# Patient Record
Sex: FEMALE | Race: WHITE | NOT HISPANIC OR LATINO | Employment: OTHER | URBAN - METROPOLITAN AREA
[De-identification: names, ages, dates, MRNs, and addresses within clinical notes are randomized per-mention and may not be internally consistent; named-entity substitution may affect disease eponyms.]

---

## 2017-01-12 ENCOUNTER — APPOINTMENT (OUTPATIENT)
Dept: LAB | Facility: CLINIC | Age: 82
End: 2017-01-12
Payer: MEDICARE

## 2017-01-12 ENCOUNTER — TRANSCRIBE ORDERS (OUTPATIENT)
Dept: LAB | Facility: CLINIC | Age: 82
End: 2017-01-12

## 2017-01-12 DIAGNOSIS — M15.0 PRIMARY GENERALIZED HYPERTROPHIC OSTEOARTHROSIS: ICD-10-CM

## 2017-01-12 DIAGNOSIS — I10 ESSENTIAL HYPERTENSION, MALIGNANT: ICD-10-CM

## 2017-01-12 DIAGNOSIS — E55.9 UNSPECIFIED VITAMIN D DEFICIENCY: ICD-10-CM

## 2017-01-12 DIAGNOSIS — E11.9 DIABETES MELLITUS WITHOUT COMPLICATION (HCC): Primary | ICD-10-CM

## 2017-01-12 DIAGNOSIS — R35.0 URINARY FREQUENCY: ICD-10-CM

## 2017-01-12 DIAGNOSIS — D64.9 ANEMIA, UNSPECIFIED: ICD-10-CM

## 2017-01-12 DIAGNOSIS — Z79.899 ENCOUNTER FOR LONG-TERM (CURRENT) USE OF OTHER MEDICATIONS: ICD-10-CM

## 2017-01-12 LAB
25(OH)D3 SERPL-MCNC: 24.5 NG/ML (ref 30–100)
ALBUMIN SERPL BCP-MCNC: 3.8 G/DL (ref 3.5–5)
ALP SERPL-CCNC: 55 U/L (ref 46–116)
ALT SERPL W P-5'-P-CCNC: 15 U/L (ref 12–78)
ANION GAP SERPL CALCULATED.3IONS-SCNC: 6 MMOL/L (ref 4–13)
AST SERPL W P-5'-P-CCNC: 11 U/L (ref 5–45)
BACTERIA UR QL AUTO: ABNORMAL /HPF
BASOPHILS # BLD AUTO: 0.02 THOUSANDS/ΜL (ref 0–0.1)
BASOPHILS NFR BLD AUTO: 0 % (ref 0–1)
BILIRUB SERPL-MCNC: 0.55 MG/DL (ref 0.2–1)
BILIRUB UR QL STRIP: NEGATIVE
BUN SERPL-MCNC: 25 MG/DL (ref 5–25)
CALCIUM SERPL-MCNC: 9.6 MG/DL (ref 8.3–10.1)
CHLORIDE SERPL-SCNC: 104 MMOL/L (ref 100–108)
CHOLEST SERPL-MCNC: 212 MG/DL (ref 50–200)
CLARITY UR: ABNORMAL
CO2 SERPL-SCNC: 31 MMOL/L (ref 21–32)
COLOR UR: YELLOW
CREAT SERPL-MCNC: 1.38 MG/DL (ref 0.6–1.3)
EOSINOPHIL # BLD AUTO: 0.95 THOUSAND/ΜL (ref 0–0.61)
EOSINOPHIL NFR BLD AUTO: 17 % (ref 0–6)
ERYTHROCYTE [DISTWIDTH] IN BLOOD BY AUTOMATED COUNT: 13.2 % (ref 11.6–15.1)
ERYTHROCYTE [SEDIMENTATION RATE] IN BLOOD: 36 MM/HOUR (ref 0–20)
EST. AVERAGE GLUCOSE BLD GHB EST-MCNC: 126 MG/DL
GFR SERPL CREATININE-BSD FRML MDRD: 36.5 ML/MIN/1.73SQ M
GLUCOSE SERPL-MCNC: 107 MG/DL (ref 65–140)
GLUCOSE UR STRIP-MCNC: NEGATIVE MG/DL
HBA1C MFR BLD: 6 % (ref 4.2–6.3)
HCT VFR BLD AUTO: 35.9 % (ref 34.8–46.1)
HDLC SERPL-MCNC: 72 MG/DL (ref 40–60)
HGB BLD-MCNC: 11.3 G/DL (ref 11.5–15.4)
HGB UR QL STRIP.AUTO: NEGATIVE
IRON SERPL-MCNC: 47 UG/DL (ref 50–170)
KETONES UR STRIP-MCNC: NEGATIVE MG/DL
LDLC SERPL CALC-MCNC: 123 MG/DL (ref 0–100)
LEUKOCYTE ESTERASE UR QL STRIP: NEGATIVE
LYMPHOCYTES # BLD AUTO: 1.56 THOUSANDS/ΜL (ref 0.6–4.47)
LYMPHOCYTES NFR BLD AUTO: 28 % (ref 14–44)
MAGNESIUM SERPL-MCNC: 2.1 MG/DL (ref 1.6–2.6)
MCH RBC QN AUTO: 29.4 PG (ref 26.8–34.3)
MCHC RBC AUTO-ENTMCNC: 31.5 G/DL (ref 31.4–37.4)
MCV RBC AUTO: 94 FL (ref 82–98)
MONOCYTES # BLD AUTO: 0.54 THOUSAND/ΜL (ref 0.17–1.22)
MONOCYTES NFR BLD AUTO: 10 % (ref 4–12)
NEUTROPHILS # BLD AUTO: 2.57 THOUSANDS/ΜL (ref 1.85–7.62)
NEUTS SEG NFR BLD AUTO: 45 % (ref 43–75)
NITRITE UR QL STRIP: NEGATIVE
NON-SQ EPI CELLS URNS QL MICRO: ABNORMAL /HPF
NRBC BLD AUTO-RTO: 0 /100 WBCS
PH UR STRIP.AUTO: 7.5 [PH] (ref 4.5–8)
PLATELET # BLD AUTO: 100 THOUSANDS/UL (ref 149–390)
PMV BLD AUTO: 12.3 FL (ref 8.9–12.7)
POTASSIUM SERPL-SCNC: 4.3 MMOL/L (ref 3.5–5.3)
PROT SERPL-MCNC: 7.5 G/DL (ref 6.4–8.2)
PROT UR STRIP-MCNC: ABNORMAL MG/DL
RBC # BLD AUTO: 3.84 MILLION/UL (ref 3.81–5.12)
RBC #/AREA URNS AUTO: ABNORMAL /HPF
SODIUM SERPL-SCNC: 141 MMOL/L (ref 136–145)
SP GR UR STRIP.AUTO: 1.02 (ref 1–1.03)
TRIGL SERPL-MCNC: 87 MG/DL
TSH SERPL DL<=0.05 MIU/L-ACNC: 1.17 UIU/ML (ref 0.36–3.74)
URATE SERPL-MCNC: 5.7 MG/DL (ref 2–6.8)
UROBILINOGEN UR QL STRIP.AUTO: 0.2 E.U./DL
VIT B12 SERPL-MCNC: 361 PG/ML (ref 100–900)
WBC # BLD AUTO: 5.65 THOUSAND/UL (ref 4.31–10.16)
WBC #/AREA URNS AUTO: ABNORMAL /HPF

## 2017-01-12 PROCEDURE — 80053 COMPREHEN METABOLIC PANEL: CPT | Performed by: INTERNAL MEDICINE

## 2017-01-12 PROCEDURE — 83540 ASSAY OF IRON: CPT | Performed by: INTERNAL MEDICINE

## 2017-01-12 PROCEDURE — 87086 URINE CULTURE/COLONY COUNT: CPT | Performed by: INTERNAL MEDICINE

## 2017-01-12 PROCEDURE — 81001 URINALYSIS AUTO W/SCOPE: CPT | Performed by: INTERNAL MEDICINE

## 2017-01-12 PROCEDURE — 82607 VITAMIN B-12: CPT | Performed by: INTERNAL MEDICINE

## 2017-01-12 PROCEDURE — 84443 ASSAY THYROID STIM HORMONE: CPT | Performed by: INTERNAL MEDICINE

## 2017-01-12 PROCEDURE — 84550 ASSAY OF BLOOD/URIC ACID: CPT | Performed by: INTERNAL MEDICINE

## 2017-01-12 PROCEDURE — 82306 VITAMIN D 25 HYDROXY: CPT | Performed by: INTERNAL MEDICINE

## 2017-01-12 PROCEDURE — 83735 ASSAY OF MAGNESIUM: CPT | Performed by: INTERNAL MEDICINE

## 2017-01-12 PROCEDURE — 80061 LIPID PANEL: CPT | Performed by: INTERNAL MEDICINE

## 2017-01-12 PROCEDURE — 36415 COLL VENOUS BLD VENIPUNCTURE: CPT | Performed by: INTERNAL MEDICINE

## 2017-01-12 PROCEDURE — 83036 HEMOGLOBIN GLYCOSYLATED A1C: CPT | Performed by: INTERNAL MEDICINE

## 2017-01-12 PROCEDURE — 85652 RBC SED RATE AUTOMATED: CPT | Performed by: INTERNAL MEDICINE

## 2017-01-12 PROCEDURE — 85025 COMPLETE CBC W/AUTO DIFF WBC: CPT | Performed by: INTERNAL MEDICINE

## 2017-01-13 LAB — BACTERIA UR CULT: NORMAL

## 2017-09-25 ENCOUNTER — OFFICE VISIT (OUTPATIENT)
Dept: LAB | Facility: HOSPITAL | Age: 82
End: 2017-09-25
Attending: INTERNAL MEDICINE
Payer: MEDICARE

## 2017-09-25 ENCOUNTER — TRANSCRIBE ORDERS (OUTPATIENT)
Dept: ADMINISTRATIVE | Facility: HOSPITAL | Age: 82
End: 2017-09-25

## 2017-09-25 ENCOUNTER — HOSPITAL ENCOUNTER (OUTPATIENT)
Dept: RADIOLOGY | Facility: HOSPITAL | Age: 82
Discharge: HOME | End: 2017-09-25
Attending: INTERNAL MEDICINE
Payer: MEDICARE

## 2017-09-25 DIAGNOSIS — R07.9 CHEST PAIN, UNSPECIFIED: ICD-10-CM

## 2017-09-25 DIAGNOSIS — R07.9 CHEST PAIN, UNSPECIFIED: Primary | ICD-10-CM

## 2017-09-25 PROCEDURE — 71020 HB CHEST X-RAY 2VW FRONTAL&LATL: CPT

## 2017-09-25 PROCEDURE — 93005 ELECTROCARDIOGRAM TRACING: CPT

## 2017-09-26 ENCOUNTER — TRANSCRIBE ORDERS (OUTPATIENT)
Dept: LAB | Facility: CLINIC | Age: 82
End: 2017-09-26

## 2017-09-26 ENCOUNTER — APPOINTMENT (OUTPATIENT)
Dept: LAB | Facility: CLINIC | Age: 82
End: 2017-09-26
Payer: MEDICARE

## 2017-09-26 DIAGNOSIS — R07.9 CHEST PAIN, UNSPECIFIED: ICD-10-CM

## 2017-09-26 DIAGNOSIS — R53.83 FATIGUE, UNSPECIFIED TYPE: ICD-10-CM

## 2017-09-26 DIAGNOSIS — E55.9 UNSPECIFIED VITAMIN D DEFICIENCY: ICD-10-CM

## 2017-09-26 DIAGNOSIS — R06.02 SHORTNESS OF BREATH: ICD-10-CM

## 2017-09-26 DIAGNOSIS — R06.03 ACUTE RESPIRATORY DISTRESS: ICD-10-CM

## 2017-09-26 DIAGNOSIS — R07.9 CHEST PAIN, UNSPECIFIED: Primary | ICD-10-CM

## 2017-09-26 DIAGNOSIS — I10 ESSENTIAL HYPERTENSION, MALIGNANT: ICD-10-CM

## 2017-09-26 DIAGNOSIS — E78.00 PURE HYPERCHOLESTEROLEMIA: ICD-10-CM

## 2017-09-26 LAB
25(OH)D3 SERPL-MCNC: 23.6 NG/ML (ref 30–100)
ALBUMIN SERPL BCP-MCNC: 3.6 G/DL (ref 3.5–5)
ALP SERPL-CCNC: 74 U/L (ref 46–116)
ALT SERPL W P-5'-P-CCNC: 25 U/L (ref 12–78)
ANION GAP SERPL CALCULATED.3IONS-SCNC: 9 MMOL/L (ref 4–13)
AST SERPL W P-5'-P-CCNC: 20 U/L (ref 5–45)
ATRIAL RATE: 84 BPM
BASOPHILS # BLD AUTO: 0.05 THOUSANDS/ΜL (ref 0–0.1)
BASOPHILS NFR BLD AUTO: 1 % (ref 0–1)
BILIRUB SERPL-MCNC: 0.82 MG/DL (ref 0.2–1)
BUN SERPL-MCNC: 28 MG/DL (ref 5–25)
CALCIUM SERPL-MCNC: 9.1 MG/DL (ref 8.3–10.1)
CHLORIDE SERPL-SCNC: 105 MMOL/L (ref 100–108)
CHOLEST SERPL-MCNC: 185 MG/DL (ref 50–200)
CO2 SERPL-SCNC: 25 MMOL/L (ref 21–32)
CREAT SERPL-MCNC: 1.3 MG/DL (ref 0.6–1.3)
DEPRECATED D DIMER PPP: 1930 NG/ML (FEU) (ref 0–424)
EOSINOPHIL # BLD AUTO: 1.1 THOUSAND/ΜL (ref 0–0.61)
EOSINOPHIL NFR BLD AUTO: 19 % (ref 0–6)
ERYTHROCYTE [DISTWIDTH] IN BLOOD BY AUTOMATED COUNT: 13.6 % (ref 11.6–15.1)
ERYTHROCYTE [SEDIMENTATION RATE] IN BLOOD: 34 MM/HOUR (ref 0–20)
EST. AVERAGE GLUCOSE BLD GHB EST-MCNC: 131 MG/DL
GFR SERPL CREATININE-BSD FRML MDRD: 38 ML/MIN/1.73SQ M
GLUCOSE P FAST SERPL-MCNC: 119 MG/DL (ref 65–99)
HBA1C MFR BLD: 6.2 % (ref 4.2–6.3)
HCT VFR BLD AUTO: 31.9 % (ref 34.8–46.1)
HDLC SERPL-MCNC: 57 MG/DL (ref 40–60)
HGB BLD-MCNC: 10.4 G/DL (ref 11.5–15.4)
IRON SERPL-MCNC: 56 UG/DL (ref 50–170)
LDLC SERPL CALC-MCNC: 112 MG/DL (ref 0–100)
LYMPHOCYTES # BLD AUTO: 1.01 THOUSANDS/ΜL (ref 0.6–4.47)
LYMPHOCYTES NFR BLD AUTO: 18 % (ref 14–44)
MAGNESIUM SERPL-MCNC: 2.3 MG/DL (ref 1.6–2.6)
MCH RBC QN AUTO: 29.2 PG (ref 26.8–34.3)
MCHC RBC AUTO-ENTMCNC: 32.6 G/DL (ref 31.4–37.4)
MCV RBC AUTO: 90 FL (ref 82–98)
MONOCYTES # BLD AUTO: 0.51 THOUSAND/ΜL (ref 0.17–1.22)
MONOCYTES NFR BLD AUTO: 9 % (ref 4–12)
NEUTROPHILS # BLD AUTO: 3.07 THOUSANDS/ΜL (ref 1.85–7.62)
NEUTS SEG NFR BLD AUTO: 53 % (ref 43–75)
NRBC BLD AUTO-RTO: 0 /100 WBCS
NT-PROBNP SERPL-MCNC: 2046 PG/ML
P AXIS: 50 DEGREES
PLATELET # BLD AUTO: 78 THOUSANDS/UL (ref 149–390)
PMV BLD AUTO: 12.7 FL (ref 8.9–12.7)
POTASSIUM SERPL-SCNC: 4.1 MMOL/L (ref 3.5–5.3)
PR INTERVAL: 146 MS
PROT SERPL-MCNC: 7.5 G/DL (ref 6.4–8.2)
QRS AXIS: -45 DEGREES
QRSD INTERVAL: 122 MS
QT INTERVAL: 396 MS
QTC INTERVAL: 467 MS
RBC # BLD AUTO: 3.56 MILLION/UL (ref 3.81–5.12)
SODIUM SERPL-SCNC: 139 MMOL/L (ref 136–145)
T WAVE AXIS: 45 DEGREES
TRIGL SERPL-MCNC: 80 MG/DL
TSH SERPL DL<=0.05 MIU/L-ACNC: 0.96 UIU/ML (ref 0.36–3.74)
URATE SERPL-MCNC: 7.2 MG/DL (ref 2–6.8)
VENTRICULAR RATE: 84 BPM
VIT B12 SERPL-MCNC: 423 PG/ML (ref 100–900)
WBC # BLD AUTO: 5.75 THOUSAND/UL (ref 4.31–10.16)

## 2017-09-26 PROCEDURE — 85025 COMPLETE CBC W/AUTO DIFF WBC: CPT | Performed by: INTERNAL MEDICINE

## 2017-09-26 PROCEDURE — 82607 VITAMIN B-12: CPT | Performed by: INTERNAL MEDICINE

## 2017-09-26 PROCEDURE — 85379 FIBRIN DEGRADATION QUANT: CPT

## 2017-09-26 PROCEDURE — 83735 ASSAY OF MAGNESIUM: CPT | Performed by: INTERNAL MEDICINE

## 2017-09-26 PROCEDURE — 83540 ASSAY OF IRON: CPT

## 2017-09-26 PROCEDURE — 83880 ASSAY OF NATRIURETIC PEPTIDE: CPT | Performed by: INTERNAL MEDICINE

## 2017-09-26 PROCEDURE — 85652 RBC SED RATE AUTOMATED: CPT | Performed by: INTERNAL MEDICINE

## 2017-09-26 PROCEDURE — 80053 COMPREHEN METABOLIC PANEL: CPT | Performed by: INTERNAL MEDICINE

## 2017-09-26 PROCEDURE — 83036 HEMOGLOBIN GLYCOSYLATED A1C: CPT | Performed by: INTERNAL MEDICINE

## 2017-09-26 PROCEDURE — 80061 LIPID PANEL: CPT | Performed by: INTERNAL MEDICINE

## 2017-09-26 PROCEDURE — 84550 ASSAY OF BLOOD/URIC ACID: CPT | Performed by: INTERNAL MEDICINE

## 2017-09-26 PROCEDURE — 84443 ASSAY THYROID STIM HORMONE: CPT

## 2017-09-26 PROCEDURE — 82306 VITAMIN D 25 HYDROXY: CPT | Performed by: INTERNAL MEDICINE

## 2017-09-26 PROCEDURE — 87086 URINE CULTURE/COLONY COUNT: CPT

## 2017-09-26 PROCEDURE — 36415 COLL VENOUS BLD VENIPUNCTURE: CPT | Performed by: INTERNAL MEDICINE

## 2017-09-27 ENCOUNTER — APPOINTMENT (OUTPATIENT)
Dept: RADIOLOGY | Facility: HOSPITAL | Age: 82
End: 2017-09-27
Attending: INTERNAL MEDICINE
Payer: MEDICARE

## 2017-09-27 ENCOUNTER — HOSPITAL ENCOUNTER (OUTPATIENT)
Dept: RADIOLOGY | Facility: HOSPITAL | Age: 82
Discharge: HOME | End: 2017-09-27
Attending: INTERNAL MEDICINE
Payer: MEDICARE

## 2017-09-27 ENCOUNTER — HOSPITAL ENCOUNTER (OUTPATIENT)
Dept: RADIOLOGY | Facility: HOSPITAL | Age: 82
Discharge: HOME | End: 2017-09-27
Payer: MEDICARE

## 2017-09-27 ENCOUNTER — TRANSCRIBE ORDERS (OUTPATIENT)
Dept: ADMINISTRATIVE | Facility: HOSPITAL | Age: 82
End: 2017-09-27

## 2017-09-27 DIAGNOSIS — I26.99 PE (PULMONARY THROMBOEMBOLISM) (HCC): ICD-10-CM

## 2017-09-27 DIAGNOSIS — R79.1 ABNORMAL COAGULATION PROFILE: Primary | ICD-10-CM

## 2017-09-27 DIAGNOSIS — R79.89 D-DIMER, ELEVATED: ICD-10-CM

## 2017-09-27 LAB — BACTERIA UR CULT: NORMAL

## 2017-09-27 PROCEDURE — A9540 TC99M MAA: HCPCS

## 2017-09-27 PROCEDURE — 78582 LUNG VENTILAT&PERFUS IMAGING: CPT

## 2017-09-28 ENCOUNTER — HOSPITAL ENCOUNTER (OUTPATIENT)
Dept: RADIOLOGY | Facility: HOSPITAL | Age: 82
Discharge: HOME | End: 2017-09-28
Attending: INTERNAL MEDICINE
Payer: MEDICARE

## 2017-09-28 DIAGNOSIS — R79.1 ABNORMAL COAGULATION PROFILE: ICD-10-CM

## 2017-09-28 PROCEDURE — 93970 EXTREMITY STUDY: CPT

## 2017-10-09 ENCOUNTER — APPOINTMENT (OUTPATIENT)
Dept: LAB | Facility: CLINIC | Age: 82
End: 2017-10-09
Payer: MEDICARE

## 2017-10-09 ENCOUNTER — TRANSCRIBE ORDERS (OUTPATIENT)
Dept: LAB | Facility: CLINIC | Age: 82
End: 2017-10-09

## 2017-10-09 DIAGNOSIS — D64.9 ANEMIA, UNSPECIFIED TYPE: Primary | ICD-10-CM

## 2017-10-09 DIAGNOSIS — I25.10 DISEASE OF CARDIOVASCULAR SYSTEM: ICD-10-CM

## 2017-10-09 DIAGNOSIS — I11.0 BENIGN HYPERTENSIVE HEART DISEASE WITH CONGESTIVE HEART FAILURE (HCC): ICD-10-CM

## 2017-10-09 DIAGNOSIS — R06.02 SHORTNESS OF BREATH: ICD-10-CM

## 2017-10-09 DIAGNOSIS — I34.9 NONRHEUMATIC MITRAL VALVE DISORDER: ICD-10-CM

## 2017-10-09 LAB
ANION GAP SERPL CALCULATED.3IONS-SCNC: 6 MMOL/L (ref 4–13)
BUN SERPL-MCNC: 32 MG/DL (ref 5–25)
CALCIUM SERPL-MCNC: 10 MG/DL (ref 8.3–10.1)
CHLORIDE SERPL-SCNC: 104 MMOL/L (ref 100–108)
CO2 SERPL-SCNC: 29 MMOL/L (ref 21–32)
CREAT SERPL-MCNC: 1.43 MG/DL (ref 0.6–1.3)
ERYTHROCYTE [DISTWIDTH] IN BLOOD BY AUTOMATED COUNT: 13.4 % (ref 11.6–15.1)
ERYTHROCYTE [SEDIMENTATION RATE] IN BLOOD: 26 MM/HOUR (ref 0–20)
GFR SERPL CREATININE-BSD FRML MDRD: 34 ML/MIN/1.73SQ M
GLUCOSE P FAST SERPL-MCNC: 125 MG/DL (ref 65–99)
HCT VFR BLD AUTO: 37 % (ref 34.8–46.1)
HGB BLD-MCNC: 11.5 G/DL (ref 11.5–15.4)
MCH RBC QN AUTO: 28.5 PG (ref 26.8–34.3)
MCHC RBC AUTO-ENTMCNC: 31.1 G/DL (ref 31.4–37.4)
MCV RBC AUTO: 92 FL (ref 82–98)
NT-PROBNP SERPL-MCNC: 1230 PG/ML
PLATELET # BLD AUTO: 99 THOUSANDS/UL (ref 149–390)
PMV BLD AUTO: 12.7 FL (ref 8.9–12.7)
POTASSIUM SERPL-SCNC: 4.5 MMOL/L (ref 3.5–5.3)
RBC # BLD AUTO: 4.03 MILLION/UL (ref 3.81–5.12)
SODIUM SERPL-SCNC: 139 MMOL/L (ref 136–145)
URATE SERPL-MCNC: 9 MG/DL (ref 2–6.8)
WBC # BLD AUTO: 4.42 THOUSAND/UL (ref 4.31–10.16)

## 2017-10-09 PROCEDURE — 36415 COLL VENOUS BLD VENIPUNCTURE: CPT | Performed by: INTERNAL MEDICINE

## 2017-10-09 PROCEDURE — 84550 ASSAY OF BLOOD/URIC ACID: CPT | Performed by: INTERNAL MEDICINE

## 2017-10-09 PROCEDURE — 85652 RBC SED RATE AUTOMATED: CPT | Performed by: INTERNAL MEDICINE

## 2017-10-09 PROCEDURE — 83880 ASSAY OF NATRIURETIC PEPTIDE: CPT | Performed by: INTERNAL MEDICINE

## 2017-10-09 PROCEDURE — 80048 BASIC METABOLIC PNL TOTAL CA: CPT | Performed by: INTERNAL MEDICINE

## 2017-10-09 PROCEDURE — 85027 COMPLETE CBC AUTOMATED: CPT | Performed by: INTERNAL MEDICINE

## 2017-10-16 ENCOUNTER — APPOINTMENT (OUTPATIENT)
Dept: LAB | Facility: CLINIC | Age: 82
End: 2017-10-16
Payer: MEDICARE

## 2017-10-16 ENCOUNTER — TRANSCRIBE ORDERS (OUTPATIENT)
Dept: LAB | Facility: CLINIC | Age: 82
End: 2017-10-16

## 2017-10-16 DIAGNOSIS — E08.00 DIABETES MELLITUS DUE TO UNDERLYING CONDITION WITH HYPEROSMOLARITY WITHOUT COMA, WITHOUT LONG-TERM CURRENT USE OF INSULIN (HCC): ICD-10-CM

## 2017-10-16 DIAGNOSIS — R06.02 SHORTNESS OF BREATH: ICD-10-CM

## 2017-10-16 DIAGNOSIS — Z79.899 NEED FOR PROPHYLACTIC CHEMOTHERAPY: ICD-10-CM

## 2017-10-16 DIAGNOSIS — I20.0 UNSTABLE ANGINA PECTORIS (HCC): ICD-10-CM

## 2017-10-16 DIAGNOSIS — I20.0 UNSTABLE ANGINA PECTORIS (HCC): Primary | ICD-10-CM

## 2017-10-16 LAB
ANION GAP SERPL CALCULATED.3IONS-SCNC: 8 MMOL/L (ref 4–13)
APTT PPP: 30 SECONDS (ref 23–35)
BILIRUB UR QL STRIP: NEGATIVE
BUN SERPL-MCNC: 38 MG/DL (ref 5–25)
CALCIUM SERPL-MCNC: 9.6 MG/DL (ref 8.3–10.1)
CHLORIDE SERPL-SCNC: 103 MMOL/L (ref 100–108)
CLARITY UR: CLEAR
CO2 SERPL-SCNC: 27 MMOL/L (ref 21–32)
COLOR UR: YELLOW
CREAT SERPL-MCNC: 1.64 MG/DL (ref 0.6–1.3)
ERYTHROCYTE [DISTWIDTH] IN BLOOD BY AUTOMATED COUNT: 13.3 % (ref 11.6–15.1)
GFR SERPL CREATININE-BSD FRML MDRD: 29 ML/MIN/1.73SQ M
GLUCOSE P FAST SERPL-MCNC: 123 MG/DL (ref 65–99)
GLUCOSE UR STRIP-MCNC: NEGATIVE MG/DL
HCT VFR BLD AUTO: 36 % (ref 34.8–46.1)
HGB BLD-MCNC: 11.4 G/DL (ref 11.5–15.4)
HGB UR QL STRIP.AUTO: NEGATIVE
INR PPP: 0.92 (ref 0.86–1.16)
KETONES UR STRIP-MCNC: NEGATIVE MG/DL
LEUKOCYTE ESTERASE UR QL STRIP: NEGATIVE
MCH RBC QN AUTO: 28.9 PG (ref 26.8–34.3)
MCHC RBC AUTO-ENTMCNC: 31.7 G/DL (ref 31.4–37.4)
MCV RBC AUTO: 91 FL (ref 82–98)
NITRITE UR QL STRIP: NEGATIVE
PH UR STRIP.AUTO: 7 [PH] (ref 4.5–8)
PLATELET # BLD AUTO: 94 THOUSANDS/UL (ref 149–390)
PMV BLD AUTO: 13 FL (ref 8.9–12.7)
POTASSIUM SERPL-SCNC: 4.7 MMOL/L (ref 3.5–5.3)
PROT UR STRIP-MCNC: NEGATIVE MG/DL
PROTHROMBIN TIME: 12.4 SECONDS (ref 12.1–14.4)
RBC # BLD AUTO: 3.95 MILLION/UL (ref 3.81–5.12)
SODIUM SERPL-SCNC: 138 MMOL/L (ref 136–145)
SP GR UR STRIP.AUTO: 1.01 (ref 1–1.03)
UROBILINOGEN UR QL STRIP.AUTO: 0.2 E.U./DL
WBC # BLD AUTO: 5.12 THOUSAND/UL (ref 4.31–10.16)

## 2017-10-16 PROCEDURE — 36415 COLL VENOUS BLD VENIPUNCTURE: CPT

## 2017-10-16 PROCEDURE — 85610 PROTHROMBIN TIME: CPT

## 2017-10-16 PROCEDURE — 85730 THROMBOPLASTIN TIME PARTIAL: CPT

## 2017-10-16 PROCEDURE — 80048 BASIC METABOLIC PNL TOTAL CA: CPT

## 2017-10-16 PROCEDURE — 85027 COMPLETE CBC AUTOMATED: CPT

## 2017-10-16 PROCEDURE — 87086 URINE CULTURE/COLONY COUNT: CPT

## 2017-10-16 PROCEDURE — 81003 URINALYSIS AUTO W/O SCOPE: CPT

## 2017-10-17 LAB — BACTERIA UR CULT: NORMAL

## 2018-01-11 ENCOUNTER — TRANSCRIBE ORDERS (OUTPATIENT)
Dept: ADMINISTRATIVE | Facility: HOSPITAL | Age: 83
End: 2018-01-11

## 2018-01-11 ENCOUNTER — HOSPITAL ENCOUNTER (OUTPATIENT)
Dept: RADIOLOGY | Facility: HOSPITAL | Age: 83
Discharge: HOME | End: 2018-01-11
Attending: INTERNAL MEDICINE
Payer: MEDICARE

## 2018-01-11 ENCOUNTER — GENERIC CONVERSION - ENCOUNTER (OUTPATIENT)
Dept: OTHER | Facility: OTHER | Age: 83
End: 2018-01-11

## 2018-01-11 DIAGNOSIS — I50.9 CONGESTIVE HEART FAILURE, UNSPECIFIED CONGESTIVE HEART FAILURE CHRONICITY, UNSPECIFIED CONGESTIVE HEART FAILURE TYPE: Primary | ICD-10-CM

## 2018-01-11 PROCEDURE — 71046 X-RAY EXAM CHEST 2 VIEWS: CPT

## 2018-01-12 NOTE — RESULT NOTES
Message    Colon polyps removed came back as hyperplastic and tubular adenomas  Patient to continue MiraLAX and stool softeners  No need for any follow-up colonoscopies  Patient to call for any GI symptoms    Left message for pt to call back  ak         Verified Results  (1) TISSUE EXAM [id number] 08:42AM Lela Master     Test Name Result Flag Reference   LAB AP CASE REPORT (Report)     Surgical Pathology Report             Case: [id number]                   Authorizing Provider: Rocco Tim MD     Collected:      11/02/2016 9966        Ordering Location:   [facility]  Received:      11/02/2016 [address] [place] [address]                                     Pathologist:      Lucero Holman MD                                 Specimens:  A) - Stomach, Gastric polyps- bx                                    B) - Polyp, Colorectal, Hepatic flexure polyp- cold snare                       C) - Polyp, Colorectal, Rectal polyp- bx   LAB AP FINAL DIAGNOSIS (Report)     A  Stomach polyps (biopsy):  - Hyperplastic polyp  - Chronic inactive oxyntic gastritis  - No H pylori identified (H&E)  - No intestinal metaplasia (Alcian blue/PAS)    B  Hepatic flexure polyp (biopsy):  - Tubular adenoma  - No high grade dysplasia     C  Rectal polyp (biopsy):  - Hyperplastic polyp  - Negative for dysplasia   Electronically signed by Lucero Holman MD on 11/7/2016 at 3:02 PM   LAB AP SURGICAL ADDITIONAL INFORMATION (Report)     These tests were developed and their performance characteristics   determined by [facility] or [facility]  They may not be cleared or approved by the [place]  Food and   Drug Administration  The FDA has determined that such clearance or   approval is not necessary  These tests are used for clinical purposes  They should not be regarded as investigational or for research   This   laboratory has been approved by [place] 88, designated as a high-complexity   laboratory and is qualified to perform these tests  LAB AP GROSS DESCRIPTION (Report)     A  The specimen is received in formalin, labeled with the patient's name   and medical record number, and is designated gastric polyps  The   specimen consists of 2 tan soft tissue fragments measuring 0 3 and 0 4 cm  Entirely submitted  One cassette  B  The specimen is received in formalin, labeled with the patient's name   and medical record number, and is designated hepatic flexure polyp  The   specimen consists of 2 tan soft tissue fragments measuring 0 2 and 0 5 cm  Entirely submitted  One cassette  Note: The estimated total formalin fixation time based upon information   provided by the submitting clinician and the standard processing schedule   is 19 75 hours  C  The specimen is received in formalin, labeled with the patient's name   and medical record number, and is designated rectal polyp biopsy  The   specimen consists of a single tan soft tissue fragment measuring 0 4 cm  Entirely submitted  One cassette  Note: The estimated total formalin fixation time based upon information   provided by the submitting clinician and the standard processing schedule   is 19 5 hours      MAC

## 2018-01-25 ENCOUNTER — OFFICE VISIT (OUTPATIENT)
Dept: VASCULAR SURGERY | Facility: CLINIC | Age: 83
End: 2018-01-25
Payer: MEDICARE

## 2018-01-25 VITALS
SYSTOLIC BLOOD PRESSURE: 118 MMHG | HEIGHT: 64 IN | HEART RATE: 74 BPM | DIASTOLIC BLOOD PRESSURE: 76 MMHG | WEIGHT: 148 LBS | RESPIRATION RATE: 16 BRPM | BODY MASS INDEX: 25.27 KG/M2

## 2018-01-25 DIAGNOSIS — I82.A12 DVT OF AXILLARY VEIN, ACUTE LEFT (HCC): Primary | ICD-10-CM

## 2018-01-25 PROCEDURE — 99214 OFFICE O/P EST MOD 30 MIN: CPT | Performed by: SURGERY

## 2018-01-25 RX ORDER — METOPROLOL TARTRATE 50 MG/1
25 TABLET, FILM COATED ORAL EVERY 12 HOURS SCHEDULED
COMMUNITY
End: 2018-05-19 | Stop reason: HOSPADM

## 2018-01-25 RX ORDER — TORSEMIDE 10 MG/1
10 TABLET ORAL DAILY
COMMUNITY
End: 2018-05-19 | Stop reason: HOSPADM

## 2018-01-25 RX ORDER — POLYSACCHARIDE-IRON COMPLEX 150 MG/1
CAPSULE ORAL
COMMUNITY
Start: 2018-01-05 | End: 2018-05-19 | Stop reason: HOSPADM

## 2018-01-25 RX ORDER — WARFARIN SODIUM 5 MG/1
2.5 TABLET ORAL
COMMUNITY
End: 2018-05-19 | Stop reason: HOSPADM

## 2018-01-25 RX ORDER — POTASSIUM CHLORIDE 750 MG/1
10 CAPSULE, EXTENDED RELEASE ORAL 2 TIMES DAILY
COMMUNITY
End: 2018-05-19 | Stop reason: HOSPADM

## 2018-01-25 RX ORDER — TRAMADOL HYDROCHLORIDE 50 MG/1
TABLET ORAL
Status: ON HOLD | COMMUNITY
Start: 2018-01-06 | End: 2018-04-18 | Stop reason: ALTCHOICE

## 2018-01-25 RX ORDER — ALBUTEROL SULFATE 4 MG/1
4 TABLET, FILM COATED, EXTENDED RELEASE ORAL EVERY 12 HOURS
Status: ON HOLD | COMMUNITY
End: 2018-04-18 | Stop reason: ALTCHOICE

## 2018-01-25 NOTE — PROGRESS NOTES
Assessment/Plan:  History of left upper arm deep vein thrombosis after placement of left chest pacemaker  Planning repeat Doppler and possible cessation of anticoagulation in early March  No problem-specific Assessment & Plan notes found for this encounter  Problem List Items Addressed This Visit     None            Subjective:      Patient ID: Felix Rawls is a 80 y o  female  History of deep vein thrombosis left upper extremity approximately 1 month ago status post pacemaker placement  No swelling no pain at this time left upper extremity        The following portions of the patient's history were reviewed and updated as appropriate: allergies, current medications, past family history, past medical history, past social history, past surgical history and problem list     Review of Systems   Constitutional: Positive for appetite change, fatigue and unexpected weight change  HENT: Positive for hearing loss, nosebleeds and postnasal drip  Eyes:        Pt wears glasses, and has eyesight problems  Respiratory: Negative  Cardiovascular:        Normal heart rate, SOB  Gastrointestinal: Positive for constipation  Genitourinary: Positive for urgency  Musculoskeletal: Positive for joint swelling  Pt c/o Lumbar pain, joint swelling, joint stiffnes, limb pain,   Skin: Negative  Hematological: Bruises/bleeds easily  Psychiatric/Behavioral: Positive for confusion  The patient is nervous/anxious  Objective:  No evidence of left upper arm swelling  Physical Exam   Constitutional: She appears well-developed  HENT:   Head: Normocephalic  Pulmonary/Chest: Effort normal and breath sounds normal    Musculoskeletal: Normal range of motion  Carotid pulses equal bilaterally no bruits, easily palpable left brachial radial and ulnar pulse , no evidence of swelling left upper extremity

## 2018-02-01 ENCOUNTER — TRANSCRIBE ORDERS (OUTPATIENT)
Dept: LAB | Facility: CLINIC | Age: 83
End: 2018-02-01

## 2018-02-02 ENCOUNTER — APPOINTMENT (OUTPATIENT)
Dept: LAB | Facility: CLINIC | Age: 83
End: 2018-02-02
Payer: MEDICARE

## 2018-02-02 ENCOUNTER — TRANSCRIBE ORDERS (OUTPATIENT)
Dept: LAB | Facility: CLINIC | Age: 83
End: 2018-02-02

## 2018-02-02 DIAGNOSIS — E78.00 PURE HYPERCHOLESTEROLEMIA: ICD-10-CM

## 2018-02-02 DIAGNOSIS — D64.9 ANEMIA, UNSPECIFIED TYPE: ICD-10-CM

## 2018-02-02 DIAGNOSIS — Z13.29 SCREENING FOR THYROID DISORDER: ICD-10-CM

## 2018-02-02 DIAGNOSIS — E11.9 DIABETES MELLITUS WITHOUT COMPLICATION (HCC): ICD-10-CM

## 2018-02-02 DIAGNOSIS — D51.9 ANEMIA DUE TO VITAMIN B12 DEFICIENCY, UNSPECIFIED B12 DEFICIENCY TYPE: ICD-10-CM

## 2018-02-02 DIAGNOSIS — I11.0 BENIGN HYPERTENSIVE HEART DISEASE WITH CONGESTIVE HEART FAILURE (HCC): Primary | ICD-10-CM

## 2018-02-02 DIAGNOSIS — I11.0 BENIGN HYPERTENSIVE HEART DISEASE WITH CONGESTIVE HEART FAILURE (HCC): ICD-10-CM

## 2018-02-02 LAB
ALBUMIN SERPL BCP-MCNC: 3.3 G/DL (ref 3.5–5)
ALP SERPL-CCNC: 99 U/L (ref 46–116)
ALT SERPL W P-5'-P-CCNC: 19 U/L (ref 12–78)
ANION GAP SERPL CALCULATED.3IONS-SCNC: 8 MMOL/L (ref 4–13)
AST SERPL W P-5'-P-CCNC: 23 U/L (ref 5–45)
BASOPHILS # BLD AUTO: 0.02 THOUSANDS/ΜL (ref 0–0.1)
BASOPHILS NFR BLD AUTO: 0 % (ref 0–1)
BILIRUB SERPL-MCNC: 0.41 MG/DL (ref 0.2–1)
BUN SERPL-MCNC: 62 MG/DL (ref 5–25)
CALCIUM SERPL-MCNC: 9.9 MG/DL (ref 8.3–10.1)
CHLORIDE SERPL-SCNC: 99 MMOL/L (ref 100–108)
CHOLEST SERPL-MCNC: 210 MG/DL (ref 50–200)
CO2 SERPL-SCNC: 28 MMOL/L (ref 21–32)
CREAT SERPL-MCNC: 2.04 MG/DL (ref 0.6–1.3)
EOSINOPHIL # BLD AUTO: 1.49 THOUSAND/ΜL (ref 0–0.61)
EOSINOPHIL NFR BLD AUTO: 22 % (ref 0–6)
ERYTHROCYTE [DISTWIDTH] IN BLOOD BY AUTOMATED COUNT: 14.3 % (ref 11.6–15.1)
ERYTHROCYTE [SEDIMENTATION RATE] IN BLOOD: 71 MM/HOUR (ref 0–20)
EST. AVERAGE GLUCOSE BLD GHB EST-MCNC: 166 MG/DL
GFR SERPL CREATININE-BSD FRML MDRD: 22 ML/MIN/1.73SQ M
GLUCOSE P FAST SERPL-MCNC: 151 MG/DL (ref 65–99)
HBA1C MFR BLD: 7.4 % (ref 4.2–6.3)
HCT VFR BLD AUTO: 37.8 % (ref 34.8–46.1)
HDLC SERPL-MCNC: 45 MG/DL (ref 40–60)
HGB BLD-MCNC: 12.1 G/DL (ref 11.5–15.4)
IRON SERPL-MCNC: 42 UG/DL (ref 50–170)
LDLC SERPL CALC-MCNC: 140 MG/DL (ref 0–100)
LYMPHOCYTES # BLD AUTO: 1.93 THOUSANDS/ΜL (ref 0.6–4.47)
LYMPHOCYTES NFR BLD AUTO: 28 % (ref 14–44)
MAGNESIUM SERPL-MCNC: 2.5 MG/DL (ref 1.6–2.6)
MCH RBC QN AUTO: 27.8 PG (ref 26.8–34.3)
MCHC RBC AUTO-ENTMCNC: 32 G/DL (ref 31.4–37.4)
MCV RBC AUTO: 87 FL (ref 82–98)
MONOCYTES # BLD AUTO: 0.53 THOUSAND/ΜL (ref 0.17–1.22)
MONOCYTES NFR BLD AUTO: 8 % (ref 4–12)
NEUTROPHILS # BLD AUTO: 2.88 THOUSANDS/ΜL (ref 1.85–7.62)
NEUTS SEG NFR BLD AUTO: 42 % (ref 43–75)
NRBC BLD AUTO-RTO: 0 /100 WBCS
PLATELET # BLD AUTO: 136 THOUSANDS/UL (ref 149–390)
PMV BLD AUTO: 10.9 FL (ref 8.9–12.7)
POTASSIUM SERPL-SCNC: 4.2 MMOL/L (ref 3.5–5.3)
PROT SERPL-MCNC: 7.8 G/DL (ref 6.4–8.2)
RBC # BLD AUTO: 4.35 MILLION/UL (ref 3.81–5.12)
SODIUM SERPL-SCNC: 135 MMOL/L (ref 136–145)
TRIGL SERPL-MCNC: 124 MG/DL
TSH SERPL DL<=0.05 MIU/L-ACNC: 1.01 UIU/ML (ref 0.36–3.74)
VIT B12 SERPL-MCNC: 700 PG/ML (ref 100–900)
WBC # BLD AUTO: 6.86 THOUSAND/UL (ref 4.31–10.16)

## 2018-02-02 PROCEDURE — 87086 URINE CULTURE/COLONY COUNT: CPT

## 2018-02-02 PROCEDURE — 80053 COMPREHEN METABOLIC PANEL: CPT | Performed by: INTERNAL MEDICINE

## 2018-02-02 PROCEDURE — 36415 COLL VENOUS BLD VENIPUNCTURE: CPT | Performed by: INTERNAL MEDICINE

## 2018-02-02 PROCEDURE — 84443 ASSAY THYROID STIM HORMONE: CPT | Performed by: INTERNAL MEDICINE

## 2018-02-02 PROCEDURE — 85025 COMPLETE CBC W/AUTO DIFF WBC: CPT | Performed by: INTERNAL MEDICINE

## 2018-02-02 PROCEDURE — 85652 RBC SED RATE AUTOMATED: CPT | Performed by: INTERNAL MEDICINE

## 2018-02-02 PROCEDURE — 83540 ASSAY OF IRON: CPT

## 2018-02-02 PROCEDURE — 83735 ASSAY OF MAGNESIUM: CPT | Performed by: INTERNAL MEDICINE

## 2018-02-02 PROCEDURE — 82607 VITAMIN B-12: CPT

## 2018-02-02 PROCEDURE — 83036 HEMOGLOBIN GLYCOSYLATED A1C: CPT | Performed by: INTERNAL MEDICINE

## 2018-02-02 PROCEDURE — 80061 LIPID PANEL: CPT | Performed by: INTERNAL MEDICINE

## 2018-02-03 LAB — BACTERIA UR CULT: NORMAL

## 2018-02-08 ENCOUNTER — APPOINTMENT (OUTPATIENT)
Dept: LAB | Facility: HOSPITAL | Age: 83
End: 2018-02-08
Payer: MEDICARE

## 2018-02-08 ENCOUNTER — TRANSCRIBE ORDERS (OUTPATIENT)
Dept: ADMINISTRATIVE | Facility: HOSPITAL | Age: 83
End: 2018-02-08

## 2018-02-08 DIAGNOSIS — I48.0 PAF (PAROXYSMAL ATRIAL FIBRILLATION) (HCC): Primary | ICD-10-CM

## 2018-02-08 LAB
INR PPP: 2.57 (ref 0.86–1.16)
PROTHROMBIN TIME: 27.2 SECONDS (ref 9.4–11.7)

## 2018-02-08 PROCEDURE — 36415 COLL VENOUS BLD VENIPUNCTURE: CPT | Performed by: INTERNAL MEDICINE

## 2018-02-08 PROCEDURE — 85610 PROTHROMBIN TIME: CPT | Performed by: INTERNAL MEDICINE

## 2018-03-01 ENCOUNTER — CLINICAL SUPPORT (OUTPATIENT)
Dept: CARDIAC REHAB | Facility: CLINIC | Age: 83
End: 2018-03-01
Payer: MEDICARE

## 2018-03-01 VITALS — BODY MASS INDEX: 25.95 KG/M2 | WEIGHT: 152 LBS | HEIGHT: 64 IN

## 2018-03-01 DIAGNOSIS — Z95.2 S/P MITRAL VALVE REPLACEMENT: Primary | ICD-10-CM

## 2018-03-01 PROCEDURE — 93797 PHYS/QHP OP CAR RHAB WO ECG: CPT

## 2018-03-01 NOTE — PROGRESS NOTES
Cardiac/Pulmonary Rehabilitation Plan of Care   Initial      Today's date: 3/1/2018   Visits: initial  Patient name: Eleuterio Benavides      : 1933  Age: 80 y o  MRN: [id number]  Referring Physician: Berta Klein MD  Provider: Mohit Lozada  Clinician: Nelly Singh RN    Dx:   Encounter Diagnosis   Name Primary?     S/P mitral valve replacement Yes     Date of onset: 2017    Medication compliance: Yes   Comments: daughter help manage medications  Fall Risk: Yes   Comments: unsteady gait, fatigue and short of breath with minimal exertion    EXERCISE/ACTIVITY    Cardiovascular:   Min: 20   METS: 1 8   Hr: 100   RPE: 5   O2 sat: p5    Modalities: Treadmill, NuStep and Recumbent bike  Strength training: in 4 weeks   Modalities: Arm curl  EKG changes: atrial pace and capture  Dyspnea score: 4  Home activity: none  Goals: increase strength and endurance to return to shopping and gardening, improve fatigue and shortness of breath via increasing activity  Education: explained cardiac rehabilitation, how to use treadmill, nu-step, recumbent bike, explained cardiac risk factors, hypotension and exercise tips for patients with DM  Plan: treadmill 5  To 10 minutes at 1 2 mph with 0% incline, recumbent bike 5 to 10 minutes level 1, nu-step 15 to 20 minutes level 2 increase time and resistance as tolerated  Readiness to change: 7    NUTRITION    Weight control:    Starting weight: 152#   Current weight: Weight - Scale: 68 9 kg (152 lb)   Waist circumference:    Startin inches   Current: Waist circumference (inches): 43 Inches  Diabetes: Patient reported fasting   Lipid management: low fat diet  Goals: eat a heart healthy low fat low salt diet  Education: 20% low fat diet, heart healthy diet, rate your plate  Plan: increase fish, fruits and vegetables  Readiness to change: 5    PSYCHOSOCIAL    Emotional: PHQ-9 Total Score: 18  Self-reported stress level: 1   Social support: family  Goals: increase mood to decrease depression  Education: will provide relaxation tips and positive encouragement  Plan: repeat PHQ9 score in 4 weeks  Readiness to change: 3    OTHER CORE COMPONENTS     Tobacco:   History   Smoking Status    Former Smoker    Packs/day: 0 25    Years: 10 00    Types: Cigarettes    Quit date: 1950   Smokeless Tobacco    Never Used     Blood pressure:    Resting: Resting BP: 122/68   Exercise: Recovery BP: 118/70  Goals: none  Education: none  Plan: none  Readiness to change: 1

## 2018-03-01 NOTE — PROGRESS NOTES
CARDIAC/PULMONARY REHAB ASSESSMENT    Today's date: 3/1/2018   Patient name: Hamilton Wilson      : 1933       MRN: [id number]  PCP: Kavon Solorzano MD  Cardiologist: Quita Mckeon  Surgeon:   Dx:   Encounter Diagnosis   Name Primary? 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    Cancer ([facility] )     colonic polyps, no surgery done    Deep [facility]) [facility] 2017    Diabetes mellitus ([facility] )     type 2    Diverticulosis     Edema     in legs on occ    Hypertension     on occ    Language barrier     speaks Luxembourgish & broken english       Physical Limitations: Short of breath and fatigue with minimal exertion, walks with unsteady gait and daughter states hypotensive at times    Risk Factors   Cholesterol: yes  Smoking: former quit 1950   25ppd times 2 5 years  HTN: yes  DM: yes type II  Obesity: yes   Inactivity: yes  Family History:   Family History   Problem Relation Age of Onset    Cancer Mother      throat     Allergies: Allergies   Allergen Reactions    Heparin      Other:     Current Medications:   Current Outpatient Prescriptions   Medication Sig Dispense Refill    albuterol (VOSPIRE ER) 4 mg 12 hr tablet Take 4 mg by mouth every 12 (twelve) hours      Calcium Carb-Cholecalciferol (CALCIUM 1000 + D) 1000-800 MG-UNIT TABS Take 1 tablet by mouth 2 (two) times a day        IFEREX 150 150 MG capsule       lidocaine (XYLOCAINE) 2 % topical gel Apply topically as needed for mild pain      metFORMIN (GLUCOPHAGE) 500 mg tablet Take 500 mg by mouth 2 (two) times a day with meals        metoprolol tartrate (LOPRESSOR) 50 mg tablet Take 25 mg by mouth every 12 (twelve) hours      naproxen sodium (ALEVE) 220 MG tablet Take 220 mg by mouth as needed for mild pain   pantoprazole (PROTONIX) 40 mg tablet Take 1 tablet by mouth daily for 30 days 30 tablet 0    potassium chloride (MICRO-K) 10 MEQ CR capsule Take 10 mEq by mouth 2 (two) times a day      torsemide (DEMADEX) 10 mg tablet Take 10 mg by mouth daily      traMADol (ULTRAM) 50 mg tablet       triamterene-hydrochlorothiazide (DYAZIDE) 37 5-25 mg per capsule Take 1 capsule by mouth as needed   warfarin (COUMADIN) 5 mg tablet Take by mouth daily       No current facility-administered medications for this visit          Functional Status Prior to Diagnosis for Treatment   Occupation: retired  Recreation: watching Tv, gardening  ADLs: self  Casey: yes  Exercise: walking outdoors  Other: none    Current Functional Status  Occupation: etired  Recreation: watching TV  ADLs: self  Casey: yes  Exercise: none  Other: none    Short Term Program Goals: Attend Cardiac rehabilitation 2 to 3 times a week    Long Term Goals: increase strength and endurance to return to shopping, gardening and walking without fatigue or shortness of breath    Ability to reach goals/rehabilitation potential: yes    Projected return to function: 12 weeks  Objective tests: six minute walk test, sub max treadmill test, arm curl, chair to stand test        Nutritional   Fats/Oils: canola oil, olive oil butter  Sodium: cheese  Sweets/ETOH/Caffeine: occasional sweets once or less a week, no alcohol, decaf coffee   Dairy/Eggs: no milk, 2 or less eggs a week  Meats:    Beef: 1 serving a week   Fish: 2 servings a week  Chicken/Turkey: 3 servings a week   Pork/Ham: 1 to 2 servings a week  Processed Meats: eliminated from diet  Fruits: none  Vegetables: 1 serving a day  Grains/Beans: oatmeal, white bread, cream of wheat, potatoes, rice and beans, pasta  Supplements: multivitamin  Social: Cook self and daughter, Shop self and daughter and Dining out none dipti    Clinical Implications: decrease salt and fat, increase fruits and vegetablse Caution vit K coumadin  Goals:   she plans to increase seafood, buy lower fat cut meats, obrien less and use less salt  Emotional/Social  Marital status:    Rate 1-5:    Marriage:         Family: denies   Financial: denies   Relationships: denies   Spirituality:  denies              Intellectual: denies    Life Stressors: denies stress however scored 18 on PHQ 9 for depression under Dr Padilla Young care    Goals: none at this time    Domestic Violence Screening: denies abuse    Comments: none

## 2018-03-01 NOTE — PROGRESS NOTES
Tatyana Kiss   1933    Risk: moderate     Pre Post % Change Goal   Date: 3/1/18      Physical       Sub Max ETT (mets) 2 2   10% increase   6MWT (feet) 660   10% increase   Curls 8   5 pt decrease   Chair to stand 10      [employer] [facility] (est peak O2) 3 97      Peak exercise CR/LA (mets) 1 8   40% increase   Emotional       PHQ9 (> 10 refer to MD) 18   4 pt decrease   [place] (lower score = improvement)       Total 34   < 27   Feelings 3   < 3   Physical Fitness 5   < 3   Social Support 2   < 3   Daily Activities 5   < 3   Social Activities 5   < 3   Pain 4   < 3   Overall Health 5   < 3   Quality of Life 3   < 3   Change in Health 2   < 3   Dietary       Rate your plate 46   > 58   Measurements       Weight 152   2 5 - 5%   BMI 26 1   19 - 25   Waist Circ  43   < 40 M / < 35 F   % Body fat 34 2   < 25 M / < 33 F   BP left arm               (systolic) 231   < 830   (diastolic) 68   < 90   Smoking #/day  (if applicable) 0   0   Lipids/Glucose (Date) 2/2/2018      Total cholesterol 210   50 - 200   Triglycerides 124   < 150   HDL 45   40 - 60      < 100   A1C 7 4   4 0 - 5 6%   Fasting

## 2018-03-05 ENCOUNTER — APPOINTMENT (OUTPATIENT)
Dept: LAB | Facility: HOSPITAL | Age: 83
End: 2018-03-05
Payer: MEDICARE

## 2018-03-05 ENCOUNTER — CLINICAL SUPPORT (OUTPATIENT)
Dept: CARDIAC REHAB | Facility: CLINIC | Age: 83
End: 2018-03-05
Payer: MEDICARE

## 2018-03-05 DIAGNOSIS — Z95.2 S/P MITRAL VALVE REPLACEMENT: ICD-10-CM

## 2018-03-05 DIAGNOSIS — I48.0 PAF (PAROXYSMAL ATRIAL FIBRILLATION) (HCC): ICD-10-CM

## 2018-03-05 LAB
INR PPP: 2.4 (ref 0.86–1.16)
PROTHROMBIN TIME: 25.4 SECONDS (ref 9.4–11.7)

## 2018-03-05 PROCEDURE — 93798 PHYS/QHP OP CAR RHAB W/ECG: CPT

## 2018-03-05 PROCEDURE — 85610 PROTHROMBIN TIME: CPT

## 2018-03-05 PROCEDURE — 36415 COLL VENOUS BLD VENIPUNCTURE: CPT

## 2018-03-07 ENCOUNTER — APPOINTMENT (OUTPATIENT)
Dept: CARDIAC REHAB | Facility: CLINIC | Age: 83
End: 2018-03-07
Payer: MEDICARE

## 2018-03-09 ENCOUNTER — CLINICAL SUPPORT (OUTPATIENT)
Dept: CARDIAC REHAB | Facility: CLINIC | Age: 83
End: 2018-03-09
Payer: MEDICARE

## 2018-03-09 DIAGNOSIS — Z95.2 S/P MITRAL VALVE REPLACEMENT: ICD-10-CM

## 2018-03-09 PROCEDURE — 93798 PHYS/QHP OP CAR RHAB W/ECG: CPT

## 2018-03-12 ENCOUNTER — CLINICAL SUPPORT (OUTPATIENT)
Dept: CARDIAC REHAB | Facility: CLINIC | Age: 83
End: 2018-03-12
Payer: MEDICARE

## 2018-03-12 DIAGNOSIS — Z95.2 S/P MITRAL VALVE REPLACEMENT: ICD-10-CM

## 2018-03-12 PROCEDURE — 93798 PHYS/QHP OP CAR RHAB W/ECG: CPT

## 2018-03-14 ENCOUNTER — CLINICAL SUPPORT (OUTPATIENT)
Dept: CARDIAC REHAB | Facility: CLINIC | Age: 83
End: 2018-03-14
Payer: MEDICARE

## 2018-03-14 DIAGNOSIS — Z95.2 S/P MITRAL VALVE REPLACEMENT: ICD-10-CM

## 2018-03-14 PROCEDURE — 93798 PHYS/QHP OP CAR RHAB W/ECG: CPT

## 2018-03-16 ENCOUNTER — CLINICAL SUPPORT (OUTPATIENT)
Dept: CARDIAC REHAB | Facility: CLINIC | Age: 83
End: 2018-03-16
Payer: MEDICARE

## 2018-03-16 DIAGNOSIS — Z95.2 S/P MITRAL VALVE REPLACEMENT: ICD-10-CM

## 2018-03-16 PROCEDURE — 93798 PHYS/QHP OP CAR RHAB W/ECG: CPT

## 2018-03-19 ENCOUNTER — CLINICAL SUPPORT (OUTPATIENT)
Dept: CARDIAC REHAB | Facility: CLINIC | Age: 83
End: 2018-03-19
Payer: MEDICARE

## 2018-03-19 DIAGNOSIS — Z95.2 S/P MITRAL VALVE REPLACEMENT: ICD-10-CM

## 2018-03-19 PROCEDURE — 93798 PHYS/QHP OP CAR RHAB W/ECG: CPT

## 2018-03-21 ENCOUNTER — APPOINTMENT (OUTPATIENT)
Dept: CARDIAC REHAB | Facility: CLINIC | Age: 83
End: 2018-03-21
Payer: MEDICARE

## 2018-03-23 ENCOUNTER — CLINICAL SUPPORT (OUTPATIENT)
Dept: CARDIAC REHAB | Facility: CLINIC | Age: 83
End: 2018-03-23
Payer: MEDICARE

## 2018-03-23 DIAGNOSIS — Z95.2 S/P MITRAL VALVE REPLACEMENT: ICD-10-CM

## 2018-03-23 PROCEDURE — 93798 PHYS/QHP OP CAR RHAB W/ECG: CPT

## 2018-03-26 ENCOUNTER — CLINICAL SUPPORT (OUTPATIENT)
Dept: CARDIAC REHAB | Facility: CLINIC | Age: 83
End: 2018-03-26
Payer: MEDICARE

## 2018-03-26 DIAGNOSIS — Z95.2 S/P MITRAL VALVE REPLACEMENT: ICD-10-CM

## 2018-03-26 PROCEDURE — 93798 PHYS/QHP OP CAR RHAB W/ECG: CPT

## 2018-03-26 NOTE — PROGRESS NOTES
Cardiac/Pulmonary Rehabilitation Plan of Care   30 Day      Today's date: 3/26/2018   Visits: 9  Patient name: Josh Phillips      : 1933  Age: 80 y o  MRN: [id number]  Referring Physician: Radha Fontana MD  Provider: Mohit Lozada  Clinician: Kathleen Corona RN    Dx:   Encounter Diagnosis   Name Primary?     S/P mitral valve replacement      Date of onset: 2017    Medication compliance: Yes   Comments: daughter help manage medications  Fall Risk: Yes   Comments: unsteady gait, fatigue and short of breath with minimal exertion    EXERCISE/ACTIVITY    Cardiovascular:   Min: 40   METS: 1 8-2   Hr: 101   RPE: 4   O2 sat: 95    Modalities: Treadmill, NuStep and Recumbent bike  Strength training: in 4 weeks   Modalities: Arm curl  EKG changes: atrial pace and capture  Dyspnea score: 4  Home activity: none  Goals: increase strength and endurance to return to shopping and gardening, improve fatigue and shortness of breath via increasing activity  Education: explained cardiac rehabilitation, how to use treadmill, nu-step, recumbent bike, explained cardiac risk factors, hypotension and exercise tips for patients with DM  Plan: treadmill 15 minutes at 1 2 mph with 0% incline, recumbent bike 5 to 10 minutes level 2, nu-step 20 minutes level 4 increase time and resistance as tolerated  Readiness to change: 7    NUTRITION    Weight control:    Starting weight: 152#   Current weight: 150 5  Waist circumference:    Startin inches   Current:    Diabetes:Patient reported fasting blood sugar 122  Lipid management: low fat diet  Goals: eat a heart healthy low fat low salt diet  Education: 20% low fat diet, heart healthy diet, rate your plate  Plan: increase fish, fruits and vegetables  Readiness to change: 5    PSYCHOSOCIAL    Emotional:    Self-reported stress level: 1   Social support: family  Goals: increase mood to decrease depression  Education: will provide relaxation tips and positive encouragement  Plan: repeat PHQ9 score in 4 weeks  Readiness to change: 3    OTHER CORE COMPONENTS     Tobacco:   History   Smoking Status    Former Smoker    Packs/day: 0 25    Years: 10 00    Types: Cigarettes    Quit date: 1950   Smokeless Tobacco    Never Used     Blood pressure:    Resting: Resting BP: 146/72   Exercise:    Goals: none  Education: none  Plan: none  Readiness to change: 1     Comments: Mrs Dexter Peguero completed 9 sessions of the cardiac rehabilitation program  Her telemetry monitor has been atrial paced at times with underlying Sr  She denies any complaint of discomfort  She completes 40 minutes of cardiovascular exercise  Will increase workload on nu-step and time on recumbent bike  Will continue to monitor

## 2018-03-28 ENCOUNTER — CLINICAL SUPPORT (OUTPATIENT)
Dept: CARDIAC REHAB | Facility: CLINIC | Age: 83
End: 2018-03-28
Payer: MEDICARE

## 2018-03-28 DIAGNOSIS — Z95.2 S/P MITRAL VALVE REPLACEMENT: ICD-10-CM

## 2018-03-28 PROCEDURE — 93798 PHYS/QHP OP CAR RHAB W/ECG: CPT

## 2018-03-30 ENCOUNTER — CLINICAL SUPPORT (OUTPATIENT)
Dept: CARDIAC REHAB | Facility: CLINIC | Age: 83
End: 2018-03-30
Payer: MEDICARE

## 2018-03-30 DIAGNOSIS — Z95.2 S/P MITRAL VALVE REPLACEMENT: ICD-10-CM

## 2018-03-30 PROCEDURE — 93798 PHYS/QHP OP CAR RHAB W/ECG: CPT

## 2018-04-02 ENCOUNTER — APPOINTMENT (OUTPATIENT)
Dept: CARDIAC REHAB | Facility: CLINIC | Age: 83
End: 2018-04-02
Payer: MEDICARE

## 2018-04-04 ENCOUNTER — CLINICAL SUPPORT (OUTPATIENT)
Dept: CARDIAC REHAB | Facility: CLINIC | Age: 83
End: 2018-04-04
Payer: MEDICARE

## 2018-04-04 DIAGNOSIS — Z95.2 S/P MITRAL VALVE REPLACEMENT: ICD-10-CM

## 2018-04-04 PROCEDURE — 93798 PHYS/QHP OP CAR RHAB W/ECG: CPT

## 2018-04-06 ENCOUNTER — CLINICAL SUPPORT (OUTPATIENT)
Dept: CARDIAC REHAB | Facility: CLINIC | Age: 83
End: 2018-04-06
Payer: MEDICARE

## 2018-04-06 DIAGNOSIS — Z95.2 S/P MITRAL VALVE REPLACEMENT: ICD-10-CM

## 2018-04-06 PROCEDURE — 93798 PHYS/QHP OP CAR RHAB W/ECG: CPT

## 2018-04-09 ENCOUNTER — CLINICAL SUPPORT (OUTPATIENT)
Dept: CARDIAC REHAB | Facility: CLINIC | Age: 83
End: 2018-04-09
Payer: MEDICARE

## 2018-04-09 DIAGNOSIS — Z95.2 S/P MITRAL VALVE REPLACEMENT: ICD-10-CM

## 2018-04-09 PROCEDURE — 93798 PHYS/QHP OP CAR RHAB W/ECG: CPT

## 2018-04-11 ENCOUNTER — CLINICAL SUPPORT (OUTPATIENT)
Dept: CARDIAC REHAB | Facility: CLINIC | Age: 83
End: 2018-04-11
Payer: MEDICARE

## 2018-04-11 DIAGNOSIS — Z95.2 S/P MITRAL VALVE REPLACEMENT: ICD-10-CM

## 2018-04-11 PROCEDURE — 93798 PHYS/QHP OP CAR RHAB W/ECG: CPT

## 2018-04-13 ENCOUNTER — CLINICAL SUPPORT (OUTPATIENT)
Dept: CARDIAC REHAB | Facility: CLINIC | Age: 83
End: 2018-04-13
Payer: MEDICARE

## 2018-04-13 DIAGNOSIS — Z95.2 S/P MITRAL VALVE REPLACEMENT: ICD-10-CM

## 2018-04-13 PROCEDURE — 93798 PHYS/QHP OP CAR RHAB W/ECG: CPT

## 2018-04-16 ENCOUNTER — APPOINTMENT (OUTPATIENT)
Dept: CARDIAC REHAB | Facility: CLINIC | Age: 83
End: 2018-04-16
Payer: MEDICARE

## 2018-04-18 ENCOUNTER — APPOINTMENT (INPATIENT)
Dept: RADIOLOGY | Facility: HOSPITAL | Age: 83
DRG: 064 | End: 2018-04-18
Payer: MEDICARE

## 2018-04-18 ENCOUNTER — APPOINTMENT (EMERGENCY)
Dept: RADIOLOGY | Facility: HOSPITAL | Age: 83
DRG: 064 | End: 2018-04-18
Payer: MEDICARE

## 2018-04-18 ENCOUNTER — CLINICAL SUPPORT (OUTPATIENT)
Dept: CARDIAC REHAB | Facility: CLINIC | Age: 83
End: 2018-04-18
Payer: MEDICARE

## 2018-04-18 ENCOUNTER — HOSPITAL ENCOUNTER (INPATIENT)
Facility: HOSPITAL | Age: 83
LOS: 3 days | Discharge: NURSING, REHAB OR LONG-TERM CARE | DRG: 064 | End: 2018-04-21
Attending: EMERGENCY MEDICINE | Admitting: INTERNAL MEDICINE
Payer: MEDICARE

## 2018-04-18 DIAGNOSIS — Z95.2 S/P MITRAL VALVE REPLACEMENT: ICD-10-CM

## 2018-04-18 DIAGNOSIS — Z95.2 H/O MITRAL VALVE REPLACEMENT: ICD-10-CM

## 2018-04-18 DIAGNOSIS — R25.8 CHOREIC MOVEMENTS: ICD-10-CM

## 2018-04-18 DIAGNOSIS — I50.9 CHF (CONGESTIVE HEART FAILURE) (HCC): ICD-10-CM

## 2018-04-18 DIAGNOSIS — I82.622 DEEP VEIN THROMBOSIS (DVT) OF LEFT UPPER EXTREMITY (HCC): ICD-10-CM

## 2018-04-18 DIAGNOSIS — I63.9 CVA (CEREBRAL VASCULAR ACCIDENT) (HCC): Primary | ICD-10-CM

## 2018-04-18 DIAGNOSIS — R41.82 CHANGE IN MENTAL STATUS: ICD-10-CM

## 2018-04-18 PROBLEM — I10 HTN (HYPERTENSION): Status: ACTIVE | Noted: 2018-04-18

## 2018-04-18 PROBLEM — E11.9 CONTROLLED TYPE 2 DIABETES MELLITUS, WITHOUT LONG-TERM CURRENT USE OF INSULIN (HCC): Status: ACTIVE | Noted: 2018-04-18

## 2018-04-18 PROBLEM — N17.9 AKI (ACUTE KIDNEY INJURY) (HCC): Status: ACTIVE | Noted: 2018-04-18

## 2018-04-18 PROBLEM — Z95.0 PACEMAKER: Status: ACTIVE | Noted: 2018-04-18

## 2018-04-18 PROBLEM — K21.9 ACID REFLUX: Status: ACTIVE | Noted: 2018-04-18

## 2018-04-18 PROBLEM — K59.00 CONSTIPATION: Status: ACTIVE | Noted: 2018-04-18

## 2018-04-18 LAB
ALBUMIN SERPL BCP-MCNC: 3.5 G/DL (ref 3.5–5)
ALP SERPL-CCNC: 85 U/L (ref 46–116)
ALT SERPL W P-5'-P-CCNC: 25 U/L (ref 12–78)
ANION GAP SERPL CALCULATED.3IONS-SCNC: 5 MMOL/L (ref 4–13)
APTT PPP: 37 SECONDS (ref 23–35)
AST SERPL W P-5'-P-CCNC: 32 U/L (ref 5–45)
BACTERIA UR QL AUTO: ABNORMAL /HPF
BASOPHILS # BLD AUTO: 0.1 THOUSANDS/ΜL (ref 0–0.1)
BASOPHILS NFR BLD AUTO: 2 % (ref 0–1)
BILIRUB SERPL-MCNC: 0.5 MG/DL (ref 0.2–1)
BILIRUB UR QL STRIP: NEGATIVE
BUN SERPL-MCNC: 49 MG/DL (ref 5–25)
CALCIUM SERPL-MCNC: 9.7 MG/DL (ref 8.3–10.1)
CHLORIDE SERPL-SCNC: 99 MMOL/L (ref 100–108)
CLARITY UR: CLEAR
CO2 SERPL-SCNC: 29 MMOL/L (ref 21–32)
COLOR UR: YELLOW
CREAT SERPL-MCNC: 2.08 MG/DL (ref 0.6–1.3)
EOSINOPHIL # BLD AUTO: 1 THOUSAND/ΜL (ref 0–0.61)
EOSINOPHIL NFR BLD AUTO: 18 % (ref 0–6)
ERYTHROCYTE [DISTWIDTH] IN BLOOD BY AUTOMATED COUNT: 17.1 % (ref 11.6–15.1)
GFR SERPL CREATININE-BSD FRML MDRD: 21 ML/MIN/1.73SQ M
GLUCOSE SERPL-MCNC: 124 MG/DL (ref 65–140)
GLUCOSE SERPL-MCNC: 91 MG/DL (ref 65–140)
GLUCOSE SERPL-MCNC: 95 MG/DL (ref 65–140)
GLUCOSE SERPL-MCNC: 95 MG/DL (ref 65–140)
GLUCOSE UR STRIP-MCNC: NEGATIVE MG/DL
HCT VFR BLD AUTO: 35.1 % (ref 37–47)
HGB BLD-MCNC: 11.7 G/DL (ref 12–16)
HGB UR QL STRIP.AUTO: NEGATIVE
INR PPP: 3.15 (ref 0.86–1.16)
KETONES UR STRIP-MCNC: NEGATIVE MG/DL
LEUKOCYTE ESTERASE UR QL STRIP: ABNORMAL
LYMPHOCYTES # BLD AUTO: 1.2 THOUSANDS/ΜL (ref 0.6–4.47)
LYMPHOCYTES NFR BLD AUTO: 21 % (ref 14–44)
MAGNESIUM SERPL-MCNC: 2.5 MG/DL (ref 1.6–2.6)
MCH RBC QN AUTO: 28.6 PG (ref 27–31)
MCHC RBC AUTO-ENTMCNC: 33.5 G/DL (ref 31.4–37.4)
MCV RBC AUTO: 86 FL (ref 82–98)
MONOCYTES # BLD AUTO: 0.5 THOUSAND/ΜL (ref 0.17–1.22)
MONOCYTES NFR BLD AUTO: 9 % (ref 4–12)
NEUTROPHILS # BLD AUTO: 2.9 THOUSANDS/ΜL (ref 1.85–7.62)
NEUTS SEG NFR BLD AUTO: 50 % (ref 43–75)
NITRITE UR QL STRIP: NEGATIVE
NON-SQ EPI CELLS URNS QL MICRO: ABNORMAL /HPF
OTHER STN SPEC: ABNORMAL
PH UR STRIP.AUTO: 6.5 [PH] (ref 5–9)
PHOSPHATE SERPL-MCNC: 4 MG/DL (ref 2.3–4.1)
PLATELET # BLD AUTO: 115 THOUSANDS/UL (ref 130–400)
PMV BLD AUTO: 10 FL (ref 8.9–12.7)
POTASSIUM SERPL-SCNC: 5.2 MMOL/L (ref 3.5–5.3)
PROT SERPL-MCNC: 7.9 G/DL (ref 6.4–8.2)
PROT UR STRIP-MCNC: NEGATIVE MG/DL
PROTHROMBIN TIME: 33.5 SECONDS (ref 9.4–11.7)
RBC # BLD AUTO: 4.1 MILLION/UL (ref 4.2–5.4)
RBC #/AREA URNS AUTO: ABNORMAL /HPF
SODIUM SERPL-SCNC: 133 MMOL/L (ref 136–145)
SP GR UR STRIP.AUTO: <=1.005 (ref 1–1.03)
TROPONIN I SERPL-MCNC: <0.02 NG/ML
TSH SERPL DL<=0.05 MIU/L-ACNC: 1.18 UIU/ML (ref 0.36–3.74)
UROBILINOGEN UR QL STRIP.AUTO: 0.2 E.U./DL
WBC # BLD AUTO: 5.7 THOUSAND/UL (ref 4.8–10.8)
WBC #/AREA URNS AUTO: ABNORMAL /HPF

## 2018-04-18 PROCEDURE — 93798 PHYS/QHP OP CAR RHAB W/ECG: CPT

## 2018-04-18 PROCEDURE — 83735 ASSAY OF MAGNESIUM: CPT | Performed by: EMERGENCY MEDICINE

## 2018-04-18 PROCEDURE — 99223 1ST HOSP IP/OBS HIGH 75: CPT | Performed by: STUDENT IN AN ORGANIZED HEALTH CARE EDUCATION/TRAINING PROGRAM

## 2018-04-18 PROCEDURE — 99285 EMERGENCY DEPT VISIT HI MDM: CPT

## 2018-04-18 PROCEDURE — 72125 CT NECK SPINE W/O DYE: CPT

## 2018-04-18 PROCEDURE — 84443 ASSAY THYROID STIM HORMONE: CPT | Performed by: EMERGENCY MEDICINE

## 2018-04-18 PROCEDURE — 84100 ASSAY OF PHOSPHORUS: CPT | Performed by: EMERGENCY MEDICINE

## 2018-04-18 PROCEDURE — 36415 COLL VENOUS BLD VENIPUNCTURE: CPT | Performed by: EMERGENCY MEDICINE

## 2018-04-18 PROCEDURE — 70450 CT HEAD/BRAIN W/O DYE: CPT

## 2018-04-18 PROCEDURE — 82948 REAGENT STRIP/BLOOD GLUCOSE: CPT

## 2018-04-18 PROCEDURE — 85025 COMPLETE CBC W/AUTO DIFF WBC: CPT | Performed by: EMERGENCY MEDICINE

## 2018-04-18 PROCEDURE — 87081 CULTURE SCREEN ONLY: CPT | Performed by: STUDENT IN AN ORGANIZED HEALTH CARE EDUCATION/TRAINING PROGRAM

## 2018-04-18 PROCEDURE — 93005 ELECTROCARDIOGRAM TRACING: CPT | Performed by: EMERGENCY MEDICINE

## 2018-04-18 PROCEDURE — 85730 THROMBOPLASTIN TIME PARTIAL: CPT | Performed by: EMERGENCY MEDICINE

## 2018-04-18 PROCEDURE — 80053 COMPREHEN METABOLIC PANEL: CPT | Performed by: EMERGENCY MEDICINE

## 2018-04-18 PROCEDURE — 71045 X-RAY EXAM CHEST 1 VIEW: CPT

## 2018-04-18 PROCEDURE — 85610 PROTHROMBIN TIME: CPT | Performed by: EMERGENCY MEDICINE

## 2018-04-18 PROCEDURE — 84484 ASSAY OF TROPONIN QUANT: CPT | Performed by: EMERGENCY MEDICINE

## 2018-04-18 PROCEDURE — 81001 URINALYSIS AUTO W/SCOPE: CPT | Performed by: EMERGENCY MEDICINE

## 2018-04-18 RX ORDER — ATORVASTATIN CALCIUM 80 MG/1
80 TABLET, FILM COATED ORAL
Status: DISCONTINUED | OUTPATIENT
Start: 2018-04-18 | End: 2018-04-21 | Stop reason: HOSPADM

## 2018-04-18 RX ORDER — ASPIRIN 325 MG
325 TABLET, DELAYED RELEASE (ENTERIC COATED) ORAL ONCE
Status: COMPLETED | OUTPATIENT
Start: 2018-04-18 | End: 2018-04-18

## 2018-04-18 RX ORDER — ACETAMINOPHEN 325 MG/1
650 TABLET ORAL EVERY 4 HOURS PRN
COMMUNITY
End: 2018-05-28 | Stop reason: HOSPADM

## 2018-04-18 RX ORDER — PANTOPRAZOLE SODIUM 40 MG/1
40 TABLET, DELAYED RELEASE ORAL
Status: DISCONTINUED | OUTPATIENT
Start: 2018-04-19 | End: 2018-04-21 | Stop reason: HOSPADM

## 2018-04-18 RX ORDER — LIDOCAINE 50 MG/G
1 PATCH TOPICAL AS NEEDED
COMMUNITY
End: 2018-05-19 | Stop reason: HOSPADM

## 2018-04-18 RX ORDER — POLYETHYLENE GLYCOL 3350 17 G/17G
17 POWDER, FOR SOLUTION ORAL DAILY
Status: ON HOLD | COMMUNITY
End: 2018-08-09 | Stop reason: ALTCHOICE

## 2018-04-18 RX ORDER — IRON POLYSACCHARIDE COMPLEX 150 MG
150 CAPSULE ORAL DAILY
Status: DISCONTINUED | OUTPATIENT
Start: 2018-04-19 | End: 2018-04-21 | Stop reason: HOSPADM

## 2018-04-18 RX ORDER — POTASSIUM CHLORIDE 20 MEQ/1
20 TABLET, EXTENDED RELEASE ORAL DAILY
Status: DISCONTINUED | OUTPATIENT
Start: 2018-04-19 | End: 2018-04-18

## 2018-04-18 RX ORDER — FOLIC ACID/MULTIVIT,IRON,MINER .4-18-35
1 TABLET,CHEWABLE ORAL DAILY
COMMUNITY
End: 2018-05-25

## 2018-04-18 RX ORDER — TORSEMIDE 10 MG/1
10 TABLET ORAL DAILY
Status: DISCONTINUED | OUTPATIENT
Start: 2018-04-19 | End: 2018-04-20

## 2018-04-18 RX ORDER — ASPIRIN 81 MG/1
81 TABLET, CHEWABLE ORAL DAILY
Status: DISCONTINUED | OUTPATIENT
Start: 2018-04-19 | End: 2018-04-21 | Stop reason: HOSPADM

## 2018-04-18 RX ORDER — DOCUSATE SODIUM 100 MG/1
100 CAPSULE, LIQUID FILLED ORAL 2 TIMES DAILY
COMMUNITY
End: 2018-06-25 | Stop reason: HOSPADM

## 2018-04-18 RX ADMIN — ATORVASTATIN CALCIUM 80 MG: 80 TABLET, FILM COATED ORAL at 18:43

## 2018-04-18 RX ADMIN — ASPIRIN 325 MG: 325 TABLET, COATED ORAL at 15:51

## 2018-04-18 NOTE — ASSESSMENT & PLAN NOTE
· Last hemoglobin A1c 7 4 in February 2018  · Place on insulin sliding scale  · Hold metformin during admission

## 2018-04-18 NOTE — ASSESSMENT & PLAN NOTE
· Patient with disturbance in gait and right hand and leg dyskinetic movements  · CT Head shows acute/subacute stroke in the right parietal lobe, no hemorrhage  · Has history of TIA in January 2018  T[address] in [address]  · Etiology uncertain  Possibly embolic  Patient is anticoagulated with Coumadin but according to family often has subtherapeutic INRs  Possibly also ischemic    · Admit to inpatient  · Cannot do CTA head because of low GFR  · Will get stat echo with bubble and bilateral carotid ultrasounds  · Neuro checks, seizure precautions  · Elevate head of bed  · Permissive hypertension, hold antihypertensives  · Neuro consult  · Statin given in ED  · INR is supratherapeutic so will hold Coumadin tonight  · PTOT and speech/swallow eval

## 2018-04-18 NOTE — PLAN OF CARE
Activity Intolerance/Impaired Mobility     Mobility/activity is maintained at optimum level for patient Progressing        Communication Impairment     Ability to express needs and understand communication [address] Discharge to home or other facility with appropriate resources Progressing        INFECTION - ADULT     Absence or prevention of progression during hospitalization Progressing     Absence of fever/infection during neutropenic period Progressing        Knowledge Deficit     Patient/family/caregiver demonstrates understanding of disease process, treatment plan, medications, and discharge instructions Progressing        Neurological Deficit     Neurological status is stable or improving Progressing        Nutrition     Nutrition/Hydration status is improving Progressing        PAIN - ADULT     Verbalizes/displays adequate comfort level or baseline comfort level Progressing        Potential for Aspiration     Non-ventilated patient's risk of aspiration is minimized Progressing        Potential for Falls     Patient will remain free of falls Progressing        Prexisting or High Potential for Compromised Skin Integrity     Skin integrity is maintained or improved Progressing        SAFETY ADULT     Maintain or return to baseline ADL function Progressing     Maintain or return mobility status to optimal level Progressing

## 2018-04-18 NOTE — ED PROVIDER NOTES
History  Chief Complaint   Patient presents with    Altered Mental Status     Per daughter patient fell on Sunday and has had intermitten confusion since then and today was having trouble walking   Fall     80-year-old female with past medical history of diabetes, hypertension, TIA, arthritis, GERD, DVT on Coumadin, status post mitral valve replacement with pig valve, presents to the ER with intermittent episodes of confusion and fall 3 days ago  Patient lives with her daughter  Daughter states that about 3 days ago patient was walking into her living room when she fell from a standing position onto a hardwood floor  Fall was unwitnessed  Patient recalls the whole event  No LOC noted  Patient appeared to be mildly confused that lasted for half an hour and subsided after the fall  At that time patient refused to be evaluated in the ER  Since then patient has had intermittent episodes of confusion that would last for 10-15 minutes and resolved  Patient was at her cardiac rehab today and was noted to be confused again  Patient's gait was noted to be ataxic were patient was veering toward her left side  Patient was brought to the ER for further evaluation  Patient currently denies any chest pain, weakness, dizziness, headaches, fevers, chills, abdominal pain, nausea, vomiting, diarrhea  Patient is alert and oriented x3 during interview  History provided by:  Patient  Altered Mental Status   Presenting symptoms: no confusion    Associated symptoms: weakness    Associated symptoms: no abdominal pain, no agitation, no fever, no headaches, no nausea, no palpitations and no rash    Fall   Associated symptoms: no abdominal pain, no back pain, no chest pain, no headaches and no nausea        Prior to Admission Medications   Prescriptions Last Dose Informant Patient Reported? Taking?    Calcium Carb-Cholecalciferol (CALCIUM 1000 + D) 1000-800 MG-UNIT TABS  Self Yes No   Sig: Take 1 tablet by mouth 2 (two) times a day     IFEREX 150 150 MG capsule   Yes No   albuterol (VOSPIRE ER) 4 mg 12 hr tablet   Yes No   Sig: Take 4 mg by mouth every 12 (twelve) hours   lidocaine (XYLOCAINE) 2 % topical gel   Yes No   Sig: Apply topically as needed for mild pain   metFORMIN (GLUCOPHAGE) 500 mg tablet   Yes No   Sig: Take 500 mg by mouth 2 (two) times a day with meals  metoprolol tartrate (LOPRESSOR) 50 mg tablet   Yes No   Sig: Take 25 mg by mouth every 12 (twelve) hours   naproxen sodium (ALEVE) 220 MG tablet   Yes No   Sig: Take 220 mg by mouth as needed for mild pain  pantoprazole (PROTONIX) 40 mg tablet   No No   Sig: Take 1 tablet by mouth daily for 30 days   potassium chloride (MICRO-K) 10 MEQ CR capsule   Yes No   Sig: Take 10 mEq by mouth 2 (two) times a day   torsemide (DEMADEX) 10 mg tablet   Yes No   Sig: Take 10 mg by mouth daily   traMADol (ULTRAM) 50 mg tablet   Yes No   triamterene-hydrochlorothiazide (DYAZIDE) 37 5-25 mg per capsule   Yes No   Sig: Take 1 capsule by mouth as needed  warfarin (COUMADIN) 5 mg tablet   Yes No   Sig: Take by mouth daily      Facility-Administered Medications: None       Past Medical History:   Diagnosis Date    Acid reflux     on occ    Arthritis     DJD right hip replaced    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 2007    x 2 lesions with no change    Cancer [facility] [address] ) 2007    colonic polyps, no surgery done    Deep vein thrombosis (DVT) of left upper extremity ([facility] ) 12/11/2017    Diabetes mellitus ([facility] )     type 2    Diverticulosis     Edema     in legs on occ    Hypertension     on occ    Language barrier     speaks Micronesian & broken english       Past Surgical History:   Procedure Laterality Date    COLON SURGERY      COLONOSCOPY      COLONOSCOPY N/A 11/2/2016    Procedure: COLONOSCOPY;  Surgeon: Armond Garcia MD;  [facility]; Service:     ESOPHAGOGASTRODUODENOSCOPY N/A 11/2/2016    Procedure: ESOPHAGOGASTRODUODENOSCOPY (EGD);   Surgeon: Armond Garcia MD; [facility]; Service:     JOINT REPLACEMENT Right 02/2015    hip    JOINT REPLACEMENT Left     knee    JOINT REPLACEMENT Right     knee    MA REVISE MEDIAN N/CARPAL TUNNEL SURG Left 1/28/2016    Procedure: RELEASE CARPAL TUNNEL;  Surgeon: Chelita Lincoln MD;  Location: [facility] MAIN OR;  Service: Orthopedics    TUBAL LIGATION      VARICOSE VEIN SURGERY Bilateral        Family History   Problem Relation Age of Onset    Cancer Mother      throat     I have reviewed and agree with the history as documented  Social History   Substance Use Topics    Smoking status: Former Smoker     Packs/day: 0 25     Years: 10 00     Types: Cigarettes     Quit date: 1950    Smokeless tobacco: Never Used    Alcohol use Yes      Comment: socially        Review of Systems   Constitutional: Negative for activity change, appetite change, chills and fever  HENT: Negative for congestion and ear pain  Eyes: Negative for pain and discharge  Respiratory: Negative for cough, chest tightness, shortness of breath, wheezing and stridor  Cardiovascular: Negative for chest pain and palpitations  Gastrointestinal: Negative for abdominal distention, abdominal pain, constipation, diarrhea and nausea  Endocrine: Negative for cold intolerance  Genitourinary: Negative for dysuria, frequency and urgency  Musculoskeletal: Negative for arthralgias and back pain  Skin: Negative for color change and rash  Allergic/Immunologic: Negative for environmental allergies and food allergies  Neurological: Positive for weakness  Negative for dizziness, numbness and headaches  Hematological: Negative for adenopathy  Psychiatric/Behavioral: Negative for agitation, behavioral problems and confusion  The patient is not nervous/anxious  All other systems reviewed and are negative        Physical Exam  ED Triage Vitals   Temperature Pulse Respirations Blood Pressure SpO2   04/18/18 1435 04/18/18 1435 04/18/18 1435 04/18/18 1435 04/18/18 1515   99 1 °F (37 3 °C) 84 18 (!) 173/84 98 %      Temp Source Heart Rate Source Patient Position - Orthostatic VS BP Location FiO2 (%)   04/18/18 1435 04/18/18 1435 04/18/18 1435 -- --   Tympanic Monitor Lying        Pain Score       --                  Orthostatic Vital Signs  Vitals:    04/18/18 1445 04/18/18 1515 04/18/18 1530 04/18/18 1600   BP: (!) 173/84  141/67 145/65   Pulse: 80 78 76 76   Patient Position - Orthostatic VS:           Physical Exam   Constitutional: She is oriented to person, place, and time  She appears well-developed and well-nourished  HENT:   Head: Normocephalic and atraumatic  Mouth/Throat: Oropharynx is clear and moist    Eyes: Conjunctivae and EOM are normal    Neck: Normal range of motion  Neck supple  Cardiovascular: Normal rate, regular rhythm, normal heart sounds and intact distal pulses  Pulmonary/Chest: Effort normal and breath sounds normal    Abdominal: Soft  Bowel sounds are normal  She exhibits no distension  There is no tenderness  Musculoskeletal: Normal range of motion  Neurological: She is alert and oriented to person, place, and time  Patient is alert and oriented x3  Visual field intact bilateral   No pronator drift noted  Finger-to-nose intact bilateral   Heel-to-shin intact bilateral   Sensory and motor strength intact bilateral   No focal neuro deficits noted  Current NIH stroke score is 0  Skin: Skin is warm and dry  Psychiatric: She has a normal mood and affect  Her behavior is normal  Judgment and thought content normal    Nursing note and vitals reviewed        ED Medications  Medications   atorvastatin (LIPITOR) tablet 80 mg (not administered)   aspirin (ECOTRIN) EC tablet 325 mg (325 mg Oral Given 4/18/18 1551)       Diagnostic Studies  Results Reviewed     Procedure Component Value Units Date/Time    Comprehensive metabolic panel [[id number]]  (Abnormal) Collected:  04/18/18 1500    Lab Status:  Final result Specimen:  Blood from Arm, Right Updated:  04/18/18 1548     Sodium 133 (L) mmol/L      Potassium 5 2 mmol/L      Chloride 99 (L) mmol/L      CO2 29 mmol/L      Anion Gap 5 mmol/L      BUN 49 (H) mg/dL      Creatinine 2 08 (H) mg/dL      Glucose 95 mg/dL      Calcium 9 7 mg/dL      AST 32 U/L      ALT 25 U/L      Alkaline Phosphatase 85 U/L      Total Protein 7 9 g/dL      Albumin 3 5 g/dL      Total Bilirubin 0 50 mg/dL      eGFR 21 ml/min/1 73sq m     Narrative:         National Kidney Disease Education Program recommendations are as follows:  GFR calculation is accurate only with a steady state creatinine  Chronic Kidney disease less than 60 ml/min/1 73 sq  meters  Kidney failure less than 15 ml/min/1 73 sq  meters  Magnesium [[id number]]  (Normal) Collected:  04/18/18 1500    Lab Status:  Final result Specimen:  Blood from Arm, Right Updated:  04/18/18 1548     Magnesium 2 5 mg/dL     Phosphorus [[id number]]  (Normal) Collected:  04/18/18 1500    Lab Status:  Final result Specimen:  Blood from Arm, Right Updated:  04/18/18 1548     Phosphorus 4 0 mg/dL     TSH, 3rd generation with T4 reflex [[id number]]  (Normal) Collected:  04/18/18 1500    Lab Status:  Final result Specimen:  Blood from Arm, Right Updated:  04/18/18 1548     TSH 3RD GENERATON 1 176 uIU/mL     Narrative:         Patients undergoing fluorescein dye angiography may retain small amounts of fluorescein in the body for 48-72 hours post procedure  Samples containing fluorescein can produce falsely depressed TSH values  If the patient had this procedure,a specimen should be resubmitted post fluorescein clearance            The recommended reference ranges for TSH during pregnancy are as follows:  First trimester 0 1 to 2 5 uIU/mL  Second trimester  0 2 to 3 0 uIU/mL  Third trimester 0 3 to 3 0 uIU/m      Troponin I [[id number]]  (Normal) Collected:  04/18/18 1500    Lab Status:  Final result Specimen:  Blood from Arm, Right Updated:  04/18/18 1529 Troponin I <0 02 ng/mL     Narrative:         [employer] Chemistry analyzer 99% cutoff is > 0 04 ng/mL in network labs    o cTnI 99% cutoff is useful only when applied to patients in the clinical setting of myocardial ischemia  o cTnI 99% cutoff should be interpreted in the context of clinical history, ECG findings and possibly cardiac imaging to establish correct diagnosis  o cTnI 99% cutoff may be suggestive but clearly not indicative of a coronary event without the clinical setting of myocardial ischemia  Protime-INR [[id number]]  (Abnormal) Collected:  04/18/18 1500    Lab Status:  Final result Specimen:  Blood from Arm, Right Updated:  04/18/18 1524     Protime 33 5 (H) seconds      INR 3 15 (H)    APTT [[id number]]  (Abnormal) Collected:  04/18/18 1500    Lab Status:  Final result Specimen:  Blood from Arm, Right Updated:  04/18/18 1524     PTT 37 (H) seconds     CBC and differential [[id number]]  (Abnormal) Collected:  04/18/18 1500    Lab Status:  Final result Specimen:  Blood from Arm, Right Updated:  04/18/18 1512     WBC 5 70 Thousand/uL      RBC 4 10 (L) Million/uL      Hemoglobin 11 7 (L) g/dL      Hematocrit 35 1 (L) %      MCV 86 fL      MCH 28 6 pg      MCHC 33 5 g/dL      RDW 17 1 (H) %      MPV 10 0 fL      Platelets 640 (L) Thousands/uL      Neutrophils Relative 50 %      Lymphocytes Relative 21 %      Monocytes Relative 9 %      Eosinophils Relative 18 (H) %      Basophils Relative 2 (H) %      Neutrophils Absolute 2 90 Thousands/µL      Lymphocytes Absolute 1 20 Thousands/µL      Monocytes Absolute 0 50 Thousand/µL      Eosinophils Absolute 1 00 (H) Thousand/µL      Basophils Absolute 0 10 Thousands/µL     UA w Reflex to Microscopic [[id number]]     Lab Status:  No result Specimen:  Urine                  CT spine cervical without contrast   Final Result by Fouzia Jose MD (04/18 1514)      No cervical spine fracture or traumatic malalignment  Degenerative changes    Congenital fusion of C2 and C3  Workstation performed: [id number]         CT head without contrast   Final Result by Cassie Tinajero MD (04/18 1512)         1  Subacute to acute infarct in the posterior right parietal region  No acute hemorrhage  No extracerebral collections  * I personally telephoned this result to Lcuy Moscoso on 4/18/2018 3:11 PM                   Workstation performed: [id number]         XR chest 1 view portable    (Results Pending)   CTA head and neck with and without contrast    (Results Pending)              Procedures  ECG 12 Lead Documentation  Date/Time: 4/18/2018 3:04 PM  Performed by: Gabriel Langston  Authorized by: Gabriel Langston     Indications / Diagnosis:  Weakness  ECG reviewed by me, the ED Provider: yes    Patient location:  ED  Previous ECG:     Previous ECG:  Compared to current    Similarity:  Changes noted  Interpretation:     Interpretation: abnormal    Comments:      Paced rhythm, rate 79, widened QRS pattern noted, left axis deviation, left anterior fascicular block, no acute ST elevations noted           Phone Contacts  ED Phone Contact    ED Course  ED Course as of Apr 18 1609 Wed Apr 18, 2018   1442 Fingerstick glucose 91     1509 Case discussed with radiologist, Dr Amilcar Calles, There is an acute vs subacute infarct to posterior right parietal region  1536 Case discussed with neurologist on call, Dr Sammy Perkins, who recommends CTA head/neck to evaluate for any other vessel abnormalities, full-dose aspirin and 80 mg of Lipitor and admit for further management  1 CaseDiscussed with hospitalist will admit patient                NIH Stroke Scale    Flowsheet Row Most Recent Value   Level of Consciousness (1a )  0 Filed at: 04/18/2018 1430   LOC Questions (1b )  0 Filed at: 04/18/2018 1430   LOC Commands (1c )  0 Filed at: 04/18/2018 1430   Best Gaze (2 )  0 Filed at: 04/18/2018 1430   Visual (3 )  0 Filed at: 04/18/2018 1430   Facial Palsy (4 )  0 Filed at: 04/18/2018 1430   Motor Arm, Left (5a )  0 Filed at: 04/18/2018 1430   Motor Arm, Right (5b )  0 Filed at: 04/18/2018 1430   Motor Leg, Left (6a )  0 Filed at: 04/18/2018 1430   Motor Leg, Right (6b )  0 Filed at: 04/18/2018 1430   Limb Ataxia (7 )  0 Filed at: 04/18/2018 1430   Sensory (8 )  0 Filed at: 04/18/2018 1430   Best Language (9 )  0 Filed at: 04/18/2018 1430   Dysarthria (10 )  0 Filed at: 04/18/2018 1430   Extinction and Inattention (11 ) (Formerly Neglect)  0 Filed at: 04/18/2018 1430   Total  0 Filed at: 04/18/2018 1430                        MDM  Number of Diagnoses or Management Options  Change in mental status: new and requires workup  CVA (cerebral vascular accident) [facility]): new and requires workup  Diagnosis management comments: Obtain blood work, troponin, EKG, CT brain, CT C-spine, x-ray  Consult Neurology       Amount and/or Complexity of Data Reviewed  Clinical lab tests: ordered and reviewed  Tests in the radiology section of CPT®: ordered and reviewed  Tests in the medicine section of CPT®: ordered and reviewed  Review and summarize past medical records: yes  Discuss the patient with other providers: yes  Independent visualization of images, tracings, or specimens: yes    Risk of Complications, Morbidity, and/or Mortality  General comments: Patient presented with fall on Coumadin 3 days ago with intermittent confusion over the past 3 days  Patient was noted to have acute on subacute right posterior parietal infarct on CT brain without any acute hemorrhage  Patient's NIH stroke score in the ER is 0  Case was discussed with neurologist on call, Dr Tahmina Romero who recommended CTA head/neck, full-dose aspirin as well as 80 mg of Lipitor daily  Unable to obtain CTA head/neck in the ER today as patient's GFR is low  Patient is admitted for further management  Patient and family agrees with admission plans      Patient Progress  Patient progress: stable    CritCare Time    Disposition  Final diagnoses:   CVA (cerebral vascular accident) ([facility] [address] )   Change in mental status     Time reflects when diagnosis was documented in both MDM as applicable and the Disposition within this note     Time User Action Codes Description Comment    4/18/2018  3:43 PM Giovanni Garcia Add [I63 9] CVA (cerebral vascular accident) ([facility] [address] )     4/18/2018  3:43 PM Ramiro Cervantes Add [R41 [id number]] Change in mental status       ED Disposition     ED Disposition Condition Comment    Admit  Case was discussed with Dr Carl Lombardo and the patient's admission status was agreed to be Admission Status: inpatient status to the service of Dr Carl Lombardo  Follow-up Information    None       Patient's Medications   Discharge Prescriptions    No medications on file     No discharge procedures on file      ED Provider  Electronically Signed by           Addie Powell DO  04/18/18 [id number]

## 2018-04-18 NOTE — ASSESSMENT & PLAN NOTE
· Baseline creatinine appears to be approximately 1 3, on admission 2 08  · Likely pre renal  · IV fluid hydration  · Serial labs to follow  · Check UA and monitor UOP

## 2018-04-18 NOTE — H&P
H&P- Lamar Chapman 1933, 80 y o  female MRN: [id number]    Unit/Bed#: [address] [place] [address] Encounter: [id number]    Primary Care Provider: Adán Plascencia MD   Date and time admitted to hospital: 4/18/2018  2:31 PM        * CVA (cerebral vascular accident) [facility])   Assessment & Plan    · Patient with disturbance in gait and right hand and leg dyskinetic movements  · CT Head shows acute/subacute stroke in the right parietal lobe, no hemorrhage  · Has history of TIA in January 2018  Treated in [place]  · Etiology uncertain  Possibly embolic  Patient is anticoagulated with Coumadin but according to family often has subtherapeutic INRs  Possibly also ischemic  · Admit to inpatient  · Cannot do CTA head because of low GFR  · Will get stat echo with bubble and bilateral carotid ultrasounds  · Neuro checks, seizure precautions  · Elevate head of bed  · Permissive hypertension, hold antihypertensives  · Neuro consult  · Statin given in ED  · INR is supratherapeutic so will hold Coumadin tonight  · PTOT and speech/swallow eval        CHADWICK (acute kidney injury) ([facility] [address] )   Assessment & Plan    · Baseline creatinine appears to be approximately 1 3, on admission 2 08  · Likely pre renal  · IV fluid hydration  · Serial labs to follow  · Check UA and monitor UOP        HTN (hypertension)   Assessment & Plan    · Patient on Dyazide and metoprolol  · Holding antihypertensives for now  · Permissive hypertension        Deep vein thrombosis (DVT) of left upper extremity ([address] )   Assessment & Plan    · Occurred in January 2018  · On Coumadin  INR supra therapeutic  · Hold Coumadin tonight          CHF (congestive heart failure) (HCC)   Assessment & Plan    · History of CHF, type unknown  · On Demadex  · Continue with diuretic and check echo         Acid reflux   Assessment & Plan    · Continue PPI        Pacemaker   Assessment & Plan    · Placed in 2010        Controlled type 2 diabetes mellitus, without long-term current use of insulin (HCC)   Assessment & Plan    · Last hemoglobin A1c 7 4 in February 2018  · Place on insulin sliding scale  · Hold metformin during admission        H/O mitral valve replacement   Assessment & Plan    · On Coumadin            VTE Prophylaxis: Warfarin (Coumadin)  / sequential compression device   Code Status: Level 1 - Full Code    Anticipated Length of Stay:  Patient will be admitted on an Inpatient basis with an anticipated length of stay of  > 2 midnights  Justification for Hospital Stay: stroke workup    Total Time for Visit, including Counseling / Coordination of Care: 45 minutes  Greater than 50% of this total time spent on direct patient counseling and coordination of care  Chief Complaint:   Altered Mental Status (Per daughter patient fell on Sunday and has had intermitten confusion since then and today was having trouble walking ) and Fall      History of Present Illness:    Alejandrina Khanna is a 80 y o  female with a PMH of T2DM, HTN, HLD, TIA, MV replacement, pacemaker placement, TIA, CHF, LUE DVT on coumadin who presents with several days of occasional falls, and abnormal gait  Symptoms began 3 days ago with a fall while patient was rising from sitting to standing  Later that night she was agitated but not confused  The next day she had another fall where her knees gave out while walking  At 1:00 p m  on the day of arrival she was at physical therapy and began walking abnormally, "like she was drunk" per her daughter  She appeared to be staggering and could not hold her balance  She was instructed to go to the emergency room by the physical therapist   Family has also noticed that for several days she has had an involuntary movement in her left hand and left foot like her muscles can't hold still  Family members deny any loss of consciousness, or seizure activity  She denies any fevers chills, speech difficulty, asymmetry in her face, weakness       In the ED labs were pertinent for sodium 133, creatinine 2 08, INR 3 15  Chest x-ray showed no abnormality  CT head showed acute to subacute infarct in the posterior right parietal region with no hemorrhage  NIH score was 0  She was given 325 ASA  Review of Systems:    Review of Systems   Constitutional: Positive for activity change  Negative for chills, diaphoresis, fatigue and fever  HENT: Negative  Eyes: Negative  Negative for visual disturbance  Respiratory: Negative for cough, chest tightness, shortness of breath and wheezing  Cardiovascular: Negative for chest pain, palpitations and leg swelling  Gastrointestinal: Negative for abdominal pain, constipation, diarrhea, nausea and vomiting  Endocrine: Negative  Genitourinary: Negative for decreased urine volume, dysuria, flank pain, frequency and urgency  Musculoskeletal: Positive for arthralgias, back pain and gait problem  Skin: Negative  Neurological: Positive for weakness and headaches (Right temporal)  Negative for dizziness, seizures, syncope, facial asymmetry, speech difficulty and numbness  Left hand and left foot dyskinesia   Psychiatric/Behavioral: Positive for agitation  Negative for confusion  Past Medical and Surgical History:     Past Medical History:   Diagnosis Date    Acid reflux     on occ    Arthritis     DJD right hip replaced    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 with 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    Cancer ([facility] [address] [facility] ) 2007    colonic polyps, no surgery done    Deep vein thrombosis (DVT) of left upper extremity ([facility] ) 12/11/2017    Diabetes mellitus ([facility] )     type 2    Diverticulosis     Edema     in legs on occ    Hypertension     on occ    Language barrier     speaks Burmese & broken english       Past Surgical History:   Procedure Laterality Date    COLON SURGERY      COLONOSCOPY      COLONOSCOPY N/A 11/2/2016    Procedure: COLONOSCOPY;  Surgeon: Abeba Garvin MD;  Location: [place] [address] GI LAB;   Service:    Scarlet Cortes ESOPHAGOGASTRODUODENOSCOPY N/A 11/2/2016    Procedure: ESOPHAGOGASTRODUODENOSCOPY (EGD); Surgeon: Jose Armando MD;  Location: [facility] GI LAB; Service:     JOINT REPLACEMENT Right 02/2015    hip    JOINT REPLACEMENT Left     knee    JOINT REPLACEMENT Right     knee    IL REVISE MEDIAN N/CARPAL TUNNEL SURG Left 1/28/2016    Procedure: RELEASE CARPAL TUNNEL;  Surgeon: Mary Jane Bernabe MD;  Location: [facility] MAIN OR;  Service: Orthopedics    TUBAL LIGATION      VARICOSE VEIN SURGERY Bilateral        Meds/Allergies:    Prior to Admission medications    Medication Sig Start Date End Date Taking? Authorizing Provider   albuterol (VOSPIRE ER) 4 mg 12 hr tablet Take 4 mg by mouth every 12 (twelve) hours    Historical Provider, MD   Calcium Carb-Cholecalciferol (CALCIUM 1000 + D) 1000-800 MG-UNIT TABS Take 1 tablet by mouth 2 (two) times a day      Historical Provider, MD   IFEREX 150 150 MG capsule  1/5/18   Historical Provider, MD   lidocaine (XYLOCAINE) 2 % topical gel Apply topically as needed for mild pain    Historical Provider, MD   metFORMIN (GLUCOPHAGE) 500 mg tablet Take 500 mg by mouth 2 (two) times a day with meals  Historical Provider, MD   metoprolol tartrate (LOPRESSOR) 50 mg tablet Take 25 mg by mouth every 12 (twelve) hours    Historical Provider, MD   naproxen sodium (ALEVE) 220 MG tablet Take 220 mg by mouth as needed for mild pain  Historical Provider, MD   pantoprazole (PROTONIX) 40 mg tablet Take 1 tablet by mouth daily for 30 days 11/2/16 12/2/16  Jose Armando MD   potassium chloride (MICRO-K) 10 MEQ CR capsule Take 10 mEq by mouth 2 (two) times a day    Historical Provider, MD   torsemide (DEMADEX) 10 mg tablet Take 10 mg by mouth daily    Historical Provider, MD   traMADol Lucia Reichmann) 50 mg tablet  1/6/18   Historical Provider, MD   triamterene-hydrochlorothiazide (DYAZIDE) 37 5-25 mg per capsule Take 1 capsule by mouth as needed      Historical Provider, MD   warfarin (COUMADIN) 5 mg tablet Take by mouth daily    Historical Provider, MD       Allergies: Allergies   Allergen Reactions    Heparin        Social History:     Marital Status:    Substance Use History:   History   Alcohol Use    Yes     Comment: socially     History   Smoking Status    Former Smoker    Packs/day: 0 25    Years: 10 00    Types: Cigarettes    Quit date: 1950   Smokeless Tobacco    Never Used     History   Drug Use No       Family History:    Family History   Problem Relation Age of Onset    Cancer Mother      throat       Physical Exam:     Vitals:   Blood Pressure: 145/65 (04/18/18 1600)  Pulse: 76 (04/18/18 1600)  Temperature: 99 1 °F (37 3 °C) (04/18/18 1435)  Temp Source: Tympanic (04/18/18 1435)  Respirations: (!) 30 (04/18/18 1600)  SpO2: 98 % (04/18/18 1600)    Physical Exam   Constitutional: She is oriented to person, place, and time  She appears well-developed  No distress  HENT:   Head: Normocephalic and atraumatic  Eyes: EOM are normal  Pupils are equal, round, and reactive to light  Neck: Normal range of motion  No JVD present  Cardiovascular: Normal rate, regular rhythm and normal heart sounds  Pulmonary/Chest: Effort normal and breath sounds normal  No respiratory distress  She has no wheezes  She has no rales  Abdominal: Soft  Bowel sounds are normal  She exhibits no distension  There is no tenderness  There is no rebound and no guarding  Musculoskeletal: She exhibits no edema, tenderness or deformity  Neurological: She is alert and oriented to person, place, and time  A cranial nerve deficit is present  Right hand and to a lesser degree right foot involuntary movements noted  Strength in bilateral upper extremities 4/5  Strength in bilateral lower extremities 4/5     Skin: Skin is warm and dry  Psychiatric: She has a normal mood and affect  Her behavior is normal    Nursing note and vitals reviewed          Additional Data:     Lab Results: I have personally reviewed pertinent reports  Results from last 7 days  Lab Units 04/18/18  1500   WBC Thousand/uL 5 70   HEMOGLOBIN g/dL 11 7*   HEMATOCRIT % 35 1*   PLATELETS Thousands/uL 115*   NEUTROS PCT % 50   LYMPHS PCT % 21   MONOS PCT % 9   EOS PCT % 18*       Results from last 7 days  Lab Units 04/18/18  1500   SODIUM mmol/L 133*   POTASSIUM mmol/L 5 2   CHLORIDE mmol/L 99*   CO2 mmol/L 29   BUN mg/dL 49*   CREATININE mg/dL 2 08*   CALCIUM mg/dL 9 7   TOTAL PROTEIN g/dL 7 9   BILIRUBIN TOTAL mg/dL 0 50   ALK PHOS U/L 85   ALT U/L 25   AST U/L 32   GLUCOSE RANDOM mg/dL 95       Results from last 7 days  Lab Units 04/18/18  1500   INR  3 15*       Imaging: I have personally reviewed pertinent reports  XR chest 1 view portable   Final Result by Sherri Abraham MD (04/18 1618)      No acute cardiopulmonary disease  Workstation performed: [id number]         CT spine cervical without contrast   Final Result by Denis Zheng MD (04/18 3809)      No cervical spine fracture or traumatic malalignment  Degenerative changes  Congenital fusion of C2 and C3  Workstation performed: [id number]         CT head without contrast   Final Result by Denis Zheng MD (04/18 3463)         1  Subacute to acute infarct in the posterior right parietal region  No acute hemorrhage  No extracerebral collections  * I personally telephoned this result to Gene Jaimes on 4/18/2018 3:11 PM                   Workstation performed: [id number]         VAS carotid complete study (*Order only if CTA has not been completed*)    (Results Pending)       XR chest 1 view portable   Final Result      No acute cardiopulmonary disease  Workstation performed: [id number]         CT spine cervical without contrast   Final Result      No cervical spine fracture or traumatic malalignment  Degenerative changes  Congenital fusion of C2 and C3               Workstation performed: [id number]         CT head without contrast   Final Result         1  Subacute to acute infarct in the posterior right parietal region  No acute hemorrhage  No extracerebral collections  * I personally telephoned this result to Genaro Polo on 4/18/2018 3:11 PM                   Workstation performed: [id number]         VAS carotid complete study (*Order only if CTA has not been completed*)    (Results Pending)       EKG, Pathology, and Other Studies Reviewed on Admission:   · EKG:  Paced rhythm, 79 beats per minute  Left axis deviation  No ST elevations    Allscripts / Epic Records Reviewed: Yes     ** Please Note: This note has been constructed using a voice recognition system   **

## 2018-04-19 ENCOUNTER — APPOINTMENT (INPATIENT)
Dept: RADIOLOGY | Facility: HOSPITAL | Age: 83
DRG: 064 | End: 2018-04-19
Payer: MEDICARE

## 2018-04-19 ENCOUNTER — APPOINTMENT (INPATIENT)
Dept: NON INVASIVE DIAGNOSTICS | Facility: HOSPITAL | Age: 83
DRG: 064 | End: 2018-04-19
Payer: MEDICARE

## 2018-04-19 PROBLEM — G93.40 ENCEPHALOPATHY: Status: ACTIVE | Noted: 2018-04-19

## 2018-04-19 PROBLEM — G93.40 ENCEPHALOPATHY: Status: RESOLVED | Noted: 2018-04-19 | Resolved: 2018-04-19

## 2018-04-19 LAB
ANION GAP SERPL CALCULATED.3IONS-SCNC: 8 MMOL/L (ref 4–13)
ATRIAL RATE: 79 BPM
BUN SERPL-MCNC: 50 MG/DL (ref 5–25)
CALCIUM SERPL-MCNC: 9.6 MG/DL (ref 8.3–10.1)
CHLORIDE SERPL-SCNC: 104 MMOL/L (ref 100–108)
CHOLEST SERPL-MCNC: 125 MG/DL (ref 50–200)
CO2 SERPL-SCNC: 27 MMOL/L (ref 21–32)
CREAT SERPL-MCNC: 1.98 MG/DL (ref 0.6–1.3)
ERYTHROCYTE [DISTWIDTH] IN BLOOD BY AUTOMATED COUNT: 17.8 % (ref 11.6–15.1)
EST. AVERAGE GLUCOSE BLD GHB EST-MCNC: 143 MG/DL
GFR SERPL CREATININE-BSD FRML MDRD: 23 ML/MIN/1.73SQ M
GLUCOSE SERPL-MCNC: 101 MG/DL (ref 65–140)
GLUCOSE SERPL-MCNC: 105 MG/DL (ref 65–140)
GLUCOSE SERPL-MCNC: 107 MG/DL (ref 65–140)
GLUCOSE SERPL-MCNC: 121 MG/DL (ref 65–140)
GLUCOSE SERPL-MCNC: 228 MG/DL (ref 65–140)
HBA1C MFR BLD: 6.6 % (ref 4.2–6.3)
HCT VFR BLD AUTO: 31.6 % (ref 37–47)
HDLC SERPL-MCNC: 43 MG/DL (ref 40–60)
HGB BLD-MCNC: 10.4 G/DL (ref 12–16)
INR PPP: 2.31 (ref 0.86–1.16)
LDLC SERPL CALC-MCNC: 67 MG/DL (ref 0–100)
MCH RBC QN AUTO: 28.4 PG (ref 27–31)
MCHC RBC AUTO-ENTMCNC: 33 G/DL (ref 31.4–37.4)
MCV RBC AUTO: 86 FL (ref 82–98)
P AXIS: 19 DEGREES
PLATELET # BLD AUTO: 90 THOUSANDS/UL (ref 130–400)
PLATELET BLD QL SMEAR: ABNORMAL
PMV BLD AUTO: 9.1 FL (ref 8.9–12.7)
POTASSIUM SERPL-SCNC: 4.2 MMOL/L (ref 3.5–5.3)
PR INTERVAL: 214 MS
PROTHROMBIN TIME: 24.5 SECONDS (ref 9.4–11.7)
QRS AXIS: -48 DEGREES
QRSD INTERVAL: 124 MS
QT INTERVAL: 414 MS
QTC INTERVAL: 474 MS
RBC # BLD AUTO: 3.68 MILLION/UL (ref 4.2–5.4)
SODIUM SERPL-SCNC: 139 MMOL/L (ref 136–145)
T WAVE AXIS: 99 DEGREES
TRIGL SERPL-MCNC: 77 MG/DL
VENTRICULAR RATE: 79 BPM
WBC # BLD AUTO: 5.5 THOUSAND/UL (ref 4.8–10.8)

## 2018-04-19 PROCEDURE — 97110 THERAPEUTIC EXERCISES: CPT

## 2018-04-19 PROCEDURE — G8987 SELF CARE CURRENT STATUS: HCPCS

## 2018-04-19 PROCEDURE — 85610 PROTHROMBIN TIME: CPT | Performed by: STUDENT IN AN ORGANIZED HEALTH CARE EDUCATION/TRAINING PROGRAM

## 2018-04-19 PROCEDURE — G8996 SWALLOW CURRENT STATUS: HCPCS

## 2018-04-19 PROCEDURE — 93306 TTE W/DOPPLER COMPLETE: CPT

## 2018-04-19 PROCEDURE — 99223 1ST HOSP IP/OBS HIGH 75: CPT | Performed by: INTERNAL MEDICINE

## 2018-04-19 PROCEDURE — G8988 SELF CARE GOAL STATUS: HCPCS

## 2018-04-19 PROCEDURE — 83036 HEMOGLOBIN GLYCOSYLATED A1C: CPT | Performed by: STUDENT IN AN ORGANIZED HEALTH CARE EDUCATION/TRAINING PROGRAM

## 2018-04-19 PROCEDURE — G8997 SWALLOW GOAL STATUS: HCPCS

## 2018-04-19 PROCEDURE — 93880 EXTRACRANIAL BILAT STUDY: CPT | Performed by: SURGERY

## 2018-04-19 PROCEDURE — 99223 1ST HOSP IP/OBS HIGH 75: CPT | Performed by: PSYCHIATRY & NEUROLOGY

## 2018-04-19 PROCEDURE — 85027 COMPLETE CBC AUTOMATED: CPT | Performed by: STUDENT IN AN ORGANIZED HEALTH CARE EDUCATION/TRAINING PROGRAM

## 2018-04-19 PROCEDURE — 93010 ELECTROCARDIOGRAM REPORT: CPT | Performed by: INTERNAL MEDICINE

## 2018-04-19 PROCEDURE — 97535 SELF CARE MNGMENT TRAINING: CPT

## 2018-04-19 PROCEDURE — 80048 BASIC METABOLIC PNL TOTAL CA: CPT | Performed by: STUDENT IN AN ORGANIZED HEALTH CARE EDUCATION/TRAINING PROGRAM

## 2018-04-19 PROCEDURE — 99232 SBSQ HOSP IP/OBS MODERATE 35: CPT | Performed by: STUDENT IN AN ORGANIZED HEALTH CARE EDUCATION/TRAINING PROGRAM

## 2018-04-19 PROCEDURE — 80061 LIPID PANEL: CPT | Performed by: STUDENT IN AN ORGANIZED HEALTH CARE EDUCATION/TRAINING PROGRAM

## 2018-04-19 PROCEDURE — 93880 EXTRACRANIAL BILAT STUDY: CPT

## 2018-04-19 PROCEDURE — 82948 REAGENT STRIP/BLOOD GLUCOSE: CPT

## 2018-04-19 PROCEDURE — 97163 PT EVAL HIGH COMPLEX 45 MIN: CPT

## 2018-04-19 PROCEDURE — 97167 OT EVAL HIGH COMPLEX 60 MIN: CPT

## 2018-04-19 PROCEDURE — 92610 EVALUATE SWALLOWING FUNCTION: CPT

## 2018-04-19 RX ORDER — CLONAZEPAM 0.5 MG/1
0.25 TABLET ORAL 2 TIMES DAILY
Status: DISCONTINUED | OUTPATIENT
Start: 2018-04-19 | End: 2018-04-20

## 2018-04-19 RX ORDER — DOCUSATE SODIUM 100 MG/1
100 CAPSULE, LIQUID FILLED ORAL 2 TIMES DAILY
Status: DISCONTINUED | OUTPATIENT
Start: 2018-04-19 | End: 2018-04-21 | Stop reason: HOSPADM

## 2018-04-19 RX ORDER — POLYETHYLENE GLYCOL 3350 17 G/17G
17 POWDER, FOR SOLUTION ORAL DAILY
Status: DISCONTINUED | OUTPATIENT
Start: 2018-04-19 | End: 2018-04-21 | Stop reason: HOSPADM

## 2018-04-19 RX ORDER — ACETAMINOPHEN 325 MG/1
650 TABLET ORAL EVERY 6 HOURS PRN
Status: DISCONTINUED | OUTPATIENT
Start: 2018-04-19 | End: 2018-04-21 | Stop reason: HOSPADM

## 2018-04-19 RX ORDER — WARFARIN SODIUM 2.5 MG/1
2.5 TABLET ORAL
Status: DISCONTINUED | OUTPATIENT
Start: 2018-04-19 | End: 2018-04-21 | Stop reason: HOSPADM

## 2018-04-19 RX ADMIN — ACETAMINOPHEN 650 MG: 325 TABLET, FILM COATED ORAL at 19:30

## 2018-04-19 RX ADMIN — ASPIRIN 81 MG 81 MG: 81 TABLET ORAL at 09:15

## 2018-04-19 RX ADMIN — Medication 150 MG: at 09:15

## 2018-04-19 RX ADMIN — TORSEMIDE 10 MG: 10 TABLET ORAL at 09:15

## 2018-04-19 RX ADMIN — POLYETHYLENE GLYCOL 3350 17 G: 17 POWDER, FOR SOLUTION ORAL at 16:16

## 2018-04-19 RX ADMIN — ATORVASTATIN CALCIUM 80 MG: 80 TABLET, FILM COATED ORAL at 16:16

## 2018-04-19 RX ADMIN — INSULIN LISPRO 2 UNITS: 100 INJECTION, SOLUTION INTRAVENOUS; SUBCUTANEOUS at 12:04

## 2018-04-19 RX ADMIN — PANTOPRAZOLE SODIUM 40 MG: 40 TABLET, DELAYED RELEASE ORAL at 06:08

## 2018-04-19 RX ADMIN — DOCUSATE SODIUM 100 MG: 100 CAPSULE, LIQUID FILLED ORAL at 21:22

## 2018-04-19 RX ADMIN — WARFARIN SODIUM 2.5 MG: 2.5 TABLET ORAL at 17:16

## 2018-04-19 RX ADMIN — CLONAZEPAM 0.25 MG: 0.5 TABLET ORAL at 18:30

## 2018-04-19 NOTE — ASSESSMENT & PLAN NOTE
· Occurred in January 2018  · On Coumadin  INR supratherapeutic on arrival  · Holding Coumadin    · Follow INRs

## 2018-04-19 NOTE — ASSESSMENT & PLAN NOTE
· SSS  · Pacer Placed in 2010  · Tele shows paced rhythm  · Cardio consulted for pacer interrogation, no malfunction, no episodes of afib

## 2018-04-19 NOTE — ASSESSMENT & PLAN NOTE
Encephalopathy in the setting of CVA as evidenced by intermittant confusion requiring CT head showing subacute to acute infarct in posterior R parietal region   resolved

## 2018-04-19 NOTE — PLAN OF CARE
Problem: DISCHARGE PLANNING  Goal: Discharge to home or other facility with appropriate resources  INTERVENTIONS:  - Identify barriers to discharge w/patient and caregiver  - Arrange for needed discharge resources and transportation as appropriate  - Identify discharge learning needs (meds, wound care, etc )  - Arrange for interpretive services to assist at discharge as needed  - Refer to Case Management Department for coordinating discharge planning if the patient needs post-hospital services based on physician/advanced practitioner order or complex needs related to functional status, cognitive ability, or social support system   -Arrange STR placement  Outcome: Progressing  Pt is generally independent  She lives alone in a private, multi-level home where she stays on the first floor  Since her stroke in January, pt's children has had the home modified  Pt room has been moved to the 1st floor  She has a handicap accessible bathroom with a specialized shower/tub and raised toilet seat  She uses a cane, walker and rolator as needed to assist with ambulation  Pt had 2 surgery this past December at [facility]  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, 2017  The mitral valve surgery was unsuccessful resulting in open heart surgery for a pacemaker a few days later  Pt has a previous admission at [address] [place] [address] rehab  She uses [address] in [place], but is currently working on receiving her medication through a [employer] company  Pt's PCP is Dr Wei Patel  Pt does not drive, her children transport her to appointments  PAPITO discussed PT's recommendation with both pt and daughter; pt agreeable to placement  SNF list given to pt for review  Per pt and daughters request, [facility] will [facility] to [facility], [facility] and [address] [place] [address]  SW will advise when a bed is secured

## 2018-04-19 NOTE — SPEECH THERAPY NOTE
Speech Language/Pathology  Bedside Swallowing Evaluation     04/19/18 1000   Swallow Information   Current Risks for Dysphagia & Aspiration New Neuro event;Mental status change   Current Symptoms/Concerns (Determine safety of present diet )   Current Diet Regular; Thin liquid   Baseline Diet Regular; Thin liquids   Baseline Assessment   Behavior/Cognition Alert; Cooperative; Interactive   Speech/Language Status (WNL, no dysarthria, receptive/expressive language WNL )   Patient Positioning Upright in chair   Swallow Mechanism Exam   Labial Symmetry WFL   Labial Strength WFL   Labial ROM WFL   Labial Sensation WFL   Facial Symmetry WFL   Facial Strength WFL   Facial ROM WFL   Facial Sensation WFL   Lingual Symmetry WFL   Lingual Strength WFL   Lingual ROM WFL   Lingual Sensation WFL   Velum WFL   Gag (did not assess )   Mandible WFL   Dentition Adequate   Volitional Cough Strong   Tracheostomy No   Consistencies Assessed and Performance   Materials Admnistered Regular/Solid; Thin liquid   Materials Adminstered Comment (see above )   Oral Stage WFL   Oral Stage Comment Adequate timing of oral preparation and transport  Phargngeal Stage WFL   Pharyngeal Stage Comment No s/s aspiration during or following the swallow on any consistency  Swallow Mechanics WFL;Swallow initation; Appears prompt;Good Larygneal rise   Esophageal Concerns Hx GERDS   Strategies and Efficacy None required  Summary   Swallow Summary Swallow skills are [facility] for a regular consistency diet with thin liquids  Recommendations   Risk for Aspiration Mild   Recommendations Consider oral diet; Dysphagia treatment   Diet Solid Recommendation Regular consistency   Diet Liquid Recommendation Thin liquid   Recommended Form of Meds As desired   General Precautions Aspiration precautions;Upright as possible for all oral intake;Remain upright for 45 mins after meals   Compensatory Swallowing Strategies (none required )   Further Evaluations (not required as related to dysphagia )   Results Reviewed with RN;PT/Family/Caregiver   Treatment Recommendations   Duration of treatment (one session )   Follow up treatments Assure diet tolerance; Patient/family education   Dysphagia Goals Patient will tolerate recommended diet without observed clinical signs of oral/pharngeal dysphagia   Speech Therapy Prognosis   Prognosis Good   Prognosis Considerations Patient Participation Level; Family/Caregiver Participation Level; Availability of Services; Potential;Previous Level of Function

## 2018-04-19 NOTE — OCCUPATIONAL THERAPY NOTE
Occupational Therapy Evaluation/Treatment     04/19/18 1000   Note Type   Note type Eval/Treat   Restrictions/Precautions   Other Precautions Chair Alarm; Bed Alarm; Fall Risk  (Malawian is primary language )   Pain Assessment   Pain Assessment No/denies pain   Home Living   Type of [address] to live on main level with bedroom/bathroom  (few steps to enter )   [address] [facility],[address]   Additional Comments poor historian    Prior Function   Level of [place] Independent with ADLs and functional mobility  (uses RW, pt reports assist from family)   Lives With Alone  (children visit daily)   Receives Help From Family   ADL Assistance Independent   IADLs Needs assistance   Falls in the last 6 months (several)   Comments pt was attending outpatient physical therapy    ADL   Eating Assistance 4  Minimal Assistance   Grooming Assistance 4  Minimal Assistance   UB Bathing Assistance 4  Minimal Assistance   LB Bathing Assistance 3  Moderate Assistance   UB Dressing Assistance 4  Minimal Assistance    [address] 3  Moderate Assistance   [address]  3  Moderate Assistance   Transfers   Sit to Stand 4  Minimal assistance   Stand to Sit 4  Minimal assistance   Stand pivot 4  Minimal assistance   Functional Mobility   Functional Mobility 4  Minimal assistance   Additional Comments 10 feet   Additional items Rolling walker   Balance   Static Sitting Fair +   Dynamic Sitting Fair   Static Standing Fair -   Dynamic Standing Poor   Activity Tolerance   Activity Tolerance Patient limited by fatigue   RUE Assessment   RUE Assessment WFL  (4-/5)   LUE Assessment   LUE Assessment WFL  (4-/5, restless left UE at rest )   Hand Function   Gross Motor Coordination Functional   Fine Motor Coordination Impaired  (dyskinetic movement LUE )   Vision - Complex Assessment   Additional Comments patient presents with left sided neglect, will attend to left side with cues    Perception   Inattention/Neglect Cues to attend left visual field;Cues to attend to left side of body   Cognition   Overall Cognitive Status Impaired   Arousal/Participation Cooperative   Attention Attends with cues to redirect   Orientation Level Oriented to person;Oriented to place; Disoriented to situation   Following Commands Follows one step commands with increased time or repetition   Comments easily distracted    Assessment   Limitation Decreased ADL status; Decreased UE strength;Decreased Safe judgement during ADL;Decreased endurance;Decreased self-care trans;Decreased high-level ADLs; Decreased fine motor control;Visual deficit  (decreased balance and mobility )   Prognosis Good   Assessment Patient evaluated by Occupational Therapy  Patient [facility] with [facility]  The patients occupational profile, medical and therapy history includes a extensive additional review of physical, cognitive, or psychosocial history related to current functional performance  Comorbidities affecting functional mobility and ADLS include: arthritis, diabetes, DVT and cancer  Prior to admission, patient was independent with functional mobility with walker, independent with ADLS and requiring assist for IADLS  The evaluation identifies the following performance deficits: weakness, impaired balance, decreased endurance, decreased coordination, increased fall risk, new onset of impairment of functional mobility, decreased ADLS, decreased IADLS, decreased activity tolerance, decreased safety awareness, impaired judgement, decreased strength and visual deficits, that result in activity limitations and/or participation restrictions  This evaluation requires clinical decision making of high complexity, because the patient presents with comorbidites that affect occupational performance and required significant modification of tasks or assistance with consideration of multiple treatment options    The Barthel Index was used as a functional outcome tool presenting with a score of 45, indicating marked limitations of functional mobility and ADLS  Patient will benefit from skilled Occupational Therapy services to address above deficits and facilitate a safe return to prior level of function  Goals   Patient Goals none stated    STG Time Frame (1-7 days)   Short Term Goal  Patient will increase standing tolerance to 3 minutes during functional activity; Patient will increase bed mobility to supervision; Patient will increase functional mobility to and from bathroom with rolling walker with supervision to increase performance with ADLS; Patient will tolerate 8 minutes of UE ROM/strengthening/fine motor coordination activities to increase general activity tolerance and performance in ADLS/IADLS; Patient will improve functional activity tolerance to 10 minutes of sustained functional tasks to increase participation in basic self-care and decrease assistance level  LTG Time Frame (8-14 days)   Long Term Goal Patient will increase standing tolerance to 6 minutes during functional activity; Patient will increase bed mobility to independent; Patient will increase functional mobility to and from bathroom with rolling walker independently to increase performance with ADLS; Patient will tolerate 12 minutes of UE ROM/strengthening/fine motor coordination to increase general activity tolerance and performance in ADLS/IADLS; Patient will improve functional activity tolerance to 20 minutes of sustained functional tasks to increase participation in basic self-care and decrease assistance level     Functional Transfer Goals   Pt Will Perform All Functional Transfers (STG supervision LTG independent )   ADL Goals   Pt Will Perform Eating (STG supervision LTG Independent)   Pt Will Perform Grooming (STG supervision LTG Independent)   Pt Will Perform Bathing (STG min assist LTG supervision )   Pt Will Perform UE Dressing (STG supervision LTG independent )   Pt Will Perform LE Dressing (STG min assist LTG supervision )   Pt Will Perform Toileting (STG min assist LTG supervision )   Plan   Treatment Interventions ADL retraining;Functional transfer training;UE strengthening/ROM; Endurance training;Patient/family training;Equipment evaluation/education; Activityengagement; Energy conservation   OT Frequency 3-5x/wk   Additional Treatment Session   Start Time 4570   End Time 1000   Treatment Assessment Patient completed toilet transfer with min assist   Hygiene for urination setup, min assist in stance  Clothing management min assist   Bathroom mobility mod assist with RW and verbal cues for safety  Patient stood to wash hands at sink with mod assist   Patient requires min assist for donning and doffing right sock seated  Patient with left neglect and able to attend to left with with cues       Recommendation   OT Discharge Recommendation Short Term Rehab   Barthel Index   Feeding 5   Bathing 0   Grooming Score 0   Dressing Score 5   Bladder Score 10   Bowels Score 10   Toilet Use Score 5   Transfers (Bed/Chair) Score 10   Mobility (Level Surface) Score 0   Stairs Score 0   Barthel Index Score 45   Modified [place] Scale   Modified [place] Scale 4   Licensure   [place] License Number  Orbie Null Lukarime Jace 87 OTR/L [id number]

## 2018-04-19 NOTE — ASSESSMENT & PLAN NOTE
· Patient with disturbance in gait and right hand and leg dyskinetic movements on arrival  · CT Head shows acute/subacute stroke in the right parietal lobe, no hemorrhage  · Has history of TIA in January 2018  Treated in [address]  · Etiology uncertain  Possibly embolic  Patient is anticoagulated with Coumadin but according to family often has subtherapeutic INRs  Possibly also ischemic    · Admit to inpatient  · Cannot do CTA head because of low GFR  · echo with bubble and bilateral carotid ultrasounds done and results pending  · Neuro checks, seizure precautions  · holding antihypertensives  · Neuro consult, recs pending  · PTOT and speech/swallow eval

## 2018-04-19 NOTE — ASSESSMENT & PLAN NOTE
· Placed in 2010  · Tele shows paced rhythm  · Cardio consult for Pacer interrogation given acute CVA

## 2018-04-19 NOTE — PHYSICAL THERAPY NOTE
PT EVALUATION       04/19/18 4171   Note Type   Note type Eval/Treat   Pain Assessment   Pain Assessment No/denies pain   Home Living   Type of [address] to live on main level with bedroom/bathroom  (few steps to enter)   Home Equipment Walker   Prior Function   Level of [place] (ambulates with rolling walker)   Lives With Alone  (children visit daily)   Receives Help From Family   ADL Assistance Independent  (per pt)   Falls in the last 6 months (several PTA)   Restrictions/Precautions   Other Precautions Fall Risk;Bed Alarm; Chair Alarm  (Telugu is primary language)   General   Additional Pertinent History Pt admitted with several falls, unsteady gait and change in mental status  Pt has been attending cardiac rehab  CT brain showed acute to subacute R pariental CVA  Cognition   Arousal/Participation Cooperative   Orientation Level Oriented to person;Oriented to place; Disoriented to time;Disoriented to situation   Following Commands Follows one step commands with increased time or repetition   RLE Assessment   RLE Assessment WFL   LLE Assessment   LLE Assessment WFL   Coordination   Movements are Fluid and Coordinated (imparied heel to shin L, +pron drift L, L neglect)   Transfers   Sit to Stand 4  Minimal assistance   Stand to Sit 4  Minimal assistance   Stand pivot 4  Minimal assistance   Additional items (with walker)   Ambulation/Elevation   Gait pattern Excessively slow   Gait Assistance 4  Minimal assist   Additional items Verbal cues; Tactile cues  (for direction)   Assistive Device Rolling walker   Distance 20 feet   Balance   Static Sitting Fair +   Dynamic Sitting Fair   Static Standing Fair   Dynamic Standing Poor   Assessment   Prognosis Good   Problem List Decreased strength;Decreased endurance; Impaired balance;Decreased mobility; Decreased coordination;Decreased cognition; Impaired judgement;Decreased safety awareness   Assessment Patient seen for Physical Therapy evaluation  Patient admitted with [facility]A (cerebral vascular accident) ([facility] )  Comorbidities affecting patient's physical performance include: TIA, htn, CJF, DVT, PPM, MVR, DM  Personal factors affecting patient at time of initial evaluation include: ambulating with assistive device, stairs to enter home, communication issues, inability to ambulate household distances, preferred language not [place] (language barrier), positive fall history and inability to live alone  Prior to admission, patient was independent with functional mobility with rolling walker  Please find objective findings from Physical Therapy assessment regarding body systems outlined above with impairments and limitations including weakness, impaired balance, decreased endurance, impaired coordination, gait deviations, decreased activity tolerance, decreased functional mobility tolerance, decreased safety awareness, impaired judgement, fall risk and decreased cognition  The Barthel Index was used as a functional outcome tool presenting with a score of 45 today indicating marked limitations of functional mobility and ADLS  Patient's clinical presentation is currently unstable/unpredictable as seen in patient's presentation of increased fall risk, new onset of impairment of functional mobility, decreased endurance and new onset of weakness  Pt would benefit from continued Physical Therapy treatment to address deficits as defined above and maximize level of functional mobility  As demonstrated by objective findings, the assigned level of complexity for this evaluation is high     Goals   Patient Goals none stated   STG Expiration Date (1-7 days)   Short Term Goal #1 supervision bed mobility, supervision transfers, supervision ambulation with walker 75 feet   LTG Expiration Date (1-2 weeks)   Long Term Goal #1 independent bed mobility, independent transfers, independent ambulation with walker 100 feet, supervision up and down 5 steps, pt will attend to activities on her L 75% of the time  Plan   Treatment/Interventions ADL retraining;Functional transfer training;LE strengthening/ROM; Therapeutic exercise;Elevations; Endurance training;Patient/family training;Cognitive reorientation;Equipment eval/education; Bed mobility;Gait training;Spoke to case management   PT Frequency (daily)   Recommendation   Recommendation (STR)   Modified [place] Scale   Modified [place] Scale 4   Barthel Index   Feeding 5   Bathing 0   Grooming Score 0   Dressing Score 5   Bladder Score 10   Bowels Score 10   Toilet Use Score 5   Transfers (Bed/Chair) Score 10   Mobility (Level Surface) Score 0   Stairs Score 0   Barthel Index Score 39   Licensure   [place] License Number  Jorden Tapia PT  [id number]     Time YT:5393  Time GMY:8221  Total Time: 9      S:  "I feel OK"  O:  Pt ambulated with min assist x 50 feet with verbal and tactile cues for direction  Pt also needs cues to look ahead, as pt tends to look at the ground  Pt stood x 5 minutes with close supervision with and without hands on walker  A:  Gait speed is slow, pt remains at risk to fall, not fully aware of her defecits    P:  Continue PT    Renae Coelho, PT  [id number]

## 2018-04-19 NOTE — PROGRESS NOTES
Progress Note - Sariah Men 1933, 80 y o  female MRN: [id number]    Unit/Bed#: [address] [place] [address]- Encounter: [id number]    Primary Care Provider: Daina Pedraza MD   Date and time admitted to hospital: 4/18/2018  2:31 PM        * CVA (cerebral vascular accident) [facility])   Assessment & Plan    · Patient with disturbance in gait and right hand and leg dyskinetic movements on arrival as well as intermittent aggitation  · CT Head shows acute/subacute stroke in the right parietal lobe, no hemorrhage  · Has history of TIA in January 2018  Treated [address]  · Etiology of CVA uncertain  Possibly embolic  Patient is anticoagulated with Coumadin but according to family often has subtherapeutic INRs  Possibly also ischemic  · Cannot do CTA head because of low GFR  · Echo with bubble done and pending  · carotid ultrasounds show <50% stenosis bilaterally   · Passed swallow eval  · Neuro consult, recs appreciated  · Ok to restart coumadin per Neuro  · PT/OT recommend STR, placement pending        [facility]acute kidney [facility]) [facility]   Assessment & Plan    · Baseline creatinine appears to be approximately 1 3, on admission 2 08  · Likely pre renal  · UA showed small leukocytes  · Improving with IV fluid hydration  · Serial labs to follow  · Avoid nephrotoxic meds        HTN (hypertension)   Assessment & Plan    · Patient on Dyazide and metoprolol  · Blood pressure is stable  · Holding antihypertensives        Deep vein thrombosis (DVT) of left upper extremity ([facility] )   Assessment & Plan    · Occurred in January 2018  · On Coumadin  INR supratherapeutic on arrival  · Coumadin held and INR back to goal, restart coumadin  · She will need follow up with PCP/ hematology as outpt  Consider 1 more month of anticoagulation then possible DC   Patient aware of need for follow up        CHF (congestive heart failure) ([facility] )   Assessment & Plan    · History of CHF, type unknown  · On Demadex  · Continue with diuretic  · Echo pending        Acid reflux   Assessment & Plan    · Continue PPI        Pacemaker   Assessment & Plan    · SSS  · Pacer Placed in   · Tele shows paced rhythm  · Cardio consulted for pacer interrogation, no malfunction, no episodes of afib  Controlled type 2 diabetes mellitus, without long-term current use of insulin (HCC)   Assessment & Plan    · Last hemoglobin A1c 7 4 in 2018  · insulin sliding scale  · Hold metformin during admission        H/O mitral valve replacement   Assessment & Plan    · Tissue valve        Encephalopathyresolved as of 2018   Assessment & Plan    Encephalopathy in the setting of CVA as evidenced by intermittant confusion requiring CT head showing subacute to acute infarct in posterior R parietal region  resolved              VTE Pharmacologic Prophylaxis:   Pharmacologic: Warfarin (Coumadin)  Mechanical VTE Prophylaxis in Place: Yes    Patient Centered Rounds: I have performed bedside rounds with nursing staff today  Discussions with Specialists or Other Care Team Provider: Yes    Education and Discussions with Family / Patient:Yes    Time Spent for Care: 30 minutes  More than 50% of total time spent on counseling and coordination of care as described above  Current Length of Stay: 1 day(s)    Current Patient Status: Inpatient     Discharge Plan: pending    Code Status: Level 1 - Full Code      Subjective:   Shira Valentino feels ok  Still has hand movement on the right  This has not changed since arrival        Objective:       Vitals:   Temp (24hrs), Av 4 °F (36 9 °C), Min:98 °F (36 7 °C), Max:99 3 °F (37 4 °C)    HR:  [70-76] 73  Resp:  [18-30] 18  BP: (109-145)/(55-69) 139/69  SpO2:  [95 %-98 %] 95 %  There is no height or weight on file to calculate BMI  Input and Output Summary (last 24 hours):        Intake/Output Summary (Last 24 hours) at 18 1535  Last data filed at 18 0900   Gross per 24 hour   Intake              360 ml Output              750 ml   Net             -390 ml       Physical Exam:     Physical Exam   Constitutional: She is oriented to person, place, and time  She appears well-developed  No distress  HENT:   Head: Normocephalic and atraumatic  Cardiovascular: Normal rate, regular rhythm and normal heart sounds  Pulmonary/Chest: Effort normal and breath sounds normal  No respiratory distress  She has no wheezes  She has no rales  Abdominal: Soft  Bowel sounds are normal  She exhibits no distension  There is no tenderness  There is no rebound and no guarding  Musculoskeletal: She exhibits no edema, tenderness or deformity  Left hand with choreic movements still present   Neurological: She is alert and oriented to person, place, and time  Skin: Skin is warm and dry  Psychiatric: She has a normal mood and affect  Her behavior is normal    Nursing note and vitals reviewed  Additional Data:     Labs:      Results from last 7 days  Lab Units 04/19/18  0629 04/18/18  1500   WBC Thousand/uL 5 50 5 70   HEMOGLOBIN g/dL 10 4* 11 7*   HEMATOCRIT % 31 6* 35 1*   PLATELETS Thousands/uL 90* 115*   NEUTROS PCT %  --  50   LYMPHS PCT %  --  21   MONOS PCT %  --  9   EOS PCT %  --  18*       Results from last 7 days  Lab Units 04/19/18  0629 04/18/18  1500   SODIUM mmol/L 139 133*   POTASSIUM mmol/L 4 2 5 2   CHLORIDE mmol/L 104 99*   CO2 mmol/L 27 29   BUN mg/dL 50* 49*   CREATININE mg/dL 1 98* 2 08*   CALCIUM mg/dL 9 6 9 7   TOTAL PROTEIN g/dL  --  7 9   BILIRUBIN TOTAL mg/dL  --  0 50   ALK PHOS U/L  --  85   ALT U/L  --  25   AST U/L  --  32   GLUCOSE RANDOM mg/dL 101 95       Results from last 7 days  Lab Units 04/19/18  0950   INR  2 31*       * I Have Reviewed All Lab Data Listed Above  * Additional Pertinent Lab Tests Reviewed: All Labs For Current Hospital Admission Reviewed    Imaging:  Xr Chest 1 View Portable    Result Date: 4/18/2018  Narrative: CHEST INDICATION:   fall  Altered mental status  COMPARISON:  1/11/2018 EXAM PERFORMED/VIEWS:  XR CHEST PORTABLE Images: 1 FINDINGS:  Left-sided chest wall pacemaker is identified  Pacemaker leads are intact  Midline sternotomy wires again noted  Heart shadow is enlarged but unchanged from prior exam  The lungs are clear  No pneumothorax or pleural effusion  Osseous structures appear within normal limits for patient age  Impression: No acute cardiopulmonary disease  Workstation performed: [id number]     Ct Head Without Contrast    Result Date: 4/18/2018  Narrative: CT BRAIN - WITHOUT CONTRAST INDICATION:   fall on coumadin  COMPARISON:  None  TECHNIQUE:  CT examination of the brain was performed  In addition to axial images, coronal 2D reformatted images were created and submitted for interpretation  Radiation dose length product (DLP) for this visit:  434 74 mGy-cm   This examination, like all CT scans performed in the [facility], was performed utilizing techniques to minimize radiation dose exposure, including the use of iterative  reconstruction and automated exposure control  IMAGE QUALITY:  Diagnostic  FINDINGS: PARENCHYMA:  No intracranial mass, mass effect or midline shift  There is no acute hemorrhage  There is no extracerebral hemorrhage  There is a subacute to acute infarct in the posterior right parietal region  Microangiopathic change is present  There is a chronic lacunar type infarct in the left thalamus  VENTRICLES AND EXTRA-AXIAL SPACES:  Prominent consistent with atrophy  VISUALIZED ORBITS AND PARANASAL SINUSES:  Unremarkable  CALVARIUM AND EXTRACRANIAL SOFT TISSUES:  Small mastoid effusion on the left  Impression: 1  Subacute to acute infarct in the posterior right parietal region  No acute hemorrhage  No extracerebral collections   * I personally telephoned this result to Vinny Ziegler on 4/18/2018 3:11 PM  Workstation performed: [id number]     Ct Spine Cervical Without Contrast    Result Date: 4/18/2018  Narrative: CT CERVICAL SPINE - WITHOUT CONTRAST INDICATION:   Fall on Coumadin  COMPARISON: None  TECHNIQUE:  CT examination of the cervical spine was performed without intravenous contrast   Contiguous axial images were obtained  Sagittal and coronal reconstructions were performed  Radiation dose length product (DLP) for this visit:  1578 22 mGy-cm   This examination, like all CT scans performed in the [facility], was performed utilizing techniques to minimize radiation dose exposure, including the use of iterative reconstruction and automated exposure control  IMAGE QUALITY:  Diagnostic  FINDINGS: ALIGNMENT:  Normal alignment of the cervical spine  No subluxation  VERTEBRAL BODIES:  No fracture  Congenital fusion of C2 and C3  DEGENERATIVE CHANGES:  Mild disc space narrowing and spurring at several levels  PREVERTEBRAL AND PARASPINAL SOFT TISSUES:  Unremarkable  THORACIC INLET:  Normal      Impression: No cervical spine fracture or traumatic malalignment  Degenerative changes  Congenital fusion of C2 and C3    Workstation performed: [id number]     Imaging Reports Reviewed by myself    Cultures:   Blood Culture: No results found for: BLOODCX  Urine Culture:   Lab Results   Component Value Date    URINECX <10,000 cfu/ml  02/02/2018    URINECX No Growth <1000 cfu/mL 10/16/2017    URINECX 20,000-29,000 cfu/ml Mixed Contaminants X3 09/26/2017    URINECX No Growth <1000 cfu/mL 01/12/2017     Sputum Culture: No components found for: SPUTUMCX  Wound Culture: No results found for: WOUNDCULT    Last 24 Hours Medication List:     Current Facility-Administered Medications:  acetaminophen 650 mg Oral Q6H PRN Alexandra Dias MD   aspirin 81 mg Oral Daily Alexandra Dias MD   atorvastatin 80 mg Oral Daily With [employer], DO   docusate sodium 100 mg Oral BID Alexandra Dias MD   insulin lispro 1-5 Units Subcutaneous TID With Meals Alexandra Dias MD   iron polysaccharides 150 mg Oral Daily Rachael Ashleigh Carpio MD   lidocaine  Topical PRN Leticia Carranza MD   pantoprazole 40 mg Oral Early Morning Leticia Carranza MD   polyethylene glycol 17 g Oral Daily Leticia Carranza MD   torsemide 10 mg Oral Daily Leticia Carranza MD   warfarin 2 5 mg Oral Daily (warfarin) Leticia Carranza MD        Today, Patient Was Seen By: Leticia Carranza MD    ** Please Note: Dragon 360 Dictation voice to text software may have been used in the creation of this document   **

## 2018-04-19 NOTE — ASSESSMENT & PLAN NOTE
· Baseline creatinine appears to be approximately 1 3, on admission 2 08  · Likely pre renal  · UA showed small leukocytes  · Improving with IV fluid hydration  · Serial labs to follow

## 2018-04-19 NOTE — CONSULTS
Consultation - Cardiology   Marco Antonio Beckett 80 y o  female MRN: [id number]  Unit/Bed#: [address] [address] Encounter: [id number]  04/19/18  9:55 AM          Physician Requesting Consult: Alexandra Dias MD  Reason for Consult / Principal Problem: CVA      Assessment:  1  New CVA with acute infarct of right parietal region - Pacemaker interrogation complete  No episodes of atrial fibrillation  - patient on coumadin therapy  Although she has episodes of subtherapeutic and supratherapeutic INR, the lack of reversal agent for novel anticoagulants with indication for DVT place her at higher risk for bleeding events given recurrent falls/imbalance  2  LUE DVT in December - post op - consider DC of coumadin after 1 more month of treatment  3  SSS s/p pacemaker  4  Recent MVR - 2D echocardiogram was ordered  5  Hypertension  6  Dyslipidemia - atorvastatin 80 mg  7  DM    Plan:  As above  Discussed with hospitalist and patient's daughter  History of Present Illness   HPI: Marco Antonio Beckett is a 80y o  year old female who presents with left arm weakness and gait instability that started 3 days ago  3 days ago, she fell and hit her head  Afterwards, she continued to have difficulty ambulating and involuntary left arm movement  She did not wish to come to ER  After being seen in cardiac rehab yesterday, she was convinced to come to ER  Subacute to acute infarct in the posterior right parietal region was noted on CT scan  Patient had a mitral valve replacement done on December 11th, 2017 at [facility]   3 days later, patient required a [employer] dual chamber pacemaker  In January, she developed a left sided facial droop and was taken to [address] [place] [address] and diagnosed with TIA after facial droop resolved  She denies any chest pain or shortness of breath  Review of Systems:    Review of Systems   Constitutional: Negative for chills, fatigue and fever     HENT: Negative for congestion, nosebleeds and postnasal drip  Respiratory: Negative for cough, chest tightness and shortness of breath  Cardiovascular: Negative for chest pain, palpitations and leg swelling  Gastrointestinal: Negative for abdominal distention, abdominal pain, diarrhea, nausea and vomiting  Endocrine: Negative for polydipsia, polyphagia and polyuria  Musculoskeletal: Negative for gait problem and myalgias  Skin: Negative for color change, pallor and rash  Allergic/Immunologic: Negative for environmental allergies, food allergies and immunocompromised state  Neurological: Positive for light-headedness  Negative for dizziness, seizures and syncope  Left arm uncontrollable movement    Hematological: Negative for adenopathy  Does not bruise/bleed easily  Psychiatric/Behavioral: Negative for dysphoric mood  The patient is not nervous/anxious  Historical Information   Past Medical History:   Diagnosis Date    Acid reflux     on occ    Arthritis     DJD right hip replaced    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 2007    x 2 lesions with no change    Cancer [facility] ) 2007    colonic polyps, no surgery done    Deep vein thrombosis (DVT) of left upper extremity ([facility] ) 12/11/2017    Diabetes mellitus ([facility] )     type 2    Diverticulosis     Edema     in legs on occ    Hypertension     on occ    Language barrier     speaks English & broken english     Past Surgical History:   Procedure Laterality Date    COLON SURGERY      COLONOSCOPY      COLONOSCOPY N/A 11/2/2016    Procedure: COLONOSCOPY;  Surgeon: Jorge Luis Cabrera MD;  [facility] GI LAB; Service:     ESOPHAGOGASTRODUODENOSCOPY N/A 11/2/2016    Procedure: ESOPHAGOGASTRODUODENOSCOPY (EGD); Surgeon: Jorge Luis Cabrera MD;  Location: [facility] GI LAB;   Service:     JOINT REPLACEMENT Right 02/2015    hip    JOINT REPLACEMENT Left     knee    JOINT REPLACEMENT Right     knee    AL REVISE MEDIAN N/CARPAL TUNNEL SURG Left 1/28/2016    Procedure: RELEASE CARPAL TUNNEL;  Surgeon: Clarissa Reed MD;  Location: [facility] MAIN OR;  Service: Orthopedics    TUBAL LIGATION      VARICOSE VEIN SURGERY Bilateral      History   Alcohol Use    Yes     Comment: socially     History   Drug Use No     History   Smoking Status    Former Smoker    Packs/day: 0 25    Years: 10 00    Types: Cigarettes    Quit date: 1950   Smokeless Tobacco    Never Used       Family History:   Family History   Problem Relation Age of Onset    Cancer Mother      throat       Meds/Allergies   current meds:   Current Facility-Administered Medications   Medication Dose Route Frequency    aspirin chewable tablet 81 mg  81 mg Oral Daily    atorvastatin (LIPITOR) tablet 80 mg  80 mg Oral Daily With Dinner    insulin lispro (HumaLOG) 100 units/mL subcutaneous injection 1-5 Units  1-5 Units Subcutaneous TID With Meals    iron polysaccharides (NIFEREX) capsule 150 mg  150 mg Oral Daily    lidocaine (XYLOCAINE) 2 % topical gel   Topical PRN    pantoprazole (PROTONIX) EC tablet 40 mg  40 mg Oral Early Morning    torsemide (DEMADEX) tablet 10 mg  10 mg Oral Daily     Allergies   Allergen Reactions    Heparin        Objective   Vitals: Blood pressure 139/69, pulse 73, temperature 98 2 °F (36 8 °C), temperature source Oral, resp  rate 18, SpO2 95 %, not currently breastfeeding  , There is no height or weight on file to calculate BMI  Physical Exam   Constitutional: She appears healthy  No distress  HENT:   Nose: Nose normal    Mouth/Throat: Oropharynx is clear  Neck: Neck supple  No JVD present  Cardiovascular: Normal rate and regular rhythm  Exam reveals no distant heart sounds and no friction rub  Murmur heard  Pulmonary/Chest: Effort normal and breath sounds normal  She has no wheezes  She has no rales  Abdominal: Soft  She exhibits no distension  There is no tenderness  Musculoskeletal: She exhibits no edema  Neurological: She is alert and oriented to person, place, and time     Skin: Skin is warm and dry  No rash noted         Lab Results:     Troponins:   Results from last 7 days  Lab Units 04/18/18  1500   TROPONIN I ng/mL <0 02       CBC with diff:   Results from last 7 days  Lab Units 04/19/18  0629 04/18/18  1500   WBC Thousand/uL 5 50 5 70   HEMOGLOBIN g/dL 10 4* 11 7*   HEMATOCRIT % 31 6* 35 1*   MCV fL 86 86   PLATELETS Thousands/uL 90* 115*   MCH pg 28 4 28 6   MCHC g/dL 33 0 33 5   RDW % 17 8* 17 1*   MPV fL 9 1 10 0       CMP:   Results from last 7 days  Lab Units 04/19/18  0629 04/18/18  1500   SODIUM mmol/L 139 133*   POTASSIUM mmol/L 4 2 5 2   CHLORIDE mmol/L 104 99*   CO2 mmol/L 27 29   ANION GAP mmol/L 8 5   BUN mg/dL 50* 49*   CREATININE mg/dL 1 98* 2 08*   GLUCOSE RANDOM mg/dL 101 95   CALCIUM mg/dL 9 6 9 7   AST U/L  --  32   ALT U/L  --  25   ALK PHOS U/L  --  85   TOTAL PROTEIN g/dL  --  7 9   BILIRUBIN TOTAL mg/dL  --  0 50   EGFR ml/min/1 73sq m 23 21       Magnesium:   Results from last 7 days  Lab Units 04/18/18  1500   MAGNESIUM mg/dL 2 5       Coags:   Results from last 7 days  Lab Units 04/18/18  1500   PTT seconds 37*   INR  3 15*       Lipid Profile:   Results from last 7 days  Lab Units 04/19/18  0629   CHOLESTEROL mg/dL 125   TRIGLYCERIDES mg/dL 77   HDL mg/dL 43   LDL CALC mg/dL 67         Cardiac testing:     EKG: Personally reviewed: Atrial paced with LVH    Imaging: I have personally reviewed pertinent films in PACS

## 2018-04-19 NOTE — ASSESSMENT & PLAN NOTE
· Last hemoglobin A1c 7 4 in February 2018  · insulin sliding scale  · Hold metformin during admission

## 2018-04-19 NOTE — ASSESSMENT & PLAN NOTE
· Occurred in January 2018  · On Coumadin  INR supratherapeutic on arrival  · Coumadin held and INR back to goal, restarted coumadin  · She will need follow up with PCP/ hematology as outpt     · Goal INR 2-3

## 2018-04-19 NOTE — CONSULTS
[address]   Neurology Initial Consult    Jabari Keenan is a 80 y o  female  [facility] [phone]-*          Information obtained from:   Chief Complaint   Patient presents with    Altered Mental Status     Per daughter patient fell on Sunday and has had intermitten confusion since then and today was having trouble walking   Fall         Assessment/Plan:    1  Subacute right parietal lobe ischemic infarction   2  HTN  3  DM II  4  CHADWICK  5  H/o DVT    Patient's stroke is manifested by left sided weakness, mild aphasia and left sided neglect  Cont tele  Recommend permissive hypertension   Continue anticoagulation with coumadin to keep INR therapeutic  Aspirin for now is fine as patient had ischemic stroke while therapeutic with INR  lipitor 80mg daily  Can not obtain MRI due to her pacemaker  Carotid doppler is negative for flow limiting stenosis  Could not get CTA due to CHADWICK  TTE w/ shunt  Cognitive speech therapy  Swallow evaluation per stroke guidelines  PT/OT- rehab   Discussed plan with patient's family and primary team                HPI:  Jabari Keenan is an 79 yo F with PMH of DVT, DM II, HTN, s/p pacemaker presents with cc of left sided weakness  Patient first had a fall on Sunday, 4 days ago  She refused to go to hospital  She had another fall few hours later  Over past 3 days she was noticed to have weakness on her left side, uncontrollable movements of left hand and foot  She has kept refusing and finally patient was then forced to bring her to hospital  Her comprehension is fair  If speaking Gabonese, she can follow well  She does tend to neglect her left side  Patient has been on coumadin since Dec for DVT  Her INR was 3+ upon arrival  Her bp on arrival was 173/84  Daughter says she is independent at home prior to Sunday  Yesterday she was telling daughter about all the food that may spoil in fridge and she was correct         Past Medical History:   Diagnosis Date    Acid reflux     on occ    Arthritis     DJD right hip replaced    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 ) 2007    x 2 lesions with no change    Cancer ([facility] ) 2007    colonic polyps, no surgery done    Deep vein thrombosis (DVT) of left upper extremity ([facility] ) 12/11/2017    Diabetes mellitus ([facility] )     type 2    Diverticulosis     Edema     in legs on occ    Hypertension     on occ    Language barrier     speaks Korean & broken english       Past Surgical History:   Procedure Laterality Date    COLON SURGERY      COLONOSCOPY      COLONOSCOPY N/A 11/2/2016    Procedure: COLONOSCOPY;  Surgeon: Carla Velez MD;  Location: [facility] GI LAB; Service:     ESOPHAGOGASTRODUODENOSCOPY N/A 11/2/2016    Procedure: ESOPHAGOGASTRODUODENOSCOPY (EGD); Surgeon: Carla Velez MD;  Location: [facility] GI LAB;   Service:     JOINT REPLACEMENT Right 02/2015    hip    JOINT REPLACEMENT Left     knee    JOINT REPLACEMENT Right     knee    MO REVISE MEDIAN N/CARPAL TUNNEL SURG Left 1/28/2016    Procedure: RELEASE CARPAL TUNNEL;  Surgeon: Tania Ellison MD;  Location: [facility] MAIN OR;  Service: Orthopedics    TUBAL LIGATION      VARICOSE VEIN SURGERY Bilateral        Allergies   Allergen Reactions    Heparin          Current Facility-Administered Medications:     acetaminophen (TYLENOL) tablet 650 mg, 650 mg, Oral, Q6H PRN, Karina Raymundo MD    aspirin chewable tablet 81 mg, 81 mg, Oral, Daily, Karina Raymundo MD, 81 mg at 04/19/18 0915    atorvastatin (LIPITOR) tablet 80 mg, 80 mg, Oral, Daily With Paul Cervantes DO, 80 mg at 04/19/18 1616    docusate sodium (COLACE) capsule 100 mg, 100 mg, Oral, BID, Karina Raymundo MD    insulin lispro (HumaLOG) 100 units/mL subcutaneous injection 1-5 Units, 1-5 Units, Subcutaneous, TID With Meals, 2 Units at 04/19/18 1204 **AND** Fingerstick Glucose (POCT), , , TID AC, Karina Raymundo MD    iron polysaccharides (NIFEREX) capsule 150 mg, 150 mg, Oral, Daily, Karina Raymundo MD, 150 mg at 04/19/18 0915   lidocaine (XYLOCAINE) 2 % topical gel, , Topical, PRN, Michelle Art MD    pantoprazole (PROTONIX) EC tablet 40 mg, 40 mg, Oral, Early Morning, Michelle Art MD, 40 mg at 04/19/18 0608    polyethylene glycol (MIRALAX) packet 17 g, 17 g, Oral, Daily, Michelle Art MD, 17 g at 04/19/18 1616    torsemide (DEMADEX) tablet 10 mg, 10 mg, Oral, Daily, Michelle Art MD, 10 mg at 04/19/18 0915    warfarin (COUMADIN) tablet 2 5 mg, 2 5 mg, Oral, Daily (warfarin), Michelle Art MD, 2 5 mg at 04/19/18 1716    Social History     Social History    Marital status:      Spouse name: N/A    Number of children: N/A    Years of education: N/A     Occupational History    Not on file  Social History Main Topics    Smoking status: Former Smoker     Packs/day: 0 25     Years: 10 00     Types: Cigarettes     Quit date: 1950    Smokeless tobacco: Never Used    Alcohol use Yes      Comment: socially    Drug use: No    Sexual activity: Not Currently     Other Topics Concern    Not on file     Social History Narrative    No narrative on file       Family History   Problem Relation Age of Onset    Cancer Mother      throat         Review of systems:  Please see HPI for positive symptoms  No fever, no chills, no weight change  Lightheadedness+ Ocular: No drainage, no blurred vision  HEENT:  No sore throat, earache, or congestion  No neck pain  COR:  No chest pain  No palpitations  Lungs:  no sob, wheezing,  GI:  no  nausea, no vomiting, no diarrhea, no constipation, no anorexia  :  No dysuria, frequency, or urgency  No hematuria  Musculoskeletal:  No joint pain or swelling or edema  Skin:  No rash or itching  Psychiatric:  no anxiety, no depression  Endocrine:  No polyuria or polydipsia  Physical examination:  Vitals:    04/19/18 1548   BP: 128/61   Pulse: 88   Resp:    Temp: 100 °F (37 8 °C)   SpO2: 96%       GENERAL APPEARANCE:  The patient is alert, oriented  He is in no acute distress  HEENT:  Head is normocephalic  The sinuses are otherwise nontender  Pupils are equal and reactive  NECK:  Supple without lymphadenopathy  HEART:  Regular rate and rhythm  LUNGS:  clear to auscultation  No crackles or wheezes are heard  ABDOMEN:  Soft, nontender, nondistended with good bowel sounds heard  EXTREMITIES:  Without cyanosis, clubbing or edema  Mental status: The patient is alert, attentive, and oriented  There is language barrier  She comprehends most of the commands  Speech is clear and fluent, good repetition, comprehension, and naming  she recalls 1/3 objects at 5 minutes  She could not draw clock  Cranial nerves:  CN II: Visual fields are full to confrontation  Fundoscopic exam is normal with sharp discs and no vascular changes    Pupils are 3 mm and reactive to light  CN III, IV, VI: At primary gaze, there is preference to right side  CN V: Facial sensation is intact to pinprick in all 3 divisions bilaterally  Corneal responses are intact  CN VII: Face is symmetric with normal eye closure and smile  CN VIII: Hearing is normal to rubbing fingers  CN IX, X: Palate elevates symmetrically  Phonation is normal   CN XI: Head turning and shoulder shrug are intact  CN XII: Tongue is midline with normal movements and no atrophy  Motor: There is no pronator drift of out-stretched arms  Muscle bulk and tone are normal      Muscle exam  Arm Right Left Leg Right Left   Deltoid 5/5 4/5 Iliopsoas 5/5 4/5   Biceps 5/5 4/5 Quads 5/5 4/5   Triceps 5/5 4/5 Hamstrings 5/5 4/5   Wrist Extension 5/5 4/5 Ankle Dorsi Flexion 5/5 4/5   Wrist Flexion 5/5 4/5 Ankle Plantar Flexion 5/5 4/5   Interossei 5/5 4/5 Ankle Eversion 5/5 4/5   APB 5/5 4/5 Ankle Inversion 5/5 4/5       Reflexes   RJ BJ TJ KJ AJ Plantars Andersen's   Right 2+ 2+ 2+ 2+ 1+ Downgoing Not present   Left 2+ 2+ 2+ 2+ 1+ Downgoing Not present     Sensory:  Light touch, pinprick, position sense, and vibration sense are intact in fingers and toes    Coordination:  Rapid alternating movements and fine finger movements are intact  There are noticeable left sided hemichorea for past few days  There are no abnormal or extraneous movements  Romberg negative  Gait/Stance:  Needs walker, assitance     Lab Results   Component Value Date    WBC 5 50 04/19/2018    HGB 10 4 (L) 04/19/2018    HCT 31 6 (L) 04/19/2018    MCV 86 04/19/2018    PLT 90 (L) 04/19/2018     Lab Results   Component Value Date    HGBA1C 6 6 (H) 04/19/2018     Lab Results   Component Value Date    ALT 25 04/18/2018    AST 32 04/18/2018    ALKPHOS 85 04/18/2018    BILITOT 0 50 04/18/2018     Lab Results   Component Value Date    GLUCOSE 101 04/19/2018    CALCIUM 9 6 04/19/2018     04/19/2018    K 4 2 04/19/2018    CO2 27 04/19/2018     04/19/2018    BUN 50 (H) 04/19/2018    CREATININE 1 98 (H) 04/19/2018         Radiology          Review of reports and Independent Interpretation of images or specimens:  Xr Chest 1 View Portable    Result Date: 4/18/2018  No acute cardiopulmonary disease  Workstation performed: [id number]     Ct Head Without Contrast    Result Date: 4/18/2018  1  Subacute to acute infarct in the posterior right parietal region  No acute hemorrhage  No extracerebral collections  * I personally telephoned this result to Genaro Polo on 4/18/2018 3:11 PM  Workstation performed: [id number]     Ct Spine Cervical Without Contrast    Result Date: 4/18/2018  No cervical spine fracture or traumatic malalignment  Degenerative changes  Congenital fusion of C2 and C3  Workstation performed: [id number]               Thank you for this consult  Total time of encounter: 70 min  More than 50% of time was spent in counseling and coordination of care of patient  [facility]  [facility] [address]  [address]

## 2018-04-19 NOTE — ASSESSMENT & PLAN NOTE
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

## 2018-04-19 NOTE — CASE MANAGEMENT
Initial Clinical Review    Admission: Date/Time/Statement: 4/18/18 @ 1545     Orders Placed This Encounter   Procedures    Inpatient Admission (expected length of stay for this patient is greater than two midnights)     Standing Status:   Standing     Number of Occurrences:   1     Order Specific Question:   Admitting Physician     Answer:   Genie Espitia     Order Specific Question:   Level of Care     Answer:   Med Surg [16]     Order Specific Question:   Estimated length of stay     Answer:   More than 2 Midnights     Order Specific Question:   Certification     Answer:   I certify that inpatient services are medically necessary for this patient for a duration of greater than two midnights  See H&P and MD Progress Notes for additional information about the patient's course of treatment  ED: Date/Time/Mode of Arrival:   ED Arrival Information     Expected Arrival Acuity Means of Arrival Escorted By Service Admission Type    - 4/18/2018 14:25 Emergent Walk-In Family Member General Medicine Emergency    Arrival Complaint    head injury due to fall Sunday, disoriented          Chief Complaint:   Chief Complaint   Patient presents with    Altered Mental Status     Per daughter patient fell on Sunday and has had intermitten confusion since then and today was having trouble walking   Fall       History of Illness: Brett Wong is a 80 y o  female with a PMH of T2DM, HTN, HLD, TIA, MV replacement, pacemaker placement, TIA, CHF, LUE DVT on coumadin who presents with several days of occasional falls, and abnormal gait  Symptoms began 3 days ago with a fall while patient was rising from sitting to standing  Later that night she was agitated but not confused  The next day she had another fall where her knees gave out while walking  At 1:00 p m  on the day of arrival she was at physical therapy and began walking abnormally, "like she was drunk" per her daughter    She appeared to be staggering and could not hold her balance  She was instructed to go to the emergency room by the physical therapist   Family has also noticed that for several days she has had an involuntary movement in her left hand and left foot like her muscles can't hold still       ED Vital Signs:   ED Triage Vitals   Temperature Pulse Respirations Blood Pressure SpO2   04/18/18 1435 04/18/18 1435 04/18/18 1435 04/18/18 1435 04/18/18 1515   99 1 °F (37 3 °C) 84 18 (!) 173/84 98 %      Temp Source Heart Rate Source Patient Position - Orthostatic VS BP Location FiO2 (%)   04/18/18 1435 04/18/18 1435 04/18/18 1435 04/18/18 1917 --   Tympanic Monitor Lying Right arm       Pain Score       04/18/18 1700       No Pain        Wt Readings from Last 1 Encounters:   03/01/18 68 9 kg (152 lb)       Vital Signs (abnormal):   04/18/18 1917  99 3 °F (37 4 °C)  76  18  114/55  96 %  None (Room air)  Sitting   04/18/18 1600  --  76   30  145/65  98 %  --  --   04/18/18 1530  --  76   23  141/67  97 %  --  --   04/18/18 1515  --  78  18  --  98 %  --  --   04/18/18 1445  --  80   29   173/84  --  --  --   04/18/18 1435  99 1 °F (37 3 °C)  84  18   173/84  --  None (Room air)  Lying       Abnormal Labs/Diagnostic Test Results:   Lab Units 04/18/18  1500   WBC Thousand/uL 5 70   HEMOGLOBIN g/dL 11 7*   HEMATOCRIT % 35 1*   PLATELETS Thousands/uL 115*   NEUTROS PCT % 50   LYMPHS PCT % 21   MONOS PCT % 9   EOS PCT % 18*         Results from last 7 days  Lab Units 04/18/18  1500   SODIUM mmol/L 133*   POTASSIUM mmol/L 5 2   CHLORIDE mmol/L 99*   CO2 mmol/L 29   BUN mg/dL 49*   CREATININE mg/dL 2 08*   CALCIUM mg/dL 9 7   TOTAL PROTEIN g/dL 7 9   BILIRUBIN TOTAL mg/dL 0 50   ALK PHOS U/L 85   ALT U/L 25   AST U/L 32   GLUCOSE RANDOM mg/dL 95         Results from last 7 days  Lab Units 04/18/18  1500   INR   3 15*     XR chest 1 view portable   Final Result by Brayden Hugo MD (04/18 1618)       No acute cardiopulmonary disease                Workstation performed: [id number]           CT spine cervical without contrast   Final Result by Boni Mayorga MD (04/18 1514)       No cervical spine fracture or traumatic malalignment        Degenerative changes  Congenital fusion of C2 and C3                Workstation performed: [id number]           CT head without contrast   Final Result by Boni Mayorga MD (04/18 1512)           1  Subacute to acute infarct in the posterior right parietal region  No acute hemorrhage  No extracerebral collections  * I personally telephoned this result to Janene Blackman on 4/18/2018 3:11 PM                        Workstation performed: [id number]           VAS carotid complete study (*Order only if CTA has not been completed*)    (Results Pending)         XR chest 1 view portable   Final Result       No acute cardiopulmonary disease                Workstation performed: [id number]           CT spine cervical without contrast   Final Result       No cervical spine fracture or traumatic malalignment        Degenerative changes  Congenital fusion of C2 and C3                Workstation performed: [id number]           CT head without contrast   Final Result           1  Subacute to acute infarct in the posterior right parietal region  No acute hemorrhage  No extracerebral collections  * I personally telephoned this result to Janene Blackman on 4/18/2018 3:11 PM                        Workstation performed: [id number]           VAS carotid complete study (*Order only if CTA has not been completed*)    (Results Pending)         EKG, Pathology, and Other Studies Reviewed on Admission:   · EKG:  Paced rhythm, 79 beats per minute  Left axis deviation    No ST elevations         ED Treatment:   Medication Administration from 04/18/2018 1425 to 04/18/2018 1640       Date/Time Order Dose Route Action Action by Comments     04/18/2018 4371 aspirin (ECOTRIN) EC tablet 325 mg 325 mg Oral Given Tabatha Parker RN           Past Medical/Surgical History: Active Ambulatory Problems     Diagnosis Date Noted    Deep vein thrombosis (DVT) of left upper extremity ([place] [address] ) 01/25/2018    H/O mitral valve replacement 03/01/2018     Resolved Ambulatory Problems     Diagnosis Date Noted    No Resolved Ambulatory Problems     Past Medical History:   Diagnosis Date    Acid reflux     Arthritis     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    Cancer [facility]) 2007    Deep vein thrombosis (DVT) of left upper extremity ([place] [address] ) 12/11/2017    Diabetes mellitus ([place] [address] )     Diverticulosis     Edema     Hypertension     Language barrier        Admitting Diagnosis: Altered mental status [R41 82]  CVA (cerebral vascular accident) ([place] [address] ) [I63 9]  Change in mental status [R41 82]    Age/Sex: 80 y o  female    Assessment/Plan:   * CVA (cerebral vascular accident) ([place] [address] )   Assessment & Plan     · Patient with disturbance in gait and right hand and leg dyskinetic movements  · CT Head shows acute/subacute stroke in the right parietal lobe, no hemorrhage  · Has history of TIA in January 2018  Treated in [address] [place] [address]  · Etiology uncertain  Possibly embolic  Patient is anticoagulated with Coumadin but according to family often has subtherapeutic INRs  Possibly also ischemic    · Admit to inpatient  · Cannot do CTA head because of low GFR  · Will get stat echo with bubble and bilateral carotid ultrasounds  · Neuro checks, seizure precautions  · Elevate head of bed  · Permissive hypertension, hold antihypertensives  · Neuro consult  · Statin given in ED  · INR is supratherapeutic so will hold Coumadin tonight  · PTOT and speech/swallow eval          CHADWICK (acute kidney injury) ([place] [address] )   Assessment & Plan     · Baseline creatinine appears to be approximately 1 3, on admission 2 08  · Likely pre renal  · IV fluid hydration  · Serial labs to follow  · Check UA and monitor UOP          HTN (hypertension)   Assessment & Plan     · Patient on Dyazide and metoprolol  · Holding antihypertensives for now  · Permissive hypertension          Deep vein thrombosis (DVT) of left upper extremity [facility])   Assessment & Plan     · Occurred in January 2018  · On Coumadin  INR supra therapeutic  · Hold Coumadin tonight           CHF (congestive heart failure) ([facility])   Assessment & Plan     · History of CHF, type unknown  · On Demadex  · Continue with diuretic and check echo           Acid reflux   Assessment & Plan     · Continue PPI          Pacemaker   Assessment & Plan     · Placed in 2010          Controlled type 2 diabetes mellitus, without long-term current use of insulin ([facility])   Assessment & Plan     · Last hemoglobin A1c 7 4 in February 2018  · Place on insulin sliding scale  · Hold metformin during admission          H/O mitral valve replacement   Assessment & Plan     · On Coumadin                VTE Prophylaxis: Warfarin (Coumadin)  / sequential compression device   Code Status: Level 1 - Full Code     Anticipated Length of Stay:  Patient will be admitted on an Inpatient basis with an anticipated length of stay of  > 2 midnights     Justification for Hospital Stay: stroke workup       Admission Orders:  [email or web address]  OOB  SPEECH EVAL  PT/OT  CONSULT CARDIOLOGY    CONSULT NEUROLOGY  NEURO CHECKS Q1H, Q2H, Q4H  TELE  DYSPHAGIA ASSESSMENT  REG DIET    Scheduled Meds:   Current Facility-Administered Medications:  aspirin 81 mg Oral Daily Milton Hatfield MD   atorvastatin 80 mg Oral Daily With [employer], DO   insulin lispro 1-5 Units Subcutaneous TID With Meals Milton Hatfield MD   iron polysaccharides 150 mg Oral Daily Milton Hatfield MD   lidocaine  Topical PRN Milton Hatfield MD   pantoprazole 40 mg Oral Early Morning Milton Hatfield MD   torsemide 10 mg Oral Daily Milton Hatfield MD     Continuous Infusions:    PRN Meds: lidocaine

## 2018-04-19 NOTE — ASSESSMENT & PLAN NOTE
· Baseline creatinine appears to be approximately 1 3, on admission 2 08  · UA showed small leukocytes  · Held demadex and given NS bolus  · Renal US was unremarkable  · Renal function began to decrease with IVF  · She will need follow up BMP in several days at the STR to monitor renal function

## 2018-04-20 ENCOUNTER — APPOINTMENT (INPATIENT)
Dept: RADIOLOGY | Facility: HOSPITAL | Age: 83
DRG: 064 | End: 2018-04-20
Payer: MEDICARE

## 2018-04-20 ENCOUNTER — APPOINTMENT (OUTPATIENT)
Dept: CARDIAC REHAB | Facility: CLINIC | Age: 83
End: 2018-04-20
Payer: MEDICARE

## 2018-04-20 LAB
ANION GAP SERPL CALCULATED.3IONS-SCNC: 8 MMOL/L (ref 4–13)
ANISOCYTOSIS BLD QL SMEAR: PRESENT
BUN SERPL-MCNC: 61 MG/DL (ref 5–25)
CALCIUM SERPL-MCNC: 9 MG/DL (ref 8.3–10.1)
CHLORIDE SERPL-SCNC: 99 MMOL/L (ref 100–108)
CO2 SERPL-SCNC: 28 MMOL/L (ref 21–32)
CREAT SERPL-MCNC: 2.15 MG/DL (ref 0.6–1.3)
ERYTHROCYTE [DISTWIDTH] IN BLOOD BY AUTOMATED COUNT: 17.9 % (ref 11.6–15.1)
GFR SERPL CREATININE-BSD FRML MDRD: 21 ML/MIN/1.73SQ M
GLUCOSE SERPL-MCNC: 112 MG/DL (ref 65–140)
GLUCOSE SERPL-MCNC: 112 MG/DL (ref 65–140)
GLUCOSE SERPL-MCNC: 120 MG/DL (ref 65–140)
GLUCOSE SERPL-MCNC: 159 MG/DL (ref 65–140)
GLUCOSE SERPL-MCNC: 95 MG/DL (ref 65–140)
HCT VFR BLD AUTO: 30.9 % (ref 37–47)
HGB BLD-MCNC: 10 G/DL (ref 12–16)
INR PPP: 2.29 (ref 0.86–1.16)
MCH RBC QN AUTO: 28.6 PG (ref 27–31)
MCHC RBC AUTO-ENTMCNC: 32.5 G/DL (ref 31.4–37.4)
MCV RBC AUTO: 88 FL (ref 82–98)
MRSA NOSE QL CULT: NORMAL
PLATELET # BLD AUTO: 84 THOUSANDS/UL (ref 130–400)
PLATELET BLD QL SMEAR: ABNORMAL
PMV BLD AUTO: 9.8 FL (ref 8.9–12.7)
POIKILOCYTOSIS BLD QL SMEAR: PRESENT
POTASSIUM SERPL-SCNC: 3.7 MMOL/L (ref 3.5–5.3)
PROTHROMBIN TIME: 24.3 SECONDS (ref 9.4–11.7)
RBC # BLD AUTO: 3.51 MILLION/UL (ref 4.2–5.4)
SODIUM SERPL-SCNC: 135 MMOL/L (ref 136–145)
WBC # BLD AUTO: 5.4 THOUSAND/UL (ref 4.8–10.8)

## 2018-04-20 PROCEDURE — 93306 TTE W/DOPPLER COMPLETE: CPT | Performed by: INTERNAL MEDICINE

## 2018-04-20 PROCEDURE — 99233 SBSQ HOSP IP/OBS HIGH 50: CPT | Performed by: PSYCHIATRY & NEUROLOGY

## 2018-04-20 PROCEDURE — 76770 US EXAM ABDO BACK WALL COMP: CPT

## 2018-04-20 PROCEDURE — 97535 SELF CARE MNGMENT TRAINING: CPT

## 2018-04-20 PROCEDURE — 82948 REAGENT STRIP/BLOOD GLUCOSE: CPT

## 2018-04-20 PROCEDURE — 85027 COMPLETE CBC AUTOMATED: CPT | Performed by: STUDENT IN AN ORGANIZED HEALTH CARE EDUCATION/TRAINING PROGRAM

## 2018-04-20 PROCEDURE — 97110 THERAPEUTIC EXERCISES: CPT

## 2018-04-20 PROCEDURE — 99232 SBSQ HOSP IP/OBS MODERATE 35: CPT | Performed by: STUDENT IN AN ORGANIZED HEALTH CARE EDUCATION/TRAINING PROGRAM

## 2018-04-20 PROCEDURE — 99232 SBSQ HOSP IP/OBS MODERATE 35: CPT | Performed by: INTERNAL MEDICINE

## 2018-04-20 PROCEDURE — 85610 PROTHROMBIN TIME: CPT | Performed by: STUDENT IN AN ORGANIZED HEALTH CARE EDUCATION/TRAINING PROGRAM

## 2018-04-20 PROCEDURE — 80048 BASIC METABOLIC PNL TOTAL CA: CPT | Performed by: STUDENT IN AN ORGANIZED HEALTH CARE EDUCATION/TRAINING PROGRAM

## 2018-04-20 RX ORDER — ASPIRIN 81 MG/1
81 TABLET, CHEWABLE ORAL DAILY
Qty: 30 TABLET | Refills: 0 | Status: SHIPPED | OUTPATIENT
Start: 2018-04-21 | End: 2018-05-19 | Stop reason: HOSPADM

## 2018-04-20 RX ORDER — CLONAZEPAM 0.5 MG/1
0.5 TABLET ORAL 2 TIMES DAILY
Status: DISCONTINUED | OUTPATIENT
Start: 2018-04-20 | End: 2018-04-21 | Stop reason: HOSPADM

## 2018-04-20 RX ORDER — ATORVASTATIN CALCIUM 80 MG/1
80 TABLET, FILM COATED ORAL
Qty: 30 TABLET | Refills: 0 | Status: ON HOLD | OUTPATIENT
Start: 2018-04-20 | End: 2018-11-07

## 2018-04-20 RX ORDER — SODIUM CHLORIDE 9 MG/ML
100 INJECTION, SOLUTION INTRAVENOUS CONTINUOUS
Status: DISCONTINUED | OUTPATIENT
Start: 2018-04-20 | End: 2018-04-20

## 2018-04-20 RX ADMIN — ATORVASTATIN CALCIUM 80 MG: 80 TABLET, FILM COATED ORAL at 16:58

## 2018-04-20 RX ADMIN — SODIUM CHLORIDE 500 ML: 0.9 INJECTION, SOLUTION INTRAVENOUS at 10:19

## 2018-04-20 RX ADMIN — PANTOPRAZOLE SODIUM 40 MG: 40 TABLET, DELAYED RELEASE ORAL at 05:27

## 2018-04-20 RX ADMIN — CLONAZEPAM 0.25 MG: 0.5 TABLET ORAL at 09:09

## 2018-04-20 RX ADMIN — POLYETHYLENE GLYCOL 3350 17 G: 17 POWDER, FOR SOLUTION ORAL at 09:09

## 2018-04-20 RX ADMIN — ACETAMINOPHEN 650 MG: 325 TABLET, FILM COATED ORAL at 09:49

## 2018-04-20 RX ADMIN — DOCUSATE SODIUM 100 MG: 100 CAPSULE, LIQUID FILLED ORAL at 21:25

## 2018-04-20 RX ADMIN — ACETAMINOPHEN 650 MG: 325 TABLET, FILM COATED ORAL at 14:34

## 2018-04-20 RX ADMIN — ASPIRIN 81 MG 81 MG: 81 TABLET ORAL at 09:09

## 2018-04-20 RX ADMIN — Medication 150 MG: at 09:09

## 2018-04-20 RX ADMIN — WARFARIN SODIUM 2.5 MG: 2.5 TABLET ORAL at 17:14

## 2018-04-20 RX ADMIN — INSULIN LISPRO 1 UNITS: 100 INJECTION, SOLUTION INTRAVENOUS; SUBCUTANEOUS at 17:13

## 2018-04-20 RX ADMIN — CLONAZEPAM 0.5 MG: 0.5 TABLET ORAL at 17:14

## 2018-04-20 RX ADMIN — TORSEMIDE 10 MG: 10 TABLET ORAL at 09:09

## 2018-04-20 RX ADMIN — DOCUSATE SODIUM 100 MG: 100 CAPSULE, LIQUID FILLED ORAL at 09:09

## 2018-04-20 NOTE — PROGRESS NOTES
Progress Note - Izzy Kidney 1933, 80 y o  female MRN: [id number]    Unit/Bed#: [address] [place] [address] Encounter: [id number]    Primary Care Provider: Ana Bello MD   Date and time admitted to hospital: 4/18/2018  2:31 PM        CHADWICK (acute kidney injury) [facility])   Assessment & Plan    · Baseline creatinine appears to be approximately 1 3, on admission 2 08  · UA showed small leukocytes  · Initially started to improve with IVF but now increased again  · Hold demadex and given NS bolus  · Monitor I/Os  · Check PVR and renal US  · If continues to increase will consult nephro  * CVA (cerebral vascular accident) [facility])   Assessment & Plan    · Patient with disturbance in gait and right hand and leg choreic movements on arrival as well as intermittent aggitation  · CT Head shows acute/subacute stroke in the right parietal lobe, no hemorrhage  · Has history of TIA in January 2018  Treated [address]  · Could not do CTA head because of low GFR  · Echo with bubble done and pending  · carotid ultrasounds show <50% stenosis bilaterally   · Passed swallow eval  · Neuro consult, recs appreciated  · ASA/statin  · PT/OT recommend STR, placement pending  · Still has sequale of left hand choreic movements         CHF (congestive heart failure) ([facility])   Assessment & Plan    · History of CHF, type unknown  · On Demadex, which is on hold   · Echo pending        HTN (hypertension)   Assessment & Plan    · Patient on Dyazide and metoprolol  · Blood pressure is stable  · Holding antihypertensives        Deep vein thrombosis (DVT) of left upper extremity ([address] )   Assessment & Plan    · Occurred in January 2018  · On Coumadin  INR supratherapeutic on arrival  · Coumadin held and INR back to goal, restarted coumadin  · She will need follow up with PCP/ hematology as outpt          Acid reflux   Assessment & Plan    · Continue PPI        Pacemaker   Assessment & Plan    · SSS  · Pacer Placed in 2010  · Tele shows paced rhythm  · Cardio consulted for pacer interrogation, no malfunction, no episodes of afib  Controlled type 2 diabetes mellitus, without long-term current use of insulin ([facility])   Assessment & Plan    · Last hemoglobin A1c 7 4 in 2018  · Repeat HA1c 6 6  · insulin sliding scale  · Hold metformin during admission        H/O mitral valve replacement   Assessment & Plan    · Tissue valve        Encephalopathyresolved as of 2018   Assessment & Plan    Encephalopathy in the setting of CVA as evidenced by intermittant confusion requiring CT head showing subacute to acute infarct in posterior R parietal region  resolved              VTE Pharmacologic Prophylaxis:   Pharmacologic: Warfarin (Coumadin)  Mechanical VTE Prophylaxis in Place: Yes    Patient Centered Rounds: I have performed bedside rounds with nursing staff today  Discussions with Specialists or Other Care Team Provider: Yes    Education and Discussions with Family / Patient:Yes    Time Spent for Care: 30 minutes  More than 50% of total time spent on counseling and coordination of care as described above  Current Length of Stay: 2 day(s)    Current Patient Status: Inpatient     Discharge Plan: pending    Code Status: Level 1 - Full Code      Subjective:   Patricia Kraft still has movements in her left hand, bothering her this morning  No new complaints  Urinating well  Denies urinary symptoms, abd pain, CP, SOB, palpitations, cough, HA, vision changes, dizziness  Objective:       Vitals:   Temp (24hrs), Av 6 °F (37 °C), Min:97 5 °F (36 4 °C), Max:100 °F (37 8 °C)    HR:  [78-98] 86  Resp:  [18] 18  BP: (106-162)/(56-65) 123/60  SpO2:  [96 %-98 %] 97 %  There is no height or weight on file to calculate BMI  Input and Output Summary (last 24 hours):        Intake/Output Summary (Last 24 hours) at 18 1117  Last data filed at 18 1019   Gross per 24 hour   Intake              800 ml   Output              750 ml   Net 50 ml       Physical Exam:     Physical Exam   Constitutional: She is oriented to person, place, and time  She appears well-developed  No distress  HENT:   Head: Normocephalic and atraumatic  Cardiovascular: Normal rate, regular rhythm and normal heart sounds  Pulmonary/Chest: Effort normal and breath sounds normal  No respiratory distress  She has no wheezes  She has no rales  Abdominal: Soft  Bowel sounds are normal  She exhibits no distension  There is no tenderness  There is no rebound and no guarding  Musculoskeletal: She exhibits no edema, tenderness or deformity  Left hand with choreic movements still present but less pronounced   Neurological: She is alert and oriented to person, place, and time  Skin: Skin is warm and dry  Psychiatric: She has a normal mood and affect  Her behavior is normal    Nursing note and vitals reviewed  Additional Data:     Labs:      Results from last 7 days  Lab Units 04/20/18  0521  04/18/18  1500   WBC Thousand/uL 5 40  < > 5 70   HEMOGLOBIN g/dL 10 0*  < > 11 7*   HEMATOCRIT % 30 9*  < > 35 1*   PLATELETS Thousands/uL 84*  < > 115*   NEUTROS PCT %  --   --  50   LYMPHS PCT %  --   --  21   MONOS PCT %  --   --  9   EOS PCT %  --   --  18*   < > = values in this interval not displayed  Results from last 7 days  Lab Units 04/20/18  0521  04/18/18  1500   SODIUM mmol/L 135*  < > 133*   POTASSIUM mmol/L 3 7  < > 5 2   CHLORIDE mmol/L 99*  < > 99*   CO2 mmol/L 28  < > 29   BUN mg/dL 61*  < > 49*   CREATININE mg/dL 2 15*  < > 2 08*   CALCIUM mg/dL 9 0  < > 9 7   TOTAL PROTEIN g/dL  --   --  7 9   BILIRUBIN TOTAL mg/dL  --   --  0 50   ALK PHOS U/L  --   --  85   ALT U/L  --   --  25   AST U/L  --   --  32   GLUCOSE RANDOM mg/dL 112  < > 95   < > = values in this interval not displayed  Results from last 7 days  Lab Units 04/20/18  0521   INR  2 29*       * I Have Reviewed All Lab Data Listed Above  * Additional Pertinent Lab Tests Reviewed:  [facility]    Imaging:  Xr Chest 1 View Portable    Result Date: 4/18/2018  Narrative: CHEST INDICATION:   fall  Altered mental status  COMPARISON:  1/11/2018 EXAM PERFORMED/VIEWS:  XR CHEST PORTABLE Images: 1 FINDINGS:  Left-sided chest wall pacemaker is identified  Pacemaker leads are intact  Midline sternotomy wires again noted  Heart shadow is enlarged but unchanged from prior exam  The lungs are clear  No pneumothorax or pleural effusion  Osseous structures appear within normal limits for patient age  Impression: No acute cardiopulmonary disease  Workstation performed: [id number]     Ct Head Without Contrast    Result Date: 4/18/2018  Narrative: CT BRAIN - WITHOUT CONTRAST INDICATION:   fall on coumadin  COMPARISON:  None  TECHNIQUE:  CT examination of the brain was performed  In addition to axial images, coronal 2D reformatted images were created and submitted for interpretation  Radiation dose length product (DLP) for this visit:  434 74 mGy-cm   This examination, like all CT scans performed in the [facility], was performed utilizing techniques to minimize radiation dose exposure, including the use of iterative  reconstruction and automated exposure control  IMAGE QUALITY:  Diagnostic  FINDINGS: PARENCHYMA:  No intracranial mass, mass effect or midline shift  There is no acute hemorrhage  There is no extracerebral hemorrhage  There is a subacute to acute infarct in the posterior right parietal region  Microangiopathic change is present  There is a chronic lacunar type infarct in the left thalamus  VENTRICLES AND EXTRA-AXIAL SPACES:  Prominent consistent with atrophy  VISUALIZED ORBITS AND PARANASAL SINUSES:  Unremarkable  CALVARIUM AND EXTRACRANIAL SOFT TISSUES:  Small mastoid effusion on the left  Impression: 1  Subacute to acute infarct in the posterior right parietal region  No acute hemorrhage  No extracerebral collections   * I personally telephoned this result to Rich[employer] Fine on 4/18/2018 3:11 PM  Workstation performed: [id number]     Ct Spine Cervical Without Contrast    Result Date: 4/18/2018  Narrative: CT CERVICAL SPINE - WITHOUT CONTRAST INDICATION:   Fall on Coumadin  COMPARISON: None  TECHNIQUE:  CT examination of the cervical spine was performed without intravenous contrast   Contiguous axial images were obtained  Sagittal and coronal reconstructions were performed  Radiation dose length product (DLP) for this visit:  1578 22 mGy-cm   This examination, like all CT scans performed in the [facility], was performed utilizing techniques to minimize radiation dose exposure, including the use of iterative reconstruction and automated exposure control  IMAGE QUALITY:  Diagnostic  FINDINGS: ALIGNMENT:  Normal alignment of the cervical spine  No subluxation  VERTEBRAL BODIES:  No fracture  Congenital fusion of C2 and C3  DEGENERATIVE CHANGES:  Mild disc space narrowing and spurring at several levels  PREVERTEBRAL AND PARASPINAL SOFT TISSUES:  Unremarkable  THORACIC INLET:  Normal      Impression: No cervical spine fracture or traumatic malalignment  Degenerative changes  Congenital fusion of C2 and C3    Workstation performed: [id number]     Imaging Reports Reviewed by myself    Cultures:   Blood Culture: No results found for: BLOODCX  Urine Culture:   Lab Results   Component Value Date    URINECX <10,000 cfu/ml  02/02/2018    URINECX No Growth <1000 cfu/mL 10/16/2017    URINECX 20,000-29,000 cfu/ml Mixed Contaminants X3 09/26/2017    URINECX No Growth <1000 cfu/mL 01/12/2017     Sputum Culture: No components found for: SPUTUMCX  Wound Culture: No results found for: WOUNDCULT    Last 24 Hours Medication List:     Current Facility-Administered Medications:  acetaminophen 650 mg Oral Q6H PRN Kristi Freedman MD    aspirin 81 mg Oral Daily Kristi Freedman MD    atorvastatin 80 mg Oral Daily With [employer], DO clonazePAM 0 25 mg Oral BID Jac Brewer MD    docusate sodium 100 mg Oral BID Cheryl Casey MD    insulin lispro 1-5 Units Subcutaneous TID With Meals Cheryl Casey MD    iron polysaccharides 150 mg Oral Daily Cheryl Casey MD    lidocaine  Topical PRN Cheryl Casey MD    pantoprazole 40 mg Oral Early Morning Cheryl Casey MD    polyethylene glycol 17 g Oral Daily Cheryl Casey MD    sodium chloride 500 mL Intravenous Once Cheryl Casey MD Last Rate: 500 mL (04/20/18 1019)   warfarin 2 5 mg Oral Daily (warfarin) Cheryl Casey MD         Today, Patient Was Seen By: Cheryl Casey MD    ** Please Note: Dragon 360 Dictation voice to text software may have been used in the creation of this document   **

## 2018-04-20 NOTE — CONSULTS
Neurology Consult Follow Up      Leeanne Spatz is a 80 y o  female  [place]    [id number]        Assessment/Recommendations:    1  Subacute right parietal lobe ischemic infarction   2  Hemichorea   3  HTN  4  DM II  5  CHADWICK  6  H/o DVT     Patient has remained stable and is slightly doing better  Her left distal extremity chorea is better with klonopin  If needed can increase it to 0 5mg bid  Cont tele  Recommend permissive hypertension   Continue anticoagulation with coumadin to keep INR therapeutic for h/o DVT  When not on anticoagulation, she can be switched from aspirin to plavix for stroke prevention  Aspirin for now is fine as patient had ischemic stroke while therapeutic with INR  Her carotid doppler did show atherosclerotic irregular plaque but without flow limiting stenosis  lipitor 80mg daily now and upon discharge   Can not obtain MRI due to her pacemaker  Could not get CTA due to CHADWICK  TTE w/ shunt- no clear source of emboli   Shoulder/arm pain has been found during post stroke recovery phase  Pain management recommended  Cognitive speech therapy  PT/OT- rehab   Discussed plan with patient's family and primary team             Chief Complaint:  Stroke   Subjective:   Patient is doing better  Her involuntary movements are better  There is still weakness and partially difficulty with gait as she has neglect and doesn't see her surroundings very well  Daughter said she was a little confused in morning and though it was still night       Past Medical History:   Diagnosis Date    Acid reflux     on occ    Arthritis     DJD right hip replaced    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 with no change    Cancer ([facility] ) 2007    colonic polyps, no surgery done    Deep vein thrombosis (DVT) of left upper extremity ([facility] ) 12/11/2017    Diabetes mellitus ([facility] )     type 2    Diverticulosis     Edema     in legs on occ    Hypertension     on occ    Language barrier speaks Syriac & broken english     Social History     Social History    Marital status:      Spouse name: N/A    Number of children: N/A    Years of education: N/A     Occupational History    Not on file  Social History Main Topics    Smoking status: Former Smoker     Packs/day: 0 25     Years: 10 00     Types: Cigarettes     Quit date: 1950    Smokeless tobacco: Never Used    Alcohol use Yes      Comment: socially    Drug use: No    Sexual activity: Not Currently     Other Topics Concern    Not on file     Social History Narrative    No narrative on file     Family History   Problem Relation Age of Onset    Cancer Mother      throat       ROS:  Please see HPI for positive symptoms  No fever, no chills, no weight change  Ocular: No drainage, no blurred vision  HEENT:  No sore throat, earache, or congestion  No neck pain  COR:  No chest pain  No palpitations  Lungs:  no sob, wheezing,  GI:  no  nausea, no vomiting, no diarrhea, no constipation, no anorexia  :  No dysuria, frequency, or urgency  No hematuria  No vaginal discharge or vaginal bleeding  Musculoskeletal:  left arm, shoulder pain+, no swelling or edema  Skin:  No rash or itching  Psychiatric:  no anxiety, no depression  Endocrine:  No polyuria or polydipsia  Objective:  /60 (BP Location: Right arm)   Pulse 86   Temp 98 3 °F (36 8 °C) (Oral)   Resp 18   Ht 5' 4" (1 626 m)   LMP  (LMP Unknown)   SpO2 97%     General: alert   Mental status: oriented x2  Attention: normal  Knowledge: fair  Language and Speech: there is language barrier  She understands in [address]  Cranial nerves: II-XII intact except right gaze preference and mild left irina neglect   Muscle tone: normal  Motor strength:  5/5 on right side, 4/5 in LUE, LLE  Sensory: grossly normal b/l   Gait: unsteady, requires walker   Coordination: difficulty with finger to nose in LUE   There is subtle chorea in left hand and foot but improved compared to yesterday   Reflexes: 2+ throughout  except as noted      Labs:      Lab Results   Component Value Date    WBC 5 40 04/20/2018    HGB 10 0 (L) 04/20/2018    HCT 30 9 (L) 04/20/2018    MCV 88 04/20/2018    PLT 84 (L) 04/20/2018     Lab Results   Component Value Date    HGBA1C 6 6 (H) 04/19/2018     Lab Results   Component Value Date    ALT 25 04/18/2018    AST 32 04/18/2018    ALKPHOS 85 04/18/2018    BILITOT 0 50 04/18/2018     Lab Results   Component Value Date    GLUCOSE 112 04/20/2018    CALCIUM 9 0 04/20/2018     (L) 04/20/2018    K 3 7 04/20/2018    CO2 28 04/20/2018    CL 99 (L) 04/20/2018    BUN 61 (H) 04/20/2018    CREATININE 2 15 (H) 04/20/2018         Review of reports and notes reveal:         Xr Chest 1 View Portable    Result Date: 4/18/2018  No acute cardiopulmonary disease  Workstation performed: [id number]     Ct Head Without Contrast    Result Date: 4/18/2018  1  Subacute to acute infarct in the posterior right parietal region  No acute hemorrhage  No extracerebral collections  * I personally telephoned this result to Mercedes Flores on 4/18/2018 3:11 PM  Workstation performed: [id number]     Ct Spine Cervical Without Contrast    Result Date: 4/18/2018  No cervical spine fracture or traumatic malalignment  Degenerative changes  Congenital fusion of C2 and C3  Workstation performed: [id number]           Thank you for this consult      Total time of encounter:  30 min  More than 50% of the time was used in counseling and/or coordination of care  Extent of couseling and/or coordination of care        Jony Alonzo MD  [facility]  [address]  [place] [address]  [phone]

## 2018-04-20 NOTE — OCCUPATIONAL THERAPY NOTE
OT TREATMENT     04/20/18 1136   Pain Assessment   Pain Assessment Henning-Baker FACES   Henning-Baker FACES Pain Rating 0   ADL   LB Dressing Assistance 4  Minimal Assistance   LB Dressing Deficit Setup;Steadying; Requires assistive device for steadying;Verbal cueing;Supervision/safety; Increased time to complete   Toileting Assistance  4  Minimal Assistance   Toileting Deficit Setup;Steadying;Verbal cueing;Supervison/safety; Increased time to complete   Transfers   Sit to Stand 5  Supervision   Additional items Assist x 1;Verbal cues   Stand to Sit 5  Supervision   Additional items Assist x 1;Verbal cues   Stand pivot 5  Supervision   Additional items Assist x 1;Verbal cues   Functional Mobility   Functional Mobility 4  Minimal assistance   Additional Comments ambulates short distance into hallway, back into room to bathroom and back to chair with RW and CGA to maintain balance, VCs for safe use of walker  Coordination   Fine Motor note dyskinesia left side, minimal impact on function   Cognition   Overall Cognitive Status WFL   Arousal/Participation Alert; Responsive; Cooperative   Attention Attends with cues to redirect   Following Commands Follows one step commands without difficulty   Activity Tolerance   Activity Tolerance Patient tolerated treatment well   Assessment   Assessment Pt received in bedside chair, daughter present  Pt agreeable to demonstrate doffing/donning socks  Pt uses left hand and manages left sock first  Slight diskinesia on left side with minimal impact on dexterity  Agreeable for a short walk  Pt perfroms sit to stand with SPV, able to acurately target walker to begin ambulation  Ambulates short distance into hallway with CGA to maintain balanace ans VCs for safety  Manges through doors wiith no significant inattention to the left  Uses bathroom (toileting/hand washing) with min A to maintain balance and VCs for safe walker technique   Targets toilet to throw away paper, flush and turns the sink off and on without difficulty  Cues back to chair for walker safety  Anticipate pt at CGA/min A level for LB care based on current status with standing balance and mobility and sock management  SPV for UB care  Plan   Treatment Interventions ADL retraining;Functional transfer training;UE strengthening/ROM; Endurance training;Patient/family training;Equipment evaluation/education; Neuromuscular reeducation; Fine motor coordination activities; Compensatory technique education; Energy conservation   OT Frequency 3-5x/wk   Recommendation   OT Discharge Recommendation [facility] Number  Sabas Dunn OTR/L [id number]   Pt in bedside chair, alarm on, call bell within reach, all needs met  Daughter present

## 2018-04-20 NOTE — PROGRESS NOTES
Cardiac/Pulmonary Rehabilitation Plan of Care                                                                                                                                                   60 Day/Discharge  2018  Comments: Mrs Sailaja Salmon has not returned to cardiac rehabilitation since her 16 session on 2018 due to CVA  Her daughter stated today that she had had a second stroke and will not be returning to cardiac rehabilitation  Unable to complete outcome assessment  Pt to be transferred to a long term facility  as per daughter  Today's date: 2018   Visits: 17  Patient name: [address]      : 1933  Age: 80 y o  MRN: [id number]  Referring Physician: Daniele Ferrera MD  Provider: Henry Crandall  Clinician: Jesenia Paz RN    Dx:   Encounter Diagnosis   Name Primary?  S/P mitral valve replacement      Date of onset: 2017    Comments: Mrs Sailaja Salmon completed 17 sessions of the cardiac rehabilitation program  Her telemetry monitor has been atrial paced at times with underlying sinus rhythm  She completes 35-40 minutes of cardiovascular exercise  On 2017 staff encouraged Mrs Wilde to seek medical attention due to an unsteady gait and had delayed response to questions  Her VS were stable  /70 HR 81  Blood sugar 120  Daughter later stated she fell at home on 4/15/18   At first they refused to seek medical attention  Discussed signs and symptoms of CVA  Daughter called cardiac rehabilitation department on 2018 and stated Mrs Sailaja Salmon had a stroke  Patient to contact cardiac rehabilitation department when able to resume therapy       Medication compliance: Yes   Comments: daughter help manage medications  Fall Risk: Yes   Comments: unsteady gait, fatigue and short of breath with minimal exertion    EXERCISE/ACTIVITY    Cardiovascular:   Min: 35   METS: 1 8-2 3   Hr: 91   RPE: 4   O2 sat: 95    Modalities: Treadmill, NuStep and Recumbent bike  Strength training: in 4 weeks   Modalities: Arm curl  EKG changes: atrial pace and capture  Dyspnea score: 4  Home activity: none  Goals: increase strength and endurance to return to shopping and gardening, improve fatigue and shortness of breath via increasing activity (not Met)  Education: explained cardiac rehabilitation, how to use treadmill, nu-step, recumbent bike, explained cardiac risk factors, hypotension and exercise tips for patients with DM  Plan: treadmill 15 minutes at 1 2 mph with 0% incline, recumbent bike 5 to 10 minutes level 2, nu-step 20 minutes level 4 increase time and resistance as tolerated  Readiness to change: 7    NUTRITION    Weight control:    Starting weight: 152#   Current weight: 150 5  Waist circumference:    Startin inches   Current:    Diabetes:Patient reported fasting blood sugar 122  Lipid management: low fat diet  Goals: eat a heart healthy low fat low salt diet  Education: 20% low fat diet, heart healthy diet, rate your plate  Plan: increase fish, fruits and vegetables  Readiness to change: 5    PSYCHOSOCIAL    Emotional:    Self-reported stress level: 1   Social support: family  Goals: increase mood to decrease depression  Education: will provide relaxation tips and positive encouragement  Plan: repeat PHQ9 score in 4 weeks  Readiness to change: 3    OTHER CORE COMPONENTS     Tobacco:   History   Smoking Status    Former Smoker    Packs/day: 0 25    Years: 10 00    Types: Cigarettes    Quit date:    Smokeless Tobacco    Never Used     Blood pressure:    Resting: Resting BP: 134/68   Exercise:    Goals: none  Education: none  Plan: none  Readiness to change: 1

## 2018-04-20 NOTE — PROGRESS NOTES
Cardiology Progress Note - Julio Gregory 80 y o  female MRN: [id number]    Unit/Bed#: [address] Encounter: [id number]      Assessment/Recommendations:  1  New CVA with acute infarct of right parietal region - Pacemaker interrogation complete  No episodes of atrial fibrillation  - patient on coumadin therapy  Although she has episodes of subtherapeutic and supratherapeutic INR, the lack of reversal agent for novel anticoagulants with indication for DVT place her at higher risk for bleeding events given recurrent falls/imbalance  2  LUE DVT in December - post op - consider DC of coumadin after 1 more month of treatment  3  SSS s/p pacemaker - interrogation reviewed  4  Recent MVR - 2D echocardiogram was reviewed  Normal functioning valve  5  Hypertension - stable  6  Dyslipidemia - atorvastatin 80 mg  7  DM     Plan:  As above  Discussed with hospitalist and patient's daughter  Subjective:   Patient seen and examined  No significant events overnight  Objective:     Vitals: Blood pressure 123/60, pulse 86, temperature 98 3 °F (36 8 °C), temperature source Oral, resp  rate 18, height 5' 4" (1 626 m), SpO2 97 %, not currently breastfeeding  , There is no height or weight on file to calculate BMI , Orthostatic Blood Pressures    Flowsheet Row Most Recent Value   Blood Pressure  123/60 filed at 04/20/2018 0907   Patient Position - Orthostatic VS  Sitting filed at 04/20/2018 0907            Intake/Output Summary (Last 24 hours) at 04/20/18 1446  Last data filed at 04/20/18 1019   Gross per 24 hour   Intake              800 ml   Output              750 ml   Net               50 ml       TELE: No significant arrhythmias seen  Physical Exam:  Physical Exam   Constitutional: She appears healthy  No distress  HENT:   Nose: Nose normal    Mouth/Throat: Oropharynx is clear  Eyes: Conjunctivae are normal  Pupils are equal, round, and reactive to light  Neck: Neck supple  No JVD present  Cardiovascular: Normal rate and regular rhythm  Exam reveals no distant heart sounds and no friction rub  Murmur heard  Systolic murmur is present   Pulmonary/Chest: Effort normal and breath sounds normal  She has no wheezes  She has no rales  Abdominal: Soft  She exhibits no distension  There is no tenderness  Musculoskeletal: She exhibits no edema  Neurological: She is alert  Left arm with involuntary movements  Skin: Skin is warm and dry  No rash noted         Medications:      Current Facility-Administered Medications:     acetaminophen (TYLENOL) tablet 650 mg, 650 mg, Oral, Q6H PRN, Georgiana Wagner MD, 650 mg at 04/20/18 1434    aspirin chewable tablet 81 mg, 81 mg, Oral, Daily, Georgiana Wagner MD, 81 mg at 04/20/18 0909    atorvastatin (LIPITOR) tablet 80 mg, 80 mg, Oral, Daily With Lucía Cervantes DO, 80 mg at 04/19/18 1616    clonazePAM (KlonoPIN) tablet 0 25 mg, 0 25 mg, Oral, BID, Chaim Rubio MD, 0 25 mg at 04/20/18 0909    docusate sodium (COLACE) capsule 100 mg, 100 mg, Oral, BID, Georgiana Wagner MD, 100 mg at 04/20/18 0909    insulin lispro (HumaLOG) 100 units/mL subcutaneous injection 1-5 Units, 1-5 Units, Subcutaneous, TID With Meals, 2 Units at 04/19/18 1204 **AND** Fingerstick Glucose (POCT), , , TID AC, Georgiana Wagner MD    iron polysaccharides (NIFEREX) capsule 150 mg, 150 mg, Oral, Daily, Georgiana Wagner MD, 150 mg at 04/20/18 0909    lidocaine (XYLOCAINE) 2 % topical gel, , Topical, PRN, Georgiana Wagner MD    pantoprazole (PROTONIX) EC tablet 40 mg, 40 mg, Oral, Early Morning, Georgiana Wagner MD, 40 mg at 04/20/18 0527    polyethylene glycol (MIRALAX) packet 17 g, 17 g, Oral, Daily, Georgiana Wagner MD, 17 g at 04/20/18 0909    warfarin (COUMADIN) tablet 2 5 mg, 2 5 mg, Oral, Daily (warfarin), Georgiana Wagner MD, 2 5 mg at 04/19/18 1716     Labs & Results:      Results from last 7 days  Lab Units 04/18/18  1500   TROPONIN I ng/mL <0 02       Results from last 7 days  Lab Units 04/20/18  0521 04/19/18  0629 04/18/18  1500   WBC Thousand/uL 5 40 5 50 5 70   HEMOGLOBIN g/dL 10 0* 10 4* 11 7*   HEMATOCRIT % 30 9* 31 6* 35 1*   PLATELETS Thousands/uL 84* 90* 115*       Results from last 7 days  Lab Units 04/19/18  0629   CHOLESTEROL mg/dL 125   TRIGLYCERIDES mg/dL 77   HDL mg/dL 43       Results from last 7 days  Lab Units 04/20/18  0521 04/19/18  0629 04/18/18  1500   SODIUM mmol/L 135* 139 133*   POTASSIUM mmol/L 3 7 4 2 5 2   CHLORIDE mmol/L 99* 104 99*   CO2 mmol/L 28 27 29   BUN mg/dL 61* 50* 49*   CREATININE mg/dL 2 15* 1 98* 2 08*   CALCIUM mg/dL 9 0 9 6 9 7   TOTAL PROTEIN g/dL  --   --  7 9   BILIRUBIN TOTAL mg/dL  --   --  0 50   ALK PHOS U/L  --   --  85   ALT U/L  --   --  25   AST U/L  --   --  32   GLUCOSE RANDOM mg/dL 112 101 95       Results from last 7 days  Lab Units 04/20/18  0521 04/19/18  0950 04/18/18  1500   INR  2 29* 2 31* 3 15*   PTT seconds  --   --  37*       Results from last 7 days  Lab Units 04/18/18  1500   MAGNESIUM mg/dL 2 5       Echo: EF mildly reduced  Normal bioprosthetic mitral valve  TLE personally reviewed by Elodia Putnam, DO - atrial pacing

## 2018-04-20 NOTE — PHYSICAL THERAPY NOTE
PT TREATMENT     04/20/18 0954   Pain Assessment   Pain Assessment 0-10   Pain Score 4   Pain Type (RN aware)   Pain Location Arm;Leg   Pain Orientation Left   Restrictions/Precautions   Other Precautions Fall Risk;Pain; Chair Alarm; Bed Alarm  (Fijian speaking, L neglect)   General   Chart Reviewed Yes   Cognition   Arousal/Participation Cooperative   Following Commands Follows one step commands with increased time or repetition   Subjective   Subjective "Tired"   Transfers   Sit to Stand 4  Minimal assistance   Stand to Sit 4  Minimal assistance   Stand pivot 4  Minimal assistance   Ambulation/Elevation   Gait pattern (slow gait speed)   Gait Assistance 4  Minimal assist   Additional items Tactile cues; Verbal cues  (direction, attention to task)   Assistive Device Rolling walker   Distance 60 feet   Activity Tolerance   Activity Tolerance Patient limited by fatigue   Exercises   Hip Flexion 10 reps; Sitting  (alternating)   Knee AROM Long Arc Quad 10 reps; Sitting;Bilateral  (alternating)   Ankle Pumps 10 reps; Sitting;Bilateral   UE Exercise (reaching for objects across midline x 2 minutes)   Assessment   Prognosis Good   Problem List Decreased strength;Decreased endurance; Impaired balance;Decreased mobility;Pain   Assessment Pt demonstrates improved L sided neglect today but still needs at least min assist for all mobility  Pt fatigues quickly and requires increased time for most tasks  Plan   Treatment/Interventions ADL retraining;Functional transfer training;LE strengthening/ROM; Therapeutic exercise;Elevations; Patient/family training;Equipment eval/education;Gait training;Bed mobility; Endurance training;Cognitive reorientation   Progress Progressing toward goals   Recommendation   Recommendation [employer])   Licensure   [place] License Number  Mikel AVERY  [id number]

## 2018-04-20 NOTE — PLAN OF CARE
Problem: PHYSICAL THERAPY ADULT  Goal: Performs mobility at highest level of function for planned discharge setting  See evaluation for individualized goals  Outcome: Progressing  Prognosis: Good  Problem List: Decreased strength, Decreased endurance, Impaired balance, Decreased mobility, Pain  Assessment: Pt demonstrates improved L sided neglect today but still needs at least min assist for all mobility  Pt fatigues quickly and requires increased time for most tasks  Recommendation:  (STR)          See flowsheet documentation for full assessment

## 2018-04-21 VITALS
SYSTOLIC BLOOD PRESSURE: 131 MMHG | TEMPERATURE: 99.1 F | WEIGHT: 151 LBS | BODY MASS INDEX: 25.78 KG/M2 | HEART RATE: 89 BPM | RESPIRATION RATE: 20 BRPM | DIASTOLIC BLOOD PRESSURE: 59 MMHG | OXYGEN SATURATION: 93 % | HEIGHT: 64 IN

## 2018-04-21 PROBLEM — I50.20 SYSTOLIC CONGESTIVE HEART FAILURE (HCC): Status: ACTIVE | Noted: 2018-04-18

## 2018-04-21 LAB
ANION GAP SERPL CALCULATED.3IONS-SCNC: 6 MMOL/L (ref 4–13)
BUN SERPL-MCNC: 59 MG/DL (ref 5–25)
CALCIUM SERPL-MCNC: 8.4 MG/DL (ref 8.3–10.1)
CHLORIDE SERPL-SCNC: 107 MMOL/L (ref 100–108)
CO2 SERPL-SCNC: 26 MMOL/L (ref 21–32)
CREAT SERPL-MCNC: 1.72 MG/DL (ref 0.6–1.3)
ERYTHROCYTE [DISTWIDTH] IN BLOOD BY AUTOMATED COUNT: 17.8 % (ref 11.6–15.1)
GFR SERPL CREATININE-BSD FRML MDRD: 27 ML/MIN/1.73SQ M
GLUCOSE SERPL-MCNC: 106 MG/DL (ref 65–140)
GLUCOSE SERPL-MCNC: 119 MG/DL (ref 65–140)
GLUCOSE SERPL-MCNC: 122 MG/DL (ref 65–140)
HCT VFR BLD AUTO: 28.8 % (ref 37–47)
HGB BLD-MCNC: 9.4 G/DL (ref 12–16)
INR PPP: 2.35 (ref 0.86–1.16)
MCH RBC QN AUTO: 28.4 PG (ref 27–31)
MCHC RBC AUTO-ENTMCNC: 32.6 G/DL (ref 31.4–37.4)
MCV RBC AUTO: 87 FL (ref 82–98)
PLATELET # BLD AUTO: 85 THOUSANDS/UL (ref 130–400)
PLATELET BLD QL SMEAR: ABNORMAL
PMV BLD AUTO: 9.6 FL (ref 8.9–12.7)
POTASSIUM SERPL-SCNC: 3.8 MMOL/L (ref 3.5–5.3)
PROTHROMBIN TIME: 24.9 SECONDS (ref 9.4–11.7)
RBC # BLD AUTO: 3.32 MILLION/UL (ref 4.2–5.4)
SODIUM SERPL-SCNC: 139 MMOL/L (ref 136–145)
WBC # BLD AUTO: 6 THOUSAND/UL (ref 4.8–10.8)

## 2018-04-21 PROCEDURE — 85610 PROTHROMBIN TIME: CPT | Performed by: STUDENT IN AN ORGANIZED HEALTH CARE EDUCATION/TRAINING PROGRAM

## 2018-04-21 PROCEDURE — 99239 HOSP IP/OBS DSCHRG MGMT >30: CPT | Performed by: STUDENT IN AN ORGANIZED HEALTH CARE EDUCATION/TRAINING PROGRAM

## 2018-04-21 PROCEDURE — 80048 BASIC METABOLIC PNL TOTAL CA: CPT | Performed by: STUDENT IN AN ORGANIZED HEALTH CARE EDUCATION/TRAINING PROGRAM

## 2018-04-21 PROCEDURE — 85027 COMPLETE CBC AUTOMATED: CPT | Performed by: STUDENT IN AN ORGANIZED HEALTH CARE EDUCATION/TRAINING PROGRAM

## 2018-04-21 PROCEDURE — 82948 REAGENT STRIP/BLOOD GLUCOSE: CPT

## 2018-04-21 RX ORDER — CLONAZEPAM 0.5 MG/1
0.25 TABLET ORAL 2 TIMES DAILY
Qty: 20 TABLET | Refills: 0 | Status: SHIPPED | OUTPATIENT
Start: 2018-04-21 | End: 2018-04-21

## 2018-04-21 RX ORDER — CLONAZEPAM 0.5 MG/1
0.25 TABLET ORAL 2 TIMES DAILY
Qty: 10 TABLET | Refills: 0 | Status: SHIPPED | OUTPATIENT
Start: 2018-04-21 | End: 2018-05-19 | Stop reason: HOSPADM

## 2018-04-21 RX ORDER — OXYCODONE HYDROCHLORIDE 5 MG/1
5 TABLET ORAL EVERY 8 HOURS PRN
Status: DISCONTINUED | OUTPATIENT
Start: 2018-04-21 | End: 2018-04-21 | Stop reason: HOSPADM

## 2018-04-21 RX ORDER — CLONAZEPAM 0.5 MG/1
0.5 TABLET ORAL 2 TIMES DAILY
Qty: 20 TABLET | Refills: 0 | Status: SHIPPED | OUTPATIENT
Start: 2018-04-21 | End: 2018-04-21

## 2018-04-21 RX ORDER — OXYCODONE HYDROCHLORIDE 5 MG/1
5 TABLET ORAL EVERY 8 HOURS PRN
Status: DISCONTINUED | OUTPATIENT
Start: 2018-04-21 | End: 2018-04-21

## 2018-04-21 RX ORDER — CLONAZEPAM 0.5 MG/1
0.25 TABLET ORAL 2 TIMES DAILY
Qty: 10 TABLET | Refills: 0 | Status: SHIPPED | OUTPATIENT
Start: 2018-04-21 | End: 2018-04-21

## 2018-04-21 RX ADMIN — PANTOPRAZOLE SODIUM 40 MG: 40 TABLET, DELAYED RELEASE ORAL at 05:24

## 2018-04-21 RX ADMIN — CLONAZEPAM 0.5 MG: 0.5 TABLET ORAL at 08:48

## 2018-04-21 RX ADMIN — POLYETHYLENE GLYCOL 3350 17 G: 17 POWDER, FOR SOLUTION ORAL at 08:52

## 2018-04-21 RX ADMIN — DOCUSATE SODIUM 100 MG: 100 CAPSULE, LIQUID FILLED ORAL at 08:48

## 2018-04-21 RX ADMIN — Medication 150 MG: at 08:48

## 2018-04-21 RX ADMIN — ASPIRIN 81 MG 81 MG: 81 TABLET ORAL at 08:48

## 2018-04-21 NOTE — OCCUPATIONAL THERAPY NOTE
Attempted to see pt at bedside at 10:50  Pt very sleepy, difficult to wake  Daughter present and on the phone  Spoke with nsg: Nathaly Hernandez, who sates pt was alert and active earlier in a m  Unable to work with pt at this time due to lethargy  Will re-attempt later schedule permitting  Thank you    SHARITA OTR [id number]

## 2018-04-21 NOTE — SOCIAL WORK
CM made aware that pt is [facility] to [facility] for [facility] today  Spoke with the pt's Dtr  She requested WC Marrenata Perez  and is aware of charge for this  She felt it was safer for her mom   scheduled with Vicente for 1pm  RN on unit made aware and [employer] made aware as well

## 2018-04-21 NOTE — PHYSICAL THERAPY NOTE
Attempted to see pt for PT treatment this AM  Pt lethargic/sleepy  Needs constant verbal/tactile cues to wake up and respond to PT then pt falls right back to sleep  Unable to work with pt this AM due to pt lethargic/sleepy  Per chart, pt is for dc to STR today    [place], [place] [id number]

## 2018-04-21 NOTE — DISCHARGE SUMMARY
Discharge- Letta Kanner 1933, 80 y o  female MRN: [id number]    Unit/Bed#: [address] 415-01 Encounter: [id number]    Primary Care Provider: Oneyda Hill MD   Date and time admitted to hospital: 4/18/2018  2:31 PM        [facility] (acute kidney injury) [facility])   Assessment & Plan    · Baseline creatinine appears to be approximately 1 3, on admission 2 08  · UA showed small leukocytes  · Held demadex and given NS bolus  · Renal US was unremarkable  · Renal function began to decrease with IVF  · She will need follow up BMP in several days at the STR to monitor renal function  * CVA (cerebral vascular accident) [facility])   Assessment & Plan    · Patient with disturbance in gait and right hand and leg choreic movements on arrival as well as intermittent aggitation  · CT Head shows acute/subacute stroke in the right parietal lobe, no hemorrhage  · Has history of TIA in January 2018  Treated in [facility] [address]  · Could not do CTA head because of low GFR  · Echo with bubble showed normal functioning valves  · carotid ultrasounds show <50% stenosis bilaterally   · Passed swallow eval  · Neuro consult, recs appreciated  · ASA/statin   · Patient 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, so she was started on klonopin 0 5mg BID  This can be titrated up as outpatient as needed to control symptoms  · PT/OT recommend [facility], and she was discharged to [facility]  ·   · Still has sequale of left hand choreic movements         Systolic congestive heart failure (HCC)   Assessment & Plan    · History of CHF, type unknown  · On Demadex  · Echo showed EF of 44%        HTN (hypertension)   Assessment & Plan    · Patient on Dyazide and metoprolol          Deep vein thrombosis (DVT) of left upper extremity [facility])   Assessment & Plan    · Occurred in January 2018  · On Coumadin    INR supratherapeutic on arrival  · Coumadin held and INR back to goal, restarted coumadin  · She will need follow up with PCP/ hematology as outpt  · Goal INR 2-3        Acid reflux   Assessment & Plan    · Continue PPI        Pacemaker   Assessment & Plan    · SSS  · Pacer Placed in 2010  · Tele shows paced rhythm  · Cardio consulted for pacer interrogation, no malfunction, no episodes of afib  Controlled type 2 diabetes mellitus, without long-term current use of insulin (HCC)   Assessment & Plan    · Last hemoglobin A1c 7 4 in February 2018  · Repeat HA1c 6 6  · insulin sliding scale        H/O mitral valve replacement   Assessment & Plan    · Tissue valve  · Echo showed normal valves        Encephalopathyresolved as of 4/19/2018   Assessment & Plan    Encephalopathy in the setting of CVA as evidenced by intermittant confusion requiring CT head showing subacute to acute infarct in posterior R parietal region  resolved              Discharging Physician / Practitioner: Lucy Mclain MD  PCP: Marilia Escobar MD  Admission Date: 4/18/2018  Discharge Date: 04/21/18    Reason for Admission: Altered Mental Status (Per daughter patient fell on Sunday and has had intermitten confusion since then and today was having trouble walking ) and Fall        Resolved Problems  Date Reviewed: 1/25/2018          Resolved    Encephalopathy 4/19/2018     Resolved by  Lucy Mclain MD          Consultations During Hospital Stay:  IP CONSULT TO NEUROLOGY  IP CONSULT TO CASE MANAGEMENT  IP CONSULT TO CARDIOLOGY    Procedures Performed:     · none    Significant Findings / Test Results:     · Cholesterol 125, triglycerides 77, HDL 43, LDL 67  · TSH 1 176  · Hemoglobin A1c 6 6  · UA:  Negative ketones blood nitrites  Small leukocytes  Negative protein, no red blood cells, 10-20 white blood cells, occasional bacteria  · 2D echo:  EF 44%, no regional wall abnormalities, no aortic stenosis  Pulmonary artery systolic pressure mildly increased  Xr Chest 1 View Portable    Result Date: 4/18/2018  Impression: No acute cardiopulmonary disease  Workstation performed: [id number]     Ct Head Without Contrast  Result Date: 4/18/2018  Impression: 1  Subacute to acute infarct in the posterior right parietal region  No acute hemorrhage  No extracerebral collections  * I personally telephoned this result to Marbella Greenberg on 4/18/2018 3:11 PM  Workstation performed: [id number]     Ct Spine Cervical Without Contrast  Result Date: 4/18/2018  Impression: No cervical spine fracture or traumatic malalignment  Degenerative changes  Congenital fusion of C2 and C3  Workstation performed: [id number]     Vas Carotid Complete Study (*order Only If Cta Has Not Been Completed*)  Result Date: 4/19/2018   Impression  PSV  EDV (cm/s)  Ratio  Dist  ICA                104          34   1 06  Mid  ICA                 102          28   1 04  Prox  ICA    1 - 49%     110          33   1 12  Dist CCA                  88          11         Mid CCA                   98          15   1 20  Prox CCA                  82          16         Ext Carotid              103           7   1 05  Prox Vert                 67          13         Subclavian               160           3          Left         Impression  PSV  EDV (cm/s)  Ratio  Dist  ICA                 76          23   0 71  Mid  ICA                  73          18   0 68  Prox  ICA    1 - 49%     111          22   1 04  Dist CCA                  90          16         Mid CCA                  107          14   0 89  Prox CCA                 120          22         Ext Carotid              151           6   1 41  Prox Vert                 58          11         Subclavian               181           3            CONCLUSION: Impression RIGHT: There is <50% stenosis noted in the internal carotid artery  Plaque is heterogenous and irregular  Vertebral artery flow is antegrade  There is no significant subclavian artery disease  LEFT: There is <50% stenosis noted in the internal carotid artery   Plaque is heterogenous and irregular  Vertebral artery flow is antegrade  There is no significant subclavian artery disease  Recommend repeat duplex in 1 year to monitor for plaque progression  Internal carotid artery stenosis determination by consensus criteria from: Aide Webb et al  Carotid Artery Stenosis: Gray-Scale and Doppler US Diagnosis - Society of Radiologists in [address] [place] [address] Center [facility], Radiology 2003; [id number]  SIGNATURE: Electronically Signed by: Michelle Castro MD, [address] on 2018-04-19 03:56:51 PM    Us Kidney And Bladder  Result Date: 4/20/2018  Impression: No hydronephrosis seen Bilateral kidneys appear to be small  Workstation performed: [id number]       Incidental Findings:   ·      Test Results Pending at Discharge (will require follow up):   · none     Outpatient Tests Requested:  · none    Complications:  none    Reason for Admission: [address] Course:     Izzy Shore is a 80 y o  female patient with a PMH of T2DM, HTN, HLD, TIA, MV replacement, pacemaker placement, TIA, CHF, LUE DVT on coumadin  who originally presented to the hospital on 4/18/2018 due to several days of occasional falls, and abnormal gait  Symptoms began 3 days ago with a fall while patient was rising from sitting to standing  Later that night she was agitated but not confused  The next day she had another fall where her knees gave out while walking  At 1:00 p m  on the day of arrival she was at physical therapy and began walking abnormally, "like she was drunk" per her daughter  She appeared to be staggering and could not hold her balance  She was instructed to go to the emergency room by the physical therapist   Family has also noticed that for several days she has had an involuntary movement in her left hand and left foot like her muscles can't hold still  Family members deny any loss of consciousness, or seizure activity    She denies any fevers chills, speech difficulty, asymmetry in her face, weakness       In the ED labs were pertinent for sodium 133, creatinine 2 08, INR 3 15  Chest x-ray showed no abnormality  CT head showed acute to subacute infarct in the posterior right parietal region with no hemorrhage  NIH score was 0  She was given 325 ASA        Please see above list of diagnoses and related plan for additional information  Condition at Discharge: good     Discharge Day Visit / Exam:     Subjective:  Still has some involuntary movements in the left hand, but getting better  Has some shoulder pain from arthrtis  This is not new for her  Vitals: Blood Pressure: 125/60 (04/21/18 0413)  Pulse: 99 (04/21/18 0413)  Temperature: 99 8 °F (37 7 °C) (04/21/18 0413)  Temp Source: Tympanic (04/21/18 0413)  Respirations: 20 (04/21/18 0413)  Height: 5' 4" (162 6 cm) (04/19/18 1200)  Weight - Scale: 68 5 kg (151 lb) (04/20/18 1644)  SpO2: 98 % (04/21/18 0413)  Exam:   Physical Exam   Constitutional: She is oriented to person, place, and time  She appears well-developed  No distress  HENT:   Head: Normocephalic and atraumatic  Cardiovascular: Normal rate, regular rhythm and normal heart sounds  Pulmonary/Chest: Effort normal and breath sounds normal  No respiratory distress  She has no wheezes  She has no rales  Abdominal: Soft  Bowel sounds are normal  She exhibits no distension  There is no tenderness  There is no rebound and no guarding  Musculoskeletal: She exhibits no edema, tenderness or deformity  Neurological: She is alert and oriented to person, place, and time  Left hand with involuntary choreic movements, improved since arrival   Skin: Skin is warm and dry  Psychiatric: She has a normal mood and affect  Her behavior is normal    Nursing note and vitals reviewed  Discharge instructions/Information to patient and family:   See after visit summary for information provided to patient and family        Provisions for Follow-Up Care:  See after visit summary for information related to follow-up care and any pertinent home health orders  Disposition:     Acute Rehab at [facility]    Planned Readmission: no     Discharge Statement:  I spent >30 minutes discharging the patient  This time was spent on the day of discharge  I had direct contact with the patient on the day of discharge  Greater than 50% of the total time was spent examining patient, answering all patient questions, arranging and discussing plan of care with patient as well as directly providing post-discharge instructions  Additional time then spent on discharge activities  Discharge Medications:  See after visit summary for reconciled discharge medications provided to patient and family        ** Please Note: This note has been constructed using a voice recognition system **

## 2018-04-23 ENCOUNTER — APPOINTMENT (EMERGENCY)
Dept: RADIOLOGY | Facility: HOSPITAL | Age: 83
DRG: 023 | End: 2018-04-23
Payer: MEDICARE

## 2018-04-23 ENCOUNTER — APPOINTMENT (INPATIENT)
Dept: RADIOLOGY | Facility: HOSPITAL | Age: 83
DRG: 023 | End: 2018-04-23
Payer: MEDICARE

## 2018-04-23 ENCOUNTER — APPOINTMENT (EMERGENCY)
Dept: RADIOLOGY | Facility: HOSPITAL | Age: 83
DRG: 023 | End: 2018-04-23
Attending: RADIOLOGY
Payer: MEDICARE

## 2018-04-23 ENCOUNTER — HOSPITAL ENCOUNTER (INPATIENT)
Facility: HOSPITAL | Age: 83
LOS: 7 days | DRG: 023 | End: 2018-04-30
Attending: EMERGENCY MEDICINE | Admitting: INTERNAL MEDICINE
Payer: MEDICARE

## 2018-04-23 ENCOUNTER — ANESTHESIA EVENT (EMERGENCY)
Dept: RADIOLOGY | Facility: HOSPITAL | Age: 83
DRG: 023 | End: 2018-04-23
Payer: MEDICARE

## 2018-04-23 ENCOUNTER — APPOINTMENT (OUTPATIENT)
Dept: CARDIAC REHAB | Facility: CLINIC | Age: 83
End: 2018-04-23
Payer: MEDICARE

## 2018-04-23 ENCOUNTER — ANESTHESIA (EMERGENCY)
Dept: RADIOLOGY | Facility: HOSPITAL | Age: 83
DRG: 023 | End: 2018-04-23
Payer: MEDICARE

## 2018-04-23 DIAGNOSIS — I63.512 ACUTE ISCHEMIC LEFT MCA STROKE (HCC): Primary | ICD-10-CM

## 2018-04-23 DIAGNOSIS — I63.9 CVA (CEREBRAL VASCULAR ACCIDENT) (HCC): ICD-10-CM

## 2018-04-23 PROBLEM — I82.622 DEEP VEIN THROMBOSIS (DVT) OF LEFT UPPER EXTREMITY (HCC): Chronic | Status: ACTIVE | Noted: 2018-01-25

## 2018-04-23 PROBLEM — Z86.73 HISTORY OF ISCHEMIC RIGHT MCA STROKE: Chronic | Status: ACTIVE | Noted: 2018-04-23

## 2018-04-23 PROBLEM — Z95.0 PACEMAKER: Chronic | Status: ACTIVE | Noted: 2018-04-18

## 2018-04-23 PROBLEM — I10 HTN (HYPERTENSION): Chronic | Status: ACTIVE | Noted: 2018-04-18

## 2018-04-23 PROBLEM — Z95.2 H/O MITRAL VALVE REPLACEMENT: Chronic | Status: ACTIVE | Noted: 2018-03-01

## 2018-04-23 PROBLEM — I50.20 SYSTOLIC CONGESTIVE HEART FAILURE (HCC): Chronic | Status: ACTIVE | Noted: 2018-04-18

## 2018-04-23 PROBLEM — E11.9 CONTROLLED TYPE 2 DIABETES MELLITUS, WITHOUT LONG-TERM CURRENT USE OF INSULIN (HCC): Chronic | Status: ACTIVE | Noted: 2018-04-18

## 2018-04-23 LAB
ABO GROUP BLD: NORMAL
ANION GAP BLD CALC-SCNC: 12 MMOL/L (ref 4–13)
ANION GAP SERPL CALCULATED.3IONS-SCNC: 6 MMOL/L (ref 4–13)
APTT PPP: 45 SECONDS (ref 23–35)
BASE EXCESS BLDA CALC-SCNC: -1 MMOL/L (ref -2–3)
BASOPHILS # BLD AUTO: 0.01 THOUSANDS/ΜL (ref 0–0.1)
BASOPHILS NFR BLD AUTO: 0 % (ref 0–1)
BLD GP AB SCN SERPL QL: NEGATIVE
BUN BLD-MCNC: 41 MG/DL (ref 5–25)
BUN SERPL-MCNC: 46 MG/DL (ref 5–25)
CA-I BLD-SCNC: 1.24 MMOL/L (ref 1.12–1.32)
CA-I BLD-SCNC: 1.25 MMOL/L (ref 1.12–1.32)
CALCIUM SERPL-MCNC: 9.5 MG/DL (ref 8.3–10.1)
CHLORIDE BLD-SCNC: 103 MMOL/L (ref 100–108)
CHLORIDE SERPL-SCNC: 105 MMOL/L (ref 100–108)
CO2 SERPL-SCNC: 28 MMOL/L (ref 21–32)
CREAT BLD-MCNC: 1.5 MG/DL (ref 0.6–1.3)
CREAT SERPL-MCNC: 1.54 MG/DL (ref 0.6–1.3)
EOSINOPHIL # BLD AUTO: 0.79 THOUSAND/ΜL (ref 0–0.61)
EOSINOPHIL NFR BLD AUTO: 18 % (ref 0–6)
ERYTHROCYTE [DISTWIDTH] IN BLOOD BY AUTOMATED COUNT: 15.8 % (ref 11.6–15.1)
ERYTHROCYTE [DISTWIDTH] IN BLOOD BY AUTOMATED COUNT: 16.1 % (ref 11.6–15.1)
GFR SERPL CREATININE-BSD FRML MDRD: 31 ML/MIN/1.73SQ M
GFR SERPL CREATININE-BSD FRML MDRD: 32 ML/MIN/1.73SQ M
GLUCOSE SERPL-MCNC: 106 MG/DL (ref 65–140)
GLUCOSE SERPL-MCNC: 119 MG/DL (ref 65–140)
GLUCOSE SERPL-MCNC: 93 MG/DL (ref 65–140)
GLUCOSE SERPL-MCNC: 96 MG/DL (ref 65–140)
HBA1C MFR BLD HPLC: 6.1 %
HCO3 BLDA-SCNC: 23.8 MMOL/L (ref 24–30)
HCT VFR BLD AUTO: 24 % (ref 34.8–46.1)
HCT VFR BLD AUTO: 32.1 % (ref 34.8–46.1)
HCT VFR BLD AUTO: 34.7 % (ref 34.8–46.1)
HCT VFR BLD CALC: 21 % (ref 34.8–46.1)
HCT VFR BLD CALC: 29 % (ref 34.8–46.1)
HGB BLD-MCNC: 10.3 G/DL (ref 11.5–15.4)
HGB BLD-MCNC: 11.3 G/DL (ref 11.5–15.4)
HGB BLD-MCNC: 8 G/DL (ref 11.5–15.4)
HGB BLDA-MCNC: 7.1 G/DL (ref 11.5–15.4)
HGB BLDA-MCNC: 9.9 G/DL (ref 11.5–15.4)
INR PPP: 1.95 (ref 0.86–1.16)
LYMPHOCYTES # BLD AUTO: 0.76 THOUSANDS/ΜL (ref 0.6–4.47)
LYMPHOCYTES NFR BLD AUTO: 17 % (ref 14–44)
MCH RBC QN AUTO: 28.9 PG (ref 26.8–34.3)
MCH RBC QN AUTO: 29.1 PG (ref 26.8–34.3)
MCHC RBC AUTO-ENTMCNC: 32.1 G/DL (ref 31.4–37.4)
MCHC RBC AUTO-ENTMCNC: 33.3 G/DL (ref 31.4–37.4)
MCV RBC AUTO: 87 FL (ref 82–98)
MCV RBC AUTO: 90 FL (ref 82–98)
MONOCYTES # BLD AUTO: 0.47 THOUSAND/ΜL (ref 0.17–1.22)
MONOCYTES NFR BLD AUTO: 11 % (ref 4–12)
NEUTROPHILS # BLD AUTO: 2.43 THOUSANDS/ΜL (ref 1.85–7.62)
NEUTS SEG NFR BLD AUTO: 54 % (ref 43–75)
NRBC BLD AUTO-RTO: 0 /100 WBCS
PCO2 BLD: 25 MMOL/L (ref 21–32)
PCO2 BLD: 28 MMOL/L (ref 21–32)
PCO2 BLD: 40.7 MM HG (ref 42–50)
PH BLD: 7.38 [PH] (ref 7.3–7.4)
PLATELET # BLD AUTO: 111 THOUSANDS/UL (ref 149–390)
PLATELET # BLD AUTO: 142 THOUSANDS/UL (ref 149–390)
PMV BLD AUTO: 10.3 FL (ref 8.9–12.7)
PMV BLD AUTO: 10.5 FL (ref 8.9–12.7)
PO2 BLD: 133 MM HG (ref 35–45)
POTASSIUM BLD-SCNC: 3.8 MMOL/L (ref 3.5–5.3)
POTASSIUM BLD-SCNC: 4.3 MMOL/L (ref 3.5–5.3)
POTASSIUM SERPL-SCNC: 4.3 MMOL/L (ref 3.5–5.3)
PROTHROMBIN TIME: 22.4 SECONDS (ref 12.1–14.4)
RBC # BLD AUTO: 2.75 MILLION/UL (ref 3.81–5.12)
RBC # BLD AUTO: 3.57 MILLION/UL (ref 3.81–5.12)
RH BLD: POSITIVE
SAO2 % BLD FROM PO2: 99 % (ref 95–98)
SODIUM BLD-SCNC: 138 MMOL/L (ref 136–145)
SODIUM BLD-SCNC: 138 MMOL/L (ref 136–145)
SODIUM SERPL-SCNC: 139 MMOL/L (ref 136–145)
SPECIMEN EXPIRATION DATE: NORMAL
SPECIMEN SOURCE: ABNORMAL
SPECIMEN SOURCE: ABNORMAL
TROPONIN I SERPL-MCNC: <0.02 NG/ML
WBC # BLD AUTO: 4.46 THOUSAND/UL (ref 4.31–10.16)
WBC # BLD AUTO: 6 THOUSAND/UL (ref 4.31–10.16)

## 2018-04-23 PROCEDURE — C1757 CATH, THROMBECTOMY/EMBOLECT: HCPCS

## 2018-04-23 PROCEDURE — 70498 CT ANGIOGRAPHY NECK: CPT

## 2018-04-23 PROCEDURE — 85014 HEMATOCRIT: CPT | Performed by: STUDENT IN AN ORGANIZED HEALTH CARE EDUCATION/TRAINING PROGRAM

## 2018-04-23 PROCEDURE — 76937 US GUIDE VASCULAR ACCESS: CPT | Performed by: RADIOLOGY

## 2018-04-23 PROCEDURE — 61645 PERQ ART M-THROMBECT &/NFS: CPT

## 2018-04-23 PROCEDURE — 85018 HEMOGLOBIN: CPT | Performed by: STUDENT IN AN ORGANIZED HEALTH CARE EDUCATION/TRAINING PROGRAM

## 2018-04-23 PROCEDURE — B41FYZZ FLUOROSCOPY OF RIGHT LOWER EXTREMITY ARTERIES USING OTHER CONTRAST: ICD-10-PCS | Performed by: RADIOLOGY

## 2018-04-23 PROCEDURE — 99291 CRITICAL CARE FIRST HOUR: CPT

## 2018-04-23 PROCEDURE — 70450 CT HEAD/BRAIN W/O DYE: CPT

## 2018-04-23 PROCEDURE — 61645 PERQ ART M-THROMBECT &/NFS: CPT | Performed by: RADIOLOGY

## 2018-04-23 PROCEDURE — 85730 THROMBOPLASTIN TIME PARTIAL: CPT | Performed by: EMERGENCY MEDICINE

## 2018-04-23 PROCEDURE — 36222 PLACE CATH CAROTID/INOM ART: CPT | Performed by: RADIOLOGY

## 2018-04-23 PROCEDURE — 99291 CRITICAL CARE FIRST HOUR: CPT | Performed by: INTERNAL MEDICINE

## 2018-04-23 PROCEDURE — 71045 X-RAY EXAM CHEST 1 VIEW: CPT

## 2018-04-23 PROCEDURE — C1894 INTRO/SHEATH, NON-LASER: HCPCS

## 2018-04-23 PROCEDURE — 86901 BLOOD TYPING SEROLOGIC RH(D): CPT | Performed by: EMERGENCY MEDICINE

## 2018-04-23 PROCEDURE — C1769 GUIDE WIRE: HCPCS

## 2018-04-23 PROCEDURE — 82948 REAGENT STRIP/BLOOD GLUCOSE: CPT

## 2018-04-23 PROCEDURE — 85025 COMPLETE CBC W/AUTO DIFF WBC: CPT | Performed by: RADIOLOGY

## 2018-04-23 PROCEDURE — 85027 COMPLETE CBC AUTOMATED: CPT | Performed by: EMERGENCY MEDICINE

## 2018-04-23 PROCEDURE — 86923 COMPATIBILITY TEST ELECTRIC: CPT

## 2018-04-23 PROCEDURE — 99245 OFF/OP CONSLTJ NEW/EST HI 55: CPT | Performed by: PSYCHIATRY & NEUROLOGY

## 2018-04-23 PROCEDURE — 30233N1 TRANSFUSION OF NONAUTOLOGOUS RED BLOOD CELLS INTO PERIPHERAL VEIN, PERCUTANEOUS APPROACH: ICD-10-PCS | Performed by: INTERNAL MEDICINE

## 2018-04-23 PROCEDURE — 80047 BASIC METABLC PNL IONIZED CA: CPT

## 2018-04-23 PROCEDURE — 80048 BASIC METABOLIC PNL TOTAL CA: CPT | Performed by: EMERGENCY MEDICINE

## 2018-04-23 PROCEDURE — 70496 CT ANGIOGRAPHY HEAD: CPT

## 2018-04-23 PROCEDURE — 93005 ELECTROCARDIOGRAM TRACING: CPT | Performed by: EMERGENCY MEDICINE

## 2018-04-23 PROCEDURE — 86850 RBC ANTIBODY SCREEN: CPT | Performed by: EMERGENCY MEDICINE

## 2018-04-23 PROCEDURE — P9021 RED BLOOD CELLS UNIT: HCPCS

## 2018-04-23 PROCEDURE — 85610 PROTHROMBIN TIME: CPT | Performed by: EMERGENCY MEDICINE

## 2018-04-23 PROCEDURE — 85014 HEMATOCRIT: CPT

## 2018-04-23 PROCEDURE — 84484 ASSAY OF TROPONIN QUANT: CPT | Performed by: EMERGENCY MEDICINE

## 2018-04-23 PROCEDURE — C1760 CLOSURE DEV, VASC: HCPCS

## 2018-04-23 PROCEDURE — 36224 PLACE CATH CAROTD ART: CPT

## 2018-04-23 PROCEDURE — 84295 ASSAY OF SERUM SODIUM: CPT

## 2018-04-23 PROCEDURE — 82803 BLOOD GASES ANY COMBINATION: CPT

## 2018-04-23 PROCEDURE — 84132 ASSAY OF SERUM POTASSIUM: CPT

## 2018-04-23 PROCEDURE — 82947 ASSAY GLUCOSE BLOOD QUANT: CPT

## 2018-04-23 PROCEDURE — 36415 COLL VENOUS BLD VENIPUNCTURE: CPT

## 2018-04-23 PROCEDURE — 99231 SBSQ HOSP IP/OBS SF/LOW 25: CPT | Performed by: RADIOLOGY

## 2018-04-23 PROCEDURE — 03CG3ZZ EXTIRPATION OF MATTER FROM INTRACRANIAL ARTERY, PERCUTANEOUS APPROACH: ICD-10-PCS | Performed by: RADIOLOGY

## 2018-04-23 PROCEDURE — 99223 1ST HOSP IP/OBS HIGH 75: CPT | Performed by: RADIOLOGY

## 2018-04-23 PROCEDURE — 82330 ASSAY OF CALCIUM: CPT

## 2018-04-23 PROCEDURE — 86900 BLOOD TYPING SEROLOGIC ABO: CPT | Performed by: EMERGENCY MEDICINE

## 2018-04-23 RX ORDER — LABETALOL HYDROCHLORIDE 5 MG/ML
10 INJECTION, SOLUTION INTRAVENOUS EVERY 4 HOURS PRN
Status: DISCONTINUED | OUTPATIENT
Start: 2018-04-23 | End: 2018-04-23

## 2018-04-23 RX ORDER — LABETALOL HYDROCHLORIDE 5 MG/ML
10 INJECTION, SOLUTION INTRAVENOUS EVERY 4 HOURS PRN
Status: DISCONTINUED | OUTPATIENT
Start: 2018-04-23 | End: 2018-04-30 | Stop reason: HOSPADM

## 2018-04-23 RX ORDER — ASPIRIN 300 MG/1
300 SUPPOSITORY RECTAL ONCE
Status: COMPLETED | OUTPATIENT
Start: 2018-04-23 | End: 2018-04-23

## 2018-04-23 RX ORDER — SODIUM CHLORIDE, SODIUM GLUCONATE, SODIUM ACETATE, POTASSIUM CHLORIDE, MAGNESIUM CHLORIDE, SODIUM PHOSPHATE, DIBASIC, AND POTASSIUM PHOSPHATE .53; .5; .37; .037; .03; .012; .00082 G/100ML; G/100ML; G/100ML; G/100ML; G/100ML; G/100ML; G/100ML
50 INJECTION, SOLUTION INTRAVENOUS CONTINUOUS
Status: DISCONTINUED | OUTPATIENT
Start: 2018-04-23 | End: 2018-04-24

## 2018-04-23 RX ORDER — ASPIRIN 300 MG/1
600 SUPPOSITORY RECTAL ONCE
Status: DISCONTINUED | OUTPATIENT
Start: 2018-04-23 | End: 2018-04-23

## 2018-04-23 RX ORDER — FENTANYL CITRATE 50 UG/ML
INJECTION, SOLUTION INTRAMUSCULAR; INTRAVENOUS AS NEEDED
Status: DISCONTINUED | OUTPATIENT
Start: 2018-04-23 | End: 2018-04-23 | Stop reason: SURG

## 2018-04-23 RX ORDER — SODIUM CHLORIDE 9 MG/ML
INJECTION, SOLUTION INTRAVENOUS CONTINUOUS PRN
Status: DISCONTINUED | OUTPATIENT
Start: 2018-04-23 | End: 2018-04-23 | Stop reason: SURG

## 2018-04-23 RX ADMIN — FENTANYL CITRATE 50 MCG: 50 INJECTION, SOLUTION INTRAMUSCULAR; INTRAVENOUS at 11:10

## 2018-04-23 RX ADMIN — LEVETIRACETAM 1000 MG: 100 INJECTION, SOLUTION INTRAVENOUS at 23:22

## 2018-04-23 RX ADMIN — ASPIRIN 300 MG: 300 SUPPOSITORY RECTAL at 09:45

## 2018-04-23 RX ADMIN — SODIUM CHLORIDE: 0.9 INJECTION, SOLUTION INTRAVENOUS at 10:40

## 2018-04-23 RX ADMIN — IOHEXOL 85 ML: 350 INJECTION, SOLUTION INTRAVENOUS at 09:25

## 2018-04-23 RX ADMIN — IODIXANOL 165 ML: 320 INJECTION, SOLUTION INTRAVASCULAR at 15:43

## 2018-04-23 RX ADMIN — SODIUM CHLORIDE, SODIUM GLUCONATE, SODIUM ACETATE, POTASSIUM CHLORIDE, MAGNESIUM CHLORIDE, SODIUM PHOSPHATE, DIBASIC, AND POTASSIUM PHOSPHATE 50 ML/HR: .53; .5; .37; .037; .03; .012; .00082 INJECTION, SOLUTION INTRAVENOUS at 14:17

## 2018-04-23 RX ADMIN — FENTANYL CITRATE 50 MCG: 50 INJECTION, SOLUTION INTRAMUSCULAR; INTRAVENOUS at 11:25

## 2018-04-23 NOTE — ED ATTENDING ATTESTATION
Irena Byrne MD, saw and evaluated the patient  I have discussed the patient with the resident/non-physician practitioner and agree with the resident's/non-physician practitioner's findings, Plan of Care, and MDM as documented in the resident's/non-physician practitioner's note, except where noted  All available labs and Radiology studies were reviewed  At this point I agree with the current assessment done in the Emergency Department  I have conducted an independent evaluation of this patient a history and physical is as follows: This 80-year-old female presents as a wake-up stroke  Patient was found to have right-sided weakness and a facial at a nursing home  Patient is episode nursing home after having a stroke a week ago with left-sided weakness and choreiform movements with some confusion  On arrival here patient is aphasic with these [place] for movements and weakness on her right side  Patient is unable to follow commands  Patient is awake  Patient has significant deficits with an NIH stroke scale of 12  Patient has some drooling, tearing of her eyes  Patient does have regular heart sounds, is atrial paced  Patient has a soft nontender abdomen  CT scan does not demonstrate any evidence of hemorrhage, did does demonstrate the prior stroke from last week  CTA however demonstrates clot in the M2 segment of the left middle cerebral artery  Neurology consult and felt this patient was not a candidate for tPA given the fact that she is on Coumadin with a therapeutic INR as well as an unknown start time  However interventional radiology felt that this patient was a candidate for thrombectomy  Patient was transported to IR in a stable condition  Patient will be admitted to ICU after this    Critical Care Time  CritCare Time    Procedures

## 2018-04-23 NOTE — Clinical Note
Case was discussed with Critical Care and the patient's admission status was agreed to be Admission Status: inpatient status to the service of Dr Kellie Medina after IR thrombectomy

## 2018-04-23 NOTE — SPEECH THERAPY NOTE
Attempted again to see pt for ST, pt remains lethargic, observed nodding off in the middle of conversation with family members   Will plan to follow up for evaluation in AM

## 2018-04-23 NOTE — ANESTHESIA POSTPROCEDURE EVALUATION
Post-Op Assessment Note      CV Status:  Stable    Mental Status:  Alert and awake    Hydration Status:  Euvolemic    PONV Controlled:  Controlled    Airway Patency:  Patent    Post Op Vitals Reviewed: Yes          Staff: CRNA       Comments: vss report  rn          BP      Temp      Pulse     Resp     SpO2

## 2018-04-23 NOTE — SPEECH THERAPY NOTE
ST consult received, chart reviewed  Attempted to see pt for evaluation, d/w LEEANN Rodney Manner, pt just returned from IR having undergone thrombectomy, and is somewhat drowsy from sedation  Will follow up as able to evaluate when pt is more A+A

## 2018-04-23 NOTE — PROGRESS NOTES
Ms Yola Pang presents with acute left MCA syndrome  Symptoms of aphasia, partial right sided paresis, NIHSS 15  LKN unknown but the very at least 1 Pm  NECT without evident large volume stroke burden  No tPA given due Coumadin use and unclear LKN  Previously very independent  Daughters would like all aggressive interventions to be performed and understand the risks including stroke, bleeding and death especially given unknown last normal  Plan is for cerebral angiogram and mechanical thrombectomy

## 2018-04-23 NOTE — H&P
History and Physical - [address] 80 y o  female MRN: [id number]  Unit/Bed#: ICU 10 Encounter: [id number]     Reason for Admission / Chief Complaint: Left MCA stroke status post thrombectomy by IR     History of Present Illness:  Marbella Li is an 80year old female who presented to [facility] as a stroke alert  History is obtained mostly from chart review as the patient has severe aphasia and dysarthria secondary to her stroke  Per ED notes, patient was found by family members to have a left-sided gaze preference, severe dysarthria, and expressive aphasia  She also was noted to have right facial droop andright-sided hemiparesis  NIH Stroke Scale was 15  Patient was last seen well at 2030 on April 22nd  In the ED, CTA head and neck demonstrated a left MCA occlusion  Neurology did not give tPA as the patient was outside the window for tPA and anticoagulated on Coumadin  Patient was transferred to IR for cerebral angiogram and mechanical thrombectomy  While at IR, thrombectomy was performed and recanalization was achieved  After the case, patient had prominent bleeding from the femoral puncture site  Estimated blood loss was 500 mL to 1 L  Patient was transfused 1 unit of PRBCs because an I-STAT demonstrated a hemoglobin less than 8  Patient has medical history significant for recent right MCA stroke, sick sinus syndrome status post pacemaker placement in 0807, systolic CHF with an ejection fraction of 44% as of April 19th 2018, recent mitral valve replacement, left upper extremity DVT anticoagulated on Coumadin, and diabetes mellitus  History obtained from chart review and unobtainable from patient due to aphasia       Past Medical History:  Past Medical History:   Diagnosis Date    Acid reflux     on occ    Arthritis     DJD right hip replaced    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 [address] ) 12/11/2017    Diabetes mellitus ([facility] [address] )     type 2    Diverticulosis     Edema     in legs on occ    Hypertension     on occ    Language barrier     speaks Romansh & broken english        Past Surgical History:  Past Surgical History:   Procedure Laterality Date    COLON SURGERY      COLONOSCOPY      COLONOSCOPY N/A 11/2/2016    Procedure: COLONOSCOPY;  Surgeon: Renetta Hardy MD;  Location: [place] [address] GI LAB; Service:     ESOPHAGOGASTRODUODENOSCOPY N/A 11/2/2016    Procedure: ESOPHAGOGASTRODUODENOSCOPY (EGD); Surgeon: Renetta Hardy MD;  Location: [facility] GI LAB; Service:     JOINT REPLACEMENT Right 02/2015    hip    JOINT REPLACEMENT Left     knee    JOINT REPLACEMENT Right     knee    KS REVISE MEDIAN N/CARPAL TUNNEL SURG Left 1/28/2016    Procedure: RELEASE CARPAL TUNNEL;  Surgeon: Tonia Jean Baptiste MD;  Location: [facility] MAIN OR;  Service: Orthopedics    TUBAL LIGATION      VARICOSE VEIN SURGERY Bilateral         Past Family History:  Family History   Problem Relation Age of Onset    Cancer Mother      throat        Social History:  History   Smoking Status    Former Smoker    Packs/day: 0 25    Years: 10 00    Types: Cigarettes    Quit date: 1950   Smokeless Tobacco    Never Used     History   Alcohol Use    Yes     Comment: socially     History   Drug Use No     Marital Status:      Medications:  Current Facility-Administered Medications   Medication Dose Route Frequency    labetalol (NORMODYNE) injection 10 mg  10 mg Intravenous Q4H PRN    multi-electrolyte (ISOLYTE-S PH 7 4 equivalent) IV solution  50 mL/hr Intravenous Continuous     Home medications:  Prior to Admission medications    Medication Sig Start Date End Date Taking?  Authorizing Provider   aspirin 81 mg chewable tablet Chew 1 tablet (81 mg total) daily 4/21/18  Yes Riccardo Hernandez MD   atorvastatin (LIPITOR) 80 mg tablet Take 1 tablet (80 mg total) by mouth daily with dinner 4/20/18  Yes Michelle Art MD   clonazePAM (KlonoPIN) 0 5 mg tablet Take 0 5 tablets (0 25 mg total) by mouth 2 (two) times a day for 10 days 4/21/18 5/1/18 Yes Michelle Art MD   docusate sodium (COLACE) 100 mg capsule Take 100 mg by mouth 2 (two) times a day   Yes Historical Provider, MD   IFEREX 150 150 MG capsule  1/5/18  Yes Historical Provider, MD   Linagliptin (TRADJENTA) 5 MG TABS Take 5 mg by mouth daily   Yes Historical Provider, MD   metoprolol tartrate (LOPRESSOR) 50 mg tablet Take 25 mg by mouth every 12 (twelve) hours   Yes Historical Provider, MD   multivitamin-iron-minerals-folic acid (CENTRUM) chewable tablet Chew 1 tablet daily   Yes Historical Provider, MD   naproxen sodium (ALEVE) 220 MG tablet Take 220 mg by mouth as needed for mild pain  Yes Historical Provider, MD   pantoprazole (PROTONIX) 40 mg tablet Take 1 tablet by mouth daily for 30 days 11/2/16 4/23/18 Yes Gina Tian MD   polyethylene glycol (MIRALAX) 17 g packet Take 17 g by mouth daily   Yes Historical Provider, MD   potassium chloride (MICRO-K) 10 MEQ CR capsule Take 10 mEq by mouth 2 (two) times a day   Yes Historical Provider, MD   torsemide (DEMADEX) 10 mg tablet Take 10 mg by mouth daily   Yes Historical Provider, MD   warfarin (COUMADIN) 5 mg tablet Take 2 5 mg by mouth daily     Yes Historical Provider, MD   acetaminophen (TYLENOL) 325 mg tablet Take 650 mg by mouth every 6 (six) hours as needed for mild pain    Historical Provider, MD   lidocaine (LIDODERM) 5 % Place 1 patch on the skin as needed for mild pain Remove & Discard patch within 12 hours or as directed by MD    Historical Provider, MD   lidocaine (XYLOCAINE) 2 % topical gel Apply topically as needed for mild pain  4/23/18  Historical Provider, MD     Allergies:   Allergies   Allergen Reactions    Heparin         ROS:   Review of Systems   Unable to perform ROS: Patient nonverbal (aphasia)        Vitals:  Vitals:    04/23/18 1027 04/23/18 1218 18 1316 18 1331   BP:  136/82 141/70 113/70   BP Location:   Right arm Right arm   Pulse: 84 79 72 72   Resp: (!) 35  (!) 32 14   Temp:   (!) 96 7 °F (35 9 °C)    TempSrc:   Oral    SpO2:  100% 99% 97%   Weight:         Temperature:   Temp (24hrs), Av 9 °F (36 6 °C), Min:96 7 °F (35 9 °C), Max:99 1 °F (37 3 °C)    Current: Temperature: (!) 96 7 °F (35 9 °C)     Weights:   IBW: -92 5 kg  Body mass index is 27 09 kg/m²  Hemodynamic Monitoring:  N/A     Non-Invasive/Invasive Ventilation Settings:  Respiratory    Lab Data (Last 4 hours)    None         O2/Vent Data (Last 4 hours)    None              No results found for: PHART, XLI8MSP, PO2ART, UQO6SFN, N9WAMTVI, BEART, SOURCE  SpO2: SpO2: 97 %     Physical Exam:  Physical Exam   Constitutional: She appears well-developed and well-nourished  HENT:   Head: Normocephalic and atraumatic  Eyes: EOM are normal  Pupils are equal, round, and reactive to light  Cardiovascular: Normal rate and regular rhythm  No murmur heard  Pulmonary/Chest: No respiratory distress  She has no wheezes  She has no rales  Abdominal: Soft  Bowel sounds are normal  She exhibits no distension  There is no tenderness  Musculoskeletal: Normal range of motion  She exhibits no edema  Neurological: She is alert  GCS is 12, E4 V2 M6  Patient does follow simple commands  She moves all 4 extremities albeit not always purposefully  No obvious facial asymmetry  Skin: Skin is warm and dry  Catheter puncture site in the right groin has a small associated hematoma with no significant ecchymosis  No active bleeding at this time  Psychiatric:   Unable to assess  Nursing note and vitals reviewed         Labs:    Results from last 7 days  Lab Units 18  1203 18  1202 18  0913 18  0908 18  0522  18  1500   WBC Thousand/uL  --  4 46  --  6 00 6 00  < > 5 70   HEMOGLOBIN g/dL  --  8 0*  --  10 3* 9 4*  < > 11 7*   I STAT HEMOGLOBIN g/dl 7 1*  --  9 9*  --   --   --   --    HEMATOCRIT %  --  24 0*  --  32 1* 28 8*  < > 35 1*   PLATELETS Thousands/uL  --  111*  --  142* 85*  < > 115*   NEUTROS PCT %  --  54  --   --   --   --  50   MONOS PCT %  --  11  --   --   --   --  9   < > = values in this interval not displayed  Results from last 7 days  Lab Units 04/23/18  1203 04/23/18  0913 04/23/18  0908 04/21/18 0522 04/20/18 0521 04/18/18  1500   SODIUM mmol/L  --   --  139 139 135*  < > 133*   POTASSIUM mmol/L  --   --  4 3 3 8 3 7  < > 5 2   CHLORIDE mmol/L  --   --  105 107 99*  < > 99*   CO2 mmol/L  --   --  28 26 28  < > 29   BUN mg/dL  --   --  46* 59* 61*  < > 49*   CREATININE mg/dL  --   --  1 54* 1 72* 2 15*  < > 2 08*   CALCIUM mg/dL  --   --  9 5 8 4 9 0  < > 9 7   TOTAL PROTEIN g/dL  --   --   --   --   --   --  7 9   BILIRUBIN TOTAL mg/dL  --   --   --   --   --   --  0 50   ALK PHOS U/L  --   --   --   --   --   --  85   ALT U/L  --   --   --   --   --   --  25   AST U/L  --   --   --   --   --   --  32   GLUCOSE RANDOM mg/dL  --   --  93 122 112  < > 95   GLUCOSE, ISTAT mg/dl 106 96  --   --   --   --   --    < > = values in this interval not displayed  Results from last 7 days  Lab Units 04/18/18  1500   MAGNESIUM mg/dL 2 5       Results from last 7 days  Lab Units 04/18/18  1500   PHOSPHORUS mg/dL 4 0        Results from last 7 days  Lab Units 04/23/18  0908 04/21/18  0522 04/20/18  0521 04/18/18  1500   INR  1 95* 2 35* 2 29*  < > 3 15*   PTT seconds 45*  --   --   --  37*   < > = values in this interval not displayed  0  Lab Value Date/Time   TROPONINI <0 02 04/23/2018 0908   TROPONINI <0 02 04/18/2018 1500        Imaging: I have personally reviewed pertinent films in PACS  EKG:  Demonstrates atrial paced rhythm  This was personally reviewed by myself     Micro:  Lab Results   Component Value Date    URINECX <10,000 cfu/ml  02/02/2018    URINECX No Growth <1000 cfu/mL 10/16/2017    URINECX 20,000-29,000 cfu/ml Mixed Contaminants X3 09/26/2017       Assessment and Plan:                  Neuro:     Left MCA CVA with hemorrhagic conversion: As above, patient presented to [facility] with new onset left gaze preference, expressive aphasia, dysarthria, right facial droop, and right hemiparesis  CTA demonstrated left MCA occlusion  This was treated with thrombectomy by IR  Neurology did not give tPA  IR treated this lesion with thrombectomy  Postprocedure CT read is pending but on Dr Ricardo Sheffield and our reading demonstrated right-sided subarachnoid hemorrhage as well as a large amount of hyperdensity in the left MCA territory, some of which may be related to recent procedure     -Repeat head CT in 4 hours to evaluate for progression of intracranial hemorrhage  STAT CT head if GCS declines by more than 2     -Neurosurgery consult      -Labetalol 10 mg ordered every 4 hours PRN to maintain systolic blood pressure under 140     -Stroke pathway admission  MRI brain  Echocardiogram or carotid duplex do not need to be repeated as patient received both of the studies during recent admission on April 19th     -Hold antiplatelets anticoagulants for 24 hours     -Antiplatelet therapy once patient cleared to take PO and after 24 hours  Statin once patient is cleared to take PO  Home regimen includes aspirin 81 mg daily and Lipitor 80 mg daily     -Neurological checks per protocol  Right groin puncture site checks per protocol  History of right MCA CVA: Patient presented to [address] [place] [address] on April 18th with frequent falls and gait disturbance  She was found to have an acute to subacute infarction in the right MCA territory  Apparently, this CVA caused involuntary choreic movements of the left upper and left lower extremity     -Plan as above  CV:     Systolic CHF:  Ejection fraction was 44% on echocardiogram performed on April 19, 2018   No evidence of acute exacerbation at this time     -Home regimen metoprolol 25 mg twice daily and torsemide 10 mg daily     -We will target neutral to slightly negative fluid balance to avoid CHF exacerbation  Will give Lasix IV while patient is NPO  Left upper extremity DVT:  Patient was diagnosed with left upper extremity DVT in January 2018 after undergoing pacemaker replacement at an outside facility  She was anticoagulated on Coumadin at home  Per report, her INR had been very labile  -INR 1 95 on presentation     -Will need to hold Coumadin 24 hours after endovascular intervention  History of mitral valve replacement:  Patient on Coumadin     -Plan as above  Sick sinus syndrome status post pacemaker placement:  No signs or symptoms of acute ischemia  No arrhythmia     -Monitor on telemetry  Lung:     -No acute issues  Titrate supplemental oxygen to maintain oxygen saturation above 92%  GI:     -No acute issues  Keep patient NPO until swallow evaluation  FEN:     -Maintenance fluids with Isolyte at 75 mL/hr while patient is NPO  Due to patient's history of systolic CHF, we will maintain euvolemia or slightly negative fluid balance     -Electrolytes were within normal limits on labs obtained in the ED  We will check magnesium and phosphorus  :     CKD: Baseline creatinine is uncertain, per prior notes appears to be 1 3  On discharge from [address] [place] [address] creatinine was 1 72  Creatinine was 1 54 today  Etiology may be related to diabetes mellitus versus cardiorenal     -Maintain euvolemia  Trend creatinine  ID:     -No acute issues  Trend fever curve and WBC count  Heme:     Anemia:  Patient has mild normocytic anemia, hemoglobin is 10 3  This appears to be her baseline  Patient reportedly experienced a large amount of blood loss after the IR procedure today    She was transfused 1 unit of PRBCs     -Will obtain H&H at this time and trend H&H every 6 hours     -Transfuse if hemoglobin below 7  Endo:     Diabetes mellitus:  Patient has a history of diabetes mellitus without home insulin use  Most recent hemoglobin A1c was 6 6%  -Blood glucose checks every 6 hours while patient is NPO  -Goal blood glucose under 180  Msk/Skin:     -No acute issues  Pressure ulcer prophylaxis with frequent offloading every 2 hours  Disposition:  Continued ICU care  VTE Pharmacologic Prophylaxis: Reason for no pharmacologic prophylaxis acute CVA  VTE Mechanical Prophylaxis: sequential compression device     Invasive lines and devices: Invasive Devices     Peripheral Intravenous Line            Peripheral IV 04/23/18 Left Antecubital less than 1 day    Peripheral IV 04/23/18 Left Hand less than 1 day                 Code Status: Level 1 - Full Code  POA:    POLST:       Given critical illness, patient length of stay will require greater than two midnights  Portions of the record may have been created with voice recognition software  Occasional wrong word or "sound a like" substitutions may have occurred due to the inherent limitations of voice recognition software  Read the chart carefully and recognize, using context, where substitutions have occurred          Nicholas Vasquez MD

## 2018-04-23 NOTE — DISCHARGE INSTRUCTIONS
ARTERIOGRAM    WHAT YOU SHOULD KNOW:   An angiogram is a procedure to look at arteries in your body  Arteries are the blood vessels that carry blood from your heart to your body  AFTER YOU LEAVE:     Self-care:   · Limit activity: Rest for the remainder of the day of your procedure  Have some one with you until the next morning  Keep your arm or leg straight as much as possible  Rest as much as possible, sitting lying or reclining  Walk only to go to the bathroom, to bed or to eat  If the angiogram catheter was put in your leg, use the stairs as little as possible  No driving  · Keep your wound clean and dry  You may shower 24 hours after your procedure  The bandage you have on should fall off in 2-3 days  If there is any drainage from the puncture site, you should put on a clean bandage  · Watch for bleeding and bruising: It is normal to have a bruise and soreness where the angiogram catheter went in  · Diet:   · You may resume your regular diet, Sips of flat soda will help with mild nausea  · Drink more liquids than usual for the next 24 hours      · IMMEDIATELY Contact Interventional Radiology at [phone] [place] PATIENTS: Contact Interventional Radiology at [phone]) Jesse Mathis PATIENTS: Contact Interventional Radiology at [phone]) if any of the following occur:  · If your bruise gets larger or if you notice any active bleeding  APPLY DIRECT PRESSURE TO THE BLEEDING SITE  · If you notice increased swelling or have increased pain at the puncture site   · If you have any numbness or pain in the extremity of the puncture site   · If that extremity seems cold or pale      · You have fever greater than 101  · Persistent nausea or vomitting    Follow up with your primary healthcare provider  as directed: Write down your questions so you remember to ask them during your visits

## 2018-04-23 NOTE — STROKE DOCUMENTATION
prehospital stroke alert called  Neurology and ED resident in [address] waiting for the patient

## 2018-04-23 NOTE — ANESTHESIA PREPROCEDURE EVALUATION
Review of Systems/Medical History    Chart reviewed  No history of anesthetic complications     Cardiovascular  Pacemaker/AICD, Hypertension controlled, Valve replacement mitral valve  replacement,   Comment: Pacemaker/ MVP, afib,  Pulmonary  COPD ,   Comment: Former smoker     GI/Hepatic    GERD well controlled,        Chronic kidney disease stage 3,        Endo/Other  Diabetes well controlled type 2 Oral agent,      GYN       Hematology  Anemia ,     Musculoskeletal    Arthritis     Neurology      Comment: Colon ca resection, TIA then CVA 1 week ago ishemic infarction  Psychology   Anxiety,              Physical Exam    Airway    Mallampati score: II  TM Distance: >3 FB  Neck ROM: full     Dental   No notable dental hx     Cardiovascular      Pulmonary      Other Findings        Anesthesia Plan  ASA Score- 4 Emergent    Anesthesia Type- IV sedation with anesthesia with ASA Monitors  Additional Monitors:   Airway Plan:         Plan Factors-    Induction- intravenous  Postoperative Plan-     Informed Consent- Anesthetic plan and risks discussed with patient and daughter  I personally reviewed this patient with the CRNA  Discussed and agreed on the Anesthesia Plan with the CRNA  Yao Begum

## 2018-04-23 NOTE — SOCIAL WORK
Pt is a <30 days readmission  Pt is not a bundle  Pt was readmitted for stroke  Last admission-4/18-4/21  Pt is confused  CM met pt's 2 daughter's, Carlos Langford and YVETTE MCGILL  Sherlyn, introduced self and made aware of  CM role at d/c  Pt has LW and POA  Pt's 4 kids are [facility], Omar Hernández, [address] [facility], [address] [facility] and CARLOTA  CM encouraged family to give hospital a copy of the said documents  CARLOTA reported that pt was d/cd to [facility] for STR last admission and was only there for 1 day prior to this admission  CM reviewed pt's medical records from last admission  CM noted on PT and Ot's note that pt needs 1 assist with transfers and minimal assist with ADL's and ambulation using a walker  Prior to last admission, pt was IPTA with all ADL's, does not drive and retired  Pt lives alone in a 2 story house with 3 DANIELA  Pt has a 1st floor set up with a handicap bathroom  Pt does not go to the 2nd floor  CARLOTA reported that there was always family present at pt's house to assist pt  Pt's DME's are: RW, walker and cane  Pt has hx with [place] [facility] at [facility] and OP PT  Pt has hx with HCC though [facility] Pt was dx with anxiety and was managed by psychiatrist Dr Basilio Hernandez  Pt has no hx with alc or drug tx  Pharmacy is [facility] in [place], [place]  CM will follow with pt's d/c needs  CM offered [facility] to pt's family

## 2018-04-23 NOTE — PLAN OF CARE
Mobility/activity is maintained at optimum level for patient Progressing      Discharge to post-acute care or home with appropriate resources Progressing      Neurological status is stable or improving Progressing      Nutrition/Hydration status is improving Progressing      Non-ventilated patient's risk of aspiration is minimized Progressing      Patient will remain free of falls Progressing      Remains free of harm/injury (restraint for non violent/non self-detsructive behavior) Progressing      Returns to optimal restraint-free functioning Progressing

## 2018-04-23 NOTE — BRIEF OP NOTE (RAD/CATH)
IR CEREBRAL ANGIOGRAPHY / INTERVENTION  Procedure Note    PATIENT NAME: Fahad Layton  : 1933  MRN: [id number]     Pre-op Diagnosis:   1  Acute ischemic left MCA stroke ([facility] [address] )    2  CVA (cerebral vascular accident) ([facility] [address] )      Post-op Diagnosis:   1  Acute ischemic left MCA stroke ([facility] [address] )    2  CVA (cerebral vascular accident) [facility])        Surgeon:   Noreen Thomas MD  Assistants:     No qualified resident was available, Resident is only observing    Estimated Blood Loss: 500-1000 cc  Findings: Left MCA proximal superior division occlusion, TICI 0  Puncture: 10:49  [place]: 11:15, Solumbra  Recan: 11:46, TICI 3  Prominent bleeding over the right femoral artery sheath throughout the case resolved after successful deployment of Angioseal device  Total blood loss is difficult to estimate however iStat revealed a Hg less than 8 therefore 1 unit pRBC trasnfused         Specimens: none    Complications:  none    Anesthesia: TIMO Thomas MD     Date: 2018  Time: 12:21 PM

## 2018-04-23 NOTE — ED PROVIDER NOTES
History  Chief Complaint   Patient presents with    Altered Mental Status     change in LOC per nursing home staff  Confused and not following commands  80year old female brought in by EMS for evaluation after being found with severe facial droop, slurred speech, left-sided gaze preference and right-sided weakness/flaccidity upon awakening approximately 1 hour prior to arrival  Patient unable to provide any history due to severity of dysarthria/aphasia  Patient's daughter states that the patient has been living at the nursing home facility for the past 3 days after being discharged from [address] [place] [address] where she had been admitted for right-sided CVA resulting in abnormal movements of the left-side of the body and gait disturbance  Patient had previously been living at home alone and had been completely independent up until December 2017 when she was admitted for CHF and underwent biologic mitral valve replacement and pacemaker placement  Her admission was complicated by left upper extremity DVT for which she was started on coumadin  Her INR has been erratic and difficult to keep within the therapeutic range per daughter  In January 2018, patient had a TIA which had presented as syncope  Patient then suffered right parietal CVA 4/15/2018  Patient had fallen to the ground and struck the left side of her head  She refused to go to the hospital immediately following the incident  The next day, family noticed personality changes with the patient becoming more irritable than her usual with complaints of seeing shadows from the right visual field  On 4/17, she developed abnormal tremor-like movements  They convinced her to go to the hospital on 4/18 after the patient developed a gait disturbance with difficulty ambulating at PT  Patient is currently unable to follow commands  She has severe dysarthria and aphasia           History provided by:  Relative and EMS personnel  STROKE Alert   Location:  Right-sided deficits  Quality:  Weakness, dysarthria, aphasia, gaze preference  Severity:  Severe  Onset quality:  Sudden  Duration: awoke with symptoms approximately 1 hour ago  Timing:  Constant  Progression:  Improving  Chronicity:  New  Context:  Awoke with symptoms, recent right-parietal CVA  Relieved by:  None tried  Worsened by:  Nothing  Ineffective treatments:  Coumadin, daily aspirin  Risk factors:  Prior CVA, biologic mitral valve, history of left upper extremity dvt      Prior to Admission Medications   Prescriptions Last Dose Informant Patient Reported? Taking? IFEREX 150 150 MG capsule 4/22/2018 at 0900  Yes Yes   Linagliptin (TRADJENTA) 5 MG TABS 4/22/2018 at 0900 Child Yes Yes   Sig: Take 5 mg by mouth daily   acetaminophen (TYLENOL) 325 mg tablet Unknown at Unknown time Child Yes No   Sig: Take 650 mg by mouth every 6 (six) hours as needed for mild pain   aspirin 81 mg chewable tablet 4/22/2018 at 0900  No Yes   Sig: Chew 1 tablet (81 mg total) daily   atorvastatin (LIPITOR) 80 mg tablet 4/22/2018 at 1700  No Yes   Sig: Take 1 tablet (80 mg total) by mouth daily with dinner   clonazePAM (KlonoPIN) 0 5 mg tablet 4/22/2018 at 1700  No Yes   Sig: Take 0 5 tablets (0 25 mg total) by mouth 2 (two) times a day for 10 days   docusate sodium (COLACE) 100 mg capsule 4/22/2018 at 1700 Child Yes Yes   Sig: Take 100 mg by mouth 2 (two) times a day   lidocaine (LIDODERM) 5 %  Child Yes No   Sig: Place 1 patch on the skin as needed for mild pain Remove & Discard patch within 12 hours or as directed by MD   metoprolol tartrate (LOPRESSOR) 50 mg tablet 4/22/2018 at 1700  Yes Yes   Sig: Take 25 mg by mouth every 12 (twelve) hours   multivitamin-iron-minerals-folic acid (CENTRUM) chewable tablet 4/22/2018 at 0900 Child Yes Yes   Sig: Chew 1 tablet daily   naproxen sodium (ALEVE) 220 MG tablet 4/23/2018 at 0600  Yes Yes   Sig: Take 220 mg by mouth as needed for mild pain     pantoprazole (PROTONIX) 40 mg tablet 4/23/2018 at 0600  No Yes   Sig: Take 1 tablet by mouth daily for 30 days   polyethylene glycol (MIRALAX) 17 g packet 4/22/2018 at 0900 Child Yes Yes   Sig: Take 17 g by mouth daily   potassium chloride (MICRO-K) 10 MEQ CR capsule 4/22/2018 at 1700  Yes Yes   Sig: Take 10 mEq by mouth 2 (two) times a day   torsemide (DEMADEX) 10 mg tablet 4/22/2018 at 0900  Yes Yes   Sig: Take 10 mg by mouth daily   warfarin (COUMADIN) 5 mg tablet 4/22/2018 at 0900  Yes Yes   Sig: Take 2 5 mg by mouth daily        Facility-Administered Medications: None       Past Medical History:   Diagnosis Date    Acid reflux     on occ    Arthritis     DJD right hip replaced    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[facility] ) 2007    x 2 lesions with no change    Cancer ([facility] ) 2007    colonic polyps, no surgery done    Deep vein thrombosis (DVT) of left upper extremity ([facility] ) 12/11/2017    Diabetes mellitus ([facility] )     type 2    Diverticulosis     Edema     in legs on occ    Hypertension     on occ    Language barrier     speaks Kittitian & broken english       Past Surgical History:   Procedure Laterality Date    COLON SURGERY      COLONOSCOPY      COLONOSCOPY N/A 11/2/2016    Procedure: COLONOSCOPY;  Surgeon: Princess South MD;  [facility]; Service:     ESOPHAGOGASTRODUODENOSCOPY N/A 11/2/2016    Procedure: ESOPHAGOGASTRODUODENOSCOPY (EGD); Surgeon: Princess South MD;  Location: [facility] GI LAB; Service:     JOINT REPLACEMENT Right 02/2015    hip    JOINT REPLACEMENT Left     knee    JOINT REPLACEMENT Right     knee    NC REVISE MEDIAN N/CARPAL TUNNEL SURG Left 1/28/2016    Procedure: RELEASE CARPAL TUNNEL;  Surgeon: Genie Holley MD;  Location: [facility] MAIN OR;  Service: Orthopedics    TUBAL LIGATION      VARICOSE VEIN SURGERY Bilateral        Family History   Problem Relation Age of Onset    Cancer Mother      throat     I have reviewed and agree with the history as documented      Social History   Substance Use Topics    Smoking status: Former Smoker     Packs/day: 0 25     Years: 10 00     Types: Cigarettes     Quit date: 1950    Smokeless tobacco: Never Used    Alcohol use Yes      Comment: socially        Review of Systems   Unable to perform ROS: Other (severe dysarthria/aphasia)       Physical Exam  ED Triage Vitals   Temperature Pulse Respirations Blood Pressure SpO2   04/23/18 0925 04/23/18 0920 04/23/18 0920 04/23/18 0920 04/23/18 0920   97 9 °F (36 6 °C) 88 16 (!) 178/83 100 %      Temp Source Heart Rate Source Patient Position - Orthostatic VS BP Location FiO2 (%)   04/23/18 0925 04/23/18 0925 04/23/18 0925 04/23/18 0930 --   Oral Monitor Sitting Right arm       Pain Score       04/23/18 0920       No Pain           Orthostatic Vital Signs  Vitals:    04/23/18 0920 04/23/18 0925 04/23/18 0930 04/23/18 1023   BP: (!) 178/83 (!) 187/77 (!) 187/77 148/64   Pulse: 88 89 90 80   Patient Position - Orthostatic VS:  Sitting Sitting Sitting       Physical Exam   Constitutional: She appears well-developed and well-nourished  Non-toxic appearance  No distress  HENT:   Head: Normocephalic and atraumatic  Eyes: Conjunctivae and EOM are normal  Pupils are equal, round, and reactive to light  Neck: Normal range of motion  Neck supple  No tracheal deviation present  No thyromegaly present  Cardiovascular: Normal rate, regular rhythm, normal heart sounds and intact distal pulses  Pulmonary/Chest: Effort normal and breath sounds normal    Abdominal: Soft  Bowel sounds are normal  She exhibits no distension  There is no tenderness  Lymphadenopathy:     She has no cervical adenopathy  Neurological: She is alert  No sensory deficit  She exhibits normal muscle tone  GCS eye subscore is 4  GCS verbal subscore is 4  GCS motor subscore is 4    4/5 strength throughout  Drift present bilateral lower extremities  Severe right facial droop  Severe dysarthria and aphasia  Unable to follow commands   Sensory appears to be intact with withdraw to pain in all 4 extremities  Occasional abnormal movements of the left upper and lower extremities  Skin: Skin is warm and dry  She is not diaphoretic  Nursing note and vitals reviewed  ED Medications  Medications   iohexol (OMNIPAQUE) 350 MG/ML injection (MULTI-DOSE) 85 mL (85 mL Intravenous Given 4/23/18 0925)   aspirin rectal suppository 300 mg (300 mg Rectal Given 4/23/18 0945)       Diagnostic Studies  Results Reviewed     Procedure Component Value Units Date/Time    Troponin I [[id number]]  (Normal) Collected:  04/23/18 0908    Lab Status:  Final result Specimen:  Blood from Arm, Left Updated:  04/23/18 0943     Troponin I <0 02 ng/mL     Narrative:         [employer] Chemistry analyzer 99% cutoff is > 0 04 ng/mL in network labs    o cTnI 99% cutoff is useful only when applied to patients in the clinical setting of myocardial ischemia  o cTnI 99% cutoff should be interpreted in the context of clinical history, ECG findings and possibly cardiac imaging to establish correct diagnosis  o cTnI 99% cutoff may be suggestive but clearly not indicative of a coronary event without the clinical setting of myocardial ischemia      APTT [[id number]]  (Abnormal) Collected:  04/23/18 0908    Lab Status:  Final result Specimen:  Blood from Arm, Left Updated:  04/23/18 0934     PTT 45 (H) seconds     Protime-INR [[id number]]  (Abnormal) Collected:  04/23/18 0908    Lab Status:  Final result Specimen:  Blood from Arm, Left Updated:  04/23/18 0934     Protime 22 4 (H) seconds      INR 1 95 (H)    Basic metabolic panel [[id number]]  (Abnormal) Collected:  04/23/18 0908    Lab Status:  Final result Specimen:  Blood from Arm, Left Updated:  04/23/18 0927     Sodium 139 mmol/L      Potassium 4 3 mmol/L      Chloride 105 mmol/L      CO2 28 mmol/L      Anion Gap 6 mmol/L      BUN 46 (H) mg/dL      Creatinine 1 54 (H) mg/dL      Glucose 93 mg/dL      Calcium 9 5 mg/dL      eGFR 31 ml/min/1 73sq m     Narrative:         National Kidney Disease Education Program recommendations are as follows:  GFR calculation is accurate only with a steady state creatinine  Chronic Kidney disease less than 60 ml/min/1 73 sq  meters  Kidney failure less than 15 ml/min/1 73 sq  meters  POCT Chem 8+ [[id number]]  (Abnormal) Collected:  04/23/18 0913    Lab Status:  Final result Updated:  04/23/18 0917     SODIUM, I-STAT 138 mmol/l      Potassium, i-STAT 4 3 mmol/L      Chloride, istat 103 mmol/L      CO2, i-STAT 28 mmol/L      Anion Gap, Istat 12 mmol/L      Calcium, Ionized i-STAT 1 24 mmol/L      BUN, I-STAT 41 (H) mg/dl      Creatinine, i-STAT 1 5 (H) mg/dl      eGFR 32 ml/min/1 73sq m      Glucose, i-STAT 96 mg/dl      Hct, i-STAT 29 (L) %      Hgb, i-STAT 9 9 (L) g/dl      Specimen Type VENOUS    CBC [[id number]]  (Abnormal) Collected:  04/23/18 0908    Lab Status:  Final result Specimen:  Blood from Arm, Left Updated:  04/23/18 0916     WBC 6 00 Thousand/uL      RBC 3 57 (L) Million/uL      Hemoglobin 10 3 (L) g/dL      Hematocrit 32 1 (L) %      MCV 90 fL      MCH 28 9 pg      MCHC 32 1 g/dL      RDW 15 8 (H) %      Platelets 954 (L) Thousands/uL      MPV 10 5 fL                  CTA stroke alert (head/neck)   Final Result by Courtney Newman MD (04/23 1031)      Flow restrictive disease of the dominant left M2 proximal branch within which clot is not excluded  Left ICA ulceration  73% short segment right ICA origin stenosis owing to dense atherosclerotic plaque  Findings were relayed by text to Dr Radha Meadows approximately 0930 hours  Workstation performed: [id number]         CT stroke alert brain   Final Result by Courtney Newman MD (04/23 1008)      No acute disease  Footprint of chronic large and small vessel ischemia are stable        Findings were directly discussed with Nikolai Mejia on 4/23/2018 9:11 AM       Workstation performed: [id number]         X-ray chest 1 view portable   ED Interpretation by Trenton Douglas MD (04/23 4822)   No acute pulmonary pathology      IR cerebral angiography / intervention    (Results Pending)         Procedures  Procedures      Phone Consults  ED Phone Contact    ED Course  ED Course as of Apr 23 1039   Mon Apr 23, 2018   1018 INR: (!) 1 95   1018 1 72 two days ago Creatinine: (!) 1 54           Identification of Seniors at [address] [place] [address] Most Recent Value   (ISAR) Identification of Seniors at Risk   Before the illness or injury that brought you to the Emergency, did you need someone to help you on a regular basis? 1 Filed at: 04/23/2018 0919   In the last 24 hours, have you needed more help than usual?  1 Filed at: 04/23/2018 9365   Have you been hospitalized for one or more nights during the past 6 months? 1 Filed at: 04/23/2018 0919   In general, do you see well? 1 Filed at: 04/23/2018 0919   In general, do you have serious problems with your memory? 1 Filed at: 04/23/2018 1682   Do you take more than three different medications every day?   1 Filed at: 04/23/2018 0919   ISAR Score  6 Filed at: 04/23/2018 0919        NIH Stroke Scale    Flowsheet Row Most Recent Value   Level of Consciousness (1a )  0 Filed at: 04/23/2018 0920   LOC Questions (1b )  0 Filed at: 04/23/2018 0920   LOC Commands (1c )  0 Filed at: 04/23/2018 0920   Best Gaze (2 )  1 Filed at: 04/23/2018 0920   Visual (3 )  1 Filed at: 04/23/2018 0920   Facial Palsy (4 )  3 Filed at: 04/23/2018 0920   Motor Arm, Left (5a )  0 Filed at: 04/23/2018 0920   Motor Arm, Right (5b )  0 Filed at: 04/23/2018 0920   Motor Leg, Left (6a )  1 Filed at: 04/23/2018 0920   Motor Leg, Right (6b )  1 Filed at: 04/23/2018 0920   Limb Ataxia (7 )  0 Filed at: 04/23/2018 0920   Sensory (8 )  0 Filed at: 04/23/2018 0920   Best Language (9 )  2 Filed at: 04/23/2018 0920   Dysarthria (10 )  2 Filed at: 04/23/2018 0920   Extinction and Inattention (11 ) (Formerly Neglect)  1 Filed at: 04/23/2018 0920   Total  12 Filed at: 04/23/2018 8558 MDM  Number of Diagnoses or Management Options  Acute ischemic left MCA stroke [facility]): new and requires workup  CVA (cerebral vascular accident) [facility]): new and requires workup  Diagnosis management comments: 80year old female presents with acute right-sided deficits upon awakening this morning  Patient has history of prior stroke 1 week ago  Currently on coumadin for left upper extremity DVT  Patient found to have left MCA occlusion  Aspirin given in ED  Patient sent to IR for attempted thrombectomy and will be admitted to ICU following the procedure         Amount and/or Complexity of Data Reviewed  Clinical lab tests: reviewed  Tests in the radiology section of CPT®: reviewed  Independent visualization of images, tracings, or specimens: yes    Patient Progress  Patient progress: stable    CritCare Time    Disposition  Final diagnoses:   [facility]ce[facility]   Acute ischemic left MCA stroke ([facility] )     Time reflects when diagnosis was documented in both MDM as applicable and the Disposition within this note     Time User Action Codes Description Comment    4/23/2018  8:49 AM Richard Gave Add [I63 9] CVA (cerebral vascular accident) ([facility] )     4/23/2018  8:49 AM Richard Gave Modify [I63 9] CVA (cerebral vascular accident) ([facility] )     4/23/2018  8:49 AM Richard Gave Modify [I63 9] CVA (cerebral vascular accident) ([facility] )     4/23/2018 10:36 AM Fannie Myles Modify [I63 9] CVA (cerebral vascular accident) ([facility] )     4/23/2018 10:36 AM Valaníbal Cee Add [H84 946] Acute ischemic left MCA stroke ([facility] )     4/23/2018 10:36 AM Fannie Sprague Modify [I63 9] CVA (cerebral vascular accident) ([facility] )     4/23/2018 10:36 AM Vallie Deck Modify [G39 236] Acute ischemic left MCA stroke [facility])       ED Disposition     ED Disposition Condition Comment    Send to Ancillary (IR, Dialysis)  Case was discussed with Critical Care and the patient's admission status was agreed to be Admission Status: inpatient status to the service of Dr Claribel Mercado after IR thrombectomy   Follow-up Information    None       Patient's Medications   Discharge Prescriptions    No medications on file     No discharge procedures on file  ED Provider  Attending physically available and evaluated Ml Campbell I managed the patient along with the ED Attending      Electronically Signed by         Avery Solomon MD  04/23/18 9643

## 2018-04-23 NOTE — CONSULTS
Consultation - Neurology   Charisma Curtis 80 y o  female MRN: [id number]  Unit/Bed#: ED 14 Encounter: [id number]      Assessment/Plan   1)  Acute L M2 CVA -  Unknown etiology at this point  Patient on Coumadin for upper extremity DVT, INR 1 95, of note has bioprosthetic mitral valve     -Pt to receive thrombectomy with Dr Polly Carmen to ICU    -Admit to stroke pathway , please note pt recently admitted on stroke pathway last week, do not need to repeat echo, TTE completed previously      -Will clarify if atrial pacer is MRI compatible    -EEG Pending    -tele   -Further recommendations to follow post-procedure     Pre-hospital Stroke Alert: 0830  Neurology at bedside: 0831  NIHSS:  15  tPA:  Not given secondary to unknown time of onset, last known normal after 8:30 p m  last evening, and Coumadin with INR of 1 95  Pt to receive thrombectomy today  2)  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    -per Dr Degroot Fails previous note, the patient improved with Klonopin   -recommend hold benzodiazepines for now      History of Present Illness     Reason for Consult / Principal Problem:  Stroke alert  Hx and PE limited by:  Aphasia, altered mental status  HPI: Charisma Curtis is a 80 y o  female with a PMH of subacute right parietal CVA, 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, hypertension, sick sinus syndrome with pacemaker, bioprosthetic mitral valve, DM 2, CHADWICK, and DVT postoperatively, on Coumadin, who presents with new onset dysarthria, aphasia, right facial droop, and right upper and lower extremity weakness  Last known normal sometime after 8:30 p m  last night  Patient awoke with symptoms  Of note, the patient was recently seen on 4/19/18 by Dr Priscilla Stevens at [address] [place] [address] for altered mental status after a fall  Patient was found to have a subacute right MCA infarction at that time, with left-sided weakness, mild aphasia and left-sided neglect    The patient's INR was therapeutic at that time, and aspirin was initiated as well as Lipitor  Stroke workup, including TTE, done on that admission was unrevealing  Additionally, patient had history of TIA in January, 2018  At baseline, the patient is alert and oriented and lives independently  She has a history of hip and bilateral knee replacements, and thus walks with a walker  The patient was discharged to skilled nursing facility [place] on 04/21/2018  The patient was seen in her usual state state of health, with residual deficits from previous CVA, at approximately 8:30 p m  when she was visited by her daughter, who is in the room during the examination  On initial examination, the patient is alert but unable to follow commands  She appears with  dense receptive and expressive aphasia as well as dysarthria, right facial droop, right field cut , left gaze preference, a right upper and lower extremity weakness  The patient is primarily [place]-speaking, but was examined in [place] without change in exam   CTA demonstrated left M2 occlusion  Patient was not deemed to be a tPA candidate secondary to at known time of onset  Neurovascular team was consulted, and the patient is to receive thrombectomy today  Inpatient consult to Neurology  Consult performed by: Halina Velazquez  Consult ordered by: Simran Degroot          Review of Systems   Unable to obtain secondary to dense receptive and expressive aphasia       Historical Information   Past Medical History:   Diagnosis Date    Acid reflux     on occ    Arthritis     DJD right hip replaced    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    Cancer ([address] ) 2007    colonic polyps, no surgery done    Deep vein thrombosis (DVT) of left upper extremity ([address] ) 12/11/2017    Diabetes mellitus ([address] )     type 2    Diverticulosis     Edema     in legs on occ    Hypertension     on occ    Language barrier     speaks Slovenian & broken english     Past Surgical History:   Procedure Laterality Date    COLON SURGERY      COLONOSCOPY      COLONOSCOPY N/A 11/2/2016    Procedure: COLONOSCOPY;  Surgeon: Chris Knowles MD;  Location: [facility] GI LAB; Service:     ESOPHAGOGASTRODUODENOSCOPY N/A 11/2/2016    Procedure: ESOPHAGOGASTRODUODENOSCOPY (EGD); Surgeon: Chris Knowles MD;  Location: [facility] GI LAB; Service:     JOINT REPLACEMENT Right 02/2015    hip    JOINT REPLACEMENT Left     knee    JOINT REPLACEMENT Right     knee    AL REVISE MEDIAN N/CARPAL TUNNEL SURG Left 1/28/2016    Procedure: RELEASE CARPAL TUNNEL;  Surgeon: Em Gibson MD;  Location: [facility] MAIN OR;  Service: Orthopedics    TUBAL LIGATION      VARICOSE VEIN SURGERY Bilateral      Social History   History   Alcohol Use    Yes     Comment: socially     History   Drug Use No     History   Smoking Status    Former Smoker    Packs/day: 0 25    Years: 10 00    Types: Cigarettes    Quit date: 1950   Smokeless Tobacco    Never Used     Family History: non-contributory    Review of previous medical records was completed  Meds/Allergies   Scheduled Meds:  Continuous Infusions:  No current facility-administered medications for this encounter  PRN Meds:  Allergies   Allergen Reactions    Heparin        Objective   Vitals:Blood pressure (!) 187/77, pulse 89, temperature 97 9 °F (36 6 °C), temperature source Oral, resp  rate 18, weight 71 6 kg (157 lb 12 8 oz), SpO2 98 %, not currently breastfeeding  ,Body mass index is 27 09 kg/m²  No intake or output data in the 24 hours ending 04/23/18 0942    Invasive Devices: Invasive Devices     Peripheral Intravenous Line            Peripheral IV 04/23/18 Left Antecubital less than 1 day    Peripheral IV 04/23/18 Left Hand less than 1 day                Physical Exam   Constitutional: She appears well-developed  She appears distressed  HENT:   Head: Normocephalic and atraumatic     Right Ear: External ear normal    Left Ear: External ear normal    Nose: Nose normal  Mouth/Throat: No oropharyngeal exudate  Eyes: Conjunctivae are normal  Right eye exhibits no discharge  Left eye exhibits no discharge  No scleral icterus  Neck: Normal range of motion  Neck supple  Cardiovascular: Normal rate and regular rhythm  Pulmonary/Chest: Effort normal and breath sounds normal    Abdominal: Soft  Bowel sounds are normal    Musculoskeletal: She exhibits no edema, tenderness or deformity  Neurological:   Reflex Scores:       Tricep reflexes are 1+ on the right side and 1+ on the left side  Bicep reflexes are 1+ on the right side and 1+ on the left side  Brachioradialis reflexes are 1+ on the right side and 1+ on the left side  Patellar reflexes are 0 on the right side and 0 on the left side  Achilles reflexes are 0 on the right side and 0 on the left side  Skin: Skin is warm and dry  No rash noted  She is not diaphoretic  No erythema  No pallor  Nursing note and vitals reviewed      Neurologic Exam     Mental Status   Eyes open with minimal interaction with examiner and environment  Did not follow commands  Demonstrates dense receptive and expressive aphasia  Did not track       Cranial Nerves   PERRL  Left gaze preference noted, patient able to cross midline  (+) right field cut  Right lower facial drooping noted with decreased nasolabial fold  (+) cough and gag  (+) Dysarthric speech   Tongue midline without fasciculations or atrophy       Motor Exam   Muscle bulk: normal  Overall muscle tone: normalAble to move all extremities equally antigravity, with exception of right lower extremity drift    Unable to assess formal strength testing secondary to comprehension/not following commands     Sensory Exam   Patient with withdraw and grimace to noxious stimuli x4     Gait, Coordination, and Reflexes     Gait  Gait: (Unable to assess gait secondary to comprehension and critically ill)    Reflexes   Right brachioradialis: 1+  Left brachioradialis: 1+  Right biceps: 1+  Left biceps: 1+  Right triceps: 1+  Left triceps: 1+  Right patellar: 0  Left patellar: 0  Right achilles: 0  Left achilles: 0  Right plantar: equivocal  Left plantar: normal  Right ankle clonus: absent  Left ankle clonus: absent  Patient demonstrates choreiform movements of distal left upper lower extremity    Unable to assess formal coordination testing secondary to comprehension      NIHSS:    1a Level of Consciousness: 0 = Alert   1b  LOC Questions: 2 = Answers neither correctly   1c  LOC Commands: 2 = Obeys neither correctly   2  Best Gaze: 1 = Partial Gaze Palsy   3  Visual: 1 = Partial hemianopia    4  Facial Palsy: 1=Minor paralysis (flattened nasolabial fold, asymmetric on smiling)   5a  Motor Right Arm: 0=No drift, limb holds 90 (or 45) degrees for full 10 seconds   5b  Motor Left Arm: 0=No drift, limb holds 90 (or 45) degrees for full 10 seconds   6a  Motor Right Le=Some effort against gravity, limb cannot get to or maintain (if cured) 90 (or 45) degrees, drifts down to bed, but has some effort against gravity   6b  Motor Left Le=No drift, limb holds 90 (or 45) degrees for full 10 seconds   7  Limb Ataxia:  1=Present in one limb   8  Sensory: 0=Normal; no sensory loss   9  Best Language:  3=Mute, global aphasia; no usable speech or auditory comprehension   10  Dysarthria: 2=Severe; patient speech is so slurred as to be unintelligible in the absence of or our of proportion to any dysphagia, or is mute/anarthric   11  Extinction and Inattention (formerly Neglect): 0=No abnormality   Total Score: 15                     Lab Results: I have personally reviewed pertinent reports       Recent Results (from the past 24 hour(s))   APTT    Collection Time: 18  9:08 AM   Result Value Ref Range    PTT 45 (H) 23 - 35 seconds   Basic metabolic panel    Collection Time: 18  9:08 AM   Result Value Ref Range    Sodium 139 136 - 145 mmol/L    Potassium 4 3 3 5 - 5 3 mmol/L    Chloride 105 100 - 108 mmol/L    CO2 28 21 - 32 mmol/L    Anion Gap 6 4 - 13 mmol/L    BUN 46 (H) 5 - 25 mg/dL    Creatinine 1 54 (H) 0 60 - 1 30 mg/dL    Glucose 93 65 - 140 mg/dL    Calcium 9 5 8 3 - 10 1 mg/dL    eGFR 31 ml/min/1 73sq m   CBC    Collection Time: 04/23/18  9:08 AM   Result Value Ref Range    WBC 6 00 4 31 - 10 16 Thousand/uL    RBC 3 57 (L) 3 81 - 5 12 Million/uL    Hemoglobin 10 3 (L) 11 5 - 15 4 g/dL    Hematocrit 32 1 (L) 34 8 - 46 1 %    MCV 90 82 - 98 fL    MCH 28 9 26 8 - 34 3 pg    MCHC 32 1 31 4 - 37 4 g/dL    RDW 15 8 (H) 11 6 - 15 1 %    Platelets 337 (L) 558 - 390 Thousands/uL    MPV 10 5 8 9 - 12 7 fL   Protime-INR    Collection Time: 04/23/18  9:08 AM   Result Value Ref Range    Protime 22 4 (H) 12 1 - 14 4 seconds    INR 1 95 (H) 0 86 - 1 16   Type and screen    Collection Time: 04/23/18  9:08 AM   Result Value Ref Range    ABO Grouping A     Rh Factor Positive     Antibody Screen Negative     Specimen Expiration Date [id number]    Troponin I    Collection Time: 04/23/18  9:08 AM   Result Value Ref Range    Troponin I <0 02 <=0 04 ng/mL   POCT Chem 8+    Collection Time: 04/23/18  9:13 AM   Result Value Ref Range    SODIUM, I-STAT 138 136 - 145 mmol/l    Potassium, i-STAT 4 3 3 5 - 5 3 mmol/L    Chloride, istat 103 100 - 108 mmol/L    CO2, i-STAT 28 21 - 32 mmol/L    Anion Gap, Istat 12 4 - 13 mmol/L    Calcium, Ionized i-STAT 1 24 1 12 - 1 32 mmol/L    BUN, I-STAT 41 (H) 5 - 25 mg/dl    Creatinine, i-STAT 1 5 (H) 0 6 - 1 3 mg/dl    eGFR 32 ml/min/1 73sq m    Glucose, i-STAT 96 65 - 140 mg/dl    Hct, i-STAT 29 (L) 34 8 - 46 1 %    Hgb, i-STAT 9 9 (L) 11 5 - 15 4 g/dl    Specimen Type VENOUS    ]    Imaging Studies: I have personally reviewed pertinent reports  and I have personally reviewed pertinent films in PACS  EKG, Pathology, and Other Studies: I have personally reviewed pertinent reports      VTE Prophylaxis: Reason for no pharmacologic prophylaxis Patient to receive thrombectomy    Code Status: Prior  Advance Directive and Living Will:      Power of :    POLST:

## 2018-04-23 NOTE — PLAN OF CARE
Problem: DISCHARGE PLANNING - CARE MANAGEMENT  Goal: Discharge to post-acute care or home with appropriate resources  INTERVENTIONS:  - Conduct assessment to determine patient/family and health care team treatment goals, and need for post-acute services based on payer coverage, community resources, and patient preferences, and barriers to discharge  - Address psychosocial, clinical, and financial barriers to discharge as identified in assessment in conjunction with the patient/family and health care team  - Arrange appropriate level of post-acute services according to patient's   needs and preference and payer coverage in collaboration with the physician and health care team  - Communicate with and update the patient/family, physician, and health care team regarding progress on the discharge plan  - Arrange appropriate transportation to post-acute venues  Assist pt and family with referrals to appropriate rehab facility  Outcome: Progressing

## 2018-04-23 NOTE — CONSULTS
Consultation - Neurosurgery   Ml Strawberry 80 y o  female MRN: [id number]  Unit/Bed#: ICU 10 Encounter: [id number]      Assessment/Plan     Assessment:  1  Acute left MCA infarct s/p thrombectomy  2  SAH/contrast extravasation  3  Expressive aphasia- improving  4  Right hemiparesis and left sided gaze preference   5  History of right MCA infarction  6  History of DVT on coumadin  7  History of DM2  8  History of MVR    Plan:  · Neuro exam: GCS14, E4, V4, M6  Follows commands intermittently, WOODARD spontaneously  Improving right sided neglect/hemiparesis, left sided gaze preference improving now gazing past midline  Right central facial droop noted  · Imaging reviewed personally and with attending  · CT brain stroke alert: No acute disease  Footprint of chronic large and small vessel ischemia are stable  · CTA stroke alert: flow restrictive disease in left M2 proximal brach  73% right ICA origin stenosis  · Post-thrombectomy CT head: interval development of SAH and contrast within suprasellar cistern, left sylvian fissure  1cm meningioma in left temporal occipital region (no intervention indicated at this time)  Enhancing cortex of subacute infarct within right posterior MCA territory  · Delayed CT head post-thrombectomy- completed pending read  · L MCA infarct s/p mechanical thrombectomy  · Straight leg x4 hours post-procedure  · Groin checks and distal pulses per protocol  · Continue Q1 neuro checks and call with exam changes  · INR 1 9 on admit (on coumadin) hold AP/AC at this time including pharmacologic DVT ppx  ·  RI given x1 in ED no additional doses advised  · Neurology consulted for stroke pathway  · Monitor for ABLA as patient's -1000cc from femoral sheath insertion site     · Patient will need PT/OT  · Continue primary care per medical critical care team  · Management of DM2  · Neurosurgery team will continue to follow, call with questions or concerns    Discussed plan with patient's nurse, Michelle, and family (daughter and grand-daughter) at bedside    History of Present Illness     History, ROS and PFSH unobtainable from patient secondary to AMS- family at bedside unable to provide detailed history  Otherwise obtained from chart review  HPI: Junie More is a 80y o  year old female , PMH TIA, R MCA, DVT, sick sinus syndrome, MVR CHADWICK, presents with left sided stroke symptoms including aphasia, right sided facial droop, neglect, found to have left MCA CVA s/p thrombectomy  Patient was previously living at home independently until a fall approximately 2 weeks ago  She subsequently developed left sided weakness, neglect, and mild aphasia which prompted her to be evaluated in the ED  At that time subacute right MCA stroke was identified and she was started on lipitor  Stroke workup including TTE relatively unremarkable  Since patient was transferred to a rehab  Prior to that hospitalization patient did have a TIA in 5986 with (uncertainty of laterality per family) transient weakness  She had a MVR and subsequent DVT for which she was on coumadin  This AM patient was found with aphasia, right sided weakness, last known normal 8:30pm when her daughter was visiting her  She was [facility] and subsequently [facility] for neurology and neuro-endovascular consultation  Patient was taken to IR for thrombectomy  She sustained 500-1000cc EBL  However, following the procedure right sided hemiparesis began to improve, she was oriented x self, following commands b/l, gazing past midline to the right  She had no complaints of pain but otherwise expressive aphasia limited ROS      Inpatient consult to Neurosurgery  Consult performed by: Leland Rodríguez ordered by: Li Washington          Review of Systems   Unable to perform ROS: Mental status change       Historical Information   Past Medical History:   Diagnosis Date    Acid reflux     on occ    Arthritis     DJD right hip replaced    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 ) 2007    x 2 lesions with no change    Cancer ([facility] ) 2007    colonic polyps, no surgery done    Deep vein thrombosis (DVT) of left upper extremity [facility] ) 12/11/2017    Diabetes mellitus ([facility] )     type 2    Diverticulosis     Edema     in legs on occ    Hypertension     on occ    Language barrier     speaks [place] & broken english     Past Surgical History:   Procedure Laterality Date    COLON SURGERY      COLONOSCOPY      COLONOSCOPY N/A 11/2/2016    Procedure: COLONOSCOPY;  Surgeon: Ten Brandt MD;  Location: [facility] GI LAB; Service:     ESOPHAGOGASTRODUODENOSCOPY N/A 11/2/2016    Procedure: ESOPHAGOGASTRODUODENOSCOPY (EGD); Surgeon: Ten Brandt MD;  Location: [facility] GI LAB;   Service:     JOINT REPLACEMENT Right 02/2015    hip    JOINT REPLACEMENT Left     knee    JOINT REPLACEMENT Right     knee    [place] REVISE MEDIAN N/CARPAL TUNNEL SURG Left 1/28/2016    Procedure: RELEASE CARPAL TUNNEL;  Surgeon: Oswaldo Mon MD;  Location: [facility] MAIN OR;  Service: Orthopedics    TUBAL LIGATION      VARICOSE VEIN SURGERY Bilateral      History   Alcohol Use    Yes     Comment: socially     History   Drug Use No     History   Smoking Status    Former Smoker    Packs/day: 0 25    Years: 10 00    Types: Cigarettes    Quit date: 1950   Smokeless Tobacco    Never Used     Family History   Problem Relation Age of Onset    Cancer Mother      throat       Meds/Allergies   all current active meds have been reviewed, current meds:   Current Facility-Administered Medications   Medication Dose Route Frequency    insulin lispro (HumaLOG) 100 units/mL subcutaneous injection 1-5 Units  1-5 Units Subcutaneous Q6H [address]    labetalol (NORMODYNE) injection 10 mg  10 mg Intravenous Q4H PRN    multi-electrolyte (ISOLYTE-S PH 7 4 equivalent) IV solution  50 mL/hr Intravenous Continuous    and PTA meds:   Prior to Admission Medications   Prescriptions Last Dose Informant Patient Reported? Taking? IFEREX 150 150 MG capsule 4/22/2018 at 0900  Yes Yes   Linagliptin (TRADJENTA) 5 MG TABS 4/22/2018 at 0900 Child Yes Yes   Sig: Take 5 mg by mouth daily   acetaminophen (TYLENOL) 325 mg tablet Unknown at Unknown time Child Yes No   Sig: Take 650 mg by mouth every 6 (six) hours as needed for mild pain   aspirin 81 mg chewable tablet 4/22/2018 at 0900  No Yes   Sig: Chew 1 tablet (81 mg total) daily   atorvastatin (LIPITOR) 80 mg tablet 4/22/2018 at 1700  No Yes   Sig: Take 1 tablet (80 mg total) by mouth daily with dinner   clonazePAM (KlonoPIN) 0 5 mg tablet 4/22/2018 at 1700  No Yes   Sig: Take 0 5 tablets (0 25 mg total) by mouth 2 (two) times a day for 10 days   docusate sodium (COLACE) 100 mg capsule 4/22/2018 at 1700 Child Yes Yes   Sig: Take 100 mg by mouth 2 (two) times a day   lidocaine (LIDODERM) 5 %  Child Yes No   Sig: Place 1 patch on the skin as needed for mild pain Remove & Discard patch within 12 hours or as directed by MD   metoprolol tartrate (LOPRESSOR) 50 mg tablet 4/22/2018 at 1700  Yes Yes   Sig: Take 25 mg by mouth every 12 (twelve) hours   multivitamin-iron-minerals-folic acid (CENTRUM) chewable tablet 4/22/2018 at 0900 Child Yes Yes   Sig: Chew 1 tablet daily   naproxen sodium (ALEVE) 220 MG tablet 4/23/2018 at 0600  Yes Yes   Sig: Take 220 mg by mouth as needed for mild pain     pantoprazole (PROTONIX) 40 mg tablet 4/23/2018 at 0600  No Yes   Sig: Take 1 tablet by mouth daily for 30 days   polyethylene glycol (MIRALAX) 17 g packet 4/22/2018 at 0900 Child Yes Yes   Sig: Take 17 g by mouth daily   potassium chloride (MICRO-K) 10 MEQ CR capsule 4/22/2018 at 1700  Yes Yes   Sig: Take 10 mEq by mouth 2 (two) times a day   torsemide (DEMADEX) 10 mg tablet 4/22/2018 at 0900  Yes Yes   Sig: Take 10 mg by mouth daily   warfarin (COUMADIN) 5 mg tablet 4/22/2018 at 0900  Yes Yes   Sig: Take 2 5 mg by mouth daily        Facility-Administered Medications: None Allergies   Allergen Reactions    Heparin        Objective     Intake/Output Summary (Last 24 hours) at 04/23/18 1705  Last data filed at 04/23/18 1214   Gross per 24 hour   Intake              450 ml   Output              450 ml   Net                0 ml       Physical Exam   Constitutional: She appears well-developed and well-nourished  HENT:   Head: Normocephalic and atraumatic  Cardiovascular: Normal rate  Pulmonary/Chest: Effort normal    Abdominal: Soft  She exhibits no distension  Neurological: GCS eye subscore is 4  GCS verbal subscore is 4  GCS motor subscore is 6  Skin: Skin is warm and dry  Right femoral sheath insertion site CDI  No underlying hematoma     Neurologic Exam     Mental Status   Oriented to person  Disoriented to place  Disoriented to time  Follows 1 step commands  Attention: decreased  Concentration: decreased  Level of consciousness: alert  Unable to perform simple calculations  Unable to name object  Able to repeat  Abnormal comprehension  Cranial Nerves     CN II   Visual acuity: (difficult to assess secondary to expressive aphasia)    CN III, IV, VI   Right pupil: Size: 3 mm  Shape: regular  Reactivity: brisk  Left pupil: Size: 3 mm  Shape: regular  CN VII   Right facial weakness: central    CN VIII   Hearing: intact    CN XII   Tongue: not atrophic  Left sided gaze preference but gazing past midline at time of exam- improved from prior     Motor Exam In b/l mits and right leg brace  Moving b/l UE grossly 4/5 throughout without noticeable right UE weakness  RLE in brace but provides 4+ DF/PF bilaterally  Sensory Exam   Difficult to assess sensation due to expressive aphasia, decreased concentration/attention  Vitals:Blood pressure 126/65, pulse 74, temperature (!) 96 7 °F (35 9 °C), temperature source Oral, resp  rate 21, weight 71 6 kg (157 lb 12 8 oz), SpO2 100 %, not currently breastfeeding  ,Body mass index is 27 09 kg/m²       Lab Results:   I have personally reviewed pertinent results  Lab Results   Component Value Date    WBC 4 46 04/23/2018    HGB 11 3 (L) 04/23/2018    HCT 34 7 (L) 04/23/2018    MCV 87 04/23/2018     (L) 04/23/2018    MCH 29 1 04/23/2018    MCHC 33 3 04/23/2018    RDW 16 1 (H) 04/23/2018    MPV 10 3 04/23/2018    NRBC 0 04/23/2018     04/23/2018     04/23/2018    CO2 28 04/23/2018    ANIONGAP 6 04/23/2018    BUN 46 (H) 04/23/2018    CREATININE 1 54 (H) 04/23/2018    GLUCOSE 106 04/23/2018    CALCIUM 9 5 04/23/2018    EGFR 32 04/23/2018    ABO A 04/23/2018    INR 1 95 (H) 04/23/2018       Imaging Studies: I have personally reviewed pertinent reports  and I have personally reviewed pertinent films in PACS    EKG, Pathology, and Other Studies: I have personally reviewed pertinent reports        VTE Prophylaxis: Reason for no pharmacologic prophylaxis SAH    Code Status: Level 1 - Full Code  Advance Directive and Living Will:      Power of :    POLST:

## 2018-04-24 ENCOUNTER — APPOINTMENT (INPATIENT)
Dept: RADIOLOGY | Facility: HOSPITAL | Age: 83
DRG: 023 | End: 2018-04-24
Payer: MEDICARE

## 2018-04-24 LAB
ANION GAP SERPL CALCULATED.3IONS-SCNC: 9 MMOL/L (ref 4–13)
ATRIAL RATE: 576 BPM
BASOPHILS # BLD AUTO: 0.03 THOUSANDS/ΜL (ref 0–0.1)
BASOPHILS NFR BLD AUTO: 1 % (ref 0–1)
BUN SERPL-MCNC: 35 MG/DL (ref 5–25)
CALCIUM SERPL-MCNC: 8.8 MG/DL (ref 8.3–10.1)
CHLORIDE SERPL-SCNC: 109 MMOL/L (ref 100–108)
CHOLEST SERPL-MCNC: 102 MG/DL (ref 50–200)
CO2 SERPL-SCNC: 23 MMOL/L (ref 21–32)
CREAT SERPL-MCNC: 1.26 MG/DL (ref 0.6–1.3)
EOSINOPHIL # BLD AUTO: 0.71 THOUSAND/ΜL (ref 0–0.61)
EOSINOPHIL NFR BLD AUTO: 15 % (ref 0–6)
ERYTHROCYTE [DISTWIDTH] IN BLOOD BY AUTOMATED COUNT: 16.2 % (ref 11.6–15.1)
GFR SERPL CREATININE-BSD FRML MDRD: 39 ML/MIN/1.73SQ M
GLUCOSE SERPL-MCNC: 108 MG/DL (ref 65–140)
GLUCOSE SERPL-MCNC: 108 MG/DL (ref 65–140)
GLUCOSE SERPL-MCNC: 127 MG/DL (ref 65–140)
GLUCOSE SERPL-MCNC: 129 MG/DL (ref 65–140)
GLUCOSE SERPL-MCNC: 204 MG/DL (ref 65–140)
HCT VFR BLD AUTO: 27 % (ref 34.8–46.1)
HDLC SERPL-MCNC: 36 MG/DL (ref 40–60)
HGB BLD-MCNC: 8.8 G/DL (ref 11.5–15.4)
LDLC SERPL CALC-MCNC: 51 MG/DL (ref 0–100)
LYMPHOCYTES # BLD AUTO: 0.75 THOUSANDS/ΜL (ref 0.6–4.47)
LYMPHOCYTES NFR BLD AUTO: 16 % (ref 14–44)
MAGNESIUM SERPL-MCNC: 2.5 MG/DL (ref 1.6–2.6)
MCH RBC QN AUTO: 28.4 PG (ref 26.8–34.3)
MCHC RBC AUTO-ENTMCNC: 32.6 G/DL (ref 31.4–37.4)
MCV RBC AUTO: 87 FL (ref 82–98)
MONOCYTES # BLD AUTO: 0.49 THOUSAND/ΜL (ref 0.17–1.22)
MONOCYTES NFR BLD AUTO: 11 % (ref 4–12)
NEUTROPHILS # BLD AUTO: 2.67 THOUSANDS/ΜL (ref 1.85–7.62)
NEUTS SEG NFR BLD AUTO: 57 % (ref 43–75)
NRBC BLD AUTO-RTO: 0 /100 WBCS
PHOSPHATE SERPL-MCNC: 4.4 MG/DL (ref 2.3–4.1)
PLATELET # BLD AUTO: 127 THOUSANDS/UL (ref 149–390)
PMV BLD AUTO: 10.4 FL (ref 8.9–12.7)
POTASSIUM SERPL-SCNC: 3.9 MMOL/L (ref 3.5–5.3)
QRS AXIS: -53 DEGREES
QRSD INTERVAL: 140 MS
QT INTERVAL: 420 MS
QTC INTERVAL: 508 MS
RBC # BLD AUTO: 3.1 MILLION/UL (ref 3.81–5.12)
SODIUM SERPL-SCNC: 141 MMOL/L (ref 136–145)
T WAVE AXIS: 48 DEGREES
TRIGL SERPL-MCNC: 73 MG/DL
VENTRICULAR RATE: 88 BPM
WBC # BLD AUTO: 4.66 THOUSAND/UL (ref 4.31–10.16)

## 2018-04-24 PROCEDURE — 70450 CT HEAD/BRAIN W/O DYE: CPT

## 2018-04-24 PROCEDURE — 97163 PT EVAL HIGH COMPLEX 45 MIN: CPT

## 2018-04-24 PROCEDURE — 92610 EVALUATE SWALLOWING FUNCTION: CPT

## 2018-04-24 PROCEDURE — 97167 OT EVAL HIGH COMPLEX 60 MIN: CPT

## 2018-04-24 PROCEDURE — 84100 ASSAY OF PHOSPHORUS: CPT | Performed by: EMERGENCY MEDICINE

## 2018-04-24 PROCEDURE — 99221 1ST HOSP IP/OBS SF/LOW 40: CPT | Performed by: NURSE PRACTITIONER

## 2018-04-24 PROCEDURE — G8979 MOBILITY GOAL STATUS: HCPCS

## 2018-04-24 PROCEDURE — 85025 COMPLETE CBC W/AUTO DIFF WBC: CPT | Performed by: EMERGENCY MEDICINE

## 2018-04-24 PROCEDURE — G8978 MOBILITY CURRENT STATUS: HCPCS

## 2018-04-24 PROCEDURE — 99233 SBSQ HOSP IP/OBS HIGH 50: CPT | Performed by: PHYSICIAN ASSISTANT

## 2018-04-24 PROCEDURE — G8987 SELF CARE CURRENT STATUS: HCPCS

## 2018-04-24 PROCEDURE — 82948 REAGENT STRIP/BLOOD GLUCOSE: CPT

## 2018-04-24 PROCEDURE — G8988 SELF CARE GOAL STATUS: HCPCS

## 2018-04-24 PROCEDURE — 93010 ELECTROCARDIOGRAM REPORT: CPT | Performed by: INTERNAL MEDICINE

## 2018-04-24 PROCEDURE — 83735 ASSAY OF MAGNESIUM: CPT | Performed by: EMERGENCY MEDICINE

## 2018-04-24 PROCEDURE — 80048 BASIC METABOLIC PNL TOTAL CA: CPT | Performed by: EMERGENCY MEDICINE

## 2018-04-24 PROCEDURE — 99291 CRITICAL CARE FIRST HOUR: CPT | Performed by: PSYCHIATRY & NEUROLOGY

## 2018-04-24 PROCEDURE — 80061 LIPID PANEL: CPT | Performed by: EMERGENCY MEDICINE

## 2018-04-24 RX ORDER — PANTOPRAZOLE SODIUM 40 MG/1
40 INJECTION, POWDER, FOR SOLUTION INTRAVENOUS
Status: DISCONTINUED | OUTPATIENT
Start: 2018-04-24 | End: 2018-04-24

## 2018-04-24 RX ORDER — LEVETIRACETAM 500 MG/1
500 TABLET ORAL EVERY 12 HOURS SCHEDULED
Status: DISCONTINUED | OUTPATIENT
Start: 2018-04-24 | End: 2018-04-26

## 2018-04-24 RX ORDER — PANTOPRAZOLE SODIUM 40 MG/1
40 TABLET, DELAYED RELEASE ORAL
Status: DISCONTINUED | OUTPATIENT
Start: 2018-04-25 | End: 2018-04-30 | Stop reason: HOSPADM

## 2018-04-24 RX ORDER — DOCUSATE SODIUM 100 MG/1
100 CAPSULE, LIQUID FILLED ORAL 2 TIMES DAILY
Status: DISCONTINUED | OUTPATIENT
Start: 2018-04-24 | End: 2018-04-30 | Stop reason: HOSPADM

## 2018-04-24 RX ORDER — ATORVASTATIN CALCIUM 80 MG/1
80 TABLET, FILM COATED ORAL
Status: DISCONTINUED | OUTPATIENT
Start: 2018-04-24 | End: 2018-04-30 | Stop reason: HOSPADM

## 2018-04-24 RX ORDER — TORSEMIDE 20 MG/1
10 TABLET ORAL DAILY
Status: DISCONTINUED | OUTPATIENT
Start: 2018-04-24 | End: 2018-04-30 | Stop reason: HOSPADM

## 2018-04-24 RX ORDER — POTASSIUM CHLORIDE 20MEQ/15ML
20 LIQUID (ML) ORAL DAILY
Status: DISCONTINUED | OUTPATIENT
Start: 2018-04-24 | End: 2018-04-25

## 2018-04-24 RX ADMIN — SODIUM CHLORIDE, SODIUM GLUCONATE, SODIUM ACETATE, POTASSIUM CHLORIDE, MAGNESIUM CHLORIDE, SODIUM PHOSPHATE, DIBASIC, AND POTASSIUM PHOSPHATE 50 ML/HR: .53; .5; .37; .037; .03; .012; .00082 INJECTION, SOLUTION INTRAVENOUS at 12:56

## 2018-04-24 RX ADMIN — LEVETIRACETAM 500 MG: 100 INJECTION, SOLUTION INTRAVENOUS at 09:47

## 2018-04-24 RX ADMIN — LEVETIRACETAM 500 MG: 500 TABLET ORAL at 20:35

## 2018-04-24 RX ADMIN — POTASSIUM CHLORIDE 20 MEQ: 20 SOLUTION ORAL at 16:22

## 2018-04-24 RX ADMIN — ATORVASTATIN CALCIUM 80 MG: 80 TABLET, FILM COATED ORAL at 16:22

## 2018-04-24 RX ADMIN — METOPROLOL TARTRATE 25 MG: 25 TABLET ORAL at 16:23

## 2018-04-24 RX ADMIN — TORSEMIDE 10 MG: 20 TABLET ORAL at 16:32

## 2018-04-24 RX ADMIN — DOCUSATE SODIUM 100 MG: 100 CAPSULE, LIQUID FILLED ORAL at 17:14

## 2018-04-24 NOTE — CONSULTS
PHYSICAL MEDICINE AND REHABILITATION CONSULT NOTE  Izzy Shore 80 y o  female MRN: [id number]  Unit/Bed#: ICU 10 Encounter: [id number]    Requested by (Physician/Service): Rohit Jara MD  Reason for Consultation:  Assessment of rehabilitation needs    Chief complaint: Acute left MCA s/p thrombectomy     HPI: Izzy Shore is a 80 y o  female with a PMH of recent right MCA, DM type 2, sick sinus syndrome s/p pacemaker (2010), CHF with EF of 44%, recent mitral valve replacement and left upper extremity DVT on coumadin who presented with acute onset of new dysarthria, aphasia, right facial droop and right sided weakness  She was recently discharged from [address] [place] [address] on 4/19/18 altered mental status and fall, a subacute right MCA infarction was found at that time  She was discharged to [facility] rehab on 4/21/2018 and presented again with new right sided symptoms on 4/23  Per chart review her daughter reported that her INR had been erratic and difficult to keep therapeutic, INR on admission was 1 95  CTA demonstrated a left M2 occlusion  Due to being on coumadin she was not a tPA candidate  She was taken to IR for a thrombectomy  Repeat head CT showed interval development of SAH  Post-operatively she had vigorous upper extremity movement and thrashing hip movement that lasted about a minuet  She was loaded with Keppra  Repeat CT scan today 4/24 showed resolving SAH and subacute right MCA infarction  She is currently POD #1 and she was up out of bed to the chair at the time of exam   She was able to follow simple commands  She continues on Keppra and will need an EEG if she continues to have more shaking episodes  Per family at bedside the patient did participate with therapy today  Notes are pending  Subjective: The patient was seen in the ICU  Per daughters at bedside she did have mitts on last night but has not needed them today  She is able to follow commands    Prior to December when she was admitted for cardiac reasons she was living independently and was very active including gardening  She was able to ambulate with a rolling walker  There is a 1st floor set up with a handicap bathroom  Family reports that someone is with her 24/7 to provide supervision/assistance as needed  The patient is asking for coffee, she has no complaints at this time      Review of Systems: A 10-point review of systems was performed  Negative except as listed above      Assessment:   - Acute left MCA s/p thrombectomy  - Hx of recent right MCA  - SSS s/p pacemaker placement  - CHF   - DM type 2     PT OT SLP   Pending evaluation Pending evaluation  Dysphagia level 3/thins     Plan:    - Chart reviewed  - Labs reviewed  - Imaging reviewed  - Continue PT/OT while in hospital  - Patient may be a good candidate for acute inpatient rehabilitation  Will need completed therapy evaluations to assess current function  Will follow  Thank you for allowing the PM&R service to participate in the care of this patient  We will continue to follow Sundeep Gore progress with you  Please do not hesitate to call with questions or concerns    Physical Exam:  General: alert, no apparent distress, cooperative and comfortable  Head: Normal, normocephalic, atraumatic  Eye: Normal external eye, conjunctiva, lids   Ears: Normal external ears  Nose: Normal external nose, mucus membranes  Pharynx: Dental Hygiene adequate  Normal buccal mucosa  Normal pharynx  Neck / Thyroid: Supple, no masses, nodes, nodules or enlargement    Pulmonary: clear to auscultation bilaterally and no crackles, no wheezes, chest expansion normal  Cardiovascular: normal rate, regular rhythm, normal S1, S2, no murmurs, rubs, clicks or gallops  Abdomen: soft, nontender, nondistended, no masses or organomegaly  Skin/Extremity: no rashes, no erythema, no peripheral edema  Neurologic: slight RUE weakness, choreiform movements of the bilateral upper extremities, right facial droop  Psych: Appropriate affect, alert and oriented to person, place  Musculoskeletal - Strength:   Right  Left  Site  Right  Left  Site    5 5  S Ab: Shoulder Abductors  5  5  HF: Hip Flexors    5 5  EF: Elbow Flexors  5 5 KF: Knee Flexors    5  5  EE: Elbow Extensors  5  5  KE: Knee Extensors    4 5  WE: Wrist Extensors  5  5  DR: Dorsi Flexors    4 5  FF: Finger Flexors  5  5  PF: Plantar Flexors    4 5  HI: Hand Intrinsics  5  5  EHL: Extensor Hallucis Longus   THE [facility] Stroke Scale (NIHSS)          1a  Level of  Consciousness (LOC) 0 = Alert, keenly responsive  1 = Not alert, but arousable by minor        stimulation  2 = Not alert, required repeated stimulation to         attend  3 = Responds only with reflex motor or totally         unresponsive       1a   0   1b  LOC Questions  Asked to say month and his/her age 0 = Answers both questions correctly  1 = Answers one question correctly        (dysarthric, intubated)  2 = Answers neither question correctly        (aphasic, stupor)  1b   0   1c  LOC Commands  Asked to open & close eyes, then  & release with non-affected hand  0 = Performs both tasks correctly  1 = Performs one task correctly  2 = Performs neither task correctly  1c   0     2  Best Gaze  Asked to follow with eyes through horizontal plane  0 = Normal  1 = Partial gaze palsy  2 = Forced deviation or total gaze paresis  2   0   3  Visual  Visual fields (quadrants) tested with finger counting or visual threat  0 = No visual loss  1 = Partial hemianopia (extinction)  2 = Complete hemianopia  3 = Bilateral hemianopia (including         blindness)  3   1   4  Facial Palsy  Asked to show teeth & raise eyebrows  0 = Normal symmetrical movement  1 = Minor paralysis  2 = Partial paralysis (total/near total         paralysis of lower face)  3 = Complete paralysis of one or both         sides (upper & lower)           4  1   5   Motor Arm  Asked to extend arms (palm down) 90º (if sitting) or 45º (if supine) & hold for 10 seconds  0 = No drift; arm stays at 90º/45º for full 10        seconds  1 = Drift; arm drifts down but does not hit         bed or other support  2 = Some effort against gravity; drifts down         to bed or support  3 = No effort against gravity: arm falls to         bed or support  4 = No movement  9 = Amputation, joint fusion  5a  (Left)       0      5b  (Right)        0    6  Motor Leg  While supine, asked to hold leg at 30º for 5 seconds  0 = No drift; leg stays at 30º for full 5        seconds  1 = Drift; leg drifts down but does not hit         the bed or other support  2 = Some effort against gravity; drifts down         to bed or support  3 = No effort against gravity; leg falls to         bed or support  4 = No movement  9 = Amputation, joint fusion  6a   (Left)       0        6b  (Right)       0   7  Limb Ataxia  Finger - nose & heel shin tests on both sides  0 = Absent  1 = Present in one limb  2 = Present in two limbs  7   0   8  Sensory  Sensation or grimace to pin prick or withdrawal from noxious stimuli in obtunded or aphasic patient   0 = Normal; no sensory loss  1 = Mild / moderate sensory loss; may be        dulled / "not as sharp"  2 = Severe / total sensory loss; coma     8   0   9  Best Language  Asked to describe a picture, name objects & read simple words  (See NIHSS language tools)  0 = No aphasia; normal  1 = Mild / moderate aphasia; some loss of        fluency / comprehension without        limitation of expression of ideas (can        identify picture from patient's        responses)  2 = Severe aphasia (cannot identify         pictures from responses)  3 = Mute; global aphasia; no usable        speech; cannot follow simple        commands  9   0   10  Dysarthria   0 = Normal   1 = Mild / moderate; slurs some words;         can be understood    2 = Severe; so slurred as to be         unintelligible; mute;anarthric     9 = Intubated or other physical barrier  10   0   11  Extinction & Inattention  Look at Visual (from #3) and double simultaneous tactile     0 = No abnormality  1 = Inattention or extinction in one sensory         modality  2 = Profound irina inattention or        inattention to more than one modality;        does not recognize own hand; orients        to only one side of space  11   1     Complete NIHSS Score (0-42): Christopher Boucher Lives with: lives alone however family is always with the patient  She lives in [facility] single family home  The living area: can live on one level  Equipment in home: Tub Bench, [address], [address] and [address]  There 3 steps to enter the home  Patient/family's goals: Return to previous home/apartment  The patient will have 24 hour ARC Supervision/physical assistance: supervision/physical assistance available upon discharge      Allergies: Allergies   Allergen Reactions    Heparin         Past Medical History:   Past Surgical History:   Family History:   Social history:   Past Medical History:   Diagnosis Date    Acid reflux     on occ    Arthritis     DJD right hip replaced    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    Cancer ([address] ) 2007    colonic polyps, no surgery done    Deep vein thrombosis (DVT) of left upper extremity ([address] ) 12/11/2017    Diabetes mellitus ([address] )     type 2    Diverticulosis     Edema     in legs on occ    Hypertension     on occ    Language barrier     speaks Venezuelan & broken english    Past Surgical History:   Procedure Laterality Date    COLON SURGERY      COLONOSCOPY      COLONOSCOPY N/A 11/2/2016    Procedure: COLONOSCOPY;  Surgeon: Janet Stewart MD;  [facility]; Service:     ESOPHAGOGASTRODUODENOSCOPY N/A 11/2/2016    Procedure: ESOPHAGOGASTRODUODENOSCOPY (EGD);   Surgeon: Janet Stewart MD;  Location: [facility] [place] [facility] LAB; Service:     JOINT REPLACEMENT Right 02/2015    hip    JOINT REPLACEMENT Left     knee    JOINT REPLACEMENT Right     knee    WA REVISE MEDIAN N/CARPAL TUNNEL SURG Left 1/28/2016    Procedure: RELEASE CARPAL TUNNEL;  Surgeon: Rena Cali MD;  Location: [facility] MAIN OR;  Service: Orthopedics    TUBAL LIGATION      VARICOSE VEIN SURGERY Bilateral      Family History   Problem Relation Age of Onset    Cancer Mother      throat      Social History     Social History    Marital status:       Spouse name: N/A    Number of children: N/A    Years of education: N/A     Social History Main Topics    Smoking status: Former Smoker     Packs/day: 0 25     Years: 10 00     Types: Cigarettes     Quit date: 1950    Smokeless tobacco: Never Used    Alcohol use Yes      Comment: socially    Drug use: No    Sexual activity: Not Currently     Other Topics Concern    None     Social History Narrative    None          Vital Signs: Reviewed    Temp:  [97 2 °F (36 2 °C)-98 8 °F (37 1 °C)] 98 5 °F (36 9 °C)  HR:  [72-88] 80  Resp:  [15-49] 20  BP: (106-151)/(52-90) 133/64   Intake/Output Summary (Last 24 hours) at 04/24/18 1359  Last data filed at 04/24/18 0507   Gross per 24 hour   Intake           741 67 ml   Output                0 ml   Net           741 67 ml        Laboratory: Reviewed    Lab Results   Component Value Date    HGB 8 8 (L) 04/24/2018    HGB 10 2 (L) 12/23/2015    HCT 27 0 (L) 04/24/2018    HCT 31 2 (L) 12/23/2015    WBC 4 66 04/24/2018    WBC 4 0 (L) 12/23/2015     Lab Results   Component Value Date    BUN 35 (H) 04/24/2018    BUN 26 (H) 12/23/2015     04/24/2018     12/23/2015    K 3 9 04/24/2018    K 4 2 12/23/2015     (H) 04/24/2018     12/23/2015    GLUCOSE 108 04/24/2018    GLUCOSE 106 04/23/2018    GLUCOSE 114 (H) 12/23/2015    CREATININE 1 26 04/24/2018    CREATININE 1 3 12/23/2015     Lab Results   Component Value Date    PROTIME 22 4 (H) 04/23/2018    PROTIME 12 8 02/16/2015    INR 1 95 (H) 04/23/2018    INR 0 98 02/16/2015        Imaging: Reviewed  X-ray Chest 1 View Portable    Result Date: 4/23/2018  Impression: Cardiomegaly  Chronic congestive changes  No acute findings  Workstation performed: [id number]     Ct Head Wo Contrast    Result Date: 4/24/2018  Impression: 1  Resolving subarachnoid hemorrhage  2   Subacute right MCA territory infarction  3   Cerebral atrophy with chronic small vessel ischemic change  Workstation performed: [id number]     Ct Head Wo Contrast    Result Date: 4/23/2018  Impression: Stable subarachnoid hemorrhage and extravasated contrast within the subarachnoid space compared to the examination performed approximately 4 hours earlier  Stable gyral enhancement involving the late subacute posterior MCA territory infarct on the right  Stable atrophy and chronic microangiopathic change  Workstation performed: [id number]     Ct Head Wo Contrast    Result Date: 4/23/2018  Impression: Interval development of subarachnoid hemorrhage and excreted contrast in the subarachnoid space mainly located within the suprasellar cistern and left sylvian fissure status post cerebral angiogram  Localized hyperdensity within the posterior inferior aspect of the posterior fossa, within the subarachnoid space may represent localized hemorrhage  1 cm meningioma within the left peripheral temporal occipital region  Enhancing cortex of the subacute infarct within the right posterior MCA territory  Cerebral volume loss and chronic microangiopathic change identified  Neurointerventional team is aware of this finding  Follow-up CT has already been ordered  Workstation performed: [id number]     Ct Stroke Alert Brain    Result Date: 4/23/2018  Impression: No acute disease  Footprint of chronic large and small vessel ischemia are stable   Findings were directly discussed with Nikolai Mejia on 4/23/2018 9:11 AM  Workstation performed: [id number]     Cta Stroke Alert (head/neck)    Result Date: 4/23/2018  Impression: Flow restrictive disease of the dominant left M2 proximal branch within which clot is not excluded  Left ICA ulceration  73% short segment right ICA origin stenosis owing to dense atherosclerotic plaque  Findings were relayed by text to Dr Sean Zaldivar approximately 0930 hours   Workstation performed: [id number]       Current Medications:     Current Facility-Administered Medications:     insulin lispro (HumaLOG) 100 units/mL subcutaneous injection 1-5 Units, 1-5 Units, Subcutaneous, Q6H [facility], Randolph Bobby MD    labetalol (NORMODYNE) injection 10 mg, 10 mg, Intravenous, Q4H PRN, Chon Harley MD    levETIRAcetam (KEPPRA) 500 mg in sodium chloride 0 9 % 100 mL IVPB, 500 mg, Intravenous, Q12H [facility], Chon Harley MD    multi-electrolyte (ISOLYTE-S PH 7 4 equivalent) IV solution, 50 mL/hr, Intravenous, Continuous, Randolph Bobby MD, Last Rate: 50 mL/hr at 04/24/18 1256, 50 mL/hr at 04/24/18 1256    pantoprazole (PROTONIX) injection 40 mg, 40 mg, Intravenous, Q24H [facility], Randolph Bobby MD

## 2018-04-24 NOTE — PHYSICIAN ADVISOR
Current patient class: Inpatient  The patient is currently on Hospital Day: 2      The patient was admitted to the hospital  on 4/23/18 at 1059 for the following diagnosis:  CVA (cerebral vascular accident) [facility]) [I63 9]  Acute ischemic left MCA stroke ([facility] ) [I63 512]       There is documentation in the medical record of an expected length of stay of at least 2 midnights  The patient is therefore expected to satisfy the 2 midnight benchmark and given the 2 midnight presumption is appropriate for INPATIENT ADMISSION  Given this expectation of a satisfying stay, CMS instructs us that the patient is most often appropriate for inpatient admission under part A provided medical necessity is documented in the chart  After review of the relevant documentation, labs, vital signs and test results, the patient is appropriate for INPATIENT ADMISSION  Admission to the hospital as an inpatient is a complex decision making process which requires the practitioner to consider the patients presenting complaint, history and physical examination and all relevant testing  With this in mind, in this case, the patient was deemed appropriate for INPATIENT ADMISSION  After review of the documentation and testing available at the time of the admission I concur with this clinical determination of medical necessity  The patient does have an inpatient admission within the previous 30 days  The patient was admitted on 4/18/18 and discharged on 4/21/18 as an inpatient  The patient therefore required readmission review  In this case the patient should be considered a SEPARATE and UNRELATED INPATIENT ADMISSION  The patient had been discharged in stable condition with a completed care plan  There were no unresolved acute medical issues at the time of discharged which would have reasonably been expected to prompt this readmission          Rationale is as follows:    80year old female with PMHx significant for recent right MCA stroke, sick sinus syndrome status post pacemaker placement in 5952, systolic CHF with an ejection fraction of 44% as of April 19th 2018, recent mitral valve replacement, left upper extremity DVT anticoagulated on Coumadin, and diabetes mellitus who presented to [facility] as a stroke alert on 4/22/18  Patient had previously been living at home alone until December 2017 when she was admitted for CHF and underwent biologic mitral valve replacement and pacemaker placement  Her admission was complicated by left upper extremity DVT for which she was started on coumadin  Per records her INR has been erratic and difficult to keep within the therapeutic range per daughter  In January 2018, patient had a TIA which had presented as syncope  Prior to current admission she had been living in a NH for 3 days after discharge from [facility] where she had been admitted 4/18/18 for R sided CVA (subacute R parietal lobe ischemic infarction)  She had symptoms of LEFT sided body abnormal movements, gait disturbance and episodes of confusion  Per EPIC patient was found by family members 4/22/18 with left-sided gaze preference, severe dysarthria, expressive aphasia, right facial droop and right-sided hemiparesis after last being seen well at [facility] on 4/22/18  Upon arrival in ED, CTA head and neck demonstrated a left MCA occlusion  Neurology did not give tPA as the patient was outside the window AND anticoagulated on Coumadin   Patient was transferred to IR for cerebral angiogram and mechanical thrombectomy  At this time source of emboli for CVA is still unknown, she is expected to get LORI as cardioembolic etiology is highly suspected  Pt anticoagulated and treated appropriately, despite that returned with stroke in different territory causing different symptoms  After reviewing the above admissions I feel that they are UNRELATED      The patients vitals on arrival were ED Triage Vitals   Temperature Pulse Respirations Blood Pressure SpO2 04/23/18 0925 04/23/18 0920 04/23/18 0920 04/23/18 0920 04/23/18 0920   97 9 °F (36 6 °C) 88 16 (!) 178/83 100 %      Temp Source Heart Rate Source Patient Position - Orthostatic VS BP Location FiO2 (%)   04/23/18 0925 04/23/18 0925 04/23/18 0925 04/23/18 0930 --   Oral Monitor Sitting Right arm       Pain Score       04/23/18 0920       No Pain           Past Medical History:   Diagnosis Date    Acid reflux     on occ    Arthritis     DJD right hip replaced    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 with no change    Cancer [facility] ) 2007    colonic polyps, no surgery done    Deep vein thrombosis (DVT) of left upper extremity ([facility] ) 12/11/2017    Diabetes mellitus ([facility] )     type 2    Diverticulosis     Edema     in legs on occ    Hypertension     on occ    Language barrier     speaks Malaysian & broken english     Past Surgical History:   Procedure Laterality Date    COLON SURGERY      COLONOSCOPY      COLONOSCOPY N/A 11/2/2016    Procedure: COLONOSCOPY;  Surgeon: Jackelyn Jackman MD;  Location: [place] [address] GI LAB; Service:     ESOPHAGOGASTRODUODENOSCOPY N/A 11/2/2016    Procedure: ESOPHAGOGASTRODUODENOSCOPY (EGD); Surgeon: Jackelyn Jackman MD;  Location: [facility] GI LAB;   Service:     JOINT REPLACEMENT Right 02/2015    hip    JOINT REPLACEMENT Left     knee    JOINT REPLACEMENT Right     knee    DE REVISE MEDIAN N/CARPAL TUNNEL SURG Left 1/28/2016    Procedure: RELEASE CARPAL TUNNEL;  Surgeon: Sergio Blackwell MD;  Location: [place] [address] MAIN OR;  Service: Orthopedics    TUBAL LIGATION      VARICOSE VEIN SURGERY Bilateral            Consults have been placed to:   IP CONSULT TO NEUROLOGY  IP CONSULT TO NEUROSURGERY  IP CONSULT TO CASE MANAGEMENT  IP CONSULT TO PHYSICAL MEDICINE REHAB    Vitals:    04/24/18 1130 04/24/18 1200 04/24/18 1300 04/24/18 1400   BP: 131/85 118/67 128/72 123/68   BP Location: Right arm Right arm Right arm Right arm   Pulse: 80 82 80 74   Resp: 18 20 22 20   Temp:  98 7 °F (37 1 °C)     TempSrc:  Oral     SpO2: 98% 94% 98% 100%   Weight:       Height:           Most recent labs:    Recent Labs      04/23/18   0908   04/24/18   0507   WBC  6 00   < >  4 66   HGB  10 3*   < >  8 8*   HCT  32 1*   < >  27 0*   PLT  142*   < >  127*   K  4 3   --   3 9   NA  139   --   141   CALCIUM  9 5   --   8 8   BUN  46*   --   35*   CREATININE  1 54*   --   1 26   INR  1 95*   --    --    TROPONINI  <0 02   --    --     < > = values in this interval not displayed         Scheduled Meds:  Current Facility-Administered Medications:  atorvastatin 80 mg Oral Daily With Xiomy Ortega MD    docusate sodium 100 mg Oral BID Ilean Mass, MD    insulin lispro 1-5 Units Subcutaneous Q6H [address] Lianaan Garcia, MD    labetalol 10 mg Intravenous Q4H PRN Ronni Baker MD    levETIRAcetam 500 mg Intravenous Q12H [address] Ronni Baker MD    metoprolol tartrate 25 mg Oral Q12H [address] Ilean Mass, MD    multi-electrolyte 50 mL/hr Intravenous Continuous Ruben Zelaya MD Last Rate: 50 mL/hr (04/24/18 1256)   [START ON 4/25/2018] pantoprazole 40 mg Oral Early Morning Ilean Mass, MD    potassium chloride 20 mEq Oral Daily Ilean Mass, MD    torsemide 10 mg Oral Daily Ilean Mass, MD      Continuous Infusions:  multi-electrolyte 50 mL/hr Last Rate: 50 mL/hr (04/24/18 1256)     PRN Meds: labetalol    Surgical procedures (if appropriate):

## 2018-04-24 NOTE — CASE MANAGEMENT
Initial Clinical Review    Admission: Date/Time/Statement: 4/23/18 @ 1059     Orders Placed This Encounter   Procedures    Inpatient Admission (expected length of stay for this patient is greater than two midnights)     Standing Status:   Standing     Number of Occurrences:   1     Order Specific Question:   Admitting Physician     Answer:   Venu Gallardo [[id number]]     Order Specific Question:   Level of Care     Answer:   Critical Care [15]     Order Specific Question:   Estimated length of stay     Answer:   More than 2 Midnights     Order Specific Question:   Certification     Answer:   I certify that inpatient services are medically necessary for this patient for a duration of greater than two midnights  See H&P and MD Progress Notes for additional information about the patient's course of treatment  ED: Date/Time/Mode of Arrival:   ED Arrival Information     Expected Arrival Acuity Means of Arrival Escorted By Service Admission Type    - 4/23/2018 09:00 Immediate Ambulance [facility] Critical Care/ICU Emergency    [address]  Bruce Perdomo          Chief Complaint:   Chief Complaint   Patient presents with    Altered Mental Status     change in LOC per nursing home staff  Confused and not following commands  History of Illness:  80 y o  female with a PMH of subacute right parietal CVA, 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, hypertension, sick sinus syndrome with pacemaker, bioprosthetic mitral valve, DM 2, CHADWICK, and DVT postoperatively, on Coumadin, who presents with new onset dysarthria, aphasia, right facial droop, and right upper and lower extremity weakness  Last known normal sometime after 8:30 p m  last night  Patient awoke with symptoms  Of note, the patient was recently seen on 4/19/18 by Dr Camelia Maya at [place] [address] for altered mental status after a fall    Patient was found to have a subacute right MCA infarction at that time, with left-sided weakness, mild aphasia and left-sided neglect    At baseline, the patient is alert and oriented and lives independently  She has a history of hip and bilateral knee replacements, and thus walks with a walker  The patient was discharged to skilled nursing facility [facility] on 04/21/2018  The patient was seen in her usual state state of health, with residual deficits from previous CVA,  at approximately 8:30 p m  when she was visited by her daughter    Today on initial examination, the patient is alert but unable to follow commands  She appears with  dense receptive and expressive aphasia as well as dysarthria, right facial droop, right field cut , left gaze preference, a right upper and lower extremity weakness  CTA demonstrated left M2 occlusion  Patient was not deemed to be a tPA candidate secondary to at known time of onset  Neurovascular team was consulted, and the patient is to receive thrombectomy today        Patient has significant deficits with an NIH stroke scale of 12  ED Vital Signs:   ED Triage Vitals   Temperature Pulse Respirations Blood Pressure SpO2   04/23/18 0925 04/23/18 0920 04/23/18 0920 04/23/18 0920 04/23/18 0920   97 9 °F (36 6 °C) 88 16 (!) 178/83 100 %      Temp Source Heart Rate Source Patient Position - Orthostatic VS BP Location FiO2 (%)   04/23/18 0925 04/23/18 0925 04/23/18 0925 04/23/18 0930 --   Oral Monitor Sitting Right arm       Pain Score       04/23/18 0920       No Pain        Wt Readings from Last 1 Encounters:   04/23/18 63 6 kg (140 lb 3 4 oz)     Abnormal Labs/Diagnostic Test Results:       ED Treatment:   Medication Administration from 04/23/2018 0842 to 04/23/2018 1315       Date/Time Order Dose Route Action Action by Comments                04/23/2018 0945 aspirin rectal suppository 300 mg 300 mg Rectal Given Robinson Hood RN           Past Medical/Surgical History:    Active Ambulatory Problems     Diagnosis Date Noted    Deep vein thrombosis (DVT) of left upper extremity (HCC) 01/25/2018    H/O mitral valve replacement 03/01/2018    [facility]re[facility] 04/18/2018    Controlled type 2 diabetes mellitus, without long-term current use of insulin ([facility] ) 04/18/2018    Pacemaker 04/18/2018    Acid reflux 04/18/2018    HTN (hypertension) 04/18/2018    Constipation 04/18/2018    CHADWICK (acute kidney injury) ([place] [address] ) 89/33/1346    Systolic congestive heart failure ([place] [address] ) 04/18/2018     Resolved Ambulatory Problems     Diagnosis Date Noted    Encephalopathy 04/19/2018     Past Medical History:   Diagnosis Date    Acid reflux     Arthritis     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    Cancer [facility]) 2007    Deep vein thrombosis (DVT) of left upper extremity ([place] [address] ) 12/11/2017    Diabetes mellitus ([place] [address] )     Diverticulosis     Edema     Hypertension     Language barrier        Admitting Diagnosis: CVA (cerebral vascular accident) ([place] [address] ) [I63 9]  Acute ischemic left MCA stroke ([place] [address] ) [I63 512]    Assessment/Plan   1)  Acute L M2 CVA -  Unknown etiology at this point  Patient on Coumadin for upper extremity DVT, INR 1 95, of note has bioprosthetic mitral valve                -Pt to receive thrombectomy with Dr Dhiraj Miguel to ICU               -Admit to stroke pathway , please note pt recently admitted on stroke pathway last week, do not need to repeat echo, TTE completed previously                 -Will clarify if atrial pacer is MRI compatible               -EEG Pending               -tele              -Further recommendations to follow post-procedure      Pre-hospital Stroke Alert: 0830  Neurology at bedside: 0831  NIHSS:  15  tPA:  Not given secondary to unknown time of onset, last known normal after 8:30 p m  last evening, and Coumadin with INR of 1 95  Pt to receive thrombectomy today       2)    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               -per Dr Christina Ceja previous note, the patient improved with Klonopin              -recommend hold benzodiazepines for now        Admission Orders:    Scheduled Meds:   Current Facility-Administered Medications:  insulin lispro 1-5 Units Subcutaneous Q6H [address] Malgorzata Zhang MD    labetalol 10 mg Intravenous Q4H PRN Evelyne Mishra MD    levETIRAcetam 500 mg Intravenous Q12H [address] Evelyne Mishra MD    multi-electrolyte 50 mL/hr Intravenous Continuous Malgorzata Zhang MD Last Rate: Stopped (04/24/18 0947)   pantoprazole 40 mg Intravenous Q24H [address] Malgorzata Zhang MD      Continuous Infusions:   multi-electrolyte 50 mL/hr Last Rate: Stopped (04/24/18 0947)     4/23  OP NOTE  Post-op Diagnosis:   1  Acute ischemic left MCA stroke ([facility] )    2  CVA (cerebral vascular accident) ([facility] )     Estimated Blood Loss: 500-1000 cc  Findings: Left MCA proximal superior division occlusion, TICI 0      Prominent bleeding over the right femoral artery sheath throughout the case resolved after successful deployment of Angioseal device  Total blood loss is difficult to estimate however iStat revealed a Hg less than 8 therefore 1 unit pRBC trasnfused  4/23  POSTOP NOTE  She sustained 500-1000cc EBL  However, following the procedure right sided hemiparesis began to improve, she was oriented x self, following commands b/l, gazing past midline to the right  Plan:  · Neuro exam: GCS14, E4, V4, M6  Follows commands intermittently, WOODARD spontaneously  Improving right sided neglect/hemiparesis, left sided gaze preference improving now gazing past midline  Right central facial droop noted  · Imaging reviewed personally and with attending  ? CT brain stroke alert: No acute disease   Footprint of chronic large and small vessel ischemia are stable  ? CTA stroke alert: flow restrictive disease in left M2 proximal brach  73% right ICA origin stenosis  ? Post-thrombectomy CT head: interval development of SAH and contrast within suprasellar cistern, left sylvian fissure   1cm meningioma in left temporal occipital region (no intervention indicated at this time)  Enhancing cortex of subacute infarct within right posterior MCA territory  ? Delayed CT head post-thrombectomy- completed pending read  · L MCA infarct s/p mechanical thrombectomy  ? Straight leg x4 hours post-procedure  ? Groin checks and distal pulses per protocol  ? Continue Q1 neuro checks and call with exam changes  ? INR 1 9 on admit (on coumadin) hold AP/AC at this time including pharmacologic DVT ppx  ?  SC given x1 in ED no additional doses advised  ? Neurology consulted for stroke pathway  ? Monitor for ABLA as patient's -1000cc from femoral sheath insertion site  ? Patient will need PT/OT  · Continue primary care per medical critical care team  ? Management of DM2  · Neurosurgery team will continue to follow, call with questions or concerns      4/24/2018  Assessment/ Plan:  1  Acute left MCA infarct s/p thrombectomy POD 1              -Repeat CTH in am               -Hold anticoagulation/ antiplatelet for now               -LORI pending               -Recommend CT CAP to assess for underlying malignancy cause a hypercoagulable state              -patient on Coumadin for recent upper extremity DVT, will switch to NOAC, likely Eliquis, after assessment of stroke size via repeat CT and appropriate waiting period               -patient has bioprosthetic valve for MVR, and thus no need to consider in regards to anticoagulation     2  Seizure-like activity  -patient loaded with 1 g Keppra last night, on Keppra 500 mg p o  B i d   -if there repeat episodes of seizure-like activity, will placed on video EEG monitoring        Subjective: The patient is asked the improved status post thrombectomy  Postprocedure CT did demonstrate contrast extravasation versus subarachnoid hemorrhage  Anticoagulation antiplatelet held    Overnight, the patient had an episode of 45 seconds to 1 minute of diffuse upper and lower extremity tonic-clonic activity, with reported head thrashing and roving eye movements  No tongue bite or incontinence noted  Patient was loaded with Keppra 1 g and started on Keppra 750 mg p o  B i d  this was later lower to Keppra 500 mg p o  B i d        On exam today, the patient is alert, cooperative and interactive  The patient is able to easily participate in conversation, and does not demonstrate receptive or expressive aphasia  There is some baseline confusion, which may represent early dementia  A 12 point review systems was completed and is negative with exception of seizure-like activity as described above, and bilateral eye itching    Neurological exam today is nonfocal

## 2018-04-24 NOTE — NUTRITION
Recommend adjust diet to CCD 1, Cardiac Step 1, Dysphagia 3, Dental Soft, Thin Liquids  Adjust Phos  Request RD Diet Protocol please

## 2018-04-24 NOTE — SOCIAL WORK
Pt is a [employer] due to having a psychiatrist for anxiety in [address] [place] [address] [address] faxed [phone] and PASSR to [facility] to complete Level II assessment  Thai Valle

## 2018-04-24 NOTE — PLAN OF CARE
Problem: PHYSICAL THERAPY ADULT  Goal: Performs mobility at highest level of function for planned discharge setting  See evaluation for individualized goals  Treatment/Interventions: Functional transfer training, LE strengthening/ROM, Therapeutic exercise, Endurance training, Patient/family training, Equipment eval/education, Bed mobility, Gait training          See flowsheet documentation for full assessment, interventions and recommendations  Prognosis: Good  Problem List: Decreased strength, Decreased endurance, Impaired balance, Decreased mobility, Decreased coordination, Decreased cognition, Impaired judgement, Decreased safety awareness, Impaired tone  Assessment: Pt seen for high complexity physical therapy evaluation  Pt is an 79 y/o female w/ history/comorbidities of recent CVA w/ L sided residual weakness that she was at rehab for, as well as DM II< CHF, MVR, DVT, pacemaker, GERD, DJD who is now admitted w/ worsening MS, confusion, word finding issues, not following commands , servere facial droop, L gaze w/ new R sided flaccidity/weakness  Imaging showed acute L MCA CVA  Had L MCA ,echanical thrombectomy yesterday and has post thrombectomy SAH in suprasellar cistern and L sylvian fissurs, and ? sz activity  Due to multiple acute medical issues w/ B CVAs, need for ICU monitoring, fall risk, note unstable clinical picture  PT consulted to assess mobility, d/c needs  Pt presents w/ decreased functional mob, standing and sitting balance, endurance, B LE strength and coordination, barriers at home  will benefit from skilled PT to correct for the above problems  Recommend rehab at d/c and likely would have tolerance for more aggressive rehab        Recommendation:  (recommend rehab at d/c)     PT - OK to Discharge: (S) Yes (to rehab when stable- see above)    See flowsheet documentation for full assessment

## 2018-04-24 NOTE — PLAN OF CARE
Problem: SLP ADULT - COMMUNICATION, IMPAIRED  Goal: Initial communication eval performed  Outcome: Completed Date Met: 04/24/18

## 2018-04-24 NOTE — PROGRESS NOTES
Called to bedside by nurse for evaluation  Nurse reports that the patient had vigorous upper extremity movement and thrashing hip movement that lasted 45 seconds to 1 minute  Family is at bedside and state they were talking to the patient but then was not responding to questions similar to how she was earlier during this episode  They report at baseline for the past week since her original stroke she has had repetitive movement of the left upper extremity which was similar to what was described this evening  Upon arrival the patient was awake answering questions when asked in Georgian by family at bedside  Following simple commands such as wiggling toes, stick out tongue answering basic questions  She continued to have some of the mild left upper extremity and right upper extremity restlessness  Bedside glucose was checked which was 108  Patient had a CT head performed roughly 4 hours earlier which showed a stable findings from previous CT head    Patient scheduled for CT scan at 5:00 a m  patient giving loading dose of Keppra and will continue to monitor or any clinical changes and discuss video EEG with day team

## 2018-04-24 NOTE — PROGRESS NOTES
Critical Care Transfer Note  Lamar Chapman 80 y o  female MRN: [id number]  Unit/Bed#: ICU 10 Encounter: [id number]    Code Status: Level 1 - Full Code  POA:    POLST:    Contact: Dorian Craig, [phone]  Discussed case with Dr Henry Solomon of [facility] at 7630  I verbally informed him of ICU course, results of diagnostic tests, consults, and pending tests/consults  Answered questions  Patient will be transferred to [facility] with telemetry  Reason for ICU Admission:  Left MCA territory ischemic stroke status post IR thrombectomy     Active problems:     1  Left MCA territory ischemic stroke:  Patient was admitted after thrombectomy by IR yesterday  Patient was found to have left gaze preference, right facial droop, aphasia, dysarthria, and right hemiparesis  Procedure was successful  Neurological examination was dramatically improved  Neurology is following the patient  2   Subarachnoid hemorrhage:  After IR thrombectomy, head CT demonstrated extravasated contrast in the left cerebral hemisphere  Some of this was postprocedural but some was felt to be due to subarachnoid hemorrhage  Serial CTs have been performed  Most recent CT performed on the morning of April 24th showed resolving subarachnoid hemorrhage  Patient has remained much improved wth no deteriorations in neurological exam     3  Systolic dysfunction:  EF 44% on most recent echocardiogram from April 18th  No signs of CHF exacerbation at this time  4  Left upper extremity DVT:  Patient developed left upper extremity DVT after pacemaker placement in January 2018  Patient was previously anticoagulated on Coumadin for this  Holding anticoagulation for now  Neurology is recommending DOAC     5  History of mitral valve replacement:  Patient is status post bioprosthetic mitral valve replacement at [facility]  No complications  6  Sick sinus syndrome status post pacemaker placement:  Patient is status post pacemaker placement    There have been no arrhythmias observed on telemetry  7  CKD:  Patient has CKD with baseline creatinine of approximately 1 3  Creatinine on admission was elevated to 1 54  Creatinine today was 1 26      8  Anemia:  Baseline hemoglobin is 9 4  Patient apparently experienced significant bleeding after thrombectomy  She received 1 unit of PRBCs  Hemoglobin this morning was 8 8  Will continue to monitor  9  Diabetes mellitus:  Patient has diabetes mellitus type 2 without home insulin use  Patient's blood glucose has been well controlled  Resolved problems: None  Assessment and Plan:   In brief, for recent stroke, patient will require a repeat head CT tomorrow (April 25th) to evaluate stroke burden and hemorrhage  Patient will be treated with Keppra 500 mg BID per Neurology  If she has any repeat episodes of seizure-like activity, video EEG should be considered  Anticoagulation will be managed in conjunction with Neurology  Home medications have been continued  Diet has been ordered, dysphagia level 3 with thin liquids  Patient has been tolerating this well  Her neurological examination has remained stable  She is awake, alert, and oriented  She moves all 4 extremities and follows commands  She engages in conversation appropriately  History of Present Illness: In brief, patient is an 80-year-old female with past medical history of recent right MCA stroke with residual left upper extremity choreoathetoid movements, systolic dysfunction on echocardiogram with EF of 44%, left upper extremity DVT anticoagulated on Coumadin, sick sinus syndrome status post pacemaker placement, chronic kidney disease, and diabetes mellitus without insulin use who presented to [facility] as a stroke alert  Family found her to have new onset aphasia, dysarthria, left gaze preference, right facial droop, and right hemiparesis  CTA demonstrated left MCA occlusion    tPA was not given as patient was outside window and was anticoagulated on Coumadin  Decision was made to pursue IR thrombectomy  Clinical Course:  Patient was treated with thrombectomy by IR  After the procedure, patient experienced a large volume of bleeding from the right femoral puncture site  She was transfuse 1 unit of PRBCs  On April 23rd at night, patient had an episode of head movement and upper extremity movement which was possibly concerning for seizure  She was treated with Keppra and has been maintained on Keppra  Neurology evaluated the patient and did not feel that video EEG was warranted  Patient's neurological examination improved dramatically over the course of her ICU stay  On the morning of April 24th, patient was awake, alert, oriented, moving all 4 extremities, following commands, and engaging in conversation appropriately  CT head performed after thrombectomy demonstrated extravasation of contrast in the left cerebral hemisphere with possible subarachnoid hemorrhage  Serial head Cts were performed  Most recent head CT performed on the morning of April 24th demonstrated resolving subarachnoid hemorrhage  Patient was evaluated by speech pathology and started on a diet  She has had no other acute medical issues  Patient is stable for transfer to general medical floors        Consultants: Neurosurgery and Neurology    Results of Diagnostic Tests:     4/23 CT head: No acute disease  Footprint of chronic large and small vessel ischemia are stable  4/23 CTA head and neck: Flow restrictive disease of the dominant left M2 proximal branch within which clot is not excluded  Left ICA ulceration  73% short segment right ICA origin stenosis owing to dense atherosclerotic plaque      4/23 CT head post thrombectomy: Interval development of subarachnoid hemorrhage and excreted contrast in the subarachnoid space mainly located within the suprasellar cistern and left sylvian fissure status post cerebral angiogram  Localized hyperdensity within the posterior inferior aspect of the posterior fossa, within the subarachnoid space may represent localized hemorrhage  1 cm meningioma within the left peripheral temporal occipital region  Enhancing cortex of the subacute infarct within the right posterior MCA territory  Cerebral volume loss and chronic microangiopathic change identified    4/23 CT head 4 hours post thrombectomy: Stable subarachnoid hemorrhage and extravasated contrast within the subarachnoid space compared to the examination performed approximately 4 hours earlier  Stable gyral enhancement involving the late subacute posterior MCA territory infarct on the right  Stable atrophy and chronic microangiopathic change  4/24 CT head: Resolving subarachnoid hemorrhage  Subacute right MCA territory infarction  Cerebral atrophy with chronic small vessel ischemic change      Pending Diagnostic Tests: CT head 4/25    Recent or Scheduled Procedures: 4/23 IR thrombectomy for left MCA occlusion                                                                      Nutrition Plan: Dysphagia 3 with thin liquids                           Portions of the record may have been created with voice recognition software  Occasional wrong word or "sound a like" substitutions may have occurred due to the inherent limitations of voice recognition software  Read the chart carefully and recognize, using context, where substitutions have occurred

## 2018-04-24 NOTE — PLAN OF CARE
Problem: OCCUPATIONAL THERAPY ADULT  Goal: Performs self-care activities at highest level of function for planned discharge setting  See evaluation for individualized goals  Treatment Interventions: ADL retraining, Functional transfer training, Endurance training, Cognitive reorientation, Equipment evaluation/education, UE strengthening/ROM, Patient/family training, Neuromuscular reeducation, Compensatory technique education, Fine motor coordination activities, Continued evaluation, Energy conservation, Activityengagement          See flowsheet documentation for full assessment, interventions and recommendations  Limitation: Decreased ADL status, Decreased UE strength, Decreased Safe judgement during ADL, Decreased cognition, Decreased endurance, Decreased sensation, Decreased fine motor control, Decreased self-care trans, Decreased high-level ADLs, Non-func L UE (BALANCE, MEDICAL STATUS)  Prognosis: Fair  Assessment: PT IS AN 83 YO F ADMIT W/ ACUTE ONSET OF DYSARTHRIA, APHASIA, R FACIAL DROOP AND R SIDED WEAKNESS  PT FOUND W/ L MCA SYNDROME AND IS NOW S/P CEREBRAL ANGIOGRAPHY AND IR THROMBECTOMY ON 4/23  PT THEN NOTED W/ INTERVAL DEVELOPMENT OF SAH AND SUBACUTE R MCA INFARCTION  PT RECENTLY ADMIT TO [facility] ON 4/19 WITH AMS S/P FALL AND FOUND W/ SUBACUTE R MCA  PT WAS DISCHARGED TO [facility] ON 4/21 FOR REAB SERVICES  PT W/ PMH SIGNIFICANT FOR AFOREMENTIONED RECENT R MCA, DM2, SSS S/P PACER, CHF W/ EF 44%, RECENT MVR, LUE DVT  AT BASELINE PT IS FROM HOME ALONE AND W/ INDEPENDENCE IN ADLS/IADLS AND W/ USE OF RW FOR MOBILITY  WHILE AT REHABILITATION PT WAS REQUIRING A X1 FOR ADLS AND MOBILITY  CURRENTLY PT REQUIRING MIN A BED MOBILITY, MIN A X2 SIT>STAND TRANSFERS, AND MIN A X2 SHORT DISTANCE AMBULATION USING HHA FROM THERAPIST  PT NOTED W/ MILD RUE DEFICITS INCLUDING IMPAIRED STRENGTH(MMT 4/5)/FMC/GMC HOWEVER PT W/ GREATER DEFICITS IN LUE INCLUDING ATAXIA/FMC/GMC/STRENGTH(MMT 3+/5)   PT ADD'L NOTED AT THIS TIME W/ R FACIAL DROOP AND L MARIA ELENA INATTENTION  PT APPEARS GENERALLY LIMITED AT THIS TIME 2* IMPAIRED BALANCE, ACTIVITY TOLERANCE, MEDICAL STATUS, ADL IMPAIRMENTS, GENERALIZED WEAKNESS/DECONDITINOING, AFOREMENTIONED DEFICITS AS WELL AS IMPAIRED ATTENTION, SAFETY, INSIGHT, RECALL, JUDGEMENT, PROCESSING AND ORIENTATION  FROM OT PERSPECTIVE, PT WILL BENEFIT FROM CONTINUED OT SERVICES IN AN INPT REHAB SETTING PENDING FURTHER MEDICAL STABILITY  WILL CONTINUE TO FOLLOW PT 3-5X/WEEK IN ORDER TO MEET THE BELOW DESCRIBED GOALS IN 10-14 DAYS        OT Discharge Recommendation: Short Term Rehab  OT - OK to Discharge:  (TO STR AT THIS TIME)

## 2018-04-24 NOTE — SPEECH THERAPY NOTE
Speech Language/Pathology  Speech/Language Pathology Dysphagia Assessment    Patient Name: Michelle Pacheco  IMRCG'W Date: 4/24/2018     Problem List  Patient Active Problem List   Diagnosis    Deep vein thrombosis (DVT) of left upper extremity ([facility] )    H/O mitral valve replacement    [facility]) [facility]    Controlled type 2 diabetes mellitus, without long-term current use of insulin ([facility] )    Pacemaker    Acid reflux    HTN (hypertension)    Constipation    CHADWICK (acute kidney injury) ([facility] )    Systolic congestive heart failure ([facility] )    Acute ischemic left MCA stroke ([facility] )    History of ischemic right MCA stroke     Past Medical History  Past Medical History:   Diagnosis Date    Acid reflux     on occ    Arthritis     DJD right hip replaced    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    x 2 lesions with no change    Cancer ([facility]  ) 2007    colonic polyps, no surgery done    Deep vein thrombosis (DVT) of left upper extremity ([facility] ) 12/11/2017    Diabetes mellitus ([facility] )     type 2    Diverticulosis     Edema     in legs on occ    Hypertension     on occ    Language barrier     speaks Venezuelan & broken english     Past Surgical History  Past Surgical History:   Procedure Laterality Date    COLON SURGERY      COLONOSCOPY      COLONOSCOPY N/A 11/2/2016    Procedure: COLONOSCOPY;  Surgeon: Allan Walsh MD;  Location: [facility]; Service:     ESOPHAGOGASTRODUODENOSCOPY N/A 11/2/2016    Procedure: ESOPHAGOGASTRODUODENOSCOPY (EGD); Surgeon: Allan Walsh MD;  Location: [facility] GI [facility];   Service:     JOINT REPLACEMENT Right 02/2015    hip    JOINT REPLACEMENT Left     knee    JOINT REPLACEMENT Right     knee    TN REVISE MEDIAN N/CARPAL TUNNEL SURG Left 1/28/2016    Procedure: RELEASE CARPAL TUNNEL;  Surgeon: Virginia Washington MD;  Location: [facility] MAIN OR;  Service: Orthopedics    TUBAL LIGATION      VARICOSE VEIN SURGERY Bilateral      Summary:  Pt presents w/ mild oral dysphagia characterized by prolonged mastication of solids  No s/s pharyngeal dysphagia or aspiration observed  Recommendations:  Diet: dysphagia level 3 (if increased difficulty/significantly prolonged mastication noted, will need to downgrade to level 2)  Liquid: thin  Meds: as tolerated  Supervision: complete/assist  Positioning:Upright  Strategies: Pt to take PO/Meds only when fully alert and upright  Aspiration precautions  Reflux precautions    Therapy Prognosis:  Prognosis considerations:  Frequency:  Eval only, No f/u tx indicated  Consider consult w/:  GI  ENT  Pulmonary  Neurology  Nutrition    HPI:  From ED records:  80year old female brought in by EMS for evaluation after being found with severe facial droop, slurred speech, left-sided gaze preference and right-sided weakness/flaccidity upon awakening approximately 1 hour prior to arrival  Patient unable to provide any history due to severity of dysarthria/aphasia  Patient's daughter states that the patient has been living at the nursing home facility for the past 3 days after being discharged from [address] [place] [address] where she had been admitted for right-sided CVA resulting in abnormal movements of the left-side of the body and gait disturbance  Patient had previously been living at home alone and had been completely independent up until December 2017 when she was admitted for CHF and underwent biologic mitral valve replacement and pacemaker placement  Her admission was complicated by left upper extremity DVT for which she was started on coumadin  Her INR has been erratic and difficult to keep within the therapeutic range per daughter  In January 2018, patient had a TIA which had presented as syncope  Patient then suffered right parietal CVA 4/15/2018  Patient had fallen to the ground and struck the left side of her head  She refused to go to the hospital immediately following the incident   The next day, family noticed personality changes with the patient becoming more irritable than her usual with complaints of seeing shadows from the right visual field  On 4/17, she developed abnormal tremor-like movements  They convinced her to go to the hospital on 4/18 after the patient developed a gait disturbance with difficulty ambulating at PT  Patient is currently unable to follow commands  She has severe dysarthria and aphasia  Pt s/p thrombecomy by IR  Overnight, patient episode of vigorous extremity movement and not focusing which lasted 45 sec to 1 min and was witnessed by nursing and family  By the time the night team arrived, patient had returned to baseline  Patient was loaded with Keppra out of concern for possible seizure  Reason for consult:  R/o aspiration  Determine safest and least restrictive diet  Failed nursing dysphagia assessment  Change in mental status  New neuro event    Current diet:  npo  Premorbid diet[de-identified]  Regular with thin liquids  Voice/Speech:  Speech is intelligible  Follows commands:   followed simple commands; difficulty with oral motor commands - ? Oral apraxia                       Cognitive Status:  Awake and alert  Cooperative  Oral mech exam:  Partial dentition  Mild R facial asymmetry  Groping movements for oral motor commands - unable to protrude tongue or lateralize consistently/poor coordination  Items administered:  Puree, toast, thin liquids  Liquids were taken by straw/cup       Oral stage: Mild  Lip closure: good  Mastication: prolonged for toast  Bolus formation: adequate  Bolus control: good  Transfer: good  Oral residue: no  Pocketing: no    Pharyngeal stage: WFLs  Swallow promptness: prompt  Laryngeal rise: fair per palpation  Wet voice: no  Throat clear: no  Cough: no  Secondary swallows: no  Audible swallows: no  No s/s aspiration    Esophageal stage:  No s/s reported    Aspiration precautions posted    Results d/w:  Pt, nursing, family, physician    Goal(s):  Pt will tolerate least restrictive diet w/out s/s aspiration or oral/pharyngeal difficulties

## 2018-04-24 NOTE — PROGRESS NOTES
Progress Note - Neurology   Julio Gregory 80 y o  female MRN: [id number]  Unit/Bed#: ICU 10 Encounter: [id number]    Assessment/ Plan:  1  Acute left MCA infarct s/p thrombectomy POD 1   -Repeat CTH in am    -Hold anticoagulation/ antiplatelet for now    -LORI pending    -Recommend CT CAP to assess for underlying malignancy cause a hypercoagulable state   -patient on Coumadin for recent upper extremity DVT, will switch to NOAC, likely Eliquis, after assessment of stroke size via repeat CT and appropriate waiting period    -patient has bioprosthetic valve for MVR, and thus no need to consider in regards to anticoagulation    2  Seizure-like activity  -patient loaded with 1 g Keppra last night, on Keppra 500 mg p o  B i d   -if there repeat episodes of seizure-like activity, will placed on video EEG monitoring        Subjective: The patient is asked the improved status post thrombectomy  Postprocedure CT did demonstrate contrast extravasation versus subarachnoid hemorrhage  Anticoagulation antiplatelet held  Overnight, the patient had an episode of 45 seconds to 1 minute of diffuse upper and lower extremity tonic-clonic activity, with reported head thrashing and roving eye movements  No tongue bite or incontinence noted  Patient was loaded with Keppra 1 g and started on Keppra 750 mg p o  B i d  this was later lower to Keppra 500 mg p o  B i d     On exam today, the patient is alert, cooperative and interactive  The patient is able to easily participate in conversation, and does not demonstrate receptive or expressive aphasia  There is some baseline confusion, which may represent early dementia  A 12 point review systems was completed and is negative with exception of seizure-like activity as described above, and bilateral eye itching    Neurological exam today is nonfocal     ROS:  See subjective    Medications:  Scheduled Meds:  Current Facility-Administered Medications:  insulin lispro 1-5 Units Subcutaneous Q6H [facility] Moe Dave MD    labetalol 10 mg Intravenous Q4H PRN Gini Galicia MD    levETIRAcetam 500 mg Intravenous Q12H [facility] Gini Galicia MD    multi-electrolyte 50 mL/hr Intravenous Continuous Moe Dave MD Last Rate: Stopped (04/24/18 0947)   pantoprazole 40 mg Intravenous Q24H [facility] Moe Dave MD      Continuous Infusions:  multi-electrolyte 50 mL/hr Last Rate: Stopped (04/24/18 0947)     PRN Meds: labetalol      Vitals: Blood pressure 133/64, pulse 80, temperature 98 5 °F (36 9 °C), temperature source Oral, resp  rate 20, height 5' (1 524 m), weight 63 6 kg (140 lb 3 4 oz), SpO2 97 %, not currently breastfeeding  ,Body mass index is 27 38 kg/m²  Physical Exam:   Physical Exam   Constitutional: She appears well-developed and well-nourished  No distress  HENT:   Head: Normocephalic and atraumatic  Right Ear: External ear normal    Left Ear: External ear normal    Nose: Nose normal    Mouth/Throat: Oropharynx is clear and moist  No oropharyngeal exudate  No tongue bite noted   Eyes:   Patient has erythema without noticeable swelling noted left upper and lower eyelid    Conjunctivae within normal limits bilaterally   Neck: Normal range of motion  Neck supple  No tracheal deviation present  No thyromegaly present  Pulmonary/Chest: Effort normal  No respiratory distress  Musculoskeletal: Normal range of motion  She exhibits no edema, tenderness or deformity  Skin: Skin is warm and dry  No rash noted  She is not diaphoretic  No erythema  No pallor  Psychiatric: She has a normal mood and affect  Her speech is normal and behavior is normal    Nursing note and vitals reviewed  Neurologic Exam     Mental Status   Oriented to person  (Able states she is in a hospital)  Disoriented to year, month and date  Follows 1 step commands  Attention: decreased  Concentration: decreased  Speech: speech is normal   Level of consciousness: alert  Abnormal comprehension  Cranial Nerves   Cranial nerves II through XII intact  No gaze preference noted     Motor Exam   Muscle bulk: normal  Overall muscle tone: normalPatient moves all extremities equally antigravity     Sensory Exam   Light touch normal      Gait, Coordination, and Reflexes     Gait  Gait: (Deferred for safety)  Patient demonstrates choreiform movements of bilateral upper extremities and left lower extremity       Lab, Imaging and other studies: I have personally reviewed pertinent reports       Recent Results (from the past 24 hour(s))   CBC and differential    Collection Time: 04/23/18 12:02 PM   Result Value Ref Range    WBC 4 46 4 31 - 10 16 Thousand/uL    RBC 2 75 (L) 3 81 - 5 12 Million/uL    Hemoglobin 8 0 (L) 11 5 - 15 4 g/dL    Hematocrit 24 0 (L) 34 8 - 46 1 %    MCV 87 82 - 98 fL    MCH 29 1 26 8 - 34 3 pg    MCHC 33 3 31 4 - 37 4 g/dL    RDW 16 1 (H) 11 6 - 15 1 %    MPV 10 3 8 9 - 12 7 fL    Platelets 446 (L) 038 - 390 Thousands/uL    nRBC 0 /100 WBCs    Neutrophils Relative 54 43 - 75 %    Lymphocytes Relative 17 14 - 44 %    Monocytes Relative 11 4 - 12 %    Eosinophils Relative 18 (H) 0 - 6 %    Basophils Relative 0 0 - 1 %    Neutrophils Absolute 2 43 1 85 - 7 62 Thousands/µL    Lymphocytes Absolute 0 76 0 60 - 4 47 Thousands/µL    Monocytes Absolute 0 47 0 17 - 1 22 Thousand/µL    Eosinophils Absolute 0 79 (H) 0 00 - 0 61 Thousand/µL    Basophils Absolute 0 01 0 00 - 0 10 Thousands/µL   POCT Blood Gas (CG8+)    Collection Time: 04/23/18 12:03 PM   Result Value Ref Range    ph, [place] ISTAT 7 375 7 300 - 7 400    pCO2, [place] i-STAT 40 7 (L) 42 0 - 50 0 mm HG    pO2, [place] i-STAT 133 0 (H) 35 0 - 45 0 mm HG    BE, i-STAT -1 -2 - 3 mmol/L    HCO3, [place] i-STAT 23 8 (L) 24 0 - 30 0 mmol/L    CO2, i-STAT 25 21 - 32 mmol/L    O2 Sat, i-STAT 99 (H) 95 - 98 %    SODIUM, I-STAT 138 136 - 145 mmol/l    Potassium, i-STAT 3 8 3 5 - 5 3 mmol/L    Calcium, Ionized i-STAT 1 25 1 12 - 1 32 mmol/L    Hct, i-STAT 21 (L) 34 8 - 46 1 %    Hgb, i-STAT 7 1 (L) 11 5 - 15 4 g/dl    Glucose, i-STAT 106 65 - 140 mg/dl    Specimen Type VENOUS    Prepare RBC:Has consent been obtained? Yes; Date of Surgery: 4/23/2018; Where is the Surgery Scheduled?  [facility] Units    Collection Time: 04/23/18  2:12 PM   Result Value Ref Range    Unit Product Code D7074V03     Unit Number [id number]     Unit ABO A     Unit [facility] POS     Unit Dispense Status Crossmatched     Unit Product Code N0973C72     Unit Number [id number]     Unit ABO A     Unit [facility] POS     Unit Dispense Status Crossmatched    Hemoglobin and hematocrit, blood    Collection Time: 04/23/18  2:27 PM   Result Value Ref Range    Hemoglobin 11 3 (L) 11 5 - 15 4 g/dL    Hematocrit 34 7 (L) 34 8 - 46 1 %   Fingerstick Glucose (POCT)    Collection Time: 04/23/18  5:51 PM   Result Value Ref Range    POC Glucose 119 65 - 140 mg/dl   Fingerstick Glucose (POCT)    Collection Time: 04/23/18 10:38 PM   Result Value Ref Range    POC Glucose 108 65 - 140 mg/dl   Lipid Panel with Direct LDL reflex    Collection Time: 04/24/18  5:07 AM   Result Value Ref Range    Cholesterol 102 50 - 200 mg/dL    Triglycerides 73 <=150 mg/dL    HDL, Direct 36 (L) 40 - 60 mg/dL    LDL Calculated 51 0 - 100 mg/dL   CBC and differential    Collection Time: 04/24/18  5:07 AM   Result Value Ref Range    WBC 4 66 4 31 - 10 16 Thousand/uL    RBC 3 10 (L) 3 81 - 5 12 Million/uL    Hemoglobin 8 8 (L) 11 5 - 15 4 g/dL    Hematocrit 27 0 (L) 34 8 - 46 1 %    MCV 87 82 - 98 fL    MCH 28 4 26 8 - 34 3 pg    MCHC 32 6 31 4 - 37 4 g/dL    RDW 16 2 (H) 11 6 - 15 1 %    MPV 10 4 8 9 - 12 7 fL    Platelets 958 (L) 975 - 390 Thousands/uL    nRBC 0 /100 WBCs    Neutrophils Relative 57 43 - 75 %    Lymphocytes Relative 16 14 - 44 %    Monocytes Relative 11 4 - 12 %    Eosinophils Relative 15 (H) 0 - 6 %    Basophils Relative 1 0 - 1 %    Neutrophils Absolute 2 67 1 85 - 7 62 Thousands/µL    Lymphocytes Absolute 0 75 0 60 - 4 47 Thousands/µL    Monocytes Absolute 0 49 0 17 - 1 22 Thousand/µL    Eosinophils Absolute 0 71 (H) 0 00 - 0 61 Thousand/µL    Basophils Absolute 0 03 0 00 - 0 10 Thousands/µL   Basic metabolic panel    Collection Time: 04/24/18  5:07 AM   Result Value Ref Range    Sodium 141 136 - 145 mmol/L    Potassium 3 9 3 5 - 5 3 mmol/L    Chloride 109 (H) 100 - 108 mmol/L    CO2 23 21 - 32 mmol/L    Anion Gap 9 4 - 13 mmol/L    BUN 35 (H) 5 - 25 mg/dL    Creatinine 1 26 0 60 - 1 30 mg/dL    Glucose 108 65 - 140 mg/dL    Calcium 8 8 8 3 - 10 1 mg/dL    eGFR 39 ml/min/1 73sq m   Magnesium    Collection Time: 04/24/18  5:07 AM   Result Value Ref Range    Magnesium 2 5 1 6 - 2 6 mg/dL   Phosphorus    Collection Time: 04/24/18  5:07 AM   Result Value Ref Range    Phosphorus 4 4 (H) 2 3 - 4 1 mg/dL   Prepare RBC:Has consent been obtained? Yes; Date of Surgery: 4/23/2018; Where is the Surgery Scheduled? [facility],  Units    Collection Time: 04/24/18  5:55 AM   Result Value Ref Range    Unit Product Code K0120J02     Unit Number [id number]     Unit ABO A     Unit [facility] POS     Unit Dispense Status Presumed Trans     Unit Product Code B0138P18     Unit Number [id number]     Unit ABO A     Unit [facility] POS     Unit Dispense Status Crossmatched    ]    VTE Prophylaxis: Sequential compression device (Venodyne)     Counseling / Coordination of Care  Total Critical Care time spent 30 minutes excluding procedures, teaching and family updates

## 2018-04-24 NOTE — PROGRESS NOTES
Patient report was called to Leonel Muller RN from the [address] [place] [address] 7 Nursing unit  The patient will be transferred to Room 705 with Telemetry, as a Medical - Surgical status patient

## 2018-04-24 NOTE — PHYSICAL THERAPY NOTE
Physical Therapy Evaluation    Patient's Name: Zoë Zaman    Admitting Diagnosis  CVA (cerebral vascular accident) [facility]) [I63 9]  Acute ischemic left MCA stroke ([facility] ) [I63 512]    Problem List  Patient Active Problem List   Diagnosis    Deep vein thrombosis (DVT) of left upper extremity ([facility] )    H/O mitral valve replacement    CVA [facility]cerebral vascular accident) ([facility] )    Controlled type 2 diabetes mellitus, without long-term current use of insulin ([facility] )    Pacemaker    Acid reflux    HTN (hypertension)    Constipation    CHADWICK (acute kidney injury) ([facility] )    Systolic congestive heart failure ([facility] )    Acute ischemic left MCA stroke ([facility] )    History of ischemic right MCA stroke       Past Medical History  Past Medical History:   Diagnosis Date    Acid reflux     on occ    Arthritis     DJD right hip replaced    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    x 2 lesions with no change    Cancer ([facility]  ) 2007    colonic polyps, no surgery done    Deep vein thrombosis (DVT) of left upper extremity ([facility] ) 12/11/2017    Diabetes mellitus ([facility] )     type 2    Diverticulosis     Edema     in legs on occ    Hypertension     on occ    Language barrier     speaks Arabic & broken english       Past Surgical History  Past Surgical History:   Procedure Laterality Date    COLON SURGERY      COLONOSCOPY      COLONOSCOPY N/A 11/2/2016    Procedure: COLONOSCOPY;  Surgeon: Princess South MD;  Location: [facility] GI LAB; Service:     ESOPHAGOGASTRODUODENOSCOPY N/A 11/2/2016    Procedure: ESOPHAGOGASTRODUODENOSCOPY (EGD); Surgeon: Princess South MD;  Location: [facility] GI LAB;   Service:     JOINT REPLACEMENT Right 02/2015    hip    JOINT REPLACEMENT Left     knee    JOINT REPLACEMENT Right     knee    SC REVISE MEDIAN N/CARPAL TUNNEL SURG Left 1/28/2016    Procedure: RELEASE CARPAL TUNNEL;  Surgeon: Genie Holley MD;  Location: [facility] MAIN OR;  Service: [address] [place] [address] Bilateral       04/24/18 1231   Note Type   Note type Eval only   Pain Assessment   Pain Assessment 0-10   Pain Score No Pain   Home Living   Type of Home House   Additional Comments Normally resides home alone  Was admitted from [facility] rehab, where she was only x1 day prior to admit, s/p recent CVA   Prior Function   Level of [place] Independent with ADLs and functional mobility   Falls in the last 6 months 1 to 4   Restrictions/Precautions   Weight Bearing Precautions Per Order No   Other Precautions Cognitive; Chair Alarm; Bed Alarm;Multiple lines;Aspiration; Fall Risk   General   Family/Caregiver Present Yes  (dtr)   Cognition   Overall Cognitive Status Impaired   Arousal/Participation Responsive   Attention Attends with cues to redirect   Orientation Level Oriented to person;Oriented to place   Memory Unable to assess   Following Commands Follows one step commands with increased time or repetition   Comments awake, cooperative  speaks primarily [place], but can speak/understand some [place]  Dtr present and translates as needed    cooperative w/ session   RLE Assessment   RLE Assessment (mild hypotonicity w/ tremor, strength 3/5)   LLE Assessment   LLE Assessment (mild hypotonicity, strength 3-/5)   Coordination   Movements are Fluid and Coordinated 0   Coordination and Movement Description (B LE ataxia)   Bed Mobility   Supine to Sit 4  Minimal assistance   Additional items Assist x 1   Transfers   Sit to Stand 4  Minimal assistance   Additional items Assist x 2   Stand to Sit 4  Minimal assistance   Additional items Assist x 2   Ambulation/Elevation   Gait pattern (ataxia, short step length, posterior LOB, difficulty coordin)   Gait Assistance 4  Minimal assist   Additional items Assist x 2   Assistive Device (HHA of 2)   Distance 3-4'x1 from bed to chair   Balance   Static Sitting Fair   Dynamic Sitting Poor +   Static Standing Poor +   Dynamic Standing Poor   Ambulatory Poor   Endurance Deficit   Endurance Deficit Yes   Endurance Deficit Description fatigue, weakness,    Activity Tolerance   Activity Tolerance Patient limited by fatigue;Treatment limited secondary to medical complications (Comment)   Nurse Made Aware yes   Assessment   Prognosis Good   Problem List Decreased strength;Decreased endurance; Impaired balance;Decreased mobility; Decreased coordination;Decreased cognition; Impaired judgement;Decreased safety awareness; Impaired tone   Assessment Pt seen for high complexity physical therapy evaluation  Pt is an 79 y/o female w/ history/comorbidities of recent CVA w/ L sided residual weakness that she was at rehab for, as well as DM II< CHF, MVR, DVT, pacemaker, GERD, DJD who is now admitted w/ worsening MS, confusion, word finding issues, not following commands , servere facial droop, L gaze w/ new R sided flaccidity/weakness  Imaging showed acute L MCA CVA  Had L MCA ,echanical thrombectomy yesterday and has post thrombectomy SAH in suprasellar cistern and L sylvian fissurs, and ? sz activity  Due to multiple acute medical issues w/ B CVAs, need for ICU monitoring, fall risk, note unstable clinical picture  PT consulted to assess mobility, d/c needs  Pt presents w/ decreased functional mob, standing and sitting balance, endurance, B LE strength and coordination, barriers at home  will benefit from skilled PT to correct for the above problems  Recommend rehab at d/c and likely would have tolerance for more aggressive rehab   Goals   Patient Goals none stated   [facility] Expiration Date 05/08/18   Short Term Goal #1 1-2 wks: Bed mob w/ indep, sitting balance to good/normal dynamically, transfers w/ S of 1, standing balance to fair + w/ appropriate device, ambulate  ft w appropriate device (/RW) and CGA/S, increase B LE strength by 1/2 -1 grade   Treatment Day 0   Plan   Treatment/Interventions Functional transfer training;LE strengthening/ROM; Therapeutic exercise; Endurance training;Patient/family training;Equipment eval/education; Bed mobility;Gait training   PT Frequency 5x/wk  (and 1x/weekend)   Recommendation   Recommendation (recommend rehab at d/c)   PT - OK to Discharge Yes  (to rehab when stable- see above)   Modified [place] Scale   Modified Connor Scale 4   Barthel Index   Feeding 5   Bathing 0   Grooming Score 0   Dressing Score 0   Bladder Score 5   Bowels Score 10   Toilet Use Score 5   Transfers (Bed/Chair) Score 5   Mobility (Level Surface) Score 0   Stairs Score 0   Barthel Index Score 30           Naomie Fournier PT, DPT, CSRS

## 2018-04-24 NOTE — OCCUPATIONAL THERAPY NOTE
[address]  Evaluation     Patient Name: Alejandrina Khanna  [id number] Date: 4/24/2018  Problem List  Patient Active Problem List   Diagnosis    Deep vein thrombosis (DVT) of left upper extremity [facility] )    H/O mitral valve replacement    [facility]    Controlled type 2 diabetes mellitus, without long-term current use of insulin ([facility] )    Pacemaker    Acid reflux    HTN (hypertension)    Constipation    CHADWICK (acute kidney injury) ([facility] )    Systolic congestive heart failure ([facility] )    Acute ischemic left MCA stroke ([facility] )    History of ischemic right MCA stroke     Past Medical History  Past Medical History:   Diagnosis Date    Acid reflux     on occ    Arthritis     DJD right hip replaced    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[facility] ) 2007    x 2 lesions with no change    Cancer ([place] [address] ) 2007    colonic polyps, no surgery done    Deep vein thrombosis (DVT) of left upper extremity ([facility] ) 12/11/2017    Diabetes mellitus ([facility] )     type 2    Diverticulosis     Edema     in legs on occ    Hypertension     on occ    Language barrier     speaks [place]ian & broken english     Past Surgical History  Past Surgical History:   Procedure Laterality Date    COLON SURGERY      COLONOSCOPY      COLONOSCOPY N/A 11/2/2016    Procedure: COLONOSCOPY;  Surgeon: Nella Frias MD;  Location: [place] [address] GI LAB; Service:     ESOPHAGOGASTRODUODENOSCOPY N/A 11/2/2016    Procedure: ESOPHAGOGASTRODUODENOSCOPY (EGD); Surgeon: Nella Frais MD;  Location: [facility] GI LAB;   Service:     JOINT REPLACEMENT Right 02/2015    hip    JOINT REPLACEMENT Left     knee    JOINT REPLACEMENT Right     knee    LA REVISE MEDIAN N/CARPAL TUNNEL SURG Left 1/28/2016    Procedure: RELEASE CARPAL TUNNEL;  Surgeon: Ashtyn Horton MD;  Location: [facility] MAIN OR;  Service: Orthopedics    TUBAL LIGATION      VARICOSE VEIN SURGERY Bilateral             04/24/18 1230   Note Type   Note type Eval/Treat   Restrictions/Precautions Weight Bearing Precautions Per Order No   Other Precautions Fall Risk;Cognitive; Chair Alarm; Bed Alarm;Multiple lines;Telemetry; Visual impairment;Aspiration   Pain Assessment   Pain Assessment No/denies pain   Pain Score No Pain   Home Living   Type of Home Other (Comment)   Additional Comments PT ORIGINALLY RESIDES HOME ALONE HOWEVER PT HAS BEEN RECEIVING REHAB SERVICES AT [facility] S/P RECENT CVA  Prior Function   Level of [place] Independent with ADLs and functional mobility   Lives With Alone   Receives Help From Family   ADL Assistance Independent   IADLs Needs assistance   Falls in the last 6 months 0   Vocational Retired   [address], SINCE RECENT CVA PT HAS REQUIRED ASSIST FOR ADLS/IADLS/MOBILITY  Lifestyle   Autonomy PT W/ BASELINE INDEPENDENCE IN ADLS/MOBILITY HOWEVER SINCE RECENT CVA PT HAS REQUIRED ASSIST X1 FOR ADLS/TRANSFERS  Reciprocal Relationships PT RESIDES [address] [place] [address]  LOCAL SUPPORTIVE FAMILY IS INVOLVED IN PATIENTS CARE  Service to Others PT IS RETIRED  PREVIOUSLY WORKED IN A FACTORY  Intrinsic Gratification PT ENJOYS GARDENING AND ENJOYS BEING OUT IN THE SUN  Psychosocial   Psychosocial (WDL) WDL   Subjective   Subjective "I FEEL OKAY"   ADL   Where Assessed Chair   Eating Assistance 4  Minimal Assistance   Grooming Assistance 3  Moderate Assistance   UB Bathing Assistance 3  Moderate Assistance   LB Bathing Assistance 2  Maximal Assistance   UB Dressing Assistance 3  Moderate Assistance   LB Dressing Assistance 2  Maximal [address]  2  Maximal Assistance   Bed Mobility   Supine to Sit 4  Minimal assistance   Additional items Assist x 1; Increased time required;Verbal cues   Sit to Supine Unable to assess   Additional Comments PT LEFT IN ROOM CHAIR POST EVAL W/ CHAIR ALARM ACTIVATED AND LE'S ELEVATED  DAUGHTER PRESENT IN ROOM  LEEANN HARRIS      Transfers   Sit to Stand 4  Minimal assistance   Additional items Assist x 2; Increased time required;Verbal cues   Stand to Sit 4  Minimal assistance   Additional items Assist x 2; Increased time required;Verbal cues   Functional Mobility   Functional Mobility 4  Minimal assistance   Additional Comments ASSIST X2 USING HHA FROM THERAPIST  CUES FOR DIRECTION  DAUGHTER ASSISTS BY PROVIDING SOME CUES IN [place] TO PATIENT  Additional items Hand hold assistance   Balance   Static Sitting Fair   Dynamic Sitting Fair -   Static Standing Poor +   Dynamic Standing Poor +   Ambulatory Poor   Activity Tolerance   Activity Tolerance Patient limited by fatigue;Treatment limited secondary to medical complications (Comment)   Medical Staff Made Aware F/U W/ CM RUKHSANA/MARIAH   Nurse Made Aware OKAY TO SEE PER LEEANN ROCKWELL   RUE Assessment   RUE Assessment X   RUE Overall AROM   R Shoulder Flexion WFL   R Elbow Flexion WFL   R Mass Grasp WFL   RUE Strength   RUE Overall Strength Deficits   R Shoulder Flexion 4-/5   R Elbow Flexion 4-/5   LUE Assessment   LUE Assessment X   LUE Overall AROM   L Shoulder Flexion WFL   L Elbow Flexion WFL   L Mass Grasp WFL   LUE Strength   LUE Overall Strength Deficits   L Shoulder Flexion 3/5   L Elbow Flexion 3+/5   LUE Tone   LUE Tone Hypotonic   Hand Function   Gross Motor Coordination Impaired  (B/L UE IMPAIRMENTS, LUE DEFICITS GREATER THAN RUE)   Fine Motor Coordination Impaired  (B/L UE IMPAIRMENTS, LUE DEFICITS GREATER THAN RUE)   Sensation   Additional Comments ATTEMPTED TO COMPLETE SENSATION TESTING, PT IS AN UNRELIABLE HISTORIAN  DAUGHTER ATTEMPTED TO COMPLETE IN [place]  DAUGTHER STATES THE PATIENTS RESPONSES ARE NOT RELIABLE  WILL CONTINUE TO ASSESS      Proprioception   Proprioception Partial deficits in the LUE   Vision-Basic Assessment   Current Vision Wears glasses all the time   Vision - Complex Assessment   Ocular Range of Motion WFL   Head Position WDL   Additional Comments PREVIOUSLY REPORTED SEEING SHADOWS, PT IS AN UNRELIABLE HISTORIAN TODAY  Perception   Inattention/Neglect Cues to maintain midline in sitting;Cues to maintain midline in standing;Cues to attend to left side of body   Cognition   Overall Cognitive Status Impaired   Arousal/Participation Alert   Attention Attends with cues to redirect   Orientation Level Oriented to person;Oriented to place; Disoriented to time;Disoriented to situation  ([address] [place] [address] Dr)   Memory Decreased recall of precautions;Decreased recall of recent events;Decreased short term memory   Following Commands Follows one step commands with increased time or repetition   Comments PT ENGAGES IN CONVERSATION AND DAUGHTER ASSISTS AS PT'S PRIMARY LANGUAGE IS Russian  PT IS LIMITED BY IMPAIRED ATTENTION, INSIGHT, SAFETY, JUDEMENT, ORIENTATION, PROCESSING AND RECALL  WILL CONTINUE TO ASSESS  Assessment   Limitation Decreased ADL status; Decreased UE strength;Decreased Safe judgement during ADL;Decreased cognition;Decreased endurance;Decreased sensation;Decreased fine motor control;Decreased self-care trans;Decreased high-level ADLs; Non-func L UE  (BALANCE, MEDICAL STATUS)   Prognosis Fair   Assessment PT IS AN 85 YO F ADMIT W/ ACUTE ONSET OF DYSARTHRIA, APHASIA, R FACIAL DROOP AND R SIDED WEAKNESS  PT FOUND W/ L MCA SYNDROME AND IS NOW S/P CEREBRAL ANGIOGRAPHY AND IR THROMBECTOMY ON 4/23  PT THEN NOTED W/ INTERVAL DEVELOPMENT OF SAH AND SUBACUTE R MCA INFARCTION  PT RECENTLY ADMIT TO  [facility] ON 4/19 WITH AMS S/P FALL AND FOUND W/ SUBACUTE R MCA  PT WAS DISCHARGED TO [facility] REHAB ON 4/21 FOR REAB SERVICES  PT W/ PMH SIGNIFICANT FOR AFOREMENTIONED RECENT R MCA, DM2, SSS S/P PACER, CHF W/ EF 44%, RECENT MVR, LUE DVT  AT BASELINE PT IS FROM HOME ALONE AND W/ INDEPENDENCE IN ADLS/IADLS AND W/ USE OF RW FOR MOBILITY  WHILE AT REHABILITATION PT WAS REQUIRING A X1 FOR ADLS AND MOBILITY   CURRENTLY PT REQUIRING MIN A BED MOBILITY, MIN A X2 SIT>STAND TRANSFERS, AND MIN A X2 SHORT DISTANCE AMBULATION USING HHA FROM THERAPIST  PT NOTED W/ MILD RUE DEFICITS INCLUDING IMPAIRED STRENGTH(MMT 4/5)/FMC/GMC HOWEVER PT W/ GREATER DEFICITS IN LUE INCLUDING ATAXIA/FMC/GMC/STRENGTH(MMT 3+/5)  PT ADD'L NOTED AT THIS TIME W/ R FACIAL DROOP AND L MARIA ELENA INATTENTION  PT APPEARS GENERALLY LIMITED AT THIS TIME 2* IMPAIRED BALANCE, ACTIVITY TOLERANCE, MEDICAL STATUS, ADL IMPAIRMENTS, GENERALIZED WEAKNESS/DECONDITINOING, AFOREMENTIONED DEFICITS AS WELL AS IMPAIRED ATTENTION, SAFETY, INSIGHT, RECALL, JUDGEMENT, PROCESSING AND ORIENTATION  FROM OT PERSPECTIVE, PT WILL BENEFIT FROM CONTINUED OT SERVICES IN AN INPT REHAB SETTING PENDING FURTHER MEDICAL STABILITY  WILL CONTINUE TO FOLLOW PT 3-5X/WEEK IN ORDER TO MEET THE BELOW DESCRIBED GOALS IN 10-14 DAYS  Goals   Patient Goals PT WOULD LIKE TO GO HOME     LTG Time Frame 10-14   Long Term Goal #1 PLEASE SEE BELOW DESCRIBED GOALS  Plan   Treatment Interventions ADL retraining;Functional transfer training; Endurance training;Cognitive reorientation;Equipment evaluation/education;UE strengthening/ROM; Patient/family training;Neuromuscular reeducation; Compensatory technique education; Fine motor coordination activities;Continued evaluation; Energy conservation; Activityengagement   Goal Expiration Date 05/08/18   OT Frequency 3-5x/wk   Recommendation   OT Discharge Recommendation Short Term Rehab   OT - OK to Discharge (TO STR AT THIS TIME)   Barthel Index   Feeding 5   Bathing 0   Grooming Score 0   Dressing Score 0   Bladder Score 5   Bowels Score 10   Toilet Use Score 5   Transfers (Bed/Chair) Score 5   Mobility (Level Surface) Score 0   Stairs Score 0   Barthel Index Score 30   Modified [place] Scale   Modified [place] Scale 4     GOALS TO BE MET IN 10-14 DAYS:    1) Pt will increase bed mobility to SBA and transfer EOB to participate in functional activity with G tolerance and balance      2) Pt will improve functional transfers to SBA on/off all surfaces using DME PRN w/ G balance/safety including toileting  3) Pt will increase independence in all ADLS to SBA with G balance sitting upright in chair  4) Pt will complete toileting w/ SBA w/ G hygiene/thoroughness using DME PRN  5) Pt will improve activity tolerance to G for min 30 min txment sessions  6) Pt will participate in light grooming task with SBA using setup standing at sink ~3-5mins with G safety and balance  7) Pt will engage in ongoing cognitive assessment(S) w/ G participation to A w/ safe d/c planning/recommendations  8) Pt will follow 100% simple 2 step commands and be A&O x4 consistently with environmental cues to increase activity participation to G     9) Pt will improve attention to re-integration of L side during functional tasks with Mod Multi Modal cueing    10) Pt will participate in fine motor coordination/ strengthening/ dexterity exercises to improve participation in functional ADL/IADL tasks  11) Pt will increase UB strength through therex and review of HEP with G carryover of tech and MOD I to increase strength and coordination 1 MMT     12) Pt will participate in completion of functional recovery indeces to evaluate and identify long term prognostic outcome potential with focus on affected UE, anxiety and depression       DOCUMENTATION COMPLETED BY Julianne Chambers MS, OTR/L

## 2018-04-24 NOTE — PROGRESS NOTES
04/24/18 [address]   Stress Factors   Family Stress Factors None identified   Coping Responses   Patient Coping Accepting   Family Coping Open/discussion   Plan of Care   Comments Cultivated a relationship of care and support to daughter and PT  Provided prayer, blessing and rosary     Assessment Completed by: Unit visit

## 2018-04-24 NOTE — PROGRESS NOTES
Progress Note - Critical Care   Julio Gregory 80 y o  female MRN: [id number]  Unit/Bed#: ICU 10 Encounter: [id number]  Code Status: Level 1 - Full Code    Assessment and Plan:     Neuro:      Left MCA CVA with SAH: As above, patient presented to [facility] with new onset left gaze preference, expressive aphasia, dysarthria, right facial droop, and right hemiparesis  CTA demonstrated left MCA occlusion  This was treated with thrombectomy by IR  Neurology did not give tPA  IR treated this lesion with thrombectomy  Postprocedure CT demonstrated subarachnoid hemorrhage and excreted contrast in the subarachnoid space mainly located within the suprasellar cistern and left sylvian fissure, localized hyperdensity within the posterior inferior aspect of the posterior fossa, and enhancing cortex of the subacute infarct within the right posterior MCA territory      -Repeat head CT in 4 hours did not demonstrate any significant change  CT head this morning demonstrated resolving subarachnoid hemorrhage as well as stable subacute right MCA territory encephalomalacia      -Overnight, patient had an episode of apparent agitation where she was moving all 4 extremities and not focusing  This was witnessed by nursing and family  When patient was evaluated by night team, her neurological examination was stable and her glucose was within normal limits  Out of concern for possible seizure, night team administered Keppra 1 g as a loading dose  May consider EEG to evaluate for seizures as patient does have subarachnoid hemorrhage and is at risk  -STAT CT head if GCS declines by more than 2      -Neurosurgery consult  Appreciate recommendations  Recommend a goal systolic blood pressure under 140 for 72 hours       -Labetalol 10 mg ordered every 4 hours PRN to maintain systolic blood pressure under 140  Blood pressure has been below this goal except for 2 readings at 1631 and 1701   Was not given any labetalol      -Stroke pathway admission  MRI brain may not be able to be performed secondary to pacemaker  Echocardiogram or carotid duplex do not need to be repeated as patient received both of the studies during recent admission on April 19th      -Hold antiplatelets anticoagulants for 24 hours      -Antiplatelet therapy once patient cleared to take PO and after 24 hours  Statin once patient is cleared to take PO  Home regimen includes aspirin 81 mg daily and Lipitor 80 mg daily      -Neurological checks per protocol  Right groin puncture site checks per protocol      History of right MCA CVA: Patient presented to [address] [place] [address] on April 18th with frequent falls and gait disturbance  She was found to have an acute to subacute infarction in the right MCA territory  Apparently, this CVA caused involuntary choreic movements of the left upper and left lower extremity      -Plan as above      CV:      Systolic dysfunction:  Ejection fraction was 44% on echocardiogram performed on April 19, 2018  No evidence of acute exacerbation at this time      -Home regimen metoprolol 25 mg twice daily and torsemide 10 mg daily  These will be continued once patient is cleared to take PO       -We will target neutral to slightly negative fluid balance to avoid CHF exacerbation  Will give Lasix IV while patient is NPO as needed  Yesterday patient was net 285 8 mL positive with 2 unmeasured urine occurrences      Left upper extremity DVT:  Patient was diagnosed with left upper extremity DVT in January 2018 after undergoing pacemaker replacement at an outside facility  She was anticoagulated on Coumadin at home    Per report, her INR had been very labile      -INR 1 95 on presentation      -Will need to hold Coumadin 24 hours after endovascular intervention      History of mitral valve replacement:  Patient on Coumadin      -Plan as above      Sick sinus syndrome status post pacemaker placement:  No signs or symptoms of acute ischemia  No arrhythmia      -Monitor on telemetry      Lung:      -No acute issues  Titrate supplemental oxygen to maintain oxygen saturation above 92%      GI:      -No acute issues  Keep patient NPO until swallow evaluation      FEN:      -Maintenance fluids with Isolyte at 75 mL/hr while patient is NPO  Due to patient's history of systolic dysfunction, we will maintain euvolemia or slightly negative fluid balance  Patient was net 285 8 mL positive yesterday with 2 unmeasured urine occurrences      -Electrolytes were within normal limits  No repletion required  -NPO pending Speech Pathology evaluation      :      CKD: Baseline creatinine is uncertain, per prior notes appears to be 1 3  On discharge from [address] [place] [address] creatinine was 1 72  Creatinine was 1 54 yesterday  Today creatinine was 1 26   Etiology may be related to diabetes mellitus versus cardiorenal      -Maintain euvolemia  Trend creatinine      ID:      -No acute issues  Afebrile overnight  WBC count stable  Trend fever curve and WBC count      Heme:      Anemia:  Patient has mild normocytic anemia, hemoglobin was 10 3 in the ED  This appears to be her baseline  Patient reportedly experienced a large amount of blood loss after the IR procedure yesterday  She was transfused 1 unit of PRBCs      -Repeat hemoglobin was 11 3  Today hemoglobin was 8 8  No expanding hematoma or significant ecchymosis from the right femoral puncture site  Continue to monitor     -Transfuse if hemoglobin below 7      Endo:      Diabetes mellitus:  Patient has a history of diabetes mellitus without home insulin use  Most recent hemoglobin A1c was 6 6%      -Blood glucose checks every 6 hours while patient is NPO  Most recent blood glucose 119, 108, and 108      -Goal blood glucose under 180      Msk/Skin:      -No acute issues  Pressure ulcer prophylaxis with frequent offloading every 2 hours      Disposition:  Continued ICU care  ______________________________________________________________________    Chief Complaint:   None offered due to aphasia  HPI/24hr events:   Patient was admitted as a stroke alert  She had new dysarthria, aphasia, left gaze preference, right facial droop, and right hemiparesis  CTA demonstrated left proximal MCA occlusion  tPA was not administered due to anticoagulation and patient being outside window  Patient was treated with thrombectomy by IR  After the procedure, she did experience significant bleeding from the femoral puncture site and was transfused 1 unit of PRBCs  Patient was transferred to the ICU for close monitoring  Her neurological examination remained stable to improved  Overnight, patient episode of vigorous extremity movement and not focusing which lasted 45 sec to 1 min and was witnessed by nursing and family  By the time the night team arrived, patient had returned to baseline  Patient was loaded with Keppra out of concern for possible seizure  ______________________________________________________________________    Physical Exam   Constitutional: She appears well-developed and well-nourished  No distress  HENT:   Head: Normocephalic and atraumatic  Eyes: EOM are normal  Pupils are equal, round, and reactive to light  Cardiovascular: Normal rate and regular rhythm  No murmur heard  Small hematoma noted around femoral puncture site in the right groin  No ecchymosis or interval expansion  Pulmonary/Chest: No respiratory distress  She has no wheezes  She has no rales  Abdominal: Soft  Bowel sounds are normal  She exhibits no distension  There is no tenderness  There is no rebound  Musculoskeletal: Normal range of motion  She exhibits no edema  Neurological: She is alert  Patient is alert  She was sleeping and did require prompting but eventually opened her eyes and followed basic commands such as wiggling her toes    Patient moves all 4 extremities spontaneously  Her left gaze preference has resolved to a significant degree  There is a small degree of right facial asymmetry  Skin: Skin is warm and dry  Psychiatric:   Unable to assess  Nursing note and vitals reviewed  ______________________________________________________________________  Vitals:    18 0830 18 0900 18 0955 18 1000   BP:  110/54 145/67 133/64   BP Location:  Right arm Right arm Right arm   Pulse: 72 72 78 80   Resp: 16 16 22 20   Temp:  98 5 °F (36 9 °C)     TempSrc:  Oral     SpO2: 98% 97% 98% 97%   Weight:       Height:                  Temperature:   Temp (24hrs), Av 2 °F (36 8 °C), Min:97 2 °F (36 2 °C), Max:98 8 °F (37 1 °C)    Current Temperature: 98 5 °F (36 9 °C)    Weights:   IBW: 45 5 kg    Body mass index is 27 38 kg/m²  Weight (last 2 days)     Date/Time   Weight    18 1316  63 6 (140 21)    18 0918  71 6 (157 8)              Hemodynamic Monitoring:  N/A       Non-Invasive/Invasive Ventilation Settings:  Respiratory    Lab Data (Last 4 hours)    None         O2/Vent Data (Last 4 hours)    None              No results found for: PHART, AAQ0LYH, PO2ART, PPJ1ADB, H4VICTAF, BEART, SOURCE  SpO2: SpO2: 97 %    Intake and Outputs:  I/O        07 -  0700  07 -  0700    I V  (mL/kg)  385 8 (6 1)    Blood  350    Total Intake(mL/kg)  735 8 (11 6)    Urine (mL/kg/hr)  0    Blood  450    Total Output   450    Net   +285 8          Unmeasured Urine Occurrence  2 x          Nutrition:        Diet Orders            Start     Ordered    18 1147  Diet Dysphagia/Modified Consistency; Dysphagia 3-Dental Soft; Thin Liquid  Diet effective now     Question Answer Comment   Diet Type Dysphagia/Modified Consistency    Dysphagia/Modified Consistency Dysphagia 3-Dental Soft    Liquid Modifier Thin Liquid    RD to adjust diet per protocol?  No        18 8995          Labs:     Results from last 7 days  Lab Units 04/24/18  0507 04/23/18  1427 04/23/18  1203 04/23/18  1202  04/23/18  0908  04/18/18  1500   WBC Thousand/uL 4 66  --   --  4 46  --  6 00  < > 5 70   HEMOGLOBIN g/dL 8 8* 11 3*  --  8 0*  --  10 3*  < > 11 7*   I STAT HEMOGLOBIN g/dl  --   --  7 1*  --   < >  --   --   --    HEMATOCRIT % 27 0* 34 7*  --  24 0*  --  32 1*  < > 35 1*   PLATELETS Thousands/uL 127*  --   --  111*  --  142*  < > 115*   NEUTROS PCT % 57  --   --  54  --   --   --  50   MONOS PCT % 11  --   --  11  --   --   --  9   < > = values in this interval not displayed  Results from last 7 days  Lab Units 04/24/18  0507 04/23/18  1203 04/23/18  0913 04/23/18  0908 04/21/18  0522  04/18/18  1500   SODIUM mmol/L 141  --   --  139 139  < > 133*   POTASSIUM mmol/L 3 9  --   --  4 3 3 8  < > 5 2   CHLORIDE mmol/L 109*  --   --  105 107  < > 99*   CO2 mmol/L 23  --   --  28 26  < > 29   BUN mg/dL 35*  --   --  46* 59*  < > 49*   CREATININE mg/dL 1 26  --   --  1 54* 1 72*  < > 2 08*   CALCIUM mg/dL 8 8  --   --  9 5 8 4  < > 9 7   TOTAL PROTEIN g/dL  --   --   --   --   --   --  7 9   BILIRUBIN TOTAL mg/dL  --   --   --   --   --   --  0 50   ALK PHOS U/L  --   --   --   --   --   --  85   ALT U/L  --   --   --   --   --   --  25   AST U/L  --   --   --   --   --   --  32   GLUCOSE RANDOM mg/dL 108  --   --  93 122  < > 95   GLUCOSE, ISTAT mg/dl  --  106 96  --   --   --   --    < > = values in this interval not displayed  Results from last 7 days  Lab Units 04/24/18  0507 04/18/18  1500   MAGNESIUM mg/dL 2 5 2 5       Results from last 7 days  Lab Units 04/24/18  0507 04/18/18  1500   PHOSPHORUS mg/dL 4 4* 4 0        Results from last 7 days  Lab Units 04/23/18  0908 04/21/18  0522 04/20/18  0521  04/18/18  1500   INR  1 95* 2 35* 2 29*  < > 3 15*   PTT seconds 45*  --   --   --  37*   < > = values in this interval not displayed          Results from last 7 days  Lab Units 04/23/18  0908 04/18/18  1500   TROPONIN I ng/mL <0 02 <0 02       Imaging: I have personally reviewed pertinent films in PACS  Micro:  Lab Results   Component Value Date    URINECX <10,000 cfu/ml  02/02/2018    URINECX No Growth <1000 cfu/mL 10/16/2017    URINECX 20,000-29,000 cfu/ml Mixed Contaminants X3 09/26/2017       Allergies: Allergies   Allergen Reactions    Heparin        Medications:   Scheduled Meds:    Current Facility-Administered Medications:  insulin lispro 1-5 Units Subcutaneous Q6H [address] Lorrie Short MD    labetalol 10 mg Intravenous Q4H PRN Tatiana Chen MD    levETIRAcetam 500 mg Intravenous Q12H [address] Tatiana Chen MD    multi-electrolyte 50 mL/hr Intravenous Continuous Lorrie Short MD Last Rate: 50 mL/hr (04/24/18 1256)   pantoprazole 40 mg Intravenous Q24H [address] Lorrie Short MD      Continuous Infusions:    multi-electrolyte 50 mL/hr Last Rate: 50 mL/hr (04/24/18 1256)     PRN Meds:    labetalol 10 mg Q4H PRN       VTE Pharmacologic Prophylaxis: RX contraindicated due to: subarachnoid hemorrhage and CVA within past 24 hours  VTE Mechanical Prophylaxis: sequential compression device  Invasive lines and devices:   Invasive Devices     Peripheral Intravenous Line            Peripheral IV 04/23/18 Left Antecubital 1 day    Peripheral IV 04/23/18 Left Hand 1 day    Peripheral IV 04/24/18 less than 1 day

## 2018-04-24 NOTE — PROGRESS NOTES
Progress Note - Neurosurgery   Sriram Foster 80 y o  female MRN: [id number]  Unit/Bed#: ICU 10 Encounter: [id number]    Assessment:  1  Acute left MCA infarct s/p thrombectomy  2  SAH/contrast extravasation  3  Expressive aphasia  4  Right hemiparesis and left sided gaze preference   5  History of right MCA infarction  6  Choreiform movement  7  ABLA  8  History of DVT on coumadin  9  History of DM2  10  History of MVR     Plan:  · Neuro exam: GCS13, E4, V3, M6  Follows commands intermittently, WOODARD spontaneously  Improving right sided neglect/hemiparesis, left sided gaze preference improving now gazing past midline  Right central facial droop improving  · Imaging reviewed personally and with attending  ? CT brain stroke alert: No acute disease   Footprint of chronic large and small vessel ischemia are stable  ? CTA stroke alert: flow restrictive disease in left M2 proximal brach  73% right ICA origin stenosis  ? Post-thrombectomy CT head: interval development of SAH and contrast within suprasellar cistern, left sylvian fissure  1cm meningioma in left temporal occipital region (no intervention indicated at this time)  Enhancing cortex of subacute infarct within right posterior MCA territory  ? Delayed CT head post-thrombectomy: stable SAH  ? CT head 4/24:   ? Repeat CTH 4/24: improving SAH  ? Consider repeat CTH tomorrow  If continually stable then delayed CTH in 1 week or sooner if rapid GCS decline  · L MCA infarct s/p mechanical thrombectomy  ? Groin checks and distal pulses per protocol- unremarkable  Groin dressing to be removed 24h post-procedure  ? Continue Q1 neuro checks and call with exam changes  ? INR 1 9 on admit (on coumadin) hold AP/AC at this time including pharmacologic DVT ppx  Consider repeat INR to rule out rebound elevation  ?  SC given x1 in ED no additional doses advised  ? Neurology consulted for stroke pathway  ?  Monitor for ABLA as patient's -1000cc from femoral sheath insertion site  hgb 8 8 from 11 3, continue to follow  Consider transfusion if <8 or symptomatic   ? SBP <140 x72h then goals may be <160  ? Episode of agitation last night, possibility of seizure considered  Started loaded with keppra 1g, now on keppra 500mg BID for seizure ppx  ? Patient will need PT/OT/speech eval   · Continue primary care per medical critical care team  ? Management of DM2  · Neurosurgery team will review repeat [address] when completed  Remainder of care deferred to primary team and neurology  Subjective/Objective   Chief Complaint: unable to provide secondary to expressive aphasia    Subjective: unable to reliably provide    Objective: lying in bed, in NAD  Daughter at bedside    I/O       04/22 0701 - 04/23 0700 04/23 0701 - 04/24 0700 04/24 0701 - 04/25 0700    I V  (mL/kg)  841 7 (13 2)     Blood  350     Total Intake(mL/kg)  1191 7 (18 7)     Urine (mL/kg/hr)  0     Blood  450     Total Output   450      Net   +741 7             Unmeasured Urine Occurrence  2 x           Invasive Devices     Peripheral Intravenous Line            Peripheral IV 04/23/18 Left Antecubital 1 day    Peripheral IV 04/23/18 Left Hand 1 day    Peripheral IV 04/24/18 less than 1 day                Physical Exam:  Vitals: Blood pressure 133/64, pulse 80, temperature 98 5 °F (36 9 °C), temperature source Oral, resp  rate 20, height 5' (1 524 m), weight 63 6 kg (140 lb 3 4 oz), SpO2 97 %, not currently breastfeeding  ,Body mass index is 27 38 kg/m²  General appearance: alert, choreiform movements in all extremities  Head: Normocephalic, without obvious abnormality, atraumatic  Eyes: conjugate gaze, left sided gaze preference improved  Groin: right femoral region CDI  No underlying hematoma  Skin is soft  Dressing CDI  Lungs: non labored breathing  Heart: regular heart rate  Neurologic:   Mental status: GCS13 E4, V3, M6  Follows commands   Says intermittent words such as "Hugo Bucker" "I love you" but otherwise patient with expressive aphasia  Cranial nerves: right sided facial droop improving  tounge midline  Left gaze preference improved  Difficulty assessing visual acuity secondary to decreased concentration/attention  PERRL  Sensory: difficult to assess secondary to decreased attention/concentration  Motor: LUE 4/5 RUE 3+/5 (difficulty following commands in RUE)  Wiggles toes and provides 5-/5 DF/PF in b/l LE  Reflexes: 2+ and symmetric b/l patellar, no clonus  Coordination: choreiform movements in b/l UE, difficult to assess  Distal pulses: 2+ DP b/l      Lab Results:    Results from last 7 days  Lab Units 04/24/18  0507 04/23/18  1427 04/23/18  1203 04/23/18  1202  04/23/18  0908  04/18/18  1500   WBC Thousand/uL 4 66  --   --  4 46  --  6 00  < > 5 70   HEMOGLOBIN g/dL 8 8* 11 3*  --  8 0*  --  10 3*  < > 11 7*   I STAT HEMOGLOBIN g/dl  --   --  7 1*  --   < >  --   --   --    HEMATOCRIT % 27 0* 34 7*  --  24 0*  --  32 1*  < > 35 1*   PLATELETS Thousands/uL 127*  --   --  111*  --  142*  < > 115*   NEUTROS PCT % 57  --   --  54  --   --   --  50   MONOS PCT % 11  --   --  11  --   --   --  9   < > = values in this interval not displayed      Results from last 7 days  Lab Units 04/24/18  0507 04/23/18  1203 04/23/18  0913 04/23/18  0908 04/21/18  0522  04/18/18  1500   SODIUM mmol/L 141  --   --  139 139  < > 133*   POTASSIUM mmol/L 3 9  --   --  4 3 3 8  < > 5 2   CHLORIDE mmol/L 109*  --   --  105 107  < > 99*   CO2 mmol/L 23  --   --  28 26  < > 29   BUN mg/dL 35*  --   --  46* 59*  < > 49*   CREATININE mg/dL 1 26  --   --  1 54* 1 72*  < > 2 08*   CALCIUM mg/dL 8 8  --   --  9 5 8 4  < > 9 7   TOTAL PROTEIN g/dL  --   --   --   --   --   --  7 9   BILIRUBIN TOTAL mg/dL  --   --   --   --   --   --  0 50   ALK PHOS U/L  --   --   --   --   --   --  85   ALT U/L  --   --   --   --   --   --  25   AST U/L  --   --   --   --   --   --  32   GLUCOSE RANDOM mg/dL 108  --   --  93 122  < > 95   GLUCOSE, ISTAT mg/dl  --  106 96  --   --   --   --    < > = values in this interval not displayed  Results from last 7 days  Lab Units 04/24/18  0507 04/18/18  1500   MAGNESIUM mg/dL 2 5 2 5       Results from last 7 days  Lab Units 04/24/18  0507 04/18/18  1500   PHOSPHORUS mg/dL 4 4* 4 0       Results from last 7 days  Lab Units 04/23/18  0908 04/21/18  0522 04/20/18  0521  04/18/18  1500   INR  1 95* 2 35* 2 29*  < > 3 15*   PTT seconds 45*  --   --   --  37*   < > = values in this interval not displayed  No results found for: TROPONINT  ABG:No results found for: PHART, MES0HTS, PO2ART, [id number], [id number], BEART, SOURCE      Imaging Studies: I have personally reviewed pertinent reports  and I have personally reviewed pertinent films in PACS    EKG, Pathology, and Other Studies: I have personally reviewed pertinent reports        VTE Pharmacologic Prophylaxis: Venodyne contraindicated due to [facility]    VTE Mechanical Prophylaxis: sequential compression device and foot pump applied

## 2018-04-25 ENCOUNTER — APPOINTMENT (INPATIENT)
Dept: RADIOLOGY | Facility: HOSPITAL | Age: 83
DRG: 023 | End: 2018-04-25
Payer: MEDICARE

## 2018-04-25 ENCOUNTER — APPOINTMENT (OUTPATIENT)
Dept: CARDIAC REHAB | Facility: CLINIC | Age: 83
End: 2018-04-25
Payer: MEDICARE

## 2018-04-25 LAB
GLUCOSE SERPL-MCNC: 120 MG/DL (ref 65–140)
GLUCOSE SERPL-MCNC: 134 MG/DL (ref 65–140)
GLUCOSE SERPL-MCNC: 149 MG/DL (ref 65–140)
GLUCOSE SERPL-MCNC: 171 MG/DL (ref 65–140)

## 2018-04-25 PROCEDURE — 70450 CT HEAD/BRAIN W/O DYE: CPT

## 2018-04-25 PROCEDURE — 99233 SBSQ HOSP IP/OBS HIGH 50: CPT | Performed by: PSYCHIATRY & NEUROLOGY

## 2018-04-25 PROCEDURE — 97116 GAIT TRAINING THERAPY: CPT

## 2018-04-25 PROCEDURE — 99233 SBSQ HOSP IP/OBS HIGH 50: CPT | Performed by: INTERNAL MEDICINE

## 2018-04-25 PROCEDURE — 82948 REAGENT STRIP/BLOOD GLUCOSE: CPT

## 2018-04-25 PROCEDURE — 97530 THERAPEUTIC ACTIVITIES: CPT

## 2018-04-25 PROCEDURE — 99232 SBSQ HOSP IP/OBS MODERATE 35: CPT | Performed by: RADIOLOGY

## 2018-04-25 PROCEDURE — 97535 SELF CARE MNGMENT TRAINING: CPT

## 2018-04-25 RX ORDER — POTASSIUM CHLORIDE 750 MG/1
10 TABLET, EXTENDED RELEASE ORAL 2 TIMES DAILY
Status: DISCONTINUED | OUTPATIENT
Start: 2018-04-25 | End: 2018-04-30 | Stop reason: HOSPADM

## 2018-04-25 RX ADMIN — ATORVASTATIN CALCIUM 80 MG: 80 TABLET, FILM COATED ORAL at 17:27

## 2018-04-25 RX ADMIN — METOPROLOL TARTRATE 25 MG: 25 TABLET ORAL at 08:04

## 2018-04-25 RX ADMIN — DOCUSATE SODIUM 100 MG: 100 CAPSULE, LIQUID FILLED ORAL at 08:04

## 2018-04-25 RX ADMIN — POTASSIUM CHLORIDE 10 MEQ: 750 TABLET, EXTENDED RELEASE ORAL at 21:55

## 2018-04-25 RX ADMIN — LEVETIRACETAM 500 MG: 500 TABLET ORAL at 21:44

## 2018-04-25 RX ADMIN — DOCUSATE SODIUM 100 MG: 100 CAPSULE, LIQUID FILLED ORAL at 17:27

## 2018-04-25 RX ADMIN — POTASSIUM CHLORIDE 20 MEQ: 20 SOLUTION ORAL at 08:04

## 2018-04-25 RX ADMIN — PANTOPRAZOLE SODIUM 40 MG: 40 TABLET, DELAYED RELEASE ORAL at 06:41

## 2018-04-25 RX ADMIN — INSULIN LISPRO 1 UNITS: 100 INJECTION, SOLUTION INTRAVENOUS; SUBCUTANEOUS at 17:27

## 2018-04-25 RX ADMIN — TORSEMIDE 10 MG: 20 TABLET ORAL at 08:04

## 2018-04-25 RX ADMIN — LEVETIRACETAM 500 MG: 500 TABLET ORAL at 08:04

## 2018-04-25 NOTE — PLAN OF CARE
Problem: PHYSICAL THERAPY ADULT  Goal: Performs mobility at highest level of function for planned discharge setting  See evaluation for individualized goals  Treatment/Interventions: Functional transfer training, LE strengthening/ROM, Therapeutic exercise, Endurance training, Patient/family training, Equipment eval/education, Bed mobility, Gait training          See flowsheet documentation for full assessment, interventions and recommendations  Prognosis: Good  Problem List: Decreased strength, Decreased range of motion, Decreased endurance, Impaired balance, Decreased mobility, Decreased coordination, Decreased cognition, Impaired judgement, Decreased safety awareness  Assessment: Pt able to progress with functional mobility this session  Pt received in room, supine in bed, daughter present, pt agreeable to therapy session  Pt performed supine to sit transfer with min A and verbal cues  Pt performed sit to stand transfer with min A x 2 and verbal cues for safety  Pt amb 15 ft with RW and min A x 2 and verbal cues for safety  Pt displays shuffling gait pattern, decreased step length and scissoring gait pattern throughout amb trial  Pt performed repeated sit to stand exercise this session with minimal rest breaks between repetitions  Pt presents with increased lethargy this session however is eager to participate  Skilled physical therapy is indicated to address listed funcitonal deficits  Recommend pt discharge to inpatient rehabilitation when medically stable  Recommendation: Post acute IP rehab     PT - OK to Discharge: (S) Yes (to rehab when stable- see above)    See flowsheet documentation for full assessment

## 2018-04-25 NOTE — PROGRESS NOTES
[facility] Progress Note  Patient: Fabien Hansen 80 y o  female   MRN: [id number]  PCP: Filemon Lucero MD  Unit/Bed#: [facility] 771-84 Encounter: [id number]  Date Of Visit: 18    1  Left MCA territory ischemic stroke:    -s/p thrombectomy by IR   Patient was found to have left gaze preference, right facial droop, aphasia, dysarthria, and right hemiparesis  Is improved  Neuro following  On statin  anticoag held given subarachnoid bleed  Plan for LORI to eval cardioembolic source? She is on keppra for questionable seizure activity        2   Subarachnoid hemorrhage:     - CT head today showing continued improvement  Neurosurgery and neurology following  If clinically stable seems next CT head in 1 week per Neurosurgery notes       3  Left upper extremity DVT:   -developed left 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  Patient was previously anticoagulated on Coumadin for this  Holding anticoagulation for now  Neurology is recommending DOAC, timing per them       5  History of mitral valve replacement:      -status post bioprosthetic mitral valve replacement at [facility]  No complications      6  Sick sinus syndrome status post pacemaker placement:     -status post pacemaker placement        7  CKD stage 3    -Cr stable  Monitor  8  Anemia:     -She received 1 unit of PRBCs  H/H 8 8 yesterday  None today  C/t monitor        9  Diabetes mellitus:       -accuchecks, sliding scale    Subjective:   Eating breakfast   Says feels better  No complaints this am      Objective:     Vitals:   Temp (24hrs), Av 8 °F (37 1 °C), Min:98 7 °F (37 1 °C), Max:99 °F (37 2 °C)    HR:  [74-90] 74  Resp:  [14-24] 14  BP: (106-131)/(48-85) 129/62  SpO2:  [94 %-100 %] 96 %  Body mass index is 27 99 kg/m²  Input and Output Summary (last 24 hours):        Intake/Output Summary (Last 24 hours) at 18 1033  Last data filed at 18 0745   Gross per 24 hour Intake          1668 34 ml   Output             1910 ml   Net          -241 66 ml       Physical Exam:     Physical Exam    GEN: NAD  HEENT: PERRL  CARDIO: s1 s2 RRR  LUNGS: CTA  ABD: Soft, NT/ND  EXT: SCDs  Neuro: right UE decreased strength vs left, upper ext choreiform movements      Additional Data:     Labs:      Results from last 7 days  Lab Units 04/24/18  0507   WBC Thousand/uL 4 66   HEMOGLOBIN g/dL 8 8*   HEMATOCRIT % 27 0*   PLATELETS Thousands/uL 127*   NEUTROS PCT % 57   LYMPHS PCT % 16   MONOS PCT % 11   EOS PCT % 15*       Results from last 7 days  Lab Units 04/24/18  0507  04/18/18  1500   SODIUM mmol/L 141  < > 133*   POTASSIUM mmol/L 3 9  < > 5 2   CHLORIDE mmol/L 109*  < > 99*   CO2 mmol/L 23  < > 29   BUN mg/dL 35*  < > 49*   CREATININE mg/dL 1 26  < > 2 08*   CALCIUM mg/dL 8 8  < > 9 7   TOTAL PROTEIN g/dL  --   --  7 9   BILIRUBIN TOTAL mg/dL  --   --  0 50   ALK PHOS U/L  --   --  85   ALT U/L  --   --  25   AST U/L  --   --  32   GLUCOSE RANDOM mg/dL 108  < > 95   GLUCOSE, ISTAT   --   < >  --    < > = values in this interval not displayed  Results from last 7 days  Lab Units 04/23/18  0908   INR  1 95*       * I Have Reviewed All Lab Data Listed Above  * Additional Pertinent Lab Tests Reviewed: All Labs For Current Hospital Admission Reviewed    Imaging:    Imaging Reports Reviewed Today Include:  All available     Recent Cultures (last 7 days):           Last 24 Hours Medication List:     Current Facility-Administered Medications:  atorvastatin 80 mg Oral Daily With Aggie Rivera MD   docusate sodium 100 mg Oral BID Hernán Chaidez MD   insulin lispro 1-5 Units Subcutaneous 4x Daily (AC & HS) Chuyita Nieves PA-C   labetalol 10 mg Intravenous Q4H PRN Herve Traylor MD   levETIRAcetam 500 mg Oral Q12H [facility] Hernán Chaidez MD   metoprolol tartrate 25 mg Oral Q12H [facility] Hernán Chaidez MD   pantoprazole 40 mg Oral Early Morning Hernán Chaidez MD   potassium chloride 20 mEq Oral Daily Yecenia Lainez MD   torsemide 10 mg Oral Daily Yecenia Lainez MD        Today, Patient Was Seen By: Zachariah Mcguire MD    ** Please Note: This note has been constructed using a voice recognition system   **

## 2018-04-25 NOTE — PROGRESS NOTES
Progress Note - Neurology   Sammie Petersen 80 y o  female MRN: [id number]  Unit/Bed#: [facility] 705-01 Encounter: [id number]    Assessment/ Plan:  1  Acute left MCA infarct s/p thrombectomy POD 2   -Repeat CTH demonstrates continued reabsorption of SAH as well as evolving nonhemorrhagic infarctions in L basal ganglia, likely resultant from known L MCA infarction   -Repeat CTH in am    -Hold anticoagulation/ antiplatelet for now    -LORI pending    -CT CAP w/ and w/o to assess for underlying malignancy cause a hypercoagulable state pending    -patient on Coumadin for recent upper extremity DVT, will switch to NOAC, likely Eliquis, after assessment of stroke size via repeat CT and appropriate waiting period    -patient has bioprosthetic valve for MVR, and thus no need to consider in regards to anticoagulation    2  Seizure-like activity -No additional episodes  -Continue Keppra 500 mg p o  B i d   -if there repeat episodes of seizure-like activity, will placed on video EEG monitoring      Subjective:   No acute events overnight  On exam today, the patient is sleepy but easily arousable  Improved expressive and receptive aphasia in comparison to previous exams  Attempted to complete a review of systems, however the patient was drowsy and required multiple attempts to stay in gauge in conversation  No focal deficits noted on exam   No repeat stat CT scan as patient's drowsiness have been present intermittently since awakening this morning and patient received CT after symptom onset this morning        ROS:  See subjective    Medications:  Scheduled Meds:    Current Facility-Administered Medications:  atorvastatin 80 mg Oral Daily With Tona Gonsalez MD   docusate sodium 100 mg Oral BID Yecenia Lainez MD   insulin lispro 1-5 Units Subcutaneous 4x Daily (AC & HS) Nemo De La Rosa PA-C   labetalol 10 mg Intravenous Q4H PRN Michael Avila MD   levETIRAcetam 500 mg Oral Q12H [address] Yecenia Lainez MD metoprolol tartrate 25 mg Oral Q12H [facility] Jori Del Cid MD   pantoprazole 40 mg Oral Early Morning Jori Del Cid MD   potassium chloride 20 mEq Oral Daily Jori Del Cid MD   torsemide 10 mg Oral Daily Jori Del Cid MD     Continuous Infusions:   PRN Meds: labetalol      Vitals: Blood pressure 129/62, pulse 74, temperature 98 7 °F (37 1 °C), temperature source Oral, resp  rate 14, height 5' (1 524 m), weight 65 kg (143 lb 4 8 oz), SpO2 96 %, not currently breastfeeding  ,Body mass index is 27 99 kg/m²  Physical Exam:   Physical Exam   Constitutional: She appears well-developed and well-nourished  No distress  HENT:   Head: Normocephalic and atraumatic  Right Ear: External ear normal    Left Ear: External ear normal    Nose: Nose normal    Mouth/Throat: Oropharynx is clear and moist  No oropharyngeal exudate  No tongue bite noted   Eyes:   Patient has erythema without noticeable swelling noted left upper and lower eyelid    Conjunctivae within normal limits bilaterally   Neck: Normal range of motion  Neck supple  No tracheal deviation present  No thyromegaly present  Pulmonary/Chest: Effort normal  No respiratory distress  Musculoskeletal: Normal range of motion  She exhibits no edema, tenderness or deformity  Skin: Skin is warm and dry  No rash noted  She is not diaphoretic  No erythema  No pallor  Psychiatric: She has a normal mood and affect  Her speech is normal and behavior is normal    Nursing note and vitals reviewed  Neurologic Exam     Mental Status   Oriented to person  (Able states she is in a hospital)  Disoriented to year, month and date  Follows 1 step commands  Attention: decreased  Concentration: decreased  Speech: speech is normal   Level of consciousness: drowsy ,  arousable by verbal stimuli  Abnormal comprehension  Cranial Nerves   Cranial nerves II through XII intact       No gaze preference noted     Motor Exam   Muscle bulk: normal  Overall muscle tone: normal  Patient moves all extremities equally antigravity     Sensory Exam   Light touch normal      Gait, Coordination, and Reflexes     Gait  Gait: (Deferred for safety)  Patient continues to demonstrate choreiform movements of bilateral upper extremities and left lower extremity       Lab, Imaging and other studies: I have personally reviewed pertinent reports  Recent Results (from the past 24 hour(s))   Fingerstick Glucose (POCT)    Collection Time: 04/24/18 11:09 AM   Result Value Ref Range    POC Glucose 127 65 - 140 mg/dl   Fingerstick Glucose (POCT)    Collection Time: 04/24/18  6:44 PM   Result Value Ref Range    POC Glucose 204 (H) 65 - 140 mg/dl   Fingerstick Glucose (POCT)    Collection Time: 04/24/18  9:06 PM   Result Value Ref Range    POC Glucose 129 65 - 140 mg/dl   Fingerstick Glucose (POCT)    Collection Time: 04/25/18  7:10 AM   Result Value Ref Range    POC Glucose 120 65 - 140 mg/dl   ]    VTE Prophylaxis: Sequential compression device (Venodyne)     Counseling / Coordination of Care  Total time spent today 35 minutes  Greater than 50% of total time was spent with the patient and / or family counseling and / or coordination of care  A description of the counseling / coordination of care: The patient was seen examined myself the attending physician  Patient's chart was reviewed thoroughly, including imaging studies and laboratory values  The patient and her daughter were counseled in the room  The patient was discussed with nursing

## 2018-04-25 NOTE — OCCUPATIONAL THERAPY NOTE
[address] Progress Note     Patient Name: Marco Antonio Beckett  IFXPJ'K Date: 4/25/2018  Problem List  Patient Active Problem List   Diagnosis    Deep vein thrombosis (DVT) of left upper extremity ([place] [address] )    H/O mitral valve replacement    CVA (cerebral vascular accident) ([place] [address] )    Controlled type 2 diabetes mellitus, without long-term current use of insulin ([place] [address] )    Pacemaker    Acid reflux    HTN (hypertension)    Constipation    CHADWICK (acute kidney injury) ([place] [address] )    Systolic congestive heart failure ([place] [address] )    Acute ischemic left MCA stroke ([place] [address] )    History of ischemic right MCA stroke             04/25/18 1409   Restrictions/Precautions   Weight Bearing Precautions Per Order No   Other Precautions Cognitive; Chair Alarm; Bed Alarm;Telemetry; Fall Risk  (Chair alarm on at end of therapy session )   Pain Assessment   Pain Assessment No/denies pain   Pain Score No Pain   ADL   Where Assessed Chair   Grooming Assistance 2  Maximal Assistance   Grooming Deficit Setup;Verbal cueing; Increased time to complete;Wash/dry face   Bed Mobility   Supine to Sit 2  Maximal assistance   Additional items HOB elevated; Increased time required;Verbal cues;LE management   Transfers   Sit to Stand 3  Moderate assistance   Additional items Assist x 2; Increased time required;Verbal cues   Stand to Sit 3  Moderate assistance   Additional items Assist x 2; Increased time required;Verbal cues   Stand pivot 3  Moderate assistance   Additional items Assist x 2; Increased time required;Verbal cues   Cognition   Overall Cognitive Status Impaired   Arousal/Participation Lethargic;Persistent stimuli required   Attention Difficulty attending to directions   Orientation Level Oriented to person   Following Commands Follows one step commands with increased time or repetition   Activity Tolerance   Activity Tolerance Patient limited by fatigue;Treatment limited secondary to medical complications (Comment)   Medical Staff Made Aware LEEANN Grider Assessment   Assessment Pt participated in OT tx session focusing on bed mobility, functional transfers, grooming, arousal, attention, command following, L side reintegration, gross motor coordination, UE functional use, and visual attention  Upon arrival, pt asleep and requiring constant verbal, tactile cues and physical movement to arouse and maintain arousal t/o session  Pt w/ eyes closed and requiring constant cues t/o session to open them and attend to therapist  Pt's family present t/o session and assisting w/ cues in [facility] [place]  Pt performed supine to sit EOB w/ max A x2, which is significantly declined from evaluation 2* significant lethargy  Pt more alert on EOB  When seated on EOB, pt presenting w/ R gaze preference and appears to be having L sided visual deficits  Pt able to identify 2/3 family members (one in front of her and one slightly in the left periphery)  Pt unable to identify family members who was standing on L side of pt  Pt performed sit to stand transfer at EOB and stand pivot transfer from bed to recliner w/ mod A x2 for force production, steadying/balance, rw management and verbal cues to sequence  Once seated in chair, pt presenting w/ L gaze preference and appears to be having R sided visual deficits  Pt presented w/ grooming items on table spread out on R and L side and asked to identify the items needed to wash face and to brush teeth  Pt able to identify the wash cloth on middle/right, soap on L, and tooth brush on far L  Pt unable to locate tooth paste located on far R  Pt performed face hygiene w/ max A, requiring cues to initiate, sequence, and terminate task, A for thoroughness, and A to attend to mouth and forehead  Pt did utilize L UE to wash face  Pt presented w/ hot and cold items in B/L hands  Pt osei to report hot vs  Cold 100% in L hand, pt able to accurately report hot in R hand however cannot report cold in R hand   Pt reporting itchy head, and able to scratch head w/ B/L hands demonstrating Fair shoulder external rotation, elbow flexion and functional use of B/L UE  Pt presents w/ increased non-volitional movement in B/L UE (R>L)  Provided pt w/ squeeze ball which she is able to use in L hand and minimally in R hand  Educated pt's family on use of squeeze ball t/o day  Also educated pt's family on B/L visual integration I e  Standing on/talking to patient from both sides of body, encouraging her to attend to external environment on both sides  Pt's family verbalizes understanding and motivated to engage pt  By end of therapy session, pt pr   Plan   Treatment Interventions ADL retraining;Visual perceptual retraining;Functional transfer training;UE strengthening/ROM; Endurance training;Cognitive reorientation;Patient/family training;Equipment evaluation/education; Neuromuscular reeducation; Fine motor coordination activities; Compensatory technique education;Continued evaluation; Energy conservation; Activityengagement   Goal Expiration Date 05/08/18   Treatment Day 1   OT Frequency 3-5x/wk   Recommendation   OT Discharge Recommendation Short Term Rehab   OT - OK to Discharge Yes  (to STR)   Barthel Index   Feeding 5   Bathing 0   Grooming Score 0   Dressing Score 0   Bladder Score 5   Bowels Score 10   Toilet Use Score 5   Transfers (Bed/Chair) Score 5   Mobility (Level Surface) Score 0   Stairs Score 0   Barthel Index Score 30   Modified Connor Scale   Modified [place] Scale 4         Yon Hernandez MS, OTR/L

## 2018-04-25 NOTE — PLAN OF CARE
Activity Intolerance/Impaired Mobility     Mobility/activity is maintained at optimum level for patient Progressing        Communication Impairment     Ability to express needs and understand communication [address] Discharge to post-acute care or home with appropriate resources Progressing        METABOLIC, FLUID AND ELECTROLYTES - ADULT     Electrolytes maintained within normal limits Progressing     Fluid balance maintained Progressing     Glucose maintained within target range Progressing        Neurological Deficit     Neurological status is stable or improving Progressing        NEUROSENSORY - ADULT     Achieves stable or improved neurological status Progressing     Absence of seizures Progressing     Achieves maximal functionality and self care Progressing        Nutrition     Nutrition/Hydration status is improving Progressing        Nutrition/Hydration-ADULT     Nutrient/Hydration intake appropriate for improving, restoring or maintaining nutritional needs Progressing        Potential for Aspiration     Non-ventilated patient's risk of aspiration is minimized Progressing        Potential for Falls     Patient will remain free of falls Progressing        Prexisting or High Potential for Compromised Skin Integrity     Skin integrity is maintained or improved Progressing        SAFETY,RESTRAINT: NV/NON-SELF DESTRUCTIVE BEHAVIOR     Remains free of harm/injury (restraint for non violent/non self-detsructive behavior) Progressing     Returns to optimal restraint-free functioning Progressing

## 2018-04-25 NOTE — SOCIAL WORK
MCG Guide Used for Initial Round: Stroke: Ischemic RRG  Optimal GLOS: 2  Hospital Day: 2 days  DC Readiness:   Discharge Readiness  Return to top of Stroke: Ischemic RRG - ISC  · Discharge readiness is indicated by patient meeting Recovery Milestones, including ALL of the following:  ? Hemodynamic stability  ? Mental status at baseline or stable  ? Neurologic deficits absent or stable  ? Unimpaired swallowing  ? Tolerates sitting in chair  ? Dangerous arrhythmia absent  ? Oral hydration, medications, diet  ? Discharge plans and education understood  ? Bleeding (eg, cerebral)  § Bleeding may occur after thrombolytic or antithrombotic therapy  § Anticipate need for extended observation if bleeding occurs  § Expect brief to moderate stay extension  § Brief (1 to 3 days), Moderate (4 to 7 days),     Identified Barriers: Repeat CT of head today  Plan for LORI  Neurology and neuro surgery consulted    Discussion Date (Time): 04/25/18 with Dr Lawrence Bermudez

## 2018-04-25 NOTE — OCCUPATIONAL THERAPY NOTE
[address] Progress Note     Patient Name: Cheri Temple  ZQQOW'C Date: 4/25/2018  Problem List  Patient Active Problem List   Diagnosis    Deep vein thrombosis (DVT) of left upper extremity ([facility] )    H/O mitral valve replacement    CVA (cerebral vascular accident) ([place] [address] )    Controlled type 2 diabetes mellitus, without long-term current use of insulin ([place] [address] )    Pacemaker    Acid reflux    HTN (hypertension)    Constipation    CHADWICK (acute kidney injury) ([place] [address] )    Systolic congestive heart failure ([place] [address] )    Acute ischemic left MCA stroke ([place] [address] )    History of ischemic right MCA stroke           04/25/18 1409   Restrictions/Precautions   Weight Bearing Precautions Per Order No   Other Precautions Cognitive; Chair Alarm; Bed Alarm;Telemetry; Fall Risk  (Chair alarm on at end of therapy session )   Pain Assessment   Pain Assessment No/denies pain   Pain Score No Pain   ADL   Where Assessed Chair   Grooming Assistance 2  Maximal Assistance   Grooming Deficit Setup;Verbal cueing; Increased time to complete;Wash/dry face   Bed Mobility   Supine to Sit 2  Maximal assistance   Additional items HOB elevated; Increased time required;Verbal cues;LE management   Transfers   Sit to Stand 3  Moderate assistance   Additional items Assist x 2; Increased time required;Verbal cues   Stand to Sit 3  Moderate assistance   Additional items Assist x 2; Increased time required;Verbal cues   Stand pivot 3  Moderate assistance   Additional items Assist x 2; Increased time required;Verbal cues   Cognition   Overall Cognitive Status Impaired   Arousal/Participation Lethargic;Persistent stimuli required   Attention Difficulty attending to directions   Orientation Level Oriented to person   Following Commands Follows one step commands with increased time or repetition   Activity Tolerance   Activity Tolerance Patient limited by fatigue;Treatment limited secondary to medical complications (Comment)   Medical Staff Made Aware LEEANN Paris Assessment   Assessment Pt participated in OT tx session focusing on bed mobility, functional transfers, grooming, arousal, attention, command following, L side reintegration, gross motor coordination, UE functional use, and visual attention  Upon arrival, pt asleep and requiring constant verbal, tactile cues and physical movement to arouse and maintain arousal t/o session  Pt w/ eyes closed and requiring constant cues t/o session to open them and attend to therapist  Pt's family present t/o session and assisting w/ cues in [facility] [place]  Pt performed supine to sit EOB w/ max A x2, which is significantly declined from evaluation 2* significant lethargy  Pt more alert on EOB  When seated on EOB, pt presenting w/ R gaze preference and appears to be having L sided visual deficits  Pt able to identify 2/3 family members (one in front of her and one slightly in the left periphery)  Pt unable to identify family members who was standing on L side of pt  Pt performed sit to stand transfer at EOB and stand pivot transfer from bed to recliner w/ mod A x2 for force production, steadying/balance, rw management and verbal cues to sequence  Once seated in chair, pt presenting w/ L gaze preference and appears to be having R sided visual deficits  Pt presented w/ grooming items on table spread out on R and L side and asked to identify the items needed to wash face and to brush teeth  Pt able to identify the wash cloth on middle/right, soap on L, and tooth brush on far L  Pt unable to locate tooth paste located on far R  Pt performed face hygiene w/ max A, requiring cues to initiate, sequence, and terminate task, A for thoroughness, and A to attend to mouth and forehead  Pt did utilize L UE to wash face  Pt reporting itchy head, and able to scratch head w/ B/L hands demonstrating Fair shoulder external rotation, elbow flexion and functional use of B/L UE  Pt presents w/ increased non-volitional movement in B/L UE (R>L)   Provided pt w/ squeeze ball which she is able to use in L hand and minimally in R hand  Educated pt's family on use of squeeze ball t/o day  Also educated pt's family on B/L visual integration I e  Standing on/talking to patient from both sides of body, encouraging her to attend to external environment on both sides  Pt's family verbalizes understanding and motivated to engage pt  By end of therapy session, pt presenting w/ eyes closed and unable to attend to therapist  Continue to recommend STR upon D/C  Will continue to follow and address previously stated goals  Plan   Treatment Interventions ADL retraining;Visual perceptual retraining;Functional transfer training;UE strengthening/ROM; Endurance training;Cognitive reorientation;Patient/family training;Equipment evaluation/education; Neuromuscular reeducation; Fine motor coordination activities; Compensatory technique education;Continued evaluation; Energy conservation; Activityengagement   Goal Expiration Date 05/08/18   Treatment Day 1   OT Frequency 3-5x/wk   Recommendation   OT Discharge Recommendation Short Term Rehab   OT - OK to Discharge Yes  (to STR)   Barthel Index   Feeding 5   Bathing 0   Grooming Score 0   Dressing Score 0   Bladder Score 5   Bowels Score 10   Toilet Use Score 5   Transfers (Bed/Chair) Score 5   Mobility (Level Surface) Score 0   Stairs Score 0   Barthel Index Score 30   Modified Connor Scale   Modified [place] Scale 4         James Atkins MS, OTR/L

## 2018-04-25 NOTE — PLAN OF CARE
Problem: OCCUPATIONAL THERAPY ADULT  Goal: Performs self-care activities at highest level of function for planned discharge setting  See evaluation for individualized goals  Treatment Interventions: ADL retraining, Functional transfer training, Endurance training, Cognitive reorientation, Equipment evaluation/education, UE strengthening/ROM, Patient/family training, Neuromuscular reeducation, Compensatory technique education, Fine motor coordination activities, Continued evaluation, Energy conservation, Activityengagement          See flowsheet documentation for full assessment, interventions and recommendations  Limitation: Decreased ADL status, Decreased UE strength, Decreased Safe judgement during ADL, Decreased cognition, Decreased endurance, Decreased sensation, Decreased fine motor control, Decreased self-care trans, Decreased high-level ADLs, Non-func L UE (BALANCE, MEDICAL STATUS)  Prognosis: Fair  Assessment: Pt participated in OT tx session focusing on bed mobility, functional transfers, grooming, arousal, attention, command following, L side reintegration, gross motor coordination, UE functional use, and visual attention  Upon arrival, pt asleep and requiring constant verbal, tactile cues and physical movement to arouse and maintain arousal t/o session  Pt w/ eyes closed and requiring constant cues t/o session to open them and attend to therapist  Pt's family present t/o session and assisting w/ cues in [place]  Pt performed supine to sit EOB w/ max A x2, which is significantly declined from evaluation 2* significant lethargy  Pt more alert on EOB  When seated on EOB, pt presenting w/ R gaze preference and appears to be having L sided visual deficits  Pt able to identify 2/3 family members (one in front of her and one slightly in the left periphery)  Pt unable to identify family members who was standing on L side of pt   Pt performed sit to stand transfer at EOB and stand pivot transfer from bed to recliner w/ mod A x2 for force production, steadying/balance, rw management and verbal cues to sequence  Once seated in chair, pt presenting w/ L gaze preference and appears to be having R sided visual deficits  Pt presented w/ grooming items on table spread out on R and L side and asked to identify the items needed to wash face and to brush teeth  Pt able to identify the wash cloth on middle/right, soap on L, and tooth brush on far L  Pt unable to locate tooth paste located on far R  Pt performed face hygiene w/ max A, requiring cues to initiate, sequence, and terminate task, A for thoroughness, and A to attend to mouth and forehead  Pt did utilize L UE to wash face  Pt presented w/ hot and cold items in B/L hands  Pt osei to report hot vs  Cold 100% in L hand, pt able to accurately report hot in R hand however cannot report cold in R hand  Pt reporting itchy head, and able to scratch head w/ B/L hands demonstrating Fair shoulder external rotation, elbow flexion and functional use of B/L UE  Pt presents w/ increased non-volitional movement in B/L UE (R>L)  Provided pt w/ squeeze ball which she is able to use in L hand and minimally in R hand  Educated pt's family on use of squeeze ball t/o day  Also educated pt's family on B/L visual integration I e  Standing on/talking to patient from both sides of body, encouraging her to attend to external environment on both sides  Pt's family verbalizes understanding and motivated to engage pt   By end of therapy session, pt pr     OT Discharge Recommendation: Short Term Rehab  OT - OK to Discharge: Yes (to STR)      Luis Pabon MS, OTR/L

## 2018-04-25 NOTE — RESTORATIVE TECHNICIAN NOTE
Restorative Specialist Mobility Note       Activity: Other (Comment) (Educated/encouraged pt to ambulate with assistance 3-4 x's/day, pt refused 2* too tired  Bed alarm on  Pt callbell, phone/tray within reach )              Repositioned:  Other (Comment) (Rep /sat pt upright in bed )       Randal HONEYCUTT, Restorative Technician, [employer] [facility]

## 2018-04-25 NOTE — PHYSICAL THERAPY NOTE
PHYSICAL THERAPY NOTE          Patient Name: Fabien Hansen  TMTAJ'A Date: 4/25/2018 04/25/18 1050   Pain Assessment   Pain Assessment No/denies pain   Restrictions/Precautions   Other Precautions Cognitive; Chair Alarm; Bed Alarm;Limb alert;Telemetry; Fall Risk   General   Chart Reviewed Yes   Family/Caregiver Present Yes  (daughter)   Cognition   Arousal/Participation Lethargic   Attention Attends with cues to redirect   Orientation Level Oriented to person;Oriented to place   Following Commands Follows one step commands with increased time or repetition   Subjective   Subjective Pt reports she is tired at beginning of session   Bed Mobility   Supine to Sit 4  Minimal assistance   Additional items Increased time required;Verbal cues   Transfers   Sit to Stand 4  Minimal assistance   Additional items Assist x 2; Increased time required;Verbal cues   Stand to Sit 4  Minimal assistance   Additional items Assist x 2; Increased time required;Verbal cues   Stand pivot 4  Minimal assistance   Additional items Assist x 2; Increased time required;Verbal cues   Ambulation/Elevation   Gait pattern Improper Weight shift;Narrow [place];Scissoring;Shuffling; Short stride; Step to;Excessively slow   Gait Assistance 4  Minimal assist   Additional items Assist x 2;Verbal cues; Tactile cues   Assistive Device Rolling walker   Distance 15 ft   Balance   Static Sitting Fair   Dynamic Sitting Fair -  (forward reach)   Static Standing Poor +  (RW)   Dynamic Standing Poor  (RW)   Ambulatory Poor  (RW)   Endurance Deficit   Endurance Deficit Yes   Endurance Deficit Description fatigue   Activity Tolerance   Activity Tolerance Patient limited by fatigue   Nurse Made Aware yes   Exercises   Balance training  repeated sit to stand trials x 10   Assessment   Prognosis Good   Problem List Decreased strength;Decreased range of motion;Decreased endurance; Impaired balance;Decreased mobility; Decreased coordination;Decreased cognition; Impaired judgement;Decreased safety awareness   Assessment Pt able to progress with functional mobility this session  Pt received in room, supine in bed, daughter present, pt agreeable to therapy session  Pt performed supine to sit transfer with min A and verbal cues  Pt performed sit to stand transfer with min A x 2 and verbal cues for safety  Pt amb 15 ft with RW and min A x 2 and verbal cues for safety  Pt displays shuffling gait pattern, decreased step length and scissoring gait pattern throughout amb trial  Pt performed repeated sit to stand exercise this session with minimal rest breaks between repetitions  Pt presents with increased lethargy this session however is eager to participate  Skilled physical therapy is indicated to address listed funcitonal deficits  Recommend pt discharge to inpatient rehabilitation when medically stable  Goals   Patient Goals none expressed   STG Expiration Date 05/08/18   Treatment Day 1   Plan   Treatment/Interventions Functional transfer training;LE strengthening/ROM; Therapeutic exercise; Endurance training;Patient/family training;Bed mobility;Gait training; Compensatory technique education;Spoke to case management; Family   Progress Progressing toward goals   PT Frequency 5x/wk  (and 1x/weekend)   Recommendation   Recommendation Post acute IP rehab   Equipment Recommended Natali Rodriguez

## 2018-04-25 NOTE — PROGRESS NOTES
Progress Note - Neurosurgery   Zoë Zaman 80 y o  female MRN: [id number]  Unit/Bed#: [facility] 705-01 Encounter: [id number]    Assessment:  1  Acute left MCA infarct s/p thrombectomy  2  SAH/contrast extravasation  3  Expressive aphasia  4  Right hemiparesis and left sided gaze preference   5  History of right MCA infarction  6  Choreiform movement  7  ABLA  8  History of DVT on coumadin  9  History of DM2  10  History of MVR     Plan:  · Veola Fraction is essentially at her baseline  Expressive aphasia is mostly resolved  Her CT shows resolving subarachnoid contrast with no evidence of new hemorrhage  No complaints of headache or groin pain  No seizure-like events overnight however drowsiness likely attributed to Keppra  BP may be liberalized if necessary  No contraindication to AP or AC at this point  No further neurosurgical intervention at this point  Subjective/Objective   Chief Complaint: Stroke    Subjective: I'm ok  Objective: NAD  Eating dinner, feeding herself with a spoon       Intake/Output       04/25/18 0701 - 04/26/18 0700      [id number] 2610-4810 Total       Intake    Total Intake -- -- --       Output    Urine  1400  -- 1400    Urine 1400 -- 1400    Unmeasured Urine Occurrence 1 x -- 1 x    Stool  --  -- --    Unmeasured Stool Occurrence 1 x -- 1 x    Total Output 1400 -- 1400       Net I/O     -1400 -- -1400          Invasive Devices     Peripheral Intravenous Line            Peripheral IV 04/23/18 Left Antecubital 2 days                Physical Exam: /58 (BP Location: Left arm)   Pulse 79   Temp 98 1 °F (36 7 °C) (Oral)   Resp 18   Ht 5' (1 524 m)   Wt 65 kg (143 lb 4 8 oz)   LMP  (LMP Unknown)   SpO2 99%   BMI 27 99 kg/m²     General Appearance:    Alert, cooperative, no distress, appears stated age   Head:    Normocephalic, without obvious abnormality, atraumatic   Eyes:    PERRL, conjunctiva/corneas clear, EOM's intact, fundi     benign, both eyes   Ears:    Normal TM's and external ear canals, both ears   Nose:   Nares normal, septum midline, mucosa normal, no drainage    or sinus tenderness   Throat:   Lips, mucosa, and tongue normal; teeth and gums normal   Neck:   Supple, symmetrical, trachea midline, no adenopathy;     thyroid:  no enlargement/tenderness/nodules; no carotid    bruit or JVD   Back:     Symmetric, no curvature, ROM normal, no CVA tenderness   Lungs:     Clear to auscultation bilaterally, respirations unlabored   Chest Wall:    No tenderness or deformity    Heart:    Regular rate and rhythm, S1 and S2 normal, no murmur, rub   or gallop   Breast Exam:    No tenderness, masses, or nipple abnormality   Abdomen:     Soft, non-tender, bowel sounds active all four quadrants,     no masses, no organomegaly   Genitalia:    Normal female without lesion, discharge or tenderness   Rectal:    Normal tone, no masses or tenderness; guaiac negative stool   Extremities:   Extremities normal, atraumatic, no cyanosis or edema   Pulses:   2+ and symmetric all extremities   Skin:   Skin color, texture, turgor normal, no rashes or lesions   Lymph nodes:   Cervical, supraclavicular, and axillary nodes normal   Neurologic:   CNII-XII intact, normal strength, sensation and reflexes     throughout       Vitals: Blood pressure 119/58, pulse 79, temperature 98 1 °F (36 7 °C), temperature source Oral, resp  rate 18, height 5' (1 524 m), weight 65 kg (143 lb 4 8 oz), SpO2 99 %, not currently breastfeeding  ,Body mass index is 27 99 kg/m²  Lab Results: I have personally reviewed pertinent results  Imaging Studies: I have personally reviewed pertinent reports  and I have personally reviewed pertinent films in PACS    EKG, Pathology, and Other Studies: I have personally reviewed pertinent reports        VTE Pharmacologic Prophylaxis: Reason for no pharmacologic prophylaxis heparin allergy    VTE Mechanical Prophylaxis: sequential compression device

## 2018-04-26 ENCOUNTER — ANESTHESIA EVENT (INPATIENT)
Dept: NON INVASIVE DIAGNOSTICS | Facility: HOSPITAL | Age: 83
DRG: 023 | End: 2018-04-26
Payer: MEDICARE

## 2018-04-26 ENCOUNTER — APPOINTMENT (INPATIENT)
Dept: RADIOLOGY | Facility: HOSPITAL | Age: 83
DRG: 023 | End: 2018-04-26
Payer: MEDICARE

## 2018-04-26 ENCOUNTER — APPOINTMENT (INPATIENT)
Dept: NEUROLOGY | Facility: AMBULATORY SURGERY CENTER | Age: 83
DRG: 023 | End: 2018-04-26
Payer: MEDICARE

## 2018-04-26 LAB
ABO GROUP BLD BPU: NORMAL
ANION GAP SERPL CALCULATED.3IONS-SCNC: 7 MMOL/L (ref 4–13)
BASOPHILS # BLD AUTO: 0.02 THOUSANDS/ΜL (ref 0–0.1)
BASOPHILS NFR BLD AUTO: 0 % (ref 0–1)
BPU ID: NORMAL
BUN SERPL-MCNC: 35 MG/DL (ref 5–25)
CALCIUM SERPL-MCNC: 9 MG/DL (ref 8.3–10.1)
CHLORIDE SERPL-SCNC: 103 MMOL/L (ref 100–108)
CO2 SERPL-SCNC: 27 MMOL/L (ref 21–32)
CREAT SERPL-MCNC: 1.42 MG/DL (ref 0.6–1.3)
EOSINOPHIL # BLD AUTO: 1.43 THOUSAND/ΜL (ref 0–0.61)
EOSINOPHIL NFR BLD AUTO: 26 % (ref 0–6)
ERYTHROCYTE [DISTWIDTH] IN BLOOD BY AUTOMATED COUNT: 15.6 % (ref 11.6–15.1)
GFR SERPL CREATININE-BSD FRML MDRD: 34 ML/MIN/1.73SQ M
GLUCOSE SERPL-MCNC: 102 MG/DL (ref 65–140)
GLUCOSE SERPL-MCNC: 121 MG/DL (ref 65–140)
GLUCOSE SERPL-MCNC: 133 MG/DL (ref 65–140)
GLUCOSE SERPL-MCNC: 157 MG/DL (ref 65–140)
GLUCOSE SERPL-MCNC: 191 MG/DL (ref 65–140)
HCT VFR BLD AUTO: 28.2 % (ref 34.8–46.1)
HGB BLD-MCNC: 8.9 G/DL (ref 11.5–15.4)
LYMPHOCYTES # BLD AUTO: 1.39 THOUSANDS/ΜL (ref 0.6–4.47)
LYMPHOCYTES NFR BLD AUTO: 25 % (ref 14–44)
MCH RBC QN AUTO: 28.1 PG (ref 26.8–34.3)
MCHC RBC AUTO-ENTMCNC: 31.6 G/DL (ref 31.4–37.4)
MCV RBC AUTO: 89 FL (ref 82–98)
MONOCYTES # BLD AUTO: 0.5 THOUSAND/ΜL (ref 0.17–1.22)
MONOCYTES NFR BLD AUTO: 9 % (ref 4–12)
NEUTROPHILS # BLD AUTO: 2.14 THOUSANDS/ΜL (ref 1.85–7.62)
NEUTS SEG NFR BLD AUTO: 40 % (ref 43–75)
NRBC BLD AUTO-RTO: 0 /100 WBCS
PLATELET # BLD AUTO: 159 THOUSANDS/UL (ref 149–390)
PMV BLD AUTO: 10.4 FL (ref 8.9–12.7)
POTASSIUM SERPL-SCNC: 4 MMOL/L (ref 3.5–5.3)
RBC # BLD AUTO: 3.17 MILLION/UL (ref 3.81–5.12)
SODIUM SERPL-SCNC: 137 MMOL/L (ref 136–145)
UNIT DISPENSE STATUS: NORMAL
UNIT PRODUCT CODE: NORMAL
UNIT RH: NORMAL
WBC # BLD AUTO: 5.49 THOUSAND/UL (ref 4.31–10.16)

## 2018-04-26 PROCEDURE — 80048 BASIC METABOLIC PNL TOTAL CA: CPT | Performed by: INTERNAL MEDICINE

## 2018-04-26 PROCEDURE — 74176 CT ABD & PELVIS W/O CONTRAST: CPT

## 2018-04-26 PROCEDURE — 99233 SBSQ HOSP IP/OBS HIGH 50: CPT | Performed by: PSYCHIATRY & NEUROLOGY

## 2018-04-26 PROCEDURE — 82948 REAGENT STRIP/BLOOD GLUCOSE: CPT

## 2018-04-26 PROCEDURE — 76376 3D RENDER W/INTRP POSTPROCES: CPT

## 2018-04-26 PROCEDURE — 95951 HB EEG MONITORING/VIDEORECORD: CPT

## 2018-04-26 PROCEDURE — 71250 CT THORAX DX C-: CPT

## 2018-04-26 PROCEDURE — 99233 SBSQ HOSP IP/OBS HIGH 50: CPT | Performed by: INTERNAL MEDICINE

## 2018-04-26 PROCEDURE — 70450 CT HEAD/BRAIN W/O DYE: CPT

## 2018-04-26 PROCEDURE — 93312 ECHO TRANSESOPHAGEAL: CPT

## 2018-04-26 PROCEDURE — 85025 COMPLETE CBC W/AUTO DIFF WBC: CPT | Performed by: INTERNAL MEDICINE

## 2018-04-26 RX ORDER — SODIUM CHLORIDE 9 MG/ML
INJECTION, SOLUTION INTRAVENOUS CONTINUOUS PRN
Status: DISCONTINUED | OUTPATIENT
Start: 2018-04-26 | End: 2018-04-26 | Stop reason: SURG

## 2018-04-26 RX ORDER — LIDOCAINE HYDROCHLORIDE 10 MG/ML
INJECTION, SOLUTION INFILTRATION; PERINEURAL AS NEEDED
Status: DISCONTINUED | OUTPATIENT
Start: 2018-04-26 | End: 2018-04-26 | Stop reason: SURG

## 2018-04-26 RX ORDER — METOPROLOL TARTRATE 5 MG/5ML
5 INJECTION INTRAVENOUS EVERY 6 HOURS
Status: DISCONTINUED | OUTPATIENT
Start: 2018-04-26 | End: 2018-04-28

## 2018-04-26 RX ORDER — SODIUM CHLORIDE 9 MG/ML
50 INJECTION, SOLUTION INTRAVENOUS CONTINUOUS
Status: CANCELLED | OUTPATIENT
Start: 2018-04-26

## 2018-04-26 RX ORDER — PROPOFOL 10 MG/ML
INJECTION, EMULSION INTRAVENOUS AS NEEDED
Status: DISCONTINUED | OUTPATIENT
Start: 2018-04-26 | End: 2018-04-26 | Stop reason: SURG

## 2018-04-26 RX ADMIN — SODIUM CHLORIDE: 0.9 INJECTION, SOLUTION INTRAVENOUS at 12:30

## 2018-04-26 RX ADMIN — LIDOCAINE HYDROCHLORIDE 50 MG: 10 INJECTION, SOLUTION INFILTRATION; PERINEURAL at 12:48

## 2018-04-26 RX ADMIN — METOPROLOL TARTRATE 5 MG: 5 INJECTION, SOLUTION INTRAVENOUS at 16:52

## 2018-04-26 RX ADMIN — PROPOFOL 30 MG: 10 INJECTION, EMULSION INTRAVENOUS at 12:59

## 2018-04-26 RX ADMIN — LEVETIRACETAM 500 MG: 100 INJECTION, SOLUTION INTRAVENOUS at 14:06

## 2018-04-26 RX ADMIN — PROPOFOL 20 MG: 10 INJECTION, EMULSION INTRAVENOUS at 13:05

## 2018-04-26 RX ADMIN — INSULIN LISPRO 1 UNITS: 100 INJECTION, SOLUTION INTRAVENOUS; SUBCUTANEOUS at 21:47

## 2018-04-26 RX ADMIN — LEVETIRACETAM 500 MG: 100 INJECTION, SOLUTION INTRAVENOUS at 20:58

## 2018-04-26 RX ADMIN — PROPOFOL 100 MG: 10 INJECTION, EMULSION INTRAVENOUS at 12:52

## 2018-04-26 RX ADMIN — METOPROLOL TARTRATE 5 MG: 5 INJECTION, SOLUTION INTRAVENOUS at 11:29

## 2018-04-26 RX ADMIN — PROPOFOL 30 MG: 10 INJECTION, EMULSION INTRAVENOUS at 12:48

## 2018-04-26 NOTE — PROGRESS NOTES
[facility] Internal Medicine Progress Note  Patient: Brooklyn Zaman 80 y o  female   MRN: [id number]  PCP: Anu Walker MD  Unit/Bed#: [facility] 812-01 Encounter: [id number]  Date Of Visit: 18    A/P:  1  Left MCA territory ischemic stroke:    -s/p thrombectomy by IR   Patient was found to have left gaze preference, right facial droop, aphasia, dysarthria, and right hemiparesis  Is improved  Neuro following  On statin  DOAC to be started likely today, CT head pending  Plan for LORI to eval cardioembolic source  CT A/P also ordered  She is on keppra for questionable seizure activity        2   Subarachnoid hemorrhage:     - CT head today showing continued improvement  Neurosurgery and neurology following  CT heads per neuro recs       3  Left upper extremity DVT:   -developed left 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   Patient was previously anticoagulated on Coumadin for this  Likely starting DOAC today        5  History of mitral valve replacement:      -status post bioprosthetic mitral valve replacement at [facility]  No complications      6  Sick sinus syndrome status post pacemaker placement:     -status post pacemaker placement        7  CKD stage 3    -Crn fluctuating but overall stable  c/t monitor BMPs       8  Anemia:     -She received 1 unit of PRBCs   Hgb is stable       9  Diabetes mellitus:       -accuchecks, sliding scale    Addendum:   Updated by RN that patient isn't taking her PO meds  Refuses then has increased somnolence  Likely waxing and waning as when I examined her she was fine  Will change necessary to meds to IV for now  Neuro aware and patient will likely have vEEG  Subjective:   States doing fine  No specific complaints  Understands they are going to check her heart today       Objective:     Vitals:   Temp (24hrs), Av 5 °F (36 9 °C), Min:98 1 °F (36 7 °C), Max:98 8 °F (37 1 °C)    HR:  [72-81] 72  Resp:  [16-18] 18  BP: (109-139)/(56-78) 139/78  SpO2:  [90 %-99 %] 90 %  Body mass index is 29 71 kg/m²  Input and Output Summary (last 24 hours): Intake/Output Summary (Last 24 hours) at 04/26/18 1034  Last data filed at 04/26/18 0602   Gross per 24 hour   Intake              120 ml   Output             1147 ml   Net            -1027 ml       Physical Exam:     Physical Exam     GEN: NAD  HEENT: PERRL  CARDIO: s1 s2 RRR  LUNGS: CTA  ABD: Soft, NT/ND  EXT: SCDs  Neuro: right UE decreased strength vs left, upper ext choreiform movements    Additional Data:     Labs:      Results from last 7 days  Lab Units 04/26/18  0508   WBC Thousand/uL 5 49   HEMOGLOBIN g/dL 8 9*   HEMATOCRIT % 28 2*   PLATELETS Thousands/uL 159   NEUTROS PCT % 40*   LYMPHS PCT % 25   MONOS PCT % 9   EOS PCT % 26*       Results from last 7 days  Lab Units 04/26/18  0508   SODIUM mmol/L 137   POTASSIUM mmol/L 4 0   CHLORIDE mmol/L 103   CO2 mmol/L 27   BUN mg/dL 35*   CREATININE mg/dL 1 42*   CALCIUM mg/dL 9 0   GLUCOSE RANDOM mg/dL 102       Results from last 7 days  Lab Units 04/23/18  0908   INR  1 95*       * I Have Reviewed All Lab Data Listed Above  * Additional Pertinent Lab Tests Reviewed: All Labs For Current Hospital Admission Reviewed    Imaging:    Imaging Reports Reviewed Today Include:  All available     Recent Cultures (last 7 days):           Last 24 Hours Medication List:     Current Facility-Administered Medications:  atorvastatin 80 mg Oral Daily With Kellee Hdz MD   docusate sodium 100 mg Oral BID Enio Fay MD   insulin lispro 1-5 Units Subcutaneous 4x Daily (AC & HS) Karine Prabhakar PA-C   labetalol 10 mg Intravenous Q4H PRN Kody Portillo MD   levETIRAcetam 500 mg Oral Q12H [address] Enio Fay MD   metoprolol tartrate 25 mg Oral Q12H [address] Enio Fay MD   pantoprazole 40 mg Oral Early Morning Enio Fay MD   potassium chloride 10 mEq Oral BID Benjamin Garcia MD   torsemide 10 mg Oral Daily Torrie Danielson MD        Today, Patient Was Seen By: Jayashree Doss MD    ** Please Note: This note has been constructed using a voice recognition system   **

## 2018-04-26 NOTE — ANESTHESIA PREPROCEDURE EVALUATION
Review of Systems/Medical History          Cardiovascular  Hypertension , CHF ,   Comment: s/p MVR,  Pulmonary  Negative pulmonary ROS   Comment: Former smoker     GI/Hepatic    GERD ,        Negative  ROS        Endo/Other  Diabetes well controlled type 2 Insulin,      GYN  Negative gynecology ROS          Hematology      Comment: h/o DVTs Musculoskeletal    Arthritis     Neurology    CVA , residual symptoms, Aphasia/Dysphagia,   Comment: Expressive aphasia; right sided weakness Psychology   Negative psychology ROS              Physical Exam    Airway    Mallampati score: III  TM Distance: >3 FB  Neck ROM: full     Dental   Comment: Multiple missing teeth upper and lower,     Cardiovascular  Rhythm: regular, Rate: normal,     Pulmonary  Pulmonary exam normal Breath sounds clear to auscultation,     Other Findings        Anesthesia Plan  ASA Score- 3     Anesthesia Type- IV sedation with anesthesia with ASA Monitors  Additional Monitors:   Airway Plan:         Plan Factors-    Induction- intravenous  Postoperative Plan-     Informed Consent- Anesthetic plan and risks discussed with patient

## 2018-04-26 NOTE — ANESTHESIA POSTPROCEDURE EVALUATION
Post-Op Assessment Note      CV Status:  Stable    Mental Status:  Awake    Hydration Status:  Euvolemic    PONV Controlled:  Controlled    Airway Patency:  Patent    Post Op Vitals Reviewed: Yes          Staff: CRNA           BP   121/60   Temp      Pulse  73   Resp   12   SpO2   100%

## 2018-04-26 NOTE — SPEECH THERAPY NOTE
Speech Language/Pathology    Speech/Language Pathology Progress Note    Patient Name: Izzy Kidney  QDHDF'W Date: 4/26/2018     Attempted to see pt for f/u dysphagia tx  Pt currently NPO for LORI  Will f/u as appropriate

## 2018-04-26 NOTE — SOCIAL WORK
CM met with Pt, her three daughters and two grandsons to discuss PT recommendation of acute rehab which they all reported being agreement to  Pt's family requested a referral be made to [facility] with back SNF choices of [facility] and [facility]  CM has made the requested referrals  CM will continue to follow

## 2018-04-26 NOTE — RESTORATIVE TECHNICIAN NOTE
Restorative Specialist Mobility Note       Activity:  (Educated/encouraged pt to ambulate with assistance 3-4 x's/day, pt refused )              Repositioned: Other (Comment) (Rep /sat pt upright in bed  Bed alarm on   Pt callbell, phone/tray within reach )       Jadon HONEYCUTT, Restorative Technician, [employer]

## 2018-04-26 NOTE — PROGRESS NOTES
The patient has been having episodes of alternating alertness, unresponsiveness, and agitation that last a few minutes at a time  The patient is refusing to take all of her medications at this time  Dr Ольга Muñoz and DIDIER Welsh were made aware of these episodes and the fact that the patient is refusing all of her medications  New orders to be noted  Continue to monitor

## 2018-04-26 NOTE — PROGRESS NOTES
Progress Note - Neurology   Taqueria Grissom 80 y o  female MRN: [id number]  Unit/Bed#: [facility] 705-01 Encounter: [id number]    Assessment/ Plan:  1  Waxing and waning agitation and decreased responsiveness - concerning for seizure activity in setting of CVAs and ? SAH  -will likely place on 24h VEEG monitoring s/p LORI   -Continue Keppra at current dosage for now  -Further recommendations to follow - please see attending attestation     2  Acute left MCA infarct s/p thrombectomy POD 3   -Repeat CTH demonstrates continued reabsorption of SAH as well as evolving nonhemorrhagic infarctions in L basal ganglia, likely resultant from known L MCA infarction   -Repeat CTH in am  Unable to receive MRI 2/2 pacer    -Hold anticoagulation/ antiplatelet for now    -LORI pending, to be completed today   -CT CAP w/ and w/o to assess for underlying malignancy cause a hypercoagulable state pending    -patient on Coumadin for recent upper extremity DVT, will switch to NOAC, likely Eliquis, after assessment of stroke size via repeat CT and appropriate waiting period    -patient has bioprosthetic valve for MVR, and thus no need to consider in regards to anticoagulation      Subjective:   Patient is demonstrated waxing and waning periods of agitation with decreased responsiveness/ lethargy  On exam today, she is alert and interactive  Participates in conversation and readily follows commands   Pt to receive LORI and repeat CTH  scheduled for this afternoon; will consider VEEG monitoring s/p LORI     ROS:  See subjective    Medications:  Scheduled Meds:    Current Facility-Administered Medications:  atorvastatin 80 mg Oral Daily With Torsten Phillips MD   docusate sodium 100 mg Oral BID Frank Paiz MD   insulin lispro 1-5 Units Subcutaneous 4x Daily (AC & HS) Yola Alvarez PA-C   labetalol 10 mg Intravenous Q4H PRN Laxmi Ocampo MD   levETIRAcetam 500 mg Intravenous Q12H Ul  Juan R Aguilar MD   metoprolol tartrate 25 mg Oral Q12H [address] Gayathri Germain MD   pantoprazole 40 mg Oral Early Morning Gayathri Germain MD   potassium chloride 10 mEq Oral BID Rj Santoyo MD   torsemide 10 mg Oral Daily Gayathri Germain MD     Continuous Infusions:   PRN Meds: labetalol      Vitals: Blood pressure 139/78, pulse 72, temperature 98 8 °F (37 1 °C), temperature source Oral, resp  rate 18, height 5' (1 524 m), weight 69 kg (152 lb 1 9 oz), SpO2 90 %, not currently breastfeeding  ,Body mass index is 29 71 kg/m²  Physical Exam:   Physical Exam   Constitutional: She appears well-developed and well-nourished  No distress  HENT:   Head: Normocephalic and atraumatic  Right Ear: External ear normal    Left Ear: External ear normal    Nose: Nose normal    Mouth/Throat: Oropharynx is clear and moist  No oropharyngeal exudate  No tongue bite noted   Eyes:   Patient has erythema without noticeable swelling noted left upper and lower eyelid    Conjunctivae within normal limits bilaterally   Neck: Normal range of motion  Neck supple  No tracheal deviation present  No thyromegaly present  Pulmonary/Chest: Effort normal  No respiratory distress  Musculoskeletal: Normal range of motion  She exhibits no edema, tenderness or deformity  Skin: Skin is warm and dry  No rash noted  She is not diaphoretic  No erythema  No pallor  Psychiatric: She has a normal mood and affect  Her speech is normal and behavior is normal    Nursing note and vitals reviewed  Neurologic Exam     Mental Status   Oriented to person  Oriented to place  (Able states she is in a hospital)  Follows 1 step commands  Attention: normal  Concentration: normal    Speech: speech is normal   Level of consciousness: drowsy ,  alert  Normal comprehension  Cranial Nerves   Cranial nerves II through XII intact       No gaze preference noted     Motor Exam   Muscle bulk: normal  Overall muscle tone: normal  Patient moves all extremities equally antigravity Sensory Exam   Light touch normal      Gait, Coordination, and Reflexes     Gait  Gait: (Deferred for safety)  Patient continues to demonstrate choreiform movements of bilateral upper extremities and left lower extremity, L>R        Lab, Imaging and other studies: I have personally reviewed pertinent reports       Recent Results (from the past 24 hour(s))   Fingerstick Glucose (POCT)    Collection Time: 04/25/18  4:05 PM   Result Value Ref Range    POC Glucose 171 (H) 65 - 140 mg/dl   Fingerstick Glucose (POCT)    Collection Time: 04/25/18  9:14 PM   Result Value Ref Range    POC Glucose 149 (H) 65 - 140 mg/dl   CBC and differential    Collection Time: 04/26/18  5:08 AM   Result Value Ref Range    WBC 5 49 4 31 - 10 16 Thousand/uL    RBC 3 17 (L) 3 81 - 5 12 Million/uL    Hemoglobin 8 9 (L) 11 5 - 15 4 g/dL    Hematocrit 28 2 (L) 34 8 - 46 1 %    MCV 89 82 - 98 fL    MCH 28 1 26 8 - 34 3 pg    MCHC 31 6 31 4 - 37 4 g/dL    RDW 15 6 (H) 11 6 - 15 1 %    MPV 10 4 8 9 - 12 7 fL    Platelets 864 070 - 519 Thousands/uL    nRBC 0 /100 WBCs    Neutrophils Relative 40 (L) 43 - 75 %    Lymphocytes Relative 25 14 - 44 %    Monocytes Relative 9 4 - 12 %    Eosinophils Relative 26 (H) 0 - 6 %    Basophils Relative 0 0 - 1 %    Neutrophils Absolute 2 14 1 85 - 7 62 Thousands/µL    Lymphocytes Absolute 1 39 0 60 - 4 47 Thousands/µL    Monocytes Absolute 0 50 0 17 - 1 22 Thousand/µL    Eosinophils Absolute 1 43 (H) 0 00 - 0 61 Thousand/µL    Basophils Absolute 0 02 0 00 - 0 10 Thousands/µL   Basic metabolic panel    Collection Time: 04/26/18  5:08 AM   Result Value Ref Range    Sodium 137 136 - 145 mmol/L    Potassium 4 0 3 5 - 5 3 mmol/L    Chloride 103 100 - 108 mmol/L    CO2 27 21 - 32 mmol/L    Anion Gap 7 4 - 13 mmol/L    BUN 35 (H) 5 - 25 mg/dL    Creatinine 1 42 (H) 0 60 - 1 30 mg/dL    Glucose 102 65 - 140 mg/dL    Calcium 9 0 8 3 - 10 1 mg/dL    eGFR 34 ml/min/1 73sq m   Fingerstick Glucose (POCT)    Collection Time: 04/26/18  6:46 AM   Result Value Ref Range    POC Glucose 121 65 - 140 mg/dl   Prepare RBC:Has consent been obtained? Yes; Date of Surgery: 4/23/2018; Where is the Surgery Scheduled? [facility]    Collection Time: 04/26/18  7:02 AM   Result Value Ref Range    Unit Product Code Q5115R15     Unit Number [id number]     Unit ABO A     Unit [facility] POS     Unit Dispense Status Presumed Trans     Unit Product Code G2379E81     Unit Number [id number]     Unit ABO A     Unit [facility] POS     Unit Dispense Status Return to Inv    Prepare RBC:Has consent been obtained? Yes; Date of Surgery: 4/23/2018; Where is the Surgery Scheduled? [facility]    Collection Time: 04/26/18  7:02 AM   Result Value Ref Range    Unit Product Code Y9543T86     Unit Number [id number]     Unit ABO A     Unit [facility] POS     Unit Dispense Status Return to [facility]     Unit Product Code U3323E88     Unit Number [id number]     Unit ABO A     Unit RH POS     Unit Dispense Status Return to Inv    ]    VTE Prophylaxis: Sequential compression device (Venodyne)     Counseling / Coordination of Care  Total time spent today 35 minutes  Greater than 50% of total time was spent with the patient and / or family counseling and / or coordination of care  A description of the counseling / coordination of care: The patient was seen examined myself the attending physician  Patient's chart was reviewed thoroughly, including imaging studies and laboratory values  The patient and her daughter were counseled in the room  The patient was discussed with nursing and Internal Medicine

## 2018-04-27 ENCOUNTER — APPOINTMENT (INPATIENT)
Dept: NEUROLOGY | Facility: AMBULATORY SURGERY CENTER | Age: 83
DRG: 023 | End: 2018-04-27
Payer: MEDICARE

## 2018-04-27 ENCOUNTER — APPOINTMENT (OUTPATIENT)
Dept: CARDIAC REHAB | Facility: CLINIC | Age: 83
End: 2018-04-27
Payer: MEDICARE

## 2018-04-27 LAB
ANION GAP SERPL CALCULATED.3IONS-SCNC: 7 MMOL/L (ref 4–13)
BASOPHILS # BLD AUTO: 0.03 THOUSANDS/ΜL (ref 0–0.1)
BASOPHILS NFR BLD AUTO: 1 % (ref 0–1)
BUN SERPL-MCNC: 25 MG/DL (ref 5–25)
CALCIUM SERPL-MCNC: 9.1 MG/DL (ref 8.3–10.1)
CHLORIDE SERPL-SCNC: 106 MMOL/L (ref 100–108)
CO2 SERPL-SCNC: 25 MMOL/L (ref 21–32)
CREAT SERPL-MCNC: 1.22 MG/DL (ref 0.6–1.3)
EOSINOPHIL # BLD AUTO: 1.04 THOUSAND/ΜL (ref 0–0.61)
EOSINOPHIL NFR BLD AUTO: 19 % (ref 0–6)
ERYTHROCYTE [DISTWIDTH] IN BLOOD BY AUTOMATED COUNT: 15.3 % (ref 11.6–15.1)
GFR SERPL CREATININE-BSD FRML MDRD: 41 ML/MIN/1.73SQ M
GLUCOSE SERPL-MCNC: 121 MG/DL (ref 65–140)
GLUCOSE SERPL-MCNC: 137 MG/DL (ref 65–140)
GLUCOSE SERPL-MCNC: 207 MG/DL (ref 65–140)
GLUCOSE SERPL-MCNC: 93 MG/DL (ref 65–140)
GLUCOSE SERPL-MCNC: 97 MG/DL (ref 65–140)
HCT VFR BLD AUTO: 27.8 % (ref 34.8–46.1)
HGB BLD-MCNC: 8.9 G/DL (ref 11.5–15.4)
LYMPHOCYTES # BLD AUTO: 1.04 THOUSANDS/ΜL (ref 0.6–4.47)
LYMPHOCYTES NFR BLD AUTO: 19 % (ref 14–44)
MCH RBC QN AUTO: 28.4 PG (ref 26.8–34.3)
MCHC RBC AUTO-ENTMCNC: 32 G/DL (ref 31.4–37.4)
MCV RBC AUTO: 89 FL (ref 82–98)
MONOCYTES # BLD AUTO: 0.52 THOUSAND/ΜL (ref 0.17–1.22)
MONOCYTES NFR BLD AUTO: 9 % (ref 4–12)
NEUTROPHILS # BLD AUTO: 2.91 THOUSANDS/ΜL (ref 1.85–7.62)
NEUTS SEG NFR BLD AUTO: 52 % (ref 43–75)
NRBC BLD AUTO-RTO: 0 /100 WBCS
PLATELET # BLD AUTO: 164 THOUSANDS/UL (ref 149–390)
PMV BLD AUTO: 10.3 FL (ref 8.9–12.7)
POTASSIUM SERPL-SCNC: 3.9 MMOL/L (ref 3.5–5.3)
RBC # BLD AUTO: 3.13 MILLION/UL (ref 3.81–5.12)
SODIUM SERPL-SCNC: 138 MMOL/L (ref 136–145)
WBC # BLD AUTO: 5.54 THOUSAND/UL (ref 4.31–10.16)

## 2018-04-27 PROCEDURE — 82948 REAGENT STRIP/BLOOD GLUCOSE: CPT

## 2018-04-27 PROCEDURE — 93325 DOPPLER ECHO COLOR FLOW MAPG: CPT | Performed by: INTERNAL MEDICINE

## 2018-04-27 PROCEDURE — 99233 SBSQ HOSP IP/OBS HIGH 50: CPT | Performed by: PSYCHIATRY & NEUROLOGY

## 2018-04-27 PROCEDURE — 85025 COMPLETE CBC W/AUTO DIFF WBC: CPT | Performed by: INTERNAL MEDICINE

## 2018-04-27 PROCEDURE — 92526 ORAL FUNCTION THERAPY: CPT

## 2018-04-27 PROCEDURE — 99233 SBSQ HOSP IP/OBS HIGH 50: CPT | Performed by: INTERNAL MEDICINE

## 2018-04-27 PROCEDURE — 93320 DOPPLER ECHO COMPLETE: CPT | Performed by: INTERNAL MEDICINE

## 2018-04-27 PROCEDURE — 80048 BASIC METABOLIC PNL TOTAL CA: CPT | Performed by: INTERNAL MEDICINE

## 2018-04-27 PROCEDURE — 93312 ECHO TRANSESOPHAGEAL: CPT | Performed by: INTERNAL MEDICINE

## 2018-04-27 PROCEDURE — 95951 PR EEG MONITORING/VIDEORECORD: CPT | Performed by: PSYCHIATRY & NEUROLOGY

## 2018-04-27 PROCEDURE — 95951 HB EEG MONITORING/VIDEORECORD: CPT

## 2018-04-27 RX ORDER — ACETAMINOPHEN 325 MG/1
650 TABLET ORAL EVERY 6 HOURS PRN
Status: DISCONTINUED | OUTPATIENT
Start: 2018-04-27 | End: 2018-04-30 | Stop reason: HOSPADM

## 2018-04-27 RX ADMIN — METOPROLOL TARTRATE 5 MG: 5 INJECTION, SOLUTION INTRAVENOUS at 01:52

## 2018-04-27 RX ADMIN — METOPROLOL TARTRATE 5 MG: 5 INJECTION, SOLUTION INTRAVENOUS at 11:56

## 2018-04-27 RX ADMIN — TORSEMIDE 10 MG: 20 TABLET ORAL at 09:03

## 2018-04-27 RX ADMIN — LEVETIRACETAM 500 MG: 100 INJECTION, SOLUTION INTRAVENOUS at 09:05

## 2018-04-27 RX ADMIN — INSULIN LISPRO 1 UNITS: 100 INJECTION, SOLUTION INTRAVENOUS; SUBCUTANEOUS at 22:26

## 2018-04-27 RX ADMIN — ACETAMINOPHEN 650 MG: 325 TABLET, FILM COATED ORAL at 22:26

## 2018-04-27 RX ADMIN — DOCUSATE SODIUM 100 MG: 100 CAPSULE, LIQUID FILLED ORAL at 17:02

## 2018-04-27 RX ADMIN — POTASSIUM CHLORIDE 10 MEQ: 750 TABLET, EXTENDED RELEASE ORAL at 17:02

## 2018-04-27 RX ADMIN — POTASSIUM CHLORIDE 10 MEQ: 750 TABLET, EXTENDED RELEASE ORAL at 09:03

## 2018-04-27 RX ADMIN — DOCUSATE SODIUM 100 MG: 100 CAPSULE, LIQUID FILLED ORAL at 09:05

## 2018-04-27 RX ADMIN — APIXABAN 2.5 MG: 2.5 TABLET, FILM COATED ORAL at 11:56

## 2018-04-27 RX ADMIN — METOPROLOL TARTRATE 5 MG: 5 INJECTION, SOLUTION INTRAVENOUS at 17:02

## 2018-04-27 RX ADMIN — METOPROLOL TARTRATE 5 MG: 5 INJECTION, SOLUTION INTRAVENOUS at 06:36

## 2018-04-27 RX ADMIN — APIXABAN 2.5 MG: 2.5 TABLET, FILM COATED ORAL at 17:02

## 2018-04-27 RX ADMIN — PANTOPRAZOLE SODIUM 40 MG: 40 TABLET, DELAYED RELEASE ORAL at 06:37

## 2018-04-27 RX ADMIN — ATORVASTATIN CALCIUM 80 MG: 80 TABLET, FILM COATED ORAL at 17:02

## 2018-04-27 NOTE — OCCUPATIONAL THERAPY NOTE
Occupational Therapy Cancel Note:    Occupational therapy attempted AM and PM however pt sleeping soundly with both attempts/ due to pt medications this treatment date  OT will attempt again next treatment       Gutierrez Robert

## 2018-04-27 NOTE — PLAN OF CARE
Problem: SLP ADULT - COMMUNICATION, IMPAIRED  Goal: Demonstrates communication skills at highest level of function for planned discharge setting  See evaluation for individualized goals  Pt will tolerate least restrictive diet w/out s/s aspiration or oral/pharyngeal difficulties          Outcome: Progressing  Patient will be able to:

## 2018-04-27 NOTE — PROGRESS NOTES
Patients family requested someone come to evaluate the patient as she was not responding to their questions with her typical answers  Upon arrival to the patients room, she was found sitting upright, staring outward  Patient was asked a series of questions, she was not able to tell the date or her date of birth, but she was able to state whom the current [place] president is  Patient began to answer more questions appropriately including her birthday  SLIM was made aware of situation as well as Dr Plata Riding with EMU monitoring

## 2018-04-27 NOTE — CASE MANAGEMENT
Continued Stay Review    Date: 4-27-18     Vital Signs: /65 (BP Location: Right arm)   Pulse 68   Temp 99 7 °F (37 6 °C) (Axillary)   Resp 18   Ht 5' (1 524 m)   Wt 69 3 kg (152 lb 12 5 oz)   LMP  (LMP Unknown)   SpO2 96%   BMI 29 84 kg/m²     Medications:   Scheduled Meds:   Current Facility-Administered Medications:  apixaban 2 5 mg Oral BID   atorvastatin 80 mg Oral Daily With Dinner   barium sulfate 900 mL Oral 90 min pre-procedure   docusate sodium 100 mg Oral BID   insulin lispro 1-5 Units Subcutaneous 4x Daily (AC & HS)   labetalol 10 mg Intravenous Q4H PRN   metoprolol 5 mg Intravenous Q6H   pantoprazole 40 mg Oral Early Morning   potassium chloride 10 mEq Oral BID   torsemide 10 mg Oral Daily     Continuous Infusions:    PRN Meds: labetalol    Abnormal Labs/Diagnostic Results    Lab Units 04/27/18  0525   HEMOGLOBIN g/dL 8 9*   HEMATOCRIT % 27 8*   EOS PCT % 19         Age/Sex: 80 y o  female         Assessment/Plan:     1  Left MCA territory ischemic stroke:    -s/p thrombectomy by IR   Patient was found to have left gaze preference, right facial droop, aphasia, dysarthria, and right hemiparesis  Is improved   Neuro following   On statin   DOAC started  repeat CT head ok   LORI to eval without vegetation  CT A/P with colonic lesion  Per daughter pt has hx of colon Ca which was removed and has been having surveillance Cscopes with Dr Mota Stage  Unable to view any records  She will need to follow up with Dr Mota Stage        2   Subarachnoid hemorrhage:     - Stable   Neurosurgery and neurology following       3  Left upper extremity DVT:   -developed left upper extremity DVT after pacemaker placement in January 2018   Patient was previously anticoagulated on Coumadin for this  Christopher Wagner started       5   History of mitral valve replacement:      -status post bioprosthetic mitral valve replacement at [facility]  No complications      6  Sick sinus syndrome status post pacemaker placement:     -status post pacemaker placement        7  CKD stage 3    -Crn fluctuating but overall stable  c/t monitor BMPs       8  Anemia:     -She received 1 unit of PRBCs   Hgb is stable       9  Diabetes mellitus:       -accuchecks, sliding scale     10  Metabolic encephalopathy   - Mental status waxing and waning  Thought maybe due to keppra and has been discontinued  Is on vEEG  Ammonia level sent  BG ok  BMP without electrolyte abnormality or uremia        Subjective:   Alertness waxing and waning     On vEEG      Discharge Plan:referral be made to [facility] with back SNF choices of [facility] and [facility]

## 2018-04-27 NOTE — SPEECH THERAPY NOTE
Speech Language/Pathology    Speech/Language Pathology Progress Note    Patient Name: Marco Antonio Beckett  JNXKS'P Date: 4/27/2018     Problem List  Patient Active Problem List   Diagnosis    [facility]DVT) [facility] upper extremity [facility]    H/O mitral valve replacement    CVA (cerebral vascular accident) ([facility] )    Controlled type 2 diabetes mellitus, without long-term current use of insulin ([facility] )    Pacemaker    Acid reflux    HTN (hypertension)    Constipation    CHADWICK (acute kidney injury) ([facility] )    Systolic congestive heart failure ([facility] )    Acute ischemic left MCA stroke ([facility] )    History of ischemic right MCA stroke        Past Medical History  Past Medical History:   Diagnosis Date    Acid reflux     on occ    Arthritis     DJD right hip replaced    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 with no change    Cancer ([place] [address] ) 2007    colonic polyps, no surgery done    Deep vein thrombosis (DVT) of left upper extremity ([facility] ) 12/11/2017    Diabetes mellitus ([facility] )     type 2    Diverticulosis     Edema     in legs on occ    Hypertension     on occ    Language barrier     speaks Vietnamese & broken english        Past Surgical History  Past Surgical History:   Procedure Laterality Date    COLON SURGERY      COLONOSCOPY      COLONOSCOPY N/A 11/2/2016    Procedure: COLONOSCOPY;  Surgeon: Jackelyn Jackman MD;  Location: [place] [address] GI LAB; Service:     ESOPHAGOGASTRODUODENOSCOPY N/A 11/2/2016    Procedure: ESOPHAGOGASTRODUODENOSCOPY (EGD); Surgeon: Jackelyn Jackman MD;  Location: [facility];   Service:     JOINT REPLACEMENT Right 02/2015    hip    JOINT REPLACEMENT Left     knee    JOINT REPLACEMENT Right     knee    TN REVISE MEDIAN N/CARPAL TUNNEL SURG Left 1/28/2016    Procedure: RELEASE CARPAL TUNNEL;  Surgeon: Sergio Blackwell MD;  Location: [facility] MAIN OR;  Service: Orthopedics    TUBAL LIGATION      VARICOSE VEIN SURGERY Bilateral          Subjective:    Pt was seen for diagnostic dysphagia treatment during lunch meal to assess for diet tolerance  Pt was alert and cooperative  Family present at bedside  Objective:    Pt was assessed during lunch meal with french fries, chopped green beans, ice cream, 4 oz thin liquids by cup/straw  Restraints loose upon arrival and pt was attempting to self-feed  Daughter was unclipping restraint upon arrival   Pt demonstrated mild difficulty with self-feeding requiring occasional verbal and tactile cues for coordination and recognition of bolus  Mastication was prolonged with all oral intake however rotary chew pattern noted with adequate breakdown and oral clearance  Pt's daughter stated that this is normal for her and she usually takes a long time to chew  There were no s/s of pharyngeal dysphagia with level 3 trials  With thin liquids pt had one dry cough after rapid successive sips of thin liquids  However no s/s of aspiration noted with single sips by cup or straw  Voice remained clear post all swallows  PO intake was poor as pt ate total of 5 french fries, 2 bites ice cream, 3 bites green beans, and 4 oz liquids before refusing additional PO  When RN administered medication whole in ice cream (small pill) pt required liquid wash to fully clear as she was attempting to chew the pill  SLP informed PCA that pt's daughter had unlocked restraint, she asked that we re-attach restraints  PCA re-attached restraints and tightened them to appropriate level  Assessment:    Pt continues to demonstrate prolonged mastication with dental soft diet however with extra time breakdown and oral clearance are WFL  There were no overt s/s of aspiration with solids or thin liquids with small bites/sips however aspiration risk is present with successive sips       Plan/Recommendations:    Continue with current diet as tolerated (dysphagia level 3, thin liquids)  Single sips  If pt is demonstrating significantly prolonged mastication she may benefit from downgrade to level 2  Medications crushed as able  Full supervision during meals and assistance with feeding  Speech to follow  Nutrition/dietician consult - poor PO intake

## 2018-04-27 NOTE — PROGRESS NOTES
[facility] Internal Medicine Progress Note  Patient: Junie More 80 y o  female   MRN: [id number]  PCP: Sara Martin MD  Unit/Bed#: [facility] 542-36 Encounter: [id number]  Date Of Visit: 18    A/P:  1  Left MCA territory ischemic stroke:    -s/p thrombectomy by IR   Patient was found to have left gaze preference, right facial droop, aphasia, dysarthria, and right hemiparesis  Is improved   Neuro following   On statin   DOAC started  repeat CT head ok  LORI to eval without vegetation  CT A/P with colonic lesion  Per daughter pt has hx of colon Ca which was removed and has been having surveillance Cscopes with Dr Maria Dolores Devlin  Unable to view any records  She will need to follow up with Dr Maria Dolores Devlin        2   Subarachnoid hemorrhage:     - Stable   Neurosurgery and neurology following       3  Left upper extremity DVT:   -developed left 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   Patient was previously anticoagulated on Coumadin for this  DOAC started       5  History of mitral valve replacement:      -status post bioprosthetic mitral valve replacement at [facility]  No complications      6  Sick sinus syndrome status post pacemaker placement:     -status post pacemaker placement        7  CKD stage 3    -Crn fluctuating but overall stable  c/t monitor BMPs       8  Anemia:     -She received 1 unit of PRBCs   Hgb is stable       9  Diabetes mellitus:       -accuchecks, sliding scale    10  Metabolic encephalopathy   - Mental status waxing and waning  Thought maybe due to keppra and has been discontinued  Is on vEEG  Ammonia level sent  BG ok  BMP without electrolyte abnormality or uremia  Subjective:   Alertness waxing and waning  On vEEG  Daughter at bedside       Objective:     Vitals:   Temp (24hrs), Av 5 °F (37 5 °C), Min:98 1 °F (36 7 °C), Max:100 1 °F (37 8 °C)    HR:  [68-76] 68  Resp:  [18] 18  BP: ()/(46-66) 132/65  SpO2:  [96 %-99 %] 96 %  Body mass index is 29 84 kg/m²  Input and Output Summary (last 24 hours): Intake/Output Summary (Last 24 hours) at 04/27/18 1250  Last data filed at 04/27/18 3360   Gross per 24 hour   Intake              720 ml   Output              748 ml   Net              -28 ml       Physical Exam:     Physical Exam    GEN: NAD, waxing and waning mental status   HEENT: PERRL  CARDIO: s1 s2 RRR  LUNGS: CTA  ABD: Soft, NT/ND  EXT: SCDs  Neuro: alert to verbal stimuli, waxing and waning during exam      Additional Data:     Labs:      Results from last 7 days  Lab Units 04/27/18  0525   WBC Thousand/uL 5 54   HEMOGLOBIN g/dL 8 9*   HEMATOCRIT % 27 8*   PLATELETS Thousands/uL 164   NEUTROS PCT % 52   LYMPHS PCT % 19   MONOS PCT % 9   EOS PCT % 19*       Results from last 7 days  Lab Units 04/27/18  0525   SODIUM mmol/L 138   POTASSIUM mmol/L 3 9   CHLORIDE mmol/L 106   CO2 mmol/L 25   BUN mg/dL 25   CREATININE mg/dL 1 22   CALCIUM mg/dL 9 1   GLUCOSE RANDOM mg/dL 93       Results from last 7 days  Lab Units 04/23/18  0908   INR  1 95*       * I Have Reviewed All Lab Data Listed Above  * Additional Pertinent Lab Tests Reviewed: All Labs For Current Hospital Admission Reviewed    Imaging:    Imaging Reports Reviewed Today Include:  All available       Recent Cultures (last 7 days):           Last 24 Hours Medication List:     Current Facility-Administered Medications:  apixaban 2 5 mg Oral BID Joi Simpson PA-C   atorvastatin 80 mg Oral Daily With Kirsten Yousif MD   barium sulfate 900 mL Oral 90 min pre-procedure Meche Rm MD   docusate sodium 100 mg Oral BID Belén Gamino MD   insulin lispro 1-5 Units Subcutaneous 4x Daily (AC & HS) Mayra Solomon PA-C   labetalol 10 mg Intravenous Q4H PRN Estevan Fegn MD   metoprolol 5 mg Intravenous Q6H Meche Rm MD   pantoprazole 40 mg Oral Early Morning Belén Gamino MD   potassium chloride 10 mEq Oral BID Meche Rm MD   torsemide 10 mg Oral Daily Gayathri Germain MD        Today, Patient Was Seen By: Rj Santoyo MD    ** Please Note: This note has been constructed using a voice recognition system   **

## 2018-04-27 NOTE — PROGRESS NOTES
Progress Note - Neurology   Madeleine Baez 80 y o  female MRN: [id number]  Unit/Bed#: [facility] 725-75 Encounter: [id number]    Assessment/ Plan:  1  Waxing and waning agitation and decreased responsiveness - concerning for seizure activity in setting of CVAs and ? SAH vs metabolic/ medication - induced encephalopathy  -VEEG initiated late evening of 4/26/18 - demonstrates diffuse slowing but no epileptiform discharges or eptilogenic focus to date   -Will DC keppra and reassess for improved mental status as well as epileptic activity on VEEG   -B12, folate, ammonia, pending   -TSH with T4 WNL   -Recommend regulation of sleep wake cycle and limiting CNS altering medications as practical   -Will continue to follow, please monitor neuro exam and notify with changes     2  Acute left MCA infarct s/p thrombectomy POD 4   -Repeat CTH demonstrates resolving SAH as well as decreasing hyperdensity in the left insular and the sylvian cistern and suprasellar cistern and decreasing hyperdensity noted in the cisterna lamina terminalis extending along the course of the left MCA and in  the prepontine cistern  Additional stable hypodensity seen in the right parietal region, probable sequela of chronic infarct    -Unable to receive MRI 2/2 pacer    -LORI demonstrates mildly dilated L atria w/o thrombus and 3mm PFO    -Eliquis 2 5mg PO BID    -patient has bioprosthetic valve for MVR, and thus no need to consider in regards to anticoagulation      Subjective:   Patient continues to demonstrate waxing and waning periods of agitation with decreased responsiveness/ lethargy  Per daughter, the pt has been increasingly drowsy in the mornings, specifically more drowsy after taking AM keppra  The pt's daughter reports that she "perks up and gets her first wind" between 1-3pm    On exam today, the pt is very drowsy but arousable  Able to follow commands with repeated verbal and physical stimuli    Unable to participate in continued conversation and sleeps when undisturbed  No focal deficits on exam, including no aphasia or confusion  Remainder of neurological exam as detailed below  Unable to perform formal ROS 2/2 to drowsiness  Will continue VEEG monitoring and assess for reversible causes, including sedating effects of Keppra  LORI demonstrates 3mm PFO and mildly dilated L atria  Will initiate eliquis as previously stated  ROS:  See subjective    Medications:  Scheduled Meds:    Current Facility-Administered Medications:  atorvastatin 80 mg Oral Daily With Cleophus Galeazzi, MD    barium sulfate 900 mL Oral 90 min pre-procedure Karma Uribe MD    docusate sodium 100 mg Oral BID Cruz Skiff, MD    insulin lispro 1-5 Units Subcutaneous 4x Daily (AC & HS) Lucina Dominguez PA-C    labetalol 10 mg Intravenous Q4H PRN Manuel Mazariegos MD    levETIRAcetam 500 mg Intravenous Q12H [address] Karma Uribe MD Last Rate: 500 mg (04/27/18 0905)   metoprolol 5 mg Intravenous Q6H Karma Uribe MD    pantoprazole 40 mg Oral Early Morning Cruz Skiff, MD    potassium chloride 10 mEq Oral BID Karma Uribe MD    torsemide 10 mg Oral Daily Cruz Skiff, MD      Continuous Infusions:   PRN Meds: labetalol      Vitals: Blood pressure 136/66, pulse 72, temperature 100 1 °F (37 8 °C), temperature source Oral, resp  rate 18, height 5' (1 524 m), weight 69 3 kg (152 lb 12 5 oz), SpO2 96 %, not currently breastfeeding  ,Body mass index is 29 84 kg/m²  Physical Exam:   Physical Exam   Constitutional: She appears well-developed and well-nourished  No distress  HENT:   Head: Normocephalic and atraumatic  Right Ear: External ear normal    Left Ear: External ear normal    Nose: Nose normal    Mouth/Throat: Oropharynx is clear and moist  No oropharyngeal exudate  Eyes:   Conjunctivae within normal limits bilaterally   Neck: Normal range of motion  Neck supple  No tracheal deviation present  No thyromegaly present  Pulmonary/Chest: Effort normal  No respiratory distress  Musculoskeletal: Normal range of motion  She exhibits no edema, tenderness or deformity  Skin: Skin is warm and dry  No rash noted  She is not diaphoretic  No erythema  No pallor  Psychiatric: She has a normal mood and affect  Her speech is normal and behavior is normal    Nursing note and vitals reviewed  Neurologic Exam     Mental Status   Oriented to person  Oriented to place  (Able states she is in a hospital)  Follows commands: Intermittently follow 1 step commands    Attention: decreased  Concentration: decreased  Speech: speech is normal   Level of consciousness: drowsy ,  arousable by tactile stimuli  Abnormal comprehension  Cranial Nerves   Cranial nerves II through XII intact  No gaze preference noted     Motor Exam   Muscle bulk: normal  Overall muscle tone: normal  Patient moves all extremities equally antigravity     Sensory Exam   Light touch normal      Gait, Coordination, and Reflexes     Gait  Gait: (Deferred for safety)  Reduced choreiform movements- minimally present on exam today       Lab, Imaging and other studies: I have personally reviewed pertinent reports       Recent Results (from the past 24 hour(s))   Fingerstick Glucose (POCT)    Collection Time: 04/26/18 11:15 AM   Result Value Ref Range    POC Glucose 133 65 - 140 mg/dl   Fingerstick Glucose (POCT)    Collection Time: 04/26/18  8:21 PM   Result Value Ref Range    POC Glucose 191 (H) 65 - 140 mg/dl   Fingerstick Glucose (POCT)    Collection Time: 04/26/18  9:46 PM   Result Value Ref Range    POC Glucose 157 (H) 65 - 140 mg/dl   CBC and differential    Collection Time: 04/27/18  5:25 AM   Result Value Ref Range    WBC 5 54 4 31 - 10 16 Thousand/uL    RBC 3 13 (L) 3 81 - 5 12 Million/uL    Hemoglobin 8 9 (L) 11 5 - 15 4 g/dL    Hematocrit 27 8 (L) 34 8 - 46 1 %    MCV 89 82 - 98 fL    MCH 28 4 26 8 - 34 3 pg    MCHC 32 0 31 4 - 37 4 g/dL    RDW 15 3 (H) 11 6 - 15 1 %    MPV 10 3 8 9 - 12 7 fL    Platelets 131 979 - 055 Thousands/uL    nRBC 0 /100 WBCs    Neutrophils Relative 52 43 - 75 %    Lymphocytes Relative 19 14 - 44 %    Monocytes Relative 9 4 - 12 %    Eosinophils Relative 19 (H) 0 - 6 %    Basophils Relative 1 0 - 1 %    Neutrophils Absolute 2 91 1 85 - 7 62 Thousands/µL    Lymphocytes Absolute 1 04 0 60 - 4 47 Thousands/µL    Monocytes Absolute 0 52 0 17 - 1 22 Thousand/µL    Eosinophils Absolute 1 04 (H) 0 00 - 0 61 Thousand/µL    Basophils Absolute 0 03 0 00 - 0 10 Thousands/µL   Basic metabolic panel    Collection Time: 04/27/18  5:25 AM   Result Value Ref Range    Sodium 138 136 - 145 mmol/L    Potassium 3 9 3 5 - 5 3 mmol/L    Chloride 106 100 - 108 mmol/L    CO2 25 21 - 32 mmol/L    Anion Gap 7 4 - 13 mmol/L    BUN 25 5 - 25 mg/dL    Creatinine 1 22 0 60 - 1 30 mg/dL    Glucose 93 65 - 140 mg/dL    Calcium 9 1 8 3 - 10 1 mg/dL    eGFR 41 ml/min/1 73sq m   Fingerstick Glucose (POCT)    Collection Time: 04/27/18  6:23 AM   Result Value Ref Range    POC Glucose 97 65 - 140 mg/dl   ]    VTE Prophylaxis: Sequential compression device (Venodyne)     Counseling / Coordination of Care  Total time spent today 30 minutes  Greater than 50% of total time was spent with the patient and / or family counseling and / or coordination of care  A description of the counseling / coordination of care: The patient was seen examined myself the attending physician  Patient's chart was reviewed thoroughly, including imaging studies and laboratory values  The patient and her daughter were counseled in the room  The patient was discussed with the attending epileptologist and Internal Medicine

## 2018-04-28 LAB
AMMONIA PLAS-SCNC: 23 UMOL/L (ref 11–35)
ANION GAP SERPL CALCULATED.3IONS-SCNC: 8 MMOL/L (ref 4–13)
BASOPHILS # BLD AUTO: 0.03 THOUSANDS/ΜL (ref 0–0.1)
BASOPHILS NFR BLD AUTO: 1 % (ref 0–1)
BUN SERPL-MCNC: 30 MG/DL (ref 5–25)
CALCIUM SERPL-MCNC: 9.3 MG/DL (ref 8.3–10.1)
CHLORIDE SERPL-SCNC: 104 MMOL/L (ref 100–108)
CO2 SERPL-SCNC: 26 MMOL/L (ref 21–32)
CREAT SERPL-MCNC: 1.54 MG/DL (ref 0.6–1.3)
EOSINOPHIL # BLD AUTO: 0.91 THOUSAND/ΜL (ref 0–0.61)
EOSINOPHIL NFR BLD AUTO: 16 % (ref 0–6)
ERYTHROCYTE [DISTWIDTH] IN BLOOD BY AUTOMATED COUNT: 15.2 % (ref 11.6–15.1)
FOLATE SERPL-MCNC: >20 NG/ML (ref 3.1–17.5)
GFR SERPL CREATININE-BSD FRML MDRD: 31 ML/MIN/1.73SQ M
GLUCOSE SERPL-MCNC: 103 MG/DL (ref 65–140)
GLUCOSE SERPL-MCNC: 133 MG/DL (ref 65–140)
GLUCOSE SERPL-MCNC: 150 MG/DL (ref 65–140)
GLUCOSE SERPL-MCNC: 177 MG/DL (ref 65–140)
GLUCOSE SERPL-MCNC: 88 MG/DL (ref 65–140)
HCT VFR BLD AUTO: 30.1 % (ref 34.8–46.1)
HGB BLD-MCNC: 9.5 G/DL (ref 11.5–15.4)
LYMPHOCYTES # BLD AUTO: 1.35 THOUSANDS/ΜL (ref 0.6–4.47)
LYMPHOCYTES NFR BLD AUTO: 24 % (ref 14–44)
MCH RBC QN AUTO: 28.2 PG (ref 26.8–34.3)
MCHC RBC AUTO-ENTMCNC: 31.6 G/DL (ref 31.4–37.4)
MCV RBC AUTO: 89 FL (ref 82–98)
MONOCYTES # BLD AUTO: 0.58 THOUSAND/ΜL (ref 0.17–1.22)
MONOCYTES NFR BLD AUTO: 10 % (ref 4–12)
NEUTROPHILS # BLD AUTO: 2.75 THOUSANDS/ΜL (ref 1.85–7.62)
NEUTS SEG NFR BLD AUTO: 49 % (ref 43–75)
NRBC BLD AUTO-RTO: 0 /100 WBCS
PLATELET # BLD AUTO: 183 THOUSANDS/UL (ref 149–390)
PMV BLD AUTO: 10 FL (ref 8.9–12.7)
POTASSIUM SERPL-SCNC: 4.2 MMOL/L (ref 3.5–5.3)
RBC # BLD AUTO: 3.37 MILLION/UL (ref 3.81–5.12)
SODIUM SERPL-SCNC: 138 MMOL/L (ref 136–145)
VIT B12 SERPL-MCNC: 798 PG/ML (ref 100–900)
WBC # BLD AUTO: 5.64 THOUSAND/UL (ref 4.31–10.16)

## 2018-04-28 PROCEDURE — 97535 SELF CARE MNGMENT TRAINING: CPT

## 2018-04-28 PROCEDURE — 99233 SBSQ HOSP IP/OBS HIGH 50: CPT | Performed by: INTERNAL MEDICINE

## 2018-04-28 PROCEDURE — 97110 THERAPEUTIC EXERCISES: CPT

## 2018-04-28 PROCEDURE — 97530 THERAPEUTIC ACTIVITIES: CPT

## 2018-04-28 PROCEDURE — 97116 GAIT TRAINING THERAPY: CPT

## 2018-04-28 PROCEDURE — 95951 PR EEG MONITORING/VIDEORECORD: CPT | Performed by: PSYCHIATRY & NEUROLOGY

## 2018-04-28 PROCEDURE — 85025 COMPLETE CBC W/AUTO DIFF WBC: CPT | Performed by: INTERNAL MEDICINE

## 2018-04-28 PROCEDURE — 80048 BASIC METABOLIC PNL TOTAL CA: CPT | Performed by: INTERNAL MEDICINE

## 2018-04-28 PROCEDURE — 82746 ASSAY OF FOLIC ACID SERUM: CPT | Performed by: PHYSICIAN ASSISTANT

## 2018-04-28 PROCEDURE — 82948 REAGENT STRIP/BLOOD GLUCOSE: CPT

## 2018-04-28 PROCEDURE — 99233 SBSQ HOSP IP/OBS HIGH 50: CPT | Performed by: PSYCHIATRY & NEUROLOGY

## 2018-04-28 PROCEDURE — 82607 VITAMIN B-12: CPT | Performed by: PHYSICIAN ASSISTANT

## 2018-04-28 PROCEDURE — 82140 ASSAY OF AMMONIA: CPT | Performed by: PHYSICIAN ASSISTANT

## 2018-04-28 RX ORDER — METOPROLOL TARTRATE 50 MG/1
50 TABLET, FILM COATED ORAL EVERY 12 HOURS SCHEDULED
Status: DISCONTINUED | OUTPATIENT
Start: 2018-04-28 | End: 2018-04-30 | Stop reason: HOSPADM

## 2018-04-28 RX ADMIN — METOPROLOL TARTRATE 5 MG: 5 INJECTION, SOLUTION INTRAVENOUS at 16:59

## 2018-04-28 RX ADMIN — DOCUSATE SODIUM 100 MG: 100 CAPSULE, LIQUID FILLED ORAL at 08:34

## 2018-04-28 RX ADMIN — INSULIN LISPRO 1 UNITS: 100 INJECTION, SOLUTION INTRAVENOUS; SUBCUTANEOUS at 16:59

## 2018-04-28 RX ADMIN — DOCUSATE SODIUM 100 MG: 100 CAPSULE, LIQUID FILLED ORAL at 17:00

## 2018-04-28 RX ADMIN — APIXABAN 2.5 MG: 2.5 TABLET, FILM COATED ORAL at 17:00

## 2018-04-28 RX ADMIN — METOPROLOL TARTRATE 50 MG: 50 TABLET ORAL at 21:38

## 2018-04-28 RX ADMIN — INSULIN LISPRO 1 UNITS: 100 INJECTION, SOLUTION INTRAVENOUS; SUBCUTANEOUS at 21:37

## 2018-04-28 RX ADMIN — METOPROLOL TARTRATE 5 MG: 5 INJECTION, SOLUTION INTRAVENOUS at 06:21

## 2018-04-28 RX ADMIN — METOPROLOL TARTRATE 5 MG: 5 INJECTION, SOLUTION INTRAVENOUS at 11:46

## 2018-04-28 RX ADMIN — POTASSIUM CHLORIDE 10 MEQ: 750 TABLET, EXTENDED RELEASE ORAL at 08:34

## 2018-04-28 RX ADMIN — PANTOPRAZOLE SODIUM 40 MG: 40 TABLET, DELAYED RELEASE ORAL at 06:21

## 2018-04-28 RX ADMIN — POTASSIUM CHLORIDE 10 MEQ: 750 TABLET, EXTENDED RELEASE ORAL at 17:00

## 2018-04-28 RX ADMIN — ATORVASTATIN CALCIUM 80 MG: 80 TABLET, FILM COATED ORAL at 16:59

## 2018-04-28 RX ADMIN — APIXABAN 2.5 MG: 2.5 TABLET, FILM COATED ORAL at 08:34

## 2018-04-28 RX ADMIN — TORSEMIDE 10 MG: 20 TABLET ORAL at 08:34

## 2018-04-28 NOTE — PLAN OF CARE
Problem: OCCUPATIONAL THERAPY ADULT  Goal: Performs self-care activities at highest level of function for planned discharge setting  See evaluation for individualized goals  Treatment Interventions: ADL retraining, Functional transfer training, Endurance training, Cognitive reorientation, Equipment evaluation/education, UE strengthening/ROM, Patient/family training, Neuromuscular reeducation, Compensatory technique education, Fine motor coordination activities, Continued evaluation, Energy conservation, Activityengagement          See flowsheet documentation for full assessment, interventions and recommendations  Outcome: Progressing  Limitation: Decreased ADL status, Decreased UE strength, Decreased Safe judgement during ADL, Decreased cognition, Decreased endurance, Decreased sensation, Decreased fine motor control, Decreased self-care trans, Decreased high-level ADLs, Non-func L UE (BALANCE, MEDICAL STATUS)  Prognosis: Fair  Assessment: Pt participated in occupational therapy with focus on activity tolerance,  bed mob, unsupported sitting balance and tolerance for pt engagement in functional self-care task/oral care, informal cognition/pt orientation to place/situation and simple one step motor commands    Pt cleared by RN/An for pt participation in therapy  Pt received HOB raised/supine  and agreeable to therapy following pt Identifiers confirmed  Pt family member/ daughter Amy Cover present supportive throughout session  Pt requires assist for bed mob to move to edge of pt bed  for sitting balance  2* pt  Decreased overall strength  Pt initially require min A for unsupported sitting balance and coordination 2*  balance deficits  Pt required SBa for oral hygiene/teeth care 2* pt decreased functional transfers/mob and decreased standing tolerance and balance   Pt tolerated session well and able to attend well to L visual field during ADL and therapuetic activity   Pt unsupported sitting balance noted for improvement as session progressed  Pt will require in-pt rehab to continue to address pt deficits with decreased strength, coordination and balance which currently impair pt ADL and functional mob       OT Discharge Recommendation: Short Term Rehab  OT - OK to Discharge: Yes (to STR)

## 2018-04-28 NOTE — PHYSICAL THERAPY NOTE
Physical Therapy Progress Note        04/28/18 7701   Pain Assessment   Pain Assessment 0-10   Pain Score No Pain   Hospital Pain Intervention(s) Ambulation/increased activity;Repositioned;Distraction   Response to Interventions Tolerated   Restrictions/Precautions   Weight Bearing Precautions Per Order No   Other Precautions Chair Alarm;Cognitive; Fall Risk   General   Chart Reviewed Yes   Family/Caregiver Present Yes   Cognition   Arousal/Participation Alert; Cooperative   Comments Patient is pleasant and cooperative to participate in therapy   Transfers   Sit to Stand 4  Minimal assistance   Additional items Assist x 2; Increased time required;Verbal cues   Stand to Sit 4  Minimal assistance   Additional items Assist x 2; Increased time required;Verbal cues   Stand pivot 4  Minimal assistance   Additional items Assist x 2; Increased time required   Ambulation/Elevation   Gait pattern Ataxia; Excessively slow; Inconsistent renate; Shuffling;Narrow PARIS   Gait Assistance 4  Minimal assist   Additional items Assist x 2;Verbal cues; Tactile cues   Assistive Device Rolling walker   Distance 15 feet x 2   Balance   Static Sitting Fair +   Dynamic Sitting Fair -   Static Standing Poor +   Dynamic Standing Poor   Ambulatory Poor   Endurance Deficit   Endurance Deficit Yes   Activity Tolerance   Nurse Made Aware Appropriate to see per RN   Exercises   Hip Flexion Sitting;10 reps;Bilateral   Hip Abduction Sitting;10 reps;Bilateral   Hip Adduction Sitting;10 reps;Bilateral   Knee AROM Long Arc Quad Sitting;10 reps;Bilateral   Ankle Pumps Sitting;10 reps;Bilateral   Assessment   Prognosis Good   Problem List Decreased strength;Decreased range of motion;Decreased endurance; Impaired balance;Decreased mobility; Decreased coordination; Impaired judgement;Decreased safety awareness   Assessment Patient seated in bed side recliner with daughter, agreeable to participate in therapy   She demonstrates improved mobility as she is able to transfer to standing with less A and ambulate with max cues for proper gait improved distances  She was able to perform TE with less difficulty  Malathi Paul, PT aide present to translate to patient with good response  She would continue to benefit from skilled PT to maximize functional independence  Goals   Patient Goals None stated   STG Expiration Date 05/08/18   Treatment Day 2   Plan   Treatment/Interventions Functional transfer training;LE strengthening/ROM; Therapeutic exercise; Endurance training;Gait training;Spoke to nursing;Family   Progress Progressing toward goals   PT Frequency (6x a week)   Recommendation   Recommendation Post acute IP rehab   Equipment Recommended Walker  (Rolling)   PT - OK to Discharge Yes  (to rehab when medically stable)     Maryann Bangura, PTA

## 2018-04-28 NOTE — PLAN OF CARE
Problem: PHYSICAL THERAPY ADULT  Goal: Performs mobility at highest level of function for planned discharge setting  See evaluation for individualized goals  Treatment/Interventions: Functional transfer training, LE strengthening/ROM, Therapeutic exercise, Endurance training, Patient/family training, Equipment eval/education, Bed mobility, Gait training          See flowsheet documentation for full assessment, interventions and recommendations  Outcome: Progressing  Prognosis: Good  Problem List: Decreased strength, Decreased range of motion, Decreased endurance, Impaired balance, Decreased mobility, Decreased coordination, Impaired judgement, Decreased safety awareness  Assessment: Patient seated in bed side recliner with daughter, agreeable to participate in therapy  She demonstrates improved mobility as she is able to transfer to standing with less A and ambulate with max cues for proper gait improved distances  She was able to perform TE with less difficulty  Brayden Herman, PT aide present to translate to patient with good response  She would continue to benefit from skilled PT to maximize functional independence  Recommendation: Post acute IP rehab     PT - OK to Discharge: Yes (to rehab when medically stable)    See flowsheet documentation for full assessment

## 2018-04-28 NOTE — PROGRESS NOTES
[address] Hospitalist Service - Internal Medicine Progress Note      PATIENT INFORMATION      Patient: Cb Schneider 80 y o  female   MRN: [id number]  PCP: Lloyd Peralta MD  Unit/Bed#: [facility] 962-03 Encounter: [id number]  Date Of Visit: 04/28/18       ASSESSMENTS & PLAN        1  Acute left MCA CVA  · Status post thrombectomy earlier hospitalization - appreciate speech therapy evaluation and currently tolerating dysphagia/soft diet with thin liquids  · Echocardiogram did reveal a small left-to-right atrial shunt but no evidence of atrial thrombus or vegetations per radiology - OK to initiate anticoagulation (Eliquis) per neurology - vital signs stable - will need extensive PT/OT post discharge  · Continue statin therapy (Lipitor) - hold off anti-platelet therapy until cleared by neurosurgery     2  Subarachnoid hemorrhage  · Serial CT of head imaging reveals resolution - per neurology, OK to initiate anticoagulation hence on Eliquis BID - H/H stable     3  Acute metabolic encephalopathy  · Likely multifactorial secondary to acute CVA with subsequent SAH and Keppra side effect (see plan for individual assessments)   · Improved with resolution of aforementioned assessments and discontinuation of Keppra as video EEG was unremarkable    4  Chronic kidney disease stage 3  · Baseline creatinine of approximately 1 4-1 6 - remains at baseline  · Monitor renal function and avoid/limit nephrotoxins if possible    5  Sick sinus syndrome  · s/p pacemaker insertion earlier this year  · Continue current cardiac regimen - outpatient follow    6  Recent left upper extremity DVT  · Shortly after pacemaker insertion earlier this year - continue Eliquis (2 5 mg BID dosing secondary to age/renal insufficiency)    7    Left temporo-occipital meningioma  · Conservative measures - continue observation with outpatient follow  · Supportive care    8   history of bioprosthetic MV replacement  · Continue current cardiac regimen - outpatient fall    9  Essential hypertension  · Low-sodium diet encouraged  · Continue Lopressor/Demadex regimen      DVT Prophylaxis:  Eliquis      SUBJECTIVE     Seen/examined this afternoon with family bedside  Patient is alert and awake and per family she is return to her usual neurologic baseline after discontinuation of Keppra  She still remains fatigued/week and is looking forward to working with therapy  No acute complaints at this time  OBJECTIVE     Vitals:   Temp (24hrs), Av 4 °F (36 9 °C), Min:97 9 °F (36 6 °C), Max:99 6 °F (37 6 °C)    HR:  [72-84] 79  Resp:  [18-20] 18  BP: (113-140)/(55-78) 135/65  SpO2:  [95 %-96 %] 95 %  Body mass index is 30 14 kg/m²  Input and Output Summary (last 24 hours):        Intake/Output Summary (Last 24 hours) at 18 1916  Last data filed at 18 1825   Gross per 24 hour   Intake              660 ml   Output             1418 ml   Net             -758 ml       Physical Exam:     GENERAL:  Well-developed/nourished - no immediate distress  HEAD:  Normocephalic - atraumatic  EYES: PERRL - EOMI   MOUTH:  Mucosa moist  NECK:  Supple - full range of motion  CARDIAC:  Regular rate/rhythm - S1/S2 positive  PULMONARY:  Clear but diminished bibasilar breath sounds - nonlabored respirations  ABDOMEN:  Soft - nontender/nondistended - active bowel sounds  MUSCULOSKELETAL:  Motor strength/range of motion quite deconditioned  NEUROLOGIC:  Alert/oriented to baseline  SKIN:  Chronic wrinkles/blemishes   PSYCHIATRIC:  Mood/affect pleasant today      ADDITIONAL DATA     Labs & Recent Cultures:       Results from last 7 days  Lab Units 18  0503   WBC Thousand/uL 5 64   HEMOGLOBIN g/dL 9 5*   HEMATOCRIT % 30 1*   PLATELETS Thousands/uL 183   NEUTROS PCT % 49   LYMPHS PCT % 24   MONOS PCT % 10   EOS PCT % 16*       Results from last 7 days  Lab Units 18  0503   SODIUM mmol/L 138   POTASSIUM mmol/L 4 2   CHLORIDE mmol/L 104   CO2 mmol/L 26 BUN mg/dL 30*   CREATININE mg/dL 1 54*   CALCIUM mg/dL 9 3   GLUCOSE RANDOM mg/dL 88       Results from last 7 days  Lab Units 04/23/18  0908   INR  1 95*         Last 24 Hours Medication List:     Current Facility-Administered Medications:  acetaminophen 650 mg Oral Q6H PRN Bonnie Jean PA-C   apixaban 2 5 mg Oral BID Joi Smipson PA-C   atorvastatin 80 mg Oral Daily With Enio Crowell MD   barium sulfate 900 mL Oral 90 min pre-procedure Wilver Tamez MD   docusate sodium 100 mg Oral BID My Bae MD   insulin lispro 1-5 Units Subcutaneous 4x Daily (AC & HS) Tricia Fraser PA-C   labetalol 10 mg Intravenous Q4H PRN Martin Lin MD   metoprolol tartrate 50 mg Oral Q12H [address] Gabi Jackson MD   pantoprazole 40 mg Oral Early Morning My Bae MD   potassium chloride 10 mEq Oral BID Wilver Tmaez MD   torsemide 10 mg Oral Daily My Bae MD          Time Spent for Care: 37 minutes  More than 50% of total time spent on counseling and coordination of care as described above  Current Length of Stay: 5 day(s)      Code Status: Level 1 - Full Code          ** Please Note: This note is constructed using a voice recognition dictation system   **

## 2018-04-28 NOTE — PROGRESS NOTES
Progress Note - Neurology   Leonel Chaim 80 y o  female MRN: [id number]  Unit/Bed#: [facility] 719-01 Encounter: [id number]    Assessment:  79 y/o F who has had multiple strokes and etiology unknown  She has had workup including LORI which showed a small PFO, but no evidence of any atrial thrombus or valvular calcification  She has a pacemaker so no need for loop recorder placement  Her altered mental status is likely 2/2 Keppra because she is much better today  Plan:  Continue with loop recorder placement  Continue with Eliquis 2 5mg BID for ESUS and DVT  Patient on VEEG monitoring - no seizures thus far, stopped keppra yesterday to decrease sedation  Will discontinue VEEG monitoring today  Discontinued her Keppra yesterday  Will sign off  Please call us back if you have any further questions or concerns  Subjective:   Patient seen and examined at bedside  She was sitting up, alert, awake and following commands  ROS:  Negative except mentioned above in subjective  negative    Vitals: Blood pressure 134/73, pulse 72, temperature 97 9 °F (36 6 °C), temperature source Oral, resp  rate 18, height 5' (1 524 m), weight 70 kg (154 lb 5 2 oz), SpO2 95 %, not currently breastfeeding  ,Body mass index is 30 14 kg/m²      Physical Exam:   General - alert, awake, follows commands  Speech - fluent, no dysarthria noted  skin-  no lesions  Cranial nerves: PERRL, EOMI, no facial droop noted  Motor 5/5 throughout  gait - deferred    Lab, Imaging and other studies:   CBC:   Results from last 7 days  Lab Units 04/28/18  0503 04/27/18  0525 04/26/18  0508   WBC Thousand/uL 5 64 5 54 5 49   RBC Million/uL 3 37* 3 13* 3 17*   HEMOGLOBIN g/dL 9 5* 8 9* 8 9*   HEMATOCRIT % 30 1* 27 8* 28 2*   MCV fL 89 89 89   PLATELETS Thousands/uL 183 164 159   , BMP/CMP:   Results from last 7 days  Lab Units 04/28/18  0503 04/27/18  0525 04/26/18  0508   SODIUM mmol/L 138 138 137   POTASSIUM mmol/L 4 2 3 9 4 0   CHLORIDE mmol/L 104 106 103   CO2 mmol/L 26 25 27   ANION GAP mmol/L 8 7 7   BUN mg/dL 30* 25 35*   CREATININE mg/dL 1 54* 1 22 1 42*   GLUCOSE RANDOM mg/dL 88 93 102   CALCIUM mg/dL 9 3 9 1 9 0   EGFR ml/min/1 73sq m 31 41 34   , Vitamin B12:   Results from last 7 days  Lab Units 04/28/18  0503   VITAMIN B 12 pg/mL 798   , HgBA1C:   , TSH:   , Ammonia:   Results from last 7 days  Lab Units 04/28/18  0503   AMMONIA umol/L 23     VTE Prophylaxis: RX contraindicated due to: Eliquis    Counseling / Coordination of Care  N/A

## 2018-04-28 NOTE — OCCUPATIONAL THERAPY NOTE
Occupational Therapy Treatment Note     04/28/18 1133   Restrictions/Precautions   Weight Bearing Precautions Per Order No   Other Precautions Chair Alarm; Bed Alarm; Fall Risk;Multiple lines;Cognitive   Pain Assessment   Pain Assessment 0-10   Pain Score No Pain   ADL   Where Assessed Edge of bed   Eating Assistance 5  Supervision/Setup   Eating Deficit Beverage management   Grooming Assistance 5  Supervision/Setup   Grooming Deficit Wash/dry hands; Wash/dry face; Teeth care   Bed Mobility   Supine to Sit 3  Moderate assistance   Additional items Assist x 2   Transfers   Sit to Stand 3  Moderate assistance   Additional items Assist x 2   Stand to Sit 3  Moderate assistance   Additional items Assist x 2   Stand pivot 3  Moderate assistance   Additional items Assist x 2   Cognition   Overall Cognitive Status Impaired   Arousal/Participation Lethargic;Persistent stimuli required   Attention Difficulty attending to directions   Orientation Level Oriented to person;Oriented to place;Oriented to time   Memory Unable to assess   Following Commands Follows one step commands with increased time or repetition   Activity Tolerance   Activity Tolerance Patient limited by fatigue;Treatment limited secondary to medical complications (Comment)   Assessment   Assessment Pt participated in occupational therapy with focus on activity tolerance,  bed mob, unsupported sitting balance and tolerance for pt engagement in functional self-care task/oral care, informal cognition/pt orientation to place/situation and simple one step motor commands    Pt cleared by RN/An for pt participation in therapy  Pt received HOB raised/supine  and agreeable to therapy following pt Identifiers confirmed  Pt family member/ daughter Oj Megan present supportive throughout session  Pt requires assist for bed mob to move to edge of pt bed  for sitting balance  2* pt  Decreased overall strength    Pt initially require min A for unsupported sitting balance and coordination 2*  balance deficits  Pt required SBa for oral hygiene/teeth care 2* pt decreased functional transfers/mob and decreased standing tolerance and balance   Pt tolerated session well and able to attend well to L visual field during ADL and therapuetic activity  Pt unsupported sitting balance noted for improvement as session progressed  Pt will require in-pt rehab to continue to address pt deficits with decreased strength, coordination and balance which currently impair pt ADL and functional mob     Plan   Treatment Interventions ADL retraining   Goal Expiration Date 05/08/18   Treatment Day 2   OT Frequency 3-5x/wk   Recommendation   OT Discharge Recommendation Short Term Rehab   Barthel Index   Feeding 5   Bathing 0   Grooming Score 0   Dressing Score 0   Bladder Score 5   Bowels Score 10   Toilet Use Score 5   Transfers (Bed/Chair) Score 5   Mobility (Level Surface) Score 0   Stairs Score 0   Barthel Index Score 30   Modified Connor Scale   Modified Connor Scale 4     Patricia ROCHE/MARLY

## 2018-04-29 LAB
ANION GAP SERPL CALCULATED.3IONS-SCNC: 7 MMOL/L (ref 4–13)
BASOPHILS # BLD AUTO: 0.04 THOUSANDS/ΜL (ref 0–0.1)
BASOPHILS NFR BLD AUTO: 1 % (ref 0–1)
BUN SERPL-MCNC: 34 MG/DL (ref 5–25)
CALCIUM SERPL-MCNC: 9 MG/DL (ref 8.3–10.1)
CHLORIDE SERPL-SCNC: 103 MMOL/L (ref 100–108)
CO2 SERPL-SCNC: 27 MMOL/L (ref 21–32)
CREAT SERPL-MCNC: 1.48 MG/DL (ref 0.6–1.3)
EOSINOPHIL # BLD AUTO: 0.96 THOUSAND/ΜL (ref 0–0.61)
EOSINOPHIL NFR BLD AUTO: 17 % (ref 0–6)
ERYTHROCYTE [DISTWIDTH] IN BLOOD BY AUTOMATED COUNT: 15.2 % (ref 11.6–15.1)
GFR SERPL CREATININE-BSD FRML MDRD: 32 ML/MIN/1.73SQ M
GLUCOSE SERPL-MCNC: 103 MG/DL (ref 65–140)
GLUCOSE SERPL-MCNC: 116 MG/DL (ref 65–140)
GLUCOSE SERPL-MCNC: 122 MG/DL (ref 65–140)
GLUCOSE SERPL-MCNC: 122 MG/DL (ref 65–140)
GLUCOSE SERPL-MCNC: 194 MG/DL (ref 65–140)
HCT VFR BLD AUTO: 28.3 % (ref 34.8–46.1)
HGB BLD-MCNC: 9.1 G/DL (ref 11.5–15.4)
LYMPHOCYTES # BLD AUTO: 1.51 THOUSANDS/ΜL (ref 0.6–4.47)
LYMPHOCYTES NFR BLD AUTO: 26 % (ref 14–44)
MCH RBC QN AUTO: 28.7 PG (ref 26.8–34.3)
MCHC RBC AUTO-ENTMCNC: 32.2 G/DL (ref 31.4–37.4)
MCV RBC AUTO: 89 FL (ref 82–98)
MONOCYTES # BLD AUTO: 0.63 THOUSAND/ΜL (ref 0.17–1.22)
MONOCYTES NFR BLD AUTO: 11 % (ref 4–12)
NEUTROPHILS # BLD AUTO: 2.59 THOUSANDS/ΜL (ref 1.85–7.62)
NEUTS SEG NFR BLD AUTO: 45 % (ref 43–75)
NRBC BLD AUTO-RTO: 0 /100 WBCS
PLATELET # BLD AUTO: 163 THOUSANDS/UL (ref 149–390)
PMV BLD AUTO: 9.7 FL (ref 8.9–12.7)
POTASSIUM SERPL-SCNC: 4.1 MMOL/L (ref 3.5–5.3)
RBC # BLD AUTO: 3.17 MILLION/UL (ref 3.81–5.12)
SODIUM SERPL-SCNC: 137 MMOL/L (ref 136–145)
TSH SERPL DL<=0.05 MIU/L-ACNC: 0.56 UIU/ML (ref 0.36–3.74)
WBC # BLD AUTO: 5.74 THOUSAND/UL (ref 4.31–10.16)

## 2018-04-29 PROCEDURE — 84443 ASSAY THYROID STIM HORMONE: CPT | Performed by: PSYCHIATRY & NEUROLOGY

## 2018-04-29 PROCEDURE — 82948 REAGENT STRIP/BLOOD GLUCOSE: CPT

## 2018-04-29 PROCEDURE — 97530 THERAPEUTIC ACTIVITIES: CPT

## 2018-04-29 PROCEDURE — 80048 BASIC METABOLIC PNL TOTAL CA: CPT | Performed by: INTERNAL MEDICINE

## 2018-04-29 PROCEDURE — 85025 COMPLETE CBC W/AUTO DIFF WBC: CPT | Performed by: INTERNAL MEDICINE

## 2018-04-29 PROCEDURE — 99233 SBSQ HOSP IP/OBS HIGH 50: CPT | Performed by: INTERNAL MEDICINE

## 2018-04-29 PROCEDURE — 97535 SELF CARE MNGMENT TRAINING: CPT

## 2018-04-29 RX ADMIN — POTASSIUM CHLORIDE 10 MEQ: 750 TABLET, EXTENDED RELEASE ORAL at 08:43

## 2018-04-29 RX ADMIN — INSULIN LISPRO 1 UNITS: 100 INJECTION, SOLUTION INTRAVENOUS; SUBCUTANEOUS at 17:07

## 2018-04-29 RX ADMIN — METOPROLOL TARTRATE 50 MG: 50 TABLET ORAL at 08:44

## 2018-04-29 RX ADMIN — METOPROLOL TARTRATE 50 MG: 50 TABLET ORAL at 21:53

## 2018-04-29 RX ADMIN — POTASSIUM CHLORIDE 10 MEQ: 750 TABLET, EXTENDED RELEASE ORAL at 17:08

## 2018-04-29 RX ADMIN — DOCUSATE SODIUM 100 MG: 100 CAPSULE, LIQUID FILLED ORAL at 17:08

## 2018-04-29 RX ADMIN — ATORVASTATIN CALCIUM 80 MG: 80 TABLET, FILM COATED ORAL at 17:08

## 2018-04-29 RX ADMIN — APIXABAN 2.5 MG: 2.5 TABLET, FILM COATED ORAL at 17:08

## 2018-04-29 RX ADMIN — TORSEMIDE 10 MG: 20 TABLET ORAL at 08:43

## 2018-04-29 RX ADMIN — DOCUSATE SODIUM 100 MG: 100 CAPSULE, LIQUID FILLED ORAL at 08:44

## 2018-04-29 RX ADMIN — APIXABAN 2.5 MG: 2.5 TABLET, FILM COATED ORAL at 08:43

## 2018-04-29 NOTE — PROGRESS NOTES
[address] Hospitalist Service - Internal Medicine Progress Note      PATIENT INFORMATION      Patient: Letta Kanner 80 y o  female   MRN: [id number]  PCP: Oneyda Hill MD  Unit/Bed#: [facility] 282-30 Encounter: [id number]  Date Of Visit: 04/29/18       ASSESSMENTS & PLAN        1  Acute left MCA CVA  · Status post thrombectomy earlier in the hospital course - seen speech therapy evaluation and currently tolerating dysphagia/soft diet with thin liquids  · Echocardiogram reveals a small left-to-right atrial shunt but no evidence of atrial thrombus or vegetations per radiology - initiated anticoagulation with Eliquis after cleared by neurology - vital signs stable - will need extensive PT/OT post discharge  · Continue statin therapy (Lipitor) - will continue to hold off anti-platelet therapy until cleared by neurosurgery   · Plan for discharge hopefully tomorrow to skilled rehab    2  Subarachnoid hemorrhage  · Serial CT of head imaging reveals improvement/resolution - per neurology, OK to initiate anticoagulation hence on Eliquis BID - H/H stable     3  Acute metabolic encephalopathy  · Likely multifactorial secondary to acute CVA with subsequent SAH and Keppra side effect (see plan for individual assessments)   · Improved with resolution of aforementioned assessments and discontinuation of Keppra as video EEG was unremarkable    4  Chronic kidney disease stage 3  · Baseline creatinine of approximately 1 4-1 6 - remains at baseline  · Monitor renal function and avoid/limit nephrotoxins if possible    5  Sick sinus syndrome  · s/p pacemaker insertion earlier this year  · Continue current cardiac regimen - outpatient followup     6  Recent left upper extremity DVT  · Shortly after pacemaker insertion earlier this year - continue Eliquis (2 5 mg BID dosing secondary to age/renal insufficiency)    7    Left temporo-occipital meningioma  · Conservative measures - continue observation with outpatient follow  · Supportive care    8   history of bioprosthetic MV replacement  · Continue current cardiac regimen - outpatient fall    9  Essential hypertension  · Low-sodium diet encouraged  · Continue Lopressor/Demadex regimen      DVT Prophylaxis:  Eliquis      SUBJECTIVE     Seen and examined this morning with nursing  Patient is sitting upright in bed resting comfortably  She states she is not that hungry this morning in regards to eating breakfast   She denies any fever/chills or other constitutional symptoms at this time  She did work with occupational therapy again this morning per nursing  No new complaints otherwise  Awaiting placement in skilled rehabilitation  OBJECTIVE     Vitals:   Temp (24hrs), Av 1 °F (36 7 °C), Min:98 1 °F (36 7 °C), Max:98 2 °F (36 8 °C)    HR:  [70-86] 70  Resp:  [18] 18  BP: (104-140)/(54-78) 131/62  SpO2:  [95 %-96 %] 96 %  Body mass index is 30 23 kg/m²  Input and Output Summary (last 24 hours):        Intake/Output Summary (Last 24 hours) at 18 1005  Last data filed at 18 0900   Gross per 24 hour   Intake              740 ml   Output             1069 ml   Net             -329 ml       Physical Exam:     GENERAL:  Well-developed/nourished - no acute distress  HEAD:  Normocephalic - atraumatic  EYES: PERRL - EOMI   MOUTH:  Mucosa moist  NECK:  Supple - full range of motion  CARDIAC:  Regular rate/rhythm - S1/S2 positive  PULMONARY:  Clear to auscultation bilaterally - nonlabored respirations  ABDOMEN:  Soft - nontender/nondistended - active bowel sounds  MUSCULOSKELETAL:  Motor strength/range of motion remain deconditioned  NEUROLOGIC:  Alert/oriented to baseline  SKIN:  Chronic wrinkles/blemishes   PSYCHIATRIC:  Mood/affect remains pleasant      ADDITIONAL DATA     Labs & Recent Cultures:       Results from last 7 days  Lab Units 18  0445   WBC Thousand/uL 5 74   HEMOGLOBIN g/dL 9 1*   HEMATOCRIT % 28 3*   PLATELETS Thousands/uL 163   NEUTROS PCT % 45   LYMPHS PCT % 26   MONOS PCT % 11   EOS PCT % 17*       Results from last 7 days  Lab Units 04/29/18  0445   SODIUM mmol/L 137   POTASSIUM mmol/L 4 1   CHLORIDE mmol/L 103   CO2 mmol/L 27   BUN mg/dL 34*   CREATININE mg/dL 1 48*   CALCIUM mg/dL 9 0   GLUCOSE RANDOM mg/dL 103       Results from last 7 days  Lab Units 04/23/18  0908   INR  1 95*         Last 24 Hours Medication List:     Current Facility-Administered Medications:  acetaminophen 650 mg Oral Q6H PRN Bonnie Jean PA-C   apixaban 2 5 mg Oral BID Joi Simpson PA-C   atorvastatin 80 mg Oral Daily With Phoebe Champion MD   barium sulfate 900 mL Oral 90 min pre-procedure Oswaldo Trujillo MD   docusate sodium 100 mg Oral BID Nathan Oviedo MD   insulin lispro 1-5 Units Subcutaneous 4x Daily (AC & HS) Monica Sifuentes PA-C   labetalol 10 mg Intravenous Q4H PRN Darryl Gustafson MD   metoprolol tartrate 50 mg Oral Q12H [facility] Jagdish Jain MD   pantoprazole 40 mg Oral Early Morning Nathan Oviedo MD   potassium chloride 10 mEq Oral BID Oswaldo Trujillo MD   torsemide 10 mg Oral Daily Nathan Oviedo MD          Time Spent for Care: 36 minutes  More than 50% of total time spent on counseling and coordination of care as described above  Current Length of Stay: 6 day(s)      Code Status: Level 1 - Full Code          ** Please Note: This note is constructed using a voice recognition dictation system   **

## 2018-04-29 NOTE — PLAN OF CARE
Problem: OCCUPATIONAL THERAPY ADULT  Goal: Performs self-care activities at highest level of function for planned discharge setting  See evaluation for individualized goals  Treatment Interventions: ADL retraining, Functional transfer training, Endurance training, Cognitive reorientation, Equipment evaluation/education, UE strengthening/ROM, Patient/family training, Neuromuscular reeducation, Compensatory technique education, Fine motor coordination activities, Continued evaluation, Energy conservation, Activityengagement          See flowsheet documentation for full assessment, interventions and recommendations  Outcome: Progressing  Limitation: Decreased ADL status, Decreased UE strength, Decreased Safe judgement during ADL, Decreased cognition, Decreased endurance, Decreased sensation, Decreased fine motor control, Decreased self-care trans, Decreased high-level ADLs, Non-func L UE (BALANCE, MEDICAL STATUS)  Prognosis: Fair  Assessment: Pt participated in occupational therapy with focus on activity tolerance, bed mob,functional transfers/standing tolerance and unsupported sitting balance and tolerance for pt engagement in functional self-care task/oral care and AM self-care tasks  Pt cleared by LEEANN/Bailey for pt participation in therapy  Pt received supine/pt sleeping pt awake to name called and pt Identifiers confirmed/pt also known to ROCHE from previous treatment session  Pt requires assist for bed mob to move to edge of pt bed 2* pt decreased strength and pt decreased motor planning  Pt required assist   for sitting balance edge of pt bed initially 2* strength deficits however pt improvement noted as pt session progressed    Pt requires assist for UB and LB self-care 2 * pt decreased balance, coordination and decreased general strength   Pt will require in-pt rehab to continue to address pt above deficits which currently impair pt ADL and functional mob     OT Discharge Recommendation: Short Term Rehab  OT - OK to Discharge: Yes (to STR)

## 2018-04-29 NOTE — PLAN OF CARE
Activity Intolerance/Impaired Mobility     Mobility/activity is maintained at optimum level for patient Progressing        Communication Impairment     Ability to express needs and understand communication [address] Discharge to post-acute care or home with appropriate resources Progressing        METABOLIC, FLUID AND ELECTROLYTES - ADULT     Electrolytes maintained within normal limits Progressing     Fluid balance maintained Progressing     Glucose maintained within target range Progressing        Neurological Deficit     Neurological status is stable or improving Progressing        NEUROSENSORY - ADULT     Achieves stable or improved neurological status Progressing     Absence of seizures Progressing     Achieves maximal functionality and self care Progressing        Nutrition     Nutrition/Hydration status is improving Progressing        Nutrition/Hydration-ADULT     Nutrient/Hydration intake appropriate for improving, restoring or maintaining nutritional needs Progressing        Potential for Aspiration     Non-ventilated patient's risk of aspiration is minimized Progressing        Potential for Falls     Patient will remain free of falls Progressing        Prexisting or High Potential for Compromised Skin Integrity     Skin integrity is maintained or improved Progressing

## 2018-04-29 NOTE — OCCUPATIONAL THERAPY NOTE
Occupational Therapy Treatment Note     04/29/18 8967   Restrictions/Precautions   Weight Bearing Precautions Per Order No   Other Precautions Chair Alarm; Fall Risk;Cognitive   Pain Assessment   Pain Assessment No/denies pain   Pain Score No Pain   ADL   Where Assessed Edge of bed   Grooming Assistance 5  Supervision/Setup   Grooming Deficit Wash/dry hands; Wash/dry face   UB Bathing Assistance 2  Maximal Assistance   UB Bathing Deficit Right arm;Left arm; Abdomen   LB Bathing Assistance 2  Maximal Assistance   LB Bathing Deficit Right lower leg including foot; Left lower leg including foot; Buttocks   UB Dressing Assistance 2  Maximal Assistance   UB Dressing Deficit Thread RUE; Thread LUE;Pull around back   UB Dressing Comments hospital gown   LB Dressing Assistance 2  Maximal Assistance   LB Dressing Deficit Don/doff R sock; Don/doff L sock; Thread RLE into pants; Thread LLE into pants; Thread RLE into underwear; Thread LLE into underwear;Pull up over hips   Toileting Assistance  2  Maximal Assistance   Toileting Deficit Clothing management up;Clothing management down;Perineal hygiene   Bed Mobility   Supine to Sit 3  Moderate assistance   Additional items Assist x 2   Sit to Supine 2  Maximal assistance   Transfers   Sit to Stand 3  Moderate assistance   Additional items Assist x 2   Stand to Sit 3  Moderate assistance   Additional items Assist x 2   Cognition   Overall Cognitive Status Impaired   Arousal/Participation Alert; Cooperative   Attention Difficulty attending to directions   Orientation Level Oriented to person;Oriented to place; Disoriented to situation;Disoriented to time   Memory Unable to assess   Following Commands Follows one step commands with increased time or repetition   Activity Tolerance   Activity Tolerance Patient limited by fatigue;Treatment limited secondary to medical complications (Comment)   Assessment   Assessment Pt participated in occupational therapy with focus on activity tolerance, bed mob,functional transfers/standing tolerance and unsupported sitting balance and tolerance for pt engagement in functional self-care task/oral care and AM self-care tasks  Pt cleared by LEEANN/Bailey for pt participation in therapy  Pt received supine/pt sleeping pt awake to name called and pt Identifiers confirmed/pt also known to ROCHE from previous treatment session  Pt requires assist for bed mob to move to edge of pt bed 2* pt decreased strength and pt decreased motor planning  Pt required assist   for sitting balance edge of pt bed initially 2* strength deficits however pt improvement noted as pt session progressed    Pt requires assist for UB and LB self-care 2 * pt decreased balance, coordination and decreased general strength   Pt will require in-pt rehab to continue to address pt above deficits which currently impair pt ADL and functional mob   Plan   Treatment Interventions ADL retraining   Goal Expiration Date 05/08/18   Treatment Day 3   OT Frequency 3-5x/wk   Recommendation   OT Discharge Recommendation Short Term Rehab   Barthel Index   Feeding 5   Bathing 0   Grooming Score 0   Dressing Score 0   Bladder Score 5   Bowels Score 10   Toilet Use Score 5   Transfers (Bed/Chair) Score 5   Mobility (Level Surface) Score 0   Stairs Score 0   Barthel Index Score 30   Modified Connor Scale   Modified Flint Scale 4       David Patel  ROCHE/L

## 2018-04-30 ENCOUNTER — APPOINTMENT (OUTPATIENT)
Dept: CARDIAC REHAB | Facility: CLINIC | Age: 83
End: 2018-04-30
Payer: MEDICARE

## 2018-04-30 ENCOUNTER — HOSPITAL ENCOUNTER (INPATIENT)
Facility: HOSPITAL | Age: 83
LOS: 19 days | Discharge: NURSING, REHAB OR LONG-TERM CARE | DRG: 056 | End: 2018-05-19
Attending: PHYSICAL MEDICINE & REHABILITATION | Admitting: PHYSICAL MEDICINE & REHABILITATION
Payer: MEDICARE

## 2018-04-30 ENCOUNTER — APPOINTMENT (INPATIENT)
Dept: RADIOLOGY | Facility: HOSPITAL | Age: 83
DRG: 023 | End: 2018-04-30
Payer: MEDICARE

## 2018-04-30 VITALS
WEIGHT: 154.1 LBS | DIASTOLIC BLOOD PRESSURE: 57 MMHG | HEART RATE: 68 BPM | SYSTOLIC BLOOD PRESSURE: 131 MMHG | HEIGHT: 60 IN | OXYGEN SATURATION: 99 % | TEMPERATURE: 98.5 F | BODY MASS INDEX: 30.25 KG/M2 | RESPIRATION RATE: 18 BRPM

## 2018-04-30 DIAGNOSIS — Z95.0 PACEMAKER: Primary | Chronic | ICD-10-CM

## 2018-04-30 DIAGNOSIS — G47.00 INSOMNIA: ICD-10-CM

## 2018-04-30 DIAGNOSIS — I10 HTN (HYPERTENSION): Chronic | ICD-10-CM

## 2018-04-30 DIAGNOSIS — K21.9 ACID REFLUX: ICD-10-CM

## 2018-04-30 DIAGNOSIS — I82.622 DEEP VEIN THROMBOSIS (DVT) OF LEFT UPPER EXTREMITY (HCC): Chronic | ICD-10-CM

## 2018-04-30 DIAGNOSIS — D69.6 THROMBOCYTOPENIA (HCC): ICD-10-CM

## 2018-04-30 DIAGNOSIS — Z86.73 HISTORY OF ISCHEMIC RIGHT MCA STROKE: Chronic | ICD-10-CM

## 2018-04-30 DIAGNOSIS — K59.00 CONSTIPATION: ICD-10-CM

## 2018-04-30 DIAGNOSIS — D64.9 ANEMIA: ICD-10-CM

## 2018-04-30 DIAGNOSIS — I63.9 CVA (CEREBRAL VASCULAR ACCIDENT) (HCC): ICD-10-CM

## 2018-04-30 PROBLEM — I63.512 ACUTE ISCHEMIC LEFT MCA STROKE (HCC): Status: RESOLVED | Noted: 2018-04-23 | Resolved: 2018-04-30

## 2018-04-30 LAB
ANION GAP SERPL CALCULATED.3IONS-SCNC: 7 MMOL/L (ref 4–13)
BASOPHILS # BLD AUTO: 0.04 THOUSANDS/ΜL (ref 0–0.1)
BASOPHILS NFR BLD AUTO: 1 % (ref 0–1)
BUN SERPL-MCNC: 39 MG/DL (ref 5–25)
CALCIUM SERPL-MCNC: 9.2 MG/DL (ref 8.3–10.1)
CHLORIDE SERPL-SCNC: 102 MMOL/L (ref 100–108)
CO2 SERPL-SCNC: 28 MMOL/L (ref 21–32)
CREAT SERPL-MCNC: 1.54 MG/DL (ref 0.6–1.3)
EOSINOPHIL # BLD AUTO: 0.99 THOUSAND/ΜL (ref 0–0.61)
EOSINOPHIL NFR BLD AUTO: 17 % (ref 0–6)
ERYTHROCYTE [DISTWIDTH] IN BLOOD BY AUTOMATED COUNT: 15.1 % (ref 11.6–15.1)
GFR SERPL CREATININE-BSD FRML MDRD: 31 ML/MIN/1.73SQ M
GLUCOSE SERPL-MCNC: 114 MG/DL (ref 65–140)
GLUCOSE SERPL-MCNC: 126 MG/DL (ref 65–140)
GLUCOSE SERPL-MCNC: 132 MG/DL (ref 65–140)
GLUCOSE SERPL-MCNC: 96 MG/DL (ref 65–140)
HCT VFR BLD AUTO: 29.3 % (ref 34.8–46.1)
HGB BLD-MCNC: 9.2 G/DL (ref 11.5–15.4)
LYMPHOCYTES # BLD AUTO: 1.62 THOUSANDS/ΜL (ref 0.6–4.47)
LYMPHOCYTES NFR BLD AUTO: 28 % (ref 14–44)
MCH RBC QN AUTO: 28.1 PG (ref 26.8–34.3)
MCHC RBC AUTO-ENTMCNC: 31.4 G/DL (ref 31.4–37.4)
MCV RBC AUTO: 90 FL (ref 82–98)
MONOCYTES # BLD AUTO: 0.63 THOUSAND/ΜL (ref 0.17–1.22)
MONOCYTES NFR BLD AUTO: 11 % (ref 4–12)
NEUTROPHILS # BLD AUTO: 2.49 THOUSANDS/ΜL (ref 1.85–7.62)
NEUTS SEG NFR BLD AUTO: 43 % (ref 43–75)
NRBC BLD AUTO-RTO: 0 /100 WBCS
PLATELET # BLD AUTO: 174 THOUSANDS/UL (ref 149–390)
PMV BLD AUTO: 10 FL (ref 8.9–12.7)
POTASSIUM SERPL-SCNC: 4.4 MMOL/L (ref 3.5–5.3)
RBC # BLD AUTO: 3.27 MILLION/UL (ref 3.81–5.12)
SODIUM SERPL-SCNC: 137 MMOL/L (ref 136–145)
WBC # BLD AUTO: 5.79 THOUSAND/UL (ref 4.31–10.16)

## 2018-04-30 PROCEDURE — 80048 BASIC METABOLIC PNL TOTAL CA: CPT | Performed by: INTERNAL MEDICINE

## 2018-04-30 PROCEDURE — 97530 THERAPEUTIC ACTIVITIES: CPT

## 2018-04-30 PROCEDURE — 99223 1ST HOSP IP/OBS HIGH 75: CPT | Performed by: PHYSICAL MEDICINE & REHABILITATION

## 2018-04-30 PROCEDURE — 85025 COMPLETE CBC W/AUTO DIFF WBC: CPT | Performed by: INTERNAL MEDICINE

## 2018-04-30 PROCEDURE — 82948 REAGENT STRIP/BLOOD GLUCOSE: CPT

## 2018-04-30 PROCEDURE — 97116 GAIT TRAINING THERAPY: CPT

## 2018-04-30 PROCEDURE — 99239 HOSP IP/OBS DSCHRG MGMT >30: CPT | Performed by: INTERNAL MEDICINE

## 2018-04-30 PROCEDURE — 73600 X-RAY EXAM OF ANKLE: CPT

## 2018-04-30 RX ORDER — BISACODYL 10 MG
10 SUPPOSITORY, RECTAL RECTAL DAILY PRN
Status: DISCONTINUED | OUTPATIENT
Start: 2018-04-30 | End: 2018-05-19 | Stop reason: HOSPADM

## 2018-04-30 RX ORDER — PANTOPRAZOLE SODIUM 40 MG/1
40 TABLET, DELAYED RELEASE ORAL
Status: CANCELLED | OUTPATIENT
Start: 2018-05-01

## 2018-04-30 RX ORDER — POTASSIUM CHLORIDE 750 MG/1
10 TABLET, EXTENDED RELEASE ORAL 2 TIMES DAILY
Status: CANCELLED | OUTPATIENT
Start: 2018-04-30

## 2018-04-30 RX ORDER — GABAPENTIN 100 MG/1
100 CAPSULE ORAL 3 TIMES DAILY
Status: CANCELLED | OUTPATIENT
Start: 2018-04-30

## 2018-04-30 RX ORDER — ACETAMINOPHEN 325 MG/1
650 TABLET ORAL EVERY 6 HOURS PRN
Status: DISCONTINUED | OUTPATIENT
Start: 2018-04-30 | End: 2018-05-19 | Stop reason: HOSPADM

## 2018-04-30 RX ORDER — GABAPENTIN 100 MG/1
100 CAPSULE ORAL 3 TIMES DAILY
Status: DISCONTINUED | OUTPATIENT
Start: 2018-04-30 | End: 2018-04-30 | Stop reason: HOSPADM

## 2018-04-30 RX ORDER — SENNOSIDES 8.6 MG
1 TABLET ORAL
Status: DISCONTINUED | OUTPATIENT
Start: 2018-04-30 | End: 2018-05-19 | Stop reason: HOSPADM

## 2018-04-30 RX ORDER — METOPROLOL TARTRATE 50 MG/1
50 TABLET, FILM COATED ORAL EVERY 12 HOURS SCHEDULED
Status: CANCELLED | OUTPATIENT
Start: 2018-04-30

## 2018-04-30 RX ORDER — ATORVASTATIN CALCIUM 80 MG/1
80 TABLET, FILM COATED ORAL
Status: DISCONTINUED | OUTPATIENT
Start: 2018-04-30 | End: 2018-05-19 | Stop reason: HOSPADM

## 2018-04-30 RX ORDER — DOCUSATE SODIUM 100 MG/1
100 CAPSULE, LIQUID FILLED ORAL 2 TIMES DAILY
Status: DISCONTINUED | OUTPATIENT
Start: 2018-04-30 | End: 2018-05-19 | Stop reason: HOSPADM

## 2018-04-30 RX ORDER — ACETAMINOPHEN 325 MG/1
650 TABLET ORAL EVERY 6 HOURS PRN
Status: CANCELLED | OUTPATIENT
Start: 2018-04-30

## 2018-04-30 RX ORDER — ATORVASTATIN CALCIUM 80 MG/1
80 TABLET, FILM COATED ORAL
Status: CANCELLED | OUTPATIENT
Start: 2018-04-30

## 2018-04-30 RX ORDER — TORSEMIDE 20 MG/1
10 TABLET ORAL DAILY
Status: DISCONTINUED | OUTPATIENT
Start: 2018-05-01 | End: 2018-05-07

## 2018-04-30 RX ORDER — POLYETHYLENE GLYCOL 3350 17 G/17G
17 POWDER, FOR SOLUTION ORAL DAILY PRN
Status: DISCONTINUED | OUTPATIENT
Start: 2018-04-30 | End: 2018-05-19 | Stop reason: HOSPADM

## 2018-04-30 RX ORDER — LABETALOL HYDROCHLORIDE 5 MG/ML
10 INJECTION, SOLUTION INTRAVENOUS EVERY 4 HOURS PRN
Status: CANCELLED | OUTPATIENT
Start: 2018-04-30

## 2018-04-30 RX ORDER — IRON POLYSACCHARIDE COMPLEX 150 MG
150 CAPSULE ORAL DAILY
Status: DISCONTINUED | OUTPATIENT
Start: 2018-05-01 | End: 2018-05-06

## 2018-04-30 RX ORDER — TORSEMIDE 20 MG/1
10 TABLET ORAL DAILY
Status: CANCELLED | OUTPATIENT
Start: 2018-05-01

## 2018-04-30 RX ORDER — PANTOPRAZOLE SODIUM 40 MG/1
40 TABLET, DELAYED RELEASE ORAL
Status: DISCONTINUED | OUTPATIENT
Start: 2018-05-01 | End: 2018-05-19 | Stop reason: HOSPADM

## 2018-04-30 RX ORDER — DOCUSATE SODIUM 100 MG/1
100 CAPSULE, LIQUID FILLED ORAL 2 TIMES DAILY
Status: CANCELLED | OUTPATIENT
Start: 2018-04-30

## 2018-04-30 RX ORDER — METOPROLOL TARTRATE 50 MG/1
50 TABLET, FILM COATED ORAL EVERY 12 HOURS SCHEDULED
Status: DISCONTINUED | OUTPATIENT
Start: 2018-04-30 | End: 2018-05-19 | Stop reason: HOSPADM

## 2018-04-30 RX ORDER — POTASSIUM CHLORIDE 750 MG/1
10 TABLET, EXTENDED RELEASE ORAL 2 TIMES DAILY
Status: DISCONTINUED | OUTPATIENT
Start: 2018-04-30 | End: 2018-05-07

## 2018-04-30 RX ADMIN — POTASSIUM CHLORIDE 10 MEQ: 750 TABLET, EXTENDED RELEASE ORAL at 18:11

## 2018-04-30 RX ADMIN — GABAPENTIN 100 MG: 100 CAPSULE ORAL at 12:41

## 2018-04-30 RX ADMIN — APIXABAN 2.5 MG: 2.5 TABLET, FILM COATED ORAL at 18:11

## 2018-04-30 RX ADMIN — APIXABAN 2.5 MG: 2.5 TABLET, FILM COATED ORAL at 09:24

## 2018-04-30 RX ADMIN — DOCUSATE SODIUM 100 MG: 100 CAPSULE, LIQUID FILLED ORAL at 09:28

## 2018-04-30 RX ADMIN — DOCUSATE SODIUM 100 MG: 100 CAPSULE, LIQUID FILLED ORAL at 18:11

## 2018-04-30 RX ADMIN — SENNOSIDES 8.6 MG: 8.6 TABLET, FILM COATED ORAL at 21:29

## 2018-04-30 RX ADMIN — METOPROLOL TARTRATE 50 MG: 50 TABLET ORAL at 09:24

## 2018-04-30 RX ADMIN — POTASSIUM CHLORIDE 10 MEQ: 750 TABLET, EXTENDED RELEASE ORAL at 09:24

## 2018-04-30 RX ADMIN — ATORVASTATIN CALCIUM 80 MG: 40 TABLET, FILM COATED ORAL at 16:13

## 2018-04-30 RX ADMIN — TORSEMIDE 10 MG: 20 TABLET ORAL at 09:24

## 2018-04-30 RX ADMIN — METOPROLOL TARTRATE 50 MG: 50 TABLET ORAL at 21:29

## 2018-04-30 NOTE — SOCIAL WORK
CM Evaluation  CM met with patient and 4 children(son and 3 daughters) to review rehab routine and CM role  Patient lives alone but son stays with her frequently on and off  Per daughter, one of the children have been staying with her 24/7 since her pacer insertion  Her home is single level with 2 [address] and full bath and bedroom on the 1st floor  She is [facility] to [facility]  She has a walker, rollator, SPC and tub bench  She uses [employer] in [place], [address] [place] [address]  Informed of weekly team meeting and potential dc needs  Discussed insurance coverage and LOS  IMM explained and signed with copy to patient and chart  CM will follow to assist with dc needs

## 2018-04-30 NOTE — RESTORATIVE TECHNICIAN NOTE
Restorative Specialist Mobility Note       Activity: Other (Comment) (Educated/encouraged pt to ambulate with assistance 3-4 x's/day, pt refused 2* pending x-ray of R LE and also being discharged to rehab soon  Chair alarm on   Pt callbell, phone/tray within reach )           Linda HONEYCUTT, Restorative Technician, [employer]

## 2018-04-30 NOTE — SOCIAL WORK
Informed by Simba Connell that pt is medically stable for discharge to rehab today  Informed Jean-Claude Pastrana [facility] liaison, who stated that pt was accepted and a bed is available for pt today  Pt will go to room 966 with a 1 pm   Pt, pt's dgt Sarah Lizarraga, pt's son Artur Menendez and pt's bedside RN Izabel Cowan made aware of same

## 2018-04-30 NOTE — H&P
H&P Exam - Marco Antonio Beckett 80 y o  female MRN: [id number]    Unit/Bed#: -99 Encounter: [id number]      Primary Rehab dx:  CVA    History of Present Illness   Pt is 79 yo female with ICH & bi-hemispheric nonhemorrhagic ischemic strokes s/p thrombectomy by Dr Anca Carranza on 4/23  Issue #1: per patient had BM yesterday   Abimbola Pereiraa #2: per patient peripheral neuropathy significantly limits her ambulation distance at baseline         PMHx:  Peripheral neuropathy, DM, CHF (EF 45%), SSS s/p pacer, colon CA, meningioma, + PFO (3 mm), s/p MVR (tissue valve), HL, h/o DVT, insomnia, non-occlusive carotid dz, chronic constipation   Incidental findings on CT C/A/P of 4/26:  1) multiple pulmonary micronodules: per CT report recommended FU imaging in 3 months for determination of stability  2) pleural thickening  3) uterine fibroid  Other incidental findings:  4) B/L renal cysts  5) 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 no intervention planned, follows with Dr Rafal Ferraro  6) EKG abnormalities (PACs, bifascicular block/wide QRS, LAD, prolonged QTc): follows with Dr Emre Mendenhall (cards) and Dr Mary Zendejas (EP); will d/w cards clinical significance of prolonged QTc in the setting of PPM and other EKG findings   7) mod-severe TR/elevated peak PA pressure:  follows with Dr Emre Mendenhall (cards) and Dr Mary Zendejas (EP)    Family History: Non contributory    ROS:  Constitutional: denies fevers, chills  HEENT: denies tinnitus  Pulm: denies SOB  CV: denies CP  GI: denies abdominal pain  Neuro: denies headache  Skin: denies rashes  Psych: denies depression  Extremities: denies edema    Allergies   Allergen Reactions    Heparin          Funtional status on admission: mod amb/tx, max ADLs   Functional status prior to admission: I PTA  Social history: FF setup available, 3 DANIELA    Objective     Current Vitals:   Vitals:    04/30/18 1400   BP: 135/79   Pulse: 89   Resp: 18   Temp: 98 1 °F (36 7 °C)   SpO2: 99%         Gen: NAD   HEENT: No swelling of the tongue  Pulm: respirations unlabored  Card: +S1/S2   Abd: soft NT  Neuro: no tongue deviation or facial asymmetry, EOM fxn appears to be grossly intact, lt touch grossly intact  MSK: 4/5 LUE/LLE, 4-/5 RUE, 3 to 4-/5 RLE  Extrem: Rt lateral malleolus swelling, non-tender to palpation, non-erythematous  Skin: no rashes  Psych: mood/affect stable               Comorbidities:   Peripheral neuropathy, DM, CHF (EF 45%), SSS s/p pacer, colon CA, meningioma, + PFO (3 mm), s/p MVR (tissue valve), HL, h/o DVT, insomnia, non-occlusive carotid dz, chronic constipation, rt ankle pain/swelling, CKD, chronic anemia, hyperphosphatemia  Incidental findings on CT C/A/P of 4/26:  1) multiple pulmonary micronodules: per CT report recommended FU imaging in 3 months for determination of stability  2) pleural thickening  3) uterine fibroid  Other incidental findings:  4) B/L renal cysts  5) 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 no intervention planned, follows with Dr Christopher Adams  6) EKG abnormalities (PACs, bifascicular block/wide QRS, LAD, prolonged QTc): follows with Dr Char Jimenez (cards) and Dr Gume Nielsen (EP); will d/w cards clinical significance of prolonged QTc in the setting of PPM and other EKG findings   7) mod-severe TR/elevated peak PA pressure:  follows with Dr Char Jimenez (cards) and Dr Gume Nielsen (EP)    Assessment:  Pt is 79 yo female with ICH & bi-hemispheric nonhemorrhagic ischemic strokes s/p thrombectomy by Dr Vishnu Cortez on 4/23    Plan:    Rehabilitation Necessity:   Medical impact on function as a result of impaired functional mobility, bed mobility, transfers, self-care/ADL's, endurance, range of motion/flexibility, and impaired coordination  Treatment plan will include a comprehensive rehabilitation program with intense therapies for 3 hours/day, 5 days/week    1  24-hour availability of a physician specializing in rehabilitation who will coordinate the rehabilitation disciplines, manage the comorbid conditions, monitor the patient's functional improvement, and maximize the rehabilitation outcome  2  Physical therapy to address bed mobidility, car and mat transfer, functional mobility with use of least restrictive assistive device, truncal strengthening, coordination, range of motion/flexibility, durable medical equipment evaluation, patient and family instruction and endurance training  3  Occupational therapy to address feeding, grooming, upper and lower body dressing and bathing, toileting, tub/toilet/bed transfers, durable medical equipment evaluation, range of motion/flexibility, dexterity, coordination and patient and family instruction  4  Rehabilitation nursing 24 hours per day to monitor bowel and bladder function, work on bowel routine, assess falls risk upon admission and periodically thereafter, implement and revise falls prevention strategies, maintain skin integrity through initial and daily pressure sore risk assessment (Wei scale), implement and revise pressure sore prevention strategies, educate patient and family members regarding medication administration, ADL's, transfers, and mobility and continue therapy carryover with ADL's, transfers, and mobility  5  Social work and case management consults for discharge planning/disposition issues, as well as coordination and communication of patient progress between family and providers    6  Rehab psychology- as needed for adjustment, coping      ICH & bi-hemispheric nonhemorrhagic ischemic strokes: nonhemorrhagic stroke felt by neurology to be embolic of unclear etiology; per s/p thrombectomy by Dr Bhakti Schrader on 4/23, cleared by neurosx for [facility] and per neuro recs started on eliquis 2 5 mg BID (not 5 mg due to age and serum Cr greater than 1 5, tends to fluctuate around this value); home lipitor increased from 40 to 80 mg      Peripheral neuropathy: likely 2/2 to DM, had tried neurontin in the past but did not tolerate     DM: on januvia 100 mg qd at home (recently switched from tradjenta 5 mg qd); currently on ISS    CHF (EF 45%): on home torsemide 10 mg qd (and on home potassium 10 MEQ BID due to hypokalemia 2/2 to torsemide); however home toprol XL 50 mg qd switched to lopressor 50 mg q12 in acute care     SSS: s/p pacer, interrogated recently in acute care, no A-fib found, follows with Dr Kelle Morris (cards) and Dr Devorah Simmons (EP)     h/o colon CA: on CT CAP of 4/26 colonic thickening suspicious for lesion noted and colonoscopy recommended, pt undergoes screening colonoscopys with Dr Maria Dolores Devlin, pt to FU with them for FU colonoscopy      Meningioma: seen by neurosx no intervention planned, follows with Dr Nay Corral    PFO (3 mm): unclear if paradoxical stroke from DVT is etiology of CVA (felt to be embolic by neuro) however as neuro has placed pt on [facility], operated by [facility] for CVA LE dopplers would not ; t/c closure as OP      s/p MVR: tissue valve    HL: home lipitor increased from 40 mg to 80 mg due to CVA      h/o DVT: home coumadin switched to eliquis for CVA     Insomnia: home remeron on home due 15 mg HS on hold (pt had episodes of sedation/AMS in acute care and will try to avoid such medications)     non-occlusive carotid dz: per carotid U/S report recommend repeat carotid U/S in 1 year; on [facility], operated by [facility] and statin    chronic constipation: per family at baseline 1 BM every 4 to 5 days, IM monitoring and will treat with laxatives as needed     rt ankle pain/swelling: likely sprain, however XR results pending    CKD: baseline 1 4-1 7     chronic anemia: AOCD/CKD, at home on iron 150 mg qd, cont iron supplementation as inpt; Hg currently stable     Hyperphosphatemia per lab reference range at 4 4: however in females 18 year or older 4 5 is ULN     Incidental findings on CT C/A/P of 4/26:  1) multiple pulmonary micronodules: per CT report recommended FU imaging in 3 months for determination of stability  2) pleural thickening  3) uterine fibroid  Other incidental findings:  4) B/L renal cysts  5) 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 no intervention planned, follows with Dr Karla Grider  6) EKG abnormalities (PACs, bifascicular block/wide QRS, LAD, prolonged QTc): follows with Dr Elif Villatoro (cards) and Dr Kendall Isbell (EP); will d/w cards clinical significance of prolonged QTc in the setting of PPM and other EKG findings   7) mod-severe TR/elevated peak PA pressure:  follows with Dr Elif Villatoro (cards) and Dr Kendall Isbell (EP)          III  Estimated length of stay:  Weeks: 1-2    IV  Goals  Maximize functional transfers, ADLs, and mobility to decrease caregiver burden  V  Anticipated discharge setting  Home    VI  Prognosis:  Fair     CMS Required Post-Admission Physician Evaluation Elements  History and Physical, including medical history, functional history and active comorbidities as in above text  Post -Admission Physician Evaluation:  The patient has the potential to make improvement and is in need of at least 2 of the following multidisciplinary therapies including, but not limited to physical, occupational, speech and respiratory  The patient may also need nutritional services, wound care and prosthetics/orthotics prescription  Given the patient's complex medical condition and risk of further medical complications, rehabilitative services cannot be safely provided at a lower level of care, such as a skilled nursing facility  I have reviewed the patient's functional and medical status at the time of the preadmission screening and they are the same as on the day of this admission  I acknowledge that I have personally performed a full physical examination on this patient within 24 hours of admission  I have determined that the patient is able to tolerate the above course of treatment, at the appropriate level of intensity, for a reasonable period of time  The patient demonstrated understanding the rehabilitation program and the discharge process after we discussed them    Agree in entirety: yes  Minor adaptions: none   Major changes: none    Note:    In addition to rehab needs this patient requires acute inpatient rehabilitation for anemia, CKD

## 2018-04-30 NOTE — PROGRESS NOTES
PHYSICAL MEDICINE AND REHABILITATION   PREADMISSION ASSESSMENT     Projected [facility] Diagnoses:  Impairment of mobility, safety, Activities of Daily Living (ADLs), and cognitive/communication skills due to Stroke:  01 2  Right Body Involvement (Left Brain)  Etiologic: acute L MCA CVA  Date of Onset: 4/23/18   Date of surgery: 4/23/18 IR Cerebral Angiography Intervention    PATIENT INFORMATION  Name: Taqueria Grissom Phone #: [phone] (home)   Address: [address] [place] [id number]  YOB: 1933 Age: 80 y o  SS#   Marital Status:    Ethnicity:   Employment Status: retired  Extended Emergency Contact Information  Primary Emergency Contact: Lorena White  Address: [address] Phone: [phone]  Relation: Daughter  Secondary Emergency Contact: Holli Mom [place]  Mobile Phone: [phone]  Relation: Daughter  Advance Directive: LEVEL 1 FULL CODE, UNKNOWN ADVANCED DIRECTIVE    INSURANCE/COVERAGE:     Primary Payor: MEDICARE / Plan: MEDICARE A AND B / Product Type: Medicare A & B Fee for Service /   Secondary Payer: [facility]   Payer Contact:  Payer Contact:   Contact Phone:  Contact Phone:   Authorization #:   Coverage Dates:  LCD:   MEDICARE #: [id number]  Medicare Days: 32/30/60  Medical Record #: [id number]    REFERRAL SOURCE:   Referring provider: Kimberli Lopez MD  Referring facility: [address] [place] [address]  Room: [place] [address]/[place] [address]  PCP: Du Mackenzie MD PCP phone number: [phone]    MEDICAL INFORMATION  HPI: Patient is an 80year old female brought in by EMS on 4/23/18 for evaluation after being found with severe facial droop, slurred speech, left-sided gaze preference and right-sided weakness/flaccidity upon awakening approximately 1 hour prior to arrival  Patient's daughter states that the patient has been living at the nursing home facility for the past 3 days after being discharged from [facility] where she had been admitted for right-sided CVA resulting in abnormal movements of the left-side of the body and gait disturbance  (Of note, the patient was recently in the hospital at [address] [place] [address] from 4/18/18  4/21/18 and then discharged to [facility]  She had previously been living at home alone and had been completely independent up until December 2017 when she was admitted for CHF and underwent biologic mitral valve replacement and pacemaker placement  Her admission was complicated by left upper extremity DVT for which she was started on Coumadin  Her INR has been erratic and difficult to keep within the therapeutic range per daughter  It was noted that in January 2018, patient had a TIA which had presented as syncope  Patient then suffered right parietal CVA 4/15/2018  Patient had fallen to the ground and struck the left side of her head  She refused to go to the hospital immediately following the incident  The next day, family noticed personality changes with the patient becoming more irritable than her usual with complaints of seeing shadows from the right visual field  On 4/17, she developed abnormal tremor-like movements  They convinced her to go to the hospital on 4/18 after the patient developed a gait disturbance with difficulty ambulating with physical therapy)    Upon presentation this admission, the patient was unable to follow commands  She had severe dysarthria and aphasia, as well as right-sided weakness  NIH on presentation was 15  CT head did not show any signs of any acute changes but does show a right MCA stroke, which looked older than 5 days  CTA head and neck revealed a left distal M1 occlusion/stenosis  TPA was not administered due Coumadin use and unclear last known normal   Dr Brianna Araujo recommended to proceed with a thrombectomy which was completed on 4/23/18 and recanalization was achieved  Estimated blood loss was 500 mL to 1 L and the patient was transfused 1 unit of PRBCs because an I-STAT demonstrated a hemoglobin less than 8  Repeat head CT showed interval development of SAH  Post-operatively she had vigorous upper extremity movement and thrashing hip movement that lasted about a minuet  She was loaded with Keppra  Repeat CT scan on 4/24 showed resolving SAH and subacute right MCA infarction  On 4/26/18 it was noted that the patient has been having episodes of alternating alertness, unresponsiveness, and agitation that last a few minutes at a time  There was concern about possible focal status, so MD considered hooking the patient up to VEEG monitoring  VEEG initiated late evening of 4/26/18 - demonstrates diffuse slowing but no seizures so far and Keppra was discontinued secondary to lethargy  A LORI was done which showed small PFO but no evidence of any atrial thrombus or valvular calcification  She has a pacemaker so no need for loop recorder placement  Her altered mental status was likely secondary to [address] per Neurolgy  VEEG was then discontinued on 4/28/18 and Neuro then signed off  At this time attending is stating that they are going to continue her statin therapy (Lipitor), and they are going to hold off on anti-platelet therapy until she is cleared by neurosurgery  Patient has been cleared to start on anticoagulation (Eliquis  BID)  At this time the patient is being cleared for discharge and both PT/OT are recommending inpatient acute rehab  Past Medical History:   Past Surgical History:    Allergies:     Past Medical History:   Diagnosis Date    Acid reflux     on occ    Arthritis     DJD right hip replaced    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 ) 2007    x 2 lesions with no change    Cancer ([facility] ) 2007    colonic polyps, no surgery done    Deep vein thrombosis (DVT) of left upper extremity [facility] ) 12/11/2017    Diabetes mellitus ([facility] )     type 2    Diverticulosis     Edema in legs on occ    Hypertension     on occ    Language barrier     speaks Indian & broken english    Past Surgical History:   Procedure Laterality Date    COLON SURGERY      COLONOSCOPY      COLONOSCOPY N/A 11/2/2016    Procedure: COLONOSCOPY;  Surgeon: Karen Vallecillo MD;  Location: [facility] GI LAB; Service:     ESOPHAGOGASTRODUODENOSCOPY N/A 11/2/2016    Procedure: ESOPHAGOGASTRODUODENOSCOPY (EGD); Surgeon: Karen Vallecillo MD;  Location: [facility] GI LAB;   Service:     JOINT REPLACEMENT Right 02/2015    hip    JOINT REPLACEMENT Left     knee    JOINT REPLACEMENT Right     knee    NY REVISE MEDIAN N/CARPAL TUNNEL SURG Left 1/28/2016    Procedure: RELEASE CARPAL TUNNEL;  Surgeon: Leonides Arreola MD;  Location: [facility] MAIN OR;  Service: Orthopedics    TUBAL LIGATION      VARICOSE VEIN SURGERY Bilateral      Allergies   Allergen Reactions    Heparin          Comorbidities: subarachnoid hemorrhage, acute metabolic encephalopathy, chronic kidney disease stage 3, sick sinus syndrome, recent left upper extremity DVT, left-temoror-occipital meningioma, history of bioprosthetic MV replacement, essential hypertension    CURRENT VITAL SIGNS:   Temp:  [98 5 °F (36 9 °C)-99 1 °F (37 3 °C)] 98 5 °F (36 9 °C)  HR:  [68-80] 68  Resp:  [16-18] 18  BP: (105-131)/(54-59) 131/57   Intake/Output Summary (Last 24 hours) at 04/30/18 1046  Last data filed at 04/30/18 0700   Gross per 24 hour   Intake              640 ml   Output             1516 ml   Net             -876 ml        LABORATORY RESULTS:      Lab Results   Component Value Date    HGB 9 2 (L) 04/30/2018    HGB 10 2 (L) 12/23/2015    HCT 29 3 (L) 04/30/2018    HCT 31 2 (L) 12/23/2015    WBC 5 79 04/30/2018    WBC 4 0 (L) 12/23/2015     Lab Results   Component Value Date    BUN 39 (H) 04/30/2018    BUN 26 (H) 12/23/2015     04/30/2018     12/23/2015    K 4 4 04/30/2018    K 4 2 12/23/2015     04/30/2018     12/23/2015    GLUCOSE 114 04/30/2018    GLUCOSE 106 04/23/2018    GLUCOSE 114 (H) 12/23/2015    CREATININE 1 54 (H) 04/30/2018    CREATININE 1 3 12/23/2015     Lab Results   Component Value Date    PROTIME 22 4 (H) 04/23/2018    PROTIME 12 8 02/16/2015    INR 1 95 (H) 04/23/2018    INR 0 98 02/16/2015        DIAGNOSTIC STUDIES:  Ct Chest Abdomen Pelvis Wo Contrast    Result Date: 4/26/2018  Impression: There is focal area of colonic thickening in the ascending colon, seen in image 83 of series 2 ,  This is suspicious for colonic lesion  Consider correlation with colonoscopy  Bilateral micronodules in the lung parenchyma, can be evaluated for stability with follow-up at 3 months No focal liver lesion No lung mass seen No significant mediastinal lymphadenopathy  I personally discussed this study with Dr Kellie Mitchell on 4/26/2018 at 7:43 PM  Workstation performed: [id number]     X-ray Chest 1 View Portable    Result Date: 4/23/2018  Impression: Cardiomegaly  Chronic congestive changes  No acute findings  Workstation performed: [id number]     Ct Head Wo Contrast    Result Date: 4/26/2018  Impression: No mass effect or midline shift seen, no new hematoma seen Subarachnoid hemorrhage seen on the previous study is decreasing No worsening seen Intraventricular hemorrhage is stable Workstation performed: [id number]     Ct Head Wo Contrast    Result Date: 4/25/2018  Impression: 1  Continued resorption of subarachnoid hemorrhage  No acute intracranial abnormality  2   Evolving nonhemorrhagic infarctions left basal ganglia  3   Cerebral atrophy with chronic small vessel ischemic change and chronic infarctions as described above  Workstation performed: [id number]     Ct Head Wo Contrast    Result Date: 4/24/2018  Impression: 1  Resolving subarachnoid hemorrhage  2   Subacute right MCA territory infarction  3   Cerebral atrophy with chronic small vessel ischemic change   Workstation performed: [id number]     Ct Head Wo Contrast    Result Date: 4/23/2018  Impression: Stable subarachnoid hemorrhage and extravasated contrast within the subarachnoid space compared to the examination performed approximately 4 hours earlier  Stable gyral enhancement involving the late subacute posterior MCA territory infarct on the right  Stable atrophy and chronic microangiopathic change  Workstation performed: [id number]     Ct Head Wo Contrast    Result Date: 4/23/2018  Impression: Interval development of subarachnoid hemorrhage and excreted contrast in the subarachnoid space mainly located within the suprasellar cistern and left sylvian fissure status post cerebral angiogram  Localized hyperdensity within the posterior inferior aspect of the posterior fossa, within the subarachnoid space may represent localized hemorrhage  1 cm meningioma within the left peripheral temporal occipital region  Enhancing cortex of the subacute infarct within the right posterior MCA territory  Cerebral volume loss and chronic microangiopathic change identified  Neurointerventional team is aware of this finding  Follow-up CT has already been ordered  Workstation performed: [id number]     Ir Cerebral Angiography / Intervention    Result Date: 4/25/2018  Impression: Left MCA M2 occlusion, TICI 0  Successful mechanical thrombectomy intravascular location of the left MCA territory, TICI 3  Workstation performed: [id number]     Ct Stroke Alert Brain    Result Date: 4/23/2018  Impression: No acute disease  Footprint of chronic large and small vessel ischemia are stable  Findings were directly discussed with Srinivasan Dotson on 4/23/2018 9:11 AM  Workstation performed: [id number]     Cta Stroke Alert (head/neck)    Result Date: 4/23/2018  Impression: Flow restrictive disease of the dominant left M2 proximal branch within which clot is not excluded  Left ICA ulceration  73% short segment right ICA origin stenosis owing to dense atherosclerotic plaque   Findings were relayed by text to   Cornelio approximately 0930 hours   Workstation performed: [id number]       PRECAUTIONS/SPECIAL NEEDS:  Tobacco:   History   Smoking Status    Former Smoker    Packs/day: 0 25    Years: 10 00    Types: Cigarettes    Quit date: 1950   Smokeless Tobacco    Never Used   , Alcohol:    History   Alcohol Use    Yes     Comment: socially   , Anticoagulation:  eliquis, Blood Sugar Management: per MD recommendations, Edema Management, Safety Concerns, IV: Type: peripheral Location: right forearm Reason: medications and fluids, Aspiration Risk/Precautions, Dietary Restrictions: Dysphagia 3-Dental Soft with Thin Liquids, Language Preference: Speaks primarily Cymro, but can speak and understand some English, and fall precautions    MEDICATIONS:     Current Facility-Administered Medications:     acetaminophen (TYLENOL) tablet 650 mg, 650 mg, Oral, Q6H PRN, Bonnie Jean PA-C, 650 mg at 04/27/18 2226    apixaban (ELIQUIS) tablet 2 5 mg, 2 5 mg, Oral, BID, Joi Simpson PA-C, 2 5 mg at 04/30/18 1863    atorvastatin (LIPITOR) tablet 80 mg, 80 mg, Oral, Daily With Aretha Celeste MD, 80 mg at 04/29/18 1708    barium sulfate 2 1 % suspension 900 mL, 900 mL, Oral, 90 min pre-procedure, Nito Weinberg MD    docusate sodium (COLACE) capsule 100 mg, 100 mg, Oral, BID, Piter Foster MD, 100 mg at 04/30/18 0928    insulin lispro (HumaLOG) 100 units/mL subcutaneous injection 1-5 Units, 1-5 Units, Subcutaneous, 4x Daily (AC & HS), Evonnie Dance, PA-C, 1 Units at 04/29/18 1707    labetalol (NORMODYNE) injection 10 mg, 10 mg, Intravenous, Q4H PRN, Gerson Colon MD    metoprolol tartrate (LOPRESSOR) tablet 50 mg, 50 mg, Oral, Q12H [facility], Tiesha Acevedo MD, 50 mg at 04/30/18 0924    pantoprazole (PROTONIX) EC tablet 40 mg, 40 mg, Oral, Early Morning, Piter Foster MD, 40 mg at 04/28/18 5610    potassium chloride (K-DUR,KLOR-CON) CR tablet 10 mEq, 10 mEq, Oral, BID, Nito Weinberg MD, 10 mEq at 04/30/18 0924    torsemide (DEMADEX) tablet 10 mg, 10 mg, Oral, Daily, Ruben Zelaya MD, 10 mg at 04/30/18 2024    SKIN INTEGRITY:   Right groin incision    PRIOR LEVEL OF FUNCTION:  She lives in a(n) single family home  Carol Mcgrath is  and lives alone  Self Care: Independent, Indoor Mobility: Independent, Stairs (in/outdoor): Independent and Cognition: Independent    HOME ENVIRONMENT:  The living area: can live on one level  There are 3 steps to enter the home  The patient will have 24 hour supervision/physical assistance available upon discharge  PREVIOUS DME:  Equipment in home (previous DME): Tub Bench, Grab Bars, Rolling Walker and Standard Walker    FUNCTIONAL STATUS:  Physical Therapy Occupational Therapy Speech Therapy   4/30/18    Bed Mobility   Supine to Sit 3  Moderate assistance   Additional items Assist x 1   Additional Comments sat EOB x 5 min prior to gait, maintains w/ initial CGA, then decreased to S   Transfers   Sit to Stand 3  Moderate assistance   Additional items Assist x 2   Stand to Sit 4  Minimal assistance   Additional items Assist x 2   Additional Comments p gait, stood at chair x 3-4 min so aide could assist w/ bathing pts back, etc in standing   Ambulation/Elevation   Gait pattern (ataxia, increased RW advancement, B foot drag, wide [place]IS)   Gait Assistance 3  Moderate assist   Additional items Assist x 1   Assistive Device Rolling walker   Distance 25'x2, standing rest x 1-2 min   Balance   Static Sitting Fair +   Dynamic Sitting Poor +   Static Standing Poor +   Dynamic Standing Poor   Ambulatory Poor   Endurance Deficit   Endurance Deficit Yes   Endurance Deficit Description fatigue, weakness, pain   Activity Tolerance   Activity Tolerance Patient limited by fatigue;Patient limited by pain;Treatment limited secondary to medical complications (Comment)   Nurse Made Aware yes   Assessment   Prognosis Fair   Problem List Decreased strength; Impaired balance;Decreased endurance;Decreased mobility; Decreased coordination;Decreased cognition; Impaired judgement;Decreased safety awareness;Pain   Assessment Pt seen for session= gait and PT 1:1 activity x 23 min total for setup, bed mob, time spent EOB, gait, and standing/repositioning time p session  awake, c/o some foot pain buit cooperative  continued to need increased assist w/ transfers, but less overall assist needed for gait w/ increased distance  Needs physical assist and tactile cues for RW mgmt  remains appropriate for rehab at d/c      4/29/18    ADL   Where Assessed Edge of bed   Grooming Assistance 5  Supervision/Setup   Grooming Deficit Wash/dry hands; Wash/dry face   UB Bathing Assistance 2  Maximal Assistance   UB Bathing Deficit Right arm;Left arm; Abdomen   LB Bathing Assistance 2  Maximal Assistance   LB Bathing Deficit Right lower leg including foot; Left lower leg including foot; Buttocks   UB Dressing Assistance 2  Maximal Assistance   UB Dressing Deficit Thread RUE; Thread LUE;Pull around back   UB Dressing Comments hospital gown   LB Dressing Assistance 2  Maximal Assistance   LB Dressing Deficit Don/doff R sock; Don/doff L sock; Thread RLE into pants; Thread LLE into pants; Thread RLE into underwear; Thread LLE into underwear;Pull up over hips   Toileting Assistance  2  Maximal Assistance   Toileting Deficit Clothing management up;Clothing management down;Perineal hygiene   Bed Mobility   Supine to Sit 3  Moderate assistance   Additional items Assist x 2   Sit to Supine 2  Maximal assistance   Transfers   Sit to Stand 3  Moderate assistance   Additional items Assist x 2   Stand to Sit 3  Moderate assistance   Additional items Assist x 2   Cognition   Overall Cognitive Status Impaired   Arousal/Participation Alert; Cooperative   Attention Difficulty attending to directions   Orientation Level Oriented to person;Oriented to place; Disoriented to situation;Disoriented to time   Memory Unable to assess Following Commands Follows one step commands with increased time or repetition   Activity Tolerance   Activity Tolerance Patient limited by fatigue;Treatment limited secondary to medical complications (Comment)   Assessment   Assessment Pt participated in occupational therapy with focus on activity tolerance, bed mob,functional transfers/standing tolerance and unsupported sitting balance and tolerance for pt engagement in functional self-care task/oral care and AM self-care tasks  Pt cleared by LEEANN/Bailey for pt participation in therapy  Pt received supine/pt sleeping pt awake to name called and pt Identifiers confirmed/pt also known to ROCHE from previous treatment session  Pt requires assist for bed mob to move to edge of pt bed 2* pt decreased strength and pt decreased motor planning  Pt required assist   for sitting balance edge of pt bed initially 2* strength deficits however pt improvement noted as pt session progressed    Pt requires assist for UB and LB self-care 2 * pt decreased balance, coordination and decreased general strength  Pt will require in-pt rehab to continue to address pt above deficits which currently impair pt ADL and functional mob      4/27/18  Subjective:     Pt was seen for diagnostic dysphagia treatment during lunch meal to assess for diet tolerance  Pt was alert and cooperative  Family present at bedside      Objective:     Pt was assessed during lunch meal with french fries, chopped green beans, ice cream, 4 oz thin liquids by cup/straw  Restraints loose upon arrival and pt was attempting to self-feed  Daughter was unclipping restraint upon arrival   Pt demonstrated mild difficulty with self-feeding requiring occasional verbal and tactile cues for coordination and recognition of bolus  Mastication was prolonged with all oral intake however rotary chew pattern noted with adequate breakdown and oral clearance   Pt's daughter stated that this is normal for her and she usually takes a long time to chew  There were no s/s of pharyngeal dysphagia with level 3 trials  With thin liquids pt had one dry cough after rapid successive sips of thin liquids  However no s/s of aspiration noted with single sips by cup or straw  Voice remained clear post all swallows      PO intake was poor as pt ate total of 5 french fries, 2 bites ice cream, 3 bites green beans, and 4 oz liquids before refusing additional PO      When RN administered medication whole in ice cream (small pill) pt required liquid wash to fully clear as she was attempting to chew the pill       SLP informed PCA that pt's daughter had unlocked restraint, she asked that we re-attach restraints  PCA re-attached restraints and tightened them to appropriate level       Assessment:     Pt continues to demonstrate prolonged mastication with dental soft diet however with extra time breakdown and oral clearance are WFL  There were no overt s/s of aspiration with solids or thin liquids with small bites/sips however aspiration risk is present with successive sips       Plan/Recommendations:     Continue with current diet as tolerated (dysphagia level 3, thin liquids)  Single sips  If pt is demonstrating significantly prolonged mastication she may benefit from downgrade to level 2  Medications crushed as able  Full supervision during meals and assistance with feeding  Speech to follow  Nutrition/dietician consult - poor PO intake  CURRENT GAP IN FUNCTION     Prior to Admission:     Functional Status: Patient was independent with mobility/ambulation, transfers, ADL's, IADL's  Estimated length of stay: 2 weeks    Anticipated Post-Discharge Disposition/Treatment  Disposition: Return to previous home/apartment    Outpatient Services: Physical Therapy (PT), Occupational Therapy (OT) and Speech Therapy    BARRIERS TO DISCHARGE  Weakness, Diminished cognition/Mentation change, Balance Difficulty, Fatigue, Home Accessibility, Caregiver Accessibility, Financial Resources, Equipment Needs and Resource Availability    INTERVENTIONS FOR DISCHARGE  Adaptive equipment, Patient/Family/Caregiver Education, Support Group, Financial Assistance, Arrange DME needs, Home Modifcations, Medication Changes per MD recommendations, Therapy exercises, Center of balance support  and Energy conservation education     REQUIRED THERAPY:  Patient will require PT, OT and ST 60 minutes each per day, five days per week to achieve rehab goals  REQUIRED FUNCTIONAL AND MEDICAL MANAGEMENT FOR INPATIENT REHABILITATION:  Skin; right groin incision, Pain Management: Overall pain is well controlled, Deep Vein Thrombosis (DVT) Prophylaxis: eliquis, and further internal medicine management of additional medical conditions while on the ARC, PT/OT/ST intervention, patient/family education and training, and any needed consults PRN    RECOMMENDED LEVEL OF CARE: Patient is an 80year old female who presented to the [address] [place] [address] on 4/23/18 with severe facial droop, slurred speech, left-sided gaze preference and right-sided weakness/flaccidity upon awakening approximately 1 hour prior to arrival  Imaging confirmed that patient had a left MCA CVA  Of note patient does also have a history of stroke and was [facility] at [facility]  Prior to the first stroke, the patient was active and gardening  She was independent with gait, transfers, and ADLS, although she did need assistance with IADLS  Since the previous stroke however the patient has required assistance with ADLS, IADLS as well as mobility (x1)  The patient will have 24/7 support at time of discharge  At this time the patient requires min-mod assist with transfers and mod assist x1 with ambulation  She is able to ambulate 25'x2 with a standing rest x1-2 minutes  At this time she also requires max assist with ADLS, both UB and LB    At this time close medical management and PM&R management is recommended while on the ARC to help monitor labs as well as other medical conditions  Nursing management will be requires to monitor bowel/bladder function to prevent incontinent episodes as well as education on medication changes  It is recommended that the patient participate in the stroke education series throughout her stay as well for education regarding strokes  Inpatient acute rehab is recommended at this time for the patient to maximize overall strength, endurance, self care, and mobility upon discharge to home with the support of her family

## 2018-04-30 NOTE — DISCHARGE SUMMARY
Discharge Summary - [facility] Hospitalist Service - Internal Medicine      Patient Information: Kathleen Palomares 80 y o  female MRN: [id number]  Unit/Bed#: [facility] 741-92 Encounter: [id number]    Discharging Physician / Practitioner: Tiesha Acevedo MD  PCP: Jose Frank MD  Admission Date:   Admission Orders     Ordered        04/23/18 1059  Inpatient Admission (expected length of stay for this patient is greater than two midnights)  Once             Discharge Date: 04/30/18      Reason for Admission:  Aphasia and dysarthria      Discharge Diagnoses:     Principal Problem:    Acute ischemic left MCA stroke    Subarachnoid hemorrhage    Acute metabolic encephalopathy    Chronic Problems:    Deep vein thrombosis (DVT) of left upper extremity     H/O bioprosthetic mitral valve replacement    Essential hypertension    Chronic kidney disease stage 3    Sick sinus syndrome    Left tempor0-occipital meningioma      Consultations During Hospital Stay:  · Neurology  · Neurosurgery  · [facility] Course:     1  Acute left MCA CVA  · Status post thrombectomy earlier in the hospital course - seen speech therapy evaluation and currently tolerating dysphagia/soft diet with thin liquids  · Echocardiogram revealed a small left-to-right atrial shunt but no evidence of atrial thrombus or vegetations per radiology - initiated anticoagulation with Eliquis after clearance by neurology - vital signs remained stable - plan to discharge today to inpatient rehab [facility])  · Continue statin therapy (Lipitor) - will continue to hold off anti-platelet therapy until cleared by neurosurgery on follow-up appointment in the outpatient setting     2  Subarachnoid hemorrhage  · Serial CT of head imaging reveals improvement/resolution - per neurology, OK to initiate anticoagulation hence on Eliquis BID - H/H remained stable      3    Acute metabolic encephalopathy  · Likely multifactorial secondary to acute CVA with subsequent SAH and Keppra side effect (see plan for individual assessments)   · Resolved to baseline with resolution of aforementioned assessments and discontinuation of Keppra per neurology as video EEG was unremarkable     4  Chronic kidney disease stage 3  · Baseline creatinine of approximately 1 4-1 6 - remained at baseline through inpatient course  · Monitor renal function and avoid/limit nephrotoxins if possible     5  Sick sinus syndrome  · s/p pacemaker insertion earlier this year  · Continue current cardiac regimen - outpatient followup      6  Recent left upper extremity DVT  · Shortly after pacemaker insertion earlier this year - continue Eliquis (2 5 mg BID dosing secondary to age/renal insufficiency)     7   Left temporo-occipital meningioma  · Conservative measures - continue observation with outpatient followup   · Supportive care     8   history of bioprosthetic MV replacement  · Continue current cardiac regimen - outpatient followup      9   Essential hypertension  · Low-sodium diet has been encouraged  · Continue Lopressor/Demadex regimen      Condition at Discharge: good       Discharge Day Visit / Exam:     Vitals: Blood Pressure: 131/57 (04/30/18 0700)  Pulse: 68 (04/30/18 0700)  Temperature: 98 5 °F (36 9 °C) (04/30/18 0700)  Temp Source: Axillary (04/30/18 0700)  Respirations: 18 (04/30/18 0700)  Height: 5' (152 4 cm) (04/24/18 1548)  Weight - Scale: 69 9 kg (154 lb 1 6 oz) (04/30/18 0600)  SpO2: 99 % (04/30/18 0700)      Physical exam - I had a face-to-face encounter with the patient on day of discharge  Discussion with Patient and/or Family:  The patient has been advised to return to the ER immediately if any symptoms recur or worsen  Discharge instructions/Information to Patient and/or Family:   See after visit summary for information provided to patient and/or family          Provisions for Follow-Up Care:  See after visit summary for information related to follow-up care and any pertinent home health orders  Disposition:   [address] inpatient rehab [facility])       Discharge Medications:  See after visit summary for reconciled discharge medications provided to patient and/or family  Discharge Statement:  I spent 39 minutes discharging the patient  This time was spent on the day of discharge  I had direct contact with the patient on the day of discharge  Greater than 50% of the total time was spent examining patient, answering all patient questions, arranging and discussing plan of care with patient as well as directly providing post-discharge instructions  Additional time then spent on discharge activities     ** Please Note: This note is constructed using a voice recognition dictation system   **

## 2018-04-30 NOTE — PHYSICAL THERAPY NOTE
Physical Therapy Treatment Note     04/30/18 0908   Pain Assessment   Pain Assessment 0-10   Pain Score 3   Pain Type Acute pain   Pain Location Foot   Pain Orientation Bilateral   Restrictions/Precautions   Weight Bearing Precautions Per Order No   Other Precautions Cognitive; Chair Alarm; Bed Alarm;Pain; Fall Risk   General   Chart Reviewed Yes   Family/Caregiver Present No   Cognition   Overall Cognitive Status Impaired   Arousal/Participation Responsive   Attention Attends with cues to redirect   Orientation Level Oriented to place;Oriented to person;Oriented to situation   Memory Unable to assess   Following Commands Follows one step commands with increased time or repetition   Subjective   Subjective states she feels OK   c/o B foot pain, but cooperative w/ session   Bed Mobility   Supine to Sit 3  Moderate assistance   Additional items Assist x 1   Additional Comments sat EOB x 5 min prior to gait, maintains w/ initial CGA, then decreased to S   Transfers   Sit to Stand 3  Moderate assistance   Additional items Assist x 2   Stand to Sit 4  Minimal assistance   Additional items Assist x 2   Additional Comments p gait, stood at chair x 3-4 min so aide could assist w/ bathing pts back, etc in standing   Ambulation/Elevation   Gait pattern (ataxia, increased RW advancement, B foot drag, wide [place])   Gait Assistance 3  Moderate assist   Additional items Assist x 1   Assistive Device Rolling walker   Distance 25'x2, standing rest x 1-2 min   Balance   Static Sitting Fair +   Dynamic Sitting Poor +   Static Standing Poor +   Dynamic Standing Poor   Ambulatory Poor   Endurance Deficit   Endurance Deficit Yes   Endurance Deficit Description fatigue, weakness, pain   Activity Tolerance   Activity Tolerance Patient limited by fatigue;Patient limited by pain;Treatment limited secondary to medical complications (Comment)   Nurse Made Aware yes   Assessment   Prognosis Fair   Problem List Decreased strength; Impaired balance;Decreased endurance;Decreased mobility; Decreased coordination;Decreased cognition; Impaired judgement;Decreased safety awareness;Pain   Assessment Pt seen for session= gait and PT 1:1 activity x 23 min total for setup, bed mob, time spent EOB, gait, and standing/repositioning time p session  awake, c/o some foot pain buit cooperative  continued to need increased assist w/ transfers, but less overall assist needed for gait w/ increased distance  Needs physical assist and tactile cues for RW mgmt  remains appropriate for rehab at d/c   Goals   Patient Goals none stated   [facility] Expiration Date 05/08/18   Treatment Day 3   Plan   Treatment/Interventions LE strengthening/ROM; Functional transfer training; Therapeutic exercise; Endurance training;Equipment eval/education; Bed mobility;Gait training;Patient/family training   Progress Progressing toward goals   PT Frequency 5x/wk  (and 1x/weekend)   Recommendation   Recommendation (recommend rehab at d/c)   Equipment Recommended [facility]   PT - OK to Discharge Yes  (to rehab when stable)   Manuel Fournier PT, DPT CSRS

## 2018-04-30 NOTE — PLAN OF CARE
Problem: PHYSICAL THERAPY ADULT  Goal: Performs mobility at highest level of function for planned discharge setting  See evaluation for individualized goals  Treatment/Interventions: Functional transfer training, LE strengthening/ROM, Therapeutic exercise, Endurance training, Patient/family training, Equipment eval/education, Bed mobility, Gait training          See flowsheet documentation for full assessment, interventions and recommendations  Outcome: Progressing  Prognosis: Fair  Problem List: Decreased strength, Impaired balance, Decreased endurance, Decreased mobility, Decreased coordination, Decreased cognition, Impaired judgement, Decreased safety awareness, Pain  Assessment: Pt seen for session= gait and PT 1:1 activity x 23 min total for setup, bed mob, time spent EOB, gait, and standing/repositioning time p session  awake, c/o some foot pain buit cooperative  continued to need increased assist w/ transfers, but less overall assist needed for gait w/ increased distance  Needs physical assist and tactile cues for RW mgmt  remains appropriate for rehab at d/c        Recommendation:  (recommend rehab at d/c)     PT - OK to Discharge: (S) Yes (to rehab when stable)    See flowsheet documentation for full assessment

## 2018-04-30 NOTE — CONSULTS
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, Lt hemichorea, HTN, CKD stage III, Lt temporo-occipital meningioma, history of HIT, history of colon CA, chronic systolic CHF, bioprosthetic mitral valve replacement and PPM placement due to SSS 25/67 complicated by Lt UE DVT at [facility], and DM type 2, who presented 4/23/18 with new onset dysarthria, aphasia, and Rt sided weakness  Pt was seen 4/19/18 due to falls with mental status change at home at [facility]  She was found to have a subacute Rt parietal CVA  INR was therapeutic at the time and ASA was added  During the current admission, pt was seen by Neurology  Head CT was negative for acute findings and did show a Rt MCA CVA  CTA of the head and neck revealed a distal Lt M1 occlusion/stenosis  Pt was not a tPA candidate due to being outside the window and INR > 1 7  She did undergo mechanical thrombectomy and recanalization was achieved  Pt did have and EBL of 500 - 1000ml from the femoral puncture site and was transfused  Post-procedure head CT revealed interval development of SAH  Pt had an episode of possible seizure and was loaded with Keppra  It was continued at 500mg 2x daily  Follow-up head CT revealed improving SAH  Pt developed decreased responsiveness 4/27/18 and was placed on video EEG  No seizures were seen and Keppra was stopped  LORI revealed a small PFO and mildly dilated Lt atria  Eliquis was started  CT of the abdomen and pelvis revealed a colonic lesion for which she will need GI follow-up  ROS:  Constitutional: Negative  HENT: Negative  Respiratory: Negative  Cardiovascular: Negative  Gastrointestinal: Negative      Musculoskeletal: Rt ankle pain    Neurological: Lt choreic movements    Psychiatric/Behavioral: Negative  Historical Information   Past Medical History:   Diagnosis Date    Acid reflux     on occ    Arthritis     DJD right hip replaced    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    x 2 lesions with no change    [facility] 2007    colonic polyps, no surgery done    Deep vein thrombosis (DVT) of left upper extremity ([facility] ) 12/11/2017    Diabetes mellitus ([facility] )     type 2    Diverticulosis     Edema     in legs on occ    Hypertension     on occ    Language barrier     speaks Macedonian & broken english     Past Surgical History:   Procedure Laterality Date    COLON SURGERY      COLONOSCOPY      COLONOSCOPY N/A 11/2/2016    Procedure: COLONOSCOPY;  Surgeon: Jose Armando MD;  Location[facility] GI LAB; Service:     ESOPHAGOGASTRODUODENOSCOPY N/A 11/2/2016    Procedure: ESOPHAGOGASTRODUODENOSCOPY (EGD); Surgeon: Jose Armando MD;  Location: [facility] GI LAB; Service:     JOINT REPLACEMENT Right 02/2015    hip    JOINT REPLACEMENT Left     knee    JOINT REPLACEMENT Right     knee    AK REVISE MEDIAN N/CARPAL TUNNEL SURG Left 1/28/2016    Procedure: RELEASE CARPAL TUNNEL;  Surgeon: Mary Jane Bernabe MD;  Location: [facility] MAIN OR;  Service: Orthopedics    TUBAL LIGATION      VARICOSE VEIN SURGERY Bilateral      Social History   History   Alcohol Use    Yes     Comment: socially     History   Drug Use No     History   Smoking Status    Former Smoker    Packs/day: 0 25    Years: 10 00    Types: Cigarettes    Quit date: 1950   Smokeless Tobacco    Never Used     Family History   Problem Relation Age of Onset    Cancer Mother      throat       Meds/Allergies   current meds:  No current facility-administered medications for this encounter          PTA meds:   Prescriptions Prior to Admission   Medication    acetaminophen (TYLENOL) 325 mg tablet    aspirin 81 mg chewable tablet    atorvastatin (LIPITOR) 80 mg tablet    clonazePAM (KlonoPIN) 0 5 mg tablet    docusate sodium (COLACE) 100 mg capsule    IFEREX 150 150 MG capsule    lidocaine (LIDODERM) 5 %    Linagliptin (TRADJENTA) 5 MG TABS    metoprolol tartrate (LOPRESSOR) 50 mg tablet    multivitamin-iron-minerals-folic acid (CENTRUM) chewable tablet    naproxen sodium (ALEVE) 220 MG tablet    pantoprazole (PROTONIX) 40 mg tablet    polyethylene glycol (MIRALAX) 17 g packet    potassium chloride (MICRO-K) 10 MEQ CR capsule    torsemide (DEMADEX) 10 mg tablet    warfarin (COUMADIN) 5 mg tablet     Allergies   Allergen Reactions    Heparin        Objective   Vitals: Blood pressure 135/79, pulse 89, temperature 98 1 °F (36 7 °C), temperature source Oral, resp  rate 18, height 5' 4" (1 626 m), weight 66 4 kg (146 lb 6 2 oz), SpO2 99 %, not currently breastfeeding  Physical Exam   Constitutional: Pt is oriented to person and place  Not time  Pt is primarily [address] speaking with pt's daughter providing translation  HENT:   Head: Normocephalic  Eyes: EOM are normal  Pupils are equal, round, and reactive to light  Neck: Neck supple  Cardiovascular: Normal rate and regular rhythm  No murmur heard  Pulmonary/Chest: Breath sounds normal  No respiratory distress  Pt has no wheezes  Pt has no rales  Abdominal: Soft  Bowel sounds are normal  Pt exhibits no distension  There is no tenderness  There is no rebound and no guarding  Musculoskeletal: No edema  Neurological: Pt is alert and oriented to person and place  Not time  CN II-XII intact  Rt gaze preference  Moves extremities equally  Infrequent choreiform movements Lt UE and LE  Psychiatric: Does not make eye contact  Downcast gaze   Pt reports this is part of her upbringing in [facility]       Lab Results:   Results from last 7 days  Lab Units 04/30/18  0456 04/29/18  0445   WBC Thousand/uL 5 79 5 74   HEMOGLOBIN g/dL 9 2* 9 1*   HEMATOCRIT % 29 3* 28 3*   PLATELETS Thousands/uL 174 163       Results from last 7 days  Lab Units 04/30/18  0456 04/29/18  0445   SODIUM mmol/L 137 137   POTASSIUM mmol/L 4 4 4 1   CHLORIDE mmol/L 102 103   CO2 mmol/L 28 27   BUN mg/dL 39* 34*   CREATININE mg/dL 1 54* 1 48*   GLUCOSE RANDOM mg/dL 114 103   CALCIUM mg/dL 9 2 9 0               Glucose (mg/dL)   Date Value   04/30/2018 114   04/29/2018 103   04/28/2018 88   04/27/2018 93   12/23/2015 114 (H)   02/22/2015 101   02/21/2015 103   02/20/2015 116     Glucose, i-STAT (mg/dl)   Date Value   04/23/2018 106   04/23/2018 96       Labs reviewed    Imaging: reviewed  EKG, Pathology, and Other Studies: I have personally reviewed pertinent reports  VTE Prophylaxis: Sequential compression device Dinora Sivan)     Code Status: Prior   Advance Directive and Living Will:      Power of :    POLST:      Assessment/Plan     1  Lt MCA CVA s/p mechanical thrombectomy: Continue Eliquis and atorvastatin for secondary prevention  [facility] Neurology follow-up  2  HTN: Continue Lopressor 50mg every 12 hours  Monitor BP every shift  3  CKD stage III:  Baseline Cr 1 5  Will monitor  4  Chronic systolic CHF:  Continue torsemide 10mg daily  Monitor fluid status  KCl 10meq 2x daily to mange hypokalemia from torsemide  5  Lt UE DVT: Eliquis  6  DM type 2: On linagliptan 2 5mg at home  It was planned for pt to change to Januvia 100mg daily  Pt currently on SSI only and blood sugars are decently controlled  Will resume Januvia when needed  7  Acute blood loss anemia: s/p transfusion  Monitor Hgb  8  PFO:  Small  Seen on LORI  [facility] Cardiology follow-up  9  History of colon CA: Colonic lesion seen on CT  Will need GI follow-up  Counseling / Coordination of Care  Total floor / unit time spent today 60 minutes  Greater than 50% of total time was spent with the patient and / or family counseling and / or coordination of care        Allan Gaspar PA-C

## 2018-05-01 LAB
GLUCOSE SERPL-MCNC: 131 MG/DL (ref 65–140)
GLUCOSE SERPL-MCNC: 134 MG/DL (ref 65–140)

## 2018-05-01 PROCEDURE — 82948 REAGENT STRIP/BLOOD GLUCOSE: CPT

## 2018-05-01 PROCEDURE — 97530 THERAPEUTIC ACTIVITIES: CPT

## 2018-05-01 PROCEDURE — 92523 SPEECH SOUND LANG COMPREHEN: CPT

## 2018-05-01 PROCEDURE — 92610 EVALUATE SWALLOWING FUNCTION: CPT

## 2018-05-01 PROCEDURE — 99232 SBSQ HOSP IP/OBS MODERATE 35: CPT | Performed by: PHYSICAL MEDICINE & REHABILITATION

## 2018-05-01 PROCEDURE — 92507 TX SP LANG VOICE COMM INDIV: CPT

## 2018-05-01 PROCEDURE — 97116 GAIT TRAINING THERAPY: CPT

## 2018-05-01 PROCEDURE — 97162 PT EVAL MOD COMPLEX 30 MIN: CPT

## 2018-05-01 PROCEDURE — 97167 OT EVAL HIGH COMPLEX 60 MIN: CPT

## 2018-05-01 PROCEDURE — 97535 SELF CARE MNGMENT TRAINING: CPT

## 2018-05-01 PROCEDURE — G0515 COGNITIVE SKILLS DEVELOPMENT: HCPCS

## 2018-05-01 RX ADMIN — TORSEMIDE 10 MG: 20 TABLET ORAL at 08:40

## 2018-05-01 RX ADMIN — METOPROLOL TARTRATE 50 MG: 50 TABLET ORAL at 21:00

## 2018-05-01 RX ADMIN — Medication 150 MG: at 08:32

## 2018-05-01 RX ADMIN — ATORVASTATIN CALCIUM 80 MG: 40 TABLET, FILM COATED ORAL at 17:13

## 2018-05-01 RX ADMIN — SENNOSIDES 8.6 MG: 8.6 TABLET, FILM COATED ORAL at 21:00

## 2018-05-01 RX ADMIN — DOCUSATE SODIUM 100 MG: 100 CAPSULE, LIQUID FILLED ORAL at 08:40

## 2018-05-01 RX ADMIN — PANTOPRAZOLE SODIUM 40 MG: 40 TABLET, DELAYED RELEASE ORAL at 05:33

## 2018-05-01 RX ADMIN — DOCUSATE SODIUM 100 MG: 100 CAPSULE, LIQUID FILLED ORAL at 17:12

## 2018-05-01 RX ADMIN — METOPROLOL TARTRATE 50 MG: 50 TABLET ORAL at 08:41

## 2018-05-01 RX ADMIN — POTASSIUM CHLORIDE 10 MEQ: 750 TABLET, EXTENDED RELEASE ORAL at 08:40

## 2018-05-01 RX ADMIN — APIXABAN 2.5 MG: 2.5 TABLET, FILM COATED ORAL at 17:13

## 2018-05-01 RX ADMIN — POTASSIUM CHLORIDE 10 MEQ: 750 TABLET, EXTENDED RELEASE ORAL at 17:13

## 2018-05-01 RX ADMIN — APIXABAN 2.5 MG: 2.5 TABLET, FILM COATED ORAL at 08:40

## 2018-05-01 NOTE — ED NOTES
Addendum to chart at 5/1/18 at [phone] for April 18, 2018 at 1550 - dysphagia screen done prior to aspirin per RN  Chart addendum completed       Arik Abbott RN  05/01/18 [id number]

## 2018-05-01 NOTE — TREATMENT PLAN
Individualized Plan of Layo Evans 80 y o  female MRN: [id number]  Unit/Bed#: -80 Encounter: [id number]     PATIENT INFORMATION  ADMISSION DATE: 4/30/2018  1:56 PM JONATHAN CATEGORY: CVA   ADMISSION DIAGNOSIS: CVA (cerebral vascular accident) ([facility] [address] ) [[address] [id number]]  EXPECTED LOS: 1-2 wks     MEDICAL/FUNCTIONAL PROGNOSIS  Based on my assessment of the patient's medical conditions and current functional status, the prognosis for attaining medical and functional goals or the IRF stay is:  fair    Medical Goals: risk factor modification     ANTICIPATED DISCHARGE DISPOSITION AND SERVICES  COMMUNITY SETTING: home     ANTICIPATED FOLLOW-UP SERVICE:   Outpatient Therapy Services: PT/OT/Speech    DISCIPLINE SPECIFIC PLANS:  Required Disciplines & Services: PT/OT/Speech     REQUIRED THERAPY:  Therapy Hours per Day Days per Week Total Days   Physical Therapy 1 5 10   Occupational Therapy 1 5 10   Speech/Language Therapy 1 5 10   NOTE: Additional therapy time(s) may be added as appropriate to meet patient needs and to achieve functional goals          ANTICIPATED FUNCTIONAL OUTCOMES:  ADL: Patient will require supervision with ADLs with least restrictive device upon completion of rehab program   Bladder/Bowel: Patient will maximize level for bladder/bowel management and educate family/caregiver to decrease burden of care   Transfers: Patient will require supervision with transfers with least restrictive device upon completion of rehab program   Locomotion: Patient will require supervision with locomotion with least restrictive device upon completion of rehab program   Cognitive:  Patient will require supervision for cognitive tasks     DISCHARGE PLANNING NEEDS  Equipment needs: tbd       REHAB ANTICIPATED PARTICIPATION RESTRICTIONS:  None

## 2018-05-01 NOTE — PCC PHYSICAL THERAPY
5/8/18:  Pt cont to demonstrate deficits in cognition which affects problem solving and task perseveration  Pt currently needs Min Ax1 for transfers and functional mobility as pt cont to demonstrate inconsistencies with balance, tends to lean L when walking and standing, and inconsistencies with sequencing; pt ambulates with HHA x1 on R  Pt would benefit from cont skilled PT to progress ability to safely perform functional tasks and mobility; pt will need 24/7 S upon d/c for safety  Pt functioning at /Nidia HHA for functional mobility at this time  Due to deficits in cognition pt has difficulty with problem solving and task initiation  Pt with L sided inattention and needs VCs t/o tx session to encourage scanning and focus to the L  Pt will benefit from cont therapy to improve deficits to progress with functional mobility and safety to decrease fall risk

## 2018-05-01 NOTE — PROGRESS NOTES
05/01/18 1315   Patient Data   Rehab Impairment stroke   Etiologic Diagnosis L MCA CVA   Date of Onset 04/23/18   Support System   Relationship pt reports she has 4 children who live locally    Home Setup   Type of Home (unclear on specifics of home setup)   Prior Level of Function   Self-Care 3  Independent - Patient completed the activities by him/herself, with or without an assistive device, with no assistance from a helper   (as of December 2017)   Indoor-Mobility (Ambulation) 3  Independent - Patient completed the activities by him/herself, with or without an assistive device, with no assistance from a helper  Stairs 3  Independent - Patient completed the activities by him/herself, with or without an assistive device, with no assistance from a helper  Functional Cognition 3  Independent - Patient completed the activities by him/herself, with or without an assistive device, with no assistance from a helper  Restrictions/Precautions   Precautions Bed/chair alarms; Fall Risk;Cognitive   Pain Assessment   Pain Assessment No/denies pain   QI: Roll Left and Right   Assistance Needed Supervision   Roll Left and Right CARE Score 4   QI: Sit to Lying   Assistance Needed Supervision;Physical assistance   Assistance Provided by [place] 50%-74%   Comment ModA when fatigued   Sit to Lying CARE Score 2   QI: Lying to Sitting on Side of Bed   Assistance Needed Supervision   Lying to Sitting on Side of Bed CARE Score 4   QI: Sit to Stand   Assistance Needed Physical assistance;Supervision; Adaptive equipment   Assistance Provided by [place] 50%-74%   Comment S when given more time and using bilat arm rests   Sit to Stand CARE Score 2   QI: Chair/Bed-to-Chair Transfer   Assistance Needed Physical assistance; Adaptive equipment   Assistance Provided by [place] Total assistance   Comment [place] x2 HHA, and ModAx1 with 2nd person for safety    Chair/Bed-to-Chair Transfer CARE Score 1   QI: Car Transfer   Assistance Needed Physical assistance; Adaptive equipment   Assistance Provided by [place] Less than 25%   Car Transfer CARE Score 3   Transfer Bed/Chair/Wheelchair   Limitations Noted In Balance; Endurance; Coordination; Sequencing;UE Strength;LE Strength   Adaptive Equipment None   Sit Pivot Moderate Assist   Stand Pivot Moderate Assist;Minimal Assist  ([place] when given more time, needs guidance)   Sit to Stand Supervision; Moderate Assist   Stand to Sit Minimal   Supine to Sit Supervision   Sit to Supine Moderate Assist;Supervision  (ModA with fatigued)   Findings 2nd person for safety  Pt's transfer status fluctuated during the session  at times pt able to stand at CS with both hands on arm rests however needs [place] for stabiliizing balance once standing  Pt able to perform modified SPT no device with MinAx1 and 2nd person for safety, and other times [place] x2 HHA  VC for sequencing and inc time provided to pt  Bed, Chair, Wheelchair Transfer (FIM) 1 - Patient requires assist of two people   QI: Toilet Transfer   Assistance Needed Physical assistance; Adaptive equipment   Assistance Provided by [place] 50%-74%   Comment ModA x1 and 2nd person at [facility] no RW   Toilet Transfer CARE Score 2   Toilet Transfer   Surface Assessed Platform Commode  (over toilet )   Transfer Technique Stand Pivot  (with use of grab bars )   Limitations Noted In Balance; Endurance;Problem Solving; Sequencing;UE Strength;LE Strength   Findings ModA SPT and 2nd person to guide hips   Toilet Transfer (FIM) 1 - Patient requires assist of two people   QI: Walk 10 Feet   Assistance Needed Physical assistance   Assistance Provided by [place] Total assistance   Comment [place]-ModA x2   Walk 10 Feet CARE Score 1   QI: Walk 50 Feet with Two Turns   Assistance Needed Physical assistance   Assistance Provided by [place] Total assistance   Comment [place]-ModA x2   Walk 50 Feet with Two Turns CARE Score 1   QI: Walk 150 Feet   Assistance Needed Physical assistance   Assistance Provided by [place] Total assistance   Comment [place]-ModA x2   Walk 150 Feet CARE Score 1   QI: Walking 10 Feet on Uneven Surfaces   Reason if not Attempted Safety concerns   Walking 10 Feet on Uneven Surfaces CARE Score 88   Ambulation   Does the patient walk? 2  Yes   Primary Discharge Mode of Locomotion Walk   Walk Assist Level Minimum Assist  (Ax2)   Gait Pattern Slow Cathryn; Inconsistant Cathryn;Decreased foot clearance;Narrow [place]; Decreased L stance; Improper weight shift;Decreased R stance; Step to; Step through   Assist Device Hand Hold  (x2)   Distance Walked (feet) 50 ft  (150x2, 125)   Limitations Noted In Balance; Coordination; Endurance; Heel Strike;Swing;Strength;Speed   Findings [place] x2 with VC   Walking (FIM) 1 - Patient requires assist of two people   Wheelchair mobility   QI: Does the patient use a wheelchair? 0  No   QI: 1 Step (Curb)   Reason if not Attempted Safety concerns   1 Step (Curb) CARE Score 88   QI: 4 Steps   Reason if not Attempted Safety concerns   4 Steps CARE Score 88   QI: 12 Steps   Reason if not Attempted Safety concerns   12 Steps CARE Score 88   Stairs   Type [place] Steps   # of Steps 3  (4" steps and 2 x 6" steps, [place] with BHR 2nd person)   Weight Bearing Precautions Fall Risk   Assist Devices Bilateral Rail   Findings 2nd person for safety  nonreciprocal pattern,  more difficulty descending than ascending    Stairs (FIM) 1 - Patient requires assist of two people   Comprehension   QI: Comprehension 3  Usually Understands: Understands most conversations, but misses some part/intent of message  Requires cues at times to understand  Comprehension (FIM) 3 - Understands basic info/conversation 50-74% of time   Expression   QI: Expression 2   Frequently exhibits difficulty with expressing needs and ideas   Expression (FIM) 3 - Expresses basic info/needs 50-74% of time   Social Interaction   Social Interaction (FIM) 3 - Interacts 50-74% of time   Problem Solving   Problem solving (FIM) 2 - Needs direction more than ½ time to initiate, plan or complete simple tasks   Memory   Memory (FIM) 2 - Recalls 1 of 2 steps   RLE Assessment   RLE Assessment (gross MMT 3/5)   LLE Assessment   LLE Assessment (gross MMT 3/5)   Cognition   Arousal/Participation Cooperative  (fatigued )   Objective Measure   PT Measure(s) session started with pt needing to use the bathroom and co-treat with ST, PT focusing on functional movement and ST focusing on sequencing, problem solving  Practiced transfers onto commode over toilet, need for VC for sequencing and hand placement  moved WC to then face sink for pt to stand and wash hands; she had difficulty sequencing through and needed VC in order to access soap and to turn off water and physical A to get paper towels out of dispenser  Pt reported feeling fine; she presented wtih dec understanding of why she was in the hospital; pt was educated that she had had a CVA  Pt reported she could move just fine, but this was after she was retropulsive with toilet transfer and standing to wash hands at sink, needing ModA to correct LOB  Pt was able to transfer from <>bed at [facility] level with 2nd person just for safety; pt's transfer status fluctuates as a result  At end of session pt was more fatigued, needing ModA to lift both legs into bed  Practiced gait training with HHA x2, with VC to inc gait speed and to work on step through gait pattern  Discharge Information   Patient's Discharge Plan to dc home with family   Patient's Rehab Expectations to get better   Barriers to Discharge Home Decreased Endurance;Decreased Strength;Decreased Cognitive Function  (dec balance, stairs, to confirm family support )   Impressions Pt presents s/p L MCA CVA, with fluctuations in physical performance of transfers and functional mobility  Pt needs sitting rest breaks due to fatigue, and physical A required for safety fluctuates as well   Pt presents w/ dec righting reactions, dec BLE strength where she needs HHA to stabilize ins tance for standing and walking, and uses bilat arm rests to stand up  Pt demonstrated retropulsive balance and dec righting reactions, wtih tendency to lean back against chair during the start of session, however was able tos tand up from free standing chair with bilat arm rests without the chair moving  Pt was able to sequence ascending  steps with bilat hand rails and physical A for balance, however had inc difficulty sequencing coming down, needing inc time, TC/VC  Pt will benefit from skilled PT to further progress functional mobility, balance, safety, righting reactions and sequencing through tasks  Currently walking HHA x2; pt also needs VC to dec L hand  and for proper hand placementl; due to difficulty sequencing with bilat UE function, RW was not used today  To further assess d/c locomotion status in regards to A with family or with additional use of AD  To confirm home setup and who will be with pt upon d/c,  Currently recommedning [facility] upon d/c due to cognitive deficits      PT Therapy Minutes   PT Time In 1315   PT Time Out 1430   PT Total Time (minutes) 75   PT Mode of treatment - Individual (minutes) 55   PT Mode of treatment - Concurrent (minutes) 0   PT Mode of treatment - Group (minutes) 0   PT Mode of treatment - Co-treat (minutes) 20   PT Mode of Teatment - Total time(minutes) 75 minutes

## 2018-05-01 NOTE — PROGRESS NOTES
Internal Medicine Progress Note  Patient: iJmmy Gibson  Age/sex: 80 y o  female  Medical Record #: [id number]      ASSESSMENT/PLAN:  Jimmy Gibson is seen and examined and mangement for following issues:    1  Lt MCA CVA s/p mechanical thrombectomy: Continue Eliquis and atorvastatin for secondary prevention  [facility] Neurology follow-up  2   HTN: stable; continue Lopressor 50mg every 12 hours  3   CKD stage III; baseline Cr 1 5: stable; will monitor  4   Chronic systolic CHF/LVEF 18%; moderate TR; bio MVR normal function:  Continue torsemide 10mg daily/Kdur 10 meq BID  Chest is clear/no edema  5  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): Eliquis 2 5mg BID 2/2 age and renal status (switched from Coumadin in the hospital so NOAC is new)  6   DM type 2: On linagliptan 2 5mg at home  It was planned for pt to change to Januvia 100mg daily as an OP per the daughter  Pt currently on SSI only and blood sugars are controlled on no meds  Will start Januvia when needed  Continue DM diet  , 132, 96, X; fasting today 131    7  Acute blood loss anemia: s/p transfusion after EBL from thrombectomy of 500-1000ml from femoral site  Stable  8   PFO:  Small  Seen on LORI  [facility] Cardiology follow-up  9   History of colon CA: Colonic lesion seen on CT  Will need GI follow-up    10  Hx PPM    11  Hx HIT      Subjective: Patient seen and examined   No new or overnight issues     ROS:   GI: denies abdominal pain, change bowel habits or reflux symptoms  Neuro: No new neurologic changes  Respiratory: No Cough, SOB  Cardiovascular: No CP, palpitations     Scheduled Meds:    Current Facility-Administered Medications:  acetaminophen 650 mg Oral Q6H PRN Tamiko Pineda MD   apixaban 2 5 mg Oral BID Tamiko Pineda MD   atorvastatin 80 mg Oral Daily With Jenniffer Townsend MD   bisacodyl 10 mg Rectal Daily PRN Tamiko Pineda MD   docusate sodium 100 mg Oral BID Tamiko Pineda MD   iron polysaccharides 150 mg Oral Daily Peña Ricketts MD   metoprolol tartrate 50 mg Oral Q12H Vasquez Hinojosa MD   pantoprazole 40 mg Oral Early Morning Peña Ricketts MD   polyethylene glycol 17 g Oral Daily PRN Peña Ricketts MD   potassium chloride 10 mEq Oral BID Peña Ricketts MD   senna 1 tablet Oral HS Peña Ricketts MD   torsemide 10 mg Oral Daily Peña Ricketts MD       Labs:       Results from last 7 days  Lab Units 04/30/18  0456 04/29/18  0445   WBC Thousand/uL 5 79 5 74   HEMOGLOBIN g/dL 9 2* 9 1*   HEMATOCRIT % 29 3* 28 3*   PLATELETS Thousands/uL 174 163       Results from last 7 days  Lab Units 04/30/18  0456 04/29/18  0445   SODIUM mmol/L 137 137   POTASSIUM mmol/L 4 4 4 1   CHLORIDE mmol/L 102 103   CO2 mmol/L 28 27   BUN mg/dL 39* 34*   CREATININE mg/dL 1 54* 1 48*   GLUCOSE RANDOM mg/dL 114 103   CALCIUM mg/dL 9 2 9 0                Glucose (mg/dL)   Date Value   04/30/2018 114   04/29/2018 103   04/28/2018 88   04/27/2018 93   12/23/2015 114 (H)   02/22/2015 101   02/21/2015 103   02/20/2015 116     Glucose, i-STAT (mg/dl)   Date Value   04/23/2018 106   04/23/2018 96       Labs reviewed    Physical Examination:  Vitals:   Vitals:    04/30/18 1400 04/30/18 2107 05/01/18 0512 05/01/18 0553   BP: 135/79 127/60 120/60    BP Location: Right arm Right arm Right arm    Pulse: 89 86 72    Resp: 18 20 20    Temp: 98 1 °F (36 7 °C) 98 1 °F (36 7 °C) 98 °F (36 7 °C)    TempSrc: Oral Oral Oral    SpO2: 99% 98% 98%    Weight: 66 4 kg (146 lb 6 2 oz)   62 7 kg (138 lb 3 7 oz)   Height: 5' 4" (1 626 m)          GEN: NAD  HEENT: NC/AT  RESP: BBS w/o crackles/wheeze/rhonci; resp unlabored  CV: +S1 S2, regular rate, no rubs/murmurs  ABD: soft, NT, ND, normal BS   : no schaefer  EXT: no edema  Skin: no rashes  Neuro: AAO; [place] 5/5 but difficult to get her to coordinate UE strength testing 2/2 some involuntary movements; face symmetric; speech clear        [x ] Total time spent: 30 Mins and greater than 50% of this time was spent counseling/coordinating care        Tom Arroyo, [address] [place] [address]  Internal Medicine

## 2018-05-01 NOTE — PCC CARE MANAGEMENT
Pt is participating with therapy but will need longer term rehabilitation  Family aware and in agreement  They provided choices for contd skilled services  Referral process to begin to secure a bed  Following to assist w/transition to a skilled facility

## 2018-05-01 NOTE — PROGRESS NOTES
OT EVALUATION     05/01/18 3465   Patient Data   Rehab Impairment stroke   Etiologic Diagnosis L MCA CVA   Date of Onset 04/23/18   Home Setup   Type of Home Single Level   Method of Entry Stairs   Number of Stairs 3   First Floor Bathroom Full;Tub;Combo;Curtain   Home Modifications Necessary? Yes   Home Modification Comment grab bars in tub    Available Equipment Roller Walker;Tub Transfer Bench;Standard Walker   Prior IADL Participation   Money Management Identify Money;Estimate Costs;Estimate Change;Combine Bills;Manage Checkbook   Meal Preparation Full Participation   Laundry Full Participation   Home Cleaning (assist from family)   Prior Level of Function   Self-Care 3  Independent - Patient completed the activities by him/herself, with or without an assistive device, with no assistance from a helper  Indoor-Mobility (Ambulation) 3  Independent - Patient completed the activities by him/herself, with or without an assistive device, with no assistance from a helper  Stairs 3  Independent - Patient completed the activities by him/herself, with or without an assistive device, with no assistance from a helper  Functional Cognition 3  Independent - Patient completed the activities by him/herself, with or without an assistive device, with no assistance from a helper  Prior Assistance Needed for Family [employer]   Prior Device Used (RW as needed for community mobility)   Psychosocial   Psychosocial (WDL) WDL   Restrictions/Precautions   Precautions Bed/chair alarms;Cognitive; Fall Risk;Impulsive  ([place]an speaking primarily but understands english)   Pain Assessment   Pain Assessment No/denies pain   Pain Score No Pain   QI: Eating   Comment pt ate prior to session   Reason if not Attempted Activity not applicable   Eating CARE Score 9   Eating Assessment   Findings pt ate prior to session   QI: Oral Hygiene   Assistance Needed Physical assistance   Assistance Provided by [place] 25%-49%   Oral Hygiene CARE Score 3   Grooming   Able To Comb/Brush Hair;Wash/Dry Face;Brush/Clean Teeth;Wash/Dry Hands   Limitation Noted In Coordination;Problem Solving;Neglect; Sequencing;Timeliness   Findings pt requires assist to comb left side of hair despite multiple redirections for  L side neglect   QI: Shower/Bathe Self   Assistance Needed Physical assistance   Assistance Provided by [place] Total assistance   Comment Ax2 for safety   Shower/Bathe Self CARE Score 1   Bathing   Assessed Bath Style Sponge Bath   Anticipated D/C Bath Style Tub   Able to Gather/Transport No   Able to [employer] Temperature No   Able to Wash/Rinse/Dry (body part) Left Arm;Right Arm; Chest;Abdomen   Limitations Noted in Balance; Coordination;Problem Solving; Safety;Strength   Positioning Seated;Standing   Findings  Pt requires assist to wash back of b/l upper thighs  Pt attempting to reach down to wash below knee but unable to wash b/l feet  Pt with limited balance during stance and required b/l UE support while therapist washed [place] and buttock  Pt iwth poor coordination and sequence of simple command following Pt requires two person assist for safety   Bathing (FIM) 1 - Patient requires two helpers   QI: Upper Body Dressing   Assistance Needed Physical assistance   Assistance Provided by [place] Total assistance   Upper Body Dressing CARE Score 1   QI: Lower Body Dressing   Assistance Needed Physical assistance   Assistance Provided by [place] Total assistance   Lower Body Dressing CARE Score 1   QI: Putting On/Taking Off Footwear   Assistance Needed Physical assistance   Assistance Provided by [place] Total assistance   Putting On/Taking Off Footwear CARE Score 1   QI: Picking Up Object   Reason if not Attempted Safety concerns   Picking Up Object CARE Score 88   Dressing/Undressing Clothing   Remove UB Clothes (hopsital gown)   Remove LB Clothes Undergarment;Pants;Socks; [place] [address] Pants;Undergarment;Socks; Shoes   Limitations Noted In Balance; Buttoning; Endurance;Problem Solving;Strength   Positioning Supported Sit;Standing   Findings Pt unable to orient button up shirt attempted to don shirt while still buttoned put arms in shirt facing backward  When reorineted pt unablet o complte required total A  Pt continues to thread LE in wrong pant leg despite redirection and cueing  Pt requires total A and two people present for balance support while pulling pants up over hips  UB Dressing (FIM) 1 - Patient completes less than 25% of all tasks   LB Dressing (FIM) 1 - Patient requires two helpers   QI: [address] Needed Physical assistance   Assistance Provided by [place] Total assistance   Toileting Hygiene CARE Score 1   Toileting   Able to [address] down no, up no  Able to Manage Clothing Closures No   Manage Hygiene Bladder   Limitations Noted In Balance; Safety;LE Strength   Toileting (FIM) 1 - Patient requires two helpers   Bowel/Bladder Management   Current Bowel Elimination Toilet   Bladder Management (FIM) 1 - Patient uses absorbent pad and helper must apply or change it   Bowel Management (FIM) 5 - [place] sets up supplies within patient reach   QI: Roll Left and Right   Assistance Needed Supervision   Assistance Provided by [place] No physical assistance   Roll Left and Right CARE Score 4   QI: Lying to Sitting on Side of Bed   Assistance Needed Physical assistance   Assistance Provided by [place] Less than 25%   Lying to Sitting on Side of Bed CARE Score 3   QI: Sit to Stand   Assistance Needed Physical assistance   Assistance Provided by [place] Total assistance   Comment Pt requires two person assist for safety   Pt with verbal cueing for hand placement and cueing to assist     Sit to Stand CARE Score 1   QI: Chair/Bed-to-Chair Transfer   Assistance Needed Physical assistance   Assistance Provided by [place] Total assistance   Comment mod A x2 for stand pivot transfer with RW pt with poor sequencing    Chair/Bed-to-Chair Transfer CARE Score 1   Transfer Bed/Chair/Wheelchair   Limitations Noted In Balance; Coordination; Endurance;Problem Solving;LE Strength; Sequencing   Stand Pivot Moderate Assist;Assist x 2   Sit to Stand Moderate Assist;Assist x 2   Stand to Sit Moderate Assist   Supine to Sit Minimal   Bed, Chair, Wheelchair Transfer (FIM) 1 - Patient requires assist of two people   QI: Toilet Transfer   Assistance Needed Physical assistance   Assistance Provided by [place] Total assistance   Toilet Transfer CARE Score 1   Toilet Transfer   Transfer Technique Stand Pivot   Limitations Noted In Balance; Endurance; Safety;LE Strength   Toilet Transfer (FIM) 1 - Patient requires assist of two people   Tub/Shower Transfer   Not Assessed Safety   Comprehension   Auditory Basic   Visual Basic   QI: Comprehension 2  Sometimes Understands: Understands only basic conversations or simple, direct phrases  Frequently requires cues to understand   Comprehension (FIM) 1 - Understands basic info/conversation < 25% of the time   Expression   Verbal Basic   Non-Verbal Basic   QI: Expression 2  Frequently exhibits difficulty with expressing needs and ideas   Expression (FIM) 3 - Expresses basic info/needs 50-74% of time   Social Interaction   Cooperation with staff   Social Interaction (FIM) 3 - Interacts 50-74% of time   Problem Solving   Routine Manages call bell;[place] precautions;Manages ADL   Problem solving (FIM) 1 - Needs direction nearly all the time   Memory   Recognize People No   Remember Routine No   Initiates Tasks No   Short-Term Impaired   Long Term Impaired   Recalls Precaution No   Memory (FIM) 1 - Recognizes, recalls/performs less than 25%   RUE Assessment   RUE Assessment X  (limited shoulder flexion 1/2 range)   LUE Assessment   LUE Assessment X  (limited [place] reacher to 1/2 range   otherwise WFL)   Cognition   Overall Cognitive Status Impaired   Arousal/Participation Alert; Cooperative   Attention Difficulty attending to directions   Orientation Level Oriented to person;Oriented to place   Memory Decreased recall of precautions;Decreased recall of recent events;Decreased short term memory;Decreased recall of biographical information;Decreased long term memory   Following Commands Follows one step commands inconsistently   Discharge Information   Patient's Discharge Plan d/c home with family/son   Patient's Rehab Expectations none stated   Barriers to Discharge Home Limited Family Support;Decreased Cognitive Function;Decreased Strength;Decreased Endurance; Safety Considerations   Impressions Pt presents s/p L MCA CVA with  recent r parietal CVA  PTA  pt was IND iwth ADLs and IADLs as she reports  Pt is poor historian however and fluctuates with following simple motor commands  pt with fluctuation in indepedence with transfers and requires total A for safety and seuqencing with RW  Pt with poor body awareness and poor L/R discrimination  Pt iwth impaired fixed gaze, impaired saccades/smooth pursuits, and L inattention  Pt with impaired cognition on all levels and requires multimodal cueing for routine tasks  Pt with retropulsion with balance requring b/l UE support for balance  Pt requires total A for all ADLs and fxnl transfers at this time  Pt would benefit from 3 week LOS to address deficits and increase safety and balance     OT Therapy Minutes   OT Time In 0830   OT Time Out 1000   OT Total Time (minutes) 90   OT Mode of treatment - Individual (minutes) 90   OT Mode of treatment - Concurrent (minutes) 0   OT Mode of treatment - Group (minutes) 0   OT Mode of treatment - Co-treat (minutes) 0   OT Mode of Teatment - Total time(minutes) 90 minutes

## 2018-05-01 NOTE — PROGRESS NOTES
Progress Note - Carol Mcgrath 80 y o  female MRN: [id number]    Unit/Bed#: -02 Encounter: [id number]            Subjective:   Patient w/o complaint currently     Objective:     ROS  Gen: denies recent wt loss   Psych: denies mood change    Vitals: Blood pressure 120/60, pulse 72, temperature 98 °F (36 7 °C), temperature source Oral, resp  rate 20, height 5' 4" (1 626 m), weight 62 7 kg (138 lb 3 7 oz), SpO2 98 %, not currently breastfeeding      Physical Exam:     Gen:        NAD   Neck:   trachea midline  Lungs:  respirations unlabored   Heart:    + S1 and S2   Abdomen:    Soft, non-tender  Psych: mood/affect appropriate  Neurologic: awake, alert    Functional :  Mobility: min  Tx: min-mod  ADLs: max        Current Facility-Administered Medications:  acetaminophen 650 mg Oral Q6H PRN Brenda Lindsay MD   apixaban 2 5 mg Oral BID Brenda Lindsay MD   atorvastatin 80 mg Oral Daily With Willow Thompson MD   bisacodyl 10 mg Rectal Daily PRN Brenda Lindsay MD   docusate sodium 100 mg Oral BID Brenda Lindsay MD   iron polysaccharides 150 mg Oral Daily Brenda Lindsay MD   metoprolol tartrate 50 mg Oral Q12H [address] Brenda Lindsay MD   pantoprazole 40 mg Oral Early Morning Brenda Lindsay MD   polyethylene glycol 17 g Oral Daily PRN Brenda Lindsay MD   potassium chloride 10 mEq Oral BID Brenda Lindsay MD   senna 1 tablet Oral HS Brenda Lindsay MD   torsemide 10 mg Oral Daily Brenda Lindsay MD         acetaminophen    bisacodyl    polyethylene glycol      Assessment:  Pt is 79 yo female with ICH & bi-hemispheric nonhemorrhagic ischemic strokes s/p thrombectomy by Dr Sara Herzog on 4/23    Plan:    Rehabilitation: cont PT/OT for ambulatory/ADL dysfxn    ICH & bi-hemispheric nonhemorrhagic ischemic strokes: nonhemorrhagic stroke felt by neurology to be embolic of unclear etiology; per s/p thrombectomy by Dr Sara Herzog on 4/23, cleared by neurosx for [facility], operated by [facility] and per neuro recs started on eliquis 2 5 mg BID (not 5 mg due to age and serum Cr greater than 1 5, tends to fluctuate around this value); home lipitor increased from 40 to 80 mg        Peripheral neuropathy: likely 2/2 to DM, had tried neurontin in the past but did not tolerate      DM: on januvia 100 mg qd at home (recently switched from tradjenta 5 mg qd); currently on ISS     CHF (EF 45%): on home torsemide 10 mg qd (and on home potassium 10 MEQ BID due to hypokalemia 2/2 to torsemide); however home toprol XL 50 mg qd switched to lopressor 50 mg q12 in acute care     SSS: s/p pacer, interrogated recently in acute care, no A-fib found, follows with Dr Madhav Rea (cards) and Dr Melanie Post (EP)      h/o colon CA: on CT CAP of 4/26 colonic thickening suspicious for lesion noted and colonoscopy recommended, pt undergoes screening colonoscopys with Dr Jens Rhodes, pt to FU with them for FU colonoscopy       Meningioma: seen by neurosx no intervention planned, follows with Dr Junior Henry     PFO (3 mm): unclear if paradoxical stroke from DVT is etiology of CVA (felt to be embolic by neuro) however as neuro has placed pt on [facility] for CVA LE dopplers would not ; t/c closure as OP       s/p MVR: tissue valve     HL: home lipitor increased from 40 mg to 80 mg due to CVA       h/o DVT: home coumadin switched to eliquis for CVA      Insomnia: home remeron on home due 15 mg HS on hold (pt had episodes of sedation/AMS in acute care and will try to avoid such medications)      non-occlusive carotid dz: per carotid U/S report recommend repeat carotid U/S in 1 year; on [facility] and statin     chronic constipation: per family at baseline 1 BM every 4 to 5 days, IM monitoring and will treat with laxatives as needed      rt ankle pain/swelling: likely sprain (XR showed no acute osseous abnormality     CKD: baseline 1 4-1  7      chronic anemia: AOCD/CKD, at home on iron 150 mg qd, cont iron supplementation as inpt; Hg currently stable at 9 2     Hyperphosphatemia per lab reference range at 4 4: however in females 18 year or older 4 5 is ULN      Incidental findings on CT C/A/P of 4/26:  1) multiple pulmonary micronodules: per CT report recommended FU imaging in 3 months for determination of stability  2) pleural thickening  3) uterine fibroid  Other incidental findings:  4) B/L renal cysts  5) 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 no intervention planned, follows with Dr Jones Boards  6) EKG abnormalities (PACs, bifascicular block/wide QRS, LAD, prolonged QTc): follows with Dr Rowdy Foy (cards) and Dr Cole Mccray (EP); will d/w cards clinical significance of prolonged QTc in the setting of PPM and other EKG findings   7) mod-severe TR/elevated peak PA pressure:  follows with Dr Rowdy Foy (cards) and Dr Cole Mccray (EP)

## 2018-05-01 NOTE — PROGRESS NOTES
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)   Lingual Symmetry WFL   Lingual Strength WFL   Lingual ROM WFL   Mandible WFL   Dentition Adequate   Volitional Cough Strong   Vocal Quality clear, adequate   Volitional Swallow WFL   Respratory Status Room air   Swallow Information   Current Risks for Dysphagia & Aspiration General debilitation;New Neuro event;Brain injury;Cognitive deficit;HX neurologic dx   Current Symptoms/Concerns Difficulty chewing;Holding food in mouth   Current Diet Dysphagia advance; Thin liquid   Baseline Diet Regular; Thin liquids   Consistencies Assessed and Performance   Materials Admnistered Soft/Level 3; Thin liquid   Oral Stage Mild impaired   Phargngeal Stage Mild impaired   Swallow Mechanics Mild delayed;Swallow initation; Appears prompt;Good Larygneal rise   Esophageal Concerns Heartburn   Recommendations   Risk for Aspiration Mild   Diet Solid Recommendation Level 3 Dysphagia/ advanced/ soft to chew   Diet Liquid Recommendation Thin liquid   Recommended Form of Meds As desired; As tolerated   General Precautions Aspiration precautions; Feed only when alert;Minimize distractions;Upright as possible for all oral intake;Remain upright for 45 mins after meals  (OOB for meals)   Compensatory Swallowing Strategies Alternate solids and liquids; External pacing   Results Reviewed with RN;PT/Family/Caregiver   QI: Eating   Assistance Needed Set-up / Meri Sleek Provided by Sawyer No physical assistance   Eating CARE Score 5   Swallow Assessment   Swallow Treatment Assessment Pt assessed for swallow function positioned upright in bed w/ bfast where pt is currently on a level 3 diet and thin liquids  Pt required assist w/ tray set up, however, demonstrated ability to self feed, consuming ~75% of meal consisting of [place] toast and hash browns, along w/ ~180cc thin liquids by cup/straw sips  Demonstrated functional bolus retrieval w/o anterior loss of solids and liquids  Mastication of level 3 softer solids was mild-moderately prolonged but was functional for bolus breakdown  Adequate bolus formation w/o increased oral residual or pocketing observed  A-p transfers and initiation of swallows w/ thins appeared prompt, but appeared to fluctuate from timely to mildly delayed w/ softer solids  Hyolaryngeal rise was judged to be [place] by palpation  Pt was offered trials of regular diet items, however, pt declined but agreeable to trial during f/u meals  Overall, pt appeared to tolerate current diet items and no overt s/s aspiration noted across meal  Pt presenting w/ overall mild oropharyngeal dysphagia and recommend level 3 diet and thin liquids at this time  Also recommend that pt have assist w/ tray set up and be OOB for meals for adequate positioning  Pt will benefit from skilled ST intervention at this time to maximize overall swallow skills in order to safely achieve baseline diet of regular/thins  Of note, pt engaged in conversation in [place] w/ SLP to mutually determine goals  Swallow Assessment Prognosis   Prognosis Good   Prognosis Considerations Co-morbidities; Medical prognosis; New learning ability;Ability to carry over; Cooperation   SLP Therapy Minutes   SLP Time In 0730   SLP Time Out 0800   SLP Total Time (minutes) 30   SLP Mode of treatment - Individual (minutes) 30   SLP Mode of treatment - Concurrent (minutes) 0   SLP Mode of treatment - Group (minutes) 0   SLP Mode of treatment - Co-treat (minutes) 0   SLP Mode of Teatment - Total time(minutes) 30 minutes   Therapy Time missed   Time missed? No   Daily FIM Score   Eating (FIM) 5 - Patient needs help to open contianers or set up tray

## 2018-05-02 LAB — GLUCOSE SERPL-MCNC: 135 MG/DL (ref 65–140)

## 2018-05-02 PROCEDURE — 92526 ORAL FUNCTION THERAPY: CPT

## 2018-05-02 PROCEDURE — 99233 SBSQ HOSP IP/OBS HIGH 50: CPT | Performed by: PHYSICAL MEDICINE & REHABILITATION

## 2018-05-02 PROCEDURE — G0515 COGNITIVE SKILLS DEVELOPMENT: HCPCS

## 2018-05-02 PROCEDURE — 82948 REAGENT STRIP/BLOOD GLUCOSE: CPT

## 2018-05-02 PROCEDURE — 97530 THERAPEUTIC ACTIVITIES: CPT

## 2018-05-02 PROCEDURE — 97112 NEUROMUSCULAR REEDUCATION: CPT

## 2018-05-02 PROCEDURE — 97116 GAIT TRAINING THERAPY: CPT

## 2018-05-02 RX ORDER — BACITRACIN, NEOMYCIN, POLYMYXIN B 400; 3.5; 5 [USP'U]/G; MG/G; [USP'U]/G
1 OINTMENT TOPICAL 2 TIMES DAILY
Status: DISCONTINUED | OUTPATIENT
Start: 2018-05-02 | End: 2018-05-05

## 2018-05-02 RX ADMIN — APIXABAN 2.5 MG: 2.5 TABLET, FILM COATED ORAL at 09:21

## 2018-05-02 RX ADMIN — DOCUSATE SODIUM 100 MG: 100 CAPSULE, LIQUID FILLED ORAL at 09:23

## 2018-05-02 RX ADMIN — DOCUSATE SODIUM 100 MG: 100 CAPSULE, LIQUID FILLED ORAL at 16:13

## 2018-05-02 RX ADMIN — POTASSIUM CHLORIDE 10 MEQ: 750 TABLET, EXTENDED RELEASE ORAL at 16:13

## 2018-05-02 RX ADMIN — BACITRACIN, NEOMYCIN, POLYMYXIN B 1 SMALL APPLICATION: 400; 3.5; 5 OINTMENT TOPICAL at 23:32

## 2018-05-02 RX ADMIN — Medication 150 MG: at 09:20

## 2018-05-02 RX ADMIN — POTASSIUM CHLORIDE 10 MEQ: 750 TABLET, EXTENDED RELEASE ORAL at 09:21

## 2018-05-02 RX ADMIN — APIXABAN 2.5 MG: 2.5 TABLET, FILM COATED ORAL at 16:13

## 2018-05-02 RX ADMIN — SENNOSIDES 8.6 MG: 8.6 TABLET, FILM COATED ORAL at 21:38

## 2018-05-02 RX ADMIN — ATORVASTATIN CALCIUM 80 MG: 40 TABLET, FILM COATED ORAL at 16:13

## 2018-05-02 RX ADMIN — METOPROLOL TARTRATE 50 MG: 50 TABLET ORAL at 09:21

## 2018-05-02 RX ADMIN — METOPROLOL TARTRATE 50 MG: 50 TABLET ORAL at 21:38

## 2018-05-02 RX ADMIN — PANTOPRAZOLE SODIUM 40 MG: 40 TABLET, DELAYED RELEASE ORAL at 05:25

## 2018-05-02 RX ADMIN — TORSEMIDE 10 MG: 20 TABLET ORAL at 09:23

## 2018-05-02 NOTE — PCC NURSING
Pt is 79 yo female with ICH & bi-hemispheric nonhemorrhagic ischemic strokes s/p thrombectomy 4/23  Currently on Lopressor for HTN, and Eliquis for CVA and DVT  Blood sugar checks and diabetic medication discontinued  Is primarily Azeri speaking  Incontinent of bladder but continent of bowel  Chronic constipation, only having BM 1-2x week  Currently no pain issues  5/8 Rapid response was called for change in mental status; stroke ruled out and pt remained on unit  5/7 &5/8 Pt received 1L  NSS  This week we will  Continue to monitor vitals and labs  We will check nuero and assess for issues  We will work on maintaining intergrity of skin and preventing skin breakdown  We will work with pt on bladder management  We will work on safety awareness and prevent falls

## 2018-05-02 NOTE — PLAN OF CARE
Problem: INFECTION - ADULT  Goal: Absence of fever/infection during neutropenic period  INTERVENTIONS:  - Monitor WBC  - Implement neutropenic guidelines   Outcome: Completed Date Met: 05/02/18

## 2018-05-02 NOTE — PROGRESS NOTES
Progress Note - Briana Holley 80 y o  female MRN: [id number]    Unit/Bed#: -58 Encounter: [id number]            Subjective:   Patient expressed concerns regarding insurance coverage for her rehab stay     Objective:     ROS  Gen: denies recent wt loss   Psych: denies mood change    Vitals: Blood pressure 132/60, pulse 75, temperature 98 2 °F (36 8 °C), temperature source Oral, resp  rate 18, height 5' 4" (1 626 m), weight 67 6 kg (149 lb 0 5 oz), SpO2 99 %, not currently breastfeeding      Physical Exam:     Gen:        NAD   Neck:   trachea midline  Lungs:  respirations unlabored   Heart:    + S1 and S2   Abdomen:    Soft, non-tender  Psych: mood/affect appropriate  Neurologic: awake, alert    Functional :  Mobility: min x 2  Tx: min x 2  ADLs: max        Current Facility-Administered Medications:  acetaminophen 650 mg Oral Q6H PRN Ankita Socks, MD   apixaban 2 5 mg Oral BID Ankita Socks, MD   atorvastatin 80 mg Oral Daily With Marleny Trevizo MD   bisacodyl 10 mg Rectal Daily PRN Ankita Socks, MD   docusate sodium 100 mg Oral BID Ankita Socks, MD   iron polysaccharides 150 mg Oral Daily Ankita Socks, MD   metoprolol tartrate 50 mg Oral Q12H [address] Ankita Socks, MD   pantoprazole 40 mg Oral Early Morning Ankita Socks, MD   polyethylene glycol 17 g Oral Daily PRN Ankita Socks, MD   potassium chloride 10 mEq Oral BID Ankita Socks, MD   senna 1 tablet Oral HS Ankita Socks, MD   torsemide 10 mg Oral Daily Ankita Socks, MD         acetaminophen    bisacodyl    polyethylene glycol      Assessment:  Pt is 79 yo female with ICH & bi-hemispheric nonhemorrhagic ischemic strokes s/p thrombectomy by Dr Lizz Fuentes on 4/23    Plan:    Rehabilitation: cont PT/OT for ambulatory/ADL dysfxn    ICH & bi-hemispheric nonhemorrhagic ischemic strokes: nonhemorrhagic stroke felt by neurology to be embolic of unclear etiology; per s/p thrombectomy by Dr Lizz Fuentes on 4/23, cleared by neurosx for [facility] and per neuro recs started on eliquis 2 5 mg BID (not 5 mg due to age and serum Cr greater than 1 5, tends to fluctuate around this value); home lipitor increased from 40 to 80 mg        Peripheral neuropathy: likely 2/2 to DM, had tried neurontin in the past but did not tolerate      DM: on januvia 100 mg qd at home (recently switched from tradjenta 5 mg qd); currently on ISS     CHF (EF 45%): on home torsemide 10 mg qd (and on home potassium 10 MEQ BID due to hypokalemia 2/2 to torsemide); however home toprol XL 50 mg qd switched to lopressor 50 mg q12 in acute care     SSS: s/p pacer, interrogated recently in acute care, no A-fib found, follows with Dr Perri Lisa (cards) and Dr Dwight Nelson (EP)      h/o colon CA: on CT CAP of 4/26 colonic thickening suspicious for lesion noted and colonoscopy recommended, pt undergoes screening colonoscopys with Dr Laura Holley, pt to FU with them for FU colonoscopy       Meningioma: seen by neurosx no intervention planned, follows with Dr Mirta Izaguirre     PFO (3 mm): unclear if paradoxical stroke from DVT is etiology of CVA (felt to be embolic by neuro) however as neuro has placed pt on [facility] for CVA LE dopplers would not ; t/c closure as OP       s/p MVR: tissue valve     HL: home lipitor increased from 40 mg to 80 mg due to CVA       h/o DVT: home coumadin switched to eliquis for CVA      Insomnia: home remeron on home due 15 mg HS on hold (pt had episodes of sedation/AMS in acute care and will try to avoid such medications)      non-occlusive carotid dz: per carotid U/S report recommend repeat carotid U/S in 1 year; on [facility] and statin     chronic constipation: per family at baseline 1 BM every 4 to 5 days, IM monitoring and will treat with laxatives as needed      rt ankle pain/swelling: likely sprain; XR showed no acute osseous abnormality     CKD: baseline 1 4-1  7      chronic anemia: AOCD/CKD, at home on iron 150 mg qd, cont iron supplementation as inpt; Hg currently stable at 9 2     Hyperphosphatemia per lab reference range at 4 4: however in females 18 year or older 4 5 is ULN      Dispo: reteam    Incidental findings on CT C/A/P of 4/26:  1) multiple pulmonary micronodules: per CT report recommended FU imaging in 3 months for determination of stability  2) pleural thickening  3) uterine fibroid  Other incidental findings:  4) B/L renal cysts  5) 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 no intervention planned, follows with Dr Lian Cruz  6) EKG abnormalities (PACs, bifascicular block/wide QRS, LAD, prolonged QTc): follows with Dr Piero Carmen (cards) and Dr Joanna Campuzano (EP); will d/w cards clinical significance of prolonged QTc in the setting of PPM and other EKG findings   7) mod-severe TR/elevated peak PA pressure:  follows with Dr Piero Carmen (cards) and Dr Joanna Cmapuzano (EP)    This patient was discussed by the Interdisciplinary Team in weekly case conference today  The care of the patient was extensively discussed with all care providers and an appropriate rehabilitation plan was formulated unique for this patient  Barriers were identified preventing progression of therapy and appropriate interventions were discussed with each discipline  Please see the team note for input from all disciplines regarding barriers, intervention, and discharge planning      [ x ] Total time spent: 45 Mins, and greater than 50% of this time was spent counseling/coordinating care

## 2018-05-02 NOTE — PROGRESS NOTES
SLP TAA     05/01/18 1240   Patient Data   Rehab Impairment Stroke   Etiologic Diagnosis L MCA CVA   Date of Onset 04/23/18   Prior IADL Participation   Money Management Identify Money;Estimate Costs;Estimate Change;Combine Bills;Manage Checkbook   Meal Preparation Full Participation   Laundry Full Participation   Home Cleaning (assist from family)   Prior Level of Function   Functional Cognition 3  Independent - Patient completed the activities by him/herself, with or without an assistive device, with no assistance from a helper  Restrictions/Precautions   Precautions Cognitive; Impulsive;Bed/chair alarms; Fall Risk;Supervision on toilet/commode   Pain Assessment   Pain Assessment No/denies pain   Pain Score No Pain   Comprehension   Assist Devices Glasses   Auditory Basic  (inconsistent)   Findings Pt completed portions of informal speech/language and cognitive linguistic assessments  See SLP Rehab note for full details  QI: Comprehension 1  Rarely/Never Understands   Comprehension (FIM) 2 - Understands basic info/conversation 25-49% of time   Expression   Verbal Basic   Intelligibility Word   Findings Pt completed portions of informal speech/language and cognitive linguistic assessments  See SLP Rehab note for full details  QI: Expression 2  Frequently exhibits difficulty with expressing needs and ideas   Expression (FIM) 3 - Expresses basic info/needs 50-74% of time   Social Interaction   Cooperation with staff   Participation Individual   Findings Pt was participatory and cooperative throughout Dan[place], however, frequently demonstrated laughing at inappropriate times  Social Interaction (FIM) 3 - Interacts 50-74% of time   Problem Solving   Findings Pt completed portions of informal speech/language and cognitive linguistic assessments  See SLP Rehab note for full details      Problem solving (FIM) 1 - Needs direction nearly all the time   Memory   Recognize People Yes   Remember Routine No Initiates Tasks No   Short-Term Impaired   Long Term Impaired   Recalls Precaution No   Findings Pt completed portions of informal speech/language and cognitive linguistic assessments  See SLP Rehab note for full details  Memory (FIM) 2 - Recognizes, recalls/performs 25-49%   Cognition   Overall Cognitive Status Impaired   Arousal/Participation Alert; Cooperative   Attention Difficulty attending to directions   Orientation Level Oriented to person;Oriented to place   Memory Decreased long term memory;Decreased recall of biographical information;Decreased short term memory;Decreased recall of recent events;Decreased recall of precautions   Following Commands Follows one step commands inconsistently   Comments Pt completed portions of informal speech/language and cognitive linguistic assessments  See SLP Rehab note for full details  Discharge Information   Patient's Discharge Plan to dc home w/ family   Patient's Rehab Expectations to get better   Barriers to Discharge Home Decreased Endurance;Decreased Strength;Decreased Cognitive Function   Impressions Pt completed portions of informal cognitive linguistic and speech/language assessments where pt found to be presenting w/ moderate to severe deficits, characterized by decreased comprehenison, word finding difficulties, decreased problem solving/reasoning, decreased sequencing, and decreased STM and LTM recall  Pt presents w/ good rehab potential and will benefit from skilled ST intervention to maximize functional cognitive linguistic and communication skills at this time      SLP Therapy Minutes   SLP Time In 1240   SLP Time Out 2419   SLP Total Time (minutes) 55   SLP Mode of treatment - Individual (minutes) 35   SLP Mode of treatment - Concurrent (minutes) 0   SLP Mode of treatment - Group (minutes) 0   SLP Mode of treatment - Co-treat (minutes) 20   SLP Mode of Teatment - Total time(minutes) 55 minutes

## 2018-05-02 NOTE — PROGRESS NOTES
SLP Informal Speech/Language & Cognitive Linguistic Assessments     18 1240   Pain Assessment   Pain Assessment No/denies pain   Pain Score No Pain   Restrictions/Precautions   Precautions Cognitive; Impulsive;Bed/chair alarms; Fall Risk;Supervision on toilet/commode   Executive Function Skills   Problem Solving X   Simple Functional Tasks Moderate   Verbal Reasoning Skills Maximal   Safety/Judgement X   Routine Tasks Moderate   Insight Severe insight   Impulsive Moderately impulsive   Task Initiation Delayed initiation   Flexibility of Thought Reduced flexibility   Planning Reduced planning skills   Organization Moderately disorganized   Processing Speed Delayed   Memory Skills   Orientation Level Oriented to person;Oriented to place   Long Term Biographical Recall Mild Impairment   Short Term Working Recall Severe Impairment   Memory (FIM) 2 - Recognizes, recalls/performs 25-49%   Social Interaction (FIM) 3 - Interacts 50-74% of time   Auditory Comprehension   Word Level Comprehension X   Yes/No Questions X   Simple Questions Moderate impaired   Commands X   One Step Basic Commands Minimal   Two Step Basic Commands Moderate   Complex/Abstract Commands Maximal   Speech/Language/Cognition Assessmetn   Treatment Assessment Pt completed portions of the informal speech/language and cognitive linguistic assessments  Pt is primarily [place] speaking, however, pt noted to engage in informal convo using English to begin session  When administering portions of the informal assessments, info was presented to pt first in [place] and again in [place] if pt did not respond or indicated that she was confused  Orientation: person, time  LTM: Accurately stated  w/ increased time, address and children's names but unable to correctly state   STM: repeated 4/4 target words, recalled 3/4 target words after ~15 sec and 1/4 words after ~2 min   Of note, pt w/ decreased comprehension of task initially when explained in both languages, however, given models then improved comprehension ob task observed  Working Memory: unable to accurately engage in task due to decreased comprehension and reasoning skills, despite presentation of info in both languages  Auditory Comprehension: Answered biographical yes/no questions presented in Jamaican w/ 10/10 accuracy and simple yes/no questions in Jamaican w/ 2/5 accuracy  Auditory Commands: one-step 3/5 accurate, two step 0/2 accurate  Reading Comprehension: Unable to follow written commands w/o direct models and tactile cues  During therapy tasks, pt noted to be tangential and w/ increased laughing, frequently requiring info to be presented in both [place]ican then followed by English to improve comprehension of info  Pt also noted to use both language when speaking, often changes between languages in a single sentence  Following assessment, pt engaged in co-treat w/ OT when toiling where pt required max assist for sequencing steps and for problem solving, specifically when hand washing  Pt was perseverative w/ hygiene steps and w/ decreased recall/reasoning to turn off sink when finished  Overall, based informal assessment, pt presenting w/ cognitive linguistic deficits in coordination w/ receptive and expressive language deficits, impacting current functional independence at this time  Pt will benefit from skilled ST intervention to maximize functional cognitive linguistic skills and functional communication skills for improved independence  SLP Therapy Minutes   SLP Time In 1240   SLP Time Out 9260   SLP Total Time (minutes) 55   SLP Mode of treatment - Individual (minutes) 35   SLP Mode of treatment - Concurrent (minutes) 0   SLP Mode of treatment - Group (minutes) 0   SLP Mode of treatment - Co-treat (minutes) 20   SLP Mode of Teatment - Total time(minutes) 55 minutes   Therapy Time missed   Time missed?  No   Daily FIM Score   Problem solving (FIM) 1 - Needs direction nearly all the time Comprehension (FIM) 2 - Understands basic info/conversation 25-49% of time   Expression (FIM) 3 - Expresses basic info/needs 50-74% of time

## 2018-05-02 NOTE — PROGRESS NOTES
05/02/18 0831   Pain Assessment   Pain Score No Pain   Restrictions/Precautions   Precautions Bed/chair alarms;Cognitive; Fall Risk;Supervision on toilet/commode   Cognition   Arousal/Participation Alert   Attention Difficulty attending to directions   Memory Decreased short term memory;Decreased recall of recent events;Decreased recall of precautions   Following Commands Follows one step commands with increased time or repetition   Subjective   Subjective Pt  has no complaints, agreeable to participate in PT   QI: Sit to Stand   Assistance Needed Physical assistance   Assistance Provided by [place] 50%-74%   Sit to Stand CARE Score 2   QI: Chair/Bed-to-Chair Transfer   Assistance Needed Physical assistance   Assistance Provided by [place] 50%-74%   Chair/Bed-to-Chair Transfer CARE Score 2   Transfer Bed/Chair/Wheelchair   Limitations Noted In Problem Solving;LE Strength   Adaptive Equipment None   Stand Pivot Moderate Assist;Contact Guard   Sit to Stand Moderate Assist;Contact Guard   Stand to Sit Moderate Assist;Contact Guard   Findings Transfers varies from initially mod A but then able to complete it with CGA depending on commands and pt feels that if you are too close to her to assist her she is not able to do task correctly    Bed, Chair, Wheelchair Transfer (FIM) 2 - [place] needs to lift or boost to rise AND assist to sit   QI: Walk 10 Feet   Assistance Needed Physical assistance   Assistance Provided by [place] 50%-74%   Walk 10 Feet CARE Score 2   QI: Walk 50 Feet with Two Turns   Assistance Needed Physical assistance   Assistance Provided by [place] 50%-74%   Walk 50 Feet with Two Turns CARE Score 2   QI: Walk 150 Feet   Assistance Needed Physical assistance   Assistance Provided by [place] 50%-74%   Walk 150 Feet CARE Score 2   QI: Walking 10 Feet on Uneven Surfaces   Reason if not Attempted Safety concerns   Walking 10 Feet on Uneven Surfaces CARE Score 88   Ambulation   Does the patient walk? 2   Yes Primary Discharge Mode of Locomotion Walk   Walk Assist Level Moderate Assist;Minimum Assist;Chair Follow  (2nd person standby on the other side for safety)   Gait Pattern Inconsistant Cathryn; Slow Cathryn;Narrow [place]; Improper weight shift   Assist Device Hand Hold   Distance Walked (feet) 100 ft  (X1, 175 X 1 )   Limitations Noted In Balance; Coordination; Endurance;Midline Orientation; Safety;Strength   Walking (FIM) 1 - Patient requires assist of two people   Wheelchair mobility   QI: Does the patient use a wheelchair? 0  No   QI: 1 Step (Curb)   Reason if not Attempted Activity not applicable   1 Step (Curb) CARE Score 9   QI: 4 Steps   Comment 2 six inch  steps    Reason if not Attempted Activity not applicable   4 Steps CARE Score 9   QI: 12 Steps   Reason if not Attempted Activity not applicable   12 Steps CARE Score 9   Stairs   Type Stairs   # of Steps 2  (six inch , 3 four inch  )   Weight Bearing Precautions Fall Risk   Assist Devices Bilateral Rail   Findings min A x 2    Stairs (FIM) 1 - Patient goes up and down less than 4 stairs regardless of assist/device/set up   QI: Toilet Transfer   Assistance Needed Physical assistance   Assistance Provided by [place] 25%-49%   Toilet Transfer CARE Score 3   Toilet Transfer   Findings A X2 for toileting    Toilet Transfer (FIM) 1 - Patient requires total assist for all tasks   Therapeutic Interventions   Flexibility B hamstring and gastroc stretching    Other SPT practice w/c<> mat X 6 reps, CGA at best    Equipment Use   NuStep Level 1 X 6 mins only and pt  had to go to bathroom    Assessment   Treatment Assessment Pt  tolerated tx well  Pt's functional performance varies depending on how she is able to comprehend commands  pt  responds better to somple single word commands and gestures on what you want her to do  Pt  initially was mod A with transfers and states that this therapist is getting her way   WHen pt  was given a little space, she was able to perform transfers better  pt  was able to ambulate up to 175 feet with mod A X 1 HHA, pt  tends to get distracted and makes gait pattern slow  Cont  with POC in the PM    Problem List Decreased strength;Decreased endurance; Impaired balance;Decreased mobility; Decreased coordination;Decreased cognition; Impaired judgement;Decreased safety awareness   Barriers to Discharge Inaccessible home environment;Decreased caregiver support   PT Barriers   Physical Impairment Decreased strength;Decreased endurance; Impaired balance;Decreased mobility; Decreased coordination;Decreased cognition; Impaired judgement;Decreased safety awareness   Functional Limitation Stair negotiation;Standing;Transfers; Walking   Plan   Treatment/Interventions Functional transfer training;LE strengthening/ROM; Elevations; Therapeutic exercise; Endurance training;Patient/family training;Equipment eval/education; Bed mobility;Gait training   Recommendation   Recommendation 24 hour supervision/assist   Equipment Recommended (LRAD )   PT Therapy Minutes   PT Time In 0830   PT Time Out 0930   PT Total Time (minutes) 60   PT Mode of treatment - Individual (minutes) 60   PT Mode of treatment - Concurrent (minutes) 0   PT Mode of treatment - Group (minutes) 0   PT Mode of treatment - Co-treat (minutes) 0   PT Mode of Teatment - Total time(minutes) 60 minutes   Therapy Time missed   Time missed?  No

## 2018-05-02 NOTE — PCC SPEECH THERAPY
Pt completed portions of informal cognitive linguistic and speech/language assessments where pt found to be presenting w/ moderate to severe deficits, characterized by decreased comprehenison, word finding difficulties, decreased problem solving/reasoning, decreased sequencing, and decreased STM and LTM recall  Pt also demonstrating decreased insight into deficits, along w/ deficits in safety awareness and judgement  Pt also assessed for swallow function where pt presenting w/ overall mild oropharygneal dysphagia characterized by prolonged mastication, slow a-p transfers and mildly delayed initiation of swallows  Currently recommending level 3 diet and thin liquids, however, pt will benefit from skilled ST intervention to maximize swallow skills and safety achieve baseline diet of regular/thins  Pt will also benefit from skilled ST intervention to maximize functional cognitive linguistic and communication skills at this time  Update 5/8/2018: Pt making slow progress towards goals at this time where pt continues to present w/ overall decreased cognitive linguistic skills  At this time, pt's cognition consistently fluctuating throughout day and from day to day, impacting pt's current progress towards goals  Pt presenting w/ deficits in attention, STM recall, problem solving/reasoning, sequencing skills, comprehension, and judgement  Pt also presenting w/ decreased activity tolerance and cognitive fatigue impacting overall independence  Pt benefits from verbal cueing, along w/ rephrasing and repetition to improve comprehension and recall  Regarding swallow abilities, pt continues to present w/ mild oropharyngeal dysphagia characterized by prolonged mastication, slow and incomplete a-p transfers, decreased bolus formation and delayed initiation of swallows  Pt is currently tolerating a level 3 diet w/ thin liquids   While pt has trialed regular diet items, pt has taken small amts and is demonstrating moderately prolonged oral prep skills, requiring verbal cues to complete oral clearance  Of note, pt's cognition noted to impact pt's current swallow function  Pt will benefit from skilled ST intervention at this time to maximize functional cognitive linguistic skills and to maximize current swallow abilities to achieve safest and least restrictive diet  Update 5/16/2018: Pt previously followed for dysphagia therapy, however, pt progressed to demonstrating functional oral and pharyngeal swallow skills and tolerating a regular diet w/ thin liquids  While further skilled ST services are no longer warranted for dysphagia therapy, pt continues to be followed for cognitive linguistic therapy  Pt remains w/ deficits in LTM and STM recall, attention, comprehension, sequencing and problem solving, impacting pt's ability to independently complete ADL tasks and overall safety  Pt's overall activity tolerance has improved but continues to present w/ deficits in functional cognitive linguistic skills, therefore, will benefit from further skilled ST services to address the above mentioned barriers and to maximize skills at this time

## 2018-05-02 NOTE — PROGRESS NOTES
Internal Medicine Progress Note  Patient: Cheri Temple  Age/sex: 80 y o  female  Medical Record #: [id number]      ASSESSMENT/PLAN:  Cheri Temple is seen and examined and mangement for following issues:    1  Lt MCA CVA s/p mechanical thrombectomy: Continue Eliquis and atorvastatin for secondary prevention  45 [facility] Neurology follow-up  2   HTN: stable; continue Lopressor 50mg every 12 hours  3   CKD stage III; baseline Cr 1 5: stable; will monitor  4   Chronic systolic CHF/LVEF 10%; moderate TR; bio MVR normal function:  Continue torsemide 10mg daily/Kdur 10 meq BID  Chest is clear/no edema  5  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): Eliquis 2 5mg BID 2/2 age and renal status (switched from Coumadin in the hospital so NOAC is new)  6   DM type 2: On linagliptan 2 5mg at home  It was planned for pt to change to Januvia 100mg daily as an OP per the daughter  Will start Januvia when needed but so far BS controlled w/o tx = will stop Accuchecks for now and watch FBSs   Continue DM diet  , 134; fasting today 135    7  Acute blood loss anemia: s/p transfusion after EBL from thrombectomy of 500-1000ml from femoral site  Stable  CBC 5/3    8  PFO:  Small  Seen on LORI  45 [facility] Cardiology follow-up  9   History of colon CA: Colonic lesion seen on CT  Will need GI follow-up    10  Hx PPM    11  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      Subjective: Patient seen and examined   No new or overnight issues     ROS:   GI: denies abdominal pain, change bowel habits or reflux symptoms  Neuro: No new neurologic changes  Respiratory: No Cough, SOB  Cardiovascular: No CP, palpitations     Scheduled Meds:    Current Facility-Administered Medications:  acetaminophen 650 mg Oral Q6H PRN Ashleigh Garza MD   apixaban 2 5 mg Oral BID Ashleigh Garza MD   atorvastatin 80 mg Oral Daily With Leyla Gonzalez MD   bisacodyl 10 mg Rectal Daily PRN Ashleigh Garza MD   docusate sodium 100 mg Oral BID Ashleigh Garza MD   iron polysaccharides 150 mg Oral Daily Melissa Moyer MD   metoprolol tartrate 50 mg Oral Q12H Lola Cantu MD   pantoprazole 40 mg Oral Early Morning Melissa Moyer MD   polyethylene glycol 17 g Oral Daily PRN Melissa Moyer MD   potassium chloride 10 mEq Oral BID Melissa Moyer MD   senna 1 tablet Oral HS Melissa Moyer MD   torsemide 10 mg Oral Daily Melissa Moyer MD       Labs:       Results from last 7 days  Lab Units 04/30/18  0456 04/29/18  0445   WBC Thousand/uL 5 79 5 74   HEMOGLOBIN g/dL 9 2* 9 1*   HEMATOCRIT % 29 3* 28 3*   PLATELETS Thousands/uL 174 163       Results from last 7 days  Lab Units 04/30/18  0456 04/29/18  0445   SODIUM mmol/L 137 137   POTASSIUM mmol/L 4 4 4 1   CHLORIDE mmol/L 102 103   CO2 mmol/L 28 27   BUN mg/dL 39* 34*   CREATININE mg/dL 1 54* 1 48*   GLUCOSE RANDOM mg/dL 114 103   CALCIUM mg/dL 9 2 9 0                  Glucose (mg/dL)   Date Value   04/30/2018 114   04/29/2018 103   04/28/2018 88   04/27/2018 93   12/23/2015 114 (H)   02/22/2015 101   02/21/2015 103   02/20/2015 116     Glucose, i-STAT (mg/dl)   Date Value   04/23/2018 106   04/23/2018 96       Labs reviewed    Physical Examination:  Vitals:   Vitals:    05/01/18 2039 05/02/18 0508 05/02/18 0600 05/02/18 0920   BP: 123/58 140/58  130/58   BP Location: Right arm Right arm     Pulse: 76 71  72   Resp: 18 17     Temp: 98 °F (36 7 °C) 98 2 °F (36 8 °C)     TempSrc: Oral Oral     SpO2: 98% 96%     Weight:  67 6 kg (149 lb 0 5 oz) 67 6 kg (149 lb 0 5 oz)    Height:           GEN: NAD  HEENT: NC/AT  RESP: BBS w/o crackles/wheeze/rhonci; resp unlabored  CV: +S1 S2, regular rate, no rubs/murmurs  ABD: soft, NT, ND, normal BS   : no schaefer  EXT: no edema  Skin: no rashes  Neuro: AAO; [place] 5/5 but difficult to get her to coordinate UE strength testing 2/2 some involuntary movements; face symmetric; speech clear        [x ] Total time spent: 30 Mins and greater than 50% of this time was spent counseling/coordinating [facility]       Marilia Gomez, 10 [facility]  Internal Medicine

## 2018-05-02 NOTE — PROGRESS NOTES
05/02/18 1330   Pain Assessment   Pain Rating: FLACC (Activity) - Face 0   Pain Rating: FLACC (Activity) - Legs 1   Pain Rating: FLACC (Activity) - Activity 1   Pain Rating: FLACC (Activity) - Cry 0   Pain Rating: FLACC (Activity) - Consolability 0   Score: FLACC (Activity) 2   Restrictions/Precautions   Precautions Bed/chair alarms;Cognitive; Fall Risk   Cognition   Arousal/Participation Alert   Attention Difficulty attending to directions   Memory Decreased short term memory;Decreased recall of recent events;Decreased recall of precautions   Following Commands Follows one step commands with increased time or repetition   Subjective   Subjective Nurse nav stated that pt c/o that she is tired but is agreeable to participate in PT   QI: Sit to Lying   Assistance Needed Physical assistance   Assistance Provided by [place] 25%-49%   Sit to Lying CARE Score 3   QI: Sit to Stand   Assistance Needed Physical assistance   Assistance Provided by [place] 25%-49%   Sit to Stand CARE Score 3   QI: Chair/Bed-to-Chair Transfer   Assistance Needed Physical assistance   Assistance Provided by [place] 25%-49%   Chair/Bed-to-Chair Transfer CARE Score 3   Transfer Bed/Chair/Wheelchair   Limitations Noted In Balance; Endurance;Problem Solving;LE Strength   Adaptive Equipment Roller Walker   Stand Pivot Minimal Assist   Sit to Stand Minimal Assist   Stand to Sit Minimal Assist   Sit to Supine Minimal Assist   Bed, Chair, Wheelchair Transfer (FIM) 2 - [place] needs to lift or boost to rise AND assist to sit   QI: Car Transfer   Assistance Needed Physical assistance   Assistance Provided by [place] 25%-49%   Car Transfer CARE Score 3   QI: Walk 10 Feet   Assistance Needed Physical assistance   Assistance Provided by [place] 25%-49%   Walk 10 Feet CARE Score 3   QI: Walk 50 Feet with Two Turns   Assistance Needed Physical assistance   Assistance Provided by [place] 50%-74%   Walk 50 Feet with Two Turns CARE Score 2   QI: Walk 150 Feet Assistance Needed Physical assistance   Assistance Provided by [place] 50%-74%   Walk 150 Feet CARE Score 2   QI: Walking 10 Feet on Uneven Surfaces   Reason if not Attempted Activity not applicable   Walking 10 Feet on Uneven Surfaces CARE Score 9   Ambulation   Does the patient walk? 2  Yes   Primary Discharge Mode of Locomotion Walk   Walk Assist Level Moderate Assist;Minimum Assist  (2nd person standby and guiding pt visually where to go )   Gait Pattern Slow Cathryn;Decreased foot clearance; Improper weight shift   Assist Device Hand Hold   Distance Walked (feet) 150 ft  (X 1 )   Limitations Noted In Balance; Endurance; Safety   Walking (FIM) 1 - Patient requires assist of two people   Wheelchair mobility   QI: Does the patient use a wheelchair? 0  No   Equipment Use   NuStep Level 1 x 8 mins    Assessment   Treatment Assessment Pt  participated in gait training, car transfers and nu step this session  Pt  able to demonstrate improved transfers this session but cont to get very distracted with walking in the hallway and most of the time pt  walks slower if there is an obstacle that is on her way  Pt  states that she does not have pain holds on to her R leg when she was walking and while doing the nu step   No other complaints reported  pt  was assisted to bed after session   Problem List Decreased strength;Decreased endurance; Impaired balance;Decreased mobility; Decreased coordination;Decreased cognition; Impaired judgement;Decreased safety awareness   Barriers to Discharge Inaccessible home environment;Decreased caregiver support   PT Barriers   Physical Impairment Decreased strength;Decreased endurance; Impaired balance;Decreased mobility; Decreased coordination;Decreased cognition; Impaired judgement;Decreased safety awareness   Functional Limitation Stair negotiation;Standing;Transfers; Walking   Plan   Treatment/Interventions Functional transfer training;LE strengthening/ROM; Elevations; Therapeutic exercise; Endurance training;Patient/family training;Equipment eval/education; Bed mobility;Gait training   Recommendation   Recommendation 24 hour supervision/assist   Equipment Recommended (LRAD)   PT Therapy Minutes   PT Time In 1330   PT Time Out 1400   PT Total Time (minutes) 30   PT Mode of treatment - Individual (minutes) 30   PT Mode of treatment - Concurrent (minutes) 0   PT Mode of treatment - Group (minutes) 0   PT Mode of treatment - Co-treat (minutes) 0   PT Mode of Teatment - Total time(minutes) 30 minutes   Therapy Time missed   Time missed?  No

## 2018-05-02 NOTE — PROGRESS NOTES
05/02/18 1200   Pain Assessment   Pain Assessment No/denies pain   Pain Score No Pain   Restrictions/Precautions   Precautions Aspiration;Bed/chair alarms; Fall Risk;Cognitive;Supervision on toilet/commode   Memory Skills   Memory (FIM) 2 - Recognizes, recalls/performs 25-49%   Social Interaction (FIM) 4 - Needs redirecting for appropriate language or to initiate interaction   Speech/Language/Cognition Assessmetn   Treatment Assessment Pt participated skilled ST tasks targeting comprehension, expression and cognition during and following meal completion  During meal, pt followed simple one-step verbal directions w/ ~50% accuracy, benefiting from repetition, gestural and tactile cues  Pt demonstrated decreased problem solving abilities w/ tray set up, requiring moderate assist to improve reasoning and problem solving skills  Pt recalled biographical info to include family member info w/ ~80% accuracy, benefiting from verbal cues and additional semantic info  Of note, pt used both [place] and [place] when speaking  Regarding comprehension, pt required min-moderate assist in the form of rewording/rephrasing, semantic cues and gestures to improve understanding, however, did demonstrate improved comprehension of more informal coversational info  Demonstrated STM recall of current and previous visitors, but continues to elicit inconsistent info regarding her home set up and availability to assistance from family  Continues to present w/ deficits in memory, problem solving/reasoning, attention, comprehension and insight, therefore, will benefit from further skilled ST services to maximize skills at this time  Swallow Information   Current Risks for Dysphagia & Aspiration General debilitation;New Neuro event;Brain injury;Cognitive deficit;HX neurologic dx   Current Symptoms/Concerns Difficulty chewing;Holding food in mouth;Cough; With food; With liquids   Current Diet Dysphagia advance; Thin liquid   Baseline Diet Regular; Thin liquids   Consistencies Assessed and Performance   Materials Admnistered Puree/Level 1;Soft/Level 3;Regular/Solid; Thin liquid   Oral Stage Mild impaired   Phargngeal Stage Mild impaired   Swallow Mechanics Mild delayed;Swallow initation; Appears prompt;Good Larygneal rise   Esophageal Concerns Heartburn   Recommendations   Risk for Aspiration Mild   Diet Solid Recommendation Level 3 Dysphagia/ advanced/ soft to chew   Diet Liquid Recommendation Thin liquid   Recommended Form of Meds As desired; As tolerated   General Precautions Aspiration precautions; Feed only when alert;Minimize distractions;Upright as possible for all oral intake;Remain upright for 45 mins after meals  (OOB for meals)   Compensatory Swallowing Strategies Alternate solids and liquids; External pacing   QI: Eating   Assistance Needed Set-up / clean-up   Eating CARE Score 5   Swallow Assessment   Swallow Treatment Assessment Pt seen for ongoing dysphagia therapy during lunch w/ level 3 tray consisting of chicken marsala, carrots, mashed potatoes, allison food cake and trials of regular texture hard pretzels  Pt consumed ~75% of meal and ~240cc thins by cup sip  Required assist w/ tray set up but demonstrated ability to self feeding, noting a mildly faster rate of feeding  Demonstrated adequate bolus retrieval w/o anterior loss noting slow, mildly prolonged mastication of solids  Bolus formation was mildy reduced, noting consistent oral residual on tongue, however, pt able to clear by end of meal w/ increased time and use of liquids  Slower transfers of solids w/ swallow initiation of solids appearing mildly delayed but functional and prompt w/ thins  Pt noted to have dry cough x2 at end of meal, however, no further overt s/s aspiration during meal  Of note, pt educated on pacing due to prolonged mastication and slower bolus transfers to which pt demonstrated brief carryover   Will cont to recommend level 3 diet w/ thin liquids at this time but will trial full regular diet tray to further assess tolerance and safety w/ additional regular textures  Swallow Assessment Prognosis   Prognosis Good   Prognosis Considerations Co-morbidities; Medical prognosis; New learning ability;Ability to carry over; Cooperation   SLP Therapy Minutes   SLP Time In 1200   SLP Time Out 1300   SLP Total Time (minutes) 60   SLP Mode of treatment - Individual (minutes) 40   SLP Mode of treatment - Concurrent (minutes) 20   SLP Mode of treatment - Group (minutes) 0   SLP Mode of treatment - Co-treat (minutes) 0   SLP Mode of Teatment - Total time(minutes) 60 minutes   Therapy Time missed   Time missed?  No   Daily FIM Score   Problem solving (FIM) 2 - Needs direction more than ½ time to initiate, plan or complete simple tasks   Comprehension (FIM) 3 - Understands basic info/conversation 50-74% of time   Expression (FIM) 3 - Expresses basic info/needs 50-74% of time   Eating (FIM) 5 - Patient needs help to open contianers or set up tray

## 2018-05-02 NOTE — PROGRESS NOTES
05/02/18 1000   Pain Assessment   Pain Assessment No/denies pain   Pain Score No Pain   Restrictions/Precautions   Precautions Fall Risk;Bed/chair alarms   QI: Eating   Assistance Needed Supervision;Verbal cues   Eating CARE Score 4   Eating Assessment   Eating (FIM) 5 - Patient requires supervision, cueing or coaxing   Grooming   Able To Comb/Brush Hair   Limitation Noted In Coordination   Grooming (FIM) 5 - Patient requires supervision/monitoring   QI: Sit to Stand   Assistance Needed Physical assistance   Assistance Provided by [place] Less than 25%   Sit to Stand CARE Score 3   QI: Chair/Bed-to-Chair Transfer   Assistance Needed Physical assistance   Assistance Provided by [place] 25%-49%   Chair/Bed-to-Chair Transfer CARE Score 3   Transfer Bed/Chair/Wheelchair   Limitations Noted In Balance; Endurance; Coordination;Sensation;Problem Solving   Stand Pivot Moderate Assist   Sit to Stand Minimal   Stand to Sit Minimal   Bed, Chair, Wheelchair Transfer (FIM) 2 - [place] needs to lift or boost to rise AND assist to sit   Neuromuscular Education   Trunk Control able to maintain sitting balance un supported w/ forward reaching  Pt does require MIN A for posterior leaning during drinking from cup  Functional Movement Patterns Pt engages in seated L attention, L UE coordination task  Pt completing  reaching tasks to items  w/ R and L UE utilizing midline crossing to increase L side awareness to better improve ability to safely complete ADLs  Pt demo decreased awareness to items on L far side, able to complete w/ verbal cueing  Pt demo disassociated contralateral movements  Coordination   Gross Motor Pt completes seated UE AROM for B/L UE symetrical use  Pt demo increased L UE ataxia, decreased coordination, and poor self awareness  W/ verbal cues Pt unable to self correct  Pt reuqires external support for fluid UE Control      Cognition   Overall Cognitive Status Impaired   Arousal/Participation Alert   Attention Difficulty attending to directions   Orientation Level Oriented to person   Memory Decreased short term memory;Decreased recall of biographical information;Decreased recall of recent events   Following Commands Follows one step commands with increased time or repetition   Comments Decreased L Side attention, cues for attention to L visual field  Decreased attention to task  pt complete simple one step commnad follow about 50% of the time during activities  Pt able to identify colors of cones during reaching activity  Activity Tolerance   Activity Tolerance Patient tolerated treatment well   Assessment   Treatment Assessment Pt engages in skilled OT session focusing on L side attention, L UE coordination, B/L UE use to increase independence w/ ADLs  See above for details  Pt continues to require skilled OT services to increase overall functional independence  and safety w/ ADLs and functional transfers  Prognosis Fair   Problem List Decreased strength;Decreased range of motion;Decreased endurance;Decreased mobility; Impaired balance;Decreased safety awareness;Decreased coordination;Decreased cognition; Impaired judgement   Plan   Treatment/Interventions ADL retraining;Functional transfer training;LE strengthening/ROM; Therapeutic exercise; Endurance training;Cognitive reorientation;Patient/family training;Equipment eval/education   Progress Progressing toward goals   OT Therapy Minutes   OT Time In 0930   OT Time Out 1030   OT Total Time (minutes) 60   OT Mode of treatment - Individual (minutes) 60   OT Mode of treatment - Concurrent (minutes) 0   OT Mode of treatment - Group (minutes) 0   OT Mode of treatment - Co-treat (minutes) 0   OT Mode of Teatment - Total time(minutes) 60 minutes

## 2018-05-02 NOTE — TEAM CONFERENCE
Acute RehabilitationTeam Conference Note  Date: 5/2/2018   Time: 11:15 AM       Patient Name:  Julio Gregory       Medical Record Number: [id number]   YOB: 1933  Sex: Female          [facility]  [facility]  Payor Info:  Payor: Celia Bosworth / Plan: MEDICARE A AND B / Product Type: Medicare A & B Fee for Service /      Admitting Diagnosis: CVA (cerebral vascular accident) ([place] [address] ) [I63 9]   Admit Date/Time:  4/30/2018  1:56 PM  Admission Comments: No comment available     Primary Diagnosis:  History of ischemic right MCA stroke  Principal Problem: History of ischemic right MCA stroke    Patient Active Problem List    Diagnosis Date Noted    History of ischemic right MCA stroke 04/23/2018    CVA (cerebral vascular accident) ([place] [address] ) 04/18/2018    Controlled type 2 diabetes mellitus, without long-term current use of insulin ([place] [address] ) 04/18/2018    Pacemaker 04/18/2018    Acid reflux 04/18/2018    HTN (hypertension) 04/18/2018    Constipation 04/18/2018    CHADWICK (acute kidney injury) ([place] [address] ) 32/03/3234    Systolic congestive heart failure ([place] [address] ) 04/18/2018    H/O mitral valve replacement 03/01/2018    Deep vein thrombosis (DVT) of left upper extremity ([place] [address] ) 01/25/2018       Physical Therapy:    Weight Bearing Status: Full Weight Bearing  Transfers: Minimal Assistance, Assist of 2  Bed Mobility: Moderate Assistance  Amulation Distance (ft): 150 feet  Ambulation: Assist of 2, Minimal Assistance  Assistive Device for Ambulation: Hand Hold Assistance  Number of Stairs: 2  Assistive Device for Stairs: Bilateral Hand Rails  Stair Assistance: Moderate Assistance    PT eval performed on 5/1; pt presents w/deficits in cognition in regards to sequencing, problem solving and she sometimes perseverates on task  Pt currently needs Ax2 for safety with functional mobility due to inconsistencies with balance and sequencing   Pt needs inc time to perform tasks due to fatigue and weakness, however with inc time she is able to perform more on her own  Pt will benefit from skilled PT to further progress overall functional mobility  Pt will need 247S upon d/c for safety  Occupational Therapy:  Grooming: Minimal Assistance  Bathing: Total Assistance  Bathing: Total Assistance  Upper Body Dressing: Total Assistance  Lower Body Dressing: Total Assistance  Toileting: Total Assistance  Tub/Shower Transfer:  (N/A SAFETY)  Toilet Transfer: Total Assistance  Cognition: Exceptions to WNL  Cognition: Decreased Memory, Decreased Safety, Decreased Executive Functions, Behavioral Considerations, Decreased Attention, Impulsive, Decreased Comprehension  Orientation: Person, Place  Discharge Recommendations: Home with:  [address] with[de-identified] 24 Hour Assistance       Pt presents s/p L MCA CVA, with fluctuations in physical performance of transfers and functional mobility  Pt needs sitting rest breaks due to fatigue, and physical A required for safety fluctuates as well  Pt presents w/ dec righting reactions, dec BLE strength where she needs HHA to stabilize ins tance for standing and walking, and uses bilat arm rests to stand up  Pt demonstrated retropulsive balance and dec righting reactions, wtih tendency to lean back against chair during the start of session, however was able tos tand up from free standing chair with bilat arm rests without the chair moving  Pt was able to sequence ascending  steps with bilat hand rails and physical A for balance, however had inc difficulty sequencing coming down, needing inc time, TC/VC  Pt will benefit from skilled PT to further progress functional mobility, balance, safety, righting reactions and sequencing through tasks  Currently walking HHA x2; pt also needs VC to dec L hand  and for proper hand placementl; due to difficulty sequencing with bilat UE function, RW was not used today  To further assess d/c locomotion status in regards to A with family or with additional use of AD   To confirm home setup and who will be with pt upon d/c,  Currently recommedning 329P upon d/c due to cognitive deficits  Speech Therapy:  Mode of Communication: Verbal  Speech/Language: Expressive Aphasia (expressive/receptive language deficits)  Cognition: Exceptions to WNL  Cognition: Decreased Memory, Decreased Executive Functions, Decreased Attention, Decreased Comprehension, Decreased Safety, Impulsive  Orientation: Person, Time  Swallowing: Exceptions to WNL  Swallowing: Oral Dysphagia, Pharyngeal Dysphagia, Aspiration Risk  Diet Recommendations: Level 3/Denture Soft, Thin  Discharge Recommendations: Home with:  Pt completed portions of informal cognitive linguistic and speech/language assessments where pt found to be presenting w/ moderate to severe deficits, characterized by decreased comprehenison, word finding difficulties, decreased problem solving/reasoning, decreased sequencing, and decreased STM and LTM recall  Pt also demonstrating decreased insight into deficits, along w/ deficits in safety awareness and judgement  Pt also assessed for swallow function where pt presenting w/ overall mild oropharygneal dysphagia characterized by prolonged mastication, slow a-p transfers and mildly delayed initiation of swallows  Currently recommending level 3 diet and thin liquids, however, pt will benefit from skilled ST intervention to maximize swallow skills and safety achieve baseline diet of regular/thins  Pt will also benefit from skilled ST intervention to maximize functional cognitive linguistic and communication skills at this time  Nursing Notes:  Appetite: Good  Diet Type: Diabetic                                                   Bladder: 1 - Total Assistance        Bowel: 5 - Supervision              Pain Score: 0                                  Pt is 79 yo female with ICH & bi-hemispheric nonhemorrhagic ischemic strokes s/p thrombectomy 4/23  Currently on Lopressor for HTN, and Eliquis for CVA and DVT  Hx   Of DM on Januvia  Blood suage checks BID at [phone]  Is primarily Armenian speaking  Incontinent of bladder but continent of bowel  Currently no pain issues  This week we will monitor vitals and labs  We will work on maintaining intergrity of skin and preventing skin breakdown  We will work on safety awareness and prevent falls  Case Management:     Discharge Planning  Goal Length of Stay: 9  Living Arrangements: Other (Comment)  Support Systems: Children  Assistance Needed: unknown  Type of Current Residence: Nursing home  [address] Name: will need to ask daughter  Current Home Care Services: No  Pt is participating with therapy and hopes to be able to return home  Pt just began therapy exercises, cm following to assist w/dc planning recommendations  Is the patient actively participating in therapies? yes  List any modifications to the treatment plan:     Barriers Interventions   Cognition, processing of info, comprehension, receptive expressive aphasia Speech therapy exercises   Safety, decreased insight Safety education techniques, family education   Left upper extremity weakness Therapy exercises             Is the patient making expected progress toward goals?  yes  List any update or changes to goals:     Medical Goals: Patient will be medically stable for discharge to [facility] upon completion of rehab program and Patient will be able to manage medical conditions and comorbid conditions with medications and follow up upon completion of rehab program  pres  Weekly Team Goals:   Rehab Team Goals  ADL Team Goal: Patient will require supervision with ADLs with least restrictive device upon completion of rehab program  Bowel/Bladder Team Goal: Patient will maximize level for bladder/bowel management and educate family/caregiver to decrease burden of care  Transfer Team Goal: Patient will require supervision with transfers with least restrictive device upon completion of rehab program  Locomotion Team Goal: Patient will require supervision with locomotion with least restrictive device upon completion of rehab program  Cognitive Team Goal: Patient will require assist for basic cognitive tasks upon completion of rehab program    Discussion: in attendance to review pts progress is rn pt ot slp cm and physician  Pt presents with deficits in overall cog, safety, balance  Pt has supervision goals and is currently transferring at contact guard min a, stairs min to mod a, short distance ambulation, but pt is not safe  Anticipate 3 week los unless pt makes quicker progress       Anticipated Discharge Date:  reteam

## 2018-05-03 LAB
ANION GAP SERPL CALCULATED.3IONS-SCNC: 8 MMOL/L (ref 4–13)
BASOPHILS # BLD AUTO: 0.03 THOUSANDS/ΜL (ref 0–0.1)
BASOPHILS NFR BLD AUTO: 1 % (ref 0–1)
BUN SERPL-MCNC: 40 MG/DL (ref 5–25)
CALCIUM SERPL-MCNC: 9.7 MG/DL (ref 8.3–10.1)
CHLORIDE SERPL-SCNC: 100 MMOL/L (ref 100–108)
CO2 SERPL-SCNC: 28 MMOL/L (ref 21–32)
CREAT SERPL-MCNC: 1.5 MG/DL (ref 0.6–1.3)
EOSINOPHIL # BLD AUTO: 0.69 THOUSAND/ΜL (ref 0–0.61)
EOSINOPHIL NFR BLD AUTO: 14 % (ref 0–6)
ERYTHROCYTE [DISTWIDTH] IN BLOOD BY AUTOMATED COUNT: 14.8 % (ref 11.6–15.1)
GFR SERPL CREATININE-BSD FRML MDRD: 32 ML/MIN/1.73SQ M
GLUCOSE SERPL-MCNC: 106 MG/DL (ref 65–140)
HCT VFR BLD AUTO: 28.5 % (ref 34.8–46.1)
HGB BLD-MCNC: 9.3 G/DL (ref 11.5–15.4)
LYMPHOCYTES # BLD AUTO: 1.21 THOUSANDS/ΜL (ref 0.6–4.47)
LYMPHOCYTES NFR BLD AUTO: 24 % (ref 14–44)
MCH RBC QN AUTO: 28.4 PG (ref 26.8–34.3)
MCHC RBC AUTO-ENTMCNC: 32.6 G/DL (ref 31.4–37.4)
MCV RBC AUTO: 87 FL (ref 82–98)
MONOCYTES # BLD AUTO: 0.61 THOUSAND/ΜL (ref 0.17–1.22)
MONOCYTES NFR BLD AUTO: 12 % (ref 4–12)
NEUTROPHILS # BLD AUTO: 2.46 THOUSANDS/ΜL (ref 1.85–7.62)
NEUTS SEG NFR BLD AUTO: 49 % (ref 43–75)
NRBC BLD AUTO-RTO: 0 /100 WBCS
PLATELET # BLD AUTO: 162 THOUSANDS/UL (ref 149–390)
PMV BLD AUTO: 10.1 FL (ref 8.9–12.7)
POTASSIUM SERPL-SCNC: 3.8 MMOL/L (ref 3.5–5.3)
RBC # BLD AUTO: 3.27 MILLION/UL (ref 3.81–5.12)
SODIUM SERPL-SCNC: 136 MMOL/L (ref 136–145)
WBC # BLD AUTO: 5.01 THOUSAND/UL (ref 4.31–10.16)

## 2018-05-03 PROCEDURE — 97116 GAIT TRAINING THERAPY: CPT

## 2018-05-03 PROCEDURE — 80048 BASIC METABOLIC PNL TOTAL CA: CPT | Performed by: NURSE PRACTITIONER

## 2018-05-03 PROCEDURE — 97530 THERAPEUTIC ACTIVITIES: CPT

## 2018-05-03 PROCEDURE — 99232 SBSQ HOSP IP/OBS MODERATE 35: CPT | Performed by: PHYSICAL MEDICINE & REHABILITATION

## 2018-05-03 PROCEDURE — 97110 THERAPEUTIC EXERCISES: CPT

## 2018-05-03 PROCEDURE — G0515 COGNITIVE SKILLS DEVELOPMENT: HCPCS

## 2018-05-03 PROCEDURE — 92526 ORAL FUNCTION THERAPY: CPT

## 2018-05-03 PROCEDURE — 85025 COMPLETE CBC W/AUTO DIFF WBC: CPT | Performed by: NURSE PRACTITIONER

## 2018-05-03 PROCEDURE — 97535 SELF CARE MNGMENT TRAINING: CPT

## 2018-05-03 RX ADMIN — BACITRACIN, NEOMYCIN, POLYMYXIN B 1 SMALL APPLICATION: 400; 3.5; 5 OINTMENT TOPICAL at 18:12

## 2018-05-03 RX ADMIN — METOPROLOL TARTRATE 50 MG: 50 TABLET ORAL at 22:00

## 2018-05-03 RX ADMIN — PANTOPRAZOLE SODIUM 40 MG: 40 TABLET, DELAYED RELEASE ORAL at 05:11

## 2018-05-03 RX ADMIN — METOPROLOL TARTRATE 50 MG: 50 TABLET ORAL at 09:35

## 2018-05-03 RX ADMIN — APIXABAN 2.5 MG: 2.5 TABLET, FILM COATED ORAL at 09:35

## 2018-05-03 RX ADMIN — DOCUSATE SODIUM 100 MG: 100 CAPSULE, LIQUID FILLED ORAL at 09:32

## 2018-05-03 RX ADMIN — POTASSIUM CHLORIDE 10 MEQ: 750 TABLET, EXTENDED RELEASE ORAL at 09:33

## 2018-05-03 RX ADMIN — ATORVASTATIN CALCIUM 80 MG: 40 TABLET, FILM COATED ORAL at 18:09

## 2018-05-03 RX ADMIN — DOCUSATE SODIUM 100 MG: 100 CAPSULE, LIQUID FILLED ORAL at 18:10

## 2018-05-03 RX ADMIN — SENNOSIDES 8.6 MG: 8.6 TABLET, FILM COATED ORAL at 22:20

## 2018-05-03 RX ADMIN — TORSEMIDE 10 MG: 20 TABLET ORAL at 09:35

## 2018-05-03 RX ADMIN — Medication 150 MG: at 09:36

## 2018-05-03 RX ADMIN — APIXABAN 2.5 MG: 2.5 TABLET, FILM COATED ORAL at 18:09

## 2018-05-03 RX ADMIN — POLYETHYLENE GLYCOL 3350 17 G: 17 POWDER, FOR SOLUTION ORAL at 16:01

## 2018-05-03 RX ADMIN — BACITRACIN, NEOMYCIN, POLYMYXIN B 1 SMALL APPLICATION: 400; 3.5; 5 OINTMENT TOPICAL at 09:39

## 2018-05-03 RX ADMIN — POTASSIUM CHLORIDE 10 MEQ: 750 TABLET, EXTENDED RELEASE ORAL at 18:11

## 2018-05-03 NOTE — SOCIAL WORK
Phone call placed to pts dtr [facility] [phone], reviewed team update and barriers to dc  Explained pts options of returning home with the potential that she will need assistance with all functional mobility  Cm explained pts current cognitive deficits as well  Cm explained options of hhc vs contd inpat subacute rehab and darnell is in agreement with subacute rehaba although she will discuss with her siblings  Cm to email a list of options for them to begin researching at [email or web address]  Informed of next review next Wednesday, potential los as long as pt is making progress  Following to assist w/dc planning needs

## 2018-05-03 NOTE — PROGRESS NOTES
05/03/18 0800   Pain Assessment   Pain Assessment No/denies pain   Pain Score No Pain   Restrictions/Precautions   Precautions Cognitive;Bed/chair alarms; Fall Risk;Aspiration;Supervision on toilet/commode   Memory Skills   Memory (FIM) 2 - Recognizes, recalls/performs 25-49%   Social Interaction (FIM) 3 - Interacts 50-74% of time   Speech/Language/Cognition Assessmetn   Treatment Assessment Following meal completion, pt engaged in skilled ST session w/ focus on cognitive linguistic skills (8:20am-09:00am)  Pt responded to auditorily presented personal yes/no questions w/ 10/10 accuracy, noting prompt responses and environmental yes/no questions w/ 7/10 accuray, benefiting from verbal cues and rewording of info to improve comprehension  During conversation, pt demonstrated LTM recall of family hx, family member names and personal biographical info, noting the ability to answer basic comprehension questions which required a single word or short phrase response  When engaged in a 4-step picture sequencing task, pt completed total of 5 trials where pt required overall assist fluctuating from max to total w/ use of semantic cues, binary choice options, and models  During task, pt demonstrated a R gaze preference in which pt required consistent verbal and physical cues to redirect  At end of session, categorization task initiated through written stimuli which were presented in [address]  When presented w/ a Fo3 words, pt chose another word belonging to the same category from a [address] in 4/4 trials when info was read aloud to pt in both Kazakh and english, paired w/ mimimal semantic cues  Will cont to benefit from skilled ST services to maximize functional cognitive linguistic skills and to improve overall comprehension/expression abilities      Swallow Information   Current Risks for Dysphagia & Aspiration General debilitation;New Neuro event;Brain injury;Cognitive deficit   Current Symptoms/Concerns Difficulty chewing;Holding food in mouth   Current Diet Dysphagia advance; Thin liquid   Baseline Diet Regular; Thin liquids   Consistencies Assessed and Performance   Materials Admnistered Soft/Level 3;Regular/Solid; Thin liquid   Oral Stage Mild impaired; Moderate impaired   Phargngeal Stage Mild impaired   Swallow Mechanics Mild delayed;Swallow initation; Appears prompt;Good Larygneal rise   Recommendations   Risk for Aspiration Mild   Diet Solid Recommendation Level 3 Dysphagia/ advanced/ soft to chew   Diet Liquid Recommendation Thin liquid   Recommended Form of Meds As desired; As tolerated   General Precautions Aspiration precautions; Feed only when alert;Minimize distractions;Upright as possible for all oral intake;Remain upright for 45 mins after meals  (OOB for meals)   Compensatory Swallowing Strategies Alternate solids and liquids; External pacing   QI: Eating   Assistance Needed Set-up / clean-up   Assistance Provided by [place] No physical assistance   Eating CARE Score 5   Swallow Assessment   Swallow Treatment Assessment Pt seen during bfast w/ regular diet trial tray consisting of a fresh fruit cup, english muffin and sausage links, however, pt took a single bite of sausage and declined english muffin, consuming total of 25% of meal (fresh fruit cup), along w/ ~190cc thin liquids by cup sip  Following assist w/ set up, pt able to self feed  Adequate bolus retrieval w/o anterior loss of items  While breakdown of regular texture fruit was complete, mastication was moderately prolonged across items, noting consistent oral residual on tongue and moreso on R side, however, pocketing not observed  Although multiple swallows and liquid wash assisted in clearing residual to min amts, but did not fully clear oral residual until end of meal, requiring extended time  Swallow initiation of thins appeared WFL, while mildly delayed across regular solids 2* slower oral prep/transfers   No overt s/s aspiration noted during meal  Will cont to recommend level 3 diet/thin liquids at this time, however, will further trial additional regular diet textures w/ SLP supervision only w/ potential for diet upgrade as pt able, appropriate  Swallow Assessment Prognosis   Prognosis Good   Prognosis Considerations Co-morbidities; Medical prognosis; New learning ability;Ability to carry over; Cooperation   SLP Therapy Minutes   SLP Time In 0800   SLP Time Out 0900   SLP Total Time (minutes) 60   SLP Mode of treatment - Individual (minutes) 60   SLP Mode of treatment - Concurrent (minutes) 0   SLP Mode of treatment - Group (minutes) 0   SLP Mode of treatment - Co-treat (minutes) 0   SLP Mode of Teatment - Total time(minutes) 60 minutes   Therapy Time missed   Time missed?  No   Daily FIM Score   Problem solving (FIM) 2 - Needs direction more than ½ time to initiate, plan or complete simple tasks   Comprehension (FIM) 3 - Understands basic info/conversation 50-74% of time   Expression (FIM) 3 - Expresses basic info/needs 50-74% of time   Eating (FIM) 5 - Patient needs help to open contianers or set up tray

## 2018-05-03 NOTE — PROGRESS NOTES
05/03/18 1230   Pain Assessment   Pain Assessment No/denies pain   Pain Score No Pain   Restrictions/Precautions   Precautions Bed/chair alarms;Cognitive; Fall Risk   Grooming   Able To Comb/Brush Hair   Grooming (FIM) 1 - Patient completed 0/4  tasks   QI: Putting On/Taking Off Footwear   Assistance Needed Physical assistance   Assistance Provided by [place] Total assistance   Putting On/Taking Off Footwear CARE Score 1   QI: Sit to Lying   Assistance Needed Physical assistance   Assistance Provided by [place] Total assistance   Sit to Lying CARE Score 1   QI: Lying to Sitting on Side of Bed   Assistance Needed Physical assistance   Assistance Provided by [place] Total assistance   Lying to Sitting on Side of Bed CARE Score 1   QI: Sit to Stand   Assistance Needed Physical assistance   Assistance Provided by [place] Total assistance   Sit to Stand CARE Score 1   QI: Chair/Bed-to-Chair Transfer   Assistance Needed Physical assistance   Assistance Provided by [place] Total assistance   Comment AX2 stand pivot transfer unable to follow verbal and tactile commands   Chair/Bed-to-Chair Transfer CARE Score 1   Transfer Bed/Chair/Wheelchair   Limitations Noted In Balance; Coordination;LE Strength;UE Strength; Sequencing;Problem Solving; Endurance   Stand Pivot Assist x 2;Total Assist   Sit to Stand Assist x 2;Total Assist   Stand to Sit Total Assist   Supine to Sit Total Assist   Findings Pt unable to follow any commands even simple with hand placement from therapist  Pt is total A x2 for sit to stand transfer unablet o coordinate feet for stand pivot  Pt is total A X2 for safety   Bed, Chair, Wheelchair Transfer (FIM) 1 - Patient requires assist of two people   QI: [address] Needed Physical assistance   Assistance Provided by [place] Total assistance   Comment total A x2   [address] Score 1   Toileting   Able to [address] down no, up no     Able to Manage Clothing Closures No   Manage Hygiene Bladder   Limitations Noted In Balance; Coordination;Problem Solving; Safety; Sequencing;LE Strength   Toileting (FIM) 1 - Patient requires two helpers   QI: Toilet Transfer   Assistance Needed Physical assistance   Assistance Provided by [facility] CARE Score 1   Toilet Transfer   Transfer Technique Stand Pivot   Limitations Noted In Balance; Endurance; Safety;LE Strength;Problem Solving; Sequencing   Findings total A x2 stand pivot transfer without device    Cognition   Overall Cognitive Status Impaired   Arousal/Participation Lethargic;Persistent stimuli required   Attention Difficulty attending to directions   Orientation Level Oriented to person   Memory Decreased recall of recent events;Decreased short term memory;Decreased recall of precautions;Decreased long term memory   Following Commands Follows one step commands inconsistently   Comments Pt with severe deficits in attention today and unable to make eye contact wtih therapist  Pt with persistant perseverance on seeking tactile sensory information through R hand  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  Pt unabelt o be redirected despite consistent attempts  Vision   Vision Comments pt with severe R gaze preference and unable to attend to visual information on L side  Pt unable to steady gaze  Assessment   Treatment Assessment Pt seen for 55 min session with OT focus on engaging in functional tasks  From start of session pt with severe deficits in attention and need for total A x2 for all stand pivot transfers  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 and tremors noted in R hand today  R hand new, but not consistent throughout session  Pt unable to attend to simple one step commands  Multimodal cueing including tactile, hand over hand, and visual cueing  Pt unable to follow any commands  Attempt for heavy work activity with  on table  Pt continued to  tissues and "wash table"   When given washcloth pt  not able to follow command to wash table  Pt focusing only on R side of table  Attempted to have pt to stand  Pt pushing herself back down in seat with attempt for sit to stands  Pt returned to bed at end of session and spoke to Dr Mark hernándezing pt's fluctuation between AM and PM  Pt told PT this AM that she did not sleep last night  Prognosis Fair   Problem List Decreased strength;Decreased endurance; Impaired balance;Decreased mobility; Decreased cognition;Decreased coordination; Impaired judgement; Impaired vision;Decreased safety awareness; Impaired sensation   Plan   Treatment/Interventions ADL retraining;Functional transfer training;LE strengthening/ROM; Therapeutic exercise; Endurance training;Patient/family training;Gait training; Compensatory technique education   Progress Slow progress, decreased activity tolerance   OT Therapy Minutes   OT Time In 1230   OT Time Out 1325   OT Total Time (minutes) 55   OT Mode of treatment - Individual (minutes) 55   OT Mode of treatment - Concurrent (minutes) 0   OT Mode of treatment - Group (minutes) 0   OT Mode of treatment - Co-treat (minutes) 0   OT Mode of Teatment - Total time(minutes) 55 minutes   Therapy Time missed   Time missed?  No

## 2018-05-03 NOTE — PROGRESS NOTES
Internal Medicine Progress Note  Patient: Briana Holley  Age/sex: 80 y o  female  Medical Record #: [id number]      ASSESSMENT/PLAN:  Briana Holley is seen and examined and mangement for following issues:    1  Lt MCA CVA s/p mechanical thrombectomy: Continue Eliquis and atorvastatin for secondary prevention  45 [facility] Neurology follow-up  2   HTN: stable; continue Lopressor 50mg every 12 hours  3   CHADWICK/CKD stage III; baseline Cr 1 5: stable; will monitor  On admit to hospital 4/18 had creat 2 0 and Torsemide was held/fluids given    4  Chronic systolic CHF/LVEF 67%; moderate TR; bio MVR normal function:  Continue torsemide 10mg daily/Kdur 10 meq BID  Chest is clear/no edema  5  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): Eliquis 2 5mg BID 2/2 age and renal status (switched from Coumadin in the hospital so NOAC is new)  6   DM type 2: On linagliptan 2 5mg at home  It was planned for pt to change to Januvia 100mg daily as an OP per the daughter  Will start Januvia when needed but so far BS controlled w/o tx = will stop Accuchecks for now and watch FBSs   Continue DM diet  Fasting today 106    7  Acute blood loss anemia: s/p transfusion after EBL from thrombectomy of 500-1000ml from femoral site  Stable today    8  PFO:  Small  Seen on LORI  45 [facility] Cardiology follow-up  9   History of colon CA: Colonic lesion seen on CT  Will need GI follow-up    10  Hx PPM    11  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      Subjective: Patient seen and examined   No new or overnight issues     ROS:   GI: denies abdominal pain, change bowel habits or reflux symptoms  Neuro: No new neurologic changes  Respiratory: No Cough, SOB  Cardiovascular: No CP, palpitations     Scheduled Meds:    Current Facility-Administered Medications:  acetaminophen 650 mg Oral Q6H PRN Ankita Zavala MD   apixaban 2 5 mg Oral BID Ankita Zavala MD   atorvastatin 80 mg Oral Daily With Marleny Trevizo MD   bisacodyl 10 mg Rectal Daily PRN Krystin Morales MD   docusate sodium 100 mg Oral BID Krystin Morales MD   iron polysaccharides 150 mg Oral Daily Krystin Morales MD   metoprolol tartrate 50 mg Oral Q12H Rios Sepulveda MD   neomycin-bacitracin-polymyxin b 1 small application Topical BID GAYATRI Franz   pantoprazole 40 mg Oral Early Morning Krystin Morales MD   polyethylene glycol 17 g Oral Daily PRN Krystin Morales MD   potassium chloride 10 mEq Oral BID Krystin Morales MD   senna 1 tablet Oral HS Krystin Morales MD   torsemide 10 mg Oral Daily Krystin Morales MD       Labs:       Results from last 7 days  Lab Units 05/03/18  0659 04/30/18  0456   WBC Thousand/uL 5 01 5 79   HEMOGLOBIN g/dL 9 3* 9 2*   HEMATOCRIT % 28 5* 29 3*   PLATELETS Thousands/uL 162 174       Results from last 7 days  Lab Units 05/03/18  0724 04/30/18  0456   SODIUM mmol/L 136 137   POTASSIUM mmol/L 3 8 4 4   CHLORIDE mmol/L 100 102   CO2 mmol/L 28 28   BUN mg/dL 40* 39*   CREATININE mg/dL 1 50* 1 54*   GLUCOSE RANDOM mg/dL 106 114   CALCIUM mg/dL 9 7 9 2                  Glucose (mg/dL)   Date Value   05/03/2018 106   04/30/2018 114   04/29/2018 103   04/28/2018 88   12/23/2015 114 (H)   02/22/2015 101   02/21/2015 103   02/20/2015 116     Glucose, i-STAT (mg/dl)   Date Value   04/23/2018 106   04/23/2018 96       Labs reviewed    Physical Examination:  Vitals:   Vitals:    05/02/18 0920 05/02/18 1324 05/02/18 2011 05/03/18 0511   BP: 130/58 132/60 150/65 140/70   BP Location:  Right arm  Right arm   Pulse: 72 75 85 72   Resp:  18 18 19   Temp:  98 2 °F (36 8 °C) 97 9 °F (36 6 °C) 97 9 °F (36 6 °C)   TempSrc:  Oral  Oral   SpO2:  99% 99% 97%   Weight:    67 9 kg (149 lb 11 1 oz)   Height:           GEN: NAD  HEENT: NC/AT  RESP: BBS w/o crackles/wheeze/rhonci; resp unlabored  CV: +S1 S2, regular rate, no rubs/murmurs  ABD: soft, NT, ND, normal BS   : no schaefer  EXT: no edema  Skin: no rashes  Neuro: AAO; WOODARD 5/5 but difficult to get her to coordinate UE strength testing 2/2 some involuntary movements; face symmetric; speech clear  [x ] Total time spent: 30 Mins and greater than 50% of this time was spent counseling/coordinating care        Stanford Perez, [address] [place] [address]  Internal Medicine

## 2018-05-03 NOTE — PROGRESS NOTES
Progress Note - Leeanne Spatz 80 y o  female MRN: [id number]    Unit/Bed#: -38 Encounter: [id number]            Subjective:   D/W patient and family that remeron is currently being held (but may be resumed) and the reasons for this     Objective:     ROS  Gen: denies recent wt loss   Psych: denies mood change    Vitals: Blood pressure 138/62, pulse 88, temperature 98 2 °F (36 8 °C), temperature source Oral, resp  rate 20, height 5' 4" (1 626 m), weight 67 9 kg (149 lb 11 1 oz), SpO2 99 %, not currently breastfeeding      Physical Exam:     Gen:        NAD   Neck:   trachea midline  Lungs:  respirations unlabored   Heart:    + S1 and S2   Abdomen:    Soft, non-tender  Psych: mood/affect appropriate  Neurologic: awake, alert    Functional :  Mobility: min x 2  Tx: min x 2  ADLs: max        Current Facility-Administered Medications:  acetaminophen 650 mg Oral Q6H PRN Sarah Calhoun MD   apixaban 2 5 mg Oral BID Sarah Calhoun MD   atorvastatin 80 mg Oral Daily With Vince Hdz MD   bisacodyl 10 mg Rectal Daily PRN Sarah Calhoun MD   docusate sodium 100 mg Oral BID Sarah Calhoun MD   iron polysaccharides 150 mg Oral Daily Sarah Calhoun MD   metoprolol tartrate 50 mg Oral Q12H Lorin Schwab, MD   neomycin-bacitracin-polymyxin b 1 small application Topical BID GAYATRI Pedraza   pantoprazole 40 mg Oral Early Morning Sarah Calhoun MD   polyethylene glycol 17 g Oral Daily PRN Sarah Calhoun MD   potassium chloride 10 mEq Oral BID Sarah Calhoun MD   senna 1 tablet Oral HS Sarah Calhoun MD   torsemide 10 mg Oral Daily Sarah Calhoun MD         acetaminophen    bisacodyl    polyethylene glycol      Assessment:  Pt is 81 yo female with ICH & bi-hemispheric nonhemorrhagic ischemic strokes s/p thrombectomy by Dr Liya Rocha on 4/23    Plan:    Rehabilitation: cont PT/OT for ambulatory/ADL dysfxn    ICH & bi-hemispheric nonhemorrhagic ischemic strokes: nonhemorrhagic stroke felt by neurology to be embolic of unclear etiology; per s/p thrombectomy by Dr Phoebe Keita on 4/23, cleared by neurosx for [facility] and per neuro recs started on eliquis 2 5 mg BID (not 5 mg due to age and serum Cr greater than 1 5, tends to fluctuate around this value); home lipitor increased from 40 to 80 mg        Peripheral neuropathy: likely 2/2 to DM, had tried neurontin in the past but did not tolerate      DM: on januvia 100 mg qd at home (recently switched from tradjenta 5 mg qd); currently on ISS     CHF (EF 45%): on home torsemide 10 mg qd (and on home potassium 10 MEQ BID due to hypokalemia 2/2 to torsemide); however home toprol XL 50 mg qd switched to lopressor 50 mg q12 in acute care     SSS: s/p pacer, interrogated recently in acute care, no A-fib found, follows with Dr Luz Marina Dixon (cards) and Dr Darren Noriega (EP)      h/o colon CA: on CT CAP of 4/26 colonic thickening suspicious for lesion noted and colonoscopy recommended, pt undergoes screening colonoscopys with Dr Gussie Gottron, pt to FU with them for FU colonoscopy       Meningioma: seen by neurosx no intervention planned, follows with Dr Otis Pang     PFO (3 mm): unclear if paradoxical stroke from DVT is etiology of CVA (felt to be embolic by neuro) however as neuro has placed pt on [facility] for CVA LE dopplers would not ; t/c closure as OP       s/p MVR: tissue valve     HL: home lipitor increased from 40 mg to 80 mg due to CVA       h/o DVT: home coumadin switched to eliquis for CVA      Insomnia: home remeron on home due 15 mg HS on hold (pt had episodes of sedation/AMS in acute care and will try to avoid such medications)      non-occlusive carotid dz: per carotid U/S report recommend repeat carotid U/S in 1 year; on [facility] and statin     chronic constipation: per family at baseline 1 BM every 4 to 5 days, IM monitoring and will treat with laxatives as needed      rt ankle pain/swelling: likely sprain; XR showed no acute osseous abnormality     CKD: baseline 1 4-1  7      chronic anemia: AOCD/CKD, at home on iron 150 mg qd, cont iron supplementation as inpt; Hg currently stable at 9 3     Hyperphosphatemia per lab reference range at 4 4: however in females 18 year or older 4 5 is ULN      Dispo: reteam    Incidental findings on CT C/A/P of 4/26:  1) multiple pulmonary micronodules: per CT report recommended FU imaging in 3 months for determination of stability  2) pleural thickening  3) uterine fibroid  Other incidental findings:  4) B/L renal cysts  5) 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 no intervention planned, follows with Dr Cece Curiel  6) EKG abnormalities (PACs, bifascicular block/wide QRS, LAD, prolonged QTc): follows with Dr Guzman Cortez (cards) and Dr Chastity Smith (EP); will d/w cards clinical significance of prolonged QTc in the setting of PPM and other EKG findings   7) mod-severe TR/elevated peak PA pressure:  follows with Dr Guzman Cortez (cards) and Dr Chastity Smith (EP)

## 2018-05-03 NOTE — PLAN OF CARE
Discharge to home or other facility with appropriate resources Progressing      Absence or prevention of progression during hospitalization Progressing      Nutrient/Hydration intake appropriate for improving, restoring or maintaining nutritional needs Progressing      Verbalizes/displays adequate comfort level or baseline comfort level Progressing      Patient will remain free of falls Progressing      Skin integrity is maintained or improved Progressing      Patient will remain free of falls Progressing      Maintain or return to baseline ADL function Progressing      Maintain or return mobility status to optimal level Progressing

## 2018-05-03 NOTE — PROGRESS NOTES
Dr Sanjuana Krabbe called  Made aware of family's request for patient to have Remeron 15mg po at hs  Dr Sanjuana Krabbe does not want to order at this time

## 2018-05-03 NOTE — PROGRESS NOTES
05/03/18 0930   Pain Assessment   Pain Assessment No/denies pain   Restrictions/Precautions   Precautions Fall Risk;Bed/chair alarms;Cognitive   Cognition   Overall Cognitive Status Impaired   Subjective   Subjective no complaints   QI: Sit to Lying   Assistance Needed Physical assistance;Verbal cues   Assistance Provided by [place] 25%-49%   Comment Pt requires A lifting legs into bed   Sit to Lying CARE Score 3   QI: Sit to Stand   Assistance Needed Physical assistance   Assistance Provided by [place] Less than 25%   Sit to Stand CARE Score 3   QI: Chair/Bed-to-Chair Transfer   Assistance Needed Physical assistance;Verbal cues   Assistance Provided by [place] 25%-49%   Chair/Bed-to-Chair Transfer CARE Score 3   Transfer Bed/Chair/Wheelchair   Limitations Noted In LE Strength;Vision; Sequencing;Problem Solving; Endurance; Coordination;Balance   Adaptive Equipment Hand Hold   Sit Pivot Minimal Assist   Stand Pivot Minimal Assist;Moderate Assist   Bed, Chair, Wheelchair Transfer (FIM) 3 - Patient completes 50 - 74% of all tasks   QI: Car Transfer   Assistance Needed Physical assistance;Verbal cues   Assistance Provided by [place] 25%-49%   Car Transfer CARE Score 3   QI: Walk 10 Feet   Assistance Needed Physical assistance;Verbal cues   Assistance Provided by [place] 25%-49%   Walk 10 Feet CARE Score 3   QI: Walk 50 Feet with Two Turns   Assistance Needed Physical assistance;Verbal cues   Assistance Provided by [place] 25%-49%   Walk 50 Feet with Two Turns CARE Score 3   QI: Walk 150 Feet   Assistance Needed Physical assistance;Verbal cues   Assistance Provided by [place] 25%-49%   Walk 150 Feet CARE Score 3   QI: Walking 10 Feet on Uneven Surfaces   Reason if not Attempted Safety concerns   Walking 10 Feet on Uneven Surfaces CARE Score 88   Ambulation   Does the patient walk? 2   Yes   Primary Discharge Mode of Locomotion Walk   Walk Assist Level Minimum Assist;Moderate Assist   Gait Pattern Improper weight shift;Narrow [place];Decreased foot clearance; Slow Cathryn   Assist Device Hand Hold  (on right)   Distance Walked (feet) 150 ft   Limitations Noted In Strength; Sequencing;Midline Orientation; Heel Strike; Endurance; Coordination;Balance   Findings Requires A for wt shift to R   Walking (FIM) 3 - Patient completes 50 - 74% of all tasks, needs more than steadying or light touch AND distance 150 feet or more, no rest   Wheelchair mobility   QI: Does the patient use a wheelchair? 0  No   QI: 1 Step (Curb)   Assistance Needed Physical assistance;Verbal cues   Assistance Provided by [place] 25%-49%   1 Step (Curb) CARE Score 3   QI: 4 Steps   Assistance Needed Physical assistance;Verbal cues   Assistance Provided by [place] 25%-49%   4 Steps CARE Score 3   QI: 12 Steps   Comment completes 8 before fatigued   Reason if not Attempted Activity not applicable   12 Steps CARE Score 9   Stairs   Type Stairs   # of Steps 8   Weight Bearing Precautions Fall Risk   Assist Devices Bilateral Rail   Stairs (FIM) 2 - Patient goes up and down 4 - 11 stairs regardless of assist/device/setup   Therapeutic Interventions   Flexibility passive stretch B HS and calves   Other practiuced repeated sit pivot transfers left & right, worked on wt shift to R in standing with wall on pt's R with HR, legft foot placement on 4" step repeatedly  Pt requires cueing throughout for each activity   Equipment Use   NuStep 12 min lvl 1 BUE/LE   Assessment   Treatment Assessment Pt requires frequent rest breaks du eto fatigue  Very inconsistent with ability to follow commands depsite attempting english/[place], verbal/tactile cues and demo  Poor balnace and righting reactions along with poor scanning of environment make pt very high fall risk  Pt returned to bed at end of session for rest, bed alarm on  PT Barriers   Physical Impairment Decreased endurance;Decreased coordination;Decreased mobility; Impaired balance;Decreased safety awareness; Impaired vision   Functional Limitation Transfers; Walking;Car transfers;Stair negotiation   Plan   Treatment/Interventions Gait training;Bed mobility; Equipment eval/education;Patient/family training; Endurance training; Therapeutic exercise;LE strengthening/ROM; Functional transfer training   Progress Slow progress, cognitive deficits   Recommendation   Recommendation Home PT; Home with family support;24 hour supervision/assist   PT Therapy Minutes   PT Time In 0930   PT Time Out 1100   PT Total Time (minutes) 90   PT Mode of treatment - Individual (minutes) 70   PT Mode of treatment - Concurrent (minutes) 20   PT Mode of treatment - Group (minutes) 0   PT Mode of treatment - Co-treat (minutes) 0   PT Mode of Teatment - Total time(minutes) 90 minutes   Therapy Time missed   Time missed?  No

## 2018-05-04 PROCEDURE — 97530 THERAPEUTIC ACTIVITIES: CPT

## 2018-05-04 PROCEDURE — 92508 TX SP LANG VOICE COMM GROUP: CPT

## 2018-05-04 PROCEDURE — 97116 GAIT TRAINING THERAPY: CPT

## 2018-05-04 PROCEDURE — 97535 SELF CARE MNGMENT TRAINING: CPT

## 2018-05-04 PROCEDURE — 99232 SBSQ HOSP IP/OBS MODERATE 35: CPT | Performed by: PHYSICAL MEDICINE & REHABILITATION

## 2018-05-04 PROCEDURE — 97110 THERAPEUTIC EXERCISES: CPT

## 2018-05-04 RX ORDER — LANOLIN ALCOHOL/MO/W.PET/CERES
3 CREAM (GRAM) TOPICAL
Status: DISCONTINUED | OUTPATIENT
Start: 2018-05-04 | End: 2018-05-07

## 2018-05-04 RX ADMIN — BACITRACIN, NEOMYCIN, POLYMYXIN B 1 SMALL APPLICATION: 400; 3.5; 5 OINTMENT TOPICAL at 09:06

## 2018-05-04 RX ADMIN — METOPROLOL TARTRATE 50 MG: 50 TABLET ORAL at 09:05

## 2018-05-04 RX ADMIN — SENNOSIDES 8.6 MG: 8.6 TABLET, FILM COATED ORAL at 21:59

## 2018-05-04 RX ADMIN — APIXABAN 2.5 MG: 2.5 TABLET, FILM COATED ORAL at 09:05

## 2018-05-04 RX ADMIN — POTASSIUM CHLORIDE 10 MEQ: 750 TABLET, EXTENDED RELEASE ORAL at 17:16

## 2018-05-04 RX ADMIN — TORSEMIDE 10 MG: 20 TABLET ORAL at 09:05

## 2018-05-04 RX ADMIN — ATORVASTATIN CALCIUM 80 MG: 40 TABLET, FILM COATED ORAL at 17:15

## 2018-05-04 RX ADMIN — APIXABAN 2.5 MG: 2.5 TABLET, FILM COATED ORAL at 17:16

## 2018-05-04 RX ADMIN — PANTOPRAZOLE SODIUM 40 MG: 40 TABLET, DELAYED RELEASE ORAL at 05:41

## 2018-05-04 RX ADMIN — DOCUSATE SODIUM 100 MG: 100 CAPSULE, LIQUID FILLED ORAL at 09:05

## 2018-05-04 RX ADMIN — MELATONIN TAB 3 MG 3 MG: 3 TAB at 22:05

## 2018-05-04 RX ADMIN — POTASSIUM CHLORIDE 10 MEQ: 750 TABLET, EXTENDED RELEASE ORAL at 09:04

## 2018-05-04 RX ADMIN — DOCUSATE SODIUM 100 MG: 100 CAPSULE, LIQUID FILLED ORAL at 17:16

## 2018-05-04 RX ADMIN — Medication 150 MG: at 09:04

## 2018-05-04 RX ADMIN — BACITRACIN, NEOMYCIN, POLYMYXIN B 1 SMALL APPLICATION: 400; 3.5; 5 OINTMENT TOPICAL at 17:15

## 2018-05-04 NOTE — PROGRESS NOTES
Progress Note - Alejandrina Khanna 80 y o  female MRN: [id number]    Unit/Bed#: -02 Encounter: [id number]            Subjective:   Pt w/o complaint currently     Objective:     ROS  Gen: denies recent wt loss   Psych: denies mood change    Vitals: Blood pressure 124/58, pulse 82, temperature 98 1 °F (36 7 °C), temperature source Oral, resp  rate 18, height 5' 4" (1 626 m), weight 68 2 kg (150 lb 5 7 oz), SpO2 98 %, not currently breastfeeding      Physical Exam:     Gen:        NAD   Neck:   trachea midline  Lungs:  respirations unlabored   Heart:    + S1 and S2   Abdomen:    Soft, non-tender  Psych: mood/affect appropriate  Neurologic: awake, alert    Functional :  Mobility: min x 2  Tx: min x 2  ADLs: max        Current Facility-Administered Medications:  acetaminophen 650 mg Oral Q6H PRN Patricia Vance, MD   apixaban 2 5 mg Oral BID Patricia Sport, MD   atorvastatin 80 mg Oral Daily With Henna Patton MD   bisacodyl 10 mg Rectal Daily PRN Patricia Sport, MD   docusate sodium 100 mg Oral BID Patricia Sport, MD   iron polysaccharides 150 mg Oral Daily Patricia Sport, MD   melatonin 3 mg Oral HS Patricia Sport, MD   metoprolol tartrate 50 mg Oral Q12H Rosa Dhaliwal MD   neomycin-bacitracin-polymyxin b 1 small application Topical BID GAYATRI Lazcano   pantoprazole 40 mg Oral Early Morning Patricia Vance MD   polyethylene glycol 17 g Oral Daily PRN Patricia Vance, MD   potassium chloride 10 mEq Oral BID Patricia Sport, MD   senna 1 tablet Oral HS Patricia Sport, MD   torsemide 10 mg Oral Daily Patricia Sport, MD         acetaminophen    bisacodyl    polyethylene glycol      Assessment:  Pt is 81 yo female with ICH & bi-hemispheric nonhemorrhagic ischemic strokes s/p thrombectomy by Dr Vishnu Cortez on 4/23    Plan:    Rehabilitation: cont PT/OT for ambulatory/ADL dysfxn    ICH & bi-hemispheric nonhemorrhagic ischemic strokes: nonhemorrhagic stroke felt by neurology to be embolic of unclear etiology; per s/p thrombectomy by   Ana Drain on 4/23, cleared by neurosx for [facility] and per neuro recs started on eliquis 2 5 mg BID (not 5 mg due to age and serum Cr greater than 1 5, tends to fluctuate around this value); home lipitor increased from 40 to 80 mg        Peripheral neuropathy: likely 2/2 to DM, had tried neurontin in the past but did not tolerate      DM: on januvia 100 mg qd at home (recently switched from tradjenta 5 mg qd); currently on ISS     CHF (EF 45%): on home torsemide 10 mg qd (and on home potassium 10 MEQ BID due to hypokalemia 2/2 to torsemide); however home toprol XL 50 mg qd switched to lopressor 50 mg q12 in acute care     SSS: s/p pacer, interrogated recently in acute care, no A-fib found at that time (although noted on EKG of 4/23 however pt already on [facility] for CVA and on BB), follows with Dr Piero Carmen (cards) and Dr Joanna Campuzano (EP)      h/o colon CA: on CT CAP of 4/26 colonic thickening suspicious for lesion noted and colonoscopy recommended, pt undergoes screening colonoscopys with Dr Elizabeth Lowe, pt to FU with them for FU colonoscopy       Meningioma: seen by neurosx no intervention planned, follows with Dr Lian Cruz     PFO (3 mm): unclear if paradoxical stroke from DVT is etiology of CVA (felt to be embolic by neuro) however as neuro has placed pt on [facility] for CVA LE dopplers would not ; t/c closure as OP       s/p MVR: tissue valve     HL: home lipitor increased from 40 mg to 80 mg due to CVA       h/o DVT: home coumadin switched to eliquis for CVA      Insomnia: home remeron on home due 15 mg HS on hold (pt had episodes of sedation/AMS in acute care and will try to avoid such medications)      non-occlusive carotid dz: per carotid U/S report recommend repeat carotid U/S in 1 year; on [facility] and statin     chronic constipation: per family at baseline 1 BM every 4 to 5 days, IM monitoring and will treat with laxatives as needed      rt ankle pain/swelling: likely sprain; XR showed no acute osseous abnormality     CKD: baseline 1 4-1  7      chronic anemia: AOCD/CKD, at home on iron 150 mg qd, cont iron supplementation as inpt; Hg currently stable at 9 3     Hyperphosphatemia per lab reference range at 4 4: however in females 18 year or older 4 5 is ULN      Dispo: reteam    Incidental findings on CT C/A/P of 4/26:  1) multiple pulmonary micronodules: per CT report recommended FU imaging in 3 months for determination of stability  2) pleural thickening  3) uterine fibroid  Other incidental findings:  4) B/L renal cysts  5) 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 no intervention planned, follows with Dr Shayy Velasquez  6) EKG abnormalities (PACs, bifascicular block/wide QRS, LAD, prolonged QTc): follows with Dr Ant Landa (cards) and Dr Peacock Seen (EP); d/w Dr James Corbin of cards and felt that since EKG was done in the acute setting of stroke and prior QTc's are accpetable likely prolonged QTc is not reflective of patient's QTc in the setting of acute CVA  7) mod-severe TR/elevated peak PA pressure:  follows with Dr Ant Landa (cards) and Dr Peacock Seen (EP)

## 2018-05-04 NOTE — PROGRESS NOTES
Physical Therapy Progress Note   05/04/18 1100   Pain Assessment   Pain Assessment No/denies pain   Pain Score No Pain   Restrictions/Precautions   Precautions Aspiration;Bed/chair alarms;Cognitive; Fall Risk;Supervision on toilet/commode   Cognition   Overall Cognitive Status Impaired   Arousal/Participation Lethargic;Persistent stimuli required   Attention Difficulty attending to directions   Orientation Level Oriented to person   Memory Decreased recall of recent events;Decreased short term memory;Decreased recall of precautions;Decreased long term memory   Following Commands Follows one step commands inconsistently   Subjective   Subjective denies pain; reports she is extremely tired   QI: Roll Left and Right   Assistance Needed Supervision   Assistance Provided by [place] No physical assistance   Roll Left and Right CARE Score 4   QI: Sit to Lying   Assistance Needed Physical assistance   Assistance Provided by [place] Less than 25%   Sit to Lying CARE Score 3   QI: Lying to Sitting on Side of Bed   Assistance Needed Physical assistance   Assistance Provided by [place] Less than 25%   Lying to Sitting on Side of Bed CARE Score 3   QI: Sit to Stand   Assistance Needed Physical assistance   Assistance Provided by [place] Less than 25%   Sit to Stand CARE Score 3   Bed Mobility   Able to Roll Left to Right;Right to Left;Scoot Up   Findings Nidia   QI: Chair/Bed-to-Chair Transfer   Assistance Needed Physical assistance   Assistance Provided by [place] Less than 25%   Chair/Bed-to-Chair Transfer CARE Score 3   Transfer Bed/Chair/Wheelchair   Limitations Noted In LE Strength;Balance; Coordination; Endurance;Problem Solving;Sensation;UE Strength   Adaptive Equipment Hand Hold   Sit Pivot Minimal Assist   Stand Pivot Minimal Assist   Sit to Stand Minimal Assist   Stand to Sit Minimal Assist   Supine to Sit Minimal Assist   Sit to Supine Minimal Assist   Car Transfer Minimal Assist   Findings HHA R UE   Bed, Chair, Wheelchair Transfer (FIM) 4 - Patient completes 75% of all tasks   QI: Car Transfer   Assistance Needed Physical assistance   Assistance Provided by [place] Less than 25%   Comment HHA    Car Transfer CARE Score 3   QI: Walk 10 Feet   Assistance Needed Physical assistance   Assistance Provided by [place] Less than 25%   Walk 10 Feet CARE Score 3   QI: Walk 50 Feet with Two Turns   Assistance Needed Physical assistance   Assistance Provided by [place] Less than 25%   Walk 50 Feet with Two Turns CARE Score 3   QI: Walk 150 Feet   Assistance Needed Physical assistance   Assistance Provided by [place] Less than 25%   Walk 150 Feet CARE Score 3   QI: Walking 10 Feet on Uneven Surfaces   Reason if not Attempted Activity not applicable   Walking 10 Feet on Uneven Surfaces CARE Score 9   Ambulation   Does the patient walk? 2  Yes   Primary Discharge Mode of Locomotion Walk   Walk Assist Level Minimum Assist   Gait Pattern Improper weight shift   Assist Device Hand Hold   Distance Walked (feet) 150 ft   Limitations Noted In Strength;Balance; Coordination;Midline Orientation;Posture; Safety; Sensation; Sequencing;Speed;Swing   Findings 150 HHA with weight shift assist   Walking (FIM) 3 - Patient completes 50 - 74% of all tasks, needs more than steadying or light touch AND distance 150 feet or more, no rest   Wheelchair mobility   QI: Does the patient use a wheelchair? 0   No   Wheelchair (FIM) 0 - Activity does not occur   QI: 1 Step (Curb)   Assistance Needed Physical assistance   Assistance Provided by [place] Less than 25%   Comment HHA   1 Step (Curb) CARE Score 3   QI: 4 Steps   Reason if not Attempted Activity not applicable   4 Steps CARE Score 9   QI: 12 Steps   Reason if not Attempted Activity not applicable   12 Steps CARE Score 9   Stairs   Findings NT this session;    QI: Picking Up Object   Reason if not Attempted Safety concerns   Picking Up Object CARE Score 88   Toilet Transfer   Toilet Transfer (FIM) 1 - Patient requires assist of two people   Therapeutic Interventions   Neuromuscular Re-Education STS trials x5; fair balance during xfer, cueing for hand placement;    Equipment Use   NuStep x12 minutes, level 2;    Assessment   Treatment Assessment 30-minute Phys Ther session focused on xfers and ambulation; pt able to xfer Nidia from supine <> sit EOB <> STS/SPT with R UE HHA; pt amb 150' Nidia with R UE HHA and weight shift assist; instructed in navigating curb with HHA and up/down stairs x12; also NuStep with B UE and B LE for strength/ROM/endurance and coordination B UE and B LE; pt finished session seated in w/c at bedside for lunch; recommend cont PT POC; alarms active and all needs in reach at end of session;    Problem List Decreased strength;Decreased endurance; Impaired balance;Decreased mobility; Decreased cognition;Decreased coordination; Impaired judgement; Impaired vision;Decreased safety awareness; Impaired sensation   Barriers to Discharge Inaccessible home environment;Decreased caregiver support   PT Barriers   Physical Impairment Decreased endurance;Decreased coordination;Decreased mobility; Impaired balance;Decreased safety awareness; Impaired vision   Functional Limitation Transfers; Walking;Car transfers;Stair negotiation   Plan   Treatment/Interventions ADL retraining;Functional transfer training;LE strengthening/ROM; Elevations; Therapeutic exercise; Endurance training;Cognitive reorientation;Patient/family training;Equipment eval/education; Bed mobility;Gait training   Progress Progressing toward goals   Recommendation   Recommendation Home PT   Equipment Recommended (LRAD)   PT Therapy Minutes   PT Time In 1100   PT Time Out 1130   PT Total Time (minutes) 30   PT Mode of treatment - Individual (minutes) 0   PT Mode of treatment - Concurrent (minutes) 30   PT Mode of treatment - Group (minutes) 0   PT Mode of treatment - Co-treat (minutes) 0   PT Mode of Teatment - Total time(minutes) 30 minutes   Therapy Time missed   Time missed?  No     Ulices Gagnon, PTA

## 2018-05-04 NOTE — PROGRESS NOTES
Internal Medicine Progress Note  Patient: Niall Hicks  Age/sex: 80 y o  female  Medical Record #: [id number]      ASSESSMENT/PLAN:  Niall Hicks is seen and examined and management for following issues:    1  Lt MCA CVA s/p mechanical thrombectomy: Continue Eliquis and atorvastatin for secondary prevention  45 [facility] Neurology follow-up  2   HTN: stable; continue Lopressor 50mg every 12 hours  3   CHADWICK/CKD stage III; baseline Cr 1 5: stable; will monitor  On admit to hospital 4/18 had creat 2 0 and Torsemide was held/fluids given  4   Chronic systolic CHF/LVEF 17%; moderate TR; bio MVR normal function:  Continue torsemide 10mg daily/Kdur 10 meq BID  Chest is clear/no edema  5  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): Eliquis 2 5mg BID 2/2 age and renal status (switched from Coumadin in the hospital so NOAC is new)  6   DM type 2: On linagliptan 2 5mg at home  It was planned for pt to change to Januvia 100mg daily as an OP per the daughter  Will start Januvia when needed but so far BS controlled w/o tx = will stop Accuchecks for now and watch FBSs   Continue DM diet  Fasting yesterday was 106    7  Acute blood loss anemia: s/p transfusion after EBL from thrombectomy of 500-1000ml from femoral site  Stable  8   PFO:  Small  Seen on LORI  45 [facility] Cardiology follow-up  9   History of colon CA: Colonic lesion seen on CT  Will need GI follow-up    10  Hx PPM    11  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      Subjective: Patient seen and examined   Offers no complaints    ROS:   GI: denies abdominal pain, change bowel habits or reflux symptoms  Neuro: No new neurologic changes  Respiratory: No Cough, SOB  Cardiovascular: No CP, palpitations     Scheduled Meds:    Current Facility-Administered Medications:  acetaminophen 650 mg Oral Q6H PRN Melissa Moyer MD   apixaban 2 5 mg Oral BID Melissa Moyer MD   atorvastatin 80 mg Oral Daily With Mary Lou Monroe MD   bisacodyl 10 mg Rectal Daily PRN Ankita Zavala MD   docusate sodium 100 mg Oral BID Ankita Zavala MD   iron polysaccharides 150 mg Oral Daily Ankita Zavala MD   metoprolol tartrate 50 mg Oral Q12H Dana Tse MD   neomycin-bacitracin-polymyxin b 1 small application Topical BID GAYATRI Johnson   pantoprazole 40 mg Oral Early Morning Ankita Zavala MD   polyethylene glycol 17 g Oral Daily PRN Ankita Zavala MD   potassium chloride 10 mEq Oral BID Ankita Zavala MD   senna 1 tablet Oral HS Ankita Zavala MD   torsemide 10 mg Oral Daily Ankita Zavala MD       Labs:       Results from last 7 days  Lab Units 05/03/18  0659 04/30/18  0456   WBC Thousand/uL 5 01 5 79   HEMOGLOBIN g/dL 9 3* 9 2*   HEMATOCRIT % 28 5* 29 3*   PLATELETS Thousands/uL 162 174       Results from last 7 days  Lab Units 05/03/18  0724 04/30/18  0456   SODIUM mmol/L 136 137   POTASSIUM mmol/L 3 8 4 4   CHLORIDE mmol/L 100 102   CO2 mmol/L 28 28   BUN mg/dL 40* 39*   CREATININE mg/dL 1 50* 1 54*   GLUCOSE RANDOM mg/dL 106 114   CALCIUM mg/dL 9 7 9 2                  Glucose (mg/dL)   Date Value   05/03/2018 106   04/30/2018 114   04/29/2018 103   04/28/2018 88   12/23/2015 114 (H)   02/22/2015 101   02/21/2015 103   02/20/2015 116     Glucose, i-STAT (mg/dl)   Date Value   04/23/2018 106   04/23/2018 96       Labs reviewed    Physical Examination:  Vitals:   Vitals:    05/03/18 2200 05/04/18 0535 05/04/18 0600 05/04/18 0743   BP: 134/74 148/68  127/60   BP Location:  Right arm  Right arm   Pulse: 89 66  73   Resp:  18     Temp:  98 1 °F (36 7 °C)     TempSrc:  Oral     SpO2:  97%     Weight:   68 2 kg (150 lb 5 7 oz)    Height:           GEN: NAD  HEENT: NC/AT  RESP: BBS w/o crackles/wheeze/rhonci; resp unlabored  CV: +S1 S2, regular rate, no rubs/murmurs  ABD: soft, NT, ND, normal BS   : no schaefer  EXT: no edema  Skin: no rashes  Neuro: AAO; WO[place]ARD 5/5 but difficult to get her to coordinate UE strength testing 2/2 some involuntary movements with left arm; face symmetric; speech clear  [x ] Total time spent: 30 Mins and greater than 50% of this time was spent counseling/coordinating care        Yohana Castillo, [facility]  Internal Medicine

## 2018-05-04 NOTE — SOCIAL WORK
Received phone call from pts dtr China Celis who provided choices of [address], [facility] and [facility] as options for pts contd therapy when dc occurs  Following for referral process as soon as next week

## 2018-05-04 NOTE — PROGRESS NOTES
05/04/18 0930   Pain Assessment   Pain Assessment No/denies pain   Pain Score No Pain   Restrictions/Precautions   Precautions Aspiration; Aphasia;Bed/chair alarms; Fall Risk;Cognitive   QI: Oral Hygiene   Assistance Needed Incidental touching   Oral Hygiene CARE Score 4   Grooming   Able To Comb/Brush Hair;Wash/Dry Hands; Wash/Dry Face;Brush/Clean Teeth   Findings Pt requires multiple tactile cues to initiate and complete tasks  pt demonstrates perseveration during ADLs tasks, requiring tactile cues for completion of tasks  able to comb L side of hair but unable to reach rear  Grooming (FIM) 3 - Patient completed 2/4  tasks   QI: Shower/Bathe Self   Assistance Needed Physical assistance   Assistance Provided by [place] 25%-49%   Shower/Bathe Self CARE Score 3   Bathing   Assessed Bath Style Shower   Anticipated D/C Bath Style Tub   Able to Gather/Transport No   Able to [employer] Temperature No   Able to Wash/Rinse/Dry (body part) Left Arm;Right Arm;L Upper Leg;R Upper Leg;Chest;Abdomen;Perineal Area; Buttocks   Limitations Noted in Balance; Coordination;Problem Solving; Sequencing   Findings  Pt starts bathing body and hair without water running, does not sequence to use soap or shampoo  Pt does take abnormal amount of shampoo  Pt demonstrates perseveration during bathing tasks  washing body parts as much as 6 times  Pt require tactile prompting to terminate hair washing, and would revert to hair washing at times  Pt reuqires tactile cues for safe hand placement during standing tasks  Pt would be holding on shower head cord and temperature knob  Pt is incontinent of bowel in shower and does not report  assist for management of LE and feet  Bathing (FIM) 3 - Patient completes 5/10  6/10 or 7/10 parts   Tub/Shower Transfer   Limitations Noted In Balance; Endurance;Problem Solving; Sequencing   Adaptive Equipment Transfer Bench;Grab Bars   Shower Transfer (FIM) 2 - [place] needs to lift or boost to rise AND assist to sit   QI: Upper Body Dressing   Assistance Needed Physical assistance   Assistance Provided by [place] 75% or more   Upper Body Dressing CARE Score 2   QI: Lower Body Dressing   Assistance Needed Physical assistance   Assistance Provided by [place] 75% or more   Lower Body Dressing CARE Score 2   QI: Putting On/Taking Off Footwear   Assistance Needed Physical assistance   Assistance Provided by [place] Total assistance   Putting On/Taking Off Footwear CARE Score 1   Dressing/Undressing Clothing   Remove UB Clothes (gown)   Remove LB Clothes Undergarment;Socks   Don UB Clothes Pullover Shirt;Jacket   Don LB Clothes Pants; Undergarment;Socks; Shoes   Limitations Noted In Balance   Findings Pt demonstrates odd perseverative behavior at times with clothing items  pt would shake clothing items in the air repeatedly  Pt able to thread R UE, require tactile placement of L UE in shirt hole after unsuccessful VC's  Pt then able to bring overhead but requires assist for donning over trunk  Pt attempts to thread L LE into pants but is unable to complete task any further  requires assist to thread and bring to knees  MIN A in stance pt is bale to don pants over hips with VC's   UB Dressing (FIM) 2 - Patient completes 25-49% of all tasks   LB Dressing (FIM) 1 - Patient completes less than 25% of all tasks   QI: Sit to Stand   Assistance Needed Physical assistance   Assistance Provided by [place] 25%-49%   Sit to Stand CARE Score 3   QI: Chair/Bed-to-Chair Transfer   Assistance Needed Physical assistance   Assistance Provided by [place] 50%-74%   Chair/Bed-to-Chair Transfer CARE Score 2   Transfer Bed/Chair/Wheelchair   Limitations Noted In Balance; Coordination; Sequencing;Problem Solving   Adaptive Equipment None   Sit to Stand Minimal   Stand to Sit Minimal   Bed, Chair, Wheelchair Transfer (FIM) 2 - [place] needs to lift or boost to rise AND assist to sit   QI: [address] Needed Physical assistance   Assistance Provided by [place] Total assistance   Toileting Hygiene CARE Score 1   Toileting   Toileting (FIM) 1 - Patient completes less than 25% of all tasks   Cognition   Overall Cognitive Status Impaired   Arousal/Participation Cooperative   Attention Attends with cues to redirect   Orientation Level Oriented to person   Memory Decreased short term memory;Decreased recall of recent events;Decreased recall of biographical information;Decreased recall of precautions   Following Commands Follows one step commands inconsistently   Assessment   Treatment Assessment Pt participated in skilled OT session focusing on ADL retraining, activity tolerance, activity modification, and functional transfers  See above for details on ADL function  Pt continues to be limited by L Neglect, decreased L side attention and Vision?, decreased safety awareness, impaired problem solving, ataxia, ideomotor apraxia, Decreased motor planning, increased time for processing, impaired balance  Pt continues to require skilled OT services to increase overall functional independence  and safety w/ ADLs and functional transfers  Prognosis Fair   Problem List Decreased endurance; Impaired balance;Decreased mobility; Decreased coordination;Decreased cognition; Impaired judgement;Decreased safety awareness; Impaired vision   Plan   Treatment/Interventions ADL retraining;Functional transfer training;LE strengthening/ROM; Therapeutic exercise; Endurance training;Cognitive reorientation;Patient/family training;Equipment eval/education; Bed mobility; Compensatory technique education   Progress Slow progress, cognitive deficits   Recommendation   OT Discharge Recommendation 24 hour supervision/assist   OT Therapy Minutes   OT Time In 0930   OT Time Out 1100   OT Total Time (minutes) 90   OT Mode of treatment - Individual (minutes) 90   OT Mode of treatment - Concurrent (minutes) 0   OT Mode of treatment - Group (minutes) 0   OT Mode of treatment - Co-treat (minutes) 0 OT Mode of Teatment - Total time(minutes) 90 minutes

## 2018-05-04 NOTE — PROGRESS NOTES
Physical Therapy Progress Note     05/04/18 1500   Pain Assessment   Pain Assessment FLACC   Pain Rating: FLACC (Rest) - Face 0   Pain Rating: FLACC (Rest) - Legs 0   Pain Rating: FLACC (Rest) - Activity 0   Pain Rating: FLACC (Rest) - Cry 0   Pain Rating: FLACC (Rest) - Consolability 0   Score: FLACC (Rest) 0   Pain Rating: FLACC (Activity) - Face 0   Pain Rating: FLACC (Activity) - Legs 0   Pain Rating: FLACC (Activity) - Activity 0   Pain Rating: FLACC (Activity) - Cry 0   Pain Rating: FLACC (Activity) - Consolability 0   Score: FLACC (Activity) 0   Restrictions/Precautions   Precautions Aspiration;Bed/chair alarms;Cognitive; Fall Risk;Supervision on toilet/commode   Cognition   Overall Cognitive Status Impaired   Arousal/Participation Lethargic;Persistent stimuli required   Attention Difficulty attending to directions   Orientation Level Oriented to person   Memory Decreased recall of recent events;Decreased short term memory;Decreased recall of precautions;Decreased long term memory   Following Commands Follows one step commands inconsistently   Subjective   Subjective pt not awake or alert enough to answer questions; (see FLACC); no c/o   Bed Mobility   Able to Roll Left to Right;Right to Left;Scoot Up   Findings Nidia   Transfer Bed/Chair/Wheelchair   Limitations Noted In LE Strength;Balance; Coordination; Endurance;Problem Solving;Sensation;UE Strength   Adaptive Equipment Hand Hold   Sit Pivot Minimal Assist   Stand Pivot Minimal Assist   Sit to Stand Minimal Assist   Stand to Sit Minimal Assist   Supine to Sit Minimal Assist   Sit to Supine Minimal Assist   Car Transfer Minimal Assist   Findings HHA R UE   Bed, Chair, Wheelchair Transfer (FIM) 4 - Patient completes 75% of all tasks   Ambulation   Findings not amb this session; Wheelchair mobility   QI: Does the patient use a wheelchair? 0   No   Wheelchair (FIM) 0 - Activity does not occur   Stairs   Findings NT   Toilet Transfer   Toilet Transfer (FIM) 1 - Patient requires assist of two people   Therapeutic Interventions   Flexibility PROM B LE (all planes)    Neuromuscular Re-Education passive PNF D1 to L/R UE   Assessment   Treatment Assessment Pt supine in bed throughout session; unable to awake pt effectively through stimuli (noxious or otherwise); treated with passive ROM to B UE and B LE (as above) x30 minutes while discussing pt's status and d/c planning with pt's daughter present for session; finished session with pt supine in bed with alarms active and all needs in reach; recommend cont PT POC;    Problem List Decreased strength;Decreased endurance; Impaired balance;Decreased mobility; Decreased cognition;Decreased coordination; Impaired judgement; Impaired vision;Decreased safety awareness; Impaired sensation   Barriers to Discharge Inaccessible home environment;Decreased caregiver support   PT Barriers   Physical Impairment Decreased endurance;Decreased coordination;Decreased mobility; Impaired balance;Decreased safety awareness; Impaired vision   Functional Limitation Transfers; Walking;Car transfers;Stair negotiation   Plan   Treatment/Interventions ADL retraining;Functional transfer training;LE strengthening/ROM; Elevations; Therapeutic exercise; Endurance training;Cognitive reorientation;Patient/family training;Equipment eval/education; Bed mobility;Gait training   Progress Progressing toward goals   Recommendation   Recommendation Home PT   Equipment Recommended (LRAD)   PT Therapy Minutes   PT Time In 1500   PT Time Out 1530   PT Total Time (minutes) 30   PT Mode of treatment - Individual (minutes) 30   PT Mode of treatment - Concurrent (minutes) 0   PT Mode of treatment - Group (minutes) 0   PT Mode of treatment - Co-treat (minutes) 0   PT Mode of Teatment - Total time(minutes) 30 minutes   Therapy Time missed   Time missed?  Kelli Rapp, PTA

## 2018-05-04 NOTE — PROGRESS NOTES
05/04/18 0800   Pain Assessment   Pain Assessment No/denies pain   Restrictions/Precautions   Precautions Aspiration;Bed/chair alarms;Cognitive; Fall Risk;Supervision on toilet/commode;Visual deficit   Swallow Information   Current Risks for Dysphagia & Aspiration General debilitation;New Neuro event;Brain injury;Cognitive deficit   Current Symptoms/Concerns Cough; Difficulty chewing;During meals;Pockets food   Current Diet Dysphagia advance; Thin liquid   Baseline Diet Dyphagia advanced;Regular; Thin liquids   Consistencies Assessed and Performance   Materials Admnistered Soft/Level 3;Regular/Solid; Thin liquid   Oral Stage Mild impaired; Moderate impaired   Phargngeal Stage Mild impaired;Aspiration risk   Swallow Mechanics Mild delayed; Appears prompt;Aspiration risk   Recommendations   Risk for Aspiration Mild   Diet Solid Recommendation Level 3 Dysphagia/ advanced/ soft to chew   Diet Liquid Recommendation Thin liquid   Recommended Form of Meds As tolerated   General Precautions Aspiration precautions; Feed only when alert;Minimize distractions;Upright as possible for all oral intake;Remain upright for 45 mins after meals; Other (Comment)  (OOB for meal )   Compensatory Swallowing Strategies Place food/straw in on left side; Alternate solids and liquids;Voluntary throat clear/cough to clear penetration   QI: Eating   Assistance Needed Set-up / [address] [place] [address] Provided by [place] No physical assistance   Eating CARE Score 5   Swallow Assessment   Swallow Treatment Assessment Pt observed w/ breakfast  Trialed regular diet w/ thin liquids during meal  She was setup w/ the tray and able to feed self  Consumed 75% of meal and 180cc of thins by cup  Slower, more labored mastication of solids observed ([place], fresh fruit cup) leading to mild-moderate R sided oral pocketing, which pt did clear given liquid wash or increased time   Some improvement noted w/ mastication time w/ soft solids ([place]ami toast), but continued w/ mild R sided pocketing  Suspect decreased bolus control w/ ambient fluid of fresh fruit, where pt elicited cough x2  Bolus control/transfer of thins by cup was [facility]  Swallow initiation was mildly delayed w/ soft/solids but prompter w/ thins  Hyolaryngeal elevation mildly decreased  No other signs/sxs of aspiration noted w/ meal  continue on level 3/thin liquids and continue solid trials w/ SLP supervision only  Swallow Assessment Prognosis   Prognosis Good   Prognosis Considerations Co-morbidities; Medical prognosis; New learning ability;Ability to carry over   SLP Therapy Minutes   SLP Time In 0800   SLP Time Out 0845   SLP Total Time (minutes) 45   SLP Mode of treatment - Individual (minutes) 0   SLP Mode of treatment - Concurrent (minutes) 0   SLP Mode of treatment - Group (minutes) 45   SLP Mode of treatment - Co-treat (minutes) 0   SLP Mode of Teatment - Total time(minutes) 45 minutes   Therapy Time missed   Time missed?  No   Daily FIM Score   Eating (FIM) 5 - Patient needs help to open contianers or set up tray

## 2018-05-05 PROCEDURE — 97530 THERAPEUTIC ACTIVITIES: CPT

## 2018-05-05 PROCEDURE — 97112 NEUROMUSCULAR REEDUCATION: CPT

## 2018-05-05 PROCEDURE — 97110 THERAPEUTIC EXERCISES: CPT

## 2018-05-05 PROCEDURE — 92507 TX SP LANG VOICE COMM INDIV: CPT

## 2018-05-05 PROCEDURE — 92526 ORAL FUNCTION THERAPY: CPT

## 2018-05-05 RX ADMIN — METOPROLOL TARTRATE 50 MG: 50 TABLET ORAL at 21:29

## 2018-05-05 RX ADMIN — Medication 150 MG: at 08:36

## 2018-05-05 RX ADMIN — DOCUSATE SODIUM 100 MG: 100 CAPSULE, LIQUID FILLED ORAL at 08:36

## 2018-05-05 RX ADMIN — POTASSIUM CHLORIDE 10 MEQ: 750 TABLET, EXTENDED RELEASE ORAL at 17:07

## 2018-05-05 RX ADMIN — DOCUSATE SODIUM 100 MG: 100 CAPSULE, LIQUID FILLED ORAL at 17:07

## 2018-05-05 RX ADMIN — PANTOPRAZOLE SODIUM 40 MG: 40 TABLET, DELAYED RELEASE ORAL at 05:00

## 2018-05-05 RX ADMIN — TORSEMIDE 10 MG: 20 TABLET ORAL at 08:35

## 2018-05-05 RX ADMIN — ATORVASTATIN CALCIUM 80 MG: 40 TABLET, FILM COATED ORAL at 17:07

## 2018-05-05 RX ADMIN — MELATONIN TAB 3 MG 3 MG: 3 TAB at 21:29

## 2018-05-05 RX ADMIN — POTASSIUM CHLORIDE 10 MEQ: 750 TABLET, EXTENDED RELEASE ORAL at 08:35

## 2018-05-05 RX ADMIN — APIXABAN 2.5 MG: 2.5 TABLET, FILM COATED ORAL at 08:36

## 2018-05-05 RX ADMIN — APIXABAN 2.5 MG: 2.5 TABLET, FILM COATED ORAL at 17:07

## 2018-05-05 RX ADMIN — SENNOSIDES 8.6 MG: 8.6 TABLET, FILM COATED ORAL at 21:29

## 2018-05-05 RX ADMIN — METOPROLOL TARTRATE 50 MG: 50 TABLET ORAL at 08:36

## 2018-05-05 NOTE — PROGRESS NOTES
05/05/18 0829   Pain Assessment   Pain Assessment No/denies pain   Pain Score No Pain   Restrictions/Precautions   Precautions Fall Risk;Bed/chair alarms;Cognitive   Memory Skills   Memory (FIM) 2 - Recognizes, recalls/performs 25-49%   Social Interaction (FIM) 3 - Interacts 50-74% of time   Speech/Language/Cognition Assessmetn   Treatment Assessment Pt seen for skilled SLP co-treat session with OT  Pt was alert and interactive  Pt completed number ID utilizing both Marshallese and english commands with 6/8 acc  Pt then was able to place number in appropriate frame with 5/8 acc increasing with verbal and repetition cues  Noted some fluctuating problem solving when placing the number in frame  Pt completed similar task with letters- pt able to ID letters provide english cue c 5/6 acc  Noted pt impulsive and not following directions requiring repetition  Pt then noted with increased difficulty IDing frame to place letter in and decreased problem solving but benefits from verbal and direct modeling cues  Pt is improving with skilled SLP services and will cont to benefit to maxmize speech/language abilities at this time  Swallow Information   Current Risks for Dysphagia & Aspiration General debilitation;New Neuro event;Brain injury;Cognitive deficit;HX neurologic dx   Current Symptoms/Concerns Difficulty chewing;Holding food in mouth   Current Diet Dysphagia advance; Thin liquid   Baseline Diet Regular; Thin liquids   Consistencies Assessed and Performance   Materials Admnistered Mechanical Soft/Level 2;Soft/Level 3; Thin liquid   Oral Stage Mild impaired; Moderate impaired   Phargngeal Stage Mild impaired   Swallow Mechanics Mild delayed;Swallow initation; Appears prompt;Aspiration risk   Recommendations   Risk for Aspiration Mild   Diet Solid Recommendation Level 3 Dysphagia/ advanced/ soft to chew   Diet Liquid Recommendation Thin liquid   Recommended Form of Meds Whole;Crushed; With puree   General Precautions Aspiration precautions; Feed only when alert;Minimize distractions;Upright as possible for all oral intake;Remain upright for 45 mins after meals   Compensatory Swallowing Strategies Alternate solids and liquids;Cue for lingual sweep; External pacing   Results Reviewed with PT/Family/Caregiver   QI: Eating   Assistance Needed Set-up / clean-up;Supervision   Assistance Provided by [place] No physical assistance   Eating CARE Score 4   Swallow Assessment   Swallow Treatment Assessment Pt seen for skilled SLP dysphagia session  Pt seen OOB upright in chair in room  Nursing present and providing meds whole in applesauce to pt  Pt noted to be chewing medication, delayed transfers- requiring verbal cues and encouragement to swallow and not chew/pocket medication  Rec crushing medications if approprate  Pt initially refusing all food items on breakfast tray- retrieved other items she said she would like and pt still refused  Pt provided banana to eat and she took it, ate by bite c good bolus retrieval, oral control, slow mastication but functional transfers and initiation of swallows  Pt ate entire banana but refused other items even when provided assistance c initiation  Pt drank thin liquids by cup and straw c good bolus retrieval, oral control, timely transfers and swallows  No overt s/s of aspriation noted  Rec cont level 3 diet as tolerated and trial upgraded items as appropriate as able  Pt required encouragement this session for any food intake- cont to monitor  Swallow Assessment Prognosis   Prognosis Good   Prognosis Considerations Age; Co-morbidities; Medical diagnosis; Medical prognosis;Participation level;Previous level of function;Severity of impairments;New learning ability;Ability to carry over; Cooperation   SLP Therapy Minutes   SLP Time In 0830   SLP Time Out 0930   SLP Total Time (minutes) 60   SLP Mode of treatment - Individual (minutes) 30   SLP Mode of treatment - Concurrent (minutes) 0   SLP Mode of treatment - Group (minutes) 0   SLP Mode of treatment - Co-treat (minutes) 30   SLP Mode of Teatment - Total time(minutes) 60 minutes   Therapy Time missed   Time missed?  No   Daily FIM Score   Problem solving (FIM) 2 - Solves basic problems 25-49% of time   Comprehension (FIM) 3 - Understands basic info/conversation 50-74% of time   Expression (FIM) 3 - Expresses basic info/needs 50-74% of time   Eating (FIM) 5 - Patient requires supervision, cueing or coaxing

## 2018-05-05 NOTE — PROGRESS NOTES
05/05/18 1400   Pain Assessment   Pain Assessment No/denies pain   Restrictions/Precautions   Precautions Bed/chair alarms;Cognitive; Fall Risk   Cognition   Arousal/Participation Cooperative   Subjective   Subjective pt reported feeling okay and laid down at end of session    QI: Sit to [address] to Lying CARE Score 4   QI: Lying to Sitting on Side of Bed   Assistance Needed Supervision   Lying to Sitting on Side of Bed CARE Score 4   QI: Sit to Stand   Assistance Needed Physical assistance   Assistance Provided by [place] Less than 25%   Sit to Stand CARE Score 3   QI: Chair/Bed-to-Chair Transfer   Assistance Needed Physical assistance   Assistance Provided by [place] Less than 25%   Chair/Bed-to-Chair Transfer CARE Score 3   Transfer Bed/Chair/Wheelchair   Limitations Noted In Balance; Endurance;LE Strength   Stand Pivot Minimal Assist   Sit to Stand Minimal Assist   Stand to Sit Minimal Assist   Supine to Sit Supervision   Sit to Supine Supervision   Findings [place] for initiation purposes   Bed, Chair, Wheelchair Transfer (FIM) 2 - [place] needs to lift or boost to rise AND assist to sit   QI: Car Transfer   Assistance Needed Physical assistance   Assistance Provided by [place] Less than 25%   Comment VC   Car Transfer CARE Score 3   QI: Walk 10 Feet   Assistance Needed Physical assistance   Assistance Provided by [place] Less than 25%   Walk 10 Feet CARE Score 3   QI: Walk 50 Feet with Two Turns   Assistance Needed Physical assistance   Assistance Provided by [place] Less than 25%   Walk 50 Feet with Two Turns CARE Score 3   Ambulation   Does the patient walk? 2  Yes   Primary Discharge Mode of Locomotion Walk   Walk Assist Level Minimum Assist   Gait Pattern Inconsistant Cathryn; Slow Cathryn;Decreased foot clearance; Improper weight shift;Decreased L stance;Decreased R stance; Step through; Step to;Narrow [place]   Assist Device Hand Hold   Distance Walked (feet) 75 ft  (x2)   Limitations Noted In Balance; Endurance; Heel Strike;Swing;Strength;Speed   Findings daughter practiced HHA as well   Walking (FIM) 2 - Patient ambulates between 50 - 149 feet regardless of assist/device/set up   QI: 1 Step (Curb)   Assistance Needed Physical assistance   Assistance Provided by [place] Total assistance   Comment ModA x2   1 Step (Curb) CARE Score 1   QI: 4 Steps   Assistance Needed Supervision; Adaptive equipment   4 Steps CARE Score 4   Stairs   Type Stairs;Curb   # of Steps 4   Assist Devices Single Rail;Hand Hold   Findings HHA x2 at [facility] for 8" curb step and  for 4 of 6"  steps   Stairs (FIM) 2 - Patient goes up and down 4 - 11 stairs regardless of assist/device/setup   Assessment   Treatment Assessment Pt cont to present with fluctuations in alertness/arousal, needing more hands on /TC for sequencing and initiation of tasks this afternoon  Pt cont to be able to physically peform tasks, however at times needs [place] to get the task started/initiated (such as lifting RLE into the car) however after some help for initiation pt can physically perform tasks, such as she was able to perform  steps at S level with HR  Pt's daughter reported feeling happy at her mom's progress and reported that pt would take a lot of time to clean the sink/faucet/counter after washing her hands at home and that this is not new  She reported that family cycles through and Patricia Kraft is never home alone  Discussed help needed for functional mobility tasks, such as HHA or guiding at hips for directions and Jamarcus Palacios demonstrated good understanding of how to A pt  Pt will cont to benefit from skilled PT to further progress functional mobility  Cont to recommend 247S at home  Family/Caregiver Present daughter Jamarcus Palacios   PT Family training done with: Jamarcus Palacios for transfers and HHA for walking  She was present to see stairs, curb step and car transfer  PT Barriers   Physical Impairment Decreased strength;Decreased endurance; Impaired balance;Decreased mobility; Decreased coordination;Decreased cognition; Impaired judgement;Decreased safety awareness   Functional Limitation Car transfers; Ramp negotiation;Stair negotiation;Standing;Transfers; Walking   Plan   Treatment/Interventions Functional transfer training;LE strengthening/ROM; Elevations; Therapeutic exercise; Endurance training;Cognitive reorientation;Patient/family training;Equipment eval/education; Bed mobility;Gait training   Progress Slow progress, cognitive deficits   Recommendation   Recommendation 24 hour supervision/assist;Outpatient PT; Home with family support;Home PT   PT Therapy Minutes   PT Time In 1400   PT Time Out 1430   PT Total Time (minutes) 30   PT Mode of treatment - Individual (minutes) 0   PT Mode of treatment - Concurrent (minutes) 30   PT Mode of treatment - Group (minutes) 0   PT Mode of treatment - Co-treat (minutes) 0   PT Mode of Teatment - Total time(minutes) 30 minutes   Therapy Time missed   Time missed?  No

## 2018-05-05 NOTE — PROGRESS NOTES
05/05/18 0930   Pain Assessment   Pain Assessment No/denies pain   Restrictions/Precautions   Precautions Fall Risk;Bed/chair alarms;Cognitive   Cognition   Arousal/Participation Cooperative   Subjective   Subjective pt reports feeling okay and willing to do therapy    QI: Sit to Stand   Assistance Needed Supervision; Adaptive equipment  (arm rests )   Sit to Stand CARE Score 4   QI: Chair/Bed-to-Chair Transfer   Assistance Needed Physical assistance   Assistance Provided by [place] Less than 25%   Chair/Bed-to-Chair Transfer CARE Score 3   Transfer Bed/Chair/Wheelchair   Limitations Noted In Balance; Endurance;LE Strength   Stand Pivot Minimal Assist   Sit to Stand Supervision   Stand to Sit Supervision   Bed, Chair, Wheelchair Transfer (FIM) 4 - Patient requires steadying assist or light touching   QI: Walk 10 Feet   Assistance Needed Incidental touching;Physical assistance   Assistance Provided by [place] Less than 25%   Walk 10 Feet CARE Score 3   QI: Walk 50 Feet with Two Turns   Assistance Needed Incidental touching;Physical assistance   Assistance Provided by [place] Less than 25%   Walk 50 Feet with Two Turns CARE Score 3   Ambulation   Does the patient walk? 2  Yes   Primary Discharge Mode of Locomotion Walk   Walk Assist Level Minimum Assist;Contact Guard   Gait Pattern Inconsistant Cathryn; Slow Cathryn;Decreased foot clearance; Improper weight shift;Decreased L stance;Decreased R stance; Step to; Step through;Narrow [place]   Assist Device Hand Hold   Distance Walked (feet) 70 ft  (x2)   Limitations Noted In Balance; Endurance; Heel Strike;Swing;Strength;Speed   Findings also repeated short distances from the sink <>window sill in PT gym (about 15-20' at a time) x8, pt stood for first half hour of session to complete task   Walking (FIM) 2 - Patient ambulates between 50 - 149 feet regardless of assist/device/set up   Therapeutic Interventions   Flexibility stretching bilat calves 2x60 sec each   Other first part of session focused on co-treat with OT; OT focusing on sequencing, following directions and commands and PT focus on standing balance, ambulatory balance and walking while carrying items  Pt's family has brought in her plants for her garden; task was to take each plant, 1-2 at a time, from the window sill in the PT gym to the sink to water them and to place them in the carrier  Equipment Use   NuStep 15 min level 0 BUE, slow renate on her own, use of AAROM to help increase renate    Assessment   Treatment Assessment Pt cont to present with gradual improvement in regards to activity tolernace and BLE strength and ambulatory balance, as she was able to stand and walk repeated short distances during co-treat with OT, needing A for walking more for directions/sequencing than for balance support and stability  Pt presents w/feeling somewhat unstable when walking, as she reached out for hallway rail for support although she is not losing her balance  Pt cont to need repeated instructions, directions, TC/VC for all sequencing due to deficits in spatial awareness, ability to follow directions and problem solve, as well as with memory and sequencing of tasks  She did demonstrate the ability to stand at the sink and wash her hands with very minimal cuing, however cont to perseverate on some tasks at times, needing [place] to guide her away to the next task  Cont to recommend 247S at home due to cognitive deficits  PT Barriers   Physical Impairment Decreased strength;Decreased endurance; Impaired balance;Decreased mobility; Decreased cognition; Impaired judgement;Decreased safety awareness   Functional Limitation Car transfers; Ramp negotiation;Stair negotiation;Standing;Transfers; Walking   Plan   Treatment/Interventions Functional transfer training;LE strengthening/ROM; Elevations; Therapeutic exercise; Endurance training;Cognitive reorientation;Patient/family training;Equipment eval/education; Bed mobility;Gait training   Progress Slow progress, cognitive deficits   Recommendation   Recommendation 24 hour supervision/assist;Outpatient PT; Home with family support   PT Therapy Minutes   PT Time In 0930   PT Time Out 1040   PT Total Time (minutes) 70   PT Mode of treatment - Individual (minutes) 0   PT Mode of treatment - Concurrent (minutes) 40   PT Mode of treatment - Group (minutes) 0   PT Mode of treatment - Co-treat (minutes) 30   PT Mode of Teatment - Total time(minutes) 70 minutes   Therapy Time missed   Time missed?  No

## 2018-05-05 NOTE — PROGRESS NOTES
Internal Medicine Progress Note  Patient: Marco Antonio Beckett  Age/sex: 80 y o  female  Medical Record #: [id number]      ASSESSMENT/PLAN:  Marco Antonio Beckett is seen and examined and management for following issues:    1  Lt MCA CVA s/p mechanical thrombectomy: Continue Eliquis and atorvastatin for secondary prevention  [facility] Neurology follow-up  2   HTN: stable; continue Lopressor 50mg every 12 hours  3   CHADWICK/CKD stage III; baseline Cr 1 5: stable; will monitor  On admit to hospital 4/18 had creat 2 0 and Torsemide was held/fluids given  4   Chronic systolic CHF/LVEF 64%; moderate TR; bio MVR normal function:  Continue torsemide 10mg daily/Kdur 10 meq BID  Chest is clear/no edema  5  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): Eliquis 2 5mg BID 2/2 age and renal status (switched from Coumadin in the hospital so NOAC is new)  6   DM type 2: On linagliptan 2 5mg at home  It was planned for pt to change to Januvia 100mg daily as an OP per the daughter  Will start Januvia when needed but so far BS controlled w/o tx = will stop Accuchecks for now and watch FBSs   Continue DM diet  Fasting 5/3/18 was 106    7  Acute blood loss anemia: s/p transfusion after EBL from thrombectomy of 500-1000ml from femoral site  Stable  8   PFO:  Small  Seen on LORI  [facility] Cardiology follow-up  9   History of colon CA: Colonic lesion seen on CT  Will need GI follow-up    10  Hx PPM    11  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      Subjective: Patient seen and examined   Offers no complaints    ROS:   GI: denies abdominal pain, change bowel habits or reflux symptoms  Neuro: No new neurologic changes  Respiratory: No Cough, SOB  Cardiovascular: No CP, palpitations     Scheduled Meds:    Current Facility-Administered Medications:  acetaminophen 650 mg Oral Q6H PRN Veronica Langston MD   apixaban 2 5 mg Oral BID Veronica Langston MD   atorvastatin 80 mg Oral Daily With Leann Fuentes MD   bisacodyl 10 mg Rectal Daily PRN Tobias Marquise Vila MD   docusate sodium 100 mg Oral BID Zane Class, MD   iron polysaccharides 150 mg Oral Daily Zane Class, MD   melatonin 3 mg Oral HS Zane Class, MD   metoprolol tartrate 50 mg Oral Q12H Keiko Villafuerte MD   neomycin-bacitracin-polymyxin b 1 small application Topical BID GAYATRI Lubin   pantoprazole 40 mg Oral Early Morning Zane Class, MD   polyethylene glycol 17 g Oral Daily PRN Zane Class, MD   potassium chloride 10 mEq Oral BID Zane Class, MD   senna 1 tablet Oral HS Zane Class, MD   torsemide 10 mg Oral Daily Zane Class, MD       Labs:       Results from last 7 days  Lab Units 05/03/18  0659 04/30/18  0456   WBC Thousand/uL 5 01 5 79   HEMOGLOBIN g/dL 9 3* 9 2*   HEMATOCRIT % 28 5* 29 3*   PLATELETS Thousands/uL 162 174       Results from last 7 days  Lab Units 05/03/18  0724 04/30/18  0456   SODIUM mmol/L 136 137   POTASSIUM mmol/L 3 8 4 4   CHLORIDE mmol/L 100 102   CO2 mmol/L 28 28   BUN mg/dL 40* 39*   CREATININE mg/dL 1 50* 1 54*   GLUCOSE RANDOM mg/dL 106 114   CALCIUM mg/dL 9 7 9 2                  Glucose (mg/dL)   Date Value   05/03/2018 106   04/30/2018 114   04/29/2018 103   04/28/2018 88   12/23/2015 114 (H)   02/22/2015 101   02/21/2015 103   02/20/2015 116     Glucose, i-STAT (mg/dl)   Date Value   04/23/2018 106   04/23/2018 96       Labs reviewed    Physical Examination:  Vitals:   Vitals:    05/04/18 2010 05/04/18 2158 05/05/18 0448 05/05/18 0546   BP:  112/54 142/68    BP Location:  Right arm Right arm    Pulse: 75 76 79    Resp: 18  18    Temp: 98 °F (36 7 °C)  98 3 °F (36 8 °C)    TempSrc: Oral  Oral    SpO2:   98%    Weight:   68 5 kg (151 lb 0 2 oz) 68 5 kg (151 lb 0 2 oz)   Height:           GEN: NAD  HEENT: NC/AT  RESP: BBS w/o crackles/wheeze/rhonci; resp unlabored  CV: +S1 S2, regular rate, no rubs/murmurs  ABD: soft, NT, ND, normal BS   : no schaefer  EXT: no edema  Skin: no rashes  Neuro: AAO; [place] 5/5 but difficult to get her to coordinate UE strength testing 2/2 some involuntary movements with left arm; face symmetric; speech clear  [x ] Total time spent: 30 Mins and greater than 50% of this time was spent counseling/coordinating care        Timmy Bhardwaj, Porter Patel   Internal Medicine

## 2018-05-05 NOTE — PROGRESS NOTES
05/05/18 0901   Pain Assessment   Pain Assessment No/denies pain   Pain Score No Pain   Transfer Bed/Chair/Wheelchair   Adaptive Equipment None   Sit to Stand Minimal   Stand to Sit Supervision   Bed, Chair, Wheelchair Transfer (FIM) 4 - Patient requires steadying assist or light touching   Functional Standing Tolerance   Time 4 minutes   Activity table top foam number puzzle construction    Cognition   Overall Cognitive Status Impaired   Arousal/Participation Alert; Cooperative   Attention Difficulty attending to directions   Orientation Level Oriented to person   Memory Decreased short term memory;Decreased recall of recent events   Following Commands Follows one step commands inconsistently   Activity Tolerance   Activity Tolerance Patient tolerated treatment well   Assessment   Treatment Assessment Pt participates in skilled OT session focusing on L side attention, B/L UE use, task initiation/termination  Pt engages in 30 MIN ST co-tx w/ OT focus on standing balance, L UE motor control, L side visual attention  Pt require verbal and tactile cues to incorporate L UE to complete puzzle  L UE noted to be "restless" until engaged in activity  Pt is noted to require cues to attend to items on L  30 MIN PT co-tx session w/ OT focus again on L UE coordination/attention, L side awareness, engagement in leisure activity  Pt engages in preferred activity for watering small vegetable plants in room  Pt was to transport plants from window to sink to water  Pt requires VC's for sequencing tasks w/ water controls  Pt demo difficulty utilizing B/L UE together to manage items for water plants, for example, Pt continuing to try and reach w/ L UE hand over plants to reach R side water control rather than hold cup in L hand and turn water on w/ R UE  Pt reuqires cues for direction to window sill after repeating multiple times  Pt demonstrates decreased ability to turn L, rather than turn right 360 degrees   Pt continues to require skilled OT services to increase overall functional independence  and safety w/ ADLs and functional transfers  Problem List Decreased strength;Decreased endurance; Impaired balance;Decreased mobility; Decreased coordination;Decreased cognition; Impaired judgement;Decreased safety awareness   Plan   Treatment/Interventions ADL retraining;Functional transfer training; Therapeutic exercise; Endurance training;Patient/family training;Cognitive reorientation;Equipment eval/education   Progress Slow progress, cognitive deficits   OT Therapy Minutes   OT Time In 0900   OT Time Out 1000   OT Total Time (minutes) 60   OT Mode of treatment - Individual (minutes) 0   OT Mode of treatment - Concurrent (minutes) 0   OT Mode of treatment - Group (minutes) 0   OT Mode of treatment - Co-treat (minutes) 60   OT Mode of Teatment - Total time(minutes) 60 minutes

## 2018-05-06 PROCEDURE — 97112 NEUROMUSCULAR REEDUCATION: CPT

## 2018-05-06 PROCEDURE — 97530 THERAPEUTIC ACTIVITIES: CPT

## 2018-05-06 RX ORDER — FERROUS SULFATE 300 MG/5ML
300 LIQUID (ML) ORAL DAILY
Status: DISCONTINUED | OUTPATIENT
Start: 2018-05-07 | End: 2018-05-19 | Stop reason: HOSPADM

## 2018-05-06 RX ADMIN — TORSEMIDE 10 MG: 20 TABLET ORAL at 08:07

## 2018-05-06 RX ADMIN — METOPROLOL TARTRATE 50 MG: 50 TABLET ORAL at 08:07

## 2018-05-06 RX ADMIN — POTASSIUM CHLORIDE 10 MEQ: 750 TABLET, EXTENDED RELEASE ORAL at 16:26

## 2018-05-06 RX ADMIN — POTASSIUM CHLORIDE 10 MEQ: 750 TABLET, EXTENDED RELEASE ORAL at 08:07

## 2018-05-06 RX ADMIN — METOPROLOL TARTRATE 50 MG: 50 TABLET ORAL at 21:36

## 2018-05-06 RX ADMIN — APIXABAN 2.5 MG: 2.5 TABLET, FILM COATED ORAL at 16:25

## 2018-05-06 RX ADMIN — PANTOPRAZOLE SODIUM 40 MG: 40 TABLET, DELAYED RELEASE ORAL at 06:55

## 2018-05-06 RX ADMIN — APIXABAN 2.5 MG: 2.5 TABLET, FILM COATED ORAL at 08:07

## 2018-05-06 RX ADMIN — MELATONIN TAB 3 MG 3 MG: 3 TAB at 21:37

## 2018-05-06 RX ADMIN — ATORVASTATIN CALCIUM 80 MG: 40 TABLET, FILM COATED ORAL at 16:25

## 2018-05-06 RX ADMIN — SENNOSIDES 8.6 MG: 8.6 TABLET, FILM COATED ORAL at 21:36

## 2018-05-06 NOTE — PROGRESS NOTES
05/06/18 1400   Pain Assessment   Pain Assessment No/denies pain   Restrictions/Precautions   Precautions Cognitive; Fall Risk;Bed/chair alarms   Cognition   Arousal/Participation Cooperative   Subjective   Subjective pt reported feeling okay but tired    QI: Sit to Lying   Assistance Needed Physical assistance   Assistance Provided by [place] 50%-74%   Sit to Lying CARE Score 2   QI: Lying to Sitting on Side of Bed   Assistance Needed Physical assistance   Assistance Provided by [place] 75% or more   Lying to Sitting on Side of Bed CARE Score 2   QI: Sit to Stand   Assistance Needed Physical assistance   Assistance Provided by [place] 75% or more   Sit to Stand CARE Score 2   QI: Chair/Bed-to-Chair Transfer   Assistance Needed Physical assistance   Assistance Provided by [place] 75% or more   Chair/Bed-to-Chair Transfer CARE Score 2   Transfer Bed/Chair/Wheelchair   Limitations Noted In Balance; Coordination; Endurance;Problem Solving; Sequencing;UE Strength;LE Strength   Adaptive Equipment None;Hand Hold   Stand Pivot Maximum Assist;Moderate Assist   Sit to Stand Moderate Assist;Maximum Assist   Stand to Sit Minimal Assist   Supine to Sit Maximum Assist   Sit to Supine Moderate Assist   Bed, Chair, Wheelchair Transfer (FIM) 2 - [place] needs to lift or boost to rise AND assist to sit   QI: Walk 10 Feet   Assistance Needed Physical assistance   Assistance Provided by [place] 25%-49%   Walk 10 Feet CARE Score 3   QI: Walk 50 Feet with Two Turns   Assistance Needed Physical assistance   Assistance Provided by [place] 25%-49%   Walk 50 Feet with Two Turns CARE Score 3   QI: Walk 150 Feet   Assistance Needed Physical assistance   Assistance Provided by [place] 50%-74%   Walk 150 Feet CARE Score 2   Ambulation   Does the patient walk? 2  Yes   Primary Discharge Mode of Locomotion Walk   Walk Assist Level Moderate Assist   Gait Pattern Inconsistant Cathryn; Slow Cathryn;Decreased foot clearance;Decreased R stance;Decreased L stance; Improper weight shift; Step through; Step to;Narrow [place]   Assist Device Hand Hold   Distance Walked (feet) 400 ft   Limitations Noted In Balance; Endurance; Heel Strike;Swing;Strength;Speed   Findings ModA to guide pt and to keep pace , HHA RUE and other hand around waist, VC    Walking (FIM) 3 - Patient completes 50 - 74% of all tasks, needs more than steadying or light touch AND distance 150 feet or more, no rest   QI: Toilet Transfer   Assistance Needed Physical assistance; Adaptive equipment   Assistance Provided by [place] 50%-74%   Toilet Transfer CARE Score 2   Toilet Transfer   Surface Assessed Bedside Commode   Limitations Noted In Balance; Endurance; Sequencing;Problem Solving   Adaptive Equipment (HHA)   Findings ModA up and down due to fatigue   Toilet Transfer (FIM) 2 - [place] needs to lift or boost to rise AND assist to sit   Assessment   Treatment Assessment Pt cont to be more fatigued in the afternoon, however pt was able to be aroused  Pt had inc time following directions and commands and inc diffiuclty communicating what she needs to do  Pt needed inc physical A to stand and transfer because of the difficulty wtih communication, dec ability to initiate movement and dec ability to sequence and motor plan through movements  Two of pt's daughters were present at end of session and therapist reviewed these findings with the family, who demonstrated understanding  Pt will cont to benefit from skilled PT to further progress functional mobility, activity tolerance, standing and ambulatory balance to progress safety with mobility  Cont to recommend 247S for safety due to cognitive deficits  PT Barriers   Physical Impairment Decreased strength;Decreased endurance; Impaired balance;Decreased mobility; Decreased coordination;Decreased cognition; Impaired judgement;Decreased safety awareness   Functional Limitation Car transfers; Ramp negotiation;Stair negotiation;Standing;Transfers; Walking; Wheelchair management Plan   Treatment/Interventions Functional transfer training;LE strengthening/ROM; Elevations; Therapeutic exercise; Endurance training;Patient/family training;Cognitive reorientation;Equipment eval/education; Bed mobility;Gait training   Progress Slow progress, cognitive deficits   Recommendation   Recommendation 24 hour supervision/assist;Short-term skilled PT;Outpatient PT; Home PT   Equipment Recommended Wheelchair   PT Therapy Minutes   PT Time In 1400   PT Time Out 1430   PT Total Time (minutes) 30   PT Mode of treatment - Individual (minutes) 0   PT Mode of treatment - Concurrent (minutes) 30   PT Mode of treatment - Group (minutes) 0   PT Mode of treatment - Co-treat (minutes) 0   PT Mode of Teatment - Total time(minutes) 30 minutes   Therapy Time missed   Time missed?  No

## 2018-05-06 NOTE — PROGRESS NOTES
Internal Medicine Progress Note  Patient: Michelle Pacheco  Age/sex: 80 y o  female  Medical Record #: [id number]      ASSESSMENT/PLAN:  Michelle Pacheco is seen and examined and management for following issues:    1  Lt MCA CVA s/p mechanical thrombectomy: Continue Eliquis and atorvastatin for secondary prevention  45 [facility] follow-up  2   HTN: stable; continue Lopressor 50mg every 12 hours  3   CHADWICK/CKD stage III; baseline Cr 1 5: stable; will monitor  On admit to hospital 4/18 had creat 2 0 and Torsemide was held/fluids given  4   Chronic systolic CHF/LVEF 70%; moderate TR; bio MVR normal function:  Continue torsemide 10mg daily/Kdur 10 meq BID  Chest is clear/no edema  5  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): Eliquis 2 5mg BID 2/2 age and renal status (switched from Coumadin in the hospital so NOAC is new)  6   DM type 2: On linagliptan 2 5mg at home  It was planned for pt to change to Januvia 100mg daily as an OP per the daughter  Will start Januvia when needed but so far BS controlled w/o tx likely 2/2 fact she is not eating much = will stop Accuchecks for now and watch FBSs but if she starts eating better will resume Accuchecks  Continue DM diet  Fasting 5/3/18 was 106    7  Acute blood loss anemia: s/p transfusion after EBL from thrombectomy of 500-1000ml from femoral site  Stable  8   PFO:  Small  Seen on LORI  45 [facility] follow-up  9   History of colon CA: Colonic lesion seen on CT  Will need GI follow-up    10  Hx PPM    11  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    12  Poor appetite: not eating very well  Also, question depression  Maybe add Remeron 15mg qhs tomorrow = will d/w primary service tomorrow  This may help her sleep as well = not sure if Melatonin helping much  Subjective: Patient seen and examined   Offers no complaints = cant swallow Niferex so will change to iron liquid    ROS:   GI: denies abdominal pain, change bowel habits or reflux symptoms  Neuro: No new neurologic changes  Respiratory: No Cough, SOB  Cardiovascular: No CP, palpitations     Scheduled Meds:    Current Facility-Administered Medications:  acetaminophen 650 mg Oral Q6H PRN Delia Phoenix MD   apixaban 2 5 mg Oral BID Delia Phoenix MD   atorvastatin 80 mg Oral Daily With Laverne Alcaraz MD   bisacodyl 10 mg Rectal Daily PRN Delia Phoenix MD   docusate sodium 100 mg Oral BID Delia Phoenix MD   iron polysaccharides 150 mg Oral Daily Delia Phoenix MD   melatonin 3 mg Oral HS Delia Phoenix MD   metoprolol tartrate 50 mg Oral Q12H [address] Delia Phoenix MD   pantoprazole 40 mg Oral Early Morning Delia Phoenix MD   polyethylene glycol 17 g Oral Daily PRN Delia Phoenix MD   potassium chloride 10 mEq Oral BID Delia Phoenix MD   senna 1 tablet Oral HS Delia Phoenix MD   torsemide 10 mg Oral Daily Delia Phoenix MD       Labs:       Results from last 7 days  Lab Units 05/03/18  0659 04/30/18  0456   WBC Thousand/uL 5 01 5 79   HEMOGLOBIN g/dL 9 3* 9 2*   HEMATOCRIT % 28 5* 29 3*   PLATELETS Thousands/uL 162 174       Results from last 7 days  Lab Units 05/03/18  0724 04/30/18  0456   SODIUM mmol/L 136 137   POTASSIUM mmol/L 3 8 4 4   CHLORIDE mmol/L 100 102   CO2 mmol/L 28 28   BUN mg/dL 40* 39*   CREATININE mg/dL 1 50* 1 54*   GLUCOSE RANDOM mg/dL 106 114   CALCIUM mg/dL 9 7 9 2                  Glucose (mg/dL)   Date Value   05/03/2018 106   04/30/2018 114   04/29/2018 103   04/28/2018 88   12/23/2015 114 (H)   02/22/2015 101   02/21/2015 103   02/20/2015 116     Glucose, i-STAT (mg/dl)   Date Value   04/23/2018 106   04/23/2018 96       Labs reviewed    Physical Examination:  Vitals:   Vitals:    05/05/18 1312 05/05/18 1959 05/06/18 0458 05/06/18 0600   BP: 139/62 126/59 128/65    BP Location: Right arm Right arm Right arm    Pulse: 74 76 80    Resp: 18 18 18    Temp: 98 9 °F (37 2 °C) 99 6 °F (37 6 °C) 98 4 °F (36 9 °C)    TempSrc: Oral Oral Oral    SpO2: 94% 96% 93%    Weight:   66 5 kg (146 lb 9 7 oz) 66 5 kg (146 lb 9 7 oz)   Height:           GEN: NAD  HEENT: NC/AT  RESP: BBS w/o crackles/wheeze/rhonci; resp unlabored  CV: +S1 S2, regular rate, no rubs/murmurs  ABD: soft, NT, ND, normal BS   : no schaefer  EXT: no edema  Skin: no rashes  Neuro: AAO; WOODARD 5/5 but difficult to get her to coordinate UE strength testing 2/2 some involuntary movements with left arm; face symmetric; speech clear  [x ] Total time spent: 30 Mins and greater than 50% of this time was spent counseling/coordinating care        Lauren Muller, [place] Fitzpatrick  Internal Medicine

## 2018-05-06 NOTE — PROGRESS NOTES
05/06/18 0840   Pain Assessment   Pain Assessment No/denies pain   Pain Score No Pain   QI: Chair/Bed-to-Chair Transfer   Assistance Needed Physical assistance   Assistance Provided by [place] 50%-74%   Chair/Bed-to-Chair Transfer CARE Score 2   Transfer Bed/Chair/Wheelchair   Adaptive Equipment None   Stand Pivot Moderate Assist   Sit to Stand Minimal   Stand to Sit Minimal   Supine to Sit Moderate Assist   Bed, Chair, Wheelchair Transfer (FIM) 2 - [place] needs to lift or boost to rise AND assist to sit   Cognition   Overall Cognitive Status Impaired   Arousal/Participation Alert   Attention Difficulty attending to directions   Orientation Level Oriented to person   Memory Decreased short term memory;Decreased recall of recent events;Decreased recall of precautions   Following Commands Follows one step commands inconsistently   Comments Pt demo decreased command follow today, increased ataxia, and restlessness w/ UE  Activity Tolerance   Activity Tolerance Patient tolerated treatment well   Assessment   Treatment Assessment Pt participates in skilled OT session focusing on L side awareness, task initiation and termination, standing balance  Pt participates in standing balance activity in kitchen w/ focus on L side awareness, and L UE functional use  CS-CGA at times standing at counter top  Pt require frequent cues to initiate tasks, and for termination  Pt would perseverate on on single task up to 6 times  Pt require verbal command at times to complete functional task with L UE  Pt continues to require skilled acute rehab OT services to increase overall functional independence and safety w/ ADLs and functional transfers, continue to follow plan of care  Prognosis Fair   Problem List Decreased range of motion;Decreased endurance; Impaired balance;Decreased mobility; Decreased coordination;Decreased cognition;Decreased safety awareness; Impaired judgement; Impaired vision   Plan   Treatment/Interventions ADL retraining;Functional transfer training; Therapeutic exercise; Endurance training;Cognitive reorientation;Patient/family training;Equipment eval/education   Progress Slow progress, cognitive deficits   Recommendation   OT Discharge Recommendation 24 hour supervision/assist   OT Therapy Minutes   OT Time In 0840   OT Time Out 0930   OT Total Time (minutes) 50   OT Mode of treatment - Individual (minutes) 50   OT Mode of treatment - Concurrent (minutes) 0   OT Mode of treatment - Group (minutes) 0   OT Mode of treatment - Co-treat (minutes) 0   OT Mode of Teatment - Total time(minutes) 50 minutes

## 2018-05-07 LAB
ANION GAP SERPL CALCULATED.3IONS-SCNC: 9 MMOL/L (ref 4–13)
BASOPHILS # BLD AUTO: 0.02 THOUSANDS/ΜL (ref 0–0.1)
BASOPHILS NFR BLD AUTO: 0 % (ref 0–1)
BUN SERPL-MCNC: 55 MG/DL (ref 5–25)
CALCIUM SERPL-MCNC: 9.8 MG/DL (ref 8.3–10.1)
CHLORIDE SERPL-SCNC: 96 MMOL/L (ref 100–108)
CO2 SERPL-SCNC: 30 MMOL/L (ref 21–32)
CREAT SERPL-MCNC: 1.99 MG/DL (ref 0.6–1.3)
EOSINOPHIL # BLD AUTO: 0.75 THOUSAND/ΜL (ref 0–0.61)
EOSINOPHIL NFR BLD AUTO: 14 % (ref 0–6)
ERYTHROCYTE [DISTWIDTH] IN BLOOD BY AUTOMATED COUNT: 14.4 % (ref 11.6–15.1)
GFR SERPL CREATININE-BSD FRML MDRD: 23 ML/MIN/1.73SQ M
GLUCOSE SERPL-MCNC: 115 MG/DL (ref 65–140)
HCT VFR BLD AUTO: 29 % (ref 34.8–46.1)
HGB BLD-MCNC: 9.5 G/DL (ref 11.5–15.4)
LYMPHOCYTES # BLD AUTO: 1.51 THOUSANDS/ΜL (ref 0.6–4.47)
LYMPHOCYTES NFR BLD AUTO: 29 % (ref 14–44)
MCH RBC QN AUTO: 28.6 PG (ref 26.8–34.3)
MCHC RBC AUTO-ENTMCNC: 32.8 G/DL (ref 31.4–37.4)
MCV RBC AUTO: 87 FL (ref 82–98)
MONOCYTES # BLD AUTO: 0.63 THOUSAND/ΜL (ref 0.17–1.22)
MONOCYTES NFR BLD AUTO: 12 % (ref 4–12)
NEUTROPHILS # BLD AUTO: 2.36 THOUSANDS/ΜL (ref 1.85–7.62)
NEUTS SEG NFR BLD AUTO: 45 % (ref 43–75)
NRBC BLD AUTO-RTO: 0 /100 WBCS
PLATELET # BLD AUTO: 196 THOUSANDS/UL (ref 149–390)
PMV BLD AUTO: 10.2 FL (ref 8.9–12.7)
POTASSIUM SERPL-SCNC: 3.7 MMOL/L (ref 3.5–5.3)
RBC # BLD AUTO: 3.32 MILLION/UL (ref 3.81–5.12)
SODIUM SERPL-SCNC: 135 MMOL/L (ref 136–145)
WBC # BLD AUTO: 5.27 THOUSAND/UL (ref 4.31–10.16)

## 2018-05-07 PROCEDURE — G0515 COGNITIVE SKILLS DEVELOPMENT: HCPCS

## 2018-05-07 PROCEDURE — 97112 NEUROMUSCULAR REEDUCATION: CPT

## 2018-05-07 PROCEDURE — 92526 ORAL FUNCTION THERAPY: CPT

## 2018-05-07 PROCEDURE — 85025 COMPLETE CBC W/AUTO DIFF WBC: CPT | Performed by: NURSE PRACTITIONER

## 2018-05-07 PROCEDURE — 80048 BASIC METABOLIC PNL TOTAL CA: CPT | Performed by: NURSE PRACTITIONER

## 2018-05-07 PROCEDURE — 97530 THERAPEUTIC ACTIVITIES: CPT

## 2018-05-07 PROCEDURE — 99232 SBSQ HOSP IP/OBS MODERATE 35: CPT | Performed by: PHYSICAL MEDICINE & REHABILITATION

## 2018-05-07 RX ORDER — SODIUM CHLORIDE 9 MG/ML
60 INJECTION, SOLUTION INTRAVENOUS ONCE
Status: COMPLETED | OUTPATIENT
Start: 2018-05-07 | End: 2018-05-08

## 2018-05-07 RX ORDER — MIRTAZAPINE 15 MG/1
7.5 TABLET, FILM COATED ORAL
Status: DISCONTINUED | OUTPATIENT
Start: 2018-05-07 | End: 2018-05-09

## 2018-05-07 RX ADMIN — SODIUM CHLORIDE 60 ML/HR: 0.9 INJECTION, SOLUTION INTRAVENOUS at 11:52

## 2018-05-07 RX ADMIN — METOPROLOL TARTRATE 50 MG: 50 TABLET ORAL at 21:47

## 2018-05-07 RX ADMIN — APIXABAN 2.5 MG: 2.5 TABLET, FILM COATED ORAL at 16:11

## 2018-05-07 RX ADMIN — DOCUSATE SODIUM 100 MG: 100 CAPSULE, LIQUID FILLED ORAL at 09:53

## 2018-05-07 RX ADMIN — MINERAL SUPPLEMENT IRON 300 MG / 5 ML STRENGTH LIQUID 100 PER BOX UNFLAVORED 300 MG: at 11:52

## 2018-05-07 RX ADMIN — PANTOPRAZOLE SODIUM 40 MG: 40 TABLET, DELAYED RELEASE ORAL at 06:39

## 2018-05-07 RX ADMIN — METOPROLOL TARTRATE 50 MG: 50 TABLET ORAL at 09:53

## 2018-05-07 RX ADMIN — ATORVASTATIN CALCIUM 80 MG: 40 TABLET, FILM COATED ORAL at 16:10

## 2018-05-07 RX ADMIN — SENNOSIDES 8.6 MG: 8.6 TABLET, FILM COATED ORAL at 21:47

## 2018-05-07 RX ADMIN — DOCUSATE SODIUM 100 MG: 100 CAPSULE, LIQUID FILLED ORAL at 16:11

## 2018-05-07 RX ADMIN — APIXABAN 2.5 MG: 2.5 TABLET, FILM COATED ORAL at 09:53

## 2018-05-07 RX ADMIN — MIRTAZAPINE 7.5 MG: 15 TABLET, FILM COATED ORAL at 21:47

## 2018-05-07 NOTE — PROGRESS NOTES
Progress Note - Carol Mcgrath 80 y o  female MRN: [id number]    Unit/Bed#: -02 Encounter: [id number]            Subjective:   Pt continues to have trouble sleeping     Objective:     ROS  Gen: denies recent wt loss   Psych: denies mood change    Vitals: Blood pressure 128/62, pulse 88, temperature (!) 97 4 °F (36 3 °C), temperature source Oral, resp  rate 18, height 5' 4" (1 626 m), weight 65 8 kg (145 lb 1 oz), SpO2 98 %, not currently breastfeeding      Physical Exam:     Gen:        NAD   Neck:   trachea midline  Lungs:  respirations unlabored   Heart:    + S1 and S2   Abdomen:    Soft, non-tender  Psych: mood/affect appropriate  Neurologic: awake, alert    Functional :  Mobility: min x 2  Tx: min x 2  ADLs: max        Current Facility-Administered Medications:  acetaminophen 650 mg Oral Q6H PRN Brenda Lindsay MD   apixaban 2 5 mg Oral BID Brenda Lindsay MD   atorvastatin 80 mg Oral Daily With Willow Thompson MD   bisacodyl 10 mg Rectal Daily PRN Brenda Lindsay MD   docusate sodium 100 mg Oral BID Brenda Lindsay MD   ferrous sulfate 300 mg Oral Daily GAYATRI Mejía   metoprolol tartrate 50 mg Oral Q12H [address] Brenda Lindsay MD   mirtazapine 7 5 mg Oral HS Brenda Lindsay MD   pantoprazole 40 mg Oral Early Morning Brenda Lindsay MD   polyethylene glycol 17 g Oral Daily PRN Brenda Lindsay MD   senna 1 tablet Oral HS Brenda Lindsay MD         acetaminophen    bisacodyl    polyethylene glycol      Assessment:  Pt is 81 yo female with ICH & bi-hemispheric nonhemorrhagic ischemic strokes s/p thrombectomy by Dr Sara Herzog on 4/23    Plan:    Rehabilitation: cont PT/OT for ambulatory/ADL dysfxn    ICH & bi-hemispheric nonhemorrhagic ischemic strokes: nonhemorrhagic stroke felt by neurology to be embolic of unclear etiology; per s/p thrombectomy by Dr Sara Herzog on 4/23, cleared by neurosx for [facility] and per neuro recs started on eliquis 2 5 mg BID (not 5 mg due to age and serum Cr greater than 1 5, tends to fluctuate around this value); home lipitor increased from 40 to 80 mg        Peripheral neuropathy: likely 2/2 to DM, had tried neurontin in the past but did not tolerate      DM: on januvia 100 mg qd at home (recently switched from tradjenta 5 mg qd); currently on ISS     CHF (EF 45%): home torsemide 10 mg qd (and home potassium 10 MEQ BID due to hypokalemia 2/2 to torsemide) both being held due to acute on chronic renal failure likely 2/2 to poor PO; additionally  home toprol XL 50 mg qd switched to lopressor 50 mg q12 in acute care     SSS: s/p pacer, interrogated recently in acute care, no A-fib found at that time (although noted on EKG of 4/23 however pt already on [facility] for CVA and on BB), follows with Dr Erika Diez (cards) and Dr Kiet Naqvi (EP)      h/o colon CA: on CT CAP of 4/26 colonic thickening suspicious for lesion noted and colonoscopy recommended, pt undergoes screening colonoscopys with Dr Rony Funez, pt to FU with them for FU colonoscopy       Meningioma: seen by neurosx no intervention planned, follows with Dr Ольга Toth     PFO (3 mm): unclear if paradoxical stroke from DVT is etiology of CVA (felt to be embolic by neuro) however as neuro has placed pt on [facility] for CVA LE dopplers would not ; t/c closure as OP       s/p MVR: tissue valve     HL: home lipitor increased from 40 mg to 80 mg due to CVA       h/o DVT: home coumadin switched to eliquis for CVA      Insomnia: at home on remeron 15 mg HS was held in acute care due to episodes of sedation/AMS, has not had any during rehab stay therefore will resume at reduced dose and monitor patient as pt continues to have difficulty sleeping at night      non-occlusive carotid dz: per carotid U/S report recommend repeat carotid U/S in 1 year; on [facility] and statin     chronic constipation: per family at baseline 1 BM every 4 to 5 days, IM monitoring and will treat with laxatives as needed      rt ankle pain/swelling: likely sprain; XR showed no acute osseous abnormality     CHADWICK on CKD: baseline 1 4-1 7, currently 1 99 (likely 2/2 to poor PO), torsemide on hold per IM     Hyponatremia: mild at 135 ()     chronic anemia: AOCD/CKD, at home on iron 150 mg qd, cont iron supplementation as inpt; Hg currently stable at 9 5     Hyperphosphatemia per lab reference range at 4 4: however in females 18 year or older 4 5 is ULN      Dispo: reteam    Incidental findings on CT C/A/P of 4/26:  1) multiple pulmonary micronodules: per CT report recommended FU imaging in 3 months for determination of stability  2) pleural thickening  3) uterine fibroid  Other incidental findings:  4) B/L renal cysts  5) 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

## 2018-05-07 NOTE — PROGRESS NOTES
Internal Medicine Progress Note  Patient: Brooklyn Zaman  Age/sex: 80 y o  female  Medical Record #: [id number]      ASSESSMENT/PLAN:  Brooklyn Zaman is seen and examined and management for following issues:    1  Lt MCA CVA s/p mechanical thrombectomy: Continue Eliquis and atorvastatin for secondary prevention  45 [facility] follow-up  2   HTN: stable; continue Lopressor 50mg every 12 hours  3   CHADWICK/CKD stage III; baseline Cr 1 5: stable; will monitor  On admit to hospital 4/18 had creat 2 0 and Torsemide was held/fluids given; Today up to 1 99 = will stop Torsemide/KCL and give 1 liter NSS  BMP in AM     4   Chronic systolic CHF/LVEF 34%; moderate TR; bio MVR normal function:  holding torsemide 10mg daily/Kdur 10 meq BID  Chest is clear/no edema  5  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): Eliquis 2 5mg BID 2/2 age and renal status (switched from Coumadin in the hospital so NOAC is new)  6   DM type 2: On linagliptan 2 5mg at home  It was planned for pt to change to Januvia 100mg daily as an OP per the daughter  Will start Januvia when needed but so far BS controlled w/o tx likely 2/2 fact she is not eating much = did stop Accuchecks for now and watch FBSs but if she starts eating better will resume Accuchecks  Continue DM diet  Fasting today 5/7/18 is 115    7  Acute blood loss anemia: s/p transfusion after EBL from thrombectomy of 500-1000ml from femoral site  Stable  8   PFO:  Small  Seen on LORI  45 [facility] follow-up  9   History of colon CA: Colonic lesion seen on CT  Will need GI follow-up    10  Hx PPM    11  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    12  Poor appetite: not eating very well  Also, question depression  Maybe add Remeron 15mg qhs today =  d/w primary service  Apparently, she was on Remeron 15mg at home      Subjective: Patient seen and examined   Offers no complaints     ROS:   GI: denies abdominal pain, change bowel habits or reflux symptoms  Neuro: No new neurologic changes  Respiratory: No Cough, SOB  Cardiovascular: No CP, palpitations     Scheduled Meds:    Current Facility-Administered Medications:  acetaminophen 650 mg Oral Q6H PRN Krystin Morales MD   apixaban 2 5 mg Oral BID Krystin Morales MD   atorvastatin 80 mg Oral Daily With Edgar Ryan MD   bisacodyl 10 mg Rectal Daily PRN Krystin Morales MD   docusate sodium 100 mg Oral BID Krystin Morales MD   ferrous sulfate 300 mg Oral Daily GAYATRI Molina   melatonin 3 mg Oral HS Krystin Morales MD   metoprolol tartrate 50 mg Oral Q12H [address] Krystin Morales MD   pantoprazole 40 mg Oral Early Morning Krystin Morales MD   polyethylene glycol 17 g Oral Daily PRN Krystin Morales MD   potassium chloride 10 mEq Oral BID Krystin Morales MD   senna 1 tablet Oral HS Krystin Morales MD   torsemide 10 mg Oral Daily Krystin Morales MD       Labs:       Results from last 7 days  Lab Units 05/07/18  0647 05/03/18  0659   WBC Thousand/uL 5 27 5 01   HEMOGLOBIN g/dL 9 5* 9 3*   HEMATOCRIT % 29 0* 28 5*   PLATELETS Thousands/uL 196 162       Results from last 7 days  Lab Units 05/07/18  0728 05/03/18  0724   SODIUM mmol/L 135* 136   POTASSIUM mmol/L 3 7 3 8   CHLORIDE mmol/L 96* 100   CO2 mmol/L 30 28   BUN mg/dL 55* 40*   CREATININE mg/dL 1 99* 1 50*   GLUCOSE RANDOM mg/dL 115 106   CALCIUM mg/dL 9 8 9 7                  Glucose (mg/dL)   Date Value   05/07/2018 115   05/03/2018 106   04/30/2018 114   04/29/2018 103   12/23/2015 114 (H)   02/22/2015 101   02/21/2015 103   02/20/2015 116     Glucose, i-STAT (mg/dl)   Date Value   04/23/2018 106   04/23/2018 96       Labs reviewed    Physical Examination:  Vitals:   Vitals:    05/06/18 0600 05/06/18 1343 05/06/18 2044 05/07/18 0458   BP:  138/67 140/69 139/75   BP Location:  Right arm Right arm Right arm   Pulse:  81 80 72   Resp:  18 18 18   Temp:  98 6 °F (37 °C) 98 3 °F (36 8 °C) (!) 97 4 °F (36 3 °C)   TempSrc:  Oral Oral Oral   SpO2:  97% 96% 98%   Weight: 66 5 kg (146 lb 9 7 oz) 65 8 kg (145 lb 1 oz)   Height:           GEN: NAD  HEENT: NC/AT  RESP: BBS w/o crackles/wheeze/rhonci; resp unlabored  CV: +S1 S2, regular rate, no rubs/murmurs  ABD: soft, NT, ND, normal BS   : no schaefer  EXT: no edema  Skin: no rashes  Neuro: AAO; [place] 5/5 but difficult to get her to coordinate UE strength testing 2/2 some involuntary movements with left arm and inability to consistently follow commands; face symmetric; speech clear  [x ] Total time spent: 30 Mins and greater than 50% of this time was spent counseling/coordinating care        Aretha Perez, [address] [facility]  Internal Medicine

## 2018-05-07 NOTE — PLAN OF CARE
DISCHARGE PLANNING     Discharge to home or other facility with appropriate resources Progressing        INFECTION - ADULT     Absence or prevention of progression during hospitalization Progressing        Nutrition/Hydration-ADULT     Nutrient/Hydration intake appropriate for improving, restoring or maintaining nutritional needs Progressing        PAIN - ADULT     Verbalizes/displays adequate comfort level or baseline comfort level Progressing        Potential for Falls     Patient will remain free of falls Progressing        Prexisting or High Potential for Compromised Skin Integrity     Skin integrity is maintained or improved Progressing        SAFETY ADULT     Patient will remain free of falls Progressing     Maintain or return to baseline ADL function Progressing     Maintain or return mobility status to optimal level Progressing

## 2018-05-07 NOTE — PROGRESS NOTES
05/07/18 1000   Pain Assessment   Pain Assessment No/denies pain   Pain Score No Pain   Grooming   Able To Wash/Dry Hands   Findings tactile cues for termination of task   Grooming (FIM) 4 - Patient requires steadying assist or light touching   QI: Chair/Bed-to-Chair Transfer   Assistance Needed Physical assistance   Assistance Provided by [place] Less than 25%   Chair/Bed-to-Chair Transfer CARE Score 3   Transfer Bed/Chair/Wheelchair   Limitations Noted In Balance;Confidence; Coordination; Endurance;Pain Management;Problem Solving; Sequencing   Sit Pivot Minimal Assist   Stand Pivot Minimal Assist   Sit to Stand Supervision   Findings Pt does only turn to the right when approaching sitting sufaces  Bed, Chair, Wheelchair Transfer (FIM) 3 - [place] needs to lift, boost or assist to stand OR sit   QI: [address] Needed Physical assistance   Assistance Provided by [place] 75% or more   Toileting Hygiene CARE Score 2   Toileting   Able to [address] down no, up yes  Able to Manage Clothing Closures No   Manage Hygiene Bladder; Bowel   Findings assit for managing ties  Pt requires extensive assist for management of hygiene  Pt demo difficulty managing toilet paper  pt continues to perseverate on the action desired to complete and not pay attention to what items she has  Pt attemtpts to clean bottom with her sweater that is in her R hand      Toileting (FIM) 1 - Patient requires two helpers   QI: Toilet Transfer   Assistance Needed Physical assistance   Assistance Provided by [place] 25%-49%   Toilet Transfer CARE Score 3   Toilet Transfer   Surface Assessed Standard Toilet   Toilet Transfer (FIM) 3 - [place] needs to lift, boost or assist to stand OR sit   Cognition   Overall Cognitive Status Impaired   Arousal/Participation Alert   Attention Difficulty attending to directions   Orientation Level Oriented to person   Memory Decreased short term memory;Decreased recall of recent events;Decreased recall of precautions   Following Commands Follows one step commands inconsistently   Assessment   Treatment Assessment Pt engages in skilled OT session focusing on L side attention, sequencing, L UE coordination, and standing balance  Pt engages in various meaningful activities (watering plants, cleaning kitchen) w/ focusing on L side attention, B/L UE integration, sequencing and standing balance  Pt requires cues for nearly the entire time to problem solve through instructions, initiate and terminate tasks  Pt continues to require skilled acute rehab OT services to increase overall functional independence and safety w/ ADLs and functional transfers, continue to follow plan of care  Prognosis Fair   Problem List Decreased strength;Decreased range of motion;Decreased endurance; Impaired balance;Decreased mobility; Decreased coordination; Impaired judgement;Decreased safety awareness   Plan   Treatment/Interventions Functional transfer training;ADL retraining;LE strengthening/ROM; Therapeutic exercise; Endurance training;Cognitive reorientation;Patient/family training   OT Therapy Minutes   OT Time In 1000   OT Time Out 1130   OT Total Time (minutes) 90   OT Mode of treatment - Individual (minutes) 90   OT Mode of treatment - Concurrent (minutes) 0   OT Mode of treatment - Group (minutes) 0   OT Mode of treatment - Co-treat (minutes) 0   OT Mode of Teatment - Total time(minutes) 90 minutes

## 2018-05-07 NOTE — PROGRESS NOTES
05/07/18 0930   Pain Assessment   Pain Assessment No/denies pain   Restrictions/Precautions   Precautions Bed/chair alarms; Aphasia; Aspiration;Cognitive; Fall Risk   Cognition   Arousal/Participation Cooperative   Subjective   Subjective pt reported feeling okay    QI: Sit to Stand   Assistance Needed Physical assistance   Assistance Provided by [place] Less than 25%   Sit to Stand CARE Score 3   QI: Chair/Bed-to-Chair Transfer   Assistance Needed Physical assistance   Assistance Provided by [place] Less than 25%   Chair/Bed-to-Chair Transfer CARE Score 3   Transfer Bed/Chair/Wheelchair   Limitations Noted In Balance; Endurance; Coordination;Problem Solving; Sequencing;UE Strength;LE Strength   Adaptive Equipment None   Stand Pivot Minimal Assist   Sit to Stand Minimal Assist   Stand to Sit Minimal Assist   Car Transfer Minimal Assist   Bed, Chair, Wheelchair Transfer (FIM) 2 - [place] needs to lift or boost to rise AND assist to sit   QI: Car Transfer   Assistance Needed Physical assistance   Assistance Provided by [place] Less than 25%   Car Transfer CARE Score 3   QI: Walk 10 Feet   Assistance Needed Physical assistance   Assistance Provided by [place] Less than 25%   Walk 10 Feet CARE Score 3   QI: Walk 50 Feet with Two Turns   Assistance Needed Physical assistance   Assistance Provided by [place] Less than 25%   Walk 50 Feet with Two Turns CARE Score 3   Ambulation   Does the patient walk? 2  Yes   Primary Discharge Mode of Locomotion Walk   Walk Assist Level Minimum Assist   Gait Pattern Inconsistant Cathryn;Decreased foot clearance; Slow Cathryn; Step to; Step through; Decreased R stance;Decreased L stance; Improper weight shift   Assist Device Hand Hold   Distance Walked (feet) 50 ft  (60, 70)   Limitations Noted In Balance; Endurance; Heel Strike;Swing;Strength;Speed   Walking (FIM) 2 - Patient ambulates between 50 - 149 feet regardless of assist/device/set up   QI: 4 Steps   Assistance Needed Physical assistance; Adaptive equipment   Assistance Provided by [place] Less than 25%   4 Steps CARE Score 3   QI: 12 Steps   Assistance Needed Physical assistance; Adaptive equipment   Assistance Provided by [place] Less than 25%   12 Steps CARE Score 3   Stairs   Type Stairs   # of Steps 12   Weight Bearing Precautions Fall Risk   Assist Devices Single Rail;Bilateral Rail   Findings pt performed nonreciprocal pattern on 6"  steps with mix of BHR and single HR  [place]   Stairs (FIM) 4 - Patient requires steadying assist or light touching AND patient goes up and down full flight (12- 14 stairs)   Therapeutic Interventions   Other standing in room to brush her teeth, gathered supplies for pt and put them on her tray and she was able to complete at S level    Assessment   Treatment Assessment Pt cont to fluctuate in regards to balance and stability in relation to the task that she is performing; pt demonstrated adequate standing balance when she was washing her hands at S level, however when initiating tasks she needs [place] to guide and for balance; noting dec weight shifting to R side as she leans more to the L, however did not have a LOB during the session  Pt will cont to benefit from skilled PT to further progress functional mobilty, however cont to be limited by fatigue, dec arousal, and cognitive deficits that dec her ability to fully attend to task  PT Barriers   Physical Impairment Decreased strength;Decreased endurance; Impaired balance;Decreased mobility; Decreased cognition; Impaired judgement;Decreased safety awareness   Functional Limitation Car transfers; Ramp negotiation;Stair negotiation;Standing;Transfers; Walking   Plan   Treatment/Interventions Functional transfer training;LE strengthening/ROM; Elevations; Endurance training; Therapeutic exercise;Cognitive reorientation;Patient/family training;Equipment eval/education; Bed mobility;Gait training   Progress Slow progress, cognitive deficits   Recommendation   Recommendation 24 hour supervision/assist;Home PT;Outpatient PT; Home with family support   PT Therapy Minutes   PT Time In 0930   PT Time Out 1000   PT Total Time (minutes) 30   PT Mode of treatment - Individual (minutes) 0   PT Mode of treatment - Concurrent (minutes) 30   PT Mode of treatment - Group (minutes) 0   PT Mode of treatment - Co-treat (minutes) 0   PT Mode of Teatment - Total time(minutes) 30 minutes   Therapy Time missed   Time missed?  No   stretching bilat hamstrings and calves 2x60 sec each, standing at counter to wash her hands at S level with VC

## 2018-05-07 NOTE — PROGRESS NOTES
05/07/18 0830   Pain Assessment   Pain Assessment No/denies pain   Pain Score No Pain   Memory Skills   Memory (FIM) 3 - Recognizes, recalls/performs 50-74%   Social Interaction (FIM) 4 - Needs redirecting for appropriate language or to initiate interaction   Speech/Language/Cognition Assessmetn   Treatment Assessment Following meal completion, pt participated in skilled ST session w/ focus on cognitive linguistic skills and language  Pt oriented to person and time to begin session, however, required moderate to max cues to orient to place and situation  When presented w/ simple one-step commands, pt followed 8/8 auditorily presented directions  Targeting reasoning and expression, pt engaged in categorization task where pt presented w/ Fo3 pictures, demonstrating ability to ID picture which belonged to a given category in 9/10 trials, benefiting from a semantic cue to complete task  Pt also named requested items w/ 7/8 accuracy, noting the use of semantic paraphasias at times which may be due a language barrier  During informal conversation, pt accurately stated family member names promptly  Pt remains w/ deficits in overall cognition where pt is noted to frequently fluctuate in independence and ability to complete functional tasks  , therefore, will cont to benefit from skilled ST services to maximize functional cognitive linguistic skills at this time  Swallow Information   Current Risks for Dysphagia & Aspiration General debilitation;New Neuro event;Brain injury;Cognitive deficit;HX neurologic dx   Current Symptoms/Concerns Difficulty chewing;Holding food in mouth   Current Diet Dysphagia advance; Thin liquid   Baseline Diet Regular; Thin liquids   Consistencies Assessed and Performance   Materials Admnistered Mechanical Soft/Level 2;Soft/Level 3; Thin liquid   Oral Stage Mild impaired   Phargngeal Stage Mild impaired   Swallow Mechanics Mild delayed;Swallow initation; Appears prompt;Aspiration risk   Esophageal Concerns Heartburn   Recommendations   Risk for Aspiration Mild   Diet Solid Recommendation Level 3 Dysphagia/ advanced/ soft to chew   Diet Liquid Recommendation Thin liquid   Recommended Form of Meds Whole;Crushed; With puree   General Precautions Aspiration precautions; Feed only when alert;Minimize distractions;Upright as possible for all oral intake;Remain upright for 45 mins after meals  (OOB for meals)   QI: Eating   Assistance Needed Set-up / clean-up;Supervision   Assistance Provided by [place] No physical assistance   Eating CARE Score 4   Swallow Assessment   Swallow Treatment Assessment Pt seen during bfast for ongoing dysphagia therapy where pt presented w/ level 3/regular diet items, however, pt declined intake of tray items and requesting only crackers  Pt consumed 5 crackers and ~200cc thin liquids by cup sip where pt required increased time due to decreased attention to meal  Following assist w/ set up, pt demonstrated functional bolus retrieval w/o anterior loss  Mastication of crackers was prolonged, noting consistent oral residual across tongue but mildly increased amt more on R side  A-p transfers of crackers was slower but prompt w/ thin liquids where overall swallow initiation also appeared mildly delayed  When pt w/ increased attention to PO intake, swallow initation of thins appeared prompter  Margo Chong cont to recommend level 3 diet and thin liquids at this time, but will further attempt to trial regular diet items to assess for tolerance w/ potential for diet upgrade  Swallow Assessment Prognosis   Prognosis Good   Prognosis Considerations Co-morbidities; Medical prognosis;Participation level;New learning ability;Ability to carry over; Cooperation   SLP Therapy Minutes   SLP Time In 0830   SLP Time Out 0930   SLP Total Time (minutes) 60   SLP Mode of treatment - Individual (minutes) 60   SLP Mode of treatment - Concurrent (minutes) 0   SLP Mode of treatment - Group (minutes) 0   SLP Mode of treatment - Co-treat (minutes) 0   SLP Mode of Teatment - Total time(minutes) 60 minutes   Therapy Time missed   Time missed?  No   Daily FIM Score   Problem solving (FIM) 2 - Needs direction more than ½ time to initiate, plan or complete simple tasks   Comprehension (FIM) 3 - Understands basic info/conversation 50-74% of time   Expression (FIM) 3 - Expresses basic info/needs 50-74% of time   Eating (FIM) 5 - Patient needs help to open contianers or set up tray

## 2018-05-08 ENCOUNTER — APPOINTMENT (INPATIENT)
Dept: RADIOLOGY | Facility: HOSPITAL | Age: 83
DRG: 056 | End: 2018-05-08
Payer: MEDICARE

## 2018-05-08 LAB
ALBUMIN SERPL BCP-MCNC: 2.9 G/DL (ref 3.5–5)
ALP SERPL-CCNC: 91 U/L (ref 46–116)
ALT SERPL W P-5'-P-CCNC: 20 U/L (ref 12–78)
AMMONIA PLAS-SCNC: 14 UMOL/L (ref 11–35)
ANION GAP BLD CALC-SCNC: 16 MMOL/L (ref 4–13)
ANION GAP SERPL CALCULATED.3IONS-SCNC: 6 MMOL/L (ref 4–13)
ANION GAP SERPL CALCULATED.3IONS-SCNC: 7 MMOL/L (ref 4–13)
APTT PPP: 29 SECONDS (ref 23–35)
AST SERPL W P-5'-P-CCNC: 29 U/L (ref 5–45)
BASOPHILS # BLD AUTO: 0.03 THOUSANDS/ΜL (ref 0–0.1)
BASOPHILS NFR BLD AUTO: 1 % (ref 0–1)
BILIRUB SERPL-MCNC: 0.39 MG/DL (ref 0.2–1)
BUN BLD-MCNC: 69 MG/DL (ref 5–25)
BUN SERPL-MCNC: 51 MG/DL (ref 5–25)
BUN SERPL-MCNC: 58 MG/DL (ref 5–25)
CA-I BLD-SCNC: 1.26 MMOL/L (ref 1.12–1.32)
CALCIUM SERPL-MCNC: 9.3 MG/DL (ref 8.3–10.1)
CALCIUM SERPL-MCNC: 9.8 MG/DL (ref 8.3–10.1)
CHLORIDE BLD-SCNC: 98 MMOL/L (ref 100–108)
CHLORIDE SERPL-SCNC: 102 MMOL/L (ref 100–108)
CHLORIDE SERPL-SCNC: 102 MMOL/L (ref 100–108)
CO2 SERPL-SCNC: 28 MMOL/L (ref 21–32)
CO2 SERPL-SCNC: 28 MMOL/L (ref 21–32)
CREAT BLD-MCNC: 1.6 MG/DL (ref 0.6–1.3)
CREAT SERPL-MCNC: 1.67 MG/DL (ref 0.6–1.3)
CREAT SERPL-MCNC: 1.9 MG/DL (ref 0.6–1.3)
EOSINOPHIL # BLD AUTO: 0.96 THOUSAND/ΜL (ref 0–0.61)
EOSINOPHIL NFR BLD AUTO: 18 % (ref 0–6)
ERYTHROCYTE [DISTWIDTH] IN BLOOD BY AUTOMATED COUNT: 14.2 % (ref 11.6–15.1)
GFR SERPL CREATININE-BSD FRML MDRD: 24 ML/MIN/1.73SQ M
GFR SERPL CREATININE-BSD FRML MDRD: 28 ML/MIN/1.73SQ M
GFR SERPL CREATININE-BSD FRML MDRD: 29 ML/MIN/1.73SQ M
GLUCOSE P FAST SERPL-MCNC: 96 MG/DL (ref 65–99)
GLUCOSE SERPL-MCNC: 103 MG/DL (ref 65–140)
GLUCOSE SERPL-MCNC: 112 MG/DL (ref 65–140)
GLUCOSE SERPL-MCNC: 115 MG/DL (ref 65–140)
GLUCOSE SERPL-MCNC: 96 MG/DL (ref 65–140)
HCT VFR BLD AUTO: 31.6 % (ref 34.8–46.1)
HCT VFR BLD CALC: 27 % (ref 34.8–46.1)
HGB BLD-MCNC: 9.9 G/DL (ref 11.5–15.4)
HGB BLDA-MCNC: 9.2 G/DL (ref 11.5–15.4)
INR PPP: 1.17 (ref 0.86–1.16)
LYMPHOCYTES # BLD AUTO: 1.34 THOUSANDS/ΜL (ref 0.6–4.47)
LYMPHOCYTES NFR BLD AUTO: 26 % (ref 14–44)
MCH RBC QN AUTO: 28.3 PG (ref 26.8–34.3)
MCHC RBC AUTO-ENTMCNC: 31.3 G/DL (ref 31.4–37.4)
MCV RBC AUTO: 90 FL (ref 82–98)
MONOCYTES # BLD AUTO: 0.43 THOUSAND/ΜL (ref 0.17–1.22)
MONOCYTES NFR BLD AUTO: 8 % (ref 4–12)
NEUTROPHILS # BLD AUTO: 2.49 THOUSANDS/ΜL (ref 1.85–7.62)
NEUTS SEG NFR BLD AUTO: 47 % (ref 43–75)
NRBC BLD AUTO-RTO: 0 /100 WBCS
PCO2 BLD: 29 MMOL/L (ref 21–32)
PLATELET # BLD AUTO: 170 THOUSANDS/UL (ref 149–390)
PMV BLD AUTO: 9.8 FL (ref 8.9–12.7)
POTASSIUM BLD-SCNC: 4.7 MMOL/L (ref 3.5–5.3)
POTASSIUM SERPL-SCNC: 3.9 MMOL/L (ref 3.5–5.3)
POTASSIUM SERPL-SCNC: 4.2 MMOL/L (ref 3.5–5.3)
PROT SERPL-MCNC: 7.9 G/DL (ref 6.4–8.2)
PROTHROMBIN TIME: 15 SECONDS (ref 12.1–14.4)
RBC # BLD AUTO: 3.5 MILLION/UL (ref 3.81–5.12)
SODIUM BLD-SCNC: 137 MMOL/L (ref 136–145)
SODIUM SERPL-SCNC: 136 MMOL/L (ref 136–145)
SODIUM SERPL-SCNC: 137 MMOL/L (ref 136–145)
SPECIMEN SOURCE: ABNORMAL
SPECIMEN SOURCE: ABNORMAL
TROPONIN I BLD-MCNC: 0.6 NG/ML (ref 0–0.08)
TROPONIN I SERPL-MCNC: <0.02 NG/ML
WBC # BLD AUTO: 5.26 THOUSAND/UL (ref 4.31–10.16)

## 2018-05-08 PROCEDURE — 93005 ELECTROCARDIOGRAM TRACING: CPT

## 2018-05-08 PROCEDURE — 97535 SELF CARE MNGMENT TRAINING: CPT

## 2018-05-08 PROCEDURE — 70450 CT HEAD/BRAIN W/O DYE: CPT

## 2018-05-08 PROCEDURE — 97530 THERAPEUTIC ACTIVITIES: CPT

## 2018-05-08 PROCEDURE — 82140 ASSAY OF AMMONIA: CPT | Performed by: INTERNAL MEDICINE

## 2018-05-08 PROCEDURE — 99231 SBSQ HOSP IP/OBS SF/LOW 25: CPT | Performed by: PHYSICIAN ASSISTANT

## 2018-05-08 PROCEDURE — 80047 BASIC METABLC PNL IONIZED CA: CPT

## 2018-05-08 PROCEDURE — 84484 ASSAY OF TROPONIN QUANT: CPT

## 2018-05-08 PROCEDURE — 97112 NEUROMUSCULAR REEDUCATION: CPT

## 2018-05-08 PROCEDURE — 99232 SBSQ HOSP IP/OBS MODERATE 35: CPT | Performed by: PHYSICAL MEDICINE & REHABILITATION

## 2018-05-08 PROCEDURE — 97116 GAIT TRAINING THERAPY: CPT

## 2018-05-08 PROCEDURE — 85730 THROMBOPLASTIN TIME PARTIAL: CPT | Performed by: INTERNAL MEDICINE

## 2018-05-08 PROCEDURE — 71045 X-RAY EXAM CHEST 1 VIEW: CPT

## 2018-05-08 PROCEDURE — 82948 REAGENT STRIP/BLOOD GLUCOSE: CPT

## 2018-05-08 PROCEDURE — 84484 ASSAY OF TROPONIN QUANT: CPT | Performed by: INTERNAL MEDICINE

## 2018-05-08 PROCEDURE — 94762 N-INVAS EAR/PLS OXIMTRY CONT: CPT

## 2018-05-08 PROCEDURE — 85025 COMPLETE CBC W/AUTO DIFF WBC: CPT | Performed by: INTERNAL MEDICINE

## 2018-05-08 PROCEDURE — 85014 HEMATOCRIT: CPT

## 2018-05-08 PROCEDURE — 92526 ORAL FUNCTION THERAPY: CPT

## 2018-05-08 PROCEDURE — 80053 COMPREHEN METABOLIC PANEL: CPT | Performed by: INTERNAL MEDICINE

## 2018-05-08 PROCEDURE — 85610 PROTHROMBIN TIME: CPT | Performed by: INTERNAL MEDICINE

## 2018-05-08 PROCEDURE — 97110 THERAPEUTIC EXERCISES: CPT

## 2018-05-08 PROCEDURE — 80048 BASIC METABOLIC PNL TOTAL CA: CPT | Performed by: NURSE PRACTITIONER

## 2018-05-08 RX ORDER — SODIUM CHLORIDE 9 MG/ML
60 INJECTION, SOLUTION INTRAVENOUS ONCE
Status: COMPLETED | OUTPATIENT
Start: 2018-05-08 | End: 2018-05-09

## 2018-05-08 RX ADMIN — METOPROLOL TARTRATE 50 MG: 50 TABLET ORAL at 08:19

## 2018-05-08 RX ADMIN — PANTOPRAZOLE SODIUM 40 MG: 40 TABLET, DELAYED RELEASE ORAL at 05:42

## 2018-05-08 RX ADMIN — ATORVASTATIN CALCIUM 80 MG: 40 TABLET, FILM COATED ORAL at 17:59

## 2018-05-08 RX ADMIN — SENNOSIDES 8.6 MG: 8.6 TABLET, FILM COATED ORAL at 21:13

## 2018-05-08 RX ADMIN — MINERAL SUPPLEMENT IRON 300 MG / 5 ML STRENGTH LIQUID 100 PER BOX UNFLAVORED 300 MG: at 08:18

## 2018-05-08 RX ADMIN — APIXABAN 2.5 MG: 2.5 TABLET, FILM COATED ORAL at 17:59

## 2018-05-08 RX ADMIN — MIRTAZAPINE 7.5 MG: 15 TABLET, FILM COATED ORAL at 21:13

## 2018-05-08 RX ADMIN — METOPROLOL TARTRATE 50 MG: 50 TABLET ORAL at 21:13

## 2018-05-08 RX ADMIN — APIXABAN 2.5 MG: 2.5 TABLET, FILM COATED ORAL at 08:17

## 2018-05-08 RX ADMIN — SODIUM CHLORIDE 60 ML/HR: 0.9 INJECTION, SOLUTION INTRAVENOUS at 09:40

## 2018-05-08 NOTE — PROGRESS NOTES
05/08/18 0900   Pain Assessment   Pain Assessment No/denies pain   Restrictions/Precautions   Precautions Cognitive; Impulsive;Bed/chair alarms; Fall Risk;Supervision on toilet/commode   Cognition   Arousal/Participation Cooperative   Subjective   Subjective Pt reported she is ready for PT   QI: Sit to Stand   Assistance Needed Physical assistance   Assistance Provided by [place] 25%-49%   Comment pt req occasional VC for hand placement   Sit to Stand CARE Score 3   QI: Chair/Bed-to-Chair Transfer   Assistance Needed Physical assistance   Assistance Provided by [place] 25%-49%   Comment Min Ax1 with R HHA and one hand on pt's waist   Chair/Bed-to-Chair Transfer CARE Score 3   Transfer Bed/Chair/Wheelchair   Limitations Noted In Problem Solving;LE Strength   Stand Pivot Minimal Assist;Assist x 1   Sit to Stand Minimal Assist;Assist x 1   Stand to [facility]   Findings A with transfers fluctuated GC to Min Ax1 dep on VC and postitioning of therapist   Bed, Chair, Wheelchair Transfer (FIM) 4 - Patient completes 75% of all tasks   QI: Walk 10 Feet   Assistance Needed Physical assistance   Assistance Provided by [place] 50%-74%   Comment [place]-Mod Ax1 with R HHA   Walk 10 Feet CARE Score 2   QI: Walk 50 Feet with Two Turns   Assistance Needed Physical assistance   Assistance Provided by [place] 50%-74%   Comment [place]-Mod Ax1 with R HHA   Walk 50 Feet with Two Turns CARE Score 2   Ambulation   Does the patient walk? 2  Yes   Primary Discharge Mode of Locomotion Walk   Walk Assist Level Moderate Assist;Assist x 1  (R HHA)   Gait Pattern Inconsistant Cathryn; Slow [place];Decreased foot clearance;Narrow [place]; Decreased L stance;Decreased R stance; Improper weight shift; Step to; Step through   Assist Device Hand Hold  (x1 on R)   Distance Walked (feet) 50 ft  (100x2)   Limitations Noted In Balance; Coordination; Endurance; Heel Strike;Speed;Strength;Swing   Findings [place]-ModA x1 with R HHA   Walking (FIM) 2 - Patient ambulates between 50 - 149 feet regardless of assist/device/set up   Wheelchair mobility   QI: Does the patient use a wheelchair? 0  No   QI: 4 Steps   Assistance Needed Physical assistance;Verbal cues   Assistance Provided by [place] 25%-49%   Comment 6"  steps   4 Steps CARE Score 3   QI: 12 Steps   Assistance Needed Physical assistance;Verbal cues   Assistance Provided by [place] 25%-49%   Comment 6"  steps   12 Steps CARE Score 3   Stairs   Type Stairs   # of Steps 12  (on 6"  steps)   Weight Bearing Precautions Fall Risk   Assist Devices Hand Hold   Findings Min Ax1, HHA R   Stairs (FIM) 4 - Patient requires steadying assist or light touching AND patient goes up and down full flight (12- 14 stairs)   Therapeutic Interventions   Strengthening repeated sit-to-stands to fatigue, 15   Balance dynamic standing at hi mat table, folding towels with B hands, walking sidestepping to put away; stand at sink and water plants by repeatedly filling cup of water   Neuromuscular Re- stairs x6 for at total of 12 stairs; VC for hand placement   Assessment   Treatment Assessment Pt participated in PT session with focus on standing bal, wt shift to R, walking, and strengthening BLE  Pt completed tasks with VC and simple instructions with one word [place]  Pt completed sit to stands to fatigue, completing 15 with VC to continue to fatigue; pt negotiated stairs safely but req VC to let L hand come down along rail as she descended the stairs  Pt ambulated in hallway HHA x1 on the R for a total of 200' with turns to R side each time; pt demonstrated listing to L; with VC corrected momentarily by coming more center  Cont to give dynamic standing activities such as watering plants and folding towels to encourage wt shift to R as pt tends to L  Pt would benefit from cont skilled PT to cont work on wt bearing to R in standing and walking, strengthening, and activity leonora     Problem List Decreased strength;Decreased endurance; Impaired balance;Decreased mobility; Decreased coordination;Decreased cognition; Impaired judgement;Decreased safety awareness   Barriers to Discharge Inaccessible home environment;Decreased caregiver support   PT Barriers   Physical Impairment Decreased strength;Decreased endurance; Impaired balance;Decreased mobility; Decreased coordination;Decreased cognition; Impaired judgement;Decreased safety awareness   Functional Limitation Standing;Walking;Transfers;Stair negotiation   Plan   Treatment/Interventions Functional transfer training;LE strengthening/ROM; Elevations; Therapeutic exercise; Endurance training;Patient/family training;Equipment eval/education; Bed mobility;Gait training   Progress Slow progress, cognitive deficits   Recommendation   Recommendation 24 hour supervision/assist;Home PT;Outpatient PT; Home with family support   PT Therapy Minutes   PT Time In 0900   PT Time Out 1000   PT Total Time (minutes) 60   PT Mode of treatment - Individual (minutes) 30   PT Mode of treatment - Concurrent (minutes) 30   PT Mode of treatment - Group (minutes) 0   PT Mode of treatment - Co-treat (minutes) 0   PT Mode of Teatment - Total time(minutes) 60 minutes   Therapy Time missed   Time missed?  No

## 2018-05-08 NOTE — PROGRESS NOTES
Madeleine Baez is a 80year old with a history of acute left MCA CVA and SAH (resolved) s/p thrombectomy on 4/23, as well as a known left temporo-occipital meningioma and multiple additional comorbidities  During stroke work-up LORI revealed a mildly deilated LA without thrombus and a 3mm PFO  Patient was started on Elliquis secondary to DVT and embolic stroke of unknown etiology  Stroke alert was called due to encephalopathy with difficulty waking patient  Last known normal was approximately 2 hours prior  VSS with recent BP of 173/82  Labs notable for acute on chronic kidney disease with GFR of 24  On exam, patient wakes and responds with tactile cuing  She states she is unable to open her eyes and does not follow commands, but communicates with family at her baseline level with no evidence of aphasia or dysarthria  She moves all extremities symmetrically   strength is intact bilaterally  Withdrawal to pain is intact and symmetric  Exam does not reveal any acute focal neurologic deficits  Stroke alert canceled  Recommend CTH and toxic metabolic work-up  Call with additional questions

## 2018-05-08 NOTE — PROGRESS NOTES
Progress Note - Cb Schneider 80 y o  female MRN: [id number]    Unit/Bed#: -02 Encounter: [id number]            Subjective:   Per patient slept much better last night     Objective:     ROS  Gen: denies recent wt loss   Psych: denies mood change    Vitals: Blood pressure 126/66, pulse 70, temperature 98 2 °F (36 8 °C), temperature source Oral, resp  rate 18, height 5' 4" (1 626 m), weight 68 kg (149 lb 14 6 oz), SpO2 99 %, not currently breastfeeding      Physical Exam:     Gen:        NAD   Neck:   trachea midline  Lungs:  respirations unlabored   Heart:    + S1 and S2   Abdomen:    Soft, non-tender  Psych: mood/affect appropriate  Neurologic: awake, alert    Functional :  Mobility: min x 2  Tx: min x 2  ADLs: max        Current Facility-Administered Medications:  acetaminophen 650 mg Oral Q6H PRN Krystin Morales MD   apixaban 2 5 mg Oral BID Krystin Morales MD   atorvastatin 80 mg Oral Daily With Edgar Ryan MD   bisacodyl 10 mg Rectal Daily PRN Krystin Morales MD   docusate sodium 100 mg Oral BID Krystin Morales MD   ferrous sulfate 300 mg Oral Daily LuGAYATRI Griffith   metoprolol tartrate 50 mg Oral Q12H [address] Krystin Morales MD   mirtazapine 7 5 mg Oral HS Krystin Morales MD   pantoprazole 40 mg Oral Early Morning Krystin Morales MD   polyethylene glycol 17 g Oral Daily PRN Krystin Morales MD   senna 1 tablet Oral HS Krystin Morales MD         acetaminophen    bisacodyl    polyethylene glycol      Assessment:  Pt is 81 yo female with ICH & bi-hemispheric nonhemorrhagic ischemic strokes s/p thrombectomy by Dr Brianna Araujo on 4/23    Plan:    Rehabilitation: cont PT/OT for ambulatory/ADL dysfxn    ICH & bi-hemispheric nonhemorrhagic ischemic strokes: nonhemorrhagic stroke felt by neurology to be embolic of unclear etiology; per s/p thrombectomy by Dr Brianna Araujo on 4/23, cleared by neurosx for [facility] and per neuro recs started on eliquis 2 5 mg BID (not 5 mg due to age and serum Cr greater than 1 5, tends to fluctuate around this value); home lipitor increased from 40 to 80 mg        Peripheral neuropathy: likely 2/2 to DM, had tried neurontin in the past but did not tolerate      DM: on januvia 100 mg qd at home (recently switched from tradjenta 5 mg qd); currently on ISS     CHF (EF 45%): home torsemide 10 mg qd (and home potassium 10 MEQ BID due to hypokalemia 2/2 to torsemide) both being held due to acute on chronic renal failure likely 2/2 to poor PO; additionally  home toprol XL 50 mg qd switched to lopressor 50 mg q12 in acute care     SSS: s/p pacer, interrogated recently in acute care, no A-fib found at that time (although noted on EKG of 4/23 however pt already on [facility] for CVA and on BB), follows with Dr Yoseph Sharpe (cards) and Dr Salamanca Console (EP)      h/o colon CA: on CT CAP of 4/26 colonic thickening suspicious for lesion noted and colonoscopy recommended, pt undergoes screening colonoscopys with Dr Ethel Meckel, pt to FU with them for FU colonoscopy       Meningioma: seen by neurosx no intervention planned, follows with Dr Piedra Small     PFO (3 mm): unclear if paradoxical stroke from DVT is etiology of CVA (felt to be embolic by neuro) however as neuro has placed pt on [facility] for CVA LE dopplers would not ; t/c closure as OP       s/p MVR: tissue valve     HL: home lipitor increased from 40 mg to 80 mg due to CVA       h/o DVT: home coumadin switched to eliquis for CVA      Insomnia: at home on remeron 15 mg HS was held in acute care due to episodes of sedation/AMS, has not had any during rehab stay therefore will resume at reduced dose and monitor patient as pt continues to have difficulty sleeping at night      non-occlusive carotid dz: per carotid U/S report recommend repeat carotid U/S in 1 year; on [facility] and statin     chronic constipation: per family at baseline 1 BM every 4 to 5 days, IM monitoring and will treat with laxatives as needed      rt ankle pain/swelling: likely sprain; XR showed no acute osseous abnormality     [place] on CKD: baseline 1 4-1 7, currently 1 90 (likely 2/2 to poor PO), torsemide on hold per IM     Hyponatremia: currently WNL      chronic anemia: AOCD/CKD, at home on iron 150 mg qd, cont iron supplementation as inpt; Hg currently stable at 9 5     Hyperphosphatemia per lab reference range at 4 4: however in females 18 year or older 4 5 is ULN      Dispo: reteam    Incidental findings on CT C/A/P of 4/26:  1) multiple pulmonary micronodules: per CT report recommended FU imaging in 3 months for determination of stability  2) pleural thickening  3) uterine fibroid  Other incidental findings:  4) B/L renal cysts  5) 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 no intervention planned, follows with Dr Kiel Tejada  6) EKG abnormalities (PACs, bifascicular block/wide QRS, LAD, prolonged QTc): follows with Dr João Galan (cards) and Dr Minoo Fontana (EP); d/w Dr Reshma Palumbo of [employer] and felt that since EKG was done in the acute setting of stroke and prior QTc's are accpetable likely prolonged QTc is not reflective of patient's QTc in the setting of acute CVA  7) mod-severe TR/elevated peak PA pressure:  follows with Dr João Galan (cards) and Dr Minoo Fontana (EP)

## 2018-05-08 NOTE — PROGRESS NOTES
05/08/18 1000   Pain Assessment   Pain Assessment No/denies pain   Pain Score No Pain   Restrictions/Precautions   Precautions Cognitive;Bed/chair alarms;Multiple lines; Fall Risk   QI: Oral Hygiene   Assistance Needed Supervision   Assistance Provided by [place] No physical assistance   Comment pt wtih improved attention during task  With mirror pt able to comb hair with cueing to brush L side of hair  Pt ablet o complete washing hands and drying hands, independently reaching for dry towel without cueing  Oral Hygiene CARE Score 4   Grooming   Able To Initiate Tasks;Comb/Brush Hair;Wash/Dry Hands; Wash/Dry Face   Grooming (FIM) 5 - Patient requires verbal cues   QI: Shower/Bathe Self   Assistance Needed Physical assistance   Assistance Provided by [place] 25%-49%   Shower/Bathe Self CARE Score 3   Bathing   Assessed Bath Style Sponge Bath   Anticipated D/C Bath Style Tub   Able to Gather/Transport No   Able to [employer] Temperature No   Able to Wash/Rinse/Dry (body part) Left Arm;Right Arm;L Upper Leg;R Upper Leg;Chest;Abdomen;Perineal Area; Buttocks   Limitations Noted in Balance; Coordination; Endurance;Problem Solving; Safety; Sequencing;Strength   Positioning Seated;Standing   Findings  pt unable to shower today because shse was hooked up to IV fluids for increased kreatine levels  Pt compelted sponge bathing in room  Pt iwth improved attention during task and able to sequence for bathing  Pt required assist to wash and dry b/l feet but is able tow elisa lower part of legs, upper legs, and UB without cues for sequencing  Pt independently looked for dry towel and retrieved towel to initate drying body parts  Improved awareness and problem solving during bathing today with improved awareness of L side      Bathing (FIM) 4 - Patient completes 8/10 or 9/10 parts   QI: Upper Body Dressing   Assistance Needed Physical assistance   Assistance Provided by [place] 25%-49%   Comment Pt requires assist to thread Lonne Pares into jacket  Pt requires assist for sequencing threading UE's  Pt able to don pull over shirt independently  Upper Body Dressing CARE Score 3   QI: Lower Body Dressing   Assistance Needed Physical assistance   Assistance Provided by [place] 50%-74%   Comment PT REQUIRES ASSIST TO THREAD RLE/LLE INTO PANTS  PT ABLE TO PULL PANTS UP OVER HIPS WITH STEADYING ASSIST FOR BALANCE   Lower Body Dressing CARE Score 2   QI: Putting On/Taking Off Footwear   Assistance Needed Physical assistance   Assistance Provided by [place] 25%-49%   Comment PT ABLE TO DON SHOE AND SOCK ON L DEJUAN  E PT REQUIRES ASSIST TO DON SHOE ON R side but able to don sock on R with increased time  pt attempting to cross leg over knee but unable to complete unless bending forward at waist    Putting On/Taking Off Footwear CARE Score 3   Dressing/Undressing Clothing   Remove UB Clothes Pullover Shirt   Remove LB Clothes Undergarment   Don UB Clothes Pullover Shirt;Jacket   Don LB Clothes Pants; Undergarment;Socks; Shoes   Limitations Noted In Balance; Coordination;Neglect;Problem Solving; Sequencing   Positioning Supported Sit   UB Dressing (FIM) 3 - Patient completes  50-74% of all tasks   LB Dressing (FIM) 3 - Patient completes  50-74% of all tasks   QI: Sit to Stand   Assistance Needed Physical assistance   Assistance Provided by [place] 25%-49%   Sit to Stand CARE Score 3   QI: Chair/Bed-to-Chair Transfer   Assistance Needed Physical assistance   Assistance Provided by [place] 25%-49%   Chair/Bed-to-Chair Transfer CARE Score 3   Transfer Bed/Chair/Wheelchair   Stand Pivot Minimal Assist   Sit to Stand Minimal   Bed, Chair, Wheelchair Transfer (FIM) 3 - [place] needs to lift, boost or assist to stand OR sit   QI: [address] Needed Physical assistance   Assistance Provided by [place] 50%-74%   Comment pt requires assist to complete hygiene care and incidental assist to pull down pants far enough to prevent soiling      Gregorio Mccray 83 Score 2   Toileting   Able to Pull Clothing down no, up yes  Able to Manage Clothing Closures Yes   Manage Hygiene Bladder   Limitations Noted In Balance; Coordination;Problem Solving; Safety; Sequencing   Toileting (FIM) 2 - Patient completes 25-49% of all tasks   QI: Toilet Transfer   Assistance Needed Physical assistance   Assistance Provided by [place] 25%-49%   Toilet Transfer CARE Score 3   Toilet Transfer   Surface Assessed Standard Commode   Transfer Technique Stand Pivot   Limitations Noted In Balance; Endurance   Findings HHA min A for stand pivot transfer  Boost to rise from toilet  Toilet Transfer (FIM) 3 - [place] needs to lift, boost or assist to stand OR sit   Exercise Tools   Exercise Tools Yes   UE Ergometer 5 minutes prograde with hand over hand technique to improve bimanual tasks and proprioception    Cognition   Overall Cognitive Status Impaired   Arousal/Participation Alert; Cooperative   Attention Difficulty attending to directions   Orientation Level Oriented X4   Memory Decreased long term memory;Decreased short term memory   Following Commands Follows one step commands inconsistently   Assessment   Treatment Assessment Pt engaged iN OT treatment session with ADL routine and fxnl cognition  Pt with improved alertness during today's session as noted by improved ADL scores  Pt with improved awareness of UE's during dressing however as pt becomes fatigued she requires increased cueing for LB dressing  Pt with approved coordiantion with UE's  Pt continues to benefit from redirection to maintain attention on tasks  Continue with POC with focus on [facility], multistep direction follow, LB dressing, dynamic reach below waist     Prognosis Fair   Problem List Decreased strength;Decreased endurance; Impaired balance;Decreased mobility; Decreased coordination; Impaired judgement;Decreased safety awareness; Impaired sensation;Decreased cognition;Decreased range of motion   Plan   Treatment/Interventions ADL retraining;Functional transfer training;LE strengthening/ROM; Therapeutic exercise; Endurance training;Patient/family training;Equipment eval/education;Gait training; Compensatory technique education   Progress Slow progress, cognitive deficits   Recommendation   OT Discharge Recommendation Short Term Rehab  (vs home pending progress)   OT Therapy Minutes   OT Time In 1000   OT Time Out 1130   OT Total Time (minutes) 90   OT Mode of treatment - Individual (minutes) 90   OT Mode of treatment - Concurrent (minutes) 0   OT Mode of treatment - Group (minutes) 0   OT Mode of treatment - Co-treat (minutes) 0   OT Mode of Teatment - Total time(minutes) 90 minutes   Therapy Time missed   Time missed?  No

## 2018-05-08 NOTE — PROGRESS NOTES
05/08/18 1130   Pain Assessment   Pain Assessment No/denies pain   Pain Score No Pain   Restrictions/Precautions   Precautions Cognitive;Aspiration; Fall Risk;Multiple lines   Swallow Information   Current Risks for Dysphagia & Aspiration General debilitation;New Neuro event;Brain injury;Cognitive deficit;HX neurologic dx   Current Symptoms/Concerns Difficulty chewing;Holding food in mouth   Current Diet Dysphagia advance; Thin liquid   Baseline Diet Regular; Thin liquids   Consistencies Assessed and Performance   Materials Admnistered Regular/Solid; Thin liquid   Oral Stage Mild impaired; Moderate impaired   Phargngeal Stage Mild impaired   Swallow Mechanics Mild delayed;Swallow initation; Appears prompt;Aspiration risk   Recommendations   Risk for Aspiration Mild   Diet Solid Recommendation Level 3 Dysphagia/ advanced/ soft to chew   Diet Liquid Recommendation Thin liquid   Recommended Form of Meds Whole;Crushed; With puree   General Precautions Aspiration precautions; Feed only when alert;Minimize distractions;Upright as possible for all oral intake;Remain upright for 45 mins after meals  (OOB for meals )   Compensatory Swallowing Strategies Alternate solids and liquids;Cue for lingual sweep; External pacing   QI: Eating   Assistance Needed Set-up / Rocio Smoke; Verbal cues   Assistance Provided by [place] No physical assistance   Eating CARE Score 4   Swallow Assessment   Swallow Treatment Assessment Pt seen during lunch w/ regular diet trial tray consisting of chicken fingers (pt declined additional regular diet items)  Pt consumed ~25% of chicken fingers, along w/ ~240cc thin liquids by cup sips  Chicken fingers were cut into pieces for pt where pt then required verbal cues to initiate intake of chicken, noting preference for intake of only coffee  Following verbal cues, pt demonstrated ability to self feed w/o anterior loss   Mastication of regular texture chicken fingers ranged from mildly to moderately prolonged in which pt also reported "they're tough" regarding texture and ability to masticate  Delayed and slow a-p transfers noted w/ mild-moderate R side oral residual noted in which pt at times able to clear using liquid wash but also required verbal cues for use of drink  Able to follow verbal cues and directions to complete liquid wash  Swallow initiation of regular solids appeared delayed due to slow oral prep, however, timely across intake of thins by cup  No overt s/s of aspiration noted during meal, however, pt's oral prep became slower and more prolonged as pt fatigued throughout meal, requiring verbal cues to complete a-p transfers  Due to increased fatigue which impacts pt's cognition and safety of swallows, cont to recommend level 3 diet and thin liquids at this time  Will further trial regular diet textures w/ SLP supervision as pt able, appropriate  Swallow Assessment Prognosis   Prognosis Good   Prognosis Considerations Co-morbidities; Medical prognosis;Participation level;New learning ability;Ability to carry over; Cooperation   SLP Therapy Minutes   SLP Time In 1130   SLP Time Out 0259   SLP Total Time (minutes) 50   SLP Mode of treatment - Individual (minutes) 20   SLP Mode of treatment - Concurrent (minutes) 30   SLP Mode of treatment - Group (minutes) 0   SLP Mode of treatment - Co-treat (minutes) 0   SLP Mode of Teatment - Total time(minutes) 50 minutes   Therapy Time missed   Time missed?  No   Daily FIM Score   Eating (FIM) 5 - Patient requires supervision, cueing or coaxing

## 2018-05-08 NOTE — PROGRESS NOTES
Pt's family came to visit patient and stated that she was not herself  She was lethargic, pale and not responding to voice  Blood glucose 112 and vitals stable  Pt still not very responsive  Rapid was called and team came to floor to assess patient  Stroke alert was called  Chest xray, labs, and EKG completed  Pt then transported to CT scan  No significant findings  Dr Soraya Nj and Dr Cem Abraham aware  Will continue to monitor patient  At this time, patient is more alert and awake  Pt communicating with family and has more color

## 2018-05-08 NOTE — RAPID RESPONSE
Progress Note - Rapid Response   Madi Diver Andry 80 y o  female MRN: [id number]    Time Called ( Time): 4:32PM  Date Called: 5/8/18  Level of Care: [facility]  Room#: 458  [id number] Time ( Time): 4:36  Event End Time ( Time): 5:15  Primary reason for call: Acute change in mental status  Interventions:  Airway/Breathing:  No Intervention  Circulation: N/A  Other Treatments: Stroke alert called  Assessment:   3  81 yo female admitted to [facility] s/p mechanical thrombectomy for MCA CVA  On eliquis for L UE DVT  Per nursing, patient had an acute change in mental status  Plan:   · No concern for acute CVA  Notably, the patient has had history of altered mental status throughout this hospitalization which required video EEG monitoring  The monitoring was negative for any seizure-like activity  She was on Keppra briefly, but was discontinued  · Stroke alert order set was completed, with the exception of a CTA secondary to her chronic kidney disease  Her GFR at this morning was noted to be 24  · CT head showed chronic lacunar and right parietal infarcts, but no evidence of acute intracranial hemorrhage  Labs showed a negative troponin, creatinine of 1 67, normal ammonia, fingerstick glucose of 112  CBC with diff is pending  · At this point, the patient can remain in [facility]  The patient is stable, and she does have history of such episodes of altered mental status noted in her chart  Her neurologic exam is not concerning for acute CVA at this time  Recommend ruling out any infectious etiology (CBC with diff pending)  · Please note that pharmacy recommended the patient's Eliquis dose should be 5 mg b i d  for DVT (despite her low GFR and age)  Please evaluate and adjust appropriately  ·  Case discussed with Neurology and Dr Hilary Antonio from PM&R  Family (her 2 daughters) was by bedside         HPI/Chief Complaint (Background/Situation):   Niall Hicks is a 80y o  year old female who presents with with a past medical history left MCA CVA status mechanical thrombectomy, hypertension, CKD stage 3, DVT on Eliquis, chronic systolic CHF, DM II, and PFO who was found by nursing in altered mental status  Upon arrival, the patient was noted to be lethargic, but was responsive to verbal and physical stimuli     The patient was able to follow commands and was moving all 4 extremities  Nonetheless, because of concern of the worsening facial droop and in light of her recent CVA, stroke alert was called  Neurology came to evaluate the patient  A physical exam showed no focal deficits  Patient was noted to be anticoagulated on Eliquis  Historical Information   Past Medical History:   Diagnosis Date    Acid reflux     on occ    Arthritis     DJD right hip replaced    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 lesions with no change    Cancer ([facility] [address] ) 2007    colonic polyps, no surgery done    Deep vein thrombosis (DVT) of left upper extremity ([facility] [address] ) 12/11/2017    Diabetes mellitus ([facility] [address] )     type 2    Diverticulosis     Edema     in legs on occ    Hypertension     on occ    Language barrier     speaks Frisian & broken english    Stroke [facility])      Past Surgical History:   Procedure Laterality Date    COLON SURGERY      COLONOSCOPY      COLONOSCOPY N/A 11/2/2016    Procedure: COLONOSCOPY;  Surgeon: Mark Sinclair MD;  Location: [place] [address] GI LAB; Service:     ESOPHAGOGASTRODUODENOSCOPY N/A 11/2/2016    Procedure: ESOPHAGOGASTRODUODENOSCOPY (EGD); Surgeon: Mark Sinclair MD;  Location: [facility] GI LAB;   Service:     JOINT REPLACEMENT Right 02/2015    hip    JOINT REPLACEMENT Left     knee    JOINT REPLACEMENT Right     knee    WI REVISE MEDIAN N/CARPAL TUNNEL SURG Left 1/28/2016    Procedure: RELEASE CARPAL TUNNEL;  Surgeon: Lux Mayfield MD;  Location: [facility] MAIN OR;  Service: Orthopedics    TUBAL LIGATION      VARICOSE VEIN SURGERY Bilateral      Social History   History   Alcohol Use No Comment: socially     History   Drug Use No     History   Smoking Status    Former Smoker    Packs/day: 0 25    Years: 10 00    Types: Cigarettes    Quit date: 1950   Smokeless Tobacco    Never Used     Family History: non-contributory    Meds/Allergies     Current Facility-Administered Medications:  acetaminophen 650 mg Oral Q6H PRN Joanna Leyva MD   apixaban 2 5 mg Oral BID Joanna Leyva MD   atorvastatin 80 mg Oral Daily With MD Catarino   bisacodyl 10 mg Rectal Daily PRN Joanna Leyva MD   docusate sodium 100 mg Oral BID [place] MD Autumn   ferrous sulfate 300 mg Oral Daily GAYATRI Molina   metoprolol tartrate 50 mg Oral Q12H Shahbaz Campuzano MD   mirtazapine 7 5 mg Oral HS Joanna Leyva MD   pantoprazole 40 mg Oral Early Morning Joanna Leyva MD   polyethylene glycol 17 g Oral Daily PRN Joanna Leyva MD   senna 1 tablet Oral HS Joanna Leyva MD            Allergies   Allergen Reactions    Heparin        ROS: Negative    Physical Exam:  Gen:No acute distress  Resp  HEENT:Opened eyes to verbal command  Neck:soft  Chest:CTA bl  Cor:RRR  Abd:soft, nondistended  Ext:moving all extremities  Neuro:slight facial droop, 5/5 strength throughout, responding to verbal command, spontaneously moves all extremities  Skin:negative      Intake/Output Summary (Last 24 hours) at 05/08/18 1738  Last data filed at 05/08/18 1223   Gross per 24 hour   Intake             1690 ml   Output              975 ml   Net              715 ml       Respiratory    Lab Data (Last 4 hours)    None         O2/Vent Data (Last 4 hours)    None              Invasive Devices     Peripheral Intravenous Line            Peripheral IV 05/07/18 Right Wrist 1 day    Peripheral IV 05/08/18 Right Antecubital less than 1 day                DIAGNOSTIC DATA:    Lab: I have personally reviewed pertinent lab results     CBC:     Results from last 7 days  Lab Units 05/08/18  1704 05/07/18  0647   WBC Thousand/uL  --  5 27   HEMOGLOBIN g/dL  -- 9 5*   I STAT HEMOGLOBIN g/dl 9 2*  --    HEMATOCRIT %  --  29 0*   PLATELETS Thousands/uL  --  196     CMP:     Results from last 7 days  Lab Units 05/08/18  1704 05/08/18  0518 05/07/18  0728 05/03/18  0724   SODIUM mmol/L  --  136 135* 136   POTASSIUM mmol/L  --  3 9 3 7 3 8   CHLORIDE mmol/L  --  102 96* 100   CO2 mmol/L  --  28 30 28   BUN mg/dL  --  58* 55* 40*   CREATININE mg/dL  --  1 90* 1 99* 1 50*   CALCIUM mg/dL  --  9 3 9 8 9 7   GLUCOSE RANDOM mg/dL  --  115 115 106   GLUCOSE, ISTAT mg/dl 103  --   --   --      PT/INR:   Lab Results   Component Value Date    INR 1 17 (H) 05/08/2018   ,   Magnesium: No results found for: MAG,   Phosphorous: No results found for: PHOS    Microbiology:  Lab Results   Component Value Date    URINECX <10,000 cfu/ml  02/02/2018    URINECX No Growth <1000 cfu/mL 10/16/2017    URINECX 20,000-29,000 cfu/ml Mixed Contaminants X3 09/26/2017         OUTCOME:   Stayed in room   Family member contacted: family was by bedside  Code Status: Level 1 - Full Code

## 2018-05-08 NOTE — PROGRESS NOTES
Internal Medicine Progress Note  Patient: Marbella Li  Age/sex: 80 y o  female  Medical Record #: [id number]      ASSESSMENT/PLAN:  Marbella Li is seen and examined and management for following issues:    1  Lt MCA CVA s/p mechanical thrombectomy: Continue Eliquis and atorvastatin for secondary prevention  [facility] Neurology follow-up  2   HTN: stable; continue Lopressor 50mg every 12 hours  3   CHADWICK/CKD stage III; baseline Cr 1 5: stable; will monitor  On admit to hospital 4/18 had creat 2 0 and Torsemide was held/fluids given;  yesterday up to 1 99 = stopped Torsemide/KCL and gave 1 liter NSS = will give another liter today since creat still 1 90  BMP in AM  Will check PVRs to make sure that is not the issue    4  Chronic systolic CHF/LVEF 04%; moderate TR; bio MVR (normal function):  holding torsemide 10mg daily/Kdur 10 meq BID  Chest is clear/no edema  5  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): Eliquis 2 5mg BID 2/2 age and renal status (switched from Coumadin in the hospital so NOAC is new)  6   DM type 2: On linagliptan 2 5mg at home  It was planned for pt to change to Januvia 100mg daily as an OP per the daughter  Will start Januvia when needed but so far BS controlled w/o tx likely 2/2 fact she is not eating much = did stop Accuchecks for now and watch FBSs but if she starts eating better will resume Accuchecks  Continue DM diet  Fasting today 5/7/18 and 5/8/18 = 115    7  Acute blood loss anemia: s/p transfusion after EBL from thrombectomy of 500-1000ml from femoral site  Stable  8   PFO:  Small  Seen on LORI  [facility] Cardiology follow-up  9   History of colon CA: Colonic lesion seen on CT  Will need GI follow-up    10  Hx PPM    11  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    12  Poor appetite: not eating very well  Also, question depression  Remeron 7 5mg qhs added yesterday by primary service    Apparently, she was on Remeron 15mg at home      Subjective: Patient seen and examined   Offers no complaints     ROS:   GI: denies abdominal pain, change bowel habits or reflux symptoms  Neuro: No new neurologic changes  Respiratory: No Cough, SOB  Cardiovascular: No CP, palpitations     Scheduled Meds:    Current Facility-Administered Medications:  acetaminophen 650 mg Oral Q6H PRN Delia Phoenix MD   apixaban 2 5 mg Oral BID Delia Phoenix MD   atorvastatin 80 mg Oral Daily With Laverne Alcaraz MD   bisacodyl 10 mg Rectal Daily PRN Delia Phoenix MD   docusate sodium 100 mg Oral BID Delia Phoenix MD   ferrous sulfate 300 mg Oral Daily GAYATRI Childs   metoprolol tartrate 50 mg Oral Q12H [address] Delia Phoenix MD   mirtazapine 7 5 mg Oral HS Delia Phoenix MD   pantoprazole 40 mg Oral Early Morning Delia Phoenix MD   polyethylene glycol 17 g Oral Daily PRN Delia Phoenix MD   senna 1 tablet Oral HS Delia Phoenix MD       Labs:       Results from last 7 days  Lab Units 05/07/18  0647 05/03/18  0659   WBC Thousand/uL 5 27 5 01   HEMOGLOBIN g/dL 9 5* 9 3*   HEMATOCRIT % 29 0* 28 5*   PLATELETS Thousands/uL 196 162       Results from last 7 days  Lab Units 05/08/18  0518 05/07/18  0728   SODIUM mmol/L 136 135*   POTASSIUM mmol/L 3 9 3 7   CHLORIDE mmol/L 102 96*   CO2 mmol/L 28 30   BUN mg/dL 58* 55*   CREATININE mg/dL 1 90* 1 99*   GLUCOSE RANDOM mg/dL 115 115   CALCIUM mg/dL 9 3 9 8                  Glucose (mg/dL)   Date Value   05/08/2018 115   05/07/2018 115   05/03/2018 106   04/30/2018 114   12/23/2015 114 (H)   02/22/2015 101   02/21/2015 103   02/20/2015 116     Glucose, i-STAT (mg/dl)   Date Value   04/23/2018 106   04/23/2018 96       Labs reviewed    Physical Examination:  Vitals:   Vitals:    05/07/18 1318 05/07/18 2017 05/08/18 0515 05/08/18 0819   BP: 100/52 133/52 136/63 120/70   BP Location: Right arm Right arm Right arm    Pulse: 78 74 68 88   Resp: 18 18 18    Temp: 97 7 °F (36 5 °C) (!) 97 4 °F (36 3 °C) 98 °F (36 7 °C)    TempSrc: Oral Oral Oral    SpO2: 98% 96% 98%    Weight: 68 kg (149 lb 14 6 oz)    Height:           GEN: NAD  HEENT: NC/AT  RESP: BBS w/o crackles/wheeze/rhonci; resp unlabored  CV: +S1 S2, regular rate, no rubs/murmurs  ABD: soft, NT, ND, normal BS   : no schaefer  EXT: no edema  Skin: no rashes  Neuro: AAO; WOODARD 5/5 but difficult to get her to coordinate UE strength testing 2/2 some involuntary movements with left arm and inability to consistently follow commands; face symmetric; speech clear  [x ] Total time spent: 30 Mins and greater than 50% of this time was spent counseling/coordinating care        Corine Castano, [address]  Internal Medicine

## 2018-05-08 NOTE — PROGRESS NOTES
Brief PN:    -CVA alert around 5PM today for altered mental status change per RN  Family noticed increased droop, slurring of words, and [address] by Radiology of stat [facility] results which were NEGATIVE for hemorrhage or other new findings  Labs CXR EKG also done, but without new findings  -Patient clinically improved when returned from CT scan      Plan:  -Appreciate Neuro input  -Patient to remain on ARC given complete resolution of earlier symptoms   -Continue NOAC  -Neuro/VS checks Q4h  -Spoke to family personally  -Notfied Dr Neal Lugo PM&R of events  -RN to notify on call doc if any further changes    Elmer Hernandez MD  PM&R

## 2018-05-09 LAB
ANION GAP SERPL CALCULATED.3IONS-SCNC: 7 MMOL/L (ref 4–13)
ATRIAL RATE: 81 BPM
BUN SERPL-MCNC: 41 MG/DL (ref 5–25)
CALCIUM SERPL-MCNC: 9.4 MG/DL (ref 8.3–10.1)
CHLORIDE SERPL-SCNC: 106 MMOL/L (ref 100–108)
CO2 SERPL-SCNC: 26 MMOL/L (ref 21–32)
CREAT SERPL-MCNC: 1.27 MG/DL (ref 0.6–1.3)
GFR SERPL CREATININE-BSD FRML MDRD: 39 ML/MIN/1.73SQ M
GLUCOSE SERPL-MCNC: 113 MG/DL (ref 65–140)
P AXIS: 48 DEGREES
POTASSIUM SERPL-SCNC: 3.9 MMOL/L (ref 3.5–5.3)
PR INTERVAL: 102 MS
QRS AXIS: -47 DEGREES
QRSD INTERVAL: 134 MS
QT INTERVAL: 414 MS
QTC INTERVAL: 480 MS
SODIUM SERPL-SCNC: 139 MMOL/L (ref 136–145)
T WAVE AXIS: 45 DEGREES
VENTRICULAR RATE: 81 BPM

## 2018-05-09 PROCEDURE — 97535 SELF CARE MNGMENT TRAINING: CPT

## 2018-05-09 PROCEDURE — 97116 GAIT TRAINING THERAPY: CPT

## 2018-05-09 PROCEDURE — 99233 SBSQ HOSP IP/OBS HIGH 50: CPT | Performed by: PHYSICAL MEDICINE & REHABILITATION

## 2018-05-09 PROCEDURE — 97112 NEUROMUSCULAR REEDUCATION: CPT

## 2018-05-09 PROCEDURE — 97530 THERAPEUTIC ACTIVITIES: CPT

## 2018-05-09 PROCEDURE — 92526 ORAL FUNCTION THERAPY: CPT

## 2018-05-09 PROCEDURE — 93010 ELECTROCARDIOGRAM REPORT: CPT | Performed by: INTERNAL MEDICINE

## 2018-05-09 PROCEDURE — 80048 BASIC METABOLIC PNL TOTAL CA: CPT | Performed by: NURSE PRACTITIONER

## 2018-05-09 RX ADMIN — PANTOPRAZOLE SODIUM 40 MG: 40 TABLET, DELAYED RELEASE ORAL at 06:07

## 2018-05-09 RX ADMIN — MINERAL SUPPLEMENT IRON 300 MG / 5 ML STRENGTH LIQUID 100 PER BOX UNFLAVORED 300 MG: at 07:55

## 2018-05-09 RX ADMIN — APIXABAN 2.5 MG: 2.5 TABLET, FILM COATED ORAL at 07:56

## 2018-05-09 RX ADMIN — DOCUSATE SODIUM 100 MG: 100 CAPSULE, LIQUID FILLED ORAL at 16:45

## 2018-05-09 RX ADMIN — ACETAMINOPHEN 650 MG: 325 TABLET, FILM COATED ORAL at 18:54

## 2018-05-09 RX ADMIN — SENNOSIDES 8.6 MG: 8.6 TABLET, FILM COATED ORAL at 21:01

## 2018-05-09 RX ADMIN — ATORVASTATIN CALCIUM 80 MG: 40 TABLET, FILM COATED ORAL at 16:45

## 2018-05-09 RX ADMIN — METOPROLOL TARTRATE 50 MG: 50 TABLET ORAL at 21:01

## 2018-05-09 RX ADMIN — DOCUSATE SODIUM 100 MG: 100 CAPSULE, LIQUID FILLED ORAL at 07:56

## 2018-05-09 RX ADMIN — METOPROLOL TARTRATE 50 MG: 50 TABLET ORAL at 07:56

## 2018-05-09 RX ADMIN — APIXABAN 2.5 MG: 2.5 TABLET, FILM COATED ORAL at 16:45

## 2018-05-09 NOTE — PROGRESS NOTES
Internal Medicine Progress Note  Patient: Michelle Pacheco  Age/sex: 80 y o  female  Medical Record #: [id number]      ASSESSMENT/PLAN:  Michelle Pacheco is seen and examined and management for following issues:    1  Lt MCA CVA s/p mechanical thrombectomy: Continue Eliquis 2 5mg BID (keep dose same = d/w Dr Chuck Goldsmith) and atorvastatin for secondary prevention  45 [facility] follow-up  Had a RR yesterday for change in MS with being found lethargic = CTH no change, VSS; neuro saw and recommended no changes and signed off  Now this AM, she is back to her usual self  Possibly was just very tired, not eating well, CHADWICK/CKD and also drinks 5 cups coffee every day at home which she has not been getting  Not sure if issue is the HS Remeron is the issue since she takes double the dose at home but primary service is going to stop it and see if any change    2  HTN: stable; continue Lopressor 50mg every 12 hours  3   CHADWICK/CKD stage III; baseline Cr 1 5: On admit to hospital 4/18 had creat 2 0 and Torsemide was held/fluids given; On 5/7 up to 1 99 = stopped Torsemide/KCL and gave 1 liter NSS = yesterday gave another liter since creat still 1 90 = today is 1 27  Checking PVRs to make sure that is not the issue = last 2 were actually done as PVRs and were not significant  4   Chronic systolic CHF/LVEF 95%; moderate TR; bio MVR (normal function):  holding torsemide 10mg daily/Kdur 10 meq BID  Chest is clear/no edema  5  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): Eliquis 2 5mg BID 2/2 age and renal status (switched from Coumadin in the hospital so NOAC is new)  6   DM type 2: On linagliptan 2 5mg at home  It was planned for pt to change to Januvia 100mg daily as an OP per the daughter  Will start Januvia when needed but so far BS controlled w/o tx likely 2/2 fact she is not eating much = did stop Accuchecks for now and watch FBSs but if she starts eating better will resume Accuchecks    Continue DM diet  Fasting today 5/7/18 and 5/8/18 = 115, today 113 (last evening at 1742 drawn for rapid response was 96)  7   Acute blood loss anemia: s/p transfusion after EBL from thrombectomy of 500-1000ml from femoral site  Stable  8   PFO:  Small  Seen on LORI  45 [facility] follow-up  9   History of colon CA: Colonic lesion seen on CT  Will need GI follow-up    10  Hx PPM    11  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    12  Poor appetite: not eating very well  Also, question depression  Remeron 7 5mg qhs added 5/7/18 by primary service and is being stopped now  Apparently, she was on Remeron 15mg at home      Subjective: Patient seen and examined   Offers no complaints     ROS:   GI: denies abdominal pain, change bowel habits or reflux symptoms  Neuro: No new neurologic changes  Respiratory: No Cough, SOB  Cardiovascular: No CP, palpitations     Scheduled Meds:    Current Facility-Administered Medications:  acetaminophen 650 mg Oral Q6H PRN Joanna Leyva MD   apixaban 2 5 mg Oral BID Joanna Leyva MD   atorvastatin 80 mg Oral Daily With Tacho Vincent MD   bisacodyl 10 mg Rectal Daily PRN Joanna Leyva MD   docusate sodium 100 mg Oral BID Joanna Leyva MD   ferrous sulfate 300 mg Oral Daily GAYATRI Hansen   metoprolol tartrate 50 mg Oral Q12H [address] Joanna Leyva MD   mirtazapine 7 5 mg Oral HS Joanna Leyva MD   pantoprazole 40 mg Oral Early Morning Joanna Leyva MD   polyethylene glycol 17 g Oral Daily PRN Joanna Leyva MD   senna 1 tablet Oral HS Joanna Leyva MD       Labs:       Results from last 7 days  Lab Units 05/08/18  1958 05/08/18  1704 05/07/18  0647   WBC Thousand/uL 5 26  --  5 27   HEMOGLOBIN g/dL 9 9*  --  9 5*   I STAT HEMOGLOBIN g/dl  --  9 2*  --    HEMATOCRIT % 31 6*  --  29 0*   PLATELETS Thousands/uL 170  --  196       Results from last 7 days  Lab Units 05/09/18  0451 05/08/18  1742   SODIUM mmol/L 139 137   POTASSIUM mmol/L 3 9 4 2   CHLORIDE mmol/L 106 102   CO2 mmol/L 26 28   BUN mg/dL 41* 51*   CREATININE mg/dL 1 27 1 67*   GLUCOSE RANDOM mg/dL 113 96   CALCIUM mg/dL 9 4 9 8           Results from last 7 days  Lab Units 05/08/18  1728   INR  1 17*          Glucose (mg/dL)   Date Value   05/09/2018 113   05/08/2018 96   05/08/2018 115   05/07/2018 115   12/23/2015 114 (H)   02/22/2015 101   02/21/2015 103   02/20/2015 116     Glucose, i-STAT (mg/dl)   Date Value   05/08/2018 103   04/23/2018 106   04/23/2018 96       Labs reviewed    Physical Examination:  Vitals:   Vitals:    05/08/18 2001 05/09/18 0013 05/09/18 0442 05/09/18 0600   BP: 120/56 112/72 132/58    BP Location: Right arm Right arm Right arm    Pulse: 76 70 71    Resp: 18 16 18    Temp: (!) 97 4 °F (36 3 °C)  98 3 °F (36 8 °C)    TempSrc: Oral  Oral    SpO2: 96% 97% 95%    Weight:   64 kg (141 lb 1 5 oz) 64 kg (141 lb 1 5 oz)   Height:           GEN: NAD  HEENT: NC/AT  RESP: BBS w/o crackles/wheeze/rhonci; resp unlabored  CV: +S1 S2, regular rate, no rubs/murmurs  ABD: soft, NT, ND, normal BS   : no schaefer  EXT: no edema  Skin: no rashes  Neuro: AAO; WOODARD 5/5 but difficult to get her to coordinate UE strength testing 2/2 some involuntary movements with left arm and inability to consistently follow commands; face symmetric; speech clear  [x ] Total time spent: 30 Mins and greater than 50% of this time was spent counseling/coordinating care        Porter Alvarez  Internal Medicine

## 2018-05-09 NOTE — TEAM CONFERENCE
Acute RehabilitationTeam Conference Note  Date: 5/9/2018   Time: 11:09 AM       Patient Name:  Maki Rodriguez       Medical Record Number: [id number]   YOB: 1933  Sex: Female          Room/Bed:  [facility]0/[facility]0-02  Payor Info:  Payor: Suzie Zhusavannah / Plan: MEDICARE A AND B / Product Type: Medicare A & B Fee for Service /      Admitting Diagnosis: CVA (cerebral vascular accident) ([place] [address] ) [I63 9]   Admit Date/Time:  4/30/2018  1:56 PM  Admission Comments: No comment available     Primary Diagnosis:  History of ischemic right MCA stroke  Principal Problem: History of ischemic right MCA stroke    Patient Active Problem List    Diagnosis Date Noted    History of ischemic right MCA stroke 04/23/2018    CVA (cerebral vascular accident) ([place] [address] ) 04/18/2018    Controlled type 2 diabetes mellitus, without long-term current use of insulin ([place] [address] ) 04/18/2018    Pacemaker 04/18/2018    Acid reflux 04/18/2018    HTN (hypertension) 04/18/2018    Constipation 04/18/2018    CHADWICK (acute kidney injury) ([place] [address] ) 71/23/0535    Systolic congestive heart failure ([place] [address] ) 04/18/2018    H/O mitral valve replacement 03/01/2018    Deep vein thrombosis (DVT) of left upper extremity ([place] [address] ) 01/25/2018       Physical Therapy:    Weight Bearing Status: Full Weight Bearing  Transfers: Minimal Assistance, Moderate Assistance  Bed Mobility: Moderate Assistance  Amulation Distance (ft): 150 feet  Ambulation: Minimal Assistance  Assistive Device for Ambulation: Hand Hold Assistance  Number of Stairs: 12  Assistive Device for Stairs: Bilateral Hand Rails  Stair Assistance: Moderate Assistance  Discharge Recommendations: Home with:  [address] with[de-identified] 24 Hour Supervision, Family Support    5/8/18:  Pt cont to demonstrate deficits in cognition which affects problem solving and task perseveration   Pt currently needs Min Ax1 for transfers and functional mobility as pt cont to demonstrate inconsistencies with balance, tends to lean L when walking and standing, and inconsistencies with sequencing; pt ambulates with HHA x1 on R  Pt would benefit from cont skilled PT to progress ability to safely perform functional tasks and mobility; pt will need 24/7 S upon d/c for safety  Occupational Therapy:  Grooming: Supervision  Bathing: Minimal Assistance  Bathing: Minimal Assistance  Upper Body Dressing: Moderate Assistance  Lower Body Dressing: Moderate Assistance  Toileting: Maximum Assistance  Tub/Shower Transfer: Maximum Assistance  Toilet Transfer: Moderate Assistance  Cognition: Exceptions to WNL  Cognition: Decreased Memory, Decreased Executive Functions, Decreased Attention, Decreased Comprehension, Decreased Safety, Impulsive  Orientation: Person, Place, Time, Situation  Discharge Recommendations: Other  DC Home with[de-identified]  (subacute rehab)       Pt presents s/p CVA  Pt demonstrates difficulty with fxnl cognition affecting motor planning and sequence for common ADL tasks  Pt does present with improved attention at times but when fatigued pt requires extensive multimodal cueing  Pt with improvements in initiation of tasks  Pt requires cueing during ADLs for throughness and for sequencing  Pt currently still using HHA for fxnl transfers and fxnl mobility due to decreased corodination with RW and poor motor planning  Pt with improvements of use of b/l UE's during fxnl tasks but difficulty with R and L discrimination  Again as pt becomes fatigued, initiating tasks with b/l UE's becomes more difficult  Pt would benefit from continued OT to maximize independence and safety and to improve motor coordination of b/l UE's, improve balance, improve ADL participation and improve fxnl transfers to decrease burden of care      Speech Therapy:  Mode of Communication: Verbal  Speech/Language: Dysarthia  Cognition: Exceptions to WNL  Cognition: Decreased Memory, Decreased Executive Functions, Decreased Attention, Decreased Comprehension, Decreased Safety  Orientation: Person, Time  Swallowing: Exceptions to WNL  Swallowing: Oral Dysphagia, Pharyngeal Dysphagia, Aspiration Risk  Diet Recommendations: Level 3/Denture Soft, Thin  Discharge Recommendations: Home with:  DC Home with[de-identified] 24 Hour Supervision, 24 Hour Assisteance, Family Support, Outpatient Speech Therapy  Pt completed portions of informal cognitive linguistic and speech/language assessments where pt found to be presenting w/ moderate to severe deficits, characterized by decreased comprehenison, word finding difficulties, decreased problem solving/reasoning, decreased sequencing, and decreased STM and LTM recall  Pt also demonstrating decreased insight into deficits, along w/ deficits in safety awareness and judgement  Pt also assessed for swallow function where pt presenting w/ overall mild oropharygneal dysphagia characterized by prolonged mastication, slow a-p transfers and mildly delayed initiation of swallows  Currently recommending level 3 diet and thin liquids, however, pt will benefit from skilled ST intervention to maximize swallow skills and safety achieve baseline diet of regular/thins  Pt will also benefit from skilled ST intervention to maximize functional cognitive linguistic and communication skills at this time  Update 5/8/2018: Pt making slow progress towards goals at this time where pt continues to present w/ overall decreased cognitive linguistic skills  At this time, pt's cognition consistently fluctuating throughout day and from day to day, impacting pt's current progress towards goals  Pt presenting w/ deficits in attention, STM recall, problem solving/reasoning, sequencing skills, comprehension, and judgement  Pt also presenting w/ decreased activity tolerance and cognitive fatigue impacting overall independence  Pt benefits from verbal cueing, along w/ rephrasing and repetition to improve comprehension and recall   Regarding swallow abilities, pt continues to present w/ mild oropharyngeal dysphagia characterized by prolonged mastication, slow and incomplete a-p transfers, decreased bolus formation and delayed initiation of swallows  Pt is currently tolerating a level 3 diet w/ thin liquids  While pt has trialed regular diet items, pt has taken small amts and is demonstrating moderately prolonged oral prep skills, requiring verbal cues to complete oral clearance  Of note, pt's cognition noted to impact pt's current swallow function  Pt will benefit from skilled ST intervention at this time to maximize functional cognitive linguistic skills and to maximize current swallow abilities to achieve safest and least restrictive diet  Nursing Notes:  Appetite: Fair  Diet Type: Diabetic                                                   Bladder: 1 - Total Assistance        Bowel: 5 - Supervision              Pain Score: 0                          Pain Patient/Family Education: Yes  Medication Management/Safety  Safe Administration: Yes  Medication Patient/Family Education Complete: Yes    Pt is 81 yo female with ICH & bi-hemispheric nonhemorrhagic ischemic strokes s/p thrombectomy 4/23  Currently on Lopressor for HTN, and Eliquis for CVA and DVT  Blood sugar checks and diabetic medication discontinued  Is primarily [place] speaking  Incontinent of bladder but continent of bowel  Chronic constipation, only having BM 1-2x week  Currently no pain issues  5/8 Rapid response was called for change in mental status; stroke ruled out and pt remained on unit  5/7 &5/8 Pt received 1L  NSS  This week we will  Continue to monitor vitals and labs  We will check nuero and assess for issues  We will work on maintaining intergrity of skin and preventing skin breakdown  We will work with pt on bladder management  We will work on safety awareness and prevent falls      Case Management:     Discharge Planning  Goal Length of Stay: 9  Living Arrangements: Other (Comment)  Support Systems: Children  Assistance Needed: unknown  Type of Current Residence: Nursing home  [address] Name: will need to ask daughter  Current Home Care Services: No  Pt is participating with therapy but will need longer term rehabilitation  Family aware and in agreement  They provided choices for contd skilled services  Referral process to begin to secure a bed  Following to assist w/transition to a skilled facility  Is the patient actively participating in therapies? yes  List any modifications to the treatment plan:     Barriers Interventions   Perseverates, comprehension, left side attention Speech therapy exercises, cueing to the left   sleepy remiron started (1/2 home dose)   Dysphagia, pocketing Speech therapy, modfied diet             Is the patient making expected progress toward goals? no  List any update or changes to goals:     Medical Goals: Patient will be medically stable for discharge to [facility] upon completion of rehab program and Patient will be able to manage medical conditions and comorbid conditions with medications and follow up upon completion of rehab program    Weekly Team Goals:   Rehab Team Goals  ADL Team Goal: Patient will require supervision with ADLs with least restrictive device upon completion of rehab program  Bowel/Bladder Team Goal: Patient will maximize level for bladder/bowel management and educate family/caregiver to decrease burden of care  Transfer Team Goal: Patient will require supervision with transfers with least restrictive device upon completion of rehab program  Locomotion Team Goal: Patient will require supervision with locomotion with least restrictive device upon completion of rehab program  Cognitive Team Goal: Patient will require assist for basic cognitive tasks upon completion of rehab program    Discussion: in attendance to review pts progress is rn pt ot slp cm and physician  Pt is making very slow progress but continues to participate with therapy   Family is supportive and aware recommendations are for contd subacute rehab on dc  Team feels pt's stay should be maximized to increase functional ability       Anticipated Discharge Date:  reteam

## 2018-05-09 NOTE — PROGRESS NOTES
05/09/18 1000   Pain Assessment   Pain Assessment No/denies pain   Pain Score No Pain   Transfer Bed/Chair/Wheelchair   Limitations Noted In Balance; Coordination   Stand Pivot Minimal Assist   Sit to Stand Minimal   Stand to Sit Supervision   Supine to Sit Moderate Assist   Findings Pt requires cues for L UE push on bedside  pt is reaching for blanket, and demo decreased attention to task   Bed, Chair, Wheelchair Transfer (FIM) 3 - [place] needs to lift, boost or assist to stand OR sit   Assessment   Treatment Assessment Pt is awoke from sleep  attempts to see her function after being aoke from sleep  pt requires increased assist to complete sup-sit to EOM  Pt requires MOD A for funcitonal transfer despite sleepiness  Pt then compeltes bed mobilty to sleep on her side  Requires ROSA to get in position on R side  pt continues to benefit from skilled OT services at this time to increase overall funcitonal [place] and safety w/ ADls  Prognosis Fair   Problem List Decreased endurance;Decreased mobility; Decreased coordination;Decreased cognition; Impaired judgement;Decreased safety awareness; Impaired vision   Plan   Treatment/Interventions ADL retraining;Functional transfer training;LE strengthening/ROM; Therapeutic exercise; Endurance training;Patient/family training;Cognitive reorientation;Equipment eval/education   Progress Slow progress, cognitive deficits   Recommendation   OT Discharge Recommendation 24 hour supervision/assist   OT Therapy Minutes   OT Time In 1000   OT Time Out 1015   OT Total Time (minutes) 15   OT Mode of treatment - Individual (minutes) 15   OT Mode of treatment - Concurrent (minutes) 0   OT Mode of treatment - Group (minutes) 0   OT Mode of treatment - Co-treat (minutes) 0   OT Mode of Teatment - Total time(minutes) 15 minutes

## 2018-05-09 NOTE — PROGRESS NOTES
05/09/18 0745   Pain Assessment   Pain Assessment No/denies pain   Pain Score No Pain   Restrictions/Precautions   Precautions Cognitive;Aspiration;Bed/chair alarms; Fall Risk;Multiple lines;Supervision on toilet/commode   Swallow Information   Current Risks for Dysphagia & Aspiration General debilitation;New Neuro event;Brain injury;Cognitive deficit;HX neurologic dx   Current Symptoms/Concerns Difficulty chewing;Holding food in mouth   Current Diet Dysphagia advance; Thin liquid   Baseline Diet Regular; Thin liquids   Consistencies Assessed and Performance   Materials Admnistered Soft/Level 3;Regular/Solid; Thin liquid   Oral Stage Mild impaired; Moderate impaired   Phargngeal Stage Mild impaired   Swallow Mechanics Mild delayed;Swallow initation; Appears prompt;Aspiration risk   Recommendations   Risk for Aspiration Mild   Diet Solid Recommendation Level 3 Dysphagia/ advanced/ soft to chew   Diet Liquid Recommendation Thin liquid   Recommended Form of Meds Whole;Crushed; With puree   General Precautions Aspiration precautions; Feed only when alert;Minimize distractions;Upright as possible for all oral intake;Remain upright for 45 mins after meals; Supervision with meals  (OOB for meals )   Compensatory Swallowing Strategies Alternate solids and liquids;Cue for lingual sweep; External pacing; Check for pocketing of food on the right   Results Reviewed with RN   QI: Eating   Assistance Needed Set-up / clean-up   Assistance Provided by [place] No physical assistance   Eating CARE Score 5   Swallow Assessment   Swallow Treatment Assessment Pt seen during bfast for ongoing dysphagia tx where pt consumed 75% of regular diet trial tray consisting of fresh fruit, frosted mini wheats, and a cheese omelet, along w/ ~180cc thin liquids by cup sips  Of note, pt w/ stroke alert called last evening aruond 1700 where pt was noted to have decreased alertness and decreased verbal responding  No new findings from labs, CT of head or xrays  Pt seen sitting upright in w/c and noted to be more alert/awake in comparison to session completed during lunch on 5/8  Following assist w/ tray set up and increased wait time to initiate meal, pt demonstrated ability to self feed w/ appropriate bite sizes and pacing  While mastication was effective in bolus breakdown, pt's mastication fluctuated from mildly prolonged (cereal, omelet) to moderately prolonged (fresh fruit)  Slower and incomplete a-p transfers observed w/ consistent mild R side oral residual to which pt eventually cleared given increased time and independent use of liquid wash  Swallows of thins appeared prompt and mildly delayed across regular solids, noting piecemeal transfers and swallows of solids  Overall, pt appeared to tolerate regular diet items suspecting correlation to current cognition  While pt demonstrated improved oral and pharyngeal swallows skills w/ trials of upgrades, will cont to recommend level 3 diet and thin liquids at this time due to fluctuating cognition which impacts pt's current swallow safety  Will further trial diet upgrades w/ SLP only as pt able, appropriate  Swallow Assessment Prognosis   Prognosis Good   Prognosis Considerations Co-morbidities; Medical prognosis;Participation level;New learning ability;Ability to carry over; Cooperation   SLP Therapy Minutes   SLP Time In 0745   SLP Time Out 0830   SLP Total Time (minutes) 45   SLP Mode of treatment - Individual (minutes) 45   SLP Mode of treatment - Concurrent (minutes) 0   SLP Mode of treatment - Group (minutes) 0   SLP Mode of treatment - Co-treat (minutes) 0   SLP Mode of Teatment - Total time(minutes) 45 minutes   Therapy Time missed   Time missed?  No   Daily FIM Score   Eating (FIM) 5 - Patient needs help to open contianers or set up tray

## 2018-05-09 NOTE — SOCIAL WORK
Met w/pt's dtr Harry Youngblood and [address] [place] [address] and reviewed team update, informed that skilled nursing rehab is still being recommended at the time of dc  They recognize pt's progress and are in agreement with plan  Following to assist w/dc planning needs

## 2018-05-09 NOTE — PROGRESS NOTES
05/09/18 1431   Pain Assessment   Pain Score No Pain   Restrictions/Precautions   Precautions Aspiration;Bed/chair alarms; Fall Risk;Supervision on toilet/commode  (L limb alert)   Cognition   Arousal/Participation Alert  (sleepy towards the end of session )   Attention Attends with cues to redirect   Memory Decreased short term memory;Decreased recall of recent events;Decreased recall of precautions   Following Commands Follows one step commands with increased time or repetition   Subjective   Subjective Pt  is more awake this session and is agreeable to participate in PT    QI: Sit to Stand   Assistance Needed Incidental touching   Sit to Stand CARE Score 4   QI: Chair/Bed-to-Chair Transfer   Assistance Needed Incidental touching   Chair/Bed-to-Chair Transfer CARE Score 4   Transfer Bed/Chair/Wheelchair   Limitations Noted In Balance; Coordination; Endurance;Problem Solving;LE Strength   Adaptive Equipment Hand Hold   Sit to [employer]   Stand to [employer], Chair, Wheelchair Transfer (FIM) 4 - Patient requires steadying assist or light touching   QI: Walk 10 Feet   Assistance Needed Physical assistance   Assistance Provided by [place] Less than 25%   Walk 10 Feet CARE Score 3   QI: Walk 50 Feet with Two Turns   Assistance Needed Physical assistance   Assistance Provided by [place] Less than 25%   Walk 50 Feet with Two Turns CARE Score 3   QI: Walk 150 Feet   Reason if not Attempted Safety concerns   Walk 150 Feet CARE Score 88   QI: Walking 10 Feet on Uneven Surfaces   Reason if not Attempted Safety concerns   Walking 10 Feet on Uneven Surfaces CARE Score 88   Ambulation   Does the patient walk? 2  Yes   Primary Discharge Mode of Locomotion Walk   Walk Assist Level Minimum Assist   Gait Pattern Inconsistant Cathryn;Decreased foot clearance; Improper weight shift   Assist Device Hand Hold   Distance Walked (feet) 75 ft  (X 2 )   Limitations Noted In Coordination; Endurance; Safety   Walking (FIM) 2 - Patient ambulates between 50 - 149 feet regardless of assist/device/set up   Wheelchair mobility   QI: Does the patient use a wheelchair? 0  No   Assessment   Treatment Assessment Pt  participated with PT/ OT co treat session with PT focusing on safe ambulation in the hallway while OT is instructing patient to retrieve items and placing them appropriately in the container   ALso PT focused on standing balance and tolerance while pt  is trying to browse the kitchen  Pt  was able to tolerate 30 min session without having to sit  Pt, was assisted back to w/c at the end of session  COnt with POC to improve L sided motor control and strenght  inc balance and coordination and inc safety in functional mobility    Family/Caregiver Present 2 duaghters   Problem List Decreased strength;Decreased endurance; Impaired balance;Decreased mobility; Decreased coordination;Decreased cognition; Impaired judgement;Decreased safety awareness   Barriers to Discharge Inaccessible home environment;Decreased caregiver support   PT Barriers   Physical Impairment Decreased strength;Decreased endurance; Impaired balance;Decreased mobility; Decreased coordination;Decreased cognition; Impaired judgement;Decreased safety awareness   Functional Limitation Stair negotiation;Standing;Transfers; Walking   Plan   Treatment/Interventions Functional transfer training;LE strengthening/ROM; Elevations; Therapeutic exercise; Endurance training;Patient/family training;Equipment eval/education; Bed mobility;Gait training   Recommendation   Recommendation 24 hour supervision/assist   Equipment Recommended (LRAD)   PT Therapy Minutes   PT Time In 1430   PT Time Out 1500   PT Total Time (minutes) 30   PT Mode of treatment - Individual (minutes) 0   PT Mode of treatment - Concurrent (minutes) 0   PT Mode of treatment - Group (minutes) 0   PT Mode of treatment - Co-treat (minutes) 30   PT Mode of Teatment - Total time(minutes) 30 minutes   Therapy Time missed   Time missed?  No

## 2018-05-09 NOTE — PROGRESS NOTES
05/09/18 1430   Pain Assessment   Pain Assessment No/denies pain   Pain Type Acute pain   Assessment   Treatment Assessment Pt engages in skilled OT/pt session w/ OT focus on L side attention, L UE use, problem solving  Pt engages in L side attention task w/ identification of objects on L  Pt able to correctly place puzzle pieces in accurate shape slot from field of three  Pt requires ocasional assist for manipulation of piece on L hand  Pt continues to be confused about her location at times  During kitchen tasks Pt continues to think she is in her kitchen  Pt engages in B/L UE use for heavy input,a nd L UE control  Pt engages in discussion about cooking Armenian rice as she loves to cook in the kitchen  Pt continues to require skilled acute rehab OT services to increase overall functional independence and safety w/ ADLs and functional transfers, continue to follow plan of care  Prognosis Fair   Problem List Decreased endurance; Impaired balance;Decreased mobility; Decreased coordination;Decreased cognition; Impaired judgement;Decreased safety awareness; Impaired vision   Plan   Treatment/Interventions ADL retraining;Functional transfer training;LE strengthening/ROM; Therapeutic exercise; Endurance training;Patient/family training;Cognitive reorientation;Equipment eval/education; Bed mobility   Progress Slow progress, decreased activity tolerance   OT Therapy Minutes   OT Time In 1430   OT Time Out 1500   OT Total Time (minutes) 30   OT Mode of treatment - Individual (minutes) 0   OT Mode of treatment - Concurrent (minutes) 0   OT Mode of treatment - Group (minutes) 0   OT Mode of treatment - Co-treat (minutes) 30   OT Mode of Teatment - Total time(minutes) 30 minutes

## 2018-05-09 NOTE — PROGRESS NOTES
05/09/18 0830   Pain Assessment   Pain Score No Pain   Restrictions/Precautions   Precautions Aspiration;Bed/chair alarms;Cognitive; Fall Risk;Supervision on toilet/commode  (limb alert L )   Cognition   Arousal/Participation Arousable  (sleepy towards the end of session )   Attention Attends with cues to redirect   Memory Decreased short term memory;Decreased recall of recent events;Decreased recall of precautions   Following Commands Follows one step commands with increased time or repetition   Subjective   Subjective pt  had no complaints  was very alert initially but got letahrgic and sleepy after 1 hour session    QI: Sit to [facility] Physical assistance   Assistance Provided by [place] 25%-49%   Sit to Lying CARE Score 3   QI: Sit to Stand   Assistance Needed Incidental touching   Sit to Stand CARE Score 4   QI: Chair/Bed-to-Chair Transfer   Assistance Needed Incidental touching   Chair/Bed-to-Chair Transfer CARE Score 4   Transfer Bed/Chair/Wheelchair   Limitations Noted In Balance;Problem Solving   Adaptive Equipment Hand Hold   Stand Pivot Contact Guard   Sit to Stand Contact Guard;Supervision   Stand to Sit Contact Guard;Supervision   Sit to Supine Minimal Assist   Bed, Chair, Wheelchair Transfer (FIM) 4 - [place] lifts one extremity during transfer   QI: Car Transfer   Reason if not Attempted Activity not applicable   Car Transfer CARE Score 9   QI: [facility] Needed Physical assistance   Assistance Provided by [place] 25%-49%   Walk 10 Feet CARE Score 3   QI: Walk 50 Feet with Two [address] Needed Physical assistance   Assistance Provided by [place] 25%-49%   Walk 50 Feet with Two Turns CARE Score 3   QI: Walk 150 Feet   Reason if not Attempted Safety concerns   Walk 150 Feet CARE Score 88   QI: Walking 10 Feet on Uneven Surfaces   Reason if not Attempted Safety concerns   Walking 10 Feet on Uneven Surfaces CARE Score 88   Ambulation   Does the patient walk? 2   Yes Primary Discharge Mode of Locomotion Walk   Walk Assist Level Minimum Assist   Gait Pattern Inconsistant Cathryn; Slow Cathryn;Decreased foot clearance; Improper weight shift   Assist Device Hand Hold  (R)   Distance Walked (feet) 70 ft  (X 2 )   Limitations Noted In Endurance; Safety;Strength   Walking (FIM) 2 - Patient ambulates between 50 - 149 feet regardless of assist/device/set up   Wheelchair mobility   QI: Does the patient use a wheelchair? 0  No   QI: 4 Steps   Assistance Needed Physical assistance   Assistance Provided by [place] 25%-49%   4 Steps CARE Score 3   QI: 12 Steps   Assistance Needed Physical assistance   Assistance Provided by [place] 25%-49%   12 Steps CARE Score 3   Stairs   Type Stairs   # of Steps 12  (FF)   Weight Bearing Precautions Fall Risk   Assist Devices Single Rail   Findings CGA/ min A 2nd person for safety on FF   Stairs (FIM) 1 - Patient requires assist of two people   Therapeutic Interventions   Flexibility B hamstring and gastroc stretching    Other standing tolerance and balance while pt  was brushing her teeth with close S   Equipment Use   NuStep Level 1 X 10 mins    Other Comments   Comments VS: BP manually 138/70mmhg NM: 60 bpm R radial oulse, SpO2 95%   Assessment   Treatment Assessment Pt  very alert and was able to tolerate session until after she did full flight of steps  Pt  was getting sleepy and was leaning back towards her w/c and was not responding that much  Nurse made aware and VS were taken as above  Pt  was transferred to mat to attempt supine thera ex but pt  just fell asleep  Session was cut short and will attept to see again pt  in the PM  Nurse reported that pt  just started a medication to help her sleep and maybe is still adjusting to it  Cont with POC for now,    Problem List Decreased strength;Decreased endurance; Impaired balance;Decreased mobility; Decreased coordination;Decreased cognition; Impaired judgement;Decreased safety awareness   Barriers to Discharge Inaccessible home environment;Decreased caregiver support   PT Barriers   Physical Impairment Decreased strength;Decreased endurance; Impaired balance;Decreased mobility; Decreased coordination;Decreased cognition; Impaired judgement;Decreased safety awareness   Functional Limitation Stair negotiation;Standing;Transfers; Walking   Plan   Treatment/Interventions Functional transfer training;LE strengthening/ROM; Elevations; Therapeutic exercise; Endurance training;Patient/family training;Equipment eval/education; Bed mobility;Gait training   Recommendation   Recommendation 24 hour supervision/assist;Home PT;Outpatient PT; Home with family support   Equipment Recommended (LRAD to no AD)   PT Therapy Minutes   PT Time In 0830   PT Time Out 0935   PT Total Time (minutes) 65   PT Mode of treatment - Individual (minutes) 65   PT Mode of treatment - Concurrent (minutes) 0   PT Mode of treatment - Group (minutes) 0   PT Mode of treatment - Co-treat (minutes) 0   PT Mode of Teatment - Total time(minutes) 65 minutes   Therapy Time missed   Time missed?  No

## 2018-05-09 NOTE — PROGRESS NOTES
Progress Note - Marbella Li 80 y o  female MRN: [id number]    Unit/Bed#: -02 Encounter: [id number]            Subjective:   Pt at baseline mental status     Objective:     ROS  Gen: denies recent wt loss   Psych: denies mood change    Vitals: Blood pressure 138/70, pulse 60, temperature 98 3 °F (36 8 °C), temperature source Oral, resp  rate 18, height 5' 4" (1 626 m), weight 64 kg (141 lb 1 5 oz), SpO2 95 %, not currently breastfeeding      Physical Exam:     Gen:        NAD   Neck:   trachea midline  Lungs:  respirations unlabored   Heart:    + S1 and S2   Abdomen:    Soft, non-tender  Psych: mood/affect appropriate  Neurologic: awake, alert    Functional :  Mobility: min   Tx: min-mod  ADLs: mod        Current Facility-Administered Medications:  acetaminophen 650 mg Oral Q6H PRN Heidy Trujillo MD   apixaban 2 5 mg Oral BID Heidy Trujillo MD   atorvastatin 80 mg Oral Daily With Gerhardt Rolling, MD   bisacodyl 10 mg Rectal Daily PRN Heidy Trujillo MD   docusate sodium 100 mg Oral BID Heidy Trujillo MD   ferrous sulfate 300 mg Oral Daily GAYATRI Galvez   metoprolol tartrate 50 mg Oral Q12H [facility] Heidy Trujillo MD   pantoprazole 40 mg Oral Early Morning Heidy Trujillo MD   polyethylene glycol 17 g Oral Daily PRN Heidy Trujilol MD   senna 1 tablet Oral HS Heidy Trujillo MD         acetaminophen    bisacodyl    polyethylene glycol      Assessment:  Pt is 79 yo female with ICH & bi-hemispheric nonhemorrhagic ischemic strokes s/p thrombectomy by Dr Horacio Poole on 4/23    Plan:    Rehabilitation: cont PT/OT for ambulatory/ADL dysfxn    ICH & bi-hemispheric nonhemorrhagic ischemic strokes: nonhemorrhagic stroke felt by neurology to be embolic of unclear etiology; per s/p thrombectomy by Dr Horacio Poole on 4/23, cleared by neurosx for [facility] and per neuro recs started on eliquis 2 5 mg BID (not 5 mg due to age and serum Cr greater than 1 5, tends to fluctuate around this value); home lipitor increased from 40 to 80 mg     AMS (5/8): seen by neuro and recommended CTH  (5/8) which showed no acute findings and patient back to baseline mental status therefore per neuro recs no MRI at this time      Peripheral neuropathy: likely 2/2 to DM, had tried neurontin in the past but did not tolerate      DM: on januvia 100 mg qd at home (recently switched from tradjenta 5 mg qd); currently on ISS     CHF (EF 45%): home torsemide 10 mg qd (and home potassium 10 MEQ BID due to hypokalemia 2/2 to torsemide) both being held due to acute on chronic renal failure likely 2/2 to poor PO; additionally  home toprol XL 50 mg qd switched to lopressor 50 mg q12 in acute care     SSS: s/p pacer, interrogated recently in acute care, no A-fib found at that time (although noted on EKG of 4/23 however pt already on [facility] for CVA and on BB), follows with Dr Sammy Hart (cards) and Dr Adenike Germain (EP)      h/o colon CA: on CT CAP of 4/26 colonic thickening suspicious for lesion noted and colonoscopy recommended, pt undergoes screening colonoscopys with Dr Isabel Coombs, pt to FU with them for FU colonoscopy       Meningioma: seen by neurosx no intervention planned, follows with Dr Gong Knee     PFO (3 mm): unclear if paradoxical stroke from DVT is etiology of CVA (felt to be embolic by neuro) however as neuro has placed pt on [facility] for CVA LE dopplers would not ; t/c closure as OP       s/p MVR: tissue valve     HL: home lipitor increased from 40 mg to 80 mg due to CVA       h/o DVT: home coumadin switched to eliquis for CVA      Insomnia: at home on remeron 15 mg HS on hold due to episodes of AMS (however low suspicion this is cause of AMS)      non-occlusive carotid dz: per carotid U/S report recommend repeat carotid U/S in 1 year; on [facility] and statin     chronic constipation: per family at baseline 1 BM every 4 to 5 days, IM monitoring and will treat with laxatives as needed      rt ankle pain/swelling: likely sprain; XR showed no acute osseous abnormality     CHADWICK on CKD: baseline 1 4-1 7, Cr now back to within pt's baseline however torsemide still on hold for now per IM      chronic anemia: AOCD/CKD, at home on iron 150 mg qd, cont iron supplementation as inpt; Hg currently stable at 9 9     Hyperphosphatemia per lab reference range at 4 4: however in females 18 year or older 4 5 is ULN      Dispo: reteam    Incidental findings on CT C/A/P of 4/26:  1) multiple pulmonary micronodules: per CT report recommended FU imaging in 3 months for determination of stability  2) pleural thickening  3) uterine fibroid  Other incidental findings:  4) B/L renal cysts  5) 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 no intervention planned, follows with Dr Harriett Li  6) EKG abnormalities in the setting of atrial paced rhythm (non-specific intraventricular conduction block/wide QRS, LAD): follows with Dr Sarbjit Vasquez (cards) and Dr Chuck Black (EP)  7) mod-severe TR/elevated peak PA pressure:  follows with Dr Sarbjit Vasquez (cards) and Dr Chuck Black (EP)    This patient was discussed by the Interdisciplinary Team in weekly case conference today  The care of the patient was extensively discussed with all care providers and an appropriate rehabilitation plan was formulated unique for this patient  Barriers were identified preventing progression of therapy and appropriate interventions were discussed with each discipline  Please see the team note for input from all disciplines regarding barriers, intervention, and discharge planning      [ x ] Total time spent: 45 Mins, and greater than 50% of this time was spent counseling/coordinating care

## 2018-05-10 PROCEDURE — 97530 THERAPEUTIC ACTIVITIES: CPT

## 2018-05-10 PROCEDURE — 97116 GAIT TRAINING THERAPY: CPT

## 2018-05-10 PROCEDURE — G0515 COGNITIVE SKILLS DEVELOPMENT: HCPCS

## 2018-05-10 PROCEDURE — 97110 THERAPEUTIC EXERCISES: CPT

## 2018-05-10 PROCEDURE — 92526 ORAL FUNCTION THERAPY: CPT

## 2018-05-10 PROCEDURE — 97112 NEUROMUSCULAR REEDUCATION: CPT

## 2018-05-10 PROCEDURE — 99232 SBSQ HOSP IP/OBS MODERATE 35: CPT | Performed by: PHYSICAL MEDICINE & REHABILITATION

## 2018-05-10 RX ADMIN — SENNOSIDES 8.6 MG: 8.6 TABLET, FILM COATED ORAL at 22:31

## 2018-05-10 RX ADMIN — PANTOPRAZOLE SODIUM 40 MG: 40 TABLET, DELAYED RELEASE ORAL at 05:39

## 2018-05-10 RX ADMIN — ATORVASTATIN CALCIUM 80 MG: 40 TABLET, FILM COATED ORAL at 16:37

## 2018-05-10 RX ADMIN — APIXABAN 2.5 MG: 2.5 TABLET, FILM COATED ORAL at 08:11

## 2018-05-10 RX ADMIN — APIXABAN 2.5 MG: 2.5 TABLET, FILM COATED ORAL at 16:38

## 2018-05-10 RX ADMIN — METOPROLOL TARTRATE 50 MG: 50 TABLET ORAL at 22:31

## 2018-05-10 RX ADMIN — MINERAL SUPPLEMENT IRON 300 MG / 5 ML STRENGTH LIQUID 100 PER BOX UNFLAVORED 300 MG: at 09:23

## 2018-05-10 RX ADMIN — METOPROLOL TARTRATE 50 MG: 50 TABLET ORAL at 08:11

## 2018-05-10 RX ADMIN — DOCUSATE SODIUM 100 MG: 100 CAPSULE, LIQUID FILLED ORAL at 16:38

## 2018-05-10 RX ADMIN — DOCUSATE SODIUM 100 MG: 100 CAPSULE, LIQUID FILLED ORAL at 08:11

## 2018-05-10 NOTE — PROGRESS NOTES
Physical Therapy Progress Note   05/10/18 0920   Pain Assessment   Pain Assessment No/denies pain   Pain Score No Pain   Restrictions/Precautions   Precautions Aspiration;Bed/chair alarms; Fall Risk;Cognitive;Supervision on toilet/commode;Limb alert  (L UE limb alert)   Cognition   Overall Cognitive Status Impaired   Arousal/Participation Alert  (sleepy towards the end of session )   Attention Attends with cues to redirect   Orientation Level Oriented to person;Oriented to place   Memory Decreased short term memory;Decreased recall of recent events;Decreased recall of precautions   Following Commands Follows one step commands with increased time or repetition   Subjective   Subjective denies pain; no c/o   QI: Sit to Stand   Assistance Needed Supervision; Incidental touching   Assistance Provided by [place] No physical assistance   Sit to Stand CARE Score 4   Bed Mobility   Findings NT   QI: Chair/Bed-to-Chair Transfer   Assistance Needed Supervision   Assistance Provided by [place] No physical assistance   Comment CS   Chair/Bed-to-Chair Transfer CARE Score 4   Transfer Bed/Chair/Wheelchair   Limitations Noted In Balance; Coordination; Endurance;Problem Solving;LE Strength   Adaptive Equipment Hand Hold   Stand Pivot Contact Guard   Sit to Stand Contact Guard   Stand to [employer]   Findings cgA with xfers and amb   Bed, Chair, Wheelchair Transfer (FIM) 4 - Patient requires steadying assist or light touching   QI: Car Transfer   Assistance Needed Incidental touching;Supervision   Assistance Provided by [place] No physical assistance   Car Transfer CARE Score 4   QI: Walk 10 Feet   Assistance Needed Incidental touching;Supervision   Assistance Provided by [place] No physical assistance   Walk 10 Feet CARE Score 4   QI: Walk 50 Feet with Two Turns   Assistance Needed Incidental touching;Supervision   Assistance Provided by [place] No physical assistance   Walk 50 Feet with Two Turns CARE Score 4   QI: Walk 150 Feet   Assistance Needed Incidental touching;Supervision   Assistance Provided by [place] No physical assistance   Walk 150 Feet CARE Score 4   QI: Walking 10 Feet on Uneven Surfaces   Reason if not Attempted Safety concerns   Walking 10 Feet on Uneven Surfaces CARE Score 88   Ambulation   Does the patient walk? 2  Yes   Primary Discharge Mode of Locomotion Walk   Walk Assist Level Contact Guard   Gait Pattern Inconsistant Cathryn; Slow Cathryn; Improper weight shift; Shuffle   Assist Device Hand Hold   Distance Walked (feet) 150 ft  (x2)   Limitations Noted In Coordination   Findings cgA with HHA   Walking (FIM) 4 - Patient requires steadying assist or light touching AND distance 150 feet or more, no rest   Wheelchair mobility   QI: Does the patient use a wheelchair? 0  No   Wheelchair (FIM) 0 - Activity does not occur   QI: Picking Up Object   Reason if not Attempted Safety concerns   Picking Up Object CARE Score 88   QI: Toilet Transfer   Assistance Needed Incidental touching;Supervision   Assistance Provided by [place] No physical assistance   Toilet Transfer CARE Score 4   Toilet Transfer   Surface Assessed Standard Toilet   Limitations Noted In Balance; Endurance   Findings CS/cgA   Toilet Transfer (FIM) 4 - Patient requires steadying assist or light touching   Therapeutic Interventions   Strengthening standing TE (hip flex/ext/abd/add, HS curls, mini squats, calf raises) reps until fatigued   Flexibility manual stretch B HS and gastroc;    Equipment Use   NuStep 13 minutes, level 3   Assessment   Treatment Assessment Pt seated in w/c in gym prior to session; able to STS? SPT with CS/cgA and needs constant verbal/tactile cueing to stay on task and to receive direction; difficulty with L/R distinction; visual pointing and modeling for direction following and ambulation; amb 150' (x2) with CS/cgA and no deviece;  NuStep x12 minutes, level 3;    Problem List Decreased strength;Decreased endurance; Impaired balance;Decreased mobility; Decreased coordination;Decreased cognition; Impaired judgement;Decreased safety awareness   Barriers to Discharge Inaccessible home environment;Decreased caregiver support   PT Barriers   Physical Impairment Decreased strength;Decreased endurance; Impaired balance;Decreased mobility; Decreased coordination;Decreased cognition; Impaired judgement;Decreased safety awareness   Functional Limitation Stair negotiation;Standing;Transfers; Walking   Plan   Treatment/Interventions ADL retraining;Functional transfer training;LE strengthening/ROM; Elevations; Therapeutic exercise; Endurance training;Cognitive reorientation;Patient/family training;Equipment eval/education; Bed mobility;Gait training   Progress Progressing toward goals   Recommendation   Recommendation 24 hour supervision/assist   Equipment Recommended (TBD, LRD)   PT Therapy Minutes   PT Time In 0920   PT Time Out 1040   PT Total Time (minutes) 80   PT Mode of treatment - Individual (minutes) 40   PT Mode of treatment - Concurrent (minutes) 40   PT Mode of treatment - Group (minutes) 0   PT Mode of treatment - Co-treat (minutes) 0   PT Mode of Teatment - Total time(minutes) 80 minutes   Therapy Time missed   Time missed?  No     Gavin Elliott, PTA

## 2018-05-10 NOTE — PROGRESS NOTES
Internal Medicine Progress Note  Patient: Kathleen Palomares  Age/sex: 80 y o  female  Medical Record #: [id number]      ASSESSMENT/PLAN:  Kathleen Palomares is seen and examined and management for following issues:    1  Lt MCA CVA s/p mechanical thrombectomy: Continue Eliquis 2 5mg BID (keep dose same = d/w Dr Montana Dunn) and atorvastatin for secondary prevention  45 [facility] Neurology follow-up  Had a RR yesterday for change in MS with being found lethargic = CTH no change, VSS; neuro saw and recommended no changes and signed off  HS Remeron stopped it to see if any change which seems to have helped  2   HTN: stable; continue Lopressor 50mg every 12 hours  3   CHADWICK/CKD stage III; baseline Cr 1 5: On admit to hospital 4/18 had creat 2 0 and Torsemide was held/fluids given; On 5/7 up to 1 99 = stopped Torsemide/KCL and gave 1 liter NSS = yesterday gave another liter since creat still 1 90 = on 5/9/18 was back down to 1 27  Checking PVRs to make sure that is not the issue = PVRs were not significant  4   Chronic systolic CHF/LVEF 30%; moderate TR; bio MVR (normal function):  holding torsemide 10mg daily/Kdur 10 meq BID  Chest is clear/no edema  5  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): Eliquis 2 5mg BID 2/2 age and renal status (switched from Coumadin in the hospital so NOAC is new)  6   DM type 2: On linagliptan 2 5mg at home  It was planned for pt to change to Januvia 100mg daily as an OP per the daughter  Will start Januvia when needed but so far BS controlled w/o tx likely 2/2 fact she is not eating much = did stop Accuchecks for now and watch FBSs but if she starts eating better will resume Accuchecks  Continue DM diet  Fasting today 5/7/18 and 5/8/18 = 115, yesterday was 113 (last evening at 1742 drawn for rapid response was 96)  7   Acute blood loss anemia: s/p transfusion after EBL from thrombectomy of 500-1000ml from femoral site  Stable  8   PFO:  Small  Seen on LORI    Outpt Cardiology follow-up  9   History of colon CA: Colonic lesion seen on CT  Will need GI follow-up    10  Hx PPM    11  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    12  Poor appetite: had not been eating very well so Remeron was added  Also, question depression  Remeron 7 5mg qhs added 5/7/18 by primary service and was stopped yesterday 2/2 too sleepy  She was on Remeron 15mg at home      Subjective: Patient seen and examined   Offers no complaints     ROS:   GI: denies abdominal pain, change bowel habits or reflux symptoms  Neuro: No new neurologic changes  Respiratory: No Cough, SOB  Cardiovascular: No CP, palpitations     Scheduled Meds:    Current Facility-Administered Medications:  acetaminophen 650 mg Oral Q6H PRN Torsten Bernardo MD   apixaban 2 5 mg Oral BID Torsten Bernardo MD   atorvastatin 80 mg Oral Daily With Lalita Isaacs MD   bisacodyl 10 mg Rectal Daily PRN Torsten Bernardo MD   docusate sodium 100 mg Oral BID Torsten Bernardo MD   ferrous sulfate 300 mg Oral Daily GAYATRI Toribio   metoprolol tartrate 50 mg Oral Q12H Audelia Yarbrough MD   pantoprazole 40 mg Oral Early Morning Torsten Bernardo MD   polyethylene glycol 17 g Oral Daily PRN Torsten Bernardo MD   senna 1 tablet Oral HS Torsten Bernardo MD       Labs:       Results from last 7 days  Lab Units 05/08/18  1958 05/08/18  1704 05/07/18  0647   WBC Thousand/uL 5 26  --  5 27   HEMOGLOBIN g/dL 9 9*  --  9 5*   I STAT HEMOGLOBIN g/dl  --  9 2*  --    HEMATOCRIT % 31 6*  --  29 0*   PLATELETS Thousands/uL 170  --  196       Results from last 7 days  Lab Units 05/09/18  0451 05/08/18  1742   SODIUM mmol/L 139 137   POTASSIUM mmol/L 3 9 4 2   CHLORIDE mmol/L 106 102   CO2 mmol/L 26 28   BUN mg/dL 41* 51*   CREATININE mg/dL 1 27 1 67*   GLUCOSE RANDOM mg/dL 113 96   CALCIUM mg/dL 9 4 9 8           Results from last 7 days  Lab Units 05/08/18  1728   INR  1 17*          Glucose (mg/dL)   Date Value   05/09/2018 113   05/08/2018 96   05/08/2018 115   05/07/2018 115 12/23/2015 114 (H)   02/22/2015 101   02/21/2015 103   02/20/2015 116     Glucose, i-STAT (mg/dl)   Date Value   05/08/2018 103   04/23/2018 106   04/23/2018 96       Labs reviewed    Physical Examination:  Vitals:   Vitals:    05/09/18 2031 05/10/18 0512 05/10/18 0600 05/10/18 0811   BP: 116/59 130/68  130/70   BP Location: Right arm Right arm  Right arm   Pulse: 75 65  75   Resp: 16 18     Temp: 98 3 °F (36 8 °C) 98 7 °F (37 1 °C)     TempSrc: Oral Oral     SpO2: 98%      Weight:  64 8 kg (142 lb 13 7 oz) 64 8 kg (142 lb 13 7 oz)    Height:           GEN: NAD  HEENT: NC/AT  RESP: BBS w/o crackles/wheeze/rhonci; resp unlabored  CV: +S1 S2, regular rate, no rubs/murmurs  ABD: soft, NT, ND, normal BS   : no schaefer  EXT: no edema  Skin: no rashes  Neuro: AAO; WOODARD 5/5 but difficult to get her to coordinate UE strength testing 2/2 some involuntary movements with left arm and inability to consistently follow commands; face symmetric; speech clear  [x ] Total time spent: 30 Mins and greater than 50% of this time was spent counseling/coordinating care        Aretha Perez, [facility]  Internal Medicine

## 2018-05-10 NOTE — SOCIAL WORK
Met w/pt's dtrs jonathan and mary and reviewed los for pt, dc planning process, team mtg next week, and referral process for snf

## 2018-05-10 NOTE — PROGRESS NOTES
Progress Note - Sriram Foster 80 y o  female MRN: [id number]    Unit/Bed#: [facility] 255-42 Encounter: [id number]            Subjective:   Updated patient and family on active medical issues     Objective:     ROS  Gen: denies recent wt loss   Psych: denies mood change    Vitals: Blood pressure 138/63, pulse 76, temperature 98 3 °F (36 8 °C), temperature source Oral, resp  rate 18, height 5' 4" (1 626 m), weight 64 8 kg (142 lb 13 7 oz), SpO2 98 %, not currently breastfeeding      Physical Exam:     Gen:        NAD   Neck:   trachea midline  Lungs:  respirations unlabored   Heart:    + S1 and S2   Abdomen:    Soft, non-tender  Psych: mood/affect appropriate  Neurologic: awake, alert    Functional :  Mobility: min   Tx: min-mod  ADLs: mod        Current Facility-Administered Medications:  acetaminophen 650 mg Oral Q6H PRN Nida Arguelles MD   apixaban 2 5 mg Oral BID Nida Arguelles MD   atorvastatin 80 mg Oral Daily With Mychal Benavides MD   bisacodyl 10 mg Rectal Daily PRN Nida Arguelles MD   docusate sodium 100 mg Oral BID Nida Arguelles MD   ferrous sulfate 300 mg Oral Daily Clois Linear, CRNP   metoprolol tartrate 50 mg Oral Q12H [address] Nida Arguelles MD   pantoprazole 40 mg Oral Early Morning Nida Arguelles MD   polyethylene glycol 17 g Oral Daily PRN Nida Arguelles MD   senna 1 tablet Oral HS Nida Arguelles MD         acetaminophen    bisacodyl    polyethylene glycol      Assessment:  Pt is 81 yo female with ICH & bi-hemispheric nonhemorrhagic ischemic strokes s/p thrombectomy by Dr Jonh Good on 4/23    Plan:    Rehabilitation: cont PT/OT for ambulatory/ADL dysfxn    ICH & bi-hemispheric nonhemorrhagic ischemic strokes: nonhemorrhagic stroke felt by neurology to be embolic of unclear etiology; per s/p thrombectomy by Dr Jonh Good on 4/23, cleared by neurosx for [facility] and per neuro recs started on eliquis 2 5 mg BID (not 5 mg due to age and serum Cr greater than 1 5, tends to fluctuate around this value); home lipitor increased from 40 to 80 mg     AMS (5/8): seen by neuro and recommended CTH  (5/8) which showed no acute findings and patient back to baseline mental status therefore per neuro recs no MRI at this time      Peripheral neuropathy: likely 2/2 to DM, had tried neurontin in the past but did not tolerate      DM: on januvia 100 mg qd at home (recently switched from tradjenta 5 mg qd); currently on ISS     CHF (EF 45%): home torsemide 10 mg qd (and home potassium 10 MEQ BID due to hypokalemia 2/2 to torsemide) both being held due to acute on chronic renal failure likely 2/2 to poor PO; additionally  home toprol XL 50 mg qd switched to lopressor 50 mg q12 in acute care     SSS: s/p pacer, interrogated recently in acute care, no A-fib found at that time (although noted on EKG of 4/23 however pt already on [facility] for CVA and on BB), follows with Dr Kelle Morris (cards) and Dr Devorah Simmons (EP)      h/o colon CA: on CT CAP of 4/26 colonic thickening suspicious for lesion noted and colonoscopy recommended, pt undergoes screening colonoscopys with Dr Maria Dolores Devlin, pt to FU with them for FU colonoscopy       Meningioma: seen by neurosx no intervention planned, follows with Dr Nay Corral     PFO (3 mm): unclear if paradoxical stroke from DVT is etiology of CVA (felt to be embolic by neuro) however as neuro has placed pt on [facility] for CVA LE dopplers would not ; t/c closure as OP       s/p MVR: tissue valve     HL: home lipitor increased from 40 mg to 80 mg due to CVA       h/o DVT: home coumadin switched to eliquis for CVA      Insomnia: at home on remeron 15 mg HS on hold due to episodes of AMS (however low suspicion this is cause of AMS)      non-occlusive carotid dz: per carotid U/S report recommend repeat carotid U/S in 1 year; on [facility] and statin     chronic constipation: per family at baseline 1 BM every 4 to 5 days, IM monitoring and will treat with laxatives as needed      rt ankle pain/swelling: likely sprain; XR showed no acute osseous abnormality     CHADWICK on CKD: baseline 1 4-1 7, Cr now back to within pt's baseline however torsemide still on hold for now per IM      chronic anemia: AOCD/CKD, at home on iron 150 mg qd, cont iron supplementation as inpt; Hg currently stable at 9 9     Hyperphosphatemia per lab reference range at 4 4: however in females 18 year or older 4 5 is ULN      Dispo: reteam    Incidental findings on CT C/A/P of 4/26:  1) multiple pulmonary micronodules: per CT report recommended FU imaging in 3 months for determination of stability  2) pleural thickening  3) uterine fibroid  Other incidental findings:  4) B/L renal cysts  5) 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 no intervention planned, follows with Dr Kiel Tejada  6) EKG abnormalities in the setting of atrial paced rhythm (non-specific intraventricular conduction block/wide QRS, LAD): follows with Dr João Galan (cards) and Dr Minoo Fontana (EP)  7) mod-severe TR/elevated peak PA pressure:  follows with Dr João Galan (cards) and Dr Minoo Fontana (EP)

## 2018-05-10 NOTE — CONSULTS
Consultation - Neuropsychology   Leonel Rucker 80 y o  female MRN: [id number]  Unit/Bed#: -92 Encounter: [id number]    Assessment/Plan     Assessment:  Pt denied history of major depression, anxiety or other mental health concerns; no current symptoms endorsed  Psychosocial stressors include: symptoms of CVA; ongoing numerous health issues  Overall, pt has been effectively coping with their medical issues and is adjusting to rehab needs  Pt reported motivation to participate in rehab program and work on rehab goals; pt presented with cognitive issues which makes comprehension of rehab needs difficult  Family and social support includes: children; pt has close relationships with her children and grandchildren  Dx: F43 29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 disorder with other symptoms  Code: [id number]    Plan:   Pt will be afforded psychology services while at [facility] to help pt cope with illness  History of Present Illness   Physician Requesting Consult: Marya Allen MD  Reason for Consult / Principal Problem: coping, adjustment  Patient is a 80 y o  female   Primary complaints include: concern about health problems and poor concentration  Psychosocial Stressors: health  Consults    Psychiatric Review Of Systems:  sleep: no  appetite changes: yes  weight changes: no  energy/anergy: yes  interest/pleasure/anhedonia: no  somatic symptoms: yes  anxiety/panic: no  juliana: no  guilty/hopeless: no  self injurious behavior/risky behavior: no    Historical Information   Past Psychiatric History:   None    Substance Abuse History:  Use of Alcohol: occasional, social use how often socially  and 0 out of 4 on CAGE    Smoking history: former cigarette smoker; quit in 1950; 0 25 ppd for 10 years  Family Psychiatric History: none noted    Social History  Education: high school diploma/GED  Marital history:   Living arrangement, social support: The patient lives in home with self    Occupational History: retired  Functioning Relationships: good support system and good relationship with children  Other Pertinent History: None    Traumatic History:   Abuse: none noted  Other Traumatic Events: none noted    Past Medical History:   Diagnosis Date    Acid reflux     on occ    Arthritis     DJD right hip replaced    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    x 2 lesions with no change    Cancer ([place] [address] ) 2007    colonic polyps, no surgery done    Deep vein thrombosis (DVT) of left upper extremity ([facility] ) 12/11/2017    Diabetes mellitus ([place] [address] )     type 2    Diverticulosis     Edema     in legs on occ    Hypertension     on occ    Language barrier     speaks Uzbek & broken english    Stroke [facility])        Medical Review Of Systems:  Review of Systems    Meds/Allergies   current meds:   No current facility-administered medications for this encounter  Allergies   Allergen Reactions    Heparin        Objective   Vital signs in last 24 hours:  Temp:  [98 °F (36 7 °C)-98 7 °F (37 1 °C)] 98 7 °F (37 1 °C)  HR:  [65-75] 75  Resp:  [16-18] 18  BP: (116-135)/(59-70) 130/70      Intake/Output Summary (Last 24 hours) at 05/10/18 1134  Last data filed at 05/10/18 0736   Gross per 24 hour   Intake              300 ml   Output                0 ml   Net              300 ml       Mental Status Evaluation:  Appearance:  age appropriate   Behavior:  normal   Speech:  normal pitch and normal volume   Mood:  euthymic   Affect:  mood-congruent   Language:  WNL   Thought Process:  concrete   Thought Content:  normal   Perceptual Disturbances: None   Risk Potential: none   Sensorium:  person and place   Cognition:  impaired   Consciousness:  alert and awake    Attention: attention span appeared shorter than expected for age   Intellect: within normal limits   Fund of Knowledge: vocabulary: WNL   Insight:  limited   Judgment: fair   Muscle Strength and Tone: see PT eval   Gait/Station: see PT eval   Motor Activity: no abnormal movements

## 2018-05-10 NOTE — PROGRESS NOTES
05/10/18 1000   Pain Assessment   Pain Assessment No/denies pain   Pain Score No Pain   QI: Sit to Stand   Assistance Needed Supervision   Sit to Stand CARE Score 4   QI: Chair/Bed-to-Chair Transfer   Assistance Needed Incidental touching   Chair/Bed-to-Chair Transfer CARE Score 4   Transfer Bed/Chair/Wheelchair   Stand Pivot Contact Guard   Sit to Stand Supervision   Stand to Sit Supervision   Bed, Chair, Wheelchair Transfer (FIM) 4 - [place] lifts one extremity during transfer   QI: [address] Needed Physical assistance   Assistance Provided by [place] 25%-49%   Gregorio Mathewssri 83 Score 3   Toileting   Able to Pull Clothing down yes, up no  Limitations Noted In Balance; Coordination   Findings CS in stance for hygiene  pt does complete hand hygiene before addressing CM over hips  Pt takes steps to the sink while pants are around her ankles  Toileting (FIM) 3 - Patient completes  50-74% of all tasks   QI: Toilet Transfer   Assistance Needed Incidental touching   Toilet Transfer CARE Score 4   Toilet Transfer   Findings Pt requires hand over hand assist for grasp on L side grab bar  pt demo decreased L side attention    Toilet Transfer (FIM) 4 - [place] lifts one extremity during transfer   Cognition   Overall Cognitive Status Impaired   Arousal/Participation Alert; Cooperative   Attention Attends with cues to redirect   Orientation Level Oriented to person   Memory Decreased short term memory;Decreased recall of recent events;Decreased recall of precautions   Following Commands Follows one step commands with increased time or repetition   Activity Tolerance   Activity Tolerance Patient tolerated treatment well   Assessment   Treatment Assessment Pt participates in skilled OT session w/ 40 minute Co-tx session  OT session focusing on L side awareness, B/L UE  Coordination, sequencing task initiation/termination and leisure pursuits   Pt was able to identify ingredients to make her "[place] rice", which is a meaningful activity for her  Pt demonstrates increased alertness, initiating tasks, and more engaged in purposeful activity  Pt demonstrates some difficulty w/ directing steps to complete rice, pt perseverates on completing rice in the way she did it in the past  Pt demo increased L UE control and natural use of L UE  Pt does not require as many VC's to incorporate L UE in tasks  Pt does require cues for task termination at times  Pt is engaged in task for full 90 minute session w/ no cues for engagement  Pt continues to require skilled acute rehab OT services to increase overall functional independence and safety w/ ADLs and functional transfers, continue to follow plan of care  Prognosis Fair   Problem List Decreased endurance; Impaired balance;Decreased mobility; Decreased cognition;Decreased coordination; Impaired judgement;Decreased safety awareness; Impaired vision   Plan   Treatment/Interventions ADL retraining;Functional transfer training;LE strengthening/ROM; Therapeutic exercise; Endurance training;Cognitive reorientation   Progress Progressing toward goals   Recommendation   OT Discharge Recommendation 24 hour supervision/assist   OT Therapy Minutes   OT Time In 1000   OT Time Out 1130   OT Total Time (minutes) 90   OT Mode of treatment - Individual (minutes) 50   OT Mode of treatment - Concurrent (minutes) 0   OT Mode of treatment - Group (minutes) 0   OT Mode of treatment - Co-treat (minutes) 40   OT Mode of Teatment - Total time(minutes) 90 minutes

## 2018-05-10 NOTE — PROGRESS NOTES
05/10/18 0800   Pain Assessment   Pain Assessment No/denies pain   Restrictions/Precautions   Precautions Aspiration;Bed/chair alarms;Cognitive; Fall Risk;Limb alert;Supervision on toilet/commode   Memory Skills   Memory (FIM) 2 - Recognizes, recalls/performs 25-49%   Social Interaction (FIM) 5 - Interacts appropriately with others 90% of time   Speech/Language/Cognition Assessmetn   Treatment Assessment Pt engaged in LTM biographical recall of children's names, where she was 4/4 in recalling names  When it came to recalling where children live, pt was 3/4 accurate in recalling city  Pt was able to recall city/state currently living, but unable to recall  or age  Pt was oriented to month and year, but not [place] or date  Able to state place as "hospital" but unable to elicit [facility], even when given binary choice  Unable to recall situation where she required binary choices to state"stroke " As for recalling daily events, pt was mod-max A to recall items consumed at b'fast which just occurred and visitors from yesterday, where pt's 2 dtrs were present  Attempted to have pt orall read/comprehend sentences in [place], where on initial sentence, but unable to complete command  The remaining items were completed where pt was able to follow commands in locating items in room to point to given command  Engaged in generalized categorization task of naming vegetables, where pt was 2/3 accurate in completing  Swallow Information   Current Risks for Dysphagia & Aspiration Dysarthria;New Neuro event;Brain injury;Cognitive deficit   Current Symptoms/Concerns Difficulty chewing   Current Diet Dysphagia advance; Thin liquid   Baseline Diet Regular; Thin liquids   Consistencies Assessed and Performance   Materials Admnistered Regular/Solid; Thin liquid   Oral Stage Mild impaired   Phargngeal Stage Mild impaired;Aspiration risk   Swallow Mechanics Mild delayed;Swallow initation;Good Larygneal rise   Recommendations   Diet Solid Recommendation Level 3 Dysphagia/ advanced/ soft to chew   Diet Liquid Recommendation Thin liquid   Recommended Form of Meds As tolerated   General Precautions Aspiration precautions; Feed only when alert;Minimize distractions;Upright as possible for all oral intake;Remain upright for 45 mins after meals  (OOB for meals)   Compensatory Swallowing Strategies Place food/straw in on left side; Alternate solids and liquids;Voluntary throat clear/cough to clear penetration   QI: Eating   Assistance Needed Set-up / Sahil [place] Provided by [place] No physical assistance   Eating CARE Score 5   Swallow Assessment   Swallow Treatment Assessment Pt observed w/ breakfast, where she was trialed w/ regular items on tray  Pt was setup w/ tray and able to feed self  Consumed 50% of meal and 240cc of thins by cup  Slower but full/functional mastication noted w/ soft/solids (english muffin, [place], eggs) w/o increased R sided oral residual/pocketing  A-p transfer of thins by cup was [facility] today  Mild delay w/ swallow initiation noted w/ solids due to slower oral stage but overall hyolaryngeal elevation WFL  No overt signs/sxs of aspiration noted w/ meal  Will recommend to upgrade to regular w/ thins  Will f/u briefly to monitor tolerance of diet upgrade w/o increased oropharyngeal sxs  Swallow Assessment Prognosis   Prognosis Good   Prognosis Considerations Co-morbidities; Medical prognosis;Participation level   SLP Therapy Minutes   SLP Time In 0800   SLP Time Out 0900   SLP Total Time (minutes) 60   SLP Mode of treatment - Individual (minutes) 30   SLP Mode of treatment - Concurrent (minutes) 30   SLP Mode of treatment - Group (minutes) 0   SLP Mode of treatment - Co-treat (minutes) 0   SLP Mode of Teatment - Total time(minutes) 60 minutes   Therapy Time missed   Time missed?  No   Daily FIM Score   Problem solving (FIM) 2 - Needs direction more than ½ time to initiate, plan or complete simple tasks   Comprehension (FIM) 3 - Needs parts of sentences repeated   Expression (FIM) 3 - Expresses basic info/needs 50-74% of time   Eating (FIM) 5 - Patient needs help to open contianers or set up tray

## 2018-05-11 PROCEDURE — 92526 ORAL FUNCTION THERAPY: CPT

## 2018-05-11 PROCEDURE — G0515 COGNITIVE SKILLS DEVELOPMENT: HCPCS

## 2018-05-11 PROCEDURE — 97530 THERAPEUTIC ACTIVITIES: CPT

## 2018-05-11 PROCEDURE — 99232 SBSQ HOSP IP/OBS MODERATE 35: CPT | Performed by: PHYSICAL MEDICINE & REHABILITATION

## 2018-05-11 PROCEDURE — 97116 GAIT TRAINING THERAPY: CPT

## 2018-05-11 PROCEDURE — 97110 THERAPEUTIC EXERCISES: CPT

## 2018-05-11 RX ORDER — LANOLIN ALCOHOL/MO/W.PET/CERES
6 CREAM (GRAM) TOPICAL
Status: DISCONTINUED | OUTPATIENT
Start: 2018-05-11 | End: 2018-05-19 | Stop reason: HOSPADM

## 2018-05-11 RX ADMIN — DOCUSATE SODIUM 100 MG: 100 CAPSULE, LIQUID FILLED ORAL at 08:36

## 2018-05-11 RX ADMIN — DOCUSATE SODIUM 100 MG: 100 CAPSULE, LIQUID FILLED ORAL at 17:14

## 2018-05-11 RX ADMIN — APIXABAN 2.5 MG: 2.5 TABLET, FILM COATED ORAL at 08:38

## 2018-05-11 RX ADMIN — APIXABAN 2.5 MG: 2.5 TABLET, FILM COATED ORAL at 17:14

## 2018-05-11 RX ADMIN — SENNOSIDES 8.6 MG: 8.6 TABLET, FILM COATED ORAL at 21:49

## 2018-05-11 RX ADMIN — PANTOPRAZOLE SODIUM 40 MG: 40 TABLET, DELAYED RELEASE ORAL at 05:59

## 2018-05-11 RX ADMIN — ATORVASTATIN CALCIUM 80 MG: 40 TABLET, FILM COATED ORAL at 16:23

## 2018-05-11 RX ADMIN — METOPROLOL TARTRATE 50 MG: 50 TABLET ORAL at 08:37

## 2018-05-11 RX ADMIN — METOPROLOL TARTRATE 50 MG: 50 TABLET ORAL at 21:49

## 2018-05-11 RX ADMIN — MINERAL SUPPLEMENT IRON 300 MG / 5 ML STRENGTH LIQUID 100 PER BOX UNFLAVORED 300 MG: at 08:38

## 2018-05-11 RX ADMIN — MELATONIN TAB 3 MG 6 MG: 3 TAB at 21:49

## 2018-05-11 NOTE — PROGRESS NOTES
05/11/18 1115   Grooming   Able To Initiate Tasks; Wash/Dry Hands   Grooming (FIM) 4 - Patient requires steadying assist or light touching   Transfer Bed/Chair/Wheelchair   Sit Pivot Minimal Assist   Findings Pt does demo slighy LOB during turning to chair during kitchen task   Bed, Chair, Wheelchair Transfer (FIM) 4 - Patient completes 75% of all tasks   Meal Prep   Meal Prep Level of Assistance Moderate verbal cues   Meal Preparation see below  Reccommend full assist and supervision when completing meal preparation  Daughter is informed of recommendation   Cognition   Overall Cognitive Status Impaired   Arousal/Participation Alert; Cooperative   Attention Attends with cues to redirect   Orientation Level Oriented to person   Memory Decreased short term memory;Decreased recall of recent events   Following Commands Follows one step commands with increased time or repetition   Comments Pt continues to make comments about being home, and thinking that she is in her home  pt reports that she does not know when her kids are coming home to eat  Pt reports that her son came home and had nothing to eat last night   Activity Tolerance   Activity Tolerance Patient tolerated treatment well   Assessment   Treatment Assessment Pt participates in skilled OT session focusing on L side awareness, B/L UE  Coordination, sequencing task initiation/termination and engagement in leisure pursuits  Pt was able to identify ingredients to make her epanadas, which is a meaningful activity for her  Pt demonstrates increased alertness, initiating tasks, and more engaged in purposeful activity, however does appear more unsteady today  Pt demo decreased memory during activity stating "I cant remember where the trash can is "  Pt able to recall sequencing steps for filling and pinching crusts  Pt uses appropriate amount of filling for each pie  Pt demo increased L UE control and natural use of L UE   Pt does not require as many VC's to incorporate L UE in tasks  Pt does require cues for task termination at times  Pt is engaged in task for entirety of session w/ no cues for engagement  Pt continues to require skilled acute rehab OT services to increase overall functional independence and safety w/ ADLs and functional transfers, continue to follow plan of care  Prognosis Fair   Problem List Decreased endurance; Impaired balance;Decreased mobility; Decreased coordination;Decreased cognition; Impaired judgement;Decreased safety awareness; Impaired vision   Plan   Treatment/Interventions ADL retraining;Functional transfer training;LE strengthening/ROM; Therapeutic exercise; Endurance training;Cognitive reorientation;Patient/family training;Equipment eval/education   Recommendation   OT Discharge Recommendation 24 hour supervision/assist   OT Therapy Minutes   OT Time In 8599   OT Time Out 1400   OT Total Time (minutes) 105   OT Mode of treatment - Individual (minutes) 105   OT Mode of treatment - Concurrent (minutes) 0   OT Mode of treatment - Group (minutes) 0   OT Mode of treatment - Co-treat (minutes) 0   OT Mode of Teatment - Total time(minutes) 105 minutes

## 2018-05-11 NOTE — PROGRESS NOTES
Physical Therapy Progress Note   05/11/18 0915   Pain Assessment   Pain Assessment No/denies pain   Pain Score No Pain   Restrictions/Precautions   Precautions Aspiration;Bed/chair alarms; Fall Risk;Cognitive;Supervision on toilet/commode   Weight Bearing Restrictions No   ROM Restrictions No   Cognition   Overall Cognitive Status Impaired   Arousal/Participation Alert  (sleepy towards the end of session )   Attention Attends with cues to redirect   Orientation Level Oriented to person;Oriented to place   Memory Decreased short term memory;Decreased recall of recent events;Decreased recall of precautions   Following Commands Follows one step commands with increased time or repetition   Subjective   Subjective denies pain; no c/o; "I'm tired "   QI: Roll Left and Right   Assistance Needed Supervision   Assistance Provided by [place] No physical assistance   Roll Left and Right CARE Score 4   QI: Sit to Lying   Assistance Needed Physical assistance   Assistance Provided by [place] 25%-49%   Sit to Lying CARE Score 3   QI: Lying to Sitting on Side of Bed   Assistance Needed Physical assistance   Assistance Provided by [place] 25%-49%   Lying to Sitting on Side of Bed CARE Score 3   QI: Sit to [address] Provided by [place] No physical assistance   Sit to Stand CARE Score 4   Bed Mobility   Able to Roll Left to Right;Right to Left;Scoot Up   Findings Nidia this AM, assist level varies based on fatigue level   QI: Chair/Bed-to-Chair Transfer   Assistance Needed Incidental touching   Assistance Provided by [place] No physical assistance   Chair/Bed-to-Chair Transfer CARE Score 4   Transfer Bed/Chair/Wheelchair   Limitations Noted In Balance; Coordination   Adaptive Equipment Hand Hold   Stand Pivot Contact Guard   Sit to Stand Contact Guard   Stand to Sit Contact Guard   Supine to Sit Minimal Assist   Sit to Supine Minimal Assist   Car Transfer Contact Guard   Bed, Chair, Wheelchair Transfer (FIM) 2 - Fred needs to lift or boost to rise AND assist to sit   QI: Car Transfer   Assistance Needed Incidental touching   Assistance Provided by [place] No physical assistance   Car Transfer CARE Score 4   QI: Walk 10 Feet   Assistance Needed Incidental touching   Assistance Provided by [place] No physical assistance   Walk 10 Feet CARE Score 4   QI: Walk 50 Feet with Two Turns   Assistance Needed Incidental touching   Assistance Provided by [place] No physical assistance   Walk 50 Feet with Two Turns CARE Score 4   QI: Walk 150 Feet   Assistance Needed Incidental touching   Assistance Provided by [place] No physical assistance   Walk 150 Feet CARE Score 4   QI: Walking 10 Feet on Uneven Surfaces   Reason if not Attempted Safety concerns   Walking 10 Feet on Uneven Surfaces CARE Score 88   Ambulation   Does the patient walk? 2  Yes   Primary Discharge Mode of Locomotion Walk   Walk Assist Level Contact Guard   Gait Pattern Inconsistant Cathryn   Assist Device Hand Hold   Distance Walked (feet) 150 ft  (x3)   Limitations Noted In Coordination;Balance  (attention)   Findings cgA with HHA   Walking (FIM) 4 - Patient requires steadying assist or light touching AND distance 150 feet or more, no rest   Wheelchair mobility   QI: Does the patient use a wheelchair? 0   No   Wheelchair (FIM) 0 - Activity does not occur   QI: 1 Step (Curb)   Assistance Needed Physical assistance   Assistance Provided by Fred Less than 25%   Comment HHA for curb   1 Step (Curb) CARE Score 3   QI: 4 Steps   Assistance Needed Incidental touching   Assistance Provided by [place] No physical assistance   Comment R HR down/L HR up x12   4 Steps CARE Score 4   QI: 12 Steps   Assistance Needed Incidental touching   Assistance Provided by [place] No physical assistance   12 Steps CARE Score 4   Stairs   Type Stairs;Curb;Ramp   # of Steps 12   Weight Bearing Precautions Fall Risk   Assist Devices Single Rail;Hand Hold   Findings curb/ramp with HHA;stairs with HR   Stairs (FIM) 4 - Patient requires steadying assist or light touching AND patient goes up and down full flight (12- 14 stairs)   QI: Picking Up Object   Assistance Needed Incidental touching   Assistance Provided by [place] No physical assistance   Picking Up Object CARE Score 4   QI: Toilet Transfer   Assistance Needed Incidental touching   Assistance Provided by [place] No physical assistance   Toilet Transfer CARE Score 4   Toilet Transfer   Surface Assessed Standard Toilet   Limitations Noted In Balance; Endurance; Safety   Findings CS/cgA   Toilet Transfer (FIM) 4 - Patient requires steadying assist or light touching   Therapeutic Interventions   Strengthening standing TE in pbars (hip flex/ext/abd/add, HS curls, mini squats, calf raises, sidestepping L/R) reps until fatigued   Flexibility manual stretch B HS and gastroc;    Equipment Use   NuStep 15 minutes, level 3   Assessment   Treatment Assessment Pt cont to demonstrate difficulty with direction following, whether instructed verbally/visually or by tactile cueing; pt sometimes refuses to attempt tasks d/t confusion or irritatability; Pt instructed in standing TE ( as above, with max cueing, frequently) NuStep x15 minutes, level 3; curb/ramp and stairs (as above) as well as car xfer with HHA; pt amb 150' (x3) with short, shuffling gait; attempted to force an increase in renate, but pt leaned back into pressure placed in low back and nearly lost balance, needed steadying assist from therapist to avoid fall; finished session in bedside chair with all needs in reach and alarms active; recommend cont P TPOC:    Problem List Decreased strength;Decreased endurance; Impaired balance;Decreased mobility; Decreased coordination;Decreased cognition; Impaired judgement;Decreased safety awareness   Barriers to Discharge Inaccessible home environment;Decreased caregiver support   PT Barriers   Physical Impairment Decreased strength;Decreased endurance; Impaired balance;Decreased mobility; Decreased coordination;Decreased cognition; Impaired judgement;Decreased safety awareness   Functional Limitation Stair negotiation;Standing;Transfers; Walking   Plan   Treatment/Interventions Therapeutic exercise;ADL retraining;Functional transfer training;LE strengthening/ROM; Elevations;Cognitive reorientation; Endurance training;Patient/family training;Equipment eval/education; Bed mobility;Gait training   Progress Progressing toward goals   Recommendation   Recommendation 24 hour supervision/assist   PT Therapy Minutes   PT Time In 0915   PT Time Out 1045   PT Total Time (minutes) 90   PT Mode of treatment - Individual (minutes) 45   PT Mode of treatment - Concurrent (minutes) 45   PT Mode of treatment - Group (minutes) 0   PT Mode of treatment - Co-treat (minutes) 0   PT Mode of Teatment - Total time(minutes) 90 minutes   Therapy Time missed   Time missed?  No     Noe Murrell, PTA

## 2018-05-11 NOTE — PROGRESS NOTES
Internal Medicine Progress Note  Patient: Lamar Chapman  Age/sex: 80 y o  female  Medical Record #: [id number]      ASSESSMENT/PLAN:  Lamar Chapman is seen and examined and management for following issues:    1  Lt MCA CVA s/p mechanical thrombectomy: Continue Eliquis 2 5mg BID (keep dose same = d/w Dr Cem Abraham) and atorvastatin for secondary prevention  [facility] Neurology follow-up  Had a RR yesterday for change in MS with being found lethargic = CTH no change, VSS; neuro saw and recommended no changes and signed off  HS Remeron stopped it to see if any change which seems to have helped yesterday but today she is very tired    2  HTN: stable; continue Lopressor 50mg every 12 hours  3   CHADWICK/CKD stage III; baseline Cr 1 5: On admit to hospital 4/18 had creat 2 0 and Torsemide was held/fluids given; On 5/7 up to 1 99 = stopped Torsemide/KCL and gave 1 liter NSS = yesterday gave another liter since creat still 1 90 = on 5/9/18 was back down to 1 27  Checked PVRs = not significant  4   Chronic systolic CHF/LVEF 86%; moderate TR; bio MVR (normal function):  holding torsemide 10mg daily/Kdur 10 meq BID  Chest is clear/no edema  5  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): Eliquis 2 5mg BID 2/2 age and renal status (switched from Coumadin in the hospital so NOAC is new)  6   DM type 2: On linagliptan 2 5mg at home  It was planned for pt to change to Januvia 100mg daily as an OP per the daughter  Will start Januvia when needed but so far BS controlled w/o tx likely 2/2 fact she is not eating much = did stop Accuchecks for now and watch FBSs but if she starts eating better will resume Accuchecks  Continue DM diet  Fasting today 5/7/18 and 5/8/18 = 115, yesterday was 113 (last evening at 1742 drawn for rapid response was 96)  7   Acute blood loss anemia: s/p transfusion after EBL from thrombectomy of 500-1000ml from femoral site  Stable  8   PFO:  Small  Seen on LORI    Outpt Cardiology follow-up  9   History of colon CA: Colonic lesion seen on CT  Will need GI follow-up    10  Hx PPM    11  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    12  Poor appetite: had not been eating very well so Remeron was added  Also, question depression  Remeron 7 5mg qhs added 5/7/18 by primary service and was stopped 5/9/18 2/2 too sleepy  She was on Remeron 15mg at home      Subjective: Patient seen and examined   Offers no complaints     ROS:   GI: denies abdominal pain, change bowel habits or reflux symptoms  Neuro: No new neurologic changes  Respiratory: No Cough, SOB  Cardiovascular: No CP, palpitations     Scheduled Meds:    Current Facility-Administered Medications:  acetaminophen 650 mg Oral Q6H PRN Ankita Zavala MD   apixaban 2 5 mg Oral BID Ankita Zavala MD   atorvastatin 80 mg Oral Daily With Marleny Trevizo MD   bisacodyl 10 mg Rectal Daily PRN Ankita Zavala MD   docusate sodium 100 mg Oral BID Ankita Zavala MD   ferrous sulfate 300 mg Oral Daily GAYATRI Johnson   metoprolol tartrate 50 mg Oral Q12H Dana Tse MD   pantoprazole 40 mg Oral Early Morning Ankita Zavala MD   polyethylene glycol 17 g Oral Daily PRN Ankita Zavala MD   senna 1 tablet Oral HS Ankita Zavala MD       Labs:       Results from last 7 days  Lab Units 05/08/18  1958 05/08/18  1704 05/07/18  0647   WBC Thousand/uL 5 26  --  5 27   HEMOGLOBIN g/dL 9 9*  --  9 5*   I STAT HEMOGLOBIN g/dl  --  9 2*  --    HEMATOCRIT % 31 6*  --  29 0*   PLATELETS Thousands/uL 170  --  196       Results from last 7 days  Lab Units 05/09/18  0451 05/08/18  1742   SODIUM mmol/L 139 137   POTASSIUM mmol/L 3 9 4 2   CHLORIDE mmol/L 106 102   CO2 mmol/L 26 28   BUN mg/dL 41* 51*   CREATININE mg/dL 1 27 1 67*   GLUCOSE RANDOM mg/dL 113 96   CALCIUM mg/dL 9 4 9 8           Results from last 7 days  Lab Units 05/08/18  1728   INR  1 17*          Glucose (mg/dL)   Date Value   05/09/2018 113   05/08/2018 96   05/08/2018 115   05/07/2018 115   12/23/2015 114 (H)   02/22/2015 101   02/21/2015 103   02/20/2015 116     Glucose, i-STAT (mg/dl)   Date Value   05/08/2018 103   04/23/2018 106   04/23/2018 96       Labs reviewed    Physical Examination:  Vitals:   Vitals:    05/10/18 2136 05/11/18 0524 05/11/18 0600 05/11/18 0836   BP: 127/61 102/70  122/58   BP Location: Left arm Right arm     Pulse: 87 76  77   Resp: 16 18     Temp: 98 6 °F (37 °C) 98 1 °F (36 7 °C)     TempSrc: Oral Oral     SpO2: 96% 97%     Weight:  64 8 kg (142 lb 13 7 oz) 64 8 kg (142 lb 13 7 oz)    Height:           GEN: NAD  HEENT: NC/AT  RESP: BBS w/o crackles/wheeze/rhonci; resp unlabored  CV: +S1 S2, regular rate, no rubs/murmurs  ABD: soft, NT, ND, normal BS   : no schaefer  EXT: no edema  Skin: no rashes  Neuro: Awake but sleepier today; WOODARD 5/5 but difficult to get her to coordinate UE strength testing 2/2 some involuntary movements with left arm and inability to consistently follow commands; face symmetric; speech clear  [x ] Total time spent: 30 Mins and greater than 50% of this time was spent counseling/coordinating care        India Gonzalez, [facility]  Internal Medicine

## 2018-05-11 NOTE — PROGRESS NOTES
05/11/18 1100   Pain Assessment   Pain Assessment No/denies pain   Restrictions/Precautions   Precautions Aspiration;Bed/chair alarms;Cognitive; Fall Risk;Supervision on toilet/commode   Memory Skills   Memory (FIM) 2 - Recognizes, recalls/performs 25-49%   Social Interaction (FIM) 5 - Interacts appropriately with others 90% of time   Speech/Language/Cognition Assessmetn   Treatment Assessment Session initiated w/ STM recall of daily events where pt was mod A to recall visitors from prior day, recalling prior therapy sessions  Pt requesting to use the bathroom during the session  Pt initially being impulsive w/ attempting to stand up w/ leg rests present, where increased verbal/tactile cues needed to stop and wait until it was clear for pt to then move  Pt's ability to follow basic commands was ~75%  Execution of toileting was [facility] to complete, but noted difficulty w/ pulling up underwear and pants, requiring assistance from SLP  Once completed task, pt washing hands prior to sitting down, but required verbal cues to locate paper towels  Swallow Information   Current Risks for Dysphagia & Aspiration General debilitation;New Neuro event;Brain injury;Cognitive deficit   Current Symptoms/Concerns Difficulty chewing;Pockets food   Current Diet Regular; Thin liquid   Baseline Diet Regular; Thin liquids   Consistencies Assessed and Performance   Materials Admnistered Soft/Level 3;Regular/Solid; Thin liquid   Oral Stage WFL   Phargngeal Stage WFL   Swallow Mechanics WFL   Esophageal Concerns No s/s reported   Recommendations   Diet Solid Recommendation Regular consistency   Diet Liquid Recommendation Thin liquid   Recommended Form of Meds As tolerated   General Precautions Aspiration precautions; Feed only when alert;Minimize distractions;Upright as possible for all oral intake;Remain upright for 45 mins after meals; Other (Comment)  (OOB for meals)   Compensatory Swallowing Strategies Place food/straw in on left side;Alternate solids and liquids; Check for pocketing of food on the right;Cue for lingual sweep;Voluntary throat clear/cough to clear penetration   QI: Eating   Assistance Needed Set-up / clean-up   Assistance Provided by [place] No physical assistance   Eating CARE Score 5   Swallow Assessment   Swallow Treatment Assessment Pt observed w/ lunch where diet was advanced to regular diet w/ thin liquids  Pt was setup w/ tray and fed self w/o difficulty  Consumed 75% of meal and 360cc of thins by cup  Continues to demonstrate slower but full/functional mastication of soft solids (turkey, carrots, pie) w/o increased R sided pocketing today  No anterior spillage noted and a-p transfer of thins was [facility]  Swallow initiation was prompt and hyolaryngeal elevation was [facility]  No overt signs/sxs of aspiration noted w/ meal  Will f/u briefly to monitor tolerance of diet w/o increased aspiration sxs  Swallow Assessment Prognosis   Prognosis Good   Prognosis Considerations Co-morbidities; Medical prognosis   SLP Therapy Minutes   SLP Time In 1100   SLP Time Out 1200   SLP Total Time (minutes) 60   SLP Mode of treatment - Individual (minutes) 30   SLP Mode of treatment - Concurrent (minutes) 30   SLP Mode of treatment - Group (minutes) 0   SLP Mode of treatment - Co-treat (minutes) 0   SLP Mode of Teatment - Total time(minutes) 60 minutes   Therapy Time missed   Time missed?  No   Daily FIM Score   Problem solving (FIM) 3 - Solves basic problmes 50-74% of time   Comprehension (FIM) 3 - Understands basic info/conversation 50-74% of time   Expression (FIM) 4 - Expresses basic info/needs 75-90% of time   Eating (FIM) 5 - Patient needs help to open contianers or set up tray

## 2018-05-11 NOTE — PROGRESS NOTES
Progress Note - Cheri Temple 80 y o  female MRN: [id number]    Unit/Bed#: -02 Encounter: [id number]            Subjective:   Pt without complaint currently     Objective:     ROS  Gen: denies recent wt loss   Psych: denies mood change        Physical Exam:     Gen:        NAD   Neck:   trachea midline  Lungs:  respirations unlabored   Heart:    + S1 and S2   Abdomen:    Soft, non-tender  Psych: mood/affect appropriate  Neurologic: awake, alert    Functional :  Mobility: min   Tx: min-mod  ADLs: mod        Current Facility-Administered Medications:  acetaminophen 650 mg Oral Q6H PRN Ashleigh Garza MD   apixaban 2 5 mg Oral BID Ahsleigh Garza MD   atorvastatin 80 mg Oral Daily With Leyla Gonzalez MD   bisacodyl 10 mg Rectal Daily PRN Ashleigh Garza MD   docusate sodium 100 mg Oral BID Ashleigh Garza MD   ferrous sulfate 300 mg Oral Daily Patricia Jairon CRNP   melatonin 6 mg Oral HS Ashleigh Garza MD   metoprolol tartrate 50 mg Oral Q12H [address] Ashleigh Garza MD   pantoprazole 40 mg Oral Early Morning Ashleigh Garza MD   polyethylene glycol 17 g Oral Daily PRN Ashleigh Garza MD   senna 1 tablet Oral HS Ashleigh Garza MD         acetaminophen    bisacodyl    polyethylene glycol      Assessment:  Pt is 79 yo female with ICH & bi-hemispheric nonhemorrhagic ischemic strokes s/p thrombectomy by Dr Tiffanie Bello on 4/23    Plan:    Rehabilitation: cont PT/OT for ambulatory/ADL dysfxn    ICH & bi-hemispheric nonhemorrhagic ischemic strokes: nonhemorrhagic stroke felt by neurology to be embolic of unclear etiology; per s/p thrombectomy by Dr Tiffanie Bello on 4/23, cleared by neurosx for [facility] and per neuro recs started on eliquis 2 5 mg BID (not 5 mg due to age and serum Cr greater than 1 5, tends to fluctuate around this value); home lipitor increased from 40 to 80 mg     AMS (5/8): seen by neuro and recommended CTH  (5/8) which showed no acute findings and patient back to baseline mental status therefore per neuro recs no MRI at this time      Peripheral neuropathy: likely 2/2 to DM, had tried neurontin in the past but did not tolerate      DM: on januvia 100 mg qd at home (recently switched from tradjenta 5 mg qd); currently on ISS     CHF (EF 45%): home torsemide 10 mg qd (and home potassium 10 MEQ BID due to hypokalemia 2/2 to torsemide) both being held due to acute on chronic renal failure likely 2/2 to poor PO; additionally  home toprol XL 50 mg qd switched to lopressor 50 mg q12 in acute care     SSS: s/p pacer, interrogated recently in acute care, no A-fib found at that time (although noted on EKG of 4/23 however pt already on [facility] for CVA and on BB), follows with Dr Erika Diez (cards) and Dr Kiet Naqvi (EP)      h/o colon CA: on CT CAP of 4/26 colonic thickening suspicious for lesion noted and colonoscopy recommended, pt undergoes screening colonoscopys with Dr Rony Funez, pt to FU with them for FU colonoscopy       Meningioma: seen by neurosx no intervention planned, follows with Dr Ольга Toth     PFO (3 mm): unclear if paradoxical stroke from DVT is etiology of CVA (felt to be embolic by neuro) however as neuro has placed pt on [facility] for CVA LE dopplers would not ; t/c closure as OP       s/p MVR: tissue valve     HL: home lipitor increased from 40 mg to 80 mg due to CVA       h/o DVT: home coumadin switched to eliquis for CVA      Insomnia: at home on remeron 15 mg HS on hold due to episodes of AMS (however low suspicion this is cause of AMS)      non-occlusive carotid dz: per carotid U/S report recommend repeat carotid U/S in 1 year; on [facility] and statin     chronic constipation: per family at baseline 1 BM every 4 to 5 days, IM monitoring and will treat with laxatives as needed      rt ankle pain/swelling: likely sprain; XR showed no acute osseous abnormality     CHADWICK on CKD: baseline 1 4-1 7, Cr now back to within pt's baseline however torsemide still on hold for now per IM      chronic anemia: AOCD/CKD, at home on iron 150 mg qd, cont iron supplementation as inpt; Hg currently stable at 9 9     Hyperphosphatemia per lab reference range at 4 4: however in females 18 year or older 4 5 is ULN      Dispo: reteam    Incidental findings on CT C/A/P of 4/26:  1) multiple pulmonary micronodules: per CT report recommended FU imaging in 3 months for determination of stability  2) pleural thickening  3) uterine fibroid  Other incidental findings:  4) B/L renal cysts  5) 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 no intervention planned, follows with Dr Lian Cruz  6) EKG abnormalities in the setting of atrial paced rhythm (non-specific intraventricular conduction block/wide QRS, LAD): follows with Dr Piero Carmen (cards) and Dr Joanna Campuzano (EP)  7) mod-severe TR/elevated peak PA pressure:  follows with Dr Piero Carmen (cards) and Dr Joanna Campuzano (EP)

## 2018-05-12 PROCEDURE — 97535 SELF CARE MNGMENT TRAINING: CPT

## 2018-05-12 PROCEDURE — 97110 THERAPEUTIC EXERCISES: CPT

## 2018-05-12 PROCEDURE — 97530 THERAPEUTIC ACTIVITIES: CPT

## 2018-05-12 RX ADMIN — APIXABAN 2.5 MG: 2.5 TABLET, FILM COATED ORAL at 17:30

## 2018-05-12 RX ADMIN — METOPROLOL TARTRATE 50 MG: 50 TABLET ORAL at 21:36

## 2018-05-12 RX ADMIN — PANTOPRAZOLE SODIUM 40 MG: 40 TABLET, DELAYED RELEASE ORAL at 05:50

## 2018-05-12 RX ADMIN — APIXABAN 2.5 MG: 2.5 TABLET, FILM COATED ORAL at 08:02

## 2018-05-12 RX ADMIN — ACETAMINOPHEN 650 MG: 325 TABLET, FILM COATED ORAL at 19:29

## 2018-05-12 RX ADMIN — METOPROLOL TARTRATE 50 MG: 50 TABLET ORAL at 08:02

## 2018-05-12 RX ADMIN — ATORVASTATIN CALCIUM 80 MG: 40 TABLET, FILM COATED ORAL at 17:29

## 2018-05-12 RX ADMIN — DOCUSATE SODIUM 100 MG: 100 CAPSULE, LIQUID FILLED ORAL at 08:02

## 2018-05-12 RX ADMIN — MELATONIN TAB 3 MG 6 MG: 3 TAB at 21:39

## 2018-05-12 RX ADMIN — MINERAL SUPPLEMENT IRON 300 MG / 5 ML STRENGTH LIQUID 100 PER BOX UNFLAVORED 300 MG: at 08:09

## 2018-05-12 NOTE — PROGRESS NOTES
05/12/18 1500   Pain Assessment   Pain Assessment No/denies pain   Restrictions/Precautions   Precautions Bed/chair alarms;Cognitive; Fall Risk   Cognition   Overall Cognitive Status Impaired   Following Commands Follows one step commands inconsistently   Subjective   Subjective initially refusing to participate , stating she is "too Tired"   QI: Roll Left and Right   Assistance Needed Physical assistance;Verbal cues   Assistance Provided by [place] 25%-49%   Roll Left and Right CARE Score 3   QI: Lying to Sitting on Side of Bed   Assistance Needed Physical assistance;Verbal cues   Assistance Provided by [place] 25%-49%   Lying to Sitting on Side of Bed CARE Score 3   QI: Sit to Stand   Assistance Needed Physical assistance;Verbal cues   Assistance Provided by [place] 25%-49%   Comment difficluty initiating, improved during session   Sit to Stand CARE Score 3   QI: Chair/Bed-to-Chair Transfer   Assistance Needed Physical assistance;Verbal cues   Assistance Provided by [place] 25%-49%   Comment HHA on R, very slow moving, A with wt shift to R   Chair/Bed-to-Chair Transfer CARE Score 3   Transfer Bed/Chair/Wheelchair   Limitations Noted In LE Strength;UE Strength;Problem Solving; Endurance; Coordination;Balance; Sequencing   Stand Pivot Minimal Assist   Stand to Sit Supervision   Supine to Sit Moderate Assist   Sit to Supine Moderate Assist   Bed, Chair, Wheelchair Transfer (FIM) 3 - Patient completes 50 - 74% of all tasks   QI: Walk 10 Feet   Assistance Needed Physical assistance;Verbal cues   Assistance Provided by [place] Less than 25%   Comment HHA on R   Walk 10 Feet CARE Score 3   QI: Walk 50 Feet with Two Turns   Assistance Needed Physical assistance;Verbal cues   Assistance Provided by [place] Less than 25%   Walk 50 Feet with Two Turns CARE Score 3   QI: Walk 150 Feet   Assistance Needed Physical assistance;Verbal cues   Assistance Provided by [place] Less than 25%   Walk 150 Feet CARE Score 3   Ambulation   Does the patient walk? 2  Yes   Gait Pattern Slow Cathryn;Decreased foot clearance; Lateral deviation; Step to; Improper weight shift   Assist Device Hand Hold   Distance Walked (feet) 165 ft   Limitations Noted In Speed;Midline Orientation; Heel Strike; Endurance;Balance   Walking (FIM) 4 - Patient requires steadying assist or light touching AND distance 150 feet or more, no rest   Wheelchair mobility   QI: Does the patient use a wheelchair? 0  No   QI: Toilet Transfer   Assistance Needed Physical assistance;Verbal cues   Assistance Provided by [place] 25%-49%   Toilet Transfer CARE Score 3   Toilet Transfer   Surface Assessed Standard Toilet   Limitations Noted In Balance; Safety;Problem Solving   Adaptive Equipment Grab Bar   Findings pt attempting to pull pants down first then turn to sit on toilet   Toilet Transfer (FIM) 3 - Patient completes 50 - 74% of all tasks   Therapeutic Interventions   Flexibility passive stretch B HS and calves   Equipment Use   NuStep x 10 min lvl 2   Other Comments   Comments Sat at EOB x 20 min attempting to arouse pt, pt talking but unabl eto keep eyes open initailly to participate  Eventually able to wake up and participate  Assessment   Treatment Assessment Pt lethargic again this afternoon, difficlut to arouse initially from nap  Pt participated in activity but was more confused  Recommendation   Recommendation Short-term skilled PT   PT Therapy Minutes   PT Time In 1500   PT Time Out 1600   PT Total Time (minutes) 60   PT Mode of treatment - Individual (minutes) 60   PT Mode of treatment - Concurrent (minutes) 0   PT Mode of treatment - Group (minutes) 0   PT Mode of treatment - Co-treat (minutes) 0   PT Mode of Teatment - Total time(minutes) 60 minutes   Therapy Time missed   Time missed?  Yes   Amount of time missed 30   Reason for time missed Extreme fatigue   Time(s) multiple attempts made 2

## 2018-05-12 NOTE — PROGRESS NOTES
Internal Medicine Progress Note  Patient: Marbella Li  Age/sex: 80 y o  female  Medical Record #: [id number]      ASSESSMENT/PLAN:  Marbella Li is seen and examined and management for following issues:    1  Lt MCA CVA s/p mechanical thrombectomy: Continue Eliquis 2 5mg BID (keep dose same = d/w Dr Mara Garcia) and atorvastatin for secondary prevention  [facility] follow-up  Had a RR 5/10/18 for change in MS with being found lethargic = CTH no change, VSS; neuro saw and recommended no changes and signed off  HS Remeron stopped it to see if any change in daytime fatigue - so far variable results  2   HTN: stable; continue Lopressor 50mg every 12 hours  3   CHAWDICK/CKD stage III; baseline Cr 1 5: On admit to hospital 4/18 had creat 2 0 and Torsemide was held/fluids given; On 5/7 up to 1 99 = stopped Torsemide/KCL and gave 1 liter NSS = 5/10/18 gave another liter since creat still 1 90 = on 5/9/18 was back down to 1 27  Checked PVRs = not significant  4   Chronic systolic CHF/LVEF 65%; moderate TR; bio MVR (normal function):  holding torsemide 10mg daily/Kdur 10 meq BID  Chest is clear/no edema  5  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): Eliquis 2 5mg BID 2/2 age and renal status (switched from Coumadin in the hospital so NOAC is new)  6   DM type 2: On linagliptan 2 5mg at home  It was planned for pt to change to Januvia 100mg daily as an OP per the daughter  Will start Januvia when needed but so far BS controlled w/o tx likely 2/2 fact she is not eating much = did stop Accuchecks for now and watch FBSs but if she starts eating better will resume Accuchecks  Continue DM diet  Fasting sugars have been acceptable  7   Acute blood loss anemia: s/p transfusion after EBL from thrombectomy of 500-1000ml from femoral site  Stable  8   PFO:  Small  Seen on LORI  45 [facility] follow-up  9   History of colon CA: Colonic lesion seen on CT  Will need GI follow-up    10  Hx PPM    11  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    12  Poor appetite: had not been eating very well so Remeron was added  Also, question depression  Remeron 7 5mg qhs added 5/7/18 by primary service and was stopped 5/9/18 2/2 too sleepy  She was on Remeron 15mg at home      Subjective: Patient seen and examined  Offers no complaints  Her son was available to translate      ROS:   GI: denies abdominal pain, change bowel habits or reflux symptoms  Neuro: No new neurologic changes  Respiratory: No Cough, SOB  Cardiovascular: No CP, palpitations     Scheduled Meds:    Current Facility-Administered Medications:  acetaminophen 650 mg Oral Q6H PRN Torsten Bernardo MD   apixaban 2 5 mg Oral BID Torsten Bernardo MD   atorvastatin 80 mg Oral Daily With Lalita Isaacs MD   bisacodyl 10 mg Rectal Daily PRN Torsten Bernardo MD   docusate sodium 100 mg Oral BID Torsten Bernardo MD   ferrous sulfate 300 mg Oral Daily Anni Thrasher, WILLNP   melatonin 6 mg Oral HS Torsten Bernardo MD   metoprolol tartrate 50 mg Oral Q12H León Ellis MD   pantoprazole 40 mg Oral Early Morning Torsten Bernardo MD   polyethylene glycol 17 g Oral Daily PRN Torsten Bernardo MD   senna 1 tablet Oral HS Torsten Bernardo MD       Labs:       Results from last 7 days  Lab Units 05/08/18  1958 05/08/18  1704 05/07/18  0647   WBC Thousand/uL 5 26  --  5 27   HEMOGLOBIN g/dL 9 9*  --  9 5*   I STAT HEMOGLOBIN g/dl  --  9 2*  --    HEMATOCRIT % 31 6*  --  29 0*   PLATELETS Thousands/uL 170  --  196       Results from last 7 days  Lab Units 05/09/18  0451 05/08/18  1742   SODIUM mmol/L 139 137   POTASSIUM mmol/L 3 9 4 2   CHLORIDE mmol/L 106 102   CO2 mmol/L 26 28   BUN mg/dL 41* 51*   CREATININE mg/dL 1 27 1 67*   GLUCOSE RANDOM mg/dL 113 96   CALCIUM mg/dL 9 4 9 8           Results from last 7 days  Lab Units 05/08/18  1728   INR  1 17*          Glucose (mg/dL)   Date Value   05/09/2018 113   05/08/2018 96   05/08/2018 115   05/07/2018 115   12/23/2015 114 (H)   02/22/2015 101 02/21/2015 103   02/20/2015 116     Glucose, i-STAT (mg/dl)   Date Value   05/08/2018 103   04/23/2018 106   04/23/2018 96       Labs reviewed    Physical Examination:  Vitals:   Vitals:    05/11/18 0836 05/11/18 1411 05/11/18 2058 05/12/18 0501   BP: 122/58 149/73 119/58 125/60   BP Location:   Right arm Right arm   Pulse: 77 82 83 69   Resp:  20 16 17   Temp:  99 °F (37 2 °C) 98 9 °F (37 2 °C) 97 9 °F (36 6 °C)   TempSrc:  Oral Oral Oral   SpO2:  98% 97% 97%   Weight:    64 kg (141 lb 1 5 oz)   Height:           GEN: NAD  HEENT: NC/AT  RESP: BBS w/o crackles/wheeze/rhonci; resp unlabored  CV: +S1 S2, regular rate, no rubs/murmurs  ABD: soft, NT, ND, normal BS   : no schaefer  EXT: no edema  Skin: no rashes  Neuro: Awake but sleepier today; WOODARD 5/5 but difficult to get her to coordinate UE strength testing 2/2 some involuntary movements with left arm and inability to consistently follow commands; face symmetric; speech clear  [ X ] Total time spent: 30 Mins and greater than 50% of this time was spent counseling/coordinating care        Melene Holstein, PA-C  Internal Medicine

## 2018-05-12 NOTE — PROGRESS NOTES
Occupational Therapy progress Note   05/12/18 1000   Pain Assessment   Pain Assessment No/denies pain   Restrictions/Precautions   Precautions Bed/chair alarms;Cognitive; Fall Risk   QI: Shower/Bathe Self   Assistance Needed Physical assistance   Assistance Provided by [place] Less than 25%   Shower/Bathe Self CARE Score 3   Bathing   Assessed Bath Style Shower   Able to Gather/Transport No   Able to Wash/Rinse/Dry (body part) Left Arm;Right Arm;L Upper Leg;R Upper Leg;L Lower Leg/Foot;R Lower Leg/Foot;Chest;Abdomen;Perineal Area   Limitations Noted in Balance; Endurance;Problem Solving;ROM;Safety; Sequencing;Timeliness   Positioning Seated   Adaptive Equipment Shower Seat;Hand Held Shower   Findings  Pt required max verbal cues for sequencing  Perseverative on washing RLE  Decreased dynamic reach below waist to wash feet with poor balance  Bathing (FIM) 4 - Patient completes 8/10 or 9/10 parts   Tub/Shower Transfer   Limitations Noted In Balance; Endurance;Problem Solving; Safety; Sequencing   Adaptive Equipment Grab Bars;Seat with Back   Assessed Shower   Findings Stand pivot transfer to shower chair, verbal/tactile cues to increase pt success  Decreased verbal command following  Shower Transfer (FIM) 4 - Patient completes 75% of all tasks   QI: Upper Body Dressing   Assistance Needed Physical assistance   Assistance Provided by [place] 50%-74%   Upper Body Dressing CARE Score 2   QI: Lower Body Dressing   Assistance Needed Physical assistance   Assistance Provided by [place] 50%-74%   Lower Body Dressing CARE Score 2   QI: Putting On/Taking Off Footwear   Assistance Needed Physical assistance   Assistance Provided by [place] 75% or more   Putting On/Taking Off Footwear CARE Score 2   Dressing/Undressing Clothing   Remove UB Clothes Pullover Shirt   Remove LB Clothes Pants; Undergarment;Socks   Don UB [address] [place] [address] Avenue; Undergarment;Socks   Limitations Noted In Balance; Coordination; Endurance;Problem Solving; Safety; Sequencing;ROM   Positioning Supported Sit   Findings Mod verbal cues for initation and sequencing of all tasks  Pt unable to correctly orient clothing items with decreased awareness of body part within clothing items  Pt required assist to thread all clothing items following which pt able to successfull complete tasks  UB Dressing (FIM) 3 - Patient completes  50-74% of all tasks   LB Dressing (FIM) 3 - Patient completes  50-74% of all tasks   QI: Sit to Stand   Assistance Needed Physical assistance   Assistance Provided by [place] Less than 25%   Sit to Stand CARE Score 3   QI: Chair/Bed-to-Chair Transfer   Assistance Needed Physical assistance   Assistance Provided by [place] Less than 25%   Chair/Bed-to-Chair Transfer CARE Score 3   Transfer Bed/Chair/Wheelchair   Limitations Noted In Balance; Coordination; Endurance;Problem Solving; Sequencing   Stand Pivot Minimal Assist   Sit to Stand Minimal   Stand to Sit Minimal   Findings Max tactile cues at hips for initiation and sequencing of all steps,    Bed, Chair, Wheelchair Transfer (FIM) 4 - Patient completes 75% of all tasks   Functional Standing Tolerance   Time 30 seconds   Activity LE dressing   Comments Poor activity tolerance, pt requesting return to sit immediately   Therapeutic Excerise-Strength   UE Strength Yes   Right Upper Extremity- Strength   RUE Strength Comment Pt engaged in BUE strengthing with 3# weighted dowel  Visual demonstration increased success with initation and sequencing of tasks  2 x 10 reps biceps curls, chest press and shoulder press  Left Upper Extremity-Strength   LUE Strength Comment Pt engaged in BUE strengthing with 3# weighted dowel  Visual demonstration increased success with initation and sequencing of tasks  2 x 10 reps biceps curls, chest press and shoulder press     Activity Tolerance   Activity Tolerance Patient limited by fatigue   Assessment   Treatment Assessment Pt engaged in skilled OT session with focus on ADL participation, UE strengthing, and L side awareness  Pt engaged in ADL, see above for details  Pt engaged in BUE strengthening to improve independence and safety with functional transfers, see above for details  Pt engaged in L side awareness peg activity to locate pegs of a stated color  Pt required auditory cues (shaking bin of pegs ) to increase awareness of location of bin each time with poor carryover between trials  Pt tolerated session well, limited by fatigue  Continue with current POC  Plan   Treatment/Interventions ADL retraining;Functional transfer training; Therapeutic exercise; Endurance training;Cognitive reorientation;Patient/family training   Progress Progressing toward goals   OT Therapy Minutes   OT Time In 1000   OT Time Out 1130   OT Total Time (minutes) 90   OT Mode of treatment - Individual (minutes) 90   OT Mode of treatment - Concurrent (minutes) 0   OT Mode of treatment - Group (minutes) 0   OT Mode of treatment - Co-treat (minutes) 0   OT Mode of Teatment - Total time(minutes) 90 minutes   Therapy Time missed   Time missed? No      05/12/18 1000   Pain Assessment   Pain Assessment No/denies pain   Restrictions/Precautions   Precautions Bed/chair alarms;Cognitive; Fall Risk   QI: Shower/Bathe Self   Assistance Needed Physical assistance   Assistance Provided by [place] Less than 25%   Shower/Bathe Self CARE Score 3   Bathing   Assessed Bath Style Shower   Able to Gather/Transport No   Able to Wash/Rinse/Dry (body part) Left Arm;Right Arm;L Upper Leg;R Upper Leg;L Lower Leg/Foot;R Lower Leg/Foot;Chest;Abdomen;Perineal Area   Limitations Noted in Balance; Endurance;Problem Solving;ROM;Safety; Sequencing;Timeliness   Positioning Seated   Adaptive Equipment Shower Seat;Hand Held Shower   Findings  Pt required max verbal cues for sequencing  Perseverative on washing RLE  Decreased dynamic reach below waist to wash feet with poor balance     Bathing (FIM) 4 - Patient completes 8/10 or 9/10 parts   Tub/Shower Transfer   Limitations Noted In Balance; Endurance;Problem Solving; Safety; Sequencing   Adaptive Equipment Grab Bars;Seat with Back   Assessed Shower   Findings Stand pivot transfer to shower chair, verbal/tactile cues to increase pt success  Decreased verbal command following  Shower Transfer (FIM) 4 - Patient completes 75% of all tasks   QI: Upper Body Dressing   Assistance Needed Physical assistance   Assistance Provided by [place] 50%-74%   Upper Body Dressing CARE Score 2   QI: Lower Body Dressing   Assistance Needed Physical assistance   Assistance Provided by [place] 50%-74%   Lower Body Dressing CARE Score 2   QI: Putting On/Taking Off Footwear   Assistance Needed Physical assistance   Assistance Provided by [place] 75% or more   Putting On/Taking Off Footwear CARE Score 2   Dressing/Undressing Clothing   Remove UB Clothes Pullover Shirt   Remove LB Clothes Pants; Undergarment;Socks   Don [address]; Undergarment;Socks   Limitations Noted In Balance; Coordination; Endurance;Problem Solving; Safety; Sequencing;ROM   Positioning Supported Sit   Findings Mod verbal cues for initation and sequencing of all tasks  Pt unable to correctly orient clothing items with decreased awareness of body part within clothing items  Pt required assist to thread all clothing items following which pt able to successfull complete tasks      UB Dressing (FIM) 3 - Patient completes  50-74% of all tasks   LB Dressing (FIM) 3 - Patient completes  50-74% of all tasks   QI: Sit to Stand   Assistance Needed Physical assistance   Assistance Provided by [place] Less than 25%   Sit to Stand CARE Score 3   QI: Chair/Bed-to-Chair Transfer   Assistance Needed Physical assistance   Assistance Provided by [place] Less than 25%   Chair/Bed-to-Chair Transfer CARE Score 3   Transfer Bed/Chair/Wheelchair   Limitations Noted In Balance; Coordination; Endurance;Problem Solving; Sequencing   Stand Pivot Minimal Assist   Sit to Stand Minimal   Stand to Sit Minimal   Findings Max tactile cues at hips for initiation and sequencing of all steps,    Bed, Chair, Wheelchair Transfer (FIM) 4 - Patient completes 75% of all tasks   Functional Standing Tolerance   Time 30 seconds   Activity LE dressing   Comments Poor activity tolerance, pt requesting return to sit immediately   Therapeutic Excerise-Strength   UE Strength Yes   Right Upper Extremity- Strength   RUE Strength Comment Pt engaged in BUE strengthing with 3# weighted dowel  Visual demonstration increased success with initation and sequencing of tasks  2 x 10 reps biceps curls, chest press and shoulder press  Left Upper Extremity-Strength   LUE Strength Comment Pt engaged in BUE strengthing with 3# weighted dowel  Visual demonstration increased success with initation and sequencing of tasks  2 x 10 reps biceps curls, chest press and shoulder press  Activity Tolerance   Activity Tolerance Patient limited by fatigue   Assessment   Treatment Assessment Pt engaged in skilled OT session with focus on ADL participation, UE strengthing, and L side awareness  Pt engaged in ADL, see above for details  Pt engaged in BUE strengthening to improve independence and safety with functional transfers, see above for details  Pt engaged in L side awareness peg activity to locate pegs of a stated color  Pt required auditory cues (shaking bin of pegs ) to increase awareness of location of bin each time with poor carryover between trials  Pt tolerated session well, limited by fatigue  Continue with current POC  Plan   Treatment/Interventions ADL retraining;Functional transfer training; Therapeutic exercise; Endurance training;Cognitive reorientation;Patient/family training   Progress Progressing toward goals   OT Therapy Minutes   OT Time In 1000   OT Time Out 1130   OT Total Time (minutes) 90   OT Mode of treatment - Individual (minutes) 90   OT Mode of treatment - Concurrent (minutes) 0   OT Mode of treatment - Group (minutes) 0   OT Mode of treatment - Co-treat (minutes) 0   OT Mode of Teatment - Total time(minutes) 90 minutes   Therapy Time missed   Time missed?  No   Ofe Lees, OT

## 2018-05-13 PROCEDURE — 92526 ORAL FUNCTION THERAPY: CPT

## 2018-05-13 PROCEDURE — 97530 THERAPEUTIC ACTIVITIES: CPT

## 2018-05-13 RX ADMIN — DOCUSATE SODIUM 100 MG: 100 CAPSULE, LIQUID FILLED ORAL at 17:26

## 2018-05-13 RX ADMIN — DOCUSATE SODIUM 100 MG: 100 CAPSULE, LIQUID FILLED ORAL at 08:46

## 2018-05-13 RX ADMIN — SENNOSIDES 8.6 MG: 8.6 TABLET, FILM COATED ORAL at 21:37

## 2018-05-13 RX ADMIN — MINERAL SUPPLEMENT IRON 300 MG / 5 ML STRENGTH LIQUID 100 PER BOX UNFLAVORED 300 MG: at 08:47

## 2018-05-13 RX ADMIN — ATORVASTATIN CALCIUM 80 MG: 40 TABLET, FILM COATED ORAL at 17:26

## 2018-05-13 RX ADMIN — PANTOPRAZOLE SODIUM 40 MG: 40 TABLET, DELAYED RELEASE ORAL at 06:45

## 2018-05-13 RX ADMIN — METOPROLOL TARTRATE 50 MG: 50 TABLET ORAL at 08:46

## 2018-05-13 RX ADMIN — APIXABAN 2.5 MG: 2.5 TABLET, FILM COATED ORAL at 08:46

## 2018-05-13 RX ADMIN — APIXABAN 2.5 MG: 2.5 TABLET, FILM COATED ORAL at 17:26

## 2018-05-13 RX ADMIN — MELATONIN TAB 3 MG 6 MG: 3 TAB at 21:37

## 2018-05-13 NOTE — PROGRESS NOTES
05/13/18 0900   Pain Assessment   Pain Assessment 0-10   Pain Score No Pain   Restrictions/Precautions   Precautions Bed/chair alarms; Fall Risk;Cognitive   Cognition   Overall Cognitive Status Impaired   Arousal/Participation Alert; Cooperative   Attention Attends with cues to redirect   Orientation Level Oriented to person   Memory Decreased short term memory;Decreased recall of recent events   Following Commands Follows one step commands inconsistently   Subjective   Subjective reports she slept last night but she is still tired   QI: Roll Left and Right   Assistance Needed Supervision   Roll Left and Right CARE Score 4   QI: Sit to Lying   Assistance Needed Supervision   Sit to Lying CARE Score 4   QI: Sit to Stand   Assistance Needed Supervision   Sit to Stand CARE Score 4   QI: Chair/Bed-to-Chair Transfer   Assistance Needed Physical assistance   Assistance Provided by [place] 25%-49%   Comment HHA and steadying   Chair/Bed-to-Chair Transfer CARE Score 3   Transfer Bed/Chair/Wheelchair   Limitations Noted In Balance;Problem Solving; Sequencing;UE Strength;LE Strength; Coordination   Adaptive Equipment Hand Hold   Stand Pivot Minimal Assist   Sit to Stand Supervision   Stand to Sit Supervision   Supine to Sit Supervision   Sit to Supine Supervision   Bed, Chair, Wheelchair Transfer (FIM) 4 - Patient requires steadying assist or light touching   QI: Walk 10 Feet   Assistance Needed Physical assistance   Assistance Provided by [place] Less than 25%   Walk 10 Feet CARE Score 3   QI: Walk 50 Feet with Two Turns   Assistance Needed Physical assistance   Assistance Provided by [place] Less than 25%   Walk 50 Feet with Two Turns CARE Score 3   QI: Walk 150 Feet   Assistance Needed Physical assistance   Assistance Provided by [place] Less than 25%   Walk 150 Feet CARE Score 3   Ambulation   Does the patient walk? 2   Yes   Primary Discharge Mode of Locomotion Walk   Walk Assist Level Contact Guard   Gait Pattern Inconsistant [place]; Slow [place];Narrow [place];Step through; Improper weight shift   Assist Device Hand Hold   Distance Walked (feet) 150 ft   Limitations Noted In Coordination;Balance; Heel Strike;Speed;Strength;Swing;Posture   Findings CG HHA on R and then on L no sig difference, see assessment for details   Walking (FIM) 4 - Patient requires steadying assist or light touching AND distance 150 feet or more, no rest   QI: 4 Steps   Assistance Needed Incidental touching   Assistance Provided by [place] No physical assistance   4 Steps CARE Score 4   QI: 12 Steps   Assistance Needed Incidental touching   Assistance Provided by [place] No physical assistance   12 Steps CARE Score 4   Stairs   Type Stairs   # of Steps 12  (6")   Weight Bearing Precautions Fall Risk   Assist Devices Single Rail   Stairs (FIM) 4 - Patient requires steadying assist or light touching AND patient goes up and down full flight (12- 14 stairs)   Therapeutic Interventions   Flexibility hamstring and gastroc 60"x2   Neuromuscular Re-Education walking sideways along mat table and folding laundry   Assessment   Treatment Assessment session focused on functional mobility and activity; pt has decreased activity tolerance and does not consistently follow verbal directions or directions with visual cues; pt has difficulty folding some laundry and needs mod VC to complete task but has good standing tolerance and balance wheile moving along mat table; pt has decreased amb speed and takes short but equal steps, needs HH on R or L no difference noted and slight pressure on low back to increase speed; pt does not have ability to navigate back to her room without assistance for directions; up and down stairs slightly sideways with RHR and uses non-reciprocal pattern; continue POC as per PT   Problem List Decreased strength;Decreased range of motion;Decreased endurance;Decreased coordination;Decreased cognition; Impaired judgement;Decreased safety awareness   Barriers to Discharge Inaccessible home environment;Decreased caregiver support   PT Barriers   Physical Impairment Decreased strength;Decreased range of motion;Decreased endurance;Decreased coordination;Decreased cognition; Impaired judgement;Decreased safety awareness   Functional Limitation Stair negotiation;Car transfers; Ramp negotiation; Walking   Plan   Treatment/Interventions ADL retraining;Functional transfer training;LE strengthening/ROM; Elevations; Therapeutic exercise; Endurance training;Cognitive reorientation;Patient/family training;Bed mobility;Gait training   Progress Progressing toward goals   Recommendation   Recommendation Short-term skilled PT   PT Therapy Minutes   PT Time In 0900   PT Time Out 1000   PT Total Time (minutes) 60   PT Mode of treatment - Individual (minutes) 60   PT Mode of treatment - Concurrent (minutes) 0   PT Mode of treatment - Group (minutes) 0   PT Mode of treatment - Co-treat (minutes) 0   PT Mode of Teatment - Total time(minutes) 60 minutes   Therapy Time missed   Time missed?  No

## 2018-05-13 NOTE — PROGRESS NOTES
05/13/18 1130   Pain Assessment   Pain Assessment No/denies pain   Pain Score No Pain   Restrictions/Precautions   Precautions Cognitive;Bed/chair alarms; Fall Risk   Swallow Information   Current Risks for Dysphagia & Aspiration General debilitation;New Neuro event;Brain injury;Cognitive deficit   Current Symptoms/Concerns Difficulty chewing;Pockets food   Current Diet Regular; Thin liquid   Baseline Diet Regular; Thin liquids   Consistencies Assessed and Performance   Materials Admnistered Regular/Solid; Thin liquid   Oral Stage WFL   Phargngeal Stage WFL   Swallow Mechanics WFL   Esophageal Concerns No s/s reported   Recommendations   Diet Solid Recommendation Regular consistency   Diet Liquid Recommendation Thin liquid   Recommended Form of Meds As tolerated   General Precautions Aspiration precautions; Feed only when alert;Minimize distractions;Upright as possible for all oral intake;Remain upright for 45 mins after meals  (OOB for meals)   Compensatory Swallowing Strategies Place food/straw in on left side; Alternate solids and liquids; Check for pocketing of food on the right;Cue for lingual sweep   QI: Eating   Assistance Needed Set-up / clean-up   Assistance Provided by [place] No physical assistance   Eating CARE Score 5   Swallow Assessment   Swallow Treatment Assessment Pt seen for brief f/u where pt recently upgraded to regular diet and thin liquids  Pt consumed ~50% of meal consisting of regular diet chicken dennis, tossed salad, roll and ice cream, along w/ ~240cc thin liquids by cup sip  Following assist w/ set up, pt demonstrated ability to self feed at an appropriate pace and taking appropriate bite sizes  Demonstrated slow but functional and effective mastication w/o anterior loss   A-p transfers remain mildly slower w/ min R side pocketing continued, however, pt able to independently clear throughout meal  Overall initiation of swallows appeared [facility] and no overt s/s of aspiration noted w/ solids or liquids  Due to tolerance of baseline regular diet and thin liquids w/o increased s/s of oral or pharyngeal dysphagia or overt aspiration, further skilled ST intervention for dysphagia therapy is not warranted to continue at this time  While continue to recommend regular diet and thin liquds, also recommend that pt have continued assist w/ tray set up and use of cup for all liquids (pt preference), along w/ increased time for meal completion across all meals  SLP Therapy Minutes   SLP Time In 1130   SLP Time Out 2122   SLP Total Time (minutes) 50   SLP Mode of treatment - Individual (minutes) 0   SLP Mode of treatment - Concurrent (minutes) 50   SLP Mode of treatment - Group (minutes) 0   SLP Mode of treatment - Co-treat (minutes) 0   SLP Mode of Teatment - Total time(minutes) 50 minutes   Therapy Time missed   Time missed?  No   Daily FIM Score   Eating (FIM) 5 - Patient needs help to open contianers or set up tray

## 2018-05-13 NOTE — PROGRESS NOTES
Internal Medicine Progress Note  Patient: Robert Ballesteros  Age/sex: 80 y o  female  Medical Record #: [id number]      ASSESSMENT/PLAN:  Robert Ballesteros is seen and examined and management for following issues:    1  Lt MCA CVA s/p mechanical thrombectomy: Continue Eliquis 2 5mg BID (keep dose same = d/w Dr Sonal Dyer) and atorvastatin for secondary prevention  45 [facility] follow-up  Had a RR 5/10/18 for change in MS with being found lethargic = CTH no change, VSS; neuro saw and recommended no changes and signed off  HS Remeron stopped it to see if any change in daytime fatigue - so far variable results  2   HTN: stable; continue Lopressor 50mg every 12 hours  3   CHADWICK/CKD stage III; baseline Cr 1 5: On admit to hospital 4/18 had creat 2 0 and Torsemide was held/fluids given; On 5/7 up to 1 99 = stopped Torsemide/KCL and gave 1 liter NSS = 5/10/18 gave another liter since creat still 1 90 = on 5/9/18 was back down to 1 27  Checked PVRs = not significant  4   Chronic systolic CHF/LVEF 11%; moderate TR; bio MVR (normal function):  holding torsemide 10mg daily/Kdur 10 meq BID  Chest is clear/no edema  5  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): Eliquis 2 5mg BID 2/2 age and renal status (switched from Coumadin in the hospital so NOAC is new)  6   DM type 2: On linagliptan 2 5mg at home  It was planned for pt to change to Januvia 100mg daily as an OP per the daughter  Will start Januvia when needed but so far BS controlled w/o tx likely 2/2 fact she is not eating much = did stop Accuchecks for now and watch FBSs but if she starts eating better will resume Accuchecks  Continue DM diet  Fasting sugars have been acceptable  7   Acute blood loss anemia: s/p transfusion after EBL from thrombectomy of 500-1000ml from femoral site  Stable  8   PFO:  Small  Seen on LORI  45 [facility] follow-up  9   History of colon CA: Colonic lesion seen on CT  Will need GI follow-up    10  Hx PPM    11  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    12  Poor appetite: had not been eating very well so Remeron was added  Also, question depression  Remeron 7 5mg qhs added 5/7/18 by primary service and was stopped 5/9/18 2/2 too sleepy  She was on Remeron 15mg at home      Subjective: Patient seen and examined  Offers no complaints      ROS:   GI: denies abdominal pain, change bowel habits or reflux symptoms  Neuro: No new neurologic changes  Respiratory: No Cough, SOB  Cardiovascular: No CP, palpitations     Scheduled Meds:    Current Facility-Administered Medications:  acetaminophen 650 mg Oral Q6H PRN Marilyn Beltran MD   apixaban 2 5 mg Oral BID Marilyn Beltran MD   atorvastatin 80 mg Oral Daily With Chhaya Perez MD   bisacodyl 10 mg Rectal Daily PRN Marilyn Beltran MD   docusate sodium 100 mg Oral BID Marilyn Beltran MD   ferrous sulfate 300 mg Oral Daily Clearance Reveal, CRNP   melatonin 6 mg Oral HS Marilyn Beltran MD   metoprolol tartrate 50 mg Oral Q12H [facility] Marilyn Beltran MD   pantoprazole 40 mg Oral Early Morning Marilyn Beltran MD   polyethylene glycol 17 g Oral Daily PRN Marilyn Beltran MD   senna 1 tablet Oral HS Marilyn Beltran MD       Labs:       Results from last 7 days  Lab Units 05/08/18  1958 05/08/18  1704 05/07/18  0647   WBC Thousand/uL 5 26  --  5 27   HEMOGLOBIN g/dL 9 9*  --  9 5*   I STAT HEMOGLOBIN g/dl  --  9 2*  --    HEMATOCRIT % 31 6*  --  29 0*   PLATELETS Thousands/uL 170  --  196       Results from last 7 days  Lab Units 05/09/18  0451 05/08/18  1742   SODIUM mmol/L 139 137   POTASSIUM mmol/L 3 9 4 2   CHLORIDE mmol/L 106 102   CO2 mmol/L 26 28   BUN mg/dL 41* 51*   CREATININE mg/dL 1 27 1 67*   GLUCOSE RANDOM mg/dL 113 96   CALCIUM mg/dL 9 4 9 8           Results from last 7 days  Lab Units 05/08/18  1728   INR  1 17*          Glucose (mg/dL)   Date Value   05/09/2018 113   05/08/2018 96   05/08/2018 115   05/07/2018 115   12/23/2015 114 (H)   02/22/2015 101   02/21/2015 103   02/20/2015 116 Glucose, i-STAT (mg/dl)   Date Value   05/08/2018 103   04/23/2018 106   04/23/2018 96       Labs reviewed    Physical Examination:  Vitals:   Vitals:    05/12/18 1345 05/12/18 2131 05/13/18 0512 05/13/18 0611   BP: 126/58 122/62 152/74    BP Location: Right arm Right arm Right arm    Pulse: 72 68 70    Resp: 18 18 18    Temp: 99 1 °F (37 3 °C) 98 4 °F (36 9 °C) 97 9 °F (36 6 °C)    TempSrc: Oral Tympanic Oral    SpO2: 99% 95% 98%    Weight:    65 6 kg (144 lb 10 oz)   Height:           GEN: NAD  HEENT: NC/AT  RESP: BBS w/o crackles/wheeze/rhonci; resp unlabored  CV: +S1 S2, regular rate, no rubs/murmurs  ABD: soft, NT, ND, normal BS   : no schaefer  EXT: no edema  Skin: no rashes  Neuro: Awake but sleepier today; WOODARD 5/5 but difficult to get her to coordinate UE strength testing 2/2 some involuntary movements with left arm and inability to consistently follow commands; face symmetric; speech clear  [ X ] Total time spent: 30 Mins and greater than 50% of this time was spent counseling/coordinating care        Terri Foster PA-C  Internal Medicine

## 2018-05-14 LAB
ANION GAP SERPL CALCULATED.3IONS-SCNC: 6 MMOL/L (ref 4–13)
BASOPHILS # BLD AUTO: 0.02 THOUSANDS/ΜL (ref 0–0.1)
BASOPHILS # BLD AUTO: 0.04 THOUSANDS/ΜL (ref 0–0.1)
BASOPHILS NFR BLD AUTO: 1 % (ref 0–1)
BASOPHILS NFR BLD AUTO: 1 % (ref 0–1)
BUN SERPL-MCNC: 28 MG/DL (ref 5–25)
CALCIUM SERPL-MCNC: 9.5 MG/DL (ref 8.3–10.1)
CHLORIDE SERPL-SCNC: 108 MMOL/L (ref 100–108)
CO2 SERPL-SCNC: 26 MMOL/L (ref 21–32)
CREAT SERPL-MCNC: 1.18 MG/DL (ref 0.6–1.3)
EOSINOPHIL # BLD AUTO: 0.91 THOUSAND/ΜL (ref 0–0.61)
EOSINOPHIL # BLD AUTO: 0.94 THOUSAND/ΜL (ref 0–0.61)
EOSINOPHIL NFR BLD AUTO: 18 % (ref 0–6)
EOSINOPHIL NFR BLD AUTO: 22 % (ref 0–6)
ERYTHROCYTE [DISTWIDTH] IN BLOOD BY AUTOMATED COUNT: 14.2 % (ref 11.6–15.1)
ERYTHROCYTE [DISTWIDTH] IN BLOOD BY AUTOMATED COUNT: 14.3 % (ref 11.6–15.1)
GFR SERPL CREATININE-BSD FRML MDRD: 42 ML/MIN/1.73SQ M
GLUCOSE P FAST SERPL-MCNC: 95 MG/DL (ref 65–99)
GLUCOSE SERPL-MCNC: 95 MG/DL (ref 65–140)
HCT VFR BLD AUTO: 28.8 % (ref 34.8–46.1)
HCT VFR BLD AUTO: 32.5 % (ref 34.8–46.1)
HGB BLD-MCNC: 10 G/DL (ref 11.5–15.4)
HGB BLD-MCNC: 9 G/DL (ref 11.5–15.4)
LYMPHOCYTES # BLD AUTO: 0.81 THOUSANDS/ΜL (ref 0.6–4.47)
LYMPHOCYTES # BLD AUTO: 1.19 THOUSANDS/ΜL (ref 0.6–4.47)
LYMPHOCYTES NFR BLD AUTO: 15 % (ref 14–44)
LYMPHOCYTES NFR BLD AUTO: 29 % (ref 14–44)
MCH RBC QN AUTO: 28.3 PG (ref 26.8–34.3)
MCH RBC QN AUTO: 28.5 PG (ref 26.8–34.3)
MCHC RBC AUTO-ENTMCNC: 30.8 G/DL (ref 31.4–37.4)
MCHC RBC AUTO-ENTMCNC: 31.3 G/DL (ref 31.4–37.4)
MCV RBC AUTO: 91 FL (ref 82–98)
MCV RBC AUTO: 92 FL (ref 82–98)
MONOCYTES # BLD AUTO: 0.41 THOUSAND/ΜL (ref 0.17–1.22)
MONOCYTES # BLD AUTO: 0.47 THOUSAND/ΜL (ref 0.17–1.22)
MONOCYTES NFR BLD AUTO: 11 % (ref 4–12)
MONOCYTES NFR BLD AUTO: 8 % (ref 4–12)
NEUTROPHILS # BLD AUTO: 1.56 THOUSANDS/ΜL (ref 1.85–7.62)
NEUTROPHILS # BLD AUTO: 3.04 THOUSANDS/ΜL (ref 1.85–7.62)
NEUTS SEG NFR BLD AUTO: 37 % (ref 43–75)
NEUTS SEG NFR BLD AUTO: 58 % (ref 43–75)
NRBC BLD AUTO-RTO: 0 /100 WBCS
NRBC BLD AUTO-RTO: 0 /100 WBCS
PLATELET # BLD AUTO: 80 THOUSANDS/UL (ref 149–390)
PLATELET # BLD AUTO: 87 THOUSANDS/UL (ref 149–390)
PMV BLD AUTO: 10.4 FL (ref 8.9–12.7)
PMV BLD AUTO: 10.8 FL (ref 8.9–12.7)
POTASSIUM SERPL-SCNC: 4.2 MMOL/L (ref 3.5–5.3)
RBC # BLD AUTO: 3.16 MILLION/UL (ref 3.81–5.12)
RBC # BLD AUTO: 3.53 MILLION/UL (ref 3.81–5.12)
SODIUM SERPL-SCNC: 140 MMOL/L (ref 136–145)
WBC # BLD AUTO: 4.15 THOUSAND/UL (ref 4.31–10.16)
WBC # BLD AUTO: 5.25 THOUSAND/UL (ref 4.31–10.16)

## 2018-05-14 PROCEDURE — 85025 COMPLETE CBC W/AUTO DIFF WBC: CPT | Performed by: NURSE PRACTITIONER

## 2018-05-14 PROCEDURE — 97110 THERAPEUTIC EXERCISES: CPT

## 2018-05-14 PROCEDURE — 97112 NEUROMUSCULAR REEDUCATION: CPT

## 2018-05-14 PROCEDURE — 99232 SBSQ HOSP IP/OBS MODERATE 35: CPT | Performed by: PHYSICAL MEDICINE & REHABILITATION

## 2018-05-14 PROCEDURE — 97530 THERAPEUTIC ACTIVITIES: CPT

## 2018-05-14 PROCEDURE — 97116 GAIT TRAINING THERAPY: CPT

## 2018-05-14 PROCEDURE — 80048 BASIC METABOLIC PNL TOTAL CA: CPT | Performed by: NURSE PRACTITIONER

## 2018-05-14 RX ADMIN — METOPROLOL TARTRATE 50 MG: 50 TABLET ORAL at 08:28

## 2018-05-14 RX ADMIN — DOCUSATE SODIUM 100 MG: 100 CAPSULE, LIQUID FILLED ORAL at 08:24

## 2018-05-14 RX ADMIN — APIXABAN 2.5 MG: 2.5 TABLET, FILM COATED ORAL at 17:27

## 2018-05-14 RX ADMIN — PANTOPRAZOLE SODIUM 40 MG: 40 TABLET, DELAYED RELEASE ORAL at 05:30

## 2018-05-14 RX ADMIN — METOPROLOL TARTRATE 50 MG: 50 TABLET ORAL at 21:28

## 2018-05-14 RX ADMIN — MELATONIN TAB 3 MG 6 MG: 3 TAB at 21:28

## 2018-05-14 RX ADMIN — ATORVASTATIN CALCIUM 80 MG: 40 TABLET, FILM COATED ORAL at 17:27

## 2018-05-14 RX ADMIN — MINERAL SUPPLEMENT IRON 300 MG / 5 ML STRENGTH LIQUID 100 PER BOX UNFLAVORED 300 MG: at 08:25

## 2018-05-14 RX ADMIN — DOCUSATE SODIUM 100 MG: 100 CAPSULE, LIQUID FILLED ORAL at 17:27

## 2018-05-14 RX ADMIN — APIXABAN 2.5 MG: 2.5 TABLET, FILM COATED ORAL at 08:24

## 2018-05-14 RX ADMIN — SENNOSIDES 8.6 MG: 8.6 TABLET, FILM COATED ORAL at 21:28

## 2018-05-14 NOTE — PROGRESS NOTES
Progress Note - Sammie Petersen 80 y o  female MRN: [id number]    Unit/Bed#: -02 Encounter: [id number]            Subjective:   Pt denies any issues over the weekend     Objective:     ROS  Gen: denies recent wt loss   Psych: denies mood change    Vitals:    05/14/18 0828   BP: 128/62   Pulse: 94   Resp:    Temp:    SpO2:    T: 98  RR: 18  POx: 97%      Physical Exam:     Gen:        NAD   Neck:   trachea midline  Lungs:  respirations unlabored   Heart:    + S1 and S2   Abdomen:    Soft, non-tender  Psych: mood/affect appropriate  Neurologic: awake, alert    Functional :  Mobility: min   Tx: min-mod  ADLs: mod        Current Facility-Administered Medications:  acetaminophen 650 mg Oral Q6H PRN Marilyn Beltran MD   apixaban 2 5 mg Oral BID Marilyn Beltran MD   atorvastatin 80 mg Oral Daily With MD Catarino   bisacodyl 10 mg Rectal Daily PRN Marilyn Beltran MD   docusate sodium 100 mg Oral BID Marilyn Beltran MD   ferrous sulfate 300 mg Oral Daily Clearance Reveal, CRNP   melatonin 6 mg Oral HS Marilyn Beltran MD   metoprolol tartrate 50 mg Oral Q12H [facility] Marilyn Beltran MD   pantoprazole 40 mg Oral Early Morning Marilyn Beltran MD   polyethylene glycol 17 g Oral Daily PRN Marilyn Beltran MD   senna 1 tablet Oral HS Marilyn Beltran MD         acetaminophen    bisacodyl    polyethylene glycol      Assessment:  Pt is 79 yo female with ICH & bi-hemispheric nonhemorrhagic ischemic strokes s/p thrombectomy by Dr Ruthine Bumpers on 4/23    Plan:    Rehabilitation: cont PT/OT for ambulatory/ADL dysfxn    ICH & bi-hemispheric nonhemorrhagic ischemic strokes: nonhemorrhagic stroke felt by neurology to be embolic of unclear etiology; per s/p thrombectomy by Dr Ruthine Bumpers on 4/23, cleared by neurosx for [facility], [place], operated by [facility] and per neuro recs started on eliquis 2 5 mg BID (not 5 mg due to age and serum Cr greater than 1 5, tends to fluctuate around this value); home lipitor increased from 40 to 80 mg     AMS (5/8): seen by neuro and recommended CTH  (5/8) which showed no acute findings and patient back to baseline mental status therefore per neuro recs no MRI at this time      Peripheral neuropathy: likely 2/2 to DM, had tried neurontin in the past but did not tolerate      DM: on januvia 100 mg qd at home (recently switched from tradjenta 5 mg qd); currently on ISS     CHF (EF 45%): home torsemide 10 mg qd (and home potassium 10 MEQ BID due to hypokalemia 2/2 to torsemide) both being held due to acute on chronic renal failure likely 2/2 to poor PO; additionally  home toprol XL 50 mg qd switched to lopressor 50 mg q12 in acute care     SSS: s/p pacer, interrogated recently in acute care, no A-fib found at that time (although noted on EKG of 4/23 however pt already on [facility] for CVA and on BB), follows with Dr Adenike Reed (cards) and Dr Donnamarie Closs (EP)      h/o colon CA: on CT CAP of 4/26 colonic thickening suspicious for lesion noted and colonoscopy recommended, pt undergoes screening colonoscopys with Dr Park Reyes, pt to FU with them for FU colonoscopy       Meningioma: seen by neurosx no intervention planned, follows with Dr Susan Summers     PFO (3 mm): unclear if paradoxical stroke from DVT is etiology of CVA (felt to be embolic by neuro) however as neuro has placed pt on [facility] for CVA LE dopplers would not ; t/c closure as OP       s/p MVR: tissue valve     HL: home lipitor increased from 40 mg to 80 mg due to CVA       h/o DVT: home coumadin switched to eliquis for CVA      Insomnia: at home on remeron 15 mg HS on hold due to episodes of AMS (however low suspicion this is cause of AMS)      non-occlusive carotid dz: per carotid U/S report recommend repeat carotid U/S in 1 year; on [facility] and statin     chronic constipation: per family at baseline 1 BM every 4 to 5 days, IM monitoring and will treat with laxatives as needed      rt ankle pain/swelling: likely sprain; XR showed no acute osseous abnormality     CHADWICK on CKD: baseline 1 4-1 7, Cr currently 1 18, however torsemide still on hold for now per IM      chronic anemia: AOCD/CKD, at home on iron 150 mg qd, cont iron supplementation as inpt; Hg currently stable at 10 0    Thrombocytopenia: currently 87, heme consulted      Hyperphosphatemia per lab reference range at 4 4: however in females 18 year or older 4 5 is ULN      Dispo: reteam    Incidental findings on CT C/A/P of 4/26:  1) multiple pulmonary micronodules: per CT report recommended FU imaging in 3 months for determination of stability  2) pleural thickening  3) uterine fibroid  Other incidental findings:  4) B/L renal cysts  5) 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 no intervention planned, follows with Dr Cece Curiel  6) EKG abnormalities in the setting of atrial paced rhythm (non-specific intraventricular conduction block/wide QRS, LAD): follows with Dr Guzman Cortez (cards) and Dr Chastity Smith (EP)  7) mod-severe TR/elevated peak PA pressure:  follows with Dr Guzman Cortez (cards) and Dr Chastity Smith (EP)

## 2018-05-14 NOTE — PROGRESS NOTES
05/14/18 1230   Pain Assessment   Pain Assessment No/denies pain   Pain Score No Pain   Transfer Bed/Chair/Wheelchair   Stand Pivot Minimal Assist   Sit to Stand Minimal   Stand to Sit Supervision   Findings LOB noted w/ inititial sit-stands  Pt demo increased unsteadiness  pt reaches out for UE support during ambulation  Bed, Chair, Wheelchair Transfer (FIM) 3 - Patient completes 50 - 74% of all tasks   Cognition   Overall Cognitive Status Impaired   Arousal/Participation Alert; Cooperative   Attention Within functional limits   Orientation Level Oriented X4   Memory Within functional limits   Following Commands Follows one step commands inconsistently   Activity Tolerance   Activity Tolerance Patient tolerated treatment well   Assessment   Treatment Assessment Pt participates in skilled OT session w/ 40 minute Co-tx session  OT session focusing on L side awareness, B/L UE  Coordination, sequencing task initiation/termination and leisure pursuits  Pt appears more tired today, and Pt reports "I am very tired " Pt is more unsteady, requires more direction during functional ambulation to attend to items on L  At some points Pt would not turn to the L as directed w/ VCs and tactile prompting  Pt's 3 children were present for session and contributed to many distractions and interruptions w/ verbal instructions  Family appears to mean well with provided assist, but it seems to distract Pt from attention to task  Pt completing Meal [employer] for gathering cut food items and placing them in various bowls on L  for cooking tomorrow  Pt continues to require skilled acute rehab OT services to increase overall functional independence and safety w/ ADLs and functional transfers, continue to follow plan of care  Prognosis Fair   Problem List Decreased strength;Decreased endurance; Impaired balance;Decreased mobility; Decreased coordination;Decreased cognition; Impaired judgement;Decreased safety awareness   Plan Treatment/Interventions ADL retraining;Functional transfer training; Therapeutic exercise;Cognitive reorientation; Endurance training;Patient/family training;Equipment eval/education   Progress Progressing toward goals   OT Therapy Minutes   OT Time In 1230   OT Time Out 1400   OT Total Time (minutes) 90   OT Mode of treatment - Individual (minutes) 90   OT Mode of treatment - Concurrent (minutes) 0   OT Mode of treatment - Group (minutes) 0   OT Mode of treatment - Co-treat (minutes) 0   OT Mode of Teatment - Total time(minutes) 90 minutes

## 2018-05-14 NOTE — PROGRESS NOTES
Physical Therapy Progress Note   05/14/18 6030   Pain Assessment   Pain Assessment No/denies pain   Pain Score No Pain   Restrictions/Precautions   Precautions Cognitive;Bed/chair alarms; Fall Risk   Weight Bearing Restrictions No   ROM Restrictions No   Cognition   Overall Cognitive Status Impaired   Arousal/Participation Alert; Cooperative   Attention Attends with cues to redirect   Orientation Level Oriented to person   Memory Decreased short term memory;Decreased recall of recent events   Following Commands Follows one step commands inconsistently   Subjective   Subjective denies pain; no c/o   QI: Roll Left and Right   Assistance Needed Supervision   Assistance Provided by [place] No physical assistance   Roll Left and Right CARE Score 4   QI: Sit to [address] Provided by [place] No physical assistance   Sit to Lying CARE Score 4   QI: Lying to Sitting on Side of Bed   Assistance Needed Physical assistance   Assistance Provided by [place] 25%-49%   Lying to Sitting on Side of Bed CARE Score 3   QI: Sit to Stand   Assistance Needed Supervision;Verbal cues   Assistance Provided by [place] No physical assistance   Sit to Stand CARE Score 4   Bed Mobility   Able to Roll Left to Right;Right to Left;Scoot Up   Findings Nidia   QI: Chair/Bed-to-Chair Transfer   Assistance Needed Supervision; Incidental touching;Verbal cues   Assistance Provided by [place] No physical assistance   Chair/Bed-to-Chair Transfer CARE Score 4   Transfer Bed/Chair/Wheelchair   Limitations Noted In Balance;Problem Solving; Sequencing;UE Strength;LE Strength; Coordination   Adaptive Equipment Hand Hold   Stand Pivot Minimal Assist   Sit to Stand Minimal Assist   Stand to Sit Minimal Assist   Supine to Sit Minimal Assist   Sit to Supine Minimal Assist   Car Transfer Minimal Assist   Findings [place]idia with HHA for all xfers;    Bed, Chair, Wheelchair Transfer (FIM) 4 - Patient requires steadying assist or light touching QI: Car Transfer   Assistance Needed Physical assistance   Assistance Provided by [place] Less than 25%   Car Transfer CARE Score 3   QI: Walk 10 Feet   Assistance Needed Physical assistance   Assistance Provided by [place] Less than 25%   Walk 10 Feet CARE Score 3   QI: Walk 50 Feet with Two Turns   Assistance Needed Physical assistance   Assistance Provided by [place] Less than 25%   Walk 50 Feet with Two Turns CARE Score 3   QI: Walk 150 Feet   Assistance Needed Physical assistance   Assistance Provided by [place] Less than 25%   Walk 150 Feet CARE Score 3   QI: Walking 10 Feet on Uneven Surfaces   Assistance Needed Physical assistance   Assistance Provided by [place] Less than 25%   Walking 10 Feet on Uneven Surfaces CARE Score 3   Ambulation   Does the patient walk? 2  Yes   Primary Discharge Mode of Locomotion Walk   Walk Assist Level Contact Guard   Gait Pattern Inconsistant Cathryn; Shuffle   Assist Device Hand Hold   Distance Walked (feet) 350 ft  (x3)   Limitations Noted In Coordination;Balance; Endurance   Findings HHA   Walking (FIM) 4 - Patient requires steadying assist or light touching AND distance 150 feet or more, no rest   Wheelchair mobility   QI: Does the patient use a wheelchair? 0   No   Findings NT   Wheelchair (FIM) 0 - Activity does not occur   QI: 1 Step (Curb)   Assistance Needed Physical assistance   Assistance Provided by [place] Less than 25%   1 Step (Curb) CARE Score 3   QI: 4 Steps   Assistance Needed Incidental touching   Assistance Provided by [place] No physical assistance   4 Steps CARE Score 4   QI: 12 Steps   Assistance Needed Incidental touching   Assistance Provided by [place] No physical assistance   12 Steps CARE Score 4   Stairs   Type Stairs;Curb;Ramp   # of Steps 12   Weight Bearing Precautions Fall Risk   Assist Devices Single Rail   Findings curb/ramp with HHA; stairs with single rail   Stairs (FIM) 4 - Patient requires steadying assist or light touching AND patient goes up and down full flight (12- 14 stairs)   QI: Picking Up Object   Assistance Needed Incidental touching   Assistance Provided by [place] No physical assistance   Picking Up Object CARE Score 4   QI: Toilet Transfer   Assistance Needed Physical assistance   Assistance Provided by [place] 25%-49%   Toilet Transfer CARE Score 3   Toilet Transfer   Surface Assessed Standard Toilet   Toilet Transfer (FIM) 4 - Patient requires steadying assist or light touching   Therapeutic Interventions   Strengthening standing TE (hip flex/ext/abd/add, HS curls, mini squats, calf raises) reps until fatigued   Flexibility manual stretch B HS and gastroc;    Equipment Use   NuStep 15 minutes, level 2   Assessment   Treatment Assessment Pt in w/c prior to session, son present throughout session; pt able to STS/SPT with S/HHA but declined to attempt w/c propel; pt able to amb slowly with shuffle gait, 350' (x3) with HHA; attempts at forced renate to lengthen stride and inc foot clearance unsuccessful d/t pt's reflexive response to push back into stimulus into low back; instructed in standing TE (as above) and NuStep (as above); finished session seated at bedside with all needs in reach; alarms active; recommend cont PT POC;    Problem List Decreased strength;Decreased range of motion;Decreased endurance;Decreased coordination;Decreased cognition; Impaired judgement;Decreased safety awareness   Barriers to Discharge Inaccessible home environment;Decreased caregiver support   PT Barriers   Physical Impairment Decreased strength;Decreased range of motion;Decreased endurance;Decreased coordination;Decreased cognition; Impaired judgement;Decreased safety awareness   Functional Limitation Stair negotiation;Car transfers; Ramp negotiation; Walking   Plan   Treatment/Interventions ADL retraining;Functional transfer training;LE strengthening/ROM; Elevations; Therapeutic exercise; Endurance training;Cognitive reorientation;Patient/family training;Equipment eval/education; Bed mobility;Gait training   Progress Progressing toward goals   Recommendation   Recommendation Short-term skilled PT   PT Therapy Minutes   PT Time In 0930   PT Time Out 1100   PT Total Time (minutes) 90   PT Mode of treatment - Individual (minutes) 90   PT Mode of treatment - Concurrent (minutes) 0   PT Mode of treatment - Group (minutes) 0   PT Mode of treatment - Co-treat (minutes) 0   PT Mode of Teatment - Total time(minutes) 90 minutes   Therapy Time missed   Time missed?  No     Noe Murrell, PTA

## 2018-05-14 NOTE — PROGRESS NOTES
Internal Medicine Progress Note  Patient: Brett Wong  Age/sex: 80 y o  female  Medical Record #: [id number]      ASSESSMENT/PLAN:  Brett Wong is seen and examined and management for following issues:    1  Lt MCA CVA s/p mechanical thrombectomy: Continue Eliquis 2 5mg BID (keep dose same = d/w Dr Seamus Cash) and atorvastatin for secondary prevention  45 [facility] Neurology follow-up  Had a RR 5/10/18 for change in MS with being found lethargic = CTH no change, VSS; neuro saw and recommended no changes and signed off  HS Remeron stopped it to see if any change in daytime fatigue - so far variable results  2   HTN: stable; continue Lopressor 50mg every 12 hours  3   CHADWICK/CKD stage III; baseline Cr 1 5: On admit to hospital 4/18 had creat 2 0 and Torsemide was held/fluids given; On 5/7 up to 1 99 = stopped Torsemide/KCL and gave 1 liter NSS = 5/10/18 gave another liter since creat still 1 90 = on 5/9/18 was back down to 1 27 and now 1 18  Checked PVRs = not significant  4   Chronic systolic CHF/LVEF 59%; moderate TR; bio MVR = in [place] (normal function):  holding torsemide 10mg daily/Kdur 10 meq BID  Chest is clear/no edema  5  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): Eliquis 2 5mg BID 2/2 age and renal status (switched from Coumadin in the hospital so NOAC is new)  6   DM type 2: On linagliptan 2 5mg at home  It was planned for pt to change to Januvia 100mg daily as an OP per the daughter  Will start Januvia when needed but so far BS controlled w/o tx likely 2/2 fact she is not eating much = did stop Accuchecks for now and watching FBSs but if she starts eating better will resume Accuchecks  Continue DM diet  Fasting sugars have been acceptable and this AM was 95     7   Acute blood loss anemia: s/p transfusion after EBL from thrombectomy of 500-1000ml from femoral site  Stable  8   PFO:  Small  Seen on LORI  45 [facility] Cardiology follow-up      9   History of colon CA: Colonic lesion seen on CT  Will need GI follow-up    10  Hx PPM    11  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:  On Eliquis currently = platelets 80 this AM with previous normal;  repeat to see if lab error was essentially the same = H-O to see (she has not gotten any Heparin products)    12  Poor appetite: had not been eating very well so Remeron was added  Also, question of depression  Remeron 7 5mg qhs added 5/7/18 by primary service and was stopped 5/9/18 2/2 too sleepy  She was on Remeron 15mg at home      Subjective: Patient seen and examined  Offers no complaints      ROS:   GI: denies abdominal pain, change bowel habits or reflux symptoms  Neuro: No new neurologic changes  Respiratory: No Cough, SOB  Cardiovascular: No CP, palpitations     Scheduled Meds:    Current Facility-Administered Medications:  acetaminophen 650 mg Oral Q6H PRN Zane Class, MD   apixaban 2 5 mg Oral BID Zane Class, MD   atorvastatin 80 mg Oral Daily With Parminder Tavares MD   bisacodyl 10 mg Rectal Daily PRN Zane Class, MD   docusate sodium 100 mg Oral BID Zane Class, MD   ferrous sulfate 300 mg Oral Daily GAYATRI Lubin   melatonin 6 mg Oral HS Zane Class, MD   metoprolol tartrate 50 mg Oral Q12H [facility] Zane Class, MD   pantoprazole 40 mg Oral Early Morning Zane Class, MD   polyethylene glycol 17 g Oral Daily PRN Zane Class, MD   senna 1 tablet Oral HS Zane Class, MD       Labs:       Results from last 7 days  Lab Units 05/08/18  1958 05/08/18  1704   WBC Thousand/uL 5 26  --    HEMOGLOBIN g/dL 9 9*  --    I STAT HEMOGLOBIN g/dl  --  9 2*   HEMATOCRIT % 31 6*  --    PLATELETS Thousands/uL 170  --        Results from last 7 days  Lab Units 05/14/18  0546 05/09/18  0451   SODIUM mmol/L 140 139   POTASSIUM mmol/L 4 2 3 9   CHLORIDE mmol/L 108 106   CO2 mmol/L 26 26   BUN mg/dL 28* 41*   CREATININE mg/dL 1 18 1 27   GLUCOSE RANDOM mg/dL 95 113   CALCIUM mg/dL 9 5 9 4           Results from last 7 days  Lab Units 05/08/18  1728   INR 1 17*          Glucose (mg/dL)   Date Value   05/14/2018 95   05/09/2018 113   05/08/2018 96   05/08/2018 115   12/23/2015 114 (H)   02/22/2015 101   02/21/2015 103   02/20/2015 116     Glucose, i-STAT (mg/dl)   Date Value   05/08/2018 103   04/23/2018 106   04/23/2018 96       Labs reviewed    Physical Examination:  Vitals:   Vitals:    05/13/18 2024 05/13/18 2131 05/14/18 0548 05/14/18 0828   BP: 100/66 110/60 149/64 128/62   BP Location: Right arm Right arm Right arm    Pulse: 104 74 68 94   Resp: 18  18    Temp: 98 °F (36 7 °C)  98 °F (36 7 °C)    TempSrc: Oral  Oral    SpO2: 95%  97%    Weight:   64 8 kg (142 lb 13 7 oz)    Height:           GEN: NAD  HEENT: NC/AT  RESP: BBS w/o crackles/wheeze/rhonci; resp unlabored  CV: +S1 S2, regular rate, no rubs/murmurs  ABD: soft, NT, ND, normal BS   : no schaefer  EXT: no edema  Skin: no rashes  Neuro: Awake but somewhat sleepy again today; WOODARD 5/5 but difficult to get her to coordinate UE strength testing 2/2 some involuntary movements with left arm and inability to consistently follow commands; face symmetric; speech clear  [ X ] Total time spent: 30 Mins and greater than 50% of this time was spent counseling/coordinating care        Corine Castano, Porter Fitzpatrick  Internal Medicine

## 2018-05-15 LAB
BASOPHILS # BLD AUTO: 0.02 THOUSANDS/ΜL (ref 0–0.1)
BASOPHILS NFR BLD AUTO: 0 % (ref 0–1)
EOSINOPHIL # BLD AUTO: 0.87 THOUSAND/ΜL (ref 0–0.61)
EOSINOPHIL NFR BLD AUTO: 18 % (ref 0–6)
ERYTHROCYTE [DISTWIDTH] IN BLOOD BY AUTOMATED COUNT: 14.2 % (ref 11.6–15.1)
HCT VFR BLD AUTO: 31.6 % (ref 34.8–46.1)
HGB BLD-MCNC: 9.8 G/DL (ref 11.5–15.4)
IMM GRANULOCYTES # BLD AUTO: 0.01 THOUSAND/UL (ref 0–0.2)
IMM GRANULOCYTES NFR BLD AUTO: 0 % (ref 0–2)
LYMPHOCYTES # BLD AUTO: 0.91 THOUSANDS/ΜL (ref 0.6–4.47)
LYMPHOCYTES NFR BLD AUTO: 19 % (ref 14–44)
MCH RBC QN AUTO: 28.3 PG (ref 26.8–34.3)
MCHC RBC AUTO-ENTMCNC: 31 G/DL (ref 31.4–37.4)
MCV RBC AUTO: 91 FL (ref 82–98)
MONOCYTES # BLD AUTO: 0.51 THOUSAND/ΜL (ref 0.17–1.22)
MONOCYTES NFR BLD AUTO: 11 % (ref 4–12)
NEUTROPHILS # BLD AUTO: 2.46 THOUSANDS/ΜL (ref 1.85–7.62)
NEUTS SEG NFR BLD AUTO: 52 % (ref 43–75)
NRBC BLD AUTO-RTO: 0 /100 WBCS
PLATELET # BLD AUTO: 74 THOUSANDS/UL (ref 149–390)
PMV BLD AUTO: 10.4 FL (ref 8.9–12.7)
RBC # BLD AUTO: 3.46 MILLION/UL (ref 3.81–5.12)
WBC # BLD AUTO: 4.78 THOUSAND/UL (ref 4.31–10.16)

## 2018-05-15 PROCEDURE — 86022 PLATELET ANTIBODIES: CPT | Performed by: INTERNAL MEDICINE

## 2018-05-15 PROCEDURE — G0515 COGNITIVE SKILLS DEVELOPMENT: HCPCS

## 2018-05-15 PROCEDURE — 97535 SELF CARE MNGMENT TRAINING: CPT

## 2018-05-15 PROCEDURE — 85025 COMPLETE CBC W/AUTO DIFF WBC: CPT | Performed by: NURSE PRACTITIONER

## 2018-05-15 PROCEDURE — 97110 THERAPEUTIC EXERCISES: CPT

## 2018-05-15 PROCEDURE — 99232 SBSQ HOSP IP/OBS MODERATE 35: CPT | Performed by: PHYSICAL MEDICINE & REHABILITATION

## 2018-05-15 PROCEDURE — 97116 GAIT TRAINING THERAPY: CPT

## 2018-05-15 PROCEDURE — 97530 THERAPEUTIC ACTIVITIES: CPT

## 2018-05-15 PROCEDURE — 99222 1ST HOSP IP/OBS MODERATE 55: CPT | Performed by: INTERNAL MEDICINE

## 2018-05-15 RX ADMIN — METOPROLOL TARTRATE 50 MG: 50 TABLET ORAL at 20:44

## 2018-05-15 RX ADMIN — METOPROLOL TARTRATE 50 MG: 50 TABLET ORAL at 07:49

## 2018-05-15 RX ADMIN — APIXABAN 2.5 MG: 2.5 TABLET, FILM COATED ORAL at 07:50

## 2018-05-15 RX ADMIN — PANTOPRAZOLE SODIUM 40 MG: 40 TABLET, DELAYED RELEASE ORAL at 06:01

## 2018-05-15 RX ADMIN — MELATONIN TAB 3 MG 6 MG: 3 TAB at 20:44

## 2018-05-15 RX ADMIN — APIXABAN 2.5 MG: 2.5 TABLET, FILM COATED ORAL at 17:19

## 2018-05-15 RX ADMIN — ATORVASTATIN CALCIUM 80 MG: 40 TABLET, FILM COATED ORAL at 17:19

## 2018-05-15 RX ADMIN — DOCUSATE SODIUM 100 MG: 100 CAPSULE, LIQUID FILLED ORAL at 07:50

## 2018-05-15 RX ADMIN — MINERAL SUPPLEMENT IRON 300 MG / 5 ML STRENGTH LIQUID 100 PER BOX UNFLAVORED 300 MG: at 07:55

## 2018-05-15 RX ADMIN — SENNOSIDES 8.6 MG: 8.6 TABLET, FILM COATED ORAL at 20:44

## 2018-05-15 RX ADMIN — DOCUSATE SODIUM 100 MG: 100 CAPSULE, LIQUID FILLED ORAL at 17:19

## 2018-05-15 NOTE — PROGRESS NOTES
05/15/18 1230   Pain Assessment   Pain Assessment No/denies pain   Restrictions/Precautions   Precautions Bed/chair alarms;Cognitive; Fall Risk   Cognition   Overall Cognitive Status Impaired   Arousal/Participation Cooperative; Alert   Attention Difficulty attending to directions   Orientation Level Oriented to person;Oriented to place   Memory Decreased short term memory;Decreased recall of recent events;Decreased recall of precautions   Following Commands Follows one step commands inconsistently   Subjective   Subjective pt cont to ask where her daughters are   QI: Roll Left and Right   Reason if not Attempted Activity not applicable   Roll Left and Right CARE Score 9   QI: Sit to Lying   Reason if not Attempted Activity not applicable   Sit to Lying CARE Score 9   QI: Lying to Sitting on Side of Bed   Reason if not Attempted Activity not applicable   Lying to Sitting on Side of Bed CARE Score 9   QI: Sit to Stand   Assistance Needed Incidental touching   Sit to Stand CARE Score 4   QI: Chair/Bed-to-Chair Transfer   Assistance Needed Incidental touching   Comment HHA    Chair/Bed-to-Chair Transfer CARE Score 4   Transfer Bed/Chair/Wheelchair   Limitations Noted In Balance; Coordination; Endurance;Problem Solving;LE Strength   Adaptive Equipment Hand Hold   Stand Pivot Contact Guard   Sit to [employer]   Stand to [facility], Chair, Wheelchair Transfer (FIM) 4 - Patient requires steadying assist or light touching   QI: Car Transfer   Reason if not Attempted Activity not applicable   Car Transfer CARE Score 9   QI: Walk 10 Feet   Assistance Needed Physical assistance;Verbal cues   Assistance Provided by [place] 25%-49%   Walk 10 Feet CARE Score 3   QI: Walk 50 Feet with Two Turns   Assistance Needed Physical assistance   Assistance Provided by [place] 25%-49%   Walk 50 Feet with Two Turns CARE Score 3   QI: Walk 150 Feet   Assistance Needed Physical assistance   Assistance Provided by [place] 25%-49% Walk 150 Feet CARE Score 3   QI: Walking 10 Feet on Uneven Surfaces   Reason if not Attempted Activity not applicable   Walking 10 Feet on Uneven Surfaces CARE Score 9   Ambulation   Does the patient walk? 2  Yes   Primary Discharge Mode of Locomotion Walk   Walk Assist Level Minimum Assist   Gait Pattern Inconsistant Cathryn; Slow Cathryn; Step through; Improper weight shift;Decreased R stance   Assist Device Hand Hold   Distance Walked (feet) 150 ft  (x2; 350x1)   Limitations Noted In Balance; Coordination; Endurance; Safety;Speed;Strength;Swing   Findings practiced amb with no HHA for balance  practiced having pt scan and reading room signs to the L  Walking (FIM) 3 - Patient completes 50 - 74% of all tasks, needs more than steadying or light touch AND distance 150 feet or more, no rest   QI: Wheel 50 Feet with Two Turns   Reason if not Attempted Activity not applicable   Wheel 50 Feet with Two Turns CARE Score 9   QI: Wheel 150 Feet   Reason if not Attempted Activity not applicable   Wheel 969 Feet CARE Score 9   Wheelchair mobility   QI: Does the patient use a wheelchair? 0  No   QI: 1 Step (Curb)   Reason if not Attempted Activity not applicable   1 Step (Curb) CARE Score 9   QI: 4 Steps   Assistance Needed Incidental touching;Verbal cues   Assistance Provided by [place] No physical assistance   4 Steps CARE Score 4   QI: 12 Steps   Assistance Needed Incidental touching; Adaptive equipment   12 Steps CARE Score 4   Stairs   Type Stairs   # of Steps 12   Weight Bearing Precautions Fall Risk   Assist Devices Single Rail   Findings needs cues to completely off of step when coming down   Stairs (FIM) 4 - Patient requires steadying assist or light touching AND patient goes up and down full flight (12- 14 stairs)   QI: Picking Up Object   Reason if not Attempted Activity not applicable   Picking Up Object CARE Score 9   QI: Toilet Transfer   Reason if not Attempted Activity not applicable   Toilet Transfer CARE Score 9   Equipment Use   Freebase [id number]   Assessment   Treatment Assessment pt cont to focus on balance and safety during gait training  pt needs VCs and manual cues to scan to the L when locating rooms in the hallway and when following directions  pt cont to remain a fall risk due to poor recovery reactions  pt will benefit from cont therapy to progress with balance, safety and activity tolerance to progress with functional mobility and Ind  Problem List Decreased strength;Decreased endurance; Impaired balance;Decreased mobility; Decreased coordination;Decreased cognition;Decreased safety awareness   Barriers to Discharge Inaccessible home environment;Decreased caregiver support   PT Barriers   Physical Impairment Decreased strength;Decreased endurance;Decreased mobility; Impaired balance;Decreased coordination;Decreased cognition;Decreased safety awareness   Functional Limitation Walking;Standing;Stair negotiation   Plan   Treatment/Interventions Functional transfer training;LE strengthening/ROM; Endurance training;Patient/family training;Gait training   Progress Progressing toward goals   Recommendation   Recommendation Short-term skilled PT   PT Therapy Minutes   PT Time In 1230   PT Time Out 1330   PT Total Time (minutes) 60   PT Mode of treatment - Individual (minutes) 60   PT Mode of treatment - Concurrent (minutes) 0   PT Mode of treatment - Group (minutes) 0   PT Mode of treatment - Co-treat (minutes) 0   PT Mode of Teatment - Total time(minutes) 60 minutes   Therapy Time missed   Time missed?  No

## 2018-05-15 NOTE — CONSULTS
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 also she was found to have DVT and she had mechanical valve of the mitral valve had been on Coumadin complicated with subdural hematoma status post evacuation, with encephalopathy, she had a history of diabetes mellitus type 2  Echo Doppler of the heart with bubble study showed right-to-left shunt and PFO measuring 3 mm  She had previous history of HIT, according to the records never received any heparin products, her platelets count baseline of 170,000 the last 1 on 05/08/2018  On 05/14/2018 WBC 5 2, hemoglobin 10, MCV 92, platelets 26768, 83% neutrophils, 15% lymphocytes, 8% monocytes, and 18% eosinophiles  The patient had persistent eosinophilia in the range of 14% to 26%  In 04/20/2018 platelets count were down to 84,000  In 12/2015 platelets count of 72794 again with eosinophiles in the range of 21%  Most recent CT scan showed possible mass in the transverse colon a  The patient understands English she denies any headache diplopia chest pain abdominal pain dysuria hematuria melena or hematochezia she denied weight loss or change in appetite  She does not smoke or drink  Family history noncontributory      Review of Systems - As stated in the HPI otherwise the fourteen point review of systems was negative      Past Medical History:   Diagnosis Date    Acid reflux     on occ    Arthritis     DJD right hip replaced    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    Cancer ([address] ) 2007    colonic polyps, no surgery done    Deep vein thrombosis (DVT) of left upper extremity ([address] ) 12/11/2017    Diabetes mellitus [facility] [address] )     type 2    Diverticulosis     Edema     in legs on occ    Hypertension     on occ    Language barrier     speaks [place] & broken english    Stroke [facility])        Social History     Social History    Marital status:       Spouse name: N/A    Number of children: N/A    Years of education: N/A     Social History Main Topics    Smoking status: Former Smoker     Packs/day: 0 25     Years: 10 00     Types: Cigarettes     Quit date: 1950    Smokeless tobacco: Never Used    Alcohol use No      Comment: socially    Drug use: No    Sexual activity: Not Currently     Other Topics Concern    None     Social History Narrative    None       Family History   Problem Relation Age of Onset    Cancer Mother      throat       Allergies   Allergen Reactions    Heparin          Current Facility-Administered Medications:     acetaminophen (TYLENOL) tablet 650 mg, 650 mg, Oral, Q6H PRN, Peña Ricketts MD, 650 mg at 05/12/18 1929    apixaban (ELIQUIS) tablet 2 5 mg, 2 5 mg, Oral, BID, Peña Ricketts MD, 2 5 mg at 05/15/18 0750    atorvastatin (LIPITOR) tablet 80 mg, 80 mg, Oral, Daily With Mike Solorzano MD, 80 mg at 05/14/18 1727    bisacodyl (DULCOLAX) rectal suppository 10 mg, 10 mg, Rectal, Daily PRN, Peña Ricketts MD    docusate sodium (COLACE) capsule 100 mg, 100 mg, Oral, BID, Peña Ricketts MD, 100 mg at 05/15/18 0750    ferrous sulfate oral syrup 300 mg, 300 mg, Oral, Daily, GAYATRI Tijerina, 300 mg at 05/15/18 0755    melatonin tablet 6 mg, 6 mg, Oral, HS, Peña Ricketts MD, 6 mg at 05/14/18 2128    metoprolol tartrate (LOPRESSOR) tablet 50 mg, 50 mg, Oral, Q12H [address], Peña Ricketts MD, 50 mg at 05/15/18 0749    pantoprazole (PROTONIX) EC tablet 40 mg, 40 mg, Oral, Early Morning, Peña Ricketts MD, 40 mg at 05/15/18 0601    polyethylene glycol (MIRALAX) packet 17 g, 17 g, Oral, Daily PRN, Peña Ricketts MD, 17 g at 05/03/18 1601    senna (SENOKOT) tablet 8 6 mg, 1 tablet, Oral, HS, Peña Ricketts MD, 8 6 mg at 05/14/18 2128      /78   Pulse 78   Temp 98 9 °F (37 2 °C) (Oral)   Resp 18   Ht 5' 4" (1 626 m)   Wt 70 2 kg (154 lb 12 2 oz)   LMP  (LMP Unknown)   SpO2 96%   BMI 26 57 kg/m²       General Appearance:    Alert, oriented        Eyes:    PERRL   Ears:    Normal external ear canals, both ears   Nose:   Nares normal, septum midline   Throat:   Mucosa moist  Pharynx without injection  Neck:   Supple       Lungs:     Clear to auscultation bilaterally   Chest Wall:    No tenderness or deformity    Heart:    Regular rate and rhythm, I could not appreciate any murmur       Abdomen:     Soft, non-tender, bowel sounds +, no organomegaly I could not appreciate any masses           Extremities:   Extremities no cyanosis or edema       Skin:   no rash or icterus      Lymph nodes:   Cervical, supraclavicular, and axillary nodes normal   Neurologic:   CNII-XII intact, normal strength, weakness of the left upper extremity         Recent Results (from the past 48 hour(s))   CBC and differential    Collection Time: 05/14/18  5:46 AM   Result Value Ref Range    WBC 4 15 (L) 4 31 - 10 16 Thousand/uL    RBC 3 16 (L) 3 81 - 5 12 Million/uL    Hemoglobin 9 0 (L) 11 5 - 15 4 g/dL    Hematocrit 28 8 (L) 34 8 - 46 1 %    MCV 91 82 - 98 fL    MCH 28 5 26 8 - 34 3 pg    MCHC 31 3 (L) 31 4 - 37 4 g/dL    RDW 14 2 11 6 - 15 1 %    MPV 10 4 8 9 - 12 7 fL    Platelets 80 (L) 786 - 390 Thousands/uL    nRBC 0 /100 WBCs    Neutrophils Relative 37 (L) 43 - 75 %    Lymphocytes Relative 29 14 - 44 %    Monocytes Relative 11 4 - 12 %    Eosinophils Relative 22 (H) 0 - 6 %    Basophils Relative 1 0 - 1 %    Neutrophils Absolute 1 56 (L) 1 85 - 7 62 Thousands/µL    Lymphocytes Absolute 1 19 0 60 - 4 47 Thousands/µL    Monocytes Absolute 0 47 0 17 - 1 22 Thousand/µL    Eosinophils Absolute 0 91 (H) 0 00 - 0 61 Thousand/µL    Basophils Absolute 0 02 0 00 - 0 10 Thousands/µL   Basic metabolic panel    Collection Time: 05/14/18  5:46 AM Result Value Ref Range    Sodium 140 136 - 145 mmol/L    Potassium 4 2 3 5 - 5 3 mmol/L    Chloride 108 100 - 108 mmol/L    CO2 26 21 - 32 mmol/L    Anion Gap 6 4 - 13 mmol/L    BUN 28 (H) 5 - 25 mg/dL    Creatinine 1 18 0 60 - 1 30 mg/dL    Glucose 95 65 - 140 mg/dL    Glucose, Fasting 95 65 - 99 mg/dL    Calcium 9 5 8 3 - 10 1 mg/dL    eGFR 42 ml/min/1 73sq m   CBC and differential    Collection Time: 05/14/18 12:58 PM   Result Value Ref Range    WBC 5 25 4 31 - 10 16 Thousand/uL    RBC 3 53 (L) 3 81 - 5 12 Million/uL    Hemoglobin 10 0 (L) 11 5 - 15 4 g/dL    Hematocrit 32 5 (L) 34 8 - 46 1 %    MCV 92 82 - 98 fL    MCH 28 3 26 8 - 34 3 pg    MCHC 30 8 (L) 31 4 - 37 4 g/dL    RDW 14 3 11 6 - 15 1 %    MPV 10 8 8 9 - 12 7 fL    Platelets 87 (L) 359 - 390 Thousands/uL    nRBC 0 /100 WBCs    Neutrophils Relative 58 43 - 75 %    Lymphocytes Relative 15 14 - 44 %    Monocytes Relative 8 4 - 12 %    Eosinophils Relative 18 (H) 0 - 6 %    Basophils Relative 1 0 - 1 %    Neutrophils Absolute 3 04 1 85 - 7 62 Thousands/µL    Lymphocytes Absolute 0 81 0 60 - 4 47 Thousands/µL    Monocytes Absolute 0 41 0 17 - 1 22 Thousand/µL    Eosinophils Absolute 0 94 (H) 0 00 - 0 61 Thousand/µL    Basophils Absolute 0 04 0 00 - 0 10 Thousands/µL   CBC and differential    Collection Time: 05/15/18 10:08 AM   Result Value Ref Range    WBC 4 78 4 31 - 10 16 Thousand/uL    RBC 3 46 (L) 3 81 - 5 12 Million/uL    Hemoglobin 9 8 (L) 11 5 - 15 4 g/dL    Hematocrit 31 6 (L) 34 8 - 46 1 %    MCV 91 82 - 98 fL    MCH 28 3 26 8 - 34 3 pg    MCHC 31 0 (L) 31 4 - 37 4 g/dL    RDW 14 2 11 6 - 15 1 %    MPV 10 4 8 9 - 12 7 fL    Platelets 74 (L) 027 - 390 Thousands/uL    nRBC 0 /100 WBCs    Neutrophils Relative 52 43 - 75 %    Immat GRANS % 0 0 - 2 %    Lymphocytes Relative 19 14 - 44 %    Monocytes Relative 11 4 - 12 %    Eosinophils Relative 18 (H) 0 - 6 %    Basophils Relative 0 0 - 1 %    Neutrophils Absolute 2 46 1 85 - 7 62 Thousands/µL Immature Grans Absolute 0 01 0 00 - 0 20 Thousand/uL    Lymphocytes Absolute 0 91 0 60 - 4 47 Thousands/µL    Monocytes Absolute 0 51 0 17 - 1 22 Thousand/µL    Eosinophils Absolute 0 87 (H) 0 00 - 0 61 Thousand/µL    Basophils Absolute 0 02 0 00 - 0 10 Thousands/µL         Ct Chest Abdomen Pelvis Wo Contrast    Result Date: 4/26/2018  Narrative: CT CHEST, ABDOMEN AND PELVIS WITHOUT IV CONTRAST INDICATION:   hypercoaguable state  COMPARISON: None  TECHNIQUE: CT examination of the chest, abdomen and pelvis was performed without intravenous contrast   Axial, sagittal, and coronal 2D reformatted images were created from the source data and submitted for interpretation  Radiation dose length product (DLP) for this visit:  922 95 mGy-cm   This examination, like all CT scans performed in the [facility], was performed utilizing techniques to minimize radiation dose exposure, including the use of iterative  reconstruction and automated exposure control  Enteric contrast was administered  FINDINGS: CHEST LUNGS:  Multiple small micronodules are seen in the both lungs, measuring about 2 to 3 mm  There are seen in the right upper lobe left upper lobe and are better seen on the mid images, image 38 of series 400 and nodule is seen in the left lingular region measuring about 3 mm in image 25 of series 2 and in image 29 of series 400 A small cyst nodule seen in the left lower lobe with surgical margin measuring about 3 mm in image 25 of series 2 Trachea and central bronchial patent Bibasilar density seen suggests atelectasis No large lung mass is seen PLEURA:  Pleural thickening seen HEART/GREAT VESSELS:  Cardiomegaly seen Atherosclerotic calcification seen within the arch Mitral annular calcification seen MEDIASTINUM AND KEILA:  Lower right paratracheal lymph nodes are seen measuring about 5 to 6 mm CHEST WALL AND LOWER NECK:   Unremarkable   ABDOMEN LIVER/BILIARY TREE:  Unenhanced liver appear unremarkable GALLBLADDER:  There is layering density within the gallbladder SPLEEN:  Unremarkable  PANCREAS:  Dilation of the pancreas is limited due to lack of contrast ADRENAL GLANDS:  The adrenal glands appear unremarkable KIDNEYS/URETERS:  No hydronephrosis seen STOMACH AND BOWEL:  There is focal area of foot colonic thickening in the ascending colon, seen in image 83 of series 2, this is suspicious for colonic lesion  APPENDIX:  No findings to suggest appendicitis  ABDOMINOPELVIC CAVITY:  No ascites or free intraperitoneal air  Small para-aortic lymph nodes are seen measuring about 5 to 6 mm Small mesenteric lymph nodes are seen measuring about 5 to 6 mm VESSELS:  Suboptimally evaluated PELVIS REPRODUCTIVE ORGANS:  Fibroid seen within the uterus URINARY BLADDER:  Unremarkable  ABDOMINAL WALL/INGUINAL REGIONS:  Unremarkable  OSSEOUS STRUCTURES:  Right hip arthroplasty seen     Impression: There is focal area of colonic thickening in the ascending colon, seen in image 83 of series 2 ,  This is suspicious for colonic lesion  Consider correlation with colonoscopy  Bilateral micronodules in the lung parenchyma, can be evaluated for stability with follow-up at 3 months No focal liver lesion No lung mass seen No significant mediastinal lymphadenopathy  I personally discussed this study with Dr Gaye Turner on 4/26/2018 at 7:43 PM  Workstation performed: [id number]     X-ray Chest 1 View Portable    Result Date: 4/23/2018  Narrative: CHEST INDICATION:    Stroke  Right-sided weakness  COMPARISON:  Chest x-ray from 4/18/2018 EXAM PERFORMED/VIEWS:  XR CHEST PORTABLE FINDINGS:  Left-sided chest wall pacemaker is identified  Pacemaker leads are intact  There has been prior sternotomy  There is stable cardiomegaly  Overall interstitial prominence suggests mild chronic congestive changes, similar to the prior exam   No pneumothorax or pleural effusion  Degenerative changes are noted at both shoulders       Impression: Cardiomegaly  Chronic congestive changes  No acute findings  Workstation performed: [id number]     Xr Chest 1 View Portable    Result Date: 4/18/2018  Narrative: CHEST INDICATION:   fall  Altered mental status  COMPARISON:  1/11/2018 EXAM PERFORMED/VIEWS:  XR CHEST PORTABLE Images: 1 FINDINGS:  Left-sided chest wall pacemaker is identified  Pacemaker leads are intact  Midline sternotomy wires again noted  Heart shadow is enlarged but unchanged from prior exam  The lungs are clear  No pneumothorax or pleural effusion  Osseous structures appear within normal limits for patient age  Impression: No acute cardiopulmonary disease  Workstation performed: [id number]     Xr Ankle 2 Vw Right    Result Date: 4/30/2018  Narrative: RIGHT ANKLE INDICATION:   ankle swelling/pain  COMPARISON:  None VIEWS:  XR ANKLE 2 VW RIGHT Images: 2 FINDINGS: There is no acute fracture or dislocation  No significant degenerative changes  Large plantar and retrocalcaneal spurs  Mild vascular calcification  Impression: No acute osseous abnormality  Workstation performed: [id number]     Ct Head Wo Contrast    Result Date: 4/26/2018  Narrative: CT BRAIN - WITHOUT CONTRAST INDICATION:   s/p thrombectomy  COMPARISON:  April 24, 2018 TECHNIQUE:  CT examination of the brain was performed  In addition to axial images, coronal 2D reformatted images were created and submitted for interpretation  Radiation dose length product (DLP) for this visit:  984 18 mGy-cm   This examination, like all CT scans performed in the [facility], was performed utilizing techniques to minimize radiation dose exposure, including the use of iterative  reconstruction and automated exposure control  IMAGE QUALITY:  Diagnostic  FINDINGS: PARENCHYMA: Again noted is a hyperdensity in the left insular and the sylvian cistern and suprasellar cistern, this is decreasing Hyperdensity noted in the cisterna lamina terminalis is also decreasing    This is extending along the course of the left MCA  This also extends in the prepontine cistern Moderate periventricular and white matter hypodensities are seen related to chronic small vessel ischemic changes Hypodensity seen in the right parietal region, stable probably sequela of chronic infarct A calcified lesion seen in the left occipital region suggest small meningioma, this measures about 7 mm  VENTRICLES AND EXTRA-AXIAL SPACES: Ventricular hemorrhage seen within the bilateral occipital horn VISUALIZED ORBITS AND PARANASAL SINUSES:  Unremarkable  CALVARIUM AND EXTRACRANIAL SOFT TISSUES:  Normal      Impression: No mass effect or midline shift seen, no new hematoma seen Subarachnoid hemorrhage seen on the previous study is decreasing No worsening seen Intraventricular hemorrhage is stable Workstation performed: [id number]     Ct Head Wo Contrast    Result Date: 4/25/2018  Narrative: CT BRAIN - WITHOUT CONTRAST INDICATION:   CVA status post L MCA thrombectomy with SAH  Follow-up subarachnoid hemorrhage  COMPARISON:  Numerous prior CT examinations, most recent dated April 24, 2018  TECHNIQUE:  CT examination of the brain was performed  In addition to axial images, coronal 2D reformatted images were created and submitted for interpretation  Radiation dose length product (DLP) for this visit:  1002 46 mGy-cm   This examination, like all CT scans performed in the [facility], was performed utilizing techniques to minimize radiation dose exposure, including the use of iterative reconstruction and automated exposure control  IMAGE QUALITY:  Diagnostic  FINDINGS: PARENCHYMA:  There is continued improvement in subarachnoid hemorrhage in the suprasellar cistern, extending into the left sylvian fissure and anterior interhemispheric fissure  No acute intraparenchymal hemorrhage or extra-axial fluid collections   Evolving areas of decreased attenuation in the left basal ganglia, consistent with evolving infarction  Stable chronic infarctions in the left thalamus and right temporal parietal lobe  No acute infarctions are seen  Decreased attenuation in the periventricular regions VENTRICLES AND EXTRA-AXIAL SPACES:  Enlargement of ventricles and extra-axial CSF spaces, out of proportion to the patient's age most consistent with cerebral and cerebellar atrophy  Small amount of blood products layering in the occipital horns bilaterally, left side greater than right, consistent with redistribution  VISUALIZED ORBITS AND PARANASAL SINUSES:  No acute abnormality involving the orbits  Mild scattered sinus mucosal thickening is noted  No fluid levels are seen  CALVARIUM AND EXTRACRANIAL SOFT TISSUES:  Normal      Impression: 1  Continued resorption of subarachnoid hemorrhage  No acute intracranial abnormality  2   Evolving nonhemorrhagic infarctions left basal ganglia  3   Cerebral atrophy with chronic small vessel ischemic change and chronic infarctions as described above  Workstation performed: [id number]     Ct Head Wo Contrast    Result Date: 4/24/2018  Narrative: CT BRAIN - WITHOUT CONTRAST INDICATION:   24 hour post stroke head ct  History of subarachnoid hemorrhage, status post cerebral angiogram and thrombectomy  Follow-up hemorrhage  COMPARISON:  Numerous prior CT scans of the brain April 23, 2018, CTA of the neck and brain April 23, 2018 and cerebral angiogram and thrombectomy April 23, 2018  TECHNIQUE:  CT examination of the brain was performed  In addition to axial images, coronal 2D reformatted images were created and submitted for interpretation  Radiation dose length product (DLP) for this visit:  1024 mGy-cm   This examination, like all CT scans performed in the [facility], was performed utilizing techniques to minimize radiation dose exposure, including the use of iterative reconstruction and automated exposure control  IMAGE QUALITY:  Suboptimal due to patient motion  FINDINGS: PARENCHYMA:  There is persistent subarachnoid hemorrhage in the suprasellar cistern, anterior interhemispheric fissure, left sylvian fissure and along the left temporal convexity  This has improved from the prior study  Resolution of enhancement of the sulci along the right temporal parietal convexity, consistent with contrast staining from subacute infarction  No acute intraparenchymal hemorrhage is identified  Areas of chronic small vessel ischemic change and chronic lacunar infarction  VENTRICLES AND EXTRA-AXIAL SPACES:  Ventricles and cerebral sulci mildly dilated  No evidence of hydrocephalus  VISUALIZED ORBITS AND PARANASAL SINUSES:  No acute abnormality involving the orbits  Mild scattered sinus mucosal thickening is noted  No fluid levels are seen  CALVARIUM AND EXTRACRANIAL SOFT TISSUES:  Normal      Impression: 1  Resolving subarachnoid hemorrhage  2   Subacute right MCA territory infarction  3   Cerebral atrophy with chronic small vessel ischemic change  Workstation performed: [id number]     Ct Head Wo Contrast    Result Date: 4/23/2018  Narrative: CT BRAIN - WITHOUT CONTRAST INDICATION: Partial thrombectomy of left MCA  Hemorrhage  COMPARISON:  Earlier today  TECHNIQUE:  CT examination of the brain was performed  In addition to axial images, coronal 2D reformatted images were created and submitted for interpretation  Radiation dose length product (DLP) for this visit:  1173 27 mGy-cm   This examination, like all CT scans performed in the [facility], was performed utilizing techniques to minimize radiation dose exposure, including the use of iterative reconstruction and automated exposure control  IMAGE QUALITY:  Diagnostic  FINDINGS: PARENCHYMA:  Similar to the most recent examination there is subarachnoid hemorrhage and extravasation contrast mainly within the suprasellar cistern and left sylvian fissure    Additional hemorrhage and contrast extends into the interhemispheric fissure,  perimesencephalic cistern and prepontine cistern  The suspected subarachnoid hemorrhage localized within the posterior fossa, inferiorly below the cerebellar vermis is improving compared to the prior examination  Once again identified is cortical enhancement within the late subacute right posterior MCA territory infarction  Stable atrophy and chronic microangiopathic change  VENTRICLES:  Stable ventricles  No intraventricular hemorrhage noted  VISUALIZED ORBITS AND PARANASAL SINUSES:  Stable  CALVARIUM AND EXTRACRANIAL SOFT TISSUES:  Stable     Impression: Stable subarachnoid hemorrhage and extravasated contrast within the subarachnoid space compared to the examination performed approximately 4 hours earlier  Stable gyral enhancement involving the late subacute posterior MCA territory infarct on the right  Stable atrophy and chronic microangiopathic change  Workstation performed: [id number]     Ct Head Wo Contrast    Result Date: 4/23/2018  Narrative: CT BRAIN - WITHOUT CONTRAST INDICATION:   Stroke  Status post stroke intervention  COMPARISON:  4/23/2018, CT, CT angiography and cerebral angiography  TECHNIQUE:  CT examination of the brain was performed  In addition to axial images, coronal 2D reformatted images were created and submitted for interpretation  Radiation dose length product (DLP) for this visit:  1127 66 mGy-cm   This examination, like all CT scans performed in the [facility], was performed utilizing techniques to minimize radiation dose exposure, including the use of iterative reconstruction and automated exposure control  IMAGE QUALITY:  Diagnostic  FINDINGS: PARENCHYMA: Please note this patient is status post cerebral angiogram immediately prior to this CT exam  There is a late subacute to chronic infarct identified within the right posterior MCA territory with enhancement of the cortex consistent with the subacute infarct   There is a large amount of hyperdense material within the suprasellar cistern extending into the anterior intrahemispheric fissure and left sylvian fissure  This also extends into the interpeduncular cistern and left paramesencephalic cistern, posteriorly along the tentorium  Some of this is extremely hyperdense and appears to represent contrast extravasation especially within the posterior left sylvian fissure  The remaining hyperdensity is likely a combination of hemorrhage and contrast  Enhancing nodule identified within the left temporal occipital region consistent with small meningioma, series 2 image 18 measuring approximately 1 cm  Mild cerebral volume loss  Old lacunar infarcts within the basal ganglia  Hyperdensity noted below and inferior to the cerebellar vermis within the midline posterior fossa suggesting subarachnoid hemorrhage  VENTRICLES:  No intraventricular hemorrhage noted  Stable lateral ventricles  VISUALIZED ORBITS AND PARANASAL SINUSES:  Unremarkable  CALVARIUM AND EXTRACRANIAL SOFT TISSUES:  Normal      Impression: Interval development of subarachnoid hemorrhage and excreted contrast in the subarachnoid space mainly located within the suprasellar cistern and left sylvian fissure status post cerebral angiogram  Localized hyperdensity within the posterior inferior aspect of the posterior fossa, within the subarachnoid space may represent localized hemorrhage  1 cm meningioma within the left peripheral temporal occipital region  Enhancing cortex of the subacute infarct within the right posterior MCA territory  Cerebral volume loss and chronic microangiopathic change identified  Neurointerventional team is aware of this finding  Follow-up CT has already been ordered  Workstation performed: [id number]     Ct Head Without Contrast    Result Date: 4/18/2018  Narrative: CT BRAIN - WITHOUT CONTRAST INDICATION:   fall on coumadin  COMPARISON:  None  TECHNIQUE:  CT examination of the brain was performed    In addition to axial images, coronal 2D reformatted images were created and submitted for interpretation  Radiation dose length product (DLP) for this visit:  434 74 mGy-cm   This examination, like all CT scans performed in the [facility], was performed utilizing techniques to minimize radiation dose exposure, including the use of iterative  reconstruction and automated exposure control  IMAGE QUALITY:  Diagnostic  FINDINGS: PARENCHYMA:  No intracranial mass, mass effect or midline shift  There is no acute hemorrhage  There is no extracerebral hemorrhage  There is a subacute to acute infarct in the posterior right parietal region  Microangiopathic change is present  There is a chronic lacunar type infarct in the left thalamus  VENTRICLES AND EXTRA-AXIAL SPACES:  Prominent consistent with atrophy  VISUALIZED ORBITS AND PARANASAL SINUSES:  Unremarkable  CALVARIUM AND EXTRACRANIAL SOFT TISSUES:  Small mastoid effusion on the left  Impression: 1  Subacute to acute infarct in the posterior right parietal region  No acute hemorrhage  No extracerebral collections  * I personally telephoned this result to Vinny Ziegler on 4/18/2018 3:11 PM  Workstation performed: [id number]     Ct Spine Cervical Without Contrast    Result Date: 4/18/2018  Narrative: CT CERVICAL SPINE - WITHOUT CONTRAST INDICATION:   Fall on Coumadin  COMPARISON: None  TECHNIQUE:  CT examination of the cervical spine was performed without intravenous contrast   Contiguous axial images were obtained  Sagittal and coronal reconstructions were performed  Radiation dose length product (DLP) for this visit:  1578 22 mGy-cm   This examination, like all CT scans performed in the [facility], was performed utilizing techniques to minimize radiation dose exposure, including the use of iterative reconstruction and automated exposure control  IMAGE QUALITY:  Diagnostic   FINDINGS: ALIGNMENT:  Normal alignment of the cervical spine  No subluxation  VERTEBRAL BODIES:  No fracture  Congenital fusion of C2 and C3  DEGENERATIVE CHANGES:  Mild disc space narrowing and spurring at several levels  PREVERTEBRAL AND PARASPINAL SOFT TISSUES:  Unremarkable  THORACIC INLET:  Normal      Impression: No cervical spine fracture or traumatic malalignment  Degenerative changes  Congenital fusion of C2 and C3  Workstation performed: [id number]         Assessment and plan:  1  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, the patient was found to have PFO 3 mm, with right to left shunt, I suggest cardiology evaluation for possible closure to prevent additional strokes in the future  2  Mitral valve replacement and recent DVT currently on apixaban 5 mg p o  b i d  with aspirin I agree with the treatment  3  Mild thrombocytopenia, this pattern has been on going on for at least 2015, I will watch and observe, there is no evidence of heparin products that given to the patient however I will order HIT by EFRAÍN test to confirm, apixaban could be used to treat HIT however it is not FDA approved for this  4    Possible mass in the transverse colon she needs to be evaluated by GI for future colonoscopy

## 2018-05-15 NOTE — PROGRESS NOTES
05/15/18 0800   Pain Assessment   Pain Assessment No/denies pain   Restrictions/Precautions   Precautions Bed/chair alarms; Fall Risk;Cognitive   Cognition   Overall Cognitive Status Impaired   Arousal/Participation Cooperative   Attention Difficulty attending to directions   Orientation Level Oriented to person;Oriented to place   Memory Decreased short term memory;Decreased recall of recent events;Decreased recall of precautions   Following Commands Follows one step commands with increased time or repetition   Subjective   Subjective pt ready for therapy; pt stated she is waiting for her daugthers   QI: Roll Left and Right   Reason if not Attempted Activity not applicable   Roll Left and Right CARE Score 9   QI: Sit to Lying   Reason if not Attempted Activity not applicable   Sit to Lying CARE Score 9   QI: Lying to Sitting on Side of Bed   Reason if not Attempted Activity not applicable   Lying to Sitting on Side of Bed CARE Score 9   QI: Sit to Stand   Assistance Needed Incidental touching   Sit to Stand CARE Score 4   QI: Chair/Bed-to-Chair Transfer   Assistance Needed Physical assistance;Verbal cues   Assistance Provided by [place] Less than 25%   Comment HHA   Chair/Bed-to-Chair Transfer CARE Score 3   Transfer Bed/Chair/Wheelchair   Limitations Noted In Balance;Problem Solving; Sequencing;UE Strength;LE Strength; Coordination   Adaptive Equipment Hand Hold   Stand Pivot Minimal Assist   Sit to Stand Contact Guard   Stand to Sit Contact Guard   Findings STS x5 focusing on standing tolerance nad initial standing balance   Bed, Chair, Wheelchair Transfer (FIM) 4 - Patient completes 75% of all tasks   QI: Car Transfer   Reason if not Attempted Activity not applicable   Car Transfer CARE Score 9   QI: Walk 10 Feet   Assistance Needed Physical assistance   Assistance Provided by Ben Wheeler 25%-49%   Comment HHA, LOB x1, Nidia to correct   Walk 10 Feet CARE Score 3   QI: Walk 50 Feet with Two Turns   Assistance Needed Physical assistance   Assistance Provided by [place] Less than 25%   Walk 50 Feet with Two Turns CARE Score 3   QI: Walk 150 Feet   Assistance Needed Physical assistance   Assistance Provided by [place] Less than 25%   Walk 150 Feet CARE Score 3   QI: Walking 10 Feet on Uneven Surfaces   Reason if not Attempted Activity not applicable   Walking 10 Feet on Uneven Surfaces CARE Score 9   Ambulation   Does the patient walk? 2  Yes   Primary Discharge Mode of Locomotion Walk   Walk Assist Level Minimum Assist   Gait Pattern Inconsistant Cathryn; Step through; Improper weight shift;Decreased R stance   Assist Device Hand Hold   Distance Walked (feet) 150 ft  (x2)   Limitations Noted In Balance; Coordination; Heel Strike; Sequencing;Speed;Strength;Swing   Findings pt had slight LOB with turn initially, needing Nidia to correct  pt with poor L side inattention   Walking (FIM) 3 - Patient completes 50 - 74% of all tasks, needs more than steadying or light touch AND distance 150 feet or more, no rest   QI: Wheel 50 Feet with Two Turns   Reason if not Attempted Activity not applicable   Wheel 50 Feet with Two Turns CARE Score 9   QI: Wheel 150 Feet   Reason if not Attempted Activity not applicable   Wheel 744 Feet CARE Score 9   Wheelchair mobility   QI: Does the patient use a wheelchair? 0   No   Wheelchair (FIM) 0 - Activity does not occur   QI: 1 Step (Curb)   Reason if not Attempted Activity not applicable   1 Step (Curb) CARE Score 9   QI: 4 Steps   Reason if not Attempted Activity not applicable   4 Steps CARE Score 9   QI: 12 Steps   Reason if not Attempted Activity not applicable   12 Steps CARE Score 9   QI: Picking Up Object   Reason if not Attempted Safety concerns   Picking Up Object CARE Score 88   QI: Toilet Transfer   Assistance Needed Physical assistance   Assistance Provided by [place] Less than 25%   Toilet Transfer CARE Score 3   Toilet Transfer   Surface Assessed Bedside Commode   Limitations Noted In Balance;Problem Solving; Safety   Positioning Concerns Safety   Findings pt started pulling pants down before being in front of commode   Toilet Transfer (FIM) 4 - Patient requires steadying assist or light touching   Assessment   Treatment Assessment pt cont to focus on balance and safety with gait training  pt with a slow renate and stated not wanting to go any faster  pt with poor wt shift to the R, with therapist faciliating manual wt shift  pt with slight LOB initially due to distraction  pt with poor L side inattention and needs instructions repeated  Problem List Decreased strength;Decreased endurance; Impaired balance;Decreased mobility; Decreased coordination;Decreased cognition;Decreased safety awareness   Barriers to Discharge Inaccessible home environment;Decreased caregiver support   PT Barriers   Physical Impairment Decreased strength;Decreased endurance; Impaired balance;Decreased mobility; Decreased coordination;Decreased cognition;Decreased safety awareness   Functional Limitation Walking;Standing;Stair negotiation   Plan   Treatment/Interventions Functional transfer training;LE strengthening/ROM; Endurance training;Patient/family training;Gait training   Progress Progressing toward goals   Recommendation   Recommendation 24 hour supervision/assist;Home PT; Short-term skilled PT;Outpatient PT   PT Therapy Minutes   PT Time In 0800   PT Time Out 0830   PT Total Time (minutes) 30   PT Mode of treatment - Individual (minutes) 30   PT Mode of treatment - Concurrent (minutes) 0   PT Mode of treatment - Group (minutes) 0   PT Mode of treatment - Co-treat (minutes) 0   PT Mode of Teatment - Total time(minutes) 30 minutes   Therapy Time missed   Time missed?  No

## 2018-05-15 NOTE — PROGRESS NOTES
05/15/18 1000   Pain Assessment   Pain Assessment No/denies pain   Pain Score No Pain   Restrictions/Precautions   Precautions Bed/chair alarms;Cognitive; Fall Risk;Limb alert   Grooming   Able To Wash/Dry Face;Brush/Clean Teeth;Wash/Dry Hands   Grooming (FIM) 5 - Patient requires supervision/monitoring   QI: Shower/Bathe Self   Assistance Needed Physical assistance   Assistance Provided by [place] Less than 25%   Shower/Bathe Self CARE Score 3   Bathing   Assessed Bath Style Shower   Anticipated D/C Bath Style Tub   Able to Gather/Transport No   Able to [employer] Temperature No   Able to Wash/Rinse/Dry (body part) Left Arm;Right Arm;L Upper Leg;R Upper Leg;L Lower Leg/Foot;R Lower Leg/Foot;Chest;Abdomen;Perineal Area; Buttocks   Positioning Standing;Seated   Findings  Pt is able to initiate Grab bar use in stance for perinal bathing  Pt is able to bathe LE and Feet in sitting w/ bench in low position  Pt demo increased abilty to terminate bathing tasks today and reports "I am finished "   Bathing (FIM) 4 - Patient requires steadying assist or light touching   Tub/Shower Transfer   Shower Transfer (FIM) 4 - Patient completes 75% of all tasks   QI: Upper Body Dressing   Assistance Needed Physical assistance   Assistance Provided by [place] 50%-74%   Upper Body Dressing CARE Score 2   QI: Lower Body Dressing   Assistance Needed Physical assistance   Assistance Provided by [place] 75% or more   Lower Body Dressing CARE Score 2   QI: Putting On/Taking Off Footwear   Assistance Needed Physical assistance   Assistance Provided by [place] Total assistance   Putting On/Taking Off Footwear CARE Score 1   Dressing/Undressing Clothing   Remove LB Clothes [address]; Undergarment;Socks   Limitations Noted In Balance; Buttoning; Endurance;Neglect;Problem Solving; Safety; Sequencing   Findings Pt requirs increased assist w/ LB dressing for safety today   pt continues to reach forward to to floor to don socks but does slip forward in chair and does not coirrect  Requires dependent assit for push back into the chair  Pt demo difficulty completing tasks fully, Pt will start dressing tasks like don socks over toes then don pants over fone foot then go to shirt  Pt able to orient shirt and don R UE  UB Dressing (FIM) 2 - Patient completes 25-49% of all tasks   LB Dressing (FIM) 1 - Patient completes less than 25% of all tasks   Transfer Bed/Chair/Wheelchair   Stand Pivot Minimal Assist   Sit to Stand Minimal   Stand to Sit Minimal   Bed, Chair, Wheelchair Transfer (FIM) 2 - [place] needs to lift or boost to rise AND assist to sit   Cognition   Overall Cognitive Status Impaired   Arousal/Participation Alert; Cooperative   Attention Difficulty attending to directions   Orientation Level Oriented to person   Memory Decreased short term memory;Decreased recall of recent events   Following Commands Follows one step commands inconsistently   Activity Tolerance   Activity Tolerance Patient tolerated treatment well   Assessment   Treatment Assessment Pt participated in skilled OT session focusing on ADL retraining, activity tolerance, activity modification, functional transfers, L side awareness and attention  See above for details on ADL function  Pt continues to demonstrates difficulty w/ all dressing tasks w/ poor motor planning, problem solving  Pt continues to demonstrate increased engagement during kitchen tasks  L side awareness, B/L UE  Coordination, sequencing task initiation/termination and engagement completed in kitchen task w/ meal preparation  Continue to recommend direct supervision for all meal prep  Pt continues to require skilled acute rehab OT services to increase overall functional independence and safety w/ ADLs and functional transfers, continue to follow plan of care  Prognosis Fair   Problem List Decreased range of motion;Decreased endurance; Impaired balance;Decreased mobility; Decreased coordination;Decreased cognition; Impaired judgement;Decreased safety awareness   Plan   Treatment/Interventions ADL retraining;Functional transfer training;LE strengthening/ROM; Therapeutic exercise; Endurance training;Cognitive reorientation;Equipment eval/education;Patient/family training   Recommendation   OT Discharge Recommendation 24 hour supervision/assist   OT Therapy Minutes   OT Time In 1000   OT Time Out 1145   OT Total Time (minutes) 105   OT Mode of treatment - Individual (minutes) 105   OT Mode of treatment - Concurrent (minutes) 0   OT Mode of treatment - Group (minutes) 0   OT Mode of treatment - Co-treat (minutes) 0   OT Mode of Teatment - Total time(minutes) 105 minutes

## 2018-05-15 NOTE — PROGRESS NOTES
Internal Medicine Progress Note  Patient: Cheri Temple  Age/sex: 80 y o  female  Medical Record #: [id number]      ASSESSMENT/PLAN:  Cheri Temple is seen and examined and management for following issues:    1  Lt MCA CVA s/p mechanical thrombectomy: Continue Eliquis 2 5mg BID (keep dose same = d/w Dr Kecia Arreguin) and atorvastatin for secondary prevention  [facility]  Had a RR 5/10/18 for change in MS with being found lethargic = CTH no change, VSS; neuro saw and recommended no changes and signed off  HS Remeron stopped it to see if any change in daytime fatigue - so far variable results  2   HTN: stable; continue Lopressor 50mg every 12 hours  3   CHADWICK/CKD stage III; baseline Cr 1 5: On admit to hospital 4/18 had creat 2 0 and Torsemide was held/fluids given; On 5/7 up to 1 99 = stopped Torsemide/KCL and gave 1 liter NSS = 5/10/18 gave another liter since creat still 1 90 = on 5/9/18 was back down to 1 27 and now 1 18  Checked PVRs = not significant  4   Chronic systolic CHF/LVEF 96%; moderate TR; bio MVR = in [place] (normal function):  holding torsemide 10mg daily/Kdur 10 meq BID  Chest is clear/no edema  5  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): Eliquis 2 5mg BID 2/2 age and renal status (switched from Coumadin in the hospital so NOAC is new)  6   DM type 2: On linagliptan 2 5mg at home  It was planned for pt to change to Januvia 100mg daily as an OP per the daughter  Will start Januvia when needed but so far BS controlled w/o tx likely 2/2 fact she is not eating much = did stop Accuchecks for now and watching FBSs but if she starts eating better will resume Accuchecks  Continue DM diet  Fasting sugars have been acceptable = yesterday AM was 95     7   Acute blood loss anemia: s/p transfusion after EBL from thrombectomy of 500-1000ml from femoral site  Stable  8   PFO:  Small  Seen on LORI  45 [facility] Cardiology follow-up      9   History of colon CA: Colonic lesion seen on CT  Will need GI follow-up    10  Hx PPM    11  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:  On Eliquis currently = platelets 80 yesterday AM with previous normal;  repeat to see if lab error was essentially the same = H-O saw; she has not gotten any Heparin products but ordered HIT by EFRAÍN to confirm and watching for now    12  Poor appetite: had not been eating very well so Remeron was added  Also, question of depression  Remeron 7 5mg qhs added 5/7/18 by primary service and was stopped 5/9/18 2/2 too sleepy  She was on Remeron 15mg at home      Subjective: Patient seen and examined  Offers no complaints      ROS:   GI: denies abdominal pain, change bowel habits or reflux symptoms  Neuro: No new neurologic changes  Respiratory: No Cough, SOB  Cardiovascular: No CP, palpitations     Scheduled Meds:    Current Facility-Administered Medications:  acetaminophen 650 mg Oral Q6H PRN Heidy Trujillo MD   apixaban 2 5 mg Oral BID Heidy Trujillo MD   atorvastatin 80 mg Oral Daily With Gerhardt Rolling, MD   bisacodyl 10 mg Rectal Daily PRN Heidy Trujillo MD   docusate sodium 100 mg Oral BID Heidy Trujillo MD   ferrous sulfate 300 mg Oral Daily GAYATRI Can   melatonin 6 mg Oral HS Heidy Trujillo MD   metoprolol tartrate 50 mg Oral Q12H [address] Heidy Trujillo MD   pantoprazole 40 mg Oral Early Morning Heidy Trujillo MD   polyethylene glycol 17 g Oral Daily PRN Heidy Trujillo MD   senna 1 tablet Oral HS Heidy Trujillo MD       Labs:       Results from last 7 days  Lab Units 05/14/18  1258 05/14/18  0546   WBC Thousand/uL 5 25 4 15*   HEMOGLOBIN g/dL 10 0* 9 0*   HEMATOCRIT % 32 5* 28 8*   PLATELETS Thousands/uL 87* 80*       Results from last 7 days  Lab Units 05/14/18  0546 05/09/18  0451   SODIUM mmol/L 140 139   POTASSIUM mmol/L 4 2 3 9   CHLORIDE mmol/L 108 106   CO2 mmol/L 26 26   BUN mg/dL 28* 41*   CREATININE mg/dL 1 18 1 27   GLUCOSE RANDOM mg/dL 95 113   CALCIUM mg/dL 9 5 9 4           Results from last 7 days  Lab Units 05/08/18  1728   INR  1 17*          Glucose (mg/dL)   Date Value   05/14/2018 95   05/09/2018 113   05/08/2018 96   05/08/2018 115   12/23/2015 114 (H)   02/22/2015 101   02/21/2015 103   02/20/2015 116     Glucose, i-STAT (mg/dl)   Date Value   05/08/2018 103   04/23/2018 106   04/23/2018 96       Labs reviewed    Physical Examination:  Vitals:   Vitals:    05/14/18 2049 05/15/18 0520 05/15/18 0633 05/15/18 0749   BP: 135/62 132/68  138/78   BP Location: Left arm Left arm     Pulse: 77 81  78   Resp: 16 18     Temp: 97 6 °F (36 4 °C) 98 9 °F (37 2 °C)     TempSrc: Oral Oral     SpO2: 97% 96%     Weight:   70 2 kg (154 lb 12 2 oz)    Height:           GEN: NAD  HEENT: NC/AT  RESP: BBS w/o crackles/wheeze/rhonci; resp unlabored  CV: +S1 S2, regular rate, no rubs/murmurs  ABD: soft, NT, ND, normal BS   : no schaefer  EXT: no edema  Skin: no rashes  Neuro: Awake but somewhat sleepy again today; WOODARD 5/5 but difficult to get her to coordinate UE strength testing 2/2 some involuntary movements with left arm and inability to consistently follow commands as always; face symmetric; speech clear  [ X ] Total time spent: 30 Mins and greater than 50% of this time was spent counseling/coordinating care        Porter Kelley  Internal Medicine

## 2018-05-16 LAB — PF4 HEPARIN CMPLX AB SER-ACNC: 0.28 OD (ref 0–0.4)

## 2018-05-16 PROCEDURE — 97530 THERAPEUTIC ACTIVITIES: CPT

## 2018-05-16 PROCEDURE — G0515 COGNITIVE SKILLS DEVELOPMENT: HCPCS

## 2018-05-16 PROCEDURE — 97110 THERAPEUTIC EXERCISES: CPT

## 2018-05-16 PROCEDURE — 97116 GAIT TRAINING THERAPY: CPT

## 2018-05-16 PROCEDURE — 99233 SBSQ HOSP IP/OBS HIGH 50: CPT | Performed by: PHYSICAL MEDICINE & REHABILITATION

## 2018-05-16 RX ADMIN — ATORVASTATIN CALCIUM 80 MG: 40 TABLET, FILM COATED ORAL at 17:15

## 2018-05-16 RX ADMIN — APIXABAN 2.5 MG: 2.5 TABLET, FILM COATED ORAL at 17:15

## 2018-05-16 RX ADMIN — METOPROLOL TARTRATE 50 MG: 50 TABLET ORAL at 08:40

## 2018-05-16 RX ADMIN — PANTOPRAZOLE SODIUM 40 MG: 40 TABLET, DELAYED RELEASE ORAL at 05:19

## 2018-05-16 RX ADMIN — MELATONIN TAB 3 MG 6 MG: 3 TAB at 21:54

## 2018-05-16 RX ADMIN — METOPROLOL TARTRATE 50 MG: 50 TABLET ORAL at 21:54

## 2018-05-16 RX ADMIN — SENNOSIDES 8.6 MG: 8.6 TABLET, FILM COATED ORAL at 21:54

## 2018-05-16 RX ADMIN — MINERAL SUPPLEMENT IRON 300 MG / 5 ML STRENGTH LIQUID 100 PER BOX UNFLAVORED 300 MG: at 08:40

## 2018-05-16 RX ADMIN — DOCUSATE SODIUM 100 MG: 100 CAPSULE, LIQUID FILLED ORAL at 08:40

## 2018-05-16 RX ADMIN — DOCUSATE SODIUM 100 MG: 100 CAPSULE, LIQUID FILLED ORAL at 17:15

## 2018-05-16 RX ADMIN — APIXABAN 2.5 MG: 2.5 TABLET, FILM COATED ORAL at 08:39

## 2018-05-16 NOTE — PROGRESS NOTES
05/15/18 1400   Pain Assessment   Pain Assessment No/denies pain   Pain Score No Pain   Memory Skills   Memory (FIM) 3 - Recognizes, recalls/performs 50-74%   Social Interaction (FIM) 5 - Interacts appropriately with others 90% of time   Speech/Language/Cognition Assessmetn   Treatment Assessment Pt seen for skilled ST session w/ focus on cognitive linguistic skills  When targeting STM recall, pt engaged in recall of cooking tasks completed w/ OT where pt required overall mod assist consisting of semantic cues of foods and time frames  Demonstrated ability to list ingredients which she used during OT session of cooking activity during AM at min assist level level, listing 7/8 items when given increased time and verbal cues  When attempting a 4-step picture sequencing task, pt continued to demonstrate increased difficulty w/ task, requiring max cues and use of binary choice options  Targeting LTM recall, pt correctly stated month and year of  and address but remained w/ decreased recall of age  Discussed familial info w/ pt's dtrs present where she was 75% accurate in stating where each of her children lived, however, noted to have increased LTM recall of info related to her childhood including locations in [facility]  Will cont to benefit from skilled ST services to maximize cognitive linguistic skills at this time  SLP Therapy Minutes   SLP Time In 1400   SLP Time Out 1440   SLP Total Time (minutes) 40   SLP Mode of treatment - Individual (minutes) 40   SLP Mode of treatment - Concurrent (minutes) 0   SLP Mode of treatment - Group (minutes) 0   SLP Mode of treatment - Co-treat (minutes) 0   SLP Mode of Teatment - Total time(minutes) 40 minutes   Therapy Time missed   Time missed?  No   Daily FIM Score   Problem solving (FIM) 2 - Needs direction more than ½ time to initiate, plan or complete simple tasks   Comprehension (FIM) 4 - Understands basic info/conversation 75-90% of time   Expression (FIM) 4 - Expresses basic info/needs 75-90% of time

## 2018-05-16 NOTE — PROGRESS NOTES
Physical Therapy Progress Note     05/16/18 1400   Pain Assessment   Pain Assessment No/denies pain   Pain Score No Pain   Restrictions/Precautions   Precautions Bed/chair alarms;Cognitive; Fall Risk   Weight Bearing Restrictions No   ROM Restrictions No   Cognition   Overall Cognitive Status Impaired   Arousal/Participation Cooperative; Alert   Attention Difficulty attending to directions   Orientation Level Oriented to person;Oriented to place   Memory Decreased short term memory;Decreased recall of recent events;Decreased recall of precautions   Following Commands Follows one step commands inconsistently   Subjective   Subjective denies pain; no c/o   Bed Mobility   Findings NT   Transfer Bed/Chair/Wheelchair   Limitations Noted In Balance; Coordination   Adaptive Equipment Hand Hold   Stand Pivot Contact Guard   Sit to Stand Contact Guard   Stand to [facility], Chair, Wheelchair Transfer (FIM) 4 - Patient requires steadying assist or light touching   Ambulation   Primary Discharge Mode of Locomotion Walk   Walk Assist Level Minimum Assist;Contact Guard   Gait Pattern Inconsistant Cathryn   Assist Device Hand Hold   Distance Walked (feet) 350 ft  (x2)   Limitations Noted In Balance; Coordination   Findings HHA    Walking (FIM) 4 - Patient requires steadying assist or light touching AND distance 150 feet or more, no rest   Wheelchair mobility   Findings NT   Wheelchair (FIM) 0 - Activity does not occur   Stairs   Findings assessed in AM   Toilet Transfer   Surface Assessed Bedside Commode   Toilet Transfer (FIM) 4 - Patient requires steadying assist or light touching   Therapeutic Interventions   Strengthening sitting TE (marches, LAQ, ankle pumps)   Flexibility manual stretch B HS and gastroc;    Neuromuscular Re-Education STS x5   Assessment   Treatment Assessment 30-minute session; pt able to amb 350' (x2) with HHA and cgA; pt requires heavy cueing for direction and attention as well as safety and balance; instructed in seated TE and assessed mobility in room, while retrieving item; pt with poor safety awareness; recommend cont PT POC;    Problem List Decreased strength;Decreased endurance; Impaired balance;Decreased mobility; Decreased coordination;Decreased cognition;Decreased safety awareness   Barriers to Discharge Inaccessible home environment;Decreased caregiver support   PT Barriers   Physical Impairment Decreased strength;Decreased endurance;Decreased mobility; Impaired balance;Decreased coordination;Decreased cognition;Decreased safety awareness   Functional Limitation Walking;Standing;Stair negotiation   Plan   Treatment/Interventions ADL retraining;Functional transfer training;LE strengthening/ROM; Elevations; Therapeutic exercise; Endurance training;Cognitive reorientation;Patient/family training;Equipment eval/education; Bed mobility;Gait training   Progress Progressing toward goals   Recommendation   Recommendation Short-term skilled PT   PT Therapy Minutes   PT Time In 1400   PT Time Out 1430   PT Total Time (minutes) 30   PT Mode of treatment - Individual (minutes) 30   PT Mode of treatment - Concurrent (minutes) 0   PT Mode of treatment - Group (minutes) 0   PT Mode of treatment - Co-treat (minutes) 0   PT Mode of Teatment - Total time(minutes) 30 minutes   Therapy Time missed   Time missed?  Kelli Amado PTA

## 2018-05-16 NOTE — PROGRESS NOTES
Progress Note - Nancy Hyde 80 y o  female MRN: [id number]    Unit/Bed#: -02 Encounter: [id number]            Subjective:   Pt w/o complaint currently     Objective:     ROS  Gen: denies recent wt loss   Psych: denies mood change    T: 98 5  HR: 78  BP: 132/56  RR: 18  POx: 97%       Physical Exam:     Gen:        NAD   Neck:   trachea midline  Lungs:  respirations unlabored   Heart:    + S1 and S2   Abdomen:    Soft, non-tender  Psych: mood/affect appropriate  Neurologic: awake, alert    Functional :  Mobility: min   Tx: CG-min  ADLs: mod        Current Facility-Administered Medications:  acetaminophen 650 mg Oral Q6H PRN Nadia Colin MD   apixaban 2 5 mg Oral BID Nadia Colin MD   atorvastatin 80 mg Oral Daily With Amina Chin MD   bisacodyl 10 mg Rectal Daily PRN Nadia Colin MD   docusate sodium 100 mg Oral BID Nadia Colin MD   ferrous sulfate 300 mg Oral Daily GAYATRI Rizo   melatonin 6 mg Oral HS Nadia Colin MD   metoprolol tartrate 50 mg Oral Q12H [facility] Nadia Colin MD   pantoprazole 40 mg Oral Early Morning Nadia Colin MD   polyethylene glycol 17 g Oral Daily PRN Nadia Colin MD   senna 1 tablet Oral HS Nadia Colin MD         acetaminophen    bisacodyl    polyethylene glycol      Assessment:  Pt is 79 yo female with ICH & bi-hemispheric nonhemorrhagic ischemic strokes s/p thrombectomy by Dr Maria Dolores Crandall on 4/23    Plan:    Rehabilitation: cont PT/OT for ambulatory/ADL dysfxn    ICH & bi-hemispheric nonhemorrhagic ischemic strokes: nonhemorrhagic stroke felt by neurology to be embolic of unclear etiology; per s/p thrombectomy by Dr Maria Dolores Crandall on 4/23, cleared by neurosx for [facility] and per neuro recs started on eliquis 2 5 mg BID (not 5 mg due to age and serum Cr greater than 1 5, tends to fluctuate around this value); home lipitor increased from 40 to 80 mg     AMS (5/8): seen by neuro and recommended CTH  (5/8) which showed no acute findings and patient back to baseline mental status therefore per neuro recs no MRI at this time      Peripheral neuropathy: likely 2/2 to DM, had tried neurontin in the past but did not tolerate      DM: on januvia 100 mg qd at home (recently switched from tradjenta 5 mg qd); currently on ISS     CHF (EF 45%): home torsemide 10 mg qd (and home potassium 10 MEQ BID due to hypokalemia 2/2 to torsemide) both being held due to acute on chronic renal failure likely 2/2 to poor PO; additionally  home toprol XL 50 mg qd switched to lopressor 50 mg q12 in acute care     SSS: s/p pacer, interrogated recently in acute care, no A-fib found at that time (although noted on EKG of 4/23 however pt already on [facility] for CVA and on BB), follows with Dr Char Jimenez (cards) and Dr Gume Nielsen (EP)      h/o colon CA: on CT CAP of 4/26 colonic thickening suspicious for lesion noted and colonoscopy recommended, pt undergoes screening colonoscopys with Dr Irena Morales, pt to FU with them for FU colonoscopy       Meningioma: seen by neurosx no intervention planned, follows with Dr Christopher Adams     PFO (3 mm): unclear if paradoxical stroke from DVT is etiology of CVA (felt to be embolic by neuro) however as neuro has placed pt on [facility] for CVA LE dopplers would not ; t/c closure as OP       s/p MVR: tissue valve     HL: home lipitor increased from 40 mg to 80 mg due to CVA       h/o DVT: home coumadin switched to eliquis for CVA      Insomnia: at home on remeron 15 mg HS on hold due to episodes of AMS (however low suspicion this is cause of AMS)      non-occlusive carotid dz: per carotid U/S report recommend repeat carotid U/S in 1 year; on [facility] and statin     chronic constipation: per family at baseline 1 BM every 4 to 5 days, IM monitoring and will treat with laxatives as needed      rt ankle pain/swelling: likely sprain; XR showed no acute osseous abnormality     CHADWICK on CKD: baseline 1 4-1 7, Cr currently 1 18, however torsemide still on hold for now per IM      chronic anemia: AOCD/CKD, at home on iron 150 mg qd, cont iron supplementation as inpt; Hg currently stable at 9 8    Thrombocytopenia: currently 74, heme consulted and have ordered HIT but feel this is a chronic issue and only monitoring is required at this time     Hyperphosphatemia per lab reference range at 4 4: however in females 18 year or older 4 5 is ULN      Dispo: SNF    Incidental findings on CT C/A/P of 4/26:  1) multiple pulmonary micronodules: per CT report recommended FU imaging in 3 months for determination of stability  2) pleural thickening  3) uterine fibroid  Other incidental findings:  4) B/L renal cysts  5) 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 no intervention planned, follows with Dr Harriett Li  6) EKG abnormalities in the setting of atrial paced rhythm (non-specific intraventricular conduction block/wide QRS, LAD): follows with Dr Sarbjit Vasquez (cards) and Dr Chuck Black (EP)  7) mod-severe TR/elevated peak PA pressure:  follows with Dr Sarbjit Vasquez (cards) and Dr Chuck Black (EP)      This patient was discussed by the Interdisciplinary Team in weekly case conference today  The care of the patient was extensively discussed with all care providers and an appropriate rehabilitation plan was formulated unique for this patient  Barriers were identified preventing progression of therapy and appropriate interventions were discussed with each discipline  Please see the team note for input from all disciplines regarding barriers, intervention, and discharge planning      [ x ] Total time spent: 45 Mins, and greater than 50% of this time was spent counseling/coordinating care

## 2018-05-16 NOTE — PROGRESS NOTES
05/16/18 0900   Pain Assessment   Pain Assessment No/denies pain   Restrictions/Precautions   Precautions Bed/chair alarms;Cognitive; Fall Risk;Supervision on toilet/commode   Memory Skills   Memory (FIM) 2 - Recognizes, recalls/performs 25-49%   Social Interaction (FIM) 5 - Interacts appropriately with others 90% of time   Speech/Swallow Mechanism Exam   SpO2 97 %   Speech/Language/Cognition Assessmetn   Treatment Assessment Session began where pt was requesting to change shirt since she spilled toothpaste onto it from grooming  Ability to complete task w/o cues was min A overall  When eliciting commands to follow during task, pt noted to have decreased ability to follow and appeared to confuse completing task  Pt's recall of visitors and last meal (breakfast) was min A  However orientation remains impaired  Pt is oriented to self, is able to state "hospital" but continues to require directive cues for which hospital  Unable to state date w/o use of calendar  Ability to recall situation was increased time to elicit "stroke " Engaged in concrete convergent categorization task, where pt was 8/12 accurate, given increased semantic or gestural cues to elicit the remaining errored items  Pt's overall processing was delayed today, but was able to be re-directed to task easily  SLP Therapy Minutes   SLP Time In 0900   SLP Time Out 0930   SLP Total Time (minutes) 30   SLP Mode of treatment - Individual (minutes) 30   SLP Mode of treatment - Concurrent (minutes) 0   SLP Mode of treatment - Group (minutes) 0   SLP Mode of treatment - Co-treat (minutes) 0   SLP Mode of Teatment - Total time(minutes) 30 minutes   Therapy Time missed   Time missed?  No   Daily FIM Score   Problem solving (FIM) 2 - Needs direction more than ½ time to initiate, plan or complete simple tasks   Comprehension (FIM) 3 - Understands basic info/conversation 50-74% of time   Expression (FIM) 4 - Expresses basic info/needs 75-90% of time

## 2018-05-16 NOTE — PROGRESS NOTES
05/16/18 1330   Pain Assessment   Pain Assessment No/denies pain   Pain Score No Pain   Restrictions/Precautions   Precautions Bed/chair alarms;Cognitive; Fall Risk   Weight Bearing Restrictions No   ROM Restrictions No   QI: Sit to Stand   Assistance Needed Physical assistance   Assistance Provided by [place] 25%-49%   Comment Requires A to initiate task  Sit to Stand CARE Score 3   QI: Chair/Bed-to-Chair Transfer   Assistance Needed Physical assistance   Assistance Provided by [place] 25%-49%   Comment HHA   Chair/Bed-to-Chair Transfer CARE Score 3   Transfer Bed/Chair/Wheelchair   Limitations Noted In Balance; Coordination; Endurance;Problem Solving   Bed, Chair, Wheelchair Transfer (FIM) 3 - [place] needs to lift, boost or assist to stand OR sit   Cognition   Overall Cognitive Status Impaired   Arousal/Participation Cooperative; Alert   Attention Difficulty attending to directions   Orientation Level Oriented to person   Memory Decreased short term memory;Decreased recall of recent events;Decreased recall of precautions   Following Commands Follows one step commands inconsistently   Activity Tolerance   Activity Tolerance Patient tolerated treatment well   Assessment   Treatment Assessment Pt participated in skilled OT services with focus on leisure pursuits, functional reach activity, and sitting balance  Pt reports she enjoys gardening and cooking  Pt engaged in sitting balance/functional reach activity of scanning gardening magazine  Pt with G attention to R side during visual scanning activity  Pt uses RUE to turn pages  Pt resistive to engage in any other functional activities  Pt noted to be sliding out of chair during sitting activity and requires A x 2 people to safely scoot back into chair  Pt repositioned safely and seat belt reapplied with leg rests in place to promote safety  Pt will continue to benefit from skilled OT services with focus on standing tolerance, RUE strengthening, and visual attention  Prognosis Fair   Problem List Decreased strength;Decreased endurance; Impaired balance;Decreased mobility; Decreased coordination;Decreased cognition;Decreased safety awareness   Plan   Treatment/Interventions Functional transfer training;Cognitive reorientation; Compensatory technique education   Progress Progressing toward goals   Recommendation   OT Discharge Recommendation 24 hour supervision/assist   OT Therapy Minutes   OT Time In 1330   OT Time Out 1400   OT Total Time (minutes) 30   OT Mode of treatment - Individual (minutes) 30   OT Mode of treatment - Concurrent (minutes) 0   OT Mode of treatment - Group (minutes) 0   OT Mode of treatment - Co-treat (minutes) 0   OT Mode of Teatment - Total time(minutes) 30 minutes   Therapy Time missed   Time missed?  No

## 2018-05-16 NOTE — PROGRESS NOTES
05/16/18 1030   Pain Assessment   Pain Assessment No/denies pain   Pain Score No Pain   Restrictions/Precautions   Precautions Bed/chair alarms;Cognitive; Fall Risk   Weight Bearing Restrictions No   ROM Restrictions No   QI: Sit to Stand   Assistance Needed Physical assistance   Assistance Provided by [place] 25%-49%   Sit to Stand CARE Score 3   QI: Chair/Bed-to-Chair Transfer   Assistance Needed Physical assistance   Assistance Provided by [place] 25%-49%   Comment HHA   Chair/Bed-to-Chair Transfer CARE Score 3   Transfer Bed/Chair/Wheelchair   Limitations Noted In Balance; Coordination; Endurance;Problem Solving   Bed, Chair, Wheelchair Transfer (FIM) 3 - [place] needs to lift, boost or assist to stand OR sit   Functional Standing Tolerance   Time 30 minutes   Activity IADL tasks  Cognition   Overall Cognitive Status Impaired   Arousal/Participation Cooperative; Alert   Attention Difficulty attending to directions   Orientation Level Oriented to person   Memory Decreased short term memory;Decreased recall of recent events;Decreased recall of precautions   Following Commands Follows one step commands inconsistently   Activity Tolerance   Activity Tolerance Patient tolerated treatment well   Assessment   Treatment Assessment Pt participated in skilled OT services with focus on IADL tasks, standing tolerance, standing balance, and endurance  Pt engaged in laundry folding activity while in stance for ~ 30 minutes  Pt has no noted LOB and demos F balance throughout duration even while in unsupported stance  Pt perseverates on folding and requires VC's to terminate task and move on to the next task  Pt will continue to benefit from skilled OT services with focus on standing balance/tolerance, BUE integration, and functional cognition  Plan   Treatment/Interventions ADL retraining;Functional transfer training; Endurance training; Compensatory technique education   Progress Progressing toward goals   Recommendation   OT Discharge Recommendation 24 hour supervision/assist   OT Therapy Minutes   OT Time In 1030   OT Time Out 1130   OT Total Time (minutes) 60   OT Mode of treatment - Individual (minutes) 60   OT Mode of treatment - Concurrent (minutes) 0   OT Mode of treatment - Group (minutes) 0   OT Mode of treatment - Co-treat (minutes) 0   OT Mode of Teatment - Total time(minutes) 60 minutes   Therapy Time missed   Time missed?  No

## 2018-05-16 NOTE — TEAM CONFERENCE
Acute RehabilitationTeam Conference Note  Date: 5/16/2018   Time: 11:04 AM       Patient Name:  Cb Schneider       Medical Record Number: [id number]   YOB: 1933  Sex: Female          Room/Bed:  [facility]0/[facility]0-02  Payor Info:  Payor: Halie Mars / Plan: MEDICARE A AND B / Product Type: Medicare A & B Fee for Service /      Admitting Diagnosis: CVA (cerebral vascular accident) ([place] [address] ) [I63 9]   Admit Date/Time:  4/30/2018  1:56 PM  Admission Comments: No comment available     Primary Diagnosis:  History of ischemic right MCA stroke  Principal Problem: History of ischemic right MCA stroke    Patient Active Problem List    Diagnosis Date Noted    History of ischemic right MCA stroke 04/23/2018    CVA (cerebral vascular accident) ([place] [address] ) 04/18/2018    Controlled type 2 diabetes mellitus, without long-term current use of insulin ([place] [address] ) 04/18/2018    Pacemaker 04/18/2018    Acid reflux 04/18/2018    HTN (hypertension) 04/18/2018    Constipation 04/18/2018    CHADWICK (acute kidney injury) ([place] [address] ) 03/06/7606    Systolic congestive heart failure ([place] [address] ) 04/18/2018    H/O mitral valve replacement 03/01/2018    Deep vein thrombosis (DVT) of left upper extremity ([place] [address] ) 01/25/2018       Physical Therapy:    Weight Bearing Status: Full Weight Bearing  Transfers: Contact Guard, Minimal Assistance  Bed Mobility: Supervision  Amulation Distance (ft): 150 feet  Ambulation: Minimal Assistance  Assistive Device for Ambulation: Hand Hold Assistance  Number of Stairs: 12  Assistive Device for Stairs: Right Hand Rail  Stair Assistance: Contact Guard  Discharge Recommendations: [address] [place] [address] with[de-identified] 24 Hour Supervision, Family Support    5/8/18:  Pt cont to demonstrate deficits in cognition which affects problem solving and task perseveration   Pt currently needs Min Ax1 for transfers and functional mobility as pt cont to demonstrate inconsistencies with balance, tends to lean L when walking and standing, and inconsistencies with sequencing; pt ambulates with HHA x1 on R  Pt would benefit from cont skilled PT to progress ability to safely perform functional tasks and mobility; pt will need 24/7 S upon d/c for safety  Pt functioning at /[facility] for functional mobility at this time  Due to deficits in cognition pt has difficulty with problem solving and task initiation  Pt with L sided inattention and needs VCs t/o tx session to encourage scanning and focus to the L  Pt will benefit from cont therapy to improve deficits to progress with functional mobility and safety to decrease fall risk  Occupational Therapy:  Grooming: Supervision  Bathing: Minimal Assistance  Bathing: Minimal Assistance  Upper Body Dressing: Moderate Assistance  Lower Body Dressing: Moderate Assistance  Toileting: Maximum Assistance  Tub/Shower Transfer: Maximum Assistance  Toilet Transfer: Moderate Assistance  Cognition: Exceptions to WNL  Cognition: Decreased Memory, Decreased Executive Functions, Decreased Attention, Decreased Comprehension, Decreased Safety, Impulsive  Orientation: Person, Place, Time, Situation  Discharge Recommendations: Other  DC Home with[de-identified]  (subacute rehab)       Pt presents s/p CVA  Pt demonstrates difficulty with fxnl cognition affecting motor planning and sequence for common ADL tasks  Pt does present with improved attention at times but when fatigued pt requires extensive multimodal cueing  Pt with improvements in initiation of tasks  Pt requires cueing during ADLs for throughness and for sequencing  Pt currently still using HHA for fxnl transfers and fxnl mobility due to decreased corodination with RW and poor motor planning  Pt with improvements of use of b/l UE's during fxnl tasks but difficulty with R and L discrimination  Again as pt becomes fatigued, initiating tasks with b/l UE's becomes more difficult   Pt would benefit from continued OT to maximize independence and safety and to improve motor coordination of b/l UE's, improve balance, improve ADL participation and improve fxnl transfers to decrease burden of care  Speech Therapy:  Mode of Communication: Verbal  Speech/Language: Dysarthia  Cognition: Exceptions to WNL  Cognition: Decreased Memory, Decreased Executive Functions, Decreased Attention, Decreased Comprehension, Decreased Safety  Orientation: Person, Place  Swallowing: Within Defined Limits  Swallowing: Oral Dysphagia, Pharyngeal Dysphagia, Aspiration Risk  Diet Recommendations: Regular Diet, Thin  Discharge Recommendations: Other (home with family vs SNF)  [address] with[de-identified] 24 Hour Supervision, 24 Hour Assisteance, Outpatient Speech Therapy  Pt completed portions of informal cognitive linguistic and speech/language assessments where pt found to be presenting w/ moderate to severe deficits, characterized by decreased comprehenison, word finding difficulties, decreased problem solving/reasoning, decreased sequencing, and decreased STM and LTM recall  Pt also demonstrating decreased insight into deficits, along w/ deficits in safety awareness and judgement  Pt also assessed for swallow function where pt presenting w/ overall mild oropharygneal dysphagia characterized by prolonged mastication, slow a-p transfers and mildly delayed initiation of swallows  Currently recommending level 3 diet and thin liquids, however, pt will benefit from skilled ST intervention to maximize swallow skills and safety achieve baseline diet of regular/thins  Pt will also benefit from skilled ST intervention to maximize functional cognitive linguistic and communication skills at this time  Update 5/8/2018: Pt making slow progress towards goals at this time where pt continues to present w/ overall decreased cognitive linguistic skills  At this time, pt's cognition consistently fluctuating throughout day and from day to day, impacting pt's current progress towards goals   Pt presenting w/ deficits in attention, STM recall, problem solving/reasoning, sequencing skills, comprehension, and judgement  Pt also presenting w/ decreased activity tolerance and cognitive fatigue impacting overall independence  Pt benefits from verbal cueing, along w/ rephrasing and repetition to improve comprehension and recall  Regarding swallow abilities, pt continues to present w/ mild oropharyngeal dysphagia characterized by prolonged mastication, slow and incomplete a-p transfers, decreased bolus formation and delayed initiation of swallows  Pt is currently tolerating a level 3 diet w/ thin liquids  While pt has trialed regular diet items, pt has taken small amts and is demonstrating moderately prolonged oral prep skills, requiring verbal cues to complete oral clearance  Of note, pt's cognition noted to impact pt's current swallow function  Pt will benefit from skilled ST intervention at this time to maximize functional cognitive linguistic skills and to maximize current swallow abilities to achieve safest and least restrictive diet  Update 5/16/2018: Pt previously followed for dysphagia therapy, however, pt progressed to demonstrating functional oral and pharyngeal swallow skills and tolerating a regular diet w/ thin liquids  While further skilled ST services are no longer warranted for dysphagia therapy, pt continues to be followed for cognitive linguistic therapy  Pt remains w/ deficits in LTM and STM recall, attention, comprehension, sequencing and problem solving, impacting pt's ability to independently complete ADL tasks and overall safety  Pt's overall activity tolerance has improved but continues to present w/ deficits in functional cognitive linguistic skills, therefore, will benefit from further skilled ST services to address the above mentioned barriers and to maximize skills at this time  Nursing Notes:  Appetite: Good  Diet Type: Regular/House                      Diet Patient/Family Education Complete:  Yes Bladder: 1 - Total Assistance     Bladder Patient/Family Education: Yes  Bowel: 5 - Supervision     Bowel Patient/Family Education: Yes  Pain Location: Back  Pain Orientation: Bilateral  Pain Score: 0                       Hospital Pain Intervention(s): Medication (See MAR)  Pain Patient/Family Education: Yes  Medication Management/Safety  Safe Administration: Yes  Medication Patient/Family Education Complete: Yes    Pt is 79 yo female with ICH & bi-hemispheric nonhemorrhagic ischemic strokes s/p thrombectomy 4/23  Currently on Lopressor for HTN, and Eliquis for CVA and DVT  Blood sugar checks and diabetic medication discontinued  Is primarily [place] speaking  Incontinent of bladder but continent of bowel  Chronic constipation, only having BM 1-2x week  Currently no pain issues  5/8 Rapid response was called for change in mental status; stroke ruled out and pt remained on unit  5/7 &5/8 Pt received 1L  NSS  This week we will  Continue to monitor vitals and labs  We will check nuero and assess for issues  We will work on maintaining intergrity of skin and preventing skin breakdown  We will work with pt on bladder management  We will work on safety awareness and prevent falls  Case Management:     Discharge Planning  Goal Length of Stay: 9  Living Arrangements: Other (Comment)  Support Systems: Children  Assistance Needed: unknown  Type of Current Residence: Nursing home  [address] Name: will need to ask daughter  Current Home Care Services: No  Pt is participating with therapy but will need longer term rehabilitation  Family aware and in agreement  They provided choices for contd skilled services  Referral process to begin to secure a bed  Following to assist w/transition to a skilled facility  Is the patient actively participating in therapies?  yes  List any modifications to the treatment plan:     Barriers Interventions   Initiation to task, memory,  Cueing to start task, cueing for memory recall   Balance, ability to do transfers Therapy exercises   cognition Speech therapy exercises             Is the patient making expected progress toward goals? no  List any update or changes to goals:     Medical Goals: Patient will be medically stable for discharge to [facility] upon completion of rehab program and Patient will be able to manage medical conditions and comorbid conditions with medications and follow up upon completion of rehab program    Weekly Team Goals:   Rehab Team Goals  ADL Team Goal: Patient will require supervision with ADLs with least restrictive device upon completion of rehab program  Bowel/Bladder Team Goal: Patient will maximize level for bladder/bowel management and educate family/caregiver to decrease burden of care  Transfer Team Goal: Patient will require supervision with transfers with least restrictive device upon completion of rehab program  Locomotion Team Goal: Patient will require supervision with locomotion with least restrictive device upon completion of rehab program  Cognitive Team Goal: Patient will require assist for basic cognitive tasks upon completion of rehab program    Discussion: in attendance to review pts progress is rn pt ot slp cm and physician  Pt is participating with therapy but requires cueing to initiate task, cueing for memory recall and balance  Pt has made some gains but still cannot follow commands and when she fatigues it becomes worse  Plan is for pt to transition to snf for contd rehab       Anticipated Discharge Date:  tbd

## 2018-05-16 NOTE — PROGRESS NOTES
Internal Medicine Progress Note  Patient: Alejandrina Khanna  Age/sex: 80 y o  female  Medical Record #: [id number]      ASSESSMENT/PLAN:  Alejandrina Khanna is seen and examined and management for following issues:    1  Lt MCA CVA s/p mechanical thrombectomy: Continue Eliquis 2 5mg BID (keep dose same = d/w Dr Ofelia Briceno) and atorvastatin for secondary prevention  [facility] Neurology follow-up  Had a RR 5/10/18 for change in MS with being found lethargic = CTH no change, VSS; neuro saw and recommended no changes and signed off  HS Remeron stopped it to see if any change in daytime fatigue - so far variable results; consideration was given to starting Provigil but primary service feels will not be beneficial    2  HTN: stable; continue Lopressor 50mg every 12 hours  3   CHADWICK/CKD stage III; baseline Cr 1 5: On admit to hospital 4/18 had creat 2 0 and Torsemide was held/fluids given; On 5/7 up to 1 99 = stopped Torsemide/KCL and gave 1 liter NSS = 5/10/18 gave another liter since creat still 1 90 = on 5/9/18 was back down to 1 27 and now 1 18  Checked PVRs = not significant  4   Chronic systolic CHF/LVEF 43%; moderate TR; bio MVR = in [place] (normal function):  holding torsemide 10mg daily/Kdur 10 meq BID  Chest is clear/no edema  5  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): Eliquis 2 5mg BID 2/2 age and renal status (switched from Coumadin in the hospital so NOAC is new)  6   DM type 2: On linagliptan 2 5mg at home  It was planned for pt to change to Januvia 100mg daily as an OP per the daughter  Will start Januvia when needed but so far BS controlled w/o tx likely 2/2 fact she is not eating much = did stop Accuchecks for now and watching FBSs but if she starts eating better will resume Accuchecks  Continue DM diet  Fasting sugars have been acceptable = on 5/14 AM was 95     7   Acute blood loss anemia: s/p transfusion after EBL from thrombectomy of 500-1000ml from femoral site  Stable  8   PFO:  Small  Seen on LORI  45 [facility] Cardiology follow-up  9   History of colon CA: Colonic lesion seen on CT  Will need GI follow-up    10  Hx PPM    11  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:  On Eliquis currently = platelets 80 yesterday AM with previous normal;  repeat to see if lab error was essentially the same = H-O saw; she has not gotten any Heparin products but ordered HIT by EFRAÍN to confirm and watching for now; will get CBC in AM    12  Poor appetite: had not been eating very well so Remeron was added  Also, question of depression  Remeron 7 5mg qhs added 5/7/18 by primary service and was stopped 5/9/18 2/2 too sleepy  She was on Remeron 15mg at home      Subjective: Patient seen and examined  Offers no complaints      ROS:   GI: denies abdominal pain, change bowel habits or reflux symptoms  Neuro: No new neurologic changes  Respiratory: No Cough, SOB  Cardiovascular: No CP, palpitations     Scheduled Meds:    Current Facility-Administered Medications:  acetaminophen 650 mg Oral Q6H PRN Veronica Langston MD   apixaban 2 5 mg Oral BID Veronica Langston MD   atorvastatin 80 mg Oral Daily With Leann Fuentes MD   bisacodyl 10 mg Rectal Daily PRN Veronica Langston MD   docusate sodium 100 mg Oral BID Veronica Langston MD   ferrous sulfate 300 mg Oral Daily GAYATRI Haney   melatonin 6 mg Oral HS Veronica Langston MD   metoprolol tartrate 50 mg Oral Q12H [facility] Veronica Langston MD   pantoprazole 40 mg Oral Early Morning Veronica Langston MD   polyethylene glycol 17 g Oral Daily PRN Veronica Langston MD   senna 1 tablet Oral HS Veronica Langston MD       Labs:       Results from last 7 days  Lab Units 05/15/18  1008 05/14/18  1258   WBC Thousand/uL 4 78 5 25   HEMOGLOBIN g/dL 9 8* 10 0*   HEMATOCRIT % 31 6* 32 5*   PLATELETS Thousands/uL 74* 87*       Results from last 7 days  Lab Units 05/14/18  0546   SODIUM mmol/L 140   POTASSIUM mmol/L 4 2   CHLORIDE mmol/L 108   CO2 mmol/L 26   BUN mg/dL 28*   CREATININE mg/dL 1 18   GLUCOSE RANDOM mg/dL 95   CALCIUM mg/dL 9 5                  Glucose (mg/dL)   Date Value   05/14/2018 95   05/09/2018 113   05/08/2018 96   05/08/2018 115   12/23/2015 114 (H)   02/22/2015 101   02/21/2015 103   02/20/2015 116     Glucose, i-STAT (mg/dl)   Date Value   05/08/2018 103   04/23/2018 106   04/23/2018 96       Labs reviewed    Physical Examination:  Vitals:   Vitals:    05/15/18 2014 05/16/18 0501 05/16/18 0643 05/16/18 0822   BP: 123/54 140/62  132/56   BP Location: Right arm Right arm  Right leg   Pulse: 78 69  78   Resp: 18 18  18   Temp: 98 6 °F (37 °C) 98 5 °F (36 9 °C)     TempSrc: Oral Oral     SpO2: 97% 98%  97%   Weight:   67 6 kg (149 lb 0 5 oz)    Height:           GEN: NAD  HEENT: NC/AT  RESP: BBS w/o crackles/wheeze/rhonci; resp unlabored  CV: +S1 S2, regular rate, no rubs/murmurs  ABD: soft, NT, ND, normal BS   : no schaefer  EXT: no edema  Skin: no rashes  Neuro: Awake and alert today; WOODARD 5/5; has inability to consistently follow commands as always and makes testing somewhat difficult; face symmetric; speech clear  [ X ] Total time spent: 30 Mins and greater than 50% of this time was spent counseling/coordinating care        [facility], [address]  Internal Medicine

## 2018-05-16 NOTE — PROGRESS NOTES
Physical Therapy Progress Note   05/16/18 0368   Pain Assessment   Pain Assessment No/denies pain   Pain Score No Pain   Restrictions/Precautions   Precautions Bed/chair alarms;Cognitive; Fall Risk   Weight Bearing Restrictions No   ROM Restrictions No   Cognition   Overall Cognitive Status Impaired   Arousal/Participation Cooperative; Alert   Attention Difficulty attending to directions   Orientation Level Oriented to person;Oriented to place   Memory Decreased short term memory;Decreased recall of recent events;Decreased recall of precautions   Following Commands Follows one step commands inconsistently   Subjective   Subjective denies pain; no c/o   QI: Sit to Stand   Assistance Needed Supervision   Assistance Provided by [place] No physical assistance   Sit to Stand CARE Score 4   Bed Mobility   Able to Roll Left to Right;Right to Left;Scoot Up   Findings NT   QI: Chair/Bed-to-Chair Transfer   Assistance Needed Supervision; Incidental touching   Assistance Provided by [place] No physical assistance   Comment A   Chair/Bed-to-Chair Transfer CARE Score 4   Transfer Bed/Chair/Wheelchair   Limitations Noted In Balance; Coordination   Adaptive Equipment Hand Hold   Stand Pivot Contact Guard   Sit to Stand Contact Guard   Stand to [employer] Guard   Findings cgA with HHA for xfers;    Bed, Chair, Wheelchair Transfer (FIM) 4 - Patient requires steadying assist or light touching   QI: Car Transfer   Assistance Needed Supervision; Incidental touching   Assistance Provided by [place] No physical assistance   Car Transfer CARE Score 4   QI: Walk 10 Feet   Assistance Needed Supervision; Incidental touching   Assistance Provided by [place] No physical assistance   Comment A   Walk 10 Feet CARE Score 4   QI: Walk 50 Feet with Two Turns   Assistance Needed Supervision; Incidental touching   Assistance Provided by [place] No physical assistance   Comment A   Walk 50 Feet with Two Turns CARE Score 4   QI: Walk 150 Feet   Assistance Needed Supervision; Incidental touching   Assistance Provided by [place] No physical assistance   Comment HHA   Walk 150 Feet CARE Score 4   QI: Walking 10 Feet on Uneven Surfaces   Assistance Needed Supervision; Incidental touching   Assistance Provided by [place] No physical assistance   Comment HHA   Walking 10 Feet on Uneven Surfaces CARE Score 4   Ambulation   Does the patient walk? 2  Yes   Primary Discharge Mode of Locomotion Walk   Walk Assist Level Contact Guard   Gait Pattern Inconsistant Cathryn   Assist Device Hand Hold   Distance Walked (feet) 150 ft   Limitations Noted In Balance; Coordination   Walking (FIM) 4 - Patient requires steadying assist or light touching AND distance 150 feet or more, no rest   QI: Wheel 50 Feet with Two Turns   Reason if not Attempted Activity not applicable   Wheel 50 Feet with Two Turns CARE Score 9   QI: Wheel 150 Feet   Reason if not Attempted Activity not applicable   Wheel 282 Feet CARE Score 9   Wheelchair mobility   QI: Does the patient use a wheelchair? 0  No   Findings NT   Wheelchair (FIM) 0 - Activity does not occur   QI: 1 Step (Curb)   Assistance Needed Physical assistance   Assistance Provided by [place] Less than 25%   Comment HHA   1 Step (Curb) CARE Score 3   QI: 4 Steps   Assistance Needed Supervision; Incidental touching   Assistance Provided by [place] No physical assistance   Comment R HR   4 Steps CARE Score 4   QI: 12 Steps   Assistance Needed Supervision; Incidental touching   Assistance Provided by [place] No physical assistance   Comment R HR   12 Steps CARE Score 4   Stairs   Type Stairs;Curb;Ramp   # of Steps 12   Weight Bearing Precautions Fall Risk   Assist Devices Single Rail;Hand Hold   Findings HHA for curb/ramp; R HR for stairs x12   Stairs (FIM) 4 - Patient requires steadying assist or light touching AND patient goes up and down full flight (12- 14 stairs)   QI: Toilet Transfer   Assistance Needed Supervision; Incidental touching   Assistance Provided by [place] No physical assistance   Toilet Transfer CARE Score 4   Toilet Transfer   Surface Assessed Bedside Commode   Toilet Transfer (FIM) 4 - Patient requires steadying assist or light touching   Therapeutic Interventions   Strengthening standing TE (hip flex/ext/abd/add, mini squats, calf raises, HS curls)    Flexibility manual stretch B HS and gastroc;    Equipment Use   NuStep x12 minutes, level 2   Assessment   Treatment Assessment Pt seated in w/c prior to session; able to STS/SPT with S; amb with HHA/cgA x350 (x3) and also able to navigate up/down curb/ramp and stairs x12 with S/cgA (as above); instructed in standing TE (as above) and NuStep x12 minutes, level 2; recommend cont PT POC;    Problem List Decreased strength;Decreased endurance; Impaired balance;Decreased mobility; Decreased coordination;Decreased cognition;Decreased safety awareness   Barriers to Discharge Inaccessible home environment;Decreased caregiver support   PT Barriers   Physical Impairment Decreased strength;Decreased endurance;Decreased mobility; Impaired balance;Decreased coordination;Decreased cognition;Decreased safety awareness   Functional Limitation Walking;Standing;Stair negotiation   Plan   Treatment/Interventions ADL retraining;Functional transfer training;LE strengthening/ROM; Elevations; Therapeutic exercise; Endurance training;Cognitive reorientation;Patient/family training;Equipment eval/education; Bed mobility;Gait training   Progress Progressing toward goals   Recommendation   Recommendation Short-term skilled PT   PT Therapy Minutes   PT Time In 0930   PT Time Out 1030   PT Total Time (minutes) 60   PT Mode of treatment - Individual (minutes) 60   PT Mode of treatment - Concurrent (minutes) 0   PT Mode of treatment - Group (minutes) 0   PT Mode of treatment - Co-treat (minutes) 0   PT Mode of Teatment - Total time(minutes) 60 minutes   Therapy Time missed   Time missed?  No     Chace Izaguirre PTA

## 2018-05-17 LAB
ANION GAP SERPL CALCULATED.3IONS-SCNC: 5 MMOL/L (ref 4–13)
BASOPHILS # BLD AUTO: 0.02 THOUSANDS/ΜL (ref 0–0.1)
BASOPHILS NFR BLD AUTO: 0 % (ref 0–1)
BUN SERPL-MCNC: 24 MG/DL (ref 5–25)
CALCIUM SERPL-MCNC: 9.6 MG/DL (ref 8.3–10.1)
CHLORIDE SERPL-SCNC: 106 MMOL/L (ref 100–108)
CO2 SERPL-SCNC: 28 MMOL/L (ref 21–32)
CREAT SERPL-MCNC: 1.19 MG/DL (ref 0.6–1.3)
EOSINOPHIL # BLD AUTO: 0.94 THOUSAND/ΜL (ref 0–0.61)
EOSINOPHIL NFR BLD AUTO: 20 % (ref 0–6)
ERYTHROCYTE [DISTWIDTH] IN BLOOD BY AUTOMATED COUNT: 14.3 % (ref 11.6–15.1)
GFR SERPL CREATININE-BSD FRML MDRD: 42 ML/MIN/1.73SQ M
GLUCOSE SERPL-MCNC: 92 MG/DL (ref 65–140)
HCT VFR BLD AUTO: 29.7 % (ref 34.8–46.1)
HGB BLD-MCNC: 9.2 G/DL (ref 11.5–15.4)
IMM GRANULOCYTES # BLD AUTO: 0 THOUSAND/UL (ref 0–0.2)
IMM GRANULOCYTES NFR BLD AUTO: 0 % (ref 0–2)
LYMPHOCYTES # BLD AUTO: 1.32 THOUSANDS/ΜL (ref 0.6–4.47)
LYMPHOCYTES NFR BLD AUTO: 29 % (ref 14–44)
MCH RBC QN AUTO: 28.4 PG (ref 26.8–34.3)
MCHC RBC AUTO-ENTMCNC: 31 G/DL (ref 31.4–37.4)
MCV RBC AUTO: 92 FL (ref 82–98)
MONOCYTES # BLD AUTO: 0.57 THOUSAND/ΜL (ref 0.17–1.22)
MONOCYTES NFR BLD AUTO: 12 % (ref 4–12)
NEUTROPHILS # BLD AUTO: 1.75 THOUSANDS/ΜL (ref 1.85–7.62)
NEUTS SEG NFR BLD AUTO: 38 % (ref 43–75)
NRBC BLD AUTO-RTO: 0 /100 WBCS
PLATELET # BLD AUTO: 71 THOUSANDS/UL (ref 149–390)
PMV BLD AUTO: 11.1 FL (ref 8.9–12.7)
POTASSIUM SERPL-SCNC: 4.2 MMOL/L (ref 3.5–5.3)
RBC # BLD AUTO: 3.24 MILLION/UL (ref 3.81–5.12)
SODIUM SERPL-SCNC: 139 MMOL/L (ref 136–145)
WBC # BLD AUTO: 4.6 THOUSAND/UL (ref 4.31–10.16)

## 2018-05-17 PROCEDURE — 97530 THERAPEUTIC ACTIVITIES: CPT

## 2018-05-17 PROCEDURE — 80048 BASIC METABOLIC PNL TOTAL CA: CPT | Performed by: NURSE PRACTITIONER

## 2018-05-17 PROCEDURE — 97116 GAIT TRAINING THERAPY: CPT

## 2018-05-17 PROCEDURE — 85025 COMPLETE CBC W/AUTO DIFF WBC: CPT | Performed by: NURSE PRACTITIONER

## 2018-05-17 PROCEDURE — 97535 SELF CARE MNGMENT TRAINING: CPT

## 2018-05-17 PROCEDURE — 97112 NEUROMUSCULAR REEDUCATION: CPT

## 2018-05-17 PROCEDURE — 99232 SBSQ HOSP IP/OBS MODERATE 35: CPT | Performed by: PHYSICAL MEDICINE & REHABILITATION

## 2018-05-17 PROCEDURE — G0515 COGNITIVE SKILLS DEVELOPMENT: HCPCS

## 2018-05-17 PROCEDURE — 97110 THERAPEUTIC EXERCISES: CPT

## 2018-05-17 RX ADMIN — METOPROLOL TARTRATE 50 MG: 50 TABLET ORAL at 21:09

## 2018-05-17 RX ADMIN — MINERAL SUPPLEMENT IRON 300 MG / 5 ML STRENGTH LIQUID 100 PER BOX UNFLAVORED 300 MG: at 09:41

## 2018-05-17 RX ADMIN — APIXABAN 2.5 MG: 2.5 TABLET, FILM COATED ORAL at 17:17

## 2018-05-17 RX ADMIN — APIXABAN 2.5 MG: 2.5 TABLET, FILM COATED ORAL at 09:37

## 2018-05-17 RX ADMIN — DOCUSATE SODIUM 100 MG: 100 CAPSULE, LIQUID FILLED ORAL at 17:17

## 2018-05-17 RX ADMIN — SENNOSIDES 8.6 MG: 8.6 TABLET, FILM COATED ORAL at 21:09

## 2018-05-17 RX ADMIN — PANTOPRAZOLE SODIUM 40 MG: 40 TABLET, DELAYED RELEASE ORAL at 06:18

## 2018-05-17 RX ADMIN — DOCUSATE SODIUM 100 MG: 100 CAPSULE, LIQUID FILLED ORAL at 09:40

## 2018-05-17 RX ADMIN — MELATONIN TAB 3 MG 6 MG: 3 TAB at 21:09

## 2018-05-17 RX ADMIN — ATORVASTATIN CALCIUM 80 MG: 40 TABLET, FILM COATED ORAL at 17:17

## 2018-05-17 NOTE — PROGRESS NOTES
Internal Medicine Progress Note  Patient: Cheri Temple  Age/sex: 80 y o  female  Medical Record #: [id number]      ASSESSMENT/PLAN:  Cheri Temple is seen and examined and management for following issues:    1  Lt MCA CVA s/p mechanical thrombectomy: Continue Eliquis 2 5mg BID (keep dose same = d/w Dr Kecia Arreguin) and atorvastatin for secondary prevention  45 [facility] Neurology follow-up  Had a RR 5/10/18 for change in MS with being found lethargic = CTH no change, VSS; neuro saw and recommended no changes and signed off  HS Remeron stopped it to see if any change in daytime fatigue - so far variable results; consideration was given to starting Provigil but primary service feels will not be beneficial    2  HTN: stable; continue Lopressor 50mg every 12 hours  3   CHADWICK/CKD stage III; previous baseline Cr 1 5: On admit to hospital 4/18 had creat 2 0 and Torsemide was held/fluids given; On 5/7 up to 1 99 = stopped Torsemide/KCL and gave 1 liter NSS = 5/10/18 gave another liter since creat still 1 90 = on 5/9/18 was back down to 1 27 and now 1 19  Checked PVRs = not significant  4   Chronic systolic CHF/LVEF 28%; moderate TR; bio MVR = in [place] (normal function):  holding torsemide 10mg daily/Kdur 10 meq BID  Chest is clear/no edema  Not sure she really needs to restart this (put on it post-op?) = will watch but should remain on mild salt restriction as is now    5  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): Eliquis 2 5mg BID 2/2 age and renal status (switched from Coumadin in the hospital so NOAC is new)  6   DM type 2: On linagliptan 2 5mg at home  It was planned for pt to change to Januvia 100mg daily as an OP per the daughter  Will start Januvia when needed but so far BS controlled w/o tx likely 2/2 fact she is not eating much = did stop Accuchecks for now and watching FBSs but if she starts eating better will resume Accuchecks  Continue DM diet   Fasting sugars have been acceptable = on 5/14 AM was 92     7   Acute blood loss anemia: s/p transfusion after EBL from thrombectomy of 500-1000ml from femoral site  Stable  8   PFO:  Small  Seen on LORI  45 [facility] Cardiology follow-up  9   History of colon CA: Colonic lesion seen on CT  Will need GI follow-up    10  Hx PPM    11  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:  On Eliquis currently = platelets 80 yesterday AM with previous normal; repeat to see if lab error and was essentially the same = H-O saw; she has not gotten any Heparin products but ordered HIT by EFRAÍN to confirm and it was negative; this AM platelet count is 71 so has not rebounded yet as she has had in the past    12  Poor appetite: had not been eating very well so Remeron was added  Also, question of depression  Remeron 7 5mg qhs added 5/7/18 by primary service and was stopped 5/9/18 2/2 too sleepy  She was on Remeron 15mg at home  At this point her meal completion is widely variable from 25 to 100%    13  C/O peripheral neuropathy:  Has at home as well; burning,"pins/needles" bottoms of feet; will d/w Dr Meza Prudent: Patient seen and examined  Offers no complaints except neuropathy of feet      ROS:   GI: denies abdominal pain, change bowel habits or reflux symptoms  Neuro: No new neurologic changes  Respiratory: No Cough, SOB  Cardiovascular: No CP, palpitations     Scheduled Meds:    Current Facility-Administered Medications:  acetaminophen 650 mg Oral Q6H PRN Thong Trevizo MD   apixaban 2 5 mg Oral BID Thong Trevizo MD   atorvastatin 80 mg Oral Daily With Jeffrey Huntley MD   bisacodyl 10 mg Rectal Daily PRN Thong Trevizo MD   docusate sodium 100 mg Oral BID Thong Trevizo MD   ferrous sulfate 300 mg Oral Daily GAYATRI Molina   melatonin 6 mg Oral HS Thong Trevizo MD   metoprolol tartrate 50 mg Oral Q12H [address] Thong Trevizo MD   pantoprazole 40 mg Oral Early Morning Thong Trevizo MD   polyethylene glycol 17 g Oral Daily PRN Thong Trevizo MD   senna 1 tablet Oral HS Rebeca Clinton MD       Labs:       Results from last 7 days  Lab Units 05/17/18  0458 05/15/18  1008   WBC Thousand/uL 4 60 4 78   HEMOGLOBIN g/dL 9 2* 9 8*   HEMATOCRIT % 29 7* 31 6*   PLATELETS Thousands/uL 71* 74*       Results from last 7 days  Lab Units 05/17/18  0458 05/14/18  0546   SODIUM mmol/L 139 140   POTASSIUM mmol/L 4 2 4 2   CHLORIDE mmol/L 106 108   CO2 mmol/L 28 26   BUN mg/dL 24 28*   CREATININE mg/dL 1 19 1 18   GLUCOSE RANDOM mg/dL 92 95   CALCIUM mg/dL 9 6 9 5                  Glucose (mg/dL)   Date Value   05/17/2018 92   05/14/2018 95   05/09/2018 113   05/08/2018 96   12/23/2015 114 (H)   02/22/2015 101   02/21/2015 103   02/20/2015 116     Glucose, i-STAT (mg/dl)   Date Value   05/08/2018 103   04/23/2018 106   04/23/2018 96       Labs reviewed    Physical Examination:  Vitals:   Vitals:    05/16/18 0900 05/16/18 1326 05/16/18 2019 05/17/18 0453   BP:  135/67 149/81 130/85   BP Location:  Right arm Right arm Right arm   Pulse:  72 70 68   Resp:  18 18 18   Temp:  (!) 97 3 °F (36 3 °C) 97 5 °F (36 4 °C) 98 4 °F (36 9 °C)   TempSrc:  Oral Oral Oral   SpO2: 97% 99% 97% 98%   Weight:    68 4 kg (150 lb 12 7 oz)   Height:           GEN: NAD  HEENT: NC/AT  RESP: BBS w/o crackles/wheeze/rhonci; resp unlabored  CV: +S1 S2, regular rate, no rubs/murmurs  ABD: soft, NT, ND, normal BS   : no schaefer  EXT: no edema  Skin: no rashes  Neuro: Awake and alert today; WOODARD 5/5; has inability to consistently follow commands as always and makes testing somewhat difficult; face symmetric; speech clear  [ X ] Total time spent: 30 Mins and greater than 50% of this time was spent counseling/coordinating care        Shai Morrell, Porter Patel   Internal Medicine

## 2018-05-17 NOTE — PROGRESS NOTES
05/17/18 1230   Pain Assessment   Pain Assessment No/denies pain   Restrictions/Precautions   Precautions Bed/chair alarms; Fall Risk;Cognitive   Grooming   Able To Wash/Dry Hands   Limitation Noted In Safety;Strength   Grooming (FIM) 5 - Patient requires supervision/monitoring   QI: Sit to Lying   Assistance Needed Physical assistance   Assistance Provided by [place] 25%-49%   Comment Pt requires assist to reposition LLE in bed  Sit to Lying CARE Score 3   QI: Sit to Stand   Assistance Needed Incidental touching   Assistance Provided by [place] Less than 25%   Sit to Stand CARE Score 3   QI: Chair/Bed-to-Chair Transfer   Assistance Needed Physical assistance   Assistance Provided by [place] Less than 25%   Comment HHA for stand pivot transfer wtih no device  Chair/Bed-to-Chair Transfer CARE Score 3   Transfer Bed/Chair/Wheelchair   Stand Pivot Minimal Assist   Sit to Stand Minimal   Stand to Sit Minimal   Sit to Supine Minimal   Bed, Chair, Wheelchair Transfer (FIM) 4 - [place] lifts one extremity during transfer   QI: [address] Needed Physical assistance   Assistance Provided by [place] 50%-74%   Comment pt requires assist to full pull down pants over hips and requires assist to pull pants and undewera up over L  Toileting Hygiene CARE Score 2   Toileting   Able to Pull Clothing down no, up no  Able to Manage Clothing Closures No   Manage Hygiene Bladder   Toileting (FIM) 2 - Patient completes 25-49% of all tasks   QI: Toilet Transfer   Assistance Needed Physical assistance   Assistance Provided by [place] Less than 25%   Comment stand pivot transfer using grab bar to raised toilet seat  MAX verbal cueing for turning appropriate direction and for initating sitting on toielt   Toilet Transfer CARE Score 3   Toilet Transfer   Surface Assessed Raised Toilet   Transfer Technique Stand Pivot   Limitations Noted In Balance;Problem Solving; Safety;LE Strength; Sequencing   Adaptive Equipment Grab Bar   Toilet Transfer (FIM) 3 - [place] needs to lift, boost or assist to stand OR sit   Coordination   Gross Motor Pt completed [address] [facility] for bimanual coordination tasks  Pt benefits from max repetitiation of task to improve coordination  Pt with intention grasp and release and eye hand coordination to roll golf ball back/forth alternating between son and g-son  Task made more difficulty incorporating second ball  to track both objects simultaneously  Pt with delayed response on L side with decreased eye hand coordination, missing ball approxmiately 50% of time  Pt then attempted to grasp two different sized balls at once with difficulty grading grasp simultaneously  Pt with difficulty catching ball under cup using L hand  Pt with difficulty releasing ball from under cup and maintain grasp on cup at same time  Pt became extremely fatigued at end of session due to increased cognition  Cognition   Overall Cognitive Status Impaired   Arousal/Participation Alert; Cooperative   Attention Difficulty dividing attention   Orientation Level Oriented to person;Oriented to place;Oriented to time;Oriented to situation   Memory Decreased long term memory;Decreased short term memory   Following Commands Follows one step commands with increased time or repetition   Assessment   Treatment Assessment Pt engaged in OT treatment task with focus on bimanual tasks, fxnl cognition  Pt engaged in community task at w/c level  Pt easily fatigues with w/c propulsion using b/l hands and feet to promote increased coordination  Pt with cueing to attend to LUE while propelling w/c  Pt required assist to locate proper button on elevator  Pt becoming fatigued with decreased attention ot task  Pt returned to bed at end of session and required assist to reposition  Continue with POC wtih focus on balance, UE coordination  Prognosis Fair   Problem List Decreased strength; Impaired balance;Decreased mobility; Decreased endurance   Plan Treatment/Interventions ADL retraining;Functional transfer training;LE strengthening/ROM; Therapeutic exercise; Endurance training;Patient/family training; Compensatory technique education   Progress Progressing toward goals   OT Therapy Minutes   OT Time In 1230   OT Time Out 1400   OT Total Time (minutes) 90   OT Mode of treatment - Individual (minutes) 90   OT Mode of treatment - Concurrent (minutes) 0   OT Mode of treatment - Group (minutes) 0   OT Mode of treatment - Co-treat (minutes) 0   OT Mode of Teatment - Total time(minutes) 90 minutes   Therapy Time missed   Time missed?  No

## 2018-05-17 NOTE — SOCIAL WORK
Phone call placed to pts daughter darnell, reviewed team update and informed of need to pursue contd rehab at skilled facility  Cm confirmed choices of [facility], [facility] and [facility]  darnell confirmed and is aware pt will dc when bed is available  Referral info sent via ecin, awaiting determination

## 2018-05-17 NOTE — PROGRESS NOTES
05/17/18 0800   Pain Assessment   Pain Assessment No/denies pain   Restrictions/Precautions   Precautions Bed/chair alarms;Cognitive; Fall Risk   Cognition   Overall Cognitive Status Impaired   Arousal/Participation Cooperative   Attention Difficulty attending to directions   Orientation Level Oriented to person   Memory Decreased short term memory;Decreased recall of recent events;Decreased recall of precautions   Following Commands Follows one step commands inconsistently   Comments confused this morning stating she is at home and unable to say why and where she is   Subjective   Subjective pt finishing up breakfast start of session   QI: Roll Left and Right   Assistance Needed Supervision   Roll Left and Right CARE Score 4   QI: Lying to Sitting on Side of Bed   Assistance Needed Supervision   Comment use of bed rail; bed slightly elevated   Lying to Sitting on Side of Bed CARE Score 4   QI: Sit to Stand   Assistance Needed Incidental touching   Comment from edge of bed   Sit to Stand CARE Score 4   Bed Mobility   Findings Sup sup-sit   QI: Chair/Bed-to-Chair Transfer   Assistance Needed Physical assistance   Assistance Provided by [place] Less than 25%   Comment [facility]   Chair/Bed-to-Chair Transfer CARE Score 3   Transfer Bed/Chair/Wheelchair   Limitations Noted In Balance; Coordination   Adaptive Equipment Hand Hold   Stand Pivot Contact Guard;Minimal Assist   Sit to Stand Contact Guard   Stand to Sit Contact Guard   Supine to Sit Supervision   Bed, Chair, Wheelchair Transfer (FIM) 4 - Patient requires steadying assist or light touching   QI: Car Transfer   Reason if not Attempted Activity not applicable   Car Transfer CARE Score 9   QI: Walk 10 Feet   Assistance Needed Physical assistance   Assistance Provided by [place] Less than 25%   Comment [facility]   Walk 10 Feet CARE Score 3   QI: Walk 50 Feet with Two Turns   Assistance Needed Physical assistance   Assistance Provided by [place] Less than 25%   Comment [facility]   Walk 50 Feet with Two Turns CARE Score 3   QI: Walk 150 Feet   Assistance Needed Physical assistance   Assistance Provided by [place] Less than 25%   Comment [facility]   Walk 150 Feet CARE Score 3   QI: Walking 10 Feet on Uneven Surfaces   Reason if not Attempted Activity not applicable   Walking 10 Feet on Uneven Surfaces CARE Score 9   Ambulation   Does the patient walk? 2  Yes   Primary Discharge Mode of Locomotion Walk   Walk Assist Level Minimum Assist   Gait Pattern Inconsistant Cathryn;Decreased foot clearance;R foot drag;Decreased R stance; Improper weight shift   Assist Device Hand Hold   Distance Walked (feet) 150 ft  (x2; 350x1)   Limitations Noted In Balance; Coordination; Safety;Speed;Strength;Swing   Findings had pt scanning for cones L and R to improve L side inattention  pt Nidia due to R foot drag and having decrease balance and stability  pt unable to follow cues to improve step and heel strike   Walking (FIM) 3 - Patient completes 50 - 74% of all tasks, needs more than steadying or light touch AND distance 150 feet or more, no rest   QI: Wheel 50 Feet with Two Turns   Reason if not Attempted Activity not applicable   Wheel 50 Feet with Two Turns CARE Score 9   QI: Wheel 150 Feet   Reason if not Attempted Activity not applicable   Wheel 396 Feet CARE Score 9   Wheelchair mobility   QI: Does the patient use a wheelchair? 0   No   QI: 1 Step (Curb)   Reason if not Attempted Activity not applicable   1 Step (Curb) CARE Score 9   QI: 4 Steps   Reason if not Attempted Activity not applicable   4 Steps CARE Score 9   QI: 12 Steps   Reason if not Attempted Activity not applicable   12 Steps CARE Score 9   Stairs   Findings did not assess this session   QI: Picking Up Object   Assistance Needed Incidental touching   Assistance Provided by [place] No physical assistance   Comment CGA for safety   Picking Up Object CARE Score 4   QI: Toilet Transfer   Assistance Needed Incidental touching   Comment [facility]   Toilet Transfer CARE Score 4   Toilet Transfer   Surface Assessed Bedside Commode   Limitations Noted In Balance; Safety   Positioning Concerns Safety   Findings CGA for safety and to maintain balance   Therapeutic Interventions   Flexibility B/L passive stretching gastroc and HS   Other pt practiced moving cones from L side to R of cabinet to help with scanning    Equipment Use   NuStep [id number]   Other Comments   Comments beginning of session focused on sitting balance, functional txs and standing tolerance while performing functional tasks  Assessment   Treatment Assessment pt cont to remain a fall risk due to L side inattention, difficulty following directions for tasks and decrease foot clearance with gait training  pt unable to follow VCs to safely amb and perform tasks on own at this time  pt to cont focus on balance, safety and activity tolerance to progress with functional mobility and decrease fall risk  Problem List Decreased strength;Decreased endurance; Impaired balance;Decreased mobility; Decreased coordination;Decreased cognition;Decreased safety awareness   Barriers to Discharge Inaccessible home environment;Decreased caregiver support   PT Barriers   Physical Impairment Decreased strength;Decreased endurance; Impaired balance;Decreased mobility; Decreased coordination;Decreased cognition;Decreased safety awareness   Functional Limitation Walking;Standing;Stair negotiation   Plan   Treatment/Interventions Functional transfer training;LE strengthening/ROM; Endurance training;Bed mobility;Gait training   Progress Progressing toward goals   Recommendation   Recommendation Short-term skilled PT   PT Therapy Minutes   PT Time In 0800   PT Time Out 0930   PT Total Time (minutes) 90   PT Mode of treatment - Individual (minutes) 90   PT Mode of treatment - Concurrent (minutes) 0   PT Mode of treatment - Group (minutes) 0   PT Mode of treatment - Co-treat (minutes) 0   PT Mode of Teatment - Total time(minutes) 90 minutes Therapy Time missed   Time missed?  No

## 2018-05-17 NOTE — PROGRESS NOTES
Progress Note - Marbella Li 80 y o  female MRN: [id number]    Unit/Bed#: -02 Encounter: [id number]            Subjective:   D/W patient potential dc dispo to SNF     Objective:     ROS  Gen: denies recent wt loss   Psych: denies mood change    Vitals:    05/17/18 1400   BP: 110/55   Pulse: 80   Resp: 18   Temp: 98 5 °F (36 9 °C)   SpO2: 99%       Physical Exam:     Gen:        NAD   Neck:   trachea midline  Lungs:  respirations unlabored   Heart:    + S1 and S2   Abdomen:    Soft, non-tender  Psych: mood/affect appropriate  Neurologic: awake, alert    Functional :  Mobility: min   Tx: CG-min  ADLs: mod        Current Facility-Administered Medications:  acetaminophen 650 mg Oral Q6H PRN Heidy Trujillo MD   apixaban 2 5 mg Oral BID Heidy Trujillo MD   atorvastatin 80 mg Oral Daily With Gerhardt Rolling, MD   bisacodyl 10 mg Rectal Daily PRN Heidy Trujillo MD   docusate sodium 100 mg Oral BID Heidy Trujillo MD   ferrous sulfate 300 mg Oral Daily GAYATRI Can   melatonin 6 mg Oral HS Heidy Trujillo MD   metoprolol tartrate 50 mg Oral Q12H [address] Heidy Trujillo MD   pantoprazole 40 mg Oral Early Morning Heidy CountMD imtiaz   polyethylene glycol 17 g Oral Daily PRN Heidy CountMD imtiaz   senna 1 tablet Oral HS Heidy Trujillo MD         acetaminophen    bisacodyl    polyethylene glycol      Assessment:  Pt is 81 yo female with ICH & bi-hemispheric nonhemorrhagic ischemic strokes s/p thrombectomy by Dr Horacio Poole on 4/23    Plan:    Rehabilitation: cont PT/OT for ambulatory/ADL dysfxn    ICH & bi-hemispheric nonhemorrhagic ischemic strokes: nonhemorrhagic stroke felt by neurology to be embolic of unclear etiology; per s/p thrombectomy by Dr Horacio Poole on 4/23, cleared by neurosx for [facility] and per neuro recs started on eliquis 2 5 mg BID (not 5 mg due to age and serum Cr greater than 1 5, tends to fluctuate around this value); home lipitor increased from 40 to 80 mg     AMS (5/8): seen by neuro and recommended CTH  (5/8) which showed no acute findings and patient back to baseline mental status therefore per neuro recs no MRI at this time      Peripheral neuropathy: likely 2/2 to DM, had tried neurontin in the past but did not tolerate      DM: on januvia 100 mg qd at home (recently switched from tradjenta 5 mg qd); currently on ISS     CHF (EF 45%): home torsemide 10 mg qd (and home potassium 10 MEQ BID due to hypokalemia 2/2 to torsemide) both being held due to acute on chronic renal failure likely 2/2 to poor PO; additionally  home toprol XL 50 mg qd switched to lopressor 50 mg q12 in acute care     SSS: s/p pacer, interrogated recently in acute care, no A-fib found at that time (although noted on EKG of 4/23 however pt already on [facility] for CVA and on BB), follows with Dr Yesenia Alicia (cards) and Dr Bailee Bull (EP)      h/o colon CA: on CT CAP of 4/26 colonic thickening suspicious for lesion noted and colonoscopy recommended, pt undergoes screening colonoscopys with Dr Svetlana Cerna, pt to FU with them for FU colonoscopy       Meningioma: seen by neurosx no intervention planned, follows with Dr Noemy Castaneda     PFO (3 mm): unclear if paradoxical stroke from DVT is etiology of CVA (felt to be embolic by neuro) however as neuro has placed pt on [facility] for CVA LE dopplers would not ; t/c closure as OP       s/p MVR: tissue valve     HL: home lipitor increased from 40 mg to 80 mg due to CVA       h/o DVT: home coumadin switched to eliquis for CVA      Insomnia: at home on remeron 15 mg HS on hold due to episodes of AMS (however low suspicion this is cause of AMS)      non-occlusive carotid dz: per carotid U/S report recommend repeat carotid U/S in 1 year; on [facility] and statin     chronic constipation: per family at baseline 1 BM every 4 to 5 days, IM monitoring and will treat with laxatives as needed      rt ankle pain/swelling: likely sprain; XR showed no acute osseous abnormality     CHADWICK on CKD: baseline 1 4-1 7, Cr currently 1 19, however torsemide still on hold for now per IM      chronic anemia: AOCD/CKD, at home on iron 150 mg qd, cont iron supplementation as inpt; Hg currently 9 2, t/c transfusion for Hg<8 0    Thrombocytopenia: currently 71, heme consulted and they Herpain Induced Platelet Ab test which was WNL; heme feels this is a chronic issue and only monitoring is required at this time     Hyperphosphatemia per lab reference range at 4 4: however in females 18 year or older 4 5 is ULN      Dispo: SNF    Incidental findings on CT C/A/P of 4/26:  1) multiple pulmonary micronodules: per CT report recommended FU imaging in 3 months for determination of stability  2) pleural thickening  3) uterine fibroid  Other incidental findings:  4) B/L renal cysts  5) 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 no intervention planned, follows with Dr Mary Chavez  6) EKG abnormalities in the setting of atrial paced rhythm (non-specific intraventricular conduction block/wide QRS, LAD): follows with Dr Delmer Barros (cards) and Dr Edgard Duke (EP)  7) mod-severe TR/elevated peak PA pressure:  follows with Dr Delmer Barros (cards) and Dr Edgard Duke (EP)

## 2018-05-18 LAB
BASOPHILS # BLD AUTO: 0.02 THOUSANDS/ΜL (ref 0–0.1)
BASOPHILS NFR BLD AUTO: 0 % (ref 0–1)
EOSINOPHIL # BLD AUTO: 0.95 THOUSAND/ΜL (ref 0–0.61)
EOSINOPHIL NFR BLD AUTO: 19 % (ref 0–6)
ERYTHROCYTE [DISTWIDTH] IN BLOOD BY AUTOMATED COUNT: 14.1 % (ref 11.6–15.1)
HCT VFR BLD AUTO: 33.5 % (ref 34.8–46.1)
HGB BLD-MCNC: 10.2 G/DL (ref 11.5–15.4)
LYMPHOCYTES # BLD AUTO: 1.38 THOUSANDS/ΜL (ref 0.6–4.47)
LYMPHOCYTES NFR BLD AUTO: 27 % (ref 14–44)
MCH RBC QN AUTO: 28 PG (ref 26.8–34.3)
MCHC RBC AUTO-ENTMCNC: 30.4 G/DL (ref 31.4–37.4)
MCV RBC AUTO: 92 FL (ref 82–98)
MONOCYTES # BLD AUTO: 0.46 THOUSAND/ΜL (ref 0.17–1.22)
MONOCYTES NFR BLD AUTO: 9 % (ref 4–12)
NEUTROPHILS # BLD AUTO: 2.3 THOUSANDS/ΜL (ref 1.85–7.62)
NEUTS SEG NFR BLD AUTO: 45 % (ref 43–75)
NRBC BLD AUTO-RTO: 0 /100 WBCS
PLATELET # BLD AUTO: 104 THOUSANDS/UL (ref 149–390)
PMV BLD AUTO: 10.9 FL (ref 8.9–12.7)
RBC # BLD AUTO: 3.64 MILLION/UL (ref 3.81–5.12)
WBC # BLD AUTO: 5.12 THOUSAND/UL (ref 4.31–10.16)

## 2018-05-18 PROCEDURE — 97530 THERAPEUTIC ACTIVITIES: CPT

## 2018-05-18 PROCEDURE — G0515 COGNITIVE SKILLS DEVELOPMENT: HCPCS

## 2018-05-18 PROCEDURE — 97112 NEUROMUSCULAR REEDUCATION: CPT

## 2018-05-18 PROCEDURE — 85025 COMPLETE CBC W/AUTO DIFF WBC: CPT | Performed by: NURSE PRACTITIONER

## 2018-05-18 PROCEDURE — 97110 THERAPEUTIC EXERCISES: CPT

## 2018-05-18 PROCEDURE — 97535 SELF CARE MNGMENT TRAINING: CPT

## 2018-05-18 RX ORDER — LANOLIN ALCOHOL/MO/W.PET/CERES
6 CREAM (GRAM) TOPICAL
Refills: 0
Start: 2018-05-18 | End: 2018-05-28 | Stop reason: HOSPADM

## 2018-05-18 RX ORDER — METOPROLOL TARTRATE 50 MG/1
50 TABLET, FILM COATED ORAL EVERY 12 HOURS SCHEDULED
Refills: 0
Start: 2018-05-18 | End: 2022-08-04

## 2018-05-18 RX ORDER — FERROUS SULFATE 300 MG/5ML
300 LIQUID (ML) ORAL DAILY
Qty: 150 ML | Refills: 0
Start: 2018-05-19 | End: 2018-05-25

## 2018-05-18 RX ORDER — BISACODYL 10 MG
10 SUPPOSITORY, RECTAL RECTAL DAILY PRN
Qty: 12 SUPPOSITORY | Refills: 0 | Status: ON HOLD
Start: 2018-05-18 | End: 2018-08-09 | Stop reason: ALTCHOICE

## 2018-05-18 RX ORDER — SENNOSIDES 8.6 MG
1 TABLET ORAL
Qty: 120 EACH | Refills: 0 | Status: ON HOLD
Start: 2018-05-18 | End: 2018-08-09 | Stop reason: ALTCHOICE

## 2018-05-18 RX ORDER — PANTOPRAZOLE SODIUM 40 MG/1
40 TABLET, DELAYED RELEASE ORAL
Refills: 0
Start: 2018-05-19 | End: 2019-03-28 | Stop reason: ALTCHOICE

## 2018-05-18 RX ADMIN — METOPROLOL TARTRATE 50 MG: 50 TABLET ORAL at 08:18

## 2018-05-18 RX ADMIN — DOCUSATE SODIUM 100 MG: 100 CAPSULE, LIQUID FILLED ORAL at 08:17

## 2018-05-18 RX ADMIN — ATORVASTATIN CALCIUM 80 MG: 40 TABLET, FILM COATED ORAL at 17:10

## 2018-05-18 RX ADMIN — DOCUSATE SODIUM 100 MG: 100 CAPSULE, LIQUID FILLED ORAL at 17:10

## 2018-05-18 RX ADMIN — PANTOPRAZOLE SODIUM 40 MG: 40 TABLET, DELAYED RELEASE ORAL at 05:41

## 2018-05-18 RX ADMIN — MINERAL SUPPLEMENT IRON 300 MG / 5 ML STRENGTH LIQUID 100 PER BOX UNFLAVORED 300 MG: at 08:19

## 2018-05-18 RX ADMIN — MELATONIN TAB 3 MG 6 MG: 3 TAB at 21:26

## 2018-05-18 RX ADMIN — SENNOSIDES 8.6 MG: 8.6 TABLET, FILM COATED ORAL at 21:26

## 2018-05-18 RX ADMIN — APIXABAN 2.5 MG: 2.5 TABLET, FILM COATED ORAL at 08:18

## 2018-05-18 RX ADMIN — METOPROLOL TARTRATE 50 MG: 50 TABLET ORAL at 21:26

## 2018-05-18 RX ADMIN — APIXABAN 2.5 MG: 2.5 TABLET, FILM COATED ORAL at 17:10

## 2018-05-18 NOTE — PROGRESS NOTES
Internal Medicine Progress Note  Patient: Leonel Rucker  Age/sex: 80 y o  female  Medical Record #: [id number]      ASSESSMENT/PLAN:  Leonel Rucker is seen and examined and management for following issues:    1  Lt MCA CVA s/p mechanical thrombectomy: Continue Eliquis 2 5mg BID (keep dose same = d/w Dr Tato Lombardo) and atorvastatin for secondary prevention  45 Carmela Martinez [facility] Neurology follow-up  Had a RR 5/10/18 for change in MS with being found lethargic = CTH no change, VSS; neuro saw and recommended no changes and signed off  HS Remeron stopped it to see if any change in daytime fatigue - so far variable results; consideration was given to starting Provigil but primary service feels will not be beneficial    2  HTN: stable; continue Lopressor 50mg every 12 hours  3   CHADWICK/CKD stage III; previous baseline Cr 1 5: On admit to hospital 4/18 had creat 2 0 and Torsemide was held/fluids given; On 5/7 up to 1 99 = stopped Torsemide/KCL and gave 1 liter NSS = 5/10/18 gave another liter since creat still 1 90 = on 5/9/18 was back down to 1 27 and now 1 19  Checked PVRs = not significant  4   Chronic systolic CHF/LVEF 48%; moderate TR; bio MVR = in [place] (normal function):  holding torsemide 10mg daily/Kdur 10 meq BID  Chest is clear/no edema  Not sure she really needs to restart this (put on it post-op?) = will watch but should remain on mild salt restriction as is now    5  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): Eliquis 2 5mg BID 2/2 age and renal status (switched from Coumadin in the hospital so NOAC is new)  6   DM type 2: On linagliptan 2 5mg at home  It was planned for pt to change to Januvia 100mg daily as an OP per the daughter  Will start Januvia when needed but so far BS controlled w/o tx likely 2/2 fact she is not eating much = did stop Accuchecks for now and watching FBSs but if she starts eating better will resume Accuchecks  Continue DM diet   Fasting sugars have been acceptable = on 5/17 AM was 92     7   Acute blood loss anemia: s/p transfusion after EBL from thrombectomy of 500-1000ml from femoral site  Stable  8   PFO:  Small  Seen on LORI  45 [facility] Cardiology follow-up  9   History of colon CA: Colonic lesion seen on CT  Will need GI follow-up    10  Hx PPM    11  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:  On Eliquis currently = platelets 80 on 5/52 with previous normal; repeat to see if lab error and was essentially the same = H-O saw; she has not gotten any Heparin products but ordered HIT by EFRAÍN to confirm and it was negative; this AM platelet count is 71 so has not rebounded yet as she has had in the past     12   Poor appetite: had not been eating very well so Remeron was added  Also, question of depression  Remeron 7 5mg qhs added 5/7/18 by primary service and was stopped 5/9/18 2/2 too sleepy  She was on Remeron 15mg at home  At this point, her meal completion is widely variable from 25 to 100%    13  C/O peripheral neuropathy:  Has at home as well; burning,"pins/needles" bottoms of feet;  d/w Dr Isabel Hendricks: Patient seen and examined  Offers no complaints      ROS:   GI: denies abdominal pain, change bowel habits or reflux symptoms  Neuro: No new neurologic changes  Respiratory: No Cough, SOB  Cardiovascular: No CP, palpitations     Scheduled Meds:    Current Facility-Administered Medications:  acetaminophen 650 mg Oral Q6H PRN Ankita Zavala MD   apixaban 2 5 mg Oral BID Ankita Zavala MD   atorvastatin 80 mg Oral Daily With Marleny Trevizo MD   bisacodyl 10 mg Rectal Daily PRN Ankita Zavala MD   docusate sodium 100 mg Oral BID Ankita Zavala MD   ferrous sulfate 300 mg Oral Daily GAYATRI Molina   melatonin 6 mg Oral HS Ankita Zavala MD   metoprolol tartrate 50 mg Oral Q12H [facility] Ankita Zavala MD   pantoprazole 40 mg Oral Early Morning Ankita Zavala MD   polyethylene glycol 17 g Oral Daily PRN Ankita Zavala MD   senna 1 tablet Oral HS Ankita Zavala MD       Labs: Results from last 7 days  Lab Units 05/17/18  0458 05/15/18  1008   WBC Thousand/uL 4 60 4 78   HEMOGLOBIN g/dL 9 2* 9 8*   HEMATOCRIT % 29 7* 31 6*   PLATELETS Thousands/uL 71* 74*       Results from last 7 days  Lab Units 05/17/18  0458 05/14/18  0546   SODIUM mmol/L 139 140   POTASSIUM mmol/L 4 2 4 2   CHLORIDE mmol/L 106 108   CO2 mmol/L 28 26   BUN mg/dL 24 28*   CREATININE mg/dL 1 19 1 18   GLUCOSE RANDOM mg/dL 92 95   CALCIUM mg/dL 9 6 9 5                  Glucose (mg/dL)   Date Value   05/17/2018 92   05/14/2018 95   05/09/2018 113   05/08/2018 96   12/23/2015 114 (H)   02/22/2015 101   02/21/2015 103   02/20/2015 116     Glucose, i-STAT (mg/dl)   Date Value   05/08/2018 103   04/23/2018 106   04/23/2018 96       Labs reviewed    Physical Examination:  Vitals:   Vitals:    05/17/18 2022 05/18/18 0506 05/18/18 0600 05/18/18 0818   BP: 111/51 142/66  126/68   BP Location: Right arm Right arm     Pulse: 76 69  80   Resp: 17 17     Temp: 98 °F (36 7 °C) 98 2 °F (36 8 °C)     TempSrc: Oral Oral     SpO2: 97% 98%     Weight:  68 2 kg (150 lb 5 7 oz) 68 2 kg (150 lb 5 7 oz)    Height:           GEN: NAD  HEENT: NC/AT  RESP: BBS w/o crackles/wheeze/rhonci; resp unlabored  CV: +S1 S2, regular rate, no rubs/murmurs  ABD: soft, NT, ND, normal BS   : no schaefer  EXT: no edema  Skin: no rashes  Neuro: Awake and alert today; WOODARD 5/5; has inability to consistently follow commands as always and makes testing somewhat difficult; face symmetric; speech clear  [ X ] Total time spent: 30 Mins and greater than 50% of this time was spent counseling/coordinating care        Tiesha Varghese, [facility]  Internal Medicine

## 2018-05-18 NOTE — PROGRESS NOTES
05/18/18 1500   Pain Assessment   Pain Assessment 0-10   Pain Score No Pain   Restrictions/Precautions   Precautions Fall Risk;Bed/chair alarms;Cognitive   Cognition   Overall Cognitive Status Impaired   Arousal/Participation Alert; Cooperative   Attention Difficulty attending to directions   Orientation Level Oriented to person   Memory Decreased short term memory;Decreased recall of recent events;Decreased recall of precautions   Following Commands Follows one step commands with increased time or repetition   Subjective   Subjective reports she will miss everyone when she leaves and that she is thankful for everyones help   QI: Roll Left and Right   Assistance Needed Supervision   Roll Left and Right CARE Score 4   QI: Sit to [address] Needed Physical assistance   Assistance Provided by [place] Less than 25%   Sit to Lying CARE Score 3   QI: Lying to Sitting on Side of Bed   Assistance Needed Supervision   Lying to Sitting on Side of Bed CARE Score 4   QI: Sit to Stand   Assistance Needed Supervision   Sit to Stand CARE Score 4   QI: Chair/Bed-to-Chair Transfer   Assistance Needed Physical assistance   Assistance Provided by [place] Less than 25%   Comment [facility]   Chair/Bed-to-Chair Transfer CARE Score 3   Transfer Bed/Chair/Wheelchair   Limitations Noted In Balance;Problem Solving;UE Strength;LE Strength   Adaptive Equipment Hand Hold   Stand Pivot Contact Guard   Sit to Stand Supervision   Stand to Sit Supervision   Supine to Sit Supervision   Sit to Supine Contact Guard   QI: Walk 10 Feet   Assistance Needed Physical assistance   Assistance Provided by [place] Less than 25%   Walk 10 Feet CARE Score 3   QI: Walk 50 Feet with Two [address] Needed Physical assistance   Assistance Provided by [place] Less than 25%   Walk 50 Feet with Two Turns CARE Score 3   QI: Walk 150 Feet   Assistance Needed Physical assistance   Assistance Provided by [place] Less than 25%   Walk 150 Feet CARE Score 3   Ambulation Does the patient walk? 2  Yes   Primary Discharge Mode of Locomotion Walk   Walk Assist Level Contact Guard   Gait Pattern Inconsistant Cathryn; Slow Cathryn;Decreased foot clearance;Narrow PARIS;Step through; Improper weight shift   Assist Device Hand Hold   Distance Walked (feet) 350 ft   Limitations Noted In Coordination; Heel Strike;Speed;Strength;Swing   Walking (FIM) 4 - Patient requires steadying assist or light touching AND distance 150 feet or more, no rest   QI: 1 Step (Curb)   Assistance Needed Physical assistance   Assistance Provided by [place] Less than 25%   Comment HHA   1 Step (Curb) CARE Score 3   Therapeutic Interventions   Neuromuscular Re-Education trialed larger step approx 12" to simulate back home entrance but she was unable to complete without Ax2   Assessment   Treatment Assessment session focused on increasing endurance and functional mobility and education for family about the rehab process and the functional gains that have been made; pt ambs slow with short but equal steps and no LOB; pt was able to amb with her daughter and there was no LOB, VC for directions; pt has good sequencing for xfers but occasionally needs directions repeated; continue POC as per PT   Family/Caregiver Present Rochelle   Problem List Decreased strength;Decreased range of motion;Decreased endurance;Decreased mobility; Decreased coordination; Impaired judgement;Decreased cognition; Impaired vision;Decreased safety awareness   Barriers to Discharge Inaccessible home environment;Decreased caregiver support   PT Barriers   Functional Limitation Walking;Stair negotiation   Plan   Treatment/Interventions ADL retraining;Functional transfer training;LE strengthening/ROM; Elevations; Therapeutic exercise; Endurance training;Cognitive reorientation; Bed mobility;Gait training   Progress Progressing toward goals   Recommendation   Recommendation Short-term skilled PT   PT Therapy Minutes   PT Time In 1500   PT Time Out 1530   PT Total Time (minutes) 30   PT Mode of treatment - Individual (minutes) 30   PT Mode of treatment - Concurrent (minutes) 0   PT Mode of treatment - Group (minutes) 0   PT Mode of treatment - Co-treat (minutes) 0   PT Mode of Teatment - Total time(minutes) 30 minutes   Therapy Time missed   Time missed?  No

## 2018-05-18 NOTE — SOCIAL WORK
Family toured [facility] today and pt was approved for admission  Bed is available tomorrow  Transport arranged with [facility] w/c antony for 9 30am  Met w/pts dtr darnell and informed of all dc arrangements including fee for transport arrangements   Pt family facility and staff aware of dc plan

## 2018-05-18 NOTE — PROGRESS NOTES
05/18/18 0900   Pain Assessment   Pain Assessment 0-10   Pain Score No Pain   Restrictions/Precautions   Precautions Bed/chair alarms; Fall Risk;Cognitive   Cognition   Overall Cognitive Status Impaired   Arousal/Participation Alert; Cooperative   Attention Difficulty dividing attention   Orientation Level Oriented to person   Memory Decreased long term memory;Decreased short term memory   Following Commands Follows one step commands with increased time or repetition   Subjective   Subjective reports she is doing well and would like to go to her room after session   QI: Sit to Stand   Assistance Needed Supervision   Sit to Stand CARE Score 4   QI: Chair/Bed-to-Chair Transfer   Assistance Needed Physical assistance   Assistance Provided by [place] Less than 25%   Comment [facility]   Chair/Bed-to-Chair Transfer CARE Score 3   Transfer Bed/Chair/Wheelchair   Limitations Noted In Balance;Problem Solving;UE Strength;LE Strength   Adaptive Equipment Hand Hold   Stand Pivot Contact Guard   Sit to Stand Supervision   Stand to [employer] Transfer Supervision   Bed, Chair, Wheelchair Transfer (FIM) 4 - Patient requires steadying assist or light touching   QI: Car Transfer   Assistance Needed Supervision   Car Transfer CARE Score 4   QI: [address] [facility] Needed Physical assistance   Assistance Provided by [place] Less than 25%   Comment [facility] [place]   Walk 10 Feet CARE Score 3   QI: Walk 50 Feet with Two [address] Needed Physical assistance   Assistance Provided by [place] Less than 25%   Comment [facility]   Walk 50 Feet with Two Turns CARE Score 3   QI: Walk 150 Feet   Assistance Needed Physical assistance   Assistance Provided by [place] Less than 25%   Comment [facility] [place]   Walk 150 Feet CARE Score 3   Ambulation   Does the patient walk? 2  Yes   Primary Discharge Mode of Locomotion Walk   Walk Assist Level Contact Guard   Gait Pattern Inconsistant Cathryn; Slow Cathryn;Decreased foot clearance;Narrow [place]IS;Step through; Improper weight shift   Assist Device Hand Hold   Distance Walked (feet) 150 ft  (x2)   Limitations Noted In Coordination; Endurance; Heel Strike;Strength;Speed;Swing   Walking (FIM) 4 - Patient requires steadying assist or light touching AND distance 150 feet or more, no rest   QI: 4 Steps   Assistance Needed Supervision   Comment LHR down   4 Steps CARE Score 4   QI: 12 Steps   Assistance Needed Supervision   Comment LHR down   12 Steps CARE Score 4   Stairs   Type Stairs   # of Steps 12   Weight Bearing Precautions Fall Risk   Assist Devices Single Rail   Findings non-reciprocal   Stairs (FIM) 5 - Patient requires supervision/monitoring AND goes up and down full flight (12- 14 stairs)   Therapeutic Interventions   Strengthening NAHUN, yeni 10x3   Flexibility hamstring and gastroc 60"x2   Equipment Use   Veebeam 12 mins level 2   Assessment   Treatment Assessment session focused on increasing endurance and functional mobility; pt uses non-reciprocal pattern on stairs and goes slightly sideways with both hands on the LHR down; pt takes short steps with BLE but they are equal; light pressure applied to low back to increase speed but this caused pt to lean back and deviate L and R; continue POC as per PT   Problem List Decreased strength;Decreased range of motion;Decreased endurance; Impaired balance;Decreased cognition; Impaired judgement;Decreased safety awareness; Impaired vision;Decreased coordination   Barriers to Discharge Inaccessible home environment;Decreased caregiver support   PT Barriers   Functional Limitation Walking;Stair negotiation   Plan   Treatment/Interventions ADL retraining;Functional transfer training;LE strengthening/ROM; Elevations; Therapeutic exercise; Endurance training;Cognitive reorientation;Patient/family training;Gait training;Bed mobility   Progress Progressing toward goals   Recommendation   Recommendation Short-term skilled PT   PT Therapy Minutes   PT Time In 0900   PT Time Out 1000   PT Total Time (minutes) 60   PT Mode of treatment - Individual (minutes) 60   PT Mode of treatment - Concurrent (minutes) 0   PT Mode of treatment - Group (minutes) 0   PT Mode of treatment - Co-treat (minutes) 0   PT Mode of Teatment - Total time(minutes) 60 minutes   Therapy Time missed   Time missed?  No

## 2018-05-18 NOTE — PROGRESS NOTES
05/18/18 1230   Pain Assessment   Pain Assessment No/denies pain   Pain Score No Pain   Grooming   Able To Wash/Dry Hands   Grooming (FIM) 4 - Patient requires steadying assist or light touching   Tub/Shower Transfer   Not Assessed Patient refusal   Tub Transfer (FIM) 0 - Activity does not occur   Shower Transfer (FIM) 0 - Activity does not occur   QI: Sit to Stand   Assistance Needed Supervision   Sit to Stand CARE Score 4   QI: Chair/Bed-to-Chair Transfer   Assistance Needed Physical assistance   Assistance Provided by [place] Less than 25%   Chair/Bed-to-Chair Transfer CARE Score 3   Transfer Bed/Chair/Wheelchair   Limitations Noted In Vision;Balance; Endurance; Coordination   Stand Pivot Contact Guard   Findings HHA   Bed, Chair, Wheelchair Transfer (FIM) 4 - Patient requires steadying assist or light touching   QI: [address] Needed Physical assistance   Assistance Provided by [place] 25%-49%   [address] [facility] CARE Score 3   [facility] Bladder   Findings assist for management of pants up  Pt able to manage hygiene in stance w/ CGA and set up  Toileting (FIM) 3 - Patient completes  50-74% of all tasks   QI: Toilet Transfer   Assistance Needed Physical assistance   Assistance Provided by [place] Less than 25%   Toilet Transfer CARE Score 3   Toilet Transfer   Surface Assessed Standard Commode   Transfer Technique Stand Pivot   Limitations Noted In Balance; Endurance;Problem Solving   Toilet Transfer (FIM) 4 - Patient requires steadying assist or light touching   Cognition   Overall Cognitive Status Impaired   Arousal/Participation Alert; Cooperative   Attention Difficulty attending to directions   Orientation Level Oriented to person   Memory Decreased short term memory;Decreased recall of recent events;Decreased recall of precautions   Following Commands Follows one step commands with increased time or repetition   Comments Pt is bright, and cheerful   Pt is joking and laughing throughout session   Assessment   Treatment Assessment Pt engages in skilled OT session focusing on L side attention, B/l UE use, standing balance, and activity tolerance  Pt engages in room activity for watering plans and packing clothes for D/C tomorrow for SNF  Pt requires CGA in stance for retrieval of clothing items, transport to bag and packing bag to increase B/L UE use together and L side awareness  Pt engages in table top L side attention and visual attention task  Pt completes coloring page and completes coloring from R->L  Pt requires MIN VC's for attention to task  Prognosis Fair   Problem List Decreased strength;Decreased endurance; Impaired balance;Decreased mobility; Decreased coordination;Decreased cognition; Impaired judgement;Decreased safety awareness; Impaired vision   Plan   Treatment/Interventions ADL retraining;Functional transfer training; Therapeutic exercise; Endurance training;Cognitive reorientation;Patient/family training;Equipment eval/education; Bed mobility; Compensatory technique education   Progress Slow progress, cognitive deficits   Recommendation   OT Discharge Recommendation 24 hour supervision/assist   OT Therapy Minutes   OT Time In 1230   OT Time Out 1400   OT Total Time (minutes) 90   OT Mode of treatment - Individual (minutes) 90   OT Mode of treatment - Concurrent (minutes) 0   OT Mode of treatment - Group (minutes) 0   OT Mode of treatment - Co-treat (minutes) 0   OT Mode of Teatment - Total time(minutes) 90 minutes

## 2018-05-19 VITALS
RESPIRATION RATE: 17 BRPM | HEART RATE: 84 BPM | DIASTOLIC BLOOD PRESSURE: 56 MMHG | WEIGHT: 153.22 LBS | SYSTOLIC BLOOD PRESSURE: 118 MMHG | HEIGHT: 64 IN | TEMPERATURE: 98.7 F | OXYGEN SATURATION: 98 % | BODY MASS INDEX: 26.16 KG/M2

## 2018-05-19 PROCEDURE — 99239 HOSP IP/OBS DSCHRG MGMT >30: CPT | Performed by: PHYSICAL MEDICINE & REHABILITATION

## 2018-05-19 RX ADMIN — MINERAL SUPPLEMENT IRON 300 MG / 5 ML STRENGTH LIQUID 100 PER BOX UNFLAVORED 300 MG: at 08:59

## 2018-05-19 RX ADMIN — METOPROLOL TARTRATE 50 MG: 50 TABLET ORAL at 09:03

## 2018-05-19 RX ADMIN — PANTOPRAZOLE SODIUM 40 MG: 40 TABLET, DELAYED RELEASE ORAL at 05:35

## 2018-05-19 RX ADMIN — POLYETHYLENE GLYCOL 3350 17 G: 17 POWDER, FOR SOLUTION ORAL at 09:16

## 2018-05-19 RX ADMIN — DOCUSATE SODIUM 100 MG: 100 CAPSULE, LIQUID FILLED ORAL at 08:58

## 2018-05-19 RX ADMIN — APIXABAN 2.5 MG: 2.5 TABLET, FILM COATED ORAL at 08:58

## 2018-05-19 NOTE — PROGRESS NOTES
Pt  being discharged to [facility] today  Pt  alert to person and place; forgetful  Pt  min- mod assist hand hold to commode  Pt  occasionally incontinent of bladder and bowel  No skin breakdowns noted at the day of discharge from [facility]  Pt  denies any pain

## 2018-05-19 NOTE — DISCHARGE INSTRUCTIONS
Please note you are restricted from driving/operating a motorized vehicle/operating heavy machinery/etc until you are cleared through a formal driving evaluation  This service is offered through [facility] driving evaluation program on [address] however this evaluation can be done at a site of your choosing  Please contact your family doctor for a referral when your family doctor clears you to perform this evaluation once your neurologic/physical deficits have stabilized       Please see your doctors listed in the follow up providers section of your discharge paperwork, and take the discharge paperwork with you to your appointments    Please note changes may have been made to your medications please refer to your discharge paperwork for your current medications and take this list with you to all your doctors appointments for your doctors to review    Please do not resume a home medication unless the medication reconciliation sheet indicates to do so, please do not assume that a medication that you were given a prescription for is the same as a medication you have at home based on both medications having the same name as dosages and frequency may have changed    Please check your blood pressure & heart rate/pulse prior to taking your blood pressure/heart rate medications and 1 hour after, please contact your family doctor or cardiologist immediately for a blood pressure below 100/60 and do not take the medications until speaking with them, please contact your family doctor or cardiologist immediately for blood pressure greater than 160/100; please contact your family doctor or cardiologist immediately for a heart rate/pulse lower than 60 and do not take the medications until speaking with them, please contact your family doctor or cardiologist immediately for heart rate/pulse greater than 100     Please note the following incidental findings were found during your recent hospitalization please discuss them with your doctors so that they may arrange any tests/referrals as they deem necessary:   · Patent foramen ovale - this is a small hole in your heart that was seen during a heart echogram performed during your hospitlization  Please follow-up with your cardiologist as outaptient  · Thrombocytopenia - this is a term to indicate low platelet levels  Please follow-up with your family physician and/or Dr Eboni Darling (hematologist) as needed  · Colon lesion - this was found incidentally on CT imaging  Please follow-up with your Gastroenterologist as outpatient       Other incidental findings noted on CT imaging performed on 4/26 - Followup with your family physician or physician noted as below:   1) multiple pulmonary micronodules  2) pleural thickening  3) uterine fibroid  Other incidental findings:  4) B/L renal cysts  5) 2 menigiomas: follow with Dr Mathew Garcia as needed  6) EKG abnormalities in the setting of atrial paced rhythm (non-specific intraventricular conduction block/wide QRS, LAD): follos with Dr Cintia Alexis (cards) and Dr Carmelita Farrar (EP)  7) mod-severe TR/elevated peak PA pressure:  follow with Dr Cintia Alexis (cards) and Dr Carmelita Farrar (EP)    Unless advised by your doctors please avoid NSAID (including but not limited to advil, aleve, motrin, naproxen, ibuprofen, mobic, meloxicam, etc) medications, anti platelet medications (including but not limited to plavix, aspirin and aspirin containing products), and any prescription blood thinners due to your already being on the blood thinner Eliquis as use of these medications in combination with Eliquis can increase your bleeding risk    Unless specifically noted in your medication list provided to you in your discharge paper work, please discuss with your family doctor prior to resuming any vitamins/minerals/supplements you may have been taking prior to your hospitalization     Please note a summary of your hospital stay with relevant information for your doctors has been sent to them, please confirm with your doctors at your follow up visits that they have received this summary and have them contact [address] [place] [address] if they have not received them along with any other medical records they may require

## 2018-05-21 NOTE — CASE MANAGEMENT
Team dc summary - pt made some progress but was not yet able to return home  Family in agreement with skilled nursing services for contd inpat rehab and selected [facility]  Pt approved and transport was arranged with matt villela/kadie patrick present and aware of dc arrangements and fee for wc van

## 2018-05-22 NOTE — DISCHARGE SUMMARY
Discharge Summary - Bianca Townsend 80 y o  female MRN: [id number]  Unit/Bed#: [facility] 430-46 Encounter: [id number]    Admission Date: 4/30/2018     Discharge Date: 5/19/2018    Etiologic/Rehabilitation Diagnosis: Impairment of mobility, safety and Activities of Daily Living (ADLs) due to Stroke:  01 2  Right Body Involvement (Left Brain)    HPI: (Per Admission HPI):      Pt is 81 yo female with ICH & bi-hemispheric nonhemorrhagic ischemic strokes s/p thrombectomy by Dr Anca Carranza on 4/23  Issue #1: per patient had BM yesterday   Abimbola Chelo #2: per patient peripheral neuropathy significantly limits her ambulation distance at baseline     Patient admitted to [facility] on 4/30/18    Procedures Performed During [facility] Admission: None    Acute Rehabilitation Center Course:   Patient participated in a comprehensive interdisciplinary inpatient rehabilitation program which included involvment of MD, therapies (PT, OT, and/or SLP), RN, CM, SW, dietary, and psychology services  She will continue to require further rehabilitation and considered safe to be discharged to SNF to continue her recovery process  Please see below for patient's day to day management of medical needs        Rehabilitation: cont PT/OT for ambulatory/ADL dysfxn     ICH & bi-hemispheric nonhemorrhagic ischemic strokes: nonhemorrhagic stroke felt by neurology to be embolic of unclear etiology; per s/p thrombectomy by Dr Anca Carranza on 4/23, cleared by neurosx for [facility] and per neuro recs started on eliquis 2 5 mg BID (not 5 mg due to age and serum Cr greater than 1 5, tends to fluctuate around this value); home lipitor increased from 40 to 80 mg     AMS (5/8): seen by neuro and recommended CTH  (5/8) which showed no acute findings and patient back to baseline mental status therefore per neuro recs no MRI at this time      Peripheral neuropathy: likely 2/2 to DM, had tried neurontin in the past but did not tolerate      DM: on januvia 100 mg qd at home (recently switched from tradjenta 5 mg qd); currently on ISS     CHF (EF 45%): home torsemide 10 mg qd (and home potassium 10 MEQ BID due to hypokalemia 2/2 to torsemide) both being held due to acute on chronic renal failure likely 2/2 to poor PO; additionally  home toprol XL 50 mg qd switched to lopressor 50 mg q12 in acute care     SSS: s/p pacer, interrogated recently in acute care, no A-fib found at that time (although noted on EKG of 4/23 however pt already on [facility] for CVA and on BB), follows with Dr Violeta Estrada (cards) and Dr Catracho Rea (EP)      h/o colon CA: on CT CAP of 4/26 colonic thickening suspicious for lesion noted and colonoscopy recommended, pt undergoes screening colonoscopys with Dr Mansoro Kevin, pt to FU with them for FU colonoscopy       Meningioma: seen by neurosx no intervention planned, follows with Dr Davidson Fresh     PFO (3 mm): unclear if paradoxical stroke from DVT is etiology of CVA (felt to be embolic by neuro) however as neuro has placed pt on [facility] for CVA LE dopplers would not ; t/c closure as OP       s/p MVR: tissue valve     HL: home lipitor increased from 40 mg to 80 mg due to CVA       h/o DVT: home coumadin switched to eliquis for CVA      Insomnia: at home on remeron 15 mg HS on hold due to episodes of AMS (however low suspicion this is cause of AMS)      non-occlusive carotid dz: per carotid U/S report recommend repeat carotid U/S in 1 year; on [facility] and statin     chronic constipation: per family at baseline 1 BM every 4 to 5 days, IM monitoring and will treat with laxatives as needed      rt ankle pain/swelling: likely sprain; XR showed no acute osseous abnormality     CHADWICK on CKD: baseline 1 4-1 7, Cr currently 1 19, however torsemide still on hold for now per IM      chronic anemia: AOCD/CKD, at home on iron 150 mg qd, cont iron supplementation as inpt; Hg currently 9 2, t/c transfusion for Hg<8 0     Thrombocytopenia: currently 71, heme consulted and they Herpain Induced Platelet Ab test which was WNL; heme feels this is a chronic issue and only monitoring is required at this time     Hyperphosphatemia per lab reference range at 4 4: however in females 18 year or older 4 5 is ULN      Dispo: SNF     Incidental findings on CT C/A/P of 4/26:  1) multiple pulmonary micronodules: per CT report recommended FU imaging in 3 months for determination of stability  2) pleural thickening  3) uterine fibroid  Other incidental findings:  4) B/L renal cysts  5) 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 no intervention planned, follows with Dr Ольга Toth  6) EKG abnormalities in the setting of atrial paced rhythm (non-specific intraventricular conduction block/wide QRS, LAD): follows with Dr Erika Diez (cards) and Dr Kiet Naqvi (EP)  7) mod-severe TR/elevated peak PA pressure:  follows with Dr Erika Diez (cards) and Dr Kiet Naqvi (EP)     Discharge Physical Examination:  GI: denies abdominal pain, change bowel habits or reflux symptoms  Neuro: No new neurologic changes  Respiratory: No Cough, SOB  Cardiovascular: No CP, palpitations     Significant Findings, Care, Treatment and Services Provided: Acute comprehensive interdisciplinary inpatient rehabilitation including PT, OT, SLP, RN, CM, SW, dietary, psychology, etc     Functional Status Upon Discharge:    Physical Therapy Occupational Therapy Speech Therapy   Weight Bearing Status: Full Weight Bearing  Transfers: Contact Guard, Minimal Assistance  Bed Mobility: Supervision  Amulation Distance (ft): 150 feet  Ambulation: Minimal Assistance  Assistive Device for Ambulation: Hand Hold Assistance  Number of Stairs: 12  Assistive Device for Stairs: Right Hand Rail  Stair Assistance: Contact Guard  Discharge Recommendations: [address] [place] [address]'[facility] with[de-identified] 24 Hour Supervision, Family Support   Grooming: Supervision  Bathing: Minimal Assistance  Bathing: Minimal Assistance  Upper Body Dressing: Moderate Assistance  Lower Body Dressing:  Moderate Assistance  Toileting: Maximum Assistance  Tub/Shower Transfer: Maximum Assistance  Toilet Transfer: Moderate Assistance  Cognition: Exceptions to WNL  Cognition: Decreased Memory, Decreased Executive Functions, Decreased Attention, Decreased Comprehension, Decreased Safety, Impulsive  Orientation: Person, Place, Time, Situation     Mode of Communication: Verbal  Speech/Language: Dysarthia  Cognition: Exceptions to WNL  Cognition: Decreased Memory, Decreased Executive Functions, Decreased Attention, Decreased Comprehension, Decreased Safety  Orientation: Person, Place  Swallowing: Within Defined Limits  Swallowing: Oral Dysphagia, Pharyngeal Dysphagia, Aspiration Risk  Diet Recommendations: Regular Diet, Thin  Discharge Recommendations: Other (home with family vs SNF)  [address] with[de-identified] 24 Hour Supervision, 24 Hour Assisteance, Outpatient Speech Therapy     Discharge Diagnosis: Impairment of mobility, safety and Activities of Daily Living (ADLs) due to Stroke:  01 2  Right Body Involvement (Left Brain)    Discharge Medications:   See after visit summary for reconciled discharge medications provided to patient and family  Condition at Discharge: stable     Discharge instructions/Information to patient and family:   See after visit summary for information provided to patient and family  Provisions for Follow-Up Care:  See after visit summary for information related to follow-up care and any pertinent home health orders  Disposition: Skilled nursing facility    Planned Readmission: No    Discharge Statement   I spent 45 minutes discharging the patient  This time was spent on the day of discharge  I had direct contact with the patient on the day of discharge  Greater than 50% of the total time was spent examining patient, answering all patient questions, arranging and discussing plan of care with patient as well as directly providing post-discharge instructions  Additional time then spent on discharge activities      Discharge Medications:  See after visit summary for reconciled discharge medications provided to patient and family

## 2018-05-23 NOTE — OCCUPATIONAL THERAPY NOTE
Occupational Therapy Discharge Summary     Pt made fair progress in Occupational Therapy  Pt did not meet long term goals  Pt is unable to safely return home at this time  Pt will benefit from contionued skilled OT services to continue to address safety, sequencing, L side attention w/ ADLs  Pt is safe to SNF family support and assistance as needed

## 2018-05-24 ENCOUNTER — DOCUMENTATION (OUTPATIENT)
Dept: CARDIOLOGY CLINIC | Facility: CLINIC | Age: 83
End: 2018-05-24

## 2018-05-24 ENCOUNTER — APPOINTMENT (EMERGENCY)
Dept: RADIOLOGY | Facility: HOSPITAL | Age: 83
DRG: 871 | End: 2018-05-24
Payer: MEDICARE

## 2018-05-24 ENCOUNTER — HOSPITAL ENCOUNTER (EMERGENCY)
Facility: HOSPITAL | Age: 83
Discharge: HOME | DRG: 871 | End: 2018-05-24
Attending: EMERGENCY MEDICINE | Admitting: EMERGENCY MEDICINE
Payer: MEDICARE

## 2018-05-24 VITALS
BODY MASS INDEX: 26.3 KG/M2 | HEART RATE: 78 BPM | TEMPERATURE: 98.1 F | RESPIRATION RATE: 20 BRPM | OXYGEN SATURATION: 98 % | SYSTOLIC BLOOD PRESSURE: 131 MMHG | WEIGHT: 153.22 LBS | DIASTOLIC BLOOD PRESSURE: 68 MMHG

## 2018-05-24 DIAGNOSIS — R53.1 GENERALIZED WEAKNESS: Primary | ICD-10-CM

## 2018-05-24 LAB
ALBUMIN SERPL BCP-MCNC: 3.2 G/DL (ref 3.5–5)
ALP SERPL-CCNC: 103 U/L (ref 46–116)
ALT SERPL W P-5'-P-CCNC: 46 U/L (ref 12–78)
ANION GAP SERPL CALCULATED.3IONS-SCNC: 8 MMOL/L (ref 4–13)
APTT PPP: 26 SECONDS (ref 24–36)
AST SERPL W P-5'-P-CCNC: 89 U/L (ref 5–45)
ATRIAL RATE: 77 BPM
BACTERIA UR QL AUTO: NORMAL /HPF
BASOPHILS # BLD AUTO: 0.01 THOUSANDS/ΜL (ref 0–0.1)
BASOPHILS NFR BLD AUTO: 0 % (ref 0–1)
BILIRUB SERPL-MCNC: 0.58 MG/DL (ref 0.2–1)
BILIRUB UR QL STRIP: NEGATIVE
BUN SERPL-MCNC: 22 MG/DL (ref 5–25)
CALCIUM SERPL-MCNC: 9.8 MG/DL (ref 8.3–10.1)
CHLORIDE SERPL-SCNC: 102 MMOL/L (ref 100–108)
CLARITY UR: CLEAR
CO2 SERPL-SCNC: 26 MMOL/L (ref 21–32)
COLOR UR: YELLOW
COLOR, POC: YELLOW
CREAT SERPL-MCNC: 1.13 MG/DL (ref 0.6–1.3)
EOSINOPHIL # BLD AUTO: 0.64 THOUSAND/ΜL (ref 0–0.61)
EOSINOPHIL NFR BLD AUTO: 9 % (ref 0–6)
ERYTHROCYTE [DISTWIDTH] IN BLOOD BY AUTOMATED COUNT: 14.1 % (ref 11.6–15.1)
GFR SERPL CREATININE-BSD FRML MDRD: 45 ML/MIN/1.73SQ M
GLUCOSE SERPL-MCNC: 105 MG/DL (ref 65–140)
GLUCOSE SERPL-MCNC: 97 MG/DL (ref 65–140)
GLUCOSE UR STRIP-MCNC: NEGATIVE MG/DL
HCT VFR BLD AUTO: 31.9 % (ref 34.8–46.1)
HGB BLD-MCNC: 10.3 G/DL (ref 11.5–15.4)
HGB UR QL STRIP.AUTO: NEGATIVE
HYALINE CASTS #/AREA URNS LPF: NORMAL /LPF
INR PPP: 1.14 (ref 0.86–1.17)
KETONES UR STRIP-MCNC: NEGATIVE MG/DL
LACTATE SERPL-SCNC: 1 MMOL/L (ref 0.5–2)
LEUKOCYTE ESTERASE UR QL STRIP: NEGATIVE
LYMPHOCYTES # BLD AUTO: 1.26 THOUSANDS/ΜL (ref 0.6–4.47)
LYMPHOCYTES NFR BLD AUTO: 17 % (ref 14–44)
MAGNESIUM SERPL-MCNC: 2.2 MG/DL (ref 1.6–2.6)
MCH RBC QN AUTO: 28.6 PG (ref 26.8–34.3)
MCHC RBC AUTO-ENTMCNC: 32.3 G/DL (ref 31.4–37.4)
MCV RBC AUTO: 89 FL (ref 82–98)
MONOCYTES # BLD AUTO: 0.7 THOUSAND/ΜL (ref 0.17–1.22)
MONOCYTES NFR BLD AUTO: 10 % (ref 4–12)
NEUTROPHILS # BLD AUTO: 4.65 THOUSANDS/ΜL (ref 1.85–7.62)
NEUTS SEG NFR BLD AUTO: 64 % (ref 43–75)
NITRITE UR QL STRIP: NEGATIVE
NON-SQ EPI CELLS URNS QL MICRO: NORMAL /HPF
NRBC BLD AUTO-RTO: 0 /100 WBCS
P AXIS: 33 DEGREES
PH UR STRIP.AUTO: 5.5 [PH] (ref 4.5–8)
PLATELET # BLD AUTO: 121 THOUSANDS/UL (ref 149–390)
PMV BLD AUTO: 10.8 FL (ref 8.9–12.7)
POTASSIUM SERPL-SCNC: 4.4 MMOL/L (ref 3.5–5.3)
PR INTERVAL: 104 MS
PROT SERPL-MCNC: 8 G/DL (ref 6.4–8.2)
PROT UR STRIP-MCNC: ABNORMAL MG/DL
PROTHROMBIN TIME: 14.7 SECONDS (ref 11.8–14.2)
QRS AXIS: -54 DEGREES
QRSD INTERVAL: 136 MS
QT INTERVAL: 420 MS
QTC INTERVAL: 478 MS
RBC # BLD AUTO: 3.6 MILLION/UL (ref 3.81–5.12)
RBC #/AREA URNS AUTO: NORMAL /HPF
SODIUM SERPL-SCNC: 136 MMOL/L (ref 136–145)
SP GR UR STRIP.AUTO: 1.02 (ref 1–1.03)
T WAVE AXIS: -1 DEGREES
UROBILINOGEN UR QL STRIP.AUTO: 0.2 E.U./DL
VENTRICULAR RATE: 78 BPM
WBC # BLD AUTO: 7.28 THOUSAND/UL (ref 4.31–10.16)
WBC #/AREA URNS AUTO: NORMAL /HPF

## 2018-05-24 PROCEDURE — 83735 ASSAY OF MAGNESIUM: CPT | Performed by: EMERGENCY MEDICINE

## 2018-05-24 PROCEDURE — 81001 URINALYSIS AUTO W/SCOPE: CPT

## 2018-05-24 PROCEDURE — 96361 HYDRATE IV INFUSION ADD-ON: CPT

## 2018-05-24 PROCEDURE — 87040 BLOOD CULTURE FOR BACTERIA: CPT | Performed by: EMERGENCY MEDICINE

## 2018-05-24 PROCEDURE — 93005 ELECTROCARDIOGRAM TRACING: CPT

## 2018-05-24 PROCEDURE — 96360 HYDRATION IV INFUSION INIT: CPT

## 2018-05-24 PROCEDURE — 99285 EMERGENCY DEPT VISIT HI MDM: CPT

## 2018-05-24 PROCEDURE — 36415 COLL VENOUS BLD VENIPUNCTURE: CPT | Performed by: EMERGENCY MEDICINE

## 2018-05-24 PROCEDURE — 82948 REAGENT STRIP/BLOOD GLUCOSE: CPT

## 2018-05-24 PROCEDURE — 80053 COMPREHEN METABOLIC PANEL: CPT | Performed by: EMERGENCY MEDICINE

## 2018-05-24 PROCEDURE — 85730 THROMBOPLASTIN TIME PARTIAL: CPT | Performed by: EMERGENCY MEDICINE

## 2018-05-24 PROCEDURE — 83605 ASSAY OF LACTIC ACID: CPT | Performed by: EMERGENCY MEDICINE

## 2018-05-24 PROCEDURE — 85610 PROTHROMBIN TIME: CPT | Performed by: EMERGENCY MEDICINE

## 2018-05-24 PROCEDURE — 85025 COMPLETE CBC W/AUTO DIFF WBC: CPT | Performed by: EMERGENCY MEDICINE

## 2018-05-24 PROCEDURE — 93010 ELECTROCARDIOGRAM REPORT: CPT | Performed by: INTERNAL MEDICINE

## 2018-05-24 PROCEDURE — 71046 X-RAY EXAM CHEST 2 VIEWS: CPT

## 2018-05-24 PROCEDURE — 70450 CT HEAD/BRAIN W/O DYE: CPT

## 2018-05-24 RX ADMIN — SODIUM CHLORIDE 500 ML: 0.9 INJECTION, SOLUTION INTRAVENOUS at 17:30

## 2018-05-24 NOTE — ED PROVIDER NOTES
History  Chief Complaint   Patient presents with    Altered Mental Status     EMS states they were called for change in mental status (pt more lethargic, does not follow commands well  Basline is "pleasantly confused but follows commands")  Pt  voices no complaints at this time  Previous CVA per EMS  This is a 80 y o  old female who presents to the ED for evaluation of altered mental status  She states she is "not feeling right " Can't quantify how long  No specific symptoms she can convey secondary to dementia and previous strokes  Nursing home cannot offer any other history  Patient has decreased Otherwise, patient denies fevers, chills, night sweats, cough, congestion, rhinorrhea, CP, dyspnea, abdominal pain, nausea, vomiting, diarrhea, constipation, urinary symptoms, leg pain or swelling  Prior to Admission Medications   Prescriptions Last Dose Informant Patient Reported?  Taking?   acetaminophen (TYLENOL) 325 mg tablet  Child Yes No   Sig: Take 650 mg by mouth every 6 (six) hours as needed for mild pain   apixaban (ELIQUIS) 2 5 mg   No No   Sig: Take 1 tablet (2 5 mg total) by mouth 2 (two) times a day   atorvastatin (LIPITOR) 80 mg tablet   No No   Sig: Take 1 tablet (80 mg total) by mouth daily with dinner   bisacodyl (DULCOLAX) 10 mg suppository   No No   Sig: Insert 1 suppository (10 mg total) into the rectum daily as needed for constipation   docusate sodium (COLACE) 100 mg capsule  Child Yes No   Sig: Take 100 mg by mouth 2 (two) times a day   ferrous sulfate 300 (60 Fe) mg/5 mL syrup   No No   Sig: Take 5 mL (300 mg total) by mouth daily   Patient taking differently: Take 325 mg by mouth daily     melatonin 3 mg   No No   Sig: Take 2 tablets (6 mg total) by mouth daily at bedtime   metoprolol tartrate (LOPRESSOR) 50 mg tablet   No No   Sig: Take 1 tablet (50 mg total) by mouth every 12 (twelve) hours   multivitamin-iron-minerals-folic acid (CENTRUM) chewable tablet  Child Yes No   Sig: Chew 1 tablet daily   pantoprazole (PROTONIX) 40 mg tablet   No No   Sig: Take 1 tablet (40 mg total) by mouth daily in the early morning   polyethylene glycol (MIRALAX) 17 g packet  Child Yes No   Sig: Take 17 g by mouth daily   senna (SENOKOT) 8 6 mg   No No   Sig: Take 1 tablet (8 6 mg total) by mouth daily at bedtime      Facility-Administered Medications: None     Past Medical History:   Diagnosis Date    Acid reflux     on occ    Arthritis     DJD right hip replaced    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    x 2 lesions with no change    Cancer ([place] [address] ) 2007    colonic polyps, no surgery done    Deep vein thrombosis (DVT) of left upper extremity ([place] [address] ) 12/11/2017    Diabetes mellitus ([place] [address] )     type 2    Diverticulosis     Edema     in legs on occ    Hypertension     on occ    Language barrier     speaks [place] & broken english    Stroke [facility])      Past Surgical History:   Procedure Laterality Date    COLON SURGERY      COLONOSCOPY      COLONOSCOPY N/A 11/2/2016    Procedure: COLONOSCOPY;  Surgeon: Karen Vallecillo MD;  Location: [facility] GI LAB; Service:     ESOPHAGOGASTRODUODENOSCOPY N/A 11/2/2016    Procedure: ESOPHAGOGASTRODUODENOSCOPY (EGD); Surgeon: Karen Vallecillo MD;  Location: [facility] GI LAB; Service:     JOINT REPLACEMENT Right 02/2015    hip    JOINT REPLACEMENT Left     knee    JOINT REPLACEMENT Right     knee    [place] REVISE MEDIAN N/CARPAL TUNNEL SURG Left 1/28/2016    Procedure: RELEASE CARPAL TUNNEL;  Surgeon: Leonides Arreola MD;  Location: [facility] MAIN OR;  Service: Orthopedics    TUBAL LIGATION      VARICOSE VEIN SURGERY Bilateral      Family History   Problem Relation Age of Onset    Cancer Mother      throat     I have reviewed and agree with the history as documented      Social History   Substance Use Topics    Smoking status: Former Smoker     Packs/day: 0 25     Years: 10 00     Types: Cigarettes     Quit date: 1950    Smokeless tobacco: Never Used    Alcohol use No      Comment: socially      Review of Systems   Constitutional: Positive for activity change, appetite change and fatigue  Negative for chills, fever and unexpected weight change  HENT: Negative for congestion, rhinorrhea and sore throat  Eyes: Negative for redness and visual disturbance  Respiratory: Negative for cough and shortness of breath  Cardiovascular: Negative for chest pain and leg swelling  Gastrointestinal: Negative for abdominal pain, constipation, diarrhea, nausea and vomiting  Endocrine: Negative for cold intolerance and heat intolerance  Genitourinary: Negative for dysuria, frequency and urgency  Musculoskeletal: Negative for back pain  Skin: Negative for rash  Neurological: Negative for dizziness, syncope and numbness  All other systems reviewed and are negative  Physical Exam  ED Triage Vitals   Temperature Pulse Respirations Blood Pressure SpO2   05/24/18 1627 05/24/18 1628 05/24/18 1628 05/24/18 1628 05/24/18 1628   98 1 °F (36 7 °C) 79 18 140/89 97 %      Temp Source Heart Rate Source Patient Position - Orthostatic VS BP Location FiO2 (%)   05/24/18 1627 05/24/18 1628 05/24/18 1628 05/24/18 1628 --   Oral Monitor Lying Right arm       Pain Score       05/24/18 1628       No Pain         Physical Exam   Constitutional: She is oriented to person, place, and time  She appears well-developed  No distress  HENT:   Head: Normocephalic and atraumatic  Nose: Nose normal    Mouth/Throat: No oropharyngeal exudate  Eyes: Conjunctivae and EOM are normal  Pupils are equal, round, and reactive to light  Neck: Normal range of motion  Neck supple  Cardiovascular: Normal rate, regular rhythm and normal heart sounds  Exam reveals no gallop  No murmur heard  Pulmonary/Chest: Effort normal and breath sounds normal  She has no wheezes  She exhibits no tenderness  Abdominal: Soft  Bowel sounds are normal  She exhibits no distension  There is no tenderness   There is no rebound and no guarding  Musculoskeletal: Normal range of motion  She exhibits no tenderness or deformity  Lymphadenopathy:     She has no cervical adenopathy  Neurological: She is alert and oriented to person, place, and time  No cranial nerve deficit  Skin: Skin is warm and dry  No rash noted  She is not diaphoretic  No erythema  Psychiatric: She has a normal mood and affect  Nursing note and vitals reviewed      ED Medications  Medications   sodium chloride 0 9 % bolus 500 mL (500 mL Intravenous New Bag 5/24/18 1730)     Diagnostic Studies  Results Reviewed     Procedure Component Value Units Date/Time    Protime-INR [[id number]]  (Abnormal) Collected:  05/24/18 1727    Lab Status:  Final result Specimen:  Blood from Arm, Right Updated:  05/24/18 1857     Protime 14 7 (H) seconds      INR 1 14    APTT [[id number]]  (Normal) Collected:  05/24/18 1727    Lab Status:  Final result Specimen:  Blood from Arm, Right Updated:  05/24/18 1857     PTT 26 seconds     Urine Microscopic [[id number]]  (Normal) Collected:  05/24/18 1754    Lab Status:  Final result Specimen:  Urine from Urine, Other Updated:  05/24/18 1835     RBC, UA None Seen /hpf      WBC, UA None Seen /hpf      Epithelial Cells None Seen /hpf      Bacteria, UA None Seen /hpf      Hyaline Casts, UA None Seen /lpf     Comprehensive metabolic panel [[id number]]  (Abnormal) Collected:  05/24/18 1727    Lab Status:  Final result Specimen:  Blood from Line, Venous Updated:  05/24/18 1827     Sodium 136 mmol/L      Potassium 4 4 mmol/L      Chloride 102 mmol/L      CO2 26 mmol/L      Anion Gap 8 mmol/L      BUN 22 mg/dL      Creatinine 1 13 mg/dL      Glucose 97 mg/dL      Calcium 9 8 mg/dL      AST 89 (H) U/L      ALT 46 U/L      Alkaline Phosphatase 103 U/L      Total Protein 8 0 g/dL      Albumin 3 2 (L) g/dL      Total Bilirubin 0 58 mg/dL      eGFR 45 ml/min/1 73sq m     Narrative:         National Kidney Disease Education Program recommendations are as follows:  GFR calculation is accurate only with a steady state creatinine  Chronic Kidney disease less than 60 ml/min/1 73 sq  meters  Kidney failure less than 15 ml/min/1 73 sq  meters  Magnesium [[id number]]  (Normal) Collected:  05/24/18 1727    Lab Status:  Final result Specimen:  Blood from Line, Venous Updated:  05/24/18 1827     Magnesium 2 2 mg/dL     Lactic acid x2 [[id number]]  (Normal) Collected:  05/24/18 1727    Lab Status:  Final result Specimen:  Blood from Line, Venous Updated:  05/24/18 1815     LACTIC ACID 1 0 mmol/L     Narrative:         Result may be elevated if tourniquet was used during collection  CBC and differential [[id number]]  (Abnormal) Collected:  05/24/18 1727    Lab Status:  Final result Specimen:  Blood from Line, Venous Updated:  05/24/18 1753     WBC 7 28 Thousand/uL      RBC 3 60 (L) Million/uL      Hemoglobin 10 3 (L) g/dL      Hematocrit 31 9 (L) %      MCV 89 fL      MCH 28 6 pg      MCHC 32 3 g/dL      RDW 14 1 %      MPV 10 8 fL      Platelets 708 (L) Thousands/uL      nRBC 0 /100 WBCs      Neutrophils Relative 64 %      Lymphocytes Relative 17 %      Monocytes Relative 10 %      Eosinophils Relative 9 (H) %      Basophils Relative 0 %      Neutrophils Absolute 4 65 Thousands/µL      Lymphocytes Absolute 1 26 Thousands/µL      Monocytes Absolute 0 70 Thousand/µL      Eosinophils Absolute 0 64 (H) Thousand/µL      Basophils Absolute 0 01 Thousands/µL     POCT urinalysis dipstick [[id number]]  (Normal) Resulted:  05/24/18 1748    Lab Status:  Final result Specimen:  Urine Updated:  05/24/18 1750     Color, UA Yellow    Blood culture #2 [[id number]] Collected:  05/24/18 1727    Lab Status: In process Specimen:  Blood from Line, Venous Updated:  05/24/18 1748    Blood culture #1 [[id number]] Collected:  05/24/18 1720    Lab Status:   In process Specimen:  Blood from Line, Venous Updated:  05/24/18 1748    ED Urine Macroscopic [[id number]]  (Abnormal) Collected:  05/24/18 1754    Lab Status:  Final result Specimen:  Urine Updated:  05/24/18 1748     Color, UA Yellow     Clarity, UA Clear     pH, UA 5 5     Leukocytes, UA Negative     Nitrite, UA Negative     Protein, UA 30 (1+) (A) mg/dl      Glucose, UA Negative mg/dl      Ketones, UA Negative mg/dl      Urobilinogen, UA 0 2 E U /dl      Bilirubin, UA Negative     Blood, UA Negative     Specific Gravity, UA 1 020    Narrative:       CLINITEK RESULT    Fingerstick Glucose (POCT) [[id number]]  (Normal) Collected:  05/24/18 1644    Lab Status:  Final result Updated:  05/24/18 1645     POC Glucose 105 mg/dl         CT head wo contrast   ED Interpretation by Leonel Langford MD (05/24 1836)   No evidence of acuta intracranial abnormality, as interpreted by me  Final Result by Mayra Herzog MD (05/24 8638)      No acute intracranial abnormality  Workstation performed: [id number]         XR chest pa & lateral   ED Interpretation by Leonel Langford MD (05/24 3414)   Chest xray shows no evidence of focal consolidation, pleural effusion, pneumothorax, or other acute pulmonary pathology as interpreted by me  Final Result by Lor Mendez MD (05/24 5862)      No acute cardiopulmonary disease  Workstation performed: [id number]           Procedures  ECG 12 Lead Documentation  Date/Time: 5/24/2018 4:32 PM  Performed by: Tammie Ziegler  Authorized by: Tammie Ziegler     Indications / Diagnosis:  Weakness  ECG reviewed by me, the ED Provider: yes    Patient location:  ED  Comments:      Normal sinus rhythm, rate 78, right bundle branch block, left anterior fascicular block, nonspecific T-wave abnormality, LVH by voltage  When compared to previous EKG 05/08/2018, changes are noted  Phone Consults  ED Phone Contact    ED Course     A/P: This is a 80 y o  female who presents to the ED for evaluation of altered mental status  History is limited  Septic labs, UA via Cath, CXR, CTH  IVF  Reevaluate   FSG at bedside 105 and therefore hypoglycemia can be ruled out  Labs and CT unremarkable  Etiology is unclear  Will send back to facility  I personally discussed return precautions with this patient and family  I provided the patient with written discharge instructions and particularly highlighted specific areas of interest to this patient, including but not limited to: medications for symptom managment, follow up recommendations, and return precautions  Patient and family are in agreement with this plan as outlined above  MDM  CritCare Time    Disposition  Final diagnoses:   Generalized weakness     Time reflects when diagnosis was documented in both MDM as applicable and the Disposition within this note     Time User Action Codes Description Comment    5/24/2018  7:30 PM Any Gentile Add [R53 1] Generalized weakness       ED Disposition     ED Disposition Condition Comment    Discharge  [address] discharge to home/self care  Condition at discharge: Stable        Follow-up Information     Follow up With Specialties Details Why Mario Anderson MD Internal Medicine Schedule an appointment as soon as possible for a visit in 2 days As needed, If symptoms worsen [address] [facility] [address]  [phone]            Patient's Medications   Discharge Prescriptions    No medications on file     No discharge procedures on file  ED Provider  Attending physically available and evaluated [address]  I managed the patient along with the ED Attending      Electronically Signed by         Hoa eMjía MD  05/24/18 6803

## 2018-05-24 NOTE — PROGRESS NOTES
Heart Failure Office Visit   Cb Schneider 80 y o  female MRN: [id number]    HPI  Cb Schneider is a 80y o  year old with a known past medical history  HTN, DM2, Mitral valve replacement 12/11/17 at [facility], TIA 1/2018, [facility] [employer] dual chamber PPM inserted at [facility], CHF  LUE DVT on Coumadin, CKD base line creat 1 4-1 5, colon CA  Ms Renee Cruz was admitted to [address] [place] [address] on 4//18 with a CVA  She presented with  with left arm weakness and gait instability that started 3 days prior  She had fallen and hit her head  Afterwards, she continued to have difficulty ambulating and involuntary left arm movement  She did not wish to come to ER  After being seen in cardiac rehab and abnormal gait and arm movement noted  She presented to the ER and was found to have a subacute to acute infarct in the posterior right parietal region was noted on CT scan  Ms Renee Cruz was transferred to [facility] for further management  Embolic stroke was unclear etiology  She underwent a thrombectomy by Dr Brianna Araujo on 4/23/18  Meningioma found on CT of head  No intervention by neurosurgery  LORI showed LVEf 45%, mod concentric hypertrophy, LA mildly dilated,  There was a medium-sized patent foramen ovale measuring 3 mm  The tricuspid regurgitation jet was directly oriented at the patent foramen ovale resulting in baseline right to left shunt with accelerated flow with provocation Mitral valve bioprosthesis was present  The prosthesis was well-seated without stenosis and with trivial central (physiological) regurgitation  Mean transmitral gradient was 4 mmHg  mod TR PAP 40mm HG     no evidence of atrial thrombus or vegetations per radiology, PFO 3mm  - initiated anticoagulation with Eliquis, cleared by neurology    Continue statin therapy (Lipitor) - will continue to hold off anti-platelet therapy until cleared by neurosurgery on follow-up appointment in the outpatient setting  During this time she did experience metabolic encephalopathy which was felt to be multifactorial   In the setting of CVA and Keppra side effect  SSS: s/p pacer, interrogated recently in acute care, no A-fib found at that time  Creat at discharge 1 54  She was discharged to acute rehabilitation  Ms Araseli Abebe was discharged to [facility] for further rehabilitation  Today I had the pleasure of seeing Susan Tapia at [facility]  She is sitting in a wheelchair and is very lethargic and difficult to arouse  Nursing staff place patient back to bed  Daughter arrived and found her mental status to be abnormal   Dr Odilia Patton notified  VS her WNL, blood sugar WNL  Lab studies 5/24/18 sodium 143 potassium 4 2, BUN 29, creat 1 30      Patient Active Problem List   Diagnosis    Deep vein thrombosis (DVT) of left upper extremity ([address] )    H/O mitral valve replacement    [facility]    Controlled type 2 diabetes mellitus, without long-term current use of insulin ([facility] )    Pacemaker    Acid reflux    HTN (hypertension)    Constipation    CHADWICK (acute kidney injury) ([facility] )    Systolic congestive heart failure ([facility] )    History of ischemic right MCA stroke       ROS- cannot obtain due to mental status      Objective:   Vitals:  130/72 HR 79 BPM   Weight 146 pounds   There were no vitals filed for this visit  There is no height or weight on file to calculate BMI      Wt Readings from Last 3 Encounters:   05/19/18 69 5 kg (153 lb 3 5 oz)   04/30/18 69 9 kg (154 lb 1 6 oz)   04/20/18 68 5 kg (151 lb)         Physical Exam:  GEN: Alejandrina Khanna appears well, lethargic difficult to arouse   HEENT: pupils equal, round, and reactive to light; extraocular muscles intact  NECK: supple, no carotid bruits   HEART: regular rhythm, normal S1 and S2, no murmurs, clicks, gallops or rubs, JVP is flat    LUNGS: clear to auscultation bilaterally; no wheezes, rales, or rhonchi   ABDOMEN: normal bowel sounds, soft, no tenderness, no distention  EXTREMITIES: peripheral pulses normal; no clubbing, cyanosis, or edema  NEURO: lathargic, not following commands,   SKIN: normal without suspicious lesions on exposed skin      Current Outpatient Prescriptions:     acetaminophen (TYLENOL) 325 mg tablet, Take 650 mg by mouth every 6 (six) hours as needed for mild pain, Disp: , Rfl:     apixaban (ELIQUIS) 2 5 mg, Take 1 tablet (2 5 mg total) by mouth 2 (two) times a day, Disp: , Rfl: 0    atorvastatin (LIPITOR) 80 mg tablet, Take 1 tablet (80 mg total) by mouth daily with dinner, Disp: 30 tablet, Rfl: 0    bisacodyl (DULCOLAX) 10 mg suppository, Insert 1 suppository (10 mg total) into the rectum daily as needed for constipation, Disp: 12 suppository, Rfl: 0    docusate sodium (COLACE) 100 mg capsule, Take 100 mg by mouth 2 (two) times a day, Disp: , Rfl:     ferrous sulfate 300 (60 Fe) mg/5 mL syrup, Take 5 mL (300 mg total) by mouth daily, Disp: 150 mL, Rfl: 0    melatonin 3 mg, Take 2 tablets (6 mg total) by mouth daily at bedtime, Disp: , Rfl: 0    metoprolol tartrate (LOPRESSOR) 50 mg tablet, Take 1 tablet (50 mg total) by mouth every 12 (twelve) hours, Disp: , Rfl: 0    multivitamin-iron-minerals-folic acid (CENTRUM) chewable tablet, Chew 1 tablet daily, Disp: , Rfl:     pantoprazole (PROTONIX) 40 mg tablet, Take 1 tablet (40 mg total) by mouth daily in the early morning, Disp: , Rfl: 0    polyethylene glycol (MIRALAX) 17 g packet, Take 17 g by mouth daily, Disp: , Rfl:     senna (SENOKOT) 8 6 mg, Take 1 tablet (8 6 mg total) by mouth daily at bedtime, Disp: 120 each, Rfl: 0      Labs & Results:        Results from last 7 days  Lab Units 05/18/18  1752   WBC Thousand/uL 5 12   HEMOGLOBIN g/dL 10 2*   HEMATOCRIT % 33 5*   PLATELETS Thousands/uL 104*               Invalid input(s): LABALBU        Assessment/Plan:  1   CVA -ICH & bi-hemispheric nonhemorrhagic ischemic strokes: nonhemorrhagic stroke felt by neurology to be embolic of unclear etiology; per s/p thrombectomy by Dr Bryson Alvarez on 4/23 started on Eliquis 2 5mg BID and Lipitor 80mg daily  2  PFO 3mm will need to follow up with [facility] for referral for possible closure of PFO  3  Chronic diastolic heart failure- Eu volemic and compensated  HR and BP controlled  Continue on Metoprolol tartrate 50mg Q12 hours  Continue on a low sodium diet daily wieghts  Follow up with [facility]  4  CKD III baseline creat 1 4-1 5- creat stable 1 30  5  Sp MV replacement- now on Eliquis 2 5mg BID   6   PPM [facility] follow up with [address], [address]  5/24/2018  4:10 PM

## 2018-05-24 NOTE — ED ATTENDING ATTESTATION
Srinath Green DO, saw and evaluated the patient  I have discussed the patient with the resident/non-physician practitioner and agree with the resident's/non-physician practitioner's findings, Plan of Care, and MDM as documented in the resident's/non-physician practitioner's note, except where noted  All available labs and Radiology studies were reviewed  At this point I agree with the current assessment done in the Emergency Department  I have conducted an independent evaluation of this patient including a focused history and a physical exam     ED Note - Julio Gregory 80 y o  female MRN: [id number]  Unit/Bed#: ED 03 Encounter: [id number]    History of Present Illness   HPI  Julio Gregory is a 80 y o  female who presents for evaluation of transient confusion/ altered mental status as noted today by [facility]  Patient with history of multiple strokes and most recently an embolic stroke  No report of fever  No obvious seizure activity reported  Patient unable to provide detailed history of present illness  REVIEW OF SYSTEMS  See HPI for further details  12 systems reviewed and otherwise negative except as noted  Historical Information     PAST MEDICAL HISTORY  Past Medical History:   Diagnosis Date    Acid reflux     on occ    Arthritis     DJD right hip replaced    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    x 2 lesions with no change    Cancer ([facility] ) 2007    colonic polyps, no surgery done    Deep vein thrombosis (DVT) of left upper extremity ([facility] ) 12/11/2017    Diabetes mellitus ([facility] )     type 2    Diverticulosis     Edema     in legs on occ    Hypertension     on occ    Language barrier     speaks [place] & broken english    Stroke [facility])        FAMILY HISTORY  Family History   Problem Relation Age of Onset    Cancer Mother      throat       SOCIAL HISTORY  Social History     Social History    Marital status:       Spouse name: N/A    Number of children: N/A    Years of education: N/A     Social History Main Topics    Smoking status: Former Smoker     Packs/day: 0 25     Years: 10 00     Types: Cigarettes     Quit date: 1950    Smokeless tobacco: Never Used    Alcohol use No      Comment: socially    Drug use: No    Sexual activity: Not Currently     Other Topics Concern    None     Social History Narrative    None       SURGICAL HISTORY  Past Surgical History:   Procedure Laterality Date    COLON SURGERY      COLONOSCOPY      COLONOSCOPY N/A 11/2/2016    Procedure: COLONOSCOPY;  Surgeon: Gina Tian MD;  Location: [facility] GI LAB; Service:     ESOPHAGOGASTRODUODENOSCOPY N/A 11/2/2016    Procedure: ESOPHAGOGASTRODUODENOSCOPY (EGD); Surgeon: Gina Tian MD;  Location: [facility] GI LAB;   Service:     JOINT REPLACEMENT Right 02/2015    hip    JOINT REPLACEMENT Left     knee    JOINT REPLACEMENT Right     knee    TN REVISE MEDIAN N/CARPAL TUNNEL SURG Left 1/28/2016    Procedure: RELEASE CARPAL TUNNEL;  Surgeon: Naga Greene MD;  Location: [facility] MAIN OR;  Service: Orthopedics    TUBAL LIGATION      VARICOSE VEIN SURGERY Bilateral      Meds/Allergies     CURRENT MEDICATIONS    Current Facility-Administered Medications:     sodium chloride 0 9 % bolus 500 mL, 500 mL, Intravenous, Once, Johana Li MD    Current Outpatient Prescriptions:     acetaminophen (TYLENOL) 325 mg tablet, Take 650 mg by mouth every 6 (six) hours as needed for mild pain, Disp: , Rfl:     apixaban (ELIQUIS) 2 5 mg, Take 1 tablet (2 5 mg total) by mouth 2 (two) times a day, Disp: , Rfl: 0    atorvastatin (LIPITOR) 80 mg tablet, Take 1 tablet (80 mg total) by mouth daily with dinner, Disp: 30 tablet, Rfl: 0    bisacodyl (DULCOLAX) 10 mg suppository, Insert 1 suppository (10 mg total) into the rectum daily as needed for constipation, Disp: 12 suppository, Rfl: 0    docusate sodium (COLACE) 100 mg capsule, Take 100 mg by mouth 2 (two) times a day, Disp: , Rfl:     ferrous sulfate 300 (60 Fe) mg/5 mL syrup, Take 5 mL (300 mg total) by mouth daily (Patient taking differently: Take 325 mg by mouth daily  ), Disp: 150 mL, Rfl: 0    melatonin 3 mg, Take 2 tablets (6 mg total) by mouth daily at bedtime, Disp: , Rfl: 0    metoprolol tartrate (LOPRESSOR) 50 mg tablet, Take 1 tablet (50 mg total) by mouth every 12 (twelve) hours, Disp: , Rfl: 0    multivitamin-iron-minerals-folic acid (CENTRUM) chewable tablet, Chew 1 tablet daily, Disp: , Rfl:     pantoprazole (PROTONIX) 40 mg tablet, Take 1 tablet (40 mg total) by mouth daily in the early morning, Disp: , Rfl: 0    polyethylene glycol (MIRALAX) 17 g packet, Take 17 g by mouth daily, Disp: , Rfl:     senna (SENOKOT) 8 6 mg, Take 1 tablet (8 6 mg total) by mouth daily at bedtime, Disp: 120 each, Rfl: 0    (Not in a hospital admission)    ALLERGIES  Allergies   Allergen Reactions    Heparin      Objective     PHYSICAL EXAM    VITAL SIGNS: Blood pressure 140/89, pulse 79, temperature 98 1 °F (36 7 °C), temperature source Oral, resp  rate 18, SpO2 97 %, not currently breastfeeding      Constitutional:  Appears well developed and well nourished, no acute distress, non-toxic appearance   Eyes:  PERRL, EOMI, conjunctivae pink, sclerae non-icteric, no nystagmus   HENT:  Normocephalic/Atraumatic, no rhinorrhea, mucous membranes moist  Neck: normal range of motion, no tenderness, supple   Respiratory:  No respiratory distress, normal breath sounds  Cardiovascular:  Normal rate, normal rhythm  GI:  Soft, non-tender, non-distended  :  No CVAT, no flank ecchymosis  Musculoskeletal:  No swelling or edema, no tenderness, no deformities  Integument:  Pink, warm, dry, Well hydrated, no rash, no erythema, no bullae   Lymphatic:  No cervical/ tonsillar/ submandibular lymphadenopathy noted   Neurologic:  Awake, Alert & confused but states that she is hungry and would like to eat, CN 2-12 intact, no obvious acute focal neurological deficits  Psychiatric: Speech and behavior appropriate       ED COURSE and MDM:    Assessment/Plan   Assessment:  Alejandrina Khanna is a 80 y o  female presents for evaluation of AMS/ transient confusion    Plan:  Labs, EKG, CT head, CXR, Symptom management, Neurology consult prn  Disposition as appropriate  Portions of the record may have been created with voice recognition software  Occasional wrong word or "sound a like" substitutions may have occurred due to the inherent limitations of voice recognition software       ED Provider  Electronically Signed by

## 2018-05-25 ENCOUNTER — HOSPITAL ENCOUNTER (INPATIENT)
Facility: HOSPITAL | Age: 83
LOS: 3 days | Discharge: NURSING, REHAB OR LONG-TERM CARE | DRG: 871 | End: 2018-05-28
Attending: EMERGENCY MEDICINE | Admitting: INTERNAL MEDICINE
Payer: MEDICARE

## 2018-05-25 ENCOUNTER — APPOINTMENT (EMERGENCY)
Dept: RADIOLOGY | Facility: HOSPITAL | Age: 83
DRG: 871 | End: 2018-05-25
Payer: MEDICARE

## 2018-05-25 DIAGNOSIS — N39.0 UTI (URINARY TRACT INFECTION): ICD-10-CM

## 2018-05-25 DIAGNOSIS — M79.602 LEFT ARM PAIN: ICD-10-CM

## 2018-05-25 DIAGNOSIS — N30.00 ACUTE CYSTITIS WITHOUT HEMATURIA: ICD-10-CM

## 2018-05-25 DIAGNOSIS — G93.40 ACUTE ENCEPHALOPATHY: ICD-10-CM

## 2018-05-25 DIAGNOSIS — R41.82 ALTERED MENTAL STATUS: ICD-10-CM

## 2018-05-25 DIAGNOSIS — A41.9 SEPSIS (HCC): Primary | ICD-10-CM

## 2018-05-25 PROBLEM — Z86.718 HISTORY OF DVT (DEEP VEIN THROMBOSIS): Status: ACTIVE | Noted: 2018-05-25

## 2018-05-25 PROBLEM — Z86.73 HISTORY OF CVA (CEREBROVASCULAR ACCIDENT): Status: ACTIVE | Noted: 2018-05-25

## 2018-05-25 PROBLEM — Z86.79 HISTORY OF SUBARACHNOID HEMORRHAGE: Status: ACTIVE | Noted: 2018-05-25

## 2018-05-25 PROBLEM — Z95.0 HISTORY OF PACEMAKER: Status: ACTIVE | Noted: 2018-05-25

## 2018-05-25 PROBLEM — I50.22 CHRONIC SYSTOLIC CHF (CONGESTIVE HEART FAILURE) (HCC): Status: ACTIVE | Noted: 2018-05-25

## 2018-05-25 PROBLEM — N18.30 CKD (CHRONIC KIDNEY DISEASE) STAGE 3, GFR 30-59 ML/MIN (HCC): Status: ACTIVE | Noted: 2018-05-25

## 2018-05-25 LAB
ALBUMIN SERPL BCP-MCNC: 2.9 G/DL (ref 3.5–5)
ALP SERPL-CCNC: 97 U/L (ref 46–116)
ALT SERPL W P-5'-P-CCNC: 40 U/L (ref 12–78)
AMMONIA PLAS-SCNC: <10 UMOL/L (ref 11–35)
ANION GAP SERPL CALCULATED.3IONS-SCNC: 8 MMOL/L (ref 4–13)
APTT PPP: 23 SECONDS (ref 24–36)
AST SERPL W P-5'-P-CCNC: 70 U/L (ref 5–45)
ATRIAL RATE: 98 BPM
BACTERIA UR QL AUTO: ABNORMAL /HPF
BASOPHILS # BLD AUTO: 0.01 THOUSANDS/ΜL (ref 0–0.1)
BASOPHILS NFR BLD AUTO: 0 % (ref 0–1)
BILIRUB SERPL-MCNC: 0.62 MG/DL (ref 0.2–1)
BILIRUB UR QL STRIP: NEGATIVE
BUN SERPL-MCNC: 19 MG/DL (ref 5–25)
CALCIUM SERPL-MCNC: 9.1 MG/DL (ref 8.3–10.1)
CHLORIDE SERPL-SCNC: 105 MMOL/L (ref 100–108)
CLARITY UR: ABNORMAL
CO2 SERPL-SCNC: 24 MMOL/L (ref 21–32)
COLOR UR: YELLOW
COLOR, POC: YELLOW
CREAT SERPL-MCNC: 1.1 MG/DL (ref 0.6–1.3)
EOSINOPHIL # BLD AUTO: 0.14 THOUSAND/ΜL (ref 0–0.61)
EOSINOPHIL NFR BLD AUTO: 2 % (ref 0–6)
ERYTHROCYTE [DISTWIDTH] IN BLOOD BY AUTOMATED COUNT: 14 % (ref 11.6–15.1)
GFR SERPL CREATININE-BSD FRML MDRD: 46 ML/MIN/1.73SQ M
GLUCOSE SERPL-MCNC: 150 MG/DL (ref 65–140)
GLUCOSE SERPL-MCNC: 150 MG/DL (ref 65–140)
GLUCOSE UR STRIP-MCNC: NEGATIVE MG/DL
HCT VFR BLD AUTO: 31.5 % (ref 34.8–46.1)
HGB BLD-MCNC: 10 G/DL (ref 11.5–15.4)
HGB UR QL STRIP.AUTO: ABNORMAL
HYALINE CASTS #/AREA URNS LPF: ABNORMAL /LPF
INR PPP: 1.15 (ref 0.86–1.17)
KETONES UR STRIP-MCNC: ABNORMAL MG/DL
LACTATE SERPL-SCNC: 1 MMOL/L (ref 0.5–2)
LEUKOCYTE ESTERASE UR QL STRIP: ABNORMAL
LYMPHOCYTES # BLD AUTO: 0.61 THOUSANDS/ΜL (ref 0.6–4.47)
LYMPHOCYTES NFR BLD AUTO: 7 % (ref 14–44)
MCH RBC QN AUTO: 28.1 PG (ref 26.8–34.3)
MCHC RBC AUTO-ENTMCNC: 31.7 G/DL (ref 31.4–37.4)
MCV RBC AUTO: 89 FL (ref 82–98)
MONOCYTES # BLD AUTO: 0.65 THOUSAND/ΜL (ref 0.17–1.22)
MONOCYTES NFR BLD AUTO: 7 % (ref 4–12)
NEUTROPHILS # BLD AUTO: 7.35 THOUSANDS/ΜL (ref 1.85–7.62)
NEUTS SEG NFR BLD AUTO: 84 % (ref 43–75)
NITRITE UR QL STRIP: POSITIVE
NON-SQ EPI CELLS URNS QL MICRO: ABNORMAL /HPF
NRBC BLD AUTO-RTO: 0 /100 WBCS
NT-PROBNP SERPL-MCNC: 5526 PG/ML
P AXIS: 58 DEGREES
PH UR STRIP.AUTO: 5.5 [PH] (ref 4.5–8)
PLATELET # BLD AUTO: 115 THOUSANDS/UL (ref 149–390)
PMV BLD AUTO: 10.2 FL (ref 8.9–12.7)
POTASSIUM SERPL-SCNC: 4.4 MMOL/L (ref 3.5–5.3)
PR INTERVAL: 150 MS
PROT SERPL-MCNC: 7.5 G/DL (ref 6.4–8.2)
PROT UR STRIP-MCNC: ABNORMAL MG/DL
PROTHROMBIN TIME: 14.8 SECONDS (ref 11.8–14.2)
QRS AXIS: -60 DEGREES
QRSD INTERVAL: 136 MS
QT INTERVAL: 394 MS
QTC INTERVAL: 497 MS
RBC # BLD AUTO: 3.56 MILLION/UL (ref 3.81–5.12)
RBC #/AREA URNS AUTO: ABNORMAL /HPF
SODIUM SERPL-SCNC: 137 MMOL/L (ref 136–145)
SP GR UR STRIP.AUTO: >=1.03 (ref 1–1.03)
T WAVE AXIS: 80 DEGREES
TROPONIN I SERPL-MCNC: <0.02 NG/ML
UROBILINOGEN UR QL STRIP.AUTO: 0.2 E.U./DL
VENTRICULAR RATE: 96 BPM
WBC # BLD AUTO: 8.77 THOUSAND/UL (ref 4.31–10.16)
WBC #/AREA URNS AUTO: ABNORMAL /HPF

## 2018-05-25 PROCEDURE — 87040 BLOOD CULTURE FOR BACTERIA: CPT | Performed by: EMERGENCY MEDICINE

## 2018-05-25 PROCEDURE — 71250 CT THORAX DX C-: CPT

## 2018-05-25 PROCEDURE — 99285 EMERGENCY DEPT VISIT HI MDM: CPT

## 2018-05-25 PROCEDURE — 84484 ASSAY OF TROPONIN QUANT: CPT | Performed by: EMERGENCY MEDICINE

## 2018-05-25 PROCEDURE — 36415 COLL VENOUS BLD VENIPUNCTURE: CPT | Performed by: EMERGENCY MEDICINE

## 2018-05-25 PROCEDURE — 87086 URINE CULTURE/COLONY COUNT: CPT

## 2018-05-25 PROCEDURE — 93005 ELECTROCARDIOGRAM TRACING: CPT

## 2018-05-25 PROCEDURE — 81001 URINALYSIS AUTO W/SCOPE: CPT

## 2018-05-25 PROCEDURE — 96365 THER/PROPH/DIAG IV INF INIT: CPT

## 2018-05-25 PROCEDURE — 83605 ASSAY OF LACTIC ACID: CPT | Performed by: EMERGENCY MEDICINE

## 2018-05-25 PROCEDURE — 70450 CT HEAD/BRAIN W/O DYE: CPT

## 2018-05-25 PROCEDURE — 83880 ASSAY OF NATRIURETIC PEPTIDE: CPT | Performed by: EMERGENCY MEDICINE

## 2018-05-25 PROCEDURE — 80053 COMPREHEN METABOLIC PANEL: CPT | Performed by: EMERGENCY MEDICINE

## 2018-05-25 PROCEDURE — 85730 THROMBOPLASTIN TIME PARTIAL: CPT | Performed by: EMERGENCY MEDICINE

## 2018-05-25 PROCEDURE — 84484 ASSAY OF TROPONIN QUANT: CPT | Performed by: PHYSICIAN ASSISTANT

## 2018-05-25 PROCEDURE — 85610 PROTHROMBIN TIME: CPT | Performed by: EMERGENCY MEDICINE

## 2018-05-25 PROCEDURE — 73060 X-RAY EXAM OF HUMERUS: CPT

## 2018-05-25 PROCEDURE — 82948 REAGENT STRIP/BLOOD GLUCOSE: CPT

## 2018-05-25 PROCEDURE — 73090 X-RAY EXAM OF FOREARM: CPT

## 2018-05-25 PROCEDURE — 93010 ELECTROCARDIOGRAM REPORT: CPT | Performed by: INTERNAL MEDICINE

## 2018-05-25 PROCEDURE — 85025 COMPLETE CBC W/AUTO DIFF WBC: CPT | Performed by: EMERGENCY MEDICINE

## 2018-05-25 PROCEDURE — 87186 SC STD MICRODIL/AGAR DIL: CPT

## 2018-05-25 PROCEDURE — 82140 ASSAY OF AMMONIA: CPT | Performed by: EMERGENCY MEDICINE

## 2018-05-25 PROCEDURE — 87077 CULTURE AEROBIC IDENTIFY: CPT

## 2018-05-25 PROCEDURE — 99223 1ST HOSP IP/OBS HIGH 75: CPT | Performed by: HOSPITALIST

## 2018-05-25 PROCEDURE — 74176 CT ABD & PELVIS W/O CONTRAST: CPT

## 2018-05-25 RX ORDER — ATORVASTATIN CALCIUM 80 MG/1
80 TABLET, FILM COATED ORAL
Status: DISCONTINUED | OUTPATIENT
Start: 2018-05-26 | End: 2018-05-28 | Stop reason: HOSPADM

## 2018-05-25 RX ORDER — POLYETHYLENE GLYCOL 3350 17 G/17G
17 POWDER, FOR SOLUTION ORAL DAILY
Status: DISCONTINUED | OUTPATIENT
Start: 2018-05-26 | End: 2018-05-28 | Stop reason: HOSPADM

## 2018-05-25 RX ORDER — PANTOPRAZOLE SODIUM 40 MG/1
40 TABLET, DELAYED RELEASE ORAL
Status: DISCONTINUED | OUTPATIENT
Start: 2018-05-26 | End: 2018-05-28 | Stop reason: HOSPADM

## 2018-05-25 RX ORDER — ACETAMINOPHEN 325 MG/1
650 TABLET ORAL ONCE
Status: DISCONTINUED | OUTPATIENT
Start: 2018-05-25 | End: 2018-05-25

## 2018-05-25 RX ORDER — METOPROLOL TARTRATE 50 MG/1
50 TABLET, FILM COATED ORAL EVERY 12 HOURS SCHEDULED
Status: DISCONTINUED | OUTPATIENT
Start: 2018-05-25 | End: 2018-05-28 | Stop reason: HOSPADM

## 2018-05-25 RX ORDER — ACETAMINOPHEN 650 MG/1
650 SUPPOSITORY RECTAL ONCE
Status: COMPLETED | OUTPATIENT
Start: 2018-05-25 | End: 2018-05-25

## 2018-05-25 RX ORDER — ACETAMINOPHEN 650 MG/1
650 SUPPOSITORY RECTAL ONCE
Status: DISCONTINUED | OUTPATIENT
Start: 2018-05-25 | End: 2018-05-25

## 2018-05-25 RX ORDER — DOCUSATE SODIUM 100 MG/1
100 CAPSULE, LIQUID FILLED ORAL 2 TIMES DAILY
Status: DISCONTINUED | OUTPATIENT
Start: 2018-05-25 | End: 2018-05-28 | Stop reason: HOSPADM

## 2018-05-25 RX ORDER — ACETAMINOPHEN 325 MG/1
650 TABLET ORAL EVERY 6 HOURS
Status: DISCONTINUED | OUTPATIENT
Start: 2018-05-25 | End: 2018-05-28 | Stop reason: HOSPADM

## 2018-05-25 RX ORDER — BISACODYL 10 MG
10 SUPPOSITORY, RECTAL RECTAL DAILY PRN
Status: DISCONTINUED | OUTPATIENT
Start: 2018-05-25 | End: 2018-05-28 | Stop reason: HOSPADM

## 2018-05-25 RX ORDER — SENNOSIDES 8.6 MG
1 TABLET ORAL
Status: DISCONTINUED | OUTPATIENT
Start: 2018-05-25 | End: 2018-05-28 | Stop reason: HOSPADM

## 2018-05-25 RX ORDER — FERROUS SULFATE 325(65) MG
325 TABLET ORAL
Status: ON HOLD | COMMUNITY
End: 2018-08-09 | Stop reason: ALTCHOICE

## 2018-05-25 RX ORDER — FERROUS SULFATE 325(65) MG
325 TABLET ORAL
Status: DISCONTINUED | OUTPATIENT
Start: 2018-05-26 | End: 2018-05-28 | Stop reason: HOSPADM

## 2018-05-25 RX ORDER — SODIUM CHLORIDE 9 MG/ML
50 INJECTION, SOLUTION INTRAVENOUS CONTINUOUS
Status: DISCONTINUED | OUTPATIENT
Start: 2018-05-25 | End: 2018-05-26

## 2018-05-25 RX ADMIN — SODIUM CHLORIDE 250 ML: 0.9 INJECTION, SOLUTION INTRAVENOUS at 18:11

## 2018-05-25 RX ADMIN — APIXABAN 2.5 MG: 2.5 TABLET, FILM COATED ORAL at 22:32

## 2018-05-25 RX ADMIN — ACETAMINOPHEN 650 MG: 650 SUPPOSITORY RECTAL at 16:35

## 2018-05-25 RX ADMIN — INSULIN LISPRO 1 UNITS: 100 INJECTION, SOLUTION INTRAVENOUS; SUBCUTANEOUS at 22:32

## 2018-05-25 RX ADMIN — METOPROLOL TARTRATE 50 MG: 50 TABLET ORAL at 22:33

## 2018-05-25 RX ADMIN — SENNOSIDES 8.6 MG: 8.6 TABLET, FILM COATED ORAL at 22:31

## 2018-05-25 RX ADMIN — ACETAMINOPHEN 650 MG: 325 TABLET ORAL at 22:31

## 2018-05-25 RX ADMIN — CEFTRIAXONE 1000 MG: 1 INJECTION, SOLUTION INTRAVENOUS at 18:11

## 2018-05-25 RX ADMIN — DOCUSATE SODIUM 100 MG: 100 CAPSULE, LIQUID FILLED ORAL at 22:32

## 2018-05-25 RX ADMIN — SODIUM CHLORIDE 50 ML/HR: 0.9 INJECTION, SOLUTION INTRAVENOUS at 22:33

## 2018-05-25 NOTE — ED PROVIDER NOTES
History  Chief Complaint   Patient presents with    Chest Pain     Pt seen and evaluated yesterday for unresponsiveness and altered mental status, sent home  Pt returns today for mid sternal chest pain and left arm pain  Per EMS pt non verbal for them, per family pt has normal conversations with them  On arrival,  Pt non verbal for me, when asked if any bothers her, pt points to chest   Daughter states pt told her she had chest pain  Pt febrile 101 9 rectal temp      This is an 78-year-old female with history diabetes, diverticulosis, DVT of left upper extremity presents today with altered mental status and chest pain  Patient was seen here yesterday regarding altered mental lethargy  She had an extensive workup and a CT head along with chest x-ray and lab work which was negative and she was discharged from the hospital   She currently resides in a nursing facility for rehab   1 of the patients daughters visited and noticed she was more lethargic and febrile  She barely had anything to eat today  Patient was complaining of some chest pain and left arm pain  Patient has a DVT in her left arm which she has been treated with Eliquis  She has chronic pain in her left arm  She was also complaining of some epigastric pain as well  No nausea or vomiting  Patient is unable to provide any history currently  Patient does intermittently follow commands  Does open her eyes intermittently  She is moving all her extremities  Patient does exhibit some tenderness right upper and lower quadrant of her abdomen  Daughters at bedside to go to her area recently  Patient has not been complaining of a headache or neck pain  Patient is moving neck without any difficulty  Patient is slightly tachypneic and tachycardic on arrival   No cough  78-year-old female presenting with altered mental status and chest pain  According to family baseline patient is verbal usually oriented   Currently patient is nonverbal   Concern for possible sepsis unknown source  Patient does have a temp of 101 1°  Also patient is tachycardic  Will give some fluids rectalTylenol CT scan of head along with chest abdomen and pelvis  Will obtain labs admit patient to the hospital                Prior to Admission Medications   Prescriptions Last Dose Informant Patient Reported? Taking?    Multiple Vitamins-Minerals (MULTIVITAMIN ADULT PO)   Yes Yes   Sig: Take 1 tablet by mouth daily   acetaminophen (TYLENOL) 325 mg tablet  Child Yes Yes   Sig: Take 650 mg by mouth every 4 (four) hours as needed for mild pain or fever     apixaban (ELIQUIS) 2 5 mg   No Yes   Sig: Take 1 tablet (2 5 mg total) by mouth 2 (two) times a day   atorvastatin (LIPITOR) 80 mg tablet   No Yes   Sig: Take 1 tablet (80 mg total) by mouth daily with dinner   bisacodyl (DULCOLAX) 10 mg suppository   No No   Sig: Insert 1 suppository (10 mg total) into the rectum daily as needed for constipation   docusate sodium (COLACE) 100 mg capsule  Child Yes Yes   Sig: Take 100 mg by mouth 2 (two) times a day   ferrous sulfate 325 (65 Fe) mg tablet   Yes Yes   Sig: Take 325 mg by mouth daily with breakfast   magnesium hydroxide (MILK OF MAGNESIA) 400 mg/5 mL oral suspension   Yes Yes   Sig: Take by mouth daily as needed for constipation   melatonin 3 mg   No Yes   Sig: Take 2 tablets (6 mg total) by mouth daily at bedtime   metoprolol tartrate (LOPRESSOR) 50 mg tablet   No Yes   Sig: Take 1 tablet (50 mg total) by mouth every 12 (twelve) hours   pantoprazole (PROTONIX) 40 mg tablet   No Yes   Sig: Take 1 tablet (40 mg total) by mouth daily in the early morning   polyethylene glycol (MIRALAX) 17 g packet  Child Yes Yes   Sig: Take 17 g by mouth daily   senna (SENOKOT) 8 6 mg   No Yes   Sig: Take 1 tablet (8 6 mg total) by mouth daily at bedtime      Facility-Administered Medications: None       Past Medical History:   Diagnosis Date    Acid reflux     on occ    Arthritis     DJD right hip replaced    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 with 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    Cancer ([facility] ) 2007    colonic polyps, no surgery done    Deep vein thrombosis (DVT) of left upper extremity ([facility] ) 12/11/2017    Diabetes mellitus ([facility] )     type 2    Diverticulosis     Edema     in legs on occ    Hypertension     on occ    Language barrier     speaks Venezuelan & broken english    Stroke [facility])        Past Surgical History:   Procedure Laterality Date    COLON SURGERY      COLONOSCOPY      COLONOSCOPY N/A 11/2/2016    Procedure: COLONOSCOPY;  Surgeon: Earl Ward MD;  Location: [facility] GI LAB; Service:     ESOPHAGOGASTRODUODENOSCOPY N/A 11/2/2016    Procedure: ESOPHAGOGASTRODUODENOSCOPY (EGD); Surgeon: Earl Ward MD;  Location: [facility] GI LAB; Service:     JOINT REPLACEMENT Right 02/2015    hip    JOINT REPLACEMENT Left     knee    JOINT REPLACEMENT Right     knee    VA REVISE MEDIAN N/CARPAL TUNNEL SURG Left 1/28/2016    Procedure: RELEASE CARPAL TUNNEL;  Surgeon: Eric Betts MD;  Location: [facility] MAIN OR;  Service: Orthopedics    TUBAL LIGATION      VARICOSE VEIN SURGERY Bilateral        Family History   Problem Relation Age of Onset    Cancer Mother      throat     I have reviewed and agree with the history as documented      Social History   Substance Use Topics    Smoking status: Former Smoker     Packs/day: 0 25     Years: 10 00     Types: Cigarettes     Quit date: 1950    Smokeless tobacco: Never Used    Alcohol use No      Comment: socially        Review of Systems   Unable to perform ROS: Mental status change       Physical Exam  ED Triage Vitals   Temperature Pulse Respirations Blood Pressure SpO2   05/25/18 1453 05/25/18 1453 05/25/18 1453 05/25/18 1453 05/25/18 1453   (!) 101 9 °F (38 8 °C) 97 (!) 24 (!) 182/83 96 %      Temp Source Heart Rate Source Patient Position - Orthostatic VS BP Location FiO2 (%)   05/25/18 2130 05/25/18 1453 05/25/18 1453 05/25/18 1453 --   Oral Monitor Lying Left arm       Pain Score       05/25/18 1453       5           Orthostatic Vital Signs  Vitals:    05/25/18 1930 05/25/18 2130 05/25/18 2255 05/26/18 0728   BP: 142/66 143/68 135/62 144/70   Pulse: 90 91 94 78   Patient Position - Orthostatic VS: Lying Lying Lying        Physical Exam   Constitutional:   Appears lethargic   HENT:   Head: Normocephalic  Nose: Nose normal    Mouth/Throat: Oropharynx is clear and moist    Eyes: Conjunctivae and EOM are normal  Pupils are equal, round, and reactive to light  Neck: Normal range of motion  Neck supple  Cardiovascular: Regular rhythm and normal heart sounds  No murmur heard  Sinus tachycardia   Pulmonary/Chest: Effort normal and breath sounds normal  No respiratory distress  She exhibits no tenderness  Patient slightly tachypneic   Abdominal: Soft  She exhibits no distension  There is tenderness  There is no rebound and no guarding  Diffuse tenderness most prominent in right upper and lower quadrant   Musculoskeletal: Normal range of motion  She exhibits no edema or deformity  Tenderness diffusely of left upper extremity which patient has had in the past   Normal radial pulses normal DP pulses  Neurological: She is alert  Patient is nonverbal unable to provide any history  Intermittently does follow commands no obvious cranial nerve deficits  Her   Skin: Skin is warm  Capillary refill takes less than 2 seconds  No rash noted  She is diaphoretic  Vitals reviewed        ED Medications  Medications   apixaban (ELIQUIS) tablet 2 5 mg (2 5 mg Oral Given 5/26/18 0856)   atorvastatin (LIPITOR) tablet 80 mg (not administered)   bisacodyl (DULCOLAX) rectal suppository 10 mg (not administered)   docusate sodium (COLACE) capsule 100 mg (100 mg Oral Not Given 5/26/18 0855)   ferrous sulfate tablet 325 mg (325 mg Oral Given 5/26/18 0710)   metoprolol tartrate (LOPRESSOR) tablet 50 mg (50 mg Oral Given 5/26/18 0855)   pantoprazole (PROTONIX) EC tablet 40 mg (40 mg Oral Given 5/26/18 0700)   polyethylene glycol (MIRALAX) packet 17 g (17 g Oral Not Given 5/26/18 0855)   senna (SENOKOT) tablet 8 6 mg (8 6 mg Oral Given 5/25/18 2231)   sodium chloride 0 9 % infusion (0 mL/hr Intravenous Stopped 5/26/18 1153)   insulin lispro (HumaLOG) 100 units/mL subcutaneous injection 1-5 Units (1 Units Subcutaneous Given 5/26/18 1228)   insulin lispro (HumaLOG) 100 units/mL subcutaneous injection 1-5 Units (1 Units Subcutaneous Given 5/25/18 2232)   cefTRIAXone (ROCEPHIN) IVPB (premix) 1,000 mg (not administered)   acetaminophen (TYLENOL) tablet 650 mg (650 mg Oral Given 5/26/18 0900)   acetaminophen (TYLENOL) rectal suppository 650 mg (650 mg Rectal Given 5/25/18 1635)   sodium chloride 0 9 % bolus 250 mL (0 mL Intravenous Stopped 5/25/18 1920)   cefTRIAXone (ROCEPHIN) IVPB (premix) 1,000 mg (0 mg Intravenous Stopped 5/25/18 1920)       Diagnostic Studies  Results Reviewed     Procedure Component Value Units Date/Time    POCT urinalysis dipstick [[id number]]  (Normal) Resulted:  05/25/18 1739    Lab Status:  Final result Specimen:  Urine Updated:  05/25/18 1838     Color, UA Yellow    Urine Microscopic [[id number]]  (Abnormal) Collected:  05/25/18 1744    Lab Status:  Final result Specimen:  Urine from Urine, Clean Catch Updated:  05/25/18 1807     RBC, UA None Seen /hpf      WBC, UA Innumerable (A) /hpf      Epithelial Cells None Seen /hpf      Bacteria, UA Innumerable (A) /hpf      Hyaline Casts, UA 5-10 (A) /lpf     Urine culture [[id number]] Collected:  05/25/18 1744    Lab Status:   In process Specimen:  Urine from Urine, Clean Catch Updated:  05/25/18 1807    ED Urine Macroscopic [[id number]]  (Abnormal) Collected:  05/25/18 1744    Lab Status:  Final result Specimen:  Urine Updated:  05/25/18 1739     Color, UA Yellow     Clarity, UA Cloudy     pH, UA 5 5     Leukocytes, UA Small (A)     Nitrite, UA Positive (A)     Protein, UA 30 (1+) (A) mg/dl      Glucose, UA Negative mg/dl      Ketones, UA 15 (1+) (A) mg/dl      Urobilinogen, UA 0 2 E U /dl      Bilirubin, UA Negative     Blood, UA Small (A)     Specific Gravity, UA >=1 030    Narrative:       CLINITEK RESULT    Troponin I [[id number]]  (Normal) Collected:  05/25/18 1552    Lab Status:  Final result Specimen:  Blood from Arm, Right Updated:  05/25/18 1635     Troponin I <0 02 ng/mL     Narrative:         [employer] Chemistry analyzer 99% cutoff is > 0 04 ng/mL in network labs    o cTnI 99% cutoff is useful only when applied to patients in the clinical setting of myocardial ischemia  o cTnI 99% cutoff should be interpreted in the context of clinical history, ECG findings and possibly cardiac imaging to establish correct diagnosis  o cTnI 99% cutoff may be suggestive but clearly not indicative of a coronary event without the clinical setting of myocardial ischemia  Comprehensive metabolic panel [[id number]]  (Abnormal) Collected:  05/25/18 1552    Lab Status:  Final result Specimen:  Blood from Arm, Right Updated:  05/25/18 1632     Sodium 137 mmol/L      Potassium 4 4 mmol/L      Chloride 105 mmol/L      CO2 24 mmol/L      Anion Gap 8 mmol/L      BUN 19 mg/dL      Creatinine 1 10 mg/dL      Glucose 150 (H) mg/dL      Calcium 9 1 mg/dL      AST 70 (H) U/L      ALT 40 U/L      Alkaline Phosphatase 97 U/L      Total Protein 7 5 g/dL      Albumin 2 9 (L) g/dL      Total Bilirubin 0 62 mg/dL      eGFR 46 ml/min/1 73sq m     Narrative:         National Kidney Disease Education Program recommendations are as follows:  GFR calculation is accurate only with a steady state creatinine  Chronic Kidney disease less than 60 ml/min/1 73 sq  meters  Kidney failure less than 15 ml/min/1 73 sq  meters      B-type natriuretic peptide [[id number]]  (Abnormal) Collected:  05/25/18 1552    Lab Status:  Final result Specimen:  Blood from Arm, Right Updated:  05/25/18 1632     NT-proBNP 5,526 (H) pg/mL     Ammonia [[id number]]  (Abnormal) Collected:  05/25/18 1552    Lab Status: Final result Specimen:  Blood from Arm, Right Updated:  05/25/18 1630     Ammonia <10 (L) umol/L     Lactic acid, plasma [[id number]]  (Normal) Collected:  05/25/18 1552    Lab Status:  Final result Specimen:  Blood from Arm, Right Updated:  05/25/18 1624     LACTIC ACID 1 0 mmol/L     Narrative:         Result may be elevated if tourniquet was used during collection  Protime-INR [[id number]]  (Abnormal) Collected:  05/25/18 1550    Lab Status:  Final result Specimen:  Blood from Arm, Right Updated:  05/25/18 1621     Protime 14 8 (H) seconds      INR 1 15    APTT [[id number]]  (Abnormal) Collected:  05/25/18 1550    Lab Status:  Final result Specimen:  Blood from Arm, Right Updated:  05/25/18 1621     PTT 23 (L) seconds     CBC and differential [[id number]]  (Abnormal) Collected:  05/25/18 1552    Lab Status:  Final result Specimen:  Blood from Arm, Right Updated:  05/25/18 1612     WBC 8 77 Thousand/uL      RBC 3 56 (L) Million/uL      Hemoglobin 10 0 (L) g/dL      Hematocrit 31 5 (L) %      MCV 89 fL      MCH 28 1 pg      MCHC 31 7 g/dL      RDW 14 0 %      MPV 10 2 fL      Platelets 551 (L) Thousands/uL      nRBC 0 /100 WBCs      Neutrophils Relative 84 (H) %      Lymphocytes Relative 7 (L) %      Monocytes Relative 7 %      Eosinophils Relative 2 %      Basophils Relative 0 %      Neutrophils Absolute 7 35 Thousands/µL      Lymphocytes Absolute 0 61 Thousands/µL      Monocytes Absolute 0 65 Thousand/µL      Eosinophils Absolute 0 14 Thousand/µL      Basophils Absolute 0 01 Thousands/µL     Blood culture #1 [[id number]] Collected:  05/25/18 1549    Lab Status: In process Specimen:  Blood from Arm, Right Updated:  05/25/18 1557    Blood culture #2 [[id number]] Collected:  05/25/18 1549    Lab Status: In process Specimen:  Blood from Arm, Right Updated:  05/25/18 1557                 XR humerus LEFT   Final Result by Larry Mallory MD (05/26 4557)      No acute osseous abnormality            Findings are consistent with emergency provider's preliminary reading                     Workstation performed: [id number]         XR forearm 2 views LEFT   Final Result by Roderick Bynum MD (05/26 0730)      No acute osseous abnormality  Findings are consistent with emergency provider's preliminary reading                     Workstation performed: [id number]         CT chest abdomen pelvis wo contrast   Final Result by Jr Horton MD (05/25 1922)      No acute pathology  Unchanged left lung nodules  Persistent focal thickening of the ascending colon  If not already performed, colonoscopy is again recommended to exclude neoplasm  Colonic diverticulosis  Workstation performed: [id number]         CT head without contrast   Final Result by Jr Horton MD (05/25 1701)      No acute intracranial abnormality  Workstation performed: [id number]         VAS upper limb venous duplex scan, unilateral/limited    (Results Pending)         Procedures  Procedures      Phone Consults  ED Phone Contact    ED Course  ED Course as of May 26 1402   Fri May 25, 2018   1556 Placed an ultrasound-guided IV and the right [facility] 20 gauge    1723 IV had infiltrated in CT scan  Obtained another 20G IV long catheter in right arm above AC  CT scan infiltrated again  Since patient got 2 IV dye loads will do dry scan and can evaluate inpatient  Identification of Seniors at Risk      Most Recent Value   (ISAR) Identification of Seniors at Risk   Before the illness or injury that brought you to the Emergency, did you need someone to help you on a regular basis? 1 Filed at: 05/25/2018 1501   In the last 24 hours, have you needed more help than usual?  1 Filed at: 05/25/2018 1501   Have you been hospitalized for one or more nights during the past 6 months? 1 Filed at: 05/25/2018 1501   In general, do you see well?  0 Filed at: 05/25/2018 1501   In general, do you have serious problems with your memory?   1 Filed at: 05/25/2018 1501   Do you take more than three different medications every day? 1 Filed at: 05/25/2018 1501   ISAR Score  5 Filed at: 05/25/2018 1501                          MDM  CritCare Time    Disposition  Final diagnoses:   Sepsis ([facility] )   UTI (urinary tract infection)   Altered mental status     Time reflects when diagnosis was documented in both MDM as applicable and the Disposition within this note     Time User Action Codes Description Comment    5/25/2018  6:47 PM Ophelia Schumacher Tamikajohnnie Garcia U  8  [A41 9] Sepsis ([facility] )     5/25/2018  6:47 PM Sara Liz 61 Add [N39 0] UTI (urinary tract infection)     5/25/2018  6:47 PM Sara Liz 61 Add [R41 82] Altered mental status     5/26/2018 10:24 AM Thai aMs Add [M11 602] Left arm pain       ED Disposition     ED Disposition Condition Comment    Admit  Case was discussed with RAFAEL and the patient's admission status was agreed to be Admission Status: inpatient status to the service of Dr Marsha Carpenter   Follow-up Information    None         Current Discharge Medication List      CONTINUE these medications which have NOT CHANGED    Details   acetaminophen (TYLENOL) 325 mg tablet Take 650 mg by mouth every 4 (four) hours as needed for mild pain or fever        apixaban (ELIQUIS) 2 5 mg Take 1 tablet (2 5 mg total) by mouth 2 (two) times a day  Refills: 0    Associated Diagnoses: Pacemaker;  History of ischemic right MCA stroke; Deep vein thrombosis (DVT) of left upper extremity (HCC)      atorvastatin (LIPITOR) 80 mg tablet Take 1 tablet (80 mg total) by mouth daily with dinner  Qty: 30 tablet, Refills: 0    Associated Diagnoses: CVA (cerebral vascular accident) ([facility] )      docusate sodium (COLACE) 100 mg capsule Take 100 mg by mouth 2 (two) times a day      ferrous sulfate 325 (65 Fe) mg tablet Take 325 mg by mouth daily with breakfast      magnesium hydroxide (MILK OF MAGNESIA) 400 mg/5 mL oral suspension Take by mouth daily as needed for constipation      melatonin 3 mg Take 2 tablets (6 mg total) by mouth daily at bedtime  Refills: 0    Associated Diagnoses: Insomnia      metoprolol tartrate (LOPRESSOR) 50 mg tablet Take 1 tablet (50 mg total) by mouth every 12 (twelve) hours  Refills: 0    Associated Diagnoses: HTN (hypertension)      Multiple Vitamins-Minerals (MULTIVITAMIN ADULT PO) Take 1 tablet by mouth daily      pantoprazole (PROTONIX) 40 mg tablet Take 1 tablet (40 mg total) by mouth daily in the early morning  Refills: 0    Associated Diagnoses: Acid reflux      polyethylene glycol (MIRALAX) 17 g packet Take 17 g by mouth daily      senna (SENOKOT) 8 6 mg Take 1 tablet (8 6 mg total) by mouth daily at bedtime  Qty: 120 each, Refills: 0    Associated Diagnoses: Constipation      bisacodyl (DULCOLAX) 10 mg suppository Insert 1 suppository (10 mg total) into the rectum daily as needed for constipation  Qty: 12 suppository, Refills: 0    Associated Diagnoses: Constipation           No discharge procedures on file  ED Provider  Attending physically available and evaluated Jeff Christopher BARNETT managed the patient along with the ED Attending      Electronically Signed by         Belén Chun MD  05/26/18 9882

## 2018-05-25 NOTE — DISCHARGE INSTRUCTIONS
Please discuss the possible need for outpatient EEG for monitoring of possible seizures  Weakness   WHAT YOU NEED TO KNOW:   Weakness is a loss of muscle strength  It may be caused by brain, nerve, or muscle problems  Physical and mental conditions such as heart problems, pregnancy, dehydration, or depression may also cause weakness  Reactions to certain drugs can cause weakness  Parts of your body may become weak if you need to wear a cast or splint or have been on bed rest for a long time  DISCHARGE INSTRUCTIONS:   Call 911 for any of the following:   · You have any of the following signs of a stroke:      ¨ Numbness or drooping on one side of your face     ¨ Weakness in an arm or leg    ¨ Confusion or difficulty speaking    ¨ Dizziness, a severe headache, or vision loss    · You lose feeling in your weakened body area  · You have electric shock-like feelings down your arms and legs when you flex or move your neck  · You have sudden or increased trouble speaking, swallowing, or breathing  Return to the emergency department if:   · You have severe pain in your back, arms, or legs that worsens  · You have sudden or worsened muscle weakness or loss of movement  · You are not able to control when you urinate or have a bowel movement  Contact your healthcare provider if:   · You feel depressed or anxious  · You have questions or concerns about your condition or care  Manage weakness:   · Use assistive devices as directed  These help protect you from injury  Examples include a walker or cane  Have someone install handrails in your home  These will help you get out of a bathtub or stand up from a toilet  Use a shower chair so you can sit while you shower  Sit down on the toilet or another chair to dry off and put on your clothes  Get help going up and down stairs if your legs are weak  · Go to physical or occupational therapy if directed    A physical therapist can teach you exercises to help strengthen weak muscles  An occupational therapist can show you ways to do your daily activities more easily  For example, light forks and spoons can be easier to use if you have hand weakness  You may also learn ways to organize your household items so you are not moving heavy items  · Balance rest with exercise  Exercise can help increase your muscle strength and energy  Do not exercise for long periods at a time  Take breaks often to rest  Too much exercise can cause muscle strain or make you more tired  Ask your healthcare provider how much exercise is right for you  · Eat a variety of healthy foods  Too much or too little food may cause weakness or tiredness  Ask your healthcare provider what a healthy amount of food is for you  Healthy foods include fruits, vegetables, whole-grain breads, low-fat dairy products, lean meats and fish, nuts, and cooked beans  · Do not smoke  Nicotine and other chemicals in cigarettes and cigars can make your symptoms worse, and can cause lung damage  Ask your healthcare provider for information if you currently smoke and need help to quit  E-cigarettes or smokeless tobacco still contain nicotine  Talk to your healthcare provider before you use these products  · Do not use caffeine, alcohol, or illegal drugs  These may cause muscle twitching, which could lead to worsened weakness  Follow up with your healthcare provider as directed:  Write down your questions so you remember to ask them during your visits  © 2017 [employer] Information is for End User's use only and may not be sold, redistributed or otherwise used for commercial purposes  All illustrations and images included in CareNotes® are the copyrighted property of A [employer]  or [employer]  The above information is an  only  It is not intended as medical advice for individual conditions or treatments   Talk to your doctor, nurse or pharmacist before following any medical regimen to see if it is safe and effective for you

## 2018-05-25 NOTE — ED NOTES
Transport to [facility], report to EMT [place]bot Viktor Langston RN  05/24/18 2034

## 2018-05-25 NOTE — ED ATTENDING ATTESTATION
Mani Rose MD, saw and evaluated the patient  I have discussed the patient with the resident/non-physician practitioner and agree with the resident's/non-physician practitioner's findings, Plan of Care, and MDM as documented in the resident's/non-physician practitioner's note, except where noted  All available labs and Radiology studies were reviewed  At this point I agree with the current assessment done in the Emergency Department  I have conducted an independent evaluation of this patient a history and physical is as follows:  Pt at baseline is functional Pt ws seen yesterday with neg sheth Pt was seen today at [facility] and ws sweating pale  Noted to have fever She appears more lethargic to family Pt co chest pain Histoyr of cva in past PE: alert heart reg lungs decreased BS bilat abd soft nontender ext nad will move feet and arms voice command  MDM: will sheth sepsis and admit    Critical Care Time  CritCare Time    Procedures

## 2018-05-25 NOTE — ED NOTES
Pt's IV checked by myself, blood return freely and flushes freely     Vickye Schaumann, [facility]  05/25/18 1242

## 2018-05-26 ENCOUNTER — APPOINTMENT (INPATIENT)
Dept: NON INVASIVE DIAGNOSTICS | Facility: HOSPITAL | Age: 83
DRG: 871 | End: 2018-05-26
Payer: MEDICARE

## 2018-05-26 PROBLEM — D64.9 ANEMIA: Status: ACTIVE | Noted: 2018-05-26

## 2018-05-26 PROBLEM — K63.9 COLONIC THICKENING: Status: ACTIVE | Noted: 2018-05-26

## 2018-05-26 LAB
ANION GAP SERPL CALCULATED.3IONS-SCNC: 5 MMOL/L (ref 4–13)
BUN SERPL-MCNC: 19 MG/DL (ref 5–25)
CALCIUM SERPL-MCNC: 9.4 MG/DL (ref 8.3–10.1)
CHLORIDE SERPL-SCNC: 109 MMOL/L (ref 100–108)
CO2 SERPL-SCNC: 25 MMOL/L (ref 21–32)
CREAT SERPL-MCNC: 1.02 MG/DL (ref 0.6–1.3)
ERYTHROCYTE [DISTWIDTH] IN BLOOD BY AUTOMATED COUNT: 14 % (ref 11.6–15.1)
GFR SERPL CREATININE-BSD FRML MDRD: 51 ML/MIN/1.73SQ M
GLUCOSE SERPL-MCNC: 101 MG/DL (ref 65–140)
GLUCOSE SERPL-MCNC: 101 MG/DL (ref 65–140)
GLUCOSE SERPL-MCNC: 155 MG/DL (ref 65–140)
GLUCOSE SERPL-MCNC: 158 MG/DL (ref 65–140)
GLUCOSE SERPL-MCNC: 98 MG/DL (ref 65–140)
HCT VFR BLD AUTO: 25.9 % (ref 34.8–46.1)
HCT VFR BLD AUTO: 28 % (ref 34.8–46.1)
HGB BLD-MCNC: 8.4 G/DL (ref 11.5–15.4)
HGB BLD-MCNC: 9.1 G/DL (ref 11.5–15.4)
MCH RBC QN AUTO: 28.8 PG (ref 26.8–34.3)
MCHC RBC AUTO-ENTMCNC: 32.4 G/DL (ref 31.4–37.4)
MCV RBC AUTO: 89 FL (ref 82–98)
PLATELET # BLD AUTO: 115 THOUSANDS/UL (ref 149–390)
PMV BLD AUTO: 10.6 FL (ref 8.9–12.7)
POTASSIUM SERPL-SCNC: 4 MMOL/L (ref 3.5–5.3)
RBC # BLD AUTO: 2.92 MILLION/UL (ref 3.81–5.12)
SODIUM SERPL-SCNC: 139 MMOL/L (ref 136–145)
TROPONIN I SERPL-MCNC: <0.02 NG/ML
TROPONIN I SERPL-MCNC: <0.02 NG/ML
WBC # BLD AUTO: 6.25 THOUSAND/UL (ref 4.31–10.16)

## 2018-05-26 PROCEDURE — 85014 HEMATOCRIT: CPT | Performed by: GENERAL PRACTICE

## 2018-05-26 PROCEDURE — 93971 EXTREMITY STUDY: CPT

## 2018-05-26 PROCEDURE — 99232 SBSQ HOSP IP/OBS MODERATE 35: CPT | Performed by: GENERAL PRACTICE

## 2018-05-26 PROCEDURE — 93971 EXTREMITY STUDY: CPT | Performed by: SURGERY

## 2018-05-26 PROCEDURE — 84484 ASSAY OF TROPONIN QUANT: CPT | Performed by: PHYSICIAN ASSISTANT

## 2018-05-26 PROCEDURE — 85018 HEMOGLOBIN: CPT | Performed by: GENERAL PRACTICE

## 2018-05-26 PROCEDURE — 85027 COMPLETE CBC AUTOMATED: CPT | Performed by: PHYSICIAN ASSISTANT

## 2018-05-26 PROCEDURE — 80048 BASIC METABOLIC PNL TOTAL CA: CPT | Performed by: PHYSICIAN ASSISTANT

## 2018-05-26 PROCEDURE — 82948 REAGENT STRIP/BLOOD GLUCOSE: CPT

## 2018-05-26 RX ADMIN — ACETAMINOPHEN 650 MG: 325 TABLET ORAL at 09:00

## 2018-05-26 RX ADMIN — SENNOSIDES 8.6 MG: 8.6 TABLET, FILM COATED ORAL at 21:28

## 2018-05-26 RX ADMIN — DOCUSATE SODIUM 100 MG: 100 CAPSULE, LIQUID FILLED ORAL at 18:56

## 2018-05-26 RX ADMIN — PANTOPRAZOLE SODIUM 40 MG: 40 TABLET, DELAYED RELEASE ORAL at 07:00

## 2018-05-26 RX ADMIN — INSULIN LISPRO 1 UNITS: 100 INJECTION, SOLUTION INTRAVENOUS; SUBCUTANEOUS at 16:41

## 2018-05-26 RX ADMIN — ACETAMINOPHEN 650 MG: 325 TABLET ORAL at 16:41

## 2018-05-26 RX ADMIN — METOPROLOL TARTRATE 50 MG: 50 TABLET ORAL at 08:55

## 2018-05-26 RX ADMIN — ATORVASTATIN CALCIUM 80 MG: 80 TABLET, FILM COATED ORAL at 16:41

## 2018-05-26 RX ADMIN — APIXABAN 2.5 MG: 2.5 TABLET, FILM COATED ORAL at 18:56

## 2018-05-26 RX ADMIN — CEFTRIAXONE 1000 MG: 1 INJECTION, SOLUTION INTRAVENOUS at 20:02

## 2018-05-26 RX ADMIN — FERROUS SULFATE TAB 325 MG (65 MG ELEMENTAL FE) 325 MG: 325 (65 FE) TAB at 07:10

## 2018-05-26 RX ADMIN — APIXABAN 2.5 MG: 2.5 TABLET, FILM COATED ORAL at 08:56

## 2018-05-26 RX ADMIN — ACETAMINOPHEN 650 MG: 325 TABLET ORAL at 21:28

## 2018-05-26 RX ADMIN — METOPROLOL TARTRATE 50 MG: 50 TABLET ORAL at 21:28

## 2018-05-26 RX ADMIN — INSULIN LISPRO 1 UNITS: 100 INJECTION, SOLUTION INTRAVENOUS; SUBCUTANEOUS at 12:28

## 2018-05-26 NOTE — ASSESSMENT & PLAN NOTE
· Patient on statin and Eliquis  · CT of the head yesterday and today showed no acute intracranial abnormality  · Follow up with Neurology

## 2018-05-26 NOTE — ASSESSMENT & PLAN NOTE
· UA positive for nitrates, leuks, and innumerable bacteria  · Continue ceftriaxone and follow up urine culture and adjust antibiotics accordingly

## 2018-05-26 NOTE — PROGRESS NOTES
Progress Note - Jeff White 1933, 80 y o  female MRN: [id number]    Unit/Bed#: -01 Encounter: [id number]    Primary Care Provider: Osorio Waters MD   Date and time admitted to hospital: 5/25/2018  2:43 PM        * Sepsis [facility])   Assessment & Plan    · Present on admission as evidenced by fever, tachycardia and tachypnea  · UA positive for nitrates, leuks, and innumerable bacteria  Suspect urinary source  · Was given ceftriaxone in the emergency department - continue  · Follow-up urine culture and adjust antibiotics accordingly  · Follow-up blood cultures  · AM Procal        Acute cystitis without hematuria   Assessment & Plan    · UA positive for nitrates, leuks, and innumerable bacteria  · Continue ceftriaxone and follow up urine culture and adjust antibiotics accordingly        Anemia   Assessment & Plan    B/l 9-10  5/25 Hgb 10  5/26 Hgb 8 4, but recheck 9 1  Monitor CBC  Outpt colon as long as Hgb stable        Colonic thickening   Assessment & Plan    Pt needs to f/u outpt with Dr Rony Funez - needs colonoscopy to r/o ca        Chronic systolic CHF (congestive heart failure) ([facility] [address] )   Assessment & Plan    · LORI from April revealed LVEF 45%, PFO and moderate TR  · monitor volume status closely  · IVF stopped on 5/26        History of pacemaker   Assessment & Plan    · Follow-up with Cardiology  · On metoprolol tartrate 50 mg q 12 hours        History of DVT (deep vein thrombosis)   Assessment & Plan    · With history of DVT in the left upper extremity status post pacemaker placement  · Patient is on Eliquis  · Patient does seem to have left upper extremity discomfort  X-ray WNL    Doppler official read pending  · Pain possibly related to deconditioning from CVA        CKD (chronic kidney disease) stage 3, GFR 30-59 ml/min   Assessment & Plan    · Baseline creatinine appears to be around 1 3  · Creatinine below baseline  · BMP in the morning        History of subarachnoid hemorrhage Assessment & Plan    · CT the head yesterday and today showed no acute intracranial abnormality  · Of note, Patient on Eliquis        History of CVA (cerebrovascular accident)   Assessment & Plan    · Patient on statin and Eliquis  · CT of the head yesterday and today showed no acute intracranial abnormality  · Follow up with Neurology        Acute encephalopathy   Assessment & Plan    · Suspect secondary to the above problems  · Patient had CT of the head yesterday and repeat again today, both of which of which showed no acute intracranial abnormality  · Given apparent left upper extremity discomfort, will schedule Tylenol as pain control may help mental status        Controlled type 2 diabetes mellitus, without long-term current use of insulin ([facility])   Assessment & Plan    · Per review of the records and my discussion with the patient's family, patient has a history of diabetes, however when reviewing the med rec from facility, patient is not on any diabetes medications  · Glucose 150 on admission  · Will place on sliding scale and adjust as needed  · HbA1c from April 2018 6 6  No need to repeat now        H/O mitral valve replacement   Assessment & Plan    · Per family, this is a porcine valve  · Patient on Eliquis          VTE Pharmacologic Prophylaxis:   Pharmacologic: Apixaban (Eliquis)  Mechanical VTE Prophylaxis in Place: Yes    Patient Centered Rounds: I have performed bedside rounds with nursing staff today  Discussions with Specialists or Other Care Team Provider: no    Education and Discussions with Family / Patient: family    Time Spent for Care: 30 minutes  More than 50% of total time spent on counseling and coordination of care as described above      Current Length of Stay: 1 day(s)    Current Patient Status: Inpatient   Certification Statement: The patient will continue to require additional inpatient hospital stay due to need to await return of cx    Discharge Plan: pending cx results, pt can return to str    Code Status: Level 1 - Full Code      Subjective:   Per family, pt back at baseline PTA    Objective:     Vitals:   Temp (24hrs), Av 8 °F (37 1 °C), Min:98 °F (36 7 °C), Max:99 8 °F (37 7 °C)    HR:  [76-94] 76  Resp:  [17-18] 18  BP: (119-144)/(60-74) 119/74  SpO2:  [95 %-97 %] 96 %  Body mass index is 26 3 kg/m²  Input and Output Summary (last 24 hours): Intake/Output Summary (Last 24 hours) at 18 1605  Last data filed at 18 1321   Gross per 24 hour   Intake              400 ml   Output              610 ml   Net             -210 ml       Physical Exam:     Physical Exam   Constitutional: No distress  HENT:   Head: Normocephalic and atraumatic  Eyes: Conjunctivae and EOM are normal    Neck: Normal range of motion  Neck supple  Cardiovascular: Normal rate and regular rhythm  Pulmonary/Chest: Effort normal and breath sounds normal  She has no wheezes  She has no rales  Abdominal: Soft  Bowel sounds are normal  She exhibits no distension  There is no tenderness  Musculoskeletal: Normal range of motion  She exhibits no edema  Neurological: She is alert  Ox2   Skin: Skin is warm and dry  She is not diaphoretic         Additional Data:     Labs:      Results from last 7 days  Lab Units 18  1226 18  0539 18  1552   WBC Thousand/uL  --  6 25 8 77   HEMOGLOBIN g/dL 9 1* 8 4* 10 0*   HEMATOCRIT % 28 0* 25 9* 31 5*   PLATELETS Thousands/uL  --  115* 115*   NEUTROS PCT %  --   --  84*   LYMPHS PCT %  --   --  7*   MONOS PCT %  --   --  7   EOS PCT %  --   --  2       Results from last 7 days  Lab Units 18  0539 18  1552   SODIUM mmol/L 139 137   POTASSIUM mmol/L 4 0 4 4   CHLORIDE mmol/L 109* 105   CO2 mmol/L 25 24   BUN mg/dL 19 19   CREATININE mg/dL 1 02 1 10   CALCIUM mg/dL 9 4 9 1   TOTAL PROTEIN g/dL  --  7 5   BILIRUBIN TOTAL mg/dL  --  0 62   ALK PHOS U/L  --  97   ALT U/L  --  40   AST U/L  --  70*   GLUCOSE RANDOM mg/dL 101 150* Results from last 7 days  Lab Units 05/25/18  1550   INR  1 15       * I Have Reviewed All Lab Data Listed Above  * Additional Pertinent Lab Tests Reviewed: [address] Admission Reviewed        Recent Cultures (last 7 days):       Results from last 7 days  Lab Units 05/24/18  1727 05/24/18  1720   BLOOD CULTURE  No Growth at 24 hrs  No Growth at 24 hrs  Last 24 Hours Medication List:     Current Facility-Administered Medications:  acetaminophen 650 mg Oral Q6H Virgle Lob, PA-C   apixaban 2 5 mg Oral BID Virgle Lob, PA-C   atorvastatin 80 mg Oral Daily With [employer], PA-C   bisacodyl 10 mg Rectal Daily PRN [facility] PA-C   cefTRIAXone 1,000 mg Intravenous Q24H Virgle Lob, PA-C   docusate sodium 100 mg Oral BID Virgle Lob, PA-C   ferrous sulfate 325 mg Oral Daily With Breakfast Virgle Lob, PA-C   insulin lispro 1-5 Units Subcutaneous TID AC Virgle Lob, PA-C   insulin lispro 1-5 Units Subcutaneous HS Virgle Lob, PA-C   metoprolol tartrate 50 mg Oral Q12H [facility] PA-C   pantoprazole 40 mg Oral Early Morning Virgle Lob, PA-C   polyethylene glycol 17 g Oral Daily Virgle Lob, PA-C   senna 1 tablet Oral HS Virgle Lob, PA-C        Today, Patient Was Seen By: Moira Kennedy DO    ** Please Note: Dictation voice to text software may have been used in the creation of this document   **

## 2018-05-26 NOTE — ASSESSMENT & PLAN NOTE
· Present on admission as evidenced by fever, tachycardia and tachypnea  · UA positive for nitrates, leuks, and innumerable bacteria  Suspect urinary source      · Was given ceftriaxone in the emergency department - continue  · Follow-up urine culture and adjust antibiotics accordingly  · Follow-up blood cultures  · AM Procal

## 2018-05-26 NOTE — ASSESSMENT & PLAN NOTE
· LORI from April revealed LVEF 45%, PFO and moderate TR  · monitor volume status closely  · IVF stopped on 5/26

## 2018-05-26 NOTE — ASSESSMENT & PLAN NOTE
· With history of DVT in the left upper extremity status post pacemaker placement  · Patient is on Eliquis  · Patient does seem to have left upper extremity discomfort  X-ray results are pending    Will check Doppler

## 2018-05-26 NOTE — H&P
H&P- Izzy Kidney 1933, 80 y o  female MRN: [id number]    Unit/Bed#: -01 Encounter: [id number]    Primary Care Provider: Ana Bello MD   Date and time admitted to hospital: 5/25/2018  2:43 PM        Sepsis [facility])   Assessment & Plan    · Present on admission as evidenced by fever, tachycardia and tachypnea  · UA positive for nitrates, leuks, and innumerable bacteria  Suspect urinary source  · Was given ceftriaxone in the emergency department - continue  · Follow-up urine culture and adjust antibiotics accordingly  · Follow-up blood cultures        Acute cystitis without hematuria   Assessment & Plan    · UA positive for nitrates, leuks, and innumerable bacteria  · Continue ceftriaxone and follow up urine culture and adjust antibiotics accordingly        Acute encephalopathy   Assessment & Plan    · Suspect secondary to the above problems  · Patient had CT of the head yesterday and repeat again today, both of which of which showed no acute intracranial abnormality  · Given apparent left upper extremity discomfort, will schedule Tylenol as pain control may help mental status        History of CVA (cerebrovascular accident)   Assessment & Plan    · Patient on statin and Eliquis  · CT of the head yesterday and today showed no acute intracranial abnormality  · Follow up with Neurology        History of DVT (deep vein thrombosis)   Assessment & Plan    · With history of DVT in the left upper extremity status post pacemaker placement  · Patient is on Eliquis  · Patient does seem to have left upper extremity discomfort  X-ray results are pending    Will check Doppler        History of subarachnoid hemorrhage   Assessment & Plan    · CT the head yesterday and today showed no acute intracranial abnormality  · Of note, Patient on Eliquis        CKD (chronic kidney disease) stage 3, GFR 30-59 ml/min   Assessment & Plan    · Baseline creatinine appears to be around 1 3  · Creatinine below baseline  · BMP in the morning        Controlled type 2 diabetes mellitus, without long-term current use of insulin ([facility] [address] )   Assessment & Plan    · Per review of the records and my discussion with the patient's family, patient has a history of diabetes, however when reviewing the med rec from facility, patient is not on any diabetes medications  · Glucose 150 on admission  · Will place on sliding scale and adjust as needed  · HbA1c from April 2018 6 6  No need to repeat now        H/O mitral valve replacement   Assessment & Plan    · Per family, this is a porcine valve  · Patient on Eliquis        History of pacemaker   Assessment & Plan    · Follow-up with Cardiology  · On metoprolol tartrate 50 mg q 12 hours        Chronic systolic CHF (congestive heart failure) ([facility] [address] )   Assessment & Plan    · LORI from April revealed LVEF 45%, PFO and moderate TR  · Gentle IVF and monitor volume status closely          VTE Prophylaxis: Apixaban (Eliquis)  / sequential compression device   Code Status: FULL CODE - discussed with patient's daughter personally on admission and reviewed prior orders  POLST: POLST form is not discussed and not completed at this time  Discussion with family: 2 of the patient's daughters and 1 son present at bedside    Anticipated Length of Stay:  Patient will be admitted on an Inpatient basis with an anticipated length of stay of  > 2 midnights  Justification for Hospital Stay: plan as per above    Total Time for Visit, including Counseling / Coordination of Care: 1 hour  Greater than 50% of this total time spent on direct patient counseling and coordination of care  Chief Complaint:   lethargy    History of Present Illness:    Cheri Temple is a 80 y o  female with a history of stroke, subarachnoid hemorrhage, left upper extremity DVT, CKD stage 3, sick sinus syndrome status post pacemaker placement, and type 2 diabetes not on insulin who presents with lethargy    Of note, the patient was seen yesterday in the emergency department here for altered mental status and workup did not reveal etiology thus the patient was sent back to her facility  History is obtained via my discussion with the patient's 2 daughters and son present at bedside given the patient's current mental status  They report that today the patient did not want to get out of bed or go to therapy and was not talking much but indicated to them that she had chest pain and left upper extremity pain  They report that the patient's baseline is alert and oriented to person, and place and sometimes time and would be able to normally safe to the current  S  president is  They report that the patient did not tell them that she had chest pain but when asked if she was having any pain used her hand and pointed toward her chest   They also report that she nearly screamed when they touched her left arm in pain  Review of Systems:    Review of Systems   Unable to perform ROS: Mental status change       Past Medical and Surgical History:     Past Medical History:   Diagnosis Date    Acid reflux     on occ    Arthritis     DJD right hip replaced    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 with 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    Cancer ([facility] [address] ) 2007    colonic polyps, no surgery done    Deep vein thrombosis (DVT) of left upper extremity ([facility] [address] ) 12/11/2017    Diabetes mellitus ([facility] [address] )     type 2    Diverticulosis     Edema     in legs on occ    Hypertension     on occ    Language barrier     speaks Turkmen & broken english    Stroke [facility])        Past Surgical History:   Procedure Laterality Date    COLON SURGERY      COLONOSCOPY      COLONOSCOPY N/A 11/2/2016    Procedure: COLONOSCOPY;  Surgeon: Karen Vallecillo MD;  Location[facility] GI LAB; Service:     ESOPHAGOGASTRODUODENOSCOPY N/A 11/2/2016    Procedure: ESOPHAGOGASTRODUODENOSCOPY (EGD); Surgeon: Karen Vallecillo MD;  Location: [facility] GI LAB;   Service:     JOINT REPLACEMENT Right 02/2015    hip    JOINT REPLACEMENT Left     knee    JOINT REPLACEMENT Right     knee    CA REVISE MEDIAN N/CARPAL TUNNEL SURG Left 1/28/2016    Procedure: RELEASE CARPAL TUNNEL;  Surgeon: Naga Greene MD;  Location: [facility] MAIN OR;  Service: Orthopedics    TUBAL LIGATION      VARICOSE VEIN SURGERY Bilateral        Meds/Allergies:    Prior to Admission medications    Medication Sig Start Date End Date Taking?  Authorizing Provider   acetaminophen (TYLENOL) 325 mg tablet Take 650 mg by mouth every 4 (four) hours as needed for mild pain or fever     Yes Historical Provider, MD   apixaban (ELIQUIS) 2 5 mg Take 1 tablet (2 5 mg total) by mouth 2 (two) times a day 5/18/18  Yes Louise Ulloa MD   atorvastatin (LIPITOR) 80 mg tablet Take 1 tablet (80 mg total) by mouth daily with dinner 4/20/18  Yes Michelle Art MD   docusate sodium (COLACE) 100 mg capsule Take 100 mg by mouth 2 (two) times a day   Yes Historical Provider, MD   ferrous sulfate 325 (65 Fe) mg tablet Take 325 mg by mouth daily with breakfast   Yes Historical Provider, MD   melatonin 3 mg Take 2 tablets (6 mg total) by mouth daily at bedtime 5/18/18  Yes Louise Ulloa MD   metoprolol tartrate (LOPRESSOR) 50 mg tablet Take 1 tablet (50 mg total) by mouth every 12 (twelve) hours 5/18/18  Yes Louise Ulloa MD   Multiple Vitamins-Minerals (MULTIVITAMIN ADULT PO) Take 1 tablet by mouth daily   Yes Historical Provider, MD   pantoprazole (PROTONIX) 40 mg tablet Take 1 tablet (40 mg total) by mouth daily in the early morning 5/19/18  Yes Louise Ulloa MD   polyethylene glycol (MIRALAX) 17 g packet Take 17 g by mouth daily   Yes Historical Provider, MD   senna (SENOKOT) 8 6 mg Take 1 tablet (8 6 mg total) by mouth daily at bedtime 5/18/18  Yes Louise Ulloa MD   bisacodyl (DULCOLAX) 10 mg suppository Insert 1 suppository (10 mg total) into the rectum daily as needed for constipation 5/18/18   Richy Esparza MD   ferrous sulfate 300 (60 Fe) mg/5 mL syrup Take 5 mL (300 mg total) by mouth daily  Patient taking differently: Take 325 mg by mouth daily   5/19/18 5/25/18  Duy Beltran MD   multivitamin-iron-minerals-folic acid (CENTRUM) chewable tablet Chew 1 tablet daily  5/25/18  Historical Provider, MD     I have reveiwed home medications using records provided by [facility]  Allergies: Allergies   Allergen Reactions    Heparin        Social History:     Marital Status:    Patient Pre-hospital Living Situation: [facility]  Patient Pre-hospital Diet Restrictions: Regular diet and thin liquids per recs from [facility]  Substance Use History:   History   Alcohol Use No     Comment: socially     History   Smoking Status    Former Smoker    Packs/day: 0 25    Years: 10 00    Types: Cigarettes    Quit date: 1950   Smokeless Tobacco    Never Used     History   Drug Use No       Family History:    Family History   Problem Relation Age of Onset    Cancer Mother      throat       Physical Exam:     Vitals:   Blood Pressure: 143/68 (05/25/18 2130)  Pulse: 91 (05/25/18 2130)  Temperature: 99 8 °F (37 7 °C) (05/25/18 2130)  Temp Source: Oral (05/25/18 2130)  Respirations: 18 (05/25/18 2130)  Height: 5' 4" (162 6 cm) (05/25/18 1453)  Weight - Scale: 69 5 kg (153 lb 3 5 oz) (05/25/18 1453)  SpO2: 95 % (05/25/18 2130)    Physical Exam   Constitutional:   Patient seen lying on her back in ED bed with family members present  She follows some commands but not consistently  Cardiovascular: Normal rate and regular rhythm  Pulmonary/Chest: Effort normal and breath sounds normal  No respiratory distress  She exhibits no tenderness (Patient denies pain with palpation of anterior chest)  Abdominal: Soft  Bowel sounds are normal  There is no tenderness  Musculoskeletal: She exhibits no edema  Patient denies pain with palpation of left upper extremity but appears uncomfortable when attempting to left left upper extremity off the bed     Neurological:   Patient is initially somnolent but arousable and becomes more alert throughout my exam   She is able to move all 4 extremities  She is able to tell me her name but does not answer when asked where she is  Skin: Skin is warm and dry  Psychiatric:   Not agitated appearing   Vitals reviewed  Additional Data:     Lab Results: I have personally reviewed pertinent reports  Results from last 7 days  Lab Units 05/25/18  1552   WBC Thousand/uL 8 77   HEMOGLOBIN g/dL 10 0*   HEMATOCRIT % 31 5*   PLATELETS Thousands/uL 115*   NEUTROS PCT % 84*   LYMPHS PCT % 7*   MONOS PCT % 7   EOS PCT % 2       Results from last 7 days  Lab Units 05/25/18  1552   SODIUM mmol/L 137   POTASSIUM mmol/L 4 4   CHLORIDE mmol/L 105   CO2 mmol/L 24   BUN mg/dL 19   CREATININE mg/dL 1 10   CALCIUM mg/dL 9 1   TOTAL PROTEIN g/dL 7 5   BILIRUBIN TOTAL mg/dL 0 62   ALK PHOS U/L 97   ALT U/L 40   AST U/L 70*   GLUCOSE RANDOM mg/dL 150*       Results from last 7 days  Lab Units 05/25/18  1550   INR  1 15       Results from last 7 days  Lab Units 05/24/18  1644   POC GLUCOSE mg/dl 105           Imaging: I have personally reviewed pertinent reports  CT chest abdomen pelvis wo contrast   Final Result by Jermain Boyer MD (05/25 1922)      No acute pathology  Unchanged left lung nodules  Persistent focal thickening of the ascending colon  If not already performed, colonoscopy is again recommended to exclude neoplasm  Colonic diverticulosis  Workstation performed: [id number]         CT head without contrast   Final Result by Jermain Boyer MD (05/25 1701)      No acute intracranial abnormality                    Workstation performed: [id number]         XR forearm 2 views LEFT    (Results Pending)   XR humerus LEFT    (Results Pending)       EKG, Pathology, and Other Studies Reviewed on Admission:   · EKG: NSR, RBBB    Allscripts / Epic Records Reviewed: Yes     ** Please Note: This note has been constructed using a voice recognition system   **

## 2018-05-26 NOTE — SOCIAL WORK
Patient readmit from [facility]  D/c from [facility] 5/19  CM met with patient,explained CM role with introduction  Patient reports living alone in [facility]   THere is a bathroom on the first floor  CM also called and spoke with daughter Paul Mendoza [phone] to obtain information  Paul Mendoza stated that patient's son stays at patient's house,but is away most of the day  Patient independent , alert and oriented at home, daughter helps with household chores and transportation  Patient has a walker, cane, commode and shower chair  Patient uses walker as needed  Patient has prior [facility] experience with [facility] and prefers them again if needed on discharge  Patient has been to [facility], and daughter stated she would like patient to return there on discharge  ECINreferral sent to [facility]  PCP is Dr Ramirez  Patient has prescription coverage and uses [facility] [place] [place]  Patient has 4 supportive adult children  DAughter Paul Mendoza is POA, CM requested to have family bring in paperwork to be copied and scanned into the computer chart  Patient and daughter deny MH or drug and alcohol treatment  CM reviewed d/c planning process including the following: identifying help at home, patient preference for d/c planning needs, Discharge Lounge, [facility] Meds to Bed program, availability of treatment team to discuss questions or concerns patient and/or family may have regarding understanding medications and recognizing signs and symptoms once discharged  CM also encouraged patient to follow up with all recommended appointments after discharge  Patient advised of importance for patient and family to participate in managing patients medical well being  CM will follow for discharge needs

## 2018-05-26 NOTE — ASSESSMENT & PLAN NOTE
· Present on admission as evidenced by fever, tachycardia and tachypnea  · UA positive for nitrates, leuks, and innumerable bacteria  Suspect urinary source      · Was given ceftriaxone in the emergency department - continue  · Follow-up urine culture and adjust antibiotics accordingly  · Follow-up blood cultures

## 2018-05-26 NOTE — ASSESSMENT & PLAN NOTE
· LORI from April revealed LVEF 45%, PFO and moderate TR  · Gentle IVF and monitor volume status closely

## 2018-05-26 NOTE — ASSESSMENT & PLAN NOTE
· Suspect secondary to the above problems  · Patient had CT of the head yesterday and repeat again today, both of which of which showed no acute intracranial abnormality  · Given apparent left upper extremity discomfort, will schedule Tylenol as pain control may help mental status

## 2018-05-26 NOTE — ASSESSMENT & PLAN NOTE
· With history of DVT in the left upper extremity status post pacemaker placement  · Patient is on Eliquis  · Patient does seem to have left upper extremity discomfort  X-ray WNL    Doppler official read pending  · Pain possibly related to deconditioning from CVA

## 2018-05-26 NOTE — PLAN OF CARE
Problem: DISCHARGE PLANNING - CARE MANAGEMENT  Goal: Discharge to post-acute care or home with appropriate resources  INTERVENTIONS:  - Conduct assessment to determine patient/family and health care team treatment goals, and need for post-acute services based on payer coverage, community resources, and patient preferences, and barriers to discharge  - Address psychosocial, clinical, and financial barriers to discharge as identified in assessment in conjunction with the patient/family and health care team  - Arrange appropriate level of post-acute services according to patient's   needs and preference and payer coverage in collaboration with the physician and health care team  - Communicate with and update the patient/family, physician, and health care team regarding progress on the discharge plan  - Arrange appropriate transportation to post-acute venues  -Assist patient with making referrals to return to subacute rehab    Outcome: Progressing

## 2018-05-26 NOTE — ASSESSMENT & PLAN NOTE
B/l 9-10  5/25 Hgb 10  5/26 Hgb 8 4, but recheck 9 1  Monitor CBC  Outpt colon as long as Hgb stable

## 2018-05-26 NOTE — ASSESSMENT & PLAN NOTE
· Per review of the records and my discussion with the patient's family, patient has a history of diabetes, however when reviewing the med rec from facility, patient is not on any diabetes medications  · Glucose 150 on admission  · Will place on sliding scale and adjust as needed  · HbA1c from April 2018 6 6   No need to repeat now

## 2018-05-26 NOTE — ASSESSMENT & PLAN NOTE
· CT the head yesterday and today showed no acute intracranial abnormality  · Of note, Patient on Eliquis

## 2018-05-27 LAB
ALBUMIN SERPL BCP-MCNC: 2.6 G/DL (ref 3.5–5)
ALP SERPL-CCNC: 90 U/L (ref 46–116)
ALT SERPL W P-5'-P-CCNC: 45 U/L (ref 12–78)
ANION GAP SERPL CALCULATED.3IONS-SCNC: 6 MMOL/L (ref 4–13)
AST SERPL W P-5'-P-CCNC: 57 U/L (ref 5–45)
BACTERIA UR CULT: ABNORMAL
BILIRUB SERPL-MCNC: 0.32 MG/DL (ref 0.2–1)
BUN SERPL-MCNC: 19 MG/DL (ref 5–25)
CALCIUM SERPL-MCNC: 9.9 MG/DL (ref 8.3–10.1)
CHLORIDE SERPL-SCNC: 107 MMOL/L (ref 100–108)
CO2 SERPL-SCNC: 27 MMOL/L (ref 21–32)
CREAT SERPL-MCNC: 1.18 MG/DL (ref 0.6–1.3)
ERYTHROCYTE [DISTWIDTH] IN BLOOD BY AUTOMATED COUNT: 14 % (ref 11.6–15.1)
GFR SERPL CREATININE-BSD FRML MDRD: 42 ML/MIN/1.73SQ M
GLUCOSE SERPL-MCNC: 111 MG/DL (ref 65–140)
GLUCOSE SERPL-MCNC: 131 MG/DL (ref 65–140)
GLUCOSE SERPL-MCNC: 141 MG/DL (ref 65–140)
GLUCOSE SERPL-MCNC: 151 MG/DL (ref 65–140)
GLUCOSE SERPL-MCNC: 157 MG/DL (ref 65–140)
GLUCOSE SERPL-MCNC: 92 MG/DL (ref 65–140)
HCT VFR BLD AUTO: 31.1 % (ref 34.8–46.1)
HGB BLD-MCNC: 9.8 G/DL (ref 11.5–15.4)
MAGNESIUM SERPL-MCNC: 2.3 MG/DL (ref 1.6–2.6)
MCH RBC QN AUTO: 28.8 PG (ref 26.8–34.3)
MCHC RBC AUTO-ENTMCNC: 31.5 G/DL (ref 31.4–37.4)
MCV RBC AUTO: 92 FL (ref 82–98)
PHOSPHATE SERPL-MCNC: 3.5 MG/DL (ref 2.3–4.1)
PLATELET # BLD AUTO: 135 THOUSANDS/UL (ref 149–390)
PMV BLD AUTO: 10.6 FL (ref 8.9–12.7)
POTASSIUM SERPL-SCNC: 4.3 MMOL/L (ref 3.5–5.3)
PROCALCITONIN SERPL-MCNC: 0.17 NG/ML
PROT SERPL-MCNC: 7.3 G/DL (ref 6.4–8.2)
RBC # BLD AUTO: 3.4 MILLION/UL (ref 3.81–5.12)
SODIUM SERPL-SCNC: 140 MMOL/L (ref 136–145)
WBC # BLD AUTO: 5.1 THOUSAND/UL (ref 4.31–10.16)

## 2018-05-27 PROCEDURE — 80053 COMPREHEN METABOLIC PANEL: CPT | Performed by: GENERAL PRACTICE

## 2018-05-27 PROCEDURE — 84145 PROCALCITONIN (PCT): CPT | Performed by: GENERAL PRACTICE

## 2018-05-27 PROCEDURE — 99232 SBSQ HOSP IP/OBS MODERATE 35: CPT | Performed by: GENERAL PRACTICE

## 2018-05-27 PROCEDURE — 84100 ASSAY OF PHOSPHORUS: CPT | Performed by: GENERAL PRACTICE

## 2018-05-27 PROCEDURE — 85027 COMPLETE CBC AUTOMATED: CPT | Performed by: GENERAL PRACTICE

## 2018-05-27 PROCEDURE — 82948 REAGENT STRIP/BLOOD GLUCOSE: CPT

## 2018-05-27 PROCEDURE — 83735 ASSAY OF MAGNESIUM: CPT | Performed by: GENERAL PRACTICE

## 2018-05-27 RX ADMIN — DOCUSATE SODIUM 100 MG: 100 CAPSULE, LIQUID FILLED ORAL at 08:38

## 2018-05-27 RX ADMIN — CEFTRIAXONE 1000 MG: 1 INJECTION, SOLUTION INTRAVENOUS at 17:20

## 2018-05-27 RX ADMIN — SENNOSIDES 8.6 MG: 8.6 TABLET, FILM COATED ORAL at 21:27

## 2018-05-27 RX ADMIN — METOPROLOL TARTRATE 50 MG: 50 TABLET ORAL at 21:27

## 2018-05-27 RX ADMIN — METOPROLOL TARTRATE 50 MG: 50 TABLET ORAL at 08:39

## 2018-05-27 RX ADMIN — ACETAMINOPHEN 650 MG: 325 TABLET ORAL at 05:35

## 2018-05-27 RX ADMIN — ATORVASTATIN CALCIUM 80 MG: 80 TABLET, FILM COATED ORAL at 17:17

## 2018-05-27 RX ADMIN — APIXABAN 2.5 MG: 2.5 TABLET, FILM COATED ORAL at 08:39

## 2018-05-27 RX ADMIN — ACETAMINOPHEN 650 MG: 325 TABLET ORAL at 21:27

## 2018-05-27 RX ADMIN — FERROUS SULFATE TAB 325 MG (65 MG ELEMENTAL FE) 325 MG: 325 (65 FE) TAB at 08:38

## 2018-05-27 RX ADMIN — POLYETHYLENE GLYCOL 3350 17 G: 17 POWDER, FOR SOLUTION ORAL at 08:39

## 2018-05-27 RX ADMIN — PANTOPRAZOLE SODIUM 40 MG: 40 TABLET, DELAYED RELEASE ORAL at 05:35

## 2018-05-27 RX ADMIN — INSULIN LISPRO 1 UNITS: 100 INJECTION, SOLUTION INTRAVENOUS; SUBCUTANEOUS at 17:16

## 2018-05-27 RX ADMIN — APIXABAN 2.5 MG: 2.5 TABLET, FILM COATED ORAL at 17:17

## 2018-05-27 RX ADMIN — ACETAMINOPHEN 650 MG: 325 TABLET ORAL at 13:00

## 2018-05-27 RX ADMIN — ACETAMINOPHEN 650 MG: 325 TABLET ORAL at 17:17

## 2018-05-27 RX ADMIN — INSULIN LISPRO 1 UNITS: 100 INJECTION, SOLUTION INTRAVENOUS; SUBCUTANEOUS at 21:27

## 2018-05-27 RX ADMIN — DOCUSATE SODIUM 100 MG: 100 CAPSULE, LIQUID FILLED ORAL at 17:18

## 2018-05-27 NOTE — ASSESSMENT & PLAN NOTE
· With history of DVT in the left upper extremity status post pacemaker placement  · Patient is on Eliquis  · Patient does seem to have left upper extremity discomfort  X-ray WNL    Doppler negative for DVT  · Pain possibly related to deconditioning from CVA

## 2018-05-27 NOTE — PROGRESS NOTES
Progress Note - Fahad Layton 1933, 80 y o  female MRN: [id number]    Unit/Bed#: -01 Encounter: [id number]    Primary Care Provider: Kimberly Blackwell MD   Date and time admitted to hospital: 5/25/2018  2:43 PM        * Sepsis [facility])   Assessment & Plan    · Present on admission as evidenced by fever, tachycardia and tachypnea  · UA positive for nitrates, leuks, and innumerable bacteria  Suspect urinary source  · Was given ceftriaxone in the emergency department - continue  · Follow-up urine culture and adjust antibiotics accordingly  · Follow-up blood cultures  · AM Procal WNL - only needs 5 days of abx        Acute cystitis without hematuria   Assessment & Plan    · UA positive for nitrates, leuks, and innumerable bacteria  · Continue ceftriaxone and follow up urine culture and adjust antibiotics accordingly        Anemia   Assessment & Plan    B/l 9-10  5/25 Hgb 10  5/26 Hgb 8 4, but recheck 9 1  5/27 - Hgb 9 8  Outpt colon        Colonic thickening   Assessment & Plan    Pt needs to f/u outpt with Dr Ashutosh Johnson - needs colonoscopy to r/o ca        Chronic systolic CHF (congestive heart failure) ([facility] [address] )   Assessment & Plan    · LORI from April revealed LVEF 45%, PFO and moderate TR  · monitor volume status closely  · IVF stopped on 5/26        History of pacemaker   Assessment & Plan    · Follow-up with Cardiology  · On metoprolol tartrate 50 mg q 12 hours        History of DVT (deep vein thrombosis)   Assessment & Plan    · With history of DVT in the left upper extremity status post pacemaker placement  · Patient is on Eliquis  · Patient does seem to have left upper extremity discomfort  X-ray WNL    Doppler negative for DVT  · Pain possibly related to deconditioning from CVA        CKD (chronic kidney disease) stage 3, GFR 30-59 ml/min   Assessment & Plan    · Baseline creatinine appears to be around 1 3  · Creatinine below baseline          History of subarachnoid hemorrhage   Assessment & Plan    · CT the head yesterday and today showed no acute intracranial abnormality  · Of note, Patient on Eliquis        History of CVA (cerebrovascular accident)   Assessment & Plan    · Patient on statin and Eliquis  · CT of the head yesterday and today showed no acute intracranial abnormality  · Follow up with Neurology outpatient        Acute encephalopathy   Assessment & Plan    · Suspect secondary to the above problems  · Patient had CT of the head yesterday and repeat again today, both of which of which showed no acute intracranial abnormality  · Given apparent left upper extremity discomfort, will schedule Tylenol as pain control may help mental status        Controlled type 2 diabetes mellitus, without long-term current use of insulin ([facility])   Assessment & Plan    · Per review of the records and my discussion with the patient's family, patient has a history of diabetes, however when reviewing the med rec from facility, patient is not on any diabetes medications  · Glucose 150 on admission  · Will place on sliding scale and adjust as needed  · HbA1c from April 2018 6 6  No need to repeat now        H/O mitral valve replacement   Assessment & Plan    · Per family, this is a porcine valve  · Patient on Eliquis          VTE Pharmacologic Prophylaxis:   Pharmacologic: Apixaban (Eliquis)  Mechanical VTE Prophylaxis in Place: Yes    Patient Centered Rounds: I have performed bedside rounds with nursing staff today  Discussions with Specialists or Other Care Team Provider: no    Education and Discussions with Family / Patient: pt and dtr    Time Spent for Care: 30 minutes  More than 50% of total time spent on counseling and coordination of care as described above      Current Length of Stay: 2 day(s)    Current Patient Status: Inpatient   Certification Statement: The patient will continue to require additional inpatient hospital stay due to need to await final c and s    Discharge Plan: pending final c and s    Code Status: Level 1 - Full Code      Subjective:   No acute complaints    Objective:     Vitals:   Temp (24hrs), Av °F (36 7 °C), Min:97 4 °F (36 3 °C), Max:98 7 °F (37 1 °C)    HR:  [68-86] 75  Resp:  [18] 18  BP: (121-132)/(58-61) 121/58  SpO2:  [98 %-99 %] 99 %  Body mass index is 26 3 kg/m²  Input and Output Summary (last 24 hours): Intake/Output Summary (Last 24 hours) at 18 1630  Last data filed at 18 1417   Gross per 24 hour   Intake              860 ml   Output              704 ml   Net              156 ml       Physical Exam:     Physical Exam   Constitutional: No distress  HENT:   Head: Normocephalic and atraumatic  Eyes: Conjunctivae and EOM are normal    Neck: Normal range of motion  Neck supple  Cardiovascular: Normal rate and regular rhythm  Pulmonary/Chest: Effort normal and breath sounds normal  She has no wheezes  She has no rales  Abdominal: Soft  Bowel sounds are normal  She exhibits no distension  There is no tenderness  Musculoskeletal: Normal range of motion  She exhibits no edema  Neurological: She is alert  Ox2   Skin: Skin is warm and dry  She is not diaphoretic  Additional Data:     Labs:      Results from last 7 days  Lab Units 18  0505  18  1552   WBC Thousand/uL 5 10  < > 8 77   HEMOGLOBIN g/dL 9 8*  < > 10 0*   HEMATOCRIT % 31 1*  < > 31 5*   PLATELETS Thousands/uL 135*  < > 115*   NEUTROS PCT %  --   --  84*   LYMPHS PCT %  --   --  7*   MONOS PCT %  --   --  7   EOS PCT %  --   --  2   < > = values in this interval not displayed      Results from last 7 days  Lab Units 18  0505   SODIUM mmol/L 140   POTASSIUM mmol/L 4 3   CHLORIDE mmol/L 107   CO2 mmol/L 27   BUN mg/dL 19   CREATININE mg/dL 1 18   CALCIUM mg/dL 9 9   TOTAL PROTEIN g/dL 7 3   BILIRUBIN TOTAL mg/dL 0 32   ALK PHOS U/L 90   ALT U/L 45   AST U/L 57*   GLUCOSE RANDOM mg/dL 92       Results from last 7 days  Lab Units 18  1550   INR  1 15       * I Have Reviewed All Lab Data Listed Above  * Additional Pertinent Lab Tests Reviewed: Jonas Romano Admission Reviewed        Recent Cultures (last 7 days):       Results from last 7 days  Lab Units 05/25/18  1744 05/25/18  1549 05/24/18  1727 05/24/18  1720   BLOOD CULTURE   --  No Growth at 24 hrs  No Growth at 24 hrs  No Growth at 48 hrs  No Growth at 48 hrs  URINE CULTURE  >100,000 cfu/ml Gram Negative Brad Enteric Like*  --   --   --        Last 24 Hours Medication List:     Current Facility-Administered Medications:  acetaminophen 650 mg Oral Q6H Lorriane Parent, PA-C    apixaban 2 5 mg Oral BID Lorriane Parent, PA-C    atorvastatin 80 mg Oral Daily With [employer], PA-C    bisacodyl 10 mg Rectal Daily PRN Lorriane Parent, PA-C    cefTRIAXone 1,000 mg Intravenous Q24H Lorriane Parent, PA-C Last Rate: 1,000 mg (05/26/18 2002)   docusate sodium 100 mg Oral BID Lorriane Parent, PA-C    ferrous sulfate 325 mg Oral Daily With Breakfast Lorriane Parent, PA-C    insulin lispro 1-5 Units Subcutaneous TID AC Lorriane Parent, PA-C    insulin lispro 1-5 Units Subcutaneous HS Lorriane Parent, PA-C    metoprolol tartrate 50 mg Oral Q12H [address] Lorriane Parent, PA-C    pantoprazole 40 mg Oral Early Morning Lorriane Parent, PA-C    polyethylene glycol 17 g Oral Daily Lorriane Parent, PA-C    senna 1 tablet Oral HS Lorriane Parent, PA-C         Today, Patient Was Seen By: Mohan Muir DO    ** Please Note: Dictation voice to text software may have been used in the creation of this document   **

## 2018-05-27 NOTE — PHYSICIAN ADVISOR
Current patient class: Inpatient  The patient is currently on Hospital Day: 3      The patient was admitted to the hospital  on 5/25/18 at 1942 for the following diagnosis:  UTI (urinary tract infection) [N39 0]  Altered mental status [R41 82]  Fever [R50 9]  Sepsis ([address] ) [A41 9]       There is documentation in the medical record of an expected length of stay of at least 2 midnights  The patient is therefore expected to satisfy the 2 midnight benchmark and given the 2 midnight presumption is appropriate for INPATIENT ADMISSION  Given this expectation of a satisfying stay, CMS instructs us that the patient is most often appropriate for inpatient admission under part A provided medical necessity is documented in the chart  After review of the relevant documentation, labs, vital signs and test results, the patient is appropriate for INPATIENT ADMISSION  Admission to the hospital as an inpatient is a complex decision making process which requires the practitioner to consider the patients presenting complaint, history and physical examination and all relevant testing  With this in mind, in this case, the patient was deemed appropriate for INPATIENT ADMISSION  After review of the documentation and testing available at the time of the admission I concur with this clinical determination of medical necessity  The patient does have an inpatient admission within the previous 30 days  The patient was admitted on 5/24/18 and discharged on 5/24/18 as an inpatient  The patient therefore required readmission review  In this case the patient should be considered a SEPARATE and UNRELATED INPATIENT ADMISSION  The patient had been discharged in stable condition with a completed care plan  There were no unresolved acute medical issues at the time of discharged which would have reasonably been expected to prompt this readmission  Rationale is as follows:     The patient is a 80 yrs   Female who presented to the ED at 5/25/2018  2:43 PM with a chief complaint of Chest Pain (Pt seen and evaluated yesterday for unresponsiveness and altered mental status, sent home  Pt returns today for mid sternal chest pain and left arm pain  Per EMS pt non verbal for them, per family pt has normal conversations with them  On arrival,  Pt non verbal for me, when asked if any bothers her, pt points to chest   Daughter states pt told her she had chest pain  Pt febrile 101 9 rectal temp )     The patient presented secondary to lethargy  She was admitted with sepsis, acute cystitis without hematuria acute encephalopathy  The plan of care includes urine and blood cultures, intravenous antibiotics, intravenous fluids, repeat labs  This patient is appropriate for inpatient admission as her length of stay is expected to be least 2 midnights  Continued hospitalization is necessary to ensure stabilization of her clinical status  This admission is unrelated to her prior admission from April 23rd to April 30th secondary to acute ischemic left MCA CVA for which the patient was treated and discharged to the [place] [id number] in stable condition        The patients vitals on arrival were ED Triage Vitals   Temperature Pulse Respirations Blood Pressure SpO2   05/25/18 1453 05/25/18 1453 05/25/18 1453 05/25/18 1453 05/25/18 1453   (!) 101 9 °F (38 8 °C) 97 (!) 24 (!) 182/83 96 %      Temp Source Heart Rate Source Patient Position - Orthostatic VS BP Location FiO2 (%)   05/25/18 2130 05/25/18 1453 05/25/18 1453 05/25/18 1453 --   Oral Monitor Lying Left arm       Pain Score       05/25/18 1453       5           Past Medical History:   Diagnosis Date    Acid reflux     on occ    Arthritis     DJD right hip replaced    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    Cancer ([facility] [address] ) 2007    colonic polyps, no surgery done    Deep vein thrombosis (DVT) of left upper extremity ([facility] [address] ) 12/11/2017    Diabetes mellitus ([facility] [address] )     type 2  Diverticulosis     Edema     in legs on occ    Hypertension     on occ    Language barrier     speaks Hebrew & broken english    Stroke [facility])      Past Surgical History:   Procedure Laterality Date    COLON SURGERY      COLONOSCOPY      COLONOSCOPY N/A 11/2/2016    Procedure: COLONOSCOPY;  Surgeon: Kimberley Castillo MD;  Location[facility] LAB; Service:     ESOPHAGOGASTRODUODENOSCOPY N/A 11/2/2016    Procedure: ESOPHAGOGASTRODUODENOSCOPY (EGD); Surgeon: Kimberley Castillo MD;  Location: [facility] GI LAB;   Service:     JOINT REPLACEMENT Right 02/2015    hip    JOINT REPLACEMENT Left     knee    JOINT REPLACEMENT Right     knee    GA REVISE MEDIAN N/CARPAL TUNNEL SURG Left 1/28/2016    Procedure: RELEASE CARPAL TUNNEL;  Surgeon: Paddy Parrish MD;  Location: [facility] MAIN OR;  Service: Orthopedics    TUBAL LIGATION      VARICOSE VEIN SURGERY Bilateral            Consults have been placed to:   IP CONSULT TO CASE MANAGEMENT    Vitals:    05/26/18 0728 05/26/18 1559 05/26/18 2337 05/27/18 0735   BP: 144/70 119/74 123/61 132/61   BP Location:  Right arm Right arm Right arm   Pulse: 78 76 68 86   Resp: 18 18 18 18   Temp: 98 °F (36 7 °C) 98 °F (36 7 °C) 98 °F (36 7 °C) 98 7 °F (37 1 °C)   TempSrc: Oral Axillary Oral Oral   SpO2: 95% 96% 98% 98%   Weight:       Height:           Most recent labs:    Recent Labs      05/24/18   1727  05/25/18   1550  05/25/18   1552   05/26/18   0539   05/27/18   0505   WBC  7 28   --   8 77   --   6 25   --   5 10   HGB  10 3*   --   10 0*   --   8 4*   < >  9 8*   HCT  31 9*   --   31 5*   --   25 9*   < >  31 1*   PLT  121*   --   115*   --   115*   --   135*   K  4 4   --   4 4   --   4 0   --   4 3   NA  136   --   137   --   139   --   140   CALCIUM  9 8   --   9 1   --   9 4   --   9 9   BUN  22   --   19   --   19   --   19   CREATININE  1 13   --   1 10   --   1 02   --   1 18   INR  1 14  1 15   --    --    --    --    --    TROPONINI   --    --   <0 02   < >  <0 02 --    --    AST  89*   --   70*   --    --    --   57*   ALT  46   --   40   --    --    --   45   ALKPHOS  103   --   97   --    --    --   90   BILITOT  0 58   --   0 62   --    --    --   0 32    < > = values in this interval not displayed         Scheduled Meds:  Current Facility-Administered Medications:  acetaminophen 650 mg Oral Q6H Julian Share, PA-C    apixaban 2 5 mg Oral BID Julian Share, PA-C    atorvastatin 80 mg Oral Daily With [employer], PA-C    bisacodyl 10 mg Rectal Daily PRN Julian Share, PA-C    cefTRIAXone 1,000 mg Intravenous Q24H Julian Share, PA-C Last Rate: 1,000 mg (05/26/18 2002)   docusate sodium 100 mg Oral BID Julian Share, PA-C    ferrous sulfate 325 mg Oral Daily With Breakfast Julian Share, PA-C    insulin lispro 1-5 Units Subcutaneous TID AC Julian Share, PA-C    insulin lispro 1-5 Units Subcutaneous HS Julian Share, PA-C    metoprolol tartrate 50 mg Oral Q12H [facility] Julian Share, PA-C    pantoprazole 40 mg Oral Early Morning Julian Share, PA-C    polyethylene glycol 17 g Oral Daily Julian Share, PA-C    senna 1 tablet Oral HS Julian Share, PA-C      Continuous Infusions:   PRN Meds: bisacodyl    Surgical procedures (if appropriate):

## 2018-05-27 NOTE — CASE MANAGEMENT
Initial Clinical Review    Admission: Date/Time/Statement: 5/25/18 @ 1942     Orders Placed This Encounter   Procedures    Inpatient Admission (expected length of stay for this patient is greater than two midnights)     Standing Status:   Standing     Number of Occurrences:   1     Order Specific Question:   Admitting Physician     Answer:   Og Boles     Order Specific Question:   Level of Care     Answer:   Med Surg [16]     Order Specific Question:   Estimated length of stay     Answer:   More than 2 Midnights     Order Specific Question:   Certification     Answer:   I certify that inpatient services are medically necessary for this patient for a duration of greater than two midnights  See H&P and MD Progress Notes for additional information about the patient's course of treatment  ED: Date/Time/Mode of Arrival:   ED Arrival Information     Expected Arrival Acuity Means of Arrival Escorted By Service Admission Type    - 5/25/2018 14:42 Urgent Ambulance [facility] Family [facility] Urgent    Arrival Complaint    Fever          Chief Complaint:   Chief Complaint   Patient presents with    Chest Pain     Pt seen and evaluated yesterday for unresponsiveness and altered mental status, sent home  Pt returns today for mid sternal chest pain and left arm pain  Per EMS pt non verbal for them, per family pt has normal conversations with them  On arrival,  Pt non verbal for me, when asked if any bothers her, pt points to chest   Daughter states pt told her she had chest pain  Pt febrile 101 9 rectal temp        History of Illness: Maki Rodriguez is a 80 y o  female with a history of stroke, subarachnoid hemorrhage, left upper extremity DVT, CKD stage 3, sick sinus syndrome status post pacemaker placement, and type 2 diabetes not on insulin who presents with lethargy    Of note, the patient was seen yesterday in the emergency department here for altered mental status and workup did not reveal etiology thus the patient was sent back to her facility  History is obtained via my discussion with the patient's 2 daughters and son present at bedside given the patient's current mental status  They report that today the patient did not want to get out of bed or go to therapy and was not talking much but indicated to them that she had chest pain and left upper extremity pain  They report that the patient's baseline is alert and oriented to person, and place and sometimes time and would be able to normally safe to the current [place]  president is  They report that the patient did not tell them that she had chest pain but when asked if she was having any pain used her hand and pointed toward her chest   They also report that she nearly screamed when they touched her left arm in pain  ED Vital Signs:   ED Triage Vitals   Temperature Pulse Respirations Blood Pressure SpO2   05/25/18 1453 05/25/18 1453 05/25/18 1453 05/25/18 1453 05/25/18 1453   (!) 101 9 °F (38 8 °C) 97 (!) 24 (!) 182/83 96 %      Temp Source Heart Rate Source Patient Position - Orthostatic VS BP Location FiO2 (%)   05/25/18 2130 05/25/18 1453 05/25/18 1453 05/25/18 1453 --   Oral Monitor Lying Left arm       Pain Score       05/25/18 1453       5        Wt Readings from Last 1 Encounters:   05/25/18 69 5 kg (153 lb 3 5 oz)       Vital Signs (abnormal):     Temp-  Max   101 9  GCS  10    Abnormal Labs/Diagnostic Test Results: Albumin  2 9  AST   70  BNP   5,526  H/H   10/31 5  Platelet  839  u/a   + nitrite   1+ ketone     sm blood    sm leukocyte  Ct  Chest/abd/pelvis:    No acute pathology  Unchanged left lung nodules  Persistent focal thickening of the ascending colon   If not already performed, colonoscopy is again recommended to exclude neoplasm  Colonic diverticulosis    X ray  L forearm/ L humerus:  No abnormality  Ct head:  No acute intracranial abnormality    ED Treatment:   Medication Administration from 05/25/2018 1442 to 05/25/2018 2122       Date/Time Order Dose Route Action Action by Comments     05/25/2018 1610 acetaminophen (TYLENOL) tablet 650 mg   Oral Canceled Entry Alma Cheatham RN      05/25/2018 1635 acetaminophen (TYLENOL) rectal suppository 650 mg 650 mg Rectal Given 12 [address],       05/25/2018 1645 acetaminophen (TYLENOL) rectal suppository 650 mg   Rectal Canceled Entry 12 [address],  duplicate entry     10/76/6552 1920 sodium chloride 0 9 % bolus 250 mL 0 mL Intravenous Stopped Irene Cheatham RN      05/25/2018 1811 sodium chloride 0 9 % bolus 250 mL 250 mL Intravenous New Bag Irene Cheatham RN      05/25/2018 1920 cefTRIAXone (ROCEPHIN) IVPB (premix) 1,000 mg 0 mg Intravenous Stopped Alma Cheatham RN      05/25/2018 1811 cefTRIAXone (ROCEPHIN) IVPB (premix) 1,000 mg 1,000 mg Intravenous New Bag 12 [address]           Past Medical/Surgical History:    Active Ambulatory Problems     Diagnosis Date Noted    [facility]DVT) [facility] upper extremity ([facility] ) 01/25/2018    H/O mitral valve replacement 03/01/2018    CVA (cerebral vascular accident) ([facility] ) 04/18/2018    Controlled type 2 diabetes mellitus, without long-term current use of insulin ([facility] ) 04/18/2018    Pacemaker 04/18/2018    Acid reflux 04/18/2018    HTN (hypertension) 04/18/2018    Constipation 04/18/2018    CHADWICK (acute kidney injury) ([facility] ) 92/67/5556    Systolic congestive heart failure ([facility] ) 04/18/2018    Acute encephalopathy 04/19/2018    History of ischemic right MCA stroke 04/23/2018     Resolved Ambulatory Problems     Diagnosis Date Noted    Acute ischemic left MCA stroke ([facility] ) 04/23/2018     Past Medical History:   Diagnosis Date    Acid reflux     Arthritis     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 )   Assessment & Plan     · Present on admission as evidenced by fever, tachycardia and tachypnea  · UA positive for nitrates, leuks, and innumerable bacteria  Suspect urinary source  · Was given ceftriaxone in the emergency department - continue  · Follow-up urine culture and adjust antibiotics accordingly  · Follow-up blood cultures          Acute cystitis without hematuria   Assessment & Plan     · UA positive for nitrates, leuks, and innumerable bacteria  · Continue ceftriaxone and follow up urine culture and adjust antibiotics accordingly          Acute encephalopathy   Assessment & Plan     · Suspect secondary to the above problems  · Patient had CT of the head yesterday and repeat again today, both of which of which showed no acute intracranial abnormality  · Given apparent left upper extremity discomfort, will schedule Tylenol as pain control may help mental status          History of CVA (cerebrovascular accident)   Assessment & Plan     · Patient on statin and Eliquis  · CT of the head yesterday and today showed no acute intracranial abnormality  · Follow up with Neurology        History of DVT (deep vein thrombosis)   Assessment & Plan     · With history of DVT in the left upper extremity status post pacemaker placement  · Patient is on Eliquis  · Patient does seem to have left upper extremity discomfort  X-ray results are pending    Will check Doppler          History of subarachnoid hemorrhage   Assessment & Plan     · CT the head yesterday and today showed no acute intracranial abnormality  · Of note, Patient on Eliquis          CKD (chronic kidney disease) stage 3, GFR 30-59 ml/min   Assessment & Plan     · Baseline creatinine appears to be around 1 3  · Creatinine below baseline  · BMP in the morning     Controlled type 2 diabetes mellitus, without long-term current use of insulin ([facility] )   Assessment & Plan     · Per review of the records and my discussion with the patient's family, patient has a history of diabetes, however when reviewing the med rec from facility, patient is not on any diabetes medications  · Glucose 150 on admission  · Will place on sliding scale and adjust as needed  · HbA1c from April 2018 6 6  No need to repeat now          H/O mitral valve replacement   Assessment & Plan     · Per family, this is a porcine valve  · Patient on Eliquis          History of pacemaker   Assessment & Plan     · Follow-up with Cardiology  · On metoprolol tartrate 50 mg q 12 hours     Chronic systolic CHF (congestive heart failure) ([facility] )   Assessment & Plan     · LORI from April revealed LVEF 45%, PFO and moderate TR  · Gentle IVF and monitor volume status closely             VTE Prophylaxis: Apixaban (Eliquis)  / sequential compression device   Code Status: FULL CODE - discussed with patient's daughter personally on admission and reviewed prior orders  POLST: POLST form is not discussed and not completed at this time  Discussion with family: 2 of the patient's daughters and 1 son present at bedside     Anticipated Length of Stay:  Patient will be admitted on an Inpatient basis with an anticipated length of stay of  > 2 midnights     Justification for Hospital Stay: plan as per above          Admission Orders:   IP   5/25  @   1942  Scheduled Meds:   Current Facility-Administered Medications:  acetaminophen 650 mg Oral Q6H Reginald Morales PA-C    apixaban 2 5 mg Oral BID Reginald Morales PA-C    atorvastatin 80 mg Oral Daily With [employer], JIMY    bisacodyl 10 mg Rectal Daily PRN Reginald Morales PA-C    cefTRIAXone 1,000 mg Intravenous Q24H Reginald Morales PA-C Last Rate: 1,000 mg (05/26/18 2002)   docusate sodium 100 mg Oral BID Reginald Morales PA-C    ferrous sulfate 325 mg Oral Daily With Breakfast Viktoriya Horvath PA-C    insulin lispro 1-5 Units Subcutaneous TID AC Viktoriya Horvath PA-C    insulin lispro 1-5 Units Subcutaneous HS Viktoriya Horvath PA-C    metoprolol tartrate 50 mg Oral Q12H [address] Viktoriya Horvath PA-C    pantoprazole 40 mg Oral Early Morning Viktoriya Horvath PA-C    polyethylene glycol 17 g Oral Daily Viktoriya Horvath PA-C    senna 1 tablet Oral HS Viktoriya Horvath PA-C      Continuous Infusions:    PRN Meds: bisacodyl     Reg diet  PT/OT  Fall precautions  Serial troponin

## 2018-05-27 NOTE — ASSESSMENT & PLAN NOTE
· Present on admission as evidenced by fever, tachycardia and tachypnea  · UA positive for nitrates, leuks, and innumerable bacteria  Suspect urinary source      · Was given ceftriaxone in the emergency department - continue  · Follow-up urine culture and adjust antibiotics accordingly  · Follow-up blood cultures  · AM Procal WNL - only needs 5 days of abx

## 2018-05-27 NOTE — ASSESSMENT & PLAN NOTE
· Patient on statin and Eliquis  · CT of the head yesterday and today showed no acute intracranial abnormality  · Follow up with Neurology outpatient

## 2018-05-28 VITALS
SYSTOLIC BLOOD PRESSURE: 155 MMHG | OXYGEN SATURATION: 98 % | HEART RATE: 78 BPM | BODY MASS INDEX: 26.16 KG/M2 | TEMPERATURE: 97.5 F | HEIGHT: 64 IN | WEIGHT: 153.22 LBS | RESPIRATION RATE: 16 BRPM | DIASTOLIC BLOOD PRESSURE: 72 MMHG

## 2018-05-28 PROBLEM — E87.5 HYPERKALEMIA: Status: ACTIVE | Noted: 2018-05-28

## 2018-05-28 PROBLEM — A41.9 SEPSIS (HCC): Status: RESOLVED | Noted: 2018-05-25 | Resolved: 2018-05-28

## 2018-05-28 PROBLEM — E87.5 HYPERKALEMIA: Status: RESOLVED | Noted: 2018-05-28 | Resolved: 2018-05-28

## 2018-05-28 LAB
ALBUMIN SERPL BCP-MCNC: 2 G/DL (ref 3.5–5)
ALP SERPL-CCNC: 86 U/L (ref 46–116)
ALT SERPL W P-5'-P-CCNC: 45 U/L (ref 12–78)
ANION GAP SERPL CALCULATED.3IONS-SCNC: 6 MMOL/L (ref 4–13)
ANION GAP SERPL CALCULATED.3IONS-SCNC: 6 MMOL/L (ref 4–13)
AST SERPL W P-5'-P-CCNC: 84 U/L (ref 5–45)
BILIRUB SERPL-MCNC: 0.56 MG/DL (ref 0.2–1)
BUN SERPL-MCNC: 21 MG/DL (ref 5–25)
BUN SERPL-MCNC: 24 MG/DL (ref 5–25)
CALCIUM SERPL-MCNC: 8.8 MG/DL (ref 8.3–10.1)
CALCIUM SERPL-MCNC: 9.3 MG/DL (ref 8.3–10.1)
CHLORIDE SERPL-SCNC: 107 MMOL/L (ref 100–108)
CHLORIDE SERPL-SCNC: 109 MMOL/L (ref 100–108)
CO2 SERPL-SCNC: 19 MMOL/L (ref 21–32)
CO2 SERPL-SCNC: 27 MMOL/L (ref 21–32)
CREAT SERPL-MCNC: 1.12 MG/DL (ref 0.6–1.3)
CREAT SERPL-MCNC: 1.25 MG/DL (ref 0.6–1.3)
GFR SERPL CREATININE-BSD FRML MDRD: 40 ML/MIN/1.73SQ M
GFR SERPL CREATININE-BSD FRML MDRD: 45 ML/MIN/1.73SQ M
GLUCOSE SERPL-MCNC: 102 MG/DL (ref 65–140)
GLUCOSE SERPL-MCNC: 108 MG/DL (ref 65–140)
GLUCOSE SERPL-MCNC: 133 MG/DL (ref 65–140)
GLUCOSE SERPL-MCNC: 138 MG/DL (ref 65–140)
GLUCOSE SERPL-MCNC: 141 MG/DL (ref 65–140)
POTASSIUM SERPL-SCNC: 3.8 MMOL/L (ref 3.5–5.3)
POTASSIUM SERPL-SCNC: 6 MMOL/L (ref 3.5–5.3)
PROT SERPL-MCNC: 6.9 G/DL (ref 6.4–8.2)
SODIUM SERPL-SCNC: 134 MMOL/L (ref 136–145)
SODIUM SERPL-SCNC: 140 MMOL/L (ref 136–145)

## 2018-05-28 PROCEDURE — 97163 PT EVAL HIGH COMPLEX 45 MIN: CPT

## 2018-05-28 PROCEDURE — 80053 COMPREHEN METABOLIC PANEL: CPT | Performed by: GENERAL PRACTICE

## 2018-05-28 PROCEDURE — 99239 HOSP IP/OBS DSCHRG MGMT >30: CPT | Performed by: GENERAL PRACTICE

## 2018-05-28 PROCEDURE — 82948 REAGENT STRIP/BLOOD GLUCOSE: CPT

## 2018-05-28 PROCEDURE — 80048 BASIC METABOLIC PNL TOTAL CA: CPT | Performed by: GENERAL PRACTICE

## 2018-05-28 PROCEDURE — G8978 MOBILITY CURRENT STATUS: HCPCS

## 2018-05-28 PROCEDURE — G8979 MOBILITY GOAL STATUS: HCPCS

## 2018-05-28 RX ORDER — CEFDINIR 300 MG/1
300 CAPSULE ORAL EVERY 12 HOURS SCHEDULED
Refills: 0
Start: 2018-05-28 | End: 2018-05-30

## 2018-05-28 RX ORDER — ACETAMINOPHEN 325 MG/1
975 TABLET ORAL EVERY 8 HOURS
Qty: 30 TABLET | Refills: 0 | Status: ON HOLD
Start: 2018-05-28 | End: 2018-08-09 | Stop reason: ALTCHOICE

## 2018-05-28 RX ORDER — SODIUM POLYSTYRENE SULFONATE 15 G/60ML
15 SUSPENSION ORAL; RECTAL ONCE
Status: COMPLETED | OUTPATIENT
Start: 2018-05-28 | End: 2018-05-28

## 2018-05-28 RX ADMIN — POLYETHYLENE GLYCOL 3350 17 G: 17 POWDER, FOR SOLUTION ORAL at 09:00

## 2018-05-28 RX ADMIN — ACETAMINOPHEN 650 MG: 325 TABLET ORAL at 10:32

## 2018-05-28 RX ADMIN — SODIUM POLYSTYRENE SULFONATE 15 G: 15 SUSPENSION ORAL; RECTAL at 08:27

## 2018-05-28 RX ADMIN — FERROUS SULFATE TAB 325 MG (65 MG ELEMENTAL FE) 325 MG: 325 (65 FE) TAB at 08:27

## 2018-05-28 RX ADMIN — ACETAMINOPHEN 650 MG: 325 TABLET ORAL at 05:00

## 2018-05-28 RX ADMIN — APIXABAN 2.5 MG: 2.5 TABLET, FILM COATED ORAL at 08:29

## 2018-05-28 RX ADMIN — PANTOPRAZOLE SODIUM 40 MG: 40 TABLET, DELAYED RELEASE ORAL at 05:03

## 2018-05-28 RX ADMIN — DOCUSATE SODIUM 100 MG: 100 CAPSULE, LIQUID FILLED ORAL at 08:27

## 2018-05-28 RX ADMIN — METOPROLOL TARTRATE 50 MG: 50 TABLET ORAL at 08:26

## 2018-05-28 NOTE — PHYSICAL THERAPY NOTE
Physical Therapy Evaluation     Patient's Name: Leeanne Spatz    Admitting Diagnosis  UTI (urinary tract infection) [N39 0]  Altered mental status [R41 82]  Fever [R50 9]  Sepsis ([facility] ) [A41 9]    Problem List  Patient Active Problem List   Diagnosis    Deep vein thrombosis (DVT) of left upper extremity ([facility] )    H/O mitral valve replacement    [facility]    Controlled type 2 diabetes mellitus, without long-term current use of insulin ([facility] )    Pacemaker    Acid reflux    HTN (hypertension)    Constipation    CHADWICK (acute kidney injury) ([facility] )    Systolic congestive heart failure ([facility] )    Acute encephalopathy    History of ischemic right MCA stroke    History of CVA (cerebrovascular accident)    History of subarachnoid hemorrhage    CKD (chronic kidney disease) stage 3, GFR 30-59 ml/min    History of DVT (deep vein thrombosis)    History of pacemaker    Sepsis [facility]    Acute cystitis without hematuria    Chronic systolic CHF (congestive heart failure) ([facility] )    Colonic thickening    Anemia       Past Medical History  Past Medical History:   Diagnosis Date    Acid reflux     on occ    Arthritis     DJD right hip replaced    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)        Past Surgical History  Past Surgical History:   Procedure Laterality Date    COLON SURGERY      COLONOSCOPY      COLONOSCOPY N/A 11/2/2016    Procedure: COLONOSCOPY;  Surgeon: Marcus Glover MD;  Location: [place] [address] GI LAB; Service:     ESOPHAGOGASTRODUODENOSCOPY N/A 11/2/2016    Procedure: ESOPHAGOGASTRODUODENOSCOPY (EGD);   Surgeon: Marcus Glover MD;  Location: [place] [address] GI LAB; Service:     JOINT REPLACEMENT Right 02/2015    hip    JOINT REPLACEMENT Left     knee    JOINT REPLACEMENT Right     knee    ND REVISE MEDIAN N/CARPAL TUNNEL SURG Left 1/28/2016    Procedure: RELEASE CARPAL TUNNEL;  Surgeon: Leonides Arreola MD;  Location: [place] [address] MAIN OR;  Service: Orthopedics    TUBAL LIGATION      VARICOSE VEIN SURGERY Bilateral           05/28/18 1017   Note Type   Note type Eval only   Pain Assessment   Pain Assessment FLACC   Pain Type Acute pain;Chronic pain   Pain Location Arm   Pain Orientation Left   Pain Descriptors Aching;Discomfort   Pain Frequency Intermittent   Pain Onset Ongoing   Clinical Progression Gradually improving   Effect of Pain on Daily Activities no increased discomfort during the session   Patient's Stated Pain Goal No pain   Hospital Pain Intervention(s) Distraction;Elevated; Emotional support   Response to Interventions appears to be comfortable at the end of session   Pain Rating: FLACC (Rest) - Face 0   Pain Rating: FLACC (Rest) - Legs 1   Pain Rating: FLACC (Rest) - Activity 0   Pain Rating: FLACC (Rest) - Cry 0   Pain Rating: FLACC (Rest) - Consolability 0   Score: FLACC (Rest) 1   Pain Rating: FLACC (Activity) - Face 1   Pain Rating: FLACC (Activity) - Legs 0   Pain Rating: FLACC (Activity) - Activity 0   Pain Rating: FLACC (Activity) - Cry 0   Pain Rating: FLACC (Activity) - Consolability 0   Score: FLACC (Activity) 1   Home Living   Type of Home House   Home Layout Two level; Able to live on main level with bedroom/bathroom   Home Equipment Walker;Cane  (shower chair, [facility])   Additional Comments Prior to admission, pt was undergoing rehab at [facility] (pt was on ARC prior to that); reports she ambulates w/ rw;   Prior Function   Level of [place] Needs assistance with ADLs and functional mobility  (amb w/ rw)   Lives With Alone;Son  (son was staying w/ pt but awaye most of the day; )   Receives Help From Personal care attendant  (pt was undergoing rehab in SNF)   Restrictions/Precautions   Braces or Orthoses (denies)   Other Precautions Fall Risk;Multiple lines;Cognitive;Limb alert  (bed alarm activated at the end of session)   General   Additional Pertinent History cleared for assessment/mobilization (spoke to nsg)   Cognition   Overall Cognitive Status Impaired   Arousal/Participation Responsive   Orientation Level Oriented to person;Oriented to place;Oriented to situation   Memory Decreased recall of biographical information;Decreased recall of recent events;Decreased recall of precautions   Following Commands Follows one step commands with increased time or repetition   Comments Pt is observed in bed; responds to questions appropriately but somewhat limited/inconsistent historian; pleasant and cooperative; initially reluctant to try mobilization but later agreeable w/ education and encouragement provided  RUE Assessment   RUE Assessment WFL  (AROM)   LUE Assessment   LUE Assessment WFL  (AROM)   RLE Assessment   RLE Assessment X  (decreased AROM at the hip)   Strength RLE   RLE Overall Strength (Poor - hip; fair knee and ankle (grossly))   Tone RLE   RLE Tone Hypertonic   LLE Assessment   LLE Assessment X  (decreased AROM at the hip)   Strength LLE   LLE Overall Strength (Poor - hip; fair knee and ankle (grossly))   Tone LLE   LLE Tone Hypertonic   Bed Mobility   Supine to Sit 3  Moderate assistance   Additional items Assist x 2;HOB elevated; Increased time required;Verbal cues;LE management   Sit to Supine 2  Maximal assistance   Additional items Assist x 2; Increased time required;Verbal cues;LE management  (repositioned higher in bed w/ (A)x2)   Transfers   Sit to Stand 3  Moderate assistance   Additional items Assist x 2; Increased time required;Verbal cues   Stand to Sit 3  Moderate assistance   Additional items Assist x 2; Increased time required   Ambulation/Elevation   Gait pattern Not appropriate; Not tested  (limited standing balance and ability to follow directions)   Assistive Device Rolling walker   Balance   Static Sitting Fair -   Dynamic Sitting Poor   Activity Tolerance   Activity Tolerance Patient limited by fatigue   Nurse Made Aware spoke to [facility]   Assessment   Prognosis Good   Problem List Decreased strength;Decreased range of motion;Decreased endurance; Impaired balance;Decreased mobility; Impaired tone   Assessment Pt is 80 y o  admitted with hx of lethargy and and Dx of sepsis, acute cystitis, anemia, and acute encephalopathy  Pt 's comorbidities affecting POC include: DJD, (R) THR, DVT, DM, CVA, (L) TKR, CHF, CKD, MVR, PPM, and hx of SAH and personal factors of: advanced age and recently undergoing rehab at [facility]  Pt's clinical presentation is currently unstable/unpredictable which is evident in abn lab values being monitored and need for assist (A)x2 w/ all phases of limited mobility at bedside and inability to progress to amb when usually mobilizing independently  Pt presents w/ generalized weakness, elevated general LE tone w/ decreased overall LE strength, decreased functional endurance and activity tolerance, impaired balance, inability to amb at this time and fall risk  Will cont to follow pt in PT for progressive mobilization to address above functional deficits and to max level of (I), endurance, and safety  Currently recommend to cont w/ rehab upon D/C when medically cleared  Will cont to follow until then  Goals   Patient Goals to get bathed    otherwise no specific mobility goals expressed   STG Expiration Date 06/07/18   Short Term Goal #1 7-10 days  Pt will achieve min (A)x1 w/ bed mob to facilitate functional (I)  Pt will perform transfers w/ min (A)x1 to be able to progress further w/ amb  Pt will sustain at least poor + standing supported balance to facilitate overall safety w/ functional mobility   Pt will amb 2 x 50 ft w/ rw, min (A) and chair follow PRN to improve tolerance to mobilization and level of (I) compared to current status  Increase (B) LE strength by 1/2 MMT grade to facilitate overall progression w/ functional mobility skills  Plan   Treatment/Interventions Functional transfer training;LE strengthening/ROM; Therapeutic exercise; Endurance training;Cognitive reorientation; Bed mobility;Gait training;Spoke to nursing   PT Frequency Other (Comment)  (3-5x/week)   Recommendation   Recommendation Post acute IP rehab   Equipment Recommended Walker  (w/ (A))   Modified [place] Scale   Modified [place] Scale 4   Barthel Index   Feeding 10   Bathing 0   Grooming Score 5   Dressing Score 5   Bladder Score 5   Bowels Score 5   Toilet Use Score 5   Transfers (Bed/Chair) Score 5   Mobility (Level Surface) Score 0   Stairs Score 0   Barthel Index Score 40       Gordo Owen, PT

## 2018-05-28 NOTE — ASSESSMENT & PLAN NOTE
· Suspect secondary to the above problems  · Patient had CT of the head yesterday and repeat again today, both of which of which showed no acute intracranial abnormality  · Given apparent left upper extremity discomfort, scheduled Tylenol as pain control will help mental status

## 2018-05-28 NOTE — DISCHARGE INSTRUCTIONS
Should schedule outpatient follow up with Dr Nelly Rosenberg to be seen in next 6 weeks due to need for colonoscopy    Urinary Tract Infection in Women   WHAT YOU NEED TO KNOW:   A urinary tract infection (UTI) is caused by bacteria that get inside your urinary tract  Most bacteria that enter your urinary tract come out when you urinate  If the bacteria stay in your urinary tract, you may get an infection  Your urinary tract includes your kidneys, ureters, bladder, and urethra  Urine is made in your kidneys, and it flows from the ureters to the bladder  Urine leaves the bladder through the urethra  A UTI is more common in your lower urinary tract, which includes your bladder and urethra  DISCHARGE INSTRUCTIONS:   Seek care immediately if:   · You are urinating very little or not at all  · You have a high fever with shaking chills  · You have side or back pain that gets worse  Contact your healthcare provider if:   · You have a fever  · You do not feel better after 2 days of taking antibiotics  · You are vomiting  · You have questions or concerns about your condition or care  Medicines:   · Antibiotics  help fight a bacterial infection  · Medicines  may be given to decrease pain and burning when you urinate  They will also help decrease the feeling that you need to urinate often  These medicines will make your urine orange or red  · Take your medicine as directed  Contact your healthcare provider if you think your medicine is not helping or if you have side effects  Tell him or her if you are allergic to any medicine  Keep a list of the medicines, vitamins, and herbs you take  Include the amounts, and when and why you take them  Bring the list or the pill bottles to follow-up visits  Carry your medicine list with you in case of an emergency  Follow up with your healthcare provider as directed:  Write down your questions so you remember to ask them during your visits     Prevent another UTI: · Empty your bladder often  Urinate and empty your bladder as soon as you feel the need  Do not hold your urine for long periods of time  · Wipe from front to back after you urinate or have a bowel movement  This will help prevent germs from getting into your urinary tract through your urethra  · Drink liquids as directed  Ask how much liquid to drink each day and which liquids are best for you  You may need to drink more liquids than usual to help flush out the bacteria  Do not drink alcohol, caffeine, or citrus juices  These can irritate your bladder and increase your symptoms  Your healthcare provider may recommend cranberry juice to help prevent a UTI  · Urinate after you have sex  This can help flush out bacteria passed during sex  · Do not douche or use feminine deodorants  These can change the chemical balance in your vagina  · Change sanitary pads or tampons often  This will help prevent germs from getting into your urinary tract  · Do pelvic muscle exercises often  Pelvic muscle exercises may help you start and stop urinating  Strong pelvic muscles may help you empty your bladder easier  Squeeze these muscles tightly for 5 seconds like you are trying to hold back urine  Then relax for 5 seconds  Gradually work up to squeezing for 10 seconds  Do 3 sets of 15 repetitions a day, or as directed  © 2017 [address]  Information is for End User's use only and may not be sold, redistributed or otherwise used for commercial purposes  All illustrations and images included in CareNotes® are the copyrighted property of [employer]  or [employer]  The above information is an  only  It is not intended as medical advice for individual conditions or treatments  Talk to your doctor, nurse or pharmacist before following any medical regimen to see if it is safe and effective for you

## 2018-05-28 NOTE — DISCHARGE SUMMARY
Discharge- Brett Wong 1933, 80 y o  female MRN: [id number]    Unit/Bed#: -01 Encounter: [id number]    Primary Care Provider: Eloisa Nunez MD   Date and time admitted to hospital: 5/25/2018  2:43 PM        * Sepsis (HCC)-resolved as of 5/28/2018   Assessment & Plan    · Present on admission as evidenced by fever, tachycardia and tachypnea  · UA positive for nitrates, leuks, and innumerable bacteria  Suspect urinary source  · Was given ceftriaxone in the emergency department - continue  · U Cx grew Enterobacter aerogenes sensitive to Rocephin but resistant to first and 2nd gen cephalosporins  · Blood cx neg  · AM Procal WNL - only needs 5 days of abx  · D/c w/ Omnicef to complete 5 day course        Acute cystitis without hematuria   Assessment & Plan    · UA positive for nitrates, leuks, and innumerable bacteria  · IV Rocephin while inpt and Omnicef while at [address] [place]        Anemia   Assessment & Plan    B/l 9-10  5/25 Hgb 10  5/26 Hgb 8 4, but recheck 9 1  5/27 - Hgb 9 8  Outpt colon        Colonic thickening   Assessment & Plan    Pt needs to f/u outpt with Dr Elizabeth Lowe - needs colonoscopy to r/o ca        Chronic systolic CHF (congestive heart failure) ([facility] [address] )   Assessment & Plan    · LORI from April revealed LVEF 45%, PFO and moderate TR  · monitor volume status closely  · IVF stopped on 5/26        History of pacemaker   Assessment & Plan    · Follow-up with Cardiology  · On metoprolol tartrate 50 mg q 12 hours        History of DVT (deep vein thrombosis)   Assessment & Plan    · With history of DVT in the left upper extremity status post pacemaker placement  · Patient is on Eliquis  · Patient does seem to have left upper extremity discomfort  X-ray WNL    Doppler negative for DVT  · Pain possibly related to deconditioning from CVA        CKD (chronic kidney disease) stage 3, GFR 30-59 ml/min   Assessment & Plan    · Baseline creatinine appears to be around 1 3  · Creatinine below baseline          History of subarachnoid hemorrhage   Assessment & Plan    · CT the head yesterday and today showed no acute intracranial abnormality  · Of note, Patient on Eliquis        History of CVA (cerebrovascular accident)   Assessment & Plan    · Patient on statin and Eliquis  · CT of the head yesterday and today showed no acute intracranial abnormality  · Follow up with Neurology outpatient        Acute encephalopathy   Assessment & Plan    · Suspect secondary to the above problems  · Patient had CT of the head yesterday and repeat again today, both of which of which showed no acute intracranial abnormality  · Given apparent left upper extremity discomfort, scheduled Tylenol as pain control will help mental status        Controlled type 2 diabetes mellitus, without long-term current use of insulin ([facility])   Assessment & Plan    · Per review of the records and my discussion with the patient's family, patient has a history of diabetes, however when reviewing the med rec from facility, patient is not on any diabetes medications  · Glucose 150 on admission  · sliding scale while inpt  · HbA1c from April 2018 6 6  No need to repeat now        H/O mitral valve replacement   Assessment & Plan    · Per family, this is a porcine valve  · Patient on Eliquis        Hyperkalemia-resolved as of 5/28/2018   Assessment & Plan    Noted 5/28 - given Kayexalate 15Gm x 1  Recheck 6 hrs later 3 8            Discharging Physician / Practitioner: Javier Vogt DO  PCP: Maria G Castellon MD  Admission Date:   Admission Orders     Ordered        05/25/18 1942  Inpatient Admission (expected length of stay for this patient is greater than two midnights)  Once             Discharge Date: 05/28/18    Resolved Problems  Date Reviewed: 5/28/2018          Resolved    * (Principal)Sepsis ([facility] [address] ) 5/28/2018     Resolved by  Javier Vogt DO    Hyperkalemia 5/28/2018     Resolved by  Javier Vogt DO          Consultations During Hospital Stay:  · none    Procedures Performed:     · none    Significant Findings / Test Results:     · U Cx Enterobacter aerogenes    Incidental Findings:   · CTAP showed colonic thickening     Test Results Pending at Discharge (will require follow up):   · none     Outpatient Tests Requested:  · CBC and BMP in a few days  · Should see Dr Tala Haas in next 4-6 weeks to schedule colonoscopy    Complications:  none    Reason for Admission: sepsis    Hospital Course:     Nancy Hyde is a 80 y o  female patient who originally presented to the hospital on 5/25/2018 due to encephalopathy  PT found to have sepsis 2/2 UTI  She was put on abx and her sepsis resolved  Please see above list of diagnoses and related plan for additional information  Condition at Discharge: fair     Discharge Day Visit / Exam:     Subjective:  No o/n events  Vitals: Blood Pressure: 134/60 (05/28/18 0720)  Pulse: 76 (05/28/18 0720)  Temperature: 97 5 °F (36 4 °C) (05/28/18 0720)  Temp Source: Oral (05/28/18 0720)  Respirations: 18 (05/28/18 0720)  Height: 5' 4" (162 6 cm) (05/25/18 1453)  Weight - Scale: 69 5 kg (153 lb 3 5 oz) (05/25/18 1453)  SpO2: 99 % (05/28/18 0720)  Exam:   Physical Exam   Constitutional: No distress  HENT:   Head: Normocephalic and atraumatic  Eyes: Conjunctivae and EOM are normal    Neck: Normal range of motion  Neck supple  Cardiovascular: Normal rate and regular rhythm  Pulmonary/Chest: Effort normal and breath sounds normal  She has no wheezes  She has no rales  Abdominal: Soft  Bowel sounds are normal  She exhibits no distension  There is no tenderness  Musculoskeletal: Normal range of motion  She exhibits no edema  Neurological: She is alert  Ox2   Skin: Skin is warm and dry  She is not diaphoretic  Discussion with Family: yes    Discharge instructions/Information to patient and family:   See after visit summary for information provided to patient and family        Provisions for Follow-Up Care:  See after visit summary for information related to follow-up care and any pertinent home health orders  Disposition:     [facility] (see below)    For Discharges to [facility] SNF:   · [facility] Texted SNF Physician    Planned Readmission: no     Discharge Statement:  I spent 35 minutes discharging the patient  This time was spent on the day of discharge  I had direct contact with the patient on the day of discharge  Greater than 50% of the total time was spent examining patient, answering all patient questions, arranging and discussing plan of care with patient as well as directly providing post-discharge instructions  Additional time then spent on discharge activities  Discharge Medications:  See after visit summary for reconciled discharge medications provided to patient and family        ** Please Note: This note has been constructed using a voice recognition system **

## 2018-05-28 NOTE — ASSESSMENT & PLAN NOTE
· Present on admission as evidenced by fever, tachycardia and tachypnea  · UA positive for nitrates, leuks, and innumerable bacteria  Suspect urinary source      · Was given ceftriaxone in the emergency department - continue  · U Cx grew Enterobacter aerogenes sensitive to Rocephin but resistant to first and 2nd gen cephalosporins  · Blood cx neg  · AM Procal WNL - only needs 5 days of abx  · D/c w/ Omnicef to complete 5 day course

## 2018-05-28 NOTE — PLAN OF CARE
Problem: Potential for Falls  Goal: Patient will remain free of falls  INTERVENTIONS:  - Assess patient frequently for physical needs  -  Identify cognitive and physical deficits and behaviors that affect risk of falls  -  [place] fall precautions as indicated by assessment   - Educate patient/family on patient safety including physical limitations  - Instruct patient to call for assistance with activity based on assessment  - Modify environment to reduce risk of injury  - Consider OT/PT consult to assist with strengthening/mobility   Outcome: Progressing      Problem: Nutrition/Hydration-ADULT  Goal: Nutrient/Hydration intake appropriate for improving, restoring or maintaining nutritional needs  Monitor and assess patient's nutrition/hydration status for malnutrition (ex- brittle hair, bruises, dry skin, pale skin and conjunctiva, muscle wasting, smooth red tongue, and disorientation)  Collaborate with interdisciplinary team and initiate plan and interventions as ordered  Monitor patient's weight and dietary intake as ordered or per policy  Utilize nutrition screening tool and intervene per policy  Determine patient's food preferences and provide high-protein, high-caloric foods as appropriate       INTERVENTIONS:  - Monitor oral intake, urinary output, labs, and treatment plans  - Assess nutrition and hydration status and recommend course of action  - Evaluate amount of meals eaten  - Assist patient with eating if necessary   - Allow adequate time for meals  - Recommend/ encourage appropriate diets, oral nutritional supplements, and vitamin/mineral supplements  - Order, calculate, and assess calorie counts as needed  - Recommend, monitor, and adjust tube feedings and TPN/PPN based on assessed needs  - Assess need for intravenous fluids  - Provide specific nutrition/hydration education as appropriate  - Include patient/family/caregiver in decisions related to nutrition   Outcome: Progressing      Problem: GENITOURINARY - ADULT  Goal: Maintains or returns to baseline urinary function  INTERVENTIONS:  - Assess urinary function  - Encourage oral fluids to ensure adequate hydration  - Administer IV fluids as ordered to ensure adequate hydration  - Administer ordered medications as needed  - Offer frequent toileting  - Follow urinary retention protocol if ordered   Outcome: Progressing      Problem: Prexisting or High Potential for Compromised Skin Integrity  Goal: Skin integrity is maintained or improved  INTERVENTIONS:  - Identify patients at risk for skin breakdown  - Assess and monitor skin integrity  - Assess and monitor nutrition and hydration status  - Monitor labs (i e  albumin)  - Assess for incontinence   - Turn and reposition patient  - Assist with mobility/ambulation  - Relieve pressure over bony prominences  - Avoid friction and shearing  - Provide appropriate hygiene as needed including keeping skin clean and dry  - Evaluate need for skin moisturizer/barrier cream  - Collaborate with interdisciplinary team (i e  Nutrition, Rehabilitation, etc )   - Patient/family teaching   Outcome: Progressing

## 2018-05-28 NOTE — SOCIAL WORK
CM informed pt clear for d/c today to return to  SNF  Transport arranged via [facility]LS at 4pm to   Bedside RN and Brandon notified of transport time  VM left for pt's dtr Emanuel Postal advising of d/c and transport time  Chart copy requested  Transfer to facility and CMN forms completed

## 2018-05-28 NOTE — ASSESSMENT & PLAN NOTE
· Per review of the records and my discussion with the patient's family, patient has a history of diabetes, however when reviewing the med rec from facility, patient is not on any diabetes medications  · Glucose 150 on admission  · sliding scale while inpt  · HbA1c from April 2018 6 6   No need to repeat now

## 2018-05-28 NOTE — PLAN OF CARE
Problem: PHYSICAL THERAPY ADULT  Goal: Performs mobility at highest level of function for planned discharge setting  See evaluation for individualized goals  Treatment/Interventions: Functional transfer training, LE strengthening/ROM, Therapeutic exercise, Endurance training, Cognitive reorientation, Bed mobility, Gait training, Spoke to nursing  Equipment Recommended: [place] (w/ (A))       See flowsheet documentation for full assessment, interventions and recommendations  Prognosis: Good  Problem List: Decreased strength, Decreased range of motion, Decreased endurance, Impaired balance, Decreased mobility, Impaired tone  Assessment: Pt is 80 y o  admitted with hx of lethargy and and Dx of sepsis, acute cystitis, anemia, and acute encephalopathy  Pt 's comorbidities affecting POC include: DJD, (R) THR, DVT, DM, CVA, (L) TKR, CHF, CKD, MVR, PPM, and hx of SAH and personal factors of: advanced age and recently undergoing rehab at [facility]  Pt's clinical presentation is currently unstable/unpredictable which is evident in abn lab values being monitored and need for assist (A)x2 w/ all phases of limited mobility at bedside and inability to progress to amb when usually mobilizing independently  Pt presents w/ generalized weakness, elevated general LE tone w/ decreased overall LE strength, decreased functional endurance and activity tolerance, impaired balance, inability to amb at this time and fall risk  Will cont to follow pt in PT for progressive mobilization to address above functional deficits and to max level of (I), endurance, and safety  Currently recommend to cont w/ rehab upon D/C when medically cleared  Will cont to follow until then  Recommendation: Post acute IP rehab          See flowsheet documentation for full assessment

## 2018-05-28 NOTE — ASSESSMENT & PLAN NOTE
· UA positive for nitrates, leuks, and innumerable bacteria  · IV Rocephin while inpt and Omnicef while at [facility]

## 2018-05-29 LAB
BACTERIA BLD CULT: NORMAL
BACTERIA BLD CULT: NORMAL

## 2018-05-30 LAB
BACTERIA BLD CULT: NORMAL
BACTERIA BLD CULT: NORMAL

## 2018-06-08 ENCOUNTER — OFFICE VISIT (OUTPATIENT)
Dept: GASTROENTEROLOGY | Facility: AMBULARY SURGERY CENTER | Age: 83
End: 2018-06-08
Payer: MEDICARE

## 2018-06-08 VITALS
SYSTOLIC BLOOD PRESSURE: 135 MMHG | BODY MASS INDEX: 24.75 KG/M2 | TEMPERATURE: 98.4 F | DIASTOLIC BLOOD PRESSURE: 60 MMHG | HEART RATE: 71 BPM | WEIGHT: 145 LBS | HEIGHT: 64 IN

## 2018-06-08 DIAGNOSIS — K59.04 CHRONIC IDIOPATHIC CONSTIPATION: ICD-10-CM

## 2018-06-08 DIAGNOSIS — K63.9 COLONIC THICKENING: ICD-10-CM

## 2018-06-08 DIAGNOSIS — K21.9 GASTROESOPHAGEAL REFLUX DISEASE WITHOUT ESOPHAGITIS: Primary | ICD-10-CM

## 2018-06-08 PROCEDURE — 99214 OFFICE O/P EST MOD 30 MIN: CPT | Performed by: PHYSICIAN ASSISTANT

## 2018-06-08 RX ORDER — POTASSIUM CHLORIDE 750 MG/1
TABLET, FILM COATED, EXTENDED RELEASE ORAL DAILY
Status: ON HOLD | COMMUNITY
Start: 2018-03-27 | End: 2018-11-07

## 2018-06-08 RX ORDER — SITAGLIPTIN 100 MG/1
TABLET, FILM COATED ORAL
COMMUNITY
Start: 2018-05-13 | End: 2018-06-25 | Stop reason: HOSPADM

## 2018-06-08 NOTE — PROGRESS NOTES
[facility] - Outpatient Follow-up Note  Jimmy Gibson 80 y o  female MRN: [id number]  Encounter: [id number]          ASSESSMENT AND PLAN:      Colonic Thickening on CT scan  Anemia  -Focal thickening of the mid ascending colon seen twice on CT scan, she has a history of colon polyps, also anemia  Will plan for colonoscopy to rule out malignancy however she is on eliquis given recent strokes  Will need to reach out to neurologist to determine whether this could be held  We will contact neurology, she also has an apt next week and she will discus  Will also plan for an EGD at the same time to rule out upper GI source of anemia    ______________________________________________________________________    SUBJECTIVE:  Hamzah Mccabe is an 79 y/o female with a history of CVA on eliquis, CKD who presents with daughter for follow-up  She was recently in the hospital with sepsis secondary to a UTI and had a CT scan which showed focal thickening of the mid ascending colon without obstruction, this was seen on prior CT 4/26 as well  She has a history of colonic polyps  Her last colonoscopy was in 2016 and she had an adenomatous polyp  No family history of colon cancer  She also has anemia it has been worsening since early 2018, most recently has been 8-10, her daughter denies melena or hematochezia  She had lost weight though notably she has had multiple hospital admission  She denies abdominal pain, nausea, vomiting  She is chronically constipated however has bms regularly on colac and senokot       REVIEW OF SYSTEMS IS OTHERWISE NEGATIVE        Historical Information   Past Medical History:   Diagnosis Date    Acid reflux     on occ    Arthritis     DJD right hip replaced    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 ) 2007    x 2 lesions with no change    Cancer ([facility] ) 2007    colonic polyps, no surgery done    Deep vein thrombosis (DVT) of left upper extremity ([facility] ) 12/11/2017    Diabetes mellitus ([facility] )     type 2  Diverticulosis     Edema     in legs on occ    Hypertension     on occ    Language barrier     speaks [place]ian & broken english    Stroke [facility])      Past Surgical History:   Procedure Laterality Date    COLON SURGERY      COLONOSCOPY      COLONOSCOPY N/A 11/2/2016    Procedure: COLONOSCOPY;  Surgeon: Renetta Hardy MD;  Location: [facility] GI LAB; Service:     ESOPHAGOGASTRODUODENOSCOPY N/A 11/2/2016    Procedure: ESOPHAGOGASTRODUODENOSCOPY (EGD); Surgeon: Renetta Hardy MD;  Location: [facility] GI LAB; Service:     JOINT REPLACEMENT Right 02/2015    hip    JOINT REPLACEMENT Left     knee    JOINT REPLACEMENT Right     knee    [place] REVISE MEDIAN N/CARPAL TUNNEL SURG Left 1/28/2016    Procedure: RELEASE CARPAL TUNNEL;  Surgeon: Tonia Jean Baptiste MD;  Location: [facility] MAIN OR;  Service: Orthopedics    TUBAL LIGATION      VARICOSE VEIN SURGERY Bilateral      Social History   History   Alcohol Use No     Comment: socially     History   Drug Use No     History   Smoking Status    Former Smoker    Packs/day: 0 25    Years: 10 00    Types: Cigarettes    Quit date: 1950   Smokeless Tobacco    Never Used     Family History   Problem Relation Age of Onset    Cancer Mother      throat       Meds/Allergies       Current Outpatient Prescriptions:     acetaminophen (TYLENOL) 325 mg tablet    apixaban (ELIQUIS) 2 5 mg    atorvastatin (LIPITOR) 80 mg tablet    bisacodyl (DULCOLAX) 10 mg suppository    docusate sodium (COLACE) 100 mg capsule    ferrous sulfate 325 (65 Fe) mg tablet    magnesium hydroxide (MILK OF MAGNESIA) 400 mg/5 mL oral suspension    metoprolol tartrate (LOPRESSOR) 50 mg tablet    Multiple Vitamins-Minerals (MULTIVITAMIN ADULT PO)    pantoprazole (PROTONIX) 40 mg tablet    polyethylene glycol (MIRALAX) 17 g packet    senna (SENOKOT) 8 6 mg    Allergies   Allergen Reactions    Heparin Other (See Comments)     Thrombocytopenia             Objective     not currently breastfeeding  There is no height or weight on file to calculate BMI  PHYSICAL EXAM:      General Appearance:   Alert, cooperative, no distress   HEENT:   Normocephalic, atraumatic, anicteric      Neck:  Supple, symmetrical, trachea midline   Lungs:   Clear to auscultation bilaterally   Heart[de-identified]   Regular rate and rhythm; no murmur, rub, or gallop  Abdomen:   Soft, non-tender, non-distended; normal bowel sounds   Genitalia:   Deferred    Rectal:   Deferred    Extremities:  No cyanosis, clubbing or edema    Pulses:  2+ and symmetric    Skin:  No jaundice, rashes, or lesions    Lymph nodes:  No palpable cervical lymphadenopathy        Lab Results:   No visits with results within 1 Day(s) from this visit     Latest known visit with results is:   Admission on 05/25/2018, Discharged on 05/28/2018   Component Date Value    WBC 05/25/2018 8 77     RBC 05/25/2018 3 56*    Hemoglobin 05/25/2018 10 0*    Hematocrit 05/25/2018 31 5*    MCV 05/25/2018 89     MCH 05/25/2018 28 1     MCHC 05/25/2018 31 7     RDW 05/25/2018 14 0     MPV 05/25/2018 10 2     Platelets 97/12/6564 115*    nRBC 05/25/2018 0     Neutrophils Relative 05/25/2018 84*    Lymphocytes Relative 05/25/2018 7*    Monocytes Relative 05/25/2018 7     Eosinophils Relative 05/25/2018 2     Basophils Relative 05/25/2018 0     Neutrophils Absolute 05/25/2018 7 35     Lymphocytes Absolute 05/25/2018 0 61     Monocytes Absolute 05/25/2018 0 65     Eosinophils Absolute 05/25/2018 0 14     Basophils Absolute 05/25/2018 0 01     Protime 05/25/2018 14 8*    INR 05/25/2018 1 15     PTT 05/25/2018 23*    Sodium 05/25/2018 137     Potassium 05/25/2018 4 4     Chloride 05/25/2018 105     CO2 05/25/2018 24     Anion Gap 05/25/2018 8     BUN 05/25/2018 19     Creatinine 05/25/2018 1 10     Glucose 05/25/2018 150*    Calcium 05/25/2018 9 1     AST 05/25/2018 70*    ALT 05/25/2018 40     Alkaline Phosphatase 05/25/2018 97     Total Protein 05/25/2018 7 5  Albumin 05/25/2018 2 9*    Total Bilirubin 05/25/2018 0 62     eGFR 05/25/2018 46     NT-proBNP 05/25/2018 5526*    Troponin I 05/25/2018 <0 02     Ammonia 05/25/2018 <10*    Color, UA 05/25/2018 Yellow     Blood Culture 05/25/2018 No Growth After 5 Days   Blood Culture 05/25/2018 No Growth After 5 Days       LACTIC ACID 05/25/2018 1 0     Ventricular Rate 05/25/2018 96     Atrial Rate 05/25/2018 98     WV Interval 05/25/2018 150     QRSD Interval 05/25/2018 136     QT Interval 05/25/2018 394     QTC Interval 05/25/2018 497     P Axis 05/25/2018 58     QRS Axis 05/25/2018 -60     T Wave Axis 05/25/2018 80     Color, UA 05/25/2018 Yellow     Clarity, UA 05/25/2018 Cloudy     pH, UA 05/25/2018 5 5     Leukocytes, UA 05/25/2018 Small*    Nitrite, UA 05/25/2018 Positive*    Protein, UA 05/25/2018 30 (1+)*    Glucose, UA 05/25/2018 Negative     Ketones, UA 05/25/2018 15 (1+)*    Urobilinogen, UA 05/25/2018 0 2     Bilirubin, UA 05/25/2018 Negative     Blood, UA 05/25/2018 Small*    Specific Gravity, UA 05/25/2018 >=1 030     RBC, UA 05/25/2018 None Seen     WBC, UA 05/25/2018 Innumerable*    Epithelial Cells 05/25/2018 None Seen     Bacteria, UA 05/25/2018 Innumerable*    Hyaline Casts, UA 05/25/2018 5-10*    Urine Culture 05/25/2018 >100,000 cfu/ml Enterobacter aerogenes*    POC Glucose 05/25/2018 150*    Troponin I 05/26/2018 <0 02     Troponin I 05/26/2018 <0 02     Sodium 05/26/2018 139     Potassium 05/26/2018 4 0     Chloride 05/26/2018 109*    CO2 05/26/2018 25     Anion Gap 05/26/2018 5     BUN 05/26/2018 19     Creatinine 05/26/2018 1 02     Glucose 05/26/2018 101     Calcium 05/26/2018 9 4     eGFR 05/26/2018 51     WBC 05/26/2018 6 25     RBC 05/26/2018 2 92*    Hemoglobin 05/26/2018 8 4*    Hematocrit 05/26/2018 25 9*    MCV 05/26/2018 89     MCH 05/26/2018 28 8     MCHC 05/26/2018 32 4     RDW 05/26/2018 14 0     Platelets 31/16/3089 115*    MPV 05/26/2018 10 6     POC Glucose 05/26/2018 98     POC Glucose 05/26/2018 158*    Hemoglobin 05/26/2018 9 1*    Hematocrit 05/26/2018 28 0*    POC Glucose 05/26/2018 155*    POC Glucose 05/26/2018 101     WBC 05/27/2018 5 10     RBC 05/27/2018 3 40*    Hemoglobin 05/27/2018 9 8*    Hematocrit 05/27/2018 31 1*    MCV 05/27/2018 92     MCH 05/27/2018 28 8     MCHC 05/27/2018 31 5     RDW 05/27/2018 14 0     Platelets 14/93/2596 135*    MPV 05/27/2018 10 6     Sodium 05/27/2018 140     Potassium 05/27/2018 4 3     Chloride 05/27/2018 107     CO2 05/27/2018 27     Anion Gap 05/27/2018 6     BUN 05/27/2018 19     Creatinine 05/27/2018 1 18     Glucose 05/27/2018 92     Calcium 05/27/2018 9 9     AST 05/27/2018 57*    ALT 05/27/2018 45     Alkaline Phosphatase 05/27/2018 90     Total Protein 05/27/2018 7 3     Albumin 05/27/2018 2 6*    Total Bilirubin 05/27/2018 0 32     eGFR 05/27/2018 42     Magnesium 05/27/2018 2 3     Phosphorus 05/27/2018 3 5     Procalcitonin 05/27/2018 0 17     POC Glucose 05/27/2018 111     POC Glucose 05/27/2018 131     POC Glucose 05/27/2018 141*    POC Glucose 05/27/2018 157*    POC Glucose 05/27/2018 151*    Sodium 05/28/2018 134*    Potassium 05/28/2018 6 0*    Chloride 05/28/2018 109*    CO2 05/28/2018 19*    Anion Gap 05/28/2018 6     BUN 05/28/2018 24     Creatinine 05/28/2018 1 25     Glucose 05/28/2018 102     Calcium 05/28/2018 8 8     AST 05/28/2018 84*    ALT 05/28/2018 45     Alkaline Phosphatase 05/28/2018 86     Total Protein 05/28/2018 6 9     Albumin 05/28/2018 2 0*    Total Bilirubin 05/28/2018 0 56     eGFR 05/28/2018 40     POC Glucose 05/28/2018 133     POC Glucose 05/28/2018 141*    POC Glucose 05/28/2018 138     Sodium 05/28/2018 140     Potassium 05/28/2018 3 8     Chloride 05/28/2018 107     CO2 05/28/2018 27     Anion Gap 05/28/2018 6     BUN 05/28/2018 21     Creatinine 05/28/2018 1 12     Glucose 05/28/2018 108     Calcium 05/28/2018 9 3     eGFR 05/28/2018 45          Radiology Results:   Ct Chest Abdomen Pelvis Wo Contrast    Result Date: 5/25/2018  Narrative: CT CHEST, ABDOMEN AND PELVIS WITHOUT IV CONTRAST INDICATION:   chest pain, abdominal pain, hx of blood clots  COMPARISON: 4/26/2018 TECHNIQUE: CT examination of the chest, abdomen and pelvis was performed without intravenous contrast   Axial, sagittal, and coronal 2D reformatted images were created from the source data and submitted for interpretation  Radiation dose length product (DLP) for this visit:  674 mGy-cm   This examination, like all CT scans performed in the [facility], was performed utilizing techniques to minimize radiation dose exposure, including the use of iterative reconstruction and automated exposure control  Enteric contrast was not administered  FINDINGS: CHEST LUNGS:  Slightly limited study due to respiratory motion artifact  Unchanged 3 mm left upper lobe nodule on series 3 image 19, and 5 mm left lower lobe nodule on series 3 image 20  There is mild bibasilar atelectasis  There is no tracheal or endobronchial lesion  PLEURA:  Unremarkable  HEART/GREAT VESSELS:  Stable cardiomegaly  No pericardial effusion  Pacer leads again seen  Status post CABG  Dense mitral annulus calcification again noted  Normal caliber thoracic aorta with atherosclerotic calcifications  MEDIASTINUM AND KEILA:  Unremarkable  CHEST WALL AND LOWER NECK:   Status post median sternotomy  Pacer device in the left upper anterior chest wall  Extravasated contrast material is noted in the subcutaneous soft tissues of the visualized proximal   Right upper extremity ABDOMEN Absence of intravenous contrast enhancement limits evaluation of the solid abdominal viscera  LIVER/BILIARY TREE:  Unremarkable  GALLBLADDER:  No calcified gallstones  No pericholecystic inflammatory change  SPLEEN:  Unremarkable  PANCREAS:  Unremarkable   ADRENAL GLANDS:  Unremarkable  KIDNEYS/URETERS:  Unremarkable  No hydronephrosis  STOMACH AND BOWEL: Focal thickening of the mid ascending colon again identified  There is no obstruction  There is colonic diverticulosis without evidence of acute diverticulitis  APPENDIX:  No findings to suggest appendicitis  ABDOMINOPELVIC CAVITY:  No ascites or free intraperitoneal air  No lymphadenopathy  VESSELS:  Unremarkable for patient's age  PELVIS REPRODUCTIVE ORGANS:  Unremarkable for patient's age  URINARY BLADDER:  Excreted contrast material noted in the bladder  ABDOMINAL WALL/INGUINAL REGIONS:  Unremarkable  OSSEOUS STRUCTURES:  No acute fracture or destructive osseous lesion  Status post right hip arthroplasty  Impression: No acute pathology  Unchanged left lung nodules  Persistent focal thickening of the ascending colon  If not already performed, colonoscopy is again recommended to exclude neoplasm  Colonic diverticulosis  Workstation performed: [id number]     Xr Chest Pa & Lateral    Result Date: 5/24/2018  Narrative: CHEST INDICATION:   confusion  COMPARISON:  5/8/2018 EXAM PERFORMED/VIEWS:  XR CHEST PA & LATERAL FINDINGS:  There is a left-sided pacer  Cardiomediastinal silhouette appears unremarkable  The lungs are clear  No pneumothorax or pleural effusion  There is a large calcification in the] subacromial bursa, likely intra-articular body  Impression: No acute cardiopulmonary disease  Workstation performed: [id number]     Xr Humerus Left    Result Date: 5/26/2018  Narrative: LEFT HUMERUS INDICATION: 63-year-old female, post fall on pain COMPARISON:  None VIEWS:  XR HUMERUS LEFT FINDINGS: There is no acute fracture or dislocation  No degenerative changes  No lytic or blastic lesions are seen  Soft tissues are unremarkable  Partially visualized cardiac ICD     Impression: No acute osseous abnormality   Findings are consistent with emergency provider's preliminary reading Workstation performed: [id number] Xr Forearm 2 Views Left    Result Date: 5/26/2018  Narrative: LEFT FOREARM INDICATION: Posttraumatic forearm pain COMPARISON:  None VIEWS:  XR FOREARM 2 VW LEFT Images: 4 FINDINGS: Old ulnar styloid tip fracture There is no acute fracture or dislocation  No degenerative changes  No lytic or blastic lesions are seen  Soft tissues are unremarkable  Impression: No acute osseous abnormality  Findings are consistent with emergency provider's preliminary reading Workstation performed: [id number]     Ct Head Without Contrast    Result Date: 5/25/2018  Narrative: CT BRAIN - WITHOUT CONTRAST INDICATION:   Altered mental status  COMPARISON:  5/24/2018 TECHNIQUE:  CT examination of the brain was performed  In addition to axial images, coronal 2D reformatted images were created and submitted for interpretation  Radiation dose length product (DLP) for this visit:  1039 51 mGy-cm   This examination, like all CT scans performed in the [facility], was performed utilizing techniques to minimize radiation dose exposure, including the use of iterative reconstruction and automated exposure control  IMAGE QUALITY:  Diagnostic  FINDINGS: PARENCHYMA: Decreased attenuation is noted in periventricular and subcortical white matter demonstrating an appearance that is statistically most likely to represent mild microangiopathic change; this appearance is similar when compared to most recent prior examination  Stable old left thalamic lacunar infarct, and old right parietal infarct  No CT signs of acute infarction  No intracranial mass, mass effect or midline shift  No acute parenchymal hemorrhage  VENTRICLES AND EXTRA-AXIAL SPACES:  Normal for the patient's age  VISUALIZED ORBITS AND PARANASAL SINUSES:  Unremarkable  CALVARIUM AND EXTRACRANIAL SOFT TISSUES:  Normal      Impression: No acute intracranial abnormality   Workstation performed: [id number]     Ct Head Wo Contrast    Result Date: 5/24/2018  Narrative: CT BRAIN - WITHOUT CONTRAST INDICATION:   History of stroke  Confusion  COMPARISON:  5/8/2018 TECHNIQUE:  CT examination of the brain was performed  In addition to axial images, coronal 2D reformatted images were created and submitted for interpretation  Radiation dose length product (DLP) for this visit:  618 7789 mGy-cm   This examination, like all CT scans performed in the [facility], was performed utilizing techniques to minimize radiation dose exposure, including the use of iterative reconstruction and automated exposure control  IMAGE QUALITY:  Diagnostic  FINDINGS: PARENCHYMA: Decreased attenuation is noted in periventricular and subcortical white matter demonstrating an appearance that is statistically most likely to represent advanced microangiopathic change; this appearance is similar when compared to most recent prior examination  Chronic right parietal cortical and left thalamic lacunar type infarct  No CT signs of acute infarction  No intracranial mass, mass effect or midline shift  No acute parenchymal hemorrhage  VENTRICLES AND EXTRA-AXIAL SPACES:  Stable in size  VISUALIZED ORBITS AND PARANASAL SINUSES:  Unremarkable  CALVARIUM AND EXTRACRANIAL SOFT TISSUES:  Normal      Impression: No acute intracranial abnormality  Workstation performed: [id number]     Vas Upper Limb Venous Duplex Scan, Unilateral/limited    Result Date: 5/26/2018  Narrative:  [facility] REPORT CLINICAL: Indications:  Patient presents with left upper extremity pain and history of DVT  Operative History: arthritis, colon cancer,brain benign neoplasm,DM, HTN, Multiple bilateral varicosities Bilateral knee replacement Bilateral varicose vein surgery Mitral valve replacement Right hip replacement    CONCLUSION:  Impression RIGHT UPPER LIMB LIMITED: Evaluation shows no evidence of thrombus in the internal jugular vein, subclavian vein, and the brachiocephalic vein    LEFT UPPER LIMB: No evidence of acute or chronic deep vein thrombosis  No evidence of superficial thrombophlebitis noted  Doppler evaluation shows a normal response to augmentation maneuvers    SIGNATURE: Electronically Signed by: Nicole Dennis MD on 2018-05-26 06:17:31 PM

## 2018-06-08 NOTE — LETTER
June 8, 2018     Isauro Her, [address] [place] [address]    Patient: Ellen Camp   YOB: 1933   Date of Visit: 6/8/2018       Dear Dr Linda Thompson: Thank you for referring Vania De La Rosa to me for evaluation  Below are my notes for this consultation  If you have questions, please do not hesitate to call me  I look forward to following your patient along with you  Sincerely,        Nhi Chacon PA-C        CC: No Recipients  Nhi Chacon PA-C  6/8/2018  4:34 PM  Sign at close encounter  [facility] - Outpatient Follow-up Note  Ellen Camp 80 y o  female MRN: [id number]  Encounter: [id number]          ASSESSMENT AND PLAN:      Colonic Thickening on CT scan  Anemia  -Focal thickening of the mid ascending colon seen twice on CT scan, she has a history of colon polyps, also anemia  Will plan for colonoscopy to rule out malignancy however she is on eliquis given recent strokes  Will need to reach out to neurologist to determine whether this could be held  We will contact neurology, she also has an apt next week and she will discus  Will also plan for an EGD at the same time to rule out upper GI source of anemia    ______________________________________________________________________    SUBJECTIVE:  Sherrie Perez is an 81 y/o female with a history of CVA on eliquis, CKD who presents with daughter for follow-up  She was recently in the hospital with sepsis secondary to a UTI and had a CT scan which showed focal thickening of the mid ascending colon without obstruction, this was seen on prior CT 4/26 as well  She has a history of colonic polyps  Her last colonoscopy was in 2016 and she had an adenomatous polyp  No family history of colon cancer  She also has anemia it has been worsening since early 2018, most recently has been 8-10, her daughter denies melena or hematochezia   She had lost weight though notably she has had multiple hospital admission  She denies abdominal pain, nausea, vomiting  She is chronically constipated however has bms regularly on colac and senokot       REVIEW OF SYSTEMS IS OTHERWISE NEGATIVE  Historical Information   Past Medical History:   Diagnosis Date    Acid reflux     on occ    Arthritis     DJD right hip replaced    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 )     type 2    Diverticulosis     Edema     in legs on occ    Hypertension     on occ    Language barrier     speaks Georgian & broken english    Stroke [facility])      Past Surgical History:   Procedure Laterality Date    COLON SURGERY      COLONOSCOPY      COLONOSCOPY N/A 11/2/2016    Procedure: COLONOSCOPY;  Surgeon: Marylene Alstrom, MD;  Location: [facility] GI LAB; Service:     ESOPHAGOGASTRODUODENOSCOPY N/A 11/2/2016    Procedure: ESOPHAGOGASTRODUODENOSCOPY (EGD); Surgeon: Marylene Alstrom, MD;  Location: [facility] GI LAB;   Service:     JOINT REPLACEMENT Right 02/2015    hip    JOINT REPLACEMENT Left     knee    JOINT REPLACEMENT Right     knee    IN REVISE MEDIAN N/CARPAL TUNNEL SURG Left 1/28/2016    Procedure: RELEASE CARPAL TUNNEL;  Surgeon: Alexander Briceño MD;  Location: [facility];  Service: Orthopedics    TUBAL LIGATION      VARICOSE VEIN SURGERY Bilateral      Social History   History   Alcohol Use No     Comment: socially     History   Drug Use No     History   Smoking Status    Former Smoker    Packs/day: 0 25    Years: 10 00    Types: Cigarettes    Quit date: 1950   Smokeless Tobacco    Never Used     Family History   Problem Relation Age of Onset    Cancer Mother      throat       Meds/Allergies       Current Outpatient Prescriptions:     acetaminophen (TYLENOL) 325 mg tablet    apixaban (ELIQUIS) 2 5 mg    atorvastatin (LIPITOR) 80 mg tablet   bisacodyl (DULCOLAX) 10 mg suppository    docusate sodium (COLACE) 100 mg capsule    ferrous sulfate 325 (65 Fe) mg tablet    magnesium hydroxide (MILK OF MAGNESIA) 400 mg/5 mL oral suspension    metoprolol tartrate (LOPRESSOR) 50 mg tablet    Multiple Vitamins-Minerals (MULTIVITAMIN ADULT PO)    pantoprazole (PROTONIX) 40 mg tablet    polyethylene glycol (MIRALAX) 17 g packet    senna (SENOKOT) 8 6 mg    Allergies   Allergen Reactions    Heparin Other (See Comments)     Thrombocytopenia             Objective     not currently breastfeeding  There is no height or weight on file to calculate BMI  PHYSICAL EXAM:      General Appearance:   Alert, cooperative, no distress   HEENT:   Normocephalic, atraumatic, anicteric      Neck:  Supple, symmetrical, trachea midline   Lungs:   Clear to auscultation bilaterally   Heart[de-identified]   Regular rate and rhythm; no murmur, rub, or gallop  Abdomen:   Soft, non-tender, non-distended; normal bowel sounds   Genitalia:   Deferred    Rectal:   Deferred    Extremities:  No cyanosis, clubbing or edema    Pulses:  2+ and symmetric    Skin:  No jaundice, rashes, or lesions    Lymph nodes:  No palpable cervical lymphadenopathy        Lab Results:   No visits with results within 1 Day(s) from this visit     Latest known visit with results is:   Admission on 05/25/2018, Discharged on 05/28/2018   Component Date Value    WBC 05/25/2018 8 77     RBC 05/25/2018 3 56*    Hemoglobin 05/25/2018 10 0*    Hematocrit 05/25/2018 31 5*    MCV 05/25/2018 89     MCH 05/25/2018 28 1     MCHC 05/25/2018 31 7     RDW 05/25/2018 14 0     MPV 05/25/2018 10 2     Platelets 41/71/7493 115*    nRBC 05/25/2018 0     Neutrophils Relative 05/25/2018 84*    Lymphocytes Relative 05/25/2018 7*    Monocytes Relative 05/25/2018 7     Eosinophils Relative 05/25/2018 2     Basophils Relative 05/25/2018 0     Neutrophils Absolute 05/25/2018 7 35     Lymphocytes Absolute 05/25/2018 0 61     Monocytes Absolute 05/25/2018 0 65     Eosinophils Absolute 05/25/2018 0 14     Basophils Absolute 05/25/2018 0 01     Protime 05/25/2018 14 8*    INR 05/25/2018 1 15     PTT 05/25/2018 23*    Sodium 05/25/2018 137     Potassium 05/25/2018 4 4     Chloride 05/25/2018 105     CO2 05/25/2018 24     Anion Gap 05/25/2018 8     BUN 05/25/2018 19     Creatinine 05/25/2018 1 10     Glucose 05/25/2018 150*    Calcium 05/25/2018 9 1     AST 05/25/2018 70*    ALT 05/25/2018 40     Alkaline Phosphatase 05/25/2018 97     Total Protein 05/25/2018 7 5     Albumin 05/25/2018 2 9*    Total Bilirubin 05/25/2018 0 62     eGFR 05/25/2018 46     NT-proBNP 05/25/2018 5526*    Troponin I 05/25/2018 <0 02     Ammonia 05/25/2018 <10*    Color, UA 05/25/2018 Yellow     Blood Culture 05/25/2018 No Growth After 5 Days   Blood Culture 05/25/2018 No Growth After 5 Days       LACTIC ACID 05/25/2018 1 0     Ventricular Rate 05/25/2018 96     Atrial Rate 05/25/2018 98     NV Interval 05/25/2018 150     QRSD Interval 05/25/2018 136     QT Interval 05/25/2018 394     QTC Interval 05/25/2018 497     P Axis 05/25/2018 58     QRS Axis 05/25/2018 -60     T Wave Axis 05/25/2018 80     Color, UA 05/25/2018 Yellow     Clarity, UA 05/25/2018 Cloudy     pH, UA 05/25/2018 5 5     Leukocytes, UA 05/25/2018 Small*    Nitrite, UA 05/25/2018 Positive*    Protein, UA 05/25/2018 30 (1+)*    Glucose, UA 05/25/2018 Negative     Ketones, UA 05/25/2018 15 (1+)*    Urobilinogen, UA 05/25/2018 0 2     Bilirubin, UA 05/25/2018 Negative     Blood, UA 05/25/2018 Small*    Specific Gravity, UA 05/25/2018 >=1 030     RBC, UA 05/25/2018 None Seen     WBC, UA 05/25/2018 Innumerable*    Epithelial Cells 05/25/2018 None Seen     Bacteria, UA 05/25/2018 Innumerable*    Hyaline Casts, UA 05/25/2018 5-10*    Urine Culture 05/25/2018 >100,000 cfu/ml Enterobacter aerogenes*    POC Glucose 05/25/2018 150*    Troponin I 05/26/2018 <0 02     Troponin I 05/26/2018 <0 02     Sodium 05/26/2018 139     Potassium 05/26/2018 4 0     Chloride 05/26/2018 109*    CO2 05/26/2018 25     Anion Gap 05/26/2018 5     BUN 05/26/2018 19     Creatinine 05/26/2018 1 02     Glucose 05/26/2018 101     Calcium 05/26/2018 9 4     eGFR 05/26/2018 51     WBC 05/26/2018 6 25     RBC 05/26/2018 2 92*    Hemoglobin 05/26/2018 8 4*    Hematocrit 05/26/2018 25 9*    MCV 05/26/2018 89     MCH 05/26/2018 28 8     MCHC 05/26/2018 32 4     RDW 05/26/2018 14 0     Platelets 15/88/0055 115*    MPV 05/26/2018 10 6     POC Glucose 05/26/2018 98     POC Glucose 05/26/2018 158*    Hemoglobin 05/26/2018 9 1*    Hematocrit 05/26/2018 28 0*    POC Glucose 05/26/2018 155*    POC Glucose 05/26/2018 101     WBC 05/27/2018 5 10     RBC 05/27/2018 3 40*    Hemoglobin 05/27/2018 9 8*    Hematocrit 05/27/2018 31 1*    MCV 05/27/2018 92     MCH 05/27/2018 28 8     MCHC 05/27/2018 31 5     RDW 05/27/2018 14 0     Platelets 88/72/2976 135*    MPV 05/27/2018 10 6     Sodium 05/27/2018 140     Potassium 05/27/2018 4 3     Chloride 05/27/2018 107     CO2 05/27/2018 27     Anion Gap 05/27/2018 6     BUN 05/27/2018 19     Creatinine 05/27/2018 1 18     Glucose 05/27/2018 92     Calcium 05/27/2018 9 9     AST 05/27/2018 57*    ALT 05/27/2018 45     Alkaline Phosphatase 05/27/2018 90     Total Protein 05/27/2018 7 3     Albumin 05/27/2018 2 6*    Total Bilirubin 05/27/2018 0 32     eGFR 05/27/2018 42     Magnesium 05/27/2018 2 3     Phosphorus 05/27/2018 3 5     Procalcitonin 05/27/2018 0 17     POC Glucose 05/27/2018 111     POC Glucose 05/27/2018 131     POC Glucose 05/27/2018 141*    POC Glucose 05/27/2018 157*    POC Glucose 05/27/2018 151*    Sodium 05/28/2018 134*    Potassium 05/28/2018 6 0*    Chloride 05/28/2018 109*    CO2 05/28/2018 19*    Anion Gap 05/28/2018 6     BUN 05/28/2018 24     Creatinine 05/28/2018 1 25  Glucose 05/28/2018 102     Calcium 05/28/2018 8 8     AST 05/28/2018 84*    ALT 05/28/2018 45     Alkaline Phosphatase 05/28/2018 86     Total Protein 05/28/2018 6 9     Albumin 05/28/2018 2 0*    Total Bilirubin 05/28/2018 0 56     eGFR 05/28/2018 40     POC Glucose 05/28/2018 133     POC Glucose 05/28/2018 141*    POC Glucose 05/28/2018 138     Sodium 05/28/2018 140     Potassium 05/28/2018 3 8     Chloride 05/28/2018 107     CO2 05/28/2018 27     Anion Gap 05/28/2018 6     BUN 05/28/2018 21     Creatinine 05/28/2018 1 12     Glucose 05/28/2018 108     Calcium 05/28/2018 9 3     eGFR 05/28/2018 45          Radiology Results:   Ct Chest Abdomen Pelvis Wo Contrast    Result Date: 5/25/2018  Narrative: CT CHEST, ABDOMEN AND PELVIS WITHOUT IV CONTRAST INDICATION:   chest pain, abdominal pain, hx of blood clots  COMPARISON: 4/26/2018 TECHNIQUE: CT examination of the chest, abdomen and pelvis was performed without intravenous contrast   Axial, sagittal, and coronal 2D reformatted images were created from the source data and submitted for interpretation  Radiation dose length product (DLP) for this visit:  674 mGy-cm   This examination, like all CT scans performed in the [facility], was performed utilizing techniques to minimize radiation dose exposure, including the use of iterative reconstruction and automated exposure control  Enteric contrast was not administered  FINDINGS: CHEST LUNGS:  Slightly limited study due to respiratory motion artifact  Unchanged 3 mm left upper lobe nodule on series 3 image 19, and 5 mm left lower lobe nodule on series 3 image 20  There is mild bibasilar atelectasis  There is no tracheal or endobronchial lesion  PLEURA:  Unremarkable  HEART/GREAT VESSELS:  Stable cardiomegaly  No pericardial effusion  Pacer leads again seen  Status post CABG  Dense mitral annulus calcification again noted    Normal caliber thoracic aorta with atherosclerotic calcifications  MEDIASTINUM AND KEILA:  Unremarkable  CHEST WALL AND LOWER NECK:   Status post median sternotomy  Pacer device in the left upper anterior chest wall  Extravasated contrast material is noted in the subcutaneous soft tissues of the visualized proximal   Right upper extremity ABDOMEN Absence of intravenous contrast enhancement limits evaluation of the solid abdominal viscera  LIVER/BILIARY TREE:  Unremarkable  GALLBLADDER:  No calcified gallstones  No pericholecystic inflammatory change  SPLEEN:  Unremarkable  PANCREAS:  Unremarkable  ADRENAL GLANDS:  Unremarkable  KIDNEYS/URETERS:  Unremarkable  No hydronephrosis  STOMACH AND BOWEL: Focal thickening of the mid ascending colon again identified  There is no obstruction  There is colonic diverticulosis without evidence of acute diverticulitis  APPENDIX:  No findings to suggest appendicitis  ABDOMINOPELVIC CAVITY:  No ascites or free intraperitoneal air  No lymphadenopathy  VESSELS:  Unremarkable for patient's age  PELVIS REPRODUCTIVE ORGANS:  Unremarkable for patient's age  URINARY BLADDER:  Excreted contrast material noted in the bladder  ABDOMINAL WALL/INGUINAL REGIONS:  Unremarkable  OSSEOUS STRUCTURES:  No acute fracture or destructive osseous lesion  Status post right hip arthroplasty  Impression: No acute pathology  Unchanged left lung nodules  Persistent focal thickening of the ascending colon  If not already performed, colonoscopy is again recommended to exclude neoplasm  Colonic diverticulosis  Workstation performed: [id number]     Xr Chest Pa & Lateral    Result Date: 5/24/2018  Narrative: CHEST INDICATION:   confusion  COMPARISON:  5/8/2018 EXAM PERFORMED/VIEWS:  XR CHEST PA & LATERAL FINDINGS:  There is a left-sided pacer  Cardiomediastinal silhouette appears unremarkable  The lungs are clear  No pneumothorax or pleural effusion  There is a large calcification in the] subacromial bursa, likely intra-articular body       Impression: No acute cardiopulmonary disease  Workstation performed: [id number]     Xr Humerus Left    Result Date: 5/26/2018  Narrative: LEFT HUMERUS INDICATION: 77-year-old female, post fall on pain COMPARISON:  None VIEWS:  XR HUMERUS LEFT FINDINGS: There is no acute fracture or dislocation  No degenerative changes  No lytic or blastic lesions are seen  Soft tissues are unremarkable  Partially visualized cardiac ICD     Impression: No acute osseous abnormality  Findings are consistent with emergency provider's preliminary reading Workstation performed: [id number]     Xr Forearm 2 Views Left    Result Date: 5/26/2018  Narrative: LEFT FOREARM INDICATION: Posttraumatic forearm pain COMPARISON:  None VIEWS:  XR FOREARM 2 VW LEFT Images: 4 FINDINGS: Old ulnar styloid tip fracture There is no acute fracture or dislocation  No degenerative changes  No lytic or blastic lesions are seen  Soft tissues are unremarkable  Impression: No acute osseous abnormality  Findings are consistent with emergency provider's preliminary reading Workstation performed: [id number]     Ct Head Without Contrast    Result Date: 5/25/2018  Narrative: CT BRAIN - WITHOUT CONTRAST INDICATION:   Altered mental status  COMPARISON:  5/24/2018 TECHNIQUE:  CT examination of the brain was performed  In addition to axial images, coronal 2D reformatted images were created and submitted for interpretation  Radiation dose length product (DLP) for this visit:  1039 51 mGy-cm   This examination, like all CT scans performed in the [facility], was performed utilizing techniques to minimize radiation dose exposure, including the use of iterative reconstruction and automated exposure control  IMAGE QUALITY:  Diagnostic   FINDINGS: PARENCHYMA: Decreased attenuation is noted in periventricular and subcortical white matter demonstrating an appearance that is statistically most likely to represent mild microangiopathic change; this appearance is similar when compared to most recent prior examination  Stable old left thalamic lacunar infarct, and old right parietal infarct  No CT signs of acute infarction  No intracranial mass, mass effect or midline shift  No acute parenchymal hemorrhage  VENTRICLES AND EXTRA-AXIAL SPACES:  Normal for the patient's age  VISUALIZED ORBITS AND PARANASAL SINUSES:  Unremarkable  CALVARIUM AND EXTRACRANIAL SOFT TISSUES:  Normal      Impression: No acute intracranial abnormality  Workstation performed: [id number]     Ct Head Wo Contrast    Result Date: 5/24/2018  Narrative: CT BRAIN - WITHOUT CONTRAST INDICATION:   History of stroke  Confusion  COMPARISON:  5/8/2018 TECHNIQUE:  CT examination of the brain was performed  In addition to axial images, coronal 2D reformatted images were created and submitted for interpretation  Radiation dose length product (DLP) for this visit:  618 7789 mGy-cm   This examination, like all CT scans performed in the [facility], was performed utilizing techniques to minimize radiation dose exposure, including the use of iterative reconstruction and automated exposure control  IMAGE QUALITY:  Diagnostic  FINDINGS: PARENCHYMA: Decreased attenuation is noted in periventricular and subcortical white matter demonstrating an appearance that is statistically most likely to represent advanced microangiopathic change; this appearance is similar when compared to most recent prior examination  Chronic right parietal cortical and left thalamic lacunar type infarct  No CT signs of acute infarction  No intracranial mass, mass effect or midline shift  No acute parenchymal hemorrhage  VENTRICLES AND EXTRA-AXIAL SPACES:  Stable in size  VISUALIZED ORBITS AND PARANASAL SINUSES:  Unremarkable  CALVARIUM AND EXTRACRANIAL SOFT TISSUES:  Normal      Impression: No acute intracranial abnormality   Workstation performed: [id number]     Vas Upper Limb Venous Duplex Scan, Unilateral/limited    Result Date: 5/26/2018  Narrative:  [facility] REPORT CLINICAL: Indications:  Patient presents with left upper extremity pain and history of DVT  Operative History: arthritis, colon cancer,brain benign neoplasm,DM, HTN, Multiple bilateral varicosities Bilateral knee replacement Bilateral varicose vein surgery Mitral valve replacement Right hip replacement    CONCLUSION:  Impression RIGHT UPPER LIMB LIMITED: Evaluation shows no evidence of thrombus in the internal jugular vein, subclavian vein, and the brachiocephalic vein  LEFT UPPER LIMB: No evidence of acute or chronic deep vein thrombosis  No evidence of superficial thrombophlebitis noted  Doppler evaluation shows a normal response to augmentation maneuvers    SIGNATURE: Electronically Signed by: Sirena Luis MD on 2018-05-26 06:17:31 PM

## 2018-06-08 NOTE — LETTER
June 8, 2018     Sara Martin MD  [address] [facility] [address]    Patient: Junie More   YOB: 1933   Date of Visit: 6/8/2018       Dear Dr Ángel Escobar Recipients: Thank you for referring Zeb Branham to me for evaluation  Below are my notes for this consultation  If you have questions, please do not hesitate to call me  I look forward to following your patient along with you  Sincerely,        Franky Newman PA-C        CC: No Recipients  Franky Newman PA-C  6/8/2018  4:29 PM  Sign at close encounter  [facility] - Outpatient Follow-up Note  Junie More 80 y o  female MRN: [id number]  Encounter: [id number]          ASSESSMENT AND PLAN:      Colonic Thickening on CT scan  Anemia  -Focal thickening of the mid ascending colon seen twice on CT scan, she has a history of colon polyps, also anemia  Will plan for colonoscopy to rule out malignancy however she is on eliquis given recent strokes  Will need to reach out to neurologist to determine whether this could be held versus doing the procedure one anticoagulation which would not enable polypectomy or biopsy if polyp or mass visualized  Will also plan for an EGD at the same time to rule out upper GI source of anemia    ______________________________________________________________________    SUBJECTIVE:  [facility] is an 79 y/o female with a history of CVA on eliquis, CKD who presents with daughter for follow-up  She was recently in the hospital with sepsis secondary to a UTI and had a CT scan which showed focal thickening of the mid ascending colon without obstruction, this was seen on prior CT 4/26 as well  She has a history of colonic polyps  Her last colonoscopy was in 2016 and she had an adenomatous polyp  No family history of colon cancer   She also has anemia it has been worsening since early 2018, most recently has been 8-10, her daughter denies melena or hematochezia  She had lost weight though notably she has had multiple hospital admission  She denies abdominal pain, nausea, vomiting  She is chronically constipated however has bms regularly on colac and senokot       REVIEW OF SYSTEMS IS OTHERWISE NEGATIVE  Historical Information   Past Medical History:   Diagnosis Date    Acid reflux     on occ    Arthritis     DJD right hip replaced    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    x 2 lesions with no change    Cancer ([facility] ) 2007    colonic polyps, no surgery done    Deep vein thrombosis (DVT) of left upper extremity ([facility] [address] ) 12/11/2017    Diabetes mellitus ([facility] )     type 2    Diverticulosis     Edema     in legs on occ    Hypertension     on occ    Language barrier     speaks Citizen of [place] & broken english    Stroke [facility])      Past Surgical History:   Procedure Laterality Date    COLON SURGERY      COLONOSCOPY      COLONOSCOPY N/A 11/2/2016    Procedure: COLONOSCOPY;  Surgeon: Rocky Kyle MD;  Location: [facility] GI LAB; Service:     ESOPHAGOGASTRODUODENOSCOPY N/A 11/2/2016    Procedure: ESOPHAGOGASTRODUODENOSCOPY (EGD); Surgeon: Rocky Kyle MD;  Location: [facility] GI LAB;   Service:     JOINT REPLACEMENT Right 02/2015    hip    JOINT REPLACEMENT Left     knee    JOINT REPLACEMENT Right     knee    CA REVISE MEDIAN N/CARPAL TUNNEL SURG Left 1/28/2016    Procedure: RELEASE CARPAL TUNNEL;  Surgeon: Mirna Mcgowan MD;  Location: [facility] MAIN OR;  Service: Orthopedics    TUBAL LIGATION      VARICOSE VEIN SURGERY Bilateral      Social History   History   Alcohol Use No     Comment: socially     History   Drug Use No     History   Smoking Status    Former Smoker    Packs/day: 0 25    Years: 10 00    Types: Cigarettes    Quit date: 1950   Smokeless Tobacco    Never Used     Family History   Problem Relation Age of Onset    Cancer Mother      throat       Meds/Allergies       Current Outpatient Prescriptions:     acetaminophen (TYLENOL) 325 mg tablet    apixaban (ELIQUIS) 2 5 mg    atorvastatin (LIPITOR) 80 mg tablet    bisacodyl (DULCOLAX) 10 mg suppository    docusate sodium (COLACE) 100 mg capsule    ferrous sulfate 325 (65 Fe) mg tablet    magnesium hydroxide (MILK OF MAGNESIA) 400 mg/5 mL oral suspension    metoprolol tartrate (LOPRESSOR) 50 mg tablet    Multiple Vitamins-Minerals (MULTIVITAMIN ADULT PO)    pantoprazole (PROTONIX) 40 mg tablet    polyethylene glycol (MIRALAX) 17 g packet    senna (SENOKOT) 8 6 mg    Allergies   Allergen Reactions    Heparin Other (See Comments)     Thrombocytopenia             Objective     not currently breastfeeding  There is no height or weight on file to calculate BMI  PHYSICAL EXAM:      General Appearance:   Alert, cooperative, no distress   HEENT:   Normocephalic, atraumatic, anicteric      Neck:  Supple, symmetrical, trachea midline   Lungs:   Clear to auscultation bilaterally   Heart[de-identified]   Regular rate and rhythm; no murmur, rub, or gallop  Abdomen:   Soft, non-tender, non-distended; normal bowel sounds   Genitalia:   Deferred    Rectal:   Deferred    Extremities:  No cyanosis, clubbing or edema    Pulses:  2+ and symmetric    Skin:  No jaundice, rashes, or lesions    Lymph nodes:  No palpable cervical lymphadenopathy        Lab Results:   No visits with results within 1 Day(s) from this visit     Latest known visit with results is:   Admission on 05/25/2018, Discharged on 05/28/2018   Component Date Value    WBC 05/25/2018 8 77     RBC 05/25/2018 3 56*    Hemoglobin 05/25/2018 10 0*    Hematocrit 05/25/2018 31 5*    MCV 05/25/2018 89     MCH 05/25/2018 28 1     MCHC 05/25/2018 31 7     RDW 05/25/2018 14 0     MPV 05/25/2018 10 2     Platelets 84/47/5716 115*    nRBC 05/25/2018 0     Neutrophils Relative 05/25/2018 84*    Lymphocytes Relative 05/25/2018 7*    Monocytes Relative 05/25/2018 7     Eosinophils Relative 05/25/2018 2     Basophils Relative 05/25/2018 0     Neutrophils Absolute 05/25/2018 7 35     Lymphocytes Absolute 05/25/2018 0 61     Monocytes Absolute 05/25/2018 0 65     Eosinophils Absolute 05/25/2018 0 14     Basophils Absolute 05/25/2018 0 01     Protime 05/25/2018 14 8*    INR 05/25/2018 1 15     PTT 05/25/2018 23*    Sodium 05/25/2018 137     Potassium 05/25/2018 4 4     Chloride 05/25/2018 105     CO2 05/25/2018 24     Anion Gap 05/25/2018 8     BUN 05/25/2018 19     Creatinine 05/25/2018 1 10     Glucose 05/25/2018 150*    Calcium 05/25/2018 9 1     AST 05/25/2018 70*    ALT 05/25/2018 40     Alkaline Phosphatase 05/25/2018 97     Total Protein 05/25/2018 7 5     Albumin 05/25/2018 2 9*    Total Bilirubin 05/25/2018 0 62     eGFR 05/25/2018 46     NT-proBNP 05/25/2018 5526*    Troponin I 05/25/2018 <0 02     Ammonia 05/25/2018 <10*    Color, UA 05/25/2018 Yellow     Blood Culture 05/25/2018 No Growth After 5 Days   Blood Culture 05/25/2018 No Growth After 5 Days       LACTIC ACID 05/25/2018 1 0     Ventricular Rate 05/25/2018 96     Atrial Rate 05/25/2018 98     SC Interval 05/25/2018 150     QRSD Interval 05/25/2018 136     QT Interval 05/25/2018 394     QTC Interval 05/25/2018 497     P Axis 05/25/2018 58     QRS Axis 05/25/2018 -60     T Wave Axis 05/25/2018 80     Color, UA 05/25/2018 Yellow     Clarity, UA 05/25/2018 Cloudy     pH, UA 05/25/2018 5 5     Leukocytes, UA 05/25/2018 Small*    Nitrite, UA 05/25/2018 Positive*    Protein, UA 05/25/2018 30 (1+)*    Glucose, UA 05/25/2018 Negative     Ketones, UA 05/25/2018 15 (1+)*    Urobilinogen, UA 05/25/2018 0 2     Bilirubin, UA 05/25/2018 Negative     Blood, UA 05/25/2018 Small*    Specific Gravity, UA 05/25/2018 >=1 030     RBC, UA 05/25/2018 None Seen     WBC, UA 05/25/2018 Innumerable*    Epithelial Cells 05/25/2018 None Seen     Bacteria, UA 05/25/2018 Innumerable*    Hyaline Casts, UA 05/25/2018 5-10*    Urine Culture 05/25/2018 >100,000 cfu/ml Enterobacter aerogenes*    POC Glucose 05/25/2018 150*    Troponin I 05/26/2018 <0 02     Troponin I 05/26/2018 <0 02     Sodium 05/26/2018 139     Potassium 05/26/2018 4 0     Chloride 05/26/2018 109*    CO2 05/26/2018 25     Anion Gap 05/26/2018 5     BUN 05/26/2018 19     Creatinine 05/26/2018 1 02     Glucose 05/26/2018 101     Calcium 05/26/2018 9 4     eGFR 05/26/2018 51     WBC 05/26/2018 6 25     RBC 05/26/2018 2 92*    Hemoglobin 05/26/2018 8 4*    Hematocrit 05/26/2018 25 9*    MCV 05/26/2018 89     MCH 05/26/2018 28 8     MCHC 05/26/2018 32 4     RDW 05/26/2018 14 0     Platelets 55/96/2799 115*    MPV 05/26/2018 10 6     POC Glucose 05/26/2018 98     POC Glucose 05/26/2018 158*    Hemoglobin 05/26/2018 9 1*    Hematocrit 05/26/2018 28 0*    POC Glucose 05/26/2018 155*    POC Glucose 05/26/2018 101     WBC 05/27/2018 5 10     RBC 05/27/2018 3 40*    Hemoglobin 05/27/2018 9 8*    Hematocrit 05/27/2018 31 1*    MCV 05/27/2018 92     MCH 05/27/2018 28 8     MCHC 05/27/2018 31 5     RDW 05/27/2018 14 0     Platelets 26/39/2052 135*    MPV 05/27/2018 10 6     Sodium 05/27/2018 140     Potassium 05/27/2018 4 3     Chloride 05/27/2018 107     CO2 05/27/2018 27     Anion Gap 05/27/2018 6     BUN 05/27/2018 19     Creatinine 05/27/2018 1 18     Glucose 05/27/2018 92     Calcium 05/27/2018 9 9     AST 05/27/2018 57*    ALT 05/27/2018 45     Alkaline Phosphatase 05/27/2018 90     Total Protein 05/27/2018 7 3     Albumin 05/27/2018 2 6*    Total Bilirubin 05/27/2018 0 32     eGFR 05/27/2018 42     Magnesium 05/27/2018 2 3     Phosphorus 05/27/2018 3 5     Procalcitonin 05/27/2018 0 17     POC Glucose 05/27/2018 111     POC Glucose 05/27/2018 131     POC Glucose 05/27/2018 141*    POC Glucose 05/27/2018 157*    POC Glucose 05/27/2018 151*    Sodium 05/28/2018 134*    Potassium 05/28/2018 6 0*    Chloride 05/28/2018 109*    CO2 05/28/2018 19*    Anion Gap 05/28/2018 6     BUN 05/28/2018 24     Creatinine 05/28/2018 1 25     Glucose 05/28/2018 102     Calcium 05/28/2018 8 8     AST 05/28/2018 84*    ALT 05/28/2018 45     Alkaline Phosphatase 05/28/2018 86     Total Protein 05/28/2018 6 9     Albumin 05/28/2018 2 0*    Total Bilirubin 05/28/2018 0 56     eGFR 05/28/2018 40     POC Glucose 05/28/2018 133     POC Glucose 05/28/2018 141*    POC Glucose 05/28/2018 138     Sodium 05/28/2018 140     Potassium 05/28/2018 3 8     Chloride 05/28/2018 107     CO2 05/28/2018 27     Anion Gap 05/28/2018 6     BUN 05/28/2018 21     Creatinine 05/28/2018 1 12     Glucose 05/28/2018 108     Calcium 05/28/2018 9 3     eGFR 05/28/2018 45          Radiology Results:   Ct Chest Abdomen Pelvis Wo Contrast    Result Date: 5/25/2018  Narrative: CT CHEST, ABDOMEN AND PELVIS WITHOUT IV CONTRAST INDICATION:   chest pain, abdominal pain, hx of blood clots  COMPARISON: 4/26/2018 TECHNIQUE: CT examination of the chest, abdomen and pelvis was performed without intravenous contrast   Axial, sagittal, and coronal 2D reformatted images were created from the source data and submitted for interpretation  Radiation dose length product (DLP) for this visit:  674 mGy-cm   This examination, like all CT scans performed in the [facility], was performed utilizing techniques to minimize radiation dose exposure, including the use of iterative reconstruction and automated exposure control  Enteric contrast was not administered  FINDINGS: CHEST LUNGS:  Slightly limited study due to respiratory motion artifact  Unchanged 3 mm left upper lobe nodule on series 3 image 19, and 5 mm left lower lobe nodule on series 3 image 20  There is mild bibasilar atelectasis  There is no tracheal or endobronchial lesion  PLEURA:  Unremarkable  HEART/GREAT VESSELS:  Stable cardiomegaly  No pericardial effusion  Pacer leads again seen  Status post CABG    Dense mitral annulus calcification again noted  Normal caliber thoracic aorta with atherosclerotic calcifications  MEDIASTINUM AND KEILA:  Unremarkable  CHEST WALL AND LOWER NECK:   Status post median sternotomy  Pacer device in the left upper anterior chest wall  Extravasated contrast material is noted in the subcutaneous soft tissues of the visualized proximal   Right upper extremity ABDOMEN Absence of intravenous contrast enhancement limits evaluation of the solid abdominal viscera  LIVER/BILIARY TREE:  Unremarkable  GALLBLADDER:  No calcified gallstones  No pericholecystic inflammatory change  SPLEEN:  Unremarkable  PANCREAS:  Unremarkable  ADRENAL GLANDS:  Unremarkable  KIDNEYS/URETERS:  Unremarkable  No hydronephrosis  STOMACH AND BOWEL: Focal thickening of the mid ascending colon again identified  There is no obstruction  There is colonic diverticulosis without evidence of acute diverticulitis  APPENDIX:  No findings to suggest appendicitis  ABDOMINOPELVIC CAVITY:  No ascites or free intraperitoneal air  No lymphadenopathy  VESSELS:  Unremarkable for patient's age  PELVIS REPRODUCTIVE ORGANS:  Unremarkable for patient's age  URINARY BLADDER:  Excreted contrast material noted in the bladder  ABDOMINAL WALL/INGUINAL REGIONS:  Unremarkable  OSSEOUS STRUCTURES:  No acute fracture or destructive osseous lesion  Status post right hip arthroplasty  Impression: No acute pathology  Unchanged left lung nodules  Persistent focal thickening of the ascending colon  If not already performed, colonoscopy is again recommended to exclude neoplasm  Colonic diverticulosis  Workstation performed: [id number]     Xr Chest Pa & Lateral    Result Date: 5/24/2018  Narrative: CHEST INDICATION:   confusion  COMPARISON:  5/8/2018 EXAM PERFORMED/VIEWS:  XR CHEST PA & LATERAL FINDINGS:  There is a left-sided pacer  Cardiomediastinal silhouette appears unremarkable  The lungs are clear  No pneumothorax or pleural effusion   There is a large calcification in the] subacromial bursa, likely intra-articular body  Impression: No acute cardiopulmonary disease  Workstation performed: [id number]     Xr Humerus Left    Result Date: 5/26/2018  Narrative: LEFT HUMERUS INDICATION: 51-year-old female, post fall on pain COMPARISON:  None VIEWS:  XR HUMERUS LEFT FINDINGS: There is no acute fracture or dislocation  No degenerative changes  No lytic or blastic lesions are seen  Soft tissues are unremarkable  Partially visualized cardiac ICD     Impression: No acute osseous abnormality  Findings are consistent with emergency provider's preliminary reading Workstation performed: [id number]     Xr Forearm 2 Views Left    Result Date: 5/26/2018  Narrative: LEFT FOREARM INDICATION: Posttraumatic forearm pain COMPARISON:  None VIEWS:  XR FOREARM 2 VW LEFT Images: 4 FINDINGS: Old ulnar styloid tip fracture There is no acute fracture or dislocation  No degenerative changes  No lytic or blastic lesions are seen  Soft tissues are unremarkable  Impression: No acute osseous abnormality  Findings are consistent with emergency provider's preliminary reading Workstation performed: [id number]     Ct Head Without Contrast    Result Date: 5/25/2018  Narrative: CT BRAIN - WITHOUT CONTRAST INDICATION:   Altered mental status  COMPARISON:  5/24/2018 TECHNIQUE:  CT examination of the brain was performed  In addition to axial images, coronal 2D reformatted images were created and submitted for interpretation  Radiation dose length product (DLP) for this visit:  1039 51 mGy-cm   This examination, like all CT scans performed in the [facility], was performed utilizing techniques to minimize radiation dose exposure, including the use of iterative reconstruction and automated exposure control  IMAGE QUALITY:  Diagnostic   FINDINGS: PARENCHYMA: Decreased attenuation is noted in periventricular and subcortical white matter demonstrating an appearance that is statistically most likely to represent mild microangiopathic change; this appearance is similar when compared to most recent prior examination  Stable old left thalamic lacunar infarct, and old right parietal infarct  No CT signs of acute infarction  No intracranial mass, mass effect or midline shift  No acute parenchymal hemorrhage  VENTRICLES AND EXTRA-AXIAL SPACES:  Normal for the patient's age  VISUALIZED ORBITS AND PARANASAL SINUSES:  Unremarkable  CALVARIUM AND EXTRACRANIAL SOFT TISSUES:  Normal      Impression: No acute intracranial abnormality  Workstation performed: [id number]     Ct Head Wo Contrast    Result Date: 5/24/2018  Narrative: CT BRAIN - WITHOUT CONTRAST INDICATION:   History of stroke  Confusion  COMPARISON:  5/8/2018 TECHNIQUE:  CT examination of the brain was performed  In addition to axial images, coronal 2D reformatted images were created and submitted for interpretation  Radiation dose length product (DLP) for this visit:  618 7789 mGy-cm   This examination, like all CT scans performed in the [facility], was performed utilizing techniques to minimize radiation dose exposure, including the use of iterative reconstruction and automated exposure control  IMAGE QUALITY:  Diagnostic  FINDINGS: PARENCHYMA: Decreased attenuation is noted in periventricular and subcortical white matter demonstrating an appearance that is statistically most likely to represent advanced microangiopathic change; this appearance is similar when compared to most recent prior examination  Chronic right parietal cortical and left thalamic lacunar type infarct  No CT signs of acute infarction  No intracranial mass, mass effect or midline shift  No acute parenchymal hemorrhage  VENTRICLES AND EXTRA-AXIAL SPACES:  Stable in size  VISUALIZED ORBITS AND PARANASAL SINUSES:  Unremarkable  CALVARIUM AND EXTRACRANIAL SOFT TISSUES:  Normal      Impression: No acute intracranial abnormality   Workstation performed: [id number]     Vas Upper Limb Venous Duplex Scan, Unilateral/limited    Result Date: 5/26/2018  Narrative:  [facility] REPORT CLINICAL: Indications:  Patient presents with left upper extremity pain and history of DVT  Operative History: arthritis, colon cancer,brain benign neoplasm,DM, HTN, Multiple bilateral varicosities Bilateral knee replacement Bilateral varicose vein surgery Mitral valve replacement Right hip replacement    CONCLUSION:  Impression RIGHT UPPER LIMB LIMITED: Evaluation shows no evidence of thrombus in the internal jugular vein, subclavian vein, and the brachiocephalic vein  LEFT UPPER LIMB: No evidence of acute or chronic deep vein thrombosis  No evidence of superficial thrombophlebitis noted  Doppler evaluation shows a normal response to augmentation maneuvers    SIGNATURE: Electronically Signed by: Mali Roger MD on 2018-05-26 06:17:31 PM

## 2018-06-08 NOTE — LETTER
June 8, 2018     Antonio Villarreal MD  [address] [facility] [address]    Patient: Charisma Curtis   YOB: 1933   Date of Visit: 6/8/2018       Dear Dr Amy Bojorquez:    Thank you for referring Carl Leyva to me for evaluation  Below are my notes for this consultation  If you have questions, please do not hesitate to call me  I look forward to following your patient along with you  Sincerely,        Eva Fuentes PA-C        CC: No Recipients  Eva Fuentes PA-C  6/8/2018  8:30 AM  Sign at close encounter  [facility] - Outpatient Follow-up Note  Charisma Curtis 80 y o  female MRN: [id number]  Encounter: [id number]          ASSESSMENT AND PLAN:      There are no diagnoses linked to this encounter   ______________________________________________________________________    SUBJECTIVE:  ***      REVIEW OF SYSTEMS IS OTHERWISE NEGATIVE  Historical Information   Past Medical History:   Diagnosis Date    Acid reflux     on occ    Arthritis     DJD right hip replaced    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 2007    x 2 lesions with no change    Cancer ([facility] ) 2007    colonic polyps, no surgery done    Deep vein thrombosis (DVT) of left upper extremity ([facility] ) 12/11/2017    Diabetes mellitus ([facility] )     type 2    Diverticulosis     Edema     in legs on occ    Hypertension     on occ    Language barrier     speaks Irish & broken english    Stroke [facility])      Past Surgical History:   Procedure Laterality Date    COLON SURGERY      COLONOSCOPY      COLONOSCOPY N/A 11/2/2016    Procedure: COLONOSCOPY;  Surgeon: Maria R Denton MD;  Location: [facility] GI LAB; Service:     ESOPHAGOGASTRODUODENOSCOPY N/A 11/2/2016    Procedure: ESOPHAGOGASTRODUODENOSCOPY (EGD); Surgeon: Maria R Denton MD;  Location: [facility] GI LAB;   Service:     JOINT REPLACEMENT Right 02/2015    hip    JOINT REPLACEMENT Left     knee    JOINT REPLACEMENT Right     knee  [place] REVISE MEDIAN N/CARPAL TUNNEL SURG Left 1/28/2016    Procedure: RELEASE CARPAL TUNNEL;  Surgeon: Vonnie Guaman MD;  Location: [facility] MAIN OR;  Service: Orthopedics    TUBAL LIGATION      VARICOSE VEIN SURGERY Bilateral      Social History   History   Alcohol Use No     Comment: socially     History   Drug Use No     History   Smoking Status    Former Smoker    Packs/day: 0 25    Years: 10 00    Types: Cigarettes    Quit date: 1950   Smokeless Tobacco    Never Used     Family History   Problem Relation Age of Onset    Cancer Mother      throat       Meds/Allergies       Current Outpatient Prescriptions:     acetaminophen (TYLENOL) 325 mg tablet    apixaban (ELIQUIS) 2 5 mg    atorvastatin (LIPITOR) 80 mg tablet    bisacodyl (DULCOLAX) 10 mg suppository    docusate sodium (COLACE) 100 mg capsule    ferrous sulfate 325 (65 Fe) mg tablet    magnesium hydroxide (MILK OF MAGNESIA) 400 mg/5 mL oral suspension    metoprolol tartrate (LOPRESSOR) 50 mg tablet    Multiple Vitamins-Minerals (MULTIVITAMIN ADULT PO)    pantoprazole (PROTONIX) 40 mg tablet    polyethylene glycol (MIRALAX) 17 g packet    senna (SENOKOT) 8 6 mg    Allergies   Allergen Reactions    Heparin Other (See Comments)     Thrombocytopenia             Objective     not currently breastfeeding  There is no height or weight on file to calculate BMI  PHYSICAL EXAM:      General Appearance:   Alert, cooperative, no distress   HEENT:   Normocephalic, atraumatic, anicteric      Neck:  Supple, symmetrical, trachea midline   Lungs:   Clear to auscultation bilaterally; no rales, rhonchi or wheezing; respirations unlabored    Heart[de-identified]   Regular rate and rhythm; no murmur, rub, or gallop     Abdomen:   Soft, non-tender, non-distended; normal bowel sounds; no masses, no organomegaly    Genitalia:   Deferred    Rectal:   Deferred    Extremities:  No cyanosis, clubbing or edema    Pulses:  2+ and symmetric    Skin:  No jaundice, rashes, or lesions    Lymph nodes:  No palpable cervical lymphadenopathy        Lab Results:   No visits with results within 1 Day(s) from this visit  Latest known visit with results is:   Admission on 05/25/2018, Discharged on 05/28/2018   Component Date Value    WBC 05/25/2018 8 77     RBC 05/25/2018 3 56*    Hemoglobin 05/25/2018 10 0*    Hematocrit 05/25/2018 31 5*    MCV 05/25/2018 89     MCH 05/25/2018 28 1     MCHC 05/25/2018 31 7     RDW 05/25/2018 14 0     MPV 05/25/2018 10 2     Platelets 29/54/5256 115*    nRBC 05/25/2018 0     Neutrophils Relative 05/25/2018 84*    Lymphocytes Relative 05/25/2018 7*    Monocytes Relative 05/25/2018 7     Eosinophils Relative 05/25/2018 2     Basophils Relative 05/25/2018 0     Neutrophils Absolute 05/25/2018 7 35     Lymphocytes Absolute 05/25/2018 0 61     Monocytes Absolute 05/25/2018 0 65     Eosinophils Absolute 05/25/2018 0 14     Basophils Absolute 05/25/2018 0 01     Protime 05/25/2018 14 8*    INR 05/25/2018 1 15     PTT 05/25/2018 23*    Sodium 05/25/2018 137     Potassium 05/25/2018 4 4     Chloride 05/25/2018 105     CO2 05/25/2018 24     Anion Gap 05/25/2018 8     BUN 05/25/2018 19     Creatinine 05/25/2018 1 10     Glucose 05/25/2018 150*    Calcium 05/25/2018 9 1     AST 05/25/2018 70*    ALT 05/25/2018 40     Alkaline Phosphatase 05/25/2018 97     Total Protein 05/25/2018 7 5     Albumin 05/25/2018 2 9*    Total Bilirubin 05/25/2018 0 62     eGFR 05/25/2018 46     NT-proBNP 05/25/2018 5526*    Troponin I 05/25/2018 <0 02     Ammonia 05/25/2018 <10*    Color, UA 05/25/2018 Yellow     Blood Culture 05/25/2018 No Growth After 5 Days   Blood Culture 05/25/2018 No Growth After 5 Days       LACTIC ACID 05/25/2018 1 0     Ventricular Rate 05/25/2018 96     Atrial Rate 05/25/2018 98     NM Interval 05/25/2018 150     QRSD Interval 05/25/2018 136     QT Interval 05/25/2018 394     QTC Interval 05/25/2018 497     P Axis 05/25/2018 58     QRS Axis 05/25/2018 -60     T Wave Axis 05/25/2018 80     Color, UA 05/25/2018 Yellow     Clarity, UA 05/25/2018 Cloudy     pH, UA 05/25/2018 5 5     Leukocytes, UA 05/25/2018 Small*    Nitrite, UA 05/25/2018 Positive*    Protein, UA 05/25/2018 30 (1+)*    Glucose, UA 05/25/2018 Negative     Ketones, UA 05/25/2018 15 (1+)*    Urobilinogen, UA 05/25/2018 0 2     Bilirubin, UA 05/25/2018 Negative     Blood, UA 05/25/2018 Small*    Specific Gravity, UA 05/25/2018 >=1 030     RBC, UA 05/25/2018 None Seen     WBC, UA 05/25/2018 Innumerable*    Epithelial Cells 05/25/2018 None Seen     Bacteria, UA 05/25/2018 Innumerable*    Hyaline Casts, UA 05/25/2018 5-10*    Urine Culture 05/25/2018 >100,000 cfu/ml Enterobacter aerogenes*    POC Glucose 05/25/2018 150*    Troponin I 05/26/2018 <0 02     Troponin I 05/26/2018 <0 02     Sodium 05/26/2018 139     Potassium 05/26/2018 4 0     Chloride 05/26/2018 109*    CO2 05/26/2018 25     Anion Gap 05/26/2018 5     BUN 05/26/2018 19     Creatinine 05/26/2018 1 02     Glucose 05/26/2018 101     Calcium 05/26/2018 9 4     eGFR 05/26/2018 51     WBC 05/26/2018 6 25     RBC 05/26/2018 2 92*    Hemoglobin 05/26/2018 8 4*    Hematocrit 05/26/2018 25 9*    MCV 05/26/2018 89     MCH 05/26/2018 28 8     MCHC 05/26/2018 32 4     RDW 05/26/2018 14 0     Platelets 08/09/7048 115*    MPV 05/26/2018 10 6     POC Glucose 05/26/2018 98     POC Glucose 05/26/2018 158*    Hemoglobin 05/26/2018 9 1*    Hematocrit 05/26/2018 28 0*    POC Glucose 05/26/2018 155*    POC Glucose 05/26/2018 101     WBC 05/27/2018 5 10     RBC 05/27/2018 3 40*    Hemoglobin 05/27/2018 9 8*    Hematocrit 05/27/2018 31 1*    MCV 05/27/2018 92     MCH 05/27/2018 28 8     MCHC 05/27/2018 31 5     RDW 05/27/2018 14 0     Platelets 27/21/3256 135*    MPV 05/27/2018 10 6     Sodium 05/27/2018 140     Potassium 05/27/2018 4 3     Chloride 05/27/2018 107     CO2 05/27/2018 27     Anion Gap 05/27/2018 6     BUN 05/27/2018 19     Creatinine 05/27/2018 1 18     Glucose 05/27/2018 92     Calcium 05/27/2018 9 9     AST 05/27/2018 57*    ALT 05/27/2018 45     Alkaline Phosphatase 05/27/2018 90     Total Protein 05/27/2018 7 3     Albumin 05/27/2018 2 6*    Total Bilirubin 05/27/2018 0 32     eGFR 05/27/2018 42     Magnesium 05/27/2018 2 3     Phosphorus 05/27/2018 3 5     Procalcitonin 05/27/2018 0 17     POC Glucose 05/27/2018 111     POC Glucose 05/27/2018 131     POC Glucose 05/27/2018 141*    POC Glucose 05/27/2018 157*    POC Glucose 05/27/2018 151*    Sodium 05/28/2018 134*    Potassium 05/28/2018 6 0*    Chloride 05/28/2018 109*    CO2 05/28/2018 19*    Anion Gap 05/28/2018 6     BUN 05/28/2018 24     Creatinine 05/28/2018 1 25     Glucose 05/28/2018 102     Calcium 05/28/2018 8 8     AST 05/28/2018 84*    ALT 05/28/2018 45     Alkaline Phosphatase 05/28/2018 86     Total Protein 05/28/2018 6 9     Albumin 05/28/2018 2 0*    Total Bilirubin 05/28/2018 0 56     eGFR 05/28/2018 40     POC Glucose 05/28/2018 133     POC Glucose 05/28/2018 141*    POC Glucose 05/28/2018 138     Sodium 05/28/2018 140     Potassium 05/28/2018 3 8     Chloride 05/28/2018 107     CO2 05/28/2018 27     Anion Gap 05/28/2018 6     BUN 05/28/2018 21     Creatinine 05/28/2018 1 12     Glucose 05/28/2018 108     Calcium 05/28/2018 9 3     eGFR 05/28/2018 45          Radiology Results:   Ct Chest Abdomen Pelvis Wo Contrast    Result Date: 5/25/2018  Narrative: CT CHEST, ABDOMEN AND PELVIS WITHOUT IV CONTRAST INDICATION:   chest pain, abdominal pain, hx of blood clots  COMPARISON: 4/26/2018 TECHNIQUE: CT examination of the chest, abdomen and pelvis was performed without intravenous contrast   Axial, sagittal, and coronal 2D reformatted images were created from the source data and submitted for interpretation   Radiation dose length product (DLP) for this visit:  674 mGy-cm   This examination, like all CT scans performed in the [facility], was performed utilizing techniques to minimize radiation dose exposure, including the use of iterative reconstruction and automated exposure control  Enteric contrast was not administered  FINDINGS: CHEST LUNGS:  Slightly limited study due to respiratory motion artifact  Unchanged 3 mm left upper lobe nodule on series 3 image 19, and 5 mm left lower lobe nodule on series 3 image 20  There is mild bibasilar atelectasis  There is no tracheal or endobronchial lesion  PLEURA:  Unremarkable  HEART/GREAT VESSELS:  Stable cardiomegaly  No pericardial effusion  Pacer leads again seen  Status post CABG  Dense mitral annulus calcification again noted  Normal caliber thoracic aorta with atherosclerotic calcifications  MEDIASTINUM AND KEILA:  Unremarkable  CHEST WALL AND LOWER NECK:   Status post median sternotomy  Pacer device in the left upper anterior chest wall  Extravasated contrast material is noted in the subcutaneous soft tissues of the visualized proximal   Right upper extremity ABDOMEN Absence of intravenous contrast enhancement limits evaluation of the solid abdominal viscera  LIVER/BILIARY TREE:  Unremarkable  GALLBLADDER:  No calcified gallstones  No pericholecystic inflammatory change  SPLEEN:  Unremarkable  PANCREAS:  Unremarkable  ADRENAL GLANDS:  Unremarkable  KIDNEYS/URETERS:  Unremarkable  No hydronephrosis  STOMACH AND BOWEL: Focal thickening of the mid ascending colon again identified  There is no obstruction  There is colonic diverticulosis without evidence of acute diverticulitis  APPENDIX:  No findings to suggest appendicitis  ABDOMINOPELVIC CAVITY:  No ascites or free intraperitoneal air  No lymphadenopathy  VESSELS:  Unremarkable for patient's age  PELVIS REPRODUCTIVE ORGANS:  Unremarkable for patient's age  URINARY BLADDER:  Excreted contrast material noted in the bladder  ABDOMINAL WALL/INGUINAL REGIONS:  Unremarkable  OSSEOUS STRUCTURES:  No acute fracture or destructive osseous lesion  Status post right hip arthroplasty  Impression: No acute pathology  Unchanged left lung nodules  Persistent focal thickening of the ascending colon  If not already performed, colonoscopy is again recommended to exclude neoplasm  Colonic diverticulosis  Workstation performed: [id number]     Xr Chest Pa & Lateral    Result Date: 5/24/2018  Narrative: CHEST INDICATION:   confusion  COMPARISON:  5/8/2018 EXAM PERFORMED/VIEWS:  XR CHEST PA & LATERAL FINDINGS:  There is a left-sided pacer  Cardiomediastinal silhouette appears unremarkable  The lungs are clear  No pneumothorax or pleural effusion  There is a large calcification in the] subacromial bursa, likely intra-articular body  Impression: No acute cardiopulmonary disease  Workstation performed: [id number]     Xr Humerus Left    Result Date: 5/26/2018  Narrative: LEFT HUMERUS INDICATION: 28-year-old female, post fall on pain COMPARISON:  None VIEWS:  XR HUMERUS LEFT FINDINGS: There is no acute fracture or dislocation  No degenerative changes  No lytic or blastic lesions are seen  Soft tissues are unremarkable  Partially visualized cardiac ICD     Impression: No acute osseous abnormality  Findings are consistent with emergency provider's preliminary reading Workstation performed: [id number]     Xr Forearm 2 Views Left    Result Date: 5/26/2018  Narrative: LEFT FOREARM INDICATION: Posttraumatic forearm pain COMPARISON:  None VIEWS:  XR FOREARM 2 VW LEFT Images: 4 FINDINGS: Old ulnar styloid tip fracture There is no acute fracture or dislocation  No degenerative changes  No lytic or blastic lesions are seen  Soft tissues are unremarkable  Impression: No acute osseous abnormality   Findings are consistent with emergency provider's preliminary reading Workstation performed: [id number]     Ct Head Without Contrast    Result Date: 5/25/2018  Narrative: CT BRAIN - WITHOUT CONTRAST INDICATION:   Altered mental status  COMPARISON:  5/24/2018 TECHNIQUE:  CT examination of the brain was performed  In addition to axial images, coronal 2D reformatted images were created and submitted for interpretation  Radiation dose length product (DLP) for this visit:  1039 51 mGy-cm   This examination, like all CT scans performed in the [facility], was performed utilizing techniques to minimize radiation dose exposure, including the use of iterative reconstruction and automated exposure control  IMAGE QUALITY:  Diagnostic  FINDINGS: PARENCHYMA: Decreased attenuation is noted in periventricular and subcortical white matter demonstrating an appearance that is statistically most likely to represent mild microangiopathic change; this appearance is similar when compared to most recent prior examination  Stable old left thalamic lacunar infarct, and old right parietal infarct  No CT signs of acute infarction  No intracranial mass, mass effect or midline shift  No acute parenchymal hemorrhage  VENTRICLES AND EXTRA-AXIAL SPACES:  Normal for the patient's age  VISUALIZED ORBITS AND PARANASAL SINUSES:  Unremarkable  CALVARIUM AND EXTRACRANIAL SOFT TISSUES:  Normal      Impression: No acute intracranial abnormality  Workstation performed: [id number]     Ct Head Wo Contrast    Result Date: 5/24/2018  Narrative: CT BRAIN - WITHOUT CONTRAST INDICATION:   History of stroke  Confusion  COMPARISON:  5/8/2018 TECHNIQUE:  CT examination of the brain was performed  In addition to axial images, coronal 2D reformatted images were created and submitted for interpretation  Radiation dose length product (DLP) for this visit:  618 7789 mGy-cm     This examination, like all CT scans performed in the [facility], was performed utilizing techniques to minimize radiation dose exposure, including the use of iterative reconstruction and automated exposure control  IMAGE QUALITY:  Diagnostic  FINDINGS: PARENCHYMA: Decreased attenuation is noted in periventricular and subcortical white matter demonstrating an appearance that is statistically most likely to represent advanced microangiopathic change; this appearance is similar when compared to most recent prior examination  Chronic right parietal cortical and left thalamic lacunar type infarct  No CT signs of acute infarction  No intracranial mass, mass effect or midline shift  No acute parenchymal hemorrhage  VENTRICLES AND EXTRA-AXIAL SPACES:  Stable in size  VISUALIZED ORBITS AND PARANASAL SINUSES:  Unremarkable  CALVARIUM AND EXTRACRANIAL SOFT TISSUES:  Normal      Impression: No acute intracranial abnormality  Workstation performed: [id number]     Vas Upper Limb Venous Duplex Scan, Unilateral/limited    Result Date: 5/26/2018  Narrative:  [facility] REPORT CLINICAL: Indications:  Patient presents with left upper extremity pain and history of DVT  Operative History: arthritis, colon cancer,brain benign neoplasm,DM, HTN, Multiple bilateral varicosities Bilateral knee replacement Bilateral varicose vein surgery Mitral valve replacement Right hip replacement    CONCLUSION:  Impression RIGHT UPPER LIMB LIMITED: Evaluation shows no evidence of thrombus in the internal jugular vein, subclavian vein, and the brachiocephalic vein  LEFT UPPER LIMB: No evidence of acute or chronic deep vein thrombosis  No evidence of superficial thrombophlebitis noted  Doppler evaluation shows a normal response to augmentation maneuvers    SIGNATURE: Electronically Signed by: Eladio Arvizu MD on 2018-05-26 06:17:31 PM

## 2018-06-15 ENCOUNTER — OFFICE VISIT (OUTPATIENT)
Dept: NEUROLOGY | Facility: CLINIC | Age: 83
End: 2018-06-15
Payer: MEDICARE

## 2018-06-15 VITALS
DIASTOLIC BLOOD PRESSURE: 66 MMHG | HEART RATE: 69 BPM | HEIGHT: 64 IN | BODY MASS INDEX: 24.75 KG/M2 | WEIGHT: 145 LBS | SYSTOLIC BLOOD PRESSURE: 134 MMHG

## 2018-06-15 DIAGNOSIS — E11.9 CONTROLLED TYPE 2 DIABETES MELLITUS WITHOUT COMPLICATION, WITHOUT LONG-TERM CURRENT USE OF INSULIN (HCC): ICD-10-CM

## 2018-06-15 DIAGNOSIS — I10 HYPERTENSION, UNSPECIFIED TYPE: Chronic | ICD-10-CM

## 2018-06-15 DIAGNOSIS — I63.511 CEREBROVASCULAR ACCIDENT (CVA) DUE TO OCCLUSION OF RIGHT MIDDLE CEREBRAL ARTERY (HCC): ICD-10-CM

## 2018-06-15 DIAGNOSIS — R56.9 SEIZURE-LIKE ACTIVITY (HCC): ICD-10-CM

## 2018-06-15 DIAGNOSIS — R41.82 ALTERED MENTAL STATUS, UNSPECIFIED ALTERED MENTAL STATUS TYPE: Primary | ICD-10-CM

## 2018-06-15 PROCEDURE — 99215 OFFICE O/P EST HI 40 MIN: CPT | Performed by: NURSE PRACTITIONER

## 2018-06-15 RX ORDER — FUROSEMIDE 20 MG/1
20 TABLET ORAL DAILY
Status: ON HOLD | COMMUNITY
Start: 2018-06-13 | End: 2018-11-07

## 2018-06-15 RX ORDER — IRON POLYSACCHARIDE COMPLEX 150 MG
CAPSULE ORAL DAILY
COMMUNITY
Start: 2018-06-13 | End: 2020-09-03 | Stop reason: ALTCHOICE

## 2018-06-15 RX ORDER — POLYETHYLENE GLYCOL 3350 17 G/17G
POWDER, FOR SOLUTION ORAL
Status: ON HOLD | COMMUNITY
Start: 2018-06-13 | End: 2018-08-09 | Stop reason: ALTCHOICE

## 2018-06-15 NOTE — ASSESSMENT & PLAN NOTE
With multiple recent events- TIA in January 2018, right MCA infarct in mid April 2018, and left M2 occlusion s/p thrombectomy in later April  Question of subarachnoid hemorrhage following thrombectomy as well as subsequent questionable seizure activity  Pt initially noted to be functioning well while in the [facility] with a notable decline after being transferred to SNF  She is now at home with her children and requires assistance with all care  Work-up in progress to identify cause of drastic change  She will be getting home care in the near future  She was encouraged to maintain good control of secondary stroke risk factors, including blood pressure, cholesterol, and blood sugar  I will defer monitoring and management of these issues to her PCP  She should continue on her eliquis, statin, and appropriate blood pressure and diabetes medications  I discussed with her daughter that any new stroke like symptoms should prompt her to call 911 or take her mother to the nearest ED as soon as possible  Also should she fall and hit her head while on eliquis, she should also be evaluated in the ED

## 2018-06-15 NOTE — ASSESSMENT & PLAN NOTE
Question of seizure following thrombectomy- noted for upper and lower extremity tonic-clonic activity with thrashing of her head and roving eye movements lasting 45 seconds to 1 minute  No post ictal period afterward, per her daughter  She was loaded with and then initiated on keppra; however this was later d/c'd as it was felt to possibly be contributing to her waxing and waning mental status  EEG in the hospital noted on diffuse slowing but no epileptiform activity  Daughter denies any recurrence of tonic-clonic type activity but does note visual hallucinations as well as staring spells  Will repeat EEG and have instructed daughter to report any new activity that may be concerning for seizures

## 2018-06-15 NOTE — PATIENT INSTRUCTIONS
1  Get the blood work and urinalysis done tomorrow  2  Get the CT scan of the head and the EEG done next week  3  Schedule an appointment with her family doctor as soon as possible next week  4  Any new or worsening symptoms, please have her evaluated in the ER  5  We will call you with results once we have them

## 2018-06-15 NOTE — PROGRESS NOTES
Patient ID: Ellen Camp is a 80 y o  female  Assessment/Plan:    Altered mental status  Here for stroke follow-up, however daughter noting concern over significant decline in function over the past month, but significantly more over the past 2 weeks  Reportedly while she was in the [facility] she was walking, talking, and functioning mostly independently  Now she is confused, unable to communicate consistently (only able to intermittently speak one or two words that make sense), unable to follow commands, unable to ambulate, or care for herself  Unclear etiology- could be infectious/metabolic vs neurologic in nature  Given that the timeline of onset is not acute and has been gradual, do not feel that urgent ED visit is necessary  Will have her get labs (CBC, CMP, TSH, B12), urinalysis, CTH, and EEG to look for causes  Her daughter was instructed that should she have any new symptoms or any further decline, she should take her mother to the ED for evaluation as soon as possible  CVA (cerebral vascular accident) [facility])  With multiple recent events- TIA in January 2018, right MCA infarct in mid April 2018, and left M2 occlusion s/p thrombectomy in later April  Question of subarachnoid hemorrhage following thrombectomy as well as subsequent questionable seizure activity  Pt initially noted to be functioning well while in the [facility] with a notable decline after being transferred to SNF  She is now at home with her children and requires assistance with all care  Work-up in progress to identify cause of drastic change  She will be getting home care in the near future  She was encouraged to maintain good control of secondary stroke risk factors, including blood pressure, cholesterol, and blood sugar  I will defer monitoring and management of these issues to her PCP  She should continue on her eliquis, statin, and appropriate blood pressure and diabetes medications    I discussed with her daughter that any new stroke like symptoms should prompt her to call 911 or take her mother to the nearest ED as soon as possible  Also should she fall and hit her head while on eliquis, she should also be evaluated in the ED  Seizure-like activity (HCC)  Question of seizure following thrombectomy- noted for upper and lower extremity tonic-clonic activity with thrashing of her head and roving eye movements lasting 45 seconds to 1 minute  No post ictal period afterward, per her daughter  She was loaded with and then initiated on keppra; however this was later d/c'd as it was felt to possibly be contributing to her waxing and waning mental status  EEG in the hospital noted on diffuse slowing but no epileptiform activity  Daughter denies any recurrence of tonic-clonic type activity but does note visual hallucinations as well as staring spells  Will repeat EEG and have instructed daughter to report any new activity that may be concerning for seizures  Subjective:    Michelle Pacheco is a 80 y o  female with a PMH of subacute right parietal CVA, 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, hypertension, sick sinus syndrome with pacemaker, bioprosthetic mitral valve, DM 2, CHADWICK, and DVT postoperatively, on Coumadin, who presented to [facility] on 4/23/18 with new onset dysarthria, aphasia, right facial droop, and right upper and lower extremity weakness  Last known normal sometime after 8:30 p m  last night  Patient awoke with symptoms  Of note, the patient was recently seen on 4/19/18 by Dr Mariela Rai at [address] [place] [address] for altered mental status after a fall  Patient was found to have a subacute right MCA infarction at that time, with left-sided weakness, mild aphasia and left-sided neglect  The patient's INR was therapeutic at that time, and aspirin was initiated as well as Lipitor  Stroke workup, including TTE, done on that admission was unrevealing  Additionally, patient had history of TIA in January, 2018    At baseline, the patient is alert and oriented and lives independently  She has a history of hip and bilateral knee replacements, and thus walks with a walker  The patient was discharged to skilled nursing facility [facility] on 04/21/2018  The patient was seen in her usual state of health, with residual deficits from previous CVA, at approximately 8:30 p m  when she was visited by her daughter  CTH noted for subacute R MCA stroke  CTA demonstrated left M2 occlusion  Patient was not deemed to be a tPA candidate secondary to unknown time of onset and Coumadin therapy with INR of 1 95  Neurovascular team was consulted, and the patient was to undergo thrombectomy  Postprocedure CT did demonstrate contrast extravasation versus subarachnoid hemorrhage  Anticoagulation and antiplatelet were held  Overnight, the patient had an episode of 45 seconds to 1 minute of diffuse upper and lower extremity tonic-clonic activity, with reported head thrashing and roving eye movements  No tongue bite or incontinence noted  Patient was loaded with Keppra 1 g and started on Keppra 750 mg p o  B i d  which was later lowered to Keppra 500 mg p o  B i d  She continued to have some waxing and waning of her mental status and was placed on video EEG, which showed diffuse slowing but no epileptiform discharges or epileptogenic focus to date  It was felt that keppra may have been causing MS changes and it was later discontinued  Repeat CTH without hemorrhage  MRI unable to be completed 2/2 pacemaker  LORI demonstrated mildly dilated L atria w/o thrombus and 3mm PFO  It was recommended that she be transitioned to eliquis for DVT and ESUS  A stroke alert was called again on 5/8/18 due to encephalopathy with difficulty waking patient  Exam did not reveal any acute focal neurologic deficits and the stroke alert was canceled  It was recommended to repeat a CTH and toxic metabolic work-up  CTH was unremarkable for any acute issues  She was admitted to the [facility] for rehab   She was since readmitted for UTI/sepsis in May 25-28  From here she was discharged to [facility] for continued rehab  Today Ms Renee Cruz presents for hospital follow-up accompanied by her daughter, who is providing the history as Nasir Blanchard is currently non-verbal  Her daughter states that she is no longer talking or walking and is very confused  This apparently started while she was at [facility] for rehab, and has gotten worse over the past 2 weeks  She is also having increased crying outbursts and difficulty swallowing, noting coughing and choking while eating and drinking  Her daughter notes that there was not any work-up done while at [facility] to figure out why she had such a sudden decline in function  Her daughter also notes that her mom is hallucinating- seeing bugs and worms  She denies any further episodes of convulsions but does note staring spells where her daughter has a hard time getting through to her  No noted tongue bite injuries or other mouth injuries  She is currently requiring assistance for all ADLs and is often not able to feed herself  She is incontinent of bladder but not bowel  She is able to walk a short distance with a walker and assistance but cannot transfer on her own  The following portions of the patient's history were reviewed and updated as appropriate: past family history and past social history        Current Outpatient Prescriptions:     apixaban (ELIQUIS) 2 5 mg, Take 1 tablet (2 5 mg total) by mouth 2 (two) times a day, Disp: , Rfl: 0    atorvastatin (LIPITOR) 80 mg tablet, Take 1 tablet (80 mg total) by mouth daily with dinner, Disp: 30 tablet, Rfl: 0    docusate sodium (COLACE) 100 mg capsule, Take 100 mg by mouth 2 (two) times a day, Disp: , Rfl:     FERREX 150 150 MG capsule, , Disp: , Rfl:     ferrous sulfate 325 (65 Fe) mg tablet, Take 325 mg by mouth daily with breakfast, Disp: , Rfl:     furosemide (LASIX) 40 mg tablet, , Disp: , Rfl:    JANUVIA 100 MG tablet, , Disp: , Rfl:     magnesium hydroxide (MILK OF MAGNESIA) 400 mg/5 mL oral suspension, Take by mouth daily as needed for constipation, Disp: , Rfl:     metoprolol tartrate (LOPRESSOR) 50 mg tablet, Take 1 tablet (50 mg total) by mouth every 12 (twelve) hours, Disp: , Rfl: 0    Multiple Vitamins-Minerals (MULTIVITAMIN ADULT PO), Take 1 tablet by mouth daily, Disp: , Rfl:     pantoprazole (PROTONIX) 40 mg tablet, Take 1 tablet (40 mg total) by mouth daily in the early morning, Disp: , Rfl: 0    polyethylene glycol (GLYCOLAX) powder, , Disp: , Rfl:     polyethylene glycol (MIRALAX) 17 g packet, Take 17 g by mouth daily, Disp: , Rfl:     senna (SENOKOT) 8 6 mg, Take 1 tablet (8 6 mg total) by mouth daily at bedtime, Disp: 120 each, Rfl: 0    acetaminophen (TYLENOL) 325 mg tablet, Take 3 tablets (975 mg total) by mouth every 8 (eight) hours, Disp: 30 tablet, Rfl: 0    bisacodyl (DULCOLAX) 10 mg suppository, Insert 1 suppository (10 mg total) into the rectum daily as needed for constipation, Disp: 12 suppository, Rfl: 0    potassium chloride (K-DUR) 10 mEq tablet, , Disp: , Rfl:      Objective:    Blood pressure 134/66, pulse 69, height 5' 4" (1 626 m), weight 65 8 kg (145 lb), not currently breastfeeding  Physical Exam   Constitutional: She appears well-developed and well-nourished  HENT:   Head: Normocephalic  Psychiatric:   Quiet, mostly non-verbal       Neurological Exam    Mental Status  The patient is drowsy  She is unable to name object  She has poor attention  She follows one step commands with prompting     Inconsistently able to follow commands with prompting     Cranial Nerves    Appear to be intact, but unable to formally test as she cannot follow my directions consistently     Motor    Unable to formally test strength, but there appears to be some decreased strength on the left side     Sensory    Unable to test     Gait and Coordination    Gait not evaluated, pt seen in a wheelchair         ROS:    Review of Systems   Constitutional: Positive for appetite change, fatigue and unexpected weight change  HENT: Positive for trouble swallowing  Eyes: Negative  Respiratory: Positive for cough (When swallowing) and shortness of breath (When walking)  Cardiovascular: Negative  Gastrointestinal: Negative  Endocrine: Negative  Genitourinary: Positive for frequency and urgency  Loss of Bladder Control   Musculoskeletal: Negative  Skin: Negative  Allergic/Immunologic: Negative  Neurological:        Balance Problems  Slurred Speech     Hematological: Bruises/bleeds easily  Psychiatric/Behavioral: Positive for confusion          Memory Problems  Increased Sleepiness

## 2018-06-15 NOTE — ASSESSMENT & PLAN NOTE
Here for stroke follow-up, however daughter noting concern over significant decline in function over the past month, but significantly more over the past 2 weeks  Reportedly while she was in the [facility] she was walking, talking, and functioning mostly independently  Now she is confused, unable to communicate consistently (only able to intermittently speak one or two words that make sense), unable to follow commands, unable to ambulate, or care for herself  Unclear etiology- could be infectious/metabolic vs neurologic in nature  Given that the timeline of onset is not acute and has been gradual, do not feel that urgent ED visit is necessary  Will have her get labs (CBC, CMP, TSH, B12), urinalysis, CTH, and EEG to look for causes  Her daughter was instructed that should she have any new symptoms or any further decline, she should take her mother to the ED for evaluation as soon as possible

## 2018-06-16 ENCOUNTER — APPOINTMENT (OUTPATIENT)
Dept: LAB | Facility: HOSPITAL | Age: 83
End: 2018-06-16
Payer: MEDICARE

## 2018-06-16 ENCOUNTER — TRANSCRIBE ORDERS (OUTPATIENT)
Dept: ADMINISTRATIVE | Facility: HOSPITAL | Age: 83
End: 2018-06-16

## 2018-06-16 DIAGNOSIS — D64.9 ANEMIA, UNSPECIFIED TYPE: ICD-10-CM

## 2018-06-16 DIAGNOSIS — E11.9 TYPE 2 DIABETES MELLITUS WITHOUT COMPLICATION, WITH LONG-TERM CURRENT USE OF INSULIN (HCC): ICD-10-CM

## 2018-06-16 DIAGNOSIS — E78.00 PURE HYPERCHOLESTEROLEMIA: ICD-10-CM

## 2018-06-16 DIAGNOSIS — Z79.4 TYPE 2 DIABETES MELLITUS WITHOUT COMPLICATION, WITH LONG-TERM CURRENT USE OF INSULIN (HCC): ICD-10-CM

## 2018-06-16 DIAGNOSIS — R47.02 DYSPHASIA: ICD-10-CM

## 2018-06-16 DIAGNOSIS — E55.9 VITAMIN D DEFICIENCY: ICD-10-CM

## 2018-06-16 DIAGNOSIS — R41.82 ALTERED MENTAL STATUS, UNSPECIFIED ALTERED MENTAL STATUS TYPE: ICD-10-CM

## 2018-06-16 DIAGNOSIS — D56.9 THALASSEMIA SYNDROME: ICD-10-CM

## 2018-06-16 DIAGNOSIS — Z79.899 HIGH RISK MEDICATION USE: ICD-10-CM

## 2018-06-16 DIAGNOSIS — Z00.00 ROUTINE GENERAL MEDICAL EXAMINATION AT A HEALTH CARE FACILITY: Primary | ICD-10-CM

## 2018-06-16 DIAGNOSIS — Z00.00 ROUTINE GENERAL MEDICAL EXAMINATION AT A HEALTH CARE FACILITY: ICD-10-CM

## 2018-06-16 LAB
25(OH)D3 SERPL-MCNC: 27.5 NG/ML (ref 30–100)
ALBUMIN SERPL BCP-MCNC: 3.3 G/DL (ref 3.5–5)
ALP SERPL-CCNC: 95 U/L (ref 46–116)
ALT SERPL W P-5'-P-CCNC: 31 U/L (ref 12–78)
ANION GAP SERPL CALCULATED.3IONS-SCNC: 10 MMOL/L (ref 4–13)
AST SERPL W P-5'-P-CCNC: 19 U/L (ref 5–45)
BACTERIA UR QL AUTO: ABNORMAL /HPF
BASOPHILS # BLD AUTO: 0.02 THOUSANDS/ΜL (ref 0–0.1)
BASOPHILS NFR BLD AUTO: 0 % (ref 0–1)
BILIRUB SERPL-MCNC: 0.7 MG/DL (ref 0.2–1)
BILIRUB UR QL STRIP: NEGATIVE
BUN SERPL-MCNC: 38 MG/DL (ref 5–25)
CALCIUM SERPL-MCNC: 9.7 MG/DL (ref 8.3–10.1)
CHLORIDE SERPL-SCNC: 101 MMOL/L (ref 100–108)
CHOLEST SERPL-MCNC: 133 MG/DL (ref 50–200)
CLARITY UR: CLEAR
CO2 SERPL-SCNC: 30 MMOL/L (ref 21–32)
COLOR UR: YELLOW
CREAT SERPL-MCNC: 1.46 MG/DL (ref 0.6–1.3)
CREAT UR-MCNC: 115 MG/DL
EOSINOPHIL # BLD AUTO: 0.68 THOUSAND/ΜL (ref 0–0.61)
EOSINOPHIL NFR BLD AUTO: 11 % (ref 0–6)
ERYTHROCYTE [DISTWIDTH] IN BLOOD BY AUTOMATED COUNT: 15 % (ref 11.6–15.1)
ERYTHROCYTE [SEDIMENTATION RATE] IN BLOOD: 72 MM/HOUR (ref 2–25)
EST. AVERAGE GLUCOSE BLD GHB EST-MCNC: 137 MG/DL
FERRITIN SERPL-MCNC: 118 NG/ML (ref 8–388)
GFR SERPL CREATININE-BSD FRML MDRD: 33 ML/MIN/1.73SQ M
GLUCOSE P FAST SERPL-MCNC: 116 MG/DL (ref 65–99)
GLUCOSE UR STRIP-MCNC: NEGATIVE MG/DL
HBA1C MFR BLD: 6.4 % (ref 4.2–6.3)
HCT VFR BLD AUTO: 33.3 % (ref 34.8–46.1)
HDLC SERPL-MCNC: 59 MG/DL (ref 40–60)
HGB BLD-MCNC: 10 G/DL (ref 11.5–15.4)
HGB UR QL STRIP.AUTO: NEGATIVE
HYALINE CASTS #/AREA URNS LPF: ABNORMAL /LPF
IMM GRANULOCYTES # BLD AUTO: 0.01 THOUSAND/UL (ref 0–0.2)
IMM GRANULOCYTES NFR BLD AUTO: 0 % (ref 0–2)
IRON SERPL-MCNC: 26 UG/DL (ref 50–170)
KETONES UR STRIP-MCNC: NEGATIVE MG/DL
LDLC SERPL CALC-MCNC: 60 MG/DL (ref 0–100)
LEUKOCYTE ESTERASE UR QL STRIP: NEGATIVE
LYMPHOCYTES # BLD AUTO: 1.16 THOUSANDS/ΜL (ref 0.6–4.47)
LYMPHOCYTES NFR BLD AUTO: 19 % (ref 14–44)
MAGNESIUM SERPL-MCNC: 2.4 MG/DL (ref 1.6–2.6)
MCH RBC QN AUTO: 28.3 PG (ref 26.8–34.3)
MCHC RBC AUTO-ENTMCNC: 30 G/DL (ref 31.4–37.4)
MCV RBC AUTO: 94 FL (ref 82–98)
MICROALBUMIN UR-MCNC: 54.9 MG/L (ref 0–20)
MICROALBUMIN/CREAT 24H UR: 48 MG/G CREATININE (ref 0–30)
MONOCYTES # BLD AUTO: 0.64 THOUSAND/ΜL (ref 0.17–1.22)
MONOCYTES NFR BLD AUTO: 10 % (ref 4–12)
NEUTROPHILS # BLD AUTO: 3.68 THOUSANDS/ΜL (ref 1.85–7.62)
NEUTS SEG NFR BLD AUTO: 60 % (ref 43–75)
NITRITE UR QL STRIP: NEGATIVE
NON-SQ EPI CELLS URNS QL MICRO: ABNORMAL /HPF
NONHDLC SERPL-MCNC: 74 MG/DL
NRBC BLD AUTO-RTO: 0 /100 WBCS
PH UR STRIP.AUTO: 7 [PH] (ref 5–9)
PLATELET # BLD AUTO: 92 THOUSANDS/UL (ref 149–390)
PMV BLD AUTO: 11.6 FL (ref 8.9–12.7)
POTASSIUM SERPL-SCNC: 4.6 MMOL/L (ref 3.5–5.3)
PROT SERPL-MCNC: 7.8 G/DL (ref 6.4–8.2)
PROT UR STRIP-MCNC: ABNORMAL MG/DL
RBC # BLD AUTO: 3.53 MILLION/UL (ref 3.81–5.12)
RBC #/AREA URNS AUTO: ABNORMAL /HPF
SODIUM SERPL-SCNC: 141 MMOL/L (ref 136–145)
SP GR UR STRIP.AUTO: 1.01 (ref 1–1.03)
T4 FREE SERPL-MCNC: 0.99 NG/DL (ref 0.76–1.46)
TRIGL SERPL-MCNC: 69 MG/DL
TSH SERPL DL<=0.05 MIU/L-ACNC: 0.63 UIU/ML (ref 0.36–3.74)
TSH SERPL DL<=0.05 MIU/L-ACNC: 0.66 UIU/ML (ref 0.36–3.74)
UROBILINOGEN UR QL STRIP.AUTO: 0.2 E.U./DL
VIT B12 SERPL-MCNC: 604 PG/ML (ref 100–900)
WBC # BLD AUTO: 6.19 THOUSAND/UL (ref 4.31–10.16)
WBC #/AREA URNS AUTO: ABNORMAL /HPF

## 2018-06-16 PROCEDURE — 81001 URINALYSIS AUTO W/SCOPE: CPT | Performed by: NURSE PRACTITIONER

## 2018-06-16 PROCEDURE — 82570 ASSAY OF URINE CREATININE: CPT | Performed by: INTERNAL MEDICINE

## 2018-06-16 PROCEDURE — 80061 LIPID PANEL: CPT

## 2018-06-16 PROCEDURE — 82043 UR ALBUMIN QUANTITATIVE: CPT | Performed by: INTERNAL MEDICINE

## 2018-06-16 PROCEDURE — 83036 HEMOGLOBIN GLYCOSYLATED A1C: CPT | Performed by: INTERNAL MEDICINE

## 2018-06-16 PROCEDURE — 82607 VITAMIN B-12: CPT

## 2018-06-16 PROCEDURE — 83735 ASSAY OF MAGNESIUM: CPT

## 2018-06-16 PROCEDURE — 80053 COMPREHEN METABOLIC PANEL: CPT | Performed by: INTERNAL MEDICINE

## 2018-06-16 PROCEDURE — 85025 COMPLETE CBC W/AUTO DIFF WBC: CPT | Performed by: INTERNAL MEDICINE

## 2018-06-16 PROCEDURE — 82306 VITAMIN D 25 HYDROXY: CPT

## 2018-06-16 PROCEDURE — 84439 ASSAY OF FREE THYROXINE: CPT

## 2018-06-16 PROCEDURE — 85652 RBC SED RATE AUTOMATED: CPT

## 2018-06-16 PROCEDURE — 83540 ASSAY OF IRON: CPT

## 2018-06-16 PROCEDURE — 36415 COLL VENOUS BLD VENIPUNCTURE: CPT | Performed by: INTERNAL MEDICINE

## 2018-06-16 PROCEDURE — 82728 ASSAY OF FERRITIN: CPT

## 2018-06-16 PROCEDURE — 84443 ASSAY THYROID STIM HORMONE: CPT

## 2018-06-18 ENCOUNTER — HOSPITAL ENCOUNTER (INPATIENT)
Facility: HOSPITAL | Age: 83
LOS: 7 days | Discharge: HOME HEALTH | DRG: 177 | End: 2018-06-25
Attending: EMERGENCY MEDICINE | Admitting: INTERNAL MEDICINE
Payer: MEDICARE

## 2018-06-18 ENCOUNTER — APPOINTMENT (INPATIENT)
Dept: NON INVASIVE DIAGNOSTICS | Facility: HOSPITAL | Age: 83
DRG: 177 | End: 2018-06-18
Payer: MEDICARE

## 2018-06-18 ENCOUNTER — TELEPHONE (OUTPATIENT)
Dept: NEUROLOGY | Facility: CLINIC | Age: 83
End: 2018-06-18

## 2018-06-18 ENCOUNTER — APPOINTMENT (EMERGENCY)
Dept: RADIOLOGY | Facility: HOSPITAL | Age: 83
DRG: 177 | End: 2018-06-18
Payer: MEDICARE

## 2018-06-18 DIAGNOSIS — M25.511 SHOULDER PAIN, RIGHT: ICD-10-CM

## 2018-06-18 DIAGNOSIS — R41.82 ALTERED MENTAL STATUS: ICD-10-CM

## 2018-06-18 DIAGNOSIS — G93.40 ENCEPHALOPATHY: ICD-10-CM

## 2018-06-18 DIAGNOSIS — R13.10 DYSPHAGIA: ICD-10-CM

## 2018-06-18 DIAGNOSIS — J18.9 RIGHT LOWER LOBE PNEUMONIA: Primary | ICD-10-CM

## 2018-06-18 PROBLEM — R79.89 ABNORMAL TSH: Status: ACTIVE | Noted: 2018-06-18

## 2018-06-18 LAB
ALBUMIN SERPL BCP-MCNC: 3.2 G/DL (ref 3.5–5)
ALP SERPL-CCNC: 103 U/L (ref 46–116)
ALT SERPL W P-5'-P-CCNC: 29 U/L (ref 12–78)
AMMONIA PLAS-SCNC: 12 UMOL/L (ref 11–35)
ANION GAP SERPL CALCULATED.3IONS-SCNC: 8 MMOL/L (ref 4–13)
APAP SERPL-MCNC: <2 UG/ML (ref 10–30)
AST SERPL W P-5'-P-CCNC: 26 U/L (ref 5–45)
ATRIAL RATE: 82 BPM
ATRIAL RATE: 83 BPM
BACTERIA UR QL AUTO: ABNORMAL /HPF
BASOPHILS # BLD AUTO: 0.01 THOUSANDS/ΜL (ref 0–0.1)
BASOPHILS NFR BLD AUTO: 0 % (ref 0–1)
BILIRUB SERPL-MCNC: 0.72 MG/DL (ref 0.2–1)
BILIRUB UR QL STRIP: NEGATIVE
BUN SERPL-MCNC: 33 MG/DL (ref 5–25)
CALCIUM SERPL-MCNC: 8.6 MG/DL (ref 8.3–10.1)
CHLORIDE SERPL-SCNC: 102 MMOL/L (ref 100–108)
CLARITY UR: CLEAR
CO2 SERPL-SCNC: 27 MMOL/L (ref 21–32)
COLOR UR: YELLOW
CREAT SERPL-MCNC: 1.37 MG/DL (ref 0.6–1.3)
EOSINOPHIL # BLD AUTO: 0.14 THOUSAND/ΜL (ref 0–0.61)
EOSINOPHIL NFR BLD AUTO: 2 % (ref 0–6)
ERYTHROCYTE [DISTWIDTH] IN BLOOD BY AUTOMATED COUNT: 14.7 % (ref 11.6–15.1)
ETHANOL SERPL-MCNC: <3 MG/DL (ref 0–3)
GFR SERPL CREATININE-BSD FRML MDRD: 35 ML/MIN/1.73SQ M
GLUCOSE SERPL-MCNC: 111 MG/DL (ref 65–140)
GLUCOSE SERPL-MCNC: 127 MG/DL (ref 65–140)
GLUCOSE SERPL-MCNC: 157 MG/DL (ref 65–140)
GLUCOSE UR STRIP-MCNC: NEGATIVE MG/DL
HCT VFR BLD AUTO: 33.9 % (ref 34.8–46.1)
HGB BLD-MCNC: 10.5 G/DL (ref 11.5–15.4)
HGB UR QL STRIP.AUTO: ABNORMAL
HYALINE CASTS #/AREA URNS LPF: ABNORMAL /LPF
IMM GRANULOCYTES # BLD AUTO: 0.02 THOUSAND/UL (ref 0–0.2)
IMM GRANULOCYTES NFR BLD AUTO: 0 % (ref 0–2)
KETONES UR STRIP-MCNC: NEGATIVE MG/DL
LACTATE SERPL-SCNC: 1.1 MMOL/L (ref 0.5–2)
LEUKOCYTE ESTERASE UR QL STRIP: NEGATIVE
LYMPHOCYTES # BLD AUTO: 0.58 THOUSANDS/ΜL (ref 0.6–4.47)
LYMPHOCYTES NFR BLD AUTO: 7 % (ref 14–44)
MCH RBC QN AUTO: 28.8 PG (ref 26.8–34.3)
MCHC RBC AUTO-ENTMCNC: 31 G/DL (ref 31.4–37.4)
MCV RBC AUTO: 93 FL (ref 82–98)
MONOCYTES # BLD AUTO: 0.62 THOUSAND/ΜL (ref 0.17–1.22)
MONOCYTES NFR BLD AUTO: 7 % (ref 4–12)
NEUTROPHILS # BLD AUTO: 7.08 THOUSANDS/ΜL (ref 1.85–7.62)
NEUTS SEG NFR BLD AUTO: 84 % (ref 43–75)
NITRITE UR QL STRIP: NEGATIVE
NON-SQ EPI CELLS URNS QL MICRO: ABNORMAL /HPF
NRBC BLD AUTO-RTO: 0 /100 WBCS
P AXIS: 26 DEGREES
P AXIS: 45 DEGREES
PH UR STRIP.AUTO: 6.5 [PH] (ref 4.5–8)
PLATELET # BLD AUTO: 94 THOUSANDS/UL (ref 149–390)
PMV BLD AUTO: 12.4 FL (ref 8.9–12.7)
POTASSIUM SERPL-SCNC: 3.9 MMOL/L (ref 3.5–5.3)
PR INTERVAL: 106 MS
PR INTERVAL: 108 MS
PROT SERPL-MCNC: 7 G/DL (ref 6.4–8.2)
PROT UR STRIP-MCNC: ABNORMAL MG/DL
QRS AXIS: -50 DEGREES
QRS AXIS: -52 DEGREES
QRSD INTERVAL: 136 MS
QRSD INTERVAL: 138 MS
QT INTERVAL: 412 MS
QT INTERVAL: 426 MS
QTC INTERVAL: 484 MS
QTC INTERVAL: 494 MS
RBC # BLD AUTO: 3.64 MILLION/UL (ref 3.81–5.12)
RBC #/AREA URNS AUTO: ABNORMAL /HPF
SALICYLATES SERPL-MCNC: <3 MG/DL (ref 3–20)
SODIUM SERPL-SCNC: 137 MMOL/L (ref 136–145)
SP GR UR STRIP.AUTO: 1.02 (ref 1–1.03)
T WAVE AXIS: 67 DEGREES
T WAVE AXIS: 74 DEGREES
TROPONIN I SERPL-MCNC: <0.02 NG/ML
TSH SERPL DL<=0.05 MIU/L-ACNC: 0.28 UIU/ML (ref 0.36–3.74)
UROBILINOGEN UR QL STRIP.AUTO: 0.2 E.U./DL
VENTRICULAR RATE: 81 BPM
VENTRICULAR RATE: 83 BPM
WBC # BLD AUTO: 8.45 THOUSAND/UL (ref 4.31–10.16)
WBC #/AREA URNS AUTO: ABNORMAL /HPF

## 2018-06-18 PROCEDURE — 96367 TX/PROPH/DG ADDL SEQ IV INF: CPT

## 2018-06-18 PROCEDURE — 36415 COLL VENOUS BLD VENIPUNCTURE: CPT | Performed by: EMERGENCY MEDICINE

## 2018-06-18 PROCEDURE — 83605 ASSAY OF LACTIC ACID: CPT | Performed by: EMERGENCY MEDICINE

## 2018-06-18 PROCEDURE — 80320 DRUG SCREEN QUANTALCOHOLS: CPT | Performed by: EMERGENCY MEDICINE

## 2018-06-18 PROCEDURE — 80329 ANALGESICS NON-OPIOID 1 OR 2: CPT | Performed by: EMERGENCY MEDICINE

## 2018-06-18 PROCEDURE — 84484 ASSAY OF TROPONIN QUANT: CPT | Performed by: EMERGENCY MEDICINE

## 2018-06-18 PROCEDURE — 70450 CT HEAD/BRAIN W/O DYE: CPT

## 2018-06-18 PROCEDURE — 71046 X-RAY EXAM CHEST 2 VIEWS: CPT

## 2018-06-18 PROCEDURE — 82140 ASSAY OF AMMONIA: CPT | Performed by: EMERGENCY MEDICINE

## 2018-06-18 PROCEDURE — 81001 URINALYSIS AUTO W/SCOPE: CPT

## 2018-06-18 PROCEDURE — 87040 BLOOD CULTURE FOR BACTERIA: CPT | Performed by: EMERGENCY MEDICINE

## 2018-06-18 PROCEDURE — 93971 EXTREMITY STUDY: CPT

## 2018-06-18 PROCEDURE — 99285 EMERGENCY DEPT VISIT HI MDM: CPT

## 2018-06-18 PROCEDURE — 84443 ASSAY THYROID STIM HORMONE: CPT | Performed by: EMERGENCY MEDICINE

## 2018-06-18 PROCEDURE — 73030 X-RAY EXAM OF SHOULDER: CPT

## 2018-06-18 PROCEDURE — 80053 COMPREHEN METABOLIC PANEL: CPT | Performed by: EMERGENCY MEDICINE

## 2018-06-18 PROCEDURE — 96365 THER/PROPH/DIAG IV INF INIT: CPT

## 2018-06-18 PROCEDURE — 93005 ELECTROCARDIOGRAM TRACING: CPT

## 2018-06-18 PROCEDURE — 99223 1ST HOSP IP/OBS HIGH 75: CPT | Performed by: INTERNAL MEDICINE

## 2018-06-18 PROCEDURE — 93010 ELECTROCARDIOGRAM REPORT: CPT | Performed by: INTERNAL MEDICINE

## 2018-06-18 PROCEDURE — 92610 EVALUATE SWALLOWING FUNCTION: CPT

## 2018-06-18 PROCEDURE — 85025 COMPLETE CBC W/AUTO DIFF WBC: CPT | Performed by: EMERGENCY MEDICINE

## 2018-06-18 PROCEDURE — 82948 REAGENT STRIP/BLOOD GLUCOSE: CPT

## 2018-06-18 RX ORDER — METOPROLOL TARTRATE 50 MG/1
50 TABLET, FILM COATED ORAL EVERY 12 HOURS SCHEDULED
Status: DISCONTINUED | OUTPATIENT
Start: 2018-06-18 | End: 2018-06-25 | Stop reason: HOSPADM

## 2018-06-18 RX ORDER — HYDRALAZINE HYDROCHLORIDE 20 MG/ML
5 INJECTION INTRAMUSCULAR; INTRAVENOUS EVERY 6 HOURS PRN
Status: DISCONTINUED | OUTPATIENT
Start: 2018-06-18 | End: 2018-06-25 | Stop reason: HOSPADM

## 2018-06-18 RX ORDER — ACETAMINOPHEN 325 MG/1
650 TABLET ORAL EVERY 6 HOURS PRN
Status: DISCONTINUED | OUTPATIENT
Start: 2018-06-18 | End: 2018-06-25 | Stop reason: HOSPADM

## 2018-06-18 RX ORDER — GUAIFENESIN 600 MG
600 TABLET, EXTENDED RELEASE 12 HR ORAL EVERY 12 HOURS SCHEDULED
Status: DISCONTINUED | OUTPATIENT
Start: 2018-06-18 | End: 2018-06-25 | Stop reason: HOSPADM

## 2018-06-18 RX ORDER — PANTOPRAZOLE SODIUM 40 MG/1
40 TABLET, DELAYED RELEASE ORAL
Status: DISCONTINUED | OUTPATIENT
Start: 2018-06-19 | End: 2018-06-25 | Stop reason: HOSPADM

## 2018-06-18 RX ORDER — LEVALBUTEROL 1.25 MG/.5ML
1.25 SOLUTION, CONCENTRATE RESPIRATORY (INHALATION) EVERY 8 HOURS PRN
Status: DISCONTINUED | OUTPATIENT
Start: 2018-06-18 | End: 2018-06-18

## 2018-06-18 RX ORDER — ONDANSETRON 2 MG/ML
4 INJECTION INTRAMUSCULAR; INTRAVENOUS EVERY 6 HOURS PRN
Status: DISCONTINUED | OUTPATIENT
Start: 2018-06-18 | End: 2018-06-25 | Stop reason: HOSPADM

## 2018-06-18 RX ORDER — OXYCODONE HYDROCHLORIDE 5 MG/1
2.5 TABLET ORAL EVERY 6 HOURS PRN
Status: DISCONTINUED | OUTPATIENT
Start: 2018-06-18 | End: 2018-06-25 | Stop reason: HOSPADM

## 2018-06-18 RX ORDER — DOCUSATE SODIUM 100 MG/1
100 CAPSULE, LIQUID FILLED ORAL 2 TIMES DAILY
Status: DISCONTINUED | OUTPATIENT
Start: 2018-06-18 | End: 2018-06-25 | Stop reason: HOSPADM

## 2018-06-18 RX ORDER — SENNOSIDES 8.6 MG
1 TABLET ORAL
Status: DISCONTINUED | OUTPATIENT
Start: 2018-06-18 | End: 2018-06-25 | Stop reason: HOSPADM

## 2018-06-18 RX ORDER — POLYETHYLENE GLYCOL 3350 17 G/17G
17 POWDER, FOR SOLUTION ORAL DAILY PRN
Status: DISCONTINUED | OUTPATIENT
Start: 2018-06-18 | End: 2018-06-25 | Stop reason: HOSPADM

## 2018-06-18 RX ORDER — POTASSIUM CHLORIDE 750 MG/1
10 TABLET, EXTENDED RELEASE ORAL
Status: DISCONTINUED | OUTPATIENT
Start: 2018-06-19 | End: 2018-06-25 | Stop reason: HOSPADM

## 2018-06-18 RX ORDER — FERROUS SULFATE 325(65) MG
325 TABLET ORAL
Status: DISCONTINUED | OUTPATIENT
Start: 2018-06-19 | End: 2018-06-25 | Stop reason: HOSPADM

## 2018-06-18 RX ORDER — SODIUM CHLORIDE 9 MG/ML
75 INJECTION, SOLUTION INTRAVENOUS CONTINUOUS
Status: DISPENSED | OUTPATIENT
Start: 2018-06-18 | End: 2018-06-19

## 2018-06-18 RX ORDER — FUROSEMIDE 40 MG/1
40 TABLET ORAL DAILY
Status: DISCONTINUED | OUTPATIENT
Start: 2018-06-19 | End: 2018-06-25 | Stop reason: HOSPADM

## 2018-06-18 RX ORDER — ATORVASTATIN CALCIUM 80 MG/1
80 TABLET, FILM COATED ORAL
Status: DISCONTINUED | OUTPATIENT
Start: 2018-06-18 | End: 2018-06-25 | Stop reason: HOSPADM

## 2018-06-18 RX ORDER — DIPHENHYDRAMINE HYDROCHLORIDE 50 MG/ML
12.5 INJECTION INTRAMUSCULAR; INTRAVENOUS EVERY 6 HOURS PRN
Status: DISCONTINUED | OUTPATIENT
Start: 2018-06-18 | End: 2018-06-25 | Stop reason: HOSPADM

## 2018-06-18 RX ORDER — VANCOMYCIN HYDROCHLORIDE 1 G/200ML
15 INJECTION, SOLUTION INTRAVENOUS ONCE
Status: COMPLETED | OUTPATIENT
Start: 2018-06-18 | End: 2018-06-18

## 2018-06-18 RX ORDER — BISACODYL 10 MG
10 SUPPOSITORY, RECTAL RECTAL DAILY PRN
Status: DISCONTINUED | OUTPATIENT
Start: 2018-06-18 | End: 2018-06-25 | Stop reason: HOSPADM

## 2018-06-18 RX ORDER — ACETAMINOPHEN 650 MG/1
650 SUPPOSITORY RECTAL ONCE
Status: COMPLETED | OUTPATIENT
Start: 2018-06-18 | End: 2018-06-18

## 2018-06-18 RX ADMIN — APIXABAN 2.5 MG: 2.5 TABLET, FILM COATED ORAL at 17:42

## 2018-06-18 RX ADMIN — ACETAMINOPHEN 650 MG: 650 SUPPOSITORY RECTAL at 11:45

## 2018-06-18 RX ADMIN — Medication 1 TABLET: at 17:42

## 2018-06-18 RX ADMIN — SODIUM CHLORIDE 1000 ML: 0.9 INJECTION, SOLUTION INTRAVENOUS at 11:56

## 2018-06-18 RX ADMIN — ACETAMINOPHEN 650 MG: 325 TABLET ORAL at 19:04

## 2018-06-18 RX ADMIN — METRONIDAZOLE 500 MG: 500 INJECTION, SOLUTION INTRAVENOUS at 14:09

## 2018-06-18 RX ADMIN — SODIUM CHLORIDE 75 ML/HR: 0.9 INJECTION, SOLUTION INTRAVENOUS at 15:20

## 2018-06-18 RX ADMIN — METRONIDAZOLE 500 MG: 500 INJECTION, SOLUTION INTRAVENOUS at 22:10

## 2018-06-18 RX ADMIN — CEFEPIME HYDROCHLORIDE 1000 MG: 1 INJECTION, SOLUTION INTRAVENOUS at 11:19

## 2018-06-18 RX ADMIN — METOPROLOL TARTRATE 50 MG: 50 TABLET, FILM COATED ORAL at 17:41

## 2018-06-18 RX ADMIN — SENNOSIDES 8.6 MG: 8.6 TABLET, FILM COATED ORAL at 22:14

## 2018-06-18 RX ADMIN — ATORVASTATIN CALCIUM 80 MG: 80 TABLET, FILM COATED ORAL at 17:41

## 2018-06-18 RX ADMIN — INSULIN LISPRO 1 UNITS: 100 INJECTION, SOLUTION INTRAVENOUS; SUBCUTANEOUS at 22:02

## 2018-06-18 RX ADMIN — VANCOMYCIN HYDROCHLORIDE 1000 MG: 1 INJECTION, SOLUTION INTRAVENOUS at 11:53

## 2018-06-18 RX ADMIN — DOCUSATE SODIUM 100 MG: 100 CAPSULE, LIQUID FILLED ORAL at 17:41

## 2018-06-18 RX ADMIN — GUAIFENESIN 600 MG: 600 TABLET, EXTENDED RELEASE ORAL at 17:41

## 2018-06-18 NOTE — H&P
H&P- Zoë Zaman 1933, 80 y o  female MRN: [id number]    Unit/Bed#: [facility] 739-24 Encounter: [id number]    Primary Care Provider: Adriana Reinoso MD   Date and time admitted to hospital: 6/18/2018  9:38 AM        * HCAP (healthcare-associated pneumonia)   Assessment & Plan    · Patient was just discharge at [address] [place] [address] a few days ago, with history of dysphagia with previous strokes  · Likely gram-negative bacteria  However, we cannot rule out possibility of aspiration pneumonia, thus possible anaerobes  · Follow-up results of the blood cultures  · For sputum culture and Gram stain  · Continue cefepime and IV metronidazole  · We will not continue vancomycin at this point as patient likely developed red man syndrome while it was infusing awhile ago  Benadryl p r n  just in case it was an allergic reaction  · Xopenex p r n  Salo [place] · Dysphagia evaluation  · Mucolytic  · May possibly get procalcitonin levels to direct us with therapy  Dysphagia   Assessment & Plan    · Patient's daughter claims that patient has been having problems swallowing  Thus possibility of aspiration  · Aspiration precautions  · Speech/dysphagia evaluation  · According to the patient's daughter, patient tolerated thickened liquids  Thus for now, will have the patient on honey thick liquids and pureed  Encephalopathy   Assessment & Plan    · Likely more of toxic metabolic encephalopathy secondary to patient's infection/pneumonia  · Has history of multiple strokes in the past  · CT scan of the head done today did not reveal any acute findings  · Neuro checks  · Consider Neurology consultation, if no improvement  · Manage patient's infection/pneumonia  Abnormal TSH   Assessment & Plan    · Check free T4          Acute pain of right shoulder   Assessment & Plan    · Pain control  · Follow-up results of the right shoulder x-ray  · Aqua K-pad  · If no significant improvement, consider orthopedic surgery consult  Chronic systolic CHF (congestive heart failure) ([facility])   Assessment & Plan    · Compensated at this point  · Continue heart failure medication  · Continue Lasix  · Oxygen p r n  History of DVT (deep vein thrombosis)   Assessment & Plan    · Continue apixaban  · Patient's daughter is concerned that the with patient having right shoulder pain, that this may be another DVT; according to her, patient presented the same symptom when she was diagnosed with DVT on the left upper extremity  · Follow-up results of the shoulder x-ray  · For duplex scan of the right upper extremity throughout DVT          CKD (chronic kidney disease) stage 3, GFR 30-59 ml/min   Assessment & Plan    · Baseline serum creatinine is 1 2 to 1 3   · Stable at this point  · Monitor kidney function  · Monitor input and output  · Avoid nephrotoxins  · Avoid hypotension  History of CVA (cerebrovascular accident)   Assessment & Plan    · Has history of multiple strokes this year  · Patient also has history of subarachnoid hemorrhage  · CT scan done today revealed no acute intracranial abnormality  · Presently on anticoagulation with apixaban and on high intensity statin  · Consider a neurology consult, if no improvement with patient's mental status  HTN (hypertension)   Assessment & Plan    · Blood pressure control  · Continue Lasix  · IV blood pressure medication on as needed basis  · Adjust treatment accordingly  VTE Prophylaxis: Apixaban (Eliquis)  / sequential compression device   Code Status:  Full code  POLST: POLST form is not discussed and not completed at this time  Discussion with family:  I spoke to patient's daughter at bedside  I discussed with her our findings and plans  I answered all questions and concerns  She was the 1 who gave me the history of the patient and she is the 1 who also gave me patient's code status      Anticipated Length of Stay:  Patient will be admitted on an Inpatient basis with an anticipated length of stay of  greater than 2 midnights  Justification for Hospital Stay:  Above findings and plans  Total Time for Visit, including Counseling / Coordination of Care: 1 hour  Greater than 50% of this total time spent on direct patient counseling and coordination of care  Chief Complaint:   Fever  History of Present Illness:    Ml Campbell is a 80 y o  female who presents to the emergency room at [facility] with fever  The patient was just discharged from here and the May with a diagnosis of sepsis secondary to urinary tract infection  Patient completed a course of antibiotic with cephalosporin  Patient got better and was eventual discharge to [facility] skilled nursing facility  Patient was eventually discharge from the skilled nursing facility last Thursday, or approximately 4 days ago  According to the patient's daughter, she is claiming that at the skilled nursing facility, patient was placed on regular food and thin liquids and that she noticed that her mother has been coughing and sometimes choking with food and thin liquids there  She told me that she address this with the staff there and she demanded that a speech therapy see her mother, and she told me that she was told by the staff that once she is discharged from the facility and follows up with her primary care physician, her mother's primary care physician would refer her to a speech therapist in the outpatient  When patient went home, with patient still having coughing and choking with food and thin liquids, she decided to place the patient on thickened fluids  According to the patient's daughter, patient was able to swallow well the thickened fluids  Patient's daughter told me that last night, patient had a low-grade fever    When I asked the what was the temperature, she told me it was 99 2° F  Early this morning, she got a temperature again and it does increase to 99 7° F  According to the patient's daughter, patient also started deteriorate at the facility and at home  In fact, she even told me that she as for an extension of her stay in the facility, but this was not granted She told me that when patient was discharge from here, she was already able to talk a few words, however, she told me in the facility she started to mumble again at times and does not have any force in her speech anymore  Lately, patient told me that her mother has been getting generally weaker and weaker and needs a lot of help just to sit up  She also told me that patient is at times mostly sleeping or lethargic  She also told me that she noticed that her mother having significant right shoulder pains which is acute in onset  She is concerned that patient is having DVT again as she presented the same way when she was diagnosed with a left upper extremity DVT  There were no other noted symptoms aside from the ones mentioned above as per my discussion with the the ones mentioned above  It is important to note, that according to the patient's daughter, patient has baseline left sided weakness and hemineglect which she had even before due to her multiple strokes    Note:  While there giving the vancomycin to IV in the ER, patient developed redness on her frontal head area  Review of Systems:    Review of Systems     10 point review systems done and they were negative except for the ones I mentioned in my HPI  From my interview with the patient's daughter at bedside, there were no noted nausea or vomiting or any abdominal pains or any chest pains or shortness of breath  According to her, patient had 1 episode of diarrhea yesterday, but none since then        Past Medical and Surgical History:     Past Medical History:   Diagnosis Date    Acid reflux     on occ    Arthritis     DJD right hip replaced    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 2 lesions with no change    Cancer [facility]) 2007    colonic polyps, no surgery done    Deep vein thrombosis (DVT) of left upper extremity ([facility] [address] ) 12/11/2017    Diabetes mellitus ([facility] [address] )     type 2    Diverticulosis     Edema     in legs on occ    Hypertension     on occ    Language barrier     speaks Nepalese & broken english    Stroke [facility])        Past Surgical History:   Procedure Laterality Date    COLON SURGERY      COLONOSCOPY      COLONOSCOPY N/A 11/2/2016    Procedure: COLONOSCOPY;  Surgeon: Maria R Denton MD;  Location: [facility] GI LAB; Service:     ESOPHAGOGASTRODUODENOSCOPY N/A 11/2/2016    Procedure: ESOPHAGOGASTRODUODENOSCOPY (EGD); Surgeon: Maria R Denton MD;  Location: [facility] GI LAB; Service:     JOINT REPLACEMENT Right 02/2015    hip    JOINT REPLACEMENT Left     knee    JOINT REPLACEMENT Right     knee    WA REVISE MEDIAN N/CARPAL TUNNEL SURG Left 1/28/2016    Procedure: RELEASE CARPAL TUNNEL;  Surgeon: Kyler Maxwell MD;  Location: [facility] MAIN OR;  Service: Orthopedics    TUBAL LIGATION      VARICOSE VEIN SURGERY Bilateral        Meds/Allergies:    Prior to Admission medications    Medication Sig Start Date End Date Taking?  Authorizing Provider   acetaminophen (TYLENOL) 325 mg tablet Take 3 tablets (975 mg total) by mouth every 8 (eight) hours 5/28/18   Escobar Dawkins DO   apixaban (ELIQUIS) 2 5 mg Take 1 tablet (2 5 mg total) by mouth 2 (two) times a day 5/18/18   Dora Forte MD   atorvastatin (LIPITOR) 80 mg tablet Take 1 tablet (80 mg total) by mouth daily with dinner 4/20/18   Humza Aldana MD   bisacodyl (DULCOLAX) 10 mg suppository Insert 1 suppository (10 mg total) into the rectum daily as needed for constipation 5/18/18   Richy Esparza MD   docusate sodium (COLACE) 100 mg capsule Take 100 mg by mouth 2 (two) times a day    Historical Provider, MD   FERREX 150 150 MG capsule  6/13/18   Historical Provider, MD   ferrous sulfate 325 (65 Fe) mg tablet Take 325 mg by mouth daily with breakfast    Historical Provider, MD   furosemide (LASIX) 40 mg tablet  6/13/18   Historical Provider, MD   [place] Brim 100 MG tablet  5/13/18   Historical Provider, MD   magnesium hydroxide (MILK OF MAGNESIA) 400 mg/5 mL oral suspension Take by mouth daily as needed for constipation    Historical Provider, MD   metoprolol tartrate (LOPRESSOR) 50 mg tablet Take 1 tablet (50 mg total) by mouth every 12 (twelve) hours 5/18/18   Karen Crisostomo MD   Multiple Vitamins-Minerals (MULTIVITAMIN ADULT PO) Take 1 tablet by mouth daily    Historical Provider, MD   pantoprazole (PROTONIX) 40 mg tablet Take 1 tablet (40 mg total) by mouth daily in the early morning 5/19/18   Karen Crisostomo MD   polyethylene glycol (GLYCOLAX) powder  6/13/18   Historical Provider, MD   polyethylene glycol (MIRALAX) 17 g packet Take 17 g by mouth daily    Historical Provider, MD   potassium chloride (K-DUR) 10 mEq tablet  3/27/18   Historical Provider, MD   senna (SENOKOT) 8 6 mg Take 1 tablet (8 6 mg total) by mouth daily at bedtime 5/18/18   Karen Crisostomo MD     Patient's medication list was reviewed    Allergies: Allergies   Allergen Reactions    Heparin Other (See Comments)     Thrombocytopenia         Social History:     Marital Status:       History   Alcohol Use No     Comment: socially     History   Smoking Status    Former Smoker    Packs/day: 0 25    Years: 10 00    Types: Cigarettes    Quit date: 1950   Smokeless Tobacco    Never Used     History   Drug Use No       Family History:    Family History   Problem Relation Age of Onset    Cancer Mother         throat       Physical Exam:     Vitals:   Blood Pressure: 134/62 (06/18/18 1442)  Pulse: 81 (06/18/18 1442)  Temperature: 100 2 °F (37 9 °C) (06/18/18 1442)  Temp Source: Axillary (06/18/18 1442)  Respirations: 16 (06/18/18 1442)  Height: 5' 4" (162 6 cm) (06/18/18 1442)  Weight - Scale: 64 5 kg (142 lb 4 8 oz) (06/18/18 1442)  SpO2: 99 % (06/18/18 1442)    Physical Exam   Constitutional: No distress  HENT:   Head: Normocephalic and atraumatic  Patient does not follow commands and thus does not open her mouth  Thus cannot examine patient's mouth and pharynx  But lips are dry  Eyes: Conjunctivae are normal  Pupils are equal, round, and reactive to light  Right eye exhibits no discharge  Left eye exhibits no discharge  No scleral icterus  Neck: No JVD present  No tracheal deviation present  Cardiovascular: Normal rate, regular rhythm and normal heart sounds  Exam reveals no gallop and no friction rub  No murmur heard  Pulmonary/Chest: Effort normal  No stridor  No respiratory distress  She has no wheezes  She has rales  Positive for rales appreciated at the right base  Abdominal: Soft  Bowel sounds are normal  She exhibits no distension  There is no tenderness  There is no rebound and no guarding  Musculoskeletal: She exhibits tenderness  She exhibits no edema  Positive for significant tenderness at the right shoulder on palpation and on movement but no other signs of inflammation like swelling, erythema or abnormal warmth on palpation  Neurological:   Patient is lethargic, but awakens to name calling, was able to utter a few words, but hardly discernible  Patient does not follow commands  Patient has slight movement on the right upper extremity and right lower extremity; there was some slight movement also on the left upper extremity and left lower extremity  Skin: Skin is warm and dry  No rash noted  She is not diaphoretic  There is erythema  No pallor  Positive for erythema, that developed while the IV vancomycin was infusing on patient's frontal head area  Additional Data:     Lab Results: I have personally reviewed pertinent reports          Results from last 7 days  Lab Units 06/18/18  1022   WBC Thousand/uL 8 45   HEMOGLOBIN g/dL 10 5*   HEMATOCRIT % 33 9*   PLATELETS Thousands/uL 94*   NEUTROS PCT % 84*   LYMPHS PCT % 7*   MONOS PCT % 7   EOS PCT % 2       Results from last 7 days  Lab Units 06/18/18  1117   SODIUM mmol/L 137   POTASSIUM mmol/L 3 9   CHLORIDE mmol/L 102   CO2 mmol/L 27   BUN mg/dL 33*   CREATININE mg/dL 1 37*   CALCIUM mg/dL 8 6   TOTAL PROTEIN g/dL 7 0   BILIRUBIN TOTAL mg/dL 0 72   ALK PHOS U/L 103   ALT U/L 29   AST U/L 26   GLUCOSE RANDOM mg/dL 127               Results from last 7 days  Lab Units 06/16/18  1009   HEMOGLOBIN A1C % 6 4*       Imaging: I have personally reviewed pertinent reports  XR shoulder 2+ views RIGHT   ED Interpretation by Leia Libman, MD (06/18 1248)   No acute fracture or dislocation      CT head without contrast   Final Result by Natali Ríos MD (06/18 1129)      No acute intracranial abnormality  Workstation performed: [id number]         XR chest 2 views   Final Result by Stephani Lara MD (06/18 1105)      Faint bandlike density in the right lower lung field which could be atelectasis or potentially early changes of developing pneumonia  Recommend continued follow-up  Suspected hiatal hernia            Workstation performed: [id number]         VAS upper limb venous duplex scan, unilateral/limited    (Results Pending)       EKG, Pathology, and Other Studies Reviewed on Admission:   · EKG:  Atrial paced rhythm with right bundle branch block and left fascicular block, left ventricular hypertrophy with repolarization abnormality  No significant change was found compared to previous EKG  This was read by the cardiologist   Heart rate was 81 per minute    Allscripts / Epic Records Reviewed: Yes     ** Please Note: This note has been constructed using a voice recognition system   **

## 2018-06-18 NOTE — ASSESSMENT & PLAN NOTE
· Likely more of toxic metabolic encephalopathy secondary to patient's infection/pneumonia  · Has history of multiple strokes in the past  · CT scan of the head done today did not reveal any acute findings  · Neuro checks  · Consider Neurology consultation, if no improvement  · Manage patient's infection/pneumonia

## 2018-06-18 NOTE — TELEPHONE ENCOUNTER
Made patient's daughter, Emily Clifton, aware of below  Patient admitted to the hospital for pneumonia  CT was done today, report in EPIC  Are R/S EEG  [facility] [place] [facility]             Can someone please let Ms Wilde's daughter know that her labs are essentially normal, other than a very slight increase in her BUN/creat and a low iron  I would recommend that she be seen by her PCP ASAP and get the imaging studies that I ordered as planned this Wednesday  Thanks

## 2018-06-18 NOTE — SPEECH THERAPY NOTE
Speech-Language Pathology Bedside Swallow Evaluation        Patient Name: Niall Hicks    LZYUN'E Date: 6/18/2018     Problem List  Patient Active Problem List   Diagnosis    Deep vein thrombosis (DVT) of left upper extremity (HCC)    H/O mitral valve replacement    [facility]re[facility] accident) [facility]    Controlled type 2 diabetes mellitus without complication, without long-term current use of insulin ([facility] )    Pacemaker    Acid reflux    HTN (hypertension)    Constipation    CHADWICK (acute kidney injury) ([facility]    Systolic congestive heart failure ([facility]    Encephalopathy    History of ischemic right MCA stroke    History of CVA (cerebrovascular accident)    History of subarachnoid hemorrhage    CKD (chronic kidney disease) stage 3, GFR 30-59 ml/min    History of DVT (deep vein thrombosis)    History of pacemaker    Acute cystitis without hematuria    [facility] systolic [facility]estive [facility] failure[facility]    Colonic thickening    Anemia    Altered mental status    [facility]    HCAP (healthcare-associated pneumonia)    Acute pain of right shoulder    Abnormal TSH    Dysphagia       Past Medical History  Past Medical History:   Diagnosis Date    Acid reflux     on occ    Arthritis     DJD right hip replaced    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    x 2 lesions with no change    Cancer [facility] 2007    colonic polyps, no surgery done    Deep vein thrombosis (DVT) of left upper extremity ([facility] ) 12/11/2017    Diabetes mellitus ([facility]     type 2    Diverticulosis     Edema     in legs on occ    Hypertension     on occ    Language barrier     speaks Albanian & broken english    Stroke [facility])        Past Surgical History  Past Surgical History:   Procedure Laterality Date    COLON SURGERY      COLONOSCOPY      COLONOSCOPY N/A 11/2/2016    Procedure: COLONOSCOPY;  Surgeon: Kimberley Castillo MD;  Location: [facility] GI LAB;   Service:     ESOPHAGOGASTRODUODENOSCOPY N/A 11/2/2016 Procedure: ESOPHAGOGASTRODUODENOSCOPY (EGD); Surgeon: Ruddy Malcolm MD;  Location: [facility] GI LAB; Service:     JOINT REPLACEMENT Right 02/2015    hip    JOINT REPLACEMENT Left     knee    JOINT REPLACEMENT Right     knee    OH REVISE MEDIAN N/CARPAL TUNNEL SURG Left 1/28/2016    Procedure: RELEASE CARPAL TUNNEL;  Surgeon: Mikael Fabry, MD;  Location: [place] [address] MAIN OR;  Service: Orthopedics    TUBAL LIGATION      VARICOSE VEIN SURGERY Bilateral        Summary    Pt presents with Moderate oral/pharyngeal dysphagia characterized by decreased mastication, attention to eating task, risk for aspiration w/ thin liquids     Recommendations:   Diet: puree/level 1 diet and nectar thick liquids   Meds: crushed with puree   Aspiration precautions and compensatory swallowing strategies: upright posture, only feed when fully alert, slow rate of feeding, small bites/sips and quiet environment (tv off, limit talking, door closed, etc )  Other Recommendations/ considerations: will cont to follow for diet tolerance and potential to advance diet  Current Medical Status  Pt is a 80 y o  female who presented to [facility] with HCAP  Pt presents w/ fever  The patient was just discharged from here and the May with a diagnosis of sepsis secondary to urinary tract infection  Patient completed a course of antibiotic with cephalosporin  Patient got better and was eventual discharge to [facility] skilled nursing facility  Patient was eventually discharge from the skilled nursing facility last Thursday, or approximately 4 days ago  According to the patient's daughter, she is claiming that at the skilled nursing facility, patient was placed on regular food and thin liquids and that she noticed that her mother has been coughing and sometimes choking with food and thin liquids there    She told me that she address this with the staff there and she demanded that a speech therapy see her mother, and she told me that she was told by the staff that once she is discharged from the facility and follows up with her primary care physician, her mother's primary care physician would refer her to a speech therapist in the outpatient  When patient went home, with patient still having coughing and choking with food and thin liquids, she decided to place the patient on thickened fluids  According to the patient's daughter, patient was able to swallow well the thickened fluids  Patient's daughter told me that last night, patient had a low-grade fever  When I asked the what was the temperature, she told me it was 99 2° F  Early this morning, she got a temperature again and it does increase to 99 7° F  According to the patient's daughter, patient also started deteriorate at the facility and at home  In fact, she even told me that she as for an extension of her stay in the facility, but this was not granted She told me that when patient was discharge from here, she was already able to talk a few words, however, she told me in the facility she started to mumble again at times and does not have any force in her speech anymore  Lately, patient told me that her mother has been getting generally weaker and weaker and needs a lot of help just to sit up  She also told me that patient is at times mostly sleeping or lethargic  She also told me that she noticed that her mother having significant right shoulder pains which is acute in onset  She is concerned that patient is having DVT again as she presented the same way when she was diagnosed with a left upper extremity DVT  There were no other noted symptoms aside from the ones mentioned above as per my discussion with the the ones mentioned above    It is important to note, that according to the patient's daughter, patient has baseline left sided weakness and hemineglect which she had even before due to her multiple strokes    Past medical history:   Please see H&P for details    Special Studies:  CT-head: no acute abnormality  CXR: Faint bandlike density in the right lower lung field which could be atelectasis or potentially early changes of developing pneumonia  Recommend continued follow-up  Social/Education/Vocational Hx:  Pt lives alone     Swallow Information   Current Risks for Dysphagia & Aspiration: AMS  Current Symptoms/Concerns: coughing with po and fever  Current Diet: puree/level 1 diet and honey thick liquids   Baseline Diet: mechanically altered/level 2 diet, thin liquids and honey thick liquids-dtr stated she just started thickening liquids due to choking w/ thin liquids    Baseline Assessment   Behavior/Cognition: waxing and waning arousal level and decreased attention-stares blankly at times  Speech/Language Status: able to follow commands inconsistently and limited verbal output  Patient Positioning: upright in bed     Swallow Mechanism Exam   Facial: symmetrical  Labial: WFL  Lingual: WFL  Velum: unable to visualize  Mandible: adequate ROM  Dentition: adequate  Vocal quality:clear/adequate   Volitional Cough: unable to initiate volitional cough   Respiratory: NC    Consistencies Assessed and Performance   Consistencies Administered: thin liquids, nectar thick, honey thick, puree and hard solids    Oral Stage: pt w/ decreased mastication, decreased attention to solid bolus in mouth, eventually taken out  Pt was able to drink from straw and cup  Oral processing w/ puree and thick liquids was [facility]    Pharyngeal Stage: Swallow initiation was delayed, but complete  Noted moist weak cough x2 w/ cup sips of thin liquids  No s/s aspiration w/ thin liquids by straw or w/ HTL, NTL by cup  Esophageal Concerns: none reported      Results Reviewed with: patient, RN and family   Dysphagia Goals: pt will tolerate puree with nectar thick liquids without s/s of aspiration xacross meals      Re-assess for diet upgrade as pt's medical/cognitive status improves

## 2018-06-18 NOTE — TELEPHONE ENCOUNTER
Pt's daughter stated that they were told if anything changes to take her to the ER she stated that she just called the ambulance to have her taken to [facility] because she is lethargic, low grade fever (started yesterday evening), and having pain  States that she is "out of it"  She went to bed alert and responsive, and this morning at 7am she started to have rapid breathing and she looked like she was in pain  [phone]

## 2018-06-18 NOTE — PLAN OF CARE
Problem: SLP ADULT - SWALLOWING, IMPAIRED  Goal: Initial SLP swallow eval performed  Outcome: Completed Date Met: 06/18/18

## 2018-06-18 NOTE — ASSESSMENT & PLAN NOTE
· Blood pressure control  · Continue Lasix  · IV blood pressure medication on as needed basis  · Adjust treatment accordingly

## 2018-06-18 NOTE — ED PROVIDER NOTES
History  Chief Complaint   Patient presents with    Altered Mental Status     patient with recent decline in mentation after being admitted to nursing facility then discharged back home a few days ago  As per patient's daughter, patient has been coughing while eating/drinking  Low grade fever yesterday and today  Patient not able to stand at home  80-year-old woman with a history of CVA, DVT on Eliquis, diabetes, and hypertension presents for evaluation of altered mental status  Patient brought in by daughter who reports a steady decline in the patient's health since leaving [facility] approximately 10 days ago  She was there for rehab after an admission for urosepsis  She reports worsening generalized weakness, dysarthria, and confusion  She reports subjective fevers/chills and says the patient has been complaining only of right upper extremity pain  No recent falls or medication changes  Patient has a baseline cough with eating which is not acutely worsened, had 1 episode of nonbloody diarrhea, and reportedly had shallow respirations today  No vomiting, urinary frequency, hematuria, or foul-smelling urine  Patient was recently seen by Neurology with a workup pending for progressive altered mental status  On arrival, patient is febrile to 101 0 and hypertensive to the 192 systolic with otherwise normal vital signs  Physical exam is limited, but there is no focality on neuro exam   Patient has tenderness to palpation of the right shoulder without skin changes or obvious deformity  The remainder of her exam is unremarkable  Will perform broad altered mental status workup and infectious workup  Will treat patient empirically with broad-spectrum antibiotics and IV fluids  Will admit patient for further management  Prior to Admission Medications   Prescriptions Last Dose Informant Patient Reported? Taking?    FERREX 150 150 MG capsule   Yes No   JANUVIA 100 MG tablet   Yes No   Multiple Vitamins-Minerals (MULTIVITAMIN ADULT PO)   Yes No   Sig: Take 1 tablet by mouth daily   acetaminophen (TYLENOL) 325 mg tablet   No No   Sig: Take 3 tablets (975 mg total) by mouth every 8 (eight) hours   apixaban (ELIQUIS) 2 5 mg   No No   Sig: Take 1 tablet (2 5 mg total) by mouth 2 (two) times a day   atorvastatin (LIPITOR) 80 mg tablet   No No   Sig: Take 1 tablet (80 mg total) by mouth daily with dinner   bisacodyl (DULCOLAX) 10 mg suppository   No No   Sig: Insert 1 suppository (10 mg total) into the rectum daily as needed for constipation   docusate sodium (COLACE) 100 mg capsule  Child Yes No   Sig: Take 100 mg by mouth 2 (two) times a day   ferrous sulfate 325 (65 Fe) mg tablet   Yes No   Sig: Take 325 mg by mouth daily with breakfast   furosemide (LASIX) 40 mg tablet   Yes No   magnesium hydroxide (MILK OF MAGNESIA) 400 mg/5 mL oral suspension   Yes No   Sig: Take by mouth daily as needed for constipation   metoprolol tartrate (LOPRESSOR) 50 mg tablet   No No   Sig: Take 1 tablet (50 mg total) by mouth every 12 (twelve) hours   pantoprazole (PROTONIX) 40 mg tablet   No No   Sig: Take 1 tablet (40 mg total) by mouth daily in the early morning   polyethylene glycol (GLYCOLAX) powder   Yes No   polyethylene glycol (MIRALAX) 17 g packet  Child Yes No   Sig: Take 17 g by mouth daily   potassium chloride (K-DUR) 10 mEq tablet   Yes No   senna (SENOKOT) 8 6 mg   No No   Sig: Take 1 tablet (8 6 mg total) by mouth daily at bedtime      Facility-Administered Medications: None       Past Medical History:   Diagnosis Date    Acid reflux     on occ    Arthritis     DJD right hip replaced    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HCC) 2007    x 2 lesions with no change    Cancer ([facility] [address] ) 2007    colonic polyps, no surgery done    Deep vein thrombosis (DVT) of left upper extremity (HCC) 12/11/2017    Diabetes mellitus (HCC)     type 2    Diverticulosis     Edema     in legs on occ    Hypertension     on occ    Language barrier speaks Czech & broken english    Stroke [facility])        Past Surgical History:   Procedure Laterality Date    COLON SURGERY      COLONOSCOPY      COLONOSCOPY N/A 11/2/2016    Procedure: COLONOSCOPY;  Surgeon: Princess South MD;  Location: [place] [address] GI LAB; Service:     ESOPHAGOGASTRODUODENOSCOPY N/A 11/2/2016    Procedure: ESOPHAGOGASTRODUODENOSCOPY (EGD); Surgeon: Princess South MD;  Location: [facility] GI LAB; Service:     JOINT REPLACEMENT Right 02/2015    hip    JOINT REPLACEMENT Left     knee    JOINT REPLACEMENT Right     knee    KS REVISE MEDIAN N/CARPAL TUNNEL SURG Left 1/28/2016    Procedure: RELEASE CARPAL TUNNEL;  Surgeon: Genie Holley MD;  Location: [facility] MAIN OR;  Service: Orthopedics    TUBAL LIGATION      VARICOSE VEIN SURGERY Bilateral        Family History   Problem Relation Age of Onset    Cancer Mother         throat     I have reviewed and agree with the history as documented  Social History   Substance Use Topics    Smoking status: Former Smoker     Packs/day: 0 25     Years: 10 00     Types: Cigarettes     Quit date: 1950    Smokeless tobacco: Never Used    Alcohol use No      Comment: socially        Review of Systems   Unable to perform ROS: Mental status change       Physical Exam  ED Triage Vitals   Temperature Pulse Respirations Blood Pressure SpO2   06/18/18 0957 06/18/18 0957 06/18/18 0957 06/18/18 1030 06/18/18 0957   (!) 101 °F (38 3 °C) 83 15 (!) 188/87 99 %      Temp Source Heart Rate Source Patient Position - Orthostatic VS BP Location FiO2 (%)   06/18/18 0957 06/18/18 0957 06/18/18 1030 06/18/18 1134 --   Rectal Monitor Lying Right arm       Pain Score       06/18/18 0957       No Pain           Orthostatic Vital Signs  Vitals:    06/19/18 1523 06/19/18 2211 06/19/18 2300 06/20/18 0700   BP: 104/52 122/58 122/57 138/92   Pulse: 69 60 73 71   Patient Position - Orthostatic VS: Lying  Lying Lying       Physical Exam   Constitutional: No distress     Cachectic HENT:   Head: Normocephalic and atraumatic  Moist mucous membranes   Eyes: Conjunctivae are normal  Pupils are equal, round, and reactive to light  No scleral icterus  Neck: Neck supple  No JVD present  No signs of meningismus   Cardiovascular: Normal rate, regular rhythm and normal heart sounds  Exam reveals no gallop and no friction rub  No murmur heard  Pulmonary/Chest: Effort normal and breath sounds normal  No respiratory distress  She has no wheezes  She has no rales  Abdominal: Soft  She exhibits no distension  There is no tenderness  There is no rebound and no guarding  Musculoskeletal: She exhibits no edema or tenderness  Neurological: She is alert  Exam limited secondary to mental status and underlying chronic deficits, moving all extremities spontaneously, opens eyes to voice, no gross focal deficit   Skin: Skin is warm and dry  No rash noted  She is not diaphoretic  Vitals reviewed        ED Medications  Medications   acetaminophen (TYLENOL) tablet 650 mg (650 mg Oral Given 6/20/18 0632)   apixaban (ELIQUIS) tablet 2 5 mg (2 5 mg Oral Given 6/20/18 0803)   atorvastatin (LIPITOR) tablet 80 mg (80 mg Oral Given 6/19/18 1732)   bisacodyl (DULCOLAX) rectal suppository 10 mg (not administered)   docusate sodium (COLACE) capsule 100 mg (100 mg Oral Given 6/20/18 0803)   ferrous sulfate tablet 325 mg (325 mg Oral Given 6/20/18 0803)   furosemide (LASIX) tablet 40 mg (40 mg Oral Given 6/20/18 0803)   metoprolol tartrate (LOPRESSOR) tablet 50 mg (50 mg Oral Given 6/20/18 0803)   multivitamin-minerals (CENTRUM) tablet 1 tablet (1 tablet Oral Given 6/20/18 0803)   pantoprazole (PROTONIX) EC tablet 40 mg (40 mg Oral Given 6/20/18 0557)   polyethylene glycol (MIRALAX) packet 17 g (not administered)   potassium chloride (K-DUR,KLOR-CON) CR tablet 10 mEq (10 mEq Oral Given 6/20/18 0803)   senna (SENOKOT) tablet 8 6 mg (8 6 mg Oral Given 6/19/18 2211)   sodium chloride 0 9 % infusion (0 mL/hr Intravenous Stopped 6/19/18 0334)   ondansetron (ZOFRAN) injection 4 mg (not administered)   cefepime (MAXIPIME) 2,000 mg in dextrose 5 % 50 mL IVPB (2,000 mg Intravenous New Bag 6/19/18 1105)   metroNIDAZOLE (FLAGYL) IVPB (premix) 500 mg (500 mg Intravenous New Bag 6/20/18 0558)   oxyCODONE (ROXICODONE) IR tablet 2 5 mg (not administered)   HYDROmorphone (DILAUDID) injection 0 2 mg (not administered)   insulin lispro (HumaLOG) 100 units/mL subcutaneous injection 1-5 Units (2 Units Subcutaneous Given 6/20/18 0759)   insulin lispro (HumaLOG) 100 units/mL subcutaneous injection 1-5 Units (1 Units Subcutaneous Given 6/20/18 0010)   diphenhydrAMINE (BENADRYL) injection 12 5 mg (not administered)   guaiFENesin (MUCINEX) 12 hr tablet 600 mg (600 mg Oral Given 6/20/18 0803)   hydrALAZINE (APRESOLINE) injection 5 mg (not administered)   sodium chloride 0 9 % bolus 1,000 mL (0 mL Intravenous Stopped 6/18/18 1458)   acetaminophen (TYLENOL) rectal suppository 650 mg (650 mg Rectal Given 6/18/18 1145)   vancomycin (VANCOCIN) IVPB (premix) 1,000 mg (0 mg/kg × 65 8 kg Intravenous Stopped 6/18/18 1330)   cefepime (MAXIPIME) IVPB (premix) 1,000 mg (0 mg Intravenous Stopped 6/18/18 1153)   metroNIDAZOLE (FLAGYL) IVPB (premix) 500 mg (0 mg Intravenous Stopped 6/18/18 1458)       Diagnostic Studies  Results Reviewed     Procedure Component Value Units Date/Time    Blood culture #1 [[id number]] Collected:  06/18/18 1022    Lab Status:  Preliminary result Specimen:  Blood from Arm, Right Updated:  06/19/18 1501     Blood Culture No Growth at 24 hrs  Blood culture #2 [[id number]] Collected:  06/18/18 1022    Lab Status:  Preliminary result Specimen:  Blood from Arm, Right Updated:  06/19/18 1501     Blood Culture No Growth at 24 hrs      Urine Microscopic [[id number]]  (Abnormal) Collected:  06/18/18 1224    Lab Status:  Final result Specimen:  Urine from Urine, Clean Catch Updated:  06/18/18 1423     RBC, UA 2-4 (A) /hpf      WBC, UA None Seen /hpf      Epithelial Cells None Seen /hpf      Bacteria, UA None Seen /hpf      Hyaline Casts, UA None Seen /lpf     ED Urine Macroscopic [[id number]]  (Abnormal) Collected:  06/18/18 1224    Lab Status:  Final result Specimen:  Urine Updated:  06/18/18 1217     Color, UA Yellow     Clarity, UA Clear     pH, UA 6 5     Leukocytes, UA Negative     Nitrite, UA Negative     Protein, UA 30 (1+) (A) mg/dl      Glucose, UA Negative mg/dl      Ketones, UA Negative mg/dl      Urobilinogen, UA 0 2 E U /dl      Bilirubin, UA Negative     Blood, UA Trace (A)     Specific [place], UA 1 020    Narrative:       CLINITEK RESULT    Comprehensive metabolic panel [[id number]]  (Abnormal) Collected:  06/18/18 1117    Lab Status:  Final result Specimen:  Blood from Arm, Right Updated:  06/18/18 1216     Sodium 137 mmol/L      Potassium 3 9 mmol/L      Chloride 102 mmol/L      CO2 27 mmol/L      Anion Gap 8 mmol/L      BUN 33 (H) mg/dL      Creatinine 1 37 (H) mg/dL      Glucose 127 mg/dL      Calcium 8 6 mg/dL      AST 26 U/L      ALT 29 U/L      Alkaline Phosphatase 103 U/L      Total Protein 7 0 g/dL      Albumin 3 2 (L) g/dL      Total Bilirubin 0 72 mg/dL      eGFR 35 ml/min/1 73sq m     Narrative:         National Kidney Disease Education Program recommendations are as follows:  GFR calculation is accurate only with a steady state creatinine  Chronic Kidney disease less than 60 ml/min/1 73 sq  meters  Kidney failure less than 15 ml/min/1 73 sq  meters  TSH [[id number]]  (Abnormal) Collected:  06/18/18 1117    Lab Status:  Final result Specimen:  Blood from Arm, Right Updated:  06/18/18 1216     TSH 3RD GENERATON 0 280 (L) uIU/mL     Narrative:         Patients undergoing fluorescein dye angiography may retain small amounts of fluorescein in the body for 48-72 hours post procedure  Samples containing fluorescein can produce falsely depressed TSH values   If the patient had this procedure,a specimen should be resubmitted post fluorescein clearance  The recommended reference ranges for TSH during pregnancy are as follows:  First trimester 0 1 to 2 5 uIU/mL  Second trimester  0 2 to 3 0 uIU/mL  Third trimester 0 3 to 3 0 uIU/m      Salicylate level [[id number]]  (Abnormal) Collected:  06/18/18 1117    Lab Status:  Final result Specimen:  Blood from Arm, Right Updated:  26/89/67 9950     Salicylate Lvl <3 (L) mg/dL     Acetaminophen level [[id number]]  (Abnormal) Collected:  06/18/18 1117    Lab Status:  Final result Specimen:  Blood from Arm, Right Updated:  06/18/18 1216     Acetaminophen Level <2 (L) ug/mL     Lactic acid, plasma [[id number]]  (Normal) Collected:  06/18/18 1022    Lab Status:  Final result Specimen:  Blood from Arm, Right Updated:  06/18/18 1152     LACTIC ACID 1 1 mmol/L     Narrative:         Result may be elevated if tourniquet was used during collection      Ammonia [[id number]]  (Normal) Collected:  06/18/18 1117    Lab Status:  Final result Specimen:  Blood from Arm, Right Updated:  06/18/18 1149     Ammonia 12 umol/L     Ethanol [[id number]]  (Normal) Collected:  06/18/18 1022    Lab Status:  Final result Specimen:  Blood from Arm, Right Updated:  06/18/18 1108     Ethanol Lvl <3 mg/dL     Troponin I [[id number]]  (Normal) Collected:  06/18/18 1022    Lab Status:  Final result Specimen:  Blood from Arm, Right Updated:  06/18/18 1105     Troponin I <0 02 ng/mL     CBC and differential [[id number]]  (Abnormal) Collected:  06/18/18 1022    Lab Status:  Final result Specimen:  Blood from Arm, Right Updated:  06/18/18 1105     WBC 8 45 Thousand/uL      RBC 3 64 (L) Million/uL      Hemoglobin 10 5 (L) g/dL      Hematocrit 33 9 (L) %      MCV 93 fL      MCH 28 8 pg      MCHC 31 0 (L) g/dL      RDW 14 7 %      MPV 12 4 fL      Platelets 94 (L) Thousands/uL      nRBC 0 /100 WBCs      Neutrophils Relative 84 (H) %      Immat GRANS % 0 %      Lymphocytes Relative 7 (L) %      Monocytes Relative 7 %      Eosinophils Relative 2 % Basophils Relative 0 %      Neutrophils Absolute 7 08 Thousands/µL      Immature Grans Absolute 0 02 Thousand/uL      Lymphocytes Absolute 0 58 (L) Thousands/µL      Monocytes Absolute 0 62 Thousand/µL      Eosinophils Absolute 0 14 Thousand/µL      Basophils Absolute 0 01 Thousands/µL                  VAS upper limb venous duplex scan, unilateral/limited   Final Result by Grace Ganser, MD (06/19 1877)      XR shoulder 2+ views RIGHT   ED Interpretation by Kaden Brown MD (06/18 1248)   No acute fracture or dislocation      Final Result by Danie Pace MD (06/18 1510)      No acute osseous abnormality  Workstation performed: [id number]         CT head without contrast   Final Result by Cherylene Maiers, MD (06/18 1129)      No acute intracranial abnormality  Workstation performed: [id number]         XR chest 2 views   Final Result by Umberto Nix MD (06/18 1105)      Faint bandlike density in the right lower lung field which could be atelectasis or potentially early changes of developing pneumonia  Recommend continued follow-up  Suspected hiatal hernia            Workstation performed: [id number]               Procedures  Procedures      Phone Consults  ED Phone Contact    ED Course           Identification of Seniors at Risk      Most Recent Value   (ISAR) Identification of Seniors at Risk   Before the illness or injury that brought you to the Emergency, did you need someone to help you on a regular basis? 1 Filed at: 06/18/2018 0951   In the last 24 hours, have you needed more help than usual?  1 Filed at: 06/18/2018 0239   Have you been hospitalized for one or more nights during the past 6 months? 1 Filed at: 06/18/2018 0951   In general, do you see well?  0 Filed at: 06/18/2018 0951   In general, do you have serious problems with your memory? 0 Filed at: 06/18/2018 6531   Do you take more than three different medications every day?   1 Filed at: 06/18/2018 0951   ISAR Score  4 Filed at: 06/18/2018 0709                          MDM  Number of Diagnoses or Management Options  Altered mental status:   Dysphagia:   Right lower lobe pneumonia [facility]):   Diagnosis management comments: Workup remarkable for right lower lobe pneumonia on chest x-ray likely secondary to chronic dysphagia with possible aspiration  The patient remained hemodynamically stable while in the ED  Mental status improved with ongoing IV fluid resuscitation  No signs of meningismus or concern for CNS infection  Patient was given empiric antibiotics including anaerobic coverage  She was admitted to [facility] for further management  CritCare Time    Disposition  Final diagnoses:   Right lower lobe pneumonia ([address] )   Altered mental status   Dysphagia     Time reflects when diagnosis was documented in both MDM as applicable and the Disposition within this note     Time User Action Codes Description Comment    6/18/2018  1:19 PM Doris Augustine December Add [J18 1] Right lower lobe pneumonia ([facility] [address] )     6/18/2018  1:19 PM Doris Augustine December Add [R41 82] Altered mental status     6/18/2018  1:19 PM Doris Augustine December Add [R13 10] Dysphagia     6/19/2018 12:49 PM Mentone Armenas S Add [M25 511] Shoulder pain, right       ED Disposition     ED Disposition Condition Comment    Admit  Case was discussed with Dr Leroy Moran and the patient's admission status was agreed to be Admission Status: inpatient status to the service of Dr Leroy Moran          Follow-up Information     Follow up With Specialties Details Why Contact Info    Candace Sloan MD Orthopedic Surgery Schedule an appointment as soon as possible for a visit in 1 month(s)  [facility] [id number]  [phone]            Current Discharge Medication List      CONTINUE these medications which have NOT CHANGED    Details   acetaminophen (TYLENOL) 325 mg tablet Take 3 tablets (975 mg total) by mouth every 8 (eight) hours  Qty: 30 tablet, Refills: 0    Associated Diagnoses: Acute encephalopathy      apixaban (ELIQUIS) 2 5 mg Take 1 tablet (2 5 mg total) by mouth 2 (two) times a day  Refills: 0    Associated Diagnoses: Pacemaker; History of ischemic right MCA stroke; Deep vein thrombosis (DVT) of left upper extremity (HCC)      atorvastatin (LIPITOR) 80 mg tablet Take 1 tablet (80 mg total) by mouth daily with dinner  Qty: 30 tablet, Refills: 0    Associated Diagnoses: CVA (cerebral vascular accident) ([address] )      bisacodyl (DULCOLAX) 10 mg suppository Insert 1 suppository (10 mg total) into the rectum daily as needed for constipation  Qty: 12 suppository, Refills: 0    Associated Diagnoses: Constipation      docusate sodium (COLACE) 100 mg capsule Take 100 mg by mouth 2 (two) times a day      FERREX 150 150 MG capsule       ferrous sulfate 325 (65 Fe) mg tablet Take 325 mg by mouth daily with breakfast      furosemide (LASIX) 40 mg tablet       JANUVIA 100 MG tablet       magnesium hydroxide (MILK OF MAGNESIA) 400 mg/5 mL oral suspension Take by mouth daily as needed for constipation      metoprolol tartrate (LOPRESSOR) 50 mg tablet Take 1 tablet (50 mg total) by mouth every 12 (twelve) hours  Refills: 0    Associated Diagnoses: HTN (hypertension)      Multiple Vitamins-Minerals (MULTIVITAMIN ADULT PO) Take 1 tablet by mouth daily      pantoprazole (PROTONIX) 40 mg tablet Take 1 tablet (40 mg total) by mouth daily in the early morning  Refills: 0    Associated Diagnoses: Acid reflux      polyethylene glycol (GLYCOLAX) powder       polyethylene glycol (MIRALAX) 17 g packet Take 17 g by mouth daily      potassium chloride (K-DUR) 10 mEq tablet       senna (SENOKOT) 8 6 mg Take 1 tablet (8 6 mg total) by mouth daily at bedtime  Qty: 120 each, Refills: 0    Associated Diagnoses: Constipation           No discharge procedures on file  ED Provider  Attending physically available and evaluated Marco Antonio Samhieu   ANIBAL managed the patient along with the ED Attending      Electronically Signed by         Mercedes Medina MD  06/20/18 3880

## 2018-06-18 NOTE — ASSESSMENT & PLAN NOTE
· Compensated at this point  · Continue heart failure medication  · Continue Lasix  · Oxygen p r n  Janice Pascual

## 2018-06-18 NOTE — ASSESSMENT & PLAN NOTE
· Continue apixaban  · Patient's daughter is concerned that the with patient having right shoulder pain, that this may be another DVT; according to her, patient presented the same symptom when she was diagnosed with DVT on the left upper extremity  · Follow-up results of the shoulder x-ray    · For duplex scan of the right upper extremity throughout DVT

## 2018-06-18 NOTE — ASSESSMENT & PLAN NOTE
· Pain control  · Follow-up results of the right shoulder x-ray  · Aqua K-pad  · If no significant improvement, consider orthopedic surgery consult

## 2018-06-18 NOTE — MEDICAL STUDENT
MEDICAL STUDENT  Inpatient H&P for TRAINING ONLY   Not Part of Legal Medical Record       H&P Exam - Cheri Temple 80 y o  female MRN: [id number]    Unit/Bed#: [facility] 813-01 Encounter: [id number]    Assessment/Plan:   1  Healthcare Associated Pneumonia  -CXR: bandlike density in the RLL: atelectasis vs  PNA   -concern for gram negative organisms and possible aspiration due to dysphagia and altered mental status  -cefepime and flagyl   -vanco d/jose francisco because pt developed rash rxn   -sputum cx and gram stain   2  Dysphagia:   -per daughter, pt has increased difficulty with swallowing and has only been tolerating thick liquids  -speech/swallow eval: moderate oral/pharyngeal dysphagia with decreased mastication, attention to eating task, risk for aspiration with thin liquids, diet recommendation :puree/level 1 diet and nectar thick liquids   -aspiration precautions  3  Encephalopathy:  -increased weakness and limited ability to follow commands  -hx of multiple strokes this year   -CT head: no acute intracranial abnormality  -likely secondary to acute infection   -serial neuro exams   -neuro consults if no improvement   4  Hx of Left Upper Extremity DVT  -after MVP repair/pacemaker insertion in January  --duplex scan negative for DVT of RUE  -continue Eliquis   5  Acute Right Shoulder Pain:  -hx of osteoarthritis   -Xray of right shoulder negative for fx  -pain control   6  Abnormal TSH:  -follow up with free T4  7  Chronic systolic CHF:  -well compensated  -continue lasix and O2 PRN   -continue lopressor   8  Hx of CVA:   -multiple strokes this year and SAH  -CT head : no acute intracranial abnormality   -continue eliquis and statin  -continue neurology consult if no improvement in mental status  9  CKD stage 3:  -Cr 1 37, at baseline (1 4-1 6)  -stable  -monitor input and output   -avoid nephrotoxins  -avoid hypotension  10   Anemia:  -Hgb: 10 5, chronic, baseline 9-10   -continue iron therapy  -may consider blood smear 11  HTN  -bp now within acceptable range   -continue lasix and lopressor         History of Present Illness    Ms Josiane Carrasco is an 80year old female with a pmHx of bilateral strokes, CHF, stage 3 CKD, T2DM, CVA and htn who presented to the ED with fever since yesterday  She also had an episode of fecal incontinence with black tarry stools in that time  The history was provided by her daughter, as she is unable to communicate currently  Since the beginning of this year, she has had three strokes affecting bilateral hemispheres, one in January (after MVP repair and pacemaker placement) and two in April, which left her with bilateral weakness in the upper and lower extremities and left sided neglect  She has not been oriented to person, place or time since her most recent stroke  After her last stroke in April, she was discharged to [facility] for rehab  She was recently discharged from [facility] on May 25th, after an admission for sepsis, and returned to the rehab facility  Since that time, her daughter has noticed a significant decline in her cognitive abilities  She previously needed assistance with her activities of daily living, but was able to communicate her needs, she now is unable to communicate and can only make incomprehensible sounds with decreased comprehension and limited ability to follow commands  She has had increased weakness in the upper and lower extremities  She was previously able to walk with assistance, but now has difficulty ambulating, with a shuffling gait  She has had a 20 pound weight loss in the last two months  Additionally, she has had episodes of urinary incontinence, during which she is unaware that she has urinated  She has also been having visual hallucinations of a dog and snake recently and has been crying daily      ROS: History obtained from unobtainable from patient due to mental status, obtained from pt's daughter   General ROS: positive for  - fever, sleep disturbance and weight loss  Psychological ROS: positive for - hallucinations and crying daily   Cardiovascular: negative for chest pain or SOB  Gastrointestinal ROS: positive for - diarrhea, melena and swallowing difficulty/pain, one episode of stool incontinence  negative for - abdominal pain or nausea/vomiting  Genitourinary: urinary incontinence   Musculoskeletal ROS: positive for - gait disturbance and shoulder pain  Neurological ROS: positive for - bowel and bladder control changes, confusion, gait disturbance, speech problems and weakness  negative for - visual changes    Historical Information   Past Medical History:   Diagnosis Date    Acid reflux     on occ    Arthritis     DJD right hip replaced    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    x 2 lesions with no change    [facility] ) 2007    colonic polyps, no surgery done    Deep vein thrombosis (DVT) of left upper extremity ([facility] ) 12/11/2017    Diabetes mellitus ([place] [address] )     type 2    Diverticulosis     Edema     in legs on occ    Hypertension     on occ    Language barrier     speaks [place] & broken english    Stroke [facility])      Past Surgical History:   Procedure Laterality Date    COLON SURGERY      COLONOSCOPY      COLONOSCOPY N/A 11/2/2016    Procedure: COLONOSCOPY;  Surgeon: Petros Choi MD;  Location: [place] [address] GI LAB; Service:     ESOPHAGOGASTRODUODENOSCOPY N/A 11/2/2016    Procedure: ESOPHAGOGASTRODUODENOSCOPY (EGD); Surgeon: Petros Choi MD;  Location: [facility] GI LAB;   Service:     JOINT REPLACEMENT Right 02/2015    hip    JOINT REPLACEMENT Left     knee    JOINT REPLACEMENT Right     knee    OK REVISE MEDIAN N/CARPAL TUNNEL SURG Left 1/28/2016    Procedure: RELEASE CARPAL TUNNEL;  Surgeon: iKt Lin MD;  Location: [facility] MAIN OR;  Service: Orthopedics    TUBAL LIGATION      VARICOSE VEIN SURGERY Bilateral      Social History   History   Alcohol Use No     Comment: socially     History   Drug Use No     History   Smoking Status    Former Smoker    Packs/day: 0 25    Years: 10 00    Types: Cigarettes    Quit date: 1950   Smokeless Tobacco    Never Used     Family History:     Mother: throat cancer     Meds/Allergies   all medications and allergies reviewed  Allergies   Allergen Reactions    Heparin Other (See Comments)     Thrombocytopenia         Objective   First Vitals:   Blood Pressure: (!) 188/87 (06/18/18 1030)  Pulse: 83 (06/18/18 0957)  Temperature: (!) 101 °F (38 3 °C) (06/18/18 0957)  Temp Source: Rectal (06/18/18 0957)  Respirations: 15 (06/18/18 0957)  Height: 5' 4" (162 6 cm) (06/18/18 1442)  Weight - Scale: 64 5 kg (142 lb 4 8 oz) (06/18/18 1442)  SpO2: 99 % (06/18/18 0957)    Current Vitals:   Blood Pressure: 134/62 (06/18/18 1442)  Pulse: 81 (06/18/18 1442)  Temperature: 100 2 °F (37 9 °C) (06/18/18 1442)  Temp Source: Axillary (06/18/18 1442)  Respirations: 16 (06/18/18 1442)  Height: 5' 4" (162 6 cm) (06/18/18 1442)  Weight - Scale: 64 5 kg (142 lb 4 8 oz) (06/18/18 1442)  SpO2: 99 % (06/18/18 1442)      Intake/Output Summary (Last 24 hours) at 06/18/18 1605  Last data filed at 06/18/18 1213   Gross per 24 hour   Intake                0 ml   Output              450 ml   Net             -450 ml       Invasive Devices     Peripheral Intravenous Line            Peripheral IV 05/25/18 Right Forearm 24 days                Physical Exam:   General: not oriented to person place or time, no acute distress  HEENT:   Head: normocephalic and atraumatic   Tongue: white thrush-looking material present on tongue, daughter states this is residual thickened liquids from meals   Throat: pt could no open mouth widely to evaluate throat, dry lips   Neck: No JVD, No tracheal deviation   Cardiovascular: Normal S1 and S2, no murmurs   Respiratory: normal air movement bilaterally, no rhonci, crackles in right mid-lower lung, no wheezes   Abdominal: bowel sounds present, soft, non-tender, non-distended, no rebound or guarding Musculoskeletal:    Right shoulder: pain upon movement, no swelling, erythema or warmth   Neurologic:   -not oriented to person, place or time    -speech: incomprehensible   -left sided neglect   -CNs:    -EOMs: could track finger to the right, left sided neglect    -5: intact facial sensation bilaterally    -7: able to raise eyebrows, unable to follow commands therefore unclear whether lower branches intact     -9/10: unable to follow some commands, therefore could not assess    -strength:    -upper extremities:     -right: 4      -left: 3     -lower extremities:      -right lower le     -left lower le   Skin: bruise on left hand    Lab Results:   Lab Results   Component Value Date     2018    K 3 9 2018     2018    CO2 27 2018    ANIONGAP 8 2018    BUN 33 (H) 2018    CREATININE 1 37 (H) 2018    GLUCOSE 127 2018    GLUF 116 (H) 2018    CALCIUM 8 6 2018    AST 26 2018    ALT 29 2018    ALKPHOS 103 2018    PROT 7 0 2018    BILITOT 0 72 2018    EGFR 35 2018     Lab Results   Component Value Date    WBC 8 45 2018    HGB 10 5 (L) 2018    HCT 33 9 (L) 2018    MCV 93 2018    PLT 94 (L) 2018     Lab Results   Component Value Date    HGBA1C 6 4 (H) 2018     TSH:  280  U/A: +1 protein, trace blood   Urine micro: RBC 2-4   Lactic acid: 1 1    Imaging:  CXR: faint bandlike density in the right lower lung fields atelectasis vs  Early changes of developing pneumonia, suspected hiatal hernia  X-ray shoulder: no acute osseous abnormality  Upper limb venous duplex scan: results pending  CT head without contrast: no acute intracranial abnormality     EKG, Pathology, and Other Studies:   EKG: no significant change from ECG on May 25th, right bundle branch block, left anterior fascicular block, left ventricular hypertrophy with repolarization abnormality     Code Status: Level 1 - Full Code

## 2018-06-18 NOTE — ASSESSMENT & PLAN NOTE
· Patient was just discharge at [address] [place] [address] a few days ago, with history of dysphagia with previous strokes  · Likely gram-negative bacteria  However, we cannot rule out possibility of aspiration pneumonia, thus possible anaerobes  · Follow-up results of the blood cultures  · For sputum culture and Gram stain  · Continue cefepime and IV metronidazole  · We will not continue vancomycin at this point as patient likely developed red man syndrome while it was infusing awhile ago  Benadryl p r n  just in case it was an allergic reaction  · Xopenex p r n  Munoz Spruce · Dysphagia evaluation  · Mucolytic  · May possibly get procalcitonin levels to direct us with therapy

## 2018-06-18 NOTE — RESPIRATORY THERAPY NOTE
RT Protocol Note  Charisma Curtis 80 y o  female MRN: [id number]  Unit/Bed#: [facility] 813-01 Encounter: [id number]    Assessment    Principal Problem:    HCAP (healthcare-associated pneumonia)  Active Problems:    HTN (hypertension)    Encephalopathy    History of CVA (cerebrovascular accident)    CKD (chronic kidney disease) stage 3, GFR 30-59 ml/min    History of DVT (deep vein thrombosis)    Chronic systolic CHF (congestive heart failure) (HCC)    Acute pain of right shoulder    Abnormal TSH    Dysphagia      Home Pulmonary Medications:    None listed       Past Medical History:   Diagnosis Date    Acid reflux     on occ    Arthritis     DJD right hip replaced    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[facility] ) 2007    x 2 lesions with no change    Cancer ([place] [address] ) 2007    colonic polyps, no surgery done    Deep vein thrombosis (DVT) of left upper extremity ([facility] ) 12/11/2017    Diabetes mellitus ([place] [address] )     type 2    Diverticulosis     Edema     in legs on occ    Hypertension     on occ    Language barrier     speaks [place] & broken english    Stroke [facility])      Social History     Social History    Marital status:      Spouse name: N/A    Number of children: N/A    Years of education: N/A     Social History Main Topics    Smoking status: Former Smoker     Packs/day: 0 25     Years: 10 00     Types: Cigarettes     Quit date: 1950    Smokeless tobacco: Never Used    Alcohol use No      Comment: socially    Drug use: No    Sexual activity: Not Currently     Other Topics Concern    None     Social History Narrative    None       Subjective         Objective    Physical Exam:        Vitals:  Blood pressure 134/62, pulse 81, temperature 100 2 °F (37 9 °C), temperature source Axillary, resp  rate 16, height 5' 4" (1 626 m), weight 64 5 kg (142 lb 4 8 oz), SpO2 99 %, not currently breastfeeding  Imaging and other studies: I have personally reviewed pertinent reports              Plan    Respiratory Plan: (P) No distress/Pulmonary history, Discontinue Protocol        Resp Comments: (P) assessed pt at this time  pt has no pulmonary history  cxr shows atelectasis in the base of R lung and left lung is clear     pt instructed on use of incentive spirometry at this time  and prn udn dc'd at this time

## 2018-06-18 NOTE — ASSESSMENT & PLAN NOTE
· Patient's daughter claims that patient has been having problems swallowing  Thus possibility of aspiration  · Aspiration precautions  · Speech/dysphagia evaluation  · According to the patient's daughter, patient tolerated thickened liquids  Thus for now, will have the patient on honey thick liquids and pureed

## 2018-06-18 NOTE — ED NOTES
Called to patient's room by bedside visitor, made aware patient may be having allergic reaction to Vancomycin infusion  Redness noted to patient's hairline, extending down bridge of nose  Vancomycin infusion stopped, no respiratory distress noted, no wheezing noted  Patient remained 96% pulse ox on room air  Dr Demond Jj made aware       Cindy Montalvo RN  06/18/18 [address] [facility],[address] [facility]  06/18/18 4248

## 2018-06-18 NOTE — ASSESSMENT & PLAN NOTE
· Baseline serum creatinine is 1 2 to 1 3   · Stable at this point  · Monitor kidney function  · Monitor input and output  · Avoid nephrotoxins  · Avoid hypotension

## 2018-06-18 NOTE — ASSESSMENT & PLAN NOTE
· Has history of multiple strokes this year  · Patient also has history of subarachnoid hemorrhage  · CT scan done today revealed no acute intracranial abnormality  · Presently on anticoagulation with apixaban and on high intensity statin  · Consider a neurology consult, if no improvement with patient's mental status

## 2018-06-18 NOTE — ED ATTENDING ATTESTATION
Paul Ferreira MD, saw and evaluated the patient  I have discussed the patient with the resident/non-physician practitioner and agree with the resident's/non-physician practitioner's findings, Plan of Care, and MDM as documented in the resident's/non-physician practitioner's note, except where noted  All available labs and Radiology studies were reviewed  At this point I agree with the current assessment done in the Emergency Department  I have conducted an independent evaluation of this patient a history and physical is as follows: This 80-year-old female with history of multiple strokes and multiple medical problems presents with fever, increasingly worse mental status  Patient has had fever x2 days, alteration mental status times more than a week  Patient saw her  neurologist last week who performed basic blood work and urinalysis which did not demonstrate evidence of acute infection at that time  Patient's daughter who is here with her is concerned the patient may have developed a pneumonia as she seems to be coughing a lot especially after drinking thin liquids  On exam patient is awake, will not open her eyes, follows limited commands only  Patient is moving arms and legs  Patient has regular heart sounds, clear lung sounds with no obvious rhonchi or wheezing  Patient has a soft nontender abdomen  Of note patient does have significant pain and tenderness over the right shoulder  Patient will have thorough evaluation here to include causes for altered mental status as well as causes for fever and infection  Patient will require admission  Patient will be given broad-spectrum antibiotics  Patient with what appears to be pneumonia, right-sided  Patient will be given aspiration coverage as well with Flagyl  Patient admitted    Critical Care Time  CritCare Time    Procedures

## 2018-06-19 LAB
ANION GAP SERPL CALCULATED.3IONS-SCNC: 6 MMOL/L (ref 4–13)
BUN SERPL-MCNC: 31 MG/DL (ref 5–25)
CALCIUM SERPL-MCNC: 8.6 MG/DL (ref 8.3–10.1)
CHLORIDE SERPL-SCNC: 106 MMOL/L (ref 100–108)
CO2 SERPL-SCNC: 25 MMOL/L (ref 21–32)
CREAT SERPL-MCNC: 1.34 MG/DL (ref 0.6–1.3)
ERYTHROCYTE [DISTWIDTH] IN BLOOD BY AUTOMATED COUNT: 14.7 % (ref 11.6–15.1)
GFR SERPL CREATININE-BSD FRML MDRD: 36 ML/MIN/1.73SQ M
GLUCOSE SERPL-MCNC: 103 MG/DL (ref 65–140)
GLUCOSE SERPL-MCNC: 106 MG/DL (ref 65–140)
GLUCOSE SERPL-MCNC: 119 MG/DL (ref 65–140)
GLUCOSE SERPL-MCNC: 119 MG/DL (ref 65–140)
GLUCOSE SERPL-MCNC: 246 MG/DL (ref 65–140)
HCT VFR BLD AUTO: 27.1 % (ref 34.8–46.1)
HGB BLD-MCNC: 8.4 G/DL (ref 11.5–15.4)
MAGNESIUM SERPL-MCNC: 2.2 MG/DL (ref 1.6–2.6)
MCH RBC QN AUTO: 29 PG (ref 26.8–34.3)
MCHC RBC AUTO-ENTMCNC: 31 G/DL (ref 31.4–37.4)
MCV RBC AUTO: 93 FL (ref 82–98)
PLATELET # BLD AUTO: 79 THOUSANDS/UL (ref 149–390)
PMV BLD AUTO: 12.4 FL (ref 8.9–12.7)
POTASSIUM SERPL-SCNC: 4.1 MMOL/L (ref 3.5–5.3)
RBC # BLD AUTO: 2.9 MILLION/UL (ref 3.81–5.12)
SODIUM SERPL-SCNC: 137 MMOL/L (ref 136–145)
T4 FREE SERPL-MCNC: 1.28 NG/DL (ref 0.76–1.46)
WBC # BLD AUTO: 5.23 THOUSAND/UL (ref 4.31–10.16)

## 2018-06-19 PROCEDURE — 80048 BASIC METABOLIC PNL TOTAL CA: CPT | Performed by: INTERNAL MEDICINE

## 2018-06-19 PROCEDURE — 99232 SBSQ HOSP IP/OBS MODERATE 35: CPT | Performed by: INTERNAL MEDICINE

## 2018-06-19 PROCEDURE — 85027 COMPLETE CBC AUTOMATED: CPT | Performed by: INTERNAL MEDICINE

## 2018-06-19 PROCEDURE — G8988 SELF CARE GOAL STATUS: HCPCS

## 2018-06-19 PROCEDURE — 84439 ASSAY OF FREE THYROXINE: CPT | Performed by: INTERNAL MEDICINE

## 2018-06-19 PROCEDURE — G8978 MOBILITY CURRENT STATUS: HCPCS

## 2018-06-19 PROCEDURE — 93971 EXTREMITY STUDY: CPT | Performed by: SURGERY

## 2018-06-19 PROCEDURE — 82948 REAGENT STRIP/BLOOD GLUCOSE: CPT

## 2018-06-19 PROCEDURE — 97163 PT EVAL HIGH COMPLEX 45 MIN: CPT

## 2018-06-19 PROCEDURE — 83735 ASSAY OF MAGNESIUM: CPT | Performed by: INTERNAL MEDICINE

## 2018-06-19 PROCEDURE — G8979 MOBILITY GOAL STATUS: HCPCS

## 2018-06-19 PROCEDURE — 97167 OT EVAL HIGH COMPLEX 60 MIN: CPT

## 2018-06-19 PROCEDURE — G8987 SELF CARE CURRENT STATUS: HCPCS

## 2018-06-19 RX ADMIN — METRONIDAZOLE 500 MG: 500 INJECTION, SOLUTION INTRAVENOUS at 05:30

## 2018-06-19 RX ADMIN — GUAIFENESIN 600 MG: 600 TABLET, EXTENDED RELEASE ORAL at 08:39

## 2018-06-19 RX ADMIN — FUROSEMIDE 40 MG: 40 TABLET ORAL at 08:39

## 2018-06-19 RX ADMIN — ATORVASTATIN CALCIUM 80 MG: 80 TABLET, FILM COATED ORAL at 17:32

## 2018-06-19 RX ADMIN — APIXABAN 2.5 MG: 2.5 TABLET, FILM COATED ORAL at 17:33

## 2018-06-19 RX ADMIN — ACETAMINOPHEN 650 MG: 325 TABLET ORAL at 04:22

## 2018-06-19 RX ADMIN — GUAIFENESIN 600 MG: 600 TABLET, EXTENDED RELEASE ORAL at 22:10

## 2018-06-19 RX ADMIN — METOPROLOL TARTRATE 50 MG: 50 TABLET, FILM COATED ORAL at 22:11

## 2018-06-19 RX ADMIN — METOPROLOL TARTRATE 50 MG: 50 TABLET, FILM COATED ORAL at 08:39

## 2018-06-19 RX ADMIN — METRONIDAZOLE 500 MG: 500 INJECTION, SOLUTION INTRAVENOUS at 13:58

## 2018-06-19 RX ADMIN — Medication 325 MG: at 08:39

## 2018-06-19 RX ADMIN — POTASSIUM CHLORIDE 10 MEQ: 750 TABLET, EXTENDED RELEASE ORAL at 08:39

## 2018-06-19 RX ADMIN — SENNOSIDES 8.6 MG: 8.6 TABLET, FILM COATED ORAL at 22:11

## 2018-06-19 RX ADMIN — Medication 1 TABLET: at 08:39

## 2018-06-19 RX ADMIN — DOCUSATE SODIUM 100 MG: 100 CAPSULE, LIQUID FILLED ORAL at 08:39

## 2018-06-19 RX ADMIN — METRONIDAZOLE 500 MG: 500 INJECTION, SOLUTION INTRAVENOUS at 22:11

## 2018-06-19 RX ADMIN — CEFEPIME HYDROCHLORIDE 2000 MG: 2 INJECTION, POWDER, FOR SOLUTION INTRAVENOUS at 11:05

## 2018-06-19 RX ADMIN — ACETAMINOPHEN 650 MG: 325 TABLET ORAL at 12:51

## 2018-06-19 RX ADMIN — APIXABAN 2.5 MG: 2.5 TABLET, FILM COATED ORAL at 08:39

## 2018-06-19 RX ADMIN — PANTOPRAZOLE SODIUM 40 MG: 40 TABLET, DELAYED RELEASE ORAL at 05:29

## 2018-06-19 NOTE — SOCIAL WORK
MCG Guide Used for Initial Round: HCAP  Optimal GLOS: 3  Hospital Day:1 day  DC Readiness: Goal Length of Stay: Ambulatory or 2 days   Note: Goal Length of Stay assumes optimal recovery, decision making, and care  Patients may be discharged to a lower level of care (either later than or sooner than the goal) when it is appropriate for their clinical status and care needs  Discharge Readiness  Return to top of Pneumonia, Community Acquired RRG - ISC  · Discharge readiness is indicated by patient meeting Recovery Milestones, including ALL of the following:  ? Hemodynamic stability  ? Tachypnea absent  ? Hypoxemia absent  ? Afebrile, or temperature acceptable for next level of care  ? Oxygen absent or at baseline need  ? Mental status at baseline  ? Antibiotic regimen acceptable for next level of care  ? Ambulatory  ? Oral hydration, medications, and diet  ? Discharge plans and education understood  See Pneumonia, Community Acquired Optimal Recovery CourseISC for full optimal recovery management information  Extended Stay  Return to top of Pneumonia, Community Acquired RRG - ISC  Minimal (a few hours to 1 day), Brief (1 to 3 days), Moderate (4 to 7 days), and Prolonged (more than 7 days)  [Expand All / Collapse All ]  · Extended stay beyond goal length of stay may be needed for:  ? Unclear diagnosis  § Patient with negative cultures who is not recovering (eg, persistent signs and symptoms) on empiric antibiotics may require bronchoscopy, open lung biopsy, pleural biopsy, or changed antibiotic regimen  § Expect brief stay extension  ? Pleural disease  § Large pleural effusions or empyema may require repetitive drainage after diagnostic thoracentesis, chest tube drainage, or video-assisted thoracoscopy  § Expect brief stay extension  ?  Severe pneumonia or treatment failure  § Nonresponsiveness to initial therapy has been associated with higher initial severity of infection (eg, high Pneumonia Severity Index or CURB-65 scores)  § Patient with necrotizing pneumonia or lung abscess may require longer hospital stay for recovery  § Patient with extension of x-ray infiltrates, multilobar disease, or ongoing hypoxemia may require longer hospital stay for recovery  § Expect brief to moderate stay extension  ? Culture-identified Gram-negative or antibiotic-resistant organism (eg, Pseudomonas, methicillin-resistant Staphylococcus aureus)  § Patient with Gram-negative or antibiotic-resistant organisms may require multiple antibiotics and more prolonged antibiotic course  § Expect brief stay extension  ? Respiratory insufficiency or failure  § Anticipate invasive or noninvasive ventilatory support  § Expect moderate stay extension  § See Respiratory Failure GRGGRG guideline as appropriate  ? New-onset hyponatremia (serum sodium concentration less than 135 mEq/L (mmol/L))  § Anticipate close monitoring for signs and symptoms and of serum electrolytes  § Expect brief stay extension  ? Clinically significant comorbid illness (eg, heart failure, atrial fibrillation with rapid heart rate, alcohol withdrawal, renal insufficiency, diabetes)  § Anticipate evaluation and treatment of specific comorbidity  § Expect brief stay extension  ? Comorbid acute exacerbation of COPD  § COPD is associated with higher mortality, higher rates of ventilator-dependent respiratory failure, and Pseudomonas infection  § Expect brief stay extension  ? Concomitant diagnosis of malignancy  § Malignancy may be associated with malnutrition, immunologic impairment, or bronchial obstruction  § Expect brief to moderate stay extension  ? Concomitant altered mental status  § Altered mental status disease may delay mobilization and recovery  § Expect brief stay extension  ?  Healthcare-associated[I] pneumonia  § Patient who has been recently hospitalized (eg, within 90 days), is resident of long-term care facility, is on hemodialysis, or is receiving immunosuppression or IV chemotherapy may be infected with more varied and multi-drug-resistant organisms  § Anticipate use of broader-spectrum antibiotics  § Expect brief stay extension  ? Other complication or condition    Identified Barriers: iv abx ,  Dysphagia eval , shoulder pain , steroid injection      Discussion Date (Time): 06/19/18 with Dr Shad Mercedes

## 2018-06-19 NOTE — SOCIAL WORK
Patient identified as HRR per criteria  Call made to DC appointment hotline with information as required for CM support follow up  Outpatient care coordination referral also made

## 2018-06-19 NOTE — PLAN OF CARE
Problem: DISCHARGE PLANNING - CARE MANAGEMENT  Goal: Discharge to post-acute care or home with appropriate resources  INTERVENTIONS:  - Conduct assessment to determine patient/family and health care team treatment goals, and need for post-acute services based on payer coverage, community resources, and patient preferences, and barriers to discharge  - Address psychosocial, clinical, and financial barriers to discharge as identified in assessment in conjunction with the patient/family and health care team  - Arrange appropriate level of post-acute services according to patient's   needs and preference and payer coverage in collaboration with the physician and health care team  - Communicate with and update the patient/family, physician, and health care team regarding progress on the discharge plan  - Arrange appropriate transportation to post-acute venues  Home vs snf   Outcome: Progressing

## 2018-06-19 NOTE — ASSESSMENT & PLAN NOTE
· Patient was just discharge at [address] [place] [address] a few days ago, with history of dysphagia with previous strokes  · Likely gram-negative bacteria  However, we cannot rule out possibility of aspiration pneumonia, thus possible anaerobes  · Follow-up results of the blood cultures  · For sputum culture and Gram stain  · Continue cefepime and IV metronidazole  · We will not continue vancomycin at this point as patient likely developed red man syndrome while it was infusing awhile ago  Benadryl p r n  just in case it was an allergic reaction  · Xopenex p r n  Charisma Green · Dysphagia evaluation  · Mucolytic  · May possibly get procalcitonin levels to direct us with therapy

## 2018-06-19 NOTE — PROGRESS NOTES
Progress Note - Charisma Curtis 1933, 80 y o  female MRN: [id number]    Unit/Bed#: [facility] 813-01 Encounter: [id number]    Primary Care Provider: Antonio Villarreal MD   Date and time admitted to hospital: 6/18/2018  9:38 AM        * HCAP (healthcare-associated pneumonia)   Assessment & Plan    · Patient was just discharge at [address] [place] [address] a few days ago, with history of dysphagia with previous strokes  · Likely gram-negative bacteria  However, we cannot rule out possibility of aspiration pneumonia, thus possible anaerobes  · Follow-up results of the blood cultures  · For sputum culture and Gram stain  · Continue cefepime and IV metronidazole  · We will not continue vancomycin at this point as patient likely developed red man syndrome while it was infusing awhile ago  Benadryl p r n  just in case it was an allergic reaction  · Xopenex p r n  Munoz Spruce · Dysphagia evaluation  · Mucolytic  · May possibly get procalcitonin levels to direct us with therapy  Dysphagia   Assessment & Plan    · Patient's daughter claims that patient has been having problems swallowing  Thus possibility of aspiration  · Aspiration precautions  · Speech/dysphagia evaluation  · According to the patient's daughter, patient tolerated thickened liquids  Thus for now, will have the patient on honey thick liquids and pureed  Abnormal TSH   Assessment & Plan    · Check free T4 within normal limits  Shoulder pain, right   Assessment & Plan    · Pain control  · Follow-up results of the right shoulder x-ray  · Aqua K-pad  · Patient's daughter requesting consultation to Orthopedic surgery for consideration of steroid injection  Chronic systolic CHF (congestive heart failure) (HCC)   Assessment & Plan    · Compensated at this point  · Continue heart failure medication  · Continue Lasix  · Oxygen p r n          History of DVT (deep vein thrombosis)   Assessment & Plan    · Continue apixaban  · Patient's daughter is concerned that the with patient having right shoulder pain, that this may be another DVT; according to her, patient presented the same symptom when she was diagnosed with DVT on the left upper extremity  · Follow-up results of the shoulder x-ray  · For duplex scan of the right upper extremity throughout DVT          CKD (chronic kidney disease) stage 3, GFR 30-59 ml/min   Assessment & Plan    · Baseline serum creatinine is 1 2 to 1 3   · Stable at this point  · Monitor kidney function  · Monitor input and output  · Avoid nephrotoxins  · Avoid hypotension  History of CVA (cerebrovascular accident)   Assessment & Plan    · Has history of multiple strokes this year  · Patient also has history of subarachnoid hemorrhage  · CT scan done today revealed no acute intracranial abnormality  · Presently on anticoagulation with apixaban and on high intensity statin  · Consider a neurology consult, if no improvement with patient's mental status  Encephalopathy   Assessment & Plan    · Likely more of toxic metabolic encephalopathy secondary to patient's infection/pneumonia  · Has history of multiple strokes in the past  · CT scan of the head done today did not reveal any acute findings  · Neuro checks  · Consider Neurology consultation, if no improvement  · Manage patient's infection/pneumonia  HTN (hypertension)   Assessment & Plan    · Blood pressure control  · Continue Lasix  · IV blood pressure medication on as needed basis  · Adjust treatment accordingly  Patient will require home bed at discharge  She currently is bed bound  She has limited mobility due to chronic bilateral shoulder pain, limited ability to ambulate or stand or pivot  She will require further assistance with [facility] lift for transfers and bed  Patient will require elevation of the head of the bed with associated aspiration risk    VTE Pharmacologic Prophylaxis:   Pharmacologic: Apixaban (Eliquis)  Mechanical VTE Prophylaxis in Place: No    Patient Centered Rounds: I have performed bedside rounds with nursing staff today  Time Spent for Care: 15 minutes  More than 50% of total time spent on counseling and coordination of care as described above  Current Length of Stay: 1 day(s)    Current Patient Status: Inpatient   Certification Statement: The patient will continue to require additional inpatient hospital stay due to Need to monitor symptoms      Code Status: Level 1 - Full Code      Subjective:   No acute distress    Objective:     Vitals:   Temp (24hrs), Av 1 °F (37 3 °C), Min:98 5 °F (36 9 °C), Max:100 2 °F (37 9 °C)    HR:  [70-81] 72  Resp:  [15-18] 18  BP: (121-141)/(58-65) 140/65  SpO2:  [94 %-99 %] 98 %  Body mass index is 25 11 kg/m²  Input and Output Summary (last 24 hours): Intake/Output Summary (Last 24 hours) at 18 1248  Last data filed at 18 1111   Gross per 24 hour   Intake           1277 5 ml   Output              250 ml   Net           1027 5 ml       Physical Exam:     Physical Exam   Constitutional: She is oriented to person, place, and time  She appears well-developed and well-nourished  HENT:   Head: Normocephalic and atraumatic  Eyes: Conjunctivae are normal  Pupils are equal, round, and reactive to light  Neck: Normal range of motion  No tracheal deviation present  No thyromegaly present  Pulmonary/Chest: She has wheezes  Abdominal: Soft  Bowel sounds are normal  She exhibits no distension  There is no tenderness  Musculoskeletal: She exhibits no edema  Neurological: She is alert and oriented to person, place, and time  No cranial nerve deficit  Skin: Skin is warm and dry  No erythema  Psychiatric: She has a normal mood and affect         Additional Data:     Labs:      Results from last 7 days  Lab Units 18  0618 18  1022   WBC Thousand/uL 5 23 8 45   HEMOGLOBIN g/dL 8 4* 10 5*   HEMATOCRIT % 27 1* 33 9*   PLATELETS Thousands/uL 79* 94*   NEUTROS PCT %  --  84*   LYMPHS PCT %  --  7*   MONOS PCT %  --  7   EOS PCT %  --  2       Results from last 7 days  Lab Units 06/19/18  0618 06/18/18  1117   SODIUM mmol/L 137 137   POTASSIUM mmol/L 4 1 3 9   CHLORIDE mmol/L 106 102   CO2 mmol/L 25 27   BUN mg/dL 31* 33*   CREATININE mg/dL 1 34* 1 37*   CALCIUM mg/dL 8 6 8 6   TOTAL PROTEIN g/dL  --  7 0   BILIRUBIN TOTAL mg/dL  --  0 72   ALK PHOS U/L  --  103   ALT U/L  --  29   AST U/L  --  26   GLUCOSE RANDOM mg/dL 106 127           * I Have Reviewed All Lab Data Listed Above  * Additional Pertinent Lab Tests Reviewed:  All Labs Within Last 24 Hours Reviewed      Recent Cultures (last 7 days):           Last 24 Hours Medication List:     Current Facility-Administered Medications:  acetaminophen 650 mg Oral Q6H PRN Perla Camp MD    apixaban 2 5 mg Oral BID Perla Camp MD    atorvastatin 80 mg Oral Daily With Vignesh Camp MD    bisacodyl 10 mg Rectal Daily PRN Perla Camp MD    cefepime 2,000 mg Intravenous Q24H Perla Camp MD Last Rate: 2,000 mg (06/19/18 1105)   diphenhydrAMINE 12 5 mg Intravenous Q6H PRN Perla Camp MD    docusate sodium 100 mg Oral BID Perla Camp MD    ferrous sulfate 325 mg Oral Daily With Breakfast Perla Camp MD    furosemide 40 mg Oral Daily Perla Camp MD    guaiFENesin 600 mg Oral Q12H [address] Perla Camp MD    hydrALAZINE 5 mg Intravenous Q6H PRN Perla Camp MD    HYDROmorphone 0 2 mg Intravenous Q6H PRN Perla Camp MD    insulin lispro 1-5 Units Subcutaneous TID AC Perla Camp MD    insulin lispro 1-5 Units Subcutaneous HS Perla Camp MD    metoprolol tartrate 50 mg Oral Q12H [address] Perla Camp MD    metroNIDAZOLE 500 mg Intravenous Q8H Perla Camp MD Last Rate: 500 mg (06/19/18 0530)   multivitamin-minerals 1 tablet Oral Daily Perla Camp MD    ondansetron 4 mg Intravenous Q6H PRN Perla Camp MD    oxyCODONE 2 5 mg Oral Q6H PRN Perla Camp MD    pantoprazole 40 mg Oral Early Morning Perla Camp MD    polyethylene glycol 17 g Oral Daily PRN Perla Camp MD    potassium chloride 10 mEq Oral Daily With Breakfast Perla Camp MD    senna 1 tablet Oral HS Day Sharif MD         Today, Patient Was Seen By: Cheryl Nicole DO    ** Please Note: Dictation voice to text software may have been used in the creation of this document   **

## 2018-06-19 NOTE — PHYSICAL THERAPY NOTE
Physical Therapy Evaluation:    2 forms of pt ID verified:name,birthdate and pt ID hetal    Patient's Name: Ml Campbell    Admitting Diagnosis  Altered mental status [R41 82]  Chest pain [R07 9]  Right lower lobe pneumonia ([facility] ) [J18 1]  Dysphagia [R13 10]    Problem List  Patient Active Problem List   Diagnosis    Deep vein thrombosis (DVT) of left upper extremity ([facility] )    H/O mitral valve replacement    CVA (cerebral vascular accident) ([facility] )    Controlled type 2 diabetes mellitus without complication, without long-term current use of insulin ([facility] )    Pacemaker    Acid reflux    HTN (hypertension)    Constipation    CHADWICK (acute kidney injury) ([facility] )    Systolic congestive heart failure ([facility] )    Encephalopathy    History of ischemic right MCA stroke    History of CVA (cerebrovascular accident)    History of subarachnoid hemorrhage    CKD (chronic kidney disease) stage 3, GFR 30-59 ml/min    History of DVT (deep vein thrombosis)    History of pacemaker    Acute cystitis without hematuria    Chronic systolic CHF (congestive heart failure) (HCC)    Colonic thickening    Anemia    Altered mental status    Seizure-like activity ([facility] )    HCAP (healthcare-associated pneumonia)    Shoulder pain, right    Abnormal TSH    Dysphagia       Past Medical History  Past Medical History:   Diagnosis Date    Acid reflux     on occ    Arthritis     DJD right hip replaced    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    Cancer ([facility] ) 2007    colonic polyps, no surgery done    Deep vein thrombosis (DVT) of left upper extremity ([facility] ) 12/11/2017    Diabetes mellitus ([facility] )     type 2    Diverticulosis     Edema     in legs on occ    Hypertension     on occ    Language barrier     speaks Serbian & broken english    Stroke [facility])        Past Surgical History  Past Surgical History:   Procedure Laterality Date    COLON SURGERY      COLONOSCOPY      COLONOSCOPY N/A 11/2/2016 Procedure: COLONOSCOPY;  Surgeon: Mark Sinclair MD;  Location: [facility] GI LAB; Service:     ESOPHAGOGASTRODUODENOSCOPY N/A 11/2/2016    Procedure: ESOPHAGOGASTRODUODENOSCOPY (EGD); Surgeon: Mark Sinclair MD;  Location: [facility] GI LAB; Service:     JOINT REPLACEMENT Right 02/2015    hip    JOINT REPLACEMENT Left     knee    JOINT REPLACEMENT Right     knee    NV REVISE MEDIAN N/CARPAL TUNNEL SURG Left 1/28/2016    Procedure: RELEASE CARPAL TUNNEL;  Surgeon: Lux Mayfield MD;  Location: [facility] MAIN OR;  Service: Orthopedics    TUBAL LIGATION      VARICOSE VEIN SURGERY Bilateral       06/19/18 0955   Note Type   Note type Eval only   Pain Assessment   Pain Assessment FLACC   Pain Rating: FLACC (Rest) - Face 0   Pain Rating: FLACC (Rest) - Legs 0   Pain Rating: FLACC (Rest) - Activity 0   Pain Rating: FLACC (Rest) - Cry 0   Pain Rating: FLACC (Rest) - Consolability 0  (pt holding RUE in a guarding position across chest)   Score: FLACC (Rest) 0   Pain Rating: FLACC (Activity) - Face 1   Pain Rating: FLACC (Activity) - Legs 1   Pain Rating: FLACC (Activity) - Activity 0   Pain Rating: FLACC (Activity) - Cry 1   Pain Rating: FLACC (Activity) - Consolability 1  (when RUE is lightly touched and moved)   Score: FLACC (Activity) 4   Home Living   Type of [address] [place] [address] (unable to gather social history at this time)   [address] [facility]  (use of walker for mobility PTA)   Additional Comments pt unable to accurately provide social history at this time and dec ability to verbally communicate during PT eval->h/o previous CVA with verbal deficits;per CM Melinda Langford), pt;s family want to take pt home with inc services,assistance and DME   Per CM, pt's family will be able to provide 25* care upon D/C   Prior Function   Level of Romance (unable to gather information at this time)   Lives With ACUITY Levine, Susan. \Hospital Has a New Name and Outlook.\"" Help From Family   ADL Assistance Needs assistance   IADLs Needs assistance   Falls in the last 6 months (unable to gather information at this time)   Restrictions/Precautions   Other Precautions Pain; Fall Risk;Multiple lines; Chair Alarm;Cognitive  (language barrier;verbal communication deficit at this time)   General   Additional Pertinent History altered MS,r/o aspiration PNA,fever,RLL PNA,reports of CP,sepsis   Family/Caregiver Present No   Cognition   Overall Cognitive Status Impaired   Arousal/Participation Cooperative   Attention Difficulty attending to directions   Orientation Level Oriented to person;Oriented to place; Disoriented to time;Disoriented to situation  (pt reports being in a hospital->unable to provide name)   Following Commands Follows one step commands with increased time or repetition  (2* dec cognition,dec ability to follow simple one step comma)   Comments reports of pain,dec use of RUE   RLE Assessment   RLE Assessment (3+/5 grossly throughout)   LLE Assessment   LLE Assessment (3+/5 grossly throughout)   Coordination   Movements are Fluid and Coordinated 0   Coordination and Movement Description ataxic and unsteady gait pattern,pain,dec cognition,dec BLE step length,shuffling gait pattern,forward flexed posture   Sensation WFL   Light Touch   RLE Light Touch Grossly intact   LLE Light Touch Grossly intact   Bed Mobility   Supine to Sit Unable to assess  (pt sitting in chair pre and post mobility)   Transfers   Sit to Stand 3  Moderate assistance   Additional items Assist x 2;Armrests; Increased time required;Verbal cues   Stand to Sit 3  Moderate assistance   Additional items Assist x 2;Armrests; Increased time required;Verbal cues  (for safety,education and control descent)   Additional Comments verbal and physical instruction needed for pt to use B armrests prior to sit<->stand transfers,use of Rw and gait sequence;dec PROM B hip extensors with inc tightness and stiffness->limiting pt ablility to perform longer stride length    Ambulation/Elevation   Gait pattern Poor UE support; Improper Weight shift; Antalgic;Narrow [place]; Forward Flexion; Shuffling; Inconsistent renate; Foward flexed; Short stride; Ataxia; Step to;Excessively slow   Gait Assistance 3  Moderate assist   Additional items Assist x 1;Verbal cues; Tactile cues   Assistive Device Rolling walker   Distance 40 feet x2 with use of RW on tile and carpet surface;needs max verbal and physical instruction for manuvering of RW and gait sequence;easily distracted and slow mobility   Balance   Static Sitting Good  (in chair postmobility with chair alarm intact)   Dynamic Sitting (zero)   Static Standing Zero   Dynamic Standing Poor -   Ambulatory Poor -   Endurance Deficit   Endurance Deficit Yes   Endurance Deficit Description weakness,deconditioning,fatigue   Activity Tolerance   Activity Tolerance Patient limited by fatigue  (fair)   Medical Staff Made Aware OT Oumou Gonzales), CM Nancie Favre)   Nurse Made Aware yes Kya Saenz)   Assessment   Prognosis Fair   Problem List Decreased strength;Decreased range of motion;Decreased endurance; Impaired balance;Decreased mobility; Decreased coordination;Decreased cognition;Decreased safety awareness;Decreased skin integrity;Pain   Assessment Pt is a 81 y/o female admitted to [facility] 2* altered MS,r/o aspiration PNA,RLL PNA,sepsis and reports CP  Pt lives with family with use of walker for mobility  Unable to gather further social history at this time 2* pt has verbal communication deficits and dec cognition  Per CM, pt lives with family and family is available to A pt 25* with requests for inc home services and DME  Pt currently is not at functional mobility baseline,needs Ax2 for mobility,use of RW for mobility,multiple lines,ongoing medical care,chair alarm use,dec cognition,reports RUE pain   Pt demonstrates maximal to moderate deficits during functional mobility and gait including dec endurance,dec balance,dec BLE strength,dec PROM B hip extensors,reports inc pain RUE,ataxic and unsteady gait pattern and needs maxAx1 for gait with use fo RW especially for verbal and physically instruction for use of RW and gait sequence,modAx2 for transfers  Pt would cont to benefit from skilled inpt PT services to maximize functional independence   Goals   Patient Goals no goals reported at this time by the pt   STG Expiration Date 06/29/18   Short Term Goal #1 in 7-10 days:pt will be able to perform ambulate >150 feet with use of appropriate DME on various surfaces and chair follow min->modAx1 consistently,activity tolerance:45mins/45mins,inc balance 1 grade,BM and transfers minAx1 to and from various surfaces consistently,BLE ther ex HEP and ROM minAx1 in various positions   Treatment Day 0   Plan   Treatment/Interventions Functional transfer training;LE strengthening/ROM; Therapeutic exercise; Endurance training;Patient/family training;Equipment eval/education; Bed mobility;Gait training;Spoke to nursing;Spoke to case management;OT   PT Frequency Other (Comment)  (3-5x/week)   Recommendation   Recommendation (inpt rhb vs placement vs home with fam,inc home services   )   Equipment Recommended [employer]  (RW)   Additional Comments home PT,use of personal DME,hospital bed,per family's request soy lift PENDING mobility progress upon D/C   Barthel Index   Feeding 5   Bathing 0   Grooming Score 0   Dressing Score 0   Bladder Score 5   Bowels Score 5   Toilet Use Score 5   Transfers (Bed/Chair) Score 5   Mobility (Level Surface) Score 0   Stairs Score 0   Barthel Index Score 25   Skilled PT recommended while in hospital and upon DC to progress pt toward treatment goals           Mt Fleming, PT, DPT@

## 2018-06-19 NOTE — PLAN OF CARE
Problem: PHYSICAL THERAPY ADULT  Goal: Performs mobility at highest level of function for planned discharge setting  See evaluation for individualized goals  Treatment/Interventions: Functional transfer training, LE strengthening/ROM, Therapeutic exercise, Endurance training, Patient/family training, Equipment eval/education, Bed mobility, Gait training, Spoke to nursing, Spoke to case management, OT  Equipment Recommended: Shaniqua Juárez (RW)       See flowsheet documentation for full assessment, interventions and recommendations  Prognosis: Fair  Problem List: Decreased strength, Decreased range of motion, Decreased endurance, Impaired balance, Decreased mobility, Decreased coordination, Decreased cognition, Decreased safety awareness, Decreased skin integrity, Pain  Assessment: Pt is a 81 y/o female admitted to [facility] 2* altered MS,r/o aspiration PNA,RLL PNA,sepsis and reports CP  Pt lives with family with use of walker for mobility  Unable to gather further social history at this time 2* pt has verbal communication deficits and dec cognition  Per CM, pt lives with family and family is available to A pt 25* with requests for inc home services and DME  Pt currently is not at functional mobility baseline,needs Ax2 for mobility,use of RW for mobility,multiple lines,ongoing medical care,chair alarm use,dec cognition,reports RUE pain  Pt demonstrates maximal to moderate deficits during functional mobility and gait including dec endurance,dec balance,dec BLE strength,dec PROM B hip extensors,reports inc pain RUE,ataxic and unsteady gait pattern and needs maxAx1 for gait with use fo RW especially for verbal and physically instruction for use of RW and gait sequence,modAx2 for transfers  Pt would cont to benefit from skilled inpt PT services to maximize functional independence        Recommendation:  (inpt rhb vs placement vs home with fam,inc home services   )          See flowsheet documentation for full assessment

## 2018-06-19 NOTE — PROGRESS NOTES
Patient stated she felt like she had to urinate, patient ambulated to bathroom with assistance of two staff members  Patient urinated but did not go in hat so was unable to be measured  Bladder scanned patient to check PVR and it was 0 mL

## 2018-06-19 NOTE — OCCUPATIONAL THERAPY NOTE
Occupational Therapy Evaluation      Valeriy Uptonarnulfo    6/19/2018    Patient Active Problem List   Diagnosis    Deep vein thrombosis (DVT) of left upper extremity ([facility]    H/O mitral valve replacement    CVA (cerebral vascular accident) ([facility]    Controlled type 2 diabetes mellitus without complication, without long-term current use of insulin ([facility] )    Pacemaker    Acid reflux    HTN (hypertension)    Constipation    CHADWICK (acute kidney injury) ([facility]    Systolic congestive heart failure ([facility]    Encephalopathy    History of ischemic right MCA stroke    History of CVA (cerebrovascular accident)    History of subarachnoid hemorrhage    CKD (chronic kidney disease) stage 3, GFR 30-59 ml/min    History of DVT (deep vein thrombosis)    History of pacemaker    Acute cystitis without hematuria    Chronic systolic CHF (congestive heart failure) (HCC)    Colonic thickening    Anemia    Altered mental status    [facility]    HCAP (healthcare-associated pneumonia)    Acute pain of right shoulder    Abnormal TSH    Dysphagia       Past Medical History:   Diagnosis Date    Acid reflux     on occ    Arthritis     DJD right hip replaced    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 lesions with no change    Cancer ([facility] ) 2007    colonic polyps, no surgery done    Deep vein thrombosis (DVT) of left upper extremity ([facility] ) 12/11/2017    Diabetes mellitus ([facility] )     type 2    Diverticulosis     Edema     in legs on occ    Hypertension     on occ    Language barrier     speaks Pakistani & broken english    Stroke [facility])        Past Surgical History:   Procedure Laterality Date    COLON SURGERY      COLONOSCOPY      COLONOSCOPY N/A 11/2/2016    Procedure: COLONOSCOPY;  Surgeon: Rocco Tim MD;  Location: [facility] GI LAB; Service:     ESOPHAGOGASTRODUODENOSCOPY N/A 11/2/2016    Procedure: ESOPHAGOGASTRODUODENOSCOPY (EGD); Surgeon: Rocco Tim MD;  Location: [facility] GI LAB;   Service:  JOINT REPLACEMENT Right 02/2015    hip    JOINT REPLACEMENT Left     knee    JOINT REPLACEMENT Right     knee    OH REVISE MEDIAN N/CARPAL TUNNEL SURG Left 1/28/2016    Procedure: RELEASE CARPAL TUNNEL;  Surgeon: Clarissa Reed MD;  Location: [facility] MAIN OR;  Service: Orthopedics    TUBAL LIGATION      VARICOSE VEIN SURGERY Bilateral       06/19/18 1015   Note Type   Note type Eval/Treat   Restrictions/Precautions   Weight Bearing Precautions Per Order No   Other Precautions Chair Alarm;Cognitive; Fall Risk;Pain   Pain Assessment   Pain Assessment FLACC   Pain Score (c/o R shoulder pain)   Pain Rating: FLACC (Rest) - Face 0   Pain Rating: FLACC (Rest) - Legs 0   Pain Rating: FLACC (Rest) - Activity 0   Pain Rating: FLACC (Rest) - Cry 0   Pain Rating: FLACC (Rest) - Consolability 0   Score: FLACC (Rest) 0   Pain Rating: FLACC (Activity) - Face 1   Pain Rating: FLACC (Activity) - Legs 0   Pain Rating: FLACC (Activity) - Activity 1   Pain Rating: FLACC (Activity) - Cry 1   Pain Rating: FLACC (Activity) - Consolability 1   Score: FLACC (Activity) 4   Home Living   Type of Home House   Home Equipment Walker   Prior Function   Level of Piatt Independent with ADLs and functional mobility   Receives Help From Family   ADL Assistance Independent   IADLs Needs assistance   Falls in the last 6 months 0   Vocational Retired   Lifestyle   Autonomy pt reports being ind w/ ADLs PTA   Reciprocal Relationships pt has family support   Service to Others pt is retired   Intrinsic Gratification none expressed at this time   Psychosocial   Psychosocial (WDL) WDL   Subjective   Subjective "where is my son?"   ADL   Where Assessed Chair   Eating Assistance 5  Supervision/Setup   Eating Deficit Supervision/safety   Grooming Assistance 4  Minimal Assistance   Grooming Deficit Verbal cueing   UB Bathing Assistance 3  Moderate Assistance   UB Bathing Deficit Verbal cueing;Supervision/safety; Increased time to complete   LB Bathing Assistance 2  Maximal Assistance   LB Bathing Deficit Verbal cueing;Supervision/safety; Increased time to complete   UB Dressing Assistance 3  Moderate Assistance   LB Dressing Assistance 2  Maximal Assistance   Toileting Assistance  3  Moderate Assistance   Functional Assistance 3  Moderate Assistance   Bed Mobility   Additional Comments pt OOB   Transfers   Sit to Stand 3  Moderate assistance   Additional items Assist x 2; Increased time required;Verbal cues;Armrests   Stand to Sit 3  Moderate assistance   Additional items Assist x 1; Increased time required;Verbal cues;Armrests   Functional Mobility   Functional Mobility 3  Moderate assistance   Additional items Rolling walker   Balance   Static Sitting Fair   Dynamic Sitting Fair -   Static Standing Poor +   Dynamic Standing Poor   Ambulatory Poor   Activity Tolerance   Activity Tolerance Patient limited by fatigue;Patient limited by pain   Medical Staff Made Aware PT Tammi Forman, updated CM   Nurse Made Aware RN Deidra Jimenez   RUE Assessment   RUE Assessment X  (c/o shoulder pain, flexion to 90*, elbow WFL)   LUE Assessment   LUE Assessment X  (<90* shoulder, elbow WFL)   Hand Function   Gross Motor Coordination Impaired   Fine Motor Coordination Impaired   Vision-Basic Assessment   Current Vision Wears glasses all the time  (not w/ patient at eval)   Cognition   Overall Cognitive Status Impaired   Arousal/Participation Cooperative   Attention Difficulty attending to directions   Orientation Level Oriented to person;Disoriented to place; Disoriented to time;Disoriented to situation   Memory Decreased recall of precautions;Decreased recall of recent events;Decreased short term memory;Decreased recall of biographical information   Following Commands Follows one step commands with increased time or repetition  (verbal and tactile cues)   Assessment   Limitation Decreased ADL status; Decreased UE ROM; Decreased UE strength;Decreased Safe judgement during ADL;Decreased cognition;Decreased endurance;Decreased fine motor control;Decreased self-care trans;Decreased high-level ADLs   Prognosis Fair   Assessment Pt is a 79 yo F admit to [facility] on 6/18/18 w/ fever, recently d/c from [facility] dx'd w/ HCAP  Pt w/ PMHx significant for CVA, arthritis, cancer, DVT, DM, edema, HTN, diverticulosis   Pt w/ active OT orders and activity orders  PTA, pt was living at home w/ family  Pt is poor historian, unable to obtain compete history  Pt denies falls  Upon OT arrival, pt presents seated in chair  Following OT evaluation, pt seated in chair, all needs within reach  At time of OT eval, pt requiring Min A for grooming, Mod A UB self-care, Max A LB self-care, Mod A x 2 transfers and functional mobility w/ RW  Pt requiring VC for RW safety  Pt demonstrating deficits in baseline areas of occupation including decreased ADL performance, IADL performance, and functional mobility 2* cognitive status, decreased attention, decreased command following, generalized weakness, decreased mobility status, decreased activity tolerance, increased risk for falls, decreased safety awareness  Pt scored a 30/100 on the Barthel Index and has been identified as a high complexity evaluation  OT will continue to follow pt 3-5x/wk to promote increased independence w/ ADLs and functional mobility to meet below stated goals  From OT standpoint, anticipate d/c to STR to continue to address safety and independence w/ ADLs, IADLs, and functional mobility  Goals   Patient Goals talk to son   LTG Time Frame 7-10   Plan   Treatment Interventions ADL retraining;Visual perceptual retraining;Functional transfer training;UE strengthening/ROM; Endurance training;Cognitive reorientation;Patient/family training;Equipment evaluation/education; Fine motor coordination activities; Compensatory technique education;Continued evaluation; Energy conservation; Activityengagement   Goal Expiration Date 06/29/18   OT Frequency 3-5x/wk Recommendation   OT Discharge Recommendation Short Term Rehab   OT - OK to Discharge Yes  (to STR at this time)   Barthel Index   Feeding 5   Bathing 0   Grooming Score 0   Dressing Score 5   Bladder Score 5   Bowels Score 5   Toilet Use Score 5   Transfers (Bed/Chair) Score 5   Mobility (Level Surface) Score 0   Stairs Score 0   Barthel Index Score 30   Modified [place] Scale   Modified [place] Scale 4   Pt will complete supine<>sit in bed w/ SBA w/ G carryover of technique  Pt will complete functional transfers w/ SBA and RW w/ G carryover of safe techniques in order to increase mobility status  Pt will complete basic grooming tasks w/ G dynamic balance w/ SBA  Pt will complete toileting w/ SBA w/ G hygiene/thoroughness to promote increased independence  Pt will increase independence w/ UB self-care to a SBA level w/ G dynamic balance  Pt will increase independence w/ LB self-care to a SBA level w/ G dynamic balance  Pt will improve activity tolerance during functional activities to G during 30 min treatment sessions  Pt will demonstrate G safety and problem solving while completing ADL tasks w/ SBA      Documentation Completed by Nghia Navarro MS, OTR/L

## 2018-06-19 NOTE — ASSESSMENT & PLAN NOTE
· Pain control  · Follow-up results of the right shoulder x-ray  · Aqua K-pad  · Patient's daughter requesting consultation to Orthopedic surgery for consideration of steroid injection

## 2018-06-19 NOTE — PROGRESS NOTES
Patient has not urinated since she was ordered for a straight cath to obtain a UA around noon in the ER  Bladder scanned pt for 242 mL  Pt states she does not feel like she has to go  Berny Humphreys PA-C notified  Will bladder scan patient again within the next few hours if she does not go

## 2018-06-19 NOTE — CASE MANAGEMENT
Initial Clinical Review    Admission: Date/Time/Statement: 6/18/18 @ 1320     Orders Placed This Encounter   Procedures    Inpatient Admission (expected length of stay for this patient is greater than two midnights)     Standing Status:   Standing     Number of Occurrences:   1     Order Specific Question:   Admitting Physician     Answer:   Dav Carvajal [[id number]]     Order Specific Question:   Level of Care     Answer:   Med Surg [[id number]]     Order Specific Question:   Estimated length of stay     Answer:   More than 2 Midnights     Order Specific Question:   Certification     Answer:   I certify that inpatient services are medically necessary for this patient for a duration of greater than two midnights  See H&P and MD Progress Notes for additional information about the patient's course of treatment  ED: Date/Time/Mode of Arrival:   ED Arrival Information     Expected Arrival Acuity Means of Arrival Escorted By Service Admission Type    - 6/18/2018 09:38 [facility]    Arrival Complaint    chest pain        Chief Complaint:   Chief Complaint   Patient presents with    Altered Mental Status     patient with recent decline in mentation after being admitted to nursing facility then discharged back home a few days ago  As per patient's daughter, patient has been coughing while eating/drinking  Low grade fever yesterday and today  Patient not able to stand at home  History of Illness: Jimmy Gibson is a 80 y o  female who presents to the emergency room at [facility] with fever  The patient was just discharged from here and the May with a diagnosis of sepsis secondary to urinary tract infection  Patient completed a course of antibiotic with cephalosporin  Patient got better and was eventual discharge to [facility] skilled nursing facility    Patient was eventually discharge from the skilled nursing facility last Thursday, or approximately 4 days ago  According to the patient's daughter, she is claiming that at the skilled nursing facility, patient was placed on regular food and thin liquids and that she noticed that her mother has been coughing and sometimes choking with food and thin liquids there  She told me that she address this with the staff there and she demanded that a speech therapy see her mother, and she told me that she was told by the staff that once she is discharged from the facility and follows up with her primary care physician, her mother's primary care physician would refer her to a speech therapist in the outpatient  When patient went home, with patient still having coughing and choking with food and thin liquids, she decided to place the patient on thickened fluids  According to the patient's daughter, patient was able to swallow well the thickened fluids  Patient's daughter told me that last night, patient had a low-grade fever  When I asked the what was the temperature, she told me it was 99 2° F  Early this morning, she got a temperature again and it does increase to 99 7° F  According to the patient's daughter, patient also started deteriorate at the facility and at home  In fact, she even told me that she as for an extension of her stay in the facility, but this was not granted She told me that when patient was discharge from here, she was already able to talk a few words, however, she told me in the facility she started to mumble again at times and does not have any force in her speech anymore  Lately, patient told me that her mother has been getting generally weaker and weaker and needs a lot of help just to sit up  She also told me that patient is at times mostly sleeping or lethargic  She also told me that she noticed that her mother having significant right shoulder pains which is acute in onset    She is concerned that patient is having DVT again as she presented the same way when she was diagnosed with a left upper extremity DVT  There were no other noted symptoms aside from the ones mentioned above as per my discussion with the the ones mentioned above  It is important to note, that according to the patient's daughter, patient has baseline left sided weakness and hemineglect which she had even before due to her multiple strokes    ED Vital Signs:   ED Triage Vitals   Temperature Pulse Respirations Blood Pressure SpO2   06/18/18 0957 06/18/18 0957 06/18/18 0957 06/18/18 1030 06/18/18 0957   (!) 101 °F (38 3 °C) 83 15 (!) 188/87 99 %      Temp Source Heart Rate Source Patient Position - Orthostatic VS BP Location FiO2 (%)   06/18/18 0957 06/18/18 0957 06/18/18 1030 06/18/18 1134 --   Rectal Monitor Lying Right arm       Pain Score       06/18/18 0957       No Pain        Wt Readings from Last 1 Encounters:   06/19/18 66 4 kg (146 lb 4 8 oz)     Vital Signs (abnormal):   06/18/18 1134  --  84  15   184/76  100 %  Nasal cannula  Lying   06/18/18 1030  --  80  15   188/87  99 %  --  Lying   06/18/18 0957   101 °F (38 3 °C)  83  15  --  99 %  None (Room air)  --     Abnormal Labs:    BUN/Cr 33/1 37, 31/1 34  Alb 3 2  Trop WNL   H/H 10 5/33 9, 8 4/27 1  Platelets 94, 79  POC glucose 157  Urine trace blood   Urine protein 30 (1+)  Urine RBC 2-4  Blood cultures pending   Microalb/Creat ration 48  Urine microalb 54 9  Medical ETOH <3    Diagnostic Test Results:     6/18 CXR - Faint bandlike density in the right lower lung field which could be atelectasis or potentially early changes of developing pneumonia  Recommend continued follow-up  Suspected hiatal hernia      6/18 EKG - Atrial-paced rhythm  Right bundle branch block  Left anterior fascicular block  Bifascicular block   Left ventricular hypertrophy with repolarization abnormality  Abnormal ECG    ED Treatment:   Medication Administration from 06/18/2018 0938 to 06/18/2018 1434    Date/Time Order Dose Route Action   06/18/2018 1156 sodium chloride 0 9 % bolus 1,000 mL 1,000 mL Intravenous New Bag   06/18/2018 1145 acetaminophen (TYLENOL) rectal suppository 650 mg 650 mg Rectal Given   06/18/2018 1153 vancomycin (VANCOCIN) IVPB (premix) 1,000 mg 1,000 mg Intravenous New Bag   06/18/2018 1119 cefepime (MAXIPIME) IVPB (premix) 1,000 mg 1,000 mg Intravenous New Bag   06/18/2018 1409 metroNIDAZOLE (FLAGYL) IVPB (premix) 500 mg 500 mg Intravenous New Bag        Past Medical/Surgical History:     Diagnosis    Deep vein thrombosis (DVT) of left upper extremity (HCC)    H/O mitral valve replacement    [facility]A [facility]    Controlled type 2 diabetes mellitus without complication, without long-term current use of insulin ([facility]    Pacemaker    Acid reflux    HTN (hypertension)    Constipation    CHADWICK (acute kidney injury) ([facility] )    Systolic congestive heart failure (HCC)    Encephalopathy    History of ischemic right MCA stroke    History of CVA (cerebrovascular accident)    History of subarachnoid hemorrhage    CKD (chronic kidney disease) stage 3, GFR 30-59 ml/min    History of DVT (deep vein thrombosis)    History of pacemaker    Acute cystitis without hematuria    Chronic systolic CHF (congestive heart failure) (HCC)    Colonic thickening    Anemia    Altered mental status    Seizure-like activity (HCC)       Diagnosis    Acute ischemic left MCA stroke (HCC)    Sepsis (HCC)    Hyperkalemia     Diagnosis    Acid reflux    Arthritis    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[facility]    Cancer ([facility]    Deep vein thrombosis (DVT) of left upper extremity ([facility] )    Diabetes mellitus ([facility]    Diverticulosis    Edema    Hypertension    Language barrier    Stroke [facility])     Admitting Diagnosis: Altered mental status [R41 82]  Chest pain [R07 9]  Right lower lobe pneumonia (HCC) [J18 1]  Dysphagia [R13 10]    Age/Sex: 80 y o  female    Assessment/Plan:   * HCAP (healthcare-associated pneumonia)   Assessment & Plan     · Patient was just discharge at [facility] a few days ago, with history of dysphagia with previous strokes  · Likely gram-negative bacteria  However, we cannot rule out possibility of aspiration pneumonia, thus possible anaerobes  · Follow-up results of the blood cultures  · For sputum culture and Gram stain  · Continue cefepime and IV metronidazole  · We will not continue vancomycin at this point as patient likely developed red man syndrome while it was infusing awhile ago  Benadryl p r n  just in case it was an allergic reaction  · Xopenex p r n  Caroline Sanabria · Dysphagia evaluation  · Mucolytic  · May possibly get procalcitonin levels to direct us with therapy           Dysphagia   Assessment & Plan     · Patient's daughter claims that patient has been having problems swallowing  Thus possibility of aspiration  · Aspiration precautions  · Speech/dysphagia evaluation  · According to the patient's daughter, patient tolerated thickened liquids  Thus for now, will have the patient on honey thick liquids and pureed           Encephalopathy   Assessment & Plan     · Likely more of toxic metabolic encephalopathy secondary to patient's infection/pneumonia  · Has history of multiple strokes in the past  · CT scan of the head done today did not reveal any acute findings  · Neuro checks  · Consider Neurology consultation, if no improvement  · Manage patient's infection/pneumonia                Abnormal TSH   Assessment & Plan     · Check free T4           Acute pain of right shoulder   Assessment & Plan     · Pain control  · Follow-up results of the right shoulder x-ray  · Aqua K-pad  · If no significant improvement, consider orthopedic surgery consult              Chronic systolic CHF (congestive heart failure) (HCC)   Assessment & Plan     · Compensated at this point  · Continue heart failure medication  · Continue Lasix  · Oxygen p r n             History of DVT (deep vein thrombosis)   Assessment & Plan   · Continue apixaban  · Patient's daughter is concerned that the with patient having right shoulder pain, that this may be another DVT; according to her, patient presented the same symptom when she was diagnosed with DVT on the left upper extremity  · Follow-up results of the shoulder x-ray  · For duplex scan of the right upper extremity throughout DVT             CKD (chronic kidney disease) stage 3, GFR 30-59 ml/min   Assessment & Plan     · Baseline serum creatinine is 1 2 to 1 3   · Stable at this point  · Monitor kidney function  · Monitor input and output  · Avoid nephrotoxins  · Avoid hypotension           History of CVA (cerebrovascular accident)   Assessment & Plan     · Has history of multiple strokes this year  · Patient also has history of subarachnoid hemorrhage  · CT scan done today revealed no acute intracranial abnormality  · Presently on anticoagulation with apixaban and on high intensity statin  · Consider a neurology consult, if no improvement with patient's mental status           HTN (hypertension)   Assessment & Plan     · Blood pressure control  · Continue Lasix  · IV blood pressure medication on as needed basis  · Adjust treatment accordingly           VTE Prophylaxis: Apixaban (Eliquis)  / sequential compression device   Code Status:  Full code  Anticipated Length of Stay:  Patient will be admitted on an Inpatient basis with an anticipated length of stay of  greater than 2 midnights  Justification for Hospital Stay:  Above findings and plans      Admission Orders:  Scheduled Meds:   Current Facility-Administered Medications:  acetaminophen 650 mg Oral Q6H PRN    apixaban 2 5 mg Oral BID    atorvastatin 80 mg Oral Daily With Dinner    bisacodyl 10 mg Rectal Daily PRN    cefepime 2,000 mg Intravenous Q24H Last Rate: 2,000 mg (06/19/18 1105)   diphenhydrAMINE 12 5 mg Intravenous Q6H PRN    docusate sodium 100 mg Oral BID    ferrous sulfate 325 mg Oral Daily With Breakfast furosemide 40 mg Oral Daily    guaiFENesin 600 mg Oral Q12H PARUL    hydrALAZINE 5 mg Intravenous Q6H PRN    HYDROmorphone 0 2 mg Intravenous Q6H PRN    insulin lispro 1-5 Units Subcutaneous TID AC    insulin lispro 1-5 Units Subcutaneous HS    metoprolol tartrate 50 mg Oral Q12H PARUL    metroNIDAZOLE 500 mg Intravenous Q8H Last Rate: 500 mg (06/19/18 1358)   multivitamin-minerals 1 tablet Oral Daily    ondansetron 4 mg Intravenous Q6H PRN    oxyCODONE 2 5 mg Oral Q6H PRN    pantoprazole 40 mg Oral Early Morning    polyethylene glycol 17 g Oral Daily PRN    potassium chloride 10 mEq Oral Daily With Breakfast    senna 1 tablet Oral HS      Continuous Infusions:       0 9% NSS at 75 ml/hr from admission til 6/19 @ 0319    PRN Meds:   Acetaminophen x3    bisacodyl    diphenhydrAMINE    hydrALAZINE    HYDROmorphone    ondansetron    oxyCODONE    polyethylene glycol    SCDs  POC glucose ac/hs   Neuro checks q 4 hr   Daily wt   Aspiration precautions   Up w/ assist   Sputum C&S   Diet Dysphagia/Modified Consistency; Dysphagia 1-Pureed;  Nectar Thick Liquid  Cons Ortho   PT eval/tx

## 2018-06-19 NOTE — PHYSICIAN ADVISOR
Current patient class: Inpatient  The patient is currently on Hospital Day: 2 at [facility]        The patient was admitted to the hospital  on 6/18/18 at 1320 for the following diagnosis:  Altered mental status [R41 82]  Chest pain [R07 9]  Right lower lobe pneumonia ([address] ) [J18 1]  Dysphagia [R13 10]       There is documentation in the medical record of an expected length of stay of at least 2 midnights  The patient is therefore expected to satisfy the 2 midnight benchmark and given the 2 midnight presumption is appropriate for INPATIENT ADMISSION  Given this expectation of a satisfying stay, CMS instructs us that the patient is most often appropriate for inpatient admission under part A provided medical necessity is documented in the chart  After review of the relevant documentation, labs, vital signs and test results, the patient is appropriate for INPATIENT ADMISSION  Admission to the hospital as an inpatient is a complex decision making process which requires the practitioner to consider the patients presenting complaint, history and physical examination and all relevant testing  With this in mind, in this case, the patient was deemed appropriate for INPATIENT ADMISSION  After review of the documentation and testing available at the time of the admission I concur with this clinical determination of medical necessity  The patient does have an inpatient admission within the previous 30 days  The patient was admitted on 5/25/18 and discharged on 5/28/18 as an inpatient  The patient therefore required readmission review  In this case the patient should be considered a SEPARATE and UNRELATED INPATIENT ADMISSION  The patient had been discharged in stable condition with a completed care plan  There were no unresolved acute medical issues at the time of discharged which would have reasonably been expected to prompt this readmission          Rationale is as follows: The patient is a 80 yrs   Female who presented to the ED at 6/18/2018  9:38 AM with a chief complaint of Altered Mental Status (patient with recent decline in mentation after being admitted to nursing facility then discharged back home a few days ago  As per patient's daughter, patient has been coughing while eating/drinking  Low grade fever yesterday and today  Patient not able to stand at home   )    The patient had a prior admission from 5/25-5/28 for sepsis due to UTI  The patient presented with fever  The patient is being admitted with health care associated pneumonia  The plan of care includes IV abx, dysphagia evaluation, xopenex prn, repeat labs  This patient is appropriate for INPATIENT admission; continued hospitalization is necessary to ensure stabilization of her clinical status  This admission is UNRELATED to her prior admission as she was treated and discharged in stable condition      The patients vitals on arrival were ED Triage Vitals   Temperature Pulse Respirations Blood Pressure SpO2   06/18/18 0957 06/18/18 0957 06/18/18 0957 06/18/18 1030 06/18/18 0957   (!) 101 °F (38 3 °C) 83 15 (!) 188/87 99 %      Temp Source Heart Rate Source Patient Position - Orthostatic VS BP Location FiO2 (%)   06/18/18 0957 06/18/18 0957 06/18/18 1030 06/18/18 1134 --   Rectal Monitor Lying Right arm       Pain Score       06/18/18 0957       No Pain           Past Medical History:   Diagnosis Date    Acid reflux     on occ    Arthritis     DJD right hip replaced    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 2 lesions with no change    Cancer ([facility] ) 2007    colonic polyps, no surgery done    Deep vein thrombosis (DVT) of left upper extremity ([facility] ) 12/11/2017    Diabetes mellitus (HCC)     type 2    Diverticulosis     Edema     in legs on occ    Hypertension     on occ    Language barrier     speaks Kyrgyz & broken english    Stroke [facility])      Past Surgical History:   Procedure Laterality Date    COLON SURGERY      COLONOSCOPY      COLONOSCOPY N/A 11/2/2016    Procedure: COLONOSCOPY;  Surgeon: Rocco Tim MD;  Location: [place] [address] GI LAB; Service:     ESOPHAGOGASTRODUODENOSCOPY N/A 11/2/2016    Procedure: ESOPHAGOGASTRODUODENOSCOPY (EGD); Surgeon: Rocco Tim MD;  Location: [facility] GI LAB; Service:     JOINT REPLACEMENT Right 02/2015    hip    JOINT REPLACEMENT Left     knee    JOINT REPLACEMENT Right     knee    LA REVISE MEDIAN N/CARPAL TUNNEL SURG Left 1/28/2016    Procedure: RELEASE CARPAL TUNNEL;  Surgeon: Anoop Telles MD;  Location: [place] [address] MAIN OR;  Service: Orthopedics    TUBAL LIGATION      VARICOSE VEIN SURGERY Bilateral            Consults have been placed to:   IP CONSULT TO CASE MANAGEMENT  IP CONSULT TO ORTHOPEDIC SURGERY    Vitals:    06/19/18 0400 06/19/18 0600 06/19/18 0705 06/19/18 1523   BP:   140/65 104/52   BP Location:   Right arm Right arm   Pulse:   72 69   Resp:   18 20   Temp:   98 9 °F (37 2 °C)    TempSrc:   Oral    SpO2: 97%  98% 97%   Weight:  66 4 kg (146 lb 4 8 oz)     Height:           Most recent labs:    Recent Labs      06/18/18   1022   06/18/18   1117  06/19/18   0618   WBC  8 45   --    --   5 23   HGB  10 5*   --    --   8 4*   HCT  33 9*   --    --   27 1*   PLT  94*   --    --   79*   K   --    < >  3 9  4 1   NA   --    < >  137  137   CALCIUM   --    < >  8 6  8 6   BUN   --    < >  33*  31*   CREATININE   --    < >  1 37*  1 34*   TROPONINI  <0 02   --    --    --    AST   --    --   26   --    ALT   --    --   29   --    ALKPHOS   --    --   103   --    BILITOT   --    --   0 72   --     < > = values in this interval not displayed         Scheduled Meds:  Current Facility-Administered Medications:  acetaminophen 650 mg Oral Q6H PRN Perla Camp MD    apixaban 2 5 mg Oral BID Perla Camp MD    atorvastatin 80 mg Oral Daily With Dinner Perla Camp MD    bisacodyl 10 mg Rectal Daily PRN Perla Camp MD    cefepime 2,000 mg Intravenous Q24H Perla Camp MD Last Rate: 2,000 mg (06/19/18 1105)   diphenhydrAMINE 12 5 mg Intravenous Q6H PRN Perla Camp MD    docusate sodium 100 mg Oral BID Perla Camp MD    ferrous sulfate 325 mg Oral Daily With Breakfast Perla Camp MD    furosemide 40 mg Oral Daily Perla Camp MD    guaiFENesin 600 mg Oral Q12H [address] Perla Camp MD    hydrALAZINE 5 mg Intravenous Q6H PRN Perla Camp MD    HYDROmorphone 0 2 mg Intravenous Q6H PRN Perla Camp MD    insulin lispro 1-5 Units Subcutaneous TID AC Perla Camp MD    insulin lispro 1-5 Units Subcutaneous HS Perla Camp MD    metoprolol tartrate 50 mg Oral Q12H [address] Perla Camp MD    metroNIDAZOLE 500 mg Intravenous Q8H Perla Camp MD Last Rate: 500 mg (06/19/18 1358)   multivitamin-minerals 1 tablet Oral Daily Perla Camp MD    ondansetron 4 mg Intravenous Q6H PRN Perla Camp MD    oxyCODONE 2 5 mg Oral Q6H PRN Perla Camp MD    pantoprazole 40 mg Oral Early Morning Perla Camp MD    polyethylene glycol 17 g Oral Daily PRN Perla Camp MD    potassium chloride 10 mEq Oral Daily With Breakfast Perla Camp MD    senna 1 tablet Oral HS Perla Camp MD      Continuous Infusions:   PRN Meds:   acetaminophen    bisacodyl    diphenhydrAMINE    hydrALAZINE    HYDROmorphone    ondansetron    oxyCODONE    polyethylene glycol    Surgical procedures (if appropriate):

## 2018-06-19 NOTE — CONSULTS
Orthopedics   Patricia Gibbons 80 y o  female MRN: [id number]  Unit/Bed#: [facility] 203-65      Chief Complaint:   right shoulder pain    HPI:  80 y o  RHD female complaining of right shoulder pain  Pain has been going on for months but has gotten worse in the last week  She does not remember any traumatic or twisting events  Pain is mostly posterior and does not radiate  Movement has made it worse and nothing has made it better  She has not received any treatment or imaging for this shoulder before  She was unable to move shoulder at all yesterday, with even light touch illiciting  pain  She is minimally verbal with AMS at this point, so history and instructions for examination were given by daughter  Review Of Systems:   · Skin: Normal  · Neuro: See HPI  · Musculoskeletal: See HPI  · 14 point review of systems negative except as stated above     Past Medical History:   Past Medical History:   Diagnosis Date    Acid reflux     on occ    Arthritis     DJD right hip replaced    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[facility] [address] ) 2007    x 2 lesions with no change    Cancer ([facility] [address] ) 2007    colonic polyps, no surgery done    Deep vein thrombosis (DVT) of left upper extremity ([facility] [address] ) 12/11/2017    Diabetes mellitus ([facility] )     type 2    Diverticulosis     Edema     in legs on occ    Hypertension     on occ    Language barrier     speaks Danish & broken english    Stroke [facility])        Past Surgical History:   Past Surgical History:   Procedure Laterality Date    COLON SURGERY      COLONOSCOPY      COLONOSCOPY N/A 11/2/2016    Procedure: COLONOSCOPY;  Surgeon: Krish Ang MD;  Location: [place] [address] GI LAB; Service:     ESOPHAGOGASTRODUODENOSCOPY N/A 11/2/2016    Procedure: ESOPHAGOGASTRODUODENOSCOPY (EGD); Surgeon: Krish Ang MD;  Location: [facility] GI LAB;   Service:     JOINT REPLACEMENT Right 02/2015    hip    JOINT REPLACEMENT Left     knee    JOINT REPLACEMENT Right     knee    [place] REVISE MEDIAN N/CARPAL TUNNEL SURG Left 1/28/2016 Procedure: RELEASE CARPAL TUNNEL;  Surgeon: Stuart Mitchell MD;  Location: [facility] MAIN OR;  Service: Orthopedics    TUBAL LIGATION      VARICOSE VEIN SURGERY Bilateral        Family History:  Family history reviewed and non-contributory  Family History   Problem Relation Age of Onset    Cancer Mother         throat       Social History:  Social History     Social History    Marital status:      Spouse name: N/A    Number of children: N/A    Years of education: N/A     Social History Main Topics    Smoking status: Former Smoker     Packs/day: 0 25     Years: 10 00     Types: Cigarettes     Quit date: 1950    Smokeless tobacco: Never Used    Alcohol use No      Comment: socially    Drug use: No    Sexual activity: Not Currently     Other Topics Concern    None     Social History Narrative    None       Allergies:    Allergies   Allergen Reactions    Heparin Other (See Comments)     Thrombocytopenia             Labs:    0  Lab Value Date/Time   HCT 27 1 (L) 06/19/2018 0618   HCT 33 9 (L) 06/18/2018 1022   HCT 33 3 (L) 06/16/2018 1009   HCT 31 2 (L) 12/23/2015 0808   HCT 26 8 (L) 02/22/2015 0847   HCT 22 3 (L) 02/21/2015 0610   HGB 8 4 (L) 06/19/2018 0618   HGB 10 5 (L) 06/18/2018 1022   HGB 10 0 (L) 06/16/2018 1009   HGB 10 2 (L) 12/23/2015 0808   HGB 8 7 (L) 02/22/2015 0847   HGB 7 4 (L) 02/21/2015 0610   INR 1 15 05/25/2018 1550   INR 0 98 02/16/2015 1536   WBC 5 23 06/19/2018 0618   WBC 8 45 06/18/2018 1022   WBC 6 19 06/16/2018 1009   WBC 4 0 (L) 12/23/2015 0808   WBC 5 25 02/21/2015 0610   WBC 4 81 02/19/2015 0533   ESR 72 (H) 06/16/2018 1009   ESR 31 (H) 12/23/2015 0808       Meds:    Current Facility-Administered Medications:     acetaminophen (TYLENOL) tablet 650 mg, 650 mg, Oral, Q6H PRN, Perla Camp MD, 650 mg at 06/19/18 1251    apixaban (ELIQUIS) tablet 2 5 mg, 2 5 mg, Oral, BID, Perla Camp MD, 2 5 mg at 06/19/18 0839    atorvastatin (LIPITOR) tablet 80 mg, 80 mg, Oral, Daily With Anu Camp MD, 80 mg at 06/18/18 1741    bisacodyl (DULCOLAX) rectal suppository 10 mg, 10 mg, Rectal, Daily PRN, Perla Camp MD    cefepime (MAXIPIME) 2,000 mg in dextrose 5 % 50 mL IVPB, 2,000 mg, Intravenous, Q24H, Perla Camp MD, Last Rate: 100 mL/hr at 06/19/18 1105, 2,000 mg at 06/19/18 1105    diphenhydrAMINE (BENADRYL) injection 12 5 mg, 12 5 mg, Intravenous, Q6H PRN, Perla Camp MD    docusate sodium (COLACE) capsule 100 mg, 100 mg, Oral, BID, Perla Camp MD, 100 mg at 06/19/18 0839    ferrous sulfate tablet 325 mg, 325 mg, Oral, Daily With Breakfast, Perla Camp MD, 325 mg at 06/19/18 0839    furosemide (LASIX) tablet 40 mg, 40 mg, Oral, Daily, Perla Camp MD, 40 mg at 06/19/18 0839    guaiFENesin (MUCINEX) 12 hr tablet 600 mg, 600 mg, Oral, Q12H [facility], Perla Camp MD, 600 mg at 06/19/18 0839    hydrALAZINE (APRESOLINE) injection 5 mg, 5 mg, Intravenous, Q6H PRN, Perla Camp MD    HYDROmorphone (DILAUDID) injection 0 2 mg, 0 2 mg, Intravenous, Q6H PRN, Perla Camp MD    insulin lispro (HumaLOG) 100 units/mL subcutaneous injection 1-5 Units, 1-5 Units, Subcutaneous, TID AC **AND** Fingerstick Glucose (POCT), , , TID AC, Perla Camp MD    insulin lispro (HumaLOG) 100 units/mL subcutaneous injection 1-5 Units, 1-5 Units, Subcutaneous, HS, Perla Camp MD, 1 Units at 06/18/18 2202    metoprolol tartrate (LOPRESSOR) tablet 50 mg, 50 mg, Oral, Q12H [facility], Perla Camp MD, 50 mg at 06/19/18 0839    metroNIDAZOLE (FLAGYL) IVPB (premix) 500 mg, 500 mg, Intravenous, Q8H, Perla Camp MD, Last Rate: 200 mL/hr at 06/19/18 1358, 500 mg at 06/19/18 1358    multivitamin-minerals (CENTRUM) tablet 1 tablet, 1 tablet, Oral, Daily, Perla Camp MD, 1 tablet at 06/19/18 0839    ondansetron (ZOFRAN) injection 4 mg, 4 mg, Intravenous, Q6H PRN, MD Vanessa Matthews oxyCODONE (ROXICODONE) IR tablet 2 5 mg, 2 5 mg, Oral, Q6H PRN, Perla Camp MD    pantoprazole (PROTONIX) EC tablet 40 mg, 40 mg, Oral, Early Morning, Perla Camp MD, 40 mg at 06/19/18 0529    polyethylene glycol (MIRALAX) packet 17 g, 17 g, Oral, Daily PRN, Perla Camp MD    potassium chloride (K-DUR,KLOR-CON) CR tablet 10 mEq, 10 mEq, Oral, Daily With Breakfast, Perla Camp MD, 10 mEq at 06/19/18 0839    senna (SENOKOT) tablet 8 6 mg, 1 tablet, Oral, HS, Perla Camp MD, 8 6 mg at 06/18/18 2214    Blood Culture:   Lab Results   Component Value Date    BLOODCX No Growth at 24 hrs  06/18/2018    BLOODCX No Growth at 24 hrs  06/18/2018       Wound Culture:   No results found for: WOUNDCULT    Ins and Outs:  I/O last 24 hours: In: 1397 5 [P O :480; I V :917 5]  Out: 700 [Urine:700]          Physical Exam:   /65 (BP Location: Right arm)   Pulse 72   Temp 98 9 °F (37 2 °C) (Oral)   Resp 18   Ht 5' 4" (1 626 m)   Wt 66 4 kg (146 lb 4 8 oz)   LMP  (LMP Unknown)   SpO2 98%   BMI 25 11 kg/m²   Gen: Alert, minimally verbal and sitting comfortably in bed  Does not participate well in exam  HEENT: EOMI, eyes clear, moist mucus membranes, hearing intact  Respiratory: Bilateral chest rise  No audible wheezing found  Cardiovascular: No audible murmurs  Abdomen: soft nondistended  Musculoskeletal: right upper extremity  · Skin pink dry and intact  · Painful range of motion  · Active ROM 30 degrees abduction and 30 degrees forward flexion  · Passive ROM 90 degrees abduction and 90 degrees forward flexion  · Painful radiation down dorsal aspect of arm from elbow down to wrist which just started during examination per family and patient  · Sensation intact to axillary, musculocutaneous, radial, ulna, median nerves otherwise  · Motor intact to radial, ulnar, and median nerves distally  · Unable to participate in Caleb's, Hornblowers, lift off, or lag tests    Radiology:    I personally reviewed the films  X-rays of right shoulder shows no acute fractures or dislocations with osteopenia and arthritic changes    Assessment:  80 y  o female with  right shoulder pain  Differential includes arthritic changes, adhesive capsulitis, or old rotator cuff tear  Plan:   · WBAT RUE  · PT/OT  · Will give intra-articular GH steroid injection and re-examine in AM  · Pain control per primary  · Monitor glucose levels  · Dispo: Ortho will follow    Procedure- Orthopedics   Charisma Curtis 80 y o  female MRN: [id number]  Unit/Bed#: [facility] 813-01    Procedure: right glenohumeral injection    After sterile preparation of the skin overlying the shoulder a 25 gauge needle was inserted via a posterior approach  A mixture of 2cc   5% Marcaine, 1cc Betamethasone was injected into the glenohumeral joint without resistance  The needle was withdrawn and a band aid was placed over the injection site  Pt tolerated the procedure well and was neurovascularly intact both pre and post procedure      Michelle Corona MD

## 2018-06-19 NOTE — SOCIAL WORK
CM met with pt and her 3 daughters at bedside   Patient lives alone in her home and her 4 children provide 24/7  Care   Pt has a few steps to get in and once in everything on first level for pt , there is  a bathroom on the first floor  The patient has supportive family her  daughter helps with household chores and transportation  Patient has a walker, cane, commode and shower chair  Patient uses walker as needed  Pt family requesting assistance with hospital bed, transport wheel chair and soy life  Patient is open to [facility] [place] and family would like to continue care , referral in [facility] as such   Jessica Miguel Patient has been to [address] [place] [address] and rigoberto Abdul   PCP is Dr Ramirez  Patient has prescription coverage and uses [facility] [place] [place]  Patient has 4 supportive adult children  DAughter Emanuel Postal is BRITTNY  Pt has no history of mental health or [facility] rehab in the past    CM reviewed d/c planning process including the following: identifying help at home, patient preference for d/c planning needs, Discharge Lounge, [facility] Meds to Bed program, availability of treatment team to discuss questions or concerns patient and/or family may have regarding understanding medications and recognizing signs and symptoms once discharged  CM also encouraged patient to follow up with all recommended appointments after discharge  Patient advised of importance for patient and family to participate in managing patients medical well being  Discharge checklist discussed with patient and family

## 2018-06-19 NOTE — PLAN OF CARE
Problem: OCCUPATIONAL THERAPY ADULT  Goal: Performs self-care activities at highest level of function for planned discharge setting  See evaluation for individualized goals  Treatment Interventions: ADL retraining, Visual perceptual retraining, Functional transfer training, UE strengthening/ROM, Endurance training, Cognitive reorientation, Patient/family training, Equipment evaluation/education, Fine motor coordination activities, Compensatory technique education, Continued evaluation, Energy conservation, Activityengagement          See flowsheet documentation for full assessment, interventions and recommendations  Limitation: Decreased ADL status, Decreased UE ROM, Decreased UE strength, Decreased Safe judgement during ADL, Decreased cognition, Decreased endurance, Decreased fine motor control, Decreased self-care trans, Decreased high-level ADLs  Prognosis: Fair  Assessment: Pt is a 81 yo F admit to [facility] on [facility] w/ fever, recently [place] [facility] [place]'d w/ HCAP  Pt w/ PMHx significant for CVA, arthritis, cancer, DVT, DM, edema, HTN, diverticulosis   Pt w/ active OT orders and activity orders  PTA, pt was living at home w/ family  Pt is poor historian, unable to obtain compete history  Pt denies falls  Upon OT arrival, pt presents seated in chair  Following OT evaluation, pt seated in chair, all needs within reach  At time of OT eval, pt requiring Min A for grooming, Mod A UB self-care, Max A LB self-care, Mod A x 2 transfers and functional mobility w/ RW  Pt requiring VC for RW safety  Pt demonstrating deficits in baseline areas of occupation including decreased ADL performance, IADL performance, and functional mobility 2* cognitive status, decreased attention, decreased command following, generalized weakness, decreased mobility status, decreased activity tolerance, increased risk for falls, decreased safety awareness   Pt scored a 30/100 on the Barthel Index and has been identified as a high complexity evaluation  OT will continue to follow pt 3-5x/wk to promote increased independence w/ ADLs and functional mobility to meet below stated goals  From OT standpoint, anticipate d/c to STR to continue to address safety and independence w/ ADLs, IADLs, and functional mobility       OT Discharge Recommendation: Short Term Rehab  OT - OK to Discharge: Yes (to STR at this time)

## 2018-06-19 NOTE — ASSESSMENT & PLAN NOTE
· Compensated at this point  · Continue heart failure medication  · Continue Lasix  · Oxygen p r n  Rochelle Sole

## 2018-06-20 LAB
GLUCOSE SERPL-MCNC: 135 MG/DL (ref 65–140)
GLUCOSE SERPL-MCNC: 162 MG/DL (ref 65–140)
GLUCOSE SERPL-MCNC: 192 MG/DL (ref 65–140)
GLUCOSE SERPL-MCNC: 202 MG/DL (ref 65–140)
GLUCOSE SERPL-MCNC: 202 MG/DL (ref 65–140)
GLUCOSE SERPL-MCNC: 216 MG/DL (ref 65–140)
GLUCOSE SERPL-MCNC: 224 MG/DL (ref 65–140)

## 2018-06-20 PROCEDURE — 97535 SELF CARE MNGMENT TRAINING: CPT

## 2018-06-20 PROCEDURE — 97116 GAIT TRAINING THERAPY: CPT

## 2018-06-20 PROCEDURE — 97112 NEUROMUSCULAR REEDUCATION: CPT

## 2018-06-20 PROCEDURE — 99232 SBSQ HOSP IP/OBS MODERATE 35: CPT | Performed by: INTERNAL MEDICINE

## 2018-06-20 PROCEDURE — 82948 REAGENT STRIP/BLOOD GLUCOSE: CPT

## 2018-06-20 RX ADMIN — Medication 1 TABLET: at 08:03

## 2018-06-20 RX ADMIN — INSULIN LISPRO 1 UNITS: 100 INJECTION, SOLUTION INTRAVENOUS; SUBCUTANEOUS at 23:49

## 2018-06-20 RX ADMIN — INSULIN LISPRO 1 UNITS: 100 INJECTION, SOLUTION INTRAVENOUS; SUBCUTANEOUS at 16:38

## 2018-06-20 RX ADMIN — Medication 325 MG: at 08:03

## 2018-06-20 RX ADMIN — DOCUSATE SODIUM 100 MG: 100 CAPSULE, LIQUID FILLED ORAL at 17:21

## 2018-06-20 RX ADMIN — DOCUSATE SODIUM 100 MG: 100 CAPSULE, LIQUID FILLED ORAL at 08:03

## 2018-06-20 RX ADMIN — METRONIDAZOLE 500 MG: 500 INJECTION, SOLUTION INTRAVENOUS at 22:18

## 2018-06-20 RX ADMIN — METRONIDAZOLE 500 MG: 500 INJECTION, SOLUTION INTRAVENOUS at 05:58

## 2018-06-20 RX ADMIN — FUROSEMIDE 40 MG: 40 TABLET ORAL at 08:03

## 2018-06-20 RX ADMIN — GUAIFENESIN 600 MG: 600 TABLET, EXTENDED RELEASE ORAL at 22:18

## 2018-06-20 RX ADMIN — GUAIFENESIN 600 MG: 600 TABLET, EXTENDED RELEASE ORAL at 08:03

## 2018-06-20 RX ADMIN — INSULIN LISPRO 1 UNITS: 100 INJECTION, SOLUTION INTRAVENOUS; SUBCUTANEOUS at 00:10

## 2018-06-20 RX ADMIN — APIXABAN 2.5 MG: 2.5 TABLET, FILM COATED ORAL at 08:03

## 2018-06-20 RX ADMIN — METOPROLOL TARTRATE 50 MG: 50 TABLET, FILM COATED ORAL at 22:18

## 2018-06-20 RX ADMIN — METRONIDAZOLE 500 MG: 500 INJECTION, SOLUTION INTRAVENOUS at 13:08

## 2018-06-20 RX ADMIN — SENNOSIDES 8.6 MG: 8.6 TABLET, FILM COATED ORAL at 22:18

## 2018-06-20 RX ADMIN — PANTOPRAZOLE SODIUM 40 MG: 40 TABLET, DELAYED RELEASE ORAL at 05:57

## 2018-06-20 RX ADMIN — APIXABAN 2.5 MG: 2.5 TABLET, FILM COATED ORAL at 17:21

## 2018-06-20 RX ADMIN — CEFEPIME HYDROCHLORIDE 2000 MG: 2 INJECTION, POWDER, FOR SOLUTION INTRAVENOUS at 11:44

## 2018-06-20 RX ADMIN — ACETAMINOPHEN 650 MG: 325 TABLET ORAL at 06:32

## 2018-06-20 RX ADMIN — METOPROLOL TARTRATE 50 MG: 50 TABLET, FILM COATED ORAL at 08:03

## 2018-06-20 RX ADMIN — INSULIN LISPRO 2 UNITS: 100 INJECTION, SOLUTION INTRAVENOUS; SUBCUTANEOUS at 07:59

## 2018-06-20 RX ADMIN — ATORVASTATIN CALCIUM 80 MG: 80 TABLET, FILM COATED ORAL at 17:21

## 2018-06-20 RX ADMIN — POTASSIUM CHLORIDE 10 MEQ: 750 TABLET, EXTENDED RELEASE ORAL at 08:03

## 2018-06-20 NOTE — PLAN OF CARE
Problem: OCCUPATIONAL THERAPY ADULT  Goal: Performs self-care activities at highest level of function for planned discharge setting  See evaluation for individualized goals  Treatment Interventions: ADL retraining, Visual perceptual retraining, Functional transfer training, UE strengthening/ROM, Endurance training, Cognitive reorientation, Patient/family training, Equipment evaluation/education, Fine motor coordination activities, Compensatory technique education, Continued evaluation, Energy conservation, Activityengagement          See flowsheet documentation for full assessment, interventions and recommendations  Outcome: Progressing  Limitation: Decreased ADL status, Decreased UE ROM, Decreased UE strength, Decreased Safe judgement during ADL, Decreased cognition, Decreased endurance, Decreased fine motor control, Decreased self-care trans, Decreased high-level ADLs  Prognosis: Fair  Assessment: Pt participated in skilled OT session this date focusing on functional mobility, safety, ADLs, patient and caregiver training (family present for session), d/c planning  Pt agreeable to participate in OT tx session  Upon arrival, pt seated in chair  Following tx session, pt seated in chair, all needs within reach  Pt requiring Mod A x 1 for transfers, Mod A x 1 w/ RW for ambulation and Max verbal and tactile cues for RW management  Pt able to wash hands and face from seated position w/ setup, doff B/L socks w/ VC  In comparison to previous sessions, pt w/ improvements in mobility status, activity tolerance, activity participation, communication  Pt continues to be functioning below baseline level 2* generalized weakness, medical status, decreased balance, decreased mobility status, decreased attention, communication deficits  From OT standpoint, anticipate that pt will progress to d/c home w/ home OT and increased family support at d/c   Continue to follow pt in hospital for OT treatments to address performance deficits, maximize safety and independence w/ ADLs and functional mobility, and facilitate transition to prior level of functioning       OT Discharge Recommendation: Home OT (and increased family support upon d/c)  OT - OK to Discharge:  (pending medical stability and mobility status)

## 2018-06-20 NOTE — PHYSICAL THERAPY NOTE
PT TREATMENT       06/20/18 1700   Pain Assessment   Pain Assessment FLACC   Pain Rating: FLACC (Rest) - Face 1   Pain Rating: FLACC (Rest) - Legs 0   Pain Rating: FLACC (Rest) - Activity 0   Pain Rating: FLACC (Rest) - Cry 0   Pain Rating: FLACC (Rest) - Consolability 0   Score: FLACC (Rest) 1   Pain Rating: FLACC (Activity) - Face 1   Pain Rating: FLACC (Activity) - Legs 0   Pain Rating: FLACC (Activity) - Activity 0   Pain Rating: FLACC (Activity) - Cry 0   Pain Rating: FLACC (Activity) - Consolability 0   Score: FLACC (Activity) 1   Restrictions/Precautions   Other Precautions Cognitive; Chair Alarm; Fall Risk  ([employer] [place] )   General   Chart Reviewed Yes   Response to Previous Treatment Patient with no complaints from previous session  Family/Caregiver Present Yes  (CHILDREN )   Cognition   Overall Cognitive Status Impaired   Arousal/Participation Cooperative   Attention Attends with cues to redirect   Comments Bulgarian SPEAKING  EXPRESSIVE APHASIA  FAMILY TRANSLATING  Subjective   Subjective PATIENT SPEAKING Bulgarian TO FAMILY   Transfers   Sit to Stand 4  Minimal assistance   Additional items Assist x 1; Increased time required;Verbal cues   Stand to Sit 4  Minimal assistance   Additional items Assist x 1   Ambulation/Elevation   Gait pattern Excessively slow;Decreased foot clearance;L Hemiparesis; Poor UE support   Gait Assistance 4  Minimal assist   Additional items Assist x 1   Assistive Device Rolling walker   Distance 50 FEET X2   Balance   Static Sitting Fair   Static Standing Fair -   Ambulatory Poor +   Endurance Deficit   Endurance Deficit Yes   Endurance Deficit Description WEAKNESS   Activity Tolerance   Activity Tolerance Patient tolerated treatment well   Nurse Made Aware LEEANN BLOOD   Assessment   Prognosis Good   Problem List Decreased strength;Decreased endurance; Impaired balance;Decreased mobility; Impaired judgement;Decreased safety awareness; Impaired tone   Assessment PT INITIATED TREATMENT SESSION IN ORDER TO ASSIST PATIENT IN ACHIEVING GOALS TO IMPROVE TRANSFERS AND AMBULATION  PATIENT DID NOT EXPRESS SELF REPORTED GOAL  SHE CONTINUES TO PRESENT WITH EXPRESSIVE APHASIA AND IS Portuguese SPEAKING ONLY (FAMILY PRESENT TO TRANSLATE)  DURING TRANSFER TRAINING PATIENT PERFORMED 3 SIT<-->STAND TRANSFERS WITH MIN-AX1 REQUIRING TACTILE CUING/DEMONSTRATION TO PUSH FROM CHAIR  DURING GAIT TRAINING SHE AMBULATED 50 FEET X 2 W/ TWO STANDING REST BREAKS SECONDARY TO REPORTED LE FATIGUE  PATIENT DOES NOT NORMALLY UTILIZE RW HOWEVER FAMILY WOULD LIKE TO TO UTILIZE ONE UPON D/C HOME AND SO PATIENT REQUIRED FREQUENT VERBAL/TACTILE CUING TO KEEP USING RW  SECONDARY TO L SIDED HEMIPLEGIA AND NEGLECT PATIENT REQUIRES ASSISTANCE STEERING RW (TENDENCY TO GO TOWARD R SIDE)  FAMILY (THREE CHILDREN) ALL PRESENT FOR TREATMENT SESSION AND ASSISTING PRN- PLAN ON CARING FOR PATIENT 24/7 AT D/C  PT D/C RECOMMENDATION IS FOR HOME W/ FAMILY ASSIST PRN  (MIN-AX1 FOR MOBILITY) AND HOME PT SERVICES  PATIENT WILL BENEFIT FROM CONTINUED SKILLED PT THIS ADMISSION TO ACHIEVE MAXIMAL FUNCTION AND SAFETY  Goals   Patient Goals TO STAND UP FROM HER CHAIR (PATIENT MAKING ATTEMPTS)    STG Expiration Date 06/29/18   Short Term Goal #1 IN ADDITION TO GOALS ESTABLISHED DURING EVALUATION PATIENT WILL NAVIGATE 10 STEPS MIN-AX1    Treatment Day 1   Plan   Treatment/Interventions Functional transfer training;LE strengthening/ROM; Therapeutic exercise;Patient/family training;Equipment eval/education; Bed mobility;Gait training;OT;Spoke to nursing   Progress Progressing toward goals   PT Frequency Other (Comment)  (3-5X/WK)   Recommendation   Recommendation Home with family support;Home PT   Equipment Recommended Walker  (RW)   PT - OK to Discharge Yes  ([address] )     Barron Ling, PT

## 2018-06-20 NOTE — SOCIAL WORK
CM met with pt and her daughters and aware [facility] will call and set up delivery for hospital bed, transfer wheel chair , and soy lift  When medically clear family will transport home

## 2018-06-20 NOTE — ASSESSMENT & PLAN NOTE
· Pain control  · Follow-up results of the right shoulder x-ray  · Aqua K-pad  · Note Orthopedic surgery input  Will reassess with steroid injection

## 2018-06-20 NOTE — PROGRESS NOTES
06/20/18 [address] Involvement Patient active with [employer]   Confucianism Leader Aware/Contacted Leader contacted by phone   Spiritual Beliefs/Perceptions   Concept of God Accepting   Support Systems Children;Family members   Stress Factors   Patient Stress Factors Health changes   Family Stress Factors Health changes   Coping Responses   Family Coping Open/discussion   Plan of Care   Comments Established relationship of care and support; explored pt current treatment; provided active listening and encouragement; provided prayer; offered to contact pt's moore and pt and family accepted; provided blessing and education about pastoral care services   Assessment Completed by: Unit visit

## 2018-06-20 NOTE — PROGRESS NOTES
Orthopedics   Cheri Temple 80 y o  female MRN: [id number]  [facility]      HPI:  80 y o  female with  right shoulder pain and arthritis  Received CSI yesterday  Pain improved           Labs:    0  Lab Value Date/Time   HCT 27 1 (L) 06/19/2018 0618   HCT 33 9 (L) 06/18/2018 1022   HCT 33 3 (L) 06/16/2018 1009   HCT 31 2 (L) 12/23/2015 0808   HCT 26 8 (L) 02/22/2015 0847   HCT 22 3 (L) 02/21/2015 0610   HGB 8 4 (L) 06/19/2018 0618   HGB 10 5 (L) 06/18/2018 1022   HGB 10 0 (L) 06/16/2018 1009   HGB 10 2 (L) 12/23/2015 0808   HGB 8 7 (L) 02/22/2015 0847   HGB 7 4 (L) 02/21/2015 0610   INR 1 15 05/25/2018 1550   INR 0 98 02/16/2015 1536   WBC 5 23 06/19/2018 0618   WBC 8 45 06/18/2018 1022   WBC 6 19 06/16/2018 1009   WBC 4 0 (L) 12/23/2015 0808   WBC 5 25 02/21/2015 0610   WBC 4 81 02/19/2015 0533   ESR 72 (H) 06/16/2018 1009   ESR 31 (H) 12/23/2015 0808       Meds:    Current Facility-Administered Medications:     acetaminophen (TYLENOL) tablet 650 mg, 650 mg, Oral, Q6H PRN, Perla Camp MD, 650 mg at 06/19/18 1251    apixaban (ELIQUIS) tablet 2 5 mg, 2 5 mg, Oral, BID, Perla Camp MD, 2 5 mg at 06/19/18 1733    atorvastatin (LIPITOR) tablet 80 mg, 80 mg, Oral, Daily With Pavel Camp MD, 80 mg at 06/19/18 1732    bisacodyl (DULCOLAX) rectal suppository 10 mg, 10 mg, Rectal, Daily PRN, Perla Camp MD    cefepime (MAXIPIME) 2,000 mg in dextrose 5 % 50 mL IVPB, 2,000 mg, Intravenous, Q24H, Perla Camp MD, Last Rate: 100 mL/hr at 06/19/18 1105, 2,000 mg at 06/19/18 1105    diphenhydrAMINE (BENADRYL) injection 12 5 mg, 12 5 mg, Intravenous, Q6H PRN, Perla Camp MD    docusate sodium (COLACE) capsule 100 mg, 100 mg, Oral, BID, Perla Camp MD, 100 mg at 06/19/18 0839    ferrous sulfate tablet 325 mg, 325 mg, Oral, Daily With Breakfast, Perla Camp MD, 325 mg at 06/19/18 0839    furosemide (LASIX) tablet 40 mg, 40 mg, Oral, Daily, Angie Corona MD, 40 mg at 06/19/18 0839    guaiFENesin (MUCINEX) 12 hr tablet 600 mg, 600 mg, Oral, Q12H [address], Perla Camp MD, 600 mg at 06/19/18 2210    hydrALAZINE (APRESOLINE) injection 5 mg, 5 mg, Intravenous, Q6H PRN, Perla Camp MD    HYDROmorphone (DILAUDID) injection 0 2 mg, 0 2 mg, Intravenous, Q6H PRN, Perla Camp MD    insulin lispro (HumaLOG) 100 units/mL subcutaneous injection 1-5 Units, 1-5 Units, Subcutaneous, TID AC **AND** Fingerstick Glucose (POCT), , , TID AC, Perla Camp MD    insulin lispro (HumaLOG) 100 units/mL subcutaneous injection 1-5 Units, 1-5 Units, Subcutaneous, HS, Perla Camp MD, 1 Units at 06/20/18 0010    metoprolol tartrate (LOPRESSOR) tablet 50 mg, 50 mg, Oral, Q12H [address], Perla Camp MD, 50 mg at 06/19/18 2211    metroNIDAZOLE (FLAGYL) IVPB (premix) 500 mg, 500 mg, Intravenous, Q8H, Perla Camp MD, Last Rate: 200 mL/hr at 06/20/18 0558, 500 mg at 06/20/18 0558    multivitamin-minerals (CENTRUM) tablet 1 tablet, 1 tablet, Oral, Daily, Perla Camp MD, 1 tablet at 06/19/18 0839    ondansetron (ZOFRAN) injection 4 mg, 4 mg, Intravenous, Q6H PRN, Perla Camp MD    oxyCODONE (ROXICODONE) IR tablet 2 5 mg, 2 5 mg, Oral, Q6H PRN, Perla Camp MD    pantoprazole (PROTONIX) EC tablet 40 mg, 40 mg, Oral, Early Morning, Perla Camp MD, 40 mg at 06/20/18 0557    polyethylene glycol (MIRALAX) packet 17 g, 17 g, Oral, Daily PRN, Perla Camp MD    potassium chloride (K-DUR,KLOR-CON) CR tablet 10 mEq, 10 mEq, Oral, Daily With Breakfast, Perla Camp MD, 10 mEq at 06/19/18 0839    senna (SENOKOT) tablet 8 6 mg, 1 tablet, Oral, HS, Perla Camp MD, 8 6 mg at 06/19/18 2211    Blood Culture:   Lab Results   Component Value Date    BLOODCX No Growth at 24 hrs  06/18/2018    BLOODCX No Growth at 24 hrs  06/18/2018       Wound Culture:   No results found for: WOUNDCULT    Ins and Outs:  I/O last 24 hours: In: 1517 5 [P O :600; I V :917 5]  Out: 775 [Urine:775]          Physical Exam:   /57 (BP Location: Right arm)   Pulse 73   Temp 97 5 °F (36 4 °C) (Oral)   Resp 16   Ht 5' 4" (1 626 m)   Wt 67 1 kg (148 lb)   LMP  (LMP Unknown)   SpO2 96%   BMI 25 40 kg/m²   Musculoskeletal: right upper extremity  · Skin pink dry and intact  · Painful range of motion  · AROM to 100 degrees  PROM to 140  · Sensation intact to axillary, musculocutaneous, radial, ulna, median nerves otherwise  · Motor intact to radial, ulnar, and median nerves distally  · Unable to participate in Caleb's, [employer], lift off, or lag tests      Assessment:  80 y  o female with  right shoulder pain s/p CSI    Plan:   · WBAT RUE  · PT/OT  · Pain control per primary  · Monitor glucose levels  · Dispo: Ortho will follow

## 2018-06-20 NOTE — PROGRESS NOTES
Progress Note - Ellen Camp 1933, 80 y o  female MRN: [id number]    Unit/Bed#: [facility] 813-01 Encounter: [id number]    Primary Care Provider: Alba Russell MD   Date and time admitted to hospital: 6/18/2018  9:38 AM        * HCAP (healthcare-associated pneumonia)   Assessment & Plan    · Patient was just discharge at [address] [place] [address] a few days ago, with history of dysphagia with previous strokes  · Likely gram-negative bacteria  However, we cannot rule out possibility of aspiration pneumonia, thus possible anaerobes  · Follow-up results of the blood cultures  · For sputum culture and Gram stain  · Continue cefepime and IV metronidazole  · We will not continue vancomycin at this point as patient likely developed red man syndrome while it was infusing awhile ago  Benadryl p r n  just in case it was an allergic reaction  · Xopenex p r n  Laney Morris · Dysphagia evaluation  · Mucolytic  · May possibly get procalcitonin levels to direct us with therapy  Dysphagia   Assessment & Plan    · Patient's daughter claims that patient has been having problems swallowing  Thus possibility of aspiration  · Aspiration precautions  · Speech/dysphagia evaluation  · According to the patient's daughter, patient tolerated thickened liquids  Thus for now, will have the patient on honey thick liquids and pureed  Abnormal TSH   Assessment & Plan    · Check free T4 within normal limits  Shoulder pain, right   Assessment & Plan    · Pain control  · Follow-up results of the right shoulder x-ray  · Aqua K-pad  · Note Orthopedic surgery input  Will reassess with steroid injection  Chronic systolic CHF (congestive heart failure) (HCC)   Assessment & Plan    · Compensated at this point  · Continue heart failure medication  · Continue Lasix  · Oxygen p r n  History of DVT (deep vein thrombosis)   Assessment & Plan    · Continue apixaban    · Patient's daughter is concerned that the with patient having right shoulder pain, that this may be another DVT; according to her, patient presented the same symptom when she was diagnosed with DVT on the left upper extremity  · Follow-up results of the shoulder x-ray  · For duplex scan of the right upper extremity throughout DVT          CKD (chronic kidney disease) stage 3, GFR 30-59 ml/min   Assessment & Plan    · Baseline serum creatinine is 1 2 to 1 3   · Stable at this point  · Monitor kidney function  · Monitor input and output  · Avoid nephrotoxins  · Avoid hypotension  History of CVA (cerebrovascular accident)   Assessment & Plan    · Has history of multiple strokes this year  · Patient also has history of subarachnoid hemorrhage  · CT scan done today revealed no acute intracranial abnormality  · Presently on anticoagulation with apixaban and on high intensity statin  · Consider a neurology consult, if no improvement with patient's mental status  Encephalopathy   Assessment & Plan    · Likely more of toxic metabolic encephalopathy secondary to patient's infection/pneumonia  · Has history of multiple strokes in the past  · CT scan of the head done today did not reveal any acute findings  · Neuro checks  · Consider Neurology consultation, if no improvement  · Manage patient's infection/pneumonia  HTN (hypertension)   Assessment & Plan    · Blood pressure control  · Continue Lasix  · IV blood pressure medication on as needed basis  · Adjust treatment accordingly  VTE Pharmacologic Prophylaxis:   Pharmacologic: Apixaban (Eliquis)  Mechanical VTE Prophylaxis in Place: No    Patient Centered Rounds: I have performed bedside rounds with nursing staff today  Time Spent for Care: 15 minutes  More than 50% of total time spent on counseling and coordination of care as described above      Current Length of Stay: 2 day(s)    Current Patient Status: Inpatient   Certification Statement: The patient will continue to require additional inpatient hospital stay due to Need to monitor symptoms        Code Status: Level 1 - Full Code      Subjective:   Patient comfortable    Objective:     Vitals:   Temp (24hrs), Av 8 °F (36 6 °C), Min:97 5 °F (36 4 °C), Max:98 1 °F (36 7 °C)    HR:  [60-73] 71  Resp:  [16-20] 18  BP: (104-138)/(52-92) 138/92  SpO2:  [96 %-99 %] 99 %  Body mass index is 25 4 kg/m²  Input and Output Summary (last 24 hours): Intake/Output Summary (Last 24 hours) at 18 0844  Last data filed at 18 0815   Gross per 24 hour   Intake              720 ml   Output              775 ml   Net              -55 ml       Physical Exam:     Physical Exam   Constitutional: She is oriented to person, place, and time  HENT:   Head: Normocephalic and atraumatic  Eyes: Conjunctivae are normal  Pupils are equal, round, and reactive to light  Neck: Normal range of motion  Neck supple  No tracheal deviation present  No thyromegaly present  Cardiovascular: Normal rate and regular rhythm  No murmur heard  Pulmonary/Chest: Effort normal and breath sounds normal  No respiratory distress  She has no wheezes  Abdominal: Soft  Bowel sounds are normal  She exhibits no distension  There is no tenderness  Neurological: She is alert and oriented to person, place, and time  No cranial nerve deficit  Skin: Skin is warm and dry  No erythema  Psychiatric: She has a normal mood and affect         Additional Data:     Labs:      Results from last 7 days  Lab Units 1818 18  1022   WBC Thousand/uL 5 23 8 45   HEMOGLOBIN g/dL 8 4* 10 5*   HEMATOCRIT % 27 1* 33 9*   PLATELETS Thousands/uL 79* 94*   NEUTROS PCT %  --  84*   LYMPHS PCT %  --  7*   MONOS PCT %  --  7   EOS PCT %  --  2       Results from last 7 days  Lab Units 1818 18  1117   SODIUM mmol/L 137 137   POTASSIUM mmol/L 4 1 3 9   CHLORIDE mmol/L 106 102   CO2 mmol/L 25 27   BUN mg/dL 31* 33* CREATININE mg/dL 1 34* 1 37*   CALCIUM mg/dL 8 6 8 6   TOTAL PROTEIN g/dL  --  7 0   BILIRUBIN TOTAL mg/dL  --  0 72   ALK PHOS U/L  --  103   ALT U/L  --  29   AST U/L  --  26   GLUCOSE RANDOM mg/dL 106 127           * I Have Reviewed All Lab Data Listed Above  * Additional Pertinent Lab Tests Reviewed: All Labs Within Last 24 Hours Reviewed      Recent Cultures (last 7 days):       Results from last 7 days  Lab Units 06/18/18  1022   BLOOD CULTURE  No Growth at 24 hrs  No Growth at 24 hrs         Last 24 Hours Medication List:     Current Facility-Administered Medications:  acetaminophen 650 mg Oral Q6H PRN Perla Camp MD    apixaban 2 5 mg Oral BID Perla Camp MD    atorvastatin 80 mg Oral Daily With [facility] [place] MD Zoë    bisacodyl 10 mg Rectal Daily PRN Perla Camp MD    cefepime 2,000 mg Intravenous Q24H Perla Camp MD Last Rate: 2,000 mg (06/19/18 1105)   diphenhydrAMINE 12 5 mg Intravenous Q6H PRN Perla Camp MD    docusate sodium 100 mg Oral BID Perla Camp MD    ferrous sulfate 325 mg Oral Daily With Breakfast Perla Camp MD    furosemide 40 mg Oral Daily Perla Camp MD    guaiFENesin 600 mg Oral Q12H [address] Perla Camp MD    hydrALAZINE 5 mg Intravenous Q6H PRN Perla Camp MD    HYDROmorphone 0 2 mg Intravenous Q6H PRN Perla Camp MD    insulin lispro 1-5 Units Subcutaneous TID AC Perla Camp MD    insulin lispro 1-5 Units Subcutaneous HS Perla Camp MD    metoprolol tartrate 50 mg Oral Q12H [address] Perla Camp MD    metroNIDAZOLE 500 mg Intravenous Q8H Perla Camp MD Last Rate: 500 mg (06/20/18 0558)   multivitamin-minerals 1 tablet Oral Daily Perla Camp MD    ondansetron 4 mg Intravenous Q6H PRN Perla Camp MD    oxyCODONE 2 5 mg Oral Q6H PRN Perla Camp MD    pantoprazole 40 mg Oral Early Morning Perla Camp MD    polyethylene glycol 17 g Oral Daily PRN Perla Camp MD    potassium chloride 10 mEq Oral Daily With Breakfast Perla Camp MD    senna 1 tablet Oral HS Mack Aldridge MD         Today, Patient Was Seen By: Domi Olivares DO    ** Please Note: Dictation voice to text software may have been used in the creation of this document   **

## 2018-06-20 NOTE — DISCHARGE INSTRUCTIONS
Discharge Instructions - Orthopedics  Ellen Camp 80 y o  female MRN: [id number]  Unit/Bed#: [facility] 813-01    Weight Bearing Status:                                           Weight bearing as tolerated right upper extremity    Pain:  Continue analgesics as directed    PT/OT:  Continue PT/OT on outpatient basis as directed    Appt Instructions: If you do not have your appointment, please call the clinic at [phone] to f/u with Dr Du Lloyd in 1 month    Contact the office sooner if you experience any increased numbness/tingling in the extremities        Miscellaneous:  None

## 2018-06-20 NOTE — OCCUPATIONAL THERAPY NOTE
Occupational Therapy Treatment Note      Niall Hicks    6/20/2018    Patient Active Problem List   Diagnosis    Deep vein thrombosis (DVT) [facility] left upper extremity [facility]    H/O mitral valve replacement    CVA (cerebral vascular accident) ([facility] )    Controlled type 2 diabetes mellitus without complication, without long-term current use of insulin [facility]    Pacemaker    Acid reflux    HTN (hypertension)    Constipation    CHADWICK (acute kidney injury) ([facility] )    [facility]st[facility] failure [facility]    Encephalopathy    History of ischemic right MCA stroke    History of CVA (cerebrovascular accident)    History of subarachnoid hemorrhage    CKD (chronic kidney disease) stage 3, GFR 30-59 ml/min    History of DVT (deep vein thrombosis)    History of pacemaker    Acute cystitis without hematuria    Chronic systolic CHF (congestive heart failure) (HCC)    Colonic thickening    Anemia    Altered mental status    [facility]    HCAP (healthcare-associated pneumonia)    Shoulder pain, right    Abnormal TSH    Dysphagia       Past Medical History:   Diagnosis Date    Acid reflux     on occ    Arthritis     DJD right hip replaced    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 2007    x 2 lesions with no change    Cancer ([place] [address] ) 2007    colonic polyps, no surgery done    Deep vein thrombosis (DVT) of left upper extremity ([facility] ) 12/11/2017    Diabetes mellitus ([facility] )     type 2    Diverticulosis     Edema     in legs on occ    Hypertension     on occ    Language barrier     speaks Tajik & broken english    Stroke [facility])        Past Surgical History:   Procedure Laterality Date    COLON SURGERY      COLONOSCOPY      COLONOSCOPY N/A 11/2/2016    Procedure: COLONOSCOPY;  Surgeon: Kimberley Castillo MD;  Location: [facility] GI LAB; Service:     ESOPHAGOGASTRODUODENOSCOPY N/A 11/2/2016    Procedure: ESOPHAGOGASTRODUODENOSCOPY (EGD); Surgeon: Kimberley Castillo MD;  Location: [facility] GI LAB;   Service:    Antonia Kemp JOINT REPLACEMENT Right 02/2015    hip    JOINT REPLACEMENT Left     knee    JOINT REPLACEMENT Right     knee    MS REVISE MEDIAN N/CARPAL TUNNEL SURG Left 1/28/2016    Procedure: RELEASE CARPAL TUNNEL;  Surgeon: Kit Lin MD;  Location[facility] MA[facility];  Service: Orthopedics    TUBAL LIGATION      VARICOSE VEIN SURGERY Bilateral         06/20/18 1545   Restrictions/Precautions   Weight Bearing Precautions Per Order No   Other Precautions Fall Risk; Chair Alarm;Cognitive   Lifestyle   Autonomy pt was ind previously, lived alone, active lifestyle   Reciprocal Relationships pt has family support, plan to provide 24 hr supervision and assistance at home upon d/c   Service to Others pt is retired from [employer]  Intrinsic Gratification pt enjoys shopping, cooking, going out to eat, spending time w/ family, gardening  Pain Assessment   Pain Assessment No/denies pain   Pain Score No Pain   ADL   Where Assessed Chair   Grooming Assistance 5  Supervision/Setup   Grooming Deficit Wash/dry hands; Wash/dry face; Increased time to complete;Setup;Verbal cueing   Grooming Comments able to wash face w/ cues   LB Dressing Assistance 4  Minimal Assistance   LB Dressing Deficit Setup;Don/doff R sock; Don/doff L sock   LB Dressing Comments education on safe strategies for LB dressing; pt able to doff B/L socks w/ VC   Functional Standing Tolerance   Time 10 minutes   Activity functional mobility   Comments ambulated in bolivar w/ RW w/ Mod A - cues for RW management   Bed Mobility   Additional Comments pt OOB upon arrival   Transfers   Sit to Stand 3  Moderate assistance   Additional items Assist x 1; Increased time required;Verbal cues;Armrests   Stand to Sit 3  Moderate assistance   Additional items Assist x 1; Increased time required;Verbal cues;Armrests   Functional Mobility   Functional Mobility 3  Moderate assistance   Additional Comments requiring Max cues for RW management   Additional items Rolling walker Cognition   Overall Cognitive Status Impaired   Arousal/Participation Cooperative   Attention Attends with cues to redirect   Orientation Level Oriented to person   Memory Decreased recall of precautions;Decreased recall of recent events;Decreased short term memory;Decreased recall of biographical information   Following Commands Follows one step commands with increased time or repetition   Activity Tolerance   Activity Tolerance Patient tolerated treatment well   Medical Staff Made Aware RN; SHELLEY   Assessment   Assessment Pt participated in skilled OT session this date focusing on functional mobility, safety, ADLs, patient and caregiver training (family present for session), d/c planning  Pt agreeable to participate in OT tx session  Upon arrival, pt seated in chair  Following tx session, pt seated in chair, all needs within reach  Pt requiring Mod A x 1 for transfers, Mod A x 1 w/ RW for ambulation and Max verbal and tactile cues for RW management  Pt able to wash hands and face from seated position w/ setup, doff B/L socks w/ VC  In comparison to previous sessions, pt w/ improvements in mobility status, activity tolerance, activity participation, communication  Pt continues to be functioning below baseline level 2* generalized weakness, medical status, decreased balance, decreased mobility status, decreased attention, communication deficits  From OT standpoint, anticipate that pt will progress to d/c home w/ home OT and increased family support at d/c  Continue to follow pt in hospital for OT treatments to address performance deficits, maximize safety and independence w/ ADLs and functional mobility, and facilitate transition to prior level of functioning  Plan   Treatment Interventions ADL retraining;Visual perceptual retraining;Functional transfer training;UE strengthening/ROM; Endurance training;Cognitive reorientation;Patient/family training;Equipment evaluation/education; Fine motor coordination activities; Compensatory technique education;Continued evaluation; Energy conservation; Activityengagement   Goal Expiration Date 06/29/18   Treatment Day 1   OT Frequency 3-5x/wk   Recommendation   OT Discharge Recommendation Home OT  (and increased family support upon d/c)   OT - OK to Discharge (pending medical stability and mobility status)   Barthel Index   Feeding 5   Bathing 0   Grooming Score 0   Dressing Score 5   Bladder Score 5   Bowels Score 5   Toilet Use Score 5   Transfers (Bed/Chair) Score 5   Mobility (Level Surface) Score 0   Stairs Score 0   Barthel Index Score 30   Modified Connor Scale   Modified Connor Scale 4   Documentation Completed by Eduardo Mcginnis MS, OTR/L

## 2018-06-20 NOTE — ASSESSMENT & PLAN NOTE
· Patient was just discharge at [address] [place] [address] a few days ago, with history of dysphagia with previous strokes  · Likely gram-negative bacteria  However, we cannot rule out possibility of aspiration pneumonia, thus possible anaerobes  · Follow-up results of the blood cultures  · For sputum culture and Gram stain  · Continue cefepime and IV metronidazole  · We will not continue vancomycin at this point as patient likely developed red man syndrome while it was infusing awhile ago  Benadryl p r n  just in case it was an allergic reaction  · Xopenex p r n  Peña Crowell · Dysphagia evaluation  · Mucolytic  · May possibly get procalcitonin levels to direct us with therapy

## 2018-06-20 NOTE — PLAN OF CARE
Problem: PHYSICAL THERAPY ADULT  Goal: Performs mobility at highest level of function for planned discharge setting  See evaluation for individualized goals  Treatment/Interventions: Functional transfer training, LE strengthening/ROM, Therapeutic exercise, Endurance training, Patient/family training, Equipment eval/education, Bed mobility, Gait training, Spoke to nursing, Spoke to case management, OT  Equipment Recommended: Alejandro Vera (RW)       See flowsheet documentation for full assessment, interventions and recommendations  Outcome: Progressing  Prognosis: Good  Problem List: Decreased strength, Decreased endurance, Impaired balance, Decreased mobility, Impaired judgement, Decreased safety awareness, Impaired tone  Assessment: PT INITIATED TREATMENT SESSION IN ORDER TO ASSIST PATIENT IN ACHIEVING GOALS TO IMPROVE TRANSFERS AND AMBULATION  PATIENT DID NOT EXPRESS SELF REPORTED GOAL  SHE CONTINUES TO PRESENT WITH EXPRESSIVE APHASIA AND IS Egyptian SPEAKING ONLY (FAMILY PRESENT TO TRANSLATE)  DURING TRANSFER TRAINING PATIENT PERFORMED 3 SIT<-->STAND TRANSFERS WITH MIN-AX1 REQUIRING TACTILE CUING/DEMONSTRATION TO PUSH FROM CHAIR  DURING GAIT TRAINING SHE AMBULATED 50 FEET X 2 W/ TWO STANDING REST BREAKS SECONDARY TO REPORTED LE FATIGUE  PATIENT DOES NOT NORMALLY UTILIZE RW HOWEVER FAMILY WOULD LIKE TO TO UTILIZE ONE UPON D/C HOME AND SO PATIENT REQUIRED FREQUENT VERBAL/TACTILE CUING TO KEEP USING RW  SECONDARY TO L SIDED HEMIPLEGIA AND NEGLECT PATIENT REQUIRES ASSISTANCE STEERING RW (TENDENCY TO GO TOWARD R SIDE)  FAMILY (THREE CHILDREN) ALL PRESENT FOR TREATMENT SESSION AND ASSISTING PRN- PLAN ON CARING FOR PATIENT 24/7 AT D/C  PT D/C RECOMMENDATION IS FOR HOME W/ FAMILY ASSIST PRN  (MIN-AX1 FOR MOBILITY) AND HOME PT SERVICES  PATIENT WILL BENEFIT FROM CONTINUED SKILLED PT THIS ADMISSION TO ACHIEVE MAXIMAL FUNCTION AND SAFETY           Recommendation: (S) Home with family support, Home PT     PT - OK to Discharge: (S) Yes (HOME W/ FAMILY ASSIST WHEN MED CHELSEY )    See flowsheet documentation for full assessment     Mera Go, PT

## 2018-06-20 NOTE — ASSESSMENT & PLAN NOTE
· Compensated at this point  · Continue heart failure medication  · Continue Lasix  · Oxygen akanksha Knox

## 2018-06-21 LAB
GLUCOSE SERPL-MCNC: 110 MG/DL (ref 65–140)
GLUCOSE SERPL-MCNC: 111 MG/DL (ref 65–140)
GLUCOSE SERPL-MCNC: 129 MG/DL (ref 65–140)
GLUCOSE SERPL-MCNC: 205 MG/DL (ref 65–140)

## 2018-06-21 PROCEDURE — 97535 SELF CARE MNGMENT TRAINING: CPT

## 2018-06-21 PROCEDURE — 82948 REAGENT STRIP/BLOOD GLUCOSE: CPT

## 2018-06-21 RX ADMIN — APIXABAN 2.5 MG: 2.5 TABLET, FILM COATED ORAL at 17:49

## 2018-06-21 RX ADMIN — SENNOSIDES 8.6 MG: 8.6 TABLET, FILM COATED ORAL at 22:38

## 2018-06-21 RX ADMIN — GUAIFENESIN 600 MG: 600 TABLET, EXTENDED RELEASE ORAL at 22:17

## 2018-06-21 RX ADMIN — DOCUSATE SODIUM 100 MG: 100 CAPSULE, LIQUID FILLED ORAL at 08:06

## 2018-06-21 RX ADMIN — METOPROLOL TARTRATE 50 MG: 50 TABLET, FILM COATED ORAL at 22:18

## 2018-06-21 RX ADMIN — METRONIDAZOLE 500 MG: 500 INJECTION, SOLUTION INTRAVENOUS at 06:14

## 2018-06-21 RX ADMIN — CEFEPIME HYDROCHLORIDE 2000 MG: 2 INJECTION, POWDER, FOR SOLUTION INTRAVENOUS at 12:36

## 2018-06-21 RX ADMIN — Medication 1 TABLET: at 08:07

## 2018-06-21 RX ADMIN — METRONIDAZOLE 500 MG: 500 INJECTION, SOLUTION INTRAVENOUS at 13:40

## 2018-06-21 RX ADMIN — INSULIN LISPRO 1 UNITS: 100 INJECTION, SOLUTION INTRAVENOUS; SUBCUTANEOUS at 22:17

## 2018-06-21 RX ADMIN — POTASSIUM CHLORIDE 10 MEQ: 750 TABLET, EXTENDED RELEASE ORAL at 08:07

## 2018-06-21 RX ADMIN — APIXABAN 2.5 MG: 2.5 TABLET, FILM COATED ORAL at 08:07

## 2018-06-21 RX ADMIN — ATORVASTATIN CALCIUM 80 MG: 80 TABLET, FILM COATED ORAL at 17:49

## 2018-06-21 RX ADMIN — Medication 325 MG: at 08:07

## 2018-06-21 RX ADMIN — GUAIFENESIN 600 MG: 600 TABLET, EXTENDED RELEASE ORAL at 08:07

## 2018-06-21 RX ADMIN — FUROSEMIDE 40 MG: 40 TABLET ORAL at 08:07

## 2018-06-21 RX ADMIN — PANTOPRAZOLE SODIUM 40 MG: 40 TABLET, DELAYED RELEASE ORAL at 06:15

## 2018-06-21 RX ADMIN — DOCUSATE SODIUM 100 MG: 100 CAPSULE, LIQUID FILLED ORAL at 17:49

## 2018-06-21 RX ADMIN — METOPROLOL TARTRATE 50 MG: 50 TABLET, FILM COATED ORAL at 08:07

## 2018-06-21 RX ADMIN — METRONIDAZOLE 500 MG: 500 INJECTION, SOLUTION INTRAVENOUS at 22:18

## 2018-06-21 NOTE — PLAN OF CARE
Problem: OCCUPATIONAL THERAPY ADULT  Goal: Performs self-care activities at highest level of function for planned discharge setting  See evaluation for individualized goals  Treatment Interventions: ADL retraining, Visual perceptual retraining, Functional transfer training, UE strengthening/ROM, Endurance training, Cognitive reorientation, Patient/family training, Equipment evaluation/education, Fine motor coordination activities, Compensatory technique education, Continued evaluation, Energy conservation, Activityengagement          See flowsheet documentation for full assessment, interventions and recommendations  Outcome: Progressing  Limitation: Decreased ADL status, Decreased UE ROM, Decreased UE strength, Decreased Safe judgement during ADL, Decreased cognition, Decreased endurance, Decreased fine motor control, Decreased self-care trans, Decreased high-level ADLs  Prognosis: Fair  Assessment: Pt participated in skilled OT session this date focusing on grooming standing at sink, activity tolerance, safety w/ functional mobility  Pt agreeable to participate in OT tx session  Upon arrival, pt seated in chair  Following tx session, pt seated in chair, all needs within reach  Pt family present for session  Pt requiring Min A for sit<>stand tx including verbal and tactile cues for safety w/ RW  Pt ambulated w/ Min A w/ RW, tolerated ~15 min standing/ambulating  Pt washed hands, face, and hair standing w/ verbal and tactile cues w/ SBA at sink w/ RW  In comparison to previous sessions, pt w/ improvements in mobility status, ADL level  Pt continues to be functioning below baseline level 2* generalized weakness, decreased activity tolerance, decreased balance, decreased processing, decreased attention  From OT standpoint, continue to recommend home OT at d/c   Continue to follow pt in hospital for OT treatments to address performance deficits, maximize safety and independence w/ ADLs and functional mobility, and facilitate transition to prior level of functioning       OT Discharge Recommendation: Home OT (and increased family support at d/c)  OT - OK to Discharge: Yes (when medically ready)

## 2018-06-21 NOTE — OCCUPATIONAL THERAPY NOTE
Occupational Therapy Treatment Note      Nancy Hyde    6/21/2018    Patient Active Problem List   Diagnosis    Deep vein thrombosis (DVT) of left upper extremity ([facility] )    H/O mitral valve replacement    CVA (cerebral vascular accident) ([facility] )    Controlled type 2 diabetes mellitus without complication, without long-term current use of insulin ([facility] )    Pacemaker    Acid reflux    HTN (hypertension)    Constipation    CHADWICK (acute kidney injury) ([facility] )    Systolic congestive heart failure ([facility] )    Encephalopathy    History of ischemic right MCA stroke    History of CVA (cerebrovascular accident)    History of subarachnoid hemorrhage    CKD (chronic kidney disease) stage 3, GFR 30-59 ml/min    History of DVT (deep vein thrombosis)    History of pacemaker    Acute cystitis without hematuria    Chronic systolic CHF (congestive heart failure) (HCC)    Colonic thickening    Anemia    Altered mental status    [facility]    HCAP (healthcare-associated pneumonia)    Shoulder pain, right    Abnormal TSH    Dysphagia       Past Medical History:   Diagnosis Date    Acid reflux     on occ    Arthritis     DJD right hip replaced    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 ) 2007    x 2 lesions with no change    Cancer ([facility] ) 2007    colonic polyps, no surgery done    Deep vein thrombosis (DVT) of left upper extremity ([facility] ) 12/11/2017    Diabetes mellitus ([facility] )     type 2    Diverticulosis     Edema     in legs on occ    Hypertension     on occ    Language barrier     speaks [place]inean & broken english    [facility])        Past Surgical History:   Procedure Laterality Date    COLON SURGERY      COLONOSCOPY      COLONOSCOPY N/A 11/2/2016    Procedure: COLONOSCOPY;  Surgeon: Chris Knowles MD;  Location: [facility] GI LAB; Service:     ESOPHAGOGASTRODUODENOSCOPY N/A 11/2/2016    Procedure: ESOPHAGOGASTRODUODENOSCOPY (EGD); Surgeon: Chris Knowles MD;  Location[facility];   Service:    [facility] JOINT REPLACEMENT Right 02/2015    hip    JOINT REPLACEMENT Left     knee    JOINT REPLACEMENT Right     knee    AK REVISE MEDIAN N/CARPAL TUNNEL SURG Left 1/28/2016    Procedure: RELEASE CARPAL TUNNEL;  Surgeon: Virginia Washington MD;  Location: [facility] MAIN OR;  Service: Orthopedics    TUBAL LIGATION      VARICOSE VEIN SURGERY Bilateral         06/21/18 1500   Restrictions/Precautions   Weight Bearing Precautions Per Order No   Other Precautions Cognitive; Chair Alarm; Fall Risk  (alarm on post session)   Pain Assessment   Pain Assessment No/denies pain   Pain Score No Pain   ADL   Where Assessed Standing at sink   Grooming Assistance 4  Minimal Assistance   Grooming Deficit Setup;Verbal cueing;Supervision/safety; Increased time to complete;Standing with assistive device; Wash/dry hands; Wash/dry face;Brushing hair   Grooming Comments verbal cues for thoroughness   Functional Standing Tolerance   Time 15 minutes   Activity standing at sink, walk in bolivar   Comments Min A RW management in halls   Transfers   Sit to Stand 4  Minimal assistance   Additional items Assist x 1; Increased time required;Verbal cues   Stand to Sit 4  Minimal assistance   Additional items Assist x 1; Increased time required;Verbal cues   Functional Mobility   Functional Mobility 4  Minimal assistance   Additional items Rolling walker   Cognition   Overall Cognitive Status Impaired   Arousal/Participation Cooperative   Attention Attends with cues to redirect   Orientation Level Oriented to person;Disoriented to place; Disoriented to time;Disoriented to situation   Memory Decreased recall of precautions;Decreased recall of recent events;Decreased short term memory   Following Commands Follows one step commands with increased time or repetition   Comments family present to assist w/ translating   Activity Tolerance   Activity Tolerance Patient tolerated treatment well   Medical Staff Made Aware LEEANN Mittal   Assessment   Assessment Pt participated in skilled OT session this date focusing on grooming standing at sink, activity tolerance, safety w/ functional mobility  Pt agreeable to participate in OT tx session  Upon arrival, pt seated in chair  Following tx session, pt seated in chair, all needs within reach  Pt family present for session  Pt requiring Min A for sit<>stand tx including verbal and tactile cues for safety w/ RW  Pt ambulated w/ Min A w/ RW, tolerated ~15 min standing/ambulating  Pt washed hands, face, and hair standing w/ verbal and tactile cues w/ SBA at sink w/ RW  In comparison to previous sessions, pt w/ improvements in mobility status, ADL level  Pt continues to be functioning below baseline level 2* generalized weakness, decreased activity tolerance, decreased balance, decreased processing, decreased attention  From OT standpoint, continue to recommend home OT at d/c  Continue to follow pt in hospital for OT treatments to address performance deficits, maximize safety and independence w/ ADLs and functional mobility, and facilitate transition to prior level of functioning  Plan   Treatment Interventions ADL retraining;Visual perceptual retraining;Functional transfer training;UE strengthening/ROM; Endurance training;Cognitive reorientation;Patient/family training;Equipment evaluation/education; Fine motor coordination activities; Compensatory technique education;Continued evaluation; Energy conservation; Activityengagement   Goal Expiration Date 06/29/18   Treatment Day 2   OT Frequency 3-5x/wk   Recommendation   OT Discharge Recommendation Home OT  (and increased family support at d/c)   OT - OK to Discharge Yes  (when medically ready)   Barthel Index   Feeding 5   Bathing 0   Grooming Score 0   Dressing Score 5   Bladder Score 5   Bowels Score 5   Toilet Use Score 5   Transfers (Bed/Chair) Score 10   Mobility (Level Surface) Score 0   Stairs Score 0   Barthel Index Score 35   Modified Maverick Scale   Modified Maverick Scale 4   Documentation Completed by Terra Mcarthur MS, OTR/L

## 2018-06-22 ENCOUNTER — PREP FOR PROCEDURE (OUTPATIENT)
Dept: GASTROENTEROLOGY | Facility: AMBULARY SURGERY CENTER | Age: 83
End: 2018-06-22

## 2018-06-22 DIAGNOSIS — K21.9 GASTROESOPHAGEAL REFLUX DISEASE WITHOUT ESOPHAGITIS: Primary | ICD-10-CM

## 2018-06-22 DIAGNOSIS — K63.5 POLYP OF COLON, UNSPECIFIED PART OF COLON, UNSPECIFIED TYPE: ICD-10-CM

## 2018-06-22 LAB
GLUCOSE SERPL-MCNC: 114 MG/DL (ref 65–140)
GLUCOSE SERPL-MCNC: 134 MG/DL (ref 65–140)
GLUCOSE SERPL-MCNC: 149 MG/DL (ref 65–140)
GLUCOSE SERPL-MCNC: 164 MG/DL (ref 65–140)

## 2018-06-22 PROCEDURE — 97116 GAIT TRAINING THERAPY: CPT

## 2018-06-22 PROCEDURE — 82948 REAGENT STRIP/BLOOD GLUCOSE: CPT

## 2018-06-22 PROCEDURE — 97530 THERAPEUTIC ACTIVITIES: CPT

## 2018-06-22 PROCEDURE — 92526 ORAL FUNCTION THERAPY: CPT

## 2018-06-22 PROCEDURE — 99232 SBSQ HOSP IP/OBS MODERATE 35: CPT | Performed by: INTERNAL MEDICINE

## 2018-06-22 RX ADMIN — ACETAMINOPHEN 650 MG: 325 TABLET ORAL at 10:12

## 2018-06-22 RX ADMIN — APIXABAN 2.5 MG: 2.5 TABLET, FILM COATED ORAL at 10:14

## 2018-06-22 RX ADMIN — DOCUSATE SODIUM 100 MG: 100 CAPSULE, LIQUID FILLED ORAL at 10:13

## 2018-06-22 RX ADMIN — INSULIN LISPRO 1 UNITS: 100 INJECTION, SOLUTION INTRAVENOUS; SUBCUTANEOUS at 21:22

## 2018-06-22 RX ADMIN — METRONIDAZOLE 500 MG: 500 INJECTION, SOLUTION INTRAVENOUS at 05:59

## 2018-06-22 RX ADMIN — DOCUSATE SODIUM 100 MG: 100 CAPSULE, LIQUID FILLED ORAL at 17:38

## 2018-06-22 RX ADMIN — SENNOSIDES 8.6 MG: 8.6 TABLET, FILM COATED ORAL at 21:22

## 2018-06-22 RX ADMIN — ATORVASTATIN CALCIUM 80 MG: 80 TABLET, FILM COATED ORAL at 17:38

## 2018-06-22 RX ADMIN — GUAIFENESIN 600 MG: 600 TABLET, EXTENDED RELEASE ORAL at 21:22

## 2018-06-22 RX ADMIN — APIXABAN 2.5 MG: 2.5 TABLET, FILM COATED ORAL at 17:38

## 2018-06-22 RX ADMIN — PANTOPRAZOLE SODIUM 40 MG: 40 TABLET, DELAYED RELEASE ORAL at 05:59

## 2018-06-22 RX ADMIN — METRONIDAZOLE 500 MG: 500 INJECTION, SOLUTION INTRAVENOUS at 21:19

## 2018-06-22 RX ADMIN — METOPROLOL TARTRATE 50 MG: 50 TABLET, FILM COATED ORAL at 21:22

## 2018-06-22 RX ADMIN — FUROSEMIDE 40 MG: 40 TABLET ORAL at 10:14

## 2018-06-22 RX ADMIN — CEFEPIME HYDROCHLORIDE 2000 MG: 2 INJECTION, POWDER, FOR SOLUTION INTRAVENOUS at 11:10

## 2018-06-22 RX ADMIN — POTASSIUM CHLORIDE 10 MEQ: 750 TABLET, EXTENDED RELEASE ORAL at 10:13

## 2018-06-22 RX ADMIN — METRONIDAZOLE 500 MG: 500 INJECTION, SOLUTION INTRAVENOUS at 14:09

## 2018-06-22 RX ADMIN — METOPROLOL TARTRATE 50 MG: 50 TABLET, FILM COATED ORAL at 10:13

## 2018-06-22 RX ADMIN — Medication 325 MG: at 10:12

## 2018-06-22 RX ADMIN — Medication 1 TABLET: at 10:12

## 2018-06-22 RX ADMIN — ACETAMINOPHEN 650 MG: 325 TABLET ORAL at 00:48

## 2018-06-22 RX ADMIN — GUAIFENESIN 600 MG: 600 TABLET, EXTENDED RELEASE ORAL at 10:14

## 2018-06-22 NOTE — SPEECH THERAPY NOTE
Speech Language/Pathology    Speech/Language Pathology Progress Note    Patient Name: Lamar Chapman  [id number]'[id number] Date: 6/22/2018       Subjective:  Pt seen for dysphagia tx follow up  No family at bedside  Pt confused, holding on to phone, won't let it go  Pt w/ rambling Bengali speech  Pt appeared much more awake and alert than seen previously  Objective:  Pt given 1/2 pc jelly bread, pt self fed, small bites  Slow but consistent chewing  No difficulty swallowing noted, no pocketing  Pt given cup of thin liquids, pt took a few small sips by cup w/o overt s/s aspiration, then refused to take more  Attempted to have pt drink from straw, pt again refusing  Pt kept pushing cup away  Assessment:  Pt cont w/ dysphagia due to cognitive deficits- pt refused trials of thin liquids     Pt appears appropriate to advance diet     Plan/Recommendations:  rec advance to dysphagia 2 w/ nectar thick liquids  Will cont to follow

## 2018-06-22 NOTE — CASE MANAGEMENT
Continued Stay Review    Date: 6/22/18    Vital Signs: /80 (BP Location: Right arm)   Pulse 74   Temp 97 9 °F (36 6 °C) (Oral)   Resp 18   Ht 5' 4" (1 626 m)   Wt 67 8 kg (149 lb 7 6 oz)   LMP  (LMP Unknown)   SpO2 100%   BMI 25 66 kg/m²     Medications:   Scheduled Meds:   Current Facility-Administered Medications:  acetaminophen 650 mg Oral Q6H PRN    apixaban 2 5 mg Oral BID    atorvastatin 80 mg Oral Daily With Dinner    bisacodyl 10 mg Rectal Daily PRN    cefepime 2,000 mg Intravenous Q24H Last Rate: 2,000 mg (06/22/18 1110)   diphenhydrAMINE 12 5 mg Intravenous Q6H PRN    docusate sodium 100 mg Oral BID    ferrous sulfate 325 mg Oral Daily With Breakfast    furosemide 40 mg Oral Daily    guaiFENesin 600 mg Oral Q12H PARUL    hydrALAZINE 5 mg Intravenous Q6H PRN    HYDROmorphone 0 2 mg Intravenous Q6H PRN    insulin lispro 1-5 Units Subcutaneous TID AC    insulin lispro 1-5 Units Subcutaneous HS    metoprolol tartrate 50 mg Oral Q12H PARUL    metroNIDAZOLE 500 mg Intravenous Q8H Last Rate: 500 mg (06/22/18 1409)   multivitamin-minerals 1 tablet Oral Daily    ondansetron 4 mg Intravenous Q6H PRN    oxyCODONE 2 5 mg Oral Q6H PRN    pantoprazole 40 mg Oral Early Morning    polyethylene glycol 17 g Oral Daily PRN    potassium chloride 10 mEq Oral Daily With Breakfast    senna 1 tablet Oral HS      PRN Meds:   Acetaminophen x2    bisacodyl    diphenhydrAMINE    hydrALAZINE    HYDROmorphone    ondansetron    oxyCODONE    polyethylene glycol    Abnormal Labs/Diagnostic Results:     POC glucose 149    Age/Sex: 80 y o  female     Assessment/Plan:   6/22 Medicine Progress Note  * HCAP (healthcare-associated pneumonia)   Assessment & Plan     · Patient was just discharge at [facility] facility a few days ago, with history of dysphagia with previous strokes  · Likely gram-negative bacteria    However, we cannot rule out possibility of aspiration pneumonia, thus possible anaerobes  · Follow-up results of the blood cultures  · For sputum culture and Gram stain  · Continue cefepime and IV metronidazole  · Xopenex p r n  Marcine Grow · Dysphagia evaluation  · Mucolytic         Dysphagia   Assessment & Plan     · Patient's daughter claims that patient has been having problems swallowing  Thus possibility of aspiration  · Aspiration precautions  · Speech/dysphagia evaluation  · According to the patient's daughter, patient tolerated thickened liquids  Thus for now, will have the patient on honey thick liquids and pureed           Shoulder pain, right   Assessment & Plan     · Pain control  · Follow-up results of the right shoulder x-ray  · Aqua K-pad  · Note Orthopedic surgery input  Will reassess with steroid injection         Chronic systolic CHF (congestive heart failure) ([facility] [address] )   Assessment & Plan     · Compensated at this point  · Continue heart failure medication  · Continue Lasix  · Oxygen p r n        History of DVT (deep vein thrombosis)   Assessment & Plan     · Continue apixaban  · Patient's daughter is concerned that the with patient having right shoulder pain, that this may be another DVT; according to her, patient presented the same symptom when she was diagnosed with DVT on the left upper extremity  · Follow-up results of the shoulder x-ray  · For duplex scan of the right upper extremity throughout DVT        CKD (chronic kidney disease) stage 3, GFR 30-59 ml/min   Assessment & Plan     · Baseline serum creatinine is 1 2 to 1 3   · Stable at this point  · Monitor kidney function  · Monitor input and output  · Avoid nephrotoxins  · Avoid hypotension           History of CVA (cerebrovascular accident)   Assessment & Plan     · Has history of multiple strokes this year  · Patient also has history of subarachnoid hemorrhage  · CT scan done today revealed no acute intracranial abnormality  · Presently on anticoagulation with apixaban and on high intensity statin    · Consider a neurology consult, if no improvement with patient's mental status           Encephalopathy   Assessment & Plan     · Likely more of toxic metabolic encephalopathy secondary to patient's infection/pneumonia  · Has history of multiple strokes in the past  · CT scan of the head done today did not reveal any acute findings  · Neuro checks  · Consider Neurology consultation, if no improvement  · Manage patient's infection/pneumonia                HTN (hypertension)   Assessment & Plan     · Blood pressure control  · Continue Lasix  · IV blood pressure medication on as needed basis  · Adjust treatment accordingly               VTE Pharmacologic Prophylaxis:   Pharmacologic: Apixaban (Eliquis)  Mechanical VTE Prophylaxis in Place: No  ________________  6/22 ST Progress Note  Pt cont w/ dysphagia due to cognitive deficits- pt refused trials of thin liquids     Pt appears appropriate to advance diet      Discharge Plan: TBD

## 2018-06-22 NOTE — OCCUPATIONAL THERAPY NOTE
OT cancel note: Attempted OT treatment this AM, however, pt busy with another provider  OT will attempt back later as time permits

## 2018-06-22 NOTE — PROGRESS NOTES
Progress Note - Leeanne Spatz 1933, 80 y o  female MRN: [id number]    Unit/Bed#: [facility] 813-01 Encounter: [id number]    Primary Care Provider: Mary Fowler MD   Date and time admitted to hospital: 6/18/2018  9:38 AM        * HCAP (healthcare-associated pneumonia)   Assessment & Plan    · Patient was just discharge at [address] [place] [address] a few days ago, with history of dysphagia with previous strokes  · Likely gram-negative bacteria  However, we cannot rule out possibility of aspiration pneumonia, thus possible anaerobes  · Follow-up results of the blood cultures  · For sputum culture and Gram stain  · Continue cefepime and IV metronidazole  · Xopenex p r n  Marcine Grow · Dysphagia evaluation  · Mucolytic  Dysphagia   Assessment & Plan    · Patient's daughter claims that patient has been having problems swallowing  Thus possibility of aspiration  · Aspiration precautions  · Speech/dysphagia evaluation  · According to the patient's daughter, patient tolerated thickened liquids  Thus for now, will have the patient on honey thick liquids and pureed  Shoulder pain, right   Assessment & Plan    · Pain control  · Follow-up results of the right shoulder x-ray  · Aqua K-pad  · Note Orthopedic surgery input  Will reassess with steroid injection  Chronic systolic CHF (congestive heart failure) (H[facility])   Assessment & Plan    · Compensated at this point  · Continue heart failure medication  · Continue Lasix  · Oxygen p r n  History of DVT (deep vein thrombosis)   Assessment & Plan    · Continue apixaban  · Patient's daughter is concerned that the with patient having right shoulder pain, that this may be another DVT; according to her, patient presented the same symptom when she was diagnosed with DVT on the left upper extremity  · Follow-up results of the shoulder x-ray    · For duplex scan of the right upper extremity throughout DVT          CKD (chronic kidney disease) stage 3, GFR 30-59 ml/min   Assessment & Plan    · Baseline serum creatinine is 1 2 to 1 3   · Stable at this point  · Monitor kidney function  · Monitor input and output  · Avoid nephrotoxins  · Avoid hypotension  History of CVA (cerebrovascular accident)   Assessment & Plan    · Has history of multiple strokes this year  · Patient also has history of subarachnoid hemorrhage  · CT scan done today revealed no acute intracranial abnormality  · Presently on anticoagulation with apixaban and on high intensity statin  · Consider a neurology consult, if no improvement with patient's mental status  Encephalopathy   Assessment & Plan    · Likely more of toxic metabolic encephalopathy secondary to patient's infection/pneumonia  · Has history of multiple strokes in the past  · CT scan of the head done today did not reveal any acute findings  · Neuro checks  · Consider Neurology consultation, if no improvement  · Manage patient's infection/pneumonia  HTN (hypertension)   Assessment & Plan    · Blood pressure control  · Continue Lasix  · IV blood pressure medication on as needed basis  · Adjust treatment accordingly  VTE Pharmacologic Prophylaxis:   Pharmacologic: Apixaban (Eliquis)  Mechanical VTE Prophylaxis in Place: No    Patient Centered Rounds: I have performed bedside rounds with nursing staff today  Time Spent for Care: 15 minutes  More than 50% of total time spent on counseling and coordination of care as described above  Current Length of Stay: 4 day(s)    Current Patient Status: Inpatient   Certification Statement: The patient will continue to require additional inpatient hospital stay due to Need to monitor symptoms    Discharge Plan:  Tentative discharge within the next 24 to 48 hours      Code Status: Level 1 - Full Code      Subjective:   Patient comfortable    Objective:     Vitals:   Temp (24hrs), Av 7 °F (36 5 °C), Min:97 3 °F (36 3 °C), Max:97 9 °F (36 6 °C)    HR:  [72-82] 74  Resp:  [18-20] 18  BP: (144-162)/(70-80) 162/80  SpO2:  [97 %-100 %] 100 %  Body mass index is 25 66 kg/m²  Input and Output Summary (last 24 hours): Intake/Output Summary (Last 24 hours) at 06/22/18 1029  Last data filed at 06/22/18 1007   Gross per 24 hour   Intake              578 ml   Output             1252 ml   Net             -674 ml       Physical Exam:     Physical Exam    Additional Data:     Labs:      Results from last 7 days  Lab Units 06/19/18  0618 06/18/18  1022   WBC Thousand/uL 5 23 8 45   HEMOGLOBIN g/dL 8 4* 10 5*   HEMATOCRIT % 27 1* 33 9*   PLATELETS Thousands/uL 79* 94*   NEUTROS PCT %  --  84*   LYMPHS PCT %  --  7*   MONOS PCT %  --  7   EOS PCT %  --  2       Results from last 7 days  Lab Units 06/19/18  0618 06/18/18  1117   SODIUM mmol/L 137 137   POTASSIUM mmol/L 4 1 3 9   CHLORIDE mmol/L 106 102   CO2 mmol/L 25 27   BUN mg/dL 31* 33*   CREATININE mg/dL 1 34* 1 37*   CALCIUM mg/dL 8 6 8 6   TOTAL PROTEIN g/dL  --  7 0   BILIRUBIN TOTAL mg/dL  --  0 72   ALK PHOS U/L  --  103   ALT U/L  --  29   AST U/L  --  26   GLUCOSE RANDOM mg/dL 106 127           * I Have Reviewed All Lab Data Listed Above  * Additional Pertinent Lab Tests Reviewed: All Labs Within Last 24 Hours Reviewed    I  Recent Cultures (last 7 days):       Results from last 7 days  Lab Units 06/18/18  1022   BLOOD CULTURE  No Growth at 72 hrs  No Growth at 72 hrs         Last 24 Hours Medication List:     Current Facility-Administered Medications:  acetaminophen 650 mg Oral Q6H PRN Perla Camp MD    apixaban 2 5 mg Oral BID Perla Camp MD    atorvastatin 80 mg Oral Daily With Dinner Perla Camp MD    bisacodyl 10 mg Rectal Daily PRN Perla Camp MD    cefepime 2,000 mg Intravenous Q24H Perla Camp MD Last Rate: 2,000 mg (06/21/18 1236)   diphenhydrAMINE 12 5 mg Intravenous Q6H PRN Perla Camp MD    docusate sodium 100 mg Oral BID Perla Camp MD    ferrous sulfate 325 mg Oral Daily With Breakfast Perla Camp MD    furosemide 40 mg Oral Daily Perla Camp MD    guaiFENesin 600 mg Oral Q12H [facility] Perla Cmap MD    hydrALAZINE 5 mg Intravenous Q6H PRN Perla Camp MD    HYDROmorphone 0 2 mg Intravenous Q6H PRN Perla Camp MD    insulin lispro 1-5 Units Subcutaneous TID AC Perla Camp MD    insulin lispro 1-5 Units Subcutaneous HS Perla Camp MD    metoprolol tartrate 50 mg Oral Q12H [facility] Perla Camp MD    metroNIDAZOLE 500 mg Intravenous Q8H Perla Camp MD Last Rate: 500 mg (06/22/18 0559)   multivitamin-minerals 1 tablet Oral Daily Perla Camp MD    ondansetron 4 mg Intravenous Q6H PRN Perla Camp MD    oxyCODONE 2 5 mg Oral Q6H PRN Perla Camp MD    pantoprazole 40 mg Oral Early Morning Perla Camp MD    polyethylene glycol 17 g Oral Daily PRN Perla Camp MD    potassium chloride 10 mEq Oral Daily With Breakfast Perla Camp MD    senna 1 tablet Oral HS Bertha Del Real MD         Today, Patient Was Seen By: Chino Betancur DO    ** Please Note: Dictation voice to text software may have been used in the creation of this document   **

## 2018-06-22 NOTE — PHYSICAL THERAPY NOTE
Physical Therapy Progress Note     06/22/18 9304   Pain Assessment   Pain Assessment No/denies pain   Pain Score No Pain   Restrictions/Precautions   Weight Bearing Precautions Per Order No   Other Precautions Cognitive; Fall Risk   General   Chart Reviewed Yes   Family/Caregiver Present Yes  (Daughter present throughout session)   Cognition   Overall Cognitive Status Impaired   Arousal/Participation Alert; Responsive   Subjective   Subjective Pt is sitting in bedside chair  Daughter states pt wants to participate and walk  Transfers   Sit to Stand 4  Minimal assistance   Additional items Assist x 1;Verbal cues;Armrests   Stand to Sit 4  Minimal assistance   Additional items Assist x 1;Verbal cues   Stand pivot 4  Minimal assistance   Additional items Assist x 1   Ambulation/Elevation   Gait pattern L Hemiparesis; Poor UE support;Decreased foot clearance  (slow)   Gait Assistance 4  Minimal assist   Additional items Assist x 1; Tactile cues; Verbal cues   Assistive Device Rolling walker   Distance 250 feet   Stair Management Assistance 4  Minimal assist   Additional items Assist x 1; Tactile cues; Verbal cues; Increased time required   Stair Management Technique One rail L;Step to pattern; Foreward;Backward   Number of Stairs 2   Balance   Static Sitting Fair +   Dynamic Sitting Fair -   Static Standing Fair -   Dynamic Standing Poor   Ambulatory Poor +   Endurance Deficit   Endurance Deficit Yes   Endurance Deficit Description weakness and fatigue   Activity Tolerance   Activity Tolerance Patient limited by fatigue   Nurse [facility] to see per LEEANN Gonzalez Sep   Assessment   Prognosis Good   Problem List Decreased strength;Decreased endurance; Impaired balance;Decreased mobility; Decreased coordination;Decreased cognition; Impaired judgement;Decreased safety awareness; Impaired tone   Assessment Pt is making steady progress with use of rolling walker  Additional time required today due to incontinence    Pt daughter present throughout session and assisted with translation  Poor carryover with instructions requiring tactile cues for safety with use of rolling walker  Pt at times pushes walker away during gait training  Completed 2 steps as well today however requires max verbal and tactile cues for technique and safety  Attempted seated TE however pt declined  Daughter assisted with instructions for exercises as well however pt refused  Pt would benefit from continued physical therapy to maximize functional mobility and safety  Goals   Patient Goals To walk   STG Expiration Date 06/29/18   Treatment Day 2   Plan   Treatment/Interventions Functional transfer training;LE strengthening/ROM; Elevations; Therapeutic exercise; Endurance training;Cognitive reorientation;Patient/family training;Bed mobility;Gait training   Progress Progressing toward goals   PT Frequency (3-5x/week)   Recommendation   Recommendation Home with family support;Home PT   Equipment Recommended Walker  (RW)     Delma Figueroa, PTA

## 2018-06-22 NOTE — ASSESSMENT & PLAN NOTE
· Compensated at this point  · Continue heart failure medication  · Continue Lasix  · Oxygen akanksha Green

## 2018-06-22 NOTE — ASSESSMENT & PLAN NOTE
· Patient was just discharge at [address] [place] [address] a few days ago, with history of dysphagia with previous strokes  · Likely gram-negative bacteria  However, we cannot rule out possibility of aspiration pneumonia, thus possible anaerobes  · Follow-up results of the blood cultures  · For sputum culture and Gram stain  · Continue cefepime and IV metronidazole  · Xopenex p r n Georgette Ormond · Dysphagia evaluation  · Mucolytic

## 2018-06-22 NOTE — PLAN OF CARE
Problem: PHYSICAL THERAPY ADULT  Goal: Performs mobility at highest level of function for planned discharge setting  See evaluation for individualized goals  Treatment/Interventions: Functional transfer training, LE strengthening/ROM, Therapeutic exercise, Endurance training, Patient/family training, Equipment eval/education, Bed mobility, Gait training, Spoke to nursing, Spoke to case management, OT  Equipment Recommended: Jen Tinoco (RW)       See flowsheet documentation for full assessment, interventions and recommendations  Outcome: Progressing  Prognosis: Good  Problem List: Decreased strength, Decreased endurance, Impaired balance, Decreased mobility, Decreased coordination, Decreased cognition, Impaired judgement, Decreased safety awareness, Impaired tone  Assessment: Pt is making steady progress with use of rolling walker  Additional time required today due to incontinence  Pt daughter present throughout session and assisted with translation  Poor carryover with instructions requiring tactile cues for safety with use of rolling walker  Pt at times pushes walker away during gait training  Completed 2 steps as well today however requires max verbal and tactile cues for technique and safety  Attempted seated TE however pt declined  Daughter assisted with instructions for exercises as well however pt refused  Pt would benefit from continued physical therapy to maximize functional mobility and safety  Recommendation: Home with family support, Home PT     PT - OK to Discharge: (S) Yes (HOME W/ FAMILY ASSIST WHEN MED CHELSEY )    See flowsheet documentation for full assessment

## 2018-06-23 LAB
ANION GAP SERPL CALCULATED.3IONS-SCNC: 8 MMOL/L (ref 4–13)
BACTERIA BLD CULT: NORMAL
BACTERIA BLD CULT: NORMAL
BASOPHILS # BLD AUTO: 0.02 THOUSANDS/ΜL (ref 0–0.1)
BASOPHILS NFR BLD AUTO: 1 % (ref 0–1)
BUN SERPL-MCNC: 34 MG/DL (ref 5–25)
CALCIUM SERPL-MCNC: 9.4 MG/DL (ref 8.3–10.1)
CHLORIDE SERPL-SCNC: 107 MMOL/L (ref 100–108)
CO2 SERPL-SCNC: 26 MMOL/L (ref 21–32)
CREAT SERPL-MCNC: 1.17 MG/DL (ref 0.6–1.3)
EOSINOPHIL # BLD AUTO: 0.32 THOUSAND/ΜL (ref 0–0.61)
EOSINOPHIL NFR BLD AUTO: 9 % (ref 0–6)
ERYTHROCYTE [DISTWIDTH] IN BLOOD BY AUTOMATED COUNT: 14.6 % (ref 11.6–15.1)
GFR SERPL CREATININE-BSD FRML MDRD: 43 ML/MIN/1.73SQ M
GLUCOSE SERPL-MCNC: 115 MG/DL (ref 65–140)
GLUCOSE SERPL-MCNC: 120 MG/DL (ref 65–140)
GLUCOSE SERPL-MCNC: 122 MG/DL (ref 65–140)
GLUCOSE SERPL-MCNC: 130 MG/DL (ref 65–140)
GLUCOSE SERPL-MCNC: 163 MG/DL (ref 65–140)
HCT VFR BLD AUTO: 32.5 % (ref 34.8–46.1)
HGB BLD-MCNC: 10.1 G/DL (ref 11.5–15.4)
IMM GRANULOCYTES # BLD AUTO: 0.01 THOUSAND/UL (ref 0–0.2)
IMM GRANULOCYTES NFR BLD AUTO: 0 % (ref 0–2)
LYMPHOCYTES # BLD AUTO: 1.44 THOUSANDS/ΜL (ref 0.6–4.47)
LYMPHOCYTES NFR BLD AUTO: 38 % (ref 14–44)
MCH RBC QN AUTO: 28.6 PG (ref 26.8–34.3)
MCHC RBC AUTO-ENTMCNC: 31.1 G/DL (ref 31.4–37.4)
MCV RBC AUTO: 92 FL (ref 82–98)
MONOCYTES # BLD AUTO: 0.44 THOUSAND/ΜL (ref 0.17–1.22)
MONOCYTES NFR BLD AUTO: 12 % (ref 4–12)
NEUTROPHILS # BLD AUTO: 1.52 THOUSANDS/ΜL (ref 1.85–7.62)
NEUTS SEG NFR BLD AUTO: 40 % (ref 43–75)
NRBC BLD AUTO-RTO: 0 /100 WBCS
PLATELET # BLD AUTO: 143 THOUSANDS/UL (ref 149–390)
PMV BLD AUTO: 10.9 FL (ref 8.9–12.7)
POTASSIUM SERPL-SCNC: 4.3 MMOL/L (ref 3.5–5.3)
RBC # BLD AUTO: 3.53 MILLION/UL (ref 3.81–5.12)
SODIUM SERPL-SCNC: 141 MMOL/L (ref 136–145)
WBC # BLD AUTO: 3.75 THOUSAND/UL (ref 4.31–10.16)

## 2018-06-23 PROCEDURE — 99232 SBSQ HOSP IP/OBS MODERATE 35: CPT | Performed by: INTERNAL MEDICINE

## 2018-06-23 PROCEDURE — 85025 COMPLETE CBC W/AUTO DIFF WBC: CPT | Performed by: INTERNAL MEDICINE

## 2018-06-23 PROCEDURE — 80048 BASIC METABOLIC PNL TOTAL CA: CPT | Performed by: INTERNAL MEDICINE

## 2018-06-23 PROCEDURE — 82948 REAGENT STRIP/BLOOD GLUCOSE: CPT

## 2018-06-23 RX ADMIN — METRONIDAZOLE 500 MG: 500 INJECTION, SOLUTION INTRAVENOUS at 21:08

## 2018-06-23 RX ADMIN — INSULIN LISPRO 1 UNITS: 100 INJECTION, SOLUTION INTRAVENOUS; SUBCUTANEOUS at 21:07

## 2018-06-23 RX ADMIN — PANTOPRAZOLE SODIUM 40 MG: 40 TABLET, DELAYED RELEASE ORAL at 05:06

## 2018-06-23 RX ADMIN — DOCUSATE SODIUM 100 MG: 100 CAPSULE, LIQUID FILLED ORAL at 18:16

## 2018-06-23 RX ADMIN — DOCUSATE SODIUM 100 MG: 100 CAPSULE, LIQUID FILLED ORAL at 09:14

## 2018-06-23 RX ADMIN — GUAIFENESIN 600 MG: 600 TABLET, EXTENDED RELEASE ORAL at 09:15

## 2018-06-23 RX ADMIN — SENNOSIDES 8.6 MG: 8.6 TABLET, FILM COATED ORAL at 21:07

## 2018-06-23 RX ADMIN — ATORVASTATIN CALCIUM 80 MG: 80 TABLET, FILM COATED ORAL at 18:16

## 2018-06-23 RX ADMIN — METRONIDAZOLE 500 MG: 500 INJECTION, SOLUTION INTRAVENOUS at 14:33

## 2018-06-23 RX ADMIN — METOPROLOL TARTRATE 50 MG: 50 TABLET, FILM COATED ORAL at 21:07

## 2018-06-23 RX ADMIN — METRONIDAZOLE 500 MG: 500 INJECTION, SOLUTION INTRAVENOUS at 05:06

## 2018-06-23 RX ADMIN — Medication 1 TABLET: at 09:14

## 2018-06-23 RX ADMIN — GUAIFENESIN 600 MG: 600 TABLET, EXTENDED RELEASE ORAL at 21:07

## 2018-06-23 RX ADMIN — FUROSEMIDE 40 MG: 40 TABLET ORAL at 09:15

## 2018-06-23 RX ADMIN — Medication 325 MG: at 09:15

## 2018-06-23 RX ADMIN — APIXABAN 2.5 MG: 2.5 TABLET, FILM COATED ORAL at 09:15

## 2018-06-23 RX ADMIN — APIXABAN 2.5 MG: 2.5 TABLET, FILM COATED ORAL at 18:16

## 2018-06-23 RX ADMIN — POTASSIUM CHLORIDE 10 MEQ: 750 TABLET, EXTENDED RELEASE ORAL at 09:15

## 2018-06-23 RX ADMIN — METOPROLOL TARTRATE 50 MG: 50 TABLET, FILM COATED ORAL at 09:17

## 2018-06-23 RX ADMIN — CEFEPIME HYDROCHLORIDE 2000 MG: 2 INJECTION, POWDER, FOR SOLUTION INTRAVENOUS at 11:56

## 2018-06-23 NOTE — PROGRESS NOTES
Rounded with Dr Priscilla Stevens  Plan to continue IV antibiotics  Dr Priscilla Stevens said patient's daughter was concerned yesterday about anxiety and he would order something

## 2018-06-23 NOTE — ASSESSMENT & PLAN NOTE
· Compensated at this point  · Continue heart failure medication  · Continue Lasix  · Oxygen p r n  Kevon Diaz

## 2018-06-23 NOTE — PROGRESS NOTES
Progress Note - Marbella Li 1933, 80 y o  female MRN: [id number]    Unit/Bed#: [facility] 813-01 Encounter: [id number]    Primary Care Provider: Kylah Roberts MD   Date and time admitted to hospital: 6/18/2018  9:38 AM        * HCAP (healthcare-associated pneumonia)   Assessment & Plan    · Patient was just discharge at [address] [place] [address] a few days ago, with history of dysphagia with previous strokes  · Likely gram-negative bacteria  However, we cannot rule out possibility of aspiration pneumonia, thus possible anaerobes  · Follow-up results of the blood cultures  · For sputum culture and Gram stain  · Continue cefepime and IV metronidazole  · Xopenex p r n  Marline Mort · Dysphagia evaluation  · Mucolytic  Dysphagia   Assessment & Plan    · Patient's daughter claims that patient has been having problems swallowing  Thus possibility of aspiration  · Aspiration precautions  · Speech/dysphagia evaluation  · According to the patient's daughter, patient tolerated thickened liquids  Thus for now, will have the patient on honey thick liquids and pureed  Abnormal TSH   Assessment & Plan    · Check free T4 within normal limits  Shoulder pain, right   Assessment & Plan    · Pain control  · Follow-up results of the right shoulder x-ray  · Aqua K-pad  · Note Orthopedic surgery input  Will reassess with steroid injection  Chronic systolic CHF (congestive heart failure) (HCC)   Assessment & Plan    · Compensated at this point  · Continue heart failure medication  · Continue Lasix  · Oxygen p r n  History of DVT (deep vein thrombosis)   Assessment & Plan    · Continue apixaban  · Patient's daughter is concerned that the with patient having right shoulder pain, that this may be another DVT; according to her, patient presented the same symptom when she was diagnosed with DVT on the left upper extremity  · Follow-up results of the shoulder x-ray    · For duplex scan of the right upper extremity throughout DVT          CKD (chronic kidney disease) stage 3, GFR 30-59 ml/min   Assessment & Plan    · Baseline serum creatinine is 1 2 to 1 3 -at baseline creatinine  · Stable at this point  · Monitor kidney function  · Monitor input and output  · Avoid nephrotoxins  · Avoid hypotension  History of CVA (cerebrovascular accident)   Assessment & Plan    · Has history of multiple strokes this year  · Patient also has history of subarachnoid hemorrhage  · CT scan done today revealed no acute intracranial abnormality  · Presently on anticoagulation with apixaban and on high intensity statin  · Consider a neurology consult, if no improvement with patient's mental status  Encephalopathy   Assessment & Plan    · Likely more of toxic metabolic encephalopathy secondary to patient's infection/pneumonia  · Has history of multiple strokes in the past  · CT scan of the head done today did not reveal any acute findings  · Neuro checks  · Consider Neurology consultation, if no improvement  · Manage patient's infection/pneumonia  HTN (hypertension)   Assessment & Plan    · Blood pressure control  · Continue Lasix  · IV blood pressure medication on as needed basis  · Adjust treatment accordingly  VTE Pharmacologic Prophylaxis:   Pharmacologic: Apixaban (Eliquis)  Mechanical VTE Prophylaxis in Place: No    Patient Centered Rounds: I have performed bedside rounds with nursing staff today  Time Spent for Care: 15 minutes  More than 50% of total time spent on counseling and coordination of care as described above  Current Length of Stay: 5 day(s)    Current Patient Status: Inpatient   Certification Statement: The patient will continue to require additional inpatient hospital stay due to Need to monitor symptoms        Code Status: Level 1 - Full Code      Subjective:   Patient comfortable      Objective:     Vitals:   Temp (24hrs), Av 8 °F (36 6 °C), Min:97 8 °F (36 6 °C), Max:97 9 °F (36 6 °C)    HR:  [72-76] 73  Resp:  [16-18] 16  BP: (132-167)/(65-96) 167/77  SpO2:  [96 %-99 %] 96 %  Body mass index is 25 27 kg/m²  Input and Output Summary (last 24 hours): Intake/Output Summary (Last 24 hours) at 18 0830  Last data filed at 18 0803   Gross per 24 hour   Intake              730 ml   Output              877 ml   Net             -147 ml       Physical Exam:     Physical Exam   Constitutional: She is oriented to person, place, and time  She appears well-developed and well-nourished  HENT:   Head: Normocephalic and atraumatic  Eyes: Conjunctivae are normal  Pupils are equal, round, and reactive to light  Neck: Normal range of motion  Neck supple  No tracheal deviation present  No thyromegaly present  Cardiovascular: Normal rate and regular rhythm  No murmur heard  Pulmonary/Chest: Effort normal and breath sounds normal  No respiratory distress  She has no wheezes  Abdominal: Soft  Bowel sounds are normal  She exhibits no distension  There is no tenderness  Musculoskeletal: Normal range of motion  Neurological: She is alert and oriented to person, place, and time  Skin: Skin is warm and dry  No erythema  Psychiatric: She has a normal mood and affect         Additional Data:     Labs:      Results from last 7 days  Lab Units 18  0450   WBC Thousand/uL 3 75*   HEMOGLOBIN g/dL 10 1*   HEMATOCRIT % 32 5*   PLATELETS Thousands/uL 143*   NEUTROS PCT % 40*   LYMPHS PCT % 38   MONOS PCT % 12   EOS PCT % 9*       Results from last 7 days  Lab Units 18  0450  18  1117   SODIUM mmol/L 141  < > 137   POTASSIUM mmol/L 4 3  < > 3 9   CHLORIDE mmol/L 107  < > 102   CO2 mmol/L 26  < > 27   BUN mg/dL 34*  < > 33*   CREATININE mg/dL 1 17  < > 1 37*   CALCIUM mg/dL 9 4  < > 8 6   TOTAL PROTEIN g/dL  --   --  7 0   BILIRUBIN TOTAL mg/dL  --   --  0 72   ALK PHOS U/L  --   --  103   ALT U/L  --   --  29 AST U/L  --   --  26   GLUCOSE RANDOM mg/dL 122  < > 127   < > = values in this interval not displayed  * I Have Reviewed All Lab Data Listed Above  * Additional Pertinent Lab Tests Reviewed: All Labs Within Last 24 Hours Reviewed      Recent Cultures (last 7 days):       Results from last 7 days  Lab Units 06/18/18  1022   BLOOD CULTURE  No Growth After 4 Days  No Growth After 4 Days         Last 24 Hours Medication List:     Current Facility-Administered Medications:  acetaminophen 650 mg Oral Q6H PRN Perla Camp MD    apixaban 2 5 mg Oral BID Perla Camp MD    atorvastatin 80 mg Oral Daily With Namita Camp MD    bisacodyl 10 mg Rectal Daily PRN Perla Camp MD    cefepime 2,000 mg Intravenous Q24H Perla Camp MD Last Rate: Stopped (06/22/18 1140)   diphenhydrAMINE 12 5 mg Intravenous Q6H PRN Perla Camp MD    docusate sodium 100 mg Oral BID Perla Camp MD    ferrous sulfate 325 mg Oral Daily With Breakfast Perla Camp MD    furosemide 40 mg Oral Daily Perla Camp MD    guaiFENesin 600 mg Oral Q12H [address] Perla Camp MD    hydrALAZINE 5 mg Intravenous Q6H PRN Perla Camp MD    HYDROmorphone 0 2 mg Intravenous Q6H PRN Perla Camp MD    insulin lispro 1-5 Units Subcutaneous TID AC Perla Camp MD    insulin lispro 1-5 Units Subcutaneous HS Perla Camp MD    metoprolol tartrate 50 mg Oral Q12H [address] Perla Camp MD    metroNIDAZOLE 500 mg Intravenous Q8H Perla Camp MD Last Rate: 500 mg (06/23/18 0506)   multivitamin-minerals 1 tablet Oral Daily Perla Camp MD    ondansetron 4 mg Intravenous Q6H PRN Perla Camp MD    oxyCODONE 2 5 mg Oral Q6H PRN Perla Camp MD    pantoprazole 40 mg Oral Early Morning Perla Camp MD    polyethylene glycol 17 g Oral Daily PRN Perla Camp MD    potassium chloride 10 mEq Oral Daily With Breakfast Perla MARK MD Zoë    senna 1 tablet Oral HS Guerda Menendez MD         Today, Patient Was Seen By: Pili Donato DO    ** Please Note: Dictation voice to text software may have been used in the creation of this document   **

## 2018-06-23 NOTE — ASSESSMENT & PLAN NOTE
· Patient was just discharge at [address] [place] [address] a few days ago, with history of dysphagia with previous strokes  · Likely gram-negative bacteria  However, we cannot rule out possibility of aspiration pneumonia, thus possible anaerobes  · Follow-up results of the blood cultures  · For sputum culture and Gram stain  · Continue cefepime and IV metronidazole  · [facility]  [facility] · Dysphagia evaluation  · Mucolytic

## 2018-06-23 NOTE — ASSESSMENT & PLAN NOTE
· Baseline serum creatinine is 1 2 to 1 3 -at baseline creatinine  · Stable at this point  · Monitor kidney function  · Monitor input and output  · Avoid nephrotoxins  · Avoid hypotension

## 2018-06-24 LAB
GLUCOSE SERPL-MCNC: 124 MG/DL (ref 65–140)
GLUCOSE SERPL-MCNC: 194 MG/DL (ref 65–140)
GLUCOSE SERPL-MCNC: 214 MG/DL (ref 65–140)
GLUCOSE SERPL-MCNC: 99 MG/DL (ref 65–140)

## 2018-06-24 PROCEDURE — 82948 REAGENT STRIP/BLOOD GLUCOSE: CPT

## 2018-06-24 PROCEDURE — 99232 SBSQ HOSP IP/OBS MODERATE 35: CPT | Performed by: INTERNAL MEDICINE

## 2018-06-24 RX ADMIN — SENNOSIDES 8.6 MG: 8.6 TABLET, FILM COATED ORAL at 22:13

## 2018-06-24 RX ADMIN — GUAIFENESIN 600 MG: 600 TABLET, EXTENDED RELEASE ORAL at 08:27

## 2018-06-24 RX ADMIN — Medication 1 TABLET: at 08:27

## 2018-06-24 RX ADMIN — METRONIDAZOLE 500 MG: 500 INJECTION, SOLUTION INTRAVENOUS at 05:13

## 2018-06-24 RX ADMIN — ATORVASTATIN CALCIUM 80 MG: 80 TABLET, FILM COATED ORAL at 17:20

## 2018-06-24 RX ADMIN — APIXABAN 2.5 MG: 2.5 TABLET, FILM COATED ORAL at 17:19

## 2018-06-24 RX ADMIN — DOCUSATE SODIUM 100 MG: 100 CAPSULE, LIQUID FILLED ORAL at 17:20

## 2018-06-24 RX ADMIN — FUROSEMIDE 40 MG: 40 TABLET ORAL at 08:27

## 2018-06-24 RX ADMIN — CEFEPIME HYDROCHLORIDE 2000 MG: 2 INJECTION, POWDER, FOR SOLUTION INTRAVENOUS at 11:00

## 2018-06-24 RX ADMIN — DOCUSATE SODIUM 100 MG: 100 CAPSULE, LIQUID FILLED ORAL at 08:27

## 2018-06-24 RX ADMIN — METRONIDAZOLE 500 MG: 500 INJECTION, SOLUTION INTRAVENOUS at 22:14

## 2018-06-24 RX ADMIN — INSULIN LISPRO 2 UNITS: 100 INJECTION, SOLUTION INTRAVENOUS; SUBCUTANEOUS at 13:46

## 2018-06-24 RX ADMIN — PANTOPRAZOLE SODIUM 40 MG: 40 TABLET, DELAYED RELEASE ORAL at 05:16

## 2018-06-24 RX ADMIN — GUAIFENESIN 600 MG: 600 TABLET, EXTENDED RELEASE ORAL at 22:13

## 2018-06-24 RX ADMIN — INSULIN LISPRO 1 UNITS: 100 INJECTION, SOLUTION INTRAVENOUS; SUBCUTANEOUS at 22:14

## 2018-06-24 RX ADMIN — APIXABAN 2.5 MG: 2.5 TABLET, FILM COATED ORAL at 08:27

## 2018-06-24 RX ADMIN — METOPROLOL TARTRATE 50 MG: 50 TABLET, FILM COATED ORAL at 08:27

## 2018-06-24 RX ADMIN — METRONIDAZOLE 500 MG: 500 INJECTION, SOLUTION INTRAVENOUS at 13:45

## 2018-06-24 RX ADMIN — METOPROLOL TARTRATE 50 MG: 50 TABLET, FILM COATED ORAL at 22:13

## 2018-06-24 RX ADMIN — POTASSIUM CHLORIDE 10 MEQ: 750 TABLET, EXTENDED RELEASE ORAL at 08:27

## 2018-06-24 RX ADMIN — Medication 325 MG: at 08:27

## 2018-06-24 NOTE — PROGRESS NOTES
Progress Note - Maki Rodriguez 1933, 80 y o  female MRN: [id number]    Unit/Bed#: [facility] 813-01 Encounter: [id number]    Primary Care Provider: Cleo Leung MD   Date and time admitted to hospital: 6/18/2018  9:38 AM        * HCAP (healthcare-associated pneumonia)   Assessment & Plan    · Patient was just discharge at [address] [place] [address] a few days ago, with history of dysphagia with previous strokes  · Likely gram-negative bacteria  However, we cannot rule out possibility of aspiration pneumonia, thus possible anaerobes  · Follow-up results of the blood cultures  · For sputum culture and Gram stain  · Continue cefepime and IV metronidazole  · Xopenex p r n  Oscar Kaska · Dysphagia evaluation  · Mucolytic  Dysphagia   Assessment & Plan    · Patient's daughter claims that patient has been having problems swallowing  Thus possibility of aspiration  · Aspiration precautions  · Speech/dysphagia evaluation  · According to the patient's daughter, patient tolerated thickened liquids  Thus for now, will have the patient on honey thick liquids and pureed  Abnormal TSH   Assessment & Plan    · Check free T4 within normal limits  Shoulder pain, right   Assessment & Plan    · Pain control  · Follow-up results of the right shoulder x-ray  · Aqua K-pad  · Note Orthopedic surgery input  Will reassess with steroid injection  Chronic systolic CHF (congestive heart failure) (HCC)   Assessment & Plan    · Compensated at this point  · Continue heart failure medication  · Continue Lasix  · Oxygen p r n  History of DVT (deep vein thrombosis)   Assessment & Plan    · Continue apixaban  · Patient's daughter is concerned that the with patient having right shoulder pain, that this may be another DVT; according to her, patient presented the same symptom when she was diagnosed with DVT on the left upper extremity  · Follow-up results of the shoulder x-ray    · For duplex scan of the right upper extremity throughout DVT          CKD (chronic kidney disease) stage 3, GFR 30-59 ml/min   Assessment & Plan    · Baseline serum creatinine is 1 2 to 1 3 -at baseline creatinine  · Stable at this point  · Monitor kidney function  · Monitor input and output  · Avoid nephrotoxins  · Avoid hypotension  History of CVA (cerebrovascular accident)   Assessment & Plan    · Has history of multiple strokes this year  · Patient also has history of subarachnoid hemorrhage  · CT scan done today revealed no acute intracranial abnormality  · Presently on anticoagulation with apixaban and on high intensity statin  · Consider a neurology consult, if no improvement with patient's mental status  Encephalopathy   Assessment & Plan    · Likely more of toxic metabolic encephalopathy secondary to patient's infection/pneumonia  · Has history of multiple strokes in the past  · CT scan of the head done today did not reveal any acute findings  · Neuro checks  · Consider Neurology consultation, if no improvement  · Manage patient's infection/pneumonia  HTN (hypertension)   Assessment & Plan    · Blood pressure control  · Continue Lasix  · IV blood pressure medication on as needed basis  · Adjust treatment accordingly  VTE Pharmacologic Prophylaxis:   Pharmacologic: Apixaban (Eliquis)  Mechanical VTE Prophylaxis in Place: No    Patient Centered Rounds: I have performed bedside rounds with nursing staff today  Time Spent for Care: 15 minutes  More than 50% of total time spent on counseling and coordination of care as described above      Current Length of Stay: 6 day(s)    Current Patient Status: Inpatient   Certification Statement: The patient will continue to require additional inpatient hospital stay due to Need to monitor symptoms        Code Status: Level 1 - Full Code      Subjective:   No acute distress    Objective:     Vitals:   Temp (24hrs), Av 8 °F (36 6 °C), Min:97 6 °F (36 4 °C), Max:97 9 °F (36 6 °C)    HR:  [72-96] 93  Resp:  [18] 18  BP: (137-154)/(62-68) 147/68  SpO2:  [96 %-99 %] 98 %  Body mass index is 25 24 kg/m²  Input and Output Summary (last 24 hours): Intake/Output Summary (Last 24 hours) at 06/24/18 0852  Last data filed at 06/24/18 0513   Gross per 24 hour   Intake              440 ml   Output             1327 ml   Net             -887 ml       Physical Exam:     Physical Exam   Constitutional: She is oriented to person, place, and time  She appears well-developed and well-nourished  HENT:   Head: Normocephalic and atraumatic  Eyes: Conjunctivae are normal  Pupils are equal, round, and reactive to light  Pulmonary/Chest: Effort normal and breath sounds normal    Musculoskeletal: She exhibits no edema  Neurological: She is alert and oriented to person, place, and time  Skin: Skin is warm and dry  No erythema  Psychiatric: She has a normal mood and affect  Additional Data:     Labs:      Results from last 7 days  Lab Units 06/23/18  0450   WBC Thousand/uL 3 75*   HEMOGLOBIN g/dL 10 1*   HEMATOCRIT % 32 5*   PLATELETS Thousands/uL 143*   NEUTROS PCT % 40*   LYMPHS PCT % 38   MONOS PCT % 12   EOS PCT % 9*       Results from last 7 days  Lab Units 06/23/18  0450  06/18/18  1117   SODIUM mmol/L 141  < > 137   POTASSIUM mmol/L 4 3  < > 3 9   CHLORIDE mmol/L 107  < > 102   CO2 mmol/L 26  < > 27   BUN mg/dL 34*  < > 33*   CREATININE mg/dL 1 17  < > 1 37*   CALCIUM mg/dL 9 4  < > 8 6   TOTAL PROTEIN g/dL  --   --  7 0   BILIRUBIN TOTAL mg/dL  --   --  0 72   ALK PHOS U/L  --   --  103   ALT U/L  --   --  29   AST U/L  --   --  26   GLUCOSE RANDOM mg/dL 122  < > 127   < > = values in this interval not displayed  * I Have Reviewed All Lab Data Listed Above  * Additional Pertinent Lab Tests Reviewed:  All Labs Within Last 24 Hours Reviewed    Recent Cultures (last 7 days):       Results from last 7 days  Lab Units 06/18/18  1022   BLOOD CULTURE  No Growth After 5 Days  No Growth After 5 Days  Last 24 Hours Medication List:     Current Facility-Administered Medications:  acetaminophen 650 mg Oral Q6H PRN Perla Camp MD    apixaban 2 5 mg Oral BID Perla Camp MD    atorvastatin 80 mg Oral Daily With Anna Camp MD    bisacodyl 10 mg Rectal Daily PRN Perla Camp MD    cefepime 2,000 mg Intravenous Q24H Perla Camp MD Last Rate: 2,000 mg (06/23/18 1156)   diphenhydrAMINE 12 5 mg Intravenous Q6H PRN Perla Camp MD    docusate sodium 100 mg Oral BID Perla Camp MD    ferrous sulfate 325 mg Oral Daily With Breakfast Perla Camp MD    furosemide 40 mg Oral Daily Perla Camp MD    guaiFENesin 600 mg Oral Q12H [address] Perla Camp MD    hydrALAZINE 5 mg Intravenous Q6H PRN Perla Camp MD    HYDROmorphone 0 2 mg Intravenous Q6H PRN Perla Camp MD    insulin lispro 1-5 Units Subcutaneous TID AC Perla Camp MD    insulin lispro 1-5 Units Subcutaneous HS Perla Camp MD    metoprolol tartrate 50 mg Oral Q12H [address] Perla Camp MD    metroNIDAZOLE 500 mg Intravenous Q8H Perla Camp MD Last Rate: Stopped (06/24/18 0543)   multivitamin-minerals 1 tablet Oral Daily Perla Camp MD    ondansetron 4 mg Intravenous Q6H PRN Perla Camp MD    oxyCODONE 2 5 mg Oral Q6H PRN Perla Camp MD    pantoprazole 40 mg Oral Early Morning Perla Camp MD    polyethylene glycol 17 g Oral Daily PRN Perla Camp MD    potassium chloride 10 mEq Oral Daily With Breakfast Perla Camp MD    senna 1 tablet Oral HS Angie Corona MD         Today, Patient Was Seen By: Patsy Rasheed DO    ** Please Note: Dictation voice to text software may have been used in the creation of this document   **

## 2018-06-24 NOTE — ASSESSMENT & PLAN NOTE
· Compensated at this point  · Continue heart failure medication  · Continue Lasix  · Oxygen p r n Georgette Ormond

## 2018-06-24 NOTE — ASSESSMENT & PLAN NOTE
· Patient was just discharge at [address] [place] [address] a few days ago, with history of dysphagia with previous strokes  · Likely gram-negative bacteria  However, we cannot rule out possibility of aspiration pneumonia, thus possible anaerobes  · Follow-up results of the blood cultures  · For sputum culture and Gram stain  · Continue cefepime and IV metronidazole  · Xopenex p r n  Ludmila Ellis · Dysphagia evaluation  · Mucolytic

## 2018-06-25 VITALS
BODY MASS INDEX: 24.39 KG/M2 | TEMPERATURE: 98 F | RESPIRATION RATE: 16 BRPM | DIASTOLIC BLOOD PRESSURE: 78 MMHG | WEIGHT: 142.86 LBS | SYSTOLIC BLOOD PRESSURE: 161 MMHG | OXYGEN SATURATION: 97 % | HEART RATE: 77 BPM | HEIGHT: 64 IN

## 2018-06-25 LAB
GLUCOSE SERPL-MCNC: 130 MG/DL (ref 65–140)
GLUCOSE SERPL-MCNC: 149 MG/DL (ref 65–140)

## 2018-06-25 PROCEDURE — 99238 HOSP IP/OBS DSCHRG MGMT 30/<: CPT | Performed by: INTERNAL MEDICINE

## 2018-06-25 PROCEDURE — 92526 ORAL FUNCTION THERAPY: CPT

## 2018-06-25 PROCEDURE — 82948 REAGENT STRIP/BLOOD GLUCOSE: CPT

## 2018-06-25 RX ADMIN — PANTOPRAZOLE SODIUM 40 MG: 40 TABLET, DELAYED RELEASE ORAL at 05:04

## 2018-06-25 RX ADMIN — METRONIDAZOLE 500 MG: 500 INJECTION, SOLUTION INTRAVENOUS at 05:04

## 2018-06-25 RX ADMIN — FUROSEMIDE 40 MG: 40 TABLET ORAL at 08:23

## 2018-06-25 RX ADMIN — METOPROLOL TARTRATE 50 MG: 50 TABLET, FILM COATED ORAL at 08:23

## 2018-06-25 RX ADMIN — DOCUSATE SODIUM 100 MG: 100 CAPSULE, LIQUID FILLED ORAL at 08:23

## 2018-06-25 RX ADMIN — GUAIFENESIN 600 MG: 600 TABLET, EXTENDED RELEASE ORAL at 08:23

## 2018-06-25 RX ADMIN — APIXABAN 2.5 MG: 2.5 TABLET, FILM COATED ORAL at 08:23

## 2018-06-25 RX ADMIN — Medication 325 MG: at 08:23

## 2018-06-25 RX ADMIN — POTASSIUM CHLORIDE 10 MEQ: 750 TABLET, EXTENDED RELEASE ORAL at 08:23

## 2018-06-25 RX ADMIN — Medication 1 TABLET: at 08:22

## 2018-06-25 NOTE — ASSESSMENT & PLAN NOTE
· Patient was just discharge at [address] [place] [address] a few days ago, with history of dysphagia with previous strokes  · Likely gram-negative bacteria  However, we cannot rule out possibility of aspiration pneumonia, thus possible anaerobes  · Follow-up results of the blood cultures  · For sputum culture and Gram stain  · Continue cefepime and IV metronidazole through today  Will discharge home     · Xopelety Rodriguez · Dysphagia evaluation  · Mucolytic

## 2018-06-25 NOTE — SOCIAL WORK
CM  [phone] and aware pt for d/c today   Cm put phone and fax in AVS   Cm will follow,    Family will transport home

## 2018-06-25 NOTE — ASSESSMENT & PLAN NOTE
· Patient's daughter claims that patient has been having problems swallowing  Thus possibility of aspiration  · Aspiration precautions  · Speech/dysphagia evaluation  · According to the patient's daughter, patient tolerated thickened liquids  Continue current diet

## 2018-06-25 NOTE — DISCHARGE SUMMARY
Discharge- Marco Antonio Beckett 1933, 80 y o  female MRN: [id number]    Unit/Bed#: [facility] 804-77 Encounter: [id number]    Primary Care Provider: Yanelis Winkler MD   Date and time admitted to hospital: 6/18/2018  9:38 AM        * HCAP (healthcare-associated pneumonia)   Assessment & Plan    · Patient was just discharge at [address] [place] [address] a few days ago, with history of dysphagia with previous strokes  · Likely gram-negative bacteria  However, we cannot rule out possibility of aspiration pneumonia, thus possible anaerobes  · Follow-up results of the blood cultures  · For sputum culture and Gram stain  · Continue cefepime and IV metronidazole through today  Will discharge home     · Xopenex p r n  Maren Penaloza · Dysphagia evaluation  · Mucolytic  Dysphagia   Assessment & Plan    · Patient's daughter claims that patient has been having problems swallowing  Thus possibility of aspiration  · Aspiration precautions  · Speech/dysphagia evaluation  · According to the patient's daughter, patient tolerated thickened liquids  Continue current diet  Abnormal TSH   Assessment & Plan    · Check free T4 within normal limits  Shoulder pain, right   Assessment & Plan    · Pain control  · Follow-up results of the right shoulder x-ray  · Aqua K-pad  · Note Orthopedic surgery input  Will reassess with steroid injection  Chronic systolic CHF (congestive heart failure) (HCC)   Assessment & Plan    · Compensated at this point  · Continue heart failure medication  · Continue Lasix  · Oxygen p r n  History of DVT (deep vein thrombosis)   Assessment & Plan    · Continue apixaban  · Patient's daughter is concerned that the with patient having right shoulder pain, that this may be another DVT; according to her, patient presented the same symptom when she was diagnosed with DVT on the left upper extremity  · Follow-up results of the shoulder x-ray    · For duplex scan of the right upper extremity throughout DVT          CKD (chronic kidney disease) stage 3, GFR 30-59 ml/min   Assessment & Plan    · Baseline serum creatinine is 1 2 to 1 3 -at baseline creatinine  · Stable at this point  · Monitor kidney function  · Monitor input and output  · Avoid nephrotoxins  · Avoid hypotension  History of CVA (cerebrovascular accident)   Assessment & Plan    · Has history of multiple strokes this year  · Patient also has history of subarachnoid hemorrhage  · CT scan done today revealed no acute intracranial abnormality  · Presently on anticoagulation with apixaban and on high intensity statin  · Consider a neurology consult, if no improvement with patient's mental status  Encephalopathy   Assessment & Plan    · Likely more of toxic metabolic encephalopathy secondary to patient's infection/pneumonia  · Has history of multiple strokes in the past  · CT scan of the head done today did not reveal any acute findings  · Neuro checks  · Consider Neurology consultation, if no improvement  · Manage patient's infection/pneumonia  HTN (hypertension)   Assessment & Plan    · Blood pressure control  · Continue Lasix  · IV blood pressure medication on as needed basis  · Adjust treatment accordingly  Resolved Problems  Date Reviewed: 6/18/2018    None          Admission Date:   Admission Orders     Ordered        06/18/18 1320  Inpatient Admission (expected length of stay for this patient is greater than two midnights)  Once               Admitting Diagnosis: Altered mental status [R41 82]  Chest pain [R07 9]  Right lower lobe pneumonia ([address] ) [J18 1]  Dysphagia [R13 10]        Procedures Performed: No orders of the defined types were placed in this encounter  Summary of Hospital Course: This is a 80-year-old female [facility] for evaluation of fever    The patient was just discharged this past May with a diagnosis sepsis secondary to urinary tract infection  The patient received a course of antibiotics at that time  The patient is feeling better and subsequently was discharged to [facility]  She eventually was discharged from the skilled nursing facility the Thursday before admission  This was approximately 4 days earlier to admission on June 18th, 2018  Patient subsequently went home but was having persistent cough and choking sensation with food and thin liquids  She subsequently decided be placed on thickened liquids due to the symptoms  She presents the hospital low-grade fever and was ultimately admitted for pneumonia  She received a full course of antibiotics during this hospitalization  After discussion with the family they wished to take her home with family support  Condition at Discharge: fair         Discharge instructions/Information to patient and family:   See after visit summary for information provided to patient and family  Provisions for Follow-Up Care:  See after visit summary for information related to follow-up care and any pertinent home health orders  PCP: Shira Apley, MD    Disposition: Home    Planned Readmission: No    Discharge Statement   I spent 35 minutes discharging the patient  This time was spent on the day of discharge  I had direct contact with the patient on the day of discharge  Additional documentation is required if more than 30 minutes were spent on discharge  Discharge Medications:  See after visit summary for reconciled discharge medications provided to patient and family

## 2018-06-25 NOTE — PLAN OF CARE
Problem: SLP ADULT - SWALLOWING, IMPAIRED  Goal: Advance to least restrictive diet without signs or symptoms of aspiration for planned discharge setting  See evaluation for individualized goals  Outcome: Adequate for Discharge      Comments: Continue Dysphagia 2/Mechanically altered diet (soft cooked, moist and minced foods) and nectar thick liquids upon discharge  Meds crushed in applesauce  Pending improved mental status, consider future dysphagia eval for potential to advance diet

## 2018-06-25 NOTE — SOCIAL WORK
CM confirmed w/pt's daughter Geraldine Binachi [phone] will be picking pt up for d/c between 12-1pm today  Nursing updated re: same

## 2018-07-03 ENCOUNTER — TELEPHONE (OUTPATIENT)
Dept: GASTROENTEROLOGY | Facility: CLINIC | Age: 83
End: 2018-07-03

## 2018-07-03 ENCOUNTER — HOSPITAL ENCOUNTER (OUTPATIENT)
Dept: RADIOLOGY | Facility: HOSPITAL | Age: 83
Discharge: HOME | End: 2018-07-03
Attending: INTERNAL MEDICINE
Payer: MEDICARE

## 2018-07-03 ENCOUNTER — TRANSCRIBE ORDERS (OUTPATIENT)
Dept: ADMINISTRATIVE | Facility: HOSPITAL | Age: 83
End: 2018-07-03

## 2018-07-03 ENCOUNTER — APPOINTMENT (OUTPATIENT)
Dept: LAB | Facility: HOSPITAL | Age: 83
End: 2018-07-03
Attending: INTERNAL MEDICINE
Payer: MEDICARE

## 2018-07-03 DIAGNOSIS — E13.8 DIABETES MELLITUS OF OTHER TYPE WITH COMPLICATION, UNSPECIFIED WHETHER LONG TERM INSULIN USE: ICD-10-CM

## 2018-07-03 DIAGNOSIS — R47.02 DYSPHASIA: ICD-10-CM

## 2018-07-03 DIAGNOSIS — I10 ESSENTIAL HYPERTENSION, MALIGNANT: ICD-10-CM

## 2018-07-03 DIAGNOSIS — E13.8 DIABETES MELLITUS OF OTHER TYPE WITH COMPLICATION, UNSPECIFIED WHETHER LONG TERM INSULIN USE: Primary | ICD-10-CM

## 2018-07-03 DIAGNOSIS — Z12.11 ENCOUNTER FOR SCREENING COLONOSCOPY: Primary | ICD-10-CM

## 2018-07-03 DIAGNOSIS — N39.0 URINARY TRACT INFECTION WITHOUT HEMATURIA, SITE UNSPECIFIED: ICD-10-CM

## 2018-07-03 DIAGNOSIS — Z87.19 PERSONAL HISTORY OF DIGESTIVE DISEASE: ICD-10-CM

## 2018-07-03 DIAGNOSIS — I69.321 DYSPHASIA STATUS POST CEREBROVASCULAR ACCIDENT: ICD-10-CM

## 2018-07-03 LAB
25(OH)D3 SERPL-MCNC: 24.7 NG/ML (ref 30–100)
ALBUMIN SERPL BCP-MCNC: 3.3 G/DL (ref 3.5–5)
ALP SERPL-CCNC: 90 U/L (ref 46–116)
ALT SERPL W P-5'-P-CCNC: 35 U/L (ref 12–78)
ANION GAP SERPL CALCULATED.3IONS-SCNC: 6 MMOL/L (ref 4–13)
AST SERPL W P-5'-P-CCNC: 26 U/L (ref 5–45)
BASOPHILS # BLD AUTO: 0.02 THOUSANDS/ΜL (ref 0–0.1)
BASOPHILS NFR BLD AUTO: 0 % (ref 0–1)
BILIRUB SERPL-MCNC: 0.5 MG/DL (ref 0.2–1)
BILIRUB UR QL STRIP: NEGATIVE
BUN SERPL-MCNC: 29 MG/DL (ref 5–25)
CALCIUM SERPL-MCNC: 9.8 MG/DL (ref 8.3–10.1)
CHLORIDE SERPL-SCNC: 103 MMOL/L (ref 100–108)
CHOLEST SERPL-MCNC: 148 MG/DL (ref 50–200)
CLARITY UR: CLEAR
CO2 SERPL-SCNC: 31 MMOL/L (ref 21–32)
COLOR UR: YELLOW
CREAT SERPL-MCNC: 1.6 MG/DL (ref 0.6–1.3)
EOSINOPHIL # BLD AUTO: 0.58 THOUSAND/ΜL (ref 0–0.61)
EOSINOPHIL NFR BLD AUTO: 11 % (ref 0–6)
ERYTHROCYTE [DISTWIDTH] IN BLOOD BY AUTOMATED COUNT: 14.9 % (ref 11.6–15.1)
ERYTHROCYTE [SEDIMENTATION RATE] IN BLOOD: 49 MM/HOUR (ref 2–25)
EST. AVERAGE GLUCOSE BLD GHB EST-MCNC: 148 MG/DL
GFR SERPL CREATININE-BSD FRML MDRD: 29 ML/MIN/1.73SQ M
GLUCOSE P FAST SERPL-MCNC: 121 MG/DL (ref 65–99)
GLUCOSE UR STRIP-MCNC: NEGATIVE MG/DL
HBA1C MFR BLD: 6.8 % (ref 4.2–6.3)
HCT VFR BLD AUTO: 36.3 % (ref 34.8–46.1)
HDLC SERPL-MCNC: 51 MG/DL (ref 40–60)
HGB BLD-MCNC: 11.1 G/DL (ref 11.5–15.4)
HGB UR QL STRIP.AUTO: NEGATIVE
IMM GRANULOCYTES # BLD AUTO: 0.01 THOUSAND/UL (ref 0–0.2)
IMM GRANULOCYTES NFR BLD AUTO: 0 % (ref 0–2)
IRON SERPL-MCNC: 45 UG/DL (ref 50–170)
KETONES UR STRIP-MCNC: NEGATIVE MG/DL
LDLC SERPL CALC-MCNC: 73 MG/DL (ref 0–100)
LEUKOCYTE ESTERASE UR QL STRIP: NEGATIVE
LYMPHOCYTES # BLD AUTO: 1 THOUSANDS/ΜL (ref 0.6–4.47)
LYMPHOCYTES NFR BLD AUTO: 19 % (ref 14–44)
MAGNESIUM SERPL-MCNC: 2.3 MG/DL (ref 1.6–2.6)
MCH RBC QN AUTO: 28.6 PG (ref 26.8–34.3)
MCHC RBC AUTO-ENTMCNC: 30.6 G/DL (ref 31.4–37.4)
MCV RBC AUTO: 94 FL (ref 82–98)
MONOCYTES # BLD AUTO: 0.46 THOUSAND/ΜL (ref 0.17–1.22)
MONOCYTES NFR BLD AUTO: 9 % (ref 4–12)
NEUTROPHILS # BLD AUTO: 3.19 THOUSANDS/ΜL (ref 1.85–7.62)
NEUTS SEG NFR BLD AUTO: 61 % (ref 43–75)
NITRITE UR QL STRIP: NEGATIVE
NONHDLC SERPL-MCNC: 97 MG/DL
NRBC BLD AUTO-RTO: 0 /100 WBCS
PH UR STRIP.AUTO: 7 [PH] (ref 5–9)
PLATELET # BLD AUTO: 119 THOUSANDS/UL (ref 149–390)
PMV BLD AUTO: 10.9 FL (ref 8.9–12.7)
POTASSIUM SERPL-SCNC: 4.3 MMOL/L (ref 3.5–5.3)
PROT SERPL-MCNC: 7.5 G/DL (ref 6.4–8.2)
PROT UR STRIP-MCNC: NEGATIVE MG/DL
RBC # BLD AUTO: 3.88 MILLION/UL (ref 3.81–5.12)
SODIUM SERPL-SCNC: 140 MMOL/L (ref 136–145)
SP GR UR STRIP.AUTO: 1.01 (ref 1–1.03)
TRIGL SERPL-MCNC: 121 MG/DL
TSH SERPL DL<=0.05 MIU/L-ACNC: 0.68 UIU/ML (ref 0.36–3.74)
URATE SERPL-MCNC: 7.1 MG/DL (ref 2–6.8)
UROBILINOGEN UR QL STRIP.AUTO: 0.2 E.U./DL
VIT B12 SERPL-MCNC: 680 PG/ML (ref 100–900)
WBC # BLD AUTO: 5.26 THOUSAND/UL (ref 4.31–10.16)

## 2018-07-03 PROCEDURE — 82607 VITAMIN B-12: CPT

## 2018-07-03 PROCEDURE — G8998 SWALLOW D/C STATUS: HCPCS

## 2018-07-03 PROCEDURE — 85652 RBC SED RATE AUTOMATED: CPT

## 2018-07-03 PROCEDURE — 84550 ASSAY OF BLOOD/URIC ACID: CPT

## 2018-07-03 PROCEDURE — 80061 LIPID PANEL: CPT

## 2018-07-03 PROCEDURE — 80053 COMPREHEN METABOLIC PANEL: CPT

## 2018-07-03 PROCEDURE — 36415 COLL VENOUS BLD VENIPUNCTURE: CPT

## 2018-07-03 PROCEDURE — 83540 ASSAY OF IRON: CPT

## 2018-07-03 PROCEDURE — 82306 VITAMIN D 25 HYDROXY: CPT

## 2018-07-03 PROCEDURE — 83735 ASSAY OF MAGNESIUM: CPT

## 2018-07-03 PROCEDURE — 74230 X-RAY XM SWLNG FUNCJ C+: CPT

## 2018-07-03 PROCEDURE — 84443 ASSAY THYROID STIM HORMONE: CPT

## 2018-07-03 PROCEDURE — 85025 COMPLETE CBC W/AUTO DIFF WBC: CPT

## 2018-07-03 PROCEDURE — 81003 URINALYSIS AUTO W/O SCOPE: CPT | Performed by: INTERNAL MEDICINE

## 2018-07-03 PROCEDURE — 92611 MOTION FLUOROSCOPY/SWALLOW: CPT

## 2018-07-03 PROCEDURE — G8996 SWALLOW CURRENT STATUS: HCPCS

## 2018-07-03 PROCEDURE — 83036 HEMOGLOBIN GLYCOSYLATED A1C: CPT

## 2018-07-03 PROCEDURE — G8997 SWALLOW GOAL STATUS: HCPCS

## 2018-07-03 NOTE — PROCEDURES
Speech-Language Pathology Video Barium Swallow Study        Patient Name: Madeleine DUNN Date: 7/3/2018     Problem List  Patient Active Problem List   Diagnosis    Deep vein thrombosis (DVT) of left upper extremity ([facility] )    H/O mitral valve replacement    CVA (cerebral vascular accident) ([facility]    Controlled type 2 diabetes mellitus without complication, without long-term current use of insulin ([facility] )    Pacemaker    Acid reflux    HTN (hypertension)    Constipation    CHADWICK (acute kidney injury) ([facility] )    Systolic congestive heart failure ([facility] )    Encephalopathy    History of ischemic right MCA stroke    History of CVA (cerebrovascular accident)    History of subarachnoid hemorrhage    CKD (chronic kidney disease) stage 3, GFR 30-59 ml/min    History of DVT (deep vein thrombosis)    History of pacemaker    Acute cystitis without hematuria    Chronic systolic CHF (congestive heart failure) (HCC)    Colonic thickening    Anemia    Altered mental status    Seizure-like activity ([facility] )    HCAP (healthcare-associated pneumonia)    Shoulder pain, right    Abnormal TSH    Dysphagia    Gastroesophageal reflux disease without esophagitis    Polyp of colon       Past Medical History  Past Medical History:   Diagnosis Date    Acid reflux     on occ    Arthritis     DJD right hip replaced    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 with 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    [facility] 2007    colonic polyps, no surgery done    Deep vein thrombosis (DVT) of left upper extremity ([facility] ) 12/11/2017    Diabetes mellitus ([facility] )     type 2    Diverticulosis     Edema     in legs on occ    Hypertension     on occ    Language barrier     speaks Mongolian & broken english    Stroke [facility])        Past Surgical History  Past Surgical History:   Procedure Laterality Date    COLON SURGERY      COLONOSCOPY      COLONOSCOPY N/A 11/2/2016    Procedure: COLONOSCOPY;  Surgeon: Ten Brandt MD;  Location: [facility] GI LAB; Service:     ESOPHAGOGASTRODUODENOSCOPY N/A 11/2/2016    Procedure: ESOPHAGOGASTRODUODENOSCOPY (EGD); Surgeon: Tne Brandt MD;  Location: [place] [facility]; Service:     JOINT REPLACEMENT Right 02/2015    hip    JOINT REPLACEMENT Left     knee    JOINT REPLACEMENT Right     knee    ND REVISE MEDIAN N/CARPAL TUNNEL SURG Left 1/28/2016    Procedure: RELEASE CARPAL TUNNEL;  Surgeon: Oswaldo Mon MD;  Location: [place] [facility] MAIN OR;  Service: Orthopedics    TUBAL LIGATION      VARICOSE VEIN SURGERY Bilateral          General Information;  Pt is a 80 y o  Female who was referred to [facility]  for a VBS by Dr Bland December to further assess swallow function  She is known to this department (from pervious admissions) and to this therapist ( currently seeing for home care)  Per chart review and daughter report she has had 2 CVAs and one TIA since January with subsequent falls and multiple hospitalizations with STR stays due to sepsis, UTI, and PNA  She was seen by ST with each admission to [facility] and has had mild initially worsening to moderate with repeat hospitalizations/infections oropharyngeal dysphagia and was most recently discharged on a diet of dysphagia 2 textures and Nectar thick liquids  Per daughter and my observations in the home she has presented with inconsistent response to liquids with concern for penetration/aspiration  VBSS was requested to further assess diet and strategies to determine POC       Swallow Information   Current Risks for Dysphagia & Aspiration: CVA and known history of dysphagia     Current Symptoms/Concerns: coughing with po    Current Diet: puree/level 1 diet and honey thick liquids      Baseline Assessment   Behavior/Cognition: alert    Speech/Language Status: able to participate in basic conversation, able to follow commands inconsistently, limited verbal output and patient is suspected aphasia, she is also primarily Montenegrin speaking    Patient Positioning: Laterally at 90 degrees    Speech/Swallow Mech:   Facial: symmetrical  Labial: decreased ROM right side  Lingual: decreased strength right side  Velum: unable to visualize  Mandible: adequate ROM  Dentition: some missing dentition  Vocal quality:clear/adequate   Volitional Cough: weak   Respiratory: RA    Previous VBS: None noted in EPIC    Imagin/16 Head CT: No acute intracranial abnormality   CXR: Faint bandlike density in the right lower lung field which could be atelectasis or potentially early changes of developing pneumonia  Recommend continued follow-up  Suspected hiatal hernia    Consistencies Assessed and Performance   Pt was seen in radiology for a Video Barium Swallow Study, seated in the upright position and viewed laterally with the following consistencies:      Administered: thin liquids, nectar thick, honey thick, puree, mechanical soft solids and hard solids  Specific materials  administered: Barium laden applesauce, softened rigo cracker, hard loorna doone cookie, honey thick, nectar thick, thin liquids  Liquids were administered by cup and straw      Results are as follows:     Oral stage:  Lip closure: adequate  Mastication: anterior mastication with all solids- mildly prolonged yet functional with soft solids- mod prolonged with regular solids  Bolus formation: fair- adequate  Bolus control: mild-mod premature spill noted to the valleculae and right pyriform sinus with all consistencies  Transfer: adequate  Residue: min- no residue in oral cavity post swallow    Pharyngeal stage:  Swallow promptness: mild delay  Spill to valleculae: mild- mod with all consistencies;  puree>liquids>reg solids>soft solids  Spill to pyriforms: mild-mod with liquids and solids; liquids >solids- of note spill to the pyriforms noted to be primarily on the right with solids which may indicate weakness  Epiglottic inversion: mildly reduced  Laryngeal rise: adequate  Pharyngeal constriction: mildly reduced during the swallow  Vallecular retention: mild with puree , mild-mod with nectar- no retention with honey thick liquids, soft solids or regular solids   Pyriform retention:mild-mod retention with nectar thick liquids  PPW coating: mild with nectar thick liquids  Transient penetration: Observed with all liquids and puree  Epiglottic undercoat: none observed  Penetration: Deep penetration noted with thin liquids  Aspiration: none observed  Strategies: Minimal benefit from controlled cup sips; neck flexion eliminated all transient penetration, reduced deep penetration with and reduced retention in 90% of opps- straw was beneficial in aiding chin tuck    Screening of Esophageal stage:  DNT    Assessment Summary; Pt presents with mild-moderate oropharyngeal dysphagia characterized by prolonged anterior mastication, reduced bolus control posteriorly with premature spillage, delay in swallow initiation as well as reduced epiglottic inversion, pharyngeal constriction and retention post swallow  She was also noted to have penetration with puree and all liquids which was reduced/eliminated with use of neck flexion  Patient presents with risk for aspiration of retained material post swallow as well as with deep penetration with thin liquids at this time  This risk is reduced/eliminated with strategies- therefore patient would benefit from ST to initiate exercises as well as train in beneficial strategies  Diagnosis/Prognosis;  mild-moderate oropharyngeal dysphagia    good prognosis for continued safe po intake     Recommendations; Recommend mechanically altered/level 2 diet and nectar thick liquids, with upright posture, only feed when fully alert, slow rate of feeding, small bites/sips, chin tuck, effortful swallow, quiet environment (tv off, limit talking, door closed, etc ) and double swallow  Patient also benefits from use of straw for carryover of chin tuck  Recommended Form of Meds: whole with puree or crushed with puree   Full Supervision  Aspiration precautions   Reflux Precautions  Results reviewed with patient and family  Goals:  Pt will tolerate least restrictive diet w/out s/s aspiration or oral/pharyngeal difficulties  Dysphagia Goals: pt will tolerate dysph 3 textures with NTL without s/s of aspiration x3 and pt will demonstrate accurate use of chin tuck and double swallows strategy with 80% accuracy given mincues      Treatment Recommendations: Recommend continued ST in the home/outpatient with NMES in conjunction with exercises and education/training in strategy use    AMY Martinez , [address] Pathologist   [id number]

## 2018-07-31 ENCOUNTER — OFFICE VISIT (OUTPATIENT)
Dept: OBGYN CLINIC | Facility: HOSPITAL | Age: 83
End: 2018-07-31
Payer: MEDICARE

## 2018-07-31 ENCOUNTER — HOSPITAL ENCOUNTER (OUTPATIENT)
Dept: NEUROLOGY | Facility: HOSPITAL | Age: 83
Discharge: HOME | End: 2018-07-31
Payer: MEDICARE

## 2018-07-31 VITALS — DIASTOLIC BLOOD PRESSURE: 88 MMHG | HEART RATE: 85 BPM | SYSTOLIC BLOOD PRESSURE: 136 MMHG

## 2018-07-31 DIAGNOSIS — M19.011 ARTHRITIS OF RIGHT GLENOHUMERAL JOINT: Primary | ICD-10-CM

## 2018-07-31 DIAGNOSIS — R41.82 ALTERED MENTAL STATUS, UNSPECIFIED ALTERED MENTAL STATUS TYPE: ICD-10-CM

## 2018-07-31 PROCEDURE — 95816 EEG AWAKE AND DROWSY: CPT | Performed by: PSYCHIATRY & NEUROLOGY

## 2018-07-31 PROCEDURE — 99213 OFFICE O/P EST LOW 20 MIN: CPT | Performed by: ORTHOPAEDIC SURGERY

## 2018-07-31 PROCEDURE — 95816 EEG AWAKE AND DROWSY: CPT

## 2018-07-31 NOTE — PROGRESS NOTES
84 y o female right shoulder pain that occurred approximately 6 weeks ago when she was being transported  Pain was worse with motion and relieved with rest  She had XR workup that was negative for fracture  She denies numbness or tingling  She received a corticosteroid injection to the right shoulder with significant improvement of her pain  She is currently not in significant pain  Review of Systems  Review of systems negative unless otherwise specified in HPI    Past Medical History  Past Medical History:   Diagnosis Date    Acid reflux     on occ    Arthritis     DJD right hip replaced    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 with no change    [facility] 2007    colonic polyps, no surgery done    Deep vein thrombosis (DVT) of left upper extremity ([facility] ) 12/11/2017    Diabetes mellitus ([place] [address] )     type 2    Diverticulosis     Edema     in legs on occ    Hypertension     on occ    Language barrier     speaks [place] & broken english    [facility]roke [facility])        Past Surgical History  Past Surgical History:   Procedure Laterality Date    COLON SURGERY      COLONOSCOPY      COLONOSCOPY N/A 11/2/2016    Procedure: COLONOSCOPY;  Surgeon: Marylene Alstrom, MD;  Location: [facility] GI LAB; Service:     ESOPHAGOGASTRODUODENOSCOPY N/A 11/2/2016    Procedure: ESOPHAGOGASTRODUODENOSCOPY (EGD); Surgeon: Marylene Alstrom, MD;  Location: [facility];   Service:     JOINT REPLACEMENT Right 02/2015    hip    JOINT REPLACEMENT Left     knee    JOINT REPLACEMENT Right     knee    TX REVISE MEDIAN N/CARPAL TUNNEL SURG Left 1/28/2016    Procedure: RELEASE CARPAL TUNNEL;  Surgeon: Alexander Briceño MD;  Location: [facility] MAIN OR;  Service: Orthopedics    TUBAL LIGATION      VARICOSE VEIN SURGERY Bilateral        Current Medications  Current Outpatient Prescriptions on File Prior to Visit   Medication Sig Dispense Refill    acetaminophen (TYLENOL) 325 mg tablet Take 3 tablets (975 mg total) by mouth every 8 (eight) hours 30 tablet 0    apixaban (ELIQUIS) 2 5 mg Take 1 tablet (2 5 mg total) by mouth 2 (two) times a day  0    atorvastatin (LIPITOR) 80 mg tablet Take 1 tablet (80 mg total) by mouth daily with dinner 30 tablet 0    bisacodyl (DULCOLAX) 10 mg suppository Insert 1 suppository (10 mg total) into the rectum daily as needed for constipation 12 suppository 0    FERREX 150 150 MG capsule       ferrous sulfate 325 (65 Fe) mg tablet Take 325 mg by mouth daily with breakfast      furosemide (LASIX) 40 mg tablet       metoprolol tartrate (LOPRESSOR) 50 mg tablet Take 1 tablet (50 mg total) by mouth every 12 (twelve) hours  0    Multiple Vitamins-Minerals (MULTIVITAMIN ADULT PO) Take 1 tablet by mouth daily      pantoprazole (PROTONIX) 40 mg tablet Take 1 tablet (40 mg total) by mouth daily in the early morning  0    polyethylene glycol (GLYCOLAX) powder       polyethylene glycol (GOLYTELY) 4000 mL solution Take 4,000 mL by mouth once for 1 dose Per office instructions 4000 mL 0    polyethylene glycol (MIRALAX) 17 g packet Take 17 g by mouth daily      potassium chloride (K-DUR) 10 mEq tablet       senna (SENOKOT) 8 6 mg Take 1 tablet (8 6 mg total) by mouth daily at bedtime 120 each 0     No current facility-administered medications on file prior to visit          Recent Labs [facility])    0  Lab Value Date/Time   HCT 36 3 07/03/2018 0809   HCT 31 2 (L) 12/23/2015 0808   HGB 11 1 (L) 07/03/2018 0809   HGB 10 2 (L) 12/23/2015 0808   WBC 5 26 07/03/2018 0809   WBC 4 0 (L) 12/23/2015 0808   INR 1 15 05/25/2018 1550   INR 0 98 02/16/2015 1536   ESR 49 (H) 07/03/2018 0809   ESR 31 (H) 12/23/2015 0808   GLUCOSE 122 06/23/2018 0450   GLUCOSE 103 05/08/2018 1704   GLUCOSE 114 (H) 12/23/2015 0808   HGBA1C 6 8 (H) 07/03/2018 0809         Physical exam  · General: Awake, Alert, Oriented  · Eyes: Pupils equal, round and reactive to light  · Heart: regular rate and rhythm  · Lungs: No audible wheezing  · Abdomen: soft  Right shoulder:  Skin is intact  No tenderness to palpation  Passive range of motion is limited to 90 degrees abduction/forward flexion, 45 degrees of external rotation  Sensation intact r/m/u    Imaging  Imaging personally reviewed on PACS and interpretation as follows:  XR right shoulder no acute osseous abnormality, mild glenohumeral osteoarthritis  Procedure  none    Assessment/Plan:   80 y  o female right glenohumeral osteoarthritis  Pain currently well controlled  Recommend repeat glenohumral corticosteroid injection should pain return   Plan:    WBAT RUE  PT/OT as needed for right shoulder glenohumeral osteoarthritis  She can receive CSI by her primary care physician or may follow up with us for repeat CSI if needed  Follow up PRN

## 2018-08-09 ENCOUNTER — ANESTHESIA EVENT (OUTPATIENT)
Dept: GASTROENTEROLOGY | Facility: HOSPITAL | Age: 83
End: 2018-08-09
Payer: MEDICARE

## 2018-08-09 ENCOUNTER — ANESTHESIA (OUTPATIENT)
Dept: GASTROENTEROLOGY | Facility: HOSPITAL | Age: 83
End: 2018-08-09
Payer: MEDICARE

## 2018-08-09 ENCOUNTER — HOSPITAL ENCOUNTER (OUTPATIENT)
Facility: HOSPITAL | Age: 83
Setting detail: OUTPATIENT SURGERY
Discharge: HOME | End: 2018-08-09
Attending: INTERNAL MEDICINE | Admitting: INTERNAL MEDICINE
Payer: MEDICARE

## 2018-08-09 VITALS
HEIGHT: 63 IN | DIASTOLIC BLOOD PRESSURE: 72 MMHG | OXYGEN SATURATION: 96 % | SYSTOLIC BLOOD PRESSURE: 166 MMHG | WEIGHT: 137 LBS | BODY MASS INDEX: 24.27 KG/M2 | RESPIRATION RATE: 16 BRPM | TEMPERATURE: 97 F | HEART RATE: 60 BPM

## 2018-08-09 DIAGNOSIS — K63.5 POLYP OF COLON, UNSPECIFIED PART OF COLON, UNSPECIFIED TYPE: ICD-10-CM

## 2018-08-09 DIAGNOSIS — K21.9 GASTROESOPHAGEAL REFLUX DISEASE WITHOUT ESOPHAGITIS: ICD-10-CM

## 2018-08-09 PROCEDURE — 45385 COLONOSCOPY W/LESION REMOVAL: CPT | Performed by: INTERNAL MEDICINE

## 2018-08-09 PROCEDURE — 88305 TISSUE EXAM BY PATHOLOGIST: CPT | Performed by: PATHOLOGY

## 2018-08-09 PROCEDURE — 43235 EGD DIAGNOSTIC BRUSH WASH: CPT | Performed by: INTERNAL MEDICINE

## 2018-08-09 PROCEDURE — 45380 COLONOSCOPY AND BIOPSY: CPT | Performed by: INTERNAL MEDICINE

## 2018-08-09 RX ORDER — SODIUM CHLORIDE 9 MG/ML
INJECTION, SOLUTION INTRAVENOUS CONTINUOUS PRN
Status: DISCONTINUED | OUTPATIENT
Start: 2018-08-09 | End: 2018-08-09 | Stop reason: SURG

## 2018-08-09 RX ORDER — PROPOFOL 10 MG/ML
INJECTION, EMULSION INTRAVENOUS AS NEEDED
Status: DISCONTINUED | OUTPATIENT
Start: 2018-08-09 | End: 2018-08-09 | Stop reason: SURG

## 2018-08-09 RX ORDER — SODIUM CHLORIDE, SODIUM LACTATE, POTASSIUM CHLORIDE, CALCIUM CHLORIDE 600; 310; 30; 20 MG/100ML; MG/100ML; MG/100ML; MG/100ML
50 INJECTION, SOLUTION INTRAVENOUS CONTINUOUS
Status: DISCONTINUED | OUTPATIENT
Start: 2018-08-09 | End: 2018-08-10 | Stop reason: HOSPADM

## 2018-08-09 RX ADMIN — SODIUM CHLORIDE: 0.9 INJECTION, SOLUTION INTRAVENOUS at 10:56

## 2018-08-09 RX ADMIN — PROPOFOL 30 MG: 10 INJECTION, EMULSION INTRAVENOUS at 11:03

## 2018-08-09 RX ADMIN — PROPOFOL 70 MG: 10 INJECTION, EMULSION INTRAVENOUS at 10:59

## 2018-08-09 RX ADMIN — PROPOFOL 30 MG: 10 INJECTION, EMULSION INTRAVENOUS at 11:09

## 2018-08-09 RX ADMIN — PROPOFOL 20 MG: 10 INJECTION, EMULSION INTRAVENOUS at 11:06

## 2018-08-09 NOTE — OP NOTE
COLONOSCOPY    PROCEDURE: Colonoscopy/ Polypectomy (Cold Snare)  Polypectomny (Cold Biopsy)    INDICATIONS:  Abnormal colon on CT scan    POST-OP DIAGNOSIS: See the impression below    SEDATION: Monitored anesthesia care, check anesthesia records    PHYSICAL EXAM:    /52   Pulse (!) 50   Temp (!) 96 8 °F (36 °C) (Temporal)   Resp 20   Ht 5' 3" (1 6 m)   Wt 62 1 kg (137 lb)   LMP  (LMP Unknown)   SpO2 100%   BMI 24 27 kg/m²   Body mass index is 24 27 kg/m²  General: NAD  Heart: S1 & S2 normal, RRR  Lungs: CTA, No rales or rhonchi  Abdomen: Soft, nontender, nondistended, good bowel sounds    CONSENT:  Informed consent was obtained for the procedure, including sedation after explaining the risks and benefits of the procedure  Risks including but not limited to bleeding, perforation, infection, aspiration were discussed in detail  Also explained about less than 100%$ sensitivity with the exam and other alternatives  PREPARATION:   EKG tracing, pulse oximetry, blood pressure were monitored throughout the procedure  Patient was identified by myself both verbally and by visual inspection of ID band  DESCRIPTION:   Patient was placed in the left lateral decubitus position and was sedated with the above medication  Digital rectal examination was performed  The colonoscope was introduced in to the anal canal and advanced up to cecum, which was identified by the appendiceal orifice and IC valve  A careful inspection was made as the colonoscope was withdrawn, including a retroflexed view of the rectum; findings and interventions are described below  Appropriate photodocumentation was obtained  The quality of the colonic preparation was adequate  FINDINGS:    1  Cecum and ileocecal valve-normal mucosa    2  Ascending colon-in the distal part there is a polyp measuring about 6-7 mm was removed with cold snare    3  Ink marker was seen in the proximal transverse colon    There is a diminutive polyp noted in the proximal transverse that was removed with cold biopsy forceps    4  Internal hemorrhoids         IMPRESSIONS:      As above    RECOMMENDATIONS:    Check pathology    COMPLICATIONS:  None; patient tolerated the procedure well      DISPOSITION: PACU           CONDITION: Stable

## 2018-08-09 NOTE — ANESTHESIA PREPROCEDURE EVALUATION
Review of Systems/Medical History  Patient summary reviewed  Chart reviewed  No history of anesthetic complications     Cardiovascular  Exercise tolerance (METS): <4,  Pacemaker/AICD, Hypertension , Valve replacement mitral valve  replacement, CHF compensated CHF, DVT   Pulmonary  Pneumonia,        GI/Hepatic    GERD ,        Kidney disease CKD,        Endo/Other  Diabetes well controlled type 2 Oral agent,      GYN  Negative gynecology ROS          Hematology  Anemia ,  Coagulation disorder currently taking oral anticoagulants,    Musculoskeletal    Arthritis     Neurology    CVA , residual symptoms,    Psychology   Negative psychology ROS   Dementia           Physical Exam    Airway    Mallampati score: III  TM Distance: >3 FB  Neck ROM: full     Dental   No notable dental hx     Cardiovascular      Pulmonary      Other Findings        Anesthesia Plan  ASA Score- 4     Anesthesia Type- IV sedation with anesthesia with ASA Monitors  Additional Monitors:   Airway Plan:         Plan Factors- Patient instructed to abstain from smoking on day of procedure  Patient did not smoke on day of surgery  Induction- intravenous  Postoperative Plan-     Informed Consent- Anesthetic plan and risks discussed with healthcare power of  and daughter  I personally reviewed this patient with the CRNA  Discussed and agreed on the Anesthesia Plan with the CRNA  Salo Knox

## 2018-08-09 NOTE — OP NOTE
ESOPHAGOGASTRODUODENOSCOPY    PROCEDURE: EGD    INDICATIONS: Iron Deficiency Anaemia    POST-OP DIAGNOSIS: See the impression below    SEDATION: Monitored anesthesia care, check anesthesia records    PHYSICAL EXAM:    Vitals:    08/09/18 1013   BP: 167/71   Pulse: 60   Resp: 20   Temp: (!) 96 8 °F (36 °C)   SpO2: 99%    Body mass index is 24 27 kg/m²  General: NAD  Heart: S1 & S2 normal, RRR  Lungs: CTA, No rales or rhonchi  Abdomen: Soft, nontender, nondistended, good bowel sounds    CONSENT:  Informed consent was obtained for the procedure, including sedation after explaining the risks and benefits of the procedure  Risks including but not limited to bleeding, perforation, infection, aspiration were discussed in detail  Also explained about less than 100% sensitivity with the exam and other alternatives  PREPARATION:   EKG tracing, pulse oximetry, blood pressure were monitored throughout the procedure  Patient was identified by myself both verbally and by visual inspection of ID band  DESCRIPTION:   Patient was placed in the left lateral decubitus position and was sedated with the above medication  The gastroscope was introduced in to the oropharynx and the esophagus was intubated under direct visualization  Scope was passed down the esophagus up to 2nd part of the duodenum  A careful inspection was made as the gastroscope was withdrawn, including a retroflexed view of the stomach; findings and interventions are described below  FINDINGS:    #1  Esophagus and GEJ- normal mucosa  2 cm hiatal hernia seen  #2  Stomach-normal mucosa    #3  Duodenum- normal         IMPRESSIONS:      As above    RECOMMENDATIONS:     Proceed with colonoscopy    COMPLICATIONS:  None; patient tolerated the procedure well            DISPOSITION: PACU           CONDITION: Stable

## 2018-08-09 NOTE — ANESTHESIA POSTPROCEDURE EVALUATION
Post-Op Assessment Note      CV Status:  Stable    Mental Status:  Alert and awake    Hydration Status:  Stable and euvolemic    PONV Controlled:  Controlled    Airway Patency:  Patent and adequate    Post Op Vitals Reviewed: Yes          Staff: CRNA           /52 (08/09/18 1119)    Temp     Pulse (!) 50 (08/09/18 1119)   Resp 20 (08/09/18 1119)    SpO2 100 % (08/09/18 1119)

## 2018-08-09 NOTE — H&P
History and Physical - [facility]  Nancy Hyde 80 y o  female MRN: [id number]        HPI:  41-year-old female with history of DVT, diabetes mellitus had a CT scan which showed focal thickening of the mid ascending colon without obstruction  Noted to be anemic    Historical Information   Past Medical History:   Diagnosis Date    Acid reflux     on occ    Arthritis     DJD right hip replaced    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 ) 2007    x 2 lesions with no change    [facility] 2007    colonic polyps, no surgery done    Deep vein thrombosis (DVT) of left upper extremity ([facility] ) 12/11/2017    Diabetes mellitus ([facility] )     type 2    Diverticulosis     Edema     in legs on occ    Hypertension     on occ    Language barrier     speaks English & broken english    Stroke [facility])      Past Surgical History:   Procedure Laterality Date    COLON SURGERY      COLONOSCOPY      COLONOSCOPY N/A 11/2/2016    Procedure: COLONOSCOPY;  Surgeon: Chris Knowles MD;  Location: [facility] GI LAB; Service:     ESOPHAGOGASTRODUODENOSCOPY N/A 11/2/2016    Procedure: ESOPHAGOGASTRODUODENOSCOPY (EGD); Surgeon: Chris Knowles MD;  Location: [facility];   Service:    Jacki Oms / Apoorva Osmel / Daniel Luther      JOINT REPLACEMENT Right 02/2015    hip    JOINT REPLACEMENT Left     knee    JOINT REPLACEMENT Right     knee    MITRAL VALVE REPLACEMENT      SC REVISE MEDIAN N/CARPAL TUNNEL SURG Left 1/28/2016    Procedure: RELEASE CARPAL TUNNEL;  Surgeon: Em Gibson MD;  Location: [facility] MAIN OR;  Service: Orthopedics    TUBAL LIGATION      VARICOSE VEIN SURGERY Bilateral      Social History   History   Alcohol Use No     History   Drug Use No     History   Smoking Status    Former Smoker    Packs/day: 0 25    Years: 10 00    Types: Cigarettes    Quit date: 1950   Smokeless Tobacco    Never Used     Family History   Problem Relation Age of Onset    Cancer Mother         throat       Meds/Allergies Prescriptions Prior to Admission   Medication    atorvastatin (LIPITOR) 80 mg tablet    FERREX 150 150 MG capsule    furosemide (LASIX) 40 mg tablet    metoprolol tartrate (LOPRESSOR) 50 mg tablet    Multiple Vitamins-Minerals (MULTIVITAMIN ADULT PO)    pantoprazole (PROTONIX) 40 mg tablet    potassium chloride (K-DUR) 10 mEq tablet    apixaban (ELIQUIS) 2 5 mg       Allergies   Allergen Reactions    Vancomycin      Red man syndrome    Heparin Other (See Comments)     Thrombocytopenia         Objective     Blood pressure 167/71, pulse 60, temperature (!) 96 8 °F (36 °C), temperature source Temporal, resp  rate 20, height 5' 3" (1 6 m), weight 62 1 kg (137 lb), SpO2 99 %, not currently breastfeeding      PHYSICAL EXAM:    Gen: NAD  CV: S1 & S2 normal, RRR  CHEST: Clear to auscultate  ABD: soft, NT/ND, good bowel sounds  EXT: no edema    ASSESSMENT:     Abnormal colon on CT scan, anemia    PLAN:    EGD and Colonoscopy

## 2018-08-16 ENCOUNTER — TELEPHONE (OUTPATIENT)
Dept: GASTROENTEROLOGY | Facility: AMBULARY SURGERY CENTER | Age: 83
End: 2018-08-16

## 2018-08-16 NOTE — TELEPHONE ENCOUNTER
Attempted to contact pt via telephone, lmom for pt to call office  Letter also sent  Recall and hm set

## 2018-08-16 NOTE — TELEPHONE ENCOUNTER
----- Message from Karen Vallecillo MD sent at 8/15/2018  5:35 PM EDT -----    Colon polyps removed came back as precancerous tubular adenoma  Next colonoscopy in 3 years  Patient to call our office for any GI symptoms

## 2018-08-21 ENCOUNTER — TELEPHONE (OUTPATIENT)
Dept: NEUROLOGY | Facility: CLINIC | Age: 83
End: 2018-08-21

## 2018-08-21 NOTE — TELEPHONE ENCOUNTER
Called to assess patient for modified vi score post discharge after undergoing thrombectomy  Left a message on voicemail for a call back

## 2018-08-28 ENCOUNTER — OFFICE VISIT (OUTPATIENT)
Dept: VASCULAR SURGERY | Facility: CLINIC | Age: 83
End: 2018-08-28
Payer: MEDICARE

## 2018-08-28 VITALS
HEART RATE: 66 BPM | TEMPERATURE: 98 F | HEIGHT: 63 IN | RESPIRATION RATE: 16 BRPM | BODY MASS INDEX: 25.14 KG/M2 | DIASTOLIC BLOOD PRESSURE: 58 MMHG | SYSTOLIC BLOOD PRESSURE: 138 MMHG | WEIGHT: 141.9 LBS

## 2018-08-28 DIAGNOSIS — I65.23 BILATERAL CAROTID ARTERY STENOSIS: Primary | Chronic | ICD-10-CM

## 2018-08-28 PROCEDURE — 99205 OFFICE O/P NEW HI 60 MIN: CPT | Performed by: SURGERY

## 2018-08-28 RX ORDER — SITAGLIPTIN 50 MG/1
50 TABLET, FILM COATED ORAL DAILY
COMMUNITY
Start: 2018-07-31 | End: 2022-08-04

## 2018-08-28 NOTE — ASSESSMENT & PLAN NOTE
Bilateral carotid stenosis more severe on the right as compared to the left in patient with multiple recent bilateral strokes  Due to the recurrent and bilateral nature of these events in the minimal disease on the left side the most likely source is of a cardiac or central origin  In addition with no further events since institution of her current medical regiment with Eliquis, antiplatelet and statin therapy this is again suggestive of a more central or cardiac origin  For this reason the right carotid artery stenosis of 70-75% will be treated as in asymptomatic lesion  Will plan on continuing current management to include carotid duplex follow-up in 6 months  This was discussed at length with the patient and her daughter

## 2018-08-28 NOTE — PATIENT INSTRUCTIONS
Bilateral carotid artery stenosis  Bilateral carotid stenosis more severe on the right as compared to the left in patient with multiple recent bilateral strokes  Due to the recurrent and bilateral nature of these events in the minimal disease on the left side the most likely source is of a cardiac or central origin  In addition with no further events since institution of her current medical regiment with Eliquis, antiplatelet and statin therapy this is again suggestive of a more central or cardiac origin  For this reason the right carotid artery stenosis of 70-75% will be treated as in asymptomatic lesion  Will plan on continuing current management to include carotid duplex follow-up in 6 months  This was discussed at length with the patient and her daughter

## 2018-08-28 NOTE — LETTER
August 28, 2018     Mary Fowler MD  [address] [address]    Patient: Leeanne Spatz   YOB: 1933   Date of Visit: 8/28/2018       Dear Dr Merline Truong:    Thank you for referring Ira Bradshaw to me for evaluation  Below are the relevant portions of my assessment and plan of care  Bilateral carotid artery stenosis  Bilateral carotid stenosis more severe on the right as compared to the left in patient with multiple recent bilateral strokes  Due to the recurrent and bilateral nature of these events in the minimal disease on the left side the most likely source is of a cardiac or central origin  In addition with no further events since institution of her current medical regiment with Eliquis, antiplatelet and statin therapy this is again suggestive of a more central or cardiac origin  For this reason the right carotid artery stenosis of 70-75% will be treated as in asymptomatic lesion  Will plan on continuing current management to include carotid duplex follow-up in 6 months  This was discussed at length with the patient and her daughter  If you have questions, please do not hesitate to call me  I look forward to following  Search along with you           Sincerely,        Martha Oconnor MD        CC: No Recipients

## 2018-08-28 NOTE — PROGRESS NOTES
Assessment/Plan:    Bilateral carotid artery stenosis  Bilateral carotid stenosis more severe on the right as compared to the left in patient with multiple recent bilateral strokes  Due to the recurrent and bilateral nature of these events in the minimal disease on the left side the most likely source is of a cardiac or central origin  In addition with no further events since institution of her current medical regiment with Eliquis, antiplatelet and statin therapy this is again suggestive of a more central or cardiac origin  For this reason the right carotid artery stenosis of 70-75% will be treated as in asymptomatic lesion  Will plan on continuing current management to include carotid duplex follow-up in 6 months  This was discussed at length with the patient and her daughter  Diagnoses and all orders for this visit:    Bilateral carotid artery stenosis    Other orders  -     JANUVIA 100 MG tablet;           Subjective:      Patient ID: Marco Hdz is a 80 y o  female  Patient had CTA head on 6/18  Patient has h/o CVA x3  She has some L facial droop and L sided weakness as well as speech difficulties  She denies tobacco use  She takes Eliquis and statin daily  43-year-old presents in follow-up of carotid occlusive disease and recent stroke  She has evidently had multiple strokes bilaterally in the past year  She reportedly had a left hemispheric stroke 1/18, a right hemispheric stroke 04/15/2018 and a left hemispheric stroke 04/23/2018 which was treated with thrombo lysis and mechanical thrombectomy  Since that time she has been optimized on her medical management and has reportedly had had no new focal neurologic symptoms or events  She is cared for by her daughter at home  Her daughter reports she has gained most of her motor function back but 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  She does walk with the aid of a cane    She reportedly does have some residual bilateral arm weakness which the daughter feels is more severe on the right  She has also had multiple hospital Re admissions with pneumonia and UTI  Of note the daughter is present during this evaluation and gives the majority of the history secondary to a language barrier  Carotid duplex 04/19/2018 with less than 50% bilateral carotid stenosis  CT angiogram 04/23/2018 with no significant left internal carotid artery stenosis with mild/moderate irregularity  On the right there is calcific atherosclerotic disease creating a 70-75% stenosis  There 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  Cerebral arteriogram 04/23/2018 with mechanical and chemical thrombectomy of middle cerebral artery occlusion and evidence of minimal left internal carotid artery stenosis  The following portions of the patient's history were reviewed and updated as appropriate: allergies, current medications, past family history, past medical history, past social history, past surgical history and problem list     Review of Systems   Constitutional: Positive for activity change, fatigue and unexpected weight change (weight loss 30lbs)  HENT: Positive for facial swelling, trouble swallowing and voice change  Facial droop   Eyes: Negative  Respiratory: Positive for cough and shortness of breath  SOB with activity   Cardiovascular: Positive for leg swelling  Gastrointestinal: Negative  Endocrine: Positive for polyuria  Genitourinary: Positive for frequency and urgency  Musculoskeletal: Negative  Skin: Negative  Allergic/Immunologic: Negative  Neurological: Positive for seizures, facial asymmetry, speech difficulty and weakness  Hematological: Bruises/bleeds easily  Psychiatric/Behavioral: Positive for confusion and decreased concentration           Objective:      /58 (BP Location: Right arm, Patient Position: Sitting, Cuff Size: Adult)   Pulse 66   Temp 98 °F (36 7 °C) (Tympanic)   Resp 16   Ht 5' 3" (1 6 m)   Wt 64 4 kg (141 lb 14 4 oz)   LMP  (LMP Unknown)   BMI 25 14 kg/m²          Physical Exam   Constitutional: She appears well-developed and well-nourished  HENT:   Head: Normocephalic and atraumatic  Eyes: Conjunctivae and EOM are normal    Neck: Normal range of motion  Neck supple  No JVD present  Carotid bruit is not present  Cardiovascular: Normal rate, regular rhythm, S1 normal, S2 normal and normal heart sounds  No murmur heard  Pulses:       Carotid pulses are 2+ on the right side, and 2+ on the left side  Radial pulses are 2+ on the right side, and 2+ on the left side  Femoral pulses are 2+ on the right side, and 2+ on the left side  Popliteal pulses are 2+ on the right side, and 2+ on the left side  Dorsalis pedis pulses are 2+ on the right side, and 2+ on the left side  Posterior tibial pulses are 2+ on the right side, and 2+ on the left side  Pulmonary/Chest: Effort normal and breath sounds normal    Abdominal: Soft  Normal appearance and normal aorta  She exhibits no abdominal bruit and no pulsatile midline mass  There is no tenderness  No hernia  Musculoskeletal: Normal range of motion  She exhibits no edema, tenderness or deformity  Neurological: She is alert  No sensory deficit  Strength 4/5 bilateral upper extremities in nearly symmetric  Orientation of patient difficult to ascertain secondary to language barrier  Skin: Skin is warm, dry and intact  No pallor  Psychiatric: She has a normal mood and affect   Her speech is normal and behavior is normal

## 2018-09-14 ENCOUNTER — TRANSCRIBE ORDERS (OUTPATIENT)
Dept: ADMINISTRATIVE | Facility: HOSPITAL | Age: 83
End: 2018-09-14

## 2018-09-14 DIAGNOSIS — I05.9 RHEUMATIC DISEASE OF MITRAL VALVE: Primary | ICD-10-CM

## 2018-09-24 ENCOUNTER — HOSPITAL ENCOUNTER (OUTPATIENT)
Dept: NON INVASIVE DIAGNOSTICS | Facility: HOSPITAL | Age: 83
Discharge: HOME | End: 2018-09-24
Payer: MEDICARE

## 2018-09-24 DIAGNOSIS — I05.9 RHEUMATIC DISEASE OF MITRAL VALVE: ICD-10-CM

## 2018-09-24 PROCEDURE — 93306 TTE W/DOPPLER COMPLETE: CPT

## 2018-09-25 PROCEDURE — 93306 TTE W/DOPPLER COMPLETE: CPT | Performed by: INTERNAL MEDICINE

## 2018-09-27 ENCOUNTER — OFFICE VISIT (OUTPATIENT)
Dept: GERIATRICS | Age: 83
End: 2018-09-27
Payer: MEDICARE

## 2018-09-27 VITALS
BODY MASS INDEX: 28.76 KG/M2 | HEART RATE: 60 BPM | WEIGHT: 146.5 LBS | OXYGEN SATURATION: 97 % | RESPIRATION RATE: 16 BRPM | SYSTOLIC BLOOD PRESSURE: 120 MMHG | DIASTOLIC BLOOD PRESSURE: 70 MMHG | HEIGHT: 60 IN

## 2018-09-27 DIAGNOSIS — F01.50 VASCULAR DEMENTIA WITHOUT BEHAVIORAL DISTURBANCE (HCC): Primary | ICD-10-CM

## 2018-09-27 DIAGNOSIS — F39 MOOD DISORDER (HCC): ICD-10-CM

## 2018-09-27 DIAGNOSIS — Z86.73 HISTORY OF CVA (CEREBROVASCULAR ACCIDENT): ICD-10-CM

## 2018-09-27 PROBLEM — R26.2 AMBULATORY DYSFUNCTION: Status: ACTIVE | Noted: 2018-09-27

## 2018-09-27 PROCEDURE — 99205 OFFICE O/P NEW HI 60 MIN: CPT | Performed by: FAMILY MEDICINE

## 2018-09-27 NOTE — ASSESSMENT & PLAN NOTE
-Pt with recent history of cognitive decline secondary to three strokes within the last 9 months  - Pt seems to be safe at home  Lives with daughter who is able to provide one on one assistance with ADLs in an appropriate environment for patient    -Patient's daughter is hopeful for recovery  I have explained to her that while we may expect to see some recovery even up to one year out, the chances of recovering her cognitive and functional independence are very low  -Advised daughter to provide stimulating environment for patient with lots of activity when possible to preserve cognitive function as much as possible  Patient is to remain active, physically, socially and mentally  - Information on local resources for aging and financial assistance for gas bills was provided today  - Follow up in 6 months to assess status, sooner if needed  - Recommended continued follow up with PCP for management of chronic conditions  - Suggested consulting PCP for trial of antidepressant given that the patients mood was somewhat depressed

## 2018-09-27 NOTE — PROGRESS NOTES
Assessment & Plan:         Vascular dementia  -Pt with recent history of cognitive decline secondary to three strokes within the last 9 months  - Pt seems to be safe at home  Lives with daughter who is able to provide one on one assistance with ADLs in an appropriate environment for patient    -Patient's daughter is hopeful for recovery  I have explained to her that while we may expect to see some recovery even up to one year out, the chances of recovering her cognitive and functional independence are very low  -Advised daughter to provide stimulating environment for patient with lots of activity when possible to preserve cognitive function as much as possible  Patient is to remain active, physically, socially and mentally  - Information on local resources for aging and financial assistance for gas bills was provided today  - Follow up in 6 months to assess status, sooner if needed  - Recommended continued follow up with PCP for management of chronic conditions  - Suggested consulting PCP for trial of antidepressant given that the patients mood was somewhat depressed  Mood disorder ([facility] [address] )  -Patient seems depressed on exam   -Unable to answer questions for geriatric depression scale   -She did participate and interact more towards the end of the visit  -Irritability and frustration may be related to underlying depression in the setting of recent strokes and recent loss of her independence    -Per patient's daughter, she had been on low dose Remeron during hospitalization and did well   -Would recommend another trial of Remeron, starting at 7 5mg po qhs, will defer to PCP  Ambulatory dysfunction  -IN the setting of recent strokes  -Continue PT/OT, fall precautions are advised  History of CVA (cerebrovascular accident)  -Continue ASA, Eliquis, Blood pressure control and statin  -PT/OT and supportive care             HPI:  We had the pleasure of evaluating Jabari Keenan who is a 80 y o  female in Geriatric consultation today  She lives with her daughter in a two story house but has a first floor setup  Ms Cass Pierce is in the office with her daughter due to recent cognitive decline secondary to three strokes earlier in the year(4/15, 4/29)  The daughter states that prior to the stroke the patient was completely independent in ADLs  She ambulates with a walker  Her most recent fall was on April 15th after the patient's second stroke  Patient now requires help with all ADLs  She does ambulate to the bathroom alone but needs assistance with dressing and undressing  She has known left sided neglect as a result of her prior strokes and needs assistance from caregivers when eating to turn the plate and keep food on the right side  Patient has recently exhibited more agitation when caregivers provide reminders and instructions  Caregivers deny any hallucinations, but did note that the patient was recently diagnosed with bilateral cataracts worse on the right side  The patient gets up  Multiple times throughout the night to urinate  The patient has an alarm on her bed that helps notify caregivers when she gets up at night  Patient is Russian speaking  The daughter does not speak Russian but understands it and is able to communicate to her mother in [place]  ROS: Review of Systems   Unable to perform ROS: Patient nonverbal (ROS provided by patient's daughter, primary caregiver as patient is unable to provide full ROS  )   Constitutional: Positive for unexpected weight change  Negative for activity change, appetite change, chills, diaphoresis, fatigue and fever  HENT: Positive for trouble swallowing (daughter says this has larely resolved and is only seen when the patient eats foods that are very hot or very cold)  Hearing loss: chronic hearing loss at baseline  wears hearing aids bilaterally  Eyes: Positive for visual disturbance     Respiratory: Negative for cough, chest tightness and shortness of breath  Cardiovascular: Negative for chest pain, palpitations and leg swelling  Gastrointestinal: Positive for constipation  Musculoskeletal: Negative for arthralgias and back pain  Skin: Negative for color change, pallor, rash and wound  Psychiatric/Behavioral: Negative for hallucinations  Past Medical, surgical, social, medication and allergy history and patients previous records reviewed  Allergies:    Allergies   Allergen Reactions    Vancomycin      Red man syndrome    Heparin Other (See Comments)     Thrombocytopenia         Medications:      Current Outpatient Prescriptions:     Bisacodyl (LAXATIVE PO), Take by mouth One tea bag PRN, Disp: , Rfl:     magnesium hydroxide (MILK OF MAGNESIA) 400 mg/5 mL oral suspension, Take by mouth daily, Disp: , Rfl:     apixaban (ELIQUIS) 2 5 mg, Take 1 tablet (2 5 mg total) by mouth 2 (two) times a day, Disp: , Rfl: 0    atorvastatin (LIPITOR) 80 mg tablet, Take 1 tablet (80 mg total) by mouth daily with dinner, Disp: 30 tablet, Rfl: 0    FERREX 150 150 MG capsule, daily  , Disp: , Rfl:     furosemide (LASIX) 40 mg tablet, 20 mg  , Disp: , Rfl:     JANUVIA 50 MG tablet, 50 mg daily  , Disp: , Rfl:     metoprolol tartrate (LOPRESSOR) 50 mg tablet, Take 1 tablet (50 mg total) by mouth every 12 (twelve) hours, Disp: , Rfl: 0    Multiple Vitamins-Minerals (MULTIVITAMIN ADULT PO), 1 tablet 2 (two) times a day  , Disp: , Rfl:     pantoprazole (PROTONIX) 40 mg tablet, Take 1 tablet (40 mg total) by mouth daily in the early morning, Disp: , Rfl: 0    potassium chloride (K-DUR) 10 mEq tablet, daily  , Disp: , Rfl:     Vitals:  Vitals:    09/27/18 1105   BP: 120/70   Pulse: 60   Resp: 16   SpO2: 97%       History:  Past Medical History:   Diagnosis Date    Acid reflux     on occ    Arthritis     DJD right hip replaced    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    x 2 lesions with no change    Cancer ([facility] [address] ) 2007    colonic polyps, no surgery done    Deep vein thrombosis (DVT) of left upper extremity ([facility] [address] ) 12/11/2017    Diabetes mellitus ([facility] [address] )     type 2    Diverticulosis     Edema     in legs on occ    Hypertension     on occ    Language barrier     speaks [place]ian & broken english    Stroke [facility])      Past Surgical History:   Procedure Laterality Date    COLON SURGERY      COLONOSCOPY      COLONOSCOPY N/A 11/2/2016    Procedure: COLONOSCOPY;  Surgeon: Riya Clemons MD;  Location[facility] GI LAB; Service:     ESOPHAGOGASTRODUODENOSCOPY N/A 11/2/2016    Procedure: ESOPHAGOGASTRODUODENOSCOPY (EGD); Surgeon: Riya Clemons MD;  Location: [facility] GI LAB; Service:    Goldynay Jewell / Abhinav Franco / Lindsay Cerona      JOINT REPLACEMENT Right 02/2015    hip    JOINT REPLACEMENT Left     knee    JOINT REPLACEMENT Right     knee    MITRAL VALVE REPLACEMENT      [place] COLONOSCOPY FLX DX W/COLLJ SPEC WHEN PFRMD N/A 8/9/2018    Procedure: EGD AND COLONOSCOPY;  Surgeon: Riya Clemons MD;  [facility]; Service: Gastroenterology    CO REVISE MEDIAN N/CARPAL TUNNEL SURG Left 1/28/2016    Procedure: RELEASE CARPAL TUNNEL;  Surgeon: Lolita Hdz MD;  Location: [facility] MAIN OR;  Service: Orthopedics    TUBAL LIGATION      VARICOSE VEIN SURGERY Bilateral      Family History   Problem Relation Age of Onset    Cancer Mother         throat     Social History     Social History    Marital status:      Spouse name: N/A    Number of children: N/A    Years of education: N/A     Occupational History    Not on file       Social History Main Topics    Smoking status: Former Smoker     Packs/day: 0 25     Years: 10 00     Types: Cigarettes     Quit date: 1950    Smokeless tobacco: Never Used    Alcohol use No    Drug use: No    Sexual activity: Not Currently     Other Topics Concern    Not on file     Social History Narrative    No narrative on file     Past Surgical History:   Procedure Laterality Date    COLON SURGERY      COLONOSCOPY  COLONOSCOPY N/A 11/2/2016    Procedure: COLONOSCOPY;  Surgeon: Rocky Kyle MD;  Location: [place] [address] GI LAB; Service:     ESOPHAGOGASTRODUODENOSCOPY N/A 11/2/2016    Procedure: ESOPHAGOGASTRODUODENOSCOPY (EGD); Surgeon: Rocky Kyle MD;  Location: [facility] GI LAB; Service:    Violet Peaches / [place] / Bobby Media      JOINT REPLACEMENT Right 02/2015    hip    JOINT REPLACEMENT Left     knee    JOINT REPLACEMENT Right     knee    MITRAL VALVE REPLACEMENT      [place] COLONOSCOPY FLX DX W/COLLJ SPEC WHEN PFRMD N/A 8/9/2018    Procedure: EGD AND COLONOSCOPY;  Surgeon: Rocky Kyle MD;  [facility]; Service: Gastroenterology    [place] REVISE MEDIAN N/CARPAL TUNNEL SURG Left 1/28/2016    Procedure: RELEASE CARPAL TUNNEL;  Surgeon: Mirna Mcgowan MD;  Location: [facility] MAIN OR;  Service: Orthopedics    TUBAL LIGATION      VARICOSE VEIN SURGERY Bilateral          Physical Exam:   Physical Exam   Constitutional: She appears well-developed  No distress  HENT:   Head: Normocephalic and atraumatic  Right Ear: External ear normal    Left Ear: External ear normal    Mouth/Throat: Oropharynx is clear and moist  No oropharyngeal exudate  Eyes: Pupils are equal, round, and reactive to light  Conjunctivae are normal  Right eye exhibits no discharge  Left eye exhibits no discharge  No scleral icterus  Neck: No JVD present  No tracheal deviation present  Cardiovascular: Normal rate, regular rhythm, normal heart sounds and intact distal pulses  Exam reveals no gallop and no friction rub  No murmur heard  Pulmonary/Chest: Breath sounds normal  No respiratory distress  Abdominal: Soft  Bowel sounds are normal  She exhibits no distension  There is no tenderness  Musculoskeletal: She exhibits no edema, tenderness or deformity  Neurological: She displays normal reflexes  No cranial nerve deficit  Left sided irina neglect evident in clock drawing  Mild right sided weakness   Skin: No rash noted   She is not diaphoretic  No erythema  No pallor

## 2018-09-27 NOTE — ASSESSMENT & PLAN NOTE
-Patient seems depressed on exam   -Unable to answer questions for geriatric depression scale   -She did participate and interact more towards the end of the visit  -Irritability and frustration may be related to underlying depression in the setting of recent strokes and recent loss of her independence    -Per patient's daughter, she had been on low dose Remeron during hospitalization and did well   -Would recommend another trial of Remeron, starting at 7 5mg po qhs, will defer to PCP

## 2018-09-27 NOTE — PROGRESS NOTES
MSW met with patient's daughter, Sarah Lizarraga, while pt was with Dr Ian Ace  Sarah Lizarraga reports pt memory concerns began after 2nd stroke, apx April 2018  Report she is currently living with pt providing 24/7 care but the other siblings help as well  Daughter states pt does not have a fall alert but they have a chair pad alarm and a bed alarm  Daughter provides assistance with ADLs and IADLs  Jesus Babcockher reports pt currently has PT/OT and speech therapy through [facility]  Daughter reports goal of visit is for pt to gain understanding of why certain things need to done in sequential steps as well as learning to dress and undress, correctly, independently  To reduce patient and caregiver burden, family discussion was held today with Dr Ian Ace  Resources discussed and provided included YVETTE VÁZQUEZ  [facility], [employer] subsidy, educational info -dementia and activities for for those dxd with dementia as well as [facility] caregiver support group info  Six month follow up offered by Dr Ian Ace  MSW to remain available as needed

## 2018-10-29 ENCOUNTER — OFFICE VISIT (OUTPATIENT)
Dept: NEUROLOGY | Facility: CLINIC | Age: 83
End: 2018-10-29
Payer: MEDICARE

## 2018-10-29 VITALS
BODY MASS INDEX: 27.04 KG/M2 | SYSTOLIC BLOOD PRESSURE: 126 MMHG | WEIGHT: 152.6 LBS | DIASTOLIC BLOOD PRESSURE: 74 MMHG | HEART RATE: 60 BPM | HEIGHT: 63 IN

## 2018-10-29 DIAGNOSIS — I63.9 CEREBROVASCULAR ACCIDENT (CVA), UNSPECIFIED MECHANISM (HCC): Primary | ICD-10-CM

## 2018-10-29 DIAGNOSIS — I65.23 BILATERAL CAROTID ARTERY STENOSIS: Chronic | ICD-10-CM

## 2018-10-29 DIAGNOSIS — I10 ESSENTIAL HYPERTENSION: Chronic | ICD-10-CM

## 2018-10-29 DIAGNOSIS — Z86.73 HISTORY OF ISCHEMIC RIGHT MCA STROKE: Chronic | ICD-10-CM

## 2018-10-29 PROCEDURE — 99213 OFFICE O/P EST LOW 20 MIN: CPT | Performed by: PSYCHIATRY & NEUROLOGY

## 2018-10-29 NOTE — PROGRESS NOTES
Patient ID: Daniel Law is a 80 y o  female  Assessment/Plan: This is an 80-year-old  female with history of right parietal Cerebrovascular accident 04/19/2018, hypertension, CKD stage 3, left temporal occipital meningioma, history of HIT,  Systolic CHF is here for follow-up of left MCA stroke  She was supposedly known to have questionable seizure-like activity  She is currently on Lipitor 80 mg daily  She is also on Eliquis 2 5 mg daily for both ESUS and history of DVT  No new TIA/CVA like symptoms  She was seen by vascular surgery for right carotid stenosis which at this point is asymptomatic  Plan:  -  Okay to be on reduced dose of Eliquis currently with her CKD stage 3 in her being above 80   - continue with atorvastatin 40 mg daily  -  Will have her follow up with epilepsy team to see if she needs to be on seizure medications or not  Patient requested to be seen by epilepsy team    -  She does have a loop recorder in place and is following up with Cardiology  - she is also following with vascular surgery  -  Continue with PT / OT/ speech therapy twice a week  I would like to follow up in 6 months, I would be happy to see the patient sooner if the need arises  Patient/Guardian was advised to the call the office if they have any questions and concerns in the meantime  Patient/Guardian does understand that if they have any new stroke like symptoms such as facial droop on one side, weakness/paralysis on either side, speech trouble, numbness on one side, balance issues, any vision changes, or any new headache, to call 9-1-1 immediately or to proceed to the nearest ER immediately        Problem List Items Addressed This Visit     CVA (cerebral vascular accident) ([facility] ) - Primary    HTN (hypertension) (Chronic)    History of ischemic right MCA stroke (Chronic)    Bilateral carotid artery stenosis (Chronic)           Subjective:    HPI    This is an 80-year-old female with history of right parietal Cerebrovascular accident 04/19/2018, hypertension, CKD stage 3, left temporal occipital meningioma, history of hit, chronic systolic CHF who is here as a follow-up of  Left MCA stroke  She does have a history of multiple TIAs /Cerebrovascular accident  Right MCA infarct in mid April 2018 and left M2 occlusion in late April  Patient has been maintained on anticoagulation with Eliquis  She did undergo mechanical thrombectomy  She does have a history of diabetes also did have a history of left upper extremity DVT for which she is also on Eliquis for  She is here today and is doing well overall  She is currently doing PT OT and speech therapy twice a week at [facility]  She still has left sided neglect  She also shuffles her gait and she getting physical therapy  She is maintained on her medications and is tolerating them well  She has had any of symptoms of either seizure or stroke  She has been following cardiology for loop recorder placement  She sees Dr Zelia Olszewski in a week  The following portions of the patient's history were reviewed and updated as appropriate:   She  has a past medical history of Acid reflux; Arthritis; 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[place] [id number] ) (2007); Cancer [facility]) (2007); Deep vein thrombosis (DVT) of left upper extremity ([facility] ) (12/11/2017); Diabetes mellitus ([facility] ); Diverticulosis; Edema; Hypertension; Language barrier; and Stroke ([facility] )    She   Patient Active Problem List    Diagnosis Date Noted    H/O ischemic left MCA stroke 10/29/2018    Vascular dementia 09/27/2018    Mood disorder ([facility] [address] ) 09/27/2018    Ambulatory dysfunction 09/27/2018    Bilateral carotid artery stenosis 08/28/2018    Arthritis of right glenohumeral joint 07/31/2018    Gastroesophageal reflux disease without esophagitis 06/22/2018    Polyp of colon 06/22/2018    HCAP (healthcare-associated pneumonia) 06/18/2018    Shoulder pain, right 06/18/2018    Abnormal TSH 06/18/2018    Dysphagia 06/18/2018    Altered mental status 06/15/2018    Seizure-like activity ([facility] ) 06/15/2018    Colonic thickening 05/26/2018    Anemia 05/26/2018    History of CVA (cerebrovascular accident) 05/25/2018    History of subarachnoid hemorrhage 05/25/2018    CKD (chronic kidney disease) stage 3, GFR 30-59 ml/min ([facility]) 05/25/2018    History of DVT (deep vein thrombosis) 05/25/2018    History of pacemaker 05/25/2018    Acute cystitis without hematuria 05/25/2018    Chronic systolic CHF (congestive heart failure) ([place] [address] ) 05/25/2018    History of ischemic right MCA stroke 04/23/2018    Encephalopathy 04/19/2018    CVA (cerebral vascular accident) ([place] [address] ) 04/18/2018    Controlled type 2 diabetes mellitus without complication, without long-term current use of insulin ([place] [address] ) 04/18/2018    Pacemaker 04/18/2018    Acid reflux 04/18/2018    HTN (hypertension) 04/18/2018    Constipation 04/18/2018    CHADWICK (acute kidney injury) ([place] [address] ) 95/63/2632    Systolic congestive heart failure ([place] [address] ) 04/18/2018    H/O mitral valve replacement 03/01/2018    Deep vein thrombosis (DVT) of left upper extremity ([place] [address] ) 01/25/2018     She  has a past surgical history that includes Tubal ligation; Varicose vein surgery (Bilateral); Colonoscopy; Colon surgery; Joint replacement (Right, 02/2015); Joint replacement (Left); Joint replacement (Right); Esophagogastroduodenoscopy (N/A, 11/2/2016); Colonoscopy (N/A, 11/2/2016); pr revise median n/carpal tunnel surg (Left, 1/28/2016); Insert / replace / remove pacemaker; Mitral valve replacement; and pr colonoscopy flx dx w/collj spec when pfrmd (N/A, 8/9/2018)  Her family history includes Cancer in her mother  She  reports that she quit smoking about 68 years ago  Her smoking use included Cigarettes  She has a 2 50 pack-year smoking history  She has never used smokeless tobacco  She reports that she does not drink alcohol or use drugs    Current Outpatient Prescriptions   Medication Sig Dispense Refill    apixaban (ELIQUIS) 2 5 mg Take 1 tablet (2 5 mg total) by mouth 2 (two) times a day  0    atorvastatin (LIPITOR) 80 mg tablet Take 1 tablet (80 mg total) by mouth daily with dinner (Patient taking differently: Take 40 mg by mouth daily with dinner  ) 30 tablet 0    Bisacodyl (LAXATIVE PO) Take by mouth One tea bag PRN      FERREX 150 150 MG capsule daily        furosemide (LASIX) 40 mg tablet 20 mg        JANUVIA 50 MG tablet 50 mg daily        metoprolol tartrate (LOPRESSOR) 50 mg tablet Take 1 tablet (50 mg total) by mouth every 12 (twelve) hours  0    Multiple Vitamins-Minerals (MULTIVITAMIN ADULT PO) 0 5 tablets 2 (two) times a day        potassium chloride (K-DUR) 10 mEq tablet daily        magnesium hydroxide (MILK OF MAGNESIA) 400 mg/5 mL oral suspension Take by mouth daily      pantoprazole (PROTONIX) 40 mg tablet Take 1 tablet (40 mg total) by mouth daily in the early morning (Patient not taking: Reported on 10/29/2018 )  0     No current facility-administered medications for this visit        Current Outpatient Prescriptions on File Prior to Visit   Medication Sig    apixaban (ELIQUIS) 2 5 mg Take 1 tablet (2 5 mg total) by mouth 2 (two) times a day    atorvastatin (LIPITOR) 80 mg tablet Take 1 tablet (80 mg total) by mouth daily with dinner (Patient taking differently: Take 40 mg by mouth daily with dinner  )    Bisacodyl (LAXATIVE PO) Take by mouth One tea bag PRN    FERREX 150 150 MG capsule daily      furosemide (LASIX) 40 mg tablet 20 mg      JANUVIA 50 MG tablet 50 mg daily      metoprolol tartrate (LOPRESSOR) 50 mg tablet Take 1 tablet (50 mg total) by mouth every 12 (twelve) hours    Multiple Vitamins-Minerals (MULTIVITAMIN ADULT PO) 0 5 tablets 2 (two) times a day      potassium chloride (K-DUR) 10 mEq tablet daily      magnesium hydroxide (MILK OF MAGNESIA) 400 mg/5 mL oral suspension Take by mouth daily    pantoprazole (PROTONIX) 40 mg tablet Take 1 tablet (40 mg total) by mouth daily in the early morning (Patient not taking: Reported on 10/29/2018 )     No current facility-administered medications on file prior to visit  She is allergic to vancomycin and heparin            Objective:    Blood pressure 126/74, pulse 60, height 5' 3" (1 6 m), weight 69 2 kg (152 lb 9 6 oz), not currently breastfeeding  Physical Exam  General - alert, awake, follows commands  Speech - fluent, no dysarthria noted, no aphasia noted  skin -  no lesions  Cranial nerves: PERRL, EOMI, no facial droop noted, facial sensation intact, tongue midline  Motor - weakness noted on the left side, and increased tone on the left side  gait - hemiparetic gait   Neurological Exam      ROS:  I personally reviewed ROS  Review of Systems   Constitutional: Negative  Negative for appetite change and fever  HENT: Negative  Negative for hearing loss, tinnitus, trouble swallowing and voice change  Eyes: Negative  Negative for photophobia and pain  Respiratory: Positive for cough and shortness of breath  Cardiovascular: Negative  Negative for palpitations  Gastrointestinal: Negative  Negative for nausea and vomiting  Endocrine: Negative  Negative for cold intolerance and heat intolerance  Genitourinary: Negative  Negative for dysuria, frequency and urgency  Musculoskeletal: Negative  Negative for myalgias and neck pain  Skin: Negative  Negative for rash  Neurological: Negative  Negative for dizziness, tremors, seizures, syncope, facial asymmetry, speech difficulty, weakness, light-headedness, numbness and headaches  Hematological: Negative  Does not bruise/bleed easily  Psychiatric/Behavioral: Negative  Negative for confusion, hallucinations and sleep disturbance

## 2018-10-30 ENCOUNTER — TRANSCRIBE ORDERS (OUTPATIENT)
Dept: ADMINISTRATIVE | Facility: HOSPITAL | Age: 83
End: 2018-10-30

## 2018-10-30 ENCOUNTER — HOSPITAL ENCOUNTER (OUTPATIENT)
Dept: RADIOLOGY | Facility: HOSPITAL | Age: 83
Discharge: HOME | End: 2018-10-30
Attending: INTERNAL MEDICINE
Payer: MEDICARE

## 2018-10-30 DIAGNOSIS — Z86.718 HISTORY OF DVT (DEEP VEIN THROMBOSIS): ICD-10-CM

## 2018-10-30 DIAGNOSIS — M79.621 PAIN OF RIGHT UPPER ARM: ICD-10-CM

## 2018-10-30 DIAGNOSIS — M79.621 PAIN OF RIGHT UPPER ARM: Primary | ICD-10-CM

## 2018-10-30 PROCEDURE — 93971 EXTREMITY STUDY: CPT | Performed by: SURGERY

## 2018-10-30 PROCEDURE — 93971 EXTREMITY STUDY: CPT

## 2018-11-04 ENCOUNTER — HOSPITAL ENCOUNTER (INPATIENT)
Facility: HOSPITAL | Age: 83
LOS: 3 days | Discharge: NURSING, REHAB OR LONG-TERM CARE | DRG: 071 | End: 2018-11-07
Attending: EMERGENCY MEDICINE | Admitting: INTERNAL MEDICINE
Payer: MEDICARE

## 2018-11-04 ENCOUNTER — APPOINTMENT (EMERGENCY)
Dept: RADIOLOGY | Facility: HOSPITAL | Age: 83
DRG: 071 | End: 2018-11-04
Payer: MEDICARE

## 2018-11-04 DIAGNOSIS — Z86.73 HISTORY OF ISCHEMIC RIGHT MCA STROKE: Chronic | ICD-10-CM

## 2018-11-04 DIAGNOSIS — G45.9 TIA (TRANSIENT ISCHEMIC ATTACK): Primary | ICD-10-CM

## 2018-11-04 DIAGNOSIS — I63.9 CVA (CEREBRAL VASCULAR ACCIDENT) (HCC): ICD-10-CM

## 2018-11-04 DIAGNOSIS — I50.20 SYSTOLIC CONGESTIVE HEART FAILURE (HCC): Chronic | ICD-10-CM

## 2018-11-04 LAB
ALBUMIN SERPL BCP-MCNC: 3.4 G/DL (ref 3.5–5)
ALP SERPL-CCNC: 97 U/L (ref 46–116)
ALT SERPL W P-5'-P-CCNC: 47 U/L (ref 12–78)
ANION GAP SERPL CALCULATED.3IONS-SCNC: 9 MMOL/L (ref 4–13)
APTT PPP: 26 SECONDS (ref 24–36)
AST SERPL W P-5'-P-CCNC: 22 U/L (ref 5–45)
BASOPHILS # BLD AUTO: 0.01 THOUSANDS/ΜL (ref 0–0.1)
BASOPHILS NFR BLD AUTO: 0 % (ref 0–1)
BILIRUB SERPL-MCNC: 1.1 MG/DL (ref 0.2–1)
BUN SERPL-MCNC: 42 MG/DL (ref 5–25)
CALCIUM SERPL-MCNC: 9.6 MG/DL (ref 8.3–10.1)
CHLORIDE SERPL-SCNC: 101 MMOL/L (ref 100–108)
CO2 SERPL-SCNC: 26 MMOL/L (ref 21–32)
CREAT SERPL-MCNC: 1.59 MG/DL (ref 0.6–1.3)
EOSINOPHIL # BLD AUTO: 0.47 THOUSAND/ΜL (ref 0–0.61)
EOSINOPHIL NFR BLD AUTO: 6 % (ref 0–6)
ERYTHROCYTE [DISTWIDTH] IN BLOOD BY AUTOMATED COUNT: 13.5 % (ref 11.6–15.1)
GFR SERPL CREATININE-BSD FRML MDRD: 29 ML/MIN/1.73SQ M
GLUCOSE SERPL-MCNC: 130 MG/DL (ref 65–140)
GLUCOSE SERPL-MCNC: 137 MG/DL (ref 65–140)
HCT VFR BLD AUTO: 37.8 % (ref 34.8–46.1)
HGB BLD-MCNC: 11.8 G/DL (ref 11.5–15.4)
IMM GRANULOCYTES # BLD AUTO: 0.02 THOUSAND/UL (ref 0–0.2)
IMM GRANULOCYTES NFR BLD AUTO: 0 % (ref 0–2)
INR PPP: 0.97 (ref 0.86–1.16)
LYMPHOCYTES # BLD AUTO: 1.09 THOUSANDS/ΜL (ref 0.6–4.47)
LYMPHOCYTES NFR BLD AUTO: 14 % (ref 14–44)
MCH RBC QN AUTO: 29.2 PG (ref 26.8–34.3)
MCHC RBC AUTO-ENTMCNC: 31.2 G/DL (ref 31.4–37.4)
MCV RBC AUTO: 94 FL (ref 82–98)
MONOCYTES # BLD AUTO: 0.85 THOUSAND/ΜL (ref 0.17–1.22)
MONOCYTES NFR BLD AUTO: 11 % (ref 4–12)
NEUTROPHILS # BLD AUTO: 5.12 THOUSANDS/ΜL (ref 1.85–7.62)
NEUTS SEG NFR BLD AUTO: 69 % (ref 43–75)
NRBC BLD AUTO-RTO: 0 /100 WBCS
PLATELET # BLD AUTO: 117 THOUSANDS/UL (ref 149–390)
PMV BLD AUTO: 10.8 FL (ref 8.9–12.7)
POTASSIUM SERPL-SCNC: 4.7 MMOL/L (ref 3.5–5.3)
PROT SERPL-MCNC: 7.3 G/DL (ref 6.4–8.2)
PROTHROMBIN TIME: 10.2 SECONDS (ref 9.4–11.7)
RBC # BLD AUTO: 4.04 MILLION/UL (ref 3.81–5.12)
SODIUM SERPL-SCNC: 136 MMOL/L (ref 136–145)
TROPONIN I SERPL-MCNC: <0.02 NG/ML
WBC # BLD AUTO: 7.56 THOUSAND/UL (ref 4.31–10.16)

## 2018-11-04 PROCEDURE — 70450 CT HEAD/BRAIN W/O DYE: CPT

## 2018-11-04 PROCEDURE — 36415 COLL VENOUS BLD VENIPUNCTURE: CPT | Performed by: EMERGENCY MEDICINE

## 2018-11-04 PROCEDURE — 70496 CT ANGIOGRAPHY HEAD: CPT

## 2018-11-04 PROCEDURE — 93005 ELECTROCARDIOGRAM TRACING: CPT

## 2018-11-04 PROCEDURE — 85610 PROTHROMBIN TIME: CPT | Performed by: EMERGENCY MEDICINE

## 2018-11-04 PROCEDURE — 85025 COMPLETE CBC W/AUTO DIFF WBC: CPT | Performed by: EMERGENCY MEDICINE

## 2018-11-04 PROCEDURE — 82948 REAGENT STRIP/BLOOD GLUCOSE: CPT

## 2018-11-04 PROCEDURE — 99223 1ST HOSP IP/OBS HIGH 75: CPT | Performed by: INTERNAL MEDICINE

## 2018-11-04 PROCEDURE — 96360 HYDRATION IV INFUSION INIT: CPT

## 2018-11-04 PROCEDURE — 80053 COMPREHEN METABOLIC PANEL: CPT | Performed by: EMERGENCY MEDICINE

## 2018-11-04 PROCEDURE — 71045 X-RAY EXAM CHEST 1 VIEW: CPT

## 2018-11-04 PROCEDURE — 99285 EMERGENCY DEPT VISIT HI MDM: CPT

## 2018-11-04 PROCEDURE — 81003 URINALYSIS AUTO W/O SCOPE: CPT | Performed by: EMERGENCY MEDICINE

## 2018-11-04 PROCEDURE — 85730 THROMBOPLASTIN TIME PARTIAL: CPT | Performed by: EMERGENCY MEDICINE

## 2018-11-04 PROCEDURE — 87081 CULTURE SCREEN ONLY: CPT | Performed by: INTERNAL MEDICINE

## 2018-11-04 PROCEDURE — 70498 CT ANGIOGRAPHY NECK: CPT

## 2018-11-04 PROCEDURE — 84484 ASSAY OF TROPONIN QUANT: CPT | Performed by: EMERGENCY MEDICINE

## 2018-11-04 RX ORDER — ATORVASTATIN CALCIUM 80 MG/1
80 TABLET, FILM COATED ORAL ONCE
Status: COMPLETED | OUTPATIENT
Start: 2018-11-04 | End: 2018-11-04

## 2018-11-04 RX ORDER — ASPIRIN 81 MG/1
162 TABLET, CHEWABLE ORAL ONCE
Status: COMPLETED | OUTPATIENT
Start: 2018-11-04 | End: 2018-11-04

## 2018-11-04 RX ORDER — PANTOPRAZOLE SODIUM 40 MG/1
40 TABLET, DELAYED RELEASE ORAL
Status: DISCONTINUED | OUTPATIENT
Start: 2018-11-05 | End: 2018-11-07 | Stop reason: HOSPADM

## 2018-11-04 RX ORDER — ATORVASTATIN CALCIUM 80 MG/1
80 TABLET, FILM COATED ORAL
Status: DISCONTINUED | OUTPATIENT
Start: 2018-11-05 | End: 2018-11-07 | Stop reason: HOSPADM

## 2018-11-04 RX ORDER — SODIUM CHLORIDE 9 MG/ML
75 INJECTION, SOLUTION INTRAVENOUS CONTINUOUS
Status: DISCONTINUED | OUTPATIENT
Start: 2018-11-04 | End: 2018-11-07 | Stop reason: HOSPADM

## 2018-11-04 RX ORDER — METOPROLOL TARTRATE 50 MG/1
50 TABLET, FILM COATED ORAL EVERY 12 HOURS SCHEDULED
Status: DISCONTINUED | OUTPATIENT
Start: 2018-11-04 | End: 2018-11-07 | Stop reason: HOSPADM

## 2018-11-04 RX ORDER — IRON POLYSACCHARIDE COMPLEX 150 MG
150 CAPSULE ORAL DAILY
Status: DISCONTINUED | OUTPATIENT
Start: 2018-11-05 | End: 2018-11-07 | Stop reason: HOSPADM

## 2018-11-04 RX ORDER — ACETAMINOPHEN 325 MG/1
650 TABLET ORAL EVERY 6 HOURS PRN
Status: DISCONTINUED | OUTPATIENT
Start: 2018-11-04 | End: 2018-11-07 | Stop reason: HOSPADM

## 2018-11-04 RX ADMIN — SODIUM CHLORIDE 1000 ML: 0.9 INJECTION, SOLUTION INTRAVENOUS at 17:07

## 2018-11-04 RX ADMIN — APIXABAN 2.5 MG: 2.5 TABLET, FILM COATED ORAL at 20:53

## 2018-11-04 RX ADMIN — METOPROLOL TARTRATE 50 MG: 50 TABLET, FILM COATED ORAL at 20:53

## 2018-11-04 RX ADMIN — IOHEXOL 85 ML: 350 INJECTION, SOLUTION INTRAVENOUS at 17:02

## 2018-11-04 RX ADMIN — ATORVASTATIN CALCIUM 80 MG: 80 TABLET, FILM COATED ORAL at 18:32

## 2018-11-04 RX ADMIN — SODIUM CHLORIDE 75 ML/HR: 0.9 INJECTION, SOLUTION INTRAVENOUS at 20:22

## 2018-11-04 RX ADMIN — ASPIRIN 81 MG 162 MG: 81 TABLET ORAL at 18:32

## 2018-11-04 NOTE — ASSESSMENT & PLAN NOTE
Patient's creatinine is up to 1 6  Patient's baseline creatinine is around 1 1-1 3  Patient is on Lasix p r n  At home  Gentle IV rehydration with normal saline at 75 mL/hour  Follow-up BMP in a m

## 2018-11-04 NOTE — ASSESSMENT & PLAN NOTE
Patient was having difficulty following commands with staring off into space and some urinary incontinence and weakness  Etiology of which is not clear at the current time  Initial diagnosis could be TIA versus seizure versus infectious cause  Will check UA to rule out UTI  Chest x-ray showed no infiltrate  Frequent neuro checks  Neurology evaluation  Patient was given aspirin and high-dose Lipitor in the ED  Continue Lipitor and Eliquis  Will get PT/OT evaluation and treatment

## 2018-11-04 NOTE — ASSESSMENT & PLAN NOTE
Bioprosthetic valve  Last echo from 09/2018 showed EF of 45% with mild diffuse hypokinesis and bioprosthetic mitral valve

## 2018-11-04 NOTE — H&P
H&P- Eliza Sears 1933, 80 y o  female MRN: [id number]    Unit/Bed#: ED CT2 Encounter: [id number]    Primary Care Provider: Ellyn Peralta MD   Date and time admitted to hospital: 11/4/2018  4:41 PM        * Altered mental status   Assessment & Plan    Patient was having difficulty following commands with staring off into space and some urinary incontinence and weakness  Etiology of which is not clear at the current time  Initial diagnosis could be TIA versus seizure versus infectious cause  Will check UA to rule out UTI  Chest x-ray showed no infiltrate  Frequent neuro checks  Neurology evaluation  Patient was given aspirin and high-dose Lipitor in the ED  Continue Lipitor and Eliquis  Will get PT/OT evaluation and treatment     CHADWICK (acute kidney injury) ([facility] [address] )   Assessment & Plan    Patient's creatinine is up to 1 6  Patient's baseline creatinine is around 1 1-1 3  Patient is on Lasix p r n  At home  Gentle IV rehydration with normal saline at 75 mL/hour  Follow-up BMP in a m  Deep vein thrombosis (DVT) of left upper extremity [facility])   Assessment & Plan    Patient is on Eliquis which will be continued     Systolic congestive heart failure ([facility] [address] )   Assessment & Plan    Appears to be compensated  Hold Lasix for now     Controlled type 2 diabetes mellitus without complication, without long-term current use of insulin ([facility])   Assessment & Plan    Lab Results   Component Value Date    HGBA1C 6 8 (H) 07/03/2018       No results for input(s): POCGLU in the last 72 hours  Blood Sugar Average: Last 72 hrs:   last hemoglobin A1c was 6 8  Patient will be on Humalog sliding scale with Accu-Cheks q a c   And HS and diabetic diet  Recheck hemoglobin A1c     H/O mitral valve replacement   Assessment & Plan    Bioprosthetic valve  Last echo from 09/2018 showed EF of 45% with mild diffuse hypokinesis and bioprosthetic mitral valve     History of ischemic right MCA stroke   Assessment & Plan    Patient history of 3 strokes over the past 9 months and also history of sub arachnoid hemorrhage  CT scan of the brain and CT of the head and neck were negative in the ED  Neuro checks and neurology evaluation  Patient was given aspirin and Lipitor in the ED  Continue high-dose Lipitor and Eliquis     HTN (hypertension)   Assessment & Plan    Continue metoprolol 50 milligram p o  B i d          VTE Prophylaxis: Apixaban (Eliquis)  / reason for no mechanical VTE prophylaxis On Eliquis   Code Status: Prior    Anticipated Length of Stay:  Patient will be admitted on an Inpatient basis with an anticipated length of stay of  less than 2 midnights  Justification for Hospital Stay:  Altered mental status, acute kidney injury, possible UTI    Total Time for Visit, including Counseling / Coordination of Care: 1 hour  Greater than 50% of this total time spent on direct patient counseling and coordination of care  Chief Complaint:   Aphasia      History of Present Illness:    Douglas Lopes is a 80 y o  female with a PMH of hypertension, history of mitral valve replacement, status post pacemaker, history of CVA, vascular dementia, diabetes mellitus type 2 who presents with altered mental status and weakness  Most of the history obtained from patient's daughter at bedside  Daughter reported that patient has not been acting herself since this afternoon  Patient was staring off into space without responding to questions  Patient also had difficulty walking and was a dead weight  Patient was holding on to things to walk to the bathroom and could not find where the toilet seat was  Patient could not process information  Patient also had an episode of urinary incontinence which is very unusual of her  Patient also had problems with walking and balance as per the daughter  Patient usually ambulates by herself    Patient has been having right shoulder problems and received a steroid injection at [facility]  about 5 days ago   Patient complained of significant pain in the right shoulder yesterday  Patient otherwise did not have any fever, chills or shaking movements  Patient herself denies any chest pain, abdominal pain, nausea, vomiting, headache, dizziness  In the ED patient had temperature of 100 4° and stroke alert was called  Patient had CT a of the head and neck which showed atherosclerotic calcification of the bilateral carotid bifurcation approximately 60% in the proximal right cervical internal carotid artery, patent major cervical intracranial arteries, stable left occipital meningioma and old infarct in the right parietal lobe and changes of chronic microangiopathic disease  Review of Systems:    Review of Systems   Genitourinary:        Urinary incontinence   Neurological: Positive for weakness  Difficulty following commands       Past Medical and Surgical History:     Past Medical History:   Diagnosis Date    Acid reflux     on occ    Arthritis     DJD right hip replaced    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 with no change    Cancer ([facility] [address] ) 2007    colonic polyps, no surgery done    Deep vein thrombosis (DVT) of left upper extremity ([facility] [address] ) 12/11/2017    Diabetes mellitus ([facility] [address] )     type 2    Diverticulosis     Edema     in legs on occ    Hypertension     on occ    Language barrier     speaks Kazakh & broken english    Stroke [facility])        Past Surgical History:   Procedure Laterality Date    COLON SURGERY      COLONOSCOPY      COLONOSCOPY N/A 11/2/2016    Procedure: COLONOSCOPY;  Surgeon: Ginger Livingston MD;  Location: [facility] GI LAB; Service:     ESOPHAGOGASTRODUODENOSCOPY N/A 11/2/2016    Procedure: ESOPHAGOGASTRODUODENOSCOPY (EGD); Surgeon: Ginger Livingston MD;  Location: [facility] GI LAB;   Service:    James Latif / Merrick Hurtado / Carolyn Garcia      JOINT REPLACEMENT Right 02/2015    hip    JOINT REPLACEMENT Left     knee    JOINT REPLACEMENT Right     knee    MITRAL VALVE REPLACEMENT  PA COLONOSCOPY FLX DX W/COLLJ SPEC WHEN PFRMD N/A 8/9/2018    Procedure: EGD AND COLONOSCOPY;  Surgeon: Razia Mir MD;  Location: [facility]; Service: Gastroenterology    PA REVISE MEDIAN N/CARPAL TUNNEL SURG Left 1/28/2016    Procedure: RELEASE CARPAL TUNNEL;  Surgeon: Camilla Pitt MD;  Location: [facility] MAIN OR;  Service: Orthopedics    TUBAL LIGATION      VARICOSE VEIN SURGERY Bilateral        Meds/Allergies:    Prior to Admission medications    Medication Sig Start Date End Date Taking? Authorizing Provider   apixaban (ELIQUIS) 2 5 mg Take 1 tablet (2 5 mg total) by mouth 2 (two) times a day 5/18/18   Trisha Leone MD   atorvastatin (LIPITOR) 80 mg tablet Take 1 tablet (80 mg total) by mouth daily with dinner  Patient taking differently: Take 40 mg by mouth daily with dinner   4/20/18   Marvin Lin MD   Bisacodyl (LAXATIVE PO) Take by mouth One tea bag PRN    Historical Provider, MD   FERREX 150 150 MG capsule daily   6/13/18   Historical Provider, MD   furosemide (LASIX) 40 mg tablet 20 mg   6/13/18   Historical Provider, MD   JANUVIA 50 MG tablet 50 mg daily   7/31/18   Historical Provider, MD   magnesium hydroxide (MILK OF MAGNESIA) 400 mg/5 mL oral suspension Take by mouth daily    Historical Provider, MD   metoprolol tartrate (LOPRESSOR) 50 mg tablet Take 1 tablet (50 mg total) by mouth every 12 (twelve) hours 5/18/18   Trisha Leone MD   Multiple Vitamins-Minerals (MULTIVITAMIN ADULT PO) 0 5 tablets 2 (two) times a day      Historical Provider, MD   pantoprazole (PROTONIX) 40 mg tablet Take 1 tablet (40 mg total) by mouth daily in the early morning  Patient not taking: Reported on 10/29/2018  5/19/18   Trisha Leone MD   potassium chloride (K-DUR) 10 mEq tablet daily   3/27/18   Historical Provider, MD       Allergies:    Allergies   Allergen Reactions    Vancomycin      Red man syndrome    Heparin Other (See Comments)     Thrombocytopenia         Social History:     Marital Status:    Substance Use History:   History   Alcohol Use No     History   Smoking Status    Former Smoker    Packs/day: 0 25    Years: 10 00    Types: Cigarettes    Quit date: 1950   Smokeless Tobacco    Never Used     History   Drug Use No       Family History:    Family History   Problem Relation Age of Onset    Cancer Mother         throat       Physical Exam:     Vitals:   Blood Pressure: 137/65 (11/04/18 1845)  Pulse: 62 (11/04/18 1845)  Temperature: 100 4 °F (38 °C) (11/04/18 1656)  Temp Source: Tympanic (11/04/18 1656)  Respirations: 19 (11/04/18 1845)  SpO2: 98 % (11/04/18 1830)    Physical Exam   Constitutional: No distress  HENT:   Head: Normocephalic and atraumatic  Nose: Nose normal    Eyes: Pupils are equal, round, and reactive to light  Conjunctivae are normal    Neck: Normal range of motion  Neck supple  Cardiovascular: Normal rate and regular rhythm  Murmur heard  Positive systolic murmur   Pulmonary/Chest: Effort normal and breath sounds normal  No respiratory distress  She has no wheezes  She has no rales  Abdominal: Soft  Bowel sounds are normal  She exhibits no distension  There is no tenderness  There is no rebound and no guarding  Musculoskeletal: She exhibits no edema  Neurological: She is alert  No cranial nerve deficit  Oriented to only place  Patient able to follow few simple commands but sometimes not responding to commands  Strength appears to be 5/5 in all extremities  Cranials 2 through 12 grossly intact but patient again not following all commands   Skin: Skin is warm and dry  No rash noted           Additional Data:     Lab Results: I have personally reviewed pertinent films in PACS      Results from last 7 days  Lab Units 11/04/18  1654   WBC Thousand/uL 7 56   HEMOGLOBIN g/dL 11 8   HEMATOCRIT % 37 8   PLATELETS Thousands/uL 117*   NEUTROS PCT % 69   LYMPHS PCT % 14   MONOS PCT % 11   EOS PCT % 6       Results from last 7 days  Lab Units 11/04/18  1654   POTASSIUM mmol/L 4 7   CHLORIDE mmol/L 101   CO2 mmol/L 26   BUN mg/dL 42*   CREATININE mg/dL 1 59*   CALCIUM mg/dL 9 6   ALK PHOS U/L 97   ALT U/L 47   AST U/L 22       Results from last 7 days  Lab Units 11/04/18  1654   INR  0 97       Imaging: I have personally reviewed pertinent films in PACS    CT stroke alert brain   Final Result by Miky Sweet MD (11/04 1730)      1  No acute intracranial abnormality  2   Sequela of old infarct in the right parietal lobe and changes of chronic microangiopathic disease  3   Stable left occipital meningioma  I personally discussed this study with [employer] on 11/4/2018 at 5:10 PM          Workstation performed: [id number]         CTA stroke alert (head/neck)   Final Result by Miky Sweet MD (11/04 1727)      1  Patent major cervical and intracranial arteries without large vessel occlusion  Presumably atherosclerotic mild multifocal narrowing of M1 segment of  left middle cerebral artery  No aneurysm or AV malformation  2   Atherosclerotic calcification of bilateral carotid bifurcation with approximately 60% stenosis in the proximal right cervical internal carotid artery  I personally discussed this study with [employer] on 11/4/2018 at 5:10 PM                 Workstation performed: [id number]         XR chest 1 view portable    (Results Pending)       CT stroke alert brain   Final Result      1  No acute intracranial abnormality  2   Sequela of old infarct in the right parietal lobe and changes of chronic microangiopathic disease  3   Stable left occipital meningioma  I personally discussed this study with [employer] on 11/4/2018 at 5:10 PM          Workstation performed: [id number]         CTA stroke alert (head/neck)   Final Result      1  Patent major cervical and intracranial arteries without large vessel occlusion    Presumably atherosclerotic mild multifocal narrowing of M1 segment of  left middle cerebral artery  No aneurysm or AV malformation  2   Atherosclerotic calcification of bilateral carotid bifurcation with approximately 60% stenosis in the proximal right cervical internal carotid artery  I personally discussed this study with Michelle Mar on 11/4/2018 at 5:10 PM                 Workstation performed: [id number]         XR chest 1 view portable    (Results Pending)       EKG, Pathology, and Other Studies Reviewed on Admission:   · EKG:  EKG shows normal sinus rhythm at 70 beats per minute with left anterior fascicular block and left ventricular hypertrophy    Allscripts / Epic Records Reviewed: Yes     ** Please Note: This note has been constructed using a voice recognition system   **

## 2018-11-04 NOTE — ASSESSMENT & PLAN NOTE
Lab Results   Component Value Date    HGBA1C 6 8 (H) 07/03/2018       No results for input(s): POCGLU in the last 72 hours  Blood Sugar Average: Last 72 hrs:   last hemoglobin A1c was 6 8  Patient will be on Humalog sliding scale with Accu-Cheks q a c   And HS and diabetic diet  Recheck hemoglobin A1c

## 2018-11-04 NOTE — ASSESSMENT & PLAN NOTE
Patient history of 3 strokes over the past 9 months and also history of sub arachnoid hemorrhage  CT scan of the brain and CT of the head and neck were negative in the ED  Neuro checks and neurology evaluation  Patient was given aspirin and Lipitor in the ED  Continue high-dose Lipitor and Eliquis

## 2018-11-04 NOTE — ED PROVIDER NOTES
History  Chief Complaint   Patient presents with    Aphasia     Patient has history of TIA and CVA in the past   Medics report she was seen by her family member this morning at 6 o'clock and was well  A o'clock this morning another family member noted that she had difficulty with speech and described expressive aphasia without slurring  Family member did not notice any increased weakening of the extremities  Several hours later the patient was brought in for evaluation  She arrives awake and alert, without headache  Her voice is clear and she states that sounds normal to her  The patient was sent for immediate CT scanning  Record review reveals she is on Eliquis  Prior to Admission Medications   Prescriptions Last Dose Informant Patient Reported? Taking?    Bisacodyl (LAXATIVE PO)   Yes No   Sig: Take by mouth One tea bag PRN   FERREX 150 150 MG capsule   Yes No   Sig: daily     JANUVIA 50 MG tablet   Yes No   Si mg daily     Multiple Vitamins-Minerals (MULTIVITAMIN ADULT PO)   Yes No   Si 5 tablets 2 (two) times a day     apixaban (ELIQUIS) 2 5 mg   No No   Sig: Take 1 tablet (2 5 mg total) by mouth 2 (two) times a day   atorvastatin (LIPITOR) 80 mg tablet   No No   Sig: Take 1 tablet (80 mg total) by mouth daily with dinner   Patient taking differently: Take 40 mg by mouth daily with dinner     furosemide (LASIX) 40 mg tablet   Yes No   Si mg     magnesium hydroxide (MILK OF MAGNESIA) 400 mg/5 mL oral suspension   Yes No   Sig: Take by mouth daily   metoprolol tartrate (LOPRESSOR) 50 mg tablet   No No   Sig: Take 1 tablet (50 mg total) by mouth every 12 (twelve) hours   pantoprazole (PROTONIX) 40 mg tablet   No No   Sig: Take 1 tablet (40 mg total) by mouth daily in the early morning   Patient not taking: Reported on 10/29/2018    potassium chloride (K-DUR) 10 mEq tablet   Yes No   Sig: daily        Facility-Administered Medications: None       Past Medical History:   Diagnosis Date    Acid reflux     on occ    Arthritis     DJD right hip replaced    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 with no change    [facility] 2007    colonic polyps, no surgery done    Deep vein thrombosis (DVT) of left upper extremity ([facility] ) 12/11/2017    Diabetes mellitus ([facility] [address] )     type 2    Diverticulosis     Edema     in legs on occ    Hypertension     on occ    Language barrier     speaks Albanian & broken english    Stroke [facility])        Past Surgical History:   Procedure Laterality Date    COLON SURGERY      COLONOSCOPY      COLONOSCOPY N/A 11/2/2016    Procedure: COLONOSCOPY;  Surgeon: Christine Goodman MD;  Location: [facility] GI LAB; Service:     ESOPHAGOGASTRODUODENOSCOPY N/A 11/2/2016    Procedure: ESOPHAGOGASTRODUODENOSCOPY (EGD); Surgeon: Christine Goodman MD;  Location: [facility] GI LAB; Service:    Ellwood Band / Dorita Knoll / Brigida Lewandowsky      JOINT REPLACEMENT Right 02/2015    hip    JOINT REPLACEMENT Left     knee    JOINT REPLACEMENT Right     knee    MITRAL VALVE REPLACEMENT      VA COLONOSCOPY FLX DX W/COLLJ SPEC WHEN PFRMD N/A 8/9/2018    Procedure: EGD AND COLONOSCOPY;  Surgeon: Christine Goodman MD;  Location: [facility]; Service: Gastroenterology    VA REVISE MEDIAN N/CARPAL TUNNEL SURG Left 1/28/2016    Procedure: RELEASE CARPAL TUNNEL;  Surgeon: Vignesh Moody MD;  Location: [facility] MAIN OR;  Service: Orthopedics    TUBAL LIGATION      VARICOSE VEIN SURGERY Bilateral        Family History   Problem Relation Age of Onset    Cancer Mother         throat     I have reviewed and agree with the history as documented  Social History   Substance Use Topics    Smoking status: Former Smoker     Packs/day: 0 25     Years: 10 00     Types: Cigarettes     Quit date: 1950    Smokeless tobacco: Never Used    Alcohol use No        Review of Systems   Constitutional: Positive for fever  Negative for chills and diaphoresis     HENT: Negative for congestion, nosebleeds and sore throat  Eyes: Negative for visual disturbance  Respiratory: Negative for shortness of breath  Cardiovascular: Negative for chest pain  Gastrointestinal: Negative for abdominal pain, nausea and vomiting  Genitourinary: Negative for dysuria  Musculoskeletal: Positive for arthralgias and myalgias  Patient had a recent injection in the shoulder by an orthopedist    Skin: Negative for rash and wound  Neurological: Positive for speech difficulty and weakness  Negative for dizziness, seizures, syncope, light-headedness and headaches  Psychiatric/Behavioral: Negative for confusion  All other systems reviewed and are negative  Physical Exam  Physical Exam   Constitutional: She is oriented to person, place, and time  She appears well-developed and well-nourished  HENT:   Head: Normocephalic and atraumatic  Mouth/Throat: Oropharynx is clear and moist    Eyes: Conjunctivae and EOM are normal    Neck: Normal range of motion  Neck supple  No JVD present  Cardiovascular: Normal rate, regular rhythm and normal heart sounds  Pulmonary/Chest: Effort normal and breath sounds normal  No respiratory distress  Abdominal: Soft  Bowel sounds are normal  There is no tenderness  Musculoskeletal: She exhibits tenderness  She exhibits no edema  Patient has tenderness on the right shoulder and pain with range of motion  She also has some weakness in both lower extremities but is able to lift legs against gravity and flex knees with some difficulty   Neurological: She is alert and oriented to person, place, and time  No cranial nerve deficit  Coordination normal    Skin: Skin is warm and dry  Psychiatric: She has a normal mood and affect  Her behavior is normal    Nursing note and vitals reviewed        Vital Signs  ED Triage Vitals [11/04/18 1656]   Temperature Pulse Respirations Blood Pressure SpO2   100 4 °F (38 °C) 70 18 149/74 94 %      Temp Source Heart Rate Source Patient Position - Orthostatic VS BP Location FiO2 (%)   Tympanic Monitor Lying Right arm --      Pain Score       --           Vitals:    11/04/18 1748 11/04/18 1750 11/04/18 1754 11/04/18 1759   BP: 131/63      Pulse:  66 66 64   Patient Position - Orthostatic VS:           Visual Acuity  Visual Acuity      Most Recent Value   L Pupil Size (mm)  3   R Pupil Size (mm)  3          ED Medications  Medications   aspirin chewable tablet 162 mg (not administered)   atorvastatin (LIPITOR) tablet 80 mg (not administered)   sodium chloride 0 9 % bolus 1,000 mL (1,000 mL Intravenous New Bag 11/4/18 1707)   iohexol (OMNIPAQUE) 350 MG/ML injection (MULTI-DOSE) 85 mL (85 mL Intravenous Given 11/4/18 1702)       Diagnostic Studies  Results Reviewed     Procedure Component Value Units Date/Time    Troponin I [[id number]]  (Normal) Collected:  11/04/18 1654    Lab Status:  Final result Specimen:  Blood from Arm, Left Updated:  11/04/18 1722     Troponin I <0 02 ng/mL     Comprehensive metabolic panel [[id number]]  (Abnormal) Collected:  11/04/18 1654    Lab Status:  Final result Specimen:  Blood from Arm, Left Updated:  11/04/18 1717     Sodium 136 mmol/L      Potassium 4 7 mmol/L      Chloride 101 mmol/L      CO2 26 mmol/L      ANION GAP 9 mmol/L      BUN 42 (H) mg/dL      Creatinine 1 59 (H) mg/dL      Glucose 137 mg/dL      Calcium 9 6 mg/dL      AST 22 U/L      ALT 47 U/L      Alkaline Phosphatase 97 U/L      Total Protein 7 3 g/dL      Albumin 3 4 (L) g/dL      Total Bilirubin 1 10 (H) mg/dL      eGFR 29 ml/min/1 73sq m     Narrative:         National Kidney Disease Education Program recommendations are as follows:  GFR calculation is accurate only with a steady state creatinine  Chronic Kidney disease less than 60 ml/min/1 73 sq  meters  Kidney failure less than 15 ml/min/1 73 sq  meters      APTT [[id number]]  (Normal) Collected:  11/04/18 1654    Lab Status:  Final result Specimen:  Blood from Arm, Left Updated:  11/04/18 1712     PTT 26 seconds Protime-INR [[id number]]  (Normal) Collected:  11/04/18 1654    Lab Status:  Final result Specimen:  Blood from Arm, Left Updated:  11/04/18 1712     Protime 10 2 seconds      INR 0 97    CBC and differential [[id number]]  (Abnormal) Collected:  11/04/18 1654    Lab Status:  Final result Specimen:  Blood from Arm, Left Updated:  11/04/18 1700     WBC 7 56 Thousand/uL      RBC 4 04 Million/uL      Hemoglobin 11 8 g/dL      Hematocrit 37 8 %      MCV 94 fL      MCH 29 2 pg      MCHC 31 2 (L) g/dL      RDW 13 5 %      MPV 10 8 fL      Platelets 421 (L) Thousands/uL      nRBC 0 /100 WBCs      Neutrophils Relative 69 %      Immat GRANS % 0 %      Lymphocytes Relative 14 %      Monocytes Relative 11 %      Eosinophils Relative 6 %      Basophils Relative 0 %      Neutrophils Absolute 5 12 Thousands/µL      Immature Grans Absolute 0 02 Thousand/uL      Lymphocytes Absolute 1 09 Thousands/µL      Monocytes Absolute 0 85 Thousand/µL      Eosinophils Absolute 0 47 Thousand/µL      Basophils Absolute 0 01 Thousands/µL     UA w Reflex to Microscopic [[id number]]     Lab Status:  No result Specimen:  Urine                  CT stroke alert brain   Final Result by Prince Bennett MD (11/04 1730)      1  No acute intracranial abnormality  2   Sequela of old infarct in the right parietal lobe and changes of chronic microangiopathic disease  3   Stable left occipital meningioma  I personally discussed this study with Spencer Hanson on 11/4/2018 at 5:10 PM          Workstation performed: [id number]         CTA stroke alert (head/neck)   Final Result by Prince Bennett MD (11/04 1727)      1  Patent major cervical and intracranial arteries without large vessel occlusion  Presumably atherosclerotic mild multifocal narrowing of M1 segment of  left middle cerebral artery  No aneurysm or AV malformation        2   Atherosclerotic calcification of bilateral carotid bifurcation with approximately 60% stenosis in the proximal right cervical internal carotid artery  I personally discussed this study with Ashlyn Martin on 11/4/2018 at 5:10 PM                 Workstation performed: [id number]         XR chest 1 view portable    (Results Pending)              Procedures  ECG 12 Lead Documentation  Date/Time: 11/4/2018 5:05 PM  Performed by: Sammy Mayo  Authorized by: Sammy Mayo     Indications / Diagnosis:  Possible stroke  ECG reviewed by me, the ED Provider: yes    Patient location:  ED  Interpretation:     Interpretation: abnormal    Rate:     ECG rate:  70    ECG rate assessment: normal    Rhythm:     Rhythm: paced    Pacing:     Type of pacing:  Atrial  Ectopy:     Ectopy: none             Phone Contacts  ED Phone Contact    ED Course                   Stroke Assessment     Row Name 11/04/18 1812             NIH Stroke Scale    Interval Baseline      Level of Consciousness (1a ) 0      LOC Questions (1b ) 0      LOC Commands (1c ) 0      Best Gaze (2 ) 0      Visual (3 ) 0      Facial Palsy (4 ) 0      Motor Arm, Left (5a ) 0      Motor Arm, Right (5b ) 0   Orthopedic problem shoulder      Motor Leg, Left (6a ) 1      Motor Leg, Right (6b ) 1      Limb Ataxia (7 ) 0      Sensory (8 ) 0      Best Language (9 ) 0      Dysarthria (10 ) 0      Extinction and Inattention (11 ) (Formerly Neglect) 1      Total 3                First Filed Value   TPA Decision  Patient not a TPA candidate  Patient is not a candidate options  Symptoms resolved/clearly non disabling  , comment [Patient on Eliquis]                        MDM  Number of Diagnoses or Management Options  Diagnosis management comments: I discussed case with Neurology and radiology  No occlusion and no bleed is noted    On return from CT scan the patient's re-evaluated and again aphasia seems to be resolved and no focal motor weakness is noted other than orthopedic in origin    CritCare Time    Disposition  Final diagnoses:   TIA (transient ischemic attack)     Time reflects when diagnosis was documented in both MDM as applicable and the Disposition within this note     Time User Action Codes Description Comment    11/4/2018  6:10 PM Uziel Ekta DONOVAN Add [G45 9] TIA (transient ischemic attack)       ED Disposition     ED Disposition Condition Comment    Admit  Case was discussed with Dr Gregorio Blanca and the patient's admission status was agreed to be Admission Status: observation status to the service of Dr Gregorio Blanca   Follow-up Information    None         Patient's Medications   Discharge Prescriptions    No medications on file     No discharge procedures on file      ED Provider  Electronically Signed by           Madhu Marin MD  11/04/18 4548

## 2018-11-05 DIAGNOSIS — R47.01 APHASIA: Primary | ICD-10-CM

## 2018-11-05 LAB
ANION GAP SERPL CALCULATED.3IONS-SCNC: 9 MMOL/L (ref 4–13)
BILIRUB UR QL STRIP: NEGATIVE
BUN SERPL-MCNC: 38 MG/DL (ref 5–25)
CALCIUM SERPL-MCNC: 8.4 MG/DL (ref 8.3–10.1)
CHLORIDE SERPL-SCNC: 104 MMOL/L (ref 100–108)
CHOLEST SERPL-MCNC: 95 MG/DL (ref 50–200)
CLARITY UR: CLEAR
CO2 SERPL-SCNC: 25 MMOL/L (ref 21–32)
COLOR UR: YELLOW
CREAT SERPL-MCNC: 1.58 MG/DL (ref 0.6–1.3)
ERYTHROCYTE [DISTWIDTH] IN BLOOD BY AUTOMATED COUNT: 13.7 % (ref 11.6–15.1)
EST. AVERAGE GLUCOSE BLD GHB EST-MCNC: 140 MG/DL
GFR SERPL CREATININE-BSD FRML MDRD: 30 ML/MIN/1.73SQ M
GLUCOSE SERPL-MCNC: 103 MG/DL (ref 65–140)
GLUCOSE SERPL-MCNC: 118 MG/DL (ref 65–140)
GLUCOSE SERPL-MCNC: 131 MG/DL (ref 65–140)
GLUCOSE SERPL-MCNC: 250 MG/DL (ref 65–140)
GLUCOSE SERPL-MCNC: 72 MG/DL (ref 65–140)
GLUCOSE UR STRIP-MCNC: NEGATIVE MG/DL
HBA1C MFR BLD: 6.5 % (ref 4.2–6.3)
HCT VFR BLD AUTO: 32.2 % (ref 34.8–46.1)
HDLC SERPL-MCNC: 50 MG/DL (ref 40–60)
HGB BLD-MCNC: 10 G/DL (ref 11.5–15.4)
HGB UR QL STRIP.AUTO: NEGATIVE
KETONES UR STRIP-MCNC: NEGATIVE MG/DL
LDLC SERPL CALC-MCNC: 38 MG/DL (ref 0–100)
LEUKOCYTE ESTERASE UR QL STRIP: NEGATIVE
MCH RBC QN AUTO: 29.2 PG (ref 26.8–34.3)
MCHC RBC AUTO-ENTMCNC: 31.1 G/DL (ref 31.4–37.4)
MCV RBC AUTO: 94 FL (ref 82–98)
NITRITE UR QL STRIP: NEGATIVE
NONHDLC SERPL-MCNC: 45 MG/DL
PH UR STRIP.AUTO: 6.5 [PH] (ref 5–9)
PLATELET # BLD AUTO: 102 THOUSANDS/UL (ref 149–390)
PMV BLD AUTO: 11.2 FL (ref 8.9–12.7)
POTASSIUM SERPL-SCNC: 4.5 MMOL/L (ref 3.5–5.3)
PROT UR STRIP-MCNC: NEGATIVE MG/DL
RBC # BLD AUTO: 3.43 MILLION/UL (ref 3.81–5.12)
SODIUM SERPL-SCNC: 138 MMOL/L (ref 136–145)
SP GR UR STRIP.AUTO: <=1.005 (ref 1–1.03)
TRIGL SERPL-MCNC: 33 MG/DL
UROBILINOGEN UR QL STRIP.AUTO: 0.2 E.U./DL
WBC # BLD AUTO: 5.8 THOUSAND/UL (ref 4.31–10.16)

## 2018-11-05 PROCEDURE — G8987 SELF CARE CURRENT STATUS: HCPCS

## 2018-11-05 PROCEDURE — 83036 HEMOGLOBIN GLYCOSYLATED A1C: CPT | Performed by: INTERNAL MEDICINE

## 2018-11-05 PROCEDURE — G0008 ADMIN INFLUENZA VIRUS VAC: HCPCS | Performed by: INTERNAL MEDICINE

## 2018-11-05 PROCEDURE — 82948 REAGENT STRIP/BLOOD GLUCOSE: CPT

## 2018-11-05 PROCEDURE — 80061 LIPID PANEL: CPT | Performed by: INTERNAL MEDICINE

## 2018-11-05 PROCEDURE — G8978 MOBILITY CURRENT STATUS: HCPCS

## 2018-11-05 PROCEDURE — G8988 SELF CARE GOAL STATUS: HCPCS

## 2018-11-05 PROCEDURE — 99223 1ST HOSP IP/OBS HIGH 75: CPT | Performed by: PSYCHIATRY & NEUROLOGY

## 2018-11-05 PROCEDURE — 97110 THERAPEUTIC EXERCISES: CPT

## 2018-11-05 PROCEDURE — 97163 PT EVAL HIGH COMPLEX 45 MIN: CPT

## 2018-11-05 PROCEDURE — G8979 MOBILITY GOAL STATUS: HCPCS

## 2018-11-05 PROCEDURE — 90662 IIV NO PRSV INCREASED AG IM: CPT | Performed by: INTERNAL MEDICINE

## 2018-11-05 PROCEDURE — 99232 SBSQ HOSP IP/OBS MODERATE 35: CPT | Performed by: INTERNAL MEDICINE

## 2018-11-05 PROCEDURE — 97167 OT EVAL HIGH COMPLEX 60 MIN: CPT

## 2018-11-05 PROCEDURE — 80048 BASIC METABOLIC PNL TOTAL CA: CPT | Performed by: INTERNAL MEDICINE

## 2018-11-05 PROCEDURE — 85027 COMPLETE CBC AUTOMATED: CPT | Performed by: INTERNAL MEDICINE

## 2018-11-05 RX ORDER — ASPIRIN 81 MG/1
81 TABLET ORAL DAILY
Status: DISCONTINUED | OUTPATIENT
Start: 2018-11-05 | End: 2018-11-07 | Stop reason: HOSPADM

## 2018-11-05 RX ADMIN — APIXABAN 2.5 MG: 2.5 TABLET, FILM COATED ORAL at 11:18

## 2018-11-05 RX ADMIN — SODIUM CHLORIDE 75 ML/HR: 0.9 INJECTION, SOLUTION INTRAVENOUS at 21:23

## 2018-11-05 RX ADMIN — ASPIRIN 81 MG: 81 TABLET, COATED ORAL at 13:54

## 2018-11-05 RX ADMIN — Medication 150 MG: at 11:19

## 2018-11-05 RX ADMIN — METOPROLOL TARTRATE 50 MG: 50 TABLET, FILM COATED ORAL at 21:24

## 2018-11-05 RX ADMIN — INFLUENZA A VIRUS A/MICHIGAN/45/2015 X-275 (H1N1) ANTIGEN (FORMALDEHYDE INACTIVATED), INFLUENZA A VIRUS A/SINGAPORE/INFIMH-16-0019/2016 IVR-186 (H3N2) ANTIGEN (FORMALDEHYDE INACTIVATED), AND INFLUENZA B VIRUS B/MARYLAND/15/2016 BX-69A (A B/COLORADO/6/2017-LIKE VIRUS) ANTIGEN (FORMALDEHYDE INACTIVATED) 0.5 ML: 60; 60; 60 INJECTION, SUSPENSION INTRAMUSCULAR at 12:44

## 2018-11-05 RX ADMIN — INSULIN LISPRO 2 UNITS: 100 INJECTION, SOLUTION INTRAVENOUS; SUBCUTANEOUS at 12:36

## 2018-11-05 RX ADMIN — APIXABAN 2.5 MG: 2.5 TABLET, FILM COATED ORAL at 17:52

## 2018-11-05 RX ADMIN — METOPROLOL TARTRATE 50 MG: 50 TABLET, FILM COATED ORAL at 11:19

## 2018-11-05 RX ADMIN — Medication 1 TABLET: at 11:20

## 2018-11-05 RX ADMIN — ATORVASTATIN CALCIUM 80 MG: 80 TABLET, FILM COATED ORAL at 17:51

## 2018-11-05 RX ADMIN — SITAGLIPTIN 50 MG: 50 TABLET, FILM COATED ORAL at 11:20

## 2018-11-05 RX ADMIN — SODIUM CHLORIDE 75 ML/HR: 0.9 INJECTION, SOLUTION INTRAVENOUS at 07:49

## 2018-11-05 NOTE — NURSING NOTE
Assisted to bedside commode to see if we can get a urine sample but pt did not void  Assisted back to bed  Pt able to lift both legs up from the floor to bed;neurochecks incomplete as pt unco-operative;says she wants to go home  Will continue to monitor

## 2018-11-05 NOTE — UTILIZATION REVIEW
Initial Clinical Review    Admission: Date/Time/Statement: 11/4/18 @ 1900      Inpatient Admission     Standing Status:   Standing     Number of Occurrences:   1     Order Specific Question:   Admitting Physician     Answer:   Li Kang     Order Specific Question:   Level of Care     Answer:   Med Surg [16]     Order Specific Question:   Estimated length of stay     Answer:   More than 2 Midnights     Order Specific Question:   Certification     Answer:   I certify that inpatient services are medically necessary for this patient for a duration of greater than two midnights  See H&P and MD Progress Notes for additional information about the patient's course of treatment  ED: Date/Time/Mode of Arrival:   ED Arrival Information     Expected Arrival Acuity Means of Arrival Escorted By Service Admission Type    - 11/4/2018 16:41 [facility]gent [facility] [address] Emergency    Arrival Complaint    stroke alert             Chief Complaint   Patient presents with    Aphasia       History of Illness:   Bill Bernstein is a 80 y o  female with a PMH of hypertension, history of mitral valve replacement, status post pacemaker, history of CVA, vascular dementia, diabetes mellitus type 2 who presents with altered mental status and weakness     Daughter reported that patient has not been acting herself since this afternoon  Patient was staring off into space without responding to questions  Patient also had difficulty walking and was a dead weight  Patient was holding on to things to walk to the bathroom and could not find where the toilet seat was  Patient could not process information  Patient also had an episode of urinary incontinence which is very unusual of her  Patient also had problems with walking and balance as per the daughter  Patient usually ambulates by herself    Patient has been having right shoulder problems and received a steroid injection at [place] [address]  about 5 days ago  Patient complained of significant pain in the right shoulder yesterday      ED Vital Signs:   11/04/18 1656 11/04/18 1656 11/04/18 1656 11/04/18 1656 11/04/18 1656   100 4 °F (38 °C) 70 18 149/74 94 %      No Pain       11/04/18 68 kg (149 lb 14 6 oz)       Date and Time Eye Opening Best Verbal Response Best Motor Response Win Coma Scale Score   11/05/18 0500 4 5 6 15   11/05/18 0045 4 5 6 15   11/04/18 2100 4 5 6 15   11/04/18 1740 4 5 6 15   11/04/18 1656 4 5 6 15   11/04/18 1656 4 5 6 15       Abnormal Labs/Diagnostic Test Results      BUN 42 (H)   Creatinine 1 59 (H)     RBC 3 43 (L)   Hemoglobin 10 0 (L)   Hematocrit 32 2 (L)   MCHC 31 1 (L)   Platelets 316 (L)     Oriented to only place  Patient able to follow few simple commands but sometimes not responding to commands  Patient again not following all commands       CT stroke alert brain       1  No acute intracranial abnormality        2  Sequela of old infarct in the right parietal lobe and changes of chronic microangiopathic disease        3   Stable left occipital meningioma            CTA HEAD AND NECK  IMPRESSION:     1  Patent major cervical and intracranial arteries without large vessel occlusion  Presumably atherosclerotic mild multifocal narrowing of M1 segment of  left middle cerebral artery  No aneurysm or AV malformation      2    Atherosclerotic calcification of bilateral carotid bifurcation with approximately 60% stenosis in the proximal right cervical internal carotid artery        ED Treatment:   Medication Administration from 11/04/2018 1641 to 11/04/2018 1930       Date/Time Order Dose Route     11/04/2018 1707 sodium chloride 0 9 % bolus 1,000 mL 1,000 mL Intravenous     11/04/2018 1832 aspirin chewable tablet 162 mg 162 mg Oral     11/04/2018 1832 atorvastatin (LIPITOR) tablet 80 mg 80 mg Oral       Past Medical/Surgical History:    Deep vein thrombosis (DVT) of left upper extremity ([address] ) 01/25/2018    H/O mitral valve replacement 03/01/2018    [facility]    Controlled type 2 diabetes mellitus without complication, without long-term current use of insulin ([facility] ) 04/18/2018    Pacemaker 04/18/2018    Acid reflux 04/18/2018    HTN (hypertension) 04/18/2018    Constipation 04/18/2018    CHADWICK (acute kidney injury) ([facility] ) 24/72/0989    Systolic congestive heart failure ([facility] ) 04/18/2018    Encephalopathy 04/19/2018    History of ischemic right MCA stroke 04/23/2018    History of CVA (cerebrovascular accident) 05/25/2018    History of subarachnoid hemorrhage 05/25/2018    CKD (chronic kidney disease) stage 3, GFR 30-59 ml/min ([facility] ) 05/25/2018    History of DVT (deep vein thrombosis) 05/25/2018    History of pacemaker 05/25/2018    Acute cystitis without hematuria 05/25/2018    Chronic systolic CHF (congestive heart failure) ([facility] ) 05/25/2018    Colonic thickening 05/26/2018    Anemia 05/26/2018    Altered mental status 06/15/2018    Seizure-like activity ([facility] ) 06/15/2018    HCAP (healthcare-associated pneumonia) 06/18/2018    Shoulder pain, right 06/18/2018    Abnormal TSH 06/18/2018    Dysphagia 06/18/2018    Gastroesophageal reflux disease without esophagitis 06/22/2018    Polyp of colon 06/22/2018    Arthritis of right glenohumeral joint 07/31/2018    Bilateral carotid artery stenosis 08/28/2018    Vascular dementia 09/27/2018    Mood disorder ([facility] ) 09/27/2018    Ambulatory dysfunction 09/27/2018    H/O ischemic left MCA stroke 10/29/2018       Admitting Diagnosis: TIA (transient ischemic attack) [G45 9]  Stroke (cerebrum) ([place] [address] ) [I63 9]    Age/Sex: 80 y o  female  Decline in mental status,  Ambulatory dysfunction  With history of stroke  In the ED patient had temperature of 100 4° and stroke alert was called    Patient had CT a of the head and neck which showed atherosclerotic calcification of the bilateral carotid bifurcation approximately 60% in the proximal right cervical internal carotid artery, patent major cervical intracranial arteries, stable left occipital meningioma and old infarct in the right parietal lobe and changes of chronic microangiopathic disease  Assessment/Plan:   Altered mental status   Assessment & Plan     Patient was having difficulty following commands with staring off into space and some urinary incontinence and weakness  Etiology of which is not clear at the current time  Initial diagnosis could be TIA versus seizure versus infectious cause  Will check UA to rule out UTI  Chest x-ray showed no infiltrate  Frequent neuro checks  Neurology evaluation  Patient was given aspirin and high-dose Lipitor in the ED  Continue Lipitor and Eliquis  Will get PT/OT evaluation and treatment      CHADWICK (acute kidney injury) ([facility] )   Assessment & Plan     Patient's creatinine is up to 1 6  Patient's baseline creatinine is around 1 1-1 3  Patient is on Lasix p r n  At home  Gentle IV rehydration with normal saline at 75 mL/hour  Follow-up BMP in a m       History of ischemic right MCA stroke   Assessment & Plan     Patient history of 3 strokes over the past 9 months and also history of sub arachnoid hemorrhage  CT scan of the brain and CT of the head and neck were negative in the ED  Neuro checks and neurology evaluation  Patient was given aspirin and Lipitor in the ED  Continue high-dose Lipitor and Eliquis           Admission Orders:    Telemetry  Pt and Ot eval and treat  Consult neurology  Neuro checks q4 hr  EEG awake or drowsy routine   Recent echo  9-24-18   LIPID PANEL   HBA1C    CONSISTENT CARB DIET       Scheduled Meds:     acetaminophen 650 mg Oral Q6H PRN   apixaban 2 5 mg Oral BID   aspirin 81 mg Oral Daily   atorvastatin 80 mg Oral Daily With Dinner   insulin lispro 1-5 Units Subcutaneous TID AC   insulin lispro 1-5 Units Subcutaneous HS   iron polysaccharides 150 mg Oral Daily   metoprolol tartrate 50 mg Oral Q12H [address] multivitamin-minerals 1 tablet Oral Daily   pantoprazole 40 mg Oral Early Morning   sitaGLIPtin 50 mg Oral Daily   sodium chloride 75 mL/hr Intravenous Continuous

## 2018-11-05 NOTE — PLAN OF CARE
Activity Intolerance/Impaired Mobility     Mobility/activity is maintained at optimum level for patient Progressing          Communication Impairment     Ability to express needs and understand communication Progressing          Neurological Deficit     Neurological status is stable or improving Progressing          Nutrition     Nutrition/Hydration status is improving Progressing          Potential for Aspiration     Non-ventilated patient's risk of aspiration is minimized Progressing

## 2018-11-05 NOTE — ASSESSMENT & PLAN NOTE
Patient history of 3 strokes over the past 9 months and also history of sub arachnoid hemorrhage  CT scan of the brain and CT of the head and neck were negative in the ED  Continue aspirin and high-dose Lipitor along with Eliquis

## 2018-11-05 NOTE — ASSESSMENT & PLAN NOTE
Patient's creatinine is up to 1 6  Patient's baseline creatinine is around 1 1-1 3  Continue IV hydration with normal saline at 75 mL/hour  Follow-up ANJELICA in [place]

## 2018-11-05 NOTE — PROGRESS NOTES
Progress Note - Kecia Parra 1933, 80 y o  female MRN: [id number]    Unit/Bed#: [address] [place] [address] Encounter: [id number]    Primary Care Provider: Amari Menjivar MD   Date and time admitted to hospital: 11/4/2018  4:41 PM        * Altered mental status   Assessment & Plan    Patient was having difficulty following commands with staring off into space and some urinary incontinence and weakness  Etiology of which is not clear at the current time  Possible CVA versus seizure  Patient's previous 48 hour EEG was negative  Discussed with Neurology  Patient be continued on aspirin and high-dose Lipitor  Patient will need follow-up MRI at [place] [address]  an outpatient basis as patient has pacemaker  Continue PT/OT  Family not keen on starting antiepileptic medications     CHADWICK (acute kidney injury) ([facility] )   Assessment & Plan    Patient's creatinine is up to 1 6  Patient's baseline creatinine is around 1 1-1 3  Continue IV hydration with normal saline at 75 mL/hour  Follow-up BMP in a m  Deep vein thrombosis (DVT) of left upper extremity [facility])   Assessment & Plan    Patient is on Eliquis which will be continued     Systolic congestive heart failure ([facility] )   Assessment & Plan    Appears to be compensated  Hold Lasix for now     Controlled type 2 diabetes mellitus without complication, without long-term current use of insulin [facility])   Assessment & Plan    Lab Results   Component Value Date    HGBA1C 6 5 (H) 11/05/2018       Recent Labs      11/04/18   2109  11/05/18   0805  11/05/18   1146  11/05/18   1649   POCGLU  130  118  250*  72       Blood Sugar Average: Last 72 hrs:  (P) 142 5 last hemoglobin A1c was 6 8  Patient will be on Humalog sliding scale with Accu-Cheks q a c   And HS and diabetic diet  Current hemoglobin A1c 6 5     H/O mitral valve replacement   Assessment & Plan    Bioprosthetic valve  Last echo from 09/2018 showed EF of 45% with mild diffuse hypokinesis and bioprosthetic mitral valve     History of ischemic right MCA stroke   Assessment & Plan    Patient history of 3 strokes over the past 9 months and also history of sub arachnoid hemorrhage  CT scan of the brain and CT of the head and neck were negative in the ED  Continue aspirin and high-dose Lipitor along with Eliquis       HTN (hypertension)   Assessment & Plan    Continue metoprolol 50 milligram p o  B i d            VTE Pharmacologic Prophylaxis:   Pharmacologic: Apixaban (Eliquis)  Mechanical VTE Prophylaxis in Place: Yes    Patient Centered Rounds: I have performed bedside rounds with nursing staff today  Discussions with Specialists or Other Care Team Provider: Farhat Moreno    Education and Discussions with Family / Patient:Yes    Time Spent for Care: 30 minutes  More than 50% of total time spent on counseling and coordination of care as described above  Current Length of Stay: 1 day(s)    Current Patient Status: Inpatient     Discharge Plan: Pending    Code Status: Level 1 - Full Code      Subjective:   Patient's mental status has improved significantly  Patient is able to follow all the commands and responding to questions appropriately today  Patient denies any headache, dizziness, weakness, tingling and numbness  Patient complains of right shoulder pain  Objective:       Vitals:   Temp (24hrs), Av 3 °F (36 8 °C), Min:97 9 °F (36 6 °C), Max:98 5 °F (36 9 °C)    Temp:  [97 9 °F (36 6 °C)-98 5 °F (36 9 °C)] 97 9 °F (36 6 °C)  HR:  [60-71] 69  Resp:  [16-20] 20  BP: (130-183)/(59-84) 148/65  SpO2:  [95 %-99 %] 95 %  Body mass index is 26 56 kg/m²  Input and Output Summary (last 24 hours): Intake/Output Summary (Last 24 hours) at 18 1821  Last data filed at 18 1448   Gross per 24 hour   Intake             1000 ml   Output             1990 ml   Net             -990 ml       Physical Exam:     Physical Exam   Constitutional: No distress  HENT:   Head: Normocephalic and atraumatic     Nose: Nose normal    Eyes: Pupils are equal, round, and reactive to light  Conjunctivae are normal    Neck: Normal range of motion  Neck supple  Cardiovascular: Normal rate, regular rhythm and normal heart sounds  Pulmonary/Chest: Effort normal and breath sounds normal  No respiratory distress  She has no wheezes  She has no rales  Abdominal: Soft  Bowel sounds are normal  She exhibits no distension  There is no tenderness  There is no rebound and no guarding  Musculoskeletal: She exhibits no edema  Neurological: She is alert  No cranial nerve deficit  Strength is 5/5 in all extremities  Patient could not move right upper extremity due to pain in the right shoulder   Skin: Skin is warm and dry  No rash noted  Additional Data:     Labs:      Results from last 7 days  Lab Units 11/05/18  0506 11/04/18  1654   WBC Thousand/uL 5 80 7 56   HEMOGLOBIN g/dL 10 0* 11 8   HEMATOCRIT % 32 2* 37 8   PLATELETS Thousands/uL 102* 117*   NEUTROS PCT %  --  69   LYMPHS PCT %  --  14   MONOS PCT %  --  11   EOS PCT %  --  6       Results from last 7 days  Lab Units 11/05/18  0506 11/04/18  1654   POTASSIUM mmol/L 4 5 4 7   CHLORIDE mmol/L 104 101   CO2 mmol/L 25 26   BUN mg/dL 38* 42*   CREATININE mg/dL 1 58* 1 59*   CALCIUM mg/dL 8 4 9 6   ALK PHOS U/L  --  97   ALT U/L  --  47   AST U/L  --  22       Results from last 7 days  Lab Units 11/04/18  1654   INR  0 97       Results from last 7 days  Lab Units 11/04/18  1654   TROPONIN I ng/mL <0 02     Lab Results   Component Value Date/Time    HGBA1C 6 5 (H) 11/05/2018 05:06 AM    HGBA1C 6 8 (H) 07/03/2018 08:09 AM    HGBA1C 6 4 (H) 06/16/2018 10:09 AM       Results from last 7 days  Lab Units 11/05/18  1649 11/05/18  1146 11/05/18  0805 11/04/18  2109   POC GLUCOSE mg/dl 72 250* 118 130           * I Have Reviewed All Lab Data Listed Above  * Additional Pertinent Lab Tests Reviewed:  All Labs For Current Hospital Admission Reviewed    Imaging:  Xr Chest 1 View Portable    Result Date: 11/5/2018  Narrative: CHEST INDICATION:   stroke alert  COMPARISON:  06/18/2018 EXAM PERFORMED/VIEWS:  XR CHEST PORTABLE One image FINDINGS: Cardiomediastinal silhouette appears enlarged  A median sternotomy has been performed  A pacemaker enters on the left  No evidence of heart failure  The lungs are clear  No pneumothorax or pleural effusion  Osseous structures appear within normal limits for patient age  Calcific tendinitis in the right shoulder  Impression: No acute cardiopulmonary disease  Cardiomegaly  Workstation performed: [id number]     Ct Stroke Alert Brain    Result Date: 11/4/2018  Narrative: CT BRAIN - STROKE ALERT PROTOCOL INDICATION:   Stroke  COMPARISON:  Head CT 6/18/2018 TECHNIQUE:  CT examination of the brain was performed  In addition to axial images, coronal reformatted images were created and submitted for interpretation  Radiation dose length product (DLP) for this visit:  969 22 mGy-cm   This examination, like all CT scans performed in the [facility], was performed utilizing techniques to minimize radiation dose exposure, including the use of iterative  reconstruction and automated exposure control  IMAGE QUALITY:  Diagnostic  FINDINGS:  PARENCHYMA: Cerebral atrophy  Redemonstration of right parietal encephalomalacia from remote infarct  No acute intracranial hemorrhage  Periventricular and subcortical white matter patchy and confluent hypodensities most compatible with chronic microangiopathic disease  Stable left occipital dural base extra-axial partially calcified 1 cm lesion (series 2 image 17) is compatible with meningioma  No CT signs of acute infarction  VENTRICLES AND EXTRA-AXIAL SPACES:  Normal for patient's age  VISUALIZED ORBITS AND PARANASAL SINUSES:  Unremarkable  CALVARIUM AND EXTRACRANIAL SOFT TISSUES:   Normal      Impression: 1  No acute intracranial abnormality   2   Sequela of old infarct in the right parietal lobe and changes of chronic microangiopathic disease  3   Stable left occipital meningioma  I personally discussed this study with Agus Fay on 11/4/2018 at 5:10 PM  Workstation performed: [id number]     Vas Upper Limb Venous Duplex Scan, Unilateral/limited    Result Date: 10/30/2018  Narrative:  [facility] REPORT CLINICAL: Indications: Patient presents with shoulder pain that radiated down to her right upper arm x 4 days  She describes this as 10/10 type pain; however, she is a poor historian  Operative History: 2017-12-01 Mitral valve replacement 2017-12-01 Pacemaker implantation Bilateral varicose vein surgery Risk Factors The patient has history of HTN, NIDDM, CVA and HLD  CONCLUSION:  Impression RIGHT UPPER LIMB: No evidence of acute or chronic deep vein thrombosis  No evidence of superficial thrombophlebitis noted  Doppler evaluation shows a normal response to augmentation maneuvers  LEFT UPPER LIMB LIMITED: Evaluation shows no evidence of thrombus in the internal jugular vein, subclavian vein, and the brachiocephalic vein  SIGNATURE: Electronically Signed by: Verito Horowitz MD on 2018-10-30 03:50:20 PM    Cta Stroke Alert (head/neck)    Result Date: 11/4/2018  Narrative: CTA NECK AND BRAIN WITH  CONTRAST INDICATION: stroke alert COMPARISON:   Head CT 6/8/2018 and CTA head and neck 4/23/2018 TECHNIQUE:   Post contrast imaging was performed after administration of iodinated contrast through the neck and brain  Post contrast axial 0 625 mm images timed to opacify the arterial system  3D rendering was performed on an independent workstation  MIP reconstructions performed  Coronal reconstructions were performed of the noncontrast portion of the brain  Radiation dose length product (DLP) for this visit:  333 52 mGy-cm     This examination, like all CT scans performed in the [facility], was performed utilizing techniques to minimize radiation dose exposure, including the use of iterative  reconstruction and automated exposure control  IV Contrast:  85 mL of iohexol (OMNIPAQUE)  IMAGE QUALITY:   Diagnostic FINDINGS: CERVICAL VASCULATURE AORTIC ARCH AND GREAT VESSELS:  Normal aortic arch and great vessel origins  Normal visualized subclavian vessels  RIGHT VERTEBRAL ARTERY CERVICAL SEGMENT:  Normal origin  The vessel is normal in caliber throughout the neck  LEFT VERTEBRAL ARTERY CERVICAL SEGMENT:  Normal origin  The vessel is normal in caliber throughout the neck  RIGHT EXTRACRANIAL CAROTID SEGMENT: There is atherosclerotic calcification at the carotid bifurcation resulting in approximately 50% stenosis of proximal cervical internal carotid artery  Focal stenosis at the origin of the internal carotid artery measuring 1 8mm at the point of maximal stenosis  Comparing this to the more distal cervical internal carotid artery which measures 4 2mm, this corresponds to an approximately 60% stenosis  LEFT EXTRACRANIAL CAROTID SEGMENT: Atherosclerotic calcification at the bifurcation without significant stenosis of proximal cervical internal carotid artery  NASCET criteria was used to determine the degree of internal carotid artery diameter stenosis  INTRACRANIAL VASCULATURE INTERNAL CAROTID ARTERIES:  Normal enhancement of the intracranial portions of the internal carotid arteries with moderate atherosclerotic disease of bilateral carotid siphons  Normal ophthalmic artery origins  Normal ICA terminus  ANTERIOR CIRCULATION:  Symmetric A1 segments and anterior cerebral arteries with normal enhancement  Normal anterior communicating artery  MIDDLE CEREBRAL ARTERY CIRCULATION:  M1 segment and middle cerebral artery branches demonstrate normal enhancement bilaterally  Mild multifocal narrowing of M1 segment of left middle cerebral artery DISTAL VERTEBRAL ARTERIES:  Normal distal vertebral arteries  Posterior inferior cerebellar artery origins are normal  Normal vertebral basilar junction  BASILAR ARTERY:  Basilar artery is normal in caliber  Normal superior cerebellar arteries  POSTERIOR CEREBRAL ARTERIES: Persistent fetal origin of bilateral posterior cerebral arteries  Both arteries demonstrate normal enhancement  Normal posterior communicating arteries  DURAL VENOUS SINUSES:  Normal  NON VASCULAR ANATOMY BONY STRUCTURES:  Diffuse osteopenia  Multilevel degenerative changes of cervical spine more pronounced at level C5-6  SOFT TISSUES OF THE NECK:  Unremarkable  THORACIC INLET:  Unremarkable  Impression: 1  Patent major cervical and intracranial arteries without large vessel occlusion  Presumably atherosclerotic mild multifocal narrowing of M1 segment of  left middle cerebral artery  No aneurysm or AV malformation  2   Atherosclerotic calcification of bilateral carotid bifurcation with approximately 60% stenosis in the proximal right cervical internal carotid artery  I personally discussed this study with Kelli Stubbs on 11/4/2018 at 5:10 PM   Workstation performed: [id number]     Imaging Reports Reviewed by myself    Cultures:   Blood Culture:   Lab Results   Component Value Date    BLOODCX No Growth After 5 Days  06/18/2018    BLOODCX No Growth After 5 Days  06/18/2018    BLOODCX No Growth After 5 Days  05/25/2018    BLOODCX No Growth After 5 Days  05/25/2018    BLOODCX No Growth After 5 Days  05/24/2018    BLOODCX No Growth After 5 Days   05/24/2018     Urine Culture:   Lab Results   Component Value Date    URINECX >100,000 cfu/ml Enterobacter aerogenes (A) 05/25/2018    URINECX <10,000 cfu/ml  02/02/2018    URINECX No Growth <1000 cfu/mL 10/16/2017    URINECX 20,000-29,000 cfu/ml Mixed Contaminants X3 09/26/2017    URINECX No Growth <1000 cfu/mL 01/12/2017     Sputum Culture: No components found for: SPUTUMCX  Wound Culture: No results found for: WOUNDCULT    Last 24 Hours Medication List:     Current Facility-Administered Medications:  acetaminophen 650 mg Oral Q6H PRN Eiswarya MD Karishma    apixaban 2 5 mg Oral BID Layla Leslie MD    aspirin 81 mg Oral Daily Kitty York MD    atorvastatin 80 mg Oral Daily With Rosa Elena Velazquez MD    insulin lispro 1-5 Units Subcutaneous TID Braden Sandoval MD    insulin lispro 1-5 Units Subcutaneous HS Layla Leslie MD    iron polysaccharides 150 mg Oral Daily Layla Leslie MD    metoprolol tartrate 50 mg Oral Q12H [facility] Layla Leslie MD    multivitamin-minerals 1 tablet Oral Daily Layla Leslie MD    pantoprazole 40 mg Oral Early Morning Layla Leslie MD    sitaGLIPtin 50 mg Oral Daily Layla Leslie MD    sodium chloride 75 mL/hr Intravenous Continuous Layla Leslie MD Last Rate: 75 mL/hr (11/05/18 0749)        Today, Patient Was Seen By: Layla Leslie MD    ** Please Note: Dragon 360 Dictation voice to text software may have been used in the creation of this document   **

## 2018-11-05 NOTE — ASSESSMENT & PLAN NOTE
Patient was having difficulty following commands with staring off into space and some urinary incontinence and weakness  Etiology of which is not clear at the current time  Possible CVA versus seizure  Patient's previous 48 hour EEG was negative  Discussed with Neurology  Patient be continued on aspirin and high-dose Lipitor  Patient will need follow-up MRI at [place] [address] an outpatient basis as patient has pacemaker  Continue PT/OT  Family not keen on starting antiepileptic medications

## 2018-11-05 NOTE — ASSESSMENT & PLAN NOTE
Lab Results   Component Value Date    HGBA1C 6 5 (H) 11/05/2018       Recent Labs      11/04/18   2109  11/05/18   0805  11/05/18   1146  11/05/18   1649   POCGLU  130  118  250*  72       Blood Sugar Average: Last 72 hrs:  (P) 142 5 last hemoglobin A1c was 6 8  Patient will be on Humalog sliding scale with Accu-Cheks q a c   And HS and diabetic diet  Current hemoglobin A1c 6 5

## 2018-11-05 NOTE — PLAN OF CARE
DISCHARGE PLANNING     Discharge to home or other facility with appropriate resources Progressing        INFECTION - ADULT     Absence or prevention of progression during hospitalization Progressing        Knowledge Deficit     Patient/family/caregiver demonstrates understanding of disease process, treatment plan, medications, and discharge instructions Progressing        PAIN - ADULT     Verbalizes/displays adequate comfort level or baseline comfort level Progressing        Potential for Falls     Patient will remain free of falls Progressing        SAFETY ADULT     Maintain or return to baseline ADL function Progressing     Patient will remain free of falls Progressing

## 2018-11-05 NOTE — PHYSICAL THERAPY NOTE
PT EVALUATION   11/05/18 0955   Pain Assessment   Pain Assessment No/denies pain   Home Living   Type of [address] to live on main level with bedroom/bathroom;Stairs to enter with rails  (several steps to enter as per prior documentation)   [address] [facility],[address]   Additional Comments as per documentation, patient using roller walker prior to admission   Prior Function   Level of [place] Needs assistance with ADLs and functional mobility   Lives With Family   Receives Help From Family   ADL Assistance Needs assistance   IADLs Needs assistance   Falls in the last 6 months 1 to 4   Comments patient confused but cooperative   Restrictions/Precautions   Other Precautions Chair Alarm; Bed Alarm; Fall Risk   General   Additional Pertinent History chart reviewed, patient admitted with TIA?, patient staring off into space with limited response and command follow  patient now following commands inconsistentlyq   Family/Caregiver Present No   Cognition   Overall Cognitive Status Impaired   Arousal/Participation Cooperative   Orientation Level Oriented to person;Oriented to place   Following Commands Follows one step commands with increased time or repetition   RLE Assessment   RLE Assessment (ROm WFL, strength 3+/5)   LLE Assessment   LLE Assessment (ROM WFL, strength 3+/5)   Coordination   Movements are Fluid and Coordinated 0   Bed Mobility   Supine to Sit 2  Maximal assistance   Additional items Assist x 1   Sit to Supine 2  Maximal assistance   Additional items Assist x 1   Transfers   Sit to Stand 3  Moderate assistance   Additional items Assist x 1;Verbal cues   Stand to Sit 3  Moderate assistance   Additional items Assist x 1;Verbal cues   Ambulation/Elevation   Gait Assistance 3  Moderate assist   Additional items Assist x 1;Verbal cues; Tactile cues   Assistive Device Rolling walker   Distance 10 feet with chair follow, rigidity with movement of BLEs and shuffling type gait at times Balance   Static Sitting Fair -   Dynamic Sitting Poor +   Static Standing Poor   Dynamic Standing Poor -   Ambulatory Poor -   Activity Tolerance   Activity Tolerance Patient limited by fatigue  (limited by cognition)   Nurse Made Aware yes   Assessment   Prognosis Good   Problem List Decreased strength;Decreased range of motion;Decreased endurance; Impaired balance;Decreased mobility; Decreased coordination;Decreased cognition; Impaired judgement;Decreased safety awareness   Assessment Patient seen for Physical Therapy evaluation  Patient admitted with Altered mental status  Comorbidities affecting patient's physical performance include:mitral valve replacement, status post pacemaker, history of CVA, vascular dementia, diabetes mellitus type 2 who presents with altered mental status and weakness, gait dysfunction  Personal factors affecting patient at time of initial evaluation include: lives in two story house, ambulating with assistive device, stairs to enter home, communication issues, inability to ambulate household distances, inability to navigate community distances, inability to navigate level surfaces without external assistance, inability to perform dynamic tasks in community, decreased cognition, limited home support, positive fall history, inability to perform physical activity, limited insight into impairments, inability to perform ADLS and inability to perform IADLS    Prior to admission, patient was requiring assist for functional mobility with walker, requiring assist for ADLS, requiring assist for IADLS, living in a multi-level home, ambulating household distance, ambulating community distances and home with family assist   Please find objective findings from Physical Therapy assessment regarding body systems outlined above with impairments and limitations including weakness, decreased ROM, impaired balance, decreased endurance, impaired coordination, gait deviations, decreased activity tolerance, decreased functional mobility tolerance, decreased safety awareness, impaired judgement and fall risk  The Barthel Index was used as a functional outcome tool presenting with a score of 30 today indicating marked limitations of functional mobility and ADLS  Patient's clinical presentation is currently unstable/unpredictable as seen in patient's presentation of vital sign response, varying levels of cognitive performance, increased fall risk, new onset of impairment of functional mobility, decreased endurance and new onset of weakness  Pt would benefit from continued Physical Therapy treatment to address deficits as defined above and maximize level of functional mobility  As demonstrated by objective findings, the assigned level of complexity for this evaluation is high  Goals   Patient Goals "to go home"   STG Expiration Date 11/12/18   Short Term Goal #1 transfers and gait with roller walker with min assist of 1   Short Term Goal #2 gait endurance to 40 feet, strength BLEs 3+/4- all to meet patient goal of returning home   LTG Expiration Date 11/19/18   Long Term Goal #1 transfers and gait with roller walker with supervisiont   Long Term Goal #2 gait endurance to functional household distances, strength BLEs 4-/5   Treatment Day 1   Plan   Treatment/Interventions ADL retraining;Functional transfer training;LE strengthening/ROM; Elevations; Therapeutic exercise; Endurance training;Cognitive reorientation;Patient/family training;Equipment eval/education; Bed mobility;Gait training; Compensatory technique education   PT Frequency 5x/wk   Recommendation   Recommendation (STR)   Barthel Index   Feeding 5   Bathing 0   Grooming Score 0   Dressing Score 0   Bladder Score 5   Bowels Score 10   Toilet Use Score 5   Transfers (Bed/Chair) Score 5   Mobility (Level Surface) Score 0   Stairs Score 0   Barthel Index Score 27   Licensure   [place] License Number  [employer] [id number]       PT TREATMENT  Time [id number]  Time [id number]  Total Time: 13min      S:  Patient cooperative without pain complaints   O:  -sit to and from stand with mod assist of 1 x 3 for strengthening and endurance  -gait with roller walker with mod assist of 1, 10 feet x 2 to and from chair as patient requested return to bed at this time due to dizziness, (nurse made aware)  -exercise completed: LAQs, ankle pumps and hip flexion all sitting on edge of bed, 10 reps each  A:  Patient responsive and cooperative but slow to follow commands at times and will benefit from continued PT with progression as tolerated     P: DC recommendation: Carla Shannon, [id number]

## 2018-11-05 NOTE — SOCIAL WORK
Met with pt and daughter Sharmaine Davis  Pt resides in a multi-level home, but has first floor set up  There are 2 steps, then 3 steps to enter  Has RW, cane, bsc, transport chair, and rollator at home, but does not use them  Uses cane for long distances for on uneven surfaces  Has customized bathroom that is handicap accessible  Was going to [employer] outpatient rehab  Was open to [facility] in the past   [facility] all the children, but all defer to eldest child, Michelle Mendez  +LW  Pt has recently stopped driving  Family assists with transportation  Discussed PT/OT notes recommending short term rehab  Provided with choice lists for acute and subacute rehab choices  Explained role of cm, will follow  CM reviewed d/c planning process including the following: identifying help at home, patient preference for d/c planning needs, and availability of the treatment team to discuss questions or concerns patient and/or family may have regarding understanding of medications and recognizing signs and symptoms once discharged  CM also encouraged patient to follow up with all recommended appointments after discharge  Patient advised of importance for patient and family to participate in managing patient's medical well being

## 2018-11-05 NOTE — PLAN OF CARE
Problem: DISCHARGE PLANNING - CARE MANAGEMENT  Goal: Discharge to post-acute care or home with appropriate resources  INTERVENTIONS:  - Conduct assessment to determine patient/family and health care team treatment goals, and need for post-acute services based on payer coverage, community resources, and patient preferences, and barriers to discharge  - Address psychosocial, clinical, and financial barriers to discharge as identified in assessment in conjunction with the patient/family and health care team  - Arrange appropriate level of post-acute services according to patient's   needs and preference and payer coverage in collaboration with the physician and health care team  - Communicate with and update the patient/family, physician, and health care team regarding progress on the discharge plan  - Arrange appropriate transportation to post-acute venues  Outcome: Progressing  Plan:  Discharge to short term rehab

## 2018-11-05 NOTE — OCCUPATIONAL THERAPY NOTE
OT EVALUATION     11/05/18 1610   Note Type   Note type Eval only   Restrictions/Precautions   Other Precautions Chair Alarm; Bed Alarm; Fall Risk;Cognitive   Pain Assessment   Pain Assessment Henning-Baker FACES   Henning-Baker FACES Pain Rating 4   Pain Location Arm   Pain Orientation Right  (per daughter cortizone shot last week )   Home Living   Type of [address] to live on main level with bedroom/bathroom  (3 [address])   Bathroom Shower/Tub Walk-in shower   Bathroom Equipment Grab bars in shower; Shower chair   Home Equipment Walker   Additional Comments uses RW outside of home per daughter    Prior Function   Level of Todd Needs assistance with ADLs and functional mobility; Needs assistance with IADLs   Lives With Family;Daughter  (pt has 24/7 care from her daughter )   Sheridan Hitch Help From Family   ADL Assistance Needs assistance   IADLs Needs assistance   Comments pt with little verbalization, daughter present for session  Patient requires assist for ADLS and IADLS at home  Patient is confused  Patient agitated at times/frustrated       ADL   Eating Assistance 4  Minimal Assistance   Grooming Assistance 3  Moderate Assistance   UB Bathing Assistance 2  Maximal Assistance   LB Bathing Assistance 2  Maximal Assistance   UB Dressing Assistance 2  Maximal Assistance   LB Dressing Assistance 2  Maximal [address]  2  Maximal Assistance   Bed Mobility   Supine to Sit 2  Maximal assistance   Additional items Assist x 1   Sit to Supine 2  Maximal assistance   Additional items Assist x 1   Transfers   Sit to Stand 2  Maximal assistance   Additional items Assist x 1   Stand to Sit 3  Moderate assistance   Additional items Assist x 1   Functional Mobility   Functional Mobility (unable to assess )   Balance   Static Sitting Fair -   Dynamic Sitting Poor +   Static Standing Poor   Dynamic Standing Poor -   Activity Tolerance   Activity Tolerance Patient limited by fatigue  (cognition) RUE Assessment   RUE Assessment (PROM shoulder 40 degrees/pain, elbow and  3/3+/5)   LUE Assessment   LUE Assessment (grossly WFL elbow/hand 3/3+/5, refused shoulder testing)   Cognition   Overall Cognitive Status Impaired   Arousal/Participation Alert   Attention Attends with cues to redirect   Orientation Level Oriented to person   Following Commands Follows one step commands inconsistently   Comments pt is agitated at times, little verbalization, limited participation in session    Assessment   Limitation Decreased ADL status; Decreased UE strength;Decreased Safe judgement during ADL;Decreased UE ROM; Decreased cognition;Decreased endurance;Decreased self-care trans;Decreased high-level ADLs  (decreased balance and mobility )   Prognosis Good   Assessment Patient evaluated by Occupational Therapy  Patient admitted with Altered mental status  The patients occupational profile, medical and therapy history includes a extensive additional review of physical, cognitive, or psychosocial history related to current functional performance  Comorbidities affecting functional mobility and ADLS include: arthritis, CVA, diabetes, hypertension and cancer  Prior to admission, patient was independent with functional mobility with walker outside, without walker inside, has 24 hour care, requiring assist for ADLS and requiring assist for IADLS  The evaluation identifies the following performance deficits: weakness, impaired balance, decreased endurance, increased fall risk, new onset of impairment of functional mobility, decreased ADLS, decreased IADLS, pain, decreased activity tolerance, decreased safety awareness, impaired judgement, decreased cognition and decreased strength, that result in activity limitations and/or participation restrictions   This evaluation requires clinical decision making of high complexity, because the patient presents with comorbidites that affect occupational performance and required significant modification of tasks or assistance with consideration of multiple treatment options  The Barthel Index was used as a functional outcome tool presenting with a score of 30, indicating marked limitations of functional mobility and ADLS  Patient will benefit from skilled Occupational Therapy services to address above deficits and facilitate a safe return to prior level of function  Goals   Patient Goals to go home   STG Time Frame (1-7 days)   Short Term Goal  Goals established to promote patient goal of going home:  Patient will increase standing tolerance to 3 minutes during ADL task to decrease assistance level and decrease fall risk; Patient will increase bed mobility to min assist in preparation for ADLS and transfers; Patient will increase functional mobility to and from bathroom with rolling walker with mod assist to increase performance with ADLS and to use a toilet; Patient will tolerate 10 minutes of UE ROM/strengthening to increase general activity tolerance and performance in ADLS/IADLS; Patient will improve functional activity tolerance to 10 minutes of sustained functional tasks to increase participation in basic self-care and decrease assistance level;  Patient will increase dynamic sitting balance to fair to improve the ability to sit at edge of bed or on a chair for ADLS;  Patient will increase dynamic standing balance to poor+ to improve postural stability and decrease fall risk during standing ADLS and transfers  LTG Time Frame (8-14 days)   Long Term Goal Goals established to promote patient goal of going home:  Patient will increase standing tolerance to 6 minutes during ADL task to decrease assistance level and decrease fall risk; Patient will increase bed mobility to supervision in preparation for ADLS and transfers;  Patient will increase functional mobility to and from bathroom with rolling walker with min assist to increase performance with ADLS and to use a toilet; Patient will tolerate 20 minutes of UE ROM/strengthening to increase general activity tolerance and performance in ADLS/IADLS; Patient will improve functional activity tolerance to 20 minutes of sustained functional tasks to increase participation in basic self-care and decrease assistance level;  Patient will increase dynamic sitting balance to fair+ to improve the ability to sit at edge of bed or on a chair for ADLS;  Patient will increase dynamic standing balance to fair- to improve postural stability and decrease fall risk during standing ADLS and transfers  Functional Transfer Goals   Pt Will Perform All Functional Transfers (STG mod assist LTG min assist )   ADL Goals   Pt Will Perform Eating (STG min assist LTG supervision )   Pt Will Perform Grooming (STG mod assist LTG min assist )   Pt Will Perform Bathing (STG mod assist LTG min assist )   Pt Will Perform UE Dressing (STG mod assist LTG min assist )   Pt Will Perform LE Dressing (STG mod assist LTG min assist )   Pt Will Perform Toileting (STG mod assist LTG min assist )   Plan   Treatment Interventions ADL retraining;Functional transfer training;UE strengthening/ROM; Endurance training;Patient/family training;Equipment evaluation/education; Activityengagement; Energy conservation; Compensatory technique education   Goal Expiration Date 11/19/18   Treatment Day 0   OT Frequency 3-5x/wk   Recommendation   OT Discharge Recommendation Short Term Rehab   Barthel Index   Feeding 5   Bathing 0   Grooming Score 0   Dressing Score 0   Bladder Score 5   Bowels Score 10   Toilet Use Score 5   Transfers (Bed/Chair) Score 5   Mobility (Level Surface) Score 0   Stairs Score 0   Barthel Index Score 30   Modified [place] Scale   Modified [place] Scale 4   Licensure   [place] License Number  [facility] [place] [address] [address] OTR/L [id number]

## 2018-11-06 ENCOUNTER — APPOINTMENT (INPATIENT)
Dept: NEUROLOGY | Facility: HOSPITAL | Age: 83
DRG: 071 | End: 2018-11-06
Attending: INTERNAL MEDICINE
Payer: MEDICARE

## 2018-11-06 LAB
ATRIAL RATE: 70 BPM
GLUCOSE SERPL-MCNC: 101 MG/DL (ref 65–140)
GLUCOSE SERPL-MCNC: 116 MG/DL (ref 65–140)
GLUCOSE SERPL-MCNC: 116 MG/DL (ref 65–140)
GLUCOSE SERPL-MCNC: 137 MG/DL (ref 65–140)
GLUCOSE SERPL-MCNC: 190 MG/DL (ref 65–140)
MRSA NOSE QL CULT: NORMAL
P AXIS: 4 DEGREES
PR INTERVAL: 218 MS
QRS AXIS: -51 DEGREES
QRSD INTERVAL: 124 MS
QT INTERVAL: 468 MS
QTC INTERVAL: 505 MS
T WAVE AXIS: 43 DEGREES
VENTRICULAR RATE: 70 BPM

## 2018-11-06 PROCEDURE — 94760 N-INVAS EAR/PLS OXIMETRY 1: CPT

## 2018-11-06 PROCEDURE — 82948 REAGENT STRIP/BLOOD GLUCOSE: CPT

## 2018-11-06 PROCEDURE — 94640 AIRWAY INHALATION TREATMENT: CPT

## 2018-11-06 PROCEDURE — 95816 EEG AWAKE AND DROWSY: CPT | Performed by: PSYCHIATRY & NEUROLOGY

## 2018-11-06 PROCEDURE — 93010 ELECTROCARDIOGRAM REPORT: CPT | Performed by: INTERNAL MEDICINE

## 2018-11-06 PROCEDURE — 99232 SBSQ HOSP IP/OBS MODERATE 35: CPT | Performed by: FAMILY MEDICINE

## 2018-11-06 PROCEDURE — 95816 EEG AWAKE AND DROWSY: CPT

## 2018-11-06 PROCEDURE — 94664 DEMO&/EVAL PT USE INHALER: CPT

## 2018-11-06 RX ORDER — ALBUTEROL SULFATE 2.5 MG/3ML
2.5 SOLUTION RESPIRATORY (INHALATION)
Status: DISCONTINUED | OUTPATIENT
Start: 2018-11-06 | End: 2018-11-07 | Stop reason: HOSPADM

## 2018-11-06 RX ADMIN — APIXABAN 2.5 MG: 2.5 TABLET, FILM COATED ORAL at 09:42

## 2018-11-06 RX ADMIN — METOPROLOL TARTRATE 50 MG: 50 TABLET, FILM COATED ORAL at 09:41

## 2018-11-06 RX ADMIN — INSULIN LISPRO 1 UNITS: 100 INJECTION, SOLUTION INTRAVENOUS; SUBCUTANEOUS at 12:53

## 2018-11-06 RX ADMIN — PANTOPRAZOLE SODIUM 40 MG: 40 TABLET, DELAYED RELEASE ORAL at 07:02

## 2018-11-06 RX ADMIN — METOPROLOL TARTRATE 50 MG: 50 TABLET, FILM COATED ORAL at 21:20

## 2018-11-06 RX ADMIN — ATORVASTATIN CALCIUM 80 MG: 80 TABLET, FILM COATED ORAL at 17:07

## 2018-11-06 RX ADMIN — ASPIRIN 81 MG: 81 TABLET, COATED ORAL at 09:41

## 2018-11-06 RX ADMIN — Medication 150 MG: at 09:42

## 2018-11-06 RX ADMIN — Medication 1 TABLET: at 09:42

## 2018-11-06 RX ADMIN — APIXABAN 2.5 MG: 2.5 TABLET, FILM COATED ORAL at 17:07

## 2018-11-06 RX ADMIN — SITAGLIPTIN 50 MG: 50 TABLET, FILM COATED ORAL at 09:41

## 2018-11-06 RX ADMIN — ALBUTEROL SULFATE 2.5 MG: 2.5 SOLUTION RESPIRATORY (INHALATION) at 19:48

## 2018-11-06 RX ADMIN — ALBUTEROL SULFATE 2.5 MG: 2.5 SOLUTION RESPIRATORY (INHALATION) at 15:01

## 2018-11-06 NOTE — PLAN OF CARE
Problem: RESPIRATORY - ADULT  Goal: Achieves optimal ventilation and oxygenation  INTERVENTIONS:  - Assess for changes in respiratory status  - Assess for changes in mentation and behavior  - Position to facilitate oxygenation and minimize respiratory effort  - Oxygen administration by appropriate delivery method based on oxygen saturation (per order) or ABG  - Encourage broncho-pulmonary hygiene including cough, deep breathe, Incentive Spirometry  - Assess the need for suctioning and aspirate as needed  - Assess and instruct to report SOB or any respiratory difficulty  Nebulizer therapy as ordered  - Respiratory Therapy support as indicated  Outcome: Progressing

## 2018-11-06 NOTE — RESPIRATORY THERAPY NOTE
RT Protocol Note  Daniel Law 80 y o  female MRN: [id number]  Unit/Bed#: [address] [place] [address] Encounter: [id number]    Assessment    Principal Problem:    Altered mental status  Active Problems:    Deep vein thrombosis (DVT) of left upper extremity (HCC)    H/O mitral valve replacement    Controlled type 2 diabetes mellitus without complication, without long-term current use of insulin ([facility])    HTN (hypertension)    CHADWICK (acute kidney injury) ([facility] )    Systolic congestive heart failure ([place] [address] )    History of ischemic right MCA stroke    TIA (transient ischemic attack)      Home Pulmonary Medications:  none    Past Medical History:   Diagnosis Date    Acid reflux     on occ    Arthritis     DJD right hip replaced    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 lesions with no change    Cancer ([place] [address] ) 2007    colonic polyps, no surgery done    Deep vein thrombosis (DVT) of left upper extremity ([place] [address] ) 12/11/2017    Diabetes mellitus ([place] [address] )     type 2    Diverticulosis     Edema     in legs on occ    Hypertension     on occ    Language barrier     speaks Japanese & broken english    Stroke [facility])      Social History     Social History    Marital status:       Spouse name: N/A    Number of children: N/A    Years of education: N/A     Social History Main Topics    Smoking status: Former Smoker     Packs/day: 0 25     Years: 10 00     Types: Cigarettes     Quit date: 1950    Smokeless tobacco: Never Used    Alcohol use No    Drug use: No    Sexual activity: Not Currently     Other Topics Concern    None     Social History Narrative    None       Subjective    Subjective Data: Pts daughter states the patient has a rattle in her throat    Objective    Physical Exam:   Assessment Type: Pre-treatment  General Appearance: Alert, Awake  Respiratory Pattern: Normal  Chest Assessment: Chest expansion symmetrical  Bilateral Breath Sounds: Diminished  R Breath Sounds: Diminished  L Breath Sounds: Diminished, Coarse  Cough: Non-productive, Moist  O2 Device: RA    Vitals:  Blood pressure 141/65, pulse 80, temperature 98 4 °F (36 9 °C), temperature source Oral, resp  rate 18, height 5' 3" (1 6 m), weight 68 kg (149 lb 14 6 oz), SpO2 95 %, not currently breastfeeding  Imaging and other studies: I have personally reviewed pertinent reports        O2 Device: RA     Plan    Respiratory Plan: No distress/Pulmonary history        Resp Comments: pt awake and alert leonora tx well

## 2018-11-06 NOTE — PHYSICIAN ADVISOR
Current patient class: Inpatient  The patient is currently on Hospital Day: 2 at [address] [place] [address]      The patient was admitted to the hospital at 1900 on 11/4/18 for the following diagnosis:  TIA (transient ischemic attack) [G45 9]  Stroke (cerebrum) ([address] ) [I63 9]       There is documentation in the medical record of an expected length of stay of at least 2 midnights  The patient is therefore expected to satisfy the 2 midnight benchmark and given the 2 midnight presumption is appropriate for INPATIENT ADMISSION  Given this expectation of a satisfying stay, CMS instructs us that the patient is most often appropriate for inpatient admission under part A provided medical necessity is documented in the chart  After review of the relevant documentation, labs, vital signs and test results, the patient is appropriate for INPATIENT ADMISSION  Admission to the hospital as an inpatient is a complex decision making process which requires the practitioner to consider the patients presenting complaint, history and physical examination and all relevant testing  With this in mind, in this case, the patient was deemed appropriate for INPATIENT ADMISSION  After review of the documentation and testing available at the time of the admission I concur with this clinical determination of medical necessity  Rationale is as follows: The patient is a 80 yrs old Female who presented to the ED at 11/4/2018  4:41 PM with a chief complaint of Aphasia     Given the need for further hospitalization, and along with the documentation of medical necessity present in the chart, the patient is appropriate for inpatient admission  The patient is expected to satisfy the 2 midnight benchmark, and will require further acute medical care  The patient does have comorbid conditions which increases the risk for significant adverse outcome  Given this the patient is appropriate for inpatient admission        The patients vitals on arrival were ED Triage Vitals   Temperature Pulse Respirations Blood Pressure SpO2   11/04/18 1656 11/04/18 1656 11/04/18 1656 11/04/18 1656 11/04/18 1656   100 4 °F (38 °C) 70 18 149/74 94 %      Temp Source Heart Rate Source Patient Position - Orthostatic VS BP Location FiO2 (%)   11/04/18 1656 11/04/18 1656 11/04/18 1656 11/04/18 1656 --   Tympanic Monitor Lying Right arm       Pain Score       11/04/18 1748       No Pain           Past Medical History:   Diagnosis Date    Acid reflux     on occ    Arthritis     DJD right hip replaced    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[facility] ) 2007    x 2 lesions with no change    Cancer ([facility] ) 2007    colonic polyps, no surgery done    Deep vein thrombosis (DVT) of left upper extremity ([facility] ) 12/11/2017    Diabetes mellitus ([facility] )     type 2    Diverticulosis     Edema     in legs on occ    Hypertension     on occ    Language barrier     speaks Polish & broken english    Stroke [facility])      Past Surgical History:   Procedure Laterality Date    COLON SURGERY      COLONOSCOPY      COLONOSCOPY N/A 11/2/2016    Procedure: COLONOSCOPY;  Surgeon: Kadeem Bolivar MD;  Location: [place] [address] GI LAB; Service:     ESOPHAGOGASTRODUODENOSCOPY N/A 11/2/2016    Procedure: ESOPHAGOGASTRODUODENOSCOPY (EGD); Surgeon: Kadeem Bolivar MD;  Location: [facility] GI LAB; Service:    Bertha Avalos / Shiela Pelayo / Alex Wray      JOINT REPLACEMENT Right 02/2015    hip    JOINT REPLACEMENT Left     knee    JOINT REPLACEMENT Right     knee    MITRAL VALVE REPLACEMENT      MS COLONOSCOPY FLX DX W/COLLJ SPEC WHEN PFRMD N/A 8/9/2018    Procedure: EGD AND COLONOSCOPY;  Surgeon: Kadeem Bolivar MD;  [facility];   Service: Gastroenterology    MS REVISE MEDIAN N/CARPAL TUNNEL SURG Left 1/28/2016    Procedure: RELEASE CARPAL TUNNEL;  Surgeon: Tammie Terrell MD;  Location: [facility] MAIN OR;  Service: Orthopedics    TUBAL LIGATION      VARICOSE VEIN SURGERY Bilateral            Consults have been placed to:   IP CONSULT TO NEUROLOGY  IP CONSULT TO CASE MANAGEMENT    Vitals:    11/05/18 0407 11/05/18 0902 11/05/18 1627 11/05/18 2004   BP: 154/68 146/60 148/65 166/78   BP Location: Right arm Right arm Right arm Right arm   Pulse: 68 68 69 75   Resp: 18 18 20 20   Temp: 98 4 °F (36 9 °C) 98 3 °F (36 8 °C) 97 9 °F (36 6 °C) 98 8 °F (37 1 °C)   TempSrc: Temporal Oral Oral Oral   SpO2: 98% 96% 95% 95%   Weight:       Height:           Most recent labs:    Recent Labs      11/04/18   1654  11/05/18   0506   WBC  7 56  5 80   HGB  11 8  10 0*   HCT  37 8  32 2*   PLT  117*  102*   K  4 7  4 5   CALCIUM  9 6  8 4   BUN  42*  38*   CREATININE  1 59*  1 58*   INR  0 97   --    TROPONINI  <0 02   --    AST  22   --    ALT  47   --    ALKPHOS  97   --        Scheduled Meds:  Current Facility-Administered Medications:  acetaminophen 650 mg Oral Q6H PRN Angelita Schwarz MD    apixaban 2 5 mg Oral BID Keke Reyna MD    aspirin 81 mg Oral Daily Jimmy Silver MD    atorvastatin 80 mg Oral Daily With Jennifer Diego MD    insulin lispro 1-5 Units Subcutaneous TID AC Angelita Schwarz MD    insulin lispro 1-5 Units Subcutaneous HS Keke Reyna MD    iron polysaccharides 150 mg Oral Daily Angelita Scwharz MD    metoprolol tartrate 50 mg Oral Q12H [address] Angelita Schwarz MD    multivitamin-minerals 1 tablet Oral Daily Keke Reyna MD    pantoprazole 40 mg Oral Early Morning Angelita Schwarz MD    sitaGLIPtin 50 mg Oral Daily Angelita Schwarz MD    sodium chloride 75 mL/hr Intravenous Continuous Angelita Schwarz MD Last Rate: 75 mL/hr (11/05/18 2123)     Continuous Infusions:  sodium chloride 75 mL/hr Last Rate: 75 mL/hr (11/05/18 2123)     PRN Meds:   acetaminophen    Surgical procedures (if appropriate):

## 2018-11-06 NOTE — CONSULTS
[address]   Neurology Initial Consult    Raphael Montoya is a 80 y o  female  [phone]-*          Information obtained from:   Chief Complaint   Patient presents with    Aphasia         Assessment/Plan:    1  TIA vs new ischemic event   2  Recurrent ischemic strokes   3  Chronic left and right mca ischemic infarctions   4  Bilateral carotid 60% stenosis   5  Right arm tremor   6  History of seizure like activity       Patient historically has had recurrent ischemic events despite being on anticoagulation  Her exam is unclear as she has some residual deficits and can't elevate arm due to recent shoulder issue  Patient was apparently walking prior to admission so an acute stroke is very possible  At this point, will add aspirin 81mg for now and after MRI brain will decide long term plan  Resume eliquis 2 5mg bid  Ordered MRI brain to be done as outpatient at [facility]  Her pacemaker is MR compatible  Cont lipitor 80mg  Discussed that there was concern for seizure and still remains  Patient has had 3 days of video EEG and they were negative for epileptiform discharges  Patient's daughter doesn't want to consider AED unless EEG shows discharge  PT/OT- rehab  Discussed plan with patient's daughter and primary team          TPA Decision: Patient not a TPA candidate  Unclear time of onset outside appropriate time window  and Bleeding risk  Reason for Consult / Principal Problem: stroke  Hx and PE limited by: patient doesn't speak english   Patient last known well: prior to 6am 11/5  Stroke alert called:  4:45pm  Neurology time of arrival: discussed plan with ED at 4:47pm 11/4/18        HPI:  Raphael Montoya is an 79 yo F with PMH of recurrent strokes presents with cc of expressive aphasia  Patient was LSN at 6am  She was noted to have speech disturbance but family felt it may be tia and did not seek medical care   Then she was noted to have increasing weakness and was brought in  In ED, her speech had returned to its baseline  Patient is on eliquis 2 5mg bid  This patient has had complicated course  Patient had pacemaker placed in Dec of 2017 and later developed DVT for which she was placed on coumadin  In Jan she had a TIA but was taken to [facility]  In mid April she had presented with left leg weakness and hemichorea  She was found to have subacute right parietal ischemic stroke while on coumadin with INR 3+  Aspirin was added  8 days later, she was aphasic and had right hemiplegia for which she had thrombectomy for left M2 occlusion  She recovered well  Daughter says she had another transient ischemic event  She follows with Dr Francis Armenta  She is placed on eliquis 2 5mg bid, low dose due to her ckd and age  She's on lipitor 80mg  Due to pacemaker, she hasn't had MRI brain thus far         Past Medical History:   Diagnosis Date    Acid reflux     on occ    Arthritis     DJD right hip replaced    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    x 2 lesions with no change    Cancer ([facility] [address] ) 2007    colonic polyps, no surgery done    Deep vein thrombosis (DVT) of left upper extremity ([facility] [address] ) 12/11/2017    Diabetes mellitus ([facility] [address] )     type 2    Diverticulosis     Edema     in legs on occ    Hypertension     on occ    Language barrier     speaks Guinean & broken english    Stroke [facility])        Past Surgical History:   Procedure Laterality Date    COLON SURGERY      COLONOSCOPY      COLONOSCOPY N/A 11/2/2016    Procedure: COLONOSCOPY;  Surgeon: Daphney Stuart MD;  Location: [place] [address] GI LAB; Service:     ESOPHAGOGASTRODUODENOSCOPY N/A 11/2/2016    Procedure: ESOPHAGOGASTRODUODENOSCOPY (EGD); Surgeon: Daphney Stuart MD;  Location: [facility] GI LAB;   Service:    Becki Garcia / Adalberto Drivers / Phillip Art      JOINT REPLACEMENT Right 02/2015    hip    JOINT REPLACEMENT Left     knee    JOINT REPLACEMENT Right     knee    MITRAL VALVE REPLACEMENT      NC COLONOSCOPY FLX DX W/COLLJ SPEC WHEN PFRMD N/A 8/9/2018    Procedure: EGD AND COLONOSCOPY;  Surgeon: Kylie Grace MD;  [facility];   Service: Gastroenterology    WI REVISE MEDIAN N/CARPAL TUNNEL SURG Left 1/28/2016    Procedure: RELEASE CARPAL TUNNEL;  Surgeon: Beka Ge MD;  [facility] MAIN OR;  Service: Orthopedics    TUBAL LIGATION      VARICOSE VEIN SURGERY Bilateral        Allergies   Allergen Reactions    Vancomycin      Red man syndrome    Heparin Other (See Comments)     Thrombocytopenia           Current Facility-Administered Medications:     acetaminophen (TYLENOL) tablet 650 mg, 650 mg, Oral, Q6H PRN, Nasir Darnell MD    apixaban (ELIQUIS) tablet 2 5 mg, 2 5 mg, Oral, BID, Angelita Schwarz MD, 2 5 mg at 11/05/18 1752    aspirin (ECOTRIN LOW STRENGTH) EC tablet 81 mg, 81 mg, Oral, Daily, Lazaro Mcdonald MD, 81 mg at 11/05/18 1354    atorvastatin (LIPITOR) tablet 80 mg, 80 mg, Oral, Daily With Dawna Escalante MD, 80 mg at 11/05/18 1751    insulin lispro (HumaLOG) 100 units/mL subcutaneous injection 1-5 Units, 1-5 Units, Subcutaneous, TID AC, 2 Units at 11/05/18 1236 **AND** Fingerstick Glucose (POCT), , , TID AC, Angelita Schwarz MD    insulin lispro (HumaLOG) 100 units/mL subcutaneous injection 1-5 Units, 1-5 Units, Subcutaneous, HS, Angelita Schwarz MD    iron polysaccharides (NIFEREX) capsule 150 mg, 150 mg, Oral, Daily, Nasir Darnell MD, 150 mg at 11/05/18 1119    metoprolol tartrate (LOPRESSOR) tablet 50 mg, 50 mg, Oral, Q12H [facility], Angelita Schwarz MD, 50 mg at 11/05/18 1119    multivitamin-minerals (CENTRUM) tablet 1 tablet, 1 tablet, Oral, Daily, Angelita Schwarz MD, 1 tablet at 11/05/18 1120    pantoprazole (PROTONIX) EC tablet 40 mg, 40 mg, Oral, Early Morning, Angelita Schwarz MD    sitaGLIPtin (JANUVIA) tablet 50 mg, 50 mg, Oral, Daily, Angelita Schwarz MD, 50 mg at 11/05/18 1120    sodium chloride 0 9 % infusion, 75 mL/hr, Intravenous, Continuous, Angelita MD Karishma, Last Rate: 75 mL/hr at 11/05/18 0749, 75 mL/hr at 11/05/18 0749    Social History     Social History    Marital status:      Spouse name: N/A    Number of children: N/A    Years of education: N/A     Occupational History    Not on file  Social History Main Topics    Smoking status: Former Smoker     Packs/day: 0 25     Years: 10 00     Types: Cigarettes     Quit date: 1950    Smokeless tobacco: Never Used    Alcohol use No    Drug use: No    Sexual activity: Not Currently     Other Topics Concern    Not on file     Social History Narrative    No narrative on file       Family History   Problem Relation Age of Onset    Cancer Mother         throat         Review of systems:  Please see HPI for positive symptoms  No fever, no chills, no weight change  Ocular: No drainage, no blurred vision  HEENT:  No sore throat, earache, or congestion  No neck pain  COR:  No chest pain  No palpitations  Lungs:  no sob, wheezing,  GI:  no  nausea, no vomiting, no diarrhea, no constipation, no anorexia  :  No dysuria, frequency, or urgency  No hematuria  Musculoskeletal:  No joint pain or swelling or edema  Skin:  No rash or itching  Psychiatric:  no anxiety, no depression  Endocrine:  No polyuria or polydipsia  Physical examination:  Vitals:    11/05/18 1627   BP: 148/65   Pulse: 69   Resp: 20   Temp: 97 9 °F (36 6 °C)   SpO2: 95%       GENERAL APPEARANCE:  The patient is alert, oriented  She is in no acute distress  HEENT:  Head is normocephalic  The sinuses are otherwise nontender  Pupils are equal and reactive  NECK:  Supple without lymphadenopathy  HEART:  Regular rate and rhythm  LUNGS:  clear to auscultation  No crackles or wheezes are heard  ABDOMEN:  Soft, nontender, nondistended with good bowel sounds heard  EXTREMITIES:  Without cyanosis, clubbing or edema  Mental status: The patient is alert, attentive, and oriented   Speech is clear and fluent, good repetition, comprehension, and naming  he recalls 3/3 objects at 5 minutes  Cranial nerves:  CN II: Visual fields: left sided neglect  Fundoscopic exam is normal with sharp discs and no vascular changes  Pupils are 3 mm and reactive to light  CN III, IV, VI: At primary gaze, there is no eye deviation  CN V: Facial sensation is intact to pinprick in all 3 divisions bilaterally  Corneal responses are intact  CN VII: Face is symmetric with normal eye closure and smile  CN VIII: Hearing is normal to rubbing fingers  CN IX, X: Palate elevates symmetrically  Phonation is normal   CN XI: Head turning and shoulder shrug are intact  CN XII: Tongue is midline with normal movements and no atrophy  Motor: There is no pronator drift of out-stretched arms  Muscle bulk and tone are normal      Muscle exam:  RUE: deltoid: 2/5; distally: 4+/5  LUE: 4+/5  RLE/LLE: 2/5    Inconsistent with LE  Reflexes   RJ BJ TJ KJ AJ Plantars Andersen's   Right 2+ 2+ 2+ 2+ 1+ Downgoing Not present   Left 2+ 2+ 2+ 2+ 1+ Downgoing Not present     Sensory:  Grossly normal    Coordination:  Slow with rapid alternating movements and fine finger movements on left, unable to do on right  There is no dysmetria on finger-to-nose and heel-knee-shin  There are minor choreatic movements noted in right hand  Romberg anne     Gait/Stance:  Jing Goodman due to weakness   Earlier she was able to get to commode     Lab Results   Component Value Date    WBC 5 80 11/05/2018    HGB 10 0 (L) 11/05/2018    HCT 32 2 (L) 11/05/2018    MCV 94 11/05/2018     (L) 11/05/2018     Lab Results   Component Value Date    HGBA1C 6 5 (H) 11/05/2018     Lab Results   Component Value Date    ALT 47 11/04/2018    AST 22 11/04/2018    ALKPHOS 97 11/04/2018    BILITOT 0 5 12/23/2015     Lab Results   Component Value Date    GLUCOSE 103 05/08/2018    CALCIUM 8 4 11/05/2018     12/23/2015    K 4 5 11/05/2018    CO2 25 11/05/2018     11/05/2018    BUN 38 (H) 11/05/2018 CREATININE 1 58 (H) 11/05/2018         Radiology          Review of reports and notes reveal:  Xr Chest 1 View Portable    Result Date: 11/5/2018  No acute cardiopulmonary disease  Cardiomegaly  Workstation performed: [id number]     Independent Interpretation of images or specimens:    Ct Stroke Alert Brain    Result Date: 11/4/2018  1  No acute intracranial abnormality  2   Sequela of old infarct in the right parietal lobe and changes of chronic microangiopathic disease  3   Stable left occipital meningioma  I personally discussed this study with Gina Burkss on 11/4/2018 at 5:10 PM  Workstation performed: [id number]     Cta Stroke Alert (head/neck)    Result Date: 11/4/2018  1  Patent major cervical and intracranial arteries without large vessel occlusion  Presumably atherosclerotic mild multifocal narrowing of M1 segment of  left middle cerebral artery  No aneurysm or AV malformation  2   Atherosclerotic calcification of bilateral carotid bifurcation with approximately 60% stenosis in the proximal right cervical internal carotid artery  I personally discussed this study with Hebrondamion Burkss on 11/4/2018 at 5:10 PM   Workstation performed: [id number]             Thank you for this consult  Total time of encounter: 70 min  More than 50% of time was spent in counseling and coordination of care of patient  AMY Olea    [facility]  [phone]  [address]

## 2018-11-06 NOTE — SOCIAL WORK
Met with pt's daughter again  SNF choices are [facility], [facility], [address], and [facility]  Will make referrals  Plan is for d/c tomorrow  Will follow

## 2018-11-06 NOTE — PLAN OF CARE
Activity Intolerance/Impaired Mobility     Mobility/activity is maintained at optimum level for patient Progressing        Communication Impairment     Ability to express needs and understand communication [place]     Discharge to home or other facility with appropriate resources Progressing        DISCHARGE PLANNING - CARE MANAGEMENT     Discharge to post-acute care or home with appropriate resources Progressing        INFECTION - ADULT     Absence or prevention of progression during hospitalization Progressing        Knowledge Deficit     Patient/family/caregiver demonstrates understanding of disease process, treatment plan, medications, and discharge instructions Progressing        METABOLIC, FLUID AND ELECTROLYTES - ADULT     Glucose maintained within target range Progressing        Neurological Deficit     Neurological status is stable or improving Progressing        Nutrition     Nutrition/Hydration status is improving Progressing        PAIN - ADULT     Verbalizes/displays adequate comfort level or baseline comfort level Progressing        Potential for Aspiration     Non-ventilated patient's risk of aspiration is minimized Progressing        Potential for Falls     Patient will remain free of falls Progressing        SAFETY ADULT     Maintain or return to baseline ADL function Progressing     Patient will remain free of falls Progressing

## 2018-11-06 NOTE — SOCIAL WORK
Pt accepted at first choice, [facility]  Will arrange for transport tomorrow

## 2018-11-07 VITALS
RESPIRATION RATE: 18 BRPM | HEIGHT: 63 IN | HEART RATE: 76 BPM | TEMPERATURE: 99.5 F | OXYGEN SATURATION: 98 % | DIASTOLIC BLOOD PRESSURE: 78 MMHG | BODY MASS INDEX: 26.56 KG/M2 | WEIGHT: 149.91 LBS | SYSTOLIC BLOOD PRESSURE: 160 MMHG

## 2018-11-07 PROBLEM — G93.40 ENCEPHALOPATHY: Status: ACTIVE | Noted: 2018-06-15

## 2018-11-07 PROBLEM — N17.9 AKI (ACUTE KIDNEY INJURY) (HCC): Status: RESOLVED | Noted: 2018-04-18 | Resolved: 2018-11-07

## 2018-11-07 LAB
ANION GAP SERPL CALCULATED.3IONS-SCNC: 11 MMOL/L (ref 4–13)
BUN SERPL-MCNC: 28 MG/DL (ref 5–25)
CALCIUM SERPL-MCNC: 8.4 MG/DL (ref 8.3–10.1)
CHLORIDE SERPL-SCNC: 107 MMOL/L (ref 100–108)
CO2 SERPL-SCNC: 22 MMOL/L (ref 21–32)
CREAT SERPL-MCNC: 1.46 MG/DL (ref 0.6–1.3)
ERYTHROCYTE [DISTWIDTH] IN BLOOD BY AUTOMATED COUNT: 13.7 % (ref 11.6–15.1)
GFR SERPL CREATININE-BSD FRML MDRD: 33 ML/MIN/1.73SQ M
GLUCOSE SERPL-MCNC: 105 MG/DL (ref 65–140)
GLUCOSE SERPL-MCNC: 116 MG/DL (ref 65–140)
GLUCOSE SERPL-MCNC: 128 MG/DL (ref 65–140)
GLUCOSE SERPL-MCNC: 160 MG/DL (ref 65–140)
HCT VFR BLD AUTO: 30.8 % (ref 34.8–46.1)
HGB BLD-MCNC: 9.6 G/DL (ref 11.5–15.4)
MCH RBC QN AUTO: 29.5 PG (ref 26.8–34.3)
MCHC RBC AUTO-ENTMCNC: 31.2 G/DL (ref 31.4–37.4)
MCV RBC AUTO: 95 FL (ref 82–98)
PLATELET # BLD AUTO: 113 THOUSANDS/UL (ref 149–390)
PMV BLD AUTO: 10.3 FL (ref 8.9–12.7)
POTASSIUM SERPL-SCNC: 4.4 MMOL/L (ref 3.5–5.3)
RBC # BLD AUTO: 3.25 MILLION/UL (ref 3.81–5.12)
SODIUM SERPL-SCNC: 140 MMOL/L (ref 136–145)
WBC # BLD AUTO: 5.5 THOUSAND/UL (ref 4.31–10.16)

## 2018-11-07 PROCEDURE — 94640 AIRWAY INHALATION TREATMENT: CPT

## 2018-11-07 PROCEDURE — 80048 BASIC METABOLIC PNL TOTAL CA: CPT | Performed by: FAMILY MEDICINE

## 2018-11-07 PROCEDURE — 82948 REAGENT STRIP/BLOOD GLUCOSE: CPT

## 2018-11-07 PROCEDURE — 85027 COMPLETE CBC AUTOMATED: CPT | Performed by: FAMILY MEDICINE

## 2018-11-07 PROCEDURE — 94668 MNPJ CHEST WALL SBSQ: CPT

## 2018-11-07 PROCEDURE — 99239 HOSP IP/OBS DSCHRG MGMT >30: CPT | Performed by: FAMILY MEDICINE

## 2018-11-07 PROCEDURE — 94760 N-INVAS EAR/PLS OXIMETRY 1: CPT

## 2018-11-07 PROCEDURE — 94664 DEMO&/EVAL PT USE INHALER: CPT

## 2018-11-07 RX ORDER — ASPIRIN 81 MG/1
81 TABLET ORAL DAILY
Refills: 0
Start: 2018-11-08

## 2018-11-07 RX ORDER — ALBUTEROL SULFATE 2.5 MG/3ML
2.5 SOLUTION RESPIRATORY (INHALATION) EVERY 4 HOURS PRN
Refills: 0
Start: 2018-11-07 | End: 2020-09-03 | Stop reason: ALTCHOICE

## 2018-11-07 RX ORDER — POTASSIUM CHLORIDE 750 MG/1
10 TABLET, FILM COATED, EXTENDED RELEASE ORAL DAILY
Refills: 0
Start: 2018-11-09 | End: 2019-03-28 | Stop reason: ALTCHOICE

## 2018-11-07 RX ORDER — ATORVASTATIN CALCIUM 80 MG/1
80 TABLET, FILM COATED ORAL
Qty: 30 TABLET | Refills: 0 | Status: SHIPPED | OUTPATIENT
Start: 2018-11-07 | End: 2020-09-03 | Stop reason: SDUPTHER

## 2018-11-07 RX ORDER — FUROSEMIDE 20 MG/1
20 TABLET ORAL DAILY
Refills: 0
Start: 2018-11-09 | End: 2022-06-28 | Stop reason: ALTCHOICE

## 2018-11-07 RX ADMIN — INSULIN LISPRO 1 UNITS: 100 INJECTION, SOLUTION INTRAVENOUS; SUBCUTANEOUS at 12:14

## 2018-11-07 RX ADMIN — METOPROLOL TARTRATE 50 MG: 50 TABLET, FILM COATED ORAL at 09:22

## 2018-11-07 RX ADMIN — PANTOPRAZOLE SODIUM 40 MG: 40 TABLET, DELAYED RELEASE ORAL at 06:14

## 2018-11-07 RX ADMIN — ALBUTEROL SULFATE 2.5 MG: 2.5 SOLUTION RESPIRATORY (INHALATION) at 09:52

## 2018-11-07 RX ADMIN — SODIUM CHLORIDE 75 ML/HR: 0.9 INJECTION, SOLUTION INTRAVENOUS at 01:34

## 2018-11-07 RX ADMIN — SITAGLIPTIN 50 MG: 50 TABLET, FILM COATED ORAL at 09:23

## 2018-11-07 RX ADMIN — ALBUTEROL SULFATE 2.5 MG: 2.5 SOLUTION RESPIRATORY (INHALATION) at 12:24

## 2018-11-07 RX ADMIN — Medication 1 TABLET: at 09:25

## 2018-11-07 RX ADMIN — APIXABAN 2.5 MG: 2.5 TABLET, FILM COATED ORAL at 09:22

## 2018-11-07 RX ADMIN — ASPIRIN 81 MG: 81 TABLET, COATED ORAL at 09:22

## 2018-11-07 RX ADMIN — Medication 150 MG: at 09:23

## 2018-11-07 NOTE — ASSESSMENT & PLAN NOTE
Patient's baseline creatinine is around 1 1-1 3  Creatinine did improve with IV hydration   repeat lab work after discharge

## 2018-11-07 NOTE — ASSESSMENT & PLAN NOTE
Appears to be compensated  Restart Lasix on 11/09 as creatinine has improved although not quite back at baseline

## 2018-11-07 NOTE — DISCHARGE SUMMARY
Discharge Summary - [address] [facility]    Patient Information: Luci Valle 80 y o  female MRN: [id number]  Unit/Bed#: [address] [place] [address] Encounter: [id number]    Discharging Physician / Practitioner: Jenae Reynolds DO  PCP: Sanam Mckeon MD  Admission Date: 11/4/2018  Discharge Date: 11/07/18    Reason for Admission: Aphasia      Discharge Diagnoses:     Principal Problem:    Encephalopathy  Active Problems:    Deep vein thrombosis (DVT) of left upper extremity ([facility] [address] )    H/O mitral valve replacement    Controlled type 2 diabetes mellitus without complication, without long-term current use of insulin ([facility] [address] )    Essential hypertension    Systolic congestive heart failure ([facility] [address] )    History of ischemic right MCA stroke  Resolved Problems:    CHADWICK (acute kidney injury) ([facility] )        * Encephalopathy   Assessment & Plan    Patient was having difficulty following commands with staring off into space and some urinary incontinence and weakness  Possible TIA versus acute CVA  Seizure remains a possibility   CT scan of the brain showed no acute intracranial abnormality   CTA of the head and neck showed patent major cervical and intracranial arteries  Atherosclerotic calcification of bilateral carotid bifurcation was noted with about 60% stenosis in the proximal right cervical ICA  Previous 3 day EEG was negative  repeat EEG here was also normal  Patient seen by Neurology  Continue aspirin, Eliquis and high-dose Lipitor follow up with Neurology after discharge  Patient will get outpatient MRI at [facility] as patient has pacemaker  Continue PT/OT at rehab  Family not keen on starting antiepileptic medications unless EEG abnormal     CHADWICK (acute kidney injury) (HCC)-resolved as of 11/7/2018   Assessment & Plan    Patient's baseline creatinine is around 1 1-1 3  Creatinine did improve with IV hydration   repeat lab work after discharge     History of ischemic right MCA stroke   Assessment & Plan Patient has history of 3 strokes over the past 9 months and also history of subarachnoid hemorrhage  Continue aspirin, Eliquis and high-dose Lipitor  Systolic congestive heart failure ([address] )   Assessment & Plan    Appears to be compensated  Restart Lasix on 11/09 as creatinine has improved although not quite back at baseline     Essential hypertension   Assessment & Plan    Continue Lopressor     Controlled type 2 diabetes mellitus without complication, without long-term current use of insulin [facility])   Assessment & Plan    Lab Results   Component Value Date    HGBA1C 6 5 (H) 11/05/2018       Recent Labs      11/06/18   1614  11/06/18   2130  11/07/18   0724  11/07/18   1114   POCGLU  101  137  105  160*     Continue Januvia     H/O mitral valve replacement   Assessment & Plan    Bioprosthetic valve  Last echo from 09/2018 showed EF of 45% with mild diffuse hypokinesis and bioprosthetic mitral valve  Deep vein thrombosis (DVT) of left upper extremity ([address] )   Assessment & Plan    Continue Eliquis         Consultations During Hospital Stay:  [address] [place] [address] TO CASE MANAGEMENT    Procedures Performed:     · none    Significant Findings:     · See hospital course and above    Imaging while in hospital:    Xr Chest 1 View Portable    Result Date: 11/5/2018  Narrative: CHEST INDICATION:   stroke alert  COMPARISON:  06/18/2018 EXAM PERFORMED/VIEWS:  XR CHEST PORTABLE One image FINDINGS: Cardiomediastinal silhouette appears enlarged  A median sternotomy has been performed  A pacemaker enters on the left  No evidence of heart failure  The lungs are clear  No pneumothorax or pleural effusion  Osseous structures appear within normal limits for patient age  Calcific tendinitis in the right shoulder  Impression: No acute cardiopulmonary disease  Cardiomegaly   Workstation performed: [id number]     Ct Stroke Alert Brain    Result Date: 11/4/2018  Narrative: CT BRAIN - STROKE ALERT PROTOCOL INDICATION:   Stroke  COMPARISON:  Head CT 6/18/2018 TECHNIQUE:  CT examination of the brain was performed  In addition to axial images, coronal reformatted images were created and submitted for interpretation  Radiation dose length product (DLP) for this visit:  969 22 mGy-cm   This examination, like all CT scans performed in the [facility], was performed utilizing techniques to minimize radiation dose exposure, including the use of iterative  reconstruction and automated exposure control  IMAGE QUALITY:  Diagnostic  FINDINGS:  PARENCHYMA: Cerebral atrophy  Redemonstration of right parietal encephalomalacia from remote infarct  No acute intracranial hemorrhage  Periventricular and subcortical white matter patchy and confluent hypodensities most compatible with chronic microangiopathic disease  Stable left occipital dural base extra-axial partially calcified 1 cm lesion (series 2 image 17) is compatible with meningioma  No CT signs of acute infarction  VENTRICLES AND EXTRA-AXIAL SPACES:  Normal for patient's age  VISUALIZED ORBITS AND PARANASAL SINUSES:  Unremarkable  CALVARIUM AND EXTRACRANIAL SOFT TISSUES:   Normal      Impression: 1  No acute intracranial abnormality  2   Sequela of old infarct in the right parietal lobe and changes of chronic microangiopathic disease  3   Stable left occipital meningioma  I personally discussed this study with Angela Kenny on 11/4/2018 at 5:10 PM  Workstation performed: [id number]     Cta Stroke Alert (head/neck)    Result Date: 11/4/2018  Narrative: CTA NECK AND BRAIN WITH  CONTRAST INDICATION: stroke alert COMPARISON:   Head CT 6/8/2018 and CTA head and neck 4/23/2018 TECHNIQUE:   Post contrast imaging was performed after administration of iodinated contrast through the neck and brain  Post contrast axial 0 625 mm images timed to opacify the arterial system  3D rendering was performed on an independent workstation     MIP reconstructions performed  Coronal reconstructions were performed of the noncontrast portion of the brain  Radiation dose length product (DLP) for this visit:  333 52 mGy-cm   This examination, like all CT scans performed in the [facility], was performed utilizing techniques to minimize radiation dose exposure, including the use of iterative  reconstruction and automated exposure control  IV Contrast:  85 mL of iohexol (OMNIPAQUE)  IMAGE QUALITY:   Diagnostic FINDINGS: CERVICAL VASCULATURE AORTIC ARCH AND GREAT VESSELS:  Normal aortic arch and great vessel origins  Normal visualized subclavian vessels  RIGHT VERTEBRAL ARTERY CERVICAL SEGMENT:  Normal origin  The vessel is normal in caliber throughout the neck  LEFT VERTEBRAL ARTERY CERVICAL SEGMENT:  Normal origin  The vessel is normal in caliber throughout the neck  RIGHT EXTRACRANIAL CAROTID SEGMENT: There is atherosclerotic calcification at the carotid bifurcation resulting in approximately 50% stenosis of proximal cervical internal carotid artery  Focal stenosis at the origin of the internal carotid artery measuring 1 8mm at the point of maximal stenosis  Comparing this to the more distal cervical internal carotid artery which measures 4 2mm, this corresponds to an approximately 60% stenosis  LEFT EXTRACRANIAL CAROTID SEGMENT: Atherosclerotic calcification at the bifurcation without significant stenosis of proximal cervical internal carotid artery  NASCET criteria was used to determine the degree of internal carotid artery diameter stenosis  INTRACRANIAL VASCULATURE INTERNAL CAROTID ARTERIES:  Normal enhancement of the intracranial portions of the internal carotid arteries with moderate atherosclerotic disease of bilateral carotid siphons  Normal ophthalmic artery origins  Normal ICA terminus  ANTERIOR CIRCULATION:  Symmetric A1 segments and anterior cerebral arteries with normal enhancement  Normal anterior communicating artery   MIDDLE CEREBRAL ARTERY CIRCULATION:  M1 segment and middle cerebral artery branches demonstrate normal enhancement bilaterally  Mild multifocal narrowing of M1 segment of left middle cerebral artery DISTAL VERTEBRAL ARTERIES:  Normal distal vertebral arteries  Posterior inferior cerebellar artery origins are normal  Normal vertebral basilar junction  BASILAR ARTERY:  Basilar artery is normal in caliber  Normal superior cerebellar arteries  POSTERIOR CEREBRAL ARTERIES: Persistent fetal origin of bilateral posterior cerebral arteries  Both arteries demonstrate normal enhancement  Normal posterior communicating arteries  DURAL VENOUS SINUSES:  Normal  NON VASCULAR ANATOMY BONY STRUCTURES:  Diffuse osteopenia  Multilevel degenerative changes of cervical spine more pronounced at level C5-6  SOFT TISSUES OF THE NECK:  Unremarkable  THORACIC INLET:  Unremarkable  Impression: 1  Patent major cervical and intracranial arteries without large vessel occlusion  Presumably atherosclerotic mild multifocal narrowing of M1 segment of  left middle cerebral artery  No aneurysm or AV malformation  2   Atherosclerotic calcification of bilateral carotid bifurcation with approximately 60% stenosis in the proximal right cervical internal carotid artery  I personally discussed this study with Fadia Mitchell on 11/4/2018 at 5:10 PM   Workstation performed: [id number]       Incidental Findings:   · left occipital meningioma    Test Results Pending at Discharge (will require follow up):   · As per After Visit Summary     Outpatient Tests Requested:  · CBC, BMP    Complications:  See hospital course and above    Hospital Course:     Douglas Lopes is a 80 y o  female patient who originally presented to the hospital on 11/4/2018 due to altered mental status and weakness  As per daughter, patient was staring off into space and not responding to questions  She also had difficulty walking  She could not process information    She also had an episode of urinary incontinence  In the ER stroke alert was called and patient was admitted  She was seen by Cardiology while here  Presenting symptoms the could possibly be TIA versus acute CVA  Seizure is also concern but daughter did not want to start AED unless EEG was abnormal   Aspirin and high-dose Lipitor was started  Patient will obtain a MRI after discharge  Discharge plan was discussed with the patient and her family members that were present at bedside      Please see above list of diagnoses and related plan for additional information  Condition at Discharge: stable     Discharge Day Visit / Exam:     Subjective:  Denies any complaints  As per daughter patient has mucus often at home that is difficult to cough up but breathing treatments seem to be helping    Vitals: Blood Pressure: 160/78 (11/07/18 0900)  Pulse: 76 (11/07/18 0900)  Temperature: 99 5 °F (37 5 °C) (11/07/18 0900)  Temp Source: Temporal (11/07/18 0900)  Respirations: 18 (11/07/18 0900)  Height: 5' 3" (160 cm) (11/04/18 1936)  Weight - Scale: 68 kg (149 lb 14 6 oz) (11/04/18 1936)  SpO2: 98 % (11/07/18 0952)  Exam:   Physical Exam   Constitutional: No distress  HENT:   Head: Normocephalic and atraumatic  Eyes: EOM are normal  Right eye exhibits no discharge  Left eye exhibits no discharge  No scleral icterus  Neck: Neck supple  Cardiovascular: Normal rate and regular rhythm  Pulmonary/Chest: Effort normal and breath sounds normal  No respiratory distress  She has no wheezes  She has no rales  Abdominal: Soft  Bowel sounds are normal  She exhibits no distension  There is no tenderness  Musculoskeletal:   Decreased range of motion of right upper extremity due to right shoulder pain   Neurological: She is alert  No cranial nerve deficit  She exhibits abnormal muscle tone     Oriented to self and place  Right upper extremity appears weaker compared to the left  Bilateral lower extremity weakness noted - left appears worse than the right   Skin: Skin is dry  She is not diaphoretic  Discharge instructions/Information to patient and family:(Discharge Medications and Follow up):   See after visit summary for information provided to patient and family  Provisions for Follow-Up Care:  See after visit summary for information related to follow-up care and any pertinent home health orders  Disposition: [facility]    Planned Readmission:  No     Discharge Statement:  I spent > 30 minutes discharging the patient  This time was spent on the day of discharge  I had direct contact with the patient on the day of discharge  Greater than 50% of the total time was spent examining patient, answering all patient questions, arranging and discussing plan of care with patient as well as directly providing post-discharge instructions  Additional time then spent on discharge activities  Discharge Medications:  See after visit summary for reconciled discharge medications provided to patient and family  ** Please Note: "This note has been constructed using a voice recognition system  Therefore there may be syntax, spelling, and/or grammatical errors   Please call if you have any questions  "**

## 2018-11-07 NOTE — ASSESSMENT & PLAN NOTE
Lab Results   Component Value Date    HGBA1C 6 5 (H) 11/05/2018       Recent Labs      11/06/18   1614  11/06/18   2130  11/07/18   0724  11/07/18   1114   POCGLU  101  137  105  160*     Continue Januvia

## 2018-11-07 NOTE — PLAN OF CARE
Activity Intolerance/Impaired Mobility     Mobility/activity is maintained at optimum level for patient Progressing        Communication Impairment     Ability to express needs and understand communication [place]     Discharge to home or other facility with appropriate resources Progressing        DISCHARGE PLANNING - CARE MANAGEMENT     Discharge to post-acute care or home with appropriate resources Progressing        INFECTION - ADULT     Absence or prevention of progression during hospitalization Progressing        Knowledge Deficit     Patient/family/caregiver demonstrates understanding of disease process, treatment plan, medications, and discharge instructions Progressing        METABOLIC, FLUID AND ELECTROLYTES - ADULT     Glucose maintained within target range Progressing        Neurological Deficit     Neurological status is stable or improving Progressing        Nutrition     Nutrition/Hydration status is improving Progressing        PAIN - ADULT     Verbalizes/displays adequate comfort level or baseline comfort level Progressing        Potential for Aspiration     Non-ventilated patient's risk of aspiration is minimized Progressing        Potential for Falls     Patient will remain free of falls Progressing        RESPIRATORY - ADULT     Achieves optimal ventilation and oxygenation Progressing        SAFETY ADULT     Maintain or return to baseline ADL function Progressing     Patient will remain free of falls Progressing

## 2018-11-07 NOTE — ASSESSMENT & PLAN NOTE
Patient was having difficulty following commands with staring off into space and some urinary incontinence and weakness  Possible TIA versus acute CVA  Seizure remains a possibility   CT scan of the brain showed no acute intracranial abnormality   CTA of the head and neck showed patent major cervical and intracranial arteries  Atherosclerotic calcification of bilateral carotid bifurcation was noted with about 60% stenosis in the proximal right cervical ICA  Previous 3 day EEG was negative    repeat EEG here was also normal  Patient seen by Neurology  Continue aspirin, Eliquis and high-dose Lipitor follow up with Neurology after discharge  Patient will get outpatient MRI at [facility] as patient has pacemaker  Continue PT/OT at rehab  Family not keen on starting antiepileptic medications unless EEG abnormal

## 2018-11-07 NOTE — ASSESSMENT & PLAN NOTE
Patient has history of 3 strokes over the past 9 months and also history of subarachnoid hemorrhage  Continue aspirin, Eliquis and high-dose Lipitor

## 2018-11-07 NOTE — NURSING NOTE
Patient discharged to [facility] in [place], [place] for rehab,  IV removed and all belongings gathered, patient left unit via stretcher with transport team and family member, report given to receiving facility

## 2018-11-07 NOTE — PROGRESS NOTES
[address] Internal Medicine Progress Note  Patient: Luci Valle 80 y o  female   MRN: [id number]  PCP: Sanam Mckeon MD  Unit/Bed#: [address] [place] [address] Encounter: [id number]  Date Of Visit: 11/06/18    Problem List:    Principal Problem:    Altered mental status  Active Problems:    [place]ADWICK (acute kidney injury) ([facility] )    Deep vein thrombosis (DVT) of left upper extremity ([facility] )    H/O mitral valve replacement    Controlled type 2 diabetes mellitus without complication, without long-term current use of insulin (HCC)    HTN (hypertension)    Systolic congestive heart failure ([facility] )    History of ischemic right MCA stroke      Assessment & Plan:    * Altered mental status   Assessment & Plan    Patient was having difficulty following commands with staring off into space and some urinary incontinence and weakness  Possible TIA versus acute CVA  Previous 3 day EEG was negative  Follow-up repeat EEG from today  Appreciate Neurology input  Continue aspirin, Eliquis and high-dose Lipitor  Patient will need outpatient MRI at [place] [address]  as patient has pacemaker  Continue PT/OT  Family not keen on starting antiepileptic medications     CHADWICK (acute kidney injury) ([facility] )   Assessment & Plan    Patient's baseline creatinine is around 1 1-1 3  Continue IV hydration and repeat lab work in the a m       History of ischemic right MCA stroke   Assessment & Plan    Patient has history of 3 strokes over the past 9 months and also history of subarachnoid hemorrhage  CT scan of the brain and CT of the head and neck were negative in the ED  Continue aspirin, Eliquis and high-dose Lipitor      Systolic congestive heart failure (HCC)   Assessment & Plan    Appears to be compensated  Holding Lasix for now     HTN (hypertension)   Assessment & Plan    Continue Lopressor     Controlled type 2 diabetes mellitus without complication, without long-term current use of insulin [facility])   Assessment & Plan    Lab Results   Component Value Date    HGBA1C 6 5 (H) 2018       Recent Labs      18   0739  18   1126  18   1614  18   2130   POCGLU  116  190*  101  137     Continue Januvia, Humalog sliding scale with Accu-Cheks q a c  And HS     H/O mitral valve replacement   Assessment & Plan    Bioprosthetic valve  Last echo from 2018 showed EF of 45% with mild diffuse hypokinesis and bioprosthetic mitral valve     Deep vein thrombosis (DVT) of left upper extremity (HCC)   Assessment & Plan    Continue Eliquis           VTE Pharmacologic Prophylaxis:   Pharmacologic: Apixaban (Eliquis)  Mechanical VTE Prophylaxis in Place: No    Patient Centered Rounds: I have performed bedside rounds with nursing staff today  Discussions with Specialists or Other Care Team Provider: Yes    Education and Discussions with Family / Patient:Yes    Time Spent for Care: 30 minutes  More than 50% of total time spent on counseling and coordination of care as described above  Current Length of Stay: 2 day(s)    Current Patient Status: Inpatient     Discharge Plan: STR    Code Status: Level 1 - Full Code    Certification Statement: The patient will continue to require additional inpatient hospital stay due to TIA vs CVA      Subjective:   Patient denies any complaints  As per daughter at bedside patient is doing much better compared to day of admission    Objective:     Vitals:   Temp (24hrs), Av 2 °F (36 8 °C), Min:97 6 °F (36 4 °C), Max:98 9 °F (37 2 °C)    Temp:  [97 6 °F (36 4 °C)-98 9 °F (37 2 °C)] 97 6 °F (36 4 °C)  HR:  [60-80] 76  Resp:  [18] 18  BP: (112-147)/(51-65) 112/51  SpO2:  [95 %-96 %] 95 %  Body mass index is 26 56 kg/m²  Input and Output Summary (last 24 hours):        Intake/Output Summary (Last 24 hours) at 18 2249  Last data filed at 18 0500   Gross per 24 hour   Intake                0 ml   Output             1000 ml   Net            -1000 ml       Physical Exam:     Physical Exam Constitutional: No distress  HENT:   Head: Normocephalic and atraumatic  Eyes: EOM are normal  Right eye exhibits no discharge  Left eye exhibits no discharge  No scleral icterus  Neck: Neck supple  Cardiovascular: Normal rate and regular rhythm  Pulmonary/Chest: Effort normal and breath sounds normal  No respiratory distress  She has no wheezes  She has no rales  Abdominal: Soft  Bowel sounds are normal  She exhibits no distension  There is no tenderness  Musculoskeletal: She exhibits no edema  Neurological: She is alert  She exhibits abnormal muscle tone  Oriented to self and place  Decreased range of motion of right upper extremity due to right shoulder pain   Skin: Skin is dry  She is not diaphoretic  Additional Data:     Labs:      Results from last 7 days  Lab Units 11/05/18  0506 11/04/18  1654   WBC Thousand/uL 5 80 7 56   HEMOGLOBIN g/dL 10 0* 11 8   HEMATOCRIT % 32 2* 37 8   PLATELETS Thousands/uL 102* 117*   NEUTROS PCT %  --  69   LYMPHS PCT %  --  14   MONOS PCT %  --  11   EOS PCT %  --  6       Results from last 7 days  Lab Units 11/05/18  0506 11/04/18  1654   POTASSIUM mmol/L 4 5 4 7   CHLORIDE mmol/L 104 101   CO2 mmol/L 25 26   BUN mg/dL 38* 42*   CREATININE mg/dL 1 58* 1 59*   CALCIUM mg/dL 8 4 9 6   ALK PHOS U/L  --  97   ALT U/L  --  47   AST U/L  --  22       Results from last 7 days  Lab Units 11/04/18  1654   INR  0 97       * I Have Reviewed All Lab Data Listed Above  * Additional Pertinent Lab Tests Reviewed: All Labs For Current Hospital Admission Reviewed    Imaging:  Xr Chest 1 View Portable    Result Date: 11/5/2018  Narrative: CHEST INDICATION:   stroke alert  COMPARISON:  06/18/2018 EXAM PERFORMED/VIEWS:  XR CHEST PORTABLE One image FINDINGS: Cardiomediastinal silhouette appears enlarged  A median sternotomy has been performed  A pacemaker enters on the left  No evidence of heart failure  The lungs are clear  No pneumothorax or pleural effusion  Osseous structures appear within normal limits for patient age  Calcific tendinitis in the right shoulder  Impression: No acute cardiopulmonary disease  Cardiomegaly  Workstation performed: [id number]     Ct Stroke Alert Brain    Result Date: 11/4/2018  Narrative: CT BRAIN - STROKE ALERT PROTOCOL INDICATION:   Stroke  COMPARISON:  Head CT 6/18/2018 TECHNIQUE:  CT examination of the brain was performed  In addition to axial images, coronal reformatted images were created and submitted for interpretation  Radiation dose length product (DLP) for this visit:  969 22 mGy-cm   This examination, like all CT scans performed in the [facility], was performed utilizing techniques to minimize radiation dose exposure, including the use of iterative  reconstruction and automated exposure control  IMAGE QUALITY:  Diagnostic  FINDINGS:  PARENCHYMA: Cerebral atrophy  Redemonstration of right parietal encephalomalacia from remote infarct  No acute intracranial hemorrhage  Periventricular and subcortical white matter patchy and confluent hypodensities most compatible with chronic microangiopathic disease  Stable left occipital dural base extra-axial partially calcified 1 cm lesion (series 2 image 17) is compatible with meningioma  No CT signs of acute infarction  VENTRICLES AND EXTRA-AXIAL SPACES:  Normal for patient's age  VISUALIZED ORBITS AND PARANASAL SINUSES:  Unremarkable  CALVARIUM AND EXTRACRANIAL SOFT TISSUES:   Normal      Impression: 1  No acute intracranial abnormality  2   Sequela of old infarct in the right parietal lobe and changes of chronic microangiopathic disease  3   Stable left occipital meningioma   I personally discussed this study with Ashlyn Martin on 11/4/2018 at 5:10 PM  Workstation performed: [id number]     Vas Upper Limb Venous Duplex Scan, Unilateral/limited    Result Date: 10/30/2018  Narrative:  [facility] REPORT CLINICAL: Indications: Patient presents with shoulder pain that radiated down to her right upper arm x 4 days  She describes this as 10/10 type pain; however, she is a poor historian  Operative History: 2017-12-01 Mitral valve replacement 2017-12-01 Pacemaker implantation Bilateral varicose vein surgery Risk Factors The patient has history of HTN, NIDDM, CVA and HLD  CONCLUSION:  Impression RIGHT UPPER LIMB: No evidence of acute or chronic deep vein thrombosis  No evidence of superficial thrombophlebitis noted  Doppler evaluation shows a normal response to augmentation maneuvers  LEFT UPPER LIMB LIMITED: Evaluation shows no evidence of thrombus in the internal jugular vein, subclavian vein, and the brachiocephalic vein  SIGNATURE: Electronically Signed by: Jennifer Glililand MD on 2018-10-30 03:50:20 PM    Cta Stroke Alert (head/neck)    Result Date: 11/4/2018  Narrative: CTA NECK AND BRAIN WITH  CONTRAST INDICATION: stroke alert COMPARISON:   Head CT 6/8/2018 and CTA head and neck 4/23/2018 TECHNIQUE:   Post contrast imaging was performed after administration of iodinated contrast through the neck and brain  Post contrast axial 0 625 mm images timed to opacify the arterial system  3D rendering was performed on an independent workstation  MIP reconstructions performed  Coronal reconstructions were performed of the noncontrast portion of the brain  Radiation dose length product (DLP) for this visit:  333 52 mGy-cm   This examination, like all CT scans performed in the [facility], was performed utilizing techniques to minimize radiation dose exposure, including the use of iterative  reconstruction and automated exposure control  IV Contrast:  85 mL of iohexol (OMNIPAQUE)  IMAGE QUALITY:   Diagnostic FINDINGS: CERVICAL VASCULATURE AORTIC ARCH AND GREAT VESSELS:  Normal aortic arch and great vessel origins  Normal visualized subclavian vessels  RIGHT VERTEBRAL ARTERY CERVICAL SEGMENT:  Normal origin   The vessel is normal in caliber throughout the neck  LEFT VERTEBRAL ARTERY CERVICAL SEGMENT:  Normal origin  The vessel is normal in caliber throughout the neck  RIGHT EXTRACRANIAL CAROTID SEGMENT: There is atherosclerotic calcification at the carotid bifurcation resulting in approximately 50% stenosis of proximal cervical internal carotid artery  Focal stenosis at the origin of the internal carotid artery measuring 1 8mm at the point of maximal stenosis  Comparing this to the more distal cervical internal carotid artery which measures 4 2mm, this corresponds to an approximately 60% stenosis  LEFT EXTRACRANIAL CAROTID SEGMENT: Atherosclerotic calcification at the bifurcation without significant stenosis of proximal cervical internal carotid artery  NASCET criteria was used to determine the degree of internal carotid artery diameter stenosis  INTRACRANIAL VASCULATURE INTERNAL CAROTID ARTERIES:  Normal enhancement of the intracranial portions of the internal carotid arteries with moderate atherosclerotic disease of bilateral carotid siphons  Normal ophthalmic artery origins  Normal ICA terminus  ANTERIOR CIRCULATION:  Symmetric A1 segments and anterior cerebral arteries with normal enhancement  Normal anterior communicating artery  MIDDLE CEREBRAL ARTERY CIRCULATION:  M1 segment and middle cerebral artery branches demonstrate normal enhancement bilaterally  Mild multifocal narrowing of M1 segment of left middle cerebral artery DISTAL VERTEBRAL ARTERIES:  Normal distal vertebral arteries  Posterior inferior cerebellar artery origins are normal  Normal vertebral basilar junction  BASILAR ARTERY:  Basilar artery is normal in caliber  Normal superior cerebellar arteries  POSTERIOR CEREBRAL ARTERIES: Persistent fetal origin of bilateral posterior cerebral arteries  Both arteries demonstrate normal enhancement  Normal posterior communicating arteries  DURAL VENOUS SINUSES:  Normal  NON VASCULAR ANATOMY BONY STRUCTURES:  Diffuse osteopenia  Multilevel degenerative changes of cervical spine more pronounced at level C5-6  SOFT TISSUES OF THE NECK:  Unremarkable  THORACIC INLET:  Unremarkable  Impression: 1  Patent major cervical and intracranial arteries without large vessel occlusion  Presumably atherosclerotic mild multifocal narrowing of M1 segment of  left middle cerebral artery  No aneurysm or AV malformation  2   Atherosclerotic calcification of bilateral carotid bifurcation with approximately 60% stenosis in the proximal right cervical internal carotid artery  I personally discussed this study with Naty Avila on 11/4/2018 at 5:10 PM   Workstation performed: [id number]     Imaging Reports Reviewed by myself    Cultures:   Blood Culture:   Lab Results   Component Value Date    BLOODCX No Growth After 5 Days  06/18/2018    BLOODCX No Growth After 5 Days  06/18/2018    BLOODCX No Growth After 5 Days  05/25/2018    BLOODCX No Growth After 5 Days  05/25/2018    BLOODCX No Growth After 5 Days  05/24/2018    BLOODCX No Growth After 5 Days   05/24/2018     Urine Culture:   Lab Results   Component Value Date    URINECX >100,000 cfu/ml Enterobacter aerogenes (A) 05/25/2018    URINECX <10,000 cfu/ml  02/02/2018    URINECX No Growth <1000 cfu/mL 10/16/2017    URINECX 20,000-29,000 cfu/ml Mixed Contaminants X3 09/26/2017    URINECX No Growth <1000 cfu/mL 01/12/2017     Sputum Culture: No components found for: SPUTUMCX  Wound Culture: No results found for: WOUNDCULT    Last 24 Hours Medication List:     Current Facility-Administered Medications:  acetaminophen 650 mg Oral Q6H PRN Hakeem Gamble MD    albuterol 2 5 mg Nebulization 4x Daily Radha Lamas DO    apixaban 2 5 mg Oral BID Hakeem Gamble MD    aspirin 81 mg Oral Daily Mercedes Carter MD    atorvastatin 80 mg Oral Daily With Austin Collier MD    insulin lispro 1-5 Units Subcutaneous TID AC Angelita Schwarz MD    insulin lispro 1-5 Units Subcutaneous HS Angelita MD Karishma    iron polysaccharides 150 mg Oral Daily Barbara Culp MD    metoprolol tartrate 50 mg Oral Q12H [facility] Barbara Culp MD    multivitamin-minerals 1 tablet Oral Daily Barbara Culp MD    pantoprazole 40 mg Oral Early Morning Barbara Culp MD    sitaGLIPtin 50 mg Oral Daily Barbara Culp MD    sodium chloride 75 mL/hr Intravenous Continuous Barbara Culp MD Last Rate: 75 mL/hr (11/05/18 2123)        Today, Patient Was Seen By: Manuela Darling DO    ** Please Note: "This note has been constructed using a voice recognition system  Therefore there may be syntax, spelling, and/or grammatical errors   Please call if you have any questions  "**

## 2018-11-28 ENCOUNTER — HOSPITAL ENCOUNTER (OUTPATIENT)
Dept: RADIOLOGY | Facility: HOSPITAL | Age: 83
Discharge: HOME | End: 2018-11-28
Attending: SURGERY
Payer: MEDICARE

## 2018-11-28 DIAGNOSIS — I65.23 BILATERAL CAROTID ARTERY STENOSIS: Chronic | ICD-10-CM

## 2018-11-28 PROCEDURE — 93880 EXTRACRANIAL BILAT STUDY: CPT

## 2018-11-29 PROCEDURE — 93880 EXTRACRANIAL BILAT STUDY: CPT | Performed by: SURGERY

## 2018-12-04 ENCOUNTER — HOSPITAL ENCOUNTER (OUTPATIENT)
Dept: RADIOLOGY | Facility: HOSPITAL | Age: 83
Discharge: HOME | End: 2018-12-04
Payer: MEDICARE

## 2018-12-04 DIAGNOSIS — R47.01 APHASIA: ICD-10-CM

## 2018-12-04 PROCEDURE — 70551 MRI BRAIN STEM W/O DYE: CPT

## 2018-12-05 ENCOUNTER — TELEPHONE (OUTPATIENT)
Dept: NEUROLOGY | Facility: CLINIC | Age: 83
End: 2018-12-05

## 2018-12-05 NOTE — TELEPHONE ENCOUNTER
----- Message from Wm Reinoso MD sent at 12/5/2018 12:07 PM EST -----  Her MRI brain shows no new stroke, just old ones  Ask her to continue baby aspirin and eliquis  Thanks

## 2018-12-05 NOTE — TELEPHONE ENCOUNTER
I SPOKE TO THE DAUGHTER AND ADVISED RESULTS AND TO CONTINUE ASPIRIN AND ELIQUIS  SHE WAS ADVISED TO CALL OFFICE WITH ANY NEW SYMPTOMS

## 2018-12-11 ENCOUNTER — OFFICE VISIT (OUTPATIENT)
Dept: NEUROLOGY | Facility: CLINIC | Age: 83
End: 2018-12-11
Payer: MEDICARE

## 2018-12-11 VITALS
HEIGHT: 63 IN | DIASTOLIC BLOOD PRESSURE: 60 MMHG | WEIGHT: 148 LBS | SYSTOLIC BLOOD PRESSURE: 128 MMHG | HEART RATE: 70 BPM | BODY MASS INDEX: 26.22 KG/M2

## 2018-12-11 DIAGNOSIS — G20 PARKINSONISM, UNSPECIFIED PARKINSONISM TYPE (HCC): ICD-10-CM

## 2018-12-11 DIAGNOSIS — F01.50 VASCULAR DEMENTIA WITHOUT BEHAVIORAL DISTURBANCE (HCC): ICD-10-CM

## 2018-12-11 DIAGNOSIS — G45.9 TIA (TRANSIENT ISCHEMIC ATTACK): Primary | ICD-10-CM

## 2018-12-11 PROCEDURE — 99354 PR PROLONGED SVC OUTPATIENT SETTING 1ST HOUR: CPT | Performed by: PSYCHIATRY & NEUROLOGY

## 2018-12-11 PROCEDURE — 99215 OFFICE O/P EST HI 40 MIN: CPT | Performed by: PSYCHIATRY & NEUROLOGY

## 2018-12-11 NOTE — PROGRESS NOTES
Patient ID: Starr Dooley is a 80 y o  female  Assessment/Plan:           Problem List Items Addressed This Visit        Cardiovascular and Mediastinum    TIA (transient ischemic attack) - Primary    Relevant Orders    Ambulatory referral to Neurology       Nervous and Auditory    Vascular dementia    Relevant Orders    Ambulatory referral to Neurology      Other Visit Diagnoses     Parkinsonism, unspecified Parkinsonism type [facility])        Relevant Orders    Ambulatory referral to Neurology         Discussion Summary:  80year old female referred primarily to determine whether she needs seizure medication  She has a long complex history of cerebral ischemic events described in detail in the HPI  which I will summarize below  I will first concentrate on the event thought to have been a seizure  At 11 pm on 4/23/2018, after having been admitted for a new stroke, the patient had,  diffuse tremors and alteration of mental status described as "zoning out " She was empirically put on Keppra but then subsequently had nearly 72 hours of video EEG which showed background slowing but no electrographic seizures nor interictal epileptiform discharges  The conclusion drawn based on that evaluation was that that one episode was not an epileptic seizure given the absence of epileptiform discharges on 72 hour EEG  Moreover, after seeing Ryan Rosenberg, the patient had a routine EEG on 7/31/18 that was normal     Other than that one spell, no other seizure-like activity has occurred either before or after the time when the patient was having the series of transient ischemic attacks and strokes  Based on this and other findings, I don't think the patient has epilepsy and there's electrographic evidence for epileptogenic tendency thus far, hence she does not need to be on seizure medication  As far as the strokes and TIAs: patient had her first TIA on 1/28/18   She was seen in [facility] for an episode of right-sided weakness which resolved within 48 hours  She was already on coumadin at the time for mitral valve replacement with pig valve   As far as I could tell, no changes  were made in her medications to prevent further cerebrovascular episodes  On 4/18, three days after falling in her home, the patient was taken to [address] [place] [address] where she had left-sided weakness, left neglect, and "asphasia"(?)  CT showed right choroidal infarct  It was recommended by Dr Nicolas First to add aspirin to patient's medications but her daughter, who keeps a detailed of the patient's medical care, states that this was not added  On 4/23, patient was taken to the emergency room at [facility] for aphasia, left gaze deviation, right field cut, and right facial droop  CT did not show evidence of stroke, but CTA did show M2 occlusion, so thrombectomy was performed and within less than an hour most of the patient's neurologic deficits resolved  Her coumadin was changed to Eliquis  The assessment was that patient had had a right MCA infarct on 4/18, then a left hemispheric ischemia on 3/48 probably embolic from her mitral valve  Patient had no further ischemic episodes until 11/4  She had just seen Dr Francis Armenta on 10/29 and patient had been doing very well neurologically at that time  Dr Francis Armenta had said that it would probably be okay to reduce Eliquis  On 11/4, the patient became incoherent and it seemed difficult for her to speak English ( less difficult to speak Beninese) and all the mobility she had gained from physical and occupational therapy  regressed such that she started shuffling and abruptly developed increased problems with ambulation  Seen by Dr Fatemeh Mcbride at [address] [place] [address] who recommended that baby aspirin be added to Eliquis and for patient to get an outpatient MRI  Patient has a pacemaker but it is MRI compatible  MRI on 12/4/18 showed old right MCA infarct but no other ischemic deficits      Patient hasn't fully recovered from event with regards to speech and gait, she continues to undergo physical, occupational and  speech therapy  In addition, the patient  seems to have become progessively more confused  On exam, she had difficulty following even simple commands  She was unable to copy a simple figure  She also had intermittent pill rolling tremor of the left hand, shuffled when she walked and turned en bloc  She has no cogwheel rigidity  Given the neurologic developments started of 11/4, the patient needs to folow up with Dr Derik Stokes with regards secondary stroke prevention regimen (Eliquis had been decreased a few days prior to the patient developing left hemisphere ischemia, and  possibly for evaluation and treatment of vascular dementia and Parkinson's  Patient does not need to see me in follow up since she has no clear history nor EEG evidence that she ever had seizures  Subjective:    HPI    Patient is an 80year old female presenting, per referral by Dr Rosaura Pierson, for assessment of possible seizure-like activity following a possible ischemic attack  Patient has a long and complex history of cerebrovascular events:    Patient's first TIA occurred on 1/28/18 when she presented in [address] [place] [address] ED for facial dropping on the right side  The episode was contributed to a left sided TIA in the brain  She was already on coumadin for mitral valve replacement  Patient recovered within 48 hours  On April 15 the patient fell, hit her head on the floor, and didn't go to the hospital until three days later on April 18  At [address] [place] [address] a stroke on right side of brain was shown in CT  At the time she had left-sided weakness, left neglect, and asphasia  She was not given the recommended aspirin at this time to take alongside her coumadin  On April 23 the patient's roommate at [facility] noticing her having tremors   She could not talk and she went to [facility] and had a left-sided thrombectomy within an hour after an MCA infarct was indicated by CTA that showed M2 occlusion  At that time coumadin was changed to Eliquis  At 11 pm that night they thought she had a seizure and was given Keppra  It was witnessed as tremors of the entire body and her eyes were fixed  They took her off the [address] after CT and EEG for 72 hours showed no seizure activity  Her speech difficulties resolved quickly after the thrombectomy  After having strokes on both sides of the brain in such succession she was having weakness on both sides of the body  On 6/15 patient saw Live Villarreal who ordered an EEG and MRI on the patient that came back normal     Patient was again seen on 10/29 and it was explained to her by Dr Darron Sadler that the reason the stroke in the left side of her brain was not perceptible was because the clot was removed so quickly  At this the possibility of reducing Eliquis was considered  Patient was stroke free until 11/4 when she admitted to [facility] from 11/4-7  She had an episode where she was staring ahead, she was "talking rubbish," was "zoning out" and could not support her weight  After several hours she was getting worse  She remained conscious and aware throughout this whole incident that lasted from around 9 am to noon  She had been on Eliquis and Dr Darron Sadler decided not to put her on any seizure medication based on the findings of her EEG, CT, and MRI performed at that time  She had been put on baby aspirin after this and has been taking it since  She was discharged to [facility]  MRI on 12/4/18 showed no new sings of damage    Patient's daughters state strokes began after MV replacement and DVT  Her daughters say she has never had any other signs of seizure activity  She lives alone and her daughter takes her to physical therapy  She has regressed back to [place] and speaks fluently in that language but has difficulty with [place]  Her daughters says she has not been focusing as much on [place] but is receiving speech therapy  When speaking English she does not speak in full sentences  Daughters report she has vascular dementia and her confusion has worsened  Since November 4 she shuffles her feet when walking and has hand-rolling tremors  Her daughters deny any falls since then  Patient has had her flu shot this year  Patient is not taking any AEDs currently  Seizure Types:  Patients only seizure-like activity on 4/23/18  at 11 pm which was described as tremors of the entire body with her eyes fixed  Risk Factors for Epilepsy:   Patient has had four cerebrovascular episodes in 2018  Seizure Triggers:  No known seizure triggers  Previous Work-up:   CT head scans  4/18/18   Impression: Subacute to acute infarct in the posterior right parietal region  No acute hemorrhage  No extracerebral collections  4/23/2018 9:11 AM Impression: No acute disease  Footprint of chronic large and small vessel ischemia are stable  4/23/18 10:32 AM Impression: Flow restrictive disease of the dominant left M2 proximal branch within which clot is not excluded  Left ICA ulceration  73% short segment right ICA origin stenosis owing to dense atherosclerotic plaque  4/23/2018 4:34 PM Impression: Interval development of subarachnoid hemorrhage and excreted contrast in the subarachnoid space mainly located within the suprasellar cistern and left sylvian fissure status post cerebral angiogram  Localized hyperdensity within the posterior inferior aspect of the posterior fossa, within the subarachnoid space may represent localized hemorrhage  1 cm meningioma within the left peripheral temporal occipital region  Enhancing cortex of the subacute infarct within the right posterior MCA territory  Cerebral volume loss and chronic microangiopathic change identified    4/23 6:27 PM Impression Stable subarachnoid hemorrhage and extravasated contrast within the subarachnoid space compared to the examination performed approximately 4 hours earlier  Stable gyral enhancement involving the late subacute posterior MCA territory infarct on the right  Stable atrophy and chronic microangiopathic change  4/24 Impression:1  Resolving subarachnoid hemorrhage  2   Subacute right MCA territory infarction  3  Cerebral atrophy with chronic small vessel ischemic change  4/25 Impression: Continued resorption of subarachnoid hemorrhage  No acute intracranial abnormality  2   Evolving nonhemorrhagic infarctions left basal ganglia  3   Cerebral atrophy with chronic small vessel ischemic change and chronic infarctions as described above  4/26 Impression: No mass effect or midline shift seen, no new hematoma seen Subarachnoid hemorrhage seen on the previous study is decreasing No worsening seen Intraventricular hemorrhage is stable    5/8 Impression: 1  Chronic lacunar and right parietal infarcts  Microangiopathic disease  Findings limit sensitivity for detection of acute stroke  MRI may be helpful if there is persistent concern for acute stroke  2   No evidence of acute intracranial hemorrhage, mass effect or extra-axial collection  5/24 Impression: No acute intracranial abnormality  5/25 Impression: No acute intracranial abnormality  6/18 Impression:  No acute intracranial abnormality  11/4 Impression: 1  No acute intracranial abnormality  2   Sequela of old infarct in the right parietal lobe and changes of chronic microangiopathic disease  3   Stable left occipital meningioma  CTA on 11/4/18  Impression: 1  Patent major cervical and intracranial arteries without large vessel occlusion  Presumably atherosclerotic mild multifocal narrowing of M1 segment of  left middle cerebral artery  No aneurysm or AV malformation   2   Atherosclerotic calcification of bilateral carotid bifurcation with approximately 60% stenosis in the proximal right cervical internal carotid artery  EEGs  4/27/18 Video EEG 24 hour Monitoring  Interpretation: This prolonged, continuous video-EEG recording is abnormal  Background activities with intermittent delta activities and mildly slow posteriorly dominant rhythm suggest mild diffuse cerebral dysfunction of non-specific etiology  No electrographic seizures or interictal epileptiform discharges were seen  4/28 Video EEG 24 hour Monitoring  Interpretation: This prolonged, continuous video-EEG recording is abnormal  Background activities with intermittent delta activities and mildly slow posteriorly dominant rhythm suggest mild diffuse cerebral dysfunction of non-specific etiology  No electrographic seizures or interictal epileptiform discharges were seen  7/31 Routine EEG Awake or drowsy  Interpretation: This Routine EEG recorded during wakefulness, and drowsiness is normal   However, normal EEG doesn't rule out seizure disorder  Clinical correlation is recommended  11/6 Routine EEG Awake and drowsy  Interpretation: This Routine EEG recorded during wakefulness and drowsiness is essentially normal   However, normal EEG doesn't rule out seizure disorder  Clinical correlation is recommended  MRI performed on 12/4/18  Impression: 1  No acute infarction, intracranial hemorrhage or mass effect  2   Moderate-sized chronic right MCA infarction, new from the prior 2012 study  3   Mild scattered chronic microangiopathy  Current Treatment:  Patient is not currently taking any seizure medication but takes Eliquis and baby aspirin to prevent cerebrovascular events  Previous Medical and Surgical treatments for Epilepsy:  Patient was provided Keppra, but this was discontinued after 72 hours of EEG monitoring revealed no seizure-like activity or interictal epileptiform discharges  Allergies:  1  Vancomycin  2  Heparin  PMHx:  1  Diabetes mellitus type 2  2  Edema in legs on occasion  3  Hypertension on occasion  4   Benign brain neoplasm x 2 in 2007 (meningioma)  5  Arthritis (DJD right hip replaced)  6  Acid reflux on occasion  7  Diverticulosis  8  Cancer in 2007 (colonic polyps)  9  Language barrier (Portuguese and broken [place])  10  Deep vein thrombosis and left upper extremity  12/11/2017  11  Stroke    Surgical Hx:  1  Tubal ligation  2  Varicose vein surgery  3  Colonoscopy 11/2/16, 8/9/18  4  Colon surgery  5  Joint replacement (right hip) 2/2015  6  Joint replacement (bilateral knee)  7  Esophogastroduodenoscopy 11/2/16  8  CO Revise Median n/carpal tunnel surgery (left) 1/28/16  9  Insert/replace/remove pacemaker  10  Mitral valve with pig valve  11  Left-sided thrombectomy  4/23/18    SHx:  Former smoker who quite 1/1/1950 after 2 5 pack years  Denies any illicit drug or alcohol use  FHx:  1  Mother with cancer    The following portions of the patient's history were reviewed and updated as appropriate: allergies, current medications, past family history, past medical history, past social history, past surgical history and problem list          Objective:    Blood pressure 128/60, pulse 70, height 5' 3" (1 6 m), weight 67 1 kg (148 lb), not currently breastfeeding  Physical Exam   Constitutional: She is oriented to person, place, and time  She appears well-developed and well-nourished  Intermittent pill rolling tremor of the left hand, no cogwheel rigidity   HENT:   Head: Normocephalic and atraumatic  Right Ear: External ear normal    Left Ear: External ear normal    Eyes: Pupils are equal, round, and reactive to light  Lids are normal    Patient had difficulty following visual commands and had diminished visual field acuity     Neck: Neck supple  Cardiovascular: Normal rate and regular rhythm  Pulmonary/Chest: Effort normal    Neurological: She is alert and oriented to person, place, and time  She has normal strength and normal reflexes  Coordination normal    Skin: Skin is warm and dry     Psychiatric:   Patient is somewhat confused today   Vitals reviewed  Neurological Exam  Mental Status  Awake and alert  Oriented only to person, place and situation  Unable to copy figure  Visuospatial: Patient could only draw one of two shapes and this was a mirrored image  Able to name objects  Patient is [place] speaking and speaks in broken sentences  Patient also has difficulty recalling the current day of the week and could recall the month with clues  At a baseline the patient cannot spell or perform math  Cranial Nerves  CN II: Vision test: Patient had difficulty following visual commands and had diminished visual field acuity    Counts fingers incorrectly  Difficulty following instructions and actually stated she saw more fingers than were actually being held up indicating cognitive issues erlinda than any deficits related to visual field effects    CN III, IV, VI: Extraocular movements intact bilaterally  Normal lids and orbits bilaterally  Pupils equal round and reactive to light bilaterally  CN V: Facial sensation is normal   CN VIII: Hearing is normal   CN IX, X: Palate elevates symmetrically  Normal gag reflex  CN XI: Shoulder shrug strength is normal   CN XII: Tongue midline without atrophy or fasciculations  Motor   Strength is 5/5 throughout all four extremities  Sensory  Sensation is intact to light touch, pinprick, vibration and proprioception in all four extremities  Reflexes  Deep tendon reflexes are 2+ and symmetric in all four extremities with downgoing toes bilaterally  Coordination  Finger-to-nose, rapid alternating movements and heel-to-shin normal bilaterally without dysmetria  Gait  Casual gait: Shuffling gait  Reduced right arm swing  Reduced left arm swing  Unable to rise from chair without using arms  Patient walks with the assistance of a cane  Shuffling gait  Turns en bloc           ROS:    Review of Systems   Constitutional: Positive for fatigue   Negative for activity change, appetite change, chills, diaphoresis, fever and unexpected weight change  HENT: Negative for congestion, dental problem, drooling, ear discharge, ear pain, facial swelling, hearing loss, mouth sores, nosebleeds, postnasal drip, rhinorrhea, sinus pain, sinus pressure, sneezing, sore throat, tinnitus, trouble swallowing and voice change  Eyes: Positive for visual disturbance  Negative for photophobia, pain, discharge, redness and itching  Respiratory: Negative for apnea, cough, choking, chest tightness, shortness of breath, wheezing and stridor  Cardiovascular: Negative for chest pain, palpitations and leg swelling  Gastrointestinal: Negative for abdominal distention, abdominal pain, anal bleeding, blood in stool, constipation, diarrhea, nausea, rectal pain and vomiting  Endocrine: Negative for cold intolerance, heat intolerance, polydipsia, polyphagia and polyuria  Genitourinary: Negative for decreased urine volume, difficulty urinating, dyspareunia, dysuria, enuresis, flank pain, frequency, genital sores, hematuria, menstrual problem, pelvic pain, urgency, vaginal bleeding, vaginal discharge and vaginal pain  Musculoskeletal: Negative for arthralgias, back pain, gait problem, joint swelling, myalgias, neck pain and neck stiffness  Skin: Negative for color change, pallor, rash and wound  Allergic/Immunologic: Negative for environmental allergies, food allergies and immunocompromised state  Neurological: Negative for dizziness, tremors, seizures, syncope, facial asymmetry, speech difficulty, weakness, light-headedness, numbness and headaches  Hematological: Negative for adenopathy  Does not bruise/bleed easily  Psychiatric/Behavioral: Positive for sleep disturbance  Negative for agitation, behavioral problems, confusion, decreased concentration, dysphoric mood, hallucinations, self-injury and suicidal ideas  The patient is not nervous/anxious and is not hyperactive        Counseling Attestation:  Time in: 2:15 , Time out: 3:25  Total time encounter was 70 minutes and 60 minutes were spent counseling the patient         Attestation:   By signing my name below, Adelaidemorena Holley, attest that this documentation has been prepared under the direction and in the presence of Vicente Duval MD  Electronically Signed: Erica Salazar  12/11/18     I, Vicente Duval, personally performed the services described in this documentation  All medical record entries made by the scribe were at my direction and in my presence   I have reviewed the chart and discharge instructions and agree that the record reflects my personal performance and is accurate and complete  Vicente Duval MD  12/11/18

## 2019-01-01 NOTE — PROGRESS NOTES
Progress Note - Madeleine Baez 80 y o  female MRN: [id number]    Unit/Bed#: -74 Encounter: [id number]            Subjective:   Updated patient and family on active medical issues     Objective:     ROS  Gen: denies recent wt loss   Psych: denies mood change    Vitals:    05/15/18 2014   BP: 123/54   Pulse: 78   Resp: 18   Temp: 98 6 °F (37 °C)   SpO2: 97%         Physical Exam:     Gen:        NAD   Neck:   trachea midline  Lungs:  respirations unlabored   Heart:    + S1 and S2   Abdomen:    Soft, non-tender  Psych: mood/affect appropriate  Neurologic: awake, alert    Functional :  Mobility: min   Tx: min-mod  ADLs: mod        Current Facility-Administered Medications:  acetaminophen 650 mg Oral Q6H PRN Rosey Parker MD   apixaban 2 5 mg Oral BID Rosey Parker MD   atorvastatin 80 mg Oral Daily With Mateusz Dean MD   bisacodyl 10 mg Rectal Daily PRN Rosey Parker MD   docusate sodium 100 mg Oral BID Rosey Parker MD   ferrous sulfate 300 mg Oral Daily GAYATRI Bonner   melatonin 6 mg Oral HS Rosey Parker MD   metoprolol tartrate 50 mg Oral Q12H [address] Rosey Parker MD   pantoprazole 40 mg Oral Early Morning Rosey Parker MD   polyethylene glycol 17 g Oral Daily PRN Rosey Parker MD   senna 1 tablet Oral HS Rosey Parker MD         acetaminophen    bisacodyl    polyethylene glycol      Assessment:  Pt is 81 yo female with ICH & bi-hemispheric nonhemorrhagic ischemic strokes s/p thrombectomy by Dr Dariana La on 4/23    Plan:    Rehabilitation: cont PT/OT for ambulatory/ADL dysfxn    ICH & bi-hemispheric nonhemorrhagic ischemic strokes: nonhemorrhagic stroke felt by neurology to be embolic of unclear etiology; per s/p thrombectomy by Dr Dariana La on 4/23, cleared by neurosx for [facility] and per neuro recs started on eliquis 2 5 mg BID (not 5 mg due to age and serum Cr greater than 1 5, tends to fluctuate around this value); home lipitor increased from 40 to 80 mg     AMS (5/8): seen by neuro and recommended CTH  (5/8) which showed no acute findings and patient back to baseline mental status therefore per neuro recs no MRI at this time      Peripheral neuropathy: likely 2/2 to DM, had tried neurontin in the past but did not tolerate      DM: on januvia 100 mg qd at home (recently switched from tradjenta 5 mg qd); currently on ISS     CHF (EF 45%): home torsemide 10 mg qd (and home potassium 10 MEQ BID due to hypokalemia 2/2 to torsemide) both being held due to acute on chronic renal failure likely 2/2 to poor PO; additionally  home toprol XL 50 mg qd switched to lopressor 50 mg q12 in acute care     SSS: s/p pacer, interrogated recently in acute care, no A-fib found at that time (although noted on EKG of 4/23 however pt already on [facility] for CVA and on BB), follows with Dr João Galan (cards) and Dr Minoo Fontana (EP)      h/o colon CA: on CT CAP of 4/26 colonic thickening suspicious for lesion noted and colonoscopy recommended, pt undergoes screening colonoscopys with Dr Parrish Salinas, pt to FU with them for FU colonoscopy       Meningioma: seen by neurosx no intervention planned, follows with Dr Kiel Tejada     PFO (3 mm): unclear if paradoxical stroke from DVT is etiology of CVA (felt to be embolic by neuro) however as neuro has placed pt on [facility] for CVA LE dopplers would not ; t/c closure as OP       s/p MVR: tissue valve     HL: home lipitor increased from 40 mg to 80 mg due to CVA       h/o DVT: home coumadin switched to eliquis for CVA      Insomnia: at home on remeron 15 mg HS on hold due to episodes of AMS (however low suspicion this is cause of AMS)      non-occlusive carotid dz: per carotid U/S report recommend repeat carotid U/S in 1 year; on [facility] and statin     chronic constipation: per family at baseline 1 BM every 4 to 5 days, IM monitoring and will treat with laxatives as needed      rt ankle pain/swelling: likely sprain; XR showed no acute osseous abnormality     CHADWICK on CKD: baseline 1 4-1 7, Cr currently 1 18, however torsemide still on hold for now per IM      chronic anemia: AOCD/CKD, at home on iron 150 mg qd, cont iron supplementation as inpt; Hg currently stable at 9 8    Thrombocytopenia: currently 87, heme consulted and have ordered HIT but feel this is a chronic issue and only monitoring is required at this time     Hyperphosphatemia per lab reference range at 4 4: however in females 18 year or older 4 5 is ULN      Dispo: reteam    Incidental findings on CT C/A/P of 4/26:  1) multiple pulmonary micronodules: per CT report recommended FU imaging in 3 months for determination of stability  2) pleural thickening  3) uterine fibroid  Other incidental findings:  4) B/L renal cysts  5) 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 no intervention planned, follows with Dr Shara Spatz  6) EKG abnormalities in the setting of atrial paced rhythm (non-specific intraventricular conduction block/wide QRS, LAD): follows with Dr Kristy Mcconnell (cards) and Dr Kelsey Tapia (EP)  7) mod-severe TR/elevated peak PA pressure:  follows with Dr Kristy Mcconnell (cards) and Dr Kelsey Tapia (EP)              None

## 2019-02-04 ENCOUNTER — TELEPHONE (OUTPATIENT)
Dept: NEUROLOGY | Facility: CLINIC | Age: 84
End: 2019-02-04

## 2019-02-04 NOTE — TELEPHONE ENCOUNTER
Pt daughter called back    She states that they are on their way to another appt and will call back tomorrow to reschedule

## 2019-02-27 ENCOUNTER — TELEPHONE (OUTPATIENT)
Dept: GERIATRICS | Age: 84
End: 2019-02-27
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
## 2019-03-26 ENCOUNTER — TELEPHONE (OUTPATIENT)
Dept: GERIATRICS | Age: 84
End: 2019-03-26

## 2019-03-28 ENCOUNTER — OFFICE VISIT (OUTPATIENT)
Dept: GERIATRICS | Age: 84
End: 2019-03-28
Payer: MEDICARE

## 2019-03-28 VITALS
HEIGHT: 62 IN | OXYGEN SATURATION: 98 % | HEART RATE: 60 BPM | WEIGHT: 154 LBS | BODY MASS INDEX: 28.34 KG/M2 | RESPIRATION RATE: 16 BRPM | DIASTOLIC BLOOD PRESSURE: 80 MMHG | SYSTOLIC BLOOD PRESSURE: 130 MMHG | TEMPERATURE: 97.8 F

## 2019-03-28 DIAGNOSIS — R26.2 AMBULATORY DYSFUNCTION: ICD-10-CM

## 2019-03-28 DIAGNOSIS — F01.50 VASCULAR DEMENTIA WITHOUT BEHAVIORAL DISTURBANCE (HCC): Primary | ICD-10-CM

## 2019-03-28 DIAGNOSIS — F39 MOOD DISORDER (HCC): ICD-10-CM

## 2019-03-28 DIAGNOSIS — Z86.73 HISTORY OF CVA (CEREBROVASCULAR ACCIDENT): ICD-10-CM

## 2019-03-28 PROCEDURE — 99214 OFFICE O/P EST MOD 30 MIN: CPT | Performed by: FAMILY MEDICINE

## 2019-03-28 RX ORDER — CEFUROXIME AXETIL 500 MG/1
TABLET ORAL
COMMUNITY
Start: 2019-01-17 | End: 2020-09-03 | Stop reason: ALTCHOICE

## 2019-03-28 RX ORDER — GLIPIZIDE 2.5 MG/1
TABLET, EXTENDED RELEASE ORAL
COMMUNITY
Start: 2019-03-01 | End: 2020-09-03 | Stop reason: ALTCHOICE

## 2019-03-28 RX ORDER — BETAMETHASONE DIPROPIONATE 0.5 MG/G
CREAM TOPICAL
COMMUNITY
Start: 2019-03-25 | End: 2020-09-03 | Stop reason: ALTCHOICE

## 2019-03-28 RX ORDER — POTASSIUM CHLORIDE 750 MG/1
CAPSULE, EXTENDED RELEASE ORAL
COMMUNITY
Start: 2019-01-28 | End: 2022-08-04

## 2019-03-28 RX ORDER — CODEINE PHOSPHATE/GUAIFENESIN 20-200/10
LIQUID (ML) ORAL
COMMUNITY
Start: 2019-01-17 | End: 2020-09-03 | Stop reason: ALTCHOICE

## 2019-04-01 ENCOUNTER — OFFICE VISIT (OUTPATIENT)
Dept: NEUROLOGY | Facility: CLINIC | Age: 84
End: 2019-04-01
Payer: MEDICARE

## 2019-04-01 VITALS
DIASTOLIC BLOOD PRESSURE: 80 MMHG | HEIGHT: 62 IN | BODY MASS INDEX: 28.71 KG/M2 | HEART RATE: 70 BPM | WEIGHT: 156 LBS | SYSTOLIC BLOOD PRESSURE: 140 MMHG

## 2019-04-01 DIAGNOSIS — Z86.73 H/O ISCHEMIC LEFT MCA STROKE: ICD-10-CM

## 2019-04-01 DIAGNOSIS — I65.23 BILATERAL CAROTID ARTERY STENOSIS: Primary | Chronic | ICD-10-CM

## 2019-04-01 DIAGNOSIS — G45.9 TIA (TRANSIENT ISCHEMIC ATTACK): ICD-10-CM

## 2019-04-01 DIAGNOSIS — I10 ESSENTIAL HYPERTENSION: ICD-10-CM

## 2019-04-01 DIAGNOSIS — G20 PARKINSONISM, UNSPECIFIED PARKINSONISM TYPE (HCC): ICD-10-CM

## 2019-04-01 DIAGNOSIS — F01.50 VASCULAR DEMENTIA WITHOUT BEHAVIORAL DISTURBANCE (HCC): ICD-10-CM

## 2019-04-01 PROCEDURE — 1124F ACP DISCUSS-NO DSCNMKR DOCD: CPT | Performed by: PSYCHIATRY & NEUROLOGY

## 2019-04-01 PROCEDURE — 99213 OFFICE O/P EST LOW 20 MIN: CPT | Performed by: PSYCHIATRY & NEUROLOGY

## 2019-06-06 ENCOUNTER — TRANSCRIBE ORDERS (OUTPATIENT)
Dept: LAB | Facility: CLINIC | Age: 84
End: 2019-06-06

## 2019-06-06 ENCOUNTER — APPOINTMENT (OUTPATIENT)
Dept: LAB | Facility: CLINIC | Age: 84
End: 2019-06-06
Payer: MEDICARE

## 2019-06-06 DIAGNOSIS — I10 ESSENTIAL HYPERTENSION, MALIGNANT: ICD-10-CM

## 2019-06-06 DIAGNOSIS — E11.9 DIABETES MELLITUS WITHOUT COMPLICATION (HCC): ICD-10-CM

## 2019-06-06 DIAGNOSIS — E78.5 HYPERLIPIDEMIA, UNSPECIFIED HYPERLIPIDEMIA TYPE: ICD-10-CM

## 2019-06-06 DIAGNOSIS — E11.9 DIABETES MELLITUS WITHOUT COMPLICATION (HCC): Primary | ICD-10-CM

## 2019-06-06 LAB
BACTERIA UR QL AUTO: ABNORMAL /HPF
BASOPHILS # BLD AUTO: 0.03 THOUSANDS/ΜL (ref 0–0.1)
BASOPHILS NFR BLD AUTO: 1 % (ref 0–1)
BILIRUB UR QL STRIP: NEGATIVE
CHOLEST SERPL-MCNC: 123 MG/DL (ref 50–200)
CLARITY UR: CLEAR
COLOR UR: YELLOW
EOSINOPHIL # BLD AUTO: 1.19 THOUSAND/ΜL (ref 0–0.61)
EOSINOPHIL NFR BLD AUTO: 26 % (ref 0–6)
ERYTHROCYTE [DISTWIDTH] IN BLOOD BY AUTOMATED COUNT: 13 % (ref 11.6–15.1)
ERYTHROCYTE [SEDIMENTATION RATE] IN BLOOD: 18 MM/HOUR (ref 0–20)
EST. AVERAGE GLUCOSE BLD GHB EST-MCNC: 123 MG/DL
GLUCOSE UR STRIP-MCNC: NEGATIVE MG/DL
HBA1C MFR BLD: 5.9 % (ref 4.2–6.3)
HCT VFR BLD AUTO: 36.9 % (ref 34.8–46.1)
HDLC SERPL-MCNC: 62 MG/DL (ref 40–60)
HGB BLD-MCNC: 11.8 G/DL (ref 11.5–15.4)
HGB UR QL STRIP.AUTO: NEGATIVE
HYALINE CASTS #/AREA URNS LPF: ABNORMAL /LPF
IMM GRANULOCYTES # BLD AUTO: 0 THOUSAND/UL (ref 0–0.2)
IMM GRANULOCYTES NFR BLD AUTO: 0 % (ref 0–2)
IRON SERPL-MCNC: 58 UG/DL (ref 50–170)
KETONES UR STRIP-MCNC: NEGATIVE MG/DL
LDLC SERPL CALC-MCNC: 52 MG/DL (ref 0–100)
LEUKOCYTE ESTERASE UR QL STRIP: NEGATIVE
LYMPHOCYTES # BLD AUTO: 1.25 THOUSANDS/ΜL (ref 0.6–4.47)
LYMPHOCYTES NFR BLD AUTO: 28 % (ref 14–44)
MAGNESIUM SERPL-MCNC: 2.6 MG/DL (ref 1.6–2.6)
MCH RBC QN AUTO: 30.8 PG (ref 26.8–34.3)
MCHC RBC AUTO-ENTMCNC: 32 G/DL (ref 31.4–37.4)
MCV RBC AUTO: 96 FL (ref 82–98)
MONOCYTES # BLD AUTO: 0.37 THOUSAND/ΜL (ref 0.17–1.22)
MONOCYTES NFR BLD AUTO: 8 % (ref 4–12)
NEUTROPHILS # BLD AUTO: 1.71 THOUSANDS/ΜL (ref 1.85–7.62)
NEUTS SEG NFR BLD AUTO: 37 % (ref 43–75)
NITRITE UR QL STRIP: NEGATIVE
NON-SQ EPI CELLS URNS QL MICRO: ABNORMAL /HPF
NONHDLC SERPL-MCNC: 61 MG/DL
NRBC BLD AUTO-RTO: 0 /100 WBCS
PH UR STRIP.AUTO: 7.5 [PH]
PLATELET # BLD AUTO: 116 THOUSANDS/UL (ref 149–390)
PMV BLD AUTO: 11.5 FL (ref 8.9–12.7)
PROT UR STRIP-MCNC: ABNORMAL MG/DL
RBC # BLD AUTO: 3.83 MILLION/UL (ref 3.81–5.12)
RBC #/AREA URNS AUTO: ABNORMAL /HPF
SP GR UR STRIP.AUTO: 1.01 (ref 1–1.03)
TRIGL SERPL-MCNC: 43 MG/DL
TSH SERPL DL<=0.05 MIU/L-ACNC: 1.32 UIU/ML (ref 0.36–3.74)
UROBILINOGEN UR QL STRIP.AUTO: 0.2 E.U./DL
VIT B12 SERPL-MCNC: 768 PG/ML (ref 100–900)
WBC # BLD AUTO: 4.55 THOUSAND/UL (ref 4.31–10.16)
WBC #/AREA URNS AUTO: ABNORMAL /HPF

## 2019-06-06 PROCEDURE — 36415 COLL VENOUS BLD VENIPUNCTURE: CPT | Performed by: INTERNAL MEDICINE

## 2019-06-06 PROCEDURE — 83540 ASSAY OF IRON: CPT

## 2019-06-06 PROCEDURE — 83735 ASSAY OF MAGNESIUM: CPT | Performed by: INTERNAL MEDICINE

## 2019-06-06 PROCEDURE — 83036 HEMOGLOBIN GLYCOSYLATED A1C: CPT | Performed by: INTERNAL MEDICINE

## 2019-06-06 PROCEDURE — 85652 RBC SED RATE AUTOMATED: CPT | Performed by: INTERNAL MEDICINE

## 2019-06-06 PROCEDURE — 82607 VITAMIN B-12: CPT

## 2019-06-06 PROCEDURE — 84443 ASSAY THYROID STIM HORMONE: CPT | Performed by: INTERNAL MEDICINE

## 2019-06-06 PROCEDURE — 81001 URINALYSIS AUTO W/SCOPE: CPT | Performed by: INTERNAL MEDICINE

## 2019-06-06 PROCEDURE — 87086 URINE CULTURE/COLONY COUNT: CPT

## 2019-06-06 PROCEDURE — 80061 LIPID PANEL: CPT | Performed by: INTERNAL MEDICINE

## 2019-06-06 PROCEDURE — 85025 COMPLETE CBC W/AUTO DIFF WBC: CPT | Performed by: INTERNAL MEDICINE

## 2019-06-07 LAB — BACTERIA UR CULT: NORMAL

## 2019-06-28 NOTE — ASSESSMENT & PLAN NOTE
· Likely more of toxic metabolic encephalopathy secondary to patient's infection/pneumonia  · Has history of multiple strokes in the past  · CT scan of the head done today did not reveal any acute findings  · Neuro checks  · Consider Neurology consultation, if no improvement  · Manage patient's infection/pneumonia  Bilobed Transposition Flap Text: The defect edges were debeveled with a #15 scalpel blade.  Given the location of the defect and the proximity to free margins a bilobed transposition flap was deemed most appropriate.  Using a sterile surgical marker, an appropriate bilobe flap drawn around the defect.    The area thus outlined was incised deep to adipose tissue with a #15 scalpel blade.  The skin margins were undermined to an appropriate distance in all directions utilizing iris scissors.

## 2019-07-09 ENCOUNTER — TRANSCRIBE ORDERS (OUTPATIENT)
Dept: LAB | Facility: CLINIC | Age: 84
End: 2019-07-09

## 2019-07-10 ENCOUNTER — APPOINTMENT (OUTPATIENT)
Dept: LAB | Facility: CLINIC | Age: 84
End: 2019-07-10
Payer: MEDICARE

## 2019-07-10 ENCOUNTER — TRANSCRIBE ORDERS (OUTPATIENT)
Dept: LAB | Facility: CLINIC | Age: 84
End: 2019-07-10

## 2019-07-10 DIAGNOSIS — I50.9 HEART FAILURE, UNSPECIFIED HF CHRONICITY, UNSPECIFIED HEART FAILURE TYPE (HCC): ICD-10-CM

## 2019-07-10 DIAGNOSIS — E11.9 DIABETES MELLITUS WITHOUT COMPLICATION (HCC): Primary | ICD-10-CM

## 2019-07-10 DIAGNOSIS — R60.9 EDEMA, UNSPECIFIED TYPE: ICD-10-CM

## 2019-07-10 LAB
ANION GAP SERPL CALCULATED.3IONS-SCNC: 6 MMOL/L (ref 4–13)
BUN SERPL-MCNC: 36 MG/DL (ref 5–25)
CALCIUM SERPL-MCNC: 9.2 MG/DL (ref 8.3–10.1)
CHLORIDE SERPL-SCNC: 103 MMOL/L (ref 100–108)
CO2 SERPL-SCNC: 29 MMOL/L (ref 21–32)
CREAT SERPL-MCNC: 1.81 MG/DL (ref 0.6–1.3)
GFR SERPL CREATININE-BSD FRML MDRD: 25 ML/MIN/1.73SQ M
GLUCOSE SERPL-MCNC: 73 MG/DL (ref 65–140)
POTASSIUM SERPL-SCNC: 4.8 MMOL/L (ref 3.5–5.3)
SODIUM SERPL-SCNC: 138 MMOL/L (ref 136–145)

## 2019-07-10 PROCEDURE — 80048 BASIC METABOLIC PNL TOTAL CA: CPT | Performed by: INTERNAL MEDICINE

## 2019-07-10 PROCEDURE — 36415 COLL VENOUS BLD VENIPUNCTURE: CPT | Performed by: INTERNAL MEDICINE

## 2020-02-04 ENCOUNTER — APPOINTMENT (OUTPATIENT)
Dept: LAB | Facility: CLINIC | Age: 85
End: 2020-02-04
Payer: MEDICARE

## 2020-02-04 ENCOUNTER — TRANSCRIBE ORDERS (OUTPATIENT)
Dept: LAB | Facility: CLINIC | Age: 85
End: 2020-02-04

## 2020-02-04 DIAGNOSIS — E13.69 OTHER SPECIFIED DIABETES MELLITUS WITH OTHER SPECIFIED COMPLICATION, WITHOUT LONG-TERM CURRENT USE OF INSULIN (HCC): ICD-10-CM

## 2020-02-04 DIAGNOSIS — N18.30 CHRONIC KIDNEY DISEASE, STAGE III (MODERATE) (HCC): ICD-10-CM

## 2020-02-04 DIAGNOSIS — R06.89 HYPOVENTILATION, IDIOPATHIC: ICD-10-CM

## 2020-02-04 DIAGNOSIS — I10 ESSENTIAL HYPERTENSION, MALIGNANT: ICD-10-CM

## 2020-02-04 DIAGNOSIS — I63.9 IMPENDING CEREBROVASCULAR ACCIDENT (HCC): ICD-10-CM

## 2020-02-04 DIAGNOSIS — I42.0 CONGESTIVE CARDIOMYOPATHY (HCC): ICD-10-CM

## 2020-02-04 DIAGNOSIS — Z95.2 HEART VALVE REPLACED BY TRANSPLANT: ICD-10-CM

## 2020-02-04 DIAGNOSIS — I48.0 PAROXYSMAL ATRIAL FIBRILLATION (HCC): ICD-10-CM

## 2020-02-04 DIAGNOSIS — E55.9 AVITAMINOSIS D: ICD-10-CM

## 2020-02-04 DIAGNOSIS — R06.89 HYPOVENTILATION, IDIOPATHIC: Primary | ICD-10-CM

## 2020-02-04 LAB
25(OH)D3 SERPL-MCNC: 32.4 NG/ML (ref 30–100)
ALBUMIN SERPL BCP-MCNC: 3.1 G/DL (ref 3.5–5)
ALP SERPL-CCNC: 80 U/L (ref 46–116)
ALT SERPL W P-5'-P-CCNC: 54 U/L (ref 12–78)
ANION GAP SERPL CALCULATED.3IONS-SCNC: 4 MMOL/L (ref 4–13)
AST SERPL W P-5'-P-CCNC: 36 U/L (ref 5–45)
BILIRUB DIRECT SERPL-MCNC: 0.18 MG/DL (ref 0–0.2)
BILIRUB SERPL-MCNC: 0.8 MG/DL (ref 0.2–1)
BUN SERPL-MCNC: 42 MG/DL (ref 5–25)
CALCIUM SERPL-MCNC: 9.3 MG/DL (ref 8.3–10.1)
CHLORIDE SERPL-SCNC: 105 MMOL/L (ref 100–108)
CHOLEST SERPL-MCNC: 139 MG/DL (ref 50–200)
CO2 SERPL-SCNC: 30 MMOL/L (ref 21–32)
CREAT SERPL-MCNC: 1.79 MG/DL (ref 0.6–1.3)
ERYTHROCYTE [DISTWIDTH] IN BLOOD BY AUTOMATED COUNT: 12.2 % (ref 11.6–15.1)
EST. AVERAGE GLUCOSE BLD GHB EST-MCNC: 160 MG/DL
GFR SERPL CREATININE-BSD FRML MDRD: 25 ML/MIN/1.73SQ M
GLUCOSE P FAST SERPL-MCNC: 128 MG/DL (ref 65–99)
HBA1C MFR BLD: 7.2 % (ref 4.2–6.3)
HCT VFR BLD AUTO: 40.1 % (ref 34.8–46.1)
HDLC SERPL-MCNC: 62 MG/DL
HGB BLD-MCNC: 12.6 G/DL (ref 11.5–15.4)
LDLC SERPL CALC-MCNC: 64 MG/DL (ref 0–100)
MCH RBC QN AUTO: 30.2 PG (ref 26.8–34.3)
MCHC RBC AUTO-ENTMCNC: 31.4 G/DL (ref 31.4–37.4)
MCV RBC AUTO: 96 FL (ref 82–98)
NONHDLC SERPL-MCNC: 77 MG/DL
NT-PROBNP SERPL-MCNC: 2294 PG/ML
PLATELET # BLD AUTO: 118 THOUSANDS/UL (ref 149–390)
PMV BLD AUTO: 11.8 FL (ref 8.9–12.7)
POTASSIUM SERPL-SCNC: 4.6 MMOL/L (ref 3.5–5.3)
PROT SERPL-MCNC: 6.3 G/DL (ref 6.4–8.2)
RBC # BLD AUTO: 4.17 MILLION/UL (ref 3.81–5.12)
SODIUM SERPL-SCNC: 139 MMOL/L (ref 136–145)
TRIGL SERPL-MCNC: 66 MG/DL
WBC # BLD AUTO: 4.01 THOUSAND/UL (ref 4.31–10.16)

## 2020-02-04 PROCEDURE — 85027 COMPLETE CBC AUTOMATED: CPT | Performed by: INTERNAL MEDICINE

## 2020-02-04 PROCEDURE — 83036 HEMOGLOBIN GLYCOSYLATED A1C: CPT | Performed by: INTERNAL MEDICINE

## 2020-02-04 PROCEDURE — 80048 BASIC METABOLIC PNL TOTAL CA: CPT | Performed by: INTERNAL MEDICINE

## 2020-02-04 PROCEDURE — 80061 LIPID PANEL: CPT | Performed by: INTERNAL MEDICINE

## 2020-02-04 PROCEDURE — 83880 ASSAY OF NATRIURETIC PEPTIDE: CPT

## 2020-02-04 PROCEDURE — 36415 COLL VENOUS BLD VENIPUNCTURE: CPT | Performed by: INTERNAL MEDICINE

## 2020-02-04 PROCEDURE — 80076 HEPATIC FUNCTION PANEL: CPT

## 2020-02-04 PROCEDURE — 82306 VITAMIN D 25 HYDROXY: CPT | Performed by: INTERNAL MEDICINE

## 2020-03-28 NOTE — PROGRESS NOTES
Problem: Risk for Infection, Impaired Wound Healing  Goal: Remain free from signs and symptoms of infection  Outcome: PROGRESSING AS EXPECTED  Note: Pt remains free from signs/symptoms of infection.     Problem: Pain  Goal: Alleviation of Pain or a reduction in pain to the patient's comfort goal  Outcome: PROGRESSING AS EXPECTED  Note: Discussed pain management options, pt desires IV fentanyl for labor pain relief.      05/18/18 1030   Pain Assessment   Pain Assessment No/denies pain   Restrictions/Precautions   Precautions Aspiration;Bed/chair alarms;Cognitive; Fall Risk;Supervision on toilet/commode   Memory Skills   Memory (FIM) 2 - Recognizes, recalls/performs 25-49%   Social Interaction (FIM) 5 - Interacts appropriately with others 90% of time   Speech/Language/Cognition Assessmetn   Treatment Assessment Pt engaged in ateempting to id problem solving picture cards to determine safe vs  unsafe  Initial attempts to complete this task were noted to be more difficult  As result, SLP gave pt the unsafe picture scenarios where she was to id what was not safe in the pictures  Pt was 8/12 accurate in ability to ID unsafe items  Ability to talk thru to determine how to make the scenarios safer, pt was 6/12 accurate, needing increased semantic cues to improve ability to ID unsafe items and improve how to make situations safe  SLP Therapy Minutes   SLP Time In 56   SLP Time Out 1100   SLP Total Time (minutes) 30   SLP Mode of treatment - Individual (minutes) 30   SLP Mode of treatment - Concurrent (minutes) 0   SLP Mode of treatment - Group (minutes) 0   SLP Mode of treatment - Co-treat (minutes) 0   SLP Mode of Teatment - Total time(minutes) 30 minutes   Therapy Time missed   Time missed?  No   Daily FIM Score   Problem solving (FIM) 2 - Needs direction more than ½ time to initiate, plan or complete simple tasks   Comprehension (FIM) 3 - Understands basic info/conversation 50-74% of time   Expression (FIM) 4 - Expresses basic info/needs 75-90% of time

## 2020-06-22 NOTE — PCC OCCUPATIONAL THERAPY
Pt presents s/p CVA  Pt demonstrates difficulty with fxnl cognition affecting motor planning and sequence for common ADL tasks  Pt does present with improved attention at times but when fatigued pt requires extensive multimodal cueing  Pt with improvements in initiation of tasks  Pt requires cueing during ADLs for throughness and for sequencing  Pt currently still using HHA for fxnl transfers and fxnl mobility due to decreased corodination with RW and poor motor planning  Pt with improvements of use of b/l UE's during fxnl tasks but difficulty with R and L discrimination  Again as pt becomes fatigued, initiating tasks with b/l UE's becomes more difficult  Pt would benefit from continued OT to maximize independence and safety and to improve motor coordination of b/l UE's, improve balance, improve ADL participation and improve fxnl transfers to decrease burden of care  Destruction After The Procedure: No

## 2020-09-03 ENCOUNTER — OFFICE VISIT (OUTPATIENT)
Dept: NEUROLOGY | Facility: CLINIC | Age: 85
End: 2020-09-03
Payer: MEDICARE

## 2020-09-03 VITALS
TEMPERATURE: 98.2 F | DIASTOLIC BLOOD PRESSURE: 70 MMHG | WEIGHT: 159 LBS | SYSTOLIC BLOOD PRESSURE: 100 MMHG | BODY MASS INDEX: 29.56 KG/M2 | HEART RATE: 68 BPM

## 2020-09-03 DIAGNOSIS — E78.2 MIXED HYPERLIPIDEMIA: Primary | ICD-10-CM

## 2020-09-03 DIAGNOSIS — I63.9 CVA (CEREBRAL VASCULAR ACCIDENT) (HCC): ICD-10-CM

## 2020-09-03 PROCEDURE — 99213 OFFICE O/P EST LOW 20 MIN: CPT | Performed by: PSYCHIATRY & NEUROLOGY

## 2020-09-03 RX ORDER — ATORVASTATIN CALCIUM 40 MG/1
80 TABLET, FILM COATED ORAL
Qty: 30 TABLET | Refills: 3 | Status: SHIPPED | OUTPATIENT
Start: 2020-09-03

## 2020-09-03 RX ORDER — LOSARTAN POTASSIUM 25 MG/1
12.5 TABLET ORAL
COMMUNITY
Start: 2020-07-08 | End: 2022-08-04

## 2020-09-03 NOTE — PROGRESS NOTES
Patient ID: Virgen Hood is a 80 y o  female  Assessment/Plan: This is a 81 y/o Female who is here as a follow up for history of L MCA stroke  Patient does have a history of HIT as well  PLAN:      Diagnoses and all orders for this visit:    Mixed hyperlipidemia  -     atorvastatin (LIPITOR) 40 mg tablet; Take 2 tablets (80 mg total) by mouth daily with dinner    CVA (cerebral vascular accident) ([facility] [address] )  -c/w with combination of eliquis and atorvastatin for secondary stroke prevention  -BP goal < 130/80, BP is at goal    -Defer to PCP regarding DM and BP management  -does not smoke at this time   -denies any history of snoring    -I advised patient to avoid using NSAIDs for headaches or other pain  -Recommend mediterranean diet & regular exercise regimen atleast 4-5 times a week for 20-30 minutes  -I educated patient/family regarding medication compliance  -     atorvastatin (LIPITOR) 40 mg tablet; Take 2 tablets (80 mg total) by mouth daily with dinner    Other orders  -     losartan (COZAAR) 25 mg tablet       Follow up - PRN  I would be happy to see the patient sooner if any new questions/concerns arise  Patient/Guardian was advised to the call the office if they have any questions and concerns in the meantime  Patient/Guardian does understand that if they have any new stroke like symptoms such as facial droop on one side, weakness/paralysis on either side, speech trouble, numbness on one side, balance issues, any vision changes, extreme dizziness or any new headache, to call 9-1-1 immediately or to proceed to the nearest ER immediately  Subjective:    HPI    This is a 81 y/o F who is here as a history of R parietal CVA, Hypertension, CKD stage 3 here for a follow up for L MCA stroke  She denies any new TIA/CVA like symptoms  Daughter with her and provides much of the history today  She is on eliquis 2 5mg PO BID    She is complaint w/ her medications, and is tolerating them well without any side effects, no bleeding/bruising  She has some pill rolling tremor, and she is still slower, and daughters encourage walking and she walks 20 steps and then takes a break and then walks again  They are moving to [place] soon permanently and daughter taking with her  She is going to take her to the eye doctor  The following portions of the patient's history were reviewed and updated as appropriate:   She  has a past medical history of Acid reflux, Arthritis, 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[facility] [address] ) (2007), Cancer ([facility] [address] ) (2007), Deep vein thrombosis (DVT) of left upper extremity ([facility] [address] ) (12/11/2017), Dementia ([facility] [address] ), Diabetes mellitus ([facility] [address] ), Diverticulosis, Edema, Hypertension, Language barrier, and Stroke ([facility] [address] )    She   Patient Active Problem List    Diagnosis Date Noted    TIA (transient ischemic attack)     H/O ischemic left MCA stroke 10/29/2018    [facility] 09/27/2018    Mood disorder ([facility] ) 09/27/2018    Ambulatory dysfunction 09/27/2018    Bilateral carotid artery stenosis 08/28/2018    Arthritis of right glenohumeral joint 07/31/2018    Polyp of colon 06/22/2018    HCAP (healthcare-associated pneumonia) 06/18/2018    Shoulder pain, right 06/18/2018    Abnormal TSH 06/18/2018    Dysphagia 06/18/2018    Encephalopathy 06/15/2018    Seizure-like activity ([facility] [address] ) 06/15/2018    Colonic thickening 05/26/2018    Anemia 05/26/2018    History of CVA (cerebrovascular accident) 05/25/2018    History of subarachnoid hemorrhage 05/25/2018    CKD (chronic kidney disease) stage 3, GFR 30-59 ml/min ([address] ) 05/25/2018    History of DVT (deep vein thrombosis) 05/25/2018    History of pacemaker 05/25/2018    Acute cystitis without hematuria 05/25/2018    Chronic systolic CHF (congestive heart failure) ([address] ) 05/25/2018    History of ischemic right MCA stroke 04/23/2018    CVA [facility]cere[facility]) [facility] ) 04/18/2018    Controlled type 2 diabetes mellitus without complication, without long-term current use of insulin ([place] [address] ) 04/18/2018    Pacemaker 04/18/2018    Acid reflux 53/20/5716    Systolic congestive heart failure ([place] [address] ) 04/18/2018    H/O mitral valve replacement 03/01/2018    Deep vein thrombosis (DVT) of left upper extremity (HCC) 01/25/2018    Constipation 09/21/2016    External hemorrhoids 09/21/2016    Carpal tunnel syndrome, left 01/04/2016    Pain in both hands 01/04/2016    Arthralgia of hip, right 11/17/2015    Meningioma ([place] [address] ) 12/18/2014    Hypertension 05/13/2013    Esophageal reflux 05/13/2013    Arthritis 05/13/2013    Hypercholesterolemia 05/13/2013    Malignant neoplasm without specification of site ([place] [address] ) 05/13/2013    Spondylosis of cervical region without myelopathy or radiculopathy 05/13/2013    Type 2 or unspecified type diabetes mellitus 05/13/2013     She  has a past surgical history that includes Tubal ligation; Varicose vein surgery (Bilateral); Colonoscopy; Colon surgery; Joint replacement (Right, 02/2015); Joint replacement (Left); Joint replacement (Right); Esophagogastroduodenoscopy (N/A, 11/2/2016); Colonoscopy (N/A, 11/2/2016); pr revise median n/carpal tunnel surg (Left, 1/28/2016); Insert / replace / remove pacemaker; Mitral valve replacement; and pr colonoscopy flx dx w/collj spec when pfrmd (N/A, 8/9/2018)  Her family history includes Cancer in her mother  She  reports that she quit smoking about 70 years ago  Her smoking use included cigarettes  She has a 2 50 pack-year smoking history  She has never used smokeless tobacco  She reports that she does not drink alcohol or use drugs    Current Outpatient Medications   Medication Sig Dispense Refill    apixaban (ELIQUIS) 2 5 mg Take 1 tablet (2 5 mg total) by mouth 2 (two) times a day  0    aspirin (ECOTRIN LOW STRENGTH) 81 mg EC tablet Take 1 tablet (81 mg total) by mouth daily  0    atorvastatin (LIPITOR) 40 mg tablet Take 2 tablets (80 mg total) by mouth daily with dinner 30 tablet 3    furosemide (LASIX) 20 mg tablet Take 1 tablet (20 mg total) by mouth daily  0    JANUVIA 50 MG tablet 50 mg daily        losartan (COZAAR) 25 mg tablet       metoprolol tartrate (LOPRESSOR) 50 mg tablet Take 1 tablet (50 mg total) by mouth every 12 (twelve) hours  0    Multiple Vitamins-Minerals (MULTIVITAMIN ADULT PO) 0 5 tablets 2 (two) times a day        potassium chloride (MICRO-K) 10 MEQ CR capsule        No current facility-administered medications for this visit        Current Outpatient Medications on File Prior to Visit   Medication Sig    apixaban (ELIQUIS) 2 5 mg Take 1 tablet (2 5 mg total) by mouth 2 (two) times a day    aspirin (ECOTRIN LOW STRENGTH) 81 mg EC tablet Take 1 tablet (81 mg total) by mouth daily    furosemide (LASIX) 20 mg tablet Take 1 tablet (20 mg total) by mouth daily    JANUVIA 50 MG tablet 50 mg daily      losartan (COZAAR) 25 mg tablet     metoprolol tartrate (LOPRESSOR) 50 mg tablet Take 1 tablet (50 mg total) by mouth every 12 (twelve) hours    Multiple Vitamins-Minerals (MULTIVITAMIN ADULT PO) 0 5 tablets 2 (two) times a day      potassium chloride (MICRO-K) 10 MEQ CR capsule     [DISCONTINUED] atorvastatin (LIPITOR) 80 mg tablet Take 1 tablet (80 mg total) by mouth daily with dinner    [DISCONTINUED] Acetaminophen 325 MG CAPS Take 650 mg by mouth    [DISCONTINUED] albuterol (2 5 mg/3 mL) 0 083 % nebulizer solution Take 1 vial (2 5 mg total) by nebulization every 4 (four) hours as needed for wheezing or shortness of breath (Patient not taking: Reported on 9/3/2020)    [DISCONTINUED] betamethasone dipropionate (DIPROSONE) 0 05 % cream     [DISCONTINUED] Bisacodyl (LAXATIVE PO) Take by mouth One tea bag PRN    [DISCONTINUED] cefuroxime (CEFTIN) 500 mg tablet     [DISCONTINUED] CHERATUSSIN -10 MG/5ML liquid     [DISCONTINUED] FERREX 150 150 MG capsule daily      [DISCONTINUED] glipiZIDE (GLUCOTROL XL) 2 5 mg 24 hr tablet      No current facility-administered medications on file prior to visit  She is allergic to vancomycin and heparin            Objective:    Blood pressure 100/70, pulse 68, temperature 98 2 °F (36 8 °C), temperature source Temporal, weight 72 1 kg (159 lb), not currently breastfeeding  Physical Exam  General - Patient is alert, awake, oriented to time, place and person, follows commands  Speech - no dysarthria noted, no aphasia noted  Neck - supple  Skin - no rashes or lesions  Chest - clear to ausculation bilaterally  Heart - normal S1, S2, no murmurs, rubs or gallops     Neuro:   Cranial nerves: PERRL, EOMI, facial sensation intact, able to raise eyebrows symmetrically, cannot assess facial droop and tongue deviation due to face mask requirement  Motor: 5/5 throughout, normal tone, no pronator drift noted  Sensory - intact to soft touch throughout  Coordination - no ataxia/dysmetria noted  Gait - normal tandem walk    ROS:  I personally reviewed ROS   Review of Systems   Constitutional: Negative  Negative for appetite change and fever  HENT: Negative  Negative for hearing loss, tinnitus, trouble swallowing and voice change  Eyes: Negative  Negative for photophobia and pain  Respiratory: Negative  Negative for shortness of breath  Cardiovascular: Negative  Negative for palpitations  Gastrointestinal: Negative  Negative for nausea and vomiting  Endocrine: Negative  Negative for cold intolerance  Genitourinary: Negative  Negative for dysuria, frequency and urgency  Musculoskeletal: Negative  Negative for myalgias and neck pain  Skin: Negative  Negative for rash  Neurological: Negative  Negative for dizziness, tremors, seizures, syncope, facial asymmetry, speech difficulty, weakness, light-headedness, numbness and headaches  Hematological: Negative  Does not bruise/bleed easily  Psychiatric/Behavioral: Negative  Negative for confusion, hallucinations and sleep disturbance

## 2020-09-08 ENCOUNTER — TRANSCRIBE ORDERS (OUTPATIENT)
Dept: LAB | Facility: CLINIC | Age: 85
End: 2020-09-08

## 2020-09-08 ENCOUNTER — APPOINTMENT (OUTPATIENT)
Dept: LAB | Facility: CLINIC | Age: 85
End: 2020-09-08
Payer: MEDICARE

## 2020-09-08 DIAGNOSIS — I48.0 PAROXYSMAL ATRIAL FIBRILLATION (HCC): ICD-10-CM

## 2020-09-08 DIAGNOSIS — I10 ESSENTIAL HYPERTENSION, MALIGNANT: ICD-10-CM

## 2020-09-08 DIAGNOSIS — I63.9 IMPENDING CEREBROVASCULAR ACCIDENT (HCC): ICD-10-CM

## 2020-09-08 DIAGNOSIS — I42.0 CONGESTIVE CARDIOMYOPATHY (HCC): ICD-10-CM

## 2020-09-08 DIAGNOSIS — Z95.2 HEART VALVE REPLACED BY TRANSPLANT: ICD-10-CM

## 2020-09-08 DIAGNOSIS — E55.9 AVITAMINOSIS D: Primary | ICD-10-CM

## 2020-09-08 DIAGNOSIS — E55.9 AVITAMINOSIS D: ICD-10-CM

## 2020-09-08 LAB
25(OH)D3 SERPL-MCNC: 24.9 NG/ML (ref 30–100)
ALBUMIN SERPL BCP-MCNC: 3.1 G/DL (ref 3.5–5)
ALP SERPL-CCNC: 83 U/L (ref 46–116)
ALT SERPL W P-5'-P-CCNC: 25 U/L (ref 12–78)
ANION GAP SERPL CALCULATED.3IONS-SCNC: 6 MMOL/L (ref 4–13)
AST SERPL W P-5'-P-CCNC: 26 U/L (ref 5–45)
BILIRUB DIRECT SERPL-MCNC: 0.22 MG/DL (ref 0–0.2)
BILIRUB SERPL-MCNC: 0.99 MG/DL (ref 0.2–1)
BUN SERPL-MCNC: 27 MG/DL (ref 5–25)
CALCIUM SERPL-MCNC: 9.3 MG/DL (ref 8.3–10.1)
CHLORIDE SERPL-SCNC: 106 MMOL/L (ref 100–108)
CO2 SERPL-SCNC: 28 MMOL/L (ref 21–32)
CREAT SERPL-MCNC: 1.42 MG/DL (ref 0.6–1.3)
EST. AVERAGE GLUCOSE BLD GHB EST-MCNC: 128 MG/DL
GFR SERPL CREATININE-BSD FRML MDRD: 33 ML/MIN/1.73SQ M
GLUCOSE P FAST SERPL-MCNC: 105 MG/DL (ref 65–99)
HBA1C MFR BLD: 6.1 %
MAGNESIUM SERPL-MCNC: 2.6 MG/DL (ref 1.6–2.6)
POTASSIUM SERPL-SCNC: 4.2 MMOL/L (ref 3.5–5.3)
PROT SERPL-MCNC: 6.5 G/DL (ref 6.4–8.2)
SODIUM SERPL-SCNC: 140 MMOL/L (ref 136–145)

## 2020-09-08 PROCEDURE — 83735 ASSAY OF MAGNESIUM: CPT | Performed by: INTERNAL MEDICINE

## 2020-09-08 PROCEDURE — 36415 COLL VENOUS BLD VENIPUNCTURE: CPT | Performed by: INTERNAL MEDICINE

## 2020-09-08 PROCEDURE — 80076 HEPATIC FUNCTION PANEL: CPT

## 2020-09-08 PROCEDURE — 83036 HEMOGLOBIN GLYCOSYLATED A1C: CPT | Performed by: INTERNAL MEDICINE

## 2020-09-08 PROCEDURE — 82306 VITAMIN D 25 HYDROXY: CPT | Performed by: INTERNAL MEDICINE

## 2020-09-08 PROCEDURE — 80048 BASIC METABOLIC PNL TOTAL CA: CPT | Performed by: INTERNAL MEDICINE

## 2020-09-25 ENCOUNTER — APPOINTMENT (OUTPATIENT)
Dept: LAB | Facility: CLINIC | Age: 85
End: 2020-09-25
Payer: MEDICARE

## 2020-09-25 ENCOUNTER — TRANSCRIBE ORDERS (OUTPATIENT)
Dept: LAB | Facility: CLINIC | Age: 85
End: 2020-09-25

## 2020-09-25 DIAGNOSIS — R06.00 DYSPNEA, UNSPECIFIED TYPE: ICD-10-CM

## 2020-09-25 DIAGNOSIS — I48.0 PAROXYSMAL ATRIAL FIBRILLATION (HCC): Primary | ICD-10-CM

## 2020-09-25 DIAGNOSIS — M19.90 SENILE ARTHRITIS: ICD-10-CM

## 2020-09-25 DIAGNOSIS — M19.019 ACROMIOCLAVICULAR JOINT ARTHRITIS, UNSPECIFIED LATERALITY: ICD-10-CM

## 2020-09-25 DIAGNOSIS — D64.9 ANEMIA, UNSPECIFIED TYPE: ICD-10-CM

## 2020-09-25 DIAGNOSIS — Z95.0 CARDIAC PACEMAKER IN SITU: ICD-10-CM

## 2020-09-25 DIAGNOSIS — I63.9 IMPENDING CEREBROVASCULAR ACCIDENT (HCC): ICD-10-CM

## 2020-09-25 DIAGNOSIS — I42.0 CONGESTIVE CARDIOMYOPATHY (HCC): ICD-10-CM

## 2020-09-25 DIAGNOSIS — E11.9 DIABETES MELLITUS WITHOUT COMPLICATION (HCC): ICD-10-CM

## 2020-09-25 DIAGNOSIS — I10 ESSENTIAL HYPERTENSION, MALIGNANT: ICD-10-CM

## 2020-09-25 DIAGNOSIS — N18.30 CHRONIC KIDNEY DISEASE, STAGE III (MODERATE) (HCC): ICD-10-CM

## 2020-09-25 DIAGNOSIS — K21.00 REFLUX ESOPHAGITIS: ICD-10-CM

## 2020-09-25 DIAGNOSIS — Z95.2 HEART VALVE REPLACED BY TRANSPLANT: ICD-10-CM

## 2020-09-25 DIAGNOSIS — I48.0 PAROXYSMAL ATRIAL FIBRILLATION (HCC): ICD-10-CM

## 2020-09-25 LAB
25(OH)D3 SERPL-MCNC: 29.2 NG/ML (ref 30–100)
ALBUMIN SERPL BCP-MCNC: 3.5 G/DL (ref 3.5–5)
ALP SERPL-CCNC: 88 U/L (ref 46–116)
ALT SERPL W P-5'-P-CCNC: 29 U/L (ref 12–78)
ANION GAP SERPL CALCULATED.3IONS-SCNC: 4 MMOL/L (ref 4–13)
AST SERPL W P-5'-P-CCNC: 22 U/L (ref 5–45)
BACTERIA UR QL AUTO: ABNORMAL /HPF
BASOPHILS # BLD AUTO: 0.04 THOUSANDS/ΜL (ref 0–0.1)
BASOPHILS NFR BLD AUTO: 1 % (ref 0–1)
BILIRUB SERPL-MCNC: 0.76 MG/DL (ref 0.2–1)
BILIRUB UR QL STRIP: NEGATIVE
BUN SERPL-MCNC: 31 MG/DL (ref 5–25)
CALCIUM SERPL-MCNC: 9.8 MG/DL (ref 8.3–10.1)
CHLORIDE SERPL-SCNC: 104 MMOL/L (ref 100–108)
CHOLEST SERPL-MCNC: 144 MG/DL (ref 50–200)
CLARITY UR: CLEAR
CO2 SERPL-SCNC: 30 MMOL/L (ref 21–32)
COLOR UR: YELLOW
CREAT SERPL-MCNC: 1.63 MG/DL (ref 0.6–1.3)
EOSINOPHIL # BLD AUTO: 0.72 THOUSAND/ΜL (ref 0–0.61)
EOSINOPHIL NFR BLD AUTO: 17 % (ref 0–6)
ERYTHROCYTE [DISTWIDTH] IN BLOOD BY AUTOMATED COUNT: 12.2 % (ref 11.6–15.1)
ERYTHROCYTE [SEDIMENTATION RATE] IN BLOOD: 32 MM/HOUR (ref 0–29)
EST. AVERAGE GLUCOSE BLD GHB EST-MCNC: 134 MG/DL
GFR SERPL CREATININE-BSD FRML MDRD: 28 ML/MIN/1.73SQ M
GLUCOSE P FAST SERPL-MCNC: 115 MG/DL (ref 65–99)
GLUCOSE UR STRIP-MCNC: NEGATIVE MG/DL
HBA1C MFR BLD: 6.3 %
HCT VFR BLD AUTO: 38 % (ref 34.8–46.1)
HDLC SERPL-MCNC: 56 MG/DL
HGB BLD-MCNC: 12.1 G/DL (ref 11.5–15.4)
HGB UR QL STRIP.AUTO: NEGATIVE
HYALINE CASTS #/AREA URNS LPF: ABNORMAL /LPF
IMM GRANULOCYTES # BLD AUTO: 0.01 THOUSAND/UL (ref 0–0.2)
IMM GRANULOCYTES NFR BLD AUTO: 0 % (ref 0–2)
IRON SERPL-MCNC: 61 UG/DL (ref 50–170)
KETONES UR STRIP-MCNC: NEGATIVE MG/DL
LDLC SERPL CALC-MCNC: 68 MG/DL (ref 0–100)
LEUKOCYTE ESTERASE UR QL STRIP: ABNORMAL
LYMPHOCYTES # BLD AUTO: 1.17 THOUSANDS/ΜL (ref 0.6–4.47)
LYMPHOCYTES NFR BLD AUTO: 28 % (ref 14–44)
MAGNESIUM SERPL-MCNC: 3 MG/DL (ref 1.6–2.6)
MCH RBC QN AUTO: 30.4 PG (ref 26.8–34.3)
MCHC RBC AUTO-ENTMCNC: 31.8 G/DL (ref 31.4–37.4)
MCV RBC AUTO: 96 FL (ref 82–98)
MONOCYTES # BLD AUTO: 0.46 THOUSAND/ΜL (ref 0.17–1.22)
MONOCYTES NFR BLD AUTO: 11 % (ref 4–12)
NEUTROPHILS # BLD AUTO: 1.8 THOUSANDS/ΜL (ref 1.85–7.62)
NEUTS SEG NFR BLD AUTO: 43 % (ref 43–75)
NITRITE UR QL STRIP: NEGATIVE
NON-SQ EPI CELLS URNS QL MICRO: ABNORMAL /HPF
NONHDLC SERPL-MCNC: 88 MG/DL
NRBC BLD AUTO-RTO: 0 /100 WBCS
PH UR STRIP.AUTO: 7 [PH]
PLATELET # BLD AUTO: 139 THOUSANDS/UL (ref 149–390)
PMV BLD AUTO: 11.2 FL (ref 8.9–12.7)
POTASSIUM SERPL-SCNC: 4.5 MMOL/L (ref 3.5–5.3)
PROT SERPL-MCNC: 7 G/DL (ref 6.4–8.2)
PROT UR STRIP-MCNC: NEGATIVE MG/DL
RBC # BLD AUTO: 3.98 MILLION/UL (ref 3.81–5.12)
RBC #/AREA URNS AUTO: ABNORMAL /HPF
SODIUM SERPL-SCNC: 138 MMOL/L (ref 136–145)
SP GR UR STRIP.AUTO: 1.01 (ref 1–1.03)
TRIGL SERPL-MCNC: 99 MG/DL
UROBILINOGEN UR QL STRIP.AUTO: 0.2 E.U./DL
WBC # BLD AUTO: 4.2 THOUSAND/UL (ref 4.31–10.16)
WBC #/AREA URNS AUTO: ABNORMAL /HPF

## 2020-09-25 PROCEDURE — 83036 HEMOGLOBIN GLYCOSYLATED A1C: CPT | Performed by: INTERNAL MEDICINE

## 2020-09-25 PROCEDURE — 85025 COMPLETE CBC W/AUTO DIFF WBC: CPT | Performed by: INTERNAL MEDICINE

## 2020-09-25 PROCEDURE — 82306 VITAMIN D 25 HYDROXY: CPT | Performed by: INTERNAL MEDICINE

## 2020-09-25 PROCEDURE — 85652 RBC SED RATE AUTOMATED: CPT | Performed by: INTERNAL MEDICINE

## 2020-09-25 PROCEDURE — 81001 URINALYSIS AUTO W/SCOPE: CPT | Performed by: INTERNAL MEDICINE

## 2020-09-25 PROCEDURE — 83735 ASSAY OF MAGNESIUM: CPT | Performed by: INTERNAL MEDICINE

## 2020-09-25 PROCEDURE — 83540 ASSAY OF IRON: CPT

## 2020-09-25 PROCEDURE — 80061 LIPID PANEL: CPT | Performed by: INTERNAL MEDICINE

## 2020-09-25 PROCEDURE — 36415 COLL VENOUS BLD VENIPUNCTURE: CPT | Performed by: INTERNAL MEDICINE

## 2020-09-25 PROCEDURE — 80053 COMPREHEN METABOLIC PANEL: CPT | Performed by: INTERNAL MEDICINE

## 2020-11-13 LAB
LEFT EYE DIABETIC RETINOPATHY: NORMAL
RIGHT EYE DIABETIC RETINOPATHY: NORMAL

## 2021-03-19 DIAGNOSIS — I65.23 BILATERAL CAROTID ARTERY STENOSIS: Primary | Chronic | ICD-10-CM

## 2021-04-02 ENCOUNTER — HOSPITAL ENCOUNTER (OUTPATIENT)
Dept: RADIOLOGY | Facility: HOSPITAL | Age: 86
Discharge: HOME | End: 2021-04-02
Attending: SURGERY
Payer: MEDICARE

## 2021-04-02 DIAGNOSIS — I65.23 BILATERAL CAROTID ARTERY STENOSIS: Chronic | ICD-10-CM

## 2021-04-02 PROCEDURE — 93880 EXTRACRANIAL BILAT STUDY: CPT

## 2021-04-03 PROCEDURE — 93880 EXTRACRANIAL BILAT STUDY: CPT | Performed by: SURGERY

## 2021-04-22 ENCOUNTER — APPOINTMENT (OUTPATIENT)
Dept: LAB | Facility: CLINIC | Age: 86
End: 2021-04-22
Payer: MEDICARE

## 2021-04-22 ENCOUNTER — TRANSCRIBE ORDERS (OUTPATIENT)
Dept: LAB | Facility: CLINIC | Age: 86
End: 2021-04-22

## 2021-04-22 DIAGNOSIS — R06.00 DYSPNEA, UNSPECIFIED TYPE: ICD-10-CM

## 2021-04-22 DIAGNOSIS — E78.2 MIXED HYPERLIPIDEMIA: ICD-10-CM

## 2021-04-22 DIAGNOSIS — E78.2 MIXED HYPERLIPIDEMIA: Primary | ICD-10-CM

## 2021-04-22 LAB
ALBUMIN SERPL BCP-MCNC: 3.4 G/DL (ref 3.5–5)
ALP SERPL-CCNC: 94 U/L (ref 46–116)
ALT SERPL W P-5'-P-CCNC: 22 U/L (ref 12–78)
ANION GAP SERPL CALCULATED.3IONS-SCNC: 6 MMOL/L (ref 4–13)
AST SERPL W P-5'-P-CCNC: 23 U/L (ref 5–45)
BASOPHILS # BLD AUTO: 0.02 THOUSANDS/ΜL (ref 0–0.1)
BASOPHILS NFR BLD AUTO: 0 % (ref 0–1)
BILIRUB SERPL-MCNC: 0.58 MG/DL (ref 0.2–1)
BUN SERPL-MCNC: 30 MG/DL (ref 5–25)
CALCIUM ALBUM COR SERPL-MCNC: 9.8 MG/DL (ref 8.3–10.1)
CALCIUM SERPL-MCNC: 9.3 MG/DL (ref 8.3–10.1)
CHLORIDE SERPL-SCNC: 102 MMOL/L (ref 100–108)
CHOLEST SERPL-MCNC: 151 MG/DL (ref 50–200)
CO2 SERPL-SCNC: 27 MMOL/L (ref 21–32)
CREAT SERPL-MCNC: 1.41 MG/DL (ref 0.6–1.3)
EOSINOPHIL # BLD AUTO: 0.83 THOUSAND/ΜL (ref 0–0.61)
EOSINOPHIL NFR BLD AUTO: 15 % (ref 0–6)
ERYTHROCYTE [DISTWIDTH] IN BLOOD BY AUTOMATED COUNT: 12.3 % (ref 11.6–15.1)
GFR SERPL CREATININE-BSD FRML MDRD: 34 ML/MIN/1.73SQ M
GLUCOSE P FAST SERPL-MCNC: 114 MG/DL (ref 65–99)
HCT VFR BLD AUTO: 41.2 % (ref 34.8–46.1)
HDLC SERPL-MCNC: 54 MG/DL
HGB BLD-MCNC: 13 G/DL (ref 11.5–15.4)
IMM GRANULOCYTES # BLD AUTO: 0.01 THOUSAND/UL (ref 0–0.2)
IMM GRANULOCYTES NFR BLD AUTO: 0 % (ref 0–2)
LDLC SERPL CALC-MCNC: 79 MG/DL (ref 0–100)
LYMPHOCYTES # BLD AUTO: 1.52 THOUSANDS/ΜL (ref 0.6–4.47)
LYMPHOCYTES NFR BLD AUTO: 28 % (ref 14–44)
MCH RBC QN AUTO: 30.6 PG (ref 26.8–34.3)
MCHC RBC AUTO-ENTMCNC: 31.6 G/DL (ref 31.4–37.4)
MCV RBC AUTO: 97 FL (ref 82–98)
MONOCYTES # BLD AUTO: 0.53 THOUSAND/ΜL (ref 0.17–1.22)
MONOCYTES NFR BLD AUTO: 10 % (ref 4–12)
NEUTROPHILS # BLD AUTO: 2.48 THOUSANDS/ΜL (ref 1.85–7.62)
NEUTS SEG NFR BLD AUTO: 47 % (ref 43–75)
NONHDLC SERPL-MCNC: 97 MG/DL
NRBC BLD AUTO-RTO: 0 /100 WBCS
NT-PROBNP SERPL-MCNC: 1619 PG/ML
PLATELET # BLD AUTO: 142 THOUSANDS/UL (ref 149–390)
PMV BLD AUTO: 11.1 FL (ref 8.9–12.7)
POTASSIUM SERPL-SCNC: 4.5 MMOL/L (ref 3.5–5.3)
PROT SERPL-MCNC: 7 G/DL (ref 6.4–8.2)
RBC # BLD AUTO: 4.25 MILLION/UL (ref 3.81–5.12)
SODIUM SERPL-SCNC: 135 MMOL/L (ref 136–145)
TRIGL SERPL-MCNC: 91 MG/DL
TSH SERPL DL<=0.05 MIU/L-ACNC: 1.4 UIU/ML (ref 0.36–3.74)
WBC # BLD AUTO: 5.39 THOUSAND/UL (ref 4.31–10.16)

## 2021-04-22 PROCEDURE — 36415 COLL VENOUS BLD VENIPUNCTURE: CPT | Performed by: INTERNAL MEDICINE

## 2021-04-22 PROCEDURE — 85025 COMPLETE CBC W/AUTO DIFF WBC: CPT | Performed by: INTERNAL MEDICINE

## 2021-04-22 PROCEDURE — 84443 ASSAY THYROID STIM HORMONE: CPT

## 2021-04-22 PROCEDURE — 83880 ASSAY OF NATRIURETIC PEPTIDE: CPT | Performed by: INTERNAL MEDICINE

## 2021-04-22 PROCEDURE — 80061 LIPID PANEL: CPT | Performed by: INTERNAL MEDICINE

## 2021-04-22 PROCEDURE — 80053 COMPREHEN METABOLIC PANEL: CPT | Performed by: INTERNAL MEDICINE

## 2021-05-07 ENCOUNTER — OFFICE VISIT (OUTPATIENT)
Dept: VASCULAR SURGERY | Facility: CLINIC | Age: 86
End: 2021-05-07
Payer: MEDICARE

## 2021-05-07 VITALS
HEART RATE: 70 BPM | SYSTOLIC BLOOD PRESSURE: 140 MMHG | WEIGHT: 160 LBS | TEMPERATURE: 98.2 F | HEIGHT: 62 IN | BODY MASS INDEX: 29.44 KG/M2 | DIASTOLIC BLOOD PRESSURE: 84 MMHG

## 2021-05-07 DIAGNOSIS — E11.9 CONTROLLED TYPE 2 DIABETES MELLITUS WITHOUT COMPLICATION, WITHOUT LONG-TERM CURRENT USE OF INSULIN (HCC): ICD-10-CM

## 2021-05-07 DIAGNOSIS — I65.23 BILATERAL CAROTID ARTERY STENOSIS: Primary | Chronic | ICD-10-CM

## 2021-05-07 PROCEDURE — 99215 OFFICE O/P EST HI 40 MIN: CPT | Performed by: PHYSICIAN ASSISTANT

## 2021-05-07 RX ORDER — IRON POLYSACCHARIDE COMPLEX 150 MG
150 CAPSULE ORAL 2 TIMES DAILY
COMMUNITY
End: 2022-08-04

## 2021-05-07 RX ORDER — ALBUTEROL SULFATE 90 UG/1
2 AEROSOL, METERED RESPIRATORY (INHALATION) EVERY 6 HOURS PRN
COMMUNITY
End: 2022-04-17 | Stop reason: SDUPTHER

## 2021-05-07 NOTE — PATIENT INSTRUCTIONS
Bilateral carotid artery stenosis  History of bilateral CVAs  History of deep vein thrombosis    -asymptomatic  -reportedly found to have paroxysmal atrial fibrillation  -she is chronically anticoagulated with Eliquis    Ms  Maribel Jesus has been doing reasonably well and stable  Following bilateral strokes in 2018 she did develop vascular dementia with some intermittent confusion  No intercurrent hospitalizations  She has no new symptoms of stroke or stroke warning  We reviewed her carotid duplex current and prior carotid duplex studies and other imaging  The carotid duplex study shows mild plaquing bilaterally with less than 50% narrowing in the carotid arteries  The velocities are low  We reviewed the symptoms of stroke for which she should call 911     -continue aspirin 81, apixaban 2 5 twice daily and atorvastatin 40  -routine duplex surveillance for progression of disease  -continued optimize medical therapy for comorbid conditions  -CV duplex in 1 year with office visit, or sooner if needed        Symptoms of stroke:  - Unable to speak or understand speech  - Unable to move one side of the body (arm or leg)  - Visual changes  - Call 911 for any symptoms of stroke            Carotid duplex 04/02/2021  FINDINGS:     Segment      Rig                     Left                        PSV  EDV (cm/s)  Ratio  PSV  EDV (cm/s)  Ratio    Dist  ICA     76          19   1 18   59          17   0 94    Mid  ICA      72          21   1 13   60          17   0 96    Prox   ICA     88          22   1 38   89          23   1 42    Dist CCA      59          15          86          17           Mid CCA       64          12   1 07   63          16   0 96    Prox CCA      60          15          65          14           Ext Carotid   81           5   1 27   94          11   1 50    Prox Vert     46          16          41           9           Subclavian    80           0         120           0 CONCLUSION:     Impression  RIGHT:  There is <50% stenosis noted in the internal carotid artery  Plaque is  heterogenous and irregular  Vertebral artery flow is antegrade  There is no significant subclavian artery  disease  LEFT:  There is <50% stenosis noted in the internal carotid artery  Plaque is  heterogenous and irregular  Vertebral artery flow is antegrade  There is no significant subclavian artery  disease  Tech note: Echogenic structures noted on bilateral thyroids  Study was performed by student under direct supervision from a registered  senior vascular technologist with approval from patient  Compared to previous study on 11/28/2018, there is change

## 2021-05-07 NOTE — PROGRESS NOTES
Assessment/Plan:    Bilateral carotid artery stenosis  History of bilateral CVAs (likely due to PAF)  History of deep vein thrombosis  -     VAS carotid complete study; Future    -Hx bilateral hemispheric strokes in 2018  -asymptomatic; no new symptoms of stroke/TIA  -reported to have hx paroxysmal atrial fibrillation  -she is chronically anticoagulated with Eliquis  -vascular dementia    CV duplex 4/2/21:  Less than 50% stenosis bilaterally (R 88/22, L 89/23)    Plan:  Her prior strokes are unlikely a result of carotid artery stenosis particularly since she had bihemispheric strokes with CTA showing 60% right internal carotid artery stenosis and only mild left internal carotid artery stenosis  She apparently was subsequently found to have paroxysmal atrial fibrillation on pacer which would be more consistent with her stroke history  We reviewed her carotid duplex current and prior carotid duplex studies and other imaging  The carotid duplex study shows mild plaquing bilaterally with less than 50% narrowing in the carotid arteries  Overall, the velocities appear low so we can continue to monitor     -we reviewed the symptoms of stroke for which she should call 911  -continue aspirin 81, apixaban 2 5 twice daily and atorvastatin 40  -routine duplex surveillance for progression of disease  -continued optimize medical therapy for comorbid conditions  -CV duplex in 1 year with office visit, or sooner if needed            Subjective:      Patient ID: Kecia Parra is a 80 y o  female  Patient presents today to review carotid study done 4/2  Patient's daughter reports confusion at baseline secondary to dementia  Davi any s/s of CVA  HPI    Kecia Parra 80year-old female DM, hypertension, hyperlipidemia, CKD 3, CHF, Bio MVR, pacer, asthma, bilateral carotid artery stenosis, Hx bilateral CVA's, Hx DVT who presents for vascular follow up and to review recent CV duplex       Ms Drake Cohen is accompanied by her daughter Tory Vazquez in the office today  The patient is rather quiet in the office with decreased interaction  Tory Vazquez reports that after her 2 strokes in 2018 she developed vascular dementia and intermittently she does have some confusion  Otherwise she has been doing reasonably well and stable  No intercurrent hospitalizations  She has no new symptoms of stroke or TIA sx  She denies transient visual loss, burry vision, difficulty speaking, facial numbness/weakness and arm/leg weakness  We reviewed the symptoms of stroke for which she should call 911  She is maintained on good medical therapy with apixaban 2 5 b i d , aspirin 81 and atorvastatin 40  Reviewed imaging studies, as well as notes from her cardiologist and family care physician through care everywhere  A1c 6 3  LDL at goal 68    MRI 12/4/18  1  No acute infarction, intracranial hemorrhage or mass effect  2   Moderate-sized chronic right MCA infarction, new from the prior 2012 study  3   Mild scattered chronic microangiopathy  VAS CV du 4/2/21  FINDINGS:     Segment      Rig                     Left                        PSV  EDV (cm/s)  Ratio  PSV  EDV (cm/s)  Ratio    Dist  ICA     76          19   1 18   59          17   0 94    Mid  ICA      72          21   1 13   60          17   0 96    Prox  ICA     88          22   1 38   89          23   1 42    Dist CCA      59          15          86          17           Mid CCA       64          12   1 07   63          16   0 96    Prox CCA      60          15          65          14           Ext Carotid   81           5   1 27   94          11   1 50    Prox Vert     46          16          41           9           Subclavian    80           0         120           0                    CONCLUSION:     Impression  RIGHT:  There is <50% stenosis noted in the internal carotid artery  Plaque is  heterogenous and irregular  Vertebral artery flow is antegrade   There is no significant subclavian artery  disease  LEFT:  There is <50% stenosis noted in the internal carotid artery  Plaque is  heterogenous and irregular  Vertebral artery flow is antegrade  There is no significant subclavian artery  disease  Tech note: Echogenic structures noted on bilateral thyroids  Study was performed by student under direct supervision from a registered  senior vascular technologist with approval from patient  Compared to previous study on 11/28/2018, there is change  The following portions of the patient's history were reviewed and updated as appropriate: allergies, current medications, past family history, past medical history, past social history, past surgical history and problem list     Review of Systems   Constitutional: Negative  HENT: Negative  Eyes: Negative  Respiratory: Positive for cough  Cardiovascular: Negative  Gastrointestinal: Negative  Endocrine: Negative  Genitourinary: Negative  Musculoskeletal: Positive for back pain and gait problem  Skin: Negative  Allergic/Immunologic: Negative  Neurological: Positive for weakness  Hematological: Bruises/bleeds easily  Psychiatric/Behavioral: Positive for confusion and decreased concentration  Objective:      /84 (BP Location: Left arm, Patient Position: Sitting)   Pulse 70   Temp 98 2 °F (36 8 °C) (Tympanic)   Ht 5' 1 5" (1 562 m)   Wt 72 6 kg (160 lb)   LMP  (LMP Unknown)   BMI 29 74 kg/m²     Somewhat quiet and reduced responses but appropriate  Her daughter is much of the history  Moves all extremities    Generally appears weak    Slow gait and uses a cane       Physical Exam  Vitals signs and nursing note reviewed  Constitutional:       Appearance: She is well-developed  HENT:      Head: Normocephalic and atraumatic  Eyes:      Pupils: Pupils are equal, round, and reactive to light  Neck:      Musculoskeletal: Neck supple  Thyroid: No thyromegaly        Vascular: No carotid bruit or JVD  Trachea: Trachea normal    Cardiovascular:      Rate and Rhythm: Normal rate and regular rhythm  Pulses:           Carotid pulses are 2+ on the right side and 2+ on the left side  Radial pulses are 2+ on the right side and 2+ on the left side  Heart sounds: Normal heart sounds, S1 normal and S2 normal  No murmur  No friction rub  No gallop  Pulmonary:      Effort: No accessory muscle usage or respiratory distress  Breath sounds: Rales present  No wheezing  Comments: Decreased effort  Few "dry" crackles    Respirations nonlabored  Abdominal:      General: Bowel sounds are normal  There is no distension  Palpations: Abdomen is soft  Tenderness: There is no abdominal tenderness  Musculoskeletal: Normal range of motion  General: No deformity  Skin:     General: Skin is warm and dry  Capillary Refill: Capillary refill takes 2 to 3 seconds  Findings: No lesion or rash  Nails: There is no clubbing  Neurological:      Mental Status: She is alert and oriented to person, place, and time  Comments: Grossly normal    Psychiatric:         Behavior: Behavior is cooperative  I have reviewed and made appropriate changes to the review of systems input by the medical assistant      Vitals:    05/07/21 1403   BP: 140/84   BP Location: Left arm   Patient Position: Sitting   Pulse: 70   Temp: 98 2 °F (36 8 °C)   TempSrc: Tympanic   Weight: 72 6 kg (160 lb)   Height: 5' 1 5" (1 562 m)       Patient Active Problem List   Diagnosis    Deep vein thrombosis (DVT) of left upper extremity ([facility] [address] [facility]    H/O mitral valve replacement    CVA (cerebral vascular accident) ([facility] [address] [facility]    Controlled type 2 diabetes mellitus without complication, without long-term current use of insulin ([facility])    Pacemaker    Acid reflux    Hypertension    Constipation    Systolic congestive heart failure ([facility])    History of ischemic right MCA stroke    History of CVA (cerebrovascular accident)    History of subarachnoid hemorrhage    CKD (chronic kidney disease) stage 3, GFR 30-59 ml/min (HCC)    History of DVT (deep vein thrombosis)    History of pacemaker    Acute cystitis without hematuria    Chronic systolic CHF (congestive heart failure) (HCC)    Colonic thickening    Anemia    Encephalopathy    Seizure-like activity (HCC)    HCAP (healthcare-associated pneumonia)    Shoulder pain, right    Abnormal TSH    Dysphagia    Esophageal reflux    Polyp of colon    Arthritis of right glenohumeral joint    Bilateral carotid artery stenosis    Vascular dementia ([facility])    Mood disorder ([facility] [address] )    Ambulatory dysfunction    H/O ischemic left MCA stroke    TIA (transient ischemic attack)    Arthralgia of hip, right    Arthritis    Carpal tunnel syndrome, left    External hemorrhoids    Hypercholesterolemia    Malignant neoplasm without specification of site ([facility] [address] )    Meningioma ([facility] [address] )    Pain in both hands    Spondylosis of cervical region without myelopathy or radiculopathy    Type 2 or unspecified type diabetes mellitus       Past Surgical History:   Procedure Laterality Date    COLON SURGERY      COLONOSCOPY      COLONOSCOPY N/A 11/2/2016    Procedure: COLONOSCOPY;  Surgeon: Daphney Stuart MD;  Location: [facility] GI LAB; Service:     ESOPHAGOGASTRODUODENOSCOPY N/A 11/2/2016    Procedure: ESOPHAGOGASTRODUODENOSCOPY (EGD); Surgeon: Daphney Stuart MD;  Location: [facility] GI LAB; Service:    Becki Garcia / Adalberto Drivers / Phillip Art      JOINT REPLACEMENT Right 02/2015    hip    JOINT REPLACEMENT Left     knee    JOINT REPLACEMENT Right     knee    MITRAL VALVE REPLACEMENT      MA COLONOSCOPY FLX DX W/COLLJ SPEC WHEN PFRMD N/A 8/9/2018    Procedure: EGD AND COLONOSCOPY;  Surgeon: Daphney Stuart MD;  [facility];   Service: Gastroenterology    MA REVISE MEDIAN N/CARPAL TUNNEL SURG Left 1/28/2016    Procedure: RELEASE CARPAL TUNNEL; Surgeon: Lynda Ricci MD;  [facility] MA[facility];  Service: Orthopedics    TUBAL LIGATION      VARICOSE VEIN SURGERY Bilateral        Family History   Problem Relation Age of Onset    Cancer Mother         throat       Social History     Socioeconomic History    Marital status:       Spouse name: Not on file    Number of children: Not on file    Years of education: Not on file    Highest education level: Not on file   Occupational History    Not on file   Social Needs    Financial resource strain: Not on file    Food insecurity     Worry: Not on file     Inability: Not on file    Transportation needs     Medical: Not on file     Non-medical: Not on file   Tobacco Use    Smoking status: Former Smoker     Packs/day: 0 25     Years: 10 00     Pack years: 2 50     Types: Cigarettes     Quit date:      Years since quittin 3    Smokeless tobacco: Never Used   Substance and Sexual Activity    Alcohol use: No    Drug use: No    Sexual activity: Not Currently   Lifestyle    Physical activity     Days per week: Not on file     Minutes per session: Not on file    Stress: Not on file   Relationships    Social connections     Talks on phone: Not on file     Gets together: Not on file     Attends [employer] service: Not on file     Active member of club or organization: Not on file     Attends meetings of clubs or organizations: Not on file     Relationship status: Not on file    Intimate partner violence     Fear of current or ex partner: Not on file     Emotionally abused: Not on file     Physically abused: Not on file     Forced sexual activity: Not on file   Other Topics Concern    Not on file   Social History Narrative    Not on file       Allergies   Allergen Reactions    Vancomycin      Red man syndrome    Heparin Other (See Comments)     Thrombocytopenia      Glimepiride Rash         Current Outpatient Medications:     albuterol (PROVENTIL HFA,VENTOLIN HFA) 90 mcg/act inhaler, Inhale 2 puffs every 6 (six) hours as needed for wheezing, Disp: , Rfl:     apixaban (ELIQUIS) 2 5 mg, Take 1 tablet (2 5 mg total) by mouth 2 (two) times a day, Disp: , Rfl: 0    aspirin (ECOTRIN LOW STRENGTH) 81 mg EC tablet, Take 1 tablet (81 mg total) by mouth daily, Disp: , Rfl: 0    atorvastatin (LIPITOR) 40 mg tablet, Take 2 tablets (80 mg total) by mouth daily with dinner, Disp: 30 tablet, Rfl: 3    furosemide (LASIX) 20 mg tablet, Take 1 tablet (20 mg total) by mouth daily, Disp: , Rfl: 0    iron polysaccharides (FERREX) 150 mg capsule, Take 150 mg by mouth 2 (two) times a day, Disp: , Rfl:     JANUVIA 50 MG tablet, 50 mg daily  , Disp: , Rfl:     losartan (COZAAR) 25 mg tablet, , Disp: , Rfl:     metoprolol tartrate (LOPRESSOR) 50 mg tablet, Take 1 tablet (50 mg total) by mouth every 12 (twelve) hours, Disp: , Rfl: 0    Multiple Vitamins-Minerals (MULTIVITAMIN ADULT PO), 0 5 tablets 2 (two) times a day  , Disp: , Rfl:     potassium chloride (MICRO-K) 10 MEQ CR capsule, , Disp: , Rfl:

## 2021-05-25 ENCOUNTER — IMMUNIZATIONS (OUTPATIENT)
Dept: FAMILY MEDICINE CLINIC | Facility: HOSPITAL | Age: 86
End: 2021-05-25

## 2021-05-25 DIAGNOSIS — Z23 ENCOUNTER FOR IMMUNIZATION: Primary | ICD-10-CM

## 2021-05-25 PROCEDURE — 91301 SARS-COV-2 / COVID-19 MRNA VACCINE (MODERNA) 100 MCG: CPT

## 2021-05-25 PROCEDURE — 0011A SARS-COV-2 / COVID-19 MRNA VACCINE (MODERNA) 100 MCG: CPT

## 2021-06-22 ENCOUNTER — IMMUNIZATIONS (OUTPATIENT)
Dept: FAMILY MEDICINE CLINIC | Facility: HOSPITAL | Age: 86
End: 2021-06-22

## 2021-06-22 DIAGNOSIS — Z23 ENCOUNTER FOR IMMUNIZATION: Primary | ICD-10-CM

## 2021-06-22 PROCEDURE — 91301 SARS-COV-2 / COVID-19 MRNA VACCINE (MODERNA) 100 MCG: CPT

## 2021-06-22 PROCEDURE — 0012A SARS-COV-2 / COVID-19 MRNA VACCINE (MODERNA) 100 MCG: CPT

## 2021-10-20 ENCOUNTER — APPOINTMENT (OUTPATIENT)
Dept: LAB | Facility: CLINIC | Age: 86
End: 2021-10-20
Payer: MEDICARE

## 2021-10-20 DIAGNOSIS — E78.2 MIXED HYPERLIPIDEMIA: ICD-10-CM

## 2021-10-20 DIAGNOSIS — R06.00 DYSPNEA ON EXERTION: ICD-10-CM

## 2021-10-20 LAB — NT-PROBNP SERPL-MCNC: 2561 PG/ML

## 2021-10-20 PROCEDURE — 83880 ASSAY OF NATRIURETIC PEPTIDE: CPT

## 2021-12-01 ENCOUNTER — APPOINTMENT (OUTPATIENT)
Dept: LAB | Facility: CLINIC | Age: 86
End: 2021-12-01
Payer: MEDICARE

## 2022-01-05 ENCOUNTER — APPOINTMENT (OUTPATIENT)
Dept: LAB | Facility: CLINIC | Age: 87
End: 2022-01-05
Payer: MEDICARE

## 2022-02-22 ENCOUNTER — APPOINTMENT (EMERGENCY)
Dept: RADIOLOGY | Facility: HOSPITAL | Age: 87
End: 2022-02-22
Payer: MEDICARE

## 2022-02-22 ENCOUNTER — TELEPHONE (OUTPATIENT)
Dept: NEUROSURGERY | Facility: CLINIC | Age: 87
End: 2022-02-22

## 2022-02-22 ENCOUNTER — HOSPITAL ENCOUNTER (EMERGENCY)
Facility: HOSPITAL | Age: 87
Discharge: HOME | End: 2022-02-22
Attending: EMERGENCY MEDICINE | Admitting: EMERGENCY MEDICINE
Payer: MEDICARE

## 2022-02-22 VITALS
SYSTOLIC BLOOD PRESSURE: 124 MMHG | TEMPERATURE: 97.3 F | HEART RATE: 60 BPM | OXYGEN SATURATION: 99 % | DIASTOLIC BLOOD PRESSURE: 74 MMHG | BODY MASS INDEX: 28.48 KG/M2 | WEIGHT: 153.22 LBS | RESPIRATION RATE: 16 BRPM

## 2022-02-22 DIAGNOSIS — R53.1 WEAKNESS: Primary | ICD-10-CM

## 2022-02-22 DIAGNOSIS — J11.1 INFLUENZA: ICD-10-CM

## 2022-02-22 LAB
ALBUMIN SERPL BCP-MCNC: 3 G/DL (ref 3.5–5)
ALP SERPL-CCNC: 76 U/L (ref 46–116)
ALT SERPL W P-5'-P-CCNC: 27 U/L (ref 12–78)
ANION GAP SERPL CALCULATED.3IONS-SCNC: 7 MMOL/L (ref 4–13)
APTT PPP: 33 SECONDS (ref 23–37)
AST SERPL W P-5'-P-CCNC: 29 U/L (ref 5–45)
BASOPHILS # BLD AUTO: 0.02 THOUSANDS/ΜL (ref 0–0.1)
BASOPHILS NFR BLD AUTO: 1 % (ref 0–1)
BILIRUB SERPL-MCNC: 0.39 MG/DL (ref 0.2–1)
BILIRUB UR QL STRIP: NEGATIVE
BUN SERPL-MCNC: 20 MG/DL (ref 5–25)
CALCIUM ALBUM COR SERPL-MCNC: 9.5 MG/DL (ref 8.3–10.1)
CALCIUM SERPL-MCNC: 8.7 MG/DL (ref 8.3–10.1)
CHLORIDE SERPL-SCNC: 103 MMOL/L (ref 100–108)
CLARITY UR: CLEAR
CO2 SERPL-SCNC: 27 MMOL/L (ref 21–32)
COLOR UR: YELLOW
CREAT SERPL-MCNC: 1.31 MG/DL (ref 0.6–1.3)
EOSINOPHIL # BLD AUTO: 0.52 THOUSAND/ΜL (ref 0–0.61)
EOSINOPHIL NFR BLD AUTO: 13 % (ref 0–6)
ERYTHROCYTE [DISTWIDTH] IN BLOOD BY AUTOMATED COUNT: 13 % (ref 11.6–15.1)
FLUAV RNA RESP QL NAA+PROBE: POSITIVE
FLUBV RNA RESP QL NAA+PROBE: NEGATIVE
GFR SERPL CREATININE-BSD FRML MDRD: 36 ML/MIN/1.73SQ M
GLUCOSE SERPL-MCNC: 99 MG/DL (ref 65–140)
GLUCOSE UR STRIP-MCNC: NEGATIVE MG/DL
HCT VFR BLD AUTO: 36.7 % (ref 34.8–46.1)
HGB BLD-MCNC: 11.4 G/DL (ref 11.5–15.4)
HGB UR QL STRIP.AUTO: NEGATIVE
IMM GRANULOCYTES # BLD AUTO: 0 THOUSAND/UL (ref 0–0.2)
IMM GRANULOCYTES NFR BLD AUTO: 0 % (ref 0–2)
INR PPP: 0.97 (ref 0.84–1.19)
KETONES UR STRIP-MCNC: NEGATIVE MG/DL
LEUKOCYTE ESTERASE UR QL STRIP: NEGATIVE
LYMPHOCYTES # BLD AUTO: 0.91 THOUSANDS/ΜL (ref 0.6–4.47)
LYMPHOCYTES NFR BLD AUTO: 23 % (ref 14–44)
MCH RBC QN AUTO: 30.2 PG (ref 26.8–34.3)
MCHC RBC AUTO-ENTMCNC: 31.1 G/DL (ref 31.4–37.4)
MCV RBC AUTO: 97 FL (ref 82–98)
MONOCYTES # BLD AUTO: 0.66 THOUSAND/ΜL (ref 0.17–1.22)
MONOCYTES NFR BLD AUTO: 17 % (ref 4–12)
NEUTROPHILS # BLD AUTO: 1.8 THOUSANDS/ΜL (ref 1.85–7.62)
NEUTS SEG NFR BLD AUTO: 46 % (ref 43–75)
NITRITE UR QL STRIP: NEGATIVE
NRBC BLD AUTO-RTO: 0 /100 WBCS
PH UR STRIP.AUTO: 6 [PH]
PLATELET # BLD AUTO: 78 THOUSANDS/UL (ref 149–390)
PMV BLD AUTO: 10.9 FL (ref 8.9–12.7)
POTASSIUM SERPL-SCNC: 4.7 MMOL/L (ref 3.5–5.3)
PROT SERPL-MCNC: 6.5 G/DL (ref 6.4–8.2)
PROT UR STRIP-MCNC: NEGATIVE MG/DL
PROTHROMBIN TIME: 12.7 SECONDS (ref 11.6–14.5)
RBC # BLD AUTO: 3.78 MILLION/UL (ref 3.81–5.12)
RSV RNA RESP QL NAA+PROBE: NEGATIVE
SARS-COV-2 RNA RESP QL NAA+PROBE: NEGATIVE
SODIUM SERPL-SCNC: 137 MMOL/L (ref 136–145)
SP GR UR STRIP.AUTO: 1.01 (ref 1–1.03)
UROBILINOGEN UR QL STRIP.AUTO: 0.2 E.U./DL
WBC # BLD AUTO: 3.91 THOUSAND/UL (ref 4.31–10.16)

## 2022-02-22 PROCEDURE — 80053 COMPREHEN METABOLIC PANEL: CPT | Performed by: EMERGENCY MEDICINE

## 2022-02-22 PROCEDURE — 93005 ELECTROCARDIOGRAM TRACING: CPT

## 2022-02-22 PROCEDURE — 99285 EMERGENCY DEPT VISIT HI MDM: CPT | Performed by: EMERGENCY MEDICINE

## 2022-02-22 PROCEDURE — 97163 PT EVAL HIGH COMPLEX 45 MIN: CPT

## 2022-02-22 PROCEDURE — 97167 OT EVAL HIGH COMPLEX 60 MIN: CPT

## 2022-02-22 PROCEDURE — 81003 URINALYSIS AUTO W/O SCOPE: CPT | Performed by: EMERGENCY MEDICINE

## 2022-02-22 PROCEDURE — 99285 EMERGENCY DEPT VISIT HI MDM: CPT

## 2022-02-22 PROCEDURE — 97116 GAIT TRAINING THERAPY: CPT

## 2022-02-22 PROCEDURE — 36415 COLL VENOUS BLD VENIPUNCTURE: CPT | Performed by: EMERGENCY MEDICINE

## 2022-02-22 PROCEDURE — 71045 X-RAY EXAM CHEST 1 VIEW: CPT

## 2022-02-22 PROCEDURE — 85610 PROTHROMBIN TIME: CPT | Performed by: EMERGENCY MEDICINE

## 2022-02-22 PROCEDURE — 85025 COMPLETE CBC W/AUTO DIFF WBC: CPT | Performed by: EMERGENCY MEDICINE

## 2022-02-22 PROCEDURE — 85730 THROMBOPLASTIN TIME PARTIAL: CPT | Performed by: EMERGENCY MEDICINE

## 2022-02-22 PROCEDURE — 0241U HB NFCT DS VIR RESP RNA 4 TRGT: CPT | Performed by: EMERGENCY MEDICINE

## 2022-02-22 NOTE — OCCUPATIONAL THERAPY NOTE
Occupational Therapy Evaluation       02/22/22 1430   Note Type   Note type Evaluation   Restrictions/Precautions   Other Precautions Fall Risk;Droplet precautions   Pain Assessment   Pain Assessment Tool 0-10   Pain Score No Pain   Home Living   Type of Home House   Home Layout Multi-level;Stairs to enter with rails; Performs ADLs on one level  (2 steps + landing to enter; pt stays first floor)   Bathroom Shower/Tub Tub/shower unit   [address] [place] [address] Dr chair; Toilet raiser;Grab bars around toilet   Home Equipment Walker;Cane;Other (Comment)  (Transport chair)   Additional Comments Patient presented to ED with c/o generalized weakness, +Flu   Prior Function   Level of [place] Needs assistance with ADLs and functional mobility   Lives With Alone  (Family stays with patient 24/7)   Receives Help From Family   ADL Assistance Needs assistance   IADLs Needs assistance   Comments Pt is primarily [place] speaking, daughter present to translate and assist with home set up/PLOF   Patient lives alone however has supportive family who rotates and stays with patient 24/7; per daughter up until 2 weeks ago patient was independent in ambulation, recently has been needing to use cane plus assist of family for ambulation, needs assist for ADLs at baseline, requiring more assist lately from family   ADL   Eating Assistance 7  Independent   Grooming Assistance 5  Supervision/Setup   UB Bathing Assistance 4  Minimal Assistance   LB [address] 3  Moderate Assistance   UB Dressing Assistance 4  Minimal Assistance    [address] 3  Moderate [address]  3  Moderate Assistance   Bed Mobility   Supine to Sit 4  Minimal assistance   Additional items Assist x 1   Sit to Supine 4  Minimal assistance   Additional items Assist x 1   Transfers   Sit to Stand 4  Minimal assistance   Additional items Assist x 1   Stand to Sit 4  Minimal assistance   Additional items Assist x 1   Additional Comments STS x several trials throughout session   Functional Mobility   Functional Mobility 4  Minimal assistance   Additional Comments Patient ambulated short household distance in room with single point cane; increased verbal cues for safety awareness   Balance   Static Sitting Fair +   Dynamic Sitting Fair   Static Standing Fair   Dynamic Standing Fair -   Activity Tolerance   Activity Tolerance Patient limited by fatigue   Medical Staff Made Aware Yes, Dr Chris Henley   Overall Cognitive Status WFL   Arousal/Participation Alert; Cooperative   Attention Attends with cues to redirect   Orientation Level Oriented to person;Oriented to place   Following Commands Follows one step commands with increased time or repetition   Assessment   Limitation Decreased ADL status; Decreased UE strength;Decreased Safe judgement during ADL;Decreased endurance;Decreased self-care trans;Decreased high-level ADLs   Prognosis Good   Assessment Patient evaluated by Occupational Therapy  Patient admitted with <principal problem not specified>  The patients occupational profile, medical and therapy history includes a extensive additional review of physical, cognitive, or psychosocial history related to current functional performance  Comorbidities affecting functional mobility and ADLS include: DM, edema, HTN, arthritis, acid reflux, cancer, stroke, dementia, DVT    Prior to admission, patient was requiring assist for functional mobility with cane, requiring assist for ADLS, requiring assist for IADLS and home with family assist   The evaluation identifies the following performance deficits: weakness, impaired balance, decreased endurance, increased fall risk, new onset of impairment of functional mobility, decreased ADLS, decreased IADLS, decreased activity tolerance, decreased safety awareness, impaired judgement and decreased strength, that result in activity limitations and/or participation restrictions  This evaluation requires clinical decision making of high complexity, because the patient presents with comorbidites that affect occupational performance and required significant modification of tasks or assistance with consideration of multiple treatment options  The Barthel Index was used as a functional outcome tool presenting with a score of Barthel Index Score: 55, indicating marked limitations of functional mobility and ADLS  The patient's raw score on the AM-PAC Daily Activity inpatient short form is 17, standardized score is 37 26, less than 39 4  Patients at this level are likely to benefit from DC to post-acute rehabilitation services  Please refer to the recommendation of the Occupational Therapist for safe DC planning  Despite AM-PAC score, patient at baseline needs assist with ADLs and has 24/7 supervision/care from family  Recommend patient return home with continued 24/7 care from family with addition of home OT services to address functional limitations  Patient's daughter present for therapy session and in agreement with recommendation  Patient will benefit from skilled Occupational Therapy services to address above deficits and facilitate a safe return to prior level of function  Goals   Patient Goals "I'm ready to go home"   STG Time Frame   (1-7 days)   Short Term Goal  Goals established to promote Patient Goals: "I'm ready to go home":  Patient will increase standing tolerance to 5 minutes during ADL task to decrease assistance level and decrease fall risk; Patient will increase bed mobility to supervision in preparation for ADLS and transfers;  Patient will increase functional mobility to and from bathroom with single point cane with supervision to increase performance with ADLS and to use a toilet; Patient will tolerate 5 minutes of UE ROM/strengthening to increase general activity tolerance and performance in ADLS/IADLS; Patient will improve functional activity tolerance to 5 minutes of sustained functional tasks to increase participation in basic self-care and decrease assistance level;  Patient will be able to to verbalize understanding and perform energy conservation/proper body mechanics during ADLS and functional mobility at least 50% of the time with moderate cueing to decrease signs of fatigue and increase stamina to return to prior level of function; Patient will increase dynamic sitting balance to fair+ to improve the ability to sit at edge of bed or on a chair for ADLS;  Patient will increase dynamic standing balance to fair to improve postural stability and decrease fall risk during standing ADLS and transfers  LTG Time Frame   (8-14 days)   Long Term Goal Patient will increase standing tolerance to 10 minutes during ADL task to decrease assistance level and decrease fall risk; Patient will increase bed mobility to independent in preparation for ADLS and transfers;  Patient will increase functional mobility to and from bathroom with single point cane independently to increase performance with ADLS and to use a toilet; Patient will tolerate 10 minutes of UE ROM/strengthening to increase general activity tolerance and performance in ADLS/IADLS; Patient will improve functional activity tolerance to 10 minutes of sustained functional tasks to increase participation in basic self-care and decrease assistance level;  Patient will be able to to verbalize understanding and perform energy conservation/proper body mechanics during ADLS and functional mobility at least 75% of the time with minimal cueing to decrease signs of fatigue and increase stamina to return to prior level of function; Patient will increase static/dynamic sitting balance to good to improve the ability to sit at edge of bed or on a chair for ADLS;  Patient will increase static/dynamic standing balance to fair+ to improve postural stability and decrease fall risk during standing ADLS and transfers  Pt will score >/= 21/24 on AM-PAC Daily Activity Inpatient scale to promote safe independence with ADLs and functional mobility; Pt will score >/= 85/100 on Barthel Index in order to decrease caregiver assistance needed and increase ability to perform ADLs and functional mobility  Functional Transfer Goals   Pt Will Perform All Functional Transfers   (STG supervision, LTG independent)   ADL Goals   Pt Will Perform Grooming   (LTG independent)   Pt Will Perform Bathing   (STG min assist, LTG supervision)   Pt Will Perform UE Dressing   (STG supervision, LTG independent)   Pt Will Perform LE Dressing   (STG min assist, LTG supervision)   Pt Will Perform Toileting   (STG supervision, LTG independent)   Plan   Treatment Interventions ADL retraining; Endurance training;Patient/family training;Equipment evaluation/education; Compensatory technique education;Continued evaluation; Energy conservation; Activityengagement   Goal Expiration Date 03/08/22   OT Frequency 3-5x/wk   Recommendation   OT Discharge Recommendation Home with home health rehabilitation   AM-[facility] Daily Activity Inpatient   Lower Body Dressing 2   Bathing 2   Toileting 2   Upper Body Dressing 3   Grooming 4   Eating 4   Daily Activity Raw Score 17   Daily Activity Standardized Score (Calc for Raw Score >=11) 37 26   AM-PAC Applied Cognition Inpatient   Following a Speech/Presentation 3   Understanding Ordinary Conversation 4   Taking Medications 3   Remembering Where Things Are Placed or Put Away 3   Remembering List of 4-5 Errands 3   Taking Care of Complicated Tasks 3   Applied Cognition Raw Score 19   Applied Cognition Standardized Score 39 77   Barthel Index   Feeding 10   Bathing 0   Grooming Score 0   Dressing Score 5   Bladder Score 10   Bowels Score 10   Toilet Use Score 5   Transfers (Bed/Chair) Score 10   Mobility (Level Surface) Score 0   Stairs Score 5   Barthel Index Score 55   Licensure [address] [facility] Number  Aiden Wagner OTR/MARLY [id number]

## 2022-02-22 NOTE — PHYSICAL THERAPY NOTE
PHYSICAL THERAPY EVALUATION/TREATMENT     02/22/22 7195   PT Last Visit   PT Visit Date 02/22/22   Note Type   Note type Evaluation   Pain Assessment   Pain Assessment Tool 0-10   Pain Score No Pain   Restrictions/Precautions   Other Precautions Fall Risk   Home Living   Type of [address] Two level; Able to live on main level with bedroom/bathroom  (2+1 DANIELA with concrete wall to hold onto)   Home Equipment Cane  (RW;transport chair;rollator)   Prior Function   Level of [place] Needs assistance with ADLs and functional mobility   Lives With [employer] Help From Family   ADL Assistance Needs assistance   IADLs Needs assistance   Comments Pt amb with a cane and hand hold assist of family PTA   General   Additional Pertinent History Pt seen in the ED for weakness, AMS and unsteady gait  Approx 2-3 wks ago, pt had a cold, at that time was independent w/out an AD but over the last 2-3 wks is weaker and needing increased assist to amb and now using a cane  Family/Caregiver Present Yes  (pt's dtr)   Cognition   Arousal/Participation Cooperative   Orientation Level Oriented to person;Oriented to place   Following Commands Follows multistep commands with increased time or repetition  (due to language barrier;pt is English speaking;dtr interpret)   RLE Assessment   RLE Assessment WFL  (3+/5)   LLE Assessment   LLE Assessment WFL  (3+/5)   Bed Mobility   Supine to Sit 4  Minimal assistance   Additional items Assist x 1;Verbal cues   Transfers   Sit to Stand 4  Minimal assistance   Additional items Assist x 1;Verbal cues   Stand to Sit 4  Minimal assistance   Additional items Assist x 1;Verbal cues   Ambulation/Elevation   Gait pattern   (generalized unsteady)   Gait Assistance 4  Minimal assist   Additional items Assist x 1;Verbal cues; Tactile cues   Assistive Device None   Distance 15 feet with change in direction   Balance   Static Sitting Fair +   Static Standing   (F-/F)   Dynamic Standing Oli [address]24 -   Activity Tolerance   Activity Tolerance Patient limited by fatigue  (unsteadiness)   Assessment   Problem List Decreased strength;Decreased range of motion;Decreased endurance; Impaired balance;Decreased mobility; Decreased coordination;Decreased safety awareness   Assessment Patient seen for Physical Therapy evaluation  Patient admitted with weakness  Comorbidities affecting patient's physical performance include: MVR, CVA, DM2, pacer, HTN, CHF, SAH, anemia, seizure like activity, encephalopathy, meningioma  Personal factors affecting patient at time of initial evaluation include: lives in two story house, ambulating with assistive device, stairs to enter home, inability to navigate community distances, inability to navigate level surfaces without external assistance and inability to perform dynamic tasks in community  Prior to admission, patient was requiring assist for functional mobility with cane, requiring assist for ADLS, living with family in a two level home with 2+1 steps to enter, ambulating household distance and lives in a multilevel house but has 1st floor setup  Please find objective findings from Physical Therapy assessment regarding body systems outlined above with impairments and limitations including weakness, decreased ROM, impaired balance, decreased endurance, impaired coordination, gait deviations, decreased activity tolerance, decreased functional mobility tolerance, decreased safety awareness and fall risk  The Barthel Index was used as a functional outcome tool presenting with a score of Barthel Index Score: 55 today indicating marked limitations of functional mobility and ADLS  Patient's clinical presentation is currently unstable/unpredictable as seen in patient's presentation of vital sign response, increased fall risk, new onset of impairment of functional mobility, decreased endurance and new onset of weakness   Pt would benefit from continued Physical Therapy treatment to address deficits as defined above and maximize level of functional mobility  As demonstrated by objective findings, the assigned level of complexity for this evaluation is high  The patient's AM-PAC Basic Mobility Inpatient Short Form Raw Score is 18  A Raw score of greater than 16 suggests the patient may benefit from discharge to home  Please also refer to the recommendation of the Physical Therapist for safe discharge planning  Goals   Patient Goals go home   STG Expiration Date 03/01/22   Short Term Goal #1 bed mob and trans - S   Short Term Goal #2 pt will amb with a cane functional household distances - min A/S; balance with cane - F/F+; up/down 2+1 steps so pt can enter/exit her home - S   LTG Expiration Date 03/08/22   Long Term Goal #1 bed mob and trans - S/I   Long Term Goal #2 pt will amb with a cane functional household distances - S/I; balance with cane - F+/G; strength LEs - 3+ to 4-/5   Plan   Treatment/Interventions ADL retraining;Functional transfer training;LE strengthening/ROM; Elevations; Therapeutic exercise; Endurance training;Patient/family training;Equipment eval/education; Bed mobility;Gait training; Compensatory technique education   PT Frequency Other (Comment)  (5x/wk)   Recommendation   PT Discharge Recommendation Home with home health rehabilitation   [address] [place] [address] Mobility Inpatient   Turning in Bed Without Bedrails 3   Lying on Back to Sitting on Edge of Flat Bed 3   Moving Bed to Chair 3   Standing Up From Chair 3   Walk in Room 3   Climb 3-5 Stairs 3   Basic Mobility Inpatient Raw Score 18   Basic Mobility Standardized Score 41 05   Highest Level Of Mobility   JH-HLM Goal 6: Walk 10 steps or more   JH-HLM Highest Level of Mobility 7: Walk 25 feet or more   JH-HLM Goal Achieved Yes   Barthel Index   Feeding 10   Bathing 0   Grooming Score 0   Dressing Score 5   Bladder Score 10   Bowels Score 10   Toilet Use Score 5   Transfers (Bed/Chair) Score 10   Mobility (Level Surface) Score 0   Stairs Score 5   Barthel Index Score 55   Additional Treatment Session   Start Time 1405   End Time 1415   Treatment Assessment Pt trans sit to stand with min A and amb with a cane 15 feet with change in direction and min A  Pt trans stand to sit with min A;Rest; Pt trans with min A and amb with a RW 15 feet with change in direction with min A - increased tactile cues for RW control and direction;pt trans stand to sit with min A and to supine with min A  Gait not improved with RW - pt does not quite understand how to use the RW and needs increased cuing  Pt is safe for cont use of cane for amb with assist of family  Pt will benefit from cont skilled PT services at home to increase her strength, balance, endurance, gait and mobility to return pt to prior functional level  MD is aware of PT recommendation  Pt's dtr is in agreement  End of Consult   Patient Position at End of Consult Supine; All needs within reach   [facility] Insurance Number  [address] [place] [address] [id number]

## 2022-02-22 NOTE — ED PROVIDER NOTES
History  Chief Complaint   Patient presents with    Cough     Daughter states patient started with cough on , seen at PCP, completed Cefuroxime and guafenesin -codeine caused hallucinations and stopped   Patient was taking Benadryl for diabetics ( patient is on 3 rd bottle ) Patient with moist cough   Weakness - Generalized     Daughter states very weak, no falls  Talked to neurology office today due to confusion    Altered Mental Status     C/O headache  Speaks Latvian , daughter will translate     Patient is an elderly female with a moist productive cough which she has had for about 3 weeks  Patient was seen by her PCP prescribed antibiotics and cough medication  Family states no blood work or x-ray was done, and patient was not tested for [place]  She has double vaccinated but not boosted  She recently found out the caregiver who has been staying with her tested positive  Family states that patient did not improve with antibiotics and in fact continued to deteriorate  For the last 2 weeks she has felt more unsteady and weak and has required a cane to assist in walking  Today caregiver noted increased lightheadedness and gait disturbance with a feeling of disequilibrium  Patient arrives awake alert  She is oriented to place but not time, which is usual for her  She denies any pain except mild headache  She is not dizzy          Prior to Admission Medications   Prescriptions Last Dose Informant Patient Reported? Taking?    JANUVIA 50 MG tablet  Child Yes No   Si mg daily     Multiple Vitamins-Minerals (MULTIVITAMIN ADULT PO)  Child Yes No   Si 5 tablets 2 (two) times a day     albuterol (PROVENTIL HFA,VENTOLIN HFA) 90 mcg/act inhaler  Child Yes No   Sig: Inhale 2 puffs every 6 (six) hours as needed for wheezing   apixaban (ELIQUIS) 2 5 mg  Child No No   Sig: Take 1 tablet (2 5 mg total) by mouth 2 (two) times a day   aspirin (ECOTRIN LOW STRENGTH) 81 mg EC tablet  Child No No   Sig: Take 1 tablet (81 mg total) by mouth daily   atorvastatin (LIPITOR) 40 mg tablet  Child No No   Sig: Take 2 tablets (80 mg total) by mouth daily with dinner   furosemide (LASIX) 20 mg tablet  Child No No   Sig: Take 1 tablet (20 mg total) by mouth daily   iron polysaccharides (FERREX) 150 mg capsule  Child Yes No   Sig: Take 150 mg by mouth 2 (two) times a day   losartan (COZAAR) 25 mg tablet  Child Yes No   metoprolol tartrate (LOPRESSOR) 50 mg tablet  Child No No   Sig: Take 1 tablet (50 mg total) by mouth every 12 (twelve) hours   potassium chloride (MICRO-K) 10 MEQ CR capsule  Child Yes No      Facility-Administered Medications: None       Past Medical History:   Diagnosis Date    Acid reflux     on occ    Arthritis     DJD right hip replaced    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 with no change    Cancer ([facility] ) 2007    colonic polyps, no surgery done    Deep vein thrombosis (DVT) of left upper extremity ([facility] ) 12/11/2017    Dementia ([facility] )     Diabetes mellitus ([facility] )     type 2    Diverticulosis     Edema     in legs on occ    Hypertension     on occ    Language barrier     speaks Faroese & broken english    Stroke [facility])        Past Surgical History:   Procedure Laterality Date    COLON SURGERY      COLONOSCOPY      COLONOSCOPY N/A 11/2/2016    Procedure: COLONOSCOPY;  Surgeon: Xiomy Joe MD;  Location[facility] LAB; Service:     ESOPHAGOGASTRODUODENOSCOPY N/A 11/2/2016    Procedure: ESOPHAGOGASTRODUODENOSCOPY (EGD); Surgeon: Xiomy Joe MD;  Location: [facility] GI LAB; Service:    Greg Vera / Kole Jewell / Daniel Willson      JOINT REPLACEMENT Right 02/2015    hip    JOINT REPLACEMENT Left     knee    JOINT REPLACEMENT Right     knee    MITRAL VALVE REPLACEMENT      OR COLONOSCOPY FLX DX W/COLLJ SPEC WHEN PFRMD N/A 8/9/2018    Procedure: EGD AND COLONOSCOPY;  Surgeon: Xiomy Joe MD;  [facility];   Service: Gastroenterology    OR REVISE MEDIAN N/CARPAL TUNNEL SURG Left 2016    Procedure: RELEASE CARPAL TUNNEL;  Surgeon: Dwayne Vera MD;  Location: [facility] MAIN OR;  Service: Orthopedics    TUBAL LIGATION      VARICOSE VEIN SURGERY Bilateral        Family History   Problem Relation Age of Onset    Cancer Mother         throat     I have reviewed and agree with the history as documented  E-Cigarette/Vaping    E-Cigarette Use Never User      E-Cigarette/Vaping Substances     Social History     Tobacco Use    Smoking status: Former Smoker     Packs/day: 0 25     Years: 10 00     Pack years: 2 50     Types: Cigarettes     Quit date:      Years since quittin 1    Smokeless tobacco: Never Used   Vaping Use    Vaping Use: Never used   Substance Use Topics    Alcohol use: No    Drug use: No       Review of Systems   Constitutional: Negative for chills and fever  HENT: Negative for congestion and sore throat  Eyes: Negative for visual disturbance  Respiratory: Positive for cough  Negative for shortness of breath and wheezing  Cardiovascular: Negative for chest pain and leg swelling  Gastrointestinal: Negative for abdominal pain, nausea and vomiting  Genitourinary: Positive for frequency  Negative for dysuria and hematuria  Musculoskeletal: Negative for back pain and neck pain  Skin: Negative for color change and rash  Neurological: Positive for weakness, light-headedness and headaches  Negative for syncope, facial asymmetry and speech difficulty  Hematological: Bruises/bleeds easily  Psychiatric/Behavioral: Positive for confusion  All other systems reviewed and are negative  Physical Exam  Physical Exam  Vitals and nursing note reviewed  Constitutional:       Appearance: Normal appearance  She is well-developed and normal weight  HENT:      Head: Normocephalic  Right Ear: External ear normal       Left Ear: External ear normal       Nose: Nose normal       Mouth/Throat:      Pharynx: Oropharynx is clear     Eyes: Conjunctiva/sclera: Conjunctivae normal    Cardiovascular:      Rate and Rhythm: Normal rate and regular rhythm  Pulses: Normal pulses  Pulmonary:      Effort: Pulmonary effort is normal       Breath sounds: Normal breath sounds  No wheezing  Chest:      Chest wall: No tenderness  Abdominal:      General: Abdomen is flat  Palpations: Abdomen is soft  Tenderness: There is no abdominal tenderness  Musculoskeletal:         General: Normal range of motion  Cervical back: Normal range of motion and neck supple  Right lower leg: No edema  Skin:     General: Skin is warm and dry  Capillary Refill: Capillary refill takes less than 2 seconds  Neurological:      General: No focal deficit present  Mental Status: She is alert  Mental status is at baseline        Comments: Patient has residual weakness in the left upper extrem, post MCA stroke   Psychiatric:         Mood and Affect: Mood normal          Behavior: Behavior normal          Vital Signs  ED Triage Vitals   Temperature Pulse Respirations Blood Pressure SpO2   02/22/22 1123 02/22/22 1123 02/22/22 1123 02/22/22 1123 02/22/22 1123   98 7 °F (37 1 °C) 75 16 (!) 178/93 98 %      Temp Source Heart Rate Source Patient Position - Orthostatic VS BP Location FiO2 (%)   02/22/22 1123 02/22/22 1123 02/22/22 1123 02/22/22 1123 --   Tympanic Monitor Sitting Right arm       Pain Score       02/22/22 1405       No Pain           Vitals:    02/22/22 1300 02/22/22 1315 02/22/22 1516 02/22/22 1517   BP:  140/70 138/76 124/74   Pulse: 70 68 63 60   Patient Position - Orthostatic VS:   Sitting Sitting         Visual Acuity  Visual Acuity      Most Recent Value   L Pupil Size (mm) 2   R Pupil Size (mm) 2   L Pupil Shape Round   R Pupil Shape Round          ED Medications  Medications - No data to display    Diagnostic Studies  Results Reviewed     Procedure Component Value Units Date/Time    UA w Reflex to Microscopic w Reflex to Culture [[id number]] Collected: 02/22/22 1426    Lab Status: Final result Specimen: Urine, Clean Catch Updated: 02/22/22 1435     Color, UA Yellow     Clarity, UA Clear     Specific Gravity, UA 1 010     pH, UA 6 0     Leukocytes, UA Negative     Nitrite, UA Negative     Protein, UA Negative mg/dl      Glucose, UA Negative mg/dl      Ketones, UA Negative mg/dl      Urobilinogen, UA 0 2 E U /dl      Bilirubin, UA Negative     Blood, UA Negative    COVID/FLU/RSV - 2 hour TAT [[id number]]  (Abnormal) Collected: 02/22/22 1200    Lab Status: Final result Specimen: Nares from Nose Updated: 02/22/22 1249     SARS-CoV-2 Negative     INFLUENZA A PCR Positive     INFLUENZA B PCR Negative     RSV PCR Negative    Narrative:      FOR PEDIATRIC PATIENTS - copy/paste COVID Guidelines URL to browser: [email or web address]  [employer]    SARS-CoV-2 assay is a Nucleic Acid Amplification assay intended for the  qualitative detection of nucleic acid from SARS-CoV-2 in nasopharyngeal  swabs  Results are for the presumptive identification of SARS-CoV-2 RNA  Positive results are indicative of infection with SARS-CoV-2, the virus  causing COVID-19, but do not rule out bacterial infection or co-infection  with other viruses  Laboratories within the [place] and its  territories are required to report all positive results to the appropriate  public health authorities  Negative results do not preclude SARS-CoV-2  infection and should not be used as the sole basis for treatment or other  patient management decisions  Negative results must be combined with  clinical observations, patient history, and epidemiological information  This test has not been FDA cleared or approved  This test has been authorized by FDA under an Emergency Use Authorization  (EUA)   This test is only authorized for the duration of time the  declaration that circumstances exist justifying the authorization of the  emergency use of an in vitro diagnostic tests for detection of SARS-CoV-2  virus and/or diagnosis of COVID-19 infection under section 564(b)(1) of  the Act, 21 U  S C  715NNN-3(K)(4), unless the authorization is terminated  or revoked sooner  The test has been validated but independent review by FDA  and CLIA is pending  Test performed using [employer] GeneXpert: This RT-PCR assay targets N2,  a region unique to SARS-CoV-2  A conserved region in the E-gene was chosen  for pan-Sarbecovirus detection which includes SARS-CoV-2      Comprehensive metabolic panel [[id number]]  (Abnormal) Collected: 02/22/22 1200    Lab Status: Final result Specimen: Blood from Arm, Left Updated: 02/22/22 1235     Sodium 137 mmol/L      Potassium 4 7 mmol/L      Chloride 103 mmol/L      CO2 27 mmol/L      ANION GAP 7 mmol/L      BUN 20 mg/dL      Creatinine 1 31 mg/dL      Glucose 99 mg/dL      Calcium 8 7 mg/dL      Corrected Calcium 9 5 mg/dL      AST 29 U/L      ALT 27 U/L      Alkaline Phosphatase 76 U/L      Total Protein 6 5 g/dL      Albumin 3 0 g/dL      Total Bilirubin 0 39 mg/dL      eGFR 36 ml/min/1 73sq m     Narrative:      [place] guidelines for Chronic Kidney Disease (CKD):     Stage 1 with normal or high GFR (GFR > 90 mL/min/1 73 square meters)    Stage 2 Mild CKD (GFR = 60-89 mL/min/1 73 square meters)    Stage 3A Moderate CKD (GFR = 45-59 mL/min/1 73 square meters)    Stage 3B Moderate CKD (GFR = 30-44 mL/min/1 73 square meters)    Stage 4 Severe CKD (GFR = 15-29 mL/min/1 73 square meters)    Stage 5 End Stage CKD (GFR <15 mL/min/1 73 square meters)  Note: GFR calculation is accurate only with a steady state creatinine    CBC and differential [[id number]]  (Abnormal) Collected: 02/22/22 1200    Lab Status: Final result Specimen: Blood from Arm, Left Updated: 02/22/22 1231     WBC 3 91 Thousand/uL      RBC 3 78 Million/uL      Hemoglobin 11 4 g/dL      Hematocrit 36 7 %      MCV 97 fL      MCH 30 2 pg MCHC 31 1 g/dL      RDW 13 0 %      MPV 10 9 fL      Platelets 78 Thousands/uL      nRBC 0 /100 WBCs      Neutrophils Relative 46 %      Immat GRANS % 0 %      Lymphocytes Relative 23 %      Monocytes Relative 17 %      Eosinophils Relative 13 %      Basophils Relative 1 %      Neutrophils Absolute 1 80 Thousands/µL      Immature Grans Absolute 0 00 Thousand/uL      Lymphocytes Absolute 0 91 Thousands/µL      Monocytes Absolute 0 66 Thousand/µL      Eosinophils Absolute 0 52 Thousand/µL      Basophils Absolute 0 02 Thousands/µL     Narrative:      No Clots    Protime-INR [[id number]]  (Normal) Collected: 02/22/22 1200    Lab Status: Final result Specimen: Blood from Arm, Left Updated: 02/22/22 1228     Protime 12 7 seconds      INR 0 97    APTT [[id number]]  (Normal) Collected: 02/22/22 1200    Lab Status: Final result Specimen: Blood from Arm, Left Updated: 02/22/22 1228     PTT 33 seconds                  XR chest 1 view portable   Final Result by Art Solano MD (02/22 1244)      No acute cardiopulmonary disease                    Workstation performed: [id number]                    Procedures  ECG 12 Lead Documentation Only    Date/Time: 2/22/2022 11:50 AM  Performed by: Jennie Muller MD  Authorized by: Jennie Muller MD     Indications / Diagnosis:  Cough  ECG reviewed by me, the ED Provider: yes    Patient location:  ED  Interpretation:     Interpretation: abnormal    Rate:     ECG rate:  71    ECG rate assessment: normal    Rhythm:     Rhythm: paced    Pacing:     Type of pacing:  Atrial  Ectopy:     Ectopy: none    QRS:     QRS axis:  Left    QRS intervals:  Normal  Conduction:     Conduction: abnormal      Abnormal conduction: incomplete RBBB, LAFB and bifascicular block    ST segments:     ST segments:  Normal  T waves:     T waves: normal               ED Course                               SBIRT 22yo+      Most Recent Value   SBIRT (22 yo +)    In order to provide better care to our patients, we are screening all of our patients for alcohol and drug use  Would it be okay to ask you these screening questions? No Filed at: 02/22/2022 1305                    MDM  Number of Diagnoses or Management Options  Diagnosis management comments: Persistent cough x3 weeks unresponsive to antibiotics is likely viral   Will check COVID and flu, metabolic profile      Disposition  Final diagnoses:   Weakness   Influenza     Time reflects when diagnosis was documented in both MDM as applicable and the Disposition within this note     Time User Action Codes Description Comment    2/22/2022  2:50 PM Bartolo File Add [R53 1] Weakness     2/22/2022  2:50 PM Bartolo File Add [J11 1] Influenza       ED Disposition     ED Disposition Condition Date/Time Comment    Discharge Stable Tue Feb 22, 2022  2:50  [address] discharge to home/self care  Follow-up Information     Follow up With Specialties Details Why Contact Lamon Curling, MD Internal Medicine Schedule an appointment as soon as possible for a visit in 1 day  [address]    [address] [place] [address]  [phone]            Discharge Medication List as of 2/22/2022  2:50 PM      CONTINUE these medications which have NOT CHANGED    Details   albuterol (PROVENTIL HFA,VENTOLIN HFA) 90 mcg/act inhaler Inhale 2 puffs every 6 (six) hours as needed for wheezing, Historical Med      apixaban (ELIQUIS) 2 5 mg Take 1 tablet (2 5 mg total) by mouth 2 (two) times a day, Starting Fri 5/18/2018, No Print      aspirin (ECOTRIN LOW STRENGTH) 81 mg EC tablet Take 1 tablet (81 mg total) by mouth daily, Starting Thu 11/8/2018, No Print      atorvastatin (LIPITOR) 40 mg tablet Take 2 tablets (80 mg total) by mouth daily with dinner, Starting Thu 9/3/2020, Print      furosemide (LASIX) 20 mg tablet Take 1 tablet (20 mg total) by mouth daily, Starting Fri 11/9/2018, No Print      iron polysaccharides (FERREX) 150 mg capsule Take 150 mg by mouth 2 (two) times a day, Historical Med      JANUVIA 50 MG tablet 50 mg daily  , Starting Tue 7/31/2018, Historical Med      losartan (COZAAR) 25 mg tablet Starting Wed 7/8/2020, Historical Med      metoprolol tartrate (LOPRESSOR) 50 mg tablet Take 1 tablet (50 mg total) by mouth every 12 (twelve) hours, Starting Fri 5/18/2018, No Print      Multiple Vitamins-Minerals (MULTIVITAMIN ADULT PO) 0 5 tablets 2 (two) times a day  , Historical Med      potassium chloride (MICRO-K) 10 MEQ CR capsule Starting Mon 1/28/2019, Historical Med                 PDMP Review     None          ED Provider  Electronically Signed by           Harshil Mead MD  02/23/22 5773

## 2022-02-22 NOTE — TELEPHONE ENCOUNTER
2/22/22 PT DGTR JERRICA CALLED AND LMOM REQUESTING APPT FOR HER MOM FOR HEADPAIN/PRESSURE  PT KNOWN TO [facility] AND LAST SAW PA AND DR PICKENS 2012 FOR MENINGIOMAS  PT LAST SEEN BY DR Rahel Haile INPT [facility] 4/25/2018 AFTER ANGIO FOR CVA  CALLED AND SPOKE TO DGTR, SHE STATES SHE IS TAKING PT TO ED NOW SINCE SHE HAS HAD COUGH FOR ABOUT 3 WEEKS, BEEN SEEN BY PCP AND IS NOT GETTING BETTER, PT NOW HAS WHEEZING  SHE WILL ADVISE ED ABOUT PT SYMPTOMS RELATED TO HEAD  I OFFERED TO F/U WITH THEM TOMORROW TO DISCUSS NEED FOR APPT  PT DGTR WAS APPRECIATIVE FOR CALL BACK

## 2022-02-26 LAB
ATRIAL RATE: 71 BPM
P AXIS: 38 DEGREES
PR INTERVAL: 222 MS
QRS AXIS: -58 DEGREES
QRSD INTERVAL: 134 MS
QT INTERVAL: 424 MS
QTC INTERVAL: 460 MS
T WAVE AXIS: -7 DEGREES
VENTRICULAR RATE: 71 BPM

## 2022-02-26 PROCEDURE — 93010 ELECTROCARDIOGRAM REPORT: CPT | Performed by: INTERNAL MEDICINE

## 2022-02-28 NOTE — TELEPHONE ENCOUNTER
2/28/22 CALLED PT DANGELO BECERRIL AND FRANKLYN FOR HER TO CB TO SEE IF APPT STILL NEEDED FOR PT TO SEE [facility] SINCE ED VISIT, OR IF PT NEEDS NEUROLOGY

## 2022-03-03 NOTE — TELEPHONE ENCOUNTER
3/3/22 CALLED AND SPOKE TO PT DGTR, SHE STATES HER MOM HAS THE FLU AND SHE WILL NEED TIME TO CLEAR UP THE PNEUMONIA  SHE ASKED FOR A CALL BACK IN ABOUT 3 WEEKS TO DISCUSS NEED FOR AN APPT WITH PA FOR WORK UP FOR NEW IMAGING FOR PT HX OF 2 MENINGIOMA'S SHE WAS SEEING DR PICKENS FOR  ADVISED I WOULD F/U IN A FEW WEEKS  DGTR, JERRICA, WAS APPRECIATIVE FOR CALL

## 2022-04-17 ENCOUNTER — HOSPITAL ENCOUNTER (EMERGENCY)
Facility: HOSPITAL | Age: 87
Discharge: HOME | End: 2022-04-17
Attending: EMERGENCY MEDICINE | Admitting: EMERGENCY MEDICINE
Payer: MEDICARE

## 2022-04-17 ENCOUNTER — APPOINTMENT (EMERGENCY)
Dept: RADIOLOGY | Facility: HOSPITAL | Age: 87
End: 2022-04-17
Attending: EMERGENCY MEDICINE
Payer: MEDICARE

## 2022-04-17 ENCOUNTER — APPOINTMENT (EMERGENCY)
Dept: RADIOLOGY | Facility: HOSPITAL | Age: 87
End: 2022-04-17
Payer: MEDICARE

## 2022-04-17 VITALS
DIASTOLIC BLOOD PRESSURE: 80 MMHG | TEMPERATURE: 98.2 F | HEART RATE: 76 BPM | RESPIRATION RATE: 18 BRPM | OXYGEN SATURATION: 93 % | SYSTOLIC BLOOD PRESSURE: 118 MMHG

## 2022-04-17 DIAGNOSIS — R79.89 ELEVATED SERUM CREATININE: ICD-10-CM

## 2022-04-17 DIAGNOSIS — J45.901 ASTHMA EXACERBATION: Primary | ICD-10-CM

## 2022-04-17 LAB
2HR DELTA HS TROPONIN: 1 NG/L
ALBUMIN SERPL BCP-MCNC: 3.4 G/DL (ref 3.5–5)
ALP SERPL-CCNC: 84 U/L (ref 46–116)
ALT SERPL W P-5'-P-CCNC: 28 U/L (ref 12–78)
ANION GAP SERPL CALCULATED.3IONS-SCNC: 8 MMOL/L (ref 4–13)
AST SERPL W P-5'-P-CCNC: 24 U/L (ref 5–45)
BACTERIA UR QL AUTO: NORMAL /HPF
BASOPHILS # BLD AUTO: 0.02 THOUSANDS/ΜL (ref 0–0.1)
BASOPHILS NFR BLD AUTO: 0 % (ref 0–1)
BILIRUB SERPL-MCNC: 0.66 MG/DL (ref 0.2–1)
BILIRUB UR QL STRIP: NEGATIVE
BUN SERPL-MCNC: 28 MG/DL (ref 5–25)
CALCIUM ALBUM COR SERPL-MCNC: 9.8 MG/DL (ref 8.3–10.1)
CALCIUM SERPL-MCNC: 9.3 MG/DL (ref 8.3–10.1)
CARDIAC TROPONIN I PNL SERPL HS: 5 NG/L
CARDIAC TROPONIN I PNL SERPL HS: 6 NG/L
CHLORIDE SERPL-SCNC: 102 MMOL/L (ref 100–108)
CLARITY UR: CLEAR
CO2 SERPL-SCNC: 29 MMOL/L (ref 21–32)
COLOR UR: YELLOW
CREAT SERPL-MCNC: 1.38 MG/DL (ref 0.6–1.3)
EOSINOPHIL # BLD AUTO: 1 THOUSAND/ΜL (ref 0–0.61)
EOSINOPHIL NFR BLD AUTO: 20 % (ref 0–6)
ERYTHROCYTE [DISTWIDTH] IN BLOOD BY AUTOMATED COUNT: 13.1 % (ref 11.6–15.1)
FLUAV RNA RESP QL NAA+PROBE: NEGATIVE
FLUBV RNA RESP QL NAA+PROBE: NEGATIVE
GFR SERPL CREATININE-BSD FRML MDRD: 34 ML/MIN/1.73SQ M
GLUCOSE SERPL-MCNC: 91 MG/DL (ref 65–140)
GLUCOSE UR STRIP-MCNC: NEGATIVE MG/DL
HCT VFR BLD AUTO: 40.4 % (ref 34.8–46.1)
HGB BLD-MCNC: 12.7 G/DL (ref 11.5–15.4)
HGB UR QL STRIP.AUTO: NEGATIVE
IMM GRANULOCYTES # BLD AUTO: 0.01 THOUSAND/UL (ref 0–0.2)
IMM GRANULOCYTES NFR BLD AUTO: 0 % (ref 0–2)
KETONES UR STRIP-MCNC: NEGATIVE MG/DL
LEUKOCYTE ESTERASE UR QL STRIP: ABNORMAL
LYMPHOCYTES # BLD AUTO: 1.3 THOUSANDS/ΜL (ref 0.6–4.47)
LYMPHOCYTES NFR BLD AUTO: 26 % (ref 14–44)
MAGNESIUM SERPL-MCNC: 2.3 MG/DL (ref 1.6–2.6)
MCH RBC QN AUTO: 29.7 PG (ref 26.8–34.3)
MCHC RBC AUTO-ENTMCNC: 31.4 G/DL (ref 31.4–37.4)
MCV RBC AUTO: 94 FL (ref 82–98)
MONOCYTES # BLD AUTO: 0.48 THOUSAND/ΜL (ref 0.17–1.22)
MONOCYTES NFR BLD AUTO: 10 % (ref 4–12)
NEUTROPHILS # BLD AUTO: 2.16 THOUSANDS/ΜL (ref 1.85–7.62)
NEUTS SEG NFR BLD AUTO: 44 % (ref 43–75)
NITRITE UR QL STRIP: NEGATIVE
NON-SQ EPI CELLS URNS QL MICRO: NORMAL /HPF
NRBC BLD AUTO-RTO: 0 /100 WBCS
NT-PROBNP SERPL-MCNC: 1197 PG/ML
PH UR STRIP.AUTO: 6 [PH]
PLATELET # BLD AUTO: 87 THOUSANDS/UL (ref 149–390)
PMV BLD AUTO: 10.8 FL (ref 8.9–12.7)
POTASSIUM SERPL-SCNC: 4.4 MMOL/L (ref 3.5–5.3)
PROT SERPL-MCNC: 7 G/DL (ref 6.4–8.2)
PROT UR STRIP-MCNC: NEGATIVE MG/DL
RBC # BLD AUTO: 4.28 MILLION/UL (ref 3.81–5.12)
RBC #/AREA URNS AUTO: NORMAL /HPF
RSV RNA RESP QL NAA+PROBE: NEGATIVE
SARS-COV-2 RNA RESP QL NAA+PROBE: NEGATIVE
SODIUM SERPL-SCNC: 139 MMOL/L (ref 136–145)
SP GR UR STRIP.AUTO: <=1.005 (ref 1–1.03)
UROBILINOGEN UR QL STRIP.AUTO: 0.2 E.U./DL
WBC # BLD AUTO: 4.97 THOUSAND/UL (ref 4.31–10.16)
WBC #/AREA URNS AUTO: NORMAL /HPF

## 2022-04-17 PROCEDURE — 83880 ASSAY OF NATRIURETIC PEPTIDE: CPT | Performed by: EMERGENCY MEDICINE

## 2022-04-17 PROCEDURE — 96374 THER/PROPH/DIAG INJ IV PUSH: CPT

## 2022-04-17 PROCEDURE — 71045 X-RAY EXAM CHEST 1 VIEW: CPT

## 2022-04-17 PROCEDURE — 71250 CT THORAX DX C-: CPT

## 2022-04-17 PROCEDURE — 0241U HB NFCT DS VIR RESP RNA 4 TRGT: CPT | Performed by: EMERGENCY MEDICINE

## 2022-04-17 PROCEDURE — 36415 COLL VENOUS BLD VENIPUNCTURE: CPT | Performed by: EMERGENCY MEDICINE

## 2022-04-17 PROCEDURE — 81001 URINALYSIS AUTO W/SCOPE: CPT | Performed by: EMERGENCY MEDICINE

## 2022-04-17 PROCEDURE — 85025 COMPLETE CBC W/AUTO DIFF WBC: CPT | Performed by: EMERGENCY MEDICINE

## 2022-04-17 PROCEDURE — 99284 EMERGENCY DEPT VISIT MOD MDM: CPT

## 2022-04-17 PROCEDURE — 96361 HYDRATE IV INFUSION ADD-ON: CPT

## 2022-04-17 PROCEDURE — 80053 COMPREHEN METABOLIC PANEL: CPT | Performed by: EMERGENCY MEDICINE

## 2022-04-17 PROCEDURE — 93005 ELECTROCARDIOGRAM TRACING: CPT

## 2022-04-17 PROCEDURE — 94640 AIRWAY INHALATION TREATMENT: CPT

## 2022-04-17 PROCEDURE — G1004 CDSM NDSC: HCPCS

## 2022-04-17 PROCEDURE — 83735 ASSAY OF MAGNESIUM: CPT | Performed by: EMERGENCY MEDICINE

## 2022-04-17 PROCEDURE — 99285 EMERGENCY DEPT VISIT HI MDM: CPT | Performed by: EMERGENCY MEDICINE

## 2022-04-17 PROCEDURE — 84484 ASSAY OF TROPONIN QUANT: CPT | Performed by: EMERGENCY MEDICINE

## 2022-04-17 RX ORDER — PREDNISONE 50 MG/1
50 TABLET ORAL DAILY
Qty: 5 TABLET | Refills: 0 | Status: SHIPPED | OUTPATIENT
Start: 2022-04-17 | End: 2022-04-22

## 2022-04-17 RX ORDER — ALBUTEROL SULFATE 90 UG/1
2 AEROSOL, METERED RESPIRATORY (INHALATION) EVERY 6 HOURS PRN
Qty: 18 G | Refills: 0 | Status: SHIPPED | OUTPATIENT
Start: 2022-04-17 | End: 2022-08-04

## 2022-04-17 RX ORDER — ALBUTEROL SULFATE 2.5 MG/3ML
2.5 SOLUTION RESPIRATORY (INHALATION) EVERY 6 HOURS PRN
Qty: 75 ML | Refills: 0 | Status: SHIPPED | OUTPATIENT
Start: 2022-04-17

## 2022-04-17 RX ORDER — METHYLPREDNISOLONE SODIUM SUCCINATE 125 MG/2ML
60 INJECTION, POWDER, LYOPHILIZED, FOR SOLUTION INTRAMUSCULAR; INTRAVENOUS ONCE
Status: COMPLETED | OUTPATIENT
Start: 2022-04-17 | End: 2022-04-17

## 2022-04-17 RX ADMIN — ALBUTEROL SULFATE 5 MG: 2.5 SOLUTION RESPIRATORY (INHALATION) at 17:50

## 2022-04-17 RX ADMIN — IPRATROPIUM BROMIDE 0.5 MG: 0.5 SOLUTION RESPIRATORY (INHALATION) at 16:20

## 2022-04-17 RX ADMIN — IPRATROPIUM BROMIDE 0.5 MG: 0.5 SOLUTION RESPIRATORY (INHALATION) at 17:51

## 2022-04-17 RX ADMIN — METHYLPREDNISOLONE SODIUM SUCCINATE 60 MG: 125 INJECTION, POWDER, FOR SOLUTION INTRAMUSCULAR; INTRAVENOUS at 16:15

## 2022-04-17 RX ADMIN — SODIUM CHLORIDE 500 ML: 0.9 INJECTION, SOLUTION INTRAVENOUS at 17:48

## 2022-04-17 RX ADMIN — ALBUTEROL SULFATE 5 MG: 2.5 SOLUTION RESPIRATORY (INHALATION) at 16:20

## 2022-04-17 NOTE — ED NOTES
[facility] and pt and her daughter educated on its use        Pilar Rosales, LEEANN  04/17/22 1956

## 2022-04-17 NOTE — ED PROVIDER NOTES
History  Chief Complaint   Patient presents with    Wheezing     Pt daughter reports onset of audible wheezing "sometime over the last few days"  Pt not having increased work of breathing or SOB, but daughter is concerned     80-year-old female with past history of type 2 diabetes, asthma, COPD, hypertension, brain benign neoplasm, GERD, arthritis, DVT, on Eliquis, dementia, CVA, expressive aphasia status post CVA, bilateral upper and lower extremity weakness status post CVA, ambulatory with walker and cane, presents to the ED for evaluation of increasing wheezing and shortness of breath over the past 3-4 days  Patient lives at her house  Patient's children take turns staying with her to take care of her  Children noted patient wheezing and getting short of breath over the past few days  Patient did not have any fevers  Patient has had a dry cough  Patient was using her inhalers without relief  Daughter brought patient to the ED today for further evaluation  Patient primarily understands Guyanese  Patient is not able to speak due to expressive aphasia  Daughter is translating at bedside  Daughter states that patient was recently treated for UTI with an antibiotic last week  History provided by:  Patient  Wheezing      Prior to Admission Medications   Prescriptions Last Dose Informant Patient Reported? Taking?    JANUVIA 50 MG tablet 2022 at Unknown time Child Yes Yes   Si mg daily     Multiple Vitamins-Minerals (MULTIVITAMIN ADULT PO) 2022 at Unknown time Child Yes Yes   Si 5 tablets 2 (two) times a day     albuterol (PROVENTIL HFA,VENTOLIN HFA) 90 mcg/act inhaler 2022 at Unknown time Child Yes Yes   Sig: Inhale 2 puffs every 6 (six) hours as needed for wheezing   albuterol (PROVENTIL HFA,VENTOLIN HFA) 90 mcg/act inhaler   No No   Sig: Inhale 2 puffs every 6 (six) hours as needed for wheezing   apixaban (ELIQUIS) 2 5 mg 2022 at Unknown time Child No Yes   Sig: Take 1 tablet (2 5 mg total) by mouth 2 (two) times a day   aspirin (ECOTRIN LOW STRENGTH) 81 mg EC tablet 4/17/2022 at Unknown time Child No Yes   Sig: Take 1 tablet (81 mg total) by mouth daily   atorvastatin (LIPITOR) 40 mg tablet 4/16/2022 at Unknown time Child No Yes   Sig: Take 2 tablets (80 mg total) by mouth daily with dinner   furosemide (LASIX) 20 mg tablet 4/17/2022 at Unknown time Child No Yes   Sig: Take 1 tablet (20 mg total) by mouth daily   iron polysaccharides (FERREX) 150 mg capsule 4/17/2022 at Unknown time Child Yes Yes   Sig: Take 150 mg by mouth 2 (two) times a day   losartan (COZAAR) 25 mg tablet 4/17/2022 at Unknown time Child Yes Yes   metoprolol tartrate (LOPRESSOR) 50 mg tablet 4/17/2022 at Unknown time Child No Yes   Sig: Take 1 tablet (50 mg total) by mouth every 12 (twelve) hours   potassium chloride (MICRO-K) 10 MEQ CR capsule 4/17/2022 at Unknown time Child Yes Yes      Facility-Administered Medications: None       Past Medical History:   Diagnosis Date    Acid reflux     on occ    Arthritis     DJD right hip replaced    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[facility] [address] [facility] 2007    x 2 lesions with no change    Cancer ([facility] [address] [facility] 2007    colonic polyps, no surgery done    Deep vein thrombosis (DVT) of left upper extremity ([facility] [address] [facility] 12/11/2017    Dementia [place] [address] [facility]     Diabetes mellitus ([facility] [address] [facility]     type 2    Diverticulosis     Edema     in legs on occ    Hypertension     on occ    Language barrier     speaks Danish & broken english    Stroke [facility])        Past Surgical History:   Procedure Laterality Date    COLON SURGERY      COLONOSCOPY      COLONOSCOPY N/A 11/2/2016    Procedure: COLONOSCOPY;  Surgeon: Katrin Burrows MD;  Location: [facility] GI LAB; Service:     ESOPHAGOGASTRODUODENOSCOPY N/A 11/2/2016    Procedure: ESOPHAGOGASTRODUODENOSCOPY (EGD); Surgeon: Katrin Burrows MD;  Location: [facility] GI LAB;   Service:    Rd Munoz / Vinay Smith / Gilford Adams      JOINT REPLACEMENT Right 02/2015    hip    JOINT REPLACEMENT Left     knee    JOINT REPLACEMENT Right     knee    MITRAL VALVE REPLACEMENT      UT COLONOSCOPY FLX DX W/COLLJ SPEC WHEN PFRMD N/A 2018    Procedure: EGD AND COLONOSCOPY;  Surgeon: Keturah Peralta MD;  [facility]; Service: Gastroenterology    UT REVISE MEDIAN N/CARPAL TUNNEL SURG Left 2016    Procedure: RELEASE CARPAL TUNNEL;  Surgeon: Jojo Arguello MD;  Location: [facility] MAIN OR;  Service: Orthopedics    TUBAL LIGATION      VARICOSE VEIN SURGERY Bilateral        Family History   Problem Relation Age of Onset    Cancer Mother         throat     I have reviewed and agree with the history as documented  E-Cigarette/Vaping    E-Cigarette Use Never User      E-Cigarette/Vaping Substances     Social History     Tobacco Use    Smoking status: Former Smoker     Packs/day: 0 25     Years: 10 00     Pack years: 2 50     Types: Cigarettes     Quit date:      Years since quittin 3    Smokeless tobacco: Never Used   Vaping Use    Vaping Use: Never used   Substance Use Topics    Alcohol use: No    Drug use: No       Review of Systems   Unable to perform ROS: Dementia   Respiratory: Positive for wheezing  Physical Exam  Physical Exam  Vitals and nursing note reviewed  Constitutional:       Appearance: She is well-developed  HENT:      Head: Normocephalic and atraumatic  Eyes:      Conjunctiva/sclera: Conjunctivae normal    Cardiovascular:      Rate and Rhythm: Normal rate and regular rhythm  Heart sounds: Normal heart sounds  Pulmonary:      Effort: Pulmonary effort is normal       Breath sounds: Normal breath sounds  Comments: Scattered, fine wheezing noted to bilateral lungs  Abdominal:      General: Bowel sounds are normal  There is no distension  Palpations: Abdomen is soft  Tenderness: There is no abdominal tenderness  Musculoskeletal:         General: Normal range of motion        Cervical back: Normal range of motion and neck supple  Skin:     General: Skin is warm and dry  Neurological:      Mental Status: She is alert  Comments: Patient is spontaneously moving all extremity  Generalized weakness noted bilateral   No obvious focal neuro deficits noted  Full neuro exam is limited due to dementia  Psychiatric:         Behavior: Behavior normal          Thought Content: Thought content normal          Judgment: Judgment normal          Vital Signs  ED Triage Vitals [04/17/22 1453]   Temperature Pulse Respirations Blood Pressure SpO2   98 2 °F (36 8 °C) 73 18 128/61 98 %      Temp Source Heart Rate Source Patient Position - Orthostatic VS BP Location FiO2 (%)   Temporal Monitor Sitting Right arm --      Pain Score       No Pain           Vitals:    04/17/22 1453 04/17/22 1645   BP: 128/61 133/86   Pulse: 73 70   Patient Position - Orthostatic VS: Sitting Lying         Visual Acuity      ED Medications  Medications   ipratropium (ATROVENT) 0 02 % inhalation solution 0 5 mg (has no administration in time range)   albuterol inhalation solution 5 mg (has no administration in time range)   sodium chloride 0 9 % bolus 500 mL (has no administration in time range)   ipratropium (ATROVENT) 0 02 % inhalation solution 0 5 mg (0 5 mg Nebulization Given 4/17/22 1620)   albuterol inhalation solution 5 mg (5 mg Nebulization Given 4/17/22 1620)   methylPREDNISolone sodium succinate (Solu-MEDROL) injection 60 mg (60 mg Intravenous Given 4/17/22 1615)       Diagnostic Studies  Results Reviewed     Procedure Component Value Units Date/Time    Urine Microscopic [[id number]]  (Normal) Collected: 04/17/22 1709    Lab Status: Final result Specimen: Urine, Clean Catch Updated: 04/17/22 1729     RBC, UA None Seen /hpf      WBC, UA 2-4 /hpf      Epithelial Cells None Seen /hpf      Bacteria, UA None Seen /hpf     COVID/FLU/RSV - 2 hour TAT [[id number]] Collected: 04/17/22 1717    Lab Status:  In process Specimen: Nares from Nose Updated: 04/17/22 1721 UA w Reflex to Microscopic w Reflex to Culture [[id number]]  (Abnormal) Collected: 04/17/22 1709    Lab Status: Final result Specimen: Urine, Clean Catch Updated: 04/17/22 1716     Color, UA Yellow     Clarity, UA Clear     Specific Gravity, UA <=1 005     pH, UA 6 0     Leukocytes, UA Trace     Nitrite, UA Negative     Protein, UA Negative mg/dl      Glucose, UA Negative mg/dl      Ketones, UA Negative mg/dl      Urobilinogen, UA 0 2 E U /dl      Bilirubin, UA Negative     Blood, UA Negative    HS Troponin 0hr (reflex protocol) [[id number]]  (Normal) Collected: 04/17/22 1543    Lab Status: Final result Specimen: Blood from Arm, Right Updated: 04/17/22 1613     hs TnI 0hr 5 ng/L     HS Troponin I 2hr [[id number]]     Lab Status: No result Specimen: Blood     HS Troponin I 4hr [[id number]]     Lab Status: No result Specimen: Blood     CBC and differential [[id number]]  (Abnormal) Collected: 04/17/22 1543    Lab Status: Final result Specimen: Blood from Arm, Right Updated: 04/17/22 1612     WBC 4 97 Thousand/uL      RBC 4 28 Million/uL      Hemoglobin 12 7 g/dL      Hematocrit 40 4 %      MCV 94 fL      MCH 29 7 pg      MCHC 31 4 g/dL      RDW 13 1 %      MPV 10 8 fL      Platelets 87 Thousands/uL      nRBC 0 /100 WBCs      Neutrophils Relative 44 %      Immat GRANS % 0 %      Lymphocytes Relative 26 %      Monocytes Relative 10 %      Eosinophils Relative 20 %      Basophils Relative 0 %      Neutrophils Absolute 2 16 Thousands/µL      Immature Grans Absolute 0 01 Thousand/uL      Lymphocytes Absolute 1 30 Thousands/µL      Monocytes Absolute 0 48 Thousand/µL      Eosinophils Absolute 1 00 Thousand/µL      Basophils Absolute 0 02 Thousands/µL     NT-BNP PRO [[id number]]  (Abnormal) Collected: 04/17/22 1543    Lab Status: Final result Specimen: Blood from Arm, Right Updated: 04/17/22 1612     NT-proBNP 1,197 pg/mL     Magnesium [[id number]]  (Normal) Collected: 04/17/22 1543    Lab Status: Final result Specimen: Blood from Arm, Right Updated: 04/17/22 1612     Magnesium 2 3 mg/dL     Comprehensive metabolic panel [[id number]]  (Abnormal) Collected: 04/17/22 1543    Lab Status: Final result Specimen: Blood from Arm, Right Updated: 04/17/22 1606     Sodium 139 mmol/L      Potassium 4 4 mmol/L      Chloride 102 mmol/L      CO2 29 mmol/L      ANION GAP 8 mmol/L      BUN 28 mg/dL      Creatinine 1 38 mg/dL      Glucose 91 mg/dL      Calcium 9 3 mg/dL      Corrected Calcium 9 8 mg/dL      AST 24 U/L      ALT 28 U/L      Alkaline Phosphatase 84 U/L      Total Protein 7 0 g/dL      Albumin 3 4 g/dL      Total Bilirubin 0 66 mg/dL      eGFR 34 ml/min/1 73sq m     Narrative:      [place] guidelines for Chronic Kidney Disease (CKD):     Stage 1 with normal or high GFR (GFR > 90 mL/min/1 73 square meters)    Stage 2 Mild CKD (GFR = 60-89 mL/min/1 73 square meters)    Stage 3A Moderate CKD (GFR = 45-59 mL/min/1 73 square meters)    Stage 3B Moderate CKD (GFR = 30-44 mL/min/1 73 square meters)    Stage 4 Severe CKD (GFR = 15-29 mL/min/1 73 square meters)    Stage 5 End Stage CKD (GFR <15 mL/min/1 73 square meters)  Note: GFR calculation is accurate only with a steady state creatinine                 XR chest 1 view portable    (Results Pending)   CT chest without contrast    (Results Pending)              Procedures  ECG 12 Lead Documentation Only    Date/Time: 4/17/2022 3:47 PM  Performed by: Leopold Alba, DO  Authorized by: Leopold Alba, DO     Indications / Diagnosis:  Shortness of  ECG reviewed by me, the ED Provider: yes    Patient location:  ED  Previous ECG:     Previous ECG:  Compared to current    Similarity:  No change    Comparison to cardiac monitor: Yes    Interpretation:     Interpretation: abnormal    Comments:      Atrially paced rhythm, rate 69, right bundle branch block pattern noted, left anterior fascicular block noted, bifascicular block noted, no acute ST elevations noted, unchanged from baseline  ED Course  ED Course as of 04/17/22 1732   Sun Apr 17, 2022   1707 Patient re-examined at bedside  Lung sounds more clear  Chest x-ray was equivocal   Will obtain CT lungs for further evaluation of patient's shortness of breath  Patient's daughter agrees  When patient returns from CT scan neb treatment will be re-dosed  Patient is feeling uncomfortable with purewick and does not want to void  Daughter is requesting bedside commode  SBIRT 22yo+      Most Recent Value   SBIRT (24 yo +)    In order to provide better care to our patients, we are screening all of our patients for alcohol and drug use  Would it be okay to ask you these screening questions? No Filed at: 04/17/2022 1624                    MDM  Number of Diagnoses or Management Options  Asthma exacerbation: new and requires workup  Elevated serum creatinine: new and requires workup  Diagnosis management comments: Obtain blood work, EKG, chest x-ray, UA  Give steroids, neb treatment and reassess       Amount and/or Complexity of Data Reviewed  Clinical lab tests: ordered and reviewed  Tests in the radiology section of CPT®: ordered and reviewed  Tests in the medicine section of CPT®: ordered and reviewed  Review and summarize past medical records: yes  Independent visualization of images, tracings, or specimens: yes    Risk of Complications, Morbidity, and/or Mortality  General comments: Patient's CMP showed concern for mildly elevated BUN/creatinine, compared to baseline, suggesting possible mild dehydration  Patient given IV fluid bolus in the emergency department  Patient reassessed after 1st neb treatment and lung sounds are starting to improve  Chest x-ray was equivocal   BNP is improved compared to previous study  Subsequently CT chest without contrast was was ordered for further evaluation  UA is pending    Care patient signed out to the next attending will follow-up with CT chest as well as UA   If CT chest is unremarkable and patient feels better than patient will be discharged home on prednisone burst with refill of her inhaler as well as a nebulizer and neb solution  Patient will follow up with PCP in 2-3 days  If UA shows concern for UTI then antibiotics will be started  Patient Progress  Patient progress: stable      Disposition  Final diagnoses:   Asthma exacerbation   Elevated serum creatinine     Time reflects when diagnosis was documented in both MDM as applicable and the Disposition within this note     Time User Action Codes Description Comment    4/17/2022  5:16 PM Michoacano Sprain Add [J45 [id number]] Asthma exacerbation     4/17/2022  5:21 PM Ernesto Jaleel [R79 [id number]] Elevated serum creatinine       ED Disposition     None      Follow-up Information     Follow up With Specialties Details Why Anjali Saravia MD Internal Medicine In 2 days  [address]  [address] [place] [address]  [phone]            Patient's Medications   Discharge Prescriptions    ALBUTEROL (2 5 MG/3 ML) 0 083 % NEBULIZER SOLUTION    Take 3 mL (2 5 mg total) by nebulization every 6 (six) hours as needed for wheezing or shortness of breath       Start Date: 4/17/2022 End Date: --       Order Dose: 2 5 mg       Quantity: 75 mL    Refills: 0    PREDNISONE 50 MG TABLET    Take 1 tablet (50 mg total) by mouth daily for 5 days       Start Date: 4/17/2022 End Date: 4/22/2022       Order Dose: 50 mg       Quantity: 5 tablet    Refills: 0       Outpatient Discharge Orders   Nebulizer     Basic metabolic panel   Standing Status: Future Standing Exp   Date: 04/17/23       PDMP Review     None          ED Provider  Electronically Signed by           Stacie Webber,   04/17/22 2100 [facility],   04/17/22 3609

## 2022-04-19 ENCOUNTER — APPOINTMENT (OUTPATIENT)
Dept: LAB | Facility: CLINIC | Age: 87
End: 2022-04-19
Payer: MEDICARE

## 2022-04-19 DIAGNOSIS — R79.89 ELEVATED SERUM CREATININE: ICD-10-CM

## 2022-04-19 LAB
ANION GAP SERPL CALCULATED.3IONS-SCNC: 3 MMOL/L (ref 4–13)
BUN SERPL-MCNC: 36 MG/DL (ref 5–25)
CALCIUM SERPL-MCNC: 10.2 MG/DL (ref 8.3–10.1)
CHLORIDE SERPL-SCNC: 107 MMOL/L (ref 100–108)
CO2 SERPL-SCNC: 30 MMOL/L (ref 21–32)
CREAT SERPL-MCNC: 1.56 MG/DL (ref 0.6–1.3)
GFR SERPL CREATININE-BSD FRML MDRD: 29 ML/MIN/1.73SQ M
GLUCOSE SERPL-MCNC: 104 MG/DL (ref 65–140)
POTASSIUM SERPL-SCNC: 4.1 MMOL/L (ref 3.5–5.3)
SODIUM SERPL-SCNC: 140 MMOL/L (ref 136–145)

## 2022-04-19 PROCEDURE — 80048 BASIC METABOLIC PNL TOTAL CA: CPT

## 2022-04-19 PROCEDURE — 36415 COLL VENOUS BLD VENIPUNCTURE: CPT

## 2022-04-20 LAB
ATRIAL RATE: 69 BPM
P AXIS: 41 DEGREES
PR INTERVAL: 224 MS
QRS AXIS: -55 DEGREES
QRSD INTERVAL: 138 MS
QT INTERVAL: 452 MS
QTC INTERVAL: 484 MS
T WAVE AXIS: 36 DEGREES
VENTRICULAR RATE: 69 BPM

## 2022-04-20 PROCEDURE — 93010 ELECTROCARDIOGRAM REPORT: CPT | Performed by: INTERNAL MEDICINE

## 2022-04-26 ENCOUNTER — OFFICE VISIT (OUTPATIENT)
Dept: NEUROSURGERY | Facility: CLINIC | Age: 87
End: 2022-04-26
Payer: MEDICARE

## 2022-04-26 VITALS
DIASTOLIC BLOOD PRESSURE: 64 MMHG | BODY MASS INDEX: 28.16 KG/M2 | TEMPERATURE: 98.4 F | RESPIRATION RATE: 18 BRPM | HEIGHT: 62 IN | WEIGHT: 153 LBS | SYSTOLIC BLOOD PRESSURE: 118 MMHG

## 2022-04-26 DIAGNOSIS — D32.9 MENINGIOMA (HCC): ICD-10-CM

## 2022-04-26 PROCEDURE — 99203 OFFICE O/P NEW LOW 30 MIN: CPT | Performed by: PHYSICIAN ASSISTANT

## 2022-04-26 RX ORDER — FUROSEMIDE 40 MG/1
40 TABLET ORAL
COMMUNITY
Start: 2022-04-04 | End: 2022-08-04

## 2022-04-26 NOTE — ASSESSMENT & PLAN NOTE
· Pt presents for follow up for 2 meningiomas on the left  · Last seen by [facility] in 2015 by Dr Ko Yousif - the 2 left meningiomas were stable then  · Reviewed prior MRI brain  · MRI brain w wo 2/2/2015: the left of the other more superiorly at the left frontoparietal vertex are stable  These are both small without significant interval growth  Plan  · Discussed with pt and her daughter that they should follow up with neurology for her tremors  · No recent imaging to review meningiomas  Pt had MRI brain without contrast in 2018  The meningiomas were not visible due to lack of contrast    · Ordered updated Mri brain with and without contrast to be done in 1 month  BUN and Creatine ordered to be completed prior to the MRI to evaluate kidney function prior to the MRIs  Pt recently noted to have elevated Creatinine  Encouraged pt and her daughter to consider follow up with PCP/Nephrology to help optimize kidney function  Encouraged adequate fluid intake  · Discussed with patient and her daughter that if the updated MRI brain shows stable meningioma, then they should expected to stagger the next follow up by at least 2 to 3 years or more so that pt does not have a lot of contrasted images given her CKD  · Discussed plan of care with patient and her daughter  · Patient and her daughter made aware to contact neurosurgery with any questions or concerns

## 2022-04-26 NOTE — PROGRESS NOTES
Neurosurgery Office Note  Eva Mcdaniels 80 y o  female MRN: [id number]      Assessment/Plan     Meningioma [facility])  · Pt presents for follow up for 2 meningiomas on the left  · Last seen by [facility] in 2015 by Dr Jeanie Muñoz - the 2 left meningiomas were stable then  · Reviewed prior MRI brain  · MRI brain w wo 2/2/2015: the left of the other more superiorly at the left frontoparietal vertex are stable  These are both small without significant interval growth  Plan  · Discussed with pt and her daughter that they should follow up with neurology for her tremors  · No recent imaging to review meningiomas  Pt had MRI brain without contrast in 2018  The meningiomas were not visible due to lack of contrast    · Ordered updated Mri brain with and without contrast to be done in 1 month  BUN and Creatine ordered to be completed prior to the MRI to evaluate kidney function prior to the MRIs  Pt recently noted to have elevated Creatinine  Encouraged pt and her daughter to consider follow up with PCP/Nephrology to help optimize kidney function  Encouraged adequate fluid intake  · Discussed with patient and her daughter that if the updated MRI brain shows stable meningioma, then they should expected to stagger the next follow up by at least 2 to 3 years or more so that pt does not have a lot of contrasted images given her CKD  · Discussed plan of care with patient and her daughter  · Patient and her daughter made aware to contact neurosurgery with any questions or concerns  Diagnoses and all orders for this visit:    Meningioma [facility])  -     Ambulatory Referral to Neurosurgery  -     MRI brain with and without contrast; Future  -     BUN; Future  -     Creatinine, serum;  Future    Other orders  -     furosemide (LASIX) 40 mg tablet          CHIEF COMPLAINT    Chief Complaint   Patient presents with    Follow-up       HISTORY    History of Present Illness     80y o  year old female     Patient is a 80year old female with PMHx of CKD, Hx of Stroke, Meningiomas, HTN and dementia who presents to the neurosurgery office to follow up for hx of Meningiomas  Pt was accompanied by her daughter  Her daughter reports that pt has been intermittently having headaches about once or twice a month  She reports that the headache are always located in the occipital region  Given that one of the lesions is in the occipital region, they were concerned about the status of the meningiomas  Pt reports that occasionally she will also have dizziness  She denies nausea or vomiting  She denies changes in her gait  She reports she uses a cane for mobility  She denies changes in vision  Her daughter reports that pt had bilateral visual disturbance when she had the stroke but no other changes since then  Pt denies any new numbness, tingling or weakness in BUE/BLE  REVIEW OF SYSTEMS    Review of Systems   Constitutional: Negative  HENT: Negative  Eyes: Negative  Respiratory: Positive for shortness of breath (on Exercision)  Cardiovascular: Negative  Gastrointestinal: Negative for constipation  H/o HTN/ Hyperlipidemia/ 3 Strokes   Endocrine: Negative  H/o Kidney Disease Stage 3   H/o Diabetes   Genitourinary: Negative  Musculoskeletal: Positive for gait problem (difficulty walking, uses walker  )  Skin: Negative  Allergic/Immunologic: Negative  Neurological: Positive for dizziness, speech difficulty (Difficulty with speech due to 2nd Stroke), weakness (B/L Upper /Lower Extremities) and headaches  H/o Meningioma, Dementia   Lorena (daughter) has provided some history   Hematological: Bruises/bleeds easily (On Eliquis and Aspirin)  Psychiatric/Behavioral: Positive for confusion, decreased concentration and sleep disturbance  All other systems reviewed and are negative          Meds/Allergies     Current Outpatient Medications   Medication Sig Dispense Refill    albuterol (2 5 mg/3 mL) 0 083 % nebulizer solution Take 3 mL (2 5 mg total) by nebulization every 6 (six) hours as needed for wheezing or shortness of breath 75 mL 0    albuterol (PROVENTIL HFA,VENTOLIN HFA) 90 mcg/act inhaler Inhale 2 puffs every 6 (six) hours as needed for wheezing 18 g 0    apixaban (ELIQUIS) 2 5 mg Take 1 tablet (2 5 mg total) by mouth 2 (two) times a day  0    aspirin (ECOTRIN LOW STRENGTH) 81 mg EC tablet Take 1 tablet (81 mg total) by mouth daily  0    atorvastatin (LIPITOR) 40 mg tablet Take 2 tablets (80 mg total) by mouth daily with dinner 30 tablet 3    furosemide (LASIX) 20 mg tablet Take 1 tablet (20 mg total) by mouth daily  0    iron polysaccharides (FERREX) 150 mg capsule Take 150 mg by mouth 2 (two) times a day      JANUVIA 50 MG tablet 50 mg daily        losartan (COZAAR) 25 mg tablet       metoprolol tartrate (LOPRESSOR) 50 mg tablet Take 1 tablet (50 mg total) by mouth every 12 (twelve) hours  0    Multiple Vitamins-Minerals (MULTIVITAMIN ADULT PO) 0 5 tablets 2 (two) times a day        potassium chloride (MICRO-K) 10 MEQ CR capsule       furosemide (LASIX) 40 mg tablet        No current facility-administered medications for this visit         Allergies   Allergen Reactions    Vancomycin      Red man syndrome    Heparin Other (See Comments)     Thrombocytopenia      Glimepiride Rash       PAST HISTORY    Past Medical History:   Diagnosis Date    Acid reflux     on occ    Arthritis     DJD right hip replaced    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    x 2 lesions with no change    Cancer ([facility] [address] ) 2007    colonic polyps, no surgery done    Deep vein thrombosis (DVT) of left upper extremity ([address] ) 12/11/2017    Dementia ([facility] [address] )     Diabetes mellitus ([facility] [address] )     type 2    Diverticulosis     Edema     in legs on occ    Hypertension     on occ    Language barrier     speaks Hebrew & broken english    Stroke [facility])        Past Surgical History:   Procedure Laterality Date    COLON SURGERY      COLONOSCOPY      COLONOSCOPY N/A 2016    Procedure: COLONOSCOPY;  Surgeon: Sruthi Pedersen MD;  Location: [facility] GI LAB; Service:     ESOPHAGOGASTRODUODENOSCOPY N/A 2016    Procedure: ESOPHAGOGASTRODUODENOSCOPY (EGD); Surgeon: Sruthi Pedersen MD;  Location: [facility] GI LAB; Service:    Golden Mcintyre / Genny Ra / Aislinn Tee      JOINT REPLACEMENT Right 2015    hip    JOINT REPLACEMENT Left     knee    JOINT REPLACEMENT Right     knee    MITRAL VALVE REPLACEMENT      NJ COLONOSCOPY FLX DX W/COLLJ SPEC WHEN PFRMD N/A 2018    Procedure: EGD AND COLONOSCOPY;  Surgeon: Sruthi Pedersen MD;  Location: [facility] GI LAB; Service: Gastroenterology    NJ REVISE MEDIAN N/CARPAL TUNNEL SURG Left 2016    Procedure: RELEASE CARPAL TUNNEL;  Surgeon: Janice Workman MD;  Location: [facility] MAIN OR;  Service: Orthopedics    TUBAL LIGATION      VARICOSE VEIN SURGERY Bilateral        Social History     Tobacco Use    Smoking status: Former Smoker     Packs/day: 0 25     Years: 10 00     Pack years: 2 50     Types: Cigarettes     Quit date:      Years since quittin 3    Smokeless tobacco: Never Used   Vaping Use    Vaping Use: Never used   Substance Use Topics    Alcohol use: No    Drug use: No       Family History   Problem Relation Age of Onset    Cancer Mother         throat         Above history personally reviewed  EXAM    Vitals:Blood pressure 118/64, temperature 98 4 °F (36 9 °C), temperature source Temporal, resp  rate 18, height 5' 1 5" (1 562 m), weight 69 4 kg (153 lb), not currently breastfeeding  ,Body mass index is 28 44 kg/m²  Physical Exam  Constitutional:       General: She is not in acute distress  Appearance: She is well-developed  HENT:      Head: Normocephalic and atraumatic  Eyes:      Pupils: Pupils are equal, round, and reactive to light  Neck:      Trachea: No tracheal deviation  Cardiovascular:      Rate and Rhythm: Normal rate  Pulmonary:      Effort: Pulmonary effort is normal    Abdominal:      Palpations: Abdomen is soft  Tenderness: There is no abdominal tenderness  There is no guarding  Musculoskeletal:      Cervical back: Neck supple  Skin:     General: Skin is warm and dry  Coloration: Skin is not pale  Findings: No rash  Neurological:      Mental Status: She is alert  Comments: GCS 14 (-1 confusion), Awake, Alert, Oriented to person only, not oriented to place or time    Motor: WOODARD, strength 4+/5 BUE, 4/5 BLE  Sensation:  intact to LT X 4     Coordination: no drift bilateral upper extremities    Pt noted to have tremors  Psychiatric:         Behavior: Behavior normal          Neurologic Exam     Cranial Nerves     CN III, IV, VI   Pupils are equal, round, and reactive to light  MEDICAL DECISION MAKING    Imaging Studies:     XR chest 1 view portable    Result Date: 4/18/2022  Narrative: CHEST INDICATION:   sob  COMPARISON:  2/22/2020 EXAM PERFORMED/VIEWS:  XR CHEST PORTABLE FINDINGS:  Left-sided chest wall intracardiac device is identified  Leads are intact  Cardiomediastinal silhouette appears unremarkable  The lungs are clear  No pneumothorax or pleural effusion  Osseous structures appear within normal limits for patient age  Impression: No acute cardiopulmonary disease  Workstation performed: [id number]     CT chest without contrast    Result Date: 4/17/2022  Narrative: CT CHEST WITHOUT IV CONTRAST Chest radiograph (PORTABLE) INDICATION:   Shortness of breath  COMPARISON:  Chest CT dated 5/20/2018  TECHNIQUE: Initially, portable chest radiograph was performed  Subsequently, CT examination of the chest was performed without intravenous contrast   Axial, sagittal, and coronal 2D reformatted images were created from the source data and submitted for interpretation  Radiation dose length product (DLP) for this visit:  345 mGy-cm     This examination, like all CT scans performed in the [facility], was performed utilizing techniques to minimize radiation dose exposure, including the use of iterative reconstruction and automated exposure control  FINDINGS: RADIOGRAPH: Dual-lead pacer is present  Intact median sternotomy wires  Heart size within normal limits for portable technique  Aortic atherosclerosis  Pulmonary vasculature appears mildly indistinct  No Kerley B lines  No focal consolidation, pneumothorax, or effusion  Calcific tendinosis of the left rotator cuff  Degenerative changes of the left shoulder  No displaced rib fractures or other acute osseous abnormalities  CT: LUNGS:  Subtle changes of centrilobular emphysema  Groundglass opacity in the right upper lobe measuring 15 mm x 11 mm (3/52) not visible on conventional radiograph  Peripheral groundglass opacity measuring 10 mm x 9 mm in the left upper lobe (3/39)  These are new from 2018  No consolidative opacity  No interstitial fibrosis or thickening  No endobronchial lesions  3 mm solid, noncalcified pulmonary nodule in the left lower lobe (3/81) not definitively seen in 2018, though potentially obscured the rest of motion at that time  Other scattered bilateral pulmonary nodules measuring 3 mm and below throughout both lung fields appear unchanged since 2018  PLEURA:  Unremarkable  HEART/GREAT VESSELS: Heart is enlarged  Aortic valvular, mitral annular and coronary tendon may calcifications  Postsurgical changes after CABG  Native coronary atherosclerotic calcifications  Pacer leads in the right atrium and right ventricle  No pericardial thickening or effusion  Thoracic aortic atherosclerosis without aneurysm  MEDIASTINUM AND KEILA:  Chronic postsurgical stranding in the ventral mediastinal fat after CABG  No masses or lymphadenopathy  CHEST WALL AND LOWER NECK:  No abnormality around the patient's implantable defibrillator generator pack  Healed median sternotomy without dehiscence   VISUALIZED STRUCTURES IN THE UPPER ABDOMEN:  Moderate-sized type III hiatal hernia  No acute findings within visualized portions of the upper abdomen  OSSEOUS STRUCTURES: Incidental note of chondrocalcinosis and calcific tendinosis at the left shoulder  Healed median sternotomy  No acute fracture or destructive osseous lesion  Impression: 1  Mild groundglass opacities in the anterior upper lobes could represent low level atypical infectious or inflammatory process  No conventional pneumonic consolidations  Recommend CT follow-up in 6-12 months to document resolution or stability  2   Otherwise, no acute findings  3   Scattered 2 to 3 mm pulmonary nodules throughout both lungs largely unchanged from 2018   3 mm nodule in the left lower lobe was not seen on prior but may have been obscured  Likelihood of malignancy is extremely low  Workstation performed: [id number]       I have personally reviewed pertinent reports     and I have personally reviewed pertinent films in PACS

## 2022-04-29 ENCOUNTER — TELEPHONE (OUTPATIENT)
Dept: NEUROSURGERY | Facility: CLINIC | Age: 87
End: 2022-04-29

## 2022-04-29 DIAGNOSIS — D32.9 MENINGIOMA (HCC): Primary | ICD-10-CM

## 2022-04-29 NOTE — TELEPHONE ENCOUNTER
Received a call from [facility] with MRI reporting the patient is unable to safely undergo an MRI  She reports she spoke with technical services with [facility]  Since the patient has a heart valve, pacemaker, and retained epicardial pacer leads, she is unable to undergo an MRI safely per [facility]  She will notify the patient  This RN will send a message to the ordering provider to see if they can order a different type of imaging

## 2022-05-06 NOTE — TELEPHONE ENCOUNTER
Pt unable to have MRI brain for  Meningioma follow up  Ordered CT head with and without contrast instead

## 2022-05-09 ENCOUNTER — HOSPITAL ENCOUNTER (OUTPATIENT)
Dept: RADIOLOGY | Facility: HOSPITAL | Age: 87
Discharge: HOME | End: 2022-05-09
Payer: MEDICARE

## 2022-05-09 DIAGNOSIS — I65.23 BILATERAL CAROTID ARTERY STENOSIS: Chronic | ICD-10-CM

## 2022-05-09 PROCEDURE — 93880 EXTRACRANIAL BILAT STUDY: CPT | Performed by: SURGERY

## 2022-05-09 PROCEDURE — 93880 EXTRACRANIAL BILAT STUDY: CPT

## 2022-05-10 ENCOUNTER — TELEPHONE (OUTPATIENT)
Dept: NEUROSURGERY | Facility: CLINIC | Age: 87
End: 2022-05-10

## 2022-05-10 NOTE — TELEPHONE ENCOUNTER
The soonest availability central scheduling had for CT head with and without contrast was 7/11/22 at 2 pm at [place]  Called Lorena and notified her of the above  She accepted the appointment  She is aware the patient is to obtain blood work 2-3 days prior to the CT to assess kidney function  Orders are already in [address] [place] [address] Rd  Also scheduled patient a SNPX with an AP and Dr Roberto Moran after CT to review  Patient prefers to see Dr Roberto Moran  She requested for this information to be emailed to the email address on file  Will do  She was appreciative

## 2022-05-10 NOTE — TELEPHONE ENCOUNTER
Called patient and her daughter, Logan Gomez, answered the phone  Advised her of the Annette's recommendation  Offered to schedule CT  She requested for patient to be scheduled at the [facility] on May 25, 26, or 27th after 1 pm  Will aim to schedule on one of those dates  Will call her back once it is scheduled  Patient also needs a follow up with our office too once the CT is scheduled  She was appreciative

## 2022-05-12 ENCOUNTER — TRANSCRIBE ORDERS (OUTPATIENT)
Dept: VASCULAR SURGERY | Facility: CLINIC | Age: 87
End: 2022-05-12

## 2022-05-12 DIAGNOSIS — I65.23 BILATERAL CAROTID ARTERY STENOSIS: Primary | ICD-10-CM

## 2022-05-18 ENCOUNTER — OFFICE VISIT (OUTPATIENT)
Dept: FAMILY MEDICINE CLINIC | Facility: CLINIC | Age: 87
End: 2022-05-18
Payer: MEDICARE

## 2022-05-18 VITALS
HEIGHT: 61 IN | TEMPERATURE: 97.6 F | SYSTOLIC BLOOD PRESSURE: 116 MMHG | DIASTOLIC BLOOD PRESSURE: 60 MMHG | RESPIRATION RATE: 16 BRPM | BODY MASS INDEX: 30.02 KG/M2 | HEART RATE: 62 BPM | WEIGHT: 159 LBS

## 2022-05-18 DIAGNOSIS — I10 HYPERTENSION, UNSPECIFIED TYPE: ICD-10-CM

## 2022-05-18 DIAGNOSIS — I51.9 HEART DISEASE: ICD-10-CM

## 2022-05-18 DIAGNOSIS — I42.0 CARDIOMYOPATHY, DILATED (HCC): ICD-10-CM

## 2022-05-18 DIAGNOSIS — N18.4 CHRONIC RENAL DISEASE, STAGE IV (HCC): ICD-10-CM

## 2022-05-18 DIAGNOSIS — E11.9 CONTROLLED TYPE 2 DIABETES MELLITUS WITHOUT COMPLICATION, WITHOUT LONG-TERM CURRENT USE OF INSULIN (HCC): Primary | ICD-10-CM

## 2022-05-18 DIAGNOSIS — I48.0 PAROXYSMAL A-FIB (HCC): ICD-10-CM

## 2022-05-18 DIAGNOSIS — Z86.73 HISTORY OF ISCHEMIC RIGHT MCA STROKE: Chronic | ICD-10-CM

## 2022-05-18 DIAGNOSIS — Z00.00 MEDICARE ANNUAL WELLNESS VISIT, SUBSEQUENT: ICD-10-CM

## 2022-05-18 DIAGNOSIS — D69.6 THROMBOCYTOPENIA (HCC): ICD-10-CM

## 2022-05-18 DIAGNOSIS — M35.1 OVERLAP SYNDROME (HCC): ICD-10-CM

## 2022-05-18 PROBLEM — I25.119 CORONARY ARTERY DISEASE WITH ANGINA PECTORIS, UNSPECIFIED VESSEL OR LESION TYPE, UNSPECIFIED WHETHER NATIVE OR TRANSPLANTED HEART (HCC): Status: ACTIVE | Noted: 2022-05-18

## 2022-05-18 LAB
SL AMB  POCT GLUCOSE, UA: ABNORMAL
SL AMB LEUKOCYTE ESTERASE,UA: 75
SL AMB POCT BILIRUBIN,UA: ABNORMAL
SL AMB POCT BLOOD,UA: ABNORMAL
SL AMB POCT CLARITY,UA: CLEAR
SL AMB POCT COLOR,UA: YELLOW
SL AMB POCT HEMOGLOBIN AIC: 6.1 (ref ?–6.5)
SL AMB POCT KETONES,UA: ABNORMAL
SL AMB POCT NITRITE,UA: ABNORMAL
SL AMB POCT PH,UA: 6
SL AMB POCT SPECIFIC GRAVITY,UA: 1
SL AMB POCT URINE PROTEIN: ABNORMAL
SL AMB POCT UROBILINOGEN: ABNORMAL

## 2022-05-18 PROCEDURE — 83036 HEMOGLOBIN GLYCOSYLATED A1C: CPT | Performed by: FAMILY MEDICINE

## 2022-05-18 PROCEDURE — 81003 URINALYSIS AUTO W/O SCOPE: CPT | Performed by: FAMILY MEDICINE

## 2022-05-18 PROCEDURE — 1123F ACP DISCUSS/DSCN MKR DOCD: CPT | Performed by: FAMILY MEDICINE

## 2022-05-18 PROCEDURE — 99204 OFFICE O/P NEW MOD 45 MIN: CPT | Performed by: FAMILY MEDICINE

## 2022-05-18 PROCEDURE — G0439 PPPS, SUBSEQ VISIT: HCPCS | Performed by: FAMILY MEDICINE

## 2022-05-18 RX ORDER — BUDESONIDE AND FORMOTEROL FUMARATE DIHYDRATE 160; 4.5 UG/1; UG/1
2 AEROSOL RESPIRATORY (INHALATION) 2 TIMES DAILY
COMMUNITY
Start: 2022-05-05 | End: 2022-06-29 | Stop reason: ALTCHOICE

## 2022-05-18 NOTE — PROGRESS NOTES
Assessment and Plan:     Problem List Items Addressed This Visit    None          Preventive health issues were discussed with patient, and age appropriate screening tests were ordered as noted in patient's After Visit Summary  Personalized health advice and appropriate referrals for health education or preventive services given if needed, as noted in patient's After Visit Summary       History of Present Illness:     Patient presents for Medicare Annual Wellness visit    Patient Care Team:  Elaine Salazar MD as PCP - MD Arben Cheema MD as Endoscopist     Problem List:     Patient Active Problem List   Diagnosis    Deep vein thrombosis (DVT) of left upper extremity ([facility] )    H/O mitral valve replacement    CVA (cerebral vascular accident) ([place] [address] )    Controlled type 2 diabetes mellitus without complication, without long-term current use of insulin ([place] [address] )    Pacemaker    Acid reflux    Hypertension    Constipation    Systolic congestive heart failure ([facility] )    History of ischemic right MCA stroke    History of CVA (cerebrovascular accident)    History of subarachnoid hemorrhage    CKD (chronic kidney disease) stage 3, GFR 30-59 ml/min ([facility])    History of DVT (deep vein thrombosis)    History of pacemaker    Acute cystitis without hematuria    Chronic systolic CHF (congestive heart failure) ([facility])    Colonic thickening    Anemia    Encephalopathy    Seizure-like activity ([facility] )    HCAP (healthcare-associated pneumonia)    Shoulder pain, right    Abnormal TSH    Dysphagia    Esophageal reflux    Polyp of colon    Arthritis of right glenohumeral joint    Bilateral carotid artery stenosis    Vascular dementia ([place] [address] )    Mood disorder ([place] [address] )    Ambulatory dysfunction    H/O ischemic left MCA stroke    TIA (transient ischemic attack)    Arthralgia of hip, right    Arthritis    Carpal tunnel syndrome, left    External hemorrhoids    Hypercholesterolemia    Malignant neoplasm without specification of site ([facility] )    Meningioma ([facility] )    Pain in both hands    Spondylosis of cervical region without myelopathy or radiculopathy    Type 2 or unspecified type diabetes mellitus      Past Medical and Surgical History:     Past Medical History:   Diagnosis Date    Acid reflux     on occ    Arthritis     DJD right hip replaced    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[facility] ) 2007    x 2 lesions with no change    Cancer ([facility] ) 2007    colonic polyps, no surgery done    Deep vein thrombosis (DVT) of left upper extremity ([facility] ) 12/11/2017    Dementia ([facility] )     Diabetes mellitus ([place] [address] )     type 2    Diverticulosis     Edema     in legs on occ    Hypertension     on occ    Language barrier     speaks Scottish & broken english    Stroke [facility])      Past Surgical History:   Procedure Laterality Date    COLON SURGERY      COLONOSCOPY      COLONOSCOPY N/A 11/2/2016    Procedure: COLONOSCOPY;  Surgeon: Sruthi Pedersen MD;  Location: [place] [address] GI LAB; Service:     ESOPHAGOGASTRODUODENOSCOPY N/A 11/2/2016    Procedure: ESOPHAGOGASTRODUODENOSCOPY (EGD); Surgeon: Sruthi Pedersen MD;  Location: [facility] GI LAB; Service:    Golden Mcintyre / Genny Ra / Aislinn Tee      JOINT REPLACEMENT Right 02/2015    hip    JOINT REPLACEMENT Left     knee    JOINT REPLACEMENT Right     knee    MITRAL VALVE REPLACEMENT      PA COLONOSCOPY FLX DX W/COLLJ SPEC WHEN PFRMD N/A 8/9/2018    Procedure: EGD AND COLONOSCOPY;  Surgeon: Sruthi Pedersen MD;  [facility]; Service: Gastroenterology    PA REVISE MEDIAN N/CARPAL TUNNEL SURG Left 1/28/2016    Procedure: RELEASE CARPAL TUNNEL;  Surgeon: Janice Workman MD;  Location: [facility] MAIN OR;  Service: Orthopedics    TUBAL LIGATION      VARICOSE VEIN SURGERY Bilateral       Family History:     Family History   Problem Relation Age of Onset    Cancer Mother         throat      Social History:     Social History     Socioeconomic History    Marital status:   Spouse name: None    Number of children: None    Years of education: None    Highest education level: None   Occupational History    None   Tobacco Use    Smoking status: Former Smoker     Packs/day: 0 25     Years: 10 00     Pack years: 2 50     Types: Cigarettes     Quit date:      Years since quittin 4    Smokeless tobacco: Never Used   Vaping Use    Vaping Use: Never used   Substance and Sexual Activity    Alcohol use: No    Drug use: No    Sexual activity: Not Currently   Other Topics Concern    None   Social History Narrative    None     Social Determinants of Health     Financial Resource Strain: Not on file   Food Insecurity: Not on file   Transportation Needs: Not on file   Physical Activity: Not on file   Stress: Not on file   Social Connections: Not on file   Intimate Partner Violence: Not on file   Housing Stability: Not on file      Medications and Allergies:     Current Outpatient Medications   Medication Sig Dispense Refill    albuterol (2 5 mg/3 mL) 0 083 % nebulizer solution Take 3 mL (2 5 mg total) by nebulization every 6 (six) hours as needed for wheezing or shortness of breath 75 mL 0    albuterol (PROVENTIL HFA,VENTOLIN HFA) 90 mcg/act inhaler Inhale 2 puffs every 6 (six) hours as needed for wheezing 18 g 0    apixaban (ELIQUIS) 2 5 mg Take 1 tablet (2 5 mg total) by mouth 2 (two) times a day  0    aspirin (ECOTRIN LOW STRENGTH) 81 mg EC tablet Take 1 tablet (81 mg total) by mouth daily  0    atorvastatin (LIPITOR) 40 mg tablet Take 2 tablets (80 mg total) by mouth daily with dinner 30 tablet 3    budesonide-formoterol (Symbicort) 160-4 5 mcg/act inhaler Inhale 2 puffs 2 (two) times a day      furosemide (LASIX) 40 mg tablet       iron polysaccharides (FERREX) 150 mg capsule Take 150 mg by mouth 2 (two) times a day      JANUVIA 50 MG tablet 50 mg daily        losartan (COZAAR) 25 mg tablet 12 5 mg In a m       metoprolol tartrate (LOPRESSOR) 50 mg tablet Take 1 tablet (50 mg total) by mouth every 12 (twelve) hours (Patient taking differently: Take 25 mg by mouth every 12 (twelve) hours)  0    Multiple Vitamins-Minerals (MULTIVITAMIN ADULT PO) 0 5 tablets 2 (two) times a day        Polysaccharide Iron-FA-B12 (FERREX 150 FORTE PO) Take by mouth      potassium chloride (MICRO-K) 10 MEQ CR capsule       furosemide (LASIX) 20 mg tablet Take 1 tablet (20 mg total) by mouth daily (Patient not taking: Reported on 5/18/2022)  0     No current facility-administered medications for this visit  Allergies   Allergen Reactions    Vancomycin      Red man syndrome    Guaifenesin-Codeine Hallucinations    Heparin Other (See Comments)     Thrombocytopenia      Glimepiride Rash      Immunizations:     Immunization History   Administered Date(s) Administered    COVID-19 MODERNA VACC 0 5 ML IM 05/25/2021, 06/22/2021    Influenza Split High Dose Preservative Free IM 10/22/2020    Influenza, high dose seasonal 0 7 mL 11/05/2018    Pneumococcal Polysaccharide PPV23 02/22/2015    influenza, trivalent, adjuvanted 10/23/2019      Health Maintenance:         Topic Date Due    Colorectal Cancer Screening  08/09/2021         Topic Date Due    DTaP,Tdap,and Td Vaccines (1 - Tdap) Never done    Pneumococcal Vaccine: 65+ Years (2 - PCV) 02/22/2016    COVID-19 Vaccine (3 - Booster for Moderna series) 11/22/2021      Medicare Health Risk Assessment:     /60 (BP Location: Left arm, Patient Position: Sitting, Cuff Size: Large)   Pulse 62   Temp 97 6 °F (36 4 °C)   Resp 16   Ht 5' 1" (1 549 m)   Wt 72 1 kg (159 lb)   LMP  (LMP Unknown)   BMI 30 04 kg/m²      Eden Monge is here for her Subsequent Wellness visit  Last Medicare Wellness visit information reviewed, patient interviewed and updates made to the record today  Health Risk Assessment:   Patient rates overall health as fair  Patient feels that their physical health rating is slightly worse   Patient is satisfied with their life  Eyesight was rated as same  Hearing was rated as same  Patient feels that their emotional and mental health rating is same  Patients states they are sometimes angry  Patient states they are never, rarely unusually tired/fatigued  Pain experienced in the last 7 days has been none  Patient states that she has experienced no weight loss or gain in last 6 months  Depression Screening:   PHQ-2 Score: 2      Fall Risk Screening: In the past year, patient has experienced: no history of falling in past year      Urinary Incontinence Screening:   Patient has leaked urine accidently in the last six months  Home Safety:  Patient has trouble with stairs inside or outside of their home  Patient has working smoke alarms and has working carbon monoxide detector  Home safety hazards include: none  Nutrition:   Current diet is Diabetic  Medications:   Patient is currently taking over-the-counter supplements  OTC medications include: see medication list  Patient is not able to manage medications  Activities of Daily Living (ADLs)/Instrumental Activities of Daily Living (IADLs):   Walk and transfer into and out of bed and chair?: Yes  Dress and groom yourself?: No    Bathe or shower yourself?: No    Feed yourself? Yes  Do your laundry/housekeeping?: No  Manage your money, pay your bills and track your expenses?: No  Make your own meals?: No    Do your own shopping?: No    Previous Hospitalizations:   Any hospitalizations or ED visits within the last 12 months?: No      Advance Care Planning:   Living will: Yes    Durable POA for healthcare:  Yes    Advanced directive: Yes    Advanced directive counseling given: Yes    Five wishes given: Yes      Cognitive Screening:   Provider or family/friend/caregiver concerned regarding cognition?: Yes    PREVENTIVE SCREENINGS      Cardiovascular Screening:    General: History Lipid Disorder, Risks and Benefits Discussed and Screening Current      Diabetes Screening:     General: History Diabetes, Screening Current and Risks and Benefits Discussed      Colorectal Cancer Screening:     General: Screening Not Indicated and Risks and Benefits Discussed      Breast Cancer Screening:     General: Risks and Benefits Discussed      Cervical Cancer Screening:    General: Screening Not Indicated      Osteoporosis Screening:    General: Risks and Benefits Discussed      Abdominal Aortic Aneurysm (AAA) Screening:        General: Screening Not Indicated      Lung Cancer Screening:     General: Screening Not Indicated      Hepatitis C Screening:    General: Screening Not Indicated    Screening, Brief Intervention, and Referral to Treatment (SBIRT)    Screening  Typical number of drinks in a day: 0  Typical number of drinks in a week: 0  Interpretation: Low risk drinking behavior  AUDIT-C Screenin) How often did you have a drink containing alcohol in the past year? never  2) How many drinks did you have on a typical day when you were drinking in the past year? 0  3) How often did you have 6 or more drinks on one occasion in the past year? never    AUDIT-C Score: 0  Interpretation: Score 0-2 (female): Negative screen for alcohol misuse    Single Item Drug Screening:  How often have you used an illegal drug (including marijuana) or a prescription medication for non-medical reasons in the past year? never    Single Item Drug Screen Score: 0  Interpretation: Negative screen for possible drug use disorder    Other Counseling Topics:   Car/seat belt/driving safety, skin self-exam, sunscreen and calcium and vitamin D intake and regular weightbearing exercise         Nila Copeland MD

## 2022-05-18 NOTE — PATIENT INSTRUCTIONS
Medicare Preventive Visit Patient Instructions  Thank you for completing your Welcome to Medicare Visit or Medicare Annual Wellness Visit today  Your next wellness visit will be due in one year (5/19/2023)  The screening/preventive services that you may require over the next 5-10 years are detailed below  Some tests may not apply to you based off risk factors and/or age  Screening tests ordered at today's visit but not completed yet may show as past due  Also, please note that scanned in results may not display below  Preventive Screenings:  Service Recommendations Previous Testing/Comments   Colorectal Cancer Screening  * Colonoscopy    * Fecal Occult Blood Test (FOBT)/Fecal Immunochemical Test (FIT)  * Fecal DNA/Cologuard Test  * Flexible Sigmoidoscopy Age: 54-65 years old   Colonoscopy: every 10 years (may be performed more frequently if at higher risk)  OR  FOBT/FIT: every 1 year  OR  Cologuard: every 3 years  OR  Sigmoidoscopy: every 5 years  Screening may be recommended earlier than age 48 if at higher risk for colorectal cancer  Also, an individualized decision between you and your healthcare provider will decide whether screening between the ages of 74-80 would be appropriate  Colonoscopy: 08/09/2018  FOBT/FIT: Not on file  Cologuard: Not on file  Sigmoidoscopy: Not on file    Screening Not Indicated     Breast Cancer Screening Age: 36 years old  Frequency: every 1-2 years  Not required if history of left and right mastectomy Mammogram: Not on file        Cervical Cancer Screening Between the ages of 21-29, pap smear recommended once every 3 years  Between the ages of 33-67, can perform pap smear with HPV co-testing every 5 years     Recommendations may differ for women with a history of total hysterectomy, cervical cancer, or abnormal pap smears in past  Pap Smear: Not on file    Screening Not Indicated   Hepatitis C Screening Once for adults born between 1945 and 1965  More frequently in patients at high risk for Hepatitis C Hep C Antibody: Not on file        Diabetes Screening 1-2 times per year if you're at risk for diabetes or have pre-diabetes Fasting glucose: 107 mg/dL   A1C: 6 3 %    Screening Not Indicated  History Diabetes   Cholesterol Screening Once every 5 years if you don't have a lipid disorder  May order more often based on risk factors  Lipid panel: 10/20/2021    Screening Not Indicated  History Lipid Disorder     Other Preventive Screenings Covered by Medicare:  1  Abdominal Aortic Aneurysm (AAA) Screening: covered once if your at risk  You're considered to be at risk if you have a family history of AAA  2  Lung Cancer Screening: covers low dose CT scan once per year if you meet all of the following conditions: (1) Age 50-69; (2) No signs or symptoms of lung cancer; (3) Current smoker or have quit smoking within the last 15 years; (4) You have a tobacco smoking history of at least 30 pack years (packs per day multiplied by number of years you smoked); (5) You get a written order from a healthcare provider  3  Glaucoma Screening: covered annually if you're considered high risk: (1) You have diabetes OR (2) Family history of glaucoma OR (3)  aged 48 and older OR (3)  American aged 72 and older  3  Osteoporosis Screening: covered every 2 years if you meet one of the following conditions: (1) You're estrogen deficient and at risk for osteoporosis based off medical history and other findings; (2) Have a vertebral abnormality; (3) On glucocorticoid therapy for more than 3 months; (4) Have primary hyperparathyroidism; (5) On osteoporosis medications and need to assess response to drug therapy  · Last bone density test (DXA Scan): Not on file  5  HIV Screening: covered annually if you're between the age of 12-76  Also covered annually if you are younger than 13 and older than 72 with risk factors for HIV infection   For pregnant patients, it is covered up to 3 times per pregnancy  Immunizations:  Immunization Recommendations   Influenza Vaccine Annual influenza vaccination during flu season is recommended for all persons aged >= 6 months who do not have contraindications   Pneumococcal Vaccine (Prevnar and Pneumovax)  * Prevnar = PCV13  * Pneumovax = PPSV23   Adults 25-60 years old: 1-3 doses may be recommended based on certain risk factors  Adults 72 years old: Prevnar (PCV13) vaccine recommended followed by Pneumovax (PPSV23) vaccine  If already received PPSV23 since turning 65, then PCV13 recommended at least one year after PPSV23 dose  Hepatitis B Vaccine 3 dose series if at intermediate or high risk (ex: diabetes, end stage renal disease, liver disease)   Tetanus (Td) Vaccine - COST NOT COVERED BY MEDICARE PART B Following completion of primary series, a booster dose should be given every 10 years to maintain immunity against tetanus  Td may also be given as tetanus wound prophylaxis  Tdap Vaccine - COST NOT COVERED BY MEDICARE PART B Recommended at least once for all adults  For pregnant patients, recommended with each pregnancy  Shingles Vaccine (Shingrix) - COST NOT COVERED BY MEDICARE PART B  2 shot series recommended in those aged 48 and above     Health Maintenance Due:      Topic Date Due    Colorectal Cancer Screening  08/09/2021     Immunizations Due:      Topic Date Due    DTaP,Tdap,and Td Vaccines (1 - Tdap) Never done    Pneumococcal Vaccine: 65+ Years (2 - PCV) 02/22/2016    COVID-19 Vaccine (3 - Booster for Shelly Callas series) 11/22/2021     Advance Directives   What are advance directives? Advance directives are legal documents that state your wishes and plans for medical care  These plans are made ahead of time in case you lose your ability to make decisions for yourself  Advance directives can apply to any medical decision, such as the treatments you want, and if you want to donate organs  What are the types of advance directives?   There are many types of advance directives, and each state has rules about how to use them  You may choose a combination of any of the following:  · Living will: This is a written record of the treatment you want  You can also choose which treatments you do not want, which to limit, and which to stop at a certain time  This includes surgery, medicine, IV fluid, and tube feedings  · Durable power of  for healthcare [facility]): This is a written record that states who you want to make healthcare choices for you when you are unable to make them for yourself  This person, called a proxy, is usually a family member or a friend  You may choose more than 1 proxy  · Do not resuscitate (DNR) order:  A DNR order is used in case your heart stops beating or you stop breathing  It is a request not to have certain forms of treatment, such as CPR  A DNR order may be included in other types of advance directives  · Medical directive: This covers the care that you want if you are in a coma, near death, or unable to make decisions for yourself  You can list the treatments you want for each condition  Treatment may include pain medicine, surgery, blood transfusions, dialysis, IV or tube feedings, and a ventilator (breathing machine)  · Values history: This document has questions about your views, beliefs, and how you feel and think about life  This information can help others choose the care that you would choose  Why are advance directives important? An advance directive helps you control your care  Although spoken wishes may be used, it is better to have your wishes written down  Spoken wishes can be misunderstood, or not followed  Treatments may be given even if you do not want them  An advance directive may make it easier for your family to make difficult choices about your care  Urinary Incontinence   Urinary incontinence (UI)  is when you lose control of your bladder   UI develops because your bladder cannot store or empty urine properly  The 3 most common types of UI are stress incontinence, urge incontinence, or both  Medicines:   · May be given to help strengthen your bladder control  Report any side effects of medication to your healthcare provider  Do pelvic muscle exercises often:  Your pelvic muscles help you stop urinating  Squeeze these muscles tight for 5 seconds, then relax for 5 seconds  Gradually work up to squeezing for 10 seconds  Do 3 sets of 15 repetitions a day, or as directed  This will help strengthen your pelvic muscles and improve bladder control  Train your bladder:  Go to the bathroom at set times, such as every 2 hours, even if you do not feel the urge to go  You can also try to hold your urine when you feel the urge to go  For example, hold your urine for 5 minutes when you feel the urge to go  As that becomes easier, hold your urine for 10 minutes  Self-care:   · Keep a UI record  Write down how often you leak urine and how much you leak  Make a note of what you were doing when you leaked urine  · Drink liquids as directed  You may need to limit the amount of liquid you drink to help control your urine leakage  Do not drink any liquid right before you go to bed  Limit or do not have drinks that contain caffeine or alcohol  · Prevent constipation  Eat a variety of high-fiber foods  Good examples are high-fiber cereals, beans, vegetables, and whole-grain breads  Walking is the best way to trigger your intestines to have a bowel movement  · Exercise regularly and maintain a healthy weight  Weight loss and exercise will decrease pressure on your bladder and help you control your leakage  · Use a catheter as directed  to help empty your bladder  A catheter is a tiny, plastic tube that is put into your bladder to drain your urine  · Go to behavior therapy as directed  Behavior therapy may be used to help you learn to control your urge to urinate      Weight Management   Why it is important to manage your weight:  Being overweight increases your risk of health conditions such as heart disease, high blood pressure, type 2 diabetes, and certain types of cancer  It can also increase your risk for osteoarthritis, sleep apnea, and other respiratory problems  Aim for a slow, steady weight loss  Even a small amount of weight loss can lower your risk of health problems  How to lose weight safely:  A safe and healthy way to lose weight is to eat fewer calories and get regular exercise  You can lose up about 1 pound a week by decreasing the number of calories you eat by 500 calories each day  Healthy meal plan for weight management:  A healthy meal plan includes a variety of foods, contains fewer calories, and helps you stay healthy  A healthy meal plan includes the following:  · Eat whole-grain foods more often  A healthy meal plan should contain fiber  Fiber is the part of grains, fruits, and vegetables that is not broken down by your body  Whole-grain foods are healthy and provide extra fiber in your diet  Some examples of whole-grain foods are whole-wheat breads and pastas, oatmeal, brown rice, and bulgur  · Eat a variety of vegetables every day  Include dark, leafy greens such as spinach, kale, nadine greens, and mustard greens  Eat yellow and orange vegetables such as carrots, sweet potatoes, and winter squash  · Eat a variety of fruits every day  Choose fresh or canned fruit (canned in its own juice or light syrup) instead of juice  Fruit juice has very little or no fiber  · Eat low-fat dairy foods  Drink fat-free (skim) milk or 1% milk  Eat fat-free yogurt and low-fat cottage cheese  Try low-fat cheeses such as mozzarella and other reduced-fat cheeses  · Choose meat and other protein foods that are low in fat  Choose beans or other legumes such as split peas or lentils  Choose fish, skinless poultry (chicken or turkey), or lean cuts of red meat (beef or pork)   Before you cook meat or poultry, cut off any visible fat  · Use less fat and oil  Try baking foods instead of frying them  Add less fat, such as margarine, sour cream, regular salad dressing and mayonnaise to foods  Eat fewer high-fat foods  Some examples of high-fat foods include french fries, doughnuts, ice cream, and cakes  · Eat fewer sweets  Limit foods and drinks that are high in sugar  This includes candy, cookies, regular soda, and sweetened drinks  Exercise:  Exercise at least 30 minutes per day on most days of the week  Some examples of exercise include walking, biking, dancing, and swimming  You can also fit in more physical activity by taking the stairs instead of the elevator or parking farther away from stores  Ask your healthcare provider about the best exercise plan for you  © Copyright [employer] 2018 Information is for End User's use only and may not be sold, redistributed or otherwise used for commercial purposes   All illustrations and images included in CareNotes® are the copyrighted property of [employer]  or [address] [place] [address]

## 2022-05-19 ENCOUNTER — TELEPHONE (OUTPATIENT)
Dept: ADMINISTRATIVE | Facility: OTHER | Age: 87
End: 2022-05-19

## 2022-05-19 LAB
ALBUMIN/CREAT UR: 13 MG/G CREAT (ref 0–29)
CREAT UR-MCNC: 36.5 MG/DL
MICROALBUMIN UR-MCNC: 4.7 UG/ML

## 2022-05-19 NOTE — TELEPHONE ENCOUNTER
----- Message from [place], [place] sent at 5/18/2022  1:31 PM EDT -----  Regarding: dm eye exam  05/18/22 1:31 PM    Hello, our patient Camilla Lugo has haddm eye exam  completed/performed  Please assist in updating the patient chart by Dr Polo Fleming in [place] The date of service is 2021      Thank you,  KATELYNN Noyola PG

## 2022-05-19 NOTE — LETTER
Diabetic Eye Exam Form    Date Requested: 22  Patient: Santana Arreola  Patient : 1933   Referring Provider: Nadine Iraheta MD    DIABETIC Eye Exam Date _______________________________    Type of Exam MUST be documented for Diabetic Eye Exams  Please CHECK ONE  Retinal Exam       Dilated Retinal Exam       OCT       Optomap-Iris Exam      Fundus Photography     Left Eye - Please check Retinopathy AND Type or No Retinopathy      Exam did show retinopathy    Exam did not show retinopathy         Mild     Proliferative           Moderate    Severe            None         Right Eye - Please check Retinopathy AND Type or No Retinopathy     Exam did show retinopathy    Exam did not show retinopathy         Mild     Proliferative        Moderate    Severe        None       Comments __________________________________________________________    Practice Providing Exam ______________________________________________    Exam Performed By (print name) _______________________________________      Provider Signature ___________________________________________________    These reports are needed for  compliance  Please fax this completed form and a copy of the Diabetic Eye Exam report to our office located at [place] [address] as soon as possible via [phone] kelly Estrada: Phone [phone]  We thank you for your assistance in treating our mutual patient

## 2022-05-19 NOTE — LETTER
Diabetic Eye Exam Form    Date Requested: 22  Patient: Edelmiro Kawasaki  Patient : 1933   Referring Provider: Stacey Muhammad MD    DIABETIC Eye Exam Date _______________________________    Type of Exam MUST be documented for Diabetic Eye Exams  Please CHECK ONE  Retinal Exam       Dilated Retinal Exam       OCT       Optomap-Iris Exam      Fundus Photography     Left Eye - Please check Retinopathy AND Type or No Retinopathy      Exam did show retinopathy    Exam did not show retinopathy         Mild     Proliferative           Moderate    Severe            None         Right Eye - Please check Retinopathy AND Type or No Retinopathy     Exam did show retinopathy    Exam did not show retinopathy         Mild     Proliferative        Moderate    Severe        None       Comments __________________________________________________________    Practice Providing Exam ______________________________________________    Exam Performed By (print name) _______________________________________      Provider Signature ___________________________________________________    These reports are needed for  compliance  Please fax this completed form and a copy of the Diabetic Eye Exam report to our office located at [place] [address] as soon as possible via [phone] attention Asya Fernandez: Phone [phone]  We thank you for your assistance in treating our mutual patient

## 2022-05-19 NOTE — TELEPHONE ENCOUNTER
Upon review of the In Basket request and the patient's chart, initial outreach has been made via fax, please see Contacts section for details       [phone]    Thank you  Donte Pappas MA

## 2022-05-25 NOTE — TELEPHONE ENCOUNTER
As a follow-up, a second attempt has been made for outreach via fax, please see Contacts section for details       [phone]    Thank you  Alvaro Beaver MA

## 2022-06-27 NOTE — SOCIAL WORK
----- Message from Aditya Lamas RN sent at 6/27/2022  9:13 AM CDT -----  Regarding: H&P  HI, Pt had an H&[facility] on 6/24 [facility] for H&P for surgery on 7/8 with Dr. Kendy Kirk. It appears in epic that appt did not take place and no other appts scheduled. Please follow up. Thanks. P[facility] referral received to assess needs and assist with DCP  SW interviewed pt and daughter Anh Bangura) at the bedside to obtain baseline information and to discuss DCP  Pt is generally independent  She lives alone in a private, multi-level home where she stays on the first floor  Since her stroke in January, pt's children has had the home modified  Pt room has been moved to the 1st floor  She has a handicap accessible bathroom with a specialized shower/tub and raised toilet seat  She uses a cane, walker and rolator as needed to assist with ambulation  Pt had 2 surgery this past December at [facility]  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, 2017  The mitral valve surgery was unsuccessful resulting in open heart surgery for a pacemaker a few days later  Pt has a previous admission at [address] [place] [address] rehab  Pt was also on service with [address] [place] [address] and was being seen at [facility]  She uses [address]  in [place], but is currently working on receiving her medication through a [facility] company  Pt's PCP is Dr Viral Shen  Pt does not drive, her children transport her to appointments  SW discussed PT's recommendation with both pt and daughter; pt agreeable to placement  SNF list given to pt for review  Per pt and daughters request, referrals will be sent to [facility] and [facility]  SW will advise when a bed is secured

## 2022-06-28 PROBLEM — Z00.00 MEDICARE ANNUAL WELLNESS VISIT, SUBSEQUENT: Status: ACTIVE | Noted: 2022-06-28

## 2022-06-28 NOTE — PROGRESS NOTES
Assessment/Plan:    1  Controlled type 2 diabetes mellitus without complication, without long-term current use of insulin ([place] [id number] )  -     Diabetic foot exam; Future  -     Microalbumin / creatinine urine ratio  -     POCT urine dip auto non-scope  -     POCT hemoglobin A1c  -     CBC; Future  -     Comprehensive metabolic panel; Future  -     TSH, 3rd generation; Future  -     Lipid panel; Future    2  Hypertension, unspecified type  -     CBC; Future  -     Comprehensive metabolic panel; Future  -     TSH, 3rd generation; Future  -     Lipid panel; Future    3  Heart disease  -     CBC; Future  -     Comprehensive metabolic panel; Future  -     TSH, 3rd generation; Future  -     Lipid panel; Future    4  Paroxysmal A-fib ([place] [address] )    5  Overlap syndrome (HCC)    6  Chronic renal disease, stage IV ([place] [address] )    7  Thrombocytopenia ([place] [address] )    8  Cardiomyopathy, dilated ([place] [address] )    9  History of ischemic right MCA stroke    10  Medicare annual wellness visit, subsequent    cont current medications  Ref to cardio and neuro  ADA , low chol diet     BMI Counseling: Body mass index is 30 04 kg/m²  The BMI is above normal  Nutrition recommendations include encouraging healthy choices of fruits and vegetables, consuming healthier snacks, moderation in carbohydrate intake, increasing intake of lean protein, reducing intake of saturated and trans fat and reducing intake of cholesterol  Exercise recommendations include strength training exercises  No pharmacotherapy was ordered  Rationale for BMI follow-up plan is due to patient being overweight or obese  Depression Screening and Follow-up Plan: Patient was screened for depression during today's encounter  They screened negative with a PHQ-2 score of 2  Patient Instructions       Medicare Preventive Visit Patient Instructions  Thank you for completing your Welcome to Medicare Visit or Medicare Annual Wellness Visit today   Your next wellness visit will be due in one year (5/19/2023)  The screening/preventive services that you may require over the next 5-10 years are detailed below  Some tests may not apply to you based off risk factors and/or age  Screening tests ordered at today's visit but not completed yet may show as past due  Also, please note that scanned in results may not display below  Preventive Screenings:  Service Recommendations Previous Testing/Comments   Colorectal Cancer Screening  * Colonoscopy    * Fecal Occult Blood Test (FOBT)/Fecal Immunochemical Test (FIT)  * Fecal DNA/Cologuard Test  * Flexible Sigmoidoscopy Age: 54-65 years old   Colonoscopy: every 10 years (may be performed more frequently if at higher risk)  OR  FOBT/FIT: every 1 year  OR  Cologuard: every 3 years  OR  Sigmoidoscopy: every 5 years  Screening may be recommended earlier than age 48 if at higher risk for colorectal cancer  Also, an individualized decision between you and your healthcare provider will decide whether screening between the ages of 74-80 would be appropriate  Colonoscopy: 08/09/2018  FOBT/FIT: Not on file  Cologuard: Not on file  Sigmoidoscopy: Not on file    Screening Not Indicated     Breast Cancer Screening Age: 36 years old  Frequency: every 1-2 years  Not required if history of left and right mastectomy Mammogram: Not on file        Cervical Cancer Screening Between the ages of 21-29, pap smear recommended once every 3 years  Between the ages of 33-67, can perform pap smear with HPV co-testing every 5 years     Recommendations may differ for women with a history of total hysterectomy, cervical cancer, or abnormal pap smears in past  Pap Smear: Not on file    Screening Not Indicated   Hepatitis C Screening Once for adults born between 1945 and 1965  More frequently in patients at high risk for Hepatitis C Hep C Antibody: Not on file        Diabetes Screening 1-2 times per year if you're at risk for diabetes or have pre-diabetes Fasting glucose: 107 mg/dL   A1C: 6 3 %    Screening Not Indicated  History Diabetes   Cholesterol Screening Once every 5 years if you don't have a lipid disorder  May order more often based on risk factors  Lipid panel: 10/20/2021    Screening Not Indicated  History Lipid Disorder     Other Preventive Screenings Covered by Medicare:  1  Abdominal Aortic Aneurysm (AAA) Screening: covered once if your at risk  You're considered to be at risk if you have a family history of AAA  2  Lung Cancer Screening: covers low dose CT scan once per year if you meet all of the following conditions: (1) Age 50-69; (2) No signs or symptoms of lung cancer; (3) Current smoker or have quit smoking within the last 15 years; (4) You have a tobacco smoking history of at least 30 pack years (packs per day multiplied by number of years you smoked); (5) You get a written order from a healthcare provider  3  Glaucoma Screening: covered annually if you're considered high risk: (1) You have diabetes OR (2) Family history of glaucoma OR (3)  aged 48 and older OR (3)  American aged 72 and older  3  Osteoporosis Screening: covered every 2 years if you meet one of the following conditions: (1) You're estrogen deficient and at risk for osteoporosis based off medical history and other findings; (2) Have a vertebral abnormality; (3) On glucocorticoid therapy for more than 3 months; (4) Have primary hyperparathyroidism; (5) On osteoporosis medications and need to assess response to drug therapy  · Last bone density test (DXA Scan): Not on file  5  HIV Screening: covered annually if you're between the age of 12-76  Also covered annually if you are younger than 13 and older than 72 with risk factors for HIV infection  For pregnant patients, it is covered up to 3 times per pregnancy      Immunizations:  Immunization Recommendations   Influenza Vaccine Annual influenza vaccination during flu season is recommended for all persons aged >= 6 months who do not have contraindications   Pneumococcal Vaccine (Prevnar and Pneumovax)  * Prevnar = PCV13  * Pneumovax = PPSV23   Adults 25-60 years old: 1-3 doses may be recommended based on certain risk factors  Adults 72 years old: Prevnar (PCV13) vaccine recommended followed by Pneumovax (PPSV23) vaccine  If already received PPSV23 since turning 65, then PCV13 recommended at least one year after PPSV23 dose  Hepatitis B Vaccine 3 dose series if at intermediate or high risk (ex: diabetes, end stage renal disease, liver disease)   Tetanus (Td) Vaccine - COST NOT COVERED BY MEDICARE PART B Following completion of primary series, a booster dose should be given every 10 years to maintain immunity against tetanus  Td may also be given as tetanus wound prophylaxis  Tdap Vaccine - COST NOT COVERED BY MEDICARE PART B Recommended at least once for all adults  For pregnant patients, recommended with each pregnancy  Shingles Vaccine (Shingrix) - COST NOT COVERED BY MEDICARE PART B  2 shot series recommended in those aged 48 and above     Health Maintenance Due:      Topic Date Due    Colorectal Cancer Screening  08/09/2021     Immunizations Due:      Topic Date Due    DTaP,Tdap,and Td Vaccines (1 - Tdap) Never done    Pneumococcal Vaccine: 65+ Years (2 - PCV) 02/22/2016    COVID-19 Vaccine (3 - Booster for Donzella Creed series) 11/22/2021     Advance Directives   What are advance directives? Advance directives are legal documents that state your wishes and plans for medical care  These plans are made ahead of time in case you lose your ability to make decisions for yourself  Advance directives can apply to any medical decision, such as the treatments you want, and if you want to donate organs  What are the types of advance directives? There are many types of advance directives, and each state has rules about how to use them  You may choose a combination of any of the following:  · Living will:   This is a written record of the treatment you want  You can also choose which treatments you do not want, which to limit, and which to stop at a certain time  This includes surgery, medicine, IV fluid, and tube feedings  · Durable power of  for healthcare [facility]): This is a written record that states who you want to make healthcare choices for you when you are unable to make them for yourself  This person, called a proxy, is usually a family member or a friend  You may choose more than 1 proxy  · Do not resuscitate (DNR) order:  A DNR order is used in case your heart stops beating or you stop breathing  It is a request not to have certain forms of treatment, such as CPR  A DNR order may be included in other types of advance directives  · Medical directive: This covers the care that you want if you are in a coma, near death, or unable to make decisions for yourself  You can list the treatments you want for each condition  Treatment may include pain medicine, surgery, blood transfusions, dialysis, IV or tube feedings, and a ventilator (breathing machine)  · Values history: This document has questions about your views, beliefs, and how you feel and think about life  This information can help others choose the care that you would choose  Why are advance directives important? An advance directive helps you control your care  Although spoken wishes may be used, it is better to have your wishes written down  Spoken wishes can be misunderstood, or not followed  Treatments may be given even if you do not want them  An advance directive may make it easier for your family to make difficult choices about your care  Urinary Incontinence   Urinary incontinence (UI)  is when you lose control of your bladder  UI develops because your bladder cannot store or empty urine properly  The 3 most common types of UI are stress incontinence, urge incontinence, or both  Medicines:   · May be given to help strengthen your bladder control   Report any side effects of medication to your healthcare provider  Do pelvic muscle exercises often:  Your pelvic muscles help you stop urinating  Squeeze these muscles tight for 5 seconds, then relax for 5 seconds  Gradually work up to squeezing for 10 seconds  Do 3 sets of 15 repetitions a day, or as directed  This will help strengthen your pelvic muscles and improve bladder control  Train your bladder:  Go to the bathroom at set times, such as every 2 hours, even if you do not feel the urge to go  You can also try to hold your urine when you feel the urge to go  For example, hold your urine for 5 minutes when you feel the urge to go  As that becomes easier, hold your urine for 10 minutes  Self-care:   · Keep a UI record  Write down how often you leak urine and how much you leak  Make a note of what you were doing when you leaked urine  · Drink liquids as directed  You may need to limit the amount of liquid you drink to help control your urine leakage  Do not drink any liquid right before you go to bed  Limit or do not have drinks that contain caffeine or alcohol  · Prevent constipation  Eat a variety of high-fiber foods  Good examples are high-fiber cereals, beans, vegetables, and whole-grain breads  Walking is the best way to trigger your intestines to have a bowel movement  · Exercise regularly and maintain a healthy weight  Weight loss and exercise will decrease pressure on your bladder and help you control your leakage  · Use a catheter as directed  to help empty your bladder  A catheter is a tiny, plastic tube that is put into your bladder to drain your urine  · Go to behavior therapy as directed  Behavior therapy may be used to help you learn to control your urge to urinate  Weight Management   Why it is important to manage your weight:  Being overweight increases your risk of health conditions such as heart disease, high blood pressure, type 2 diabetes, and certain types of cancer   It can also increase your risk for osteoarthritis, sleep apnea, and other respiratory problems  Aim for a slow, steady weight loss  Even a small amount of weight loss can lower your risk of health problems  How to lose weight safely:  A safe and healthy way to lose weight is to eat fewer calories and get regular exercise  You can lose up about 1 pound a week by decreasing the number of calories you eat by 500 calories each day  Healthy meal plan for weight management:  A healthy meal plan includes a variety of foods, contains fewer calories, and helps you stay healthy  A healthy meal plan includes the following:  · Eat whole-grain foods more often  A healthy meal plan should contain fiber  Fiber is the part of grains, fruits, and vegetables that is not broken down by your body  Whole-grain foods are healthy and provide extra fiber in your diet  Some examples of whole-grain foods are whole-wheat breads and pastas, oatmeal, brown rice, and bulgur  · Eat a variety of vegetables every day  Include dark, leafy greens such as spinach, kale, nadine greens, and mustard greens  Eat yellow and orange vegetables such as carrots, sweet potatoes, and winter squash  · Eat a variety of fruits every day  Choose fresh or canned fruit (canned in its own juice or light syrup) instead of juice  Fruit juice has very little or no fiber  · Eat low-fat dairy foods  Drink fat-free (skim) milk or 1% milk  Eat fat-free yogurt and low-fat cottage cheese  Try low-fat cheeses such as mozzarella and other reduced-fat cheeses  · Choose meat and other protein foods that are low in fat  Choose beans or other legumes such as split peas or lentils  Choose fish, skinless poultry (chicken or turkey), or lean cuts of red meat (beef or pork)  Before you cook meat or poultry, cut off any visible fat  · Use less fat and oil  Try baking foods instead of frying them  Add less fat, such as margarine, sour cream, regular salad dressing and mayonnaise to foods   Eat fewer high-fat foods  Some examples of high-fat foods include french fries, doughnuts, ice cream, and cakes  · Eat fewer sweets  Limit foods and drinks that are high in sugar  This includes candy, cookies, regular soda, and sweetened drinks  Exercise:  Exercise at least 30 minutes per day on most days of the week  Some examples of exercise include walking, biking, dancing, and swimming  You can also fit in more physical activity by taking the stairs instead of the elevator or parking farther away from stores  Ask your healthcare provider about the best exercise plan for you  © Copyright [employer] 2018 Information is for End User's use only and may not be sold, redistributed or otherwise used for commercial purposes  All illustrations and images included in CareNotes® are the copyrighted property of A D [employer] M [employer] Inc  or behaview         Subjective:      Patient ID: Onelia Patel is a 80 y o  female  Chief Complaint   Patient presents with    Medicare Wellness Visit    Follow-up       HPI  NP-in w dtr Ayo Reilly for AWV and to get established  dtr translating  Pt w hx cva w residual expressive aphasia  Available hx reviewed   Hg1C=6 1%    UA-+leukos -otherwise neg  Addtl labs ordered  Pt in ED in April w shortness of breath  Cardiac w/u for acute change unremarkable  symbicort added to meds  BP in range    The following portions of the patient's history were reviewed and updated as appropriate: allergies, current medications, past family history, past medical history, past social history, past surgical history and problem list   Past Medical History:   Diagnosis Date    Acid reflux     on occ    Arthritis     DJD right hip replaced    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 with no change    Cancer ([facility] [address] ) 2007    colonic polyps, no surgery done    Deep vein thrombosis (DVT) of left upper extremity ([facility] [address] ) 12/11/2017    Dementia ([facility] [address] )     Diabetes mellitus ([facility] [address] )     type 2    Diverticulosis  Edema     in legs on occ    Hypertension     on occ    Language barrier     speaks Citizen of [place] & broken english    Stroke [facility])      Past Surgical History:   Procedure Laterality Date    COLON SURGERY      COLONOSCOPY      COLONOSCOPY N/A 11/2/2016    Procedure: COLONOSCOPY;  Surgeon: Álvaro Arellano MD;  Location: [facility] GI LAB; Service:     ESOPHAGOGASTRODUODENOSCOPY N/A 11/2/2016    Procedure: ESOPHAGOGASTRODUODENOSCOPY (EGD); Surgeon: Álvaro Arellano MD;  Location: [facility] GI LAB; Service:    Brennon Bangura / Melly Jane / Starr Garza      JOINT REPLACEMENT Right 02/2015    hip    JOINT REPLACEMENT Left     knee    JOINT REPLACEMENT Right     knee    MITRAL VALVE REPLACEMENT      OR COLONOSCOPY FLX DX W/COLLJ SPEC WHEN PFRMD N/A 8/9/2018    Procedure: EGD AND COLONOSCOPY;  Surgeon: Álvaro Arellano MD;  [facility]; Service: Gastroenterology    OR REVISE MEDIAN N/CARPAL TUNNEL SURG Left 1/28/2016    Procedure: RELEASE CARPAL TUNNEL;  Surgeon: Julia Mejía MD;  Location: [facility] MAIN OR;  Service: Orthopedics    TUBAL LIGATION      VARICOSE VEIN SURGERY Bilateral      Review of Systems   Constitutional: Positive for fatigue  Negative for fever  Respiratory:        CLEANING   Cardiovascular: Positive for palpitations  Gastrointestinal:        Occ GERD   Musculoskeletal: Positive for arthralgias  Neurological: Positive for weakness  Genoveva like activity   Psychiatric/Behavioral: Positive for sleep disturbance  The patient is nervous/anxious            Current Outpatient Medications   Medication Sig Dispense Refill    albuterol (2 5 mg/3 mL) 0 083 % nebulizer solution Take 3 mL (2 5 mg total) by nebulization every 6 (six) hours as needed for wheezing or shortness of breath 75 mL 0    albuterol (PROVENTIL HFA,VENTOLIN HFA) 90 mcg/act inhaler Inhale 2 puffs every 6 (six) hours as needed for wheezing 18 g 0    apixaban (ELIQUIS) 2 5 mg Take 1 tablet (2 5 mg total) by mouth 2 (two) times a day  0    aspirin (ECOTRIN LOW STRENGTH) 81 mg EC tablet Take 1 tablet (81 mg total) by mouth daily  0    atorvastatin (LIPITOR) 40 mg tablet Take 2 tablets (80 mg total) by mouth daily with dinner 30 tablet 3    budesonide-formoterol (Symbicort) 160-4 5 mcg/act inhaler Inhale 2 puffs 2 (two) times a day      furosemide (LASIX) 40 mg tablet 40 mg Every other day      iron polysaccharides (FERREX) 150 mg capsule Take 150 mg by mouth 2 (two) times a day      JANUVIA 50 MG tablet 50 mg daily        losartan (COZAAR) 25 mg tablet 12 5 mg In a m       metoprolol tartrate (LOPRESSOR) 50 mg tablet Take 1 tablet (50 mg total) by mouth every 12 (twelve) hours (Patient taking differently: Take 25 mg by mouth every 12 (twelve) hours)  0    Multiple Vitamins-Minerals (MULTIVITAMIN ADULT PO) 0 5 tablets 2 (two) times a day        Polysaccharide Iron-FA-B12 (FERREX 150 FORTE PO) Take by mouth      potassium chloride (MICRO-K) 10 MEQ CR capsule        No current facility-administered medications for this visit  Objective:    /60 (BP Location: Left arm, Patient Position: Sitting, Cuff Size: Large)   Pulse 62   Temp 97 6 °F (36 4 °C)   Resp 16   Ht 5' 1" (1 549 m)   Wt 72 1 kg (159 lb)   LMP  (LMP Unknown)   BMI 30 04 kg/m²        Physical Exam  Vitals and nursing note reviewed  Constitutional:       General: She is not in acute distress  Eyes:      General: No scleral icterus  Conjunctiva/sclera: Conjunctivae normal    Cardiovascular:      Rate and Rhythm: Normal rate  Rhythm irregular  Pulses: no weak pulses          Dorsalis pedis pulses are 2+ on the right side and 2+ on the left side  Posterior tibial pulses are 2+ on the right side and 2+ on the left side  Heart sounds: Murmur heard  Pulmonary:      Effort: Pulmonary effort is normal  No respiratory distress        Comments: occ exp wheeze--no localization  Abdominal:      General: Bowel sounds are normal       Palpations: Abdomen is soft  Tenderness: There is no abdominal tenderness  There is no right CVA tenderness, left CVA tenderness, guarding or rebound  Musculoskeletal:         General: Tenderness present  Cervical back: Neck supple  No tenderness  Feet:      Right foot:      Skin integrity: No ulcer, skin breakdown, erythema, warmth, callus or dry skin  Left foot:      Skin integrity: No ulcer, skin breakdown, erythema, warmth, callus or dry skin  Skin:     General: Skin is warm and dry  Coloration: Skin is not jaundiced  Neurological:      Mental Status: She is alert  Cranial Nerves: No cranial nerve deficit  Psychiatric:      Comments: anxious         Diabetic Foot Exam    Patient's shoes and socks removed  Right Foot/Ankle   Right Foot Inspection  Skin Exam: skin normal and skin intact  No dry skin, no warmth, no callus, no erythema, no maceration, no abnormal color, no pre-ulcer, no ulcer and no callus  Toe Exam: ROM and strength within normal limits  Sensory   Vibration: intact  Monofilament testing: intact    Vascular  Capillary refills: < 3 seconds  The right DP pulse is 2+  The right PT pulse is 2+  Left Foot/Ankle  Left Foot Inspection  Skin Exam: skin normal and skin intact  No dry skin, no warmth, no erythema, no maceration, normal color, no pre-ulcer, no ulcer and no callus  Toe Exam: ROM and strength within normal limits  Sensory   Vibration: intact  Monofilament testing: intact    Vascular  Capillary refills: < 3 seconds  The left DP pulse is 2+  The left PT pulse is 2+       Assign Risk Category  No deformity present  No loss of protective sensation  No weak pulses  Risk: 0    Parker Garcia MD

## 2022-06-29 ENCOUNTER — OFFICE VISIT (OUTPATIENT)
Dept: URGENT CARE | Facility: CLINIC | Age: 87
End: 2022-06-29
Payer: MEDICARE

## 2022-06-29 VITALS
RESPIRATION RATE: 20 BRPM | DIASTOLIC BLOOD PRESSURE: 70 MMHG | SYSTOLIC BLOOD PRESSURE: 100 MMHG | BODY MASS INDEX: 29.14 KG/M2 | HEART RATE: 61 BPM | TEMPERATURE: 97.8 F | OXYGEN SATURATION: 97 % | WEIGHT: 154.2 LBS

## 2022-06-29 DIAGNOSIS — J20.8 ACUTE BACTERIAL BRONCHITIS: Primary | ICD-10-CM

## 2022-06-29 DIAGNOSIS — H11.31 SUBCONJUNCTIVAL HEMORRHAGE OF RIGHT EYE: ICD-10-CM

## 2022-06-29 DIAGNOSIS — B96.89 ACUTE BACTERIAL BRONCHITIS: Primary | ICD-10-CM

## 2022-06-29 LAB
SARS-COV-2 AG UPPER RESP QL IA: NEGATIVE
VALID CONTROL: NORMAL

## 2022-06-29 PROCEDURE — 87811 SARS-COV-2 COVID19 W/OPTIC: CPT | Performed by: PHYSICIAN ASSISTANT

## 2022-06-29 PROCEDURE — 99203 OFFICE O/P NEW LOW 30 MIN: CPT | Performed by: PHYSICIAN ASSISTANT

## 2022-06-29 RX ORDER — DOXYCYCLINE 100 MG/1
100 TABLET ORAL 2 TIMES DAILY
Qty: 14 TABLET | Refills: 0 | Status: SHIPPED | OUTPATIENT
Start: 2022-06-29 | End: 2022-07-06

## 2022-06-29 RX ORDER — BENZONATATE 100 MG/1
100 CAPSULE ORAL 3 TIMES DAILY PRN
Qty: 20 CAPSULE | Refills: 0 | Status: SHIPPED | OUTPATIENT
Start: 2022-06-29 | End: 2022-08-04

## 2022-06-29 RX ORDER — FLUTICASONE PROPIONATE 50 MCG
1 SPRAY, SUSPENSION (ML) NASAL DAILY
Qty: 16 G | Refills: 0 | Status: SHIPPED | OUTPATIENT
Start: 2022-06-29 | End: 2022-08-04

## 2022-06-29 NOTE — PATIENT INSTRUCTIONS
Continue to monitor symptoms  If new or worsening symptoms develop, go immediately to Er  Drink plenty of fluids  Follow up with Family Doctor this week  Acute Bronchitis   WHAT YOU NEED TO KNOW:   Acute bronchitis is swelling and irritation in your lungs  It is usually caused by a virus and most often happens in the winter  Bronchitis may also be caused by bacteria or by a chemical irritant, such as smoke  DISCHARGE INSTRUCTIONS:   Return to the emergency department if:   You cough up blood  Your lips or fingernails turn blue  You feel like you are not getting enough air when you breathe  Call your doctor if:   Your symptoms do not go away or get worse, even after treatment  Your cough does not get better within 4 weeks  You have questions or concerns about your condition or care  Medicines: You may  need any of the following:  Cough suppressants  decrease your urge to cough  Decongestants  help loosen mucus in your lungs and make it easier to cough up  This can help you breathe easier  Inhalers  may be given  Your healthcare provider may give you one or more inhalers to help you breathe easier and cough less  An inhaler gives your medicine to open your airways  Ask your healthcare provider to show you how to use your inhaler correctly  Antibiotics  may be given for up to 5 days if your bronchitis is caused by bacteria  Acetaminophen  decreases pain and fever  It is available without a doctor's order  Ask how much to take and how often to take it  Follow directions  Read the labels of all other medicines you are using to see if they also contain acetaminophen, or ask your doctor or pharmacist  Acetaminophen can cause liver damage if not taken correctly  Do not use more than 4 grams (4,000 milligrams) total of acetaminophen in one day  NSAIDs  help decrease swelling and pain or fever  This medicine is available with or without a doctor's order   NSAIDs can cause stomach bleeding or kidney problems in certain people  If you take blood thinner medicine, always ask your healthcare provider if NSAIDs are safe for you  Always read the medicine label and follow directions  Take your medicine as directed  Contact your healthcare provider if you think your medicine is not helping or if you have side effects  Tell him of her if you are allergic to any medicine  Keep a list of the medicines, vitamins, and herbs you take  Include the amounts, and when and why you take them  Bring the list or the pill bottles to follow-up visits  Carry your medicine list with you in case of an emergency  Self-care:   Drink liquids as directed  You may need to drink more liquids than usual to stay hydrated  Ask how much liquid to drink each day and which liquids are best for you  Use a cool mist humidifier  to increase air moisture in your home  This may make it easier for you to breathe and help decrease your cough  Get more rest   Rest helps your body to heal  Slowly start to do more each day  Rest when you feel it is needed  Avoid irritants in the air  Avoid chemicals, fumes, and dust  Wear a face mask if you must work around dust or fumes  Stay inside on days when air pollution levels are high  If you have allergies, stay inside when pollen counts are high  Do not use aerosol products, such as spray-on deodorant, bug spray, and hair spray  Do not smoke or be around others who are smoking  Nicotine and other chemicals in cigarettes and cigars can cause lung damage  Ask your healthcare provider for information if you currently smoke and need help to quit  E-cigarettes or smokeless tobacco still contain nicotine  Talk to your healthcare provider before you use these products  Prevent acute bronchitis:       Ask about vaccines you may need  Get a flu vaccine each year as soon as recommended, usually in September or October   Ask your healthcare provider if you should also get a pneumonia or COVID-19 vaccine  Your healthcare provider can tell you if you should also get other vaccines, and when to get them  Prevent the spread of germs  You can decrease your risk for acute bronchitis and other illnesses by doing the following:     Wash your hands often with soap and water  Carry germ-killing hand lotion or gel with you  You can use the lotion or gel to clean your hands when soap and water are not available  Do not touch your eyes, nose, or mouth unless you have washed your hands first     Always cover your mouth when you cough to prevent the spread of germs  It is best to cough into a tissue or your shirt sleeve instead of into your hand  Ask those around you to cover their mouths when they cough  Try to avoid people who have a cold or the flu  If you are sick, stay away from others as much as possible  Follow up with your doctor as directed:  Write down questions you have so you will remember to ask them during your follow-up visits  © Copyright [employer] 2022 Information is for End User's use only and may not be sold, redistributed or otherwise used for commercial purposes  All illustrations and images included in CareNotes® are the copyrighted property of A D A M , Inc  or [employer]   The above information is an  only  It is not intended as medical advice for individual conditions or treatments  Talk to your doctor, nurse or pharmacist before following any medical regimen to see if it is safe and effective for you

## 2022-06-29 NOTE — PROGRESS NOTES
[facility]        NAME: Sara Castillo is a 80 y o  female  : 1933    MRN: [id number]  DATE: 2022  TIME: 2:02 PM    Assessment and Plan   Acute bacterial bronchitis [J20 8, B96 89]  1  Acute bacterial bronchitis  doxycycline (ADOXA) 100 MG tablet    fluticasone (FLONASE) 50 mcg/act nasal spray    benzonatate (TESSALON PERLES) 100 mg capsule    Poct Covid 19 Rapid Antigen Test   2  Subconjunctival hemorrhage of right eye         Patient Instructions     Continue to monitor symptoms  If new or worsening symptoms develop, go immediately to Er  Drink plenty of fluids  Follow up with Family Doctor this week  Chief Complaint     Chief Complaint   Patient presents with    Cold Like Symptoms     Pt here ill x1 week   cough, congestion, runny nose,  right eye redness  No meds used  Vaxed,  current  flu shot  History of Present Illness       Cough  This is a new problem  Episode onset: One week ago  The problem has been gradually worsening  The problem occurs every few minutes  Cough characteristics: occasionally productive  Associated symptoms include nasal congestion, postnasal drip and a sore throat  Pertinent negatives include no chest pain, chills, ear pain, fever, headaches, myalgias, rash, rhinorrhea, shortness of breath or wheezing  Associated symptoms comments: Redness of right eye  No injury  No pain or itchiness  Nothing aggravates the symptoms  She has tried nothing for the symptoms  The treatment provided no relief  No know sick contacts  Vaccinated for covid and flu but no booster  Review of Systems   Review of Systems   Constitutional: Negative for chills, diaphoresis, fatigue and fever  HENT: Positive for postnasal drip, sinus pressure, sinus pain, sneezing and sore throat  Negative for congestion, ear pain, rhinorrhea and voice change  Eyes: Negative  Respiratory: Positive for cough  Negative for chest tightness, shortness of breath and wheezing  Cardiovascular: Negative for chest pain and palpitations  Gastrointestinal: Negative for abdominal pain, constipation, diarrhea, nausea and vomiting  Endocrine: Negative  Genitourinary: Negative for dysuria  Musculoskeletal: Negative for back pain, myalgias and neck pain  Skin: Negative for pallor and rash  Allergic/Immunologic: Negative  Neurological: Negative for dizziness, syncope and headaches  Hematological: Negative  Psychiatric/Behavioral: Negative            Current Medications       Current Outpatient Medications:     albuterol (2 5 mg/3 mL) 0 083 % nebulizer solution, Take 3 mL (2 5 mg total) by nebulization every 6 (six) hours as needed for wheezing or shortness of breath, Disp: 75 mL, Rfl: 0    albuterol (PROVENTIL HFA,VENTOLIN HFA) 90 mcg/act inhaler, Inhale 2 puffs every 6 (six) hours as needed for wheezing, Disp: 18 g, Rfl: 0    apixaban (ELIQUIS) 2 5 mg, Take 1 tablet (2 5 mg total) by mouth 2 (two) times a day, Disp: , Rfl: 0    aspirin (ECOTRIN LOW STRENGTH) 81 mg EC tablet, Take 1 tablet (81 mg total) by mouth daily, Disp: , Rfl: 0    atorvastatin (LIPITOR) 40 mg tablet, Take 2 tablets (80 mg total) by mouth daily with dinner, Disp: 30 tablet, Rfl: 3    benzonatate (TESSALON PERLES) 100 mg capsule, Take 1 capsule (100 mg total) by mouth 3 (three) times a day as needed for cough, Disp: 20 capsule, Rfl: 0    doxycycline (ADOXA) 100 MG tablet, Take 1 tablet (100 mg total) by mouth 2 (two) times a day for 7 days, Disp: 14 tablet, Rfl: 0    fluticasone (FLONASE) 50 mcg/act nasal spray, 1 spray into each nostril daily, Disp: 16 g, Rfl: 0    furosemide (LASIX) 40 mg tablet, 40 mg Every other day, Disp: , Rfl:     iron polysaccharides (FERREX) 150 mg capsule, Take 150 mg by mouth 2 (two) times a day, Disp: , Rfl:     JANUVIA 50 MG tablet, 50 mg daily  , Disp: , Rfl:     losartan (COZAAR) 25 mg tablet, 12 5 mg In a m , Disp: , Rfl:     metoprolol tartrate (LOPRESSOR) 50 mg tablet, Take 1 tablet (50 mg total) by mouth every 12 (twelve) hours (Patient taking differently: Take 25 mg by mouth every 12 (twelve) hours), Disp: , Rfl: 0    Multiple Vitamins-Minerals (MULTIVITAMIN ADULT PO), 0 5 tablets 2 (two) times a day  , Disp: , Rfl:     Polysaccharide Iron-FA-B12 (FERREX 150 FORTE PO), Take by mouth, Disp: , Rfl:     potassium chloride (MICRO-K) 10 MEQ CR capsule, , Disp: , Rfl:     Current Allergies     Allergies as of 06/29/2022 - Reviewed 06/29/2022   Allergen Reaction Noted    Glimepiride Rash 09/21/2020    Guaifenesin-codeine Hallucinations 05/18/2022    Heparin Other (See Comments) 01/25/2018    Vancomycin  08/09/2018            The following portions of the patient's history were reviewed and updated as appropriate: allergies, current medications, past family history, past medical history, past social history, past surgical history and problem list      Past Medical History:   Diagnosis Date    Acid reflux     on occ    Arthritis     DJD right hip replaced    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    Cancer ([address] ) 2007    colonic polyps, no surgery done    Deep vein thrombosis (DVT) of left upper extremity ([address] ) 12/11/2017    Dementia ([address] )     Diabetes mellitus ([address] )     type 2    Diverticulosis     Edema     in legs on occ    Hypertension     on occ    Language barrier     speaks Jamaican & broken english    Stroke [facility])        Past Surgical History:   Procedure Laterality Date    COLON SURGERY      COLONOSCOPY      COLONOSCOPY N/A 11/2/2016    Procedure: COLONOSCOPY;  Surgeon: Sidney Lopez MD;  Location: [place] [address] GI LAB; Service:     ESOPHAGOGASTRODUODENOSCOPY N/A 11/2/2016    Procedure: ESOPHAGOGASTRODUODENOSCOPY (EGD); Surgeon: Sidney Lopez MD;  Location: [facility] GI LAB;   Service:    Austyn Dumont / Darlyn Cunningham / Sera Bleak      JOINT REPLACEMENT Right 02/2015    hip    JOINT REPLACEMENT Left     knee    JOINT REPLACEMENT Right     knee    MITRAL VALVE REPLACEMENT      OH COLONOSCOPY FLX DX W/COLLJ SPEC WHEN PFRMD N/A 8/9/2018    Procedure: EGD AND COLONOSCOPY;  Surgeon: Diana Bee MD;  [facility]; Service: Gastroenterology    OH REVISE MEDIAN N/CARPAL TUNNEL SURG Left 1/28/2016    Procedure: RELEASE CARPAL TUNNEL;  Surgeon: Keya Jenkins MD;  [facility] MAIN [facility];  Service: Orthopedics    TUBAL LIGATION      VARICOSE VEIN SURGERY Bilateral        Family History   Problem Relation Age of Onset    Cancer Mother         throat         Medications have been verified  Objective   /70   Pulse 61   Temp 97 8 °F (36 6 °C) (Tympanic)   Resp 20   Wt 69 9 kg (154 lb 3 2 oz)   LMP  (LMP Unknown)   SpO2 97%   BMI 29 14 kg/m²        Physical Exam     Physical Exam  Vitals and nursing note reviewed  Constitutional:       General: She is not in acute distress  Appearance: Normal appearance  She is well-developed  She is not ill-appearing or diaphoretic  HENT:      Head: Normocephalic and atraumatic  Right Ear: Tympanic membrane, ear canal and external ear normal       Left Ear: Tympanic membrane, ear canal and external ear normal       Nose: Congestion present  No rhinorrhea  Mouth/Throat:      Pharynx: Posterior oropharyngeal erythema present  No oropharyngeal exudate  Eyes:      General:         Right eye: No discharge  Left eye: No discharge  Conjunctiva/sclera: Conjunctivae normal       Comments: Subconjunctival hemorrhage R eye   Cardiovascular:      Rate and Rhythm: Normal rate and regular rhythm  Heart sounds: Normal heart sounds  Pulmonary:      Effort: Pulmonary effort is normal  No respiratory distress  Breath sounds: Normal breath sounds  No wheezing, rhonchi or rales  Musculoskeletal:      Cervical back: Normal range of motion and neck supple  Lymphadenopathy:      Cervical: No cervical adenopathy  Skin:     General: Skin is warm  Capillary Refill: Capillary refill takes less than 2 seconds  Coloration: Skin is not pale  Findings: No rash  Neurological:      Mental Status: She is alert

## 2022-07-05 ENCOUNTER — TELEPHONE (OUTPATIENT)
Dept: NEUROSURGERY | Facility: CLINIC | Age: 87
End: 2022-07-05

## 2022-07-05 ENCOUNTER — OFFICE VISIT (OUTPATIENT)
Dept: FAMILY MEDICINE CLINIC | Facility: CLINIC | Age: 87
End: 2022-07-05
Payer: MEDICARE

## 2022-07-05 ENCOUNTER — APPOINTMENT (OUTPATIENT)
Dept: LAB | Facility: CLINIC | Age: 87
End: 2022-07-05
Payer: MEDICARE

## 2022-07-05 ENCOUNTER — APPOINTMENT (OUTPATIENT)
Dept: RADIOLOGY | Facility: CLINIC | Age: 87
End: 2022-07-05
Payer: MEDICARE

## 2022-07-05 VITALS
TEMPERATURE: 98.4 F | HEIGHT: 61 IN | SYSTOLIC BLOOD PRESSURE: 120 MMHG | WEIGHT: 154 LBS | BODY MASS INDEX: 29.07 KG/M2 | HEART RATE: 72 BPM | RESPIRATION RATE: 16 BRPM | DIASTOLIC BLOOD PRESSURE: 70 MMHG

## 2022-07-05 DIAGNOSIS — Z96.652 HISTORY OF KNEE REPLACEMENT, TOTAL, LEFT: ICD-10-CM

## 2022-07-05 DIAGNOSIS — E11.9 CONTROLLED TYPE 2 DIABETES MELLITUS WITHOUT COMPLICATION, WITHOUT LONG-TERM CURRENT USE OF INSULIN (HCC): ICD-10-CM

## 2022-07-05 DIAGNOSIS — R30.0 DYSURIA: Primary | ICD-10-CM

## 2022-07-05 DIAGNOSIS — R56.9 SEIZURE-LIKE ACTIVITY (HCC): ICD-10-CM

## 2022-07-05 DIAGNOSIS — I10 HYPERTENSION, UNSPECIFIED TYPE: ICD-10-CM

## 2022-07-05 DIAGNOSIS — R06.2 WHEEZE: ICD-10-CM

## 2022-07-05 DIAGNOSIS — Z86.79 HISTORY OF CHF (CONGESTIVE HEART FAILURE): ICD-10-CM

## 2022-07-05 DIAGNOSIS — R05.9 COUGH: Primary | ICD-10-CM

## 2022-07-05 DIAGNOSIS — M25.562 LEFT KNEE PAIN, UNSPECIFIED CHRONICITY: ICD-10-CM

## 2022-07-05 DIAGNOSIS — R82.998 LEUKOCYTES IN URINE: ICD-10-CM

## 2022-07-05 DIAGNOSIS — I25.119 CORONARY ARTERY DISEASE WITH ANGINA PECTORIS, UNSPECIFIED VESSEL OR LESION TYPE, UNSPECIFIED WHETHER NATIVE OR TRANSPLANTED HEART (HCC): ICD-10-CM

## 2022-07-05 DIAGNOSIS — R05.9 COUGH: ICD-10-CM

## 2022-07-05 DIAGNOSIS — I51.9 HEART DISEASE: ICD-10-CM

## 2022-07-05 DIAGNOSIS — F39 MOOD DISORDER (HCC): ICD-10-CM

## 2022-07-05 LAB
ERYTHROCYTE [DISTWIDTH] IN BLOOD BY AUTOMATED COUNT: 12 % (ref 11.6–15.1)
HCT VFR BLD AUTO: 37 % (ref 34.8–46.1)
HGB BLD-MCNC: 11.9 G/DL (ref 11.5–15.4)
MCH RBC QN AUTO: 30.8 PG (ref 26.8–34.3)
MCHC RBC AUTO-ENTMCNC: 32.2 G/DL (ref 31.4–37.4)
MCV RBC AUTO: 96 FL (ref 82–98)
PLATELET # BLD AUTO: 189 THOUSANDS/UL (ref 149–390)
PMV BLD AUTO: 10.9 FL (ref 8.9–12.7)
RBC # BLD AUTO: 3.86 MILLION/UL (ref 3.81–5.12)
SL AMB  POCT GLUCOSE, UA: ABNORMAL
SL AMB LEUKOCYTE ESTERASE,UA: 75
SL AMB POCT BILIRUBIN,UA: ABNORMAL
SL AMB POCT BLOOD,UA: ABNORMAL
SL AMB POCT CLARITY,UA: ABNORMAL
SL AMB POCT COLOR,UA: ABNORMAL
SL AMB POCT KETONES,UA: ABNORMAL
SL AMB POCT NITRITE,UA: ABNORMAL
SL AMB POCT PH,UA: 5
SL AMB POCT SPECIFIC GRAVITY,UA: 1.01
SL AMB POCT URINE PROTEIN: ABNORMAL
SL AMB POCT UROBILINOGEN: ABNORMAL
WBC # BLD AUTO: 8.86 THOUSAND/UL (ref 4.31–10.16)

## 2022-07-05 PROCEDURE — 71046 X-RAY EXAM CHEST 2 VIEWS: CPT

## 2022-07-05 PROCEDURE — 84443 ASSAY THYROID STIM HORMONE: CPT

## 2022-07-05 PROCEDURE — 73562 X-RAY EXAM OF KNEE 3: CPT

## 2022-07-05 PROCEDURE — 81003 URINALYSIS AUTO W/O SCOPE: CPT | Performed by: FAMILY MEDICINE

## 2022-07-05 PROCEDURE — 80053 COMPREHEN METABOLIC PANEL: CPT

## 2022-07-05 PROCEDURE — 36415 COLL VENOUS BLD VENIPUNCTURE: CPT

## 2022-07-05 PROCEDURE — 80061 LIPID PANEL: CPT

## 2022-07-05 PROCEDURE — 85027 COMPLETE CBC AUTOMATED: CPT

## 2022-07-05 PROCEDURE — 99214 OFFICE O/P EST MOD 30 MIN: CPT | Performed by: FAMILY MEDICINE

## 2022-07-05 NOTE — TELEPHONE ENCOUNTER
Received a vm from Vitaliy Bernstein with CT department at [facility] reporting the patient's GFR level was 29 on 4/19/22 and how they do not inject below 30  She inquired if the patient is going to receive repeat blood work prior to the CT  Returned the call to Vitaliy Bernstein  Advised her how it was previously planned for the patient to obtain a BUN and creatinine prior to the CT scan to assess kidney function  Will remind patient  She was appreciative

## 2022-07-05 NOTE — TELEPHONE ENCOUNTER
Janell Leung, patient's daughter, and reminded her of the blood work prior to CT  She will complete the blood work 3 days prior to the CT  She is aware regarding the GFR  She was appreciative

## 2022-07-06 ENCOUNTER — TELEPHONE (OUTPATIENT)
Dept: FAMILY MEDICINE CLINIC | Facility: CLINIC | Age: 87
End: 2022-07-06

## 2022-07-06 DIAGNOSIS — J98.01 BRONCHOSPASM: Primary | ICD-10-CM

## 2022-07-06 LAB
ALBUMIN SERPL BCP-MCNC: 3.1 G/DL (ref 3.5–5)
ALP SERPL-CCNC: 81 U/L (ref 46–116)
ALT SERPL W P-5'-P-CCNC: 17 U/L (ref 12–78)
ANION GAP SERPL CALCULATED.3IONS-SCNC: 13 MMOL/L (ref 4–13)
AST SERPL W P-5'-P-CCNC: 20 U/L (ref 5–45)
BACTERIA UR CULT: NORMAL
BILIRUB SERPL-MCNC: 1.01 MG/DL (ref 0.2–1)
BUN SERPL-MCNC: 30 MG/DL (ref 5–25)
CALCIUM ALBUM COR SERPL-MCNC: 9.9 MG/DL (ref 8.3–10.1)
CALCIUM SERPL-MCNC: 9.2 MG/DL (ref 8.3–10.1)
CHLORIDE SERPL-SCNC: 99 MMOL/L (ref 100–108)
CHOLEST SERPL-MCNC: 128 MG/DL
CO2 SERPL-SCNC: 19 MMOL/L (ref 21–32)
CREAT SERPL-MCNC: 1.6 MG/DL (ref 0.6–1.3)
GFR SERPL CREATININE-BSD FRML MDRD: 28 ML/MIN/1.73SQ M
GLUCOSE P FAST SERPL-MCNC: 104 MG/DL (ref 65–99)
HDLC SERPL-MCNC: 69 MG/DL
LDLC SERPL CALC-MCNC: 46 MG/DL (ref 0–100)
Lab: NO GROWTH
NONHDLC SERPL-MCNC: 59 MG/DL
POTASSIUM SERPL-SCNC: 4.5 MMOL/L (ref 3.5–5.3)
PROT SERPL-MCNC: 7.5 G/DL (ref 6.4–8.2)
SODIUM SERPL-SCNC: 131 MMOL/L (ref 136–145)
TRIGL SERPL-MCNC: 63 MG/DL
TSH SERPL DL<=0.05 MIU/L-ACNC: 0.69 UIU/ML (ref 0.45–4.5)

## 2022-07-06 RX ORDER — TIOTROPIUM BROMIDE INHALATION SPRAY 1.56 UG/1
2 SPRAY, METERED RESPIRATORY (INHALATION) DAILY
Qty: 4 G | Refills: 1 | Status: SHIPPED | OUTPATIENT
Start: 2022-07-06 | End: 2022-08-30

## 2022-07-06 NOTE — TELEPHONE ENCOUNTER
Dr Bradly Macedo:    Patient's daughter called asking if patient should continue taking symbicort or would you be sending in substitute Rx? Patient's insurance does not cover this Rx and it is costing her over $200 per month  Please c/b to discuss further

## 2022-07-06 NOTE — TELEPHONE ENCOUNTER
Returned the call to Demi Flowers  She wanted to confirm the date/time/location of the CT head  Confirmed with her  Also reminded her how the patient needs to obtain blood work prior to the CT  She is aware  She was appreciative

## 2022-07-07 NOTE — TELEPHONE ENCOUNTER
Rx for spiriva respimat sent to pharm  Cxr and knee xrays wnl  Labs reviewed  Awaiting urine cx  PET scan sched for Monday 7/11

## 2022-07-07 NOTE — PROGRESS NOTES
Assessment/Plan:    1  Cough  -     XR chest pa & lateral; Future; Expected date: 07/05/2022    2  History of CHF (congestive heart failure)  -     XR chest pa & lateral; Future; Expected date: 07/05/2022    3  Wheeze  -     XR chest pa & lateral; Future; Expected date: 07/05/2022    4  Mood disorder (HCC)  -     POCT urine dip auto non-scope  -     Urine culture    5  Seizure-like activity ([facility] )    6  Coronary artery disease with angina pectoris, unspecified vessel or lesion type, unspecified whether native or transplanted heart ([facility] )    7  History of knee replacement, total, left  -     XR knee 3 vw left non injury; Future; Expected date: 07/05/2022    8  Left knee pain, unspecified chronicity  -     XR knee 3 vw left non injury; Future; Expected date: 07/05/2022    9  Leukocytes in urine  -     Urine culture           Subjective:      Patient ID: Pop Nascimento is a 80 y o  female  Chief Complaint   Patient presents with    Leg Pain     Left leg pain x2 days       HPI   +Language barrier  Family members assisting w pt care  Pt c/o left leg pain evaristo around knee area  Hx left knee arthroplasty  No acute trauma  No rash or fever  +Weakness/fatigue  Pt unable to void in office--will bring in sample after voiding at home after visit  Pt sent for labs and cxr today after appt to further assess changes in condition  The following portions of the patient's history were reviewed and updated as appropriate: allergies, current medications, past family history, past medical history, past social history, past surgical history and problem list     Review of Systems   Constitutional: Positive for fatigue  Negative for fever  Eyes:        Wears glasses   Respiratory:        CLEANING  occ wheeze   Cardiovascular: Positive for leg swelling  Gastrointestinal: Negative for blood in stool, diarrhea and vomiting  Musculoskeletal: Positive for arthralgias, gait problem and myalgias  Skin: Negative for rash     Neurological: Positive for weakness  Psychiatric/Behavioral: Positive for confusion and sleep disturbance  The patient is nervous/anxious  Current Outpatient Medications   Medication Sig Dispense Refill    albuterol (2 5 mg/3 mL) 0 083 % nebulizer solution Take 3 mL (2 5 mg total) by nebulization every 6 (six) hours as needed for wheezing or shortness of breath 75 mL 0    albuterol (PROVENTIL HFA,VENTOLIN HFA) 90 mcg/act inhaler Inhale 2 puffs every 6 (six) hours as needed for wheezing 18 g 0    apixaban (ELIQUIS) 2 5 mg Take 1 tablet (2 5 mg total) by mouth 2 (two) times a day  0    aspirin (ECOTRIN LOW STRENGTH) 81 mg EC tablet Take 1 tablet (81 mg total) by mouth daily  0    atorvastatin (LIPITOR) 40 mg tablet Take 2 tablets (80 mg total) by mouth daily with dinner 30 tablet 3    benzonatate (TESSALON PERLES) 100 mg capsule Take 1 capsule (100 mg total) by mouth 3 (three) times a day as needed for cough 20 capsule 0    fluticasone (FLONASE) 50 mcg/act nasal spray 1 spray into each nostril daily 16 g 0    iron polysaccharides (FERREX) 150 mg capsule Take 150 mg by mouth 2 (two) times a day      JANUVIA 50 MG tablet 50 mg daily        losartan (COZAAR) 25 mg tablet 12 5 mg In a m       metoprolol tartrate (LOPRESSOR) 50 mg tablet Take 1 tablet (50 mg total) by mouth every 12 (twelve) hours (Patient taking differently: Take 25 mg by mouth every 12 (twelve) hours)  0    Multiple Vitamins-Minerals (MULTIVITAMIN ADULT PO) 0 5 tablets 2 (two) times a day        potassium chloride (MICRO-K) 10 MEQ CR capsule       furosemide (LASIX) 40 mg tablet 40 mg Every other day      Polysaccharide Iron-FA-B12 (FERREX 150 FORTE PO) Take by mouth      tiotropium (Spiriva Respimat) 1 25 MCG/ACT AERS inhaler Inhale 2 puffs daily 4 g 1     No current facility-administered medications for this visit         Objective:    /70 (BP Location: Right arm, Patient Position: Sitting, Cuff Size: Standard)   Pulse 72   Temp 98 4 °F (36 9 °C) (Temporal)   Resp 16   Ht 5' 1" (1 549 m)   Wt 69 9 kg (154 lb)   LMP  (LMP Unknown)   BMI 29 10 kg/m²        Physical Exam  Vitals and nursing note reviewed  Constitutional:       General: She is not in acute distress  Cardiovascular:      Rate and Rhythm: Normal rate and regular rhythm  Pulmonary:      Effort: Pulmonary effort is normal  No respiratory distress  Comments: occ exp  wheeze  Abdominal:      General: Bowel sounds are normal       Palpations: Abdomen is soft  Tenderness: There is no abdominal tenderness  Musculoskeletal:      Cervical back: Neck supple  Skin:     General: Skin is warm and dry  Coloration: Skin is not jaundiced  Findings: No rash  Neurological:      Mental Status: She is alert     Psychiatric:      Comments: anxious         Amaury Sampson MD

## 2022-07-08 ENCOUNTER — APPOINTMENT (OUTPATIENT)
Dept: LAB | Facility: CLINIC | Age: 87
End: 2022-07-08
Payer: MEDICARE

## 2022-07-08 DIAGNOSIS — D32.9 MENINGIOMA (HCC): ICD-10-CM

## 2022-07-08 LAB
BUN SERPL-MCNC: 17 MG/DL (ref 5–25)
CREAT SERPL-MCNC: 1.39 MG/DL (ref 0.6–1.3)
GFR SERPL CREATININE-BSD FRML MDRD: 33 ML/MIN/1.73SQ M

## 2022-07-08 PROCEDURE — 36415 COLL VENOUS BLD VENIPUNCTURE: CPT

## 2022-07-08 PROCEDURE — 82565 ASSAY OF CREATININE: CPT

## 2022-07-08 PROCEDURE — 84520 ASSAY OF UREA NITROGEN: CPT

## 2022-07-11 ENCOUNTER — HOSPITAL ENCOUNTER (OUTPATIENT)
Dept: RADIOLOGY | Facility: HOSPITAL | Age: 87
Discharge: HOME | End: 2022-07-11
Payer: MEDICARE

## 2022-07-11 DIAGNOSIS — D32.9 MENINGIOMA (HCC): ICD-10-CM

## 2022-07-11 PROCEDURE — G1004 CDSM NDSC: HCPCS

## 2022-07-11 PROCEDURE — 70470 CT HEAD/BRAIN W/O & W/DYE: CPT

## 2022-07-11 RX ADMIN — IOHEXOL 65 ML: 350 INJECTION, SOLUTION INTRAVENOUS at 14:12

## 2022-07-12 ENCOUNTER — HOSPITAL ENCOUNTER (OUTPATIENT)
Dept: NON INVASIVE DIAGNOSTICS | Facility: HOSPITAL | Age: 87
Discharge: HOME | End: 2022-07-12
Payer: MEDICARE

## 2022-07-12 VITALS
SYSTOLIC BLOOD PRESSURE: 126 MMHG | HEART RATE: 69 BPM | WEIGHT: 154 LBS | DIASTOLIC BLOOD PRESSURE: 77 MMHG | HEIGHT: 61 IN | BODY MASS INDEX: 29.07 KG/M2

## 2022-07-12 DIAGNOSIS — R06.00 DYSPNEA, UNSPECIFIED: ICD-10-CM

## 2022-07-12 DIAGNOSIS — Z95.2 PRESENCE OF PROSTHETIC HEART VALVE: ICD-10-CM

## 2022-07-12 PROCEDURE — 93306 TTE W/DOPPLER COMPLETE: CPT

## 2022-07-12 PROCEDURE — 93306 TTE W/DOPPLER COMPLETE: CPT | Performed by: INTERNAL MEDICINE

## 2022-07-14 LAB
AORTIC ROOT: 3.3 CM
APICAL FOUR CHAMBER EJECTION FRACTION: 27 %
AV LVOT PEAK GRADIENT: 4 MMHG
AV PEAK GRADIENT: 7 MMHG
DOP CALC MV VTI: 41.03 CM
E WAVE DECELERATION TIME: 277 MS
FRACTIONAL SHORTENING: 24 (ref 28–44)
INTERVENTRICULAR SEPTUM IN DIASTOLE (PARASTERNAL SHORT AXIS VIEW): 1.1 CM
INTERVENTRICULAR SEPTUM: 1.1 CM (ref 0.6–1.1)
LAAS-AP2: 26.6 CM2
LAAS-AP4: 28.6 CM2
LEFT ATRIUM AREA SYSTOLE SINGLE PLANE A4C: 27.6 CM2
LEFT ATRIUM SIZE: 3.5 CM
LEFT INTERNAL DIMENSION IN SYSTOLE: 2.8 CM (ref 2.1–4)
LEFT VENTRICLE DIASTOLIC VOLUME (MOD BIPLANE): 63 ML
LEFT VENTRICLE SYSTOLIC VOLUME (MOD BIPLANE): 42 ML
LEFT VENTRICULAR INTERNAL DIMENSION IN DIASTOLE: 3.7 CM (ref 3.5–6)
LEFT VENTRICULAR POSTERIOR WALL IN END DIASTOLE: 1.1 CM
LEFT VENTRICULAR STROKE VOLUME: 28 ML
LV EF: 33 %
LVSV (TEICH): 28 ML
MV E'TISSUE VEL-LAT: 7 CM/S
MV E'TISSUE VEL-SEP: 5 CM/S
MV MEAN GRADIENT: 6 MMHG
MV PEAK A VEL: 1.38 M/S
MV PEAK E VEL: 137 CM/S
MV PEAK GRADIENT: 12 MMHG
MV STENOSIS PRESSURE HALF TIME: 80 MS
MV VALVE AREA P 1/2 METHOD: 2.75
PV PEAK GRADIENT: 2 MMHG
RIGHT VENTRICLE ID DIMENSION: 2.8 CM
SL CV LEFT ATRIUM LENGTH A2C: 5.4 CM
SL CV LV EF: 45
SL CV PED ECHO LEFT VENTRICLE DIASTOLIC VOLUME (MOD BIPLANE) 2D: 57 ML
SL CV PED ECHO LEFT VENTRICLE SYSTOLIC VOLUME (MOD BIPLANE) 2D: 29 ML
TR MAX PG: 35 MMHG
TR PEAK VELOCITY: 3 M/S
TRICUSPID VALVE PEAK REGURGITATION VELOCITY: 2.96 M/S

## 2022-07-15 ENCOUNTER — OFFICE VISIT (OUTPATIENT)
Dept: NEUROSURGERY | Facility: CLINIC | Age: 87
End: 2022-07-15
Payer: MEDICARE

## 2022-07-15 VITALS
HEIGHT: 61 IN | DIASTOLIC BLOOD PRESSURE: 76 MMHG | TEMPERATURE: 97.8 F | WEIGHT: 154 LBS | BODY MASS INDEX: 29.07 KG/M2 | HEART RATE: 74 BPM | SYSTOLIC BLOOD PRESSURE: 118 MMHG

## 2022-07-15 DIAGNOSIS — D32.9 MENINGIOMA (HCC): Primary | ICD-10-CM

## 2022-07-15 PROCEDURE — 99213 OFFICE O/P EST LOW 20 MIN: CPT | Performed by: PHYSICIAN ASSISTANT

## 2022-07-15 NOTE — PROGRESS NOTES
Neurosurgery Office Note  Luci Valle 80 y o  female MRN: [id number]      Assessment/Plan      Patient is 80 yrs old with PMHx of  DM-2, Afib, EncephalopathyDVT on Eliquis and baby ASA, CHF, Right MCA stroke, Anemia, seizure like activity, Ambulatory dysfunction, Mitral Valve replacement, Cardiac  Pacemaker placement  Patient is here for follow up of her calcified small occipital ( 0 9cm) and left FP ( 0 6cm)  meningiomas  The tumor appears stable since 2011  Without mass effect or vasogenic edema  Her daughter with the patient  She reports patient is doing fine, occasioanal headache  No visual issues, N/V  Baseline residual expressive aphasia from stroke  No weakness in the extremities  Denies any B/B dysfunction  Exam-Patient speaks few words in Telugu, her daughter a   Noris  Strength 5/5 bilaterally  Sensation to LT intact throughout  DTR 2+ wo clonus  Gait instability noted  Hx, PEx and images reviewed with the patient   Mx plan discussed  Discussed with Dr Abraham Hopes that small occipital and stable  Questionable left  FP tiny lesion  over a decade  Patient doesn't have symptoms related the lesion  Given her medical issues, age and stable lesions, no regular follow up imaging is required  Patient can follow on PRN basis  All questions were answered to patient's satisfaction  Both patient and her daughter expressed their understandings and agreed with the plan  Plan:  1  Patient left occipital calcified meningioma and suspicious left FP tiny calcified meningioma vs prominence stable since over 10 yrs-MRI of 2011  2  No NSx intervention is required nor regular follow up is needed  3  Patient is advised to follow up on PRN basis  4  Call or go to ER with new neurological symptoms      I spent 30 minutes with the patient today in which >50% of the time was spent counseling/coordination of care regarding diagnosis, imaging review, symptoms and treatment plan       C/C: " Meningioma follow up"    Chief Complaint   Patient presents with    Follow-up           History of Present Illness     Patient is 80 yrs old with PMHx of  DM-2, Afib, EncephalopathyDVT on Eliquis and baby ASA, CHF, Right MCA stroke, Anemia, seizure like activity, Ambulatory dysfunction, Mitral Valve replacement, Cardiac  Pacemaker placement  Patient is here for follow up of her calcified small occipital ( 0 9cm) and left FP ( 0 6cm)  meningiomas  The tumor appears stable since 2011  Without mass effect or vasogenic edema  Her daughter with the patient  She reports patient is doing fine, occasioanal headache  No visual issues, N/V  Baseline residual expressive aphasia from stroke  No weakness in the extremities  Denies any B/B dysfunction  Patient denies fever, chills, rigors , cough or chest pain  Imaging findings as described in the assessment section above  REVIEW OF SYSTEMS  ROS personally reviewed and updated  Review of Systems   Constitutional: Negative  HENT: Negative  Eyes: Negative  Respiratory: Negative  Shortness of breath: on Exercision  Cardiovascular: Negative  Gastrointestinal: Negative  Negative for constipation  H/o HTN/ Hyperlipidemia/ 3 Strokes   Endocrine: Negative  H/o Kidney Disease Stage 3   H/o Diabetes   Genitourinary: Negative  Musculoskeletal: Positive for gait problem (difficulty walking, uses walker  )  Skin: Negative  Allergic/Immunologic: Negative  Neurological: Positive for speech difficulty (Difficulty with speech due to 2nd Stroke)  Weakness: B/L Upper /Lower Extremities  H/o Meningioma, Dementia   Lorena (daughter) has provided some history   Hematological: Bruises/bleeds easily (On Eliquis and Aspirin)  Psychiatric/Behavioral: Negative  All other systems reviewed and are negative          Meds/Allergies     Current Outpatient Medications   Medication Sig Dispense Refill    albuterol (2 5 mg/3 mL) 0 083 % nebulizer solution Take 3 mL (2 5 mg total) by nebulization every 6 (six) hours as needed for wheezing or shortness of breath 75 mL 0    albuterol (PROVENTIL HFA,VENTOLIN HFA) 90 mcg/act inhaler Inhale 2 puffs every 6 (six) hours as needed for wheezing 18 g 0    apixaban (ELIQUIS) 2 5 mg Take 1 tablet (2 5 mg total) by mouth 2 (two) times a day  0    aspirin (ECOTRIN LOW STRENGTH) 81 mg EC tablet Take 1 tablet (81 mg total) by mouth daily  0    atorvastatin (LIPITOR) 40 mg tablet Take 2 tablets (80 mg total) by mouth daily with dinner 30 tablet 3    benzonatate (TESSALON PERLES) 100 mg capsule Take 1 capsule (100 mg total) by mouth 3 (three) times a day as needed for cough 20 capsule 0    fluticasone (FLONASE) 50 mcg/act nasal spray 1 spray into each nostril daily 16 g 0    furosemide (LASIX) 40 mg tablet 40 mg Every other day      iron polysaccharides (FERREX) 150 mg capsule Take 150 mg by mouth 2 (two) times a day      JANUVIA 50 MG tablet 50 mg daily        losartan (COZAAR) 25 mg tablet 12 5 mg In a m       metoprolol tartrate (LOPRESSOR) 50 mg tablet Take 1 tablet (50 mg total) by mouth every 12 (twelve) hours (Patient taking differently: Take 25 mg by mouth every 12 (twelve) hours)  0    Multiple Vitamins-Minerals (MULTIVITAMIN ADULT PO) 0 5 tablets 2 (two) times a day        Polysaccharide Iron-FA-B12 (FERREX 150 FORTE PO) Take by mouth      potassium chloride (MICRO-K) 10 MEQ CR capsule       tiotropium (Spiriva Respimat) 1 25 MCG/ACT AERS inhaler Inhale 2 puffs daily 4 g 1     No current facility-administered medications for this visit         Allergies   Allergen Reactions    Glimepiride Rash    Guaifenesin-Codeine Hallucinations    Heparin Other (See Comments)     Thrombocytopenia      Vancomycin      Red man syndrome       PAST HISTORY    Past Medical History:   Diagnosis Date    Acid reflux     on occ    Arthritis     DJD right hip replaced    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    x 2 lesions with no change    Cancer [facility]) 2007    colonic polyps, no surgery done    Deep vein thrombosis (DVT) of left upper extremity ([facility] [address] ) 2017    Dementia ([facility] [address] )     Diabetes mellitus ([facility] [address] )     type 2    Diverticulosis     Edema     in legs on occ    Hypertension     on occ    Language barrier     speaks Amharic & broken english    Stroke [facility])        Past Surgical History:   Procedure Laterality Date    COLON SURGERY      COLONOSCOPY      COLONOSCOPY N/A 2016    Procedure: COLONOSCOPY;  Surgeon: Caitlin Newman MD;  Location: [place] [address] GI LAB; Service:     ESOPHAGOGASTRODUODENOSCOPY N/A 2016    Procedure: ESOPHAGOGASTRODUODENOSCOPY (EGD); Surgeon: Caitlin Newman MD;  Location: [facility] GI LAB; Service:    Marycarmen Munoz / Altaf Penn / Yousuf Diver      JOINT REPLACEMENT Right 2015    hip    JOINT REPLACEMENT Left     knee    JOINT REPLACEMENT Right     knee    MITRAL VALVE REPLACEMENT      VA COLONOSCOPY FLX DX W/COLLJ SPEC WHEN PFRMD N/A 2018    Procedure: EGD AND COLONOSCOPY;  Surgeon: Caitlin Newman MD;  [facility]; Service: Gastroenterology    VA REVISE MEDIAN N/CARPAL TUNNEL SURG Left 2016    Procedure: RELEASE CARPAL TUNNEL;  Surgeon: Glenn Miller MD;  Location: [facility] MAIN OR;  Service: Orthopedics    TUBAL LIGATION      VARICOSE VEIN SURGERY Bilateral        Social History     Tobacco Use    Smoking status: Former Smoker     Packs/day: 0 25     Years: 10 00     Pack years: 2 50     Types: Cigarettes     Quit date: 1950     Years since quittin 5    Smokeless tobacco: Never Used   Vaping Use    Vaping Use: Never used   Substance Use Topics    Alcohol use: No    Drug use: No       Family History   Problem Relation Age of Onset    Cancer Mother         throat         Above history personally reviewed  EXAM    Vitals:not currently breastfeeding  ,There is no height or weight on file to calculate BMI  Physical Exam  Constitutional:       Appearance: Normal appearance  HENT:      Head: Normocephalic and atraumatic  Eyes:      Extraocular Movements: Extraocular movements intact  Pupils: Pupils are equal, round, and reactive to light  Cardiovascular:      Rate and Rhythm: Normal rate  Pulses: Normal pulses  Pulmonary:      Effort: Pulmonary effort is normal    Musculoskeletal:      Cervical back: Normal range of motion  Neurological:      General: No focal deficit present  Mental Status: She is alert and oriented to person, place, and time  GCS: GCS eye subscore is 4  GCS verbal subscore is 4  GCS motor subscore is 6  Cranial Nerves: Cranial nerves are intact  Sensory: Sensation is intact  Motor: Motor function is intact  Coordination: Finger-Nose-Finger Test normal       Deep Tendon Reflexes: Reflexes are normal and symmetric  Reflex Scores:       Tricep reflexes are 2+ on the right side and 2+ on the left side  Bicep reflexes are 2+ on the right side and 2+ on the left side  Brachioradialis reflexes are 2+ on the right side and 2+ on the left side  Patellar reflexes are 2+ on the right side and 2+ on the left side  Achilles reflexes are 2+ on the right side and 2+ on the left side  Psychiatric:         Speech: Speech normal          Neurologic Exam     Mental Status   Oriented to person, place, and time  Speech: speech is normal   Level of consciousness: alert    Cranial Nerves     CN III, IV, VI   Pupils are equal, round, and reactive to light  Right pupil: Size: 2 mm  Shape: regular  Reactivity: brisk  Left pupil: Size: 2 mm  Shape: regular  Reactivity: brisk     Nystagmus: none     CN XI   CN XI normal      Motor Exam   Muscle bulk: normal  Overall muscle tone: normal  Right arm tone: normal  Left arm tone: normal  Right arm pronator drift: absent  Left arm pronator drift: absent  Right leg tone: normal  Left leg tone: normal    Sensory Exam   Light touch normal      Gait, Coordination, and Reflexes     Coordination   Finger to nose coordination: normal    Reflexes   Right brachioradialis: 2+  Left brachioradialis: 2+  Right biceps: 2+  Left biceps: 2+  Right triceps: 2+  Left triceps: 2+  Right patellar: 2+  Left patellar: 2+  Right achilles: 2+  Left achilles: 2+  Right : 2+  Left : 2+  Right Andersen: absent  Left Andersen: absent  Right ankle clonus: absent  Left pendular knee jerk: absent        MEDICAL DECISION MAKING    Imaging Studies:     XR chest pa & lateral    Result Date: 7/5/2022  Narrative: CHEST INDICATION:   R05 9: Cough, unspecified Z86 79: Personal history of other diseases of the circulatory system R06 2: Wheezing  COMPARISON:  4/17/2022 EXAM PERFORMED/VIEWS:  XR CHEST PA & LATERAL FINDINGS:  Patient is status post median sternotomy  Transvenous pacemaker noted as on prior  Cardiac silhouette size unchanged from prior The lungs are clear  No pneumothorax or pleural effusion  Osseous structures appear within normal limits for patient age  Impression: No acute pulmonary disease Workstation performed: [id number]     XR knee 3 vw left non injury    Result Date: 7/5/2022  Narrative: LEFT KNEE INDICATION:   Q02 662: Presence of left artificial knee joint M25 562: Pain in left knee  COMPARISON:  11/19/2004 VIEWS:  XR KNEE 3 VW LEFT NON INJURY FINDINGS: There is no acute fracture or dislocation  There is no joint effusion  There is a left total knee arthroplasty  The components appear in appropriate position No lytic or blastic osseous lesion  There are atherosclerotic calcifications  Soft tissues are otherwise unremarkable  Impression: No acute osseous abnormality  Workstation performed: [id number]     CT head w wo contrast    Result Date: 7/12/2022  Narrative: CT BRAIN - WITH AND WITHOUT CONTRAST INDICATION:   D32 9: Benign neoplasm of meninges, unspecified  Follow-up evaluation   COMPARISON:  11/4/2018 TECHNIQUE:  CT examination of the brain was performed both prior to and after the administration of intravenous contrast   In addition to axial images, sagittal and coronal 2D reformatted images were created and submitted for interpretation  Radiation dose length product (DLP) for this visit:  1826 mGy-cm   This examination, like all CT scans performed in the [facility], was performed utilizing techniques to minimize radiation dose exposure, including the use of iterative reconstruction and automated exposure control  IV Contrast:  65 mL of iohexol (OMNIPAQUE)  IMAGE QUALITY:  Diagnostic  FINDINGS: PARENCHYMA:  There is a partially calcified dural based extra-axial enhancing 0 9 cm mass adjacent to the posterior inferior aspect of the left occipital lobe, seen on series 701, image 55 unchanged from prior studies indicative of a meningioma with partial popcorn calcifications  Associated adjacent hyperostosis is noted, also stable on series 2, image 15  No significant mass effect or edema  Probable 2nd subtle faintly enhancing smaller 0 6 cm dural based extra-axial mass adjacent to the left frontal lobe/frontoparietal junction seen on series 701, image 58, correlating to finding on series 700, image 60 as well as series 5 image 36, potentially a 2nd smaller meningioma or perhaps focally prominent cortical vein, not well seen on the prior studies  , Moderate to large region of cystic encephalomalacia and gliosis in the right MCA territory posteriorly centered in the parietal lobe but extending into adjacent portions of the superior aspect of the temporal lobe and posterior inferior aspects of the frontal lobe, stable indicative of old infarction  Elsewhere there are patchy periventricular hypodensities  Chronic left thalamic lacunar infarction redemonstrated  Atherosclerotic calcifications of the cavernous segment of the internal carotid artery are moderate  VENTRICLES AND EXTRA-AXIAL SPACES:  Ex vacuo dilatation of the right lateral ventricle noted  Ventricular prominence likely on the basis of atrophy also stable  Cavum septa pellucidum and vergae incidentally noted, a developmental variant  VISUALIZED ORBITS AND PARANASAL SINUSES:  Unremarkable  CALVARIUM:  Normal      Impression: 1  Stable partially calcified left occipital region meningioma measuring approximately 0 9 cm  2   Questionable tiny 2nd 0 6 cm meningioma versus focal prominence of a cortical vein/vessel adjacent to the left frontoparietal junction  Neither has significant mass effect  3   Moderate, chronic microangiopathy and moderate-sized chronic right MCA infarction as well as chronic left thalamic lacunar infarction are stable  Workstation performed: [id number]     Echo complete w/ contrast if indicated    Result Date: 7/14/2022  Narrative: Sayda Clunes  Left Ventricle: Left ventricular cavity size is normal  Wall thickness is mildly increased  The left ventricular ejection fraction is 45%  Systolic function is mildly reduced  There is mild global hypokinesis    Left Atrium: The atrium is moderately dilated    Right Atrium: The atrium is mildly dilated    Mitral Valve: There is moderate annular calcification  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  The prosthetic valve appears to be well-seated  Valve leaflet motion is normal  There is no evidence of paravalvular regurgitation  There is trace transvalvular regurgitation  The gradient recorded across the prosthetic mitral valve is within the expected range  Peak E value of 1 7 m/s, VTI 41cm, and mean gradient of 6mmHg at HR 70bpm    Tricuspid Valve: There is moderate regurgitation  Prior TTE study available for comparison  No significant changes noted compared to the prior study- EF remains mildly hypokinetic near 45%  Mitral valve prothesis without major change in gradient which remain within normal range   Pulmonary pressures remain upper normal        I have personally reviewed pertinent reports   , I have personally reviewed pertinent films in PACS and I have personally reviewed pertinent films in PACS with a Radiologist

## 2022-07-22 ENCOUNTER — APPOINTMENT (EMERGENCY)
Dept: RADIOLOGY | Facility: HOSPITAL | Age: 87
End: 2022-07-22
Payer: MEDICARE

## 2022-07-22 ENCOUNTER — HOSPITAL ENCOUNTER (INPATIENT)
Dept: RADIOLOGY | Facility: HOSPITAL | Age: 87
LOS: 13 days | Discharge: NURSING, REHAB OR LONG-TERM CARE | DRG: 064 | End: 2022-08-04
Attending: EMERGENCY MEDICINE | Admitting: EMERGENCY MEDICINE
Payer: MEDICARE

## 2022-07-22 ENCOUNTER — HOSPITAL ENCOUNTER (EMERGENCY)
Facility: HOSPITAL | Age: 87
End: 2022-07-22
Attending: EMERGENCY MEDICINE | Admitting: EMERGENCY MEDICINE
Payer: MEDICARE

## 2022-07-22 ENCOUNTER — ANESTHESIA EVENT (OUTPATIENT)
Dept: RADIOLOGY | Facility: HOSPITAL | Age: 87
End: 2022-07-22

## 2022-07-22 ENCOUNTER — ANESTHESIA (OUTPATIENT)
Dept: RADIOLOGY | Facility: HOSPITAL | Age: 87
End: 2022-07-22

## 2022-07-22 ENCOUNTER — HOSPITAL ENCOUNTER (OUTPATIENT)
Dept: RADIOLOGY | Facility: HOSPITAL | Age: 87
Discharge: HOME | DRG: 064 | End: 2022-07-22
Attending: RADIOLOGY
Payer: MEDICARE

## 2022-07-22 ENCOUNTER — TELEPHONE (OUTPATIENT)
Dept: FAMILY MEDICINE CLINIC | Facility: CLINIC | Age: 87
End: 2022-07-22

## 2022-07-22 VITALS
BODY MASS INDEX: 27.16 KG/M2 | SYSTOLIC BLOOD PRESSURE: 174 MMHG | WEIGHT: 143.74 LBS | HEART RATE: 69 BPM | TEMPERATURE: 98.1 F | OXYGEN SATURATION: 93 % | DIASTOLIC BLOOD PRESSURE: 80 MMHG | RESPIRATION RATE: 45 BRPM

## 2022-07-22 DIAGNOSIS — I63.312 CEREBROVASCULAR ACCIDENT (CVA) DUE TO THROMBOSIS OF LEFT MIDDLE CEREBRAL ARTERY (HCC): ICD-10-CM

## 2022-07-22 DIAGNOSIS — I50.22 CHRONIC SYSTOLIC CONGESTIVE HEART FAILURE (HCC): Chronic | ICD-10-CM

## 2022-07-22 DIAGNOSIS — I48.0 PAROXYSMAL A-FIB (HCC): ICD-10-CM

## 2022-07-22 DIAGNOSIS — I63.9 ACUTE CVA (CEREBROVASCULAR ACCIDENT) (HCC): Primary | ICD-10-CM

## 2022-07-22 DIAGNOSIS — E11.9 CONTROLLED TYPE 2 DIABETES MELLITUS WITHOUT COMPLICATION, WITHOUT LONG-TERM CURRENT USE OF INSULIN (HCC): ICD-10-CM

## 2022-07-22 DIAGNOSIS — Z43.1 ENCOUNTER FOR PEG (PERCUTANEOUS ENDOSCOPIC GASTROSTOMY) (HCC): Primary | ICD-10-CM

## 2022-07-22 DIAGNOSIS — Z43.1 ENCOUNTER FOR PEG (PERCUTANEOUS ENDOSCOPIC GASTROSTOMY) (HCC): ICD-10-CM

## 2022-07-22 DIAGNOSIS — K59.04 CHRONIC IDIOPATHIC CONSTIPATION: ICD-10-CM

## 2022-07-22 LAB
ALBUMIN SERPL BCP-MCNC: 3.3 G/DL (ref 3.5–5)
ALP SERPL-CCNC: 71 U/L (ref 46–116)
ALT SERPL W P-5'-P-CCNC: 21 U/L (ref 12–78)
ANION GAP SERPL CALCULATED.3IONS-SCNC: 6 MMOL/L (ref 4–13)
APTT PPP: 31 SECONDS (ref 23–37)
ARTERIAL PATENCY WRIST A: YES
AST SERPL W P-5'-P-CCNC: 22 U/L (ref 5–45)
ATRIAL RATE: 70 BPM
BACTERIA UR QL AUTO: ABNORMAL /HPF
BASE EXCESS BLDA CALC-SCNC: -2.6 MMOL/L
BASOPHILS # BLD AUTO: 0.03 THOUSANDS/ΜL (ref 0–0.1)
BASOPHILS NFR BLD AUTO: 1 % (ref 0–1)
BILIRUB SERPL-MCNC: 0.72 MG/DL (ref 0.2–1)
BILIRUB UR QL STRIP: NEGATIVE
BUN SERPL-MCNC: 32 MG/DL (ref 5–25)
CALCIUM ALBUM COR SERPL-MCNC: 9.9 MG/DL (ref 8.3–10.1)
CALCIUM SERPL-MCNC: 9.3 MG/DL (ref 8.3–10.1)
CARDIAC TROPONIN I PNL SERPL HS: 11 NG/L
CHLORIDE SERPL-SCNC: 103 MMOL/L (ref 96–108)
CLARITY UR: CLEAR
CO2 SERPL-SCNC: 29 MMOL/L (ref 21–32)
COLOR UR: ABNORMAL
CREAT SERPL-MCNC: 1.48 MG/DL (ref 0.6–1.3)
EOSINOPHIL # BLD AUTO: 0.61 THOUSAND/ΜL (ref 0–0.61)
EOSINOPHIL NFR BLD AUTO: 14 % (ref 0–6)
ERYTHROCYTE [DISTWIDTH] IN BLOOD BY AUTOMATED COUNT: 12.2 % (ref 11.6–15.1)
GFR SERPL CREATININE-BSD FRML MDRD: 31 ML/MIN/1.73SQ M
GLUCOSE SERPL-MCNC: 128 MG/DL (ref 65–140)
GLUCOSE SERPL-MCNC: 137 MG/DL (ref 65–140)
GLUCOSE SERPL-MCNC: 200 MG/DL (ref 65–140)
GLUCOSE UR STRIP-MCNC: NEGATIVE MG/DL
HCO3 BLDA-SCNC: 22.6 MMOL/L (ref 22–28)
HCT VFR BLD AUTO: 37.3 % (ref 34.8–46.1)
HGB BLD-MCNC: 11.9 G/DL (ref 11.5–15.4)
HGB UR QL STRIP.AUTO: NEGATIVE
HOROWITZ INDEX BLDA+IHG-RTO: 40 MM[HG]
IMM GRANULOCYTES # BLD AUTO: 0.01 THOUSAND/UL (ref 0–0.2)
IMM GRANULOCYTES NFR BLD AUTO: 0 % (ref 0–2)
INR PPP: 1.01 (ref 0.84–1.19)
KETONES UR STRIP-MCNC: NEGATIVE MG/DL
LEUKOCYTE ESTERASE UR QL STRIP: NEGATIVE
LYMPHOCYTES # BLD AUTO: 1.02 THOUSANDS/ΜL (ref 0.6–4.47)
LYMPHOCYTES NFR BLD AUTO: 24 % (ref 14–44)
MAGNESIUM SERPL-MCNC: 2.2 MG/DL (ref 1.6–2.6)
MCH RBC QN AUTO: 30 PG (ref 26.8–34.3)
MCHC RBC AUTO-ENTMCNC: 31.9 G/DL (ref 31.4–37.4)
MCV RBC AUTO: 94 FL (ref 82–98)
MONOCYTES # BLD AUTO: 0.44 THOUSAND/ΜL (ref 0.17–1.22)
MONOCYTES NFR BLD AUTO: 10 % (ref 4–12)
MUCOUS THREADS UR QL AUTO: ABNORMAL
NEUTROPHILS # BLD AUTO: 2.23 THOUSANDS/ΜL (ref 1.85–7.62)
NEUTS SEG NFR BLD AUTO: 51 % (ref 43–75)
NITRITE UR QL STRIP: NEGATIVE
NON-SQ EPI CELLS URNS QL MICRO: ABNORMAL /HPF
NRBC BLD AUTO-RTO: 0 /100 WBCS
O2 CT BLDA-SCNC: 16.1 ML/DL (ref 16–23)
OXYHGB MFR BLDA: 98.9 % (ref 94–97)
P AXIS: 26 DEGREES
PCO2 BLDA: 40.6 MM HG (ref 36–44)
PEEP RESPIRATORY: 6 CM[H2O]
PH BLDA: 7.36 [PH] (ref 7.35–7.45)
PH UR STRIP.AUTO: 6 [PH]
PLATELET # BLD AUTO: 125 THOUSANDS/UL (ref 149–390)
PMV BLD AUTO: 9.7 FL (ref 8.9–12.7)
PO2 BLDA: 193.6 MM HG (ref 75–129)
POTASSIUM SERPL-SCNC: 4.4 MMOL/L (ref 3.5–5.3)
PR INTERVAL: 242 MS
PROT SERPL-MCNC: 7.1 G/DL (ref 6.4–8.4)
PROT UR STRIP-MCNC: ABNORMAL MG/DL
PROTHROMBIN TIME: 13.4 SECONDS (ref 11.6–14.5)
QRS AXIS: -62 DEGREES
QRSD INTERVAL: 142 MS
QT INTERVAL: 444 MS
QTC INTERVAL: 479 MS
RBC # BLD AUTO: 3.97 MILLION/UL (ref 3.81–5.12)
RBC #/AREA URNS AUTO: ABNORMAL /HPF
SARS-COV-2 RNA RESP QL NAA+PROBE: NEGATIVE
SODIUM SERPL-SCNC: 138 MMOL/L (ref 135–147)
SP GR UR STRIP.AUTO: >=1.05 (ref 1–1.03)
SPECIMEN SOURCE: ABNORMAL
T WAVE AXIS: 31 DEGREES
UROBILINOGEN UR STRIP-ACNC: <2 MG/DL
VENT AC: 14
VENT- AC: AC
VENTRICULAR RATE: 70 BPM
VT SETTING VENT: 350 ML
WBC # BLD AUTO: 4.34 THOUSAND/UL (ref 4.31–10.16)
WBC #/AREA URNS AUTO: ABNORMAL /HPF

## 2022-07-22 PROCEDURE — 93005 ELECTROCARDIOGRAM TRACING: CPT

## 2022-07-22 PROCEDURE — 36600 WITHDRAWAL OF ARTERIAL BLOOD: CPT

## 2022-07-22 PROCEDURE — 61645 PERQ ART M-THROMBECT &/NFS: CPT

## 2022-07-22 PROCEDURE — 61645 PERQ ART M-THROMBECT &/NFS: CPT | Performed by: RADIOLOGY

## 2022-07-22 PROCEDURE — 83735 ASSAY OF MAGNESIUM: CPT | Performed by: EMERGENCY MEDICINE

## 2022-07-22 PROCEDURE — 93010 ELECTROCARDIOGRAM REPORT: CPT | Performed by: INTERNAL MEDICINE

## 2022-07-22 PROCEDURE — NC001 PR NO CHARGE: Performed by: EMERGENCY MEDICINE

## 2022-07-22 PROCEDURE — 85610 PROTHROMBIN TIME: CPT | Performed by: EMERGENCY MEDICINE

## 2022-07-22 PROCEDURE — C1769 GUIDE WIRE: HCPCS

## 2022-07-22 PROCEDURE — 84484 ASSAY OF TROPONIN QUANT: CPT | Performed by: EMERGENCY MEDICINE

## 2022-07-22 PROCEDURE — 94760 N-INVAS EAR/PLS OXIMETRY 1: CPT

## 2022-07-22 PROCEDURE — B41FYZZ FLUOROSCOPY OF RIGHT LOWER EXTREMITY ARTERIES USING OTHER CONTRAST: ICD-10-PCS | Performed by: RADIOLOGY

## 2022-07-22 PROCEDURE — 80053 COMPREHEN METABOLIC PANEL: CPT | Performed by: EMERGENCY MEDICINE

## 2022-07-22 PROCEDURE — C1894 INTRO/SHEATH, NON-LASER: HCPCS

## 2022-07-22 PROCEDURE — 70496 CT ANGIOGRAPHY HEAD: CPT

## 2022-07-22 PROCEDURE — 99285 EMERGENCY DEPT VISIT HI MDM: CPT

## 2022-07-22 PROCEDURE — C1757 CATH, THROMBECTOMY/EMBOLECT: HCPCS

## 2022-07-22 PROCEDURE — 99291 CRITICAL CARE FIRST HOUR: CPT | Performed by: EMERGENCY MEDICINE

## 2022-07-22 PROCEDURE — 36415 COLL VENOUS BLD VENIPUNCTURE: CPT | Performed by: EMERGENCY MEDICINE

## 2022-07-22 PROCEDURE — 36224 PLACE CATH CAROTD ART: CPT

## 2022-07-22 PROCEDURE — 85730 THROMBOPLASTIN TIME PARTIAL: CPT | Performed by: EMERGENCY MEDICINE

## 2022-07-22 PROCEDURE — 71045 X-RAY EXAM CHEST 1 VIEW: CPT

## 2022-07-22 PROCEDURE — U0003 INFECTIOUS AGENT DETECTION BY NUCLEIC ACID (DNA OR RNA); SEVERE ACUTE RESPIRATORY SYNDROME CORONAVIRUS 2 (SARS-COV-2) (CORONAVIRUS DISEASE [COVID-19]), AMPLIFIED PROBE TECHNIQUE, MAKING USE OF HIGH THROUGHPUT TECHNOLOGIES AS DESCRIBED BY CMS-2020-01-R: HCPCS | Performed by: EMERGENCY MEDICINE

## 2022-07-22 PROCEDURE — 0042T HB CT PERFUSION W/CONTRAST CBF: CPT

## 2022-07-22 PROCEDURE — 81001 URINALYSIS AUTO W/SCOPE: CPT | Performed by: PSYCHIATRY & NEUROLOGY

## 2022-07-22 PROCEDURE — 36222 PLACE CATH CAROTID/INOM ART: CPT

## 2022-07-22 PROCEDURE — 82948 REAGENT STRIP/BLOOD GLUCOSE: CPT

## 2022-07-22 PROCEDURE — 85025 COMPLETE CBC W/AUTO DIFF WBC: CPT | Performed by: EMERGENCY MEDICINE

## 2022-07-22 PROCEDURE — U0005 INFEC AGEN DETEC AMPLI PROBE: HCPCS | Performed by: EMERGENCY MEDICINE

## 2022-07-22 PROCEDURE — B317YZZ FLUOROSCOPY OF LEFT INTERNAL CAROTID ARTERY USING OTHER CONTRAST: ICD-10-PCS | Performed by: RADIOLOGY

## 2022-07-22 PROCEDURE — 94002 VENT MGMT INPAT INIT DAY: CPT

## 2022-07-22 PROCEDURE — 76937 US GUIDE VASCULAR ACCESS: CPT | Performed by: RADIOLOGY

## 2022-07-22 PROCEDURE — C1760 CLOSURE DEV, VASC: HCPCS

## 2022-07-22 PROCEDURE — G1004 CDSM NDSC: HCPCS

## 2022-07-22 PROCEDURE — 82805 BLOOD GASES W/O2 SATURATION: CPT | Performed by: PHYSICIAN ASSISTANT

## 2022-07-22 PROCEDURE — 70498 CT ANGIOGRAPHY NECK: CPT

## 2022-07-22 PROCEDURE — 03JY3ZZ INSPECTION OF UPPER ARTERY, PERCUTANEOUS APPROACH: ICD-10-PCS | Performed by: RADIOLOGY

## 2022-07-22 RX ORDER — IODIXANOL 320 MG/ML
80 INJECTION, SOLUTION INTRAVASCULAR
Status: COMPLETED | OUTPATIENT
Start: 2022-07-22 | End: 2022-07-22

## 2022-07-22 RX ORDER — EPHEDRINE SULFATE 50 MG/ML
INJECTION INTRAVENOUS AS NEEDED
Status: DISCONTINUED | OUTPATIENT
Start: 2022-07-22 | End: 2022-07-22

## 2022-07-22 RX ORDER — ATORVASTATIN CALCIUM 40 MG/1
40 TABLET, FILM COATED ORAL EVERY EVENING
Status: DISCONTINUED | OUTPATIENT
Start: 2022-07-22 | End: 2022-07-22

## 2022-07-22 RX ORDER — LIDOCAINE WITH 8.4% SOD BICARB 0.9%(10ML)
SYRINGE (ML) INJECTION CODE/TRAUMA/SEDATION MEDICATION
Status: COMPLETED | OUTPATIENT
Start: 2022-07-22 | End: 2022-07-22

## 2022-07-22 RX ORDER — SUCCINYLCHOLINE/SOD CL,ISO/PF 100 MG/5ML
SYRINGE (ML) INTRAVENOUS AS NEEDED
Status: DISCONTINUED | OUTPATIENT
Start: 2022-07-22 | End: 2022-07-22

## 2022-07-22 RX ORDER — SODIUM CHLORIDE 9 MG/ML
INJECTION, SOLUTION INTRAVENOUS CONTINUOUS PRN
Status: DISCONTINUED | OUTPATIENT
Start: 2022-07-22 | End: 2022-07-22

## 2022-07-22 RX ORDER — ATORVASTATIN CALCIUM 80 MG/1
80 TABLET, FILM COATED ORAL EVERY EVENING
Status: DISCONTINUED | OUTPATIENT
Start: 2022-07-23 | End: 2022-08-04 | Stop reason: HOSPADM

## 2022-07-22 RX ORDER — CHLORHEXIDINE GLUCONATE 0.12 MG/ML
15 RINSE ORAL EVERY 12 HOURS SCHEDULED
Status: DISCONTINUED | OUTPATIENT
Start: 2022-07-22 | End: 2022-08-04 | Stop reason: HOSPADM

## 2022-07-22 RX ORDER — SENNOSIDES 8.6 MG
1 TABLET ORAL DAILY
Status: DISCONTINUED | OUTPATIENT
Start: 2022-07-23 | End: 2022-07-30

## 2022-07-22 RX ORDER — ROCURONIUM BROMIDE 10 MG/ML
INJECTION, SOLUTION INTRAVENOUS AS NEEDED
Status: DISCONTINUED | OUTPATIENT
Start: 2022-07-22 | End: 2022-07-22

## 2022-07-22 RX ORDER — ACETAMINOPHEN 325 MG/1
650 TABLET ORAL EVERY 4 HOURS PRN
Status: DISCONTINUED | OUTPATIENT
Start: 2022-07-22 | End: 2022-08-04 | Stop reason: HOSPADM

## 2022-07-22 RX ORDER — PROPOFOL 10 MG/ML
5-50 INJECTION, EMULSION INTRAVENOUS
Status: DISCONTINUED | OUTPATIENT
Start: 2022-07-22 | End: 2022-07-23

## 2022-07-22 RX ORDER — IODIXANOL 320 MG/ML
110 INJECTION, SOLUTION INTRAVASCULAR
Status: COMPLETED | OUTPATIENT
Start: 2022-07-22 | End: 2022-07-22

## 2022-07-22 RX ORDER — NOREPINEPHRINE BITARTRATE 1 MG/ML
INJECTION, SOLUTION INTRAVENOUS
Status: DISCONTINUED
Start: 2022-07-22 | End: 2022-07-22 | Stop reason: WASHOUT

## 2022-07-22 RX ORDER — FLUTICASONE PROPIONATE 50 MCG
1 SPRAY, SUSPENSION (ML) NASAL DAILY
Status: DISCONTINUED | OUTPATIENT
Start: 2022-07-23 | End: 2022-07-25

## 2022-07-22 RX ORDER — INSULIN LISPRO 100 [IU]/ML
1-5 INJECTION, SOLUTION INTRAVENOUS; SUBCUTANEOUS EVERY 6 HOURS SCHEDULED
Status: DISCONTINUED | OUTPATIENT
Start: 2022-07-22 | End: 2022-08-04 | Stop reason: HOSPADM

## 2022-07-22 RX ORDER — FENTANYL CITRATE 50 UG/ML
50 INJECTION, SOLUTION INTRAMUSCULAR; INTRAVENOUS
Status: DISCONTINUED | OUTPATIENT
Start: 2022-07-22 | End: 2022-07-25

## 2022-07-22 RX ORDER — FENTANYL CITRATE 50 UG/ML
INJECTION, SOLUTION INTRAMUSCULAR; INTRAVENOUS AS NEEDED
Status: DISCONTINUED | OUTPATIENT
Start: 2022-07-22 | End: 2022-07-22

## 2022-07-22 RX ORDER — ALBUTEROL SULFATE 2.5 MG/3ML
2.5 SOLUTION RESPIRATORY (INHALATION) EVERY 6 HOURS PRN
Status: DISCONTINUED | OUTPATIENT
Start: 2022-07-22 | End: 2022-08-04 | Stop reason: HOSPADM

## 2022-07-22 RX ORDER — PROPOFOL 10 MG/ML
INJECTION, EMULSION INTRAVENOUS AS NEEDED
Status: DISCONTINUED | OUTPATIENT
Start: 2022-07-22 | End: 2022-07-22

## 2022-07-22 RX ADMIN — FENTANYL CITRATE 25 MCG: 50 INJECTION INTRAMUSCULAR; INTRAVENOUS at 15:28

## 2022-07-22 RX ADMIN — NOREPINEPHRINE BITARTRATE 5 MCG/MIN: 1 INJECTION, SOLUTION, CONCENTRATE INTRAVENOUS at 19:55

## 2022-07-22 RX ADMIN — IODIXANOL 80 ML: 320 INJECTION, SOLUTION INTRAVASCULAR at 15:15

## 2022-07-22 RX ADMIN — EPHEDRINE SULFATE 15 MG: 50 INJECTION INTRAVENOUS at 15:36

## 2022-07-22 RX ADMIN — CHLORHEXIDINE GLUCONATE 15 ML: 1.2 SOLUTION ORAL at 20:04

## 2022-07-22 RX ADMIN — PROPOFOL 50 MG: 10 INJECTION, EMULSION INTRAVENOUS at 15:40

## 2022-07-22 RX ADMIN — IOHEXOL 65 ML: 350 INJECTION, SOLUTION INTRAVENOUS at 13:14

## 2022-07-22 RX ADMIN — IODIXANOL 80 ML: 320 INJECTION, SOLUTION INTRAVASCULAR at 17:11

## 2022-07-22 RX ADMIN — Medication 100 MG: at 15:28

## 2022-07-22 RX ADMIN — ROCURONIUM BROMIDE 20 MG: 50 INJECTION INTRAVENOUS at 16:48

## 2022-07-22 RX ADMIN — FENTANYL CITRATE 50 MCG: 50 INJECTION INTRAMUSCULAR; INTRAVENOUS at 19:33

## 2022-07-22 RX ADMIN — SODIUM CHLORIDE: 0.9 INJECTION, SOLUTION INTRAVENOUS at 15:23

## 2022-07-22 RX ADMIN — PROPOFOL 12.78 MCG/KG/MIN: 10 INJECTION, EMULSION INTRAVENOUS at 20:05

## 2022-07-22 RX ADMIN — ROCURONIUM BROMIDE 50 MG: 50 INJECTION INTRAVENOUS at 15:37

## 2022-07-22 RX ADMIN — Medication 10 ML: at 15:37

## 2022-07-22 RX ADMIN — PHENYLEPHRINE HYDROCHLORIDE 50 MCG/MIN: 10 INJECTION INTRAVENOUS at 15:33

## 2022-07-22 RX ADMIN — PROPOFOL 100 MG: 10 INJECTION, EMULSION INTRAVENOUS at 15:28

## 2022-07-22 NOTE — SEDATION DOCUMENTATION
Arrived to IR Department at 1517 via stretcher from CT perfusion Scan  In IR Bi-plane at 1522  Right CFA access obtained 1539  Intubated by anesthesia on arrival to IR due to vomiting in CT scan

## 2022-07-22 NOTE — EMTALA/ACUTE CARE TRANSFER
[address] [address]  Dept: [phone]      EMTALA TRANSFER CONSENT    NAME [address]                                         1933                              MRN [id number]    I have been informed of my rights regarding examination, treatment, and transfer   by Dr Mari Wiseman DO    Benefits: Specialized equipment and/or services available at the receiving facility (Include comment)________________________    Risks: Potential for delay in receiving treatment, Potential deterioration of medical condition, Loss of IV, Increased discomfort during transfer, Possible worsening of condition or death during transfer      Consent for Transfer:  I acknowledge that my medical condition has been evaluated and explained to me by the emergency department physician or other qualified medical person and/or my attending physician, who has recommended that I be transferred to the service of  Accepting Physician: Dr Phoenix Thomas at [address] [place] [address] Name, [address] : [phone] [phone] Mandeep Bernstein  The above potential benefits of such transfer, the potential risks associated with such transfer, and the probable risks of not being transferred have been explained to me, and I fully understand them  The doctor has explained that, in my case, the benefits of transfer outweigh the risks  I agree to be transferred  I authorize the performance of emergency medical procedures and treatments upon me in both transit and upon arrival at the receiving facility  Additionally, I authorize the release of any and all medical records to the receiving facility and request they be transported with me, if possible  I understand that the safest mode of transportation during a medical emergency is an ambulance and that the Hospital advocates the use of this mode of transport   Risks of traveling to the receiving facility by car, including absence of medical control, life sustaining equipment, such as oxygen, and medical personnel has been explained to me and I fully understand them  (BON CORRECT BOX BELOW)  [  ]  I consent to the stated transfer and to be transported by ambulance/helicopter  [  ]  I consent to the stated transfer, but refuse transportation by ambulance and accept full responsibility for my transportation by car  I understand the risks of non-ambulance transfers and I exonerate the Hospital and its staff from any deterioration in my condition that results from this refusal     X___________________________________________    DATE  22  TIME________  Signature of patient or legally responsible individual signing on patient behalf           RELATIONSHIP TO PATIENT_________________________          Provider Scarlett  [facility] [address]                                         1933                              MRN [id number]    A medical screening exam was performed on the above named patient  Based on the examination:    Condition Necessitating Transfer The encounter diagnosis was Acute CVA (cerebrovascular accident) ([facility] [address] )  Patient Condition:  An emergency transfer is being made prior to stabilization due to the need for definitive care and the benefit of transfer outweighs the risk    Reason for Transfer: Level of Care needed not available at this facility    Transfer Requirements: [address] [place] [address]   · Space available and qualified personnel available for treatment as acknowledged by    · Agreed to accept transfer and to provide appropriate medical treatment as acknowledged by       Dr Bijan Alfonso  · Appropriate medical records of the examination and treatment of the patient are provided at the time of transfer   [address] _______  · Transfer will be performed by qualified personnel from    and appropriate transfer equipment as required, including the use of necessary and appropriate life support measures  Provider Certification: I have examined the patient and explained the following risks and benefits of being transferred/refusing transfer to the patient/family:  Unanticipated needs of medical equipment and personnel during transport      Based on these reasonable risks and benefits to the patient and/or the unborn child(margaret), and based upon the information available at the time of the patients examination, I certify that the medical benefits reasonably to be expected from the provision of appropriate medical treatments at another medical facility outweigh the increasing risks, if any, to the individuals medical condition, and in the case of labor to the unborn child, from effecting the transfer      X____________________________________________ DATE 07/22/22        TIME_______      ORIGINAL - SEND TO MEDICAL RECORDS   COPY - SEND WITH PATIENT DURING TRANSFER

## 2022-07-22 NOTE — EMTALA/ACUTE CARE TRANSFER
[address] [address]  Dept: [phone]      EMTALA TRANSFER CONSENT    NAME [address]                                         1933                              MRN [id number]    I have been informed of my rights regarding examination, treatment, and transfer   by Dr Akosua Gillette DO    Benefits: Specialized equipment and/or services available at the receiving facility (Include comment)________________________    Risks: Potential for delay in receiving treatment, Potential deterioration of medical condition, Loss of IV, Increased discomfort during transfer, Possible worsening of condition or death during transfer      Consent for Transfer:  I acknowledge that my medical condition has been evaluated and explained to me by the emergency department physician or other qualified medical person and/or my attending physician, who has recommended that I be transferred to the service of  Accepting Physician: Dr Rebeka Sosa at [address] [place] [address] Name, [address] : [facility]  The above potential benefits of such transfer, the potential risks associated with such transfer, and the probable risks of not being transferred have been explained to me, and I fully understand them  The doctor has explained that, in my case, the benefits of transfer outweigh the risks  I agree to be transferred  I authorize the performance of emergency medical procedures and treatments upon me in both transit and upon arrival at the receiving facility  Additionally, I authorize the release of any and all medical records to the receiving facility and request they be transported with me, if possible  I understand that the safest mode of transportation during a medical emergency is an ambulance and that the Hospital advocates the use of this mode of transport   Risks of traveling to the receiving facility by car, including absence of medical control, life sustaining equipment, such as oxygen, and medical personnel has been explained to me and I fully understand them  (BON CORRECT BOX BELOW)  [  ]  I consent to the stated transfer and to be transported by ambulance/helicopter  [  ]  I consent to the stated transfer, but refuse transportation by ambulance and accept full responsibility for my transportation by car  I understand the risks of non-ambulance transfers and I exonerate the Hospital and its staff from any deterioration in my condition that results from this refusal     X___________________________________________    DATE  22  TIME________  Signature of patient or legally responsible individual signing on patient behalf           RELATIONSHIP TO PATIENT_________________________          Provider Scarlett  [facility]                                         1933                              MRN [id number]    A medical screening exam was performed on the above named patient  Based on the examination:    Condition Necessitating Transfer The encounter diagnosis was Acute CVA (cerebrovascular accident) ([facility] [address] )  Patient Condition:  An emergency transfer is being made prior to stabilization due to the need for definitive care and the benefit of transfer outweighs the risk    Reason for Transfer: Level of Care needed not available at this facility    Transfer Requirements: [address] [place] [address]   · Space available and qualified personnel available for treatment as acknowledged by    · Agreed to accept transfer and to provide appropriate medical treatment as acknowledged by       Dr Tete Sutton  · Appropriate medical records of the examination and treatment of the patient are provided at the time of transfer   [address], [address] _______  · Transfer will be performed by qualified personnel from    and appropriate transfer equipment as required, including the use of necessary and appropriate life support measures  Provider Certification: I have examined the patient and explained the following risks and benefits of being transferred/refusing transfer to the patient/family:  General risk, such as traffic hazards, adverse weather conditions, rough terrain or turbulence, possible failure of equipment (including vehicle or aircraft), or consequences of actions of persons outside the control of the transport personnel, Unanticipated needs of medical equipment and personnel during transport      Based on these reasonable risks and benefits to the patient and/or the unborn child(margaret), and based upon the information available at the time of the patients examination, I certify that the medical benefits reasonably to be expected from the provision of appropriate medical treatments at another medical facility outweigh the increasing risks, if any, to the individuals medical condition, and in the case of labor to the unborn child, from effecting the transfer      X____________________________________________ DATE 07/22/22        TIME_______      ORIGINAL - SEND TO MEDICAL RECORDS   COPY - SEND WITH PATIENT DURING TRANSFER

## 2022-07-22 NOTE — PROGRESS NOTES
I have personally seen and examined patient and reviewed all data with resident  Agree with note, assessment and plan  Critical care time 44min   Please refer to attending comments below  Critical care time does not include procedures, family meeting or teaching  Left MCA occlusion-not a tPA candidate secondary to Eliquis status post CT perfusion with attempted endovascular thrombectomy unsuccessful vessel reperfusion on 07/22/2022  Acute hypoxic respiratory failure  Aspiration  Atrial fibrillation/paroxysmal  Hypertension  Symptomatic bradycardia with high-grade degree AV block status post pacemaker placement -2020  Aortic valve stenosis  Severe mitral valve insufficiency status post Mitral valve replacement Number [address] -2017 Biocor-bioprosthetic  CHF with cardiomyopathy EF 60%  History DVT left upper extremity 2017  History of stroke with dysphagia  Vascular dementia  Meningioma  History of clinical seizure events  Hyperlipidemia  Diabetes mellitus type 2  Chronic kidney disease stage IIIA  GERD  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  Chronic anemia    Exam:  Patient is intubated with 7 0 endotracheal tube, coarse breath sounds right greater than left, regular rate and rhythm, telemetry with sinus rhythm left lip to pace rhythm, normocephalic atraumatic, pupils are 2 mm bilateral sluggishly reactive, currently GCS is 3 T patient is not withdrawing to noxious stimuli, cannot assess sensory exam, cannot assess ability to follow commands or language at this time    Goal systolic blood pressure  [id number]    Continue with neurologic checks Q1 hour  Repeat imaging at 24 hours status post tPA ordered  MRI pending  Plan to initiate anti-platelet therapy with aspirin and patient likely will need anticoagulation pending 24 hour CT scan    Patient is allergic to heparin /history of thrombocytopenia with heparin 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  therefore consider argatroban versus initiation of oral anticoagulation if appropriate imaging without hemorrhage  Statin medication ordered  Continue with stroke protocol with PT/OT and speech evaluation  HbA1C, lipid panel and echo ordered  Neurology consult  Plan to to reimage if patient has decline in neurologic exam or < GCS by 2 points or more  Patient underwent endovascular intervention that was unsuccessful at to reperfusion  Continue with supportive medical management for acute stroke  Continue with ventilatory support  Re-evaluate patient's exam assess for appropriateness of spontaneous breathing trial     Glycemic control with insulin protocol    Initiate statin when tolerating p o  Monitor for clinical seizure events-patient is not on antiepileptic medications    Trend patient's laboratory data    Hold scheduled medications for blood pressure as allowing permissive hypertension  Hold anticoagulation with attempted endovascular intervention until repeat imaging performed

## 2022-07-22 NOTE — TELEPHONE ENCOUNTER
Patients son called in his mom  Has been in a daze , she is awake eyes opened but doesn't respond to him  I told him to make her smile but she wouldn't respond   he tried to get her to drink orange juice but she started drooling    I told son to take her to nearest er , tc/cma

## 2022-07-22 NOTE — QUICK NOTE
Stroke alert 12:52 pm  Neurology call back 12:52 pm  Pt has acute left m1 clot  Hx of dementia, afib, left frontal meningioma  Last normal 1 5-2 hours ago  She is not an iv tpa candidate as she's on eliquis  NEW SYMPTOMS BELOW:   Worse aphasia than baseline  Left gaze preference  Rue/rle flaccid  can follow some commands when speaking to her in [place] per ED attending  Rt facial droop  With lue can squeeze hand only not elevate extremity  lle drift  No seizure like activity noted  Pt lives herself  3 adult children visit her 24/7  Expressive aphasia at baseline  She stutters  Regressed only speaks Filipino now  Chronic rt mca cva and left sided weakness  At baseline she can eat, dress herself to large extent  Doesn't cook  Turkmen   coags normal  Less likely she was even complying with her eliquis and aspirin  nih 27  sbp 180s  Blood sugar 137  Dr Rebeka Sosa carefully discussed risks/benefits in call with me, and one of daughters  Mentioned less likely to be successful intervention given her existing baseline/medical history however family wants everything done  Endovascular stroke activated 1:41 pm  Will need ct perfusion to assess for penumbra  Pt will be admitted to [facility] for the acute left mca stroke

## 2022-07-22 NOTE — SEDATION DOCUMENTATION
Cerebral angiogram performed by Dr Narda Linton  Anesthesia present throughout the case  Patient to go to ICU post procedure  IR Procedure Bedrest Start Time is 2764        In department 1517  In room 1522  Access 1539  1st pass 1549  2nd pass 1603  Closure 1652  TICI pre 0  TICI post 0

## 2022-07-22 NOTE — H&P
H&P Exam - [address] 80 y o  female MRN: [id number]  Unit/Bed#: ICU 08 Encounter: [id number]      -------------------------------------------------------------------------------------------------------------  Chief Complaint:  Stroke    History of Present Illness   HX and PE limited by: Intubation  Brook Drake is a 80 y o  female with PMHx of CHF, CKD stage 4, arthritis, anemia, ambulatory dysfunction, CAD, previous left MCA stroke, atrial fibrillation and history of DVT maintained on Eliquis, history of dysphasia, status post mitral valve replacement, hyperlipidemia, hypertension, known meningioma, vascular dementia, and diabetes who presents to [facility] from [facility] as an endovascular alert  Patient presented to [facility] with a last known well approximately 1 5-2 hours prior to presentation with worsening aphasia than that of baseline, left gaze preference, flaccid right upper extremity and right lower extremity, and right facial droop  The patient has expressive aphasia at baseline, along with stuttering  The patient did agree with CT head and CTA  CT head showed chronic ischemic changes, no acute infarct or hemorrhage noted on initial CT head  CTA revealed a left M1 occlusion Moderate stenosis proximal right internal carotid artery and moderate stenosis left PCA  The patient was not a tPA candidate due to a Eliquis use  The patient was transferred to [facility] for thrombectomy  Upon arrival at [facility] the patient underwent a CT perfusion scan and a viable per number was identified  The patient subsequently underwent a unsuccessful thrombectomy  The patient was subsequently transferred to the ICU for further management  History obtained from chart review    -------------------------------------------------------------------------------------------------------------  Assessment and Plan:    Neuro:  Acute stroke, history of left MCA stroke, vascular dementia, known meningioma   CT head showed chronic ischemic changes, no acute infarct or hemorrhage noted on initial CT head   CTA revealed a left M1 occlusion Moderate stenosis proximal right internal carotid artery and moderate stenosis left PCA  NIH 27 at [facility]  Thrombectomy unsuccessful ,TICI 0  Patient is unable to obtain a MRI due to pacemaker  Continue Neuro checks - Every 1 hour x 4 hours, then every 2 hours x 4, then every 4 hours x 72 hours  Permissive HTN for the first 24 hours up to   Maintain glucose <180, SSI for coverage if indicated  Repeat CTH if GCS drops 2pts in 1hr  Fasting lipid panel, hemoglobin A1c, TSH  Repeat CT head in the morning  PT/OT/ST/PM&R Evaluation  Echocardiogram  Atorvastatin 40 mg q d  Continue monitoring on telemetry   Sleep/wake cycle regulation   CAM-ICU BID   Hold AC and AP until stroke burden is determined   Neurology consulted, appreciate recommendations   Frequent reorientation   No acute intervention required for known meningioma    CV:  Hypertension, hyperlipidemia, heart failure   Hold PTA antihypertensives   Consider Cardene or pressors as needed to maintain systolic blood pressure between 110 and 180   Patient is not in acute heart failure, will continue to monitor   Lipid panel pending    Pulm:   Continue PTA inhalers   Maintain SpO2 >88%    Suction as needed and closely monitor secretions  Maintain HOB >30 degrees  Q4h oral care with chlorhexidine BID   Monitor peak and plateau airway pressures   Maintain Ppeak < 45cm H2O, Pplat < 30cm H2O      GI:    No acute issues   Stress ulcer prophylaxis:  Protonix   Bowel regimen:  Senokot    :    Sears placed   I/O monitoring   Continue to follow renal function tests    F/E/N:    Maintenance fluids:  None   Diuresis plan:  None   Replete electrolytes with as needed to maintain K >4 0, Mag >2 0, Phos >3 0   Nutrition:  NPO    Heme/Onc:    No acute issues   Transfuse for hemoglobin <7 0   VTE prophylaxis: SCD's to BLE    Endo/Rheum:  Diabetes   Continue pta Januvia   Sliding scale insulin   Adjust insulin regimen as needed to maintain goal -180    ID:    No acute issues   Continue to monitor fever and WBC curve    MSK/Skin:    Reposition q2h, eliminate pressure points while in bed   Close skin surveillance         Disposition: Admit to Critical Care   Code Status: Level 1 - Full Code  --------------------------------------------------------------------------------------------------------------  Review of Systems    Review of systems was unable to be performed secondary to Intubation    Physical Exam  Vitals and nursing note reviewed  Constitutional:       General: She is not in acute distress  Appearance: She is well-developed  She is obese  She is ill-appearing  She is not diaphoretic  HENT:      Head: Normocephalic and atraumatic  Nose: Nose normal       Mouth/Throat:      Comments: ETT in place  Eyes:      General: No scleral icterus  Right eye: No discharge  Left eye: No discharge  Conjunctiva/sclera: Conjunctivae normal       Pupils: Pupils are equal, round, and reactive to light  Cardiovascular:      Rate and Rhythm: Normal rate and regular rhythm  Heart sounds: Normal heart sounds  No murmur heard  No friction rub  No gallop  Pulmonary:      Effort: Pulmonary effort is normal  No respiratory distress  Breath sounds: Normal breath sounds  No wheezing  Comments: Ventilated  Chest:      Chest wall: No tenderness  Abdominal:      General: Bowel sounds are normal  There is no distension  Palpations: Abdomen is soft  Tenderness: There is no abdominal tenderness  Musculoskeletal:         General: No swelling  Cervical back: Neck supple  Skin:     General: Skin is warm and dry     Neurological:      Comments: Patient nonresponsive, patient pupils equal and reactive, patient does not withdrawal to pain        --------------------------------------------------------------------------------------------------------------  Historical Information   Past Medical History:   Diagnosis Date    Acid reflux     on occ    Arthritis     DJD right hip replaced    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 ) 2007    x 2 lesions with no change    Cancer ([place] [address] ) 2007    colonic polyps, no surgery done    Deep vein thrombosis (DVT) of left upper extremity ([place] [address] ) 12/11/2017    Dementia ([place] [address] )     Diabetes mellitus ([place] [address] )     type 2    Diverticulosis     Edema     in legs on occ    Hypertension     on occ    Language barrier     speaks Citizen of [place] & broken english    Stroke [facility])      Past Surgical History:   Procedure Laterality Date    COLON SURGERY      COLONOSCOPY      COLONOSCOPY N/A 11/2/2016    Procedure: COLONOSCOPY;  Surgeon: Razia Mir MD;  Location[facility] GI LAB; Service:     ESOPHAGOGASTRODUODENOSCOPY N/A 11/2/2016    Procedure: ESOPHAGOGASTRODUODENOSCOPY (EGD); Surgeon: Razia Mir MD;  Location: [facility] LAB; Service:    Sam Polo / [facility] / Beth Bores      JOINT REPLACEMENT Right 02/2015    hip    JOINT REPLACEMENT Left     knee    JOINT REPLACEMENT Right     knee    MITRAL VALVE REPLACEMENT      NJ COLONOSCOPY FLX DX W/COLLJ SPEC WHEN PFRMD N/A 8/9/2018    Procedure: EGD AND COLONOSCOPY;  Surgeon: Razia Mir MD;  [facility];   Service: Gastroenterology    NJ REVISE MEDIAN N/CARPAL TUNNEL SURG Left 1/28/2016    Procedure: RELEASE CARPAL TUNNEL;  Surgeon: Camilla Pitt MD;  Location: [facility] MAIN OR;  Service: Orthopedics    TUBAL LIGATION      VARICOSE VEIN SURGERY Bilateral      Social History   Social History     Substance and Sexual Activity   Alcohol Use No     Social History     Substance and Sexual Activity   Drug Use No     Social History     Tobacco Use   Smoking Status Former Smoker    Packs/day: 0 25    Years: 10 00    Pack years: 2 50    Types: Cigarettes    Quit date: 46    Years since quittin 6   Smokeless Tobacco Never Used     Exercise History:  Unable to obtain due to intubation  Family History:   Family History   Problem Relation Age of Onset    Cancer Mother         throat     I have reviewed this patient's family history and commented on sigificant items within the HPI    Vitals:   Vitals:    22 1731 22 1742 22 1745 22 1800   BP:  (!) 198/111 (!) 190/104 (!) 179/93   Pulse:  90 94 84   Resp:  19 (!) 24 22   Temp:  (!) 96 4 °F (35 8 °C) (!) 96 44 °F (35 8 °C) (!) 97 16 °F (36 2 °C)   SpO2: 100% 100%  100%     Temp  Min: 96 4 °F (35 8 °C)  Max: 98 1 °F (36 7 °C)        There is no height or weight on file to calculate BMI  N/A    Medications:  Current Facility-Administered Medications   Medication Dose Route Frequency    acetaminophen (TYLENOL) tablet 650 mg  650 mg Oral Q4H PRN    atorvastatin (LIPITOR) tablet 40 mg  40 mg Oral QPM    [START ON 2022] senna (SENOKOT) tablet 8 6 mg  1 tablet Oral Daily     Home medications:  Prior to Admission Medications   Prescriptions Last Dose Informant Patient Reported? Taking?    JANUVIA 50 MG tablet  Child Yes No   Si mg daily     Multiple Vitamins-Minerals (MULTIVITAMIN ADULT PO)  Child Yes No   Si 5 tablets 2 (two) times a day     Polysaccharide Iron-FA-B12 (FERREX 150 FORTE PO)   Yes No   Sig: Take by mouth   albuterol (2 5 mg/3 mL) 0 083 % nebulizer solution   No No   Sig: Take 3 mL (2 5 mg total) by nebulization every 6 (six) hours as needed for wheezing or shortness of breath   albuterol (PROVENTIL HFA,VENTOLIN HFA) 90 mcg/act inhaler   No No   Sig: Inhale 2 puffs every 6 (six) hours as needed for wheezing   apixaban (ELIQUIS) 2 5 mg  Child No No   Sig: Take 1 tablet (2 5 mg total) by mouth 2 (two) times a day   aspirin (ECOTRIN LOW STRENGTH) 81 mg EC tablet  Child No No   Sig: Take 1 tablet (81 mg total) by mouth daily   atorvastatin (LIPITOR) 40 mg tablet  Child No No   Sig: Take 2 tablets (80 mg total) by mouth daily with dinner   benzonatate (TESSALON PERLES) 100 mg capsule   No No   Sig: Take 1 capsule (100 mg total) by mouth 3 (three) times a day as needed for cough   fluticasone (FLONASE) 50 mcg/act nasal spray   No No   Si spray into each nostril daily   furosemide (LASIX) 40 mg tablet   Yes No   Si mg Every other day   iron polysaccharides (FERREX) 150 mg capsule  Child Yes No   Sig: Take 150 mg by mouth 2 (two) times a day   losartan (COZAAR) 25 mg tablet  Child Yes No   Si 5 mg In a m    metoprolol tartrate (LOPRESSOR) 50 mg tablet   No No   Sig: Take 1 tablet (50 mg total) by mouth every 12 (twelve) hours   Patient taking differently: Take 25 mg by mouth every 12 (twelve) hours   potassium chloride (MICRO-K) 10 MEQ CR capsule  Child Yes No   tiotropium (Spiriva Respimat) 1 25 MCG/ACT AERS inhaler   No No   Sig: Inhale 2 puffs daily      Facility-Administered Medications: None     Allergies:   Allergies   Allergen Reactions    Glimepiride Rash    Guaifenesin-Codeine Hallucinations    Heparin Other (See Comments)     Thrombocytopenia      Vancomycin      Red man syndrome         Laboratory and Diagnostics:  Results from last 7 days   Lab Units 22  1307   WBC Thousand/uL 4 34   HEMOGLOBIN g/dL 11 9   HEMATOCRIT % 37 3   PLATELETS Thousands/uL 125*   NEUTROS PCT % 51   MONOS PCT % 10     Results from last 7 days   Lab Units 22  1307   SODIUM mmol/L 138   POTASSIUM mmol/L 4 4   CHLORIDE mmol/L 103   CO2 mmol/L 29   ANION GAP mmol/L 6   BUN mg/dL 32*   CREATININE mg/dL 1 48*   CALCIUM mg/dL 9 3   GLUCOSE RANDOM mg/dL 128   ALT U/L 21   AST U/L 22   ALK PHOS U/L 71   ALBUMIN g/dL 3 3*   TOTAL BILIRUBIN mg/dL 0 72     Results from last 7 days   Lab Units 22  1307   MAGNESIUM mg/dL 2 2      Results from last 7 days   Lab Units 22  1307   INR  1 01   PTT seconds 31              ABG:    VBG:          Micro:          EKG: Atrial-paced rhythm with prolonged AV conduction  Right bundle branch block  Left anterior fascicular block  Imaging: I have personally reviewed pertinent reports  ------------------------------------------------------------------------------------------------------------  Advance Directive and Living Will:      Power of :    POLST:    ------------------------------------------------------------------------------------------------------------  Anticipated Length of Stay is > 2 midnights    Counseling / Coordination of Care  Please refer to the Attending's attestation    Fritz Neigh, DO        Portions of the record may have been created with voice recognition software  Occasional wrong word or "sound a like" substitutions may have occurred due to the inherent limitations of voice recognition software    Read the chart carefully and recognize, using context, where substitutions have occurred

## 2022-07-22 NOTE — PROGRESS NOTES
22 1300   Clinical Encounter Type   Visited With Family; Patient not available   Routine Visit Introduction   Crisis Visit ED   Sikhism Encounters   Sikhism Needs Prayer    Pastoral Care Progress Note    2022  Patient: Hugo White : 1933  Admission Date & Time: 2022 1250  MRN: [id number] CSN: [id number]         introduction and prayer with family while awaiting patient in testing after stroke alert  Please contact spiritual care if any other requests or needs arise

## 2022-07-22 NOTE — QUICK NOTE
Eloy Husain is a 80 y o  female    NIHSS:  1a Level of Consciousness: 0 = Alert   1b  LOC Questions: 2 = Answers neither correctly   1c  LOC Commands: 2 = Obeys neither correctly   2  Best Gaze: 2 = Forced Deviation   3  Visual: 1 = Partial hemianopia   4  Facial Palsy: 2=Partial paralysis (total or near total paralysis of the lower face)   5a  Motor Right Arm: 4=No movement   5b  Motor Left Arm: 1=Drift, limb holds 90 (or 45) degrees but drifts down before full 10 seconds: does not hit bed   6a  Motor Right Le=No movement   6b  Motor Left Le=Some effort against gravity, limb cannot get to or maintain (if cured) 90 (or 45) degrees, drifts down to bed, but has some effort against gravity   7  Limb Ataxia:  UN = Untestable (amputation, fused joint)   8  Sensory: 0=Normal; no sensory loss   9  Best Language:  3=Mute, global aphasia; no usable speech or auditory comprehension   10  Dysarthria: 1=Mild to moderate, patient slurs at least some words and at worst, can be understood with some difficulty   11  Extinction and Inattention (formerly Neglect): 0=No abnormality   Total Score: 24     Time NIHSS was completed:1500, 22       AMY Collier , [address] [facility] Res[facility]

## 2022-07-22 NOTE — ANESTHESIA POSTPROCEDURE EVALUATION
Post-Op Assessment Note    CV Status:  Stable  Pain Score: 0    Pain management: adequate     Mental Status:  Unresponsive   Hydration Status:  Euvolemic   PONV Controlled:  Controlled   Airway Patency:  Patent  Airway: intubated      Post Op Vitals Reviewed: Yes      Staff: Anesthesiologist, CRNA   Comments: patient transported to ICU VIA Nurse and Respiratory therapist, VSS, patient remained intubated per surgeon request, Report given to ICU team         No complications documented      BP   170/92   Temp   97   Pulse  64   Resp   14   SpO2   100

## 2022-07-22 NOTE — BRIEF OP NOTE (RAD/CATH)
IR STROKE ALERT Procedure Note    PATIENT NAME: Douglas Lopes  : 1933  MRN: [id number]    Pre-op Diagnosis:   1  Cerebrovascular accident (CVA) due to thrombosis of left middle cerebral artery (HCC)      Post-op Diagnosis:   1  Cerebrovascular accident (CVA) due to thrombosis of left middle cerebral artery [facility])        Surgeon:   Matt Henderson MD  Assistants:     No qualified resident was available, Resident is only observing    Estimated Blood Loss: 250 cc  Findings:   Left ICA terminus occlusion, TICI 0  Unsuccessful mechanical thrombectomy  PUNCTURE: 15:39  FIRST PASS: 15:49 , Solumbra (Embotrap)  RECAN: n/a , TICI 0    Right femoral sheath removed, closure device deployed      Specimens:  None    Complications:  None    Anesthesia: general    Matt Henderson MD     Date: 2022  Time: 6:39 PM

## 2022-07-22 NOTE — ED PROVIDER NOTES
History  Chief Complaint   Patient presents with   Hessie Grapes by son and daughter  Patient with left sided gaze, not speaking  Family states patient has normal conversations and no speech deficit normally  They state patient was sitting in chair " and became like jello and eyes were glassy " They gave orange juice and they thought that helped  FSBS on arrival 137  Stroke alert called  Dr Moe Wade at bedside  Patient with a history of 3 previous CVAs, currently on Eliquis and aspirin, presents with family with complaint of sudden onset of aphasia  Patient's family states that they were having a conversation with her, when she suddenly became unresponsive and her eyes glazed over  Patient's family presumed that she was hypoglycemic, gave her some juice prior to bring her to the ER  Stroke alert initiated after my initial evaluation, given time from onset  Patient also has a history of dementia, hypertension, diabetes, recent meningioma discovered on CT  Patient lives independently, and has round-the-clock care from her 3 children  They state that she is compliant with her meds  History provided by:  Patient  History limited by:  Acuity of condition, age, dementia, mental status change and patient nonverbal   used: No    CVA/TIA-like Symptoms  Presenting symptoms: change in consciousness, language symptoms, visual change and weakness    Date/time of last known well:  2022 12:00 PM  Onset quality:  Sudden  Timing:  Constant  Progression:  Worsening  Similar to previous episodes: no        Prior to Admission Medications   Prescriptions Last Dose Informant Patient Reported? Taking?    JANUVIA 50 MG tablet  Child Yes No   Si mg daily     Multiple Vitamins-Minerals (MULTIVITAMIN ADULT PO)  Child Yes No   Si 5 tablets 2 (two) times a day     Polysaccharide Iron-FA-B12 (FERREX 150 FORTE PO)   Yes No   Sig: Take by mouth   albuterol (2 5 mg/3 mL) 0 083 % nebulizer solution   No No   Sig: Take 3 mL (2 5 mg total) by nebulization every 6 (six) hours as needed for wheezing or shortness of breath   albuterol (PROVENTIL HFA,VENTOLIN HFA) 90 mcg/act inhaler   No No   Sig: Inhale 2 puffs every 6 (six) hours as needed for wheezing   apixaban (ELIQUIS) 2 5 mg  Child No No   Sig: Take 1 tablet (2 5 mg total) by mouth 2 (two) times a day   aspirin (ECOTRIN LOW STRENGTH) 81 mg EC tablet  Child No No   Sig: Take 1 tablet (81 mg total) by mouth daily   atorvastatin (LIPITOR) 40 mg tablet  Child No No   Sig: Take 2 tablets (80 mg total) by mouth daily with dinner   benzonatate (TESSALON PERLES) 100 mg capsule   No No   Sig: Take 1 capsule (100 mg total) by mouth 3 (three) times a day as needed for cough   fluticasone (FLONASE) 50 mcg/act nasal spray   No No   Si spray into each nostril daily   furosemide (LASIX) 40 mg tablet   Yes No   Si mg Every other day   iron polysaccharides (FERREX) 150 mg capsule  Child Yes No   Sig: Take 150 mg by mouth 2 (two) times a day   losartan (COZAAR) 25 mg tablet  Child Yes No   Si 5 mg In a m    metoprolol tartrate (LOPRESSOR) 50 mg tablet   No No   Sig: Take 1 tablet (50 mg total) by mouth every 12 (twelve) hours   Patient taking differently: Take 25 mg by mouth every 12 (twelve) hours   potassium chloride (MICRO-K) 10 MEQ CR capsule  Child Yes No   tiotropium (Spiriva Respimat) 1 25 MCG/ACT AERS inhaler   No No   Sig: Inhale 2 puffs daily      Facility-Administered Medications: None       Past Medical History:   Diagnosis Date    Acid reflux     on occ    Arthritis     DJD right hip replaced    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HCC) 2007    x 2 lesions with no change    Cancer ([facility] [address] )     colonic polyps, no surgery done    Deep vein thrombosis (DVT) of left upper extremity (HCC) 2017    Dementia (HCC)     Diabetes mellitus (HCC)     type 2    Diverticulosis     Edema     in legs on occ    Hypertension     on occ    Language barrier     speaks Beninese & broken english    Stroke [facility])        Past Surgical History:   Procedure Laterality Date    COLON SURGERY      COLONOSCOPY      COLONOSCOPY N/A 2016    Procedure: COLONOSCOPY;  Surgeon: Pamela Santoyo MD;  Location: [facility] GI LAB; Service:     ESOPHAGOGASTRODUODENOSCOPY N/A 2016    Procedure: ESOPHAGOGASTRODUODENOSCOPY (EGD); Surgeon: Pamela Santoyo MD;  Location: [facility] GI LAB; Service:    Melly Henry / Qasim Hilton / Jermain Feeling      JOINT REPLACEMENT Right 2015    hip    JOINT REPLACEMENT Left     knee    JOINT REPLACEMENT Right     knee    MITRAL VALVE REPLACEMENT      AR COLONOSCOPY FLX DX W/COLLJ SPEC WHEN PFRMD N/A 2018    Procedure: EGD AND COLONOSCOPY;  Surgeon: Pamela Santoyo MD;  Location: [facility] GI LAB; Service: Gastroenterology    AR REVISE MEDIAN N/CARPAL TUNNEL SURG Left 2016    Procedure: RELEASE CARPAL TUNNEL;  Surgeon: Solomon Harris MD;  Location: [facility] MAIN OR;  Service: Orthopedics    TUBAL LIGATION      VARICOSE VEIN SURGERY Bilateral        Family History   Problem Relation Age of Onset    Cancer Mother         throat     I have reviewed and agree with the history as documented  E-Cigarette/Vaping    E-Cigarette Use Never User      E-Cigarette/Vaping Substances     Social History     Tobacco Use    Smoking status: Former Smoker     Packs/day: 0 25     Years: 10 00     Pack years: 2 50     Types: Cigarettes     Quit date:      Years since quittin 6    Smokeless tobacco: Never Used   Vaping Use    Vaping Use: Never used   Substance Use Topics    Alcohol use: No    Drug use: No       Review of Systems   Unable to perform ROS: Acuity of condition   Neurological: Positive for facial asymmetry, speech difficulty and weakness  All other systems reviewed and are negative  Physical Exam  Physical Exam  Vitals and nursing note reviewed  Constitutional:       General: She is not in acute distress  Appearance: She is well-developed  She is not diaphoretic  HENT:      Head: Normocephalic and atraumatic  Eyes:      General: Visual field deficit present  Conjunctiva/sclera: Conjunctivae normal       Pupils: Pupils are equal, round, and reactive to light  Comments: Left-sided forced gaze deviation   Cardiovascular:      Rate and Rhythm: Normal rate and regular rhythm  Heart sounds: Normal heart sounds  No murmur heard  Pulmonary:      Effort: Pulmonary effort is normal  No respiratory distress  Breath sounds: Normal breath sounds  Abdominal:      General: Bowel sounds are normal  There is no distension  Palpations: Abdomen is soft  Tenderness: There is no abdominal tenderness  Musculoskeletal:         General: No deformity  Normal range of motion  Cervical back: Normal range of motion and neck supple  Skin:     General: Skin is warm and dry  Capillary Refill: Capillary refill takes less than 2 seconds  Coloration: Skin is not pale  Findings: No rash  Neurological:      Mental Status: She is alert  She is disoriented  GCS: GCS eye subscore is 4  GCS verbal subscore is 1  GCS motor subscore is 6  Cranial Nerves: Cranial nerve deficit and facial asymmetry present  Sensory: Sensation is intact  No sensory deficit  Motor: Weakness present  Coordination: Finger-Nose-Finger Test abnormal    Psychiatric:         Speech: She is noncommunicative           Vital Signs  ED Triage Vitals   Temperature Pulse Respirations Blood Pressure SpO2   07/22/22 1259 07/22/22 1255 07/22/22 1255 07/22/22 1255 07/22/22 1255   98 °F (36 7 °C) 74 (!) 46 (!) 181/86 96 %      Temp Source Heart Rate Source Patient Position - Orthostatic VS BP Location FiO2 (%)   07/22/22 1259 07/22/22 1259 07/22/22 1259 07/22/22 1259 --   Tympanic Monitor Lying Right arm       Pain Score       --                  Vitals:    07/22/22 1319 07/22/22 1323 07/22/22 1330 07/22/22 1400   BP: (!) 178/99 (!) 179/81 (!) 178/81 (!) 174/80   Pulse: 72 71  69   Patient Position - Orthostatic VS:             Visual Acuity      ED Medications  Medications   iohexol (OMNIPAQUE) 350 MG/ML injection (MULTI-DOSE) 65 mL (65 mL Intravenous Given 7/22/22 1314)       Diagnostic Studies  Results Reviewed     Procedure Component Value Units Date/Time    COVID only [[id number]]  (Normal) Collected: 07/22/22 1409    Lab Status: Final result Specimen: Nares from Nose Updated: 07/22/22 1454     SARS-CoV-2 Negative    Narrative:      FOR PEDIATRIC PATIENTS - copy/paste COVID Guidelines URL to browser: [email or web address]  [email or web address]    SARS-CoV-2 assay is a Nucleic Acid Amplification assay intended for the  qualitative detection of nucleic acid from SARS-CoV-2 in nasopharyngeal  swabs  Results are for the presumptive identification of SARS-CoV-2 RNA  Positive results are indicative of infection with SARS-CoV-2, the virus  causing COVID-19, but do not rule out bacterial infection or co-infection  with other viruses  Laboratories within the [place] and its  territories are required to report all positive results to the appropriate  public health authorities  Negative results do not preclude SARS-CoV-2  infection and should not be used as the sole basis for treatment or other  patient management decisions  Negative results must be combined with  clinical observations, patient history, and epidemiological information  This test has not been FDA cleared or approved  This test has been authorized by FDA under an Emergency Use Authorization  (EUA)  This test is only authorized for the duration of time the  declaration that circumstances exist justifying the authorization of the  emergency use of an in vitro diagnostic tests for detection of SARS-CoV-2  virus and/or diagnosis of COVID-19 infection under section 564(b)(1) of  the Act, 21 U  S C  069YPD-9(V)(2), unless the authorization is terminated  or revoked sooner  The test has been validated but independent review by FDA  and CLIA is pending  Test performed using [employer] Gene[employer]pert: This RT-PCR assay targets N2,  a region unique to SARS-CoV-2  A conserved region in the E-gene was chosen  for pan-Sarbecovirus detection which includes SARS-CoV-2      Comprehensive metabolic panel [[id number]]  (Abnormal) Collected: 07/22/22 1307    Lab Status: Final result Specimen: Blood from Arm, Left Updated: 07/22/22 1357     Sodium 138 mmol/L      Potassium 4 4 mmol/L      Chloride 103 mmol/L      CO2 29 mmol/L      ANION GAP 6 mmol/L      BUN 32 mg/dL      Creatinine 1 48 mg/dL      Glucose 128 mg/dL      Calcium 9 3 mg/dL      Corrected Calcium 9 9 mg/dL      AST 22 U/L      ALT 21 U/L      Alkaline Phosphatase 71 U/L      Total Protein 7 1 g/dL      Albumin 3 3 g/dL      Total Bilirubin 0 72 mg/dL      eGFR 31 ml/min/1 73sq m     Narrative:      National Kidney Disease Foundation guidelines for Chronic Kidney Disease (CKD):     Stage 1 with normal or high GFR (GFR > 90 mL/min/1 73 square meters)    Stage 2 Mild CKD (GFR = 60-89 mL/min/1 73 square meters)    Stage 3A Moderate CKD (GFR = 45-59 mL/min/1 73 square meters)    Stage 3B Moderate CKD (GFR = 30-44 mL/min/1 73 square meters)    Stage 4 Severe CKD (GFR = 15-29 mL/min/1 73 square meters)    Stage 5 End Stage CKD (GFR <15 mL/min/1 73 square meters)  Note: GFR calculation is accurate only with a steady state creatinine    Magnesium [[id number]]  (Normal) Collected: 07/22/22 1307    Lab Status: Final result Specimen: Blood from Arm, Left Updated: 07/22/22 1357     Magnesium 2 2 mg/dL     HS Troponin 0hr (reflex protocol) [[id number]]  (Normal) Collected: 07/22/22 1307    Lab Status: Final result Specimen: Blood from Arm, Left Updated: 07/22/22 1351     hs TnI 0hr 11 ng/L     Protime-INR [[id number]]  (Normal) Collected: 07/22/22 1307    Lab Status: Final result Specimen: Blood from Arm, Left Updated: 07/22/22 1338     Protime 13 4 seconds      INR 1 01    APTT [[id number]]  (Normal) Collected: 07/22/22 1307    Lab Status: Final result Specimen: Blood from Arm, Left Updated: 07/22/22 1338     PTT 31 seconds     CBC and differential [[id number]]  (Abnormal) Collected: 07/22/22 1307    Lab Status: Final result Specimen: Blood from Arm, Left Updated: 07/22/22 1312     WBC 4 34 Thousand/uL      RBC 3 97 Million/uL      Hemoglobin 11 9 g/dL      Hematocrit 37 3 %      MCV 94 fL      MCH 30 0 pg      MCHC 31 9 g/dL      RDW 12 2 %      MPV 9 7 fL      Platelets 158 Thousands/uL      nRBC 0 /100 WBCs      Neutrophils Relative 51 %      Immat GRANS % 0 %      Lymphocytes Relative 24 %      Monocytes Relative 10 %      Eosinophils Relative 14 %      Basophils Relative 1 %      Neutrophils Absolute 2 23 Thousands/µL      Immature Grans Absolute 0 01 Thousand/uL      Lymphocytes Absolute 1 02 Thousands/µL      Monocytes Absolute 0 44 Thousand/µL      Eosinophils Absolute 0 61 Thousand/µL      Basophils Absolute 0 03 Thousands/µL     Fingerstick Glucose (POCT) [[id number]]  (Normal) Collected: 07/22/22 1252    Lab Status: Final result Updated: 07/22/22 1256     POC Glucose 137 mg/dl                  XR chest 1 view portable   Final Result by Alex Hendricks MD (07/22 3369)      No acute cardiopulmonary disease  Workstation performed: [id number]         CTA stroke alert (head/neck)   Final Result by Dario Mitchell MD (07/22 6722)      Proximal left M1 occlusion  Moderate stenosis proximal right internal carotid artery  Moderate stenosis left PCA  I personally discussed this study with Brenda Lucas on 7/22/2022 at 1:33 PM                            Workstation performed: [id number]         CT stroke alert brain   Final Result by Dario Mitchell MD (07/22 2492)      Stable chronic ischemic changes  No acute infarct or hemorrhage            I personally discussed this study with Horace Simon on 7/22/2022 at 1:33 PM          Workstation performed: [id number]                    Procedures  ECG 12 Lead Documentation Only    Date/Time: 7/22/2022 1:15 PM  Performed by: Caitlin Mario DO  Authorized by: Caitlin Mario DO     Indications / Diagnosis:  Acute cva  ECG reviewed by me, the ED Provider: yes    Patient location:  ED  Previous ECG:     Previous ECG:  Compared to current    Similarity:  No change    Comparison to cardiac monitor: Yes    Interpretation:     Interpretation: non-specific    Rate:     ECG rate:  70bpm    ECG rate assessment: normal    Rhythm:     Rhythm: paced    Pacing:     Capture:  Complete    Type of pacing:  Atrial  Ectopy:     Ectopy: none    QRS:     QRS axis:  Left  Conduction:     Conduction: abnormal      Abnormal conduction: bifascicular block    ST segments:     ST segments:  Normal    CriticalCare Time  Performed by: Caitlin Mario DO  Authorized by: Caitlin Mario DO     Critical care provider statement:     Critical care time (minutes):  35    Critical care time was exclusive of:  Separately billable procedures and treating other patients and teaching time    Critical care was necessary to treat or prevent imminent or life-threatening deterioration of the following conditions:  CNS failure or compromise    Critical care was time spent personally by me on the following activities:  Blood draw for specimens, development of treatment plan with patient or surrogate, obtaining history from patient or surrogate, discussions with consultants, evaluation of patient's response to treatment, examination of patient, ordering and performing treatments and interventions, interpretation of cardiac output measurements, ordering and review of laboratory studies, ordering and review of radiographic studies, re-evaluation of patient's condition and review of old charts    I assumed direction of critical care for this patient from another provider in my specialty: no               ED Course                  Stroke Assessment     Row Name 07/22/22 1258             NIH Stroke Scale    Interval Baseline      Level of Consciousness (1a ) 1      LOC Questions (1b ) 0      LOC Commands (1c ) 1      Best Gaze (2 ) 2      Visual (3 ) 0      Facial Palsy (4 ) 3      Motor Arm, Left (5a ) 2      Motor Arm, Right (5b ) 4      Motor Leg, Left (6a ) 1      Motor Leg, Right (6b ) 4      Limb Ataxia (7 ) 2      Sensory (8 ) 0      Best Language (9 ) 3      Dysarthria (10 ) 2      Extinction and Inattention (11 ) (Formerly Neglect) 2      Total 27              Flowsheet Row Most Recent Value   TPA Decision Options    TPA Decision Patient not a TPA candidate  Patient is not a candidate options Bleeding risk  MDM  Number of Diagnoses or Management Options  Acute CVA (cerebrovascular accident) [facility]): new and requires workup  Diagnosis management comments: Patient here with an acute CVA  Patient is not a tPA candidate due to Eliquis and aspirin use  Patient eligible for thrombectomy  Patient's family agree with transfer to [facility] for thrombectomy  Risks and benefits reviewed with patient's children  Patient remains stable at the time of discharge         Amount and/or Complexity of Data Reviewed  Clinical lab tests: ordered and reviewed  Tests in the radiology section of CPT®: ordered and reviewed    Risk of Complications, Morbidity, and/or Mortality  Presenting problems: high  Diagnostic procedures: high  Management options: high    Patient Progress  Patient progress: stable      Disposition  Final diagnoses:   Acute CVA (cerebrovascular accident) [facility])     Time reflects when diagnosis was documented in both MDM as applicable and the Disposition within this note     Time User Action Codes Description Comment    7/22/2022  1:37 PM Lux Michaud Add [I63 9] Acute CVA (cerebrovascular accident) ([facility] [address] ) ED Disposition     ED Disposition   Transfer to Another Facility-In Network    Condition   --    Date/Time   Fri Jul 22, 2022  1:37 PM    Comment   [place]ves should be transferred out to [facility] - Endovascular             MD Documentation    Flowsheet Row Most Recent Value   Patient Condition An emergency transfer is being made prior to stabilization due to the need for definitive care and the benefit of transfer outweighs the risk   Reason for Transfer Level of Care needed not available at this facility   Benefits of Transfer Specialized equipment and/or services available at the receiving facility (Include comment)________________________   Risks of Transfer Potential for delay in receiving treatment, Potential deterioration of medical condition, Loss of IV, Increased discomfort during transfer, Possible worsening of condition or death during transfer   Accepting Physician Dr Kiley Hdz Name, Mailni Liu   Sending MD Dr Spears Covert   Provider Certification Unanticipated needs of medical equipment and personnel during transport      RN Documentation    [address] Name, [address]  SLB   Bed Assignment IR   Report Given to [facility]      Follow-up Information    None         Discharge Medication List as of 7/22/2022  2:23 PM      CONTINUE these medications which have NOT CHANGED    Details   albuterol (2 5 mg/3 mL) 0 083 % nebulizer solution Take 3 mL (2 5 mg total) by nebulization every 6 (six) hours as needed for wheezing or shortness of breath, Starting Sun 4/17/2022, Normal      albuterol (PROVENTIL HFA,VENTOLIN HFA) 90 mcg/act inhaler Inhale 2 puffs every 6 (six) hours as needed for wheezing, Starting Sun 4/17/2022, Normal      apixaban (ELIQUIS) 2 5 mg Take 1 tablet (2 5 mg total) by mouth 2 (two) times a day, Starting Fri 5/18/2018, No Print      aspirin (ECOTRIN LOW STRENGTH) 81 mg EC tablet Take 1 tablet (81 mg total) by mouth daily, Starting Thu 11/8/2018, No Print      atorvastatin (LIPITOR) 40 mg tablet Take 2 tablets (80 mg total) by mouth daily with dinner, Starting Thu 9/3/2020, Print      benzonatate (TESSALON PERLES) 100 mg capsule Take 1 capsule (100 mg total) by mouth 3 (three) times a day as needed for cough, Starting Wed 6/29/2022, Normal      fluticasone (FLONASE) 50 mcg/act nasal spray 1 spray into each nostril daily, Starting Wed 6/29/2022, Normal      furosemide (LASIX) 40 mg tablet 40 mg Every other day, Starting Mon 4/4/2022, Historical Med      iron polysaccharides (FERREX) 150 mg capsule Take 150 mg by mouth 2 (two) times a day, Historical Med      JANUVIA 50 MG tablet 50 mg daily  , Starting Tue 7/31/2018, Historical Med      losartan (COZAAR) 25 mg tablet 12 5 mg In a m , Starting Wed 7/8/2020, Historical Med      metoprolol tartrate (LOPRESSOR) 50 mg tablet Take 1 tablet (50 mg total) by mouth every 12 (twelve) hours, Starting Fri 5/18/2018, No Print      Multiple Vitamins-Minerals (MULTIVITAMIN ADULT PO) 0 5 tablets 2 (two) times a day  , Historical Med      Polysaccharide Iron-FA-B12 (FERREX 150 FORTE PO) Take by mouth, Historical Med      potassium chloride (MICRO-K) 10 MEQ CR capsule Starting Mon 1/28/2019, Historical Med      tiotropium (Spiriva Respimat) 1 25 MCG/ACT AERS inhaler Inhale 2 puffs daily, Starting Wed 7/6/2022, Normal             No discharge procedures on file      PDMP Review     None          ED Provider  Electronically Signed by           Jered Jernigan DO  07/23/22 4642

## 2022-07-22 NOTE — DISCHARGE INSTRUCTIONS
Today, you underwent a diagnostic cerebral angiogram under the care of Dr Ld Denise for treatment of Stroke   ? The following instructions will help you care for yourself, or be cared for upon your return home today  These are guidelines for your care right after your surgery only  ? Notify Your Doctor or Nurse if you have any of the following:  ? SYMPTOMS OF WOUND INFECTION--   Increased pain in or around the incision   Swelling around the incision  Any drainage from the incision  Incision separates or opens up  Warmth in the tissues around the incision  Redness or tenderness on the skin near the incision   Fever (temperature greater than 101 degrees F)   ? NEUROLOGICAL CHANGES--  Change in alertness  Increased sleepiness   Nausea and vomiting   New onset of numbness or weakness in arms or legs   New problems with your bowels or bladder  New or worse problems with balance or walking  Seizures, new or worsening  ? UNRELIEVED HEADACHE PAIN--  New or increased pain unrelieved with pain medications   Pain associated with nausea and vomiting   Pain associated with other symptoms  ? QUESTIONS OR PROBLEMS--  Any questions or problems that you are unsure about  Wound Care:  Keep Incision Clean and Dry   You may shower daily, but do not soak incision  Pat dry after showering  No tub baths, soaking, swimming for 1 week after angiogram    You do not need to cover the incision  Mild to moderate bruising and tenderness to the site is expected and may last up to 1-2 weeks after your procedure  ?  A closure device was placed at the catheter insertion site  This is MRI compatible  Remove the dressing 24 hours after your procedure  If your groin site is bleeding, apply firm pressure for 10 minutes  Reinforce dressing rather than removing and checking frequently  If continues to bleed through the dressing after 1 hour, contact your neurosurgeon's office  Anticipatory Education:  ?   PAIN MED W/ Acetaminophen (Tylenol)  --IF a prescription for pain medicine has been sent home with you:  --Narcotic pain medication may cause constipation  Be sure to take stool softeners or laxatives while you are on narcotic pain medication  --Do not drive after taking prescription pain medicine  ?  If this medicine is too strong, or no longer necessary, or we did NOT recommend/prescribe oral narcotics, you may take:   - Tylenol Extra-strength/Acetaminophen, 2 tablets every 4-6 hours as needed for mild pain  DO NOT TAKE MORE THAN 4000MG PER DAY from combined sources  NOTE: Remember to eat when taking pain medicines in order to avoid nausea  Watch for constipation  Eat plenty of fruits, vegetables, juices, and drink 6-8 glasses of water each day  Constipation: Stay active and drink at least 6-8 cups of fluid each day to prevent constipation  If you need a laxative or stool softener follow the package directions or consult with your local pharmacists if you have questions  ? After anesthesia, rest for 24 hours  Do not drive, drink alcohol beverages or make any important decisions during this time  General anesthesia may cause sore throat, jaw discomfort or muscle aches  These symptoms can last for one or two days  Activity: Please follow these instructions:  Advance your activity as you can tolerate  You may do light house work; nothing strenuous   You may walk all you want  You may go up and down the steps  Use the railing for support  Do not do excessive bending, straining or heavy lifting for 48 hours after your procedure  Do not drive or return to work until you are instructed   It is normal for your energy level and sleep patterns to change after surgery  Get extra sleep at night and take naps during the day to help you feel less tired  Take rest periods during the day  Complete recovery may take several weeks  ?  You may resume driving after 31-35 hours recovery  You may return to work after 48 hours of recovery  ?  Diet:  Your doctor has recommended that you follow these diet instructions at home  Refer to the patient education materials you received during your hospital stay  If you would like more nutrition counseling, ask your doctor about making an appointment with an outpatient dietitian  Resume your home diet  ? Medications:  Please resume your home medications as instructed  ? Home Supplies and Equipment:  none  Additional Contacts:  ? CONTACTS FOR NEUROSURGERY: You may call your neurosurgeons office if you have questions between 8:30 am and 4:30 pm  You may request to speak to the nurse practitioner who is available Monday through Friday  ?  For off hours or the weekend you may call your neurosurgeon's office to leave a message

## 2022-07-22 NOTE — ED NOTES
Left a/c IV blown    Transport team attempting to place new line     Tushar Yousif RN  07/22/22 5719

## 2022-07-22 NOTE — CONSULTS
Consultation - Neurosurgery   Starr Dooley 80 y o  female MRN: [id number]  Unit/Bed#:  Encounter: [id number]      Assessment/Plan     Assessment:  Left MCA syndrome  Plan:  Faye Falcon has a very symptomatic left MCA occlusion  I have explained to her daughter that statistically intervention is unlikely to benefit her mother given her age and dementia  Additionally it is not quite clear what her functional baseline status is  Nevertheless her daughter is asking for everything to be done  I have offered mechanical thrombectomy  She understands the risks and has elected to proceed  History of Present Illness       HPI: Starr Dooley is a 80y o  year old female who presents with acute left MCA syndrome  Last known normal was 11:00 a m  Dara Soulier CT reveals left M1 occlusion  Family states that she is able to dress herself, each herself  And is ambulatory  States she is able to speak but stutters is often  States she knows her children's names and recognizes them when in the room however will forget the names of her numerous grandchildren  Consults    Review of Systems   Unable to perform ROS: Acuity of condition       Historical Information   Past Medical History:   Diagnosis Date    Acid reflux     on occ    Arthritis     DJD right hip replaced    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    Cancer ([address] ) 2007    colonic polyps, no surgery done    Deep vein thrombosis (DVT) of left upper extremity ([address] ) 12/11/2017    Dementia ([address] )     Diabetes mellitus ([address] )     type 2    Diverticulosis     Edema     in legs on occ    Hypertension     on occ    Language barrier     speaks Serbian & broken english    Stroke [facility])      Past Surgical History:   Procedure Laterality Date    COLON SURGERY      COLONOSCOPY      COLONOSCOPY N/A 11/2/2016    Procedure: COLONOSCOPY;  Surgeon: Anne Marie Seals MD;  Location: [place] [address] GI LAB;   Service:     ESOPHAGOGASTRODUODENOSCOPY N/A 11/2/2016 Procedure: ESOPHAGOGASTRODUODENOSCOPY (EGD); Surgeon: Srinivasan Eugene MD;  Location: [facility] GI LAB; Service:    Allie Rosa / Ygphillip Chetansri / Bridger Addison      JOINT REPLACEMENT Right 2015    hip    JOINT REPLACEMENT Left     knee    JOINT REPLACEMENT Right     knee    MITRAL VALVE REPLACEMENT      W[place] COLONOSCOPY FLX DX W/COLLJ SPEC WHEN PFRMD N/A 2018    Procedure: EGD AND COLONOSCOPY;  Surgeon: Srinivasan Eugene MD;  [facility]; Service: Gastroenterology    W[place] REVISE MEDIAN N/CARPAL TUNNEL SURG Left 2016    Procedure: RELEASE CARPAL TUNNEL;  Surgeon: Irene Severino MD;  Location: [facility] MAIN OR;  Service: Orthopedics    TUBAL LIGATION      VARICOSE VEIN SURGERY Bilateral      Social History     Substance and Sexual Activity   Alcohol Use No     Social History     Substance and Sexual Activity   Drug Use No     E-Cigarette/Vaping    E-Cigarette Use Never User      E-Cigarette/Vaping Substances     Social History     Tobacco Use   Smoking Status Former Smoker    Packs/day: 0 25    Years: 10 00    Pack years: 2 50    Types: Cigarettes    Quit date: 46    Years since quittin 6   Smokeless Tobacco Never Used     Family History   Problem Relation Age of Onset    Cancer Mother         throat       Meds/Allergies   all current active meds have been reviewed  Allergies   Allergen Reactions    Glimepiride Rash    Guaifenesin-Codeine Hallucinations    Heparin Other (See Comments)     Thrombocytopenia      Vancomycin      Red man syndrome       Objective   No intake or output data in the 24 hours ending 22 [id number]    Physical Exam  Constitutional:       Appearance: She is ill-appearing  Cardiovascular:      Rate and Rhythm: Normal rate  Pulses: Normal pulses  Pulmonary:      Effort: Pulmonary effort is normal    Musculoskeletal:         General: Normal range of motion  Skin:     General: Skin is warm and dry  Neurological:      Mental Status: She is alert  Cranial Nerves: Cranial nerve deficit present  Sensory: Sensory deficit present  Motor: Weakness present  Comments: Global aphasia  Left gaze preference  Right upper extremity weakness however spontaneously moving  Right lower extremity plegia  Right-sided sensory loss  Neurologic Exam    Vitals:not currently breastfeeding  ,There is no height or weight on file to calculate BMI  Lab Results: I have personally reviewed pertinent results  Imaging Studies: I have personally reviewed pertinent reports  EKG, Pathology, and Other Studies: I have personally reviewed pertinent reports        VTE Prophylaxis: Sequential compression device Luiz Holter)     Code Status: Prior  Advance Directive and Living Will:      Power of :    POLST:      Counseling / Coordination of Care  None

## 2022-07-22 NOTE — PLAN OF CARE
Problem: MOBILITY - ADULT  Goal: Maintain or return to baseline ADL function  Description: INTERVENTIONS:  -  Assess patient's ability to carry out ADLs; assess patient's baseline for ADL function and identify physical deficits which impact ability to perform ADLs (bathing, care of mouth/teeth, toileting, grooming, dressing, etc )  - Assess/evaluate cause of self-care deficits   - Assess range of motion  - Assess patient's mobility; develop plan if impaired  - Assess patient's need for assistive devices and provide as appropriate  - Encourage maximum independence but intervene and supervise when necessary  - Involve family in performance of ADLs  - Assess for home care needs following discharge   - Consider OT consult to assist with ADL evaluation and planning for discharge  - Provide patient education as appropriate  Outcome: Progressing  Goal: Maintains/Returns to pre admission functional level  Description: INTERVENTIONS:  - Perform BMAT or MOVE assessment daily    - Set and communicate daily mobility goal to care team and patient/family/caregiver  - Collaborate with rehabilitation services on mobility goals if consulted  - Perform Range of Motion  times a day  - Reposition patient every  hours    - Dangle patient  times a day  - Stand patient  times a day  - Ambulate patient  times a day  - Out of bed to chair  times a day   - Out of bed for meals  times a day  - Out of bed for toileting  - Record patient progress and toleration of activity level   Outcome: Progressing     Problem: Potential for Falls  Goal: Patient will remain free of falls  Description: INTERVENTIONS:  - Educate patient/family on patient safety including physical limitations  - Instruct patient to call for assistance with activity   - Consult OT/PT to assist with strengthening/mobility   - Keep Call bell within reach  - Keep bed low and locked with side rails adjusted as appropriate  - Keep care items and personal belongings within reach  - Initiate and maintain comfort rounds  - Make Fall Risk Sign visible to staff  - Offer Toileting every  Hours, in advance of need  - Initiate/Maintain alarm  - Obtain necessary fall risk management equipment  - Apply yellow socks and bracelet for high fall risk patients  - Consider moving patient to room near nurses station  Outcome: Progressing     Problem: Prexisting or High Potential for Compromised Skin Integrity  Goal: Skin integrity is maintained or improved  Description: INTERVENTIONS:  - Identify patients at risk for skin breakdown  - Assess and monitor skin integrity  - Assess and monitor nutrition and hydration status  - Monitor labs   - Assess for incontinence   - Turn and reposition patient  - Assist with mobility/ambulation  - Relieve pressure over bony prominences  - Avoid friction and shearing  - Provide appropriate hygiene as needed including keeping skin clean and dry  - Evaluate need for skin moisturizer/barrier cream  - Collaborate with interdisciplinary team   - Patient/family teaching  - Consider wound care consult   Outcome: Progressing     Problem: Neurological Deficit  Goal: Neurological status is stable or improving  Description: Interventions:  - Monitor and assess patient's level of consciousness, motor function, sensory function, and level of assistance needed for ADLs  - Monitor and report changes from baseline  Collaborate with interdisciplinary team to initiate plan and implement interventions as ordered  - Provide and maintain a safe environment  - Consider seizure precautions  - Consider fall precautions  - Consider aspiration precautions  - Consider bleeding precautions  Outcome: Progressing     Problem: Activity Intolerance/Impaired Mobility  Goal: Mobility/activity is maintained at optimum level for patient  Description: Interventions:  - Assess and monitor patient  barriers to mobility and need for assistive/adaptive devices    - Assess patient's emotional response to limitations  - Collaborate with interdisciplinary team and initiate plans and interventions as ordered  - Encourage independent activity per ability   - Maintain proper body alignment  - Perform active/passive rom as tolerated/ordered  - Plan activities to conserve energy   - Turn patient as appropriate  Outcome: Progressing     Problem: Communication Impairment  Goal: Ability to express needs and understand communication  Description: Assess patient's communication skills and ability to understand information  Patient will demonstrate use of effective communication techniques, alternative methods of communication and understanding even if not able to speak  - Encourage communication and provide alternate methods of communication as needed  - Collaborate with case management/ for discharge needs  - Include patient/family/caregiver in decisions related to communication  Outcome: Progressing     Problem: Potential for Aspiration  Goal: Non-ventilated patient's risk of aspiration is minimized  Description: Assess and monitor vital signs, respiratory status, and labs (WBC)  Monitor for signs of aspiration (tachypnea, cough, rales, wheezing, cyanosis, fever)  - Assess and monitor patient's ability to swallow  - Place patient up in chair to eat if possible  - HOB up at 90 degrees to eat if unable to get patient up into chair   - Supervise patient during oral intake  - Instruct patient/ family to take small bites  - Instruct patient/ family to take small single sips when taking liquids  - Follow patient-specific strategies generated by speech pathologist   Outcome: Progressing  Goal: Ventilated patient's risk of aspiration is minimized  Description: Assess and monitor vital signs, respiratory status, airway cuff pressure, and labs (WBC)  Monitor for signs of aspiration (tachypnea, cough, rales, wheezing, cyanosis, fever)      - Elevate head of bed 30 degrees if patient has tube feeding   - Monitor tube feeding  Outcome: Progressing     Problem: Nutrition  Goal: Nutrition/Hydration status is improving  Description: Monitor and assess patient's nutrition/hydration status for malnutrition (ex- brittle hair, bruises, dry skin, pale skin and conjunctiva, muscle wasting, smooth red tongue, and disorientation)  Collaborate with interdisciplinary team and initiate plan and interventions as ordered  Monitor patient's weight and dietary intake as ordered or per policy  Utilize nutrition screening tool and intervene per policy  Determine patient's food preferences and provide high-protein, high-caloric foods as appropriate  - Assist patient with eating   - Allow adequate time for meals   - Encourage patient to take dietary supplement as ordered  - Collaborate with clinical nutritionist   - Include patient/family/caregiver in decisions related to nutrition  Outcome: Progressing     Problem: Nutrition/Hydration-ADULT  Goal: Nutrient/Hydration intake appropriate for improving, restoring or maintaining nutritional needs  Description: Monitor and assess patient's nutrition/hydration status for malnutrition  Collaborate with interdisciplinary team and initiate plan and interventions as ordered  Monitor patient's weight and dietary intake as ordered or per policy  Utilize nutrition screening tool and intervene as necessary  Determine patient's food preferences and provide high-protein, high-caloric foods as appropriate       INTERVENTIONS:  - Monitor oral intake, urinary output, labs, and treatment plans  - Assess nutrition and hydration status and recommend course of action  - Evaluate amount of meals eaten  - Assist patient with eating if necessary   - Allow adequate time for meals  - Recommend/ encourage appropriate diets, oral nutritional supplements, and vitamin/mineral supplements  - Order, calculate, and assess calorie counts as needed  - Recommend, monitor, and adjust tube feedings and TPN/PPN based on assessed needs  - Assess need for intravenous fluids  - Provide specific nutrition/hydration education as appropriate  - Include patient/family/caregiver in decisions related to nutrition  Outcome: Progressing

## 2022-07-22 NOTE — RESPIRATORY THERAPY NOTE
RT Ventilator Management Note  Daniel Bal 80 y o  female MRN: [id number]  Unit/Bed#: ICU 08 Encounter: [id number]      Daily Screen    No data found in the last 10 encounters  Physical Exam:   Assessment Type: Assess only  General Appearance: Sedated  Respiratory Pattern: Assisted  Chest Assessment: Chest expansion symmetrical  Bilateral Breath Sounds: (P) Rhonchi  O2 Device: transported pt from IR, placed pt on zoll transport vent, transported pt to ICU, put pt on C3 vent without any incidents ett secured at 21 with tube charltno

## 2022-07-23 ENCOUNTER — APPOINTMENT (INPATIENT)
Dept: NON INVASIVE DIAGNOSTICS | Facility: HOSPITAL | Age: 87
DRG: 064 | End: 2022-07-23
Payer: MEDICARE

## 2022-07-23 ENCOUNTER — APPOINTMENT (INPATIENT)
Dept: RADIOLOGY | Facility: HOSPITAL | Age: 87
DRG: 064 | End: 2022-07-23
Payer: MEDICARE

## 2022-07-23 LAB
ANION GAP SERPL CALCULATED.3IONS-SCNC: 7 MMOL/L (ref 4–13)
ARTERIAL PATENCY WRIST A: YES
BASE EXCESS BLDA CALC-SCNC: 0.1 MMOL/L
BUN SERPL-MCNC: 27 MG/DL (ref 5–25)
CALCIUM SERPL-MCNC: 9 MG/DL (ref 8.3–10.1)
CHLORIDE SERPL-SCNC: 109 MMOL/L (ref 96–108)
CHOLEST SERPL-MCNC: 116 MG/DL
CO2 SERPL-SCNC: 25 MMOL/L (ref 21–32)
CREAT SERPL-MCNC: 1.46 MG/DL (ref 0.6–1.3)
ERYTHROCYTE [DISTWIDTH] IN BLOOD BY AUTOMATED COUNT: 12.3 % (ref 11.6–15.1)
EST. AVERAGE GLUCOSE BLD GHB EST-MCNC: 134 MG/DL
GFR SERPL CREATININE-BSD FRML MDRD: 31 ML/MIN/1.73SQ M
GLUCOSE SERPL-MCNC: 134 MG/DL (ref 65–140)
GLUCOSE SERPL-MCNC: 140 MG/DL (ref 65–140)
GLUCOSE SERPL-MCNC: 143 MG/DL (ref 65–140)
GLUCOSE SERPL-MCNC: 146 MG/DL (ref 65–140)
GLUCOSE SERPL-MCNC: 164 MG/DL (ref 65–140)
HBA1C MFR BLD: 6.3 %
HCO3 BLDA-SCNC: 24.8 MMOL/L (ref 22–28)
HCT VFR BLD AUTO: 29.6 % (ref 34.8–46.1)
HDLC SERPL-MCNC: 48 MG/DL
HGB BLD-MCNC: 9.5 G/DL (ref 11.5–15.4)
HOROWITZ INDEX BLDA+IHG-RTO: 40 MM[HG]
LDLC SERPL CALC-MCNC: 54 MG/DL (ref 0–100)
MAGNESIUM SERPL-MCNC: 2.5 MG/DL (ref 1.6–2.6)
MCH RBC QN AUTO: 30.4 PG (ref 26.8–34.3)
MCHC RBC AUTO-ENTMCNC: 32.1 G/DL (ref 31.4–37.4)
MCV RBC AUTO: 95 FL (ref 82–98)
NT-PROBNP SERPL-MCNC: 4472 PG/ML
O2 CT BLDA-SCNC: 14.3 ML/DL (ref 16–23)
OXYHGB MFR BLDA: 98.7 % (ref 94–97)
PCO2 BLDA: 40.3 MM HG (ref 36–44)
PEEP RESPIRATORY: 6 CM[H2O]
PH BLDA: 7.41 [PH] (ref 7.35–7.45)
PHOSPHATE SERPL-MCNC: 4.2 MG/DL (ref 2.3–4.1)
PLATELET # BLD AUTO: 110 THOUSANDS/UL (ref 149–390)
PMV BLD AUTO: 10.2 FL (ref 8.9–12.7)
PO2 BLDA: 179.3 MM HG (ref 75–129)
POTASSIUM SERPL-SCNC: 4.3 MMOL/L (ref 3.5–5.3)
RBC # BLD AUTO: 3.12 MILLION/UL (ref 3.81–5.12)
SL CV LV EF: 60
SODIUM SERPL-SCNC: 141 MMOL/L (ref 135–147)
SPECIMEN SOURCE: ABNORMAL
TRIGL SERPL-MCNC: 68 MG/DL
VENT AC: 12
VENT- AC: AC
VT SETTING VENT: 350 ML
WBC # BLD AUTO: 7.48 THOUSAND/UL (ref 4.31–10.16)

## 2022-07-23 PROCEDURE — 99232 SBSQ HOSP IP/OBS MODERATE 35: CPT | Performed by: PHYSICIAN ASSISTANT

## 2022-07-23 PROCEDURE — 80048 BASIC METABOLIC PNL TOTAL CA: CPT | Performed by: PSYCHIATRY & NEUROLOGY

## 2022-07-23 PROCEDURE — 82948 REAGENT STRIP/BLOOD GLUCOSE: CPT

## 2022-07-23 PROCEDURE — 93321 DOPPLER ECHO F-UP/LMTD STD: CPT | Performed by: INTERNAL MEDICINE

## 2022-07-23 PROCEDURE — 83735 ASSAY OF MAGNESIUM: CPT | Performed by: PSYCHIATRY & NEUROLOGY

## 2022-07-23 PROCEDURE — 93321 DOPPLER ECHO F-UP/LMTD STD: CPT

## 2022-07-23 PROCEDURE — 80061 LIPID PANEL: CPT | Performed by: PSYCHIATRY & NEUROLOGY

## 2022-07-23 PROCEDURE — G1004 CDSM NDSC: HCPCS

## 2022-07-23 PROCEDURE — 93308 TTE F-UP OR LMTD: CPT

## 2022-07-23 PROCEDURE — 99291 CRITICAL CARE FIRST HOUR: CPT | Performed by: EMERGENCY MEDICINE

## 2022-07-23 PROCEDURE — 70450 CT HEAD/BRAIN W/O DYE: CPT

## 2022-07-23 PROCEDURE — 83880 ASSAY OF NATRIURETIC PEPTIDE: CPT

## 2022-07-23 PROCEDURE — 85027 COMPLETE CBC AUTOMATED: CPT | Performed by: PSYCHIATRY & NEUROLOGY

## 2022-07-23 PROCEDURE — 94003 VENT MGMT INPAT SUBQ DAY: CPT

## 2022-07-23 PROCEDURE — 84100 ASSAY OF PHOSPHORUS: CPT | Performed by: PSYCHIATRY & NEUROLOGY

## 2022-07-23 PROCEDURE — 93325 DOPPLER ECHO COLOR FLOW MAPG: CPT | Performed by: INTERNAL MEDICINE

## 2022-07-23 PROCEDURE — NC001 PR NO CHARGE: Performed by: EMERGENCY MEDICINE

## 2022-07-23 PROCEDURE — 99223 1ST HOSP IP/OBS HIGH 75: CPT | Performed by: PSYCHIATRY & NEUROLOGY

## 2022-07-23 PROCEDURE — 93325 DOPPLER ECHO COLOR FLOW MAPG: CPT

## 2022-07-23 PROCEDURE — 94760 N-INVAS EAR/PLS OXIMETRY 1: CPT

## 2022-07-23 PROCEDURE — 83036 HEMOGLOBIN GLYCOSYLATED A1C: CPT | Performed by: PSYCHIATRY & NEUROLOGY

## 2022-07-23 PROCEDURE — 82805 BLOOD GASES W/O2 SATURATION: CPT

## 2022-07-23 PROCEDURE — 93308 TTE F-UP OR LMTD: CPT | Performed by: INTERNAL MEDICINE

## 2022-07-23 RX ORDER — ASPIRIN 81 MG/1
81 TABLET, CHEWABLE ORAL DAILY
Status: DISCONTINUED | OUTPATIENT
Start: 2022-07-23 | End: 2022-07-24

## 2022-07-23 RX ORDER — SODIUM CHLORIDE, SODIUM GLUCONATE, SODIUM ACETATE, POTASSIUM CHLORIDE, MAGNESIUM CHLORIDE, SODIUM PHOSPHATE, DIBASIC, AND POTASSIUM PHOSPHATE .53; .5; .37; .037; .03; .012; .00082 G/100ML; G/100ML; G/100ML; G/100ML; G/100ML; G/100ML; G/100ML
1000 INJECTION, SOLUTION INTRAVENOUS ONCE
Status: COMPLETED | OUTPATIENT
Start: 2022-07-23 | End: 2022-07-23

## 2022-07-23 RX ADMIN — CHLORHEXIDINE GLUCONATE 15 ML: 1.2 SOLUTION ORAL at 20:33

## 2022-07-23 RX ADMIN — ATORVASTATIN CALCIUM 80 MG: 80 TABLET, FILM COATED ORAL at 17:00

## 2022-07-23 RX ADMIN — INSULIN LISPRO 1 UNITS: 100 INJECTION, SOLUTION INTRAVENOUS; SUBCUTANEOUS at 00:14

## 2022-07-23 RX ADMIN — PROPOFOL 20 MCG/KG/MIN: 10 INJECTION, EMULSION INTRAVENOUS at 05:28

## 2022-07-23 RX ADMIN — ASPIRIN 81 MG CHEWABLE TABLET 81 MG: 81 TABLET CHEWABLE at 12:40

## 2022-07-23 RX ADMIN — CHLORHEXIDINE GLUCONATE 15 ML: 1.2 SOLUTION ORAL at 08:23

## 2022-07-23 RX ADMIN — SODIUM CHLORIDE, SODIUM GLUCONATE, SODIUM ACETATE, POTASSIUM CHLORIDE, MAGNESIUM CHLORIDE, SODIUM PHOSPHATE, DIBASIC, AND POTASSIUM PHOSPHATE 1000 ML: .53; .5; .37; .037; .03; .012; .00082 INJECTION, SOLUTION INTRAVENOUS at 01:00

## 2022-07-23 RX ADMIN — SENNOSIDES 8.6 MG: 8.6 TABLET, FILM COATED ORAL at 08:23

## 2022-07-23 NOTE — CONSULTS
NEUROLOGY RESIDENCY CONSULT NOTE     Name: Rosas Osuna   Age & Sex: 80 y o  female   MRN: [id number]  Unit/Bed#: ICU 08   Encounter: [id number]  Length of Stay: [address] left MCA stroke [facility])  Assessment & Plan   Presented on 7/22 from [address] [place] [address] to [facility] as an endovascular alert  NIHSS 27 at [address] [place] [address], 24 at [facility]  LKW 1 5-2 hrs prior to presentation  Initial presenting deficits were worsening aphasia than baseline, L gaze preference, flaccid RUE, RLE, R facial droop, LLE drift, but able to squeeze on left   tPA contraindicated- on Eliquis for afib, hx of DVTs   Pertinent history: CKD stage 4, afib on eliquis, previous L MCA stroke, HTN, HLD, T2DM, former smoker   CTH showed chronic infarcts, stable meningioma, but no acute pathology  CTA h/n showed L M1 occlusion, moderate stenosis proximal R ICA, moderate stenosis L PCA   CTP which demonstrated that pt was a candidate for IR thrombectomy-mismatch ratio:  26 4   On 7/22, patient underwent subsequent unsuccessful thrombectomy (L ICA terminus occlusion) and was transferred to the ICU for further management   Repeat CTH on 7/23 AM shows evolving L MCA infarct   7/23 AM:  Family at bedside reported patient had trouble swallowing medications due to chronic tongue deviation, and may not have been taking appropriately, including Eliquis and aspirin  Pertinent Scores:   - NIHSS: 27 at [facility], 24 at [facility]     Workup:   · LDL 54, TG 68, chol 116  · A1c 6 3%    · CTH 7/23 AM: evolving L MCA infarct  Hyperdense L MCA  Chronic infarcts seen in R occipital, parietal lobes  Impression: L MCA infarct -etiology most likely cardio-embolic due to medication noncompliance in setting of poorly controlled vascular risk factors      Plan:  - Stroke pathway  - MRI brain without contrast if able  - ASA 81 mg daily    - Atorvastatin 40 mg q d   - -180   - Echocardiogram   - Continue monitoring on telemetry  - Goal normothermia and euglycemia  - Continue Neuro checks - Every 1 hour x 4 hours, then every 2 hours x 4, then every 4 hours x 72 hours     - DVT pharm ppx appropriate, SCDs  - PT/OT/ST Evaluation  - PM&R evaluation  - Stat Head CT with any neuro status change   - Discussed plan with attending physician, Dr Jhon Morales  Recommendations for outpatient neurological follow up have yet to be determined  Pending for discharge:  Extubation, clinical improvement    SUBJECTIVE     Reason for Consult / Principal Problem: L MCA stroke   Hx and PE limited by: Patient intubated, weaned off of sedation during interview/exam    HPI: Camacho Salinas is a 80 y o   female who presented on 7/22 from [address] [place] [address] to [facility] as an endovascular alert  PMHx significant for CHF, CKD stage 4, arthritis, anemia, former smoker, CAD, previous left MCA stroke, atrial fibrillation, previous DVT maintained on Eliquis, history of dysphasia, s/p mitral valve replacement, HLD, HTN, T2DM, known meningioma, vascular dementia  LKW  approximately 1 5-2 hours prior presentation at [place] with worsening aphasia than baseline, L gaze preference, flaccid RUE, RLE, R facial droop, LLE drift, but able to squeeze on left  NIHSS 27 at [facility], 24 at [address] [place] [address]  CTH showed chronic infarcts, stable meningioma, but no acute pathology  CTA h/n showed L M1 occlusion, moderate stenosis proximal R ICA, moderate stenosis L PCA  Not tPA candidate due to DOAC use  [facility] [place] [facility]  CTP showed mismatch ratio: 26 4; patient underwent subsequent unsuccessful thrombectomy (L ICA terminus occlusion) and was transferred to the ICU for further management  As per discussion with family on 7/23 AM, patient had some aphasia, left upper and lower extremity weakness, right-sided tongue deviation  However, she would ambulate using assisted devices and with help of family members   She would dress herself on a daily basis and eat with help from family  Could not remember names of all family members, but able to recognize children  Mostly spoke in Iranian after her last stroke, but would occasionally communicate basic things in [place]  Family confirms the following new deficits- left gaze preference, LUE, LLE weakness  Right facial droop  Of note, family also reports that patient was not always able to swallow all of her medications (including Eliquis, aspirin) due to tongue deviation at baseline  Overnight, patient became agitated and propofol was started  She became hypertensive (87/46), required Levophed  [facility] on 7/23 AM shows evolving LMCA infarct  Neurology team consulted further management  Inpatient consult to Neurology  Consult performed by: Derrell Barillas MD  Consult ordered by: Elias Flores DO        Historical Information   Past Medical History:   Diagnosis Date    Acid reflux     on occ    Arthritis     DJD right hip replaced    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    x 2 lesions with no change    Cancer ([facility] ) 2007    colonic polyps, no surgery done    Deep vein thrombosis (DVT) of left upper extremity ([facility] ) 12/11/2017    Dementia ([facility] )     Diabetes mellitus ([facility] )     type 2    Diverticulosis     Edema     in legs on occ    Hypertension     on occ    Language barrier     speaks Occitan & broken english    Stroke [facility])      Past Surgical History:   Procedure Laterality Date    COLON SURGERY      COLONOSCOPY      COLONOSCOPY N/A 11/2/2016    Procedure: COLONOSCOPY;  Surgeon: Liza Faustin MD;  Location: [facility] GI LAB; Service:     ESOPHAGOGASTRODUODENOSCOPY N/A 11/2/2016    Procedure: ESOPHAGOGASTRODUODENOSCOPY (EGD); Surgeon: Liza Faustin MD;  [facility] GI LAB;   Service:    Jessie Bello / Claudio Murry / Ruiz Bonner      JOINT REPLACEMENT Right 02/2015    hip    JOINT REPLACEMENT Left     knee    JOINT REPLACEMENT Right     knee    MITRAL VALVE REPLACEMENT      [place] COLONOSCOPY FLX DX W/COLLJ SPEC WHEN PFRMD N/A 2018    Procedure: EGD AND COLONOSCOPY;  Surgeon: Razia Mir MD;  Location: [facility] GI LAB;   Service: Gastroenterology    DE REVISE MEDIAN N/CARPAL TUNNEL SURG Left 2016    Procedure: RELEASE CARPAL TUNNEL;  Surgeon: Camilla Pitt MD;  Location: [facility] MAIN OR;  Service: Orthopedics    TUBAL LIGATION      VARICOSE VEIN SURGERY Bilateral      Social History   Social History     Substance and Sexual Activity   Alcohol Use No     Social History     Substance and Sexual Activity   Drug Use No     E-Cigarette/Vaping    E-Cigarette Use Never User      E-Cigarette/Vaping Substances     Social History     Tobacco Use   Smoking Status Former Smoker    Packs/day: 0 25    Years: 10 00    Pack years: 2 50    Types: Cigarettes    Quit date: 46    Years since quittin 6   Smokeless Tobacco Never Used     Family History:   Family History   Problem Relation Age of Onset    Cancer Mother         throat     Meds/Allergies   all current active meds have been reviewed and current meds:   Current Facility-Administered Medications   Medication Dose Route Frequency    acetaminophen (TYLENOL) tablet 650 mg  650 mg Oral Q4H PRN    albuterol inhalation solution 2 5 mg  2 5 mg Nebulization Q6H PRN    aspirin chewable tablet 81 mg  81 mg Oral Daily    atorvastatin (LIPITOR) tablet 80 mg  80 mg Oral QPM    chlorhexidine (PERIDEX) 0 12 % oral rinse 15 mL  15 mL Mouth/Throat Q12H [facility]    fentanyl citrate (PF) 100 MCG/2ML 50 mcg  50 mcg Intravenous Q1H PRN    fluticasone (FLONASE) 50 mcg/act nasal spray 1 spray  1 spray Nasal Daily    insulin lispro (HumaLOG) 100 units/mL subcutaneous injection 1-5 Units  1-5 Units Subcutaneous Q6H [facility]    propofol (DIPRIVAN) 1000 mg in 100 mL infusion (premix)  5-50 mcg/kg/min Intravenous Titrated    senna (SENOKOT) tablet 8 6 mg  1 tablet Oral Daily    umeclidinium bromide (INCRUSE ELLIPTA) 62 5 mcg/inh inhaler AEPB 1 puff  1 puff Inhalation Daily     Allergies   Allergen Reactions    Glimepiride Rash    Guaifenesin-Codeine Hallucinations    Heparin Other (See Comments)     Thrombocytopenia      Vancomycin      Red man syndrome       Review of previous medical records was completed  Review of Systems-unable to be completed due to patient being intubated, weaned off of sedation during interview/exam     OBJECTIVE     Patient ID: Todd De Jesus is a 80 y o  female  Vitals:   Vitals:    22 1048 22 1049 22 1100 22 1110   BP:   123/54 123/54   BP Location:       Pulse:   84 84   Resp:   16    Temp:   98 96 °F (37 2 °C)    TempSrc:       SpO2:   100%    Weight:    64 9 kg (143 lb)   Height: 5' 1" (1 549 m) 5' 1" (1 549 m)  5' 1" (1 549 m)      Body mass index is 27 02 kg/m²  Intake/Output Summary (Last 24 hours) at 2022 1304  Last data filed at 2022 1000  Gross per 24 hour   Intake 1195 2 ml   Output 1200 ml   Net -4 8 ml       Temperature:   Temp (24hrs), Av 9 °F (36 6 °C), Min:96 4 °F (35 8 °C), Max:98 96 °F (37 2 °C)    Temperature: 98 96 °F (37 2 °C)    Invasive Devices: Invasive Devices  Report    Peripheral Intravenous Line  Duration           Peripheral IV 22 Left;Proximal;Ventral (anterior) Forearm 1 day    Peripheral IV 22 Left Wrist <1 day          Drain  Duration           NG/OG/Enteral Tube Orogastric 18 Fr Center mouth <1 day    Urethral Catheter Non-latex 16 Fr  <1 day          Airway  Duration           ETT  Cuffed; Hi-Lo; Oral 7 mm <1 day                Physical Exam   General: Ill-appearing, intubated    Neurologic Exam     Mental status:  Patient does not respond to auditory stimuli   Does not respond to visual threat b/l  Response noxious stimuli in all 4 extremities  LOC:  Stuporous    Cranial nerves:  Pupils are responsive, shape and size are equal   Grimace to pain is present in all 4 extremities  Motor function:  Decreased tone in RUE  Spontaneous movements in LUE noted  Squeezes fingers only on left  No movement of right arm, fingers  Able to move both lower extremities side to side, wiggle toes after multiple attempts  Reflexes:    2+ biceps, triceps, brachioradialis bilaterally  +1 patellars, Achilles bilaterally  Clonus absent  Plantar downgoing left, upgoing right  GCS score: No data recorded      LABORATORY DATA     Labs: I have personally reviewed pertinent reports  Results from last 7 days   Lab Units 07/23/22  0526 07/22/22  1307   WBC Thousand/uL 7 48 4 34   HEMOGLOBIN g/dL 9 5* 11 9   HEMATOCRIT % 29 6* 37 3   PLATELETS Thousands/uL 110* 125*   NEUTROS PCT %  --  51   MONOS PCT %  --  10      Results from last 7 days   Lab Units 07/23/22  0526 07/22/22  1307   POTASSIUM mmol/L 4 3 4 4   CHLORIDE mmol/L 109* 103   CO2 mmol/L 25 29   BUN mg/dL 27* 32*   CREATININE mg/dL 1 46* 1 48*   CALCIUM mg/dL 9 0 9 3   ALK PHOS U/L  --  71   ALT U/L  --  21   AST U/L  --  22     Results from last 7 days   Lab Units 07/23/22  0526 07/22/22  1307   MAGNESIUM mg/dL 2 5 2 2     Results from last 7 days   Lab Units 07/23/22  0526   PHOSPHORUS mg/dL 4 2*      Results from last 7 days   Lab Units 07/22/22  1307   INR  1 01   PTT seconds 31               IMAGING & DIAGNOSTIC TESTING     Radiology Results: I have personally reviewed pertinent films in PACS  CT head wo contrast   Final Result by Sheri Diaz DO (07/23 0523)   1  Evolving nonhemorrhagic left MCA territory infarction  2   Cerebral atrophy with chronic small vessel ischemic white matter disease and chronic right occipital parietal lobe infarctions  Workstation performed: [id number]         X-ray chest 1 view   Final Result by Baltazar Jackson MD (07/23 7112)      No acute cardiopulmonary disease  ET tube in satisfactory position                    Workstation performed: [id number]         CT cerebral perfusion   Final Result by E A Schoenberger, MD (07/22 1724)      CT perfusion performed  Data available on PACS  Workstation performed: [id number]         CT head wo contrast    (Results Pending)         ACTIVE MEDICATIONS     Current Facility-Administered Medications   Medication Dose Route Frequency    acetaminophen (TYLENOL) tablet 650 mg  650 mg Oral Q4H PRN    albuterol inhalation solution 2 5 mg  2 5 mg Nebulization Q6H PRN    aspirin chewable tablet 81 mg  81 mg Oral Daily    atorvastatin (LIPITOR) tablet 80 mg  80 mg Oral QPM    chlorhexidine (PERIDEX) 0 12 % oral rinse 15 mL  15 mL Mouth/Throat Q12H [facility]    fentanyl citrate (PF) 100 MCG/2ML 50 mcg  50 mcg Intravenous Q1H PRN    fluticasone (FLONASE) 50 mcg/act nasal spray 1 spray  1 spray Nasal Daily    insulin lispro (HumaLOG) 100 units/mL subcutaneous injection 1-5 Units  1-5 Units Subcutaneous Q6H [address]    propofol (DIPRIVAN) 1000 mg in 100 mL infusion (premix)  5-50 mcg/kg/min Intravenous Titrated    senna (SENOKOT) tablet 8 6 mg  1 tablet Oral Daily    umeclidinium bromide (INCRUSE ELLIPTA) 62 5 mcg/inh inhaler AEPB 1 puff  1 puff Inhalation Daily       Prior to Admission medications    Medication Sig Start Date End Date Taking?  Authorizing Provider   albuterol (2 5 mg/3 mL) 0 083 % nebulizer solution Take 3 mL (2 5 mg total) by nebulization every 6 (six) hours as needed for wheezing or shortness of breath 4/17/22   Marylen Panther Ferdous, DO   albuterol (PROVENTIL HFA,VENTOLIN HFA) 90 mcg/act inhaler Inhale 2 puffs every 6 (six) hours as needed for wheezing 4/17/22   Marylen Panther Ferdous, DO   apixaban (ELIQUIS) 2 5 mg Take 1 tablet (2 5 mg total) by mouth 2 (two) times a day 5/18/18   Mariela Valle MD   aspirin (ECOTRIN LOW STRENGTH) 81 mg EC tablet Take 1 tablet (81 mg total) by mouth daily 11/8/18   Radha Lamas DO   atorvastatin (LIPITOR) 40 mg tablet Take 2 tablets (80 mg total) by mouth daily with dinner 9/3/20   Carlos Paiz MD   benzonatate (TESSALON PERLES) 100 mg capsule Take 1 capsule (100 mg total) by mouth 3 (three) times a day as needed for cough 6/29/22   Haritha JIMY Lacey   fluticasone Denette Schwalbe) 50 mcg/act nasal spray 1 spray into each nostril daily 6/29/22   Haritha JIMY Lacey   furosemide (LASIX) 40 mg tablet 40 mg Every other day 4/4/22   Historical Provider, MD   iron polysaccharides (FERREX) 150 mg capsule Take 150 mg by mouth 2 (two) times a day    Historical Provider, MD   JANUVIA 50 MG tablet 50 mg daily   7/31/18   Historical Provider, MD   losartan (COZAAR) 25 mg tablet 12 5 mg In a m  7/8/20   Historical Provider, MD   metoprolol tartrate (LOPRESSOR) 50 mg tablet Take 1 tablet (50 mg total) by mouth every 12 (twelve) hours  Patient taking differently: Take 25 mg by mouth every 12 (twelve) hours 5/18/18   Murray Cristobal MD   Multiple Vitamins-Minerals (MULTIVITAMIN ADULT PO) 0 5 tablets 2 (two) times a day      Historical Provider, MD   Polysaccharide Iron-FA-B12 (FERREX 150 FORTE PO) Take by mouth    Historical Provider, MD   potassium chloride (MICRO-K) 10 MEQ CR capsule  1/28/19   Historical Provider, MD   tiotropium (Spiriva Respimat) 1 25 MCG/ACT AERS inhaler Inhale 2 puffs daily 7/6/22   Jared Irene MD     Code Status: Prior    VTE Pharmacologic Prophylaxis:  None   VTE Mechanical Prophylaxis: sequential compression device    ==  MD Jeanne Yost [facility], PGY-II

## 2022-07-23 NOTE — CONSULTS
07/23/22 1051   Recommendations/Interventions   Recommendations to Provider if extubated, diet per SLP   if remains intubated/TF per plan of care; agree with nepro at 35ml/hr which will provide 840ml, 1512kcal (23kcal/kg), 68g protein (1g/kg), 611ml free H2O from TF formula

## 2022-07-23 NOTE — CASE MANAGEMENT
Case Management Assessment & Discharge Planning Note    Patient name Todd De Jesus  Location ICU 08/ICU 08 MRN [id number]  : 1933 Date 2022       Current Admission Date: 2022  Current Admission Diagnosis:CVA (cerebral vascular accident) [facility])   Patient Active Problem List    Diagnosis Date Noted    Medicare annual wellness visit, subsequent 2022    Overlap syndrome ([facility] ) 2022    Chronic renal disease, stage IV ([facility] ) 2022    Paroxysmal A-fib ([facility] ) 2022    Thrombocytopenia ([facility] ) 2022    Cardiomyopathy, dilated ([facility] ) 2022    Coronary artery disease with angina pectoris, unspecified vessel or lesion type, unspecified whether native or transplanted heart ([facility] )     TIA (transient ischemic attack)     H/O ischemic left MCA stroke 10/29/2018    Vascular dementia ([facility] ) 2018    Mood disorder ([facility] ) 2018    Ambulatory dysfunction 2018    Bilateral carotid artery stenosis 2018    Arthritis of right glenohumeral joint 2018    Polyp of colon 2018    HCAP (healthcare-associated pneumonia) 2018    Shoulder pain, right 2018    Abnormal TSH 2018    Dysphagia 2018    Encephalopathy 06/15/2018    Seizure-like activity ([facility] ) 06/15/2018    Colonic thickening 2018    Anemia 2018    History of CVA (cerebrovascular accident) 2018    History of subarachnoid hemorrhage 2018    CKD (chronic kidney disease) stage 3, GFR 30-59 ml/min ([facility]) 2018    History of DVT (deep vein thrombosis) 2018    History of pacemaker 2018    Acute cystitis without hematuria 2018    Chronic systolic CHF (congestive heart failure) ([facility] ) 2018    Acute ischemic left MCA stroke ([facility] ) 2018    History of ischemic right MCA stroke 2018    CVA (cerebral vascular accident) ([facility] ) 2018    Controlled type 2 diabetes mellitus without complication, without long-term current use of insulin ([facility] ) 04/18/2018    Pacemaker 04/18/2018    Acid reflux 19/54/4584    Systolic congestive heart failure ([facility] ) 04/18/2018    H/O mitral valve replacement 03/01/2018    Deep vein thrombosis (DVT) of left upper extremity (HCC) 01/25/2018    Constipation 09/21/2016    External hemorrhoids 09/21/2016    Carpal tunnel syndrome, left 01/04/2016    Pain in both hands 01/04/2016    Arthralgia of hip, right 11/17/2015    Meningioma ([facility] ) 12/18/2014    Hypertension 05/13/2013    Esophageal reflux 05/13/2013    Arthritis 05/13/2013    Hypercholesterolemia 05/13/2013    Malignant neoplasm without specification of site ([facility] ) 05/13/2013    Spondylosis of cervical region without myelopathy or radiculopathy 05/13/2013    Type 2 or unspecified type diabetes mellitus 05/13/2013      LOS (days): 1  Geometric Mean LOS (GMLOS) (days):   Days to GMLOS:     OBJECTIVE:    Risk of Unplanned Readmission Score: 26 76     Current admission status: Inpatient    Preferred Pharmacy:   [address] [facility] (Now [address] [facility]) [address] [address]  Phone: [phone] Fax: [phone]    [address] [address]  Phone: [phone] Fax: [phone]    Primary Care Provider: Porfirio Sepulveda MD    Primary Insurance: MEDICARE  Secondary Insurance: [facility]    ASSESSMENT:  Danielle Adams, [facility] Representative - Daughter   Primary Phone: [phone] (Mobile)               Advance Directives  Does patient have a [address]?: Yes (Per daughter Cooper Coats all fours daughters have been appointed POA and make decision's together, Daughter Tone Gunn is listed as first due to being the eldest sister )  Does patient have Advance Directives?: Yes  Advance Directives: Power of  for health care, Power of  for finance  Primary Contact: Pradeep Perkins (Daughter) Samreen Mckeon (Daughter)    Readmission Root Cause  30 Day Readmission: No    Patient Information  Admitted from[de-identified] Home  Mental Status: Intubated  During Assessment patient was accompanied by: Daughter  Assessment information provided by[de-identified] Daughter (Daughter Ronni Zamorano)  Primary Caregiver: Family  Caregiver's Name[de-identified] all four of pt's daughters rotate shifts in order to provide 24 hours care at home for patient  Caregiver's Relationship to Patient[de-identified] Family Member (Pts daughters)  Support Systems: Children, Family members  Home entry access options  Select all that apply : Stairs  Number of steps to enter home : 2  Do the steps have railings?: Yes  Type of Current Residence: [facility] (Daughter reports a first floor set-up upon entry )  Living Arrangements: Lives Alone (alll four daughters rotate shifts and provide 24 hour care for patient)       Activities of Daily Living Prior to Admission  Functional Status: Assistance  Completes ADLs independently?: No  Level of ADL dependence: Assistance  Ambulates independently?: No  Level of ambulatory dependence: Assistance  Does patient use assisted devices?: Yes  Assisted Devices (DME) used: Straight Cane, Other (Comment) (transport chair: to and from appointments)  Does patient currently own DME?: Yes  What DME does the patient currently own?: Straight Cane, Other (Comment) (Transport chair, to and from appointments)  Does patient have a history of Outpatient Therapy (PT/OT)?: No  Does the patient have a history of Short-Term Rehab?: Yes ([facility] )  Does patient have a history of HHC?: Yes (Pt have prior [facility] for SN/PT/OT services   Daughter was unable to provide name of agency at this time )  Does patient currently have [facility]?: No    Patient Information Continued  Does patient have prescription coverage?: Yes  Does patient have a history of substance abuse?: No  Does patient have a history of Mental Health Diagnosis?: No    Means of Transportation  Means of Transport to Appts[de-identified] Family transport        DISCHARGE DETAILS:    Additional Comments: CM team will contine to follow patient and remain available for DCP needs and or concerns

## 2022-07-23 NOTE — RESPIRATORY THERAPY NOTE
RT Ventilator Management Note  Rosas Osuna 80 y o  female MRN: [id number]  Unit/Bed#: ICU 08 Encounter: [id number]      Daily Screen         7/23/2022  5336             Patient safety screen outcome[de-identified] Failed    Not Ready for Weaning due to[de-identified] Underline problem not resolved              Physical Exam:   Assessment Type: Assess only  General Appearance: Sedated  Respiratory Pattern: Assisted  Chest Assessment: Chest expansion symmetrical  Bilateral Breath Sounds: Coarse  Suction: ET Tube      Resp Comments: pt remains on current smcv settings no chnages made at this time, will cont to monitor pt

## 2022-07-23 NOTE — PROGRESS NOTES
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 secondary to Eliquis status post CT perfusion with attempted endovascular thrombectomy unsuccessful vessel reperfusion on 07/22/2022  Acute hypoxic respiratory failure  Aspiration  Atrial fibrillation/paroxysmal  Hypertension  Symptomatic bradycardia with high-grade degree AV block status post pacemaker placement -2020  Aortic valve stenosis  Severe mitral valve insufficiency status post Mitral valve replacement Number [facility] [place] -2017 Biocor-bioprosthetic  CHF with cardiomyopathy EF 60%  History DVT left upper extremity 2017  History of stroke with dysphagia  Vascular dementia  Meningioma  History of clinical seizure events  Hyperlipidemia  Diabetes mellitus type 2 HbA1c 4 2  Chronic kidney disease stage IIIA- at baseline Cr  GERD  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  Chronic anemia  Moderate protein calorie malnutrition     Exam:  Patient is frail appearing, endotracheal tube in place  Moves lue andirle spont, w/d rue/ rle, does not open eyes, pupils equal, ETT in place    Goal systolic blood pressure  161-731    IO+54ml  UO 1 09L    Ventilatory support:  Assist control rate of 12, tidal volume 350, peep 6, FiO2 40%    Sedation:  Propofol 20 micrograms/kilogram per minute    Stroke interventions:  Atorvastatin 80 mg OG  Q day    Glycemic control:  Insulin sliding scale    Patient appeared to be agitated and biting on her endotracheal tube  Propofol was initiated last night  Patient was also hypotensive required initiation of norepinephrine for hemodynamic support  Presently she was off norepinephrine  Continue with neurologic checks Q 2 hour    Repeat imaging at 24 hours status post endovascular intervention with evolving stroke on CT scan this morning  MRI unable to obtain secondary to pacemaker  Plan to initiate anti-platelet therapy with aspirin and restart anticoagulation pending exam in stroke burden  Statin medication ordered  Continue with stroke protocol with PT/OT and speech evaluation  HbA1C, lipid panel and echo ordered  Neurology consult  Plan to to reimage if patient has decline in neurologic exam or < GCS by 2 points or more  CT brain completed this morning on 07/23/2022 with evolving left MCA territory infarct and cerebral atrophy with chronic small-vessel white matter disease with chronic right occipital parietal infarctions  In reviewing patient's laboratory data as well as urinalysis from overnight  Specific gravity greater than 1 050 with a normal blood glucose  Likely patient is intravascularly volume depleted  She does have a reported history of difficulty with swallowing which may be consistent with intravascular volume depletion  Patient's nutritional status with moderate protein calorie malnutrition  Attempt spontaneous breathing trial this morning  Prior to extubation plan to confirm with patient's family goals of care and re-intubation status      Update:    Propofol held    Attempt SBT    Start TF if not extubated

## 2022-07-23 NOTE — ASSESSMENT & PLAN NOTE
PPD1 unsuccessful left MCA thrombectomy, TICI 0 (Tony, 7/22/22)  · P/w left MCA syndrome, h/o right sided CVA  · NIHSS 24  · Not tPA candidate 2/2 Eliquis    Imaging:  · CT head w/o, 7/23/22:  Evolving nonhemorrhagic left MCA territory infarction  Plan:  · CT head reviewed, showing evolution of left MCA infarct  · No evidence of hemorrhagic conversion  · Ok to start ASA/AC per neurology recommendations  · Frequent neuro checks  · -200  · Neurology follow for stroke management  · Unable to get MRI brain 2/2 PPM  · ECHO pending  · Medical management per primary team  · Wean to extubate when medically indicated  · PT/OT/PMR/SLP evaluations  · DVT ppx: SCDs, ok for pharm ppx    Neurosurgery will sign off at this time  Follow up with neurology for stroke management  Please call with questions or concerns

## 2022-07-23 NOTE — RESPIRATORY THERAPY NOTE
RT Protocol Note  Bienvenido Celis 80 y o  female MRN: [id number]  Unit/Bed#: ICU 08 Encounter: [id number]    Assessment    Active Problems:    Deep vein thrombosis (DVT) of left upper extremity (HCC)    H/O mitral valve replacement    CVA (cerebral vascular accident) ([place] [address] )    Controlled type 2 diabetes mellitus without complication, without long-term current use of insulin ([place] [address] )    Hypertension    Systolic congestive heart failure ([place] [address] )    History of CVA (cerebrovascular accident)    CKD (chronic kidney disease) stage 3, GFR 30-59 ml/min (HCC)    Dysphagia    Bilateral carotid artery stenosis    Vascular dementia (HCC)    Ambulatory dysfunction    Meningioma (HCC)    Paroxysmal A-fib (HCC)    Thrombocytopenia (HCC)    Coronary artery disease with angina pectoris, unspecified vessel or lesion type, unspecified whether native or transplanted heart [facility])      Home Pulmonary Medications:  albuterol       Past Medical History:   Diagnosis Date    Acid reflux     on occ    Arthritis     DJD right hip replaced    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    x 2 lesions with no change    Cancer ([place] [address] )     colonic polyps, no surgery done    Deep vein thrombosis (DVT) of left upper extremity ([place] [address] ) 2017    Dementia ([place] [address] )     Diabetes mellitus ([place] [address] )     type 2    Diverticulosis     Edema     in legs on occ    Hypertension     on occ    Language barrier     speaks Malay & broken english    Stroke [facility])      Social History     Socioeconomic History    Marital status:       Spouse name: Not on file    Number of children: Not on file    Years of education: Not on file    Highest education level: Not on file   Occupational History    Not on file   Tobacco Use    Smoking status: Former Smoker     Packs/day: 0 25     Years: 10 00     Pack years: 2 50     Types: Cigarettes     Quit date:      Years since quittin 6    Smokeless tobacco: Never Used   Vaping Use    Vaping Use: Never used   Substance and Sexual Activity Alcohol use: No    Drug use: No    Sexual activity: Not Currently   Other Topics Concern    Not on file   Social History Narrative    Not on file     Social Determinants of Health     Financial Resource Strain: Not on file   Food Insecurity: Not on file   Transportation Needs: Not on file   Physical Activity: Not on file   Stress: Not on file   Social Connections: Not on file   Intimate Partner Violence: Not on file   Housing Stability: Not on file       Subjective         Objective    Physical Exam:   Assessment Type: Assess only  General Appearance: Sedated  Respiratory Pattern: Assisted  Chest Assessment: Chest expansion symmetrical  Bilateral Breath Sounds: Coarse, Rhonchi  Suction: ET Tube  O2 Device: transported pt from IR, placed pt on zoll transport vent, transported pt to ICU, put pt on C3 vent without any incidents ett secured at 21 with tube charlton  Vitals:  Blood pressure 138/76, pulse 76, temperature (!) 97 16 °F (36 2 °C), resp  rate 16, SpO2 100 %, not currently breastfeeding  Imaging and other studies: I have personally reviewed pertinent reports  O2 Device: transported pt from IR, placed pt on zoll transport vent, transported pt to ICU, put pt on C3 vent without any incidents ett secured at 21 with tube charlton       Plan    Respiratory Plan: (P) Home Bronchodilator Patient pathway, Vent/NIV/HFNC        Resp Comments: pt uses albuterol udn prn at home will continue with  home meds @ this time

## 2022-07-23 NOTE — PROGRESS NOTES
[address] [facility]  Progress Note - Delos Bal 1933, 80 y o  female MRN: [id number]  Unit/Bed#: ICU 08 Encounter: [id number]  Primary Care Provider: Dheeraj Crowell MD   Date and time admitted to hospital: 7/22/2022  5:21 PM    Acute ischemic left MCA stroke [facility])  Assessment & Plan  PPD1 unsuccessful left MCA thrombectomy, TICI 0 (Nahum Morrell, 7/22/22)  · P/w left MCA syndrome, h/o right sided CVA  · NIHSS 24  · Not tPA candidate 2/2 Eliquis    Imaging:  · CT head w/o, 7/23/22:  Evolving nonhemorrhagic left MCA territory infarction  Plan:  · CT head reviewed, showing evolution of left MCA infarct  · No evidence of hemorrhagic conversion  · Ok to start ASA/AC per neurology recommendations  · Frequent neuro checks  · -200  · Neurology follow for stroke management  · Unable to get MRI brain 2/2 PPM  · ECHO pending  · Medical management per primary team  · Wean to extubate when medically indicated  · PT/OT/PMR/SLP evaluations  · DVT ppx: SCDs, ok for pharm ppx    Neurosurgery will sign off at this time  Follow up with neurology for stroke management  Please call with questions or concerns  Subjective/Objective     Subjective: Patient agitated and hypotensive overnight  Propofol and norepinephrine started  Otherwise, patient stable  Remains intubated with plans to trial extubation later today  Patient remains GCS 6T  Objective: Patient intubated, sedated  Moves left side spontaneously  Does not follow commands  Invasive Devices  Report    Peripheral Intravenous Line  Duration           Peripheral IV 07/22/22 Left;Proximal;Ventral (anterior) Forearm 1 day    Peripheral IV 07/22/22 Left Wrist <1 day          Drain  Duration           NG/OG/Enteral Tube Orogastric 18 Fr Center mouth <1 day    Urethral Catheter Non-latex 16 Fr  <1 day          Airway  Duration           ETT  Cuffed; Hi-Lo; Oral 7 mm <1 day                Vitals: Blood pressure 111/53, pulse 78, temperature 98 6 °F (37 °C), temperature source Bladder, resp  rate 12, SpO2 100 %, not currently breastfeeding  ,There is no height or weight on file to calculate BMI  General appearance: intubated, ill appearing  Head: Normocephalic, without obvious abnormality, atraumatic  Eyes: PERRL  Grimaced to eye opening, difficulty to evaluate for gaze deviation  Lungs: intubated, sedated  Heart: regular heart rate  Neurologic:   Mental status: GCS 6T (E1, V1t, M4)  Cranial nerves: grossly intact (Cranial nerves II-XII)  Motor: spontaneously moves LUE/LLE  Right hemiplegia  Right groin site CDI, no hematoma  Dressing removed  2+ bilateral pedal pulses    Lab Results: I have personally reviewed pertinent results  Results from last 7 days   Lab Units 07/23/22  0526 07/22/22  1307   WBC Thousand/uL 7 48 4 34   HEMOGLOBIN g/dL 9 5* 11 9   HEMATOCRIT % 29 6* 37 3   PLATELETS Thousands/uL 110* 125*   NEUTROS PCT %  --  51   MONOS PCT %  --  10     Results from last 7 days   Lab Units 07/23/22  0526 07/22/22  1307   POTASSIUM mmol/L 4 3 4 4   CHLORIDE mmol/L 109* 103   CO2 mmol/L 25 29   BUN mg/dL 27* 32*   CREATININE mg/dL 1 46* 1 48*   CALCIUM mg/dL 9 0 9 3   ALK PHOS U/L  --  71   ALT U/L  --  21   AST U/L  --  22     Results from last 7 days   Lab Units 07/23/22  0526 07/22/22  1307   MAGNESIUM mg/dL 2 5 2 2     Results from last 7 days   Lab Units 07/23/22  0526   PHOSPHORUS mg/dL 4 2*     Results from last 7 days   Lab Units 07/22/22  1307   INR  1 01   PTT seconds 31     No results found for: TROPONINT  ABG:  Lab Results   Component Value Date    PHART 7 363 07/22/2022    FDX5FKU 40 6 07/22/2022    PO2ART 193 6 (H) 07/22/2022    AUA1VJZ 22 6 07/22/2022    BEART -2 6 07/22/2022    SOURCE Radial, Right 07/22/2022       Imaging Studies: I have personally reviewed pertinent reports     and I have personally reviewed pertinent films in PACS     XR chest 1 view portable    Result Date: 7/22/2022  Narrative: CHEST INDICATION:   acute cva  COMPARISON:  Chest x-ray from 7/5/2022  EXAM PERFORMED/VIEWS:  XR CHEST PORTABLE FINDINGS:  Unchanged pacemaker  Median sternotomy wires again noted  Cardiomediastinal silhouette appears unremarkable  The lungs are clear  No pneumothorax or pleural effusion  Osseous structures appear within normal limits for patient age  Impression: No acute cardiopulmonary disease  Workstation performed: [id number]     XR chest pa & lateral    Result Date: 7/5/2022  Narrative: CHEST INDICATION:   R05 9: Cough, unspecified Z86 79: Personal history of other diseases of the circulatory system R06 2: Wheezing  COMPARISON:  4/17/2022 EXAM PERFORMED/VIEWS:  XR CHEST PA & LATERAL FINDINGS:  Patient is status post median sternotomy  Transvenous pacemaker noted as on prior  Cardiac silhouette size unchanged from prior The lungs are clear  No pneumothorax or pleural effusion  Osseous structures appear within normal limits for patient age  Impression: No acute pulmonary disease Workstation performed: [id number]     XR knee 3 vw left non injury    Result Date: 7/5/2022  Narrative: LEFT KNEE INDICATION:   V66 120: Presence of left artificial knee joint M25 562: Pain in left knee  COMPARISON:  11/19/2004 VIEWS:  XR KNEE 3 VW LEFT NON INJURY FINDINGS: There is no acute fracture or dislocation  There is no joint effusion  There is a left total knee arthroplasty  The components appear in appropriate position No lytic or blastic osseous lesion  There are atherosclerotic calcifications  Soft tissues are otherwise unremarkable  Impression: No acute osseous abnormality  Workstation performed: [id number]     CT head wo contrast    Result Date: 7/23/2022  Narrative: CT BRAIN - WITHOUT CONTRAST INDICATION:   Stroke, follow up Stroke  History of left MCA occlusion  Status post attempted mechanical thrombectomy, post procedure day #1  Follow-up   COMPARISON:  Noncontrast CT brain July 22, 2022, CTA stroke alert July 22, 2022 and CT perfusion study July 22, 2022  TECHNIQUE:  CT examination of the brain was performed  In addition to axial images, sagittal and coronal 2D reformatted images were created and submitted for interpretation  Radiation dose length product (DLP) for this visit:  900 21 mGy-cm   This examination, like all CT scans performed in the [facility], was performed utilizing techniques to minimize radiation dose exposure, including the use of iterative  reconstruction and automated exposure control  IMAGE QUALITY:  Diagnostic  FINDINGS: PARENCHYMA:  No acute intraparenchymal hemorrhage or extra-axial fluid collections  Developing decreased attenuation along the left MCA territory distribution, consistent with evolving left MCA territory infarction  Increased density in the left middle cerebral artery, consistent with known thrombosis  Encephalomalacia and gliosis in the right occipital and parietal lobes, consistent with prior infarction  Decreased attenuation in the periventricular regions and centrum semiovale bilaterally, consistent with chronic small vessel ischemic white matter disease  Bilateral internal carotid artery and vertebral artery calcifications  VENTRICLES AND EXTRA-AXIAL SPACES:  Enlargement of ventricles and extra-axial CSF spaces, out of proportion to the patient's age most consistent with cerebral and cerebellar atrophy  VISUALIZED ORBITS AND PARANASAL SINUSES:  Unremarkable  CALVARIUM AND EXTRACRANIAL SOFT TISSUES:  Normal      Impression: 1  Evolving nonhemorrhagic left MCA territory infarction  2   Cerebral atrophy with chronic small vessel ischemic white matter disease and chronic right occipital parietal lobe infarctions  Workstation performed: [id number]     CT head w wo contrast    Result Date: 7/12/2022  Narrative: CT BRAIN - WITH AND WITHOUT CONTRAST INDICATION:   D32 9: Benign neoplasm of meninges, unspecified  Follow-up evaluation   COMPARISON: 11/4/2018 TECHNIQUE:  CT examination of the brain was performed both prior to and after the administration of intravenous contrast   In addition to axial images, sagittal and coronal 2D reformatted images were created and submitted for interpretation  Radiation dose length product (DLP) for this visit:  1826 mGy-cm   This examination, like all CT scans performed in the [facility], was performed utilizing techniques to minimize radiation dose exposure, including the use of iterative reconstruction and automated exposure control  IV Contrast:  65 mL of iohexol (OMNIPAQUE)  IMAGE QUALITY:  Diagnostic  FINDINGS: PARENCHYMA:  There is a partially calcified dural based extra-axial enhancing 0 9 cm mass adjacent to the posterior inferior aspect of the left occipital lobe, seen on series 701, image 55 unchanged from prior studies indicative of a meningioma with partial popcorn calcifications  Associated adjacent hyperostosis is noted, also stable on series 2, image 15  No significant mass effect or edema  Probable 2nd subtle faintly enhancing smaller 0 6 cm dural based extra-axial mass adjacent to the left frontal lobe/frontoparietal junction seen on series 701, image 58, correlating to finding on series 700, image 60 as well as series 5 image 36, potentially a 2nd smaller meningioma or perhaps focally prominent cortical vein, not well seen on the prior studies  , Moderate to large region of cystic encephalomalacia and gliosis in the right MCA territory posteriorly centered in the parietal lobe but extending into adjacent portions of the superior aspect of the temporal lobe and posterior inferior aspects of the frontal lobe, stable indicative of old infarction  Elsewhere there are patchy periventricular hypodensities  Chronic left thalamic lacunar infarction redemonstrated  Atherosclerotic calcifications of the cavernous segment of the internal carotid artery are moderate   VENTRICLES AND EXTRA-AXIAL SPACES:  Ex vacuo dilatation of the right lateral ventricle noted  Ventricular prominence likely on the basis of atrophy also stable  Cavum septa pellucidum and vergae incidentally noted, a developmental variant  VISUALIZED ORBITS AND PARANASAL SINUSES:  Unremarkable  CALVARIUM:  Normal      Impression: 1  Stable partially calcified left occipital region meningioma measuring approximately 0 9 cm  2   Questionable tiny 2nd 0 6 cm meningioma versus focal prominence of a cortical vein/vessel adjacent to the left frontoparietal junction  Neither has significant mass effect  3   Moderate, chronic microangiopathy and moderate-sized chronic right MCA infarction as well as chronic left thalamic lacunar infarction are stable  Workstation performed: [id number]     X-ray chest 1 view    Result Date: 7/23/2022  Narrative: CHEST INDICATION:   Endotracheal tube positioning  COMPARISON:  Earlier the same day EXAM PERFORMED/VIEWS:  XR CHEST 1 VIEW Images: 3 FINDINGS: Cardiomediastinal silhouette appears unremarkable  A median sternotomy has been performed  Endotracheal tube terminating 2 4 cm above the olga lidia  Left-sided permanent pacemaker  Enteric catheter extending into the stomach  The lungs are clear  No pneumothorax or pleural effusion  Osseous structures appear within normal limits for patient age  Impression: No acute cardiopulmonary disease  ET tube in satisfactory position  Workstation performed: [id number]     CT stroke alert brain    Result Date: 7/22/2022  Narrative: CT BRAIN - STROKE ALERT PROTOCOL INDICATION:   Neuro deficit, acute, stroke suspected ms changes  COMPARISON:  CT dated 7/11/2022 and MRI dated 12/4/2018  TECHNIQUE:  CT examination of the brain was performed  In addition to axial images, coronal reformatted images were created and submitted for interpretation  Radiation dose length product (DLP) for this visit:  868 81 mGy-cm     This examination, like all CT scans performed in the [facility]  [address] [facility], was performed utilizing techniques to minimize radiation dose exposure, including the use of iterative  reconstruction and automated exposure control  IMAGE QUALITY:  Diagnostic  FINDINGS:  PARENCHYMA: Stable chronic infarct in the posterior right MCA distribution with encephalomalacia and gliosis centered in the right parietal lobe extending to the posterior frontal and temporal lobes  Stable focal encephalomalacia in the left insula  Stable chronic lacunar infarcts in the left caudate and thalamus  Stable partially calcified 1 cm left occipital meningioma  Additional questionable smaller meningioma at the left frontal parietal vertex is better appreciated on prior contrast-enhanced study  Stable diminished attenuation in the periventricular and subcortical white matter due to chronic microangiopathy  VENTRICLES AND EXTRA-AXIAL SPACES:  Atrophy  Ex vacuo dilatation of the atria and temporal horn of the right lateral ventricle  No hydrocephalus  VISUALIZED ORBITS AND PARANASAL SINUSES:  Unremarkable  CALVARIUM AND EXTRACRANIAL SOFT TISSUES:   Normal      Impression: Stable chronic ischemic changes  No acute infarct or hemorrhage  I personally discussed this study with Lilly Queen on 7/22/2022 at 1:33 PM  Workstation performed: [id number]     CT cerebral perfusion    Result Date: 7/22/2022  Narrative: CT PERFUSION INDICATION:  I93 334: Cerebral infarction due to thrombosis of left middle cerebral artery  COMPARISON:  CT/CTA performed earlier today  TECHNIQUE: CT perfusion study was performed  A total of 8 cm of brain tissue was evaluated in two different volumetric acquisitions  iSchemaView RAPID software was utilized to calculate cerebral blood flow, cerebral blood volume, mean transit time, and  Tmax  Radiation dose length product (DLP) for this visit:  2810 28 mGy-cm     This examination, like all CT scans performed in the [facility], was performed utilizing techniques to minimize radiation dose exposure, including the use of iterative reconstruction and automated exposure control  IV Contrast:  80 mL of iodixanol (VISIPAQUE)  IMAGE QUALITY: Limited due to motion  FINDINGS: Hemisphere affected: Left Total CBF<30% volume:  8 mL Total Tmax>6 0s volume:  211 mL Total Mismatch difference:  203 mL Total Mismatch ratio:  26 4 Hypoperfusion Index (Tmax>10s/Tmax  6s): [HIR] Additional comments: There is a large region of elevated Tmax in the left MCA distribution without matching diminished CBF in the left hemisphere  Region of diminished CBF in the right hemisphere with matching increased Tmax correlates with old right MCA infarct on CT  Additional smaller regions of elevated Tmax in the right hemisphere possibly due to misregistration    Thus, total Tmax volume is artifactually elevated  Impression: CT perfusion performed  Data available on PACS  Workstation performed: [id number]     CTA stroke alert (head/neck)    Result Date: 7/22/2022  Narrative: CTA NECK AND BRAIN WITH CONTRAST INDICATION: Neuro deficit, acute, stroke suspected ms changes COMPARISON:   CTA dated 11/4/2018  TECHNIQUE:   Post contrast imaging was performed after administration of iodinated contrast through the neck and brain  Post contrast axial 0 625 mm images timed to opacify the arterial system  3D rendering was performed on an independent workstation  MIP reconstructions performed  Coronal reconstructions were performed of the noncontrast portion of the brain  Radiation dose length product (DLP) for this visit:  400 mGy-cm   This examination, like all CT scans performed in the [facility], was performed utilizing techniques to minimize radiation dose exposure, including the use of iterative reconstruction and automated exposure control     IV Contrast:  65 mL of iohexol (OMNIPAQUE)  IMAGE QUALITY:   Diagnostic FINDINGS: CERVICAL VASCULATURE AORTIC ARCH AND GREAT VESSELS:  Mild atherosclerotic disease of the arch, proximal great vessels and visualized subclavian vessels  No significant stenosis  RIGHT VERTEBRAL ARTERY CERVICAL SEGMENT:  Normal origin  The vessel is normal in caliber throughout the neck  LEFT VERTEBRAL ARTERY CERVICAL SEGMENT:  Normal origin, directly from the aorta  The vessel is normal in caliber throughout the neck  RIGHT EXTRACRANIAL CAROTID SEGMENT:  Moderate atherosclerotic disease of the bifurcation with approximately 50% stenosis at the origin of the internal carotid artery  No significant change  LEFT EXTRACRANIAL CAROTID SEGMENT:  Mild atherosclerotic disease of the distal common carotid artery and proximal cervical internal carotid artery without significant stenosis compared to the more distal ICA  NASCET criteria was used to determine the degree of internal carotid artery diameter stenosis  INTRACRANIAL VASCULATURE INTERNAL CAROTID ARTERIES:  Calcified plaque in the cavernous and supraclinoid internal carotid arteries without significant stenosis     Normal ophthalmic artery origins  Normal ICA terminus  ANTERIOR CIRCULATION:  Symmetric A1 segments and anterior cerebral arteries with normal enhancement  Normal anterior communicating artery  MIDDLE CEREBRAL ARTERY CIRCULATION:  New proximal left M1 occlusion with reconstitution of M2 branches via collateral flow  Right MCA is patent  DISTAL VERTEBRAL ARTERIES:  Normal distal vertebral arteries  Posterior inferior cerebellar artery origins are normal  Normal vertebral basilar junction  BASILAR ARTERY:  Basilar artery is normal in caliber  Normal superior cerebellar arteries  POSTERIOR CEREBRAL ARTERIES: Both posterior cerebral arteries arise from the internal carotid arteries consistent with a fetal origin  Moderate stenosis in the left P2 segment  Normal posterior communicating arteries  VENOUS STRUCTURES:  Normal  NON VASCULAR ANATOMY BONY STRUCTURES:  No acute osseous abnormality   SOFT TISSUES OF THE NECK: Normal  THORACIC INLET:  Unremarkable  Impression: Proximal left M1 occlusion  Moderate stenosis proximal right internal carotid artery  Moderate stenosis left PCA  I personally discussed this study with Sophia Ang on 7/22/2022 at 1:33 PM  Workstation performed: [id number]     Echo complete w/ contrast if indicated    Result Date: 7/14/2022  Narrative: Harry Franklin  Left Ventricle: Left ventricular cavity size is normal  Wall thickness is mildly increased  The left ventricular ejection fraction is 45%  Systolic function is mildly reduced  There is mild global hypokinesis    Left Atrium: The atrium is moderately dilated    Right Atrium: The atrium is mildly dilated    Mitral Valve: There is moderate annular calcification  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  The prosthetic valve appears to be well-seated  Valve leaflet motion is normal  There is no evidence of paravalvular regurgitation  There is trace transvalvular regurgitation  The gradient recorded across the prosthetic mitral valve is within the expected range  Peak E value of 1 7 m/s, VTI 41cm, and mean gradient of 6mmHg at HR 70bpm    Tricuspid Valve: There is moderate regurgitation  Prior TTE study available for comparison  No significant changes noted compared to the prior study- EF remains mildly hypokinetic near 45%  Mitral valve prothesis without major change in gradient which remain within normal range  Pulmonary pressures remain upper normal      EKG, Pathology, and Other Studies: I have personally reviewed pertinent reports        VTE Pharmacologic Prophylaxis: Reason for no pharmacologic prophylaxis acute CVA    VTE Mechanical Prophylaxis: sequential compression device

## 2022-07-23 NOTE — RESPIRATORY THERAPY NOTE
RT Ventilator Management Note  Hamilton Wilson 80 y o  female MRN: [id number]  Unit/Bed#: ICU 08 Encounter: [id number]      Daily Screen    No data found in the last 10 encounters  Physical Exam:   Assessment Type: Assess only  General Appearance: Sedated  Respiratory Pattern: Assisted  Chest Assessment: Chest expansion symmetrical  Bilateral Breath Sounds: Coarse, Rhonchi  Suction: ET Tube  O2 Device: transported pt from IR, placed pt on zoll transport vent, transported pt to ICU, put pt on C3 vent without any incidents ett secured at 21 with tube [place]        Resp Comments: pt uses albuterol udn prn at home will continue with  home meds @ this time

## 2022-07-23 NOTE — RESPIRATORY THERAPY NOTE
RT Ventilator Management Note  Josh Phillips 80 y o  female MRN: [id number]  Unit/Bed#: ICU 08 Encounter: [id number]      Daily Screen         7/23/2022  0748 7/23/2022  1406          Patient safety screen outcome[de-identified] Failed Passed      Not Ready for Weaning due to[de-identified] Underline problem not resolved --                Physical Exam:   Assessment Type: Assess only  General Appearance: Sleeping  Respiratory Pattern: Assisted  Chest Assessment: Chest expansion symmetrical  Bilateral Breath Sounds: Coarse  Suction: ET Tube  O2 Device: NC      Resp Comments: pt extubated w/o incident, no stidor present at this time, no verbal response, pt placed on 4 lpm nc

## 2022-07-23 NOTE — PROGRESS NOTES
H&P Exam - [address] 80 y o  female MRN: [id number]  Unit/Bed#: ICU 08 Encounter: [id number]      -------------------------------------------------------------------------------------------------------------  Chief Complaint:  Stroke  Summary  SHX and PE limited by: Intubation  Kenia Razo is a 80 y o  female with PMHx of CHF, CKD stage 4, arthritis, anemia, ambulatory dysfunction, CAD, previous left MCA stroke, atrial fibrillation and history of DVT maintained on Eliquis, history of dysphasia, status post mitral valve replacement, hyperlipidemia, hypertension, known meningioma, vascular dementia, and diabetes  Presented on 7/23 for worsening aphasia and left gaze preference,  NIH 27  CTA revealved L  M1 occlusion, not candidate for Tpa due to Eliquis, CTP showed pernumbra, unfortunately thrombectomy was unsuccessful  Pt is currently intubated and requiring ICU level of care  Subjective/OVN:  Agitated and chewing on tube, propofol started  She was also hypotensive required touch of levo (up to 5) but now weaned off  Moving all four extremities  -------------------------------------------------------------------------------------------------------------  Assessment and Plan:    Neuro:  Acute stroke, history of left MCA stroke, vascular dementia, known meningioma    7/22 CTA:  left M1 occlusion Moderate stenosis proximal right internal carotid artery and moderate stenosis left PCA  NIH 27 on presentation   Thrombectomy unsuccessful ,TICI 0  Patient is unable to obtain a MRI due to pacemaker  Continue Neuro checks - Every 1 hour x 4 hours,  Permissive HTN for the first 24 hours up to   Maintain glucose <180  7/23: Evolving non hemorrhagic L,MCA region  Lipid panel and A1C wnl   Echocardiogram: pending   Atorvastatin 40 mg q d    Continue telemetry    Sleep/wake cycle regulation   CAM-ICU BID   Weaning  Propofol, consider Precedex for agitation    RASS goal 0   Hold AC and AP - Will need argatroban (No fondaparinux due to CKD 4)    Neurology consulted, appreciate recommendations   No acute intervention required for known meningioma    CV:  Hypertension, hyperlipidemia, heart failure   Hold PTA antihypertensives   Patient was slightly hypotensive overnight required Levo (up to 5)   o Currently off pressors   o Continue with permissive HTN goal SBP < 180    Echo pending   o PT-BNP ordered     Pulm:   Continue PTA inhalers (Incruse ellipta)    Maintain SpO2 >88%    Suction as needed and closely monitor secretions  Maintain HOB >30 degrees  Q4h oral care with chlorhexidine BID   Monitor peak and plateau airway pressures  Maintain Ppeak < 45cm H2O, Pplat < 30cm H2O      GI:    No acute issues   Stress ulcer prophylaxis:  Protonix   Bowel regimen:  Senokot    :    Sears placed   I/O monitoring   Continue to follow renal function tests    F/E/N:    Replete electrolytes with as needed to maintain K >4 0, Mag >2 0, Phos >3 0   Nutrition:  NPO >> Consider Tube feed today (Nepro - goal 35 cc/hr) if patient is still intubated    Consider starting MIVF     Heme/Onc:    No acute issues   Transfuse for hemoglobin <7 0   VTE prophylaxis: SCD's to BLE  o Consider Argatroban when AC is appropriate      Endo/Rheum:  Diabetes   Hold PTA meds (Januvia)    Continue SSI     ID:    No acute issues   Continue to monitor fever and WBC curve    MSK/Skin:    Reposition q2h, eliminate pressure points while in bed   Close skin surveillance         Disposition: Admit to Critical Care   Code Status: Prior  --------------------------------------------------------------------------------------------------------------  Intu  Review of systems was unable to be performed secondary to Intubation    Physical Exam  Nursing note reviewed  Constitutional:       General: She is not in acute distress  Appearance: She is well-developed  She is obese  She is ill-appearing   She is not diaphoretic  HENT:      Head: Normocephalic and atraumatic  Nose: Nose normal       Mouth/Throat:      Comments: ETT in place  Eyes:      General: No scleral icterus  Right eye: No discharge  Left eye: No discharge  Conjunctiva/sclera: Conjunctivae normal       Pupils: Pupils are equal, round, and reactive to light  Cardiovascular:      Rate and Rhythm: Normal rate and regular rhythm  Heart sounds: Normal heart sounds  No murmur heard  No friction rub  No gallop  Pulmonary:      Effort: Pulmonary effort is normal  No respiratory distress  Breath sounds: Normal breath sounds  No rhonchi  Comments: Ventilated    Chest:      Chest wall: No tenderness  Abdominal:      General: Bowel sounds are normal  There is no distension  Palpations: Abdomen is soft  Tenderness: There is no abdominal tenderness  Musculoskeletal:         General: No swelling  Cervical back: Neck supple  No rigidity  Skin:     General: Skin is warm and dry  Capillary Refill: Capillary refill takes less than 2 seconds     Neurological:      Comments: Deeply sedated, no response to command  Corneal reflex intact, Withdrawal all 4 extremities       --------------------------------------------------------------------------------------------------------------  Historical Information   Past Medical History:   Diagnosis Date    Acid reflux     on occ    Arthritis     DJD right hip replaced    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 lesions with no change    Cancer ([place] [address] ) 2007    colonic polyps, no surgery done    Deep vein thrombosis (DVT) of left upper extremity ([facility] ) 12/11/2017    Dementia ([facility] )     Diabetes mellitus ([facility] )     type 2    Diverticulosis     Edema     in legs on occ    Hypertension     on occ    Language barrier     speaks Korean & broken english    Stroke [facility])      Past Surgical History:   Procedure Laterality Date    COLON SURGERY      COLONOSCOPY      COLONOSCOPY N/A 2016    Procedure: COLONOSCOPY;  Surgeon: Beau Boland MD;  Location: [place] [address] GI LAB; Service:     ESOPHAGOGASTRODUODENOSCOPY N/A 2016    Procedure: ESOPHAGOGASTRODUODENOSCOPY (EGD); Surgeon: Beau Boland MD;  Location: [facility]; Service:    Doyal Joce / Julious Soto / Rayma Deonna      JOINT REPLACEMENT Right 2015    hip    JOINT REPLACEMENT Left     knee    JOINT REPLACEMENT Right     knee    MITRAL VALVE REPLACEMENT      AL COLONOSCOPY FLX DX W/COLLJ SPEC WHEN PFRMD N/A 2018    Procedure: EGD AND COLONOSCOPY;  Surgeon: Beau Boland MD;  [facility];   Service: Gastroenterology    AL REVISE MEDIAN N/CARPAL TUNNEL SURG Left 2016    Procedure: RELEASE CARPAL TUNNEL;  Surgeon: Calos Sanchez MD;  Location: [facility] MAIN OR;  Service: Orthopedics    TUBAL LIGATION      VARICOSE VEIN SURGERY Bilateral      Social History   Social History     Substance and Sexual Activity   Alcohol Use No     Social History     Substance and Sexual Activity   Drug Use No     Social History     Tobacco Use   Smoking Status Former Smoker    Packs/day: 0 25    Years: 10 00    Pack years: 2 50    Types: Cigarettes    Quit date: 46    Years since quittin 6   Smokeless Tobacco Never Used     Exercise History:  Unable to obtain due to intubation  Family History:   Family History   Problem Relation Age of Onset    Cancer Mother         throat     I have reviewed this patient's family history and commented on sigificant items within the HPI    Vitals:   Vitals:    22 0300 22 0400 22 0500 22 0600   BP: 114/58 112/56 (!) 87/46 132/63   Pulse: 82 78 76 76   Resp: 16 13 12 12   Temp: 98 24 °F (36 8 °C) 98 24 °F (36 8 °C) 98 24 °F (36 8 °C) 98 24 °F (36 8 °C)   TempSrc: Bladder Bladder Bladder Bladder   SpO2: 100% 100% 100% 100%     Temp  Min: 96 4 °F (35 8 °C)  Max: 98 6 °F (37 °C)        There is no height or weight on file to calculate BMI   N/A    Medications:  Current Facility-Administered Medications   Medication Dose Route Frequency    acetaminophen (TYLENOL) tablet 650 mg  650 mg Oral Q4H PRN    albuterol inhalation solution 2 5 mg  2 5 mg Nebulization Q6H PRN    atorvastatin (LIPITOR) tablet 80 mg  80 mg Oral QPM    chlorhexidine (PERIDEX) 0 12 % oral rinse 15 mL  15 mL Mouth/Throat Q12H PARUL    fentanyl citrate (PF) 100 MCG/2ML 50 mcg  50 mcg Intravenous Q1H PRN    fluticasone (FLONASE) 50 mcg/act nasal spray 1 spray  1 spray Nasal Daily    insulin lispro (HumaLOG) 100 units/mL subcutaneous injection 1-5 Units  1-5 Units Subcutaneous Q6H [address]    norepinephrine (LEVOPHED) 4 mg (STANDARD CONCENTRATION) IV in sodium chloride 0 9% 250 mL  1-30 mcg/min Intravenous Titrated    propofol (DIPRIVAN) 1000 mg in 100 mL infusion (premix)  5-50 mcg/kg/min Intravenous Titrated    senna (SENOKOT) tablet 8 6 mg  1 tablet Oral Daily    umeclidinium bromide (INCRUSE ELLIPTA) 62 5 mcg/inh inhaler AEPB 1 puff  1 puff Inhalation Daily     Home medications:  Prior to Admission Medications   Prescriptions Last Dose Informant Patient Reported? Taking?    JANUVIA 50 MG tablet  Child Yes No   Si mg daily     Multiple Vitamins-Minerals (MULTIVITAMIN ADULT PO)  Child Yes No   Si 5 tablets 2 (two) times a day     Polysaccharide Iron-FA-B12 (FERREX 150 FORTE PO)   Yes No   Sig: Take by mouth   albuterol (2 5 mg/3 mL) 0 083 % nebulizer solution   No No   Sig: Take 3 mL (2 5 mg total) by nebulization every 6 (six) hours as needed for wheezing or shortness of breath   albuterol (PROVENTIL HFA,VENTOLIN HFA) 90 mcg/act inhaler   No No   Sig: Inhale 2 puffs every 6 (six) hours as needed for wheezing   apixaban (ELIQUIS) 2 5 mg  Child No No   Sig: Take 1 tablet (2 5 mg total) by mouth 2 (two) times a day   aspirin (ECOTRIN LOW STRENGTH) 81 mg EC tablet  Child No No   Sig: Take 1 tablet (81 mg total) by mouth daily   atorvastatin (LIPITOR) 40 mg tablet  Child No No   Sig: Take 2 tablets (80 mg total) by mouth daily with dinner   benzonatate (TESSALON PERLES) 100 mg capsule   No No   Sig: Take 1 capsule (100 mg total) by mouth 3 (three) times a day as needed for cough   fluticasone (FLONASE) 50 mcg/act nasal spray   No No   Si spray into each nostril daily   furosemide (LASIX) 40 mg tablet   Yes No   Si mg Every other day   iron polysaccharides (FERREX) 150 mg capsule  Child Yes No   Sig: Take 150 mg by mouth 2 (two) times a day   losartan (COZAAR) 25 mg tablet  Child Yes No   Si 5 mg In a m    metoprolol tartrate (LOPRESSOR) 50 mg tablet   No No   Sig: Take 1 tablet (50 mg total) by mouth every 12 (twelve) hours   Patient taking differently: Take 25 mg by mouth every 12 (twelve) hours   potassium chloride (MICRO-K) 10 MEQ CR capsule  Child Yes No   tiotropium (Spiriva Respimat) 1 25 MCG/ACT AERS inhaler   No No   Sig: Inhale 2 puffs daily      Facility-Administered Medications: None     Allergies:   Allergies   Allergen Reactions    Glimepiride Rash    Guaifenesin-Codeine Hallucinations    Heparin Other (See Comments)     Thrombocytopenia      Vancomycin      Red man syndrome         Laboratory and Diagnostics:  Results from last 7 days   Lab Units 22  0522  1307   WBC Thousand/uL 7 48 4 34   HEMOGLOBIN g/dL 9 5* 11 9   HEMATOCRIT % 29 6* 37 3   PLATELETS Thousands/uL 110* 125*   NEUTROS PCT %  --  51   MONOS PCT %  --  10     Results from last 7 days   Lab Units 22  0526 22  1307   SODIUM mmol/L 141 138   POTASSIUM mmol/L 4 3 4 4   CHLORIDE mmol/L 109* 103   CO2 mmol/L 25 29   ANION GAP mmol/L 7 6   BUN mg/dL 27* 32*   CREATININE mg/dL 1 46* 1 48*   CALCIUM mg/dL 9 0 9 3   GLUCOSE RANDOM mg/dL 134 128   ALT U/L  --  21   AST U/L  --  22   ALK PHOS U/L  --  71   ALBUMIN g/dL  --  3 3*   TOTAL BILIRUBIN mg/dL  --  0 72     Results from last 7 days   Lab Units 22  0526 22  1307   MAGNESIUM mg/dL 2 5 2 2   PHOSPHORUS mg/dL 4 2*  --       Results from last 7 days   Lab Units 07/22/22  1307   INR  1 01   PTT seconds 31              ABG:  Results from last 7 days   Lab Units 07/22/22 2222   PH ART  7 363   PCO2 ART mm Hg 40 6   PO2 ART mm Hg 193 6*   HCO3 ART mmol/L 22 6   BASE EXC ART mmol/L -2 6   ABG SOURCE  Radial, Right     VBG:  Results from last 7 days   Lab Units 07/22/22 2222   ABG SOURCE  Radial, Right           Micro:          EKG: Atrial-paced rhythm with prolonged AV conduction  Right bundle branch block  Left anterior fascicular block  Imaging: I have personally reviewed pertinent reports  ------------------------------------------------------------------------------------------------------------  Advance Directive and Living Will:      Power of :    POLST:    ------------------------------------------------------------------------------------------------------------  Anticipated Length of Stay is > 2 midnights    Counseling / Coordination of Care  Please refer to the Attending's attestation    Brian Park, DO        Portions of the record may have been created with voice recognition software  Occasional wrong word or "sound a like" substitutions may have occurred due to the inherent limitations of voice recognition software    Read the chart carefully and recognize, using context, where substitutions have occurred

## 2022-07-23 NOTE — ASSESSMENT & PLAN NOTE
79yo F w/ PMH of CKD stage 4, afib on eliquis, previous L MCA stroke, HTN, HLD, T2DM, former smoker  Presented on 7/22 from [address] [place] [address] to [facility] as an endovascular alert  NIHSS 27 at [address] [place] [address], 24 at [facility]  LKW 1 5-2 hrs prior to presentation  Initial presenting deficits were worsening aphasia than baseline, L gaze preference, flaccid RUE, RLE, R facial droop, LLE drift, but able to squeeze on left  tPA contraindicated given was on Eliquis  Underwent subsequent unsuccessful thrombectomy (L ICA terminus occlusion) after good mismatch ratio of 26 4 and was transferred to the ICU for further management  Family noted patient may not have been taking Eliquis and aspirin appropriately due to having trouble swallowing medication due to chronic tongue deviation   Repeat CTH, 7/24: large L MCA infarct  Workup:   · LDL 54, TG 68, chol 116  · A1c 6 3%    · CTH 07/24: 1  Evolving nonhemorrhagic left MCA territory infarction (likely original ischemia is showing on imaging after 6-24 hours since last [address])  2  Cerebral atrophy with chronic small vessel ischemic white matter disease and chronic infarctions   · [address] [employer] [place] 7/23 AM: evolving L MCA infarct   Hyperdense L MCA  Chronic infarcts seen in R occipital, parietal lobes  · TTE: Echo: bioprosthetic mitral valve  The prosthetic valve appears to be functioning normally  EF 55-60  LA The atrium is dilated   Same w/ R atrium , no hypokinesis     Impression: L MCA infarct -etiology most likely cardio-embolic due to medication noncompliance in setting of poorly controlled vascular risk factors; likely to have severe disability and will unlikely to go back to semblance of previous baseline    Plan:  - Discussed w/ family in room at length expected disability due to large L MCA infarct and reviewed imaging and unlikely to go to baseline prior to stroke  - MRI brain cannot be obtained due to pacemaker  - If [facility] for secondary stroke prevention is to be restarted in 9 days given moderate to large-sized of acute infarct would have to consider Lovenox injections versus if she has a PEG tube (as family expressed wanting one earlier) then can obtain DOAC  - receiving rectal ASA 300mg daily to be transitioned to ASA 81 mg QD   - Atorvastatin 40 mg q d   - allow for normotension, Goal normothermia and euglycemia  - Continue monitoring on telemetry  - Continue Neuro checks as per critical care protocol  - SCDs  - PT/OT/ST/PM&R evaluation  - Stat Head CT with any neuro status change   -Neurology will sign off for now; please reach out w/ any questions or acute concerns

## 2022-07-23 NOTE — RESPIRATORY THERAPY NOTE
RT Ventilator Management Note  Eliza Sears 80 y o  female MRN: [id number]  Unit/Bed#: ICU 08 Encounter: [id number]      Daily Screen         7/23/2022  0748 7/23/2022  1406          Patient safety screen outcome[de-identified] Failed Passed      Not Ready for Weaning due to[de-identified] Underline problem not resolved --                Physical Exam:   Assessment Type: Assess only  General Appearance: Sleeping  Respiratory Pattern: Assisted  Chest Assessment: Chest expansion symmetrical  Bilateral Breath Sounds: Coarse  Suction: ET Tube      Resp Comments: pt found in SPONT vent settings at this time, 10/6 40%, pt is currently tolerating wean, will cont to monitor pt

## 2022-07-24 ENCOUNTER — APPOINTMENT (INPATIENT)
Dept: RADIOLOGY | Facility: HOSPITAL | Age: 87
DRG: 064 | End: 2022-07-24
Payer: MEDICARE

## 2022-07-24 LAB
ANION GAP SERPL CALCULATED.3IONS-SCNC: 7 MMOL/L (ref 4–13)
BASOPHILS # BLD AUTO: 0.02 THOUSANDS/ΜL (ref 0–0.1)
BASOPHILS NFR BLD AUTO: 0 % (ref 0–1)
BUN SERPL-MCNC: 24 MG/DL (ref 5–25)
CALCIUM SERPL-MCNC: 9.1 MG/DL (ref 8.3–10.1)
CHLORIDE SERPL-SCNC: 112 MMOL/L (ref 96–108)
CO2 SERPL-SCNC: 23 MMOL/L (ref 21–32)
CREAT SERPL-MCNC: 1.32 MG/DL (ref 0.6–1.3)
EOSINOPHIL # BLD AUTO: 0.45 THOUSAND/ΜL (ref 0–0.61)
EOSINOPHIL NFR BLD AUTO: 7 % (ref 0–6)
ERYTHROCYTE [DISTWIDTH] IN BLOOD BY AUTOMATED COUNT: 12.5 % (ref 11.6–15.1)
GFR SERPL CREATININE-BSD FRML MDRD: 36 ML/MIN/1.73SQ M
GLUCOSE SERPL-MCNC: 118 MG/DL (ref 65–140)
GLUCOSE SERPL-MCNC: 123 MG/DL (ref 65–140)
HCT VFR BLD AUTO: 29.1 % (ref 34.8–46.1)
HGB BLD-MCNC: 9.1 G/DL (ref 11.5–15.4)
IMM GRANULOCYTES # BLD AUTO: 0.02 THOUSAND/UL (ref 0–0.2)
IMM GRANULOCYTES NFR BLD AUTO: 0 % (ref 0–2)
LYMPHOCYTES # BLD AUTO: 1.19 THOUSANDS/ΜL (ref 0.6–4.47)
LYMPHOCYTES NFR BLD AUTO: 17 % (ref 14–44)
MAGNESIUM SERPL-MCNC: 2.5 MG/DL (ref 1.6–2.6)
MCH RBC QN AUTO: 30.2 PG (ref 26.8–34.3)
MCHC RBC AUTO-ENTMCNC: 31.3 G/DL (ref 31.4–37.4)
MCV RBC AUTO: 97 FL (ref 82–98)
MONOCYTES # BLD AUTO: 0.82 THOUSAND/ΜL (ref 0.17–1.22)
MONOCYTES NFR BLD AUTO: 12 % (ref 4–12)
NEUTROPHILS # BLD AUTO: 4.33 THOUSANDS/ΜL (ref 1.85–7.62)
NEUTS SEG NFR BLD AUTO: 64 % (ref 43–75)
NRBC BLD AUTO-RTO: 0 /100 WBCS
PHOSPHATE SERPL-MCNC: 3.5 MG/DL (ref 2.3–4.1)
PLATELET # BLD AUTO: 113 THOUSANDS/UL (ref 149–390)
PMV BLD AUTO: 10.7 FL (ref 8.9–12.7)
POTASSIUM SERPL-SCNC: 3.8 MMOL/L (ref 3.5–5.3)
RBC # BLD AUTO: 3.01 MILLION/UL (ref 3.81–5.12)
SODIUM SERPL-SCNC: 142 MMOL/L (ref 135–147)
WBC # BLD AUTO: 6.83 THOUSAND/UL (ref 4.31–10.16)

## 2022-07-24 PROCEDURE — 99497 ADVNCD CARE PLAN 30 MIN: CPT | Performed by: FAMILY MEDICINE

## 2022-07-24 PROCEDURE — 70450 CT HEAD/BRAIN W/O DYE: CPT

## 2022-07-24 PROCEDURE — 92610 EVALUATE SWALLOWING FUNCTION: CPT

## 2022-07-24 PROCEDURE — 99291 CRITICAL CARE FIRST HOUR: CPT | Performed by: EMERGENCY MEDICINE

## 2022-07-24 PROCEDURE — 99232 SBSQ HOSP IP/OBS MODERATE 35: CPT | Performed by: PSYCHIATRY & NEUROLOGY

## 2022-07-24 PROCEDURE — 84100 ASSAY OF PHOSPHORUS: CPT

## 2022-07-24 PROCEDURE — 99222 1ST HOSP IP/OBS MODERATE 55: CPT | Performed by: FAMILY MEDICINE

## 2022-07-24 PROCEDURE — 85025 COMPLETE CBC W/AUTO DIFF WBC: CPT

## 2022-07-24 PROCEDURE — 83735 ASSAY OF MAGNESIUM: CPT

## 2022-07-24 PROCEDURE — 80048 BASIC METABOLIC PNL TOTAL CA: CPT

## 2022-07-24 PROCEDURE — 82948 REAGENT STRIP/BLOOD GLUCOSE: CPT

## 2022-07-24 PROCEDURE — G1004 CDSM NDSC: HCPCS

## 2022-07-24 RX ORDER — FUROSEMIDE 10 MG/ML
20 INJECTION INTRAMUSCULAR; INTRAVENOUS ONCE
Status: COMPLETED | OUTPATIENT
Start: 2022-07-24 | End: 2022-07-24

## 2022-07-24 RX ORDER — ACETAMINOPHEN 650 MG/1
325 SUPPOSITORY RECTAL EVERY 4 HOURS PRN
Status: DISCONTINUED | OUTPATIENT
Start: 2022-07-24 | End: 2022-07-24

## 2022-07-24 RX ORDER — ACETAMINOPHEN 650 MG/1
325 SUPPOSITORY RECTAL EVERY 4 HOURS PRN
Status: DISCONTINUED | OUTPATIENT
Start: 2022-07-24 | End: 2022-08-04 | Stop reason: HOSPADM

## 2022-07-24 RX ORDER — GLYCOPYRROLATE 0.2 MG/ML
0.1 INJECTION INTRAMUSCULAR; INTRAVENOUS EVERY 4 HOURS PRN
Status: DISCONTINUED | OUTPATIENT
Start: 2022-07-24 | End: 2022-07-25

## 2022-07-24 RX ORDER — ASPIRIN 300 MG/1
300 SUPPOSITORY RECTAL DAILY
Status: DISCONTINUED | OUTPATIENT
Start: 2022-07-24 | End: 2022-07-25

## 2022-07-24 RX ADMIN — GLYCOPYRROLATE 0.1 MG: 0.2 INJECTION INTRAMUSCULAR; INTRAVENOUS at 17:28

## 2022-07-24 RX ADMIN — ATORVASTATIN CALCIUM 80 MG: 80 TABLET, FILM COATED ORAL at 17:19

## 2022-07-24 RX ADMIN — CHLORHEXIDINE GLUCONATE 15 ML: 1.2 SOLUTION ORAL at 08:46

## 2022-07-24 RX ADMIN — FUROSEMIDE 20 MG: 10 INJECTION, SOLUTION INTRAMUSCULAR; INTRAVENOUS at 21:46

## 2022-07-24 RX ADMIN — ASPIRIN 300 MG: 300 SUPPOSITORY RECTAL at 17:25

## 2022-07-24 RX ADMIN — CHLORHEXIDINE GLUCONATE 15 ML: 1.2 SOLUTION ORAL at 21:47

## 2022-07-24 RX ADMIN — SENNOSIDES 8.6 MG: 8.6 TABLET, FILM COATED ORAL at 17:18

## 2022-07-24 NOTE — PLAN OF CARE
Problem: MOBILITY - ADULT  Goal: Maintain or return to baseline ADL function  Description: INTERVENTIONS:  -  Assess patient's ability to carry out ADLs; assess patient's baseline for ADL function and identify physical deficits which impact ability to perform ADLs (bathing, care of mouth/teeth, toileting, grooming, dressing, etc )  - Assess/evaluate cause of self-care deficits   - Assess range of motion  - Assess patient's mobility; develop plan if impaired  - Assess patient's need for assistive devices and provide as appropriate  - Encourage maximum independence but intervene and supervise when necessary  - Involve family in performance of ADLs  - Assess for home care needs following discharge   - Consider OT consult to assist with ADL evaluation and planning for discharge  - Provide patient education as appropriate  Outcome: Progressing  Goal: Maintains/Returns to pre admission functional level  Description: INTERVENTIONS:  - Perform BMAT or MOVE assessment daily    - Set and communicate daily mobility goal to care team and patient/family/caregiver  - Collaborate with rehabilitation services on mobility goals if consulted  - Perform Range of Motion 2 times a day  - Reposition patient every 2 hours    - Dangle patient 2 times a day  - Stand patient 2 times a day  - Ambulate patient 2 times a day  - Out of bed to chair 2 times a day   - Out of bed for meals 2 times a day  - Out of bed for toileting  - Record patient progress and toleration of activity level   Outcome: Progressing     Problem: Potential for Falls  Goal: Patient will remain free of falls  Description: INTERVENTIONS:  - Educate patient/family on patient safety including physical limitations  - Instruct patient to call for assistance with activity   - Consult OT/PT to assist with strengthening/mobility   - Keep Call bell within reach  - Keep bed low and locked with side rails adjusted as appropriate  - Keep care items and personal belongings within reach  - Initiate and maintain comfort rounds  - Make Fall Risk Sign visible to staff  - Offer Toileting every 2 Hours, in advance of need  - Initiate/Maintain 2alarm  - Obtain necessary fall risk management equipment: 2  - Apply yellow socks and bracelet for high fall risk patients  - Consider moving patient to room near nurses station  Outcome: Progressing     Problem: Prexisting or High Potential for Compromised Skin Integrity  Goal: Skin integrity is maintained or improved  Description: INTERVENTIONS:  - Identify patients at risk for skin breakdown  - Assess and monitor skin integrity  - Assess and monitor nutrition and hydration status  - Monitor labs   - Assess for incontinence   - Turn and reposition patient  - Assist with mobility/ambulation  - Relieve pressure over bony prominences  - Avoid friction and shearing  - Provide appropriate hygiene as needed including keeping skin clean and dry  - Evaluate need for skin moisturizer/barrier cream  - Collaborate with interdisciplinary team   - Patient/family teaching  - Consider wound care consult   Outcome: Progressing     Problem: Neurological Deficit  Goal: Neurological status is stable or improving  Description: Interventions:  - Monitor and assess patient's level of consciousness, motor function, sensory function, and level of assistance needed for ADLs  - Monitor and report changes from baseline  Collaborate with interdisciplinary team to initiate plan and implement interventions as ordered  - Provide and maintain a safe environment  - Consider seizure precautions  - Consider fall precautions  - Consider aspiration precautions  - Consider bleeding precautions  Outcome: Progressing     Problem: Activity Intolerance/Impaired Mobility  Goal: Mobility/activity is maintained at optimum level for patient  Description: Interventions:  - Assess and monitor patient  barriers to mobility and need for assistive/adaptive devices    - Assess patient's emotional response to limitations  - Collaborate with interdisciplinary team and initiate plans and interventions as ordered  - Encourage independent activity per ability   - Maintain proper body alignment  - Perform active/passive rom as tolerated/ordered  - Plan activities to conserve energy   - Turn patient as appropriate  Outcome: Progressing     Problem: Communication Impairment  Goal: Ability to express needs and understand communication  Description: Assess patient's communication skills and ability to understand information  Patient will demonstrate use of effective communication techniques, alternative methods of communication and understanding even if not able to speak  - Encourage communication and provide alternate methods of communication as needed  - Collaborate with case management/ for discharge needs  - Include patient/family/caregiver in decisions related to communication  Outcome: Progressing     Problem: Potential for Aspiration  Goal: Non-ventilated patient's risk of aspiration is minimized  Description: Assess and monitor vital signs, respiratory status, and labs (WBC)  Monitor for signs of aspiration (tachypnea, cough, rales, wheezing, cyanosis, fever)  - Assess and monitor patient's ability to swallow  - Place patient up in chair to eat if possible  - HOB up at 90 degrees to eat if unable to get patient up into chair   - Supervise patient during oral intake  - Instruct patient/ family to take small bites  - Instruct patient/ family to take small single sips when taking liquids  - Follow patient-specific strategies generated by speech pathologist   Outcome: Progressing  Goal: Ventilated patient's risk of aspiration is minimized  Description: Assess and monitor vital signs, respiratory status, airway cuff pressure, and labs (WBC)  Monitor for signs of aspiration (tachypnea, cough, rales, wheezing, cyanosis, fever)      - Elevate head of bed 30 degrees if patient has tube feeding   - Monitor tube feeding  Outcome: Progressing     Problem: Nutrition  Goal: Nutrition/Hydration status is improving  Description: Monitor and assess patient's nutrition/hydration status for malnutrition (ex- brittle hair, bruises, dry skin, pale skin and conjunctiva, muscle wasting, smooth red tongue, and disorientation)  Collaborate with interdisciplinary team and initiate plan and interventions as ordered  Monitor patient's weight and dietary intake as ordered or per policy  Utilize nutrition screening tool and intervene per policy  Determine patient's food preferences and provide high-protein, high-caloric foods as appropriate  - Assist patient with eating   - Allow adequate time for meals   - Encourage patient to take dietary supplement as ordered  - Collaborate with clinical nutritionist   - Include patient/family/caregiver in decisions related to nutrition  Outcome: Progressing     Problem: Nutrition/Hydration-ADULT  Goal: Nutrient/Hydration intake appropriate for improving, restoring or maintaining nutritional needs  Description: Monitor and assess patient's nutrition/hydration status for malnutrition  Collaborate with interdisciplinary team and initiate plan and interventions as ordered  Monitor patient's weight and dietary intake as ordered or per policy  Utilize nutrition screening tool and intervene as necessary  Determine patient's food preferences and provide high-protein, high-caloric foods as appropriate       INTERVENTIONS:  - Monitor oral intake, urinary output, labs, and treatment plans  - Assess nutrition and hydration status and recommend course of action  - Evaluate amount of meals eaten  - Assist patient with eating if necessary   - Allow adequate time for meals  - Recommend/ encourage appropriate diets, oral nutritional supplements, and vitamin/mineral supplements  - Order, calculate, and assess calorie counts as needed  - Recommend, monitor, and adjust tube feedings and TPN/PPN based on assessed needs  - Assess need for intravenous fluids  - Provide specific nutrition/hydration education as appropriate  - Include patient/family/caregiver in decisions related to nutrition  Outcome: Progressing     Problem: SAFETY,RESTRAINT: NV/NON-SELF DESTRUCTIVE BEHAVIOR  Goal: Remains free of harm/injury (restraint for non violent/non self-detsructive behavior)  Description: INTERVENTIONS:  - Instruct patient/family regarding restraint use   - Assess and monitor physiologic and psychological status   - Provide interventions and comfort measures to meet assessed patient needs   - Identify and implement2 measures to help patient regain control  - Assess readiness for release of restraint   Outcome: Progressing  Goal: Returns to optimal restraint-free functioning  Description: INTERVENTIONS:  - Assess the patient's behavior and symptoms that indicate continued need for restraint  - Identify and implement measures to help patient regain control  - Assess readiness for release of restraint   Outcome: Progressing

## 2022-07-24 NOTE — SPEECH THERAPY NOTE
Speech Language/Pathology    Speech-Language Pathology Bedside Swallow Evaluation      Patient Name: Danilo Corea    GMQLG'X Date: 7/24/2022     Problem List  Principal Problem:    CVA (cerebral vascular accident) [facility])  Active Problems:    Deep vein thrombosis (DVT) of left upper extremity ([facility] )    H/O mitral valve replacement    Controlled type 2 diabetes mellitus without complication, without long-term current use of insulin ([facility])    Hypertension    Systolic congestive heart failure ([facility] )    Cardio-embolic left MCA stroke ([facility] )    History of CVA (cerebrovascular accident)    CKD (chronic kidney disease) stage 3, GFR 30-59 ml/min ([facility])    Dysphagia    Bilateral carotid artery stenosis    Vascular dementia ([facility])    Ambulatory dysfunction    Meningioma ([facility])    Paroxysmal A-fib ([facility])    Thrombocytopenia ([facility])    Coronary artery disease with angina pectoris, unspecified vessel or lesion type, unspecified whether native or transplanted heart [facility])      Past Medical History  Past Medical History:   Diagnosis Date    Acid reflux     on occ    Arthritis     DJD right hip replaced    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 2007    x 2 lesions with no change    Cancer ([email or web address] 2007    colonic polyps, no surgery done    Deep vein thrombosis (DVT) of left upper extremity ([facility] ) 12/11/2017    Dementia ([facility] )     Diabetes mellitus ([facility] )     type 2    Diverticulosis     Edema     in legs on occ    Hypertension     on occ    Language barrier     speaks Tajik & broken english    Stroke [facility])        Past Surgical History  Past Surgical History:   Procedure Laterality Date    COLON SURGERY      COLONOSCOPY      COLONOSCOPY N/A 11/2/2016    Procedure: COLONOSCOPY;  Surgeon: Daphney Stuart MD;  Location: [facility] GI LAB; Service:     ESOPHAGOGASTRODUODENOSCOPY N/A 11/2/2016    Procedure: ESOPHAGOGASTRODUODENOSCOPY (EGD); Surgeon: Daphney Stuart MD;  Location: [facility] GI LAB;   Service:    Becki Garcia / Adalberto Drivers / [facility] JOINT REPLACEMENT Right 02/2015    hip    JOINT REPLACEMENT Left     knee    JOINT REPLACEMENT Right     knee    MITRAL VALVE REPLACEMENT      OH COLONOSCOPY FLX DX W/COLLJ SPEC WHEN PFRMD N/A 8/9/2018    Procedure: EGD AND COLONOSCOPY;  Surgeon: Matilda Bernstein MD;  Location: [facility]; Service: Gastroenterology    OH REVISE MEDIAN N/CARPAL TUNNEL SURG Left 1/28/2016    Procedure: RELEASE CARPAL TUNNEL;  Surgeon: Christiana Hammond MD;  Location: [facility] MAIN OR;  Service: Orthopedics    TUBAL LIGATION      VARICOSE VEIN SURGERY Bilateral        Summary   Pt presented with s/s suggestive of profound oropharyngeal dysphagia  Pt w/ poor JUAN, though alerts for PO trials and opens oral cavity to PO presentations  Retrieval is weak w/ poor labial seal  Severe oral holding of pureed material, eventually spilling out of oral cavity to R side  Min amount of material transferred, likely gravity  3-4 audible swallows for tsp presentation  Hyo-laryngeal movement is min to palpation  Pt w/ immediate gurgling/cough, suction provided w/ removed pudding tinged secretions  Pt tightly sealed lips to subsequent presentations (attempt of honey thick via tsp)  S/w pt's daughter present regarding current s/s aspiration, risks of aspiration, and rationale for recommendation of NPO status  Pt's daughter verbalized understanding  Risk/s for Aspiration: High      Recommended Diet: NPO   Recommended Form of Meds: non-oral if possible   Aspiration precautions and swallowing strategies: -  Other Recommendations: Continue frequent oral care        Current Medical Status  Pt is a 80 y o  female who presented to [facility] with w/ PMH of CKD stage 4, afib on eliquis, previous L MCA stroke, HTN, HLD, T2DM, former smoker  Presented on 7/22 from [address] [place] [address] to [facility] as an endovascular alert  NIHSS 27 at [address] [place] [address], 24 at [facility]  LKW 1 5-2 hrs prior to presentation    Initial presenting deficits were worsening aphasia than baseline, L gaze preference, flaccid RUE, RLE, R facial droop, LLE drift, but able to squeeze on left  tPA contraindicated given was on Eliquis  Underwent subsequent unsuccessful thrombectomy (L ICA terminus occlusion) after good mismatch ratio of 26 4 and was transferred to the ICU for further management  Family noted patient may not have been taking Eliquis and aspirin appropriately due to having trouble swallowing medication due to chronic tongue deviation  Current Precautions: Seizure    Allergies:  No known food allergies    Past medical history:  Please see H&P for details    Special Studies:  CT head 7/24: 1  Evolving nonhemorrhagic left MCA territory infarction        2   Cerebral atrophy with chronic small vessel ischemic white matter disease and chronic infarctions as described above  CXR 7/22: No acute cardiopulmonary disease  ET tube in satisfactory position  Social/Education/Vocational Hx:  Pt lives alone and w/ support from family    Swallow Information   Current Risks for Dysphagia & Aspiration: CVA, known history of dysphagia, known history of aspiration, AMS and decreased alertness  Current Symptoms/Concerns: failed RN dysphagia screen  Current Diet: NPO   Baseline Diet: per pt's daugghter regular w/ thin w/ noted coughing and pocketing    Previous VBS: 7/3/2019- "Assessment Summary; Pt presents with mild-moderate oropharyngeal dysphagia characterized by prolonged anterior mastication, reduced bolus control posteriorly with premature spillage, delay in swallow initiation as well as reduced epiglottic inversion, pharyngeal constriction and retention post swallow  She was also noted to have penetration with puree and all liquids which was reduced/eliminated with use of neck flexion  Patient presents with risk for aspiration of retained material post swallow as well as with deep penetration with thin liquids at this time   This risk is reduced/eliminated with strategies- therefore patient would benefit from ST to initiate exercises as well as train in beneficial strategies      Diagnosis/Prognosis;  mild-moderate oropharyngeal dysphagia    good prognosis for continued safe po intake      Recommendations; Recommend mechanically altered/level 2 diet and nectar thick liquids, with upright posture, only feed when fully alert, slow rate of feeding, small bites/sips, chin tuck, effortful swallow, quiet environment (tv off, limit talking, door closed, etc ) and double swallow  Patient also benefits from use of straw for carryover of chin tuck  Recommended Form of Meds: whole with puree or crushed with puree   Full Supervision  Aspiration precautions   Reflux Precautions  Results reviewed with patient and family "           Baseline Assessment   Behavior/Cognition: lethargic and waxing and waning arousal level  Speech/Language Status: not able to to follow commands and no verbal output noted  Patient Positioning: upright in bed  Pain Status/Interventions/Response to Interventions:   No report of or nonverbal indications of pain  Swallow Mechanism Exam  Facial: right facial droop  Labial: unable to test 2/2 limited command following  Lingual: unable to test 2/2 limited command following and per chart review "chronic R tongue deviation"   Velum: unable to visualize  Mandible: unable to test 2/2 limited command following  Dentition: adequate  Vocal quality:unable to assess   Volitional Cough: unable to initiate volitional cough   Respiratory Status: NC (intubated 7/22-7/23)       Consistencies Assessed and Performance   Consistencies Administered: honey thick and puree    Oral Stage: profound  Weak labial seal for retrieval and oral containment  Severe oral holding w/ eventual anterior spill of all to R side  Min amount of material transferred w/ subsequent moderate oral residue requiring suction to remove       Pharyngeal Stage: suspect severe  Swallow Mechanics:  Swallowing initiation suspected delayed  Laryngeal rise was palpated and judged to be weak/min  Immediate cough and gurgling w/ puree  Esophageal Concerns: none reported    Strategies and Efficacy: -     Summary and Recommendations (see above)    Results Reviewed with: patient, RN, MD and family     Treatment Recommended: Yes     Frequency of treatment: As able and appropriate     Patient Stated Goal: None stated     Dysphagia LTG  -Patient will demonstrate safe and effective oral intake (without overt s/s significant oral/pharyngeal dysphagia including s/s penetration or aspiration) for the highest appropriate diet level       Short Term Goals:  -Pt will tolerate Dysphagia 1/pureed diet and honey thick liquid with no significant s/s oral or pharyngeal dysphagia across 1-3 diagnostic session/s    -Patient will tolerate trials of upgraded food and/or liquid texture with no significant s/s of oral or pharyngeal dysphagia including aspiration across 1-3 diagnostic sessions     -Patient will comply with a Video/Modified Barium Swallow study for more complete assessment of swallowing anatomy/physiology/aspiration risk and to assess efficacy of treatment techniques so as to best guide treatment plan      Speech Therapy Prognosis   Prognosis: Guarded    Prognosis Considerations: age, medical status, prior medical history and cognitive status

## 2022-07-24 NOTE — PROGRESS NOTES
Pastoral Care Progress Note    2022  Patient: Felix Rawls : 1933  Admission Date & Time: 2022 1721  MRN: [id number] [facility]: [id number]                     Chaplaincy Interventions Utilized:      22 1400   Clinical Encounter Type   Visited With Patient and family together   Crisis Visit Critical Care     Patient had several oates  4 family members were present in the room   provided spiritual and emotional support to patient and family members, also provided prayer  Sent a message to Father Zahida Sy for the [employer] and for [employer]

## 2022-07-24 NOTE — PROGRESS NOTES
NEUROLOGY RESIDENCY PROGRESS NOTE     Name: Hugo White   Age & Sex: 80 y o  female   MRN: [id number]  Unit/Bed#: ICU 08   Encounter: [id number]    Recommendations for outpatient neurological follow up have yet to be determined  ASSESSMENT & PLAN     Cardio-embolic left MCA stroke [facility])  Assessment & Plan  79yo F w/ PMH of CKD stage 4, afib on eliquis, previous L MCA stroke, HTN, HLD, T2DM, former smoker  Presented on 7/22 from [address] [place] [address] to [facility] as an endovascular alert  NIHSS 27 at [address] [place] [address], 24 at [facility]  LKW 1 5-2 hrs prior to presentation  Initial presenting deficits were worsening aphasia than baseline, L gaze preference, flaccid RUE, RLE, R facial droop, LLE drift, but able to squeeze on left  tPA contraindicated given was on Eliquis  Underwent subsequent unsuccessful thrombectomy (L ICA terminus occlusion) after good mismatch ratio of 26 4 and was transferred to the ICU for further management  Family noted patient may not have been taking Eliquis and aspirin appropriately due to having trouble swallowing medication due to chronic tongue deviation   Repeat CTH large L MCA infarct  Workup:   · LDL 54, TG 68, chol 116  · A1c 6 3%    · CTH 07/24: 1  Evolving nonhemorrhagic left MCA territory infarction (likely original ischemia is showing on imaging after 6-24 hours since last [address])  2  Cerebral atrophy with chronic small vessel ischemic white matter disease and chronic infarctions   · [address] 7/23 AM: evolving L MCA infarct   Hyperdense L MCA  Chronic infarcts seen in R occipital, parietal lobes  · TTE: Echo: bioprosthetic mitral valve  The prosthetic valve appears to be functioning normally  EF 55-60  LA The atrium is dilated   Same w/ R atrium , no hypokinesis     Impression: L MCA infarct -etiology most likely cardio-embolic due to medication noncompliance in setting of poorly controlled vascular risk factors; likely to have severe disability and will unlikely to go back to semblance of previous baseline    Plan:  - discussed w/ family in room expected disability due to large L MCA infarct and reviewed imaging and unlikely to go to baseline prior to stroke  - MRI brain cannot be obtained due to pacemaker  - If [facility] for secondary stroke prevention is to be restarted in 9 days given moderate to large-sized of acute infarct would have to consider Lovenox injections versus if she has a PEG tube (as family expressed wanting one earlier) then can obtain DOAC  - receiving rectal ASA 300mg daily  - Atorvastatin 40 mg q d   - allow for normotension, Goal normothermia and euglycemia  - Continue monitoring on telemetry  - Continue Neuro checks as per critical care protocol  - SCDs  - PT/OT/ST/PM&R evaluation  - Stat Head CT with any neuro status change               SUBJECTIVE     Patient was seen and examined  No acute events overnight  Patient was extubated overnight but has not been able to follow any commands or do any sort of eye tracking  Per daughter patient did have every once in a while spontaneous eye opening but could not be elicited  Pertinent Negatives include:  Minimally responsive     Review of Systems   Unable to perform ROS: Mental status change   Minimally responsive    OBJECTIVE     Patient ID: Tracee Amaro is a 80 y o  female  Vitals:    22 0800 22 0900 22 1000 22 1100   BP: 137/57 119/56 155/81 123/57   Pulse: 86 84 84 80   Resp: 20 18 17 13   Temp: 99 68 °F (37 6 °C) 100 04 °F (37 8 °C) 99 68 °F (37 6 °C) 100 04 °F (37 8 °C)   TempSrc: Oral      SpO2: 96% 96% 98% 97%   Weight:       Height:          Temperature:   Temp (24hrs), Av 8 °F (37 7 °C), Min:99 32 °F (37 4 °C), Max:100 4 °F (38 °C)    Temperature: 100 04 °F (37 8 °C)      Physical Exam  Vitals and nursing note reviewed  Constitutional:       General: She is not in acute distress  Appearance: She is well-developed  HENT:      Head: Normocephalic and atraumatic  Eyes:      Conjunctiva/sclera: Conjunctivae normal       Pupils: Pupils are equal, round, and reactive to light  Cardiovascular:      Rate and Rhythm: Normal rate and regular rhythm  Heart sounds: No murmur heard  Pulmonary:      Effort: Pulmonary effort is normal  No respiratory distress  Breath sounds: Normal breath sounds  Abdominal:      Palpations: Abdomen is soft  Tenderness: There is no abdominal tenderness  Musculoskeletal:      Cervical back: Neck supple  Skin:     General: Skin is warm and dry  Neurological:      Deep Tendon Reflexes:      Reflex Scores:       Tricep reflexes are 1+ on the right side and 1+ on the left side  Bicep reflexes are 1+ on the right side and 1+ on the left side  Brachioradialis reflexes are 1+ on the right side and 1+ on the left side  Patellar reflexes are 1+ on the right side and 1+ on the left side  Achilles reflexes are 1+ on the right side and 1+ on the left side  Neurologic Exam     Mental Status   Patient is minimally responsive and unable to follow commands     Cranial Nerves     CN III, IV, VI   Pupils are equal, round, and reactive to light    Patient is minimally responsive and unable to follow commands  Patient could not appropriately I track and could not open eyes spontaneously no doll's eyes appreciated     Motor Exam Patient is minimally responsive and unable to follow commands     Sensory Exam   Patient is minimally responsive and unable to follow commands  Noted trouble function and right lower extremity  Patient did respond to noxious stimuli in the left upper and left lower extremity when sternal was given right upper extremity did withdrawal/move a little bit when given noxious stimuli     Gait, Coordination, and Reflexes     Reflexes   Right brachioradialis: 1+  Left brachioradialis: 1+  Right biceps: 1+  Left biceps: 1+  Right triceps: 1+  Left triceps: 1+  Right patellar: 1+  Left patellar: 1+  Right achilles: 1+  Left achilles: 1+  Right plantar: equivocal  Left plantar: equivocalPatient is minimally responsive and unable to follow commands          LABORATORY DATA     Labs: I have personally reviewed pertinent reports  and I have personally reviewed pertinent films in PACS  Results from last 7 days   Lab Units 07/24/22  0622 07/23/22  0526 07/22/22  1307   WBC Thousand/uL 6 83 7 48 4 34   HEMOGLOBIN g/dL 9 1* 9 5* 11 9   HEMATOCRIT % 29 1* 29 6* 37 3   PLATELETS Thousands/uL 113* 110* 125*   NEUTROS PCT % 64  --  51   MONOS PCT % 12  --  10      Results from last 7 days   Lab Units 07/24/22  0628 07/23/22  0526 07/22/22  1307   SODIUM mmol/L 142 141 138   POTASSIUM mmol/L 3 8 4 3 4 4   CHLORIDE mmol/L 112* 109* 103   CO2 mmol/L 23 25 29   BUN mg/dL 24 27* 32*   CREATININE mg/dL 1 32* 1 46* 1 48*   CALCIUM mg/dL 9 1 9 0 9 3   ALK PHOS U/L  --   --  71   ALT U/L  --   --  21   AST U/L  --   --  22     Results from last 7 days   Lab Units 07/24/22  0628 07/23/22  0526 07/22/22  1307   MAGNESIUM mg/dL 2 5 2 5 2 2     Results from last 7 days   Lab Units 07/24/22  0628 07/23/22  0526   PHOSPHORUS mg/dL 3 5 4 2*      Results from last 7 days   Lab Units 07/22/22  1307   INR  1 01   PTT seconds 31               IMAGING & DIAGNOSTIC TESTING     Radiology Results: I have personally reviewed pertinent reports  and I have personally reviewed pertinent films in PACS    CT head wo contrast   Final Result by Nadia Cisneros DO (07/24 9224)   1  Evolving nonhemorrhagic left MCA territory infarction  2   Cerebral atrophy with chronic small vessel ischemic white matter disease and chronic infarctions as described above  Workstation performed: [id number]         CT head wo contrast   Final Result by Nadia Cisneros DO (07/23 0956)   1  Evolving nonhemorrhagic left MCA territory infarction          2   Cerebral atrophy with chronic small vessel ischemic white matter disease and chronic right occipital parietal lobe infarctions  Workstation performed: [id number]         X-ray chest 1 view   Final Result by Darrius Zeng MD (07/23 4999)      No acute cardiopulmonary disease  ET tube in satisfactory position  Workstation performed: [id number]         CT cerebral perfusion   Final Result by Rosalia Norris MD (07/22 1724)      CT perfusion performed  Data available on PACS  Workstation performed: [id number]             Other Diagnostic Testing: I have personally reviewed pertinent reports  and I have personally reviewed pertinent films in PACS    ACTIVE MEDICATIONS     Current Facility-Administered Medications   Medication Dose Route Frequency    acetaminophen (TYLENOL) rectal suppository 325 mg  325 mg Rectal Q4H PRN    acetaminophen (TYLENOL) tablet 650 mg  650 mg Oral Q4H PRN    albuterol inhalation solution 2 5 mg  2 5 mg Nebulization Q6H PRN    aspirin rectal suppository 300 mg  300 mg Rectal Daily    atorvastatin (LIPITOR) tablet 80 mg  80 mg Oral QPM    chlorhexidine (PERIDEX) 0 12 % oral rinse 15 mL  15 mL Mouth/Throat Q12H PARUL    fentanyl citrate (PF) 100 MCG/2ML 50 mcg  50 mcg Intravenous Q1H PRN    fluticasone (FLONASE) 50 mcg/act nasal spray 1 spray  1 spray Nasal Daily    glycopyrrolate (ROBINUL) injection 0 1 mg  0 1 mg Intravenous Q4H PRN    insulin lispro (HumaLOG) 100 units/mL subcutaneous injection 1-5 Units  1-5 Units Subcutaneous Q6H [facility]    senna (SENOKOT) tablet 8 6 mg  1 tablet Oral Daily    umeclidinium bromide (INCRUSE ELLIPTA) 62 5 mcg/inh inhaler AEPB 1 puff  1 puff Inhalation Daily       Prior to Admission medications    Medication Sig Start Date End Date Taking?  Authorizing Provider   albuterol (2 5 mg/3 mL) 0 083 % nebulizer solution Take 3 mL (2 5 mg total) by nebulization every 6 (six) hours as needed for wheezing or shortness of breath 4/17/22   Nelson Cervantes,    albuterol (PROVENTIL HFA,VENTOLIN HFA) 90 mcg/act inhaler Inhale 2 puffs every 6 (six) hours as needed for wheezing 4/17/22   Logan Cervantes DO   apixaban (ELIQUIS) 2 5 mg Take 1 tablet (2 5 mg total) by mouth 2 (two) times a day 5/18/18   Edwige Westbrook MD   aspirin (ECOTRIN LOW STRENGTH) 81 mg EC tablet Take 1 tablet (81 mg total) by mouth daily 11/8/18   Radha Lamas DO   atorvastatin (LIPITOR) 40 mg tablet Take 2 tablets (80 mg total) by mouth daily with dinner 9/3/20   Ant Goddard MD   benzonatate (TESSALON PERLES) 100 mg capsule Take 1 capsule (100 mg total) by mouth 3 (three) times a day as needed for cough 6/29/22   Ori Washburn PA-C   fluticasone [facility]) 50 mcg/act nasal spray 1 spray into each nostril daily 6/29/22   Ori Washburn PA-C   furosemide (LASIX) 40 mg tablet 40 mg Every other day 4/4/22   Historical Provider, MD   iron polysaccharides (FERREX) 150 mg capsule Take 150 mg by mouth 2 (two) times a day    Historical Provider, MD   JANUVIA 50 MG tablet 50 mg daily   7/31/18   Historical Provider, MD   losartan (COZAAR) 25 mg tablet 12 5 mg In a m  7/8/20   Historical Provider, MD   metoprolol tartrate (LOPRESSOR) 50 mg tablet Take 1 tablet (50 mg total) by mouth every 12 (twelve) hours  Patient taking differently: Take 25 mg by mouth every 12 (twelve) hours 5/18/18   Edwige Westbrook MD   Multiple Vitamins-Minerals (MULTIVITAMIN ADULT PO) 0 5 tablets 2 (two) times a day      Historical Provider, MD   Polysaccharide Iron-FA-B12 (FERREX 150 FORTE PO) Take by mouth    Historical Provider, MD   potassium chloride (MICRO-K) 10 MEQ CR capsule  1/28/19   Historical Provider, MD   tiotropium (Spiriva Respimat) 1 25 MCG/ACT AERS inhaler Inhale 2 puffs daily 7/6/22   Faith Brown MD         VTE Pharmacologic Prophylaxis: Pharmacologic VTE Prophylaxis contraindicated due to Extent of large MCA stroke  VTE Mechanical Prophylaxis: sequential compression device    ==  [employer], DO [address] Neurology Residency, PGY-2

## 2022-07-24 NOTE — PROGRESS NOTES
Daily Progress Note - Critical Care   Florentino Medina 80 y o  female MRN: [id number]  Unit/Bed#: ICU 08 Encounter: [id number]        ----------------------------------------------------------------------------------------  HPI/24hr events:  Florentino Medina is a 80 y o  female with PMHx of CHF, CKD stage 4, arthritis, anemia, ambulatory dysfunction, CAD, previous left MCA stroke, atrial fibrillation and history of DVT maintained on Eliquis, history of dysphasia, status post mitral valve replacement, hyperlipidemia, hypertension, known meningioma, vascular dementia, and diabetes  Presented on  for worsening aphasia and left gaze preference,  NIH 27  CTA revealved L  M1 occlusion, not candidate for Tpa due to Eliquis, CTP showed pernumbra, unfortunately thrombectomy was unsuccessful  Vitals Last 24 Hours:  BP  Min: 97/45  Max: 170/74  Temp  Av 6 °F (37 6 °C)  Min: 98 24 °F (36 8 °C)  Max: 100 4 °F (38 °C)  Pulse  Av 7  Min: 72  Max: 102  Resp  Av 5  Min: 15  Max: 26  SpO2  Av 2 %  Min: 95 %  Max: 100 %  Height  Av' 1" (154 9 cm)  Min: 5' 1" (154 9 cm)  Max: 5' 1" (154 9 cm)  Weight  Av 9 kg (143 lb)  Min: 64 9 kg (143 lb)  Max: 64 9 kg (143 lb)    I&O Last 24 Hours:  I/O last 24 hours: In: 105 5 [I V :15 5; NG/GT:90]  Out: 730 [Urine:730]    ---------------------------------------------------------------------------------------  SUBJECTIVE  Patient seen examined at bedside  Patient is aphasic and does not follow commands, and is not verbal     Review of Systems  Review of systems was unable to be performed secondary to Aphasia  ---------------------------------------------------------------------------------------  Assessment and Plan:       Neuro:  Acute stroke, history of left MCA stroke, vascular dementia, known meningioma   ·  CTA:  left M1 occlusion Moderate stenosis proximal right internal carotid artery and moderate stenosis left PCA     · [facility] : Evolving non hemorrhagic L,MCA region  · [facility] 7/24: Evolving nonhemorrhagic left MCA territory infarction  Cerebral atrophy with chronic small vessel ischemic white matter disease and chronic infarctions as described above  · NIH 27 on presentation   · Thrombectomy unsuccessful ,TICI 0  · Patient is unable to obtain a MRI due to pacemaker  · Continue Neuro checks  · Maintain glucose <180  · Echocardiogram: LVEF 88-65%, no diastolic dysfunction, There is LA dilation  · Atorvastatin 80 mg q d  · Continue telemetry   · Sleep/wake cycle regulation  · CAM-ICU BID  · Hold AC and AP - Will need argatroban (No fondaparinux due to CKD 4)   · Neurology consulted, appreciate recommendations  · No acute intervention required for known meningioma     CV:  Hypertension, hyperlipidemia, heart failure  · Hold PTA antihypertensives  ? Continue with permissive HTN goal SBP < 180  · Echocardiogram: LVEF 81-07%, no diastolic dysfunction, There is LA dilation  · BNP 4,472  · Continue atorvastatin 80 mg daily     Pulm:  · Continue PTA inhalers (Incruse ellipta)   · Maintain SpO2 >88%      GI:   · No acute issues  · Stress ulcer prophylaxis:  Protonix  · Bowel regimen:  Senokot     :   · Sears placed  · I/O monitoring  · Continue to follow renal function tests     F/E/N:   · Replete electrolytes with as needed to maintain K >4 0, Mag >2 0, Phos >3 0  · Nutrition:  NPO -> Pt to have swallow eval today, if she does not pass may need peg tube    Heme/Onc:  anemia  · Patient noted to have  · Transfuse for hemoglobin <7 0  · VTE prophylaxis: SCD's to BLE  ?  Consider Argatroban when [facility] is appropriate       Endo/Rheum:  Diabetes  · Hold PTA meds (Januvia)   · Continue SSI      ID:   · No acute issues  · Continue to monitor fever and WBC curve     MSK/Skin:   · Reposition q2h, eliminate pressure points while in bed  · Close skin surveillance      Disposition: Continue Critical Care   Code Status: Level 3 - DNAR and DNI  ---------------------------------------------------------------------------------------  ICU CORE MEASURES    Prophylaxis   VTE Pharmacologic Prophylaxis: Pharmacologic VTE Prophylaxis contraindicated due to stroke burden  VTE Mechanical Prophylaxis: sequential compression device  Stress Ulcer Prophylaxis: Prophylaxis Not Indicated     ABCDE Protocol (if indicated)  Plan to perform spontaneous awakening trial today? Not applicable  Plan to perform spontaneous breathing trial today? Not applicable  Obvious barriers to extubation? Not applicable  CAM-ICU: Positive    Invasive Devices Review  Invasive Devices  Report    Peripheral Intravenous Line  Duration           Peripheral IV 07/22/22 Left;Proximal;Ventral (anterior) Forearm 2 days    Peripheral IV 07/22/22 Left Wrist 1 day          Drain  Duration           Urethral Catheter Non-latex 16 Fr  1 day              Can any invasive devices be discontinued today? No (provide explanation):  Required for continued medical care   ---------------------------------------------------------------------------------------  OBJECTIVE    Physical Exam  Vitals and nursing note reviewed  Constitutional:       General: She is not in acute distress  Appearance: She is well-developed  She is not diaphoretic  HENT:      Head: Normocephalic and atraumatic  Nose: Nose normal    Eyes:      General: No scleral icterus  Right eye: No discharge  Left eye: No discharge  Conjunctiva/sclera: Conjunctivae normal       Pupils: Pupils are equal, round, and reactive to light  Cardiovascular:      Rate and Rhythm: Normal rate and regular rhythm  Heart sounds: Normal heart sounds  No murmur heard  No friction rub  No gallop  Pulmonary:      Effort: Pulmonary effort is normal  No respiratory distress  Breath sounds: Normal breath sounds  No wheezing  Chest:      Chest wall: No tenderness     Abdominal:      General: Bowel sounds are normal  There is no distension  Palpations: Abdomen is soft  Tenderness: There is no abdominal tenderness  Musculoskeletal:         General: No deformity or signs of injury  Cervical back: Neck supple  Skin:     General: Skin is warm and dry  Neurological:      Mental Status: She is disoriented  Motor: Weakness present  Comments: Triple flexion right lower extremity  Withdrawals right upper extremity  Vitals   Vitals:    22 0400 22 0500 22 0600 22 0637   BP: 135/55 143/64 170/74 138/55   Pulse: 86 86 102 84   Resp: 16 18 18 18   Temp: 99 32 °F (37 4 °C) 99 68 °F (37 6 °C) 99 68 °F (37 6 °C) 99 7 °F (37 6 °C)   TempSrc:       SpO2: 96% 95% 96% 96%   Weight:       Height:         Temp (24hrs), Av 6 °F (37 6 °C), Min:98 24 °F (36 8 °C), Max:100 4 °F (38 °C)    Temp:  [98 24 °F (36 8 °C)-100 4 °F (38 °C)] 99 7 °F (37 6 °C)  HR:  [] 84  Resp:  [15-26] 18  BP: ()/() 138/55  SpO2:  [95 %-100 %] 96 %  BP  Min: 97/45  Max: 170/74  Temp  Av 6 °F (37 6 °C)  Min: 98 24 °F (36 8 °C)  Max: 100 4 °F (38 °C)  Pulse  Av 7  Min: 72  Max: 102  Resp  Av 5  Min: 15  Max: 26  SpO2  Av 2 %  Min: 95 %  Max: 100 %  Height  Av' 1" (154 9 cm)  Min: 5' 1" (154 9 cm)  Max: 5' 1" (154 9 cm)  Weight  Av 9 kg (143 lb)  Min: 64 9 kg (143 lb)  Max: 64 9 kg (143 lb)     Respiratory:  SpO2: SpO2: 96 %, SpO2 Device: O2 Device: Ventilator  O2 Flow Rate (L/min): 4 L/min    Invasive/non-invasive ventilation settings   Respiratory  Report   Lab Data (Last 4 hours)    None         O2/Vent Data (Last 4 hours)    None                Height and Weights   Height: 5' 1" (154 9 cm)     Body mass index is 27 02 kg/m²  Weight (last 2 days)     Date/Time Weight    22 1110 64 9 (143)          Intake and Output  I/O        07    P  O   0     I V  (mL/kg) 1149 7 15 5 (0 2)     NG/GT  90     Total Intake(mL/kg) 1149 7 105 5 (1 6)     Urine (mL/kg/hr) 1095 730 (0 5)     Total Output 1095 730     Net +54 7 -624 5                UOP: 32 5 ml/hr     Nutrition       Diet Orders   (From admission, onward)             Start     Ordered    07/22/22 1739  Diet NPO  Diet effective now        References:    Nutrtion Support Algorithm Enteral vs  Parenteral   Question Answer Comment   Diet Type NPO    RD to adjust diet per protocol? Yes        07/22/22 1745              Laboratory and Diagnostics:  Results from last 7 days   Lab Units 07/24/22 0622 07/23/22  0526 07/22/22  1307   WBC Thousand/uL 6 83 7 48 4 34   HEMOGLOBIN g/dL 9 1* 9 5* 11 9   HEMATOCRIT % 29 1* 29 6* 37 3   PLATELETS Thousands/uL 113* 110* 125*   NEUTROS PCT % 64  --  51   MONOS PCT % 12  --  10     Results from last 7 days   Lab Units 07/24/22  0628 07/23/22  0526 07/22/22  1307   SODIUM mmol/L 142 141 138   POTASSIUM mmol/L 3 8 4 3 4 4   CHLORIDE mmol/L 112* 109* 103   CO2 mmol/L 23 25 29   ANION GAP mmol/L 7 7 6   BUN mg/dL 24 27* 32*   CREATININE mg/dL 1 32* 1 46* 1 48*   CALCIUM mg/dL 9 1 9 0 9 3   GLUCOSE RANDOM mg/dL 123 134 128   ALT U/L  --   --  21   AST U/L  --   --  22   ALK PHOS U/L  --   --  71   ALBUMIN g/dL  --   --  3 3*   TOTAL BILIRUBIN mg/dL  --   --  0 72     Results from last 7 days   Lab Units 07/24/22  0628 07/23/22  0526 07/22/22  1307   MAGNESIUM mg/dL 2 5 2 5 2 2   PHOSPHORUS mg/dL 3 5 4 2*  --       Results from last 7 days   Lab Units 07/22/22  1307   INR  1 01   PTT seconds 31              ABG:  Results from last 7 days   Lab Units 07/23/22  0838   PH ART  7 407   PCO2 ART mm Hg 40 3   PO2 ART mm Hg 179 3*   HCO3 ART mmol/L 24 8   BASE EXC ART mmol/L 0 1   ABG SOURCE  Radial, Right     VBG:  Results from last 7 days   Lab Units 07/23/22  0838   ABG SOURCE  Radial, Right           Micro        EKG: No new EKG  Imaging: I have personally reviewed pertinent reports        Active Medications  Scheduled Meds:  Current Facility-Administered Medications   Medication Dose Route Frequency Provider Last Rate    acetaminophen  650 mg Oral Q4H PRN Cathy Kiah,       albuterol  2 5 mg Nebulization Q6H PRN Cathy Kiah, DO      aspirin  81 mg Oral Daily Tu B Le, DO      atorvastatin  80 mg Oral QPM Hernesto Washington PA-C      chlorhexidine  15 mL Mouth/Throat Q12H [address] Hernesto Washington PA-C      fentanyl citrate (PF)  50 mcg Intravenous Q1H PRN Hernesto Washington PA-C      fluticasone  1 spray Nasal Daily Fredi Adler DO      insulin lispro  1-5 Units Subcutaneous Q6H [address] Fredi Buenrostro,       senna  1 tablet Oral Daily Fredi Buenrostro, DO      umeclidinium bromide  1 puff Inhalation Daily Fredi Adler DO       Continuous Infusions:     PRN Meds:   acetaminophen, 650 mg, Q4H PRN  albuterol, 2 5 mg, Q6H PRN  fentanyl citrate (PF), 50 mcg, Q1H PRN        Allergies   Allergies   Allergen Reactions    Glimepiride Rash    Guaifenesin-Codeine Hallucinations    Heparin Other (See Comments)     Thrombocytopenia      Vancomycin      Red man syndrome       Advance Directive and Living Will:      Power of :    POLST:      Counseling / Coordination of Care  Please see attendings attestation    Cathy Dupont DO      Portions of the record may have been created with voice recognition software  Occasional wrong word or "sound a like" substitutions may have occurred due to the inherent limitations of voice recognition software    Read the chart carefully and recognize, using context, where substitutions have occurred

## 2022-07-25 PROBLEM — J44.89 COPD WITH ASTHMA: Status: ACTIVE | Noted: 2022-07-25

## 2022-07-25 PROBLEM — J44.9 COPD WITH ASTHMA (HCC): Status: ACTIVE | Noted: 2022-07-25

## 2022-07-25 LAB
ANION GAP SERPL CALCULATED.3IONS-SCNC: 6 MMOL/L (ref 4–13)
BASOPHILS # BLD AUTO: 0.01 THOUSANDS/ΜL (ref 0–0.1)
BASOPHILS NFR BLD AUTO: 0 % (ref 0–1)
BUN SERPL-MCNC: 29 MG/DL (ref 5–25)
CALCIUM SERPL-MCNC: 9.4 MG/DL (ref 8.3–10.1)
CHLORIDE SERPL-SCNC: 112 MMOL/L (ref 96–108)
CO2 SERPL-SCNC: 26 MMOL/L (ref 21–32)
CREAT SERPL-MCNC: 1.45 MG/DL (ref 0.6–1.3)
EOSINOPHIL # BLD AUTO: 0.18 THOUSAND/ΜL (ref 0–0.61)
EOSINOPHIL NFR BLD AUTO: 3 % (ref 0–6)
ERYTHROCYTE [DISTWIDTH] IN BLOOD BY AUTOMATED COUNT: 12.6 % (ref 11.6–15.1)
GFR SERPL CREATININE-BSD FRML MDRD: 32 ML/MIN/1.73SQ M
GLUCOSE SERPL-MCNC: 161 MG/DL (ref 65–140)
GLUCOSE SERPL-MCNC: 166 MG/DL (ref 65–140)
GLUCOSE SERPL-MCNC: 174 MG/DL (ref 65–140)
GLUCOSE SERPL-MCNC: 178 MG/DL (ref 65–140)
GLUCOSE SERPL-MCNC: 178 MG/DL (ref 65–140)
HCT VFR BLD AUTO: 28.7 % (ref 34.8–46.1)
HGB BLD-MCNC: 8.9 G/DL (ref 11.5–15.4)
IMM GRANULOCYTES # BLD AUTO: 0.02 THOUSAND/UL (ref 0–0.2)
IMM GRANULOCYTES NFR BLD AUTO: 0 % (ref 0–2)
LYMPHOCYTES # BLD AUTO: 0.97 THOUSANDS/ΜL (ref 0.6–4.47)
LYMPHOCYTES NFR BLD AUTO: 14 % (ref 14–44)
MAGNESIUM SERPL-MCNC: 2.6 MG/DL (ref 1.6–2.6)
MCH RBC QN AUTO: 30.2 PG (ref 26.8–34.3)
MCHC RBC AUTO-ENTMCNC: 31 G/DL (ref 31.4–37.4)
MCV RBC AUTO: 97 FL (ref 82–98)
MONOCYTES # BLD AUTO: 0.66 THOUSAND/ΜL (ref 0.17–1.22)
MONOCYTES NFR BLD AUTO: 10 % (ref 4–12)
NEUTROPHILS # BLD AUTO: 4.88 THOUSANDS/ΜL (ref 1.85–7.62)
NEUTS SEG NFR BLD AUTO: 73 % (ref 43–75)
NRBC BLD AUTO-RTO: 0 /100 WBCS
PHOSPHATE SERPL-MCNC: 2.8 MG/DL (ref 2.3–4.1)
PLATELET # BLD AUTO: 137 THOUSANDS/UL (ref 149–390)
PMV BLD AUTO: 10.4 FL (ref 8.9–12.7)
POTASSIUM SERPL-SCNC: 3.6 MMOL/L (ref 3.5–5.3)
RBC # BLD AUTO: 2.95 MILLION/UL (ref 3.81–5.12)
SODIUM SERPL-SCNC: 144 MMOL/L (ref 135–147)
WBC # BLD AUTO: 6.72 THOUSAND/UL (ref 4.31–10.16)

## 2022-07-25 PROCEDURE — 94760 N-INVAS EAR/PLS OXIMETRY 1: CPT

## 2022-07-25 PROCEDURE — 84100 ASSAY OF PHOSPHORUS: CPT | Performed by: PSYCHIATRY & NEUROLOGY

## 2022-07-25 PROCEDURE — 99232 SBSQ HOSP IP/OBS MODERATE 35: CPT | Performed by: STUDENT IN AN ORGANIZED HEALTH CARE EDUCATION/TRAINING PROGRAM

## 2022-07-25 PROCEDURE — 94640 AIRWAY INHALATION TREATMENT: CPT

## 2022-07-25 PROCEDURE — NC001 PR NO CHARGE: Performed by: ANESTHESIOLOGY

## 2022-07-25 PROCEDURE — NC001 PR NO CHARGE

## 2022-07-25 PROCEDURE — 83735 ASSAY OF MAGNESIUM: CPT | Performed by: PSYCHIATRY & NEUROLOGY

## 2022-07-25 PROCEDURE — 97167 OT EVAL HIGH COMPLEX 60 MIN: CPT

## 2022-07-25 PROCEDURE — 92526 ORAL FUNCTION THERAPY: CPT

## 2022-07-25 PROCEDURE — 97530 THERAPEUTIC ACTIVITIES: CPT

## 2022-07-25 PROCEDURE — 82542 COL CHROMOTOGRAPHY QUAL/QUAN: CPT | Performed by: STUDENT IN AN ORGANIZED HEALTH CARE EDUCATION/TRAINING PROGRAM

## 2022-07-25 PROCEDURE — 97163 PT EVAL HIGH COMPLEX 45 MIN: CPT

## 2022-07-25 PROCEDURE — 85025 COMPLETE CBC W/AUTO DIFF WBC: CPT | Performed by: PSYCHIATRY & NEUROLOGY

## 2022-07-25 PROCEDURE — 82948 REAGENT STRIP/BLOOD GLUCOSE: CPT

## 2022-07-25 PROCEDURE — 80048 BASIC METABOLIC PNL TOTAL CA: CPT | Performed by: PSYCHIATRY & NEUROLOGY

## 2022-07-25 PROCEDURE — 94664 DEMO&/EVAL PT USE INHALER: CPT

## 2022-07-25 PROCEDURE — 99291 CRITICAL CARE FIRST HOUR: CPT | Performed by: ANESTHESIOLOGY

## 2022-07-25 RX ORDER — ASPIRIN 81 MG/1
81 TABLET, CHEWABLE ORAL DAILY
Status: DISCONTINUED | OUTPATIENT
Start: 2022-07-26 | End: 2022-08-04 | Stop reason: HOSPADM

## 2022-07-25 RX ORDER — BUDESONIDE 0.5 MG/2ML
0.5 INHALANT ORAL
Status: DISCONTINUED | OUTPATIENT
Start: 2022-07-25 | End: 2022-07-25

## 2022-07-25 RX ORDER — LEVALBUTEROL 1.25 MG/.5ML
1.25 SOLUTION, CONCENTRATE RESPIRATORY (INHALATION)
Status: CANCELLED | OUTPATIENT
Start: 2022-07-25

## 2022-07-25 RX ORDER — LEVALBUTEROL 1.25 MG/.5ML
1.25 SOLUTION, CONCENTRATE RESPIRATORY (INHALATION)
Status: DISCONTINUED | OUTPATIENT
Start: 2022-07-25 | End: 2022-08-04 | Stop reason: HOSPADM

## 2022-07-25 RX ORDER — POTASSIUM CHLORIDE 20MEQ/15ML
40 LIQUID (ML) ORAL ONCE
Status: COMPLETED | OUTPATIENT
Start: 2022-07-25 | End: 2022-07-25

## 2022-07-25 RX ORDER — BUDESONIDE 0.5 MG/2ML
0.5 INHALANT ORAL
Status: DISCONTINUED | OUTPATIENT
Start: 2022-07-25 | End: 2022-08-04 | Stop reason: HOSPADM

## 2022-07-25 RX ORDER — POLYETHYLENE GLYCOL 3350 17 G/17G
17 POWDER, FOR SOLUTION ORAL DAILY
Status: DISCONTINUED | OUTPATIENT
Start: 2022-07-25 | End: 2022-07-30

## 2022-07-25 RX ORDER — MINERAL OIL AND PETROLATUM 150; 830 MG/G; MG/G
OINTMENT OPHTHALMIC
Status: DISCONTINUED | OUTPATIENT
Start: 2022-07-25 | End: 2022-08-04 | Stop reason: HOSPADM

## 2022-07-25 RX ORDER — SODIUM CHLORIDE FOR INHALATION 0.9 %
3 VIAL, NEBULIZER (ML) INHALATION
Status: DISCONTINUED | OUTPATIENT
Start: 2022-07-25 | End: 2022-07-26

## 2022-07-25 RX ADMIN — WHITE PETROLATUM 57.7 %-MINERAL OIL 31.9 % EYE OINTMENT: at 21:41

## 2022-07-25 RX ADMIN — LEVALBUTEROL HYDROCHLORIDE 1.25 MG: 1.25 SOLUTION, CONCENTRATE RESPIRATORY (INHALATION) at 10:16

## 2022-07-25 RX ADMIN — BUDESONIDE 0.5 MG: 0.5 INHALANT ORAL at 20:57

## 2022-07-25 RX ADMIN — Medication 12.5 MG: at 12:03

## 2022-07-25 RX ADMIN — INSULIN LISPRO 1 UNITS: 100 INJECTION, SOLUTION INTRAVENOUS; SUBCUTANEOUS at 12:00

## 2022-07-25 RX ADMIN — CHLORHEXIDINE GLUCONATE 15 ML: 1.2 SOLUTION ORAL at 08:24

## 2022-07-25 RX ADMIN — INSULIN LISPRO 1 UNITS: 100 INJECTION, SOLUTION INTRAVENOUS; SUBCUTANEOUS at 00:28

## 2022-07-25 RX ADMIN — ISODIUM CHLORIDE 3 ML: 0.03 SOLUTION RESPIRATORY (INHALATION) at 20:57

## 2022-07-25 RX ADMIN — INSULIN LISPRO 1 UNITS: 100 INJECTION, SOLUTION INTRAVENOUS; SUBCUTANEOUS at 06:20

## 2022-07-25 RX ADMIN — POLYETHYLENE GLYCOL 3350 17 G: 17 POWDER, FOR SOLUTION ORAL at 12:03

## 2022-07-25 RX ADMIN — INSULIN LISPRO 1 UNITS: 100 INJECTION, SOLUTION INTRAVENOUS; SUBCUTANEOUS at 18:25

## 2022-07-25 RX ADMIN — SENNOSIDES 8.6 MG: 8.6 TABLET, FILM COATED ORAL at 08:14

## 2022-07-25 RX ADMIN — POTASSIUM CHLORIDE 40 MEQ: 20 SOLUTION ORAL at 08:14

## 2022-07-25 RX ADMIN — Medication 12.5 MG: at 21:41

## 2022-07-25 RX ADMIN — LEVALBUTEROL HYDROCHLORIDE 1.25 MG: 1.25 SOLUTION, CONCENTRATE RESPIRATORY (INHALATION) at 20:57

## 2022-07-25 RX ADMIN — ASPIRIN 300 MG: 300 SUPPOSITORY RECTAL at 08:45

## 2022-07-25 RX ADMIN — CHLORHEXIDINE GLUCONATE 15 ML: 1.2 SOLUTION ORAL at 21:41

## 2022-07-25 RX ADMIN — ATORVASTATIN CALCIUM 80 MG: 80 TABLET, FILM COATED ORAL at 18:27

## 2022-07-25 RX ADMIN — ISODIUM CHLORIDE 3 ML: 0.03 SOLUTION RESPIRATORY (INHALATION) at 10:16

## 2022-07-25 NOTE — ASSESSMENT & PLAN NOTE
Lab Results   Component Value Date    EGFR 32 07/25/2022    EGFR 36 07/24/2022    EGFR 31 07/23/2022    CREATININE 1 45 (H) 07/25/2022    CREATININE 1 32 (H) 07/24/2022    CREATININE 1 46 (H) 07/23/2022     Baseline creatinine 1 4-1 6  Current and has remained within baseline during admission      · Discontinue Sears catheter 7/25  · Continue to monitor BUN, creatinine, I/O

## 2022-07-25 NOTE — PHYSICAL THERAPY NOTE
Physical Therapy Evaluation and Treatment    Patient Name: Socorro Conte    RTOWJ'R Date: 7/25/2022     Problem List  Principal Problem:    CVA (cerebral vascular accident) [facility])  Active Problems:    Deep vein thrombosis (DVT) of left upper extremity ([facility] )    H/O mitral valve replacement    Controlled type 2 diabetes mellitus without complication, without long-term current use of insulin ([facility])    Hypertension    Systolic congestive heart failure ([facility] )    Cardio-embolic left MCA stroke ([facility] )    History of CVA (cerebrovascular accident)    CKD (chronic kidney disease) stage 3, GFR 30-59 ml/min ([facility])    Dysphagia    Bilateral carotid artery stenosis    Vascular dementia ([facility])    Ambulatory dysfunction    Meningioma ([facility])    Paroxysmal A-fib ([facility])    Thrombocytopenia ([facility])    Coronary artery disease with angina pectoris, unspecified vessel or lesion type, unspecified whether native or transplanted heart ([facility] )    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[facility] )       Past Medical History  Past Medical History:   Diagnosis Date    Acid reflux     on occ    Arthritis     DJD right hip replaced    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[facility] ) 2007    x 2 lesions with no change    Cancer ([facility] ) 2007    colonic polyps, no surgery done    Deep vein thrombosis (DVT) of left upper extremity ([facility] ) 12/11/2017    Dementia ([place] [address] )     Diabetes mellitus ([facility] )     type 2    Diverticulosis     Edema     in legs on occ    Hypertension     on occ    Language barrier     speaks [place] & broken english    Stroke [facility])         Past Surgical History  Past Surgical History:   Procedure Laterality Date    COLON SURGERY      COLONOSCOPY      COLONOSCOPY N/A 11/2/2016    Procedure: COLONOSCOPY;  Surgeon: Troy Barber MD;  Location: [place] [address] GI LAB; Service:     ESOPHAGOGASTRODUODENOSCOPY N/A 11/2/2016    Procedure: ESOPHAGOGASTRODUODENOSCOPY (EGD); Surgeon: Troy Barber MD;  Location: [facility] GI LAB;   Service:     Fletcher Phillip / Katrin Irving / REMOVE PACEMAKER      IR STROKE ALERT  7/22/2022    JOINT REPLACEMENT Right 02/2015    hip    JOINT REPLACEMENT Left     knee    JOINT REPLACEMENT Right     knee    MITRAL VALVE REPLACEMENT      FL COLONOSCOPY FLX DX W/COLLJ SPEC WHEN PFRMD N/A 8/9/2018    Procedure: EGD AND COLONOSCOPY;  Surgeon: Anabel Blanc MD;  [facility]; Service: Gastroenterology    FL REVISE MEDIAN N/CARPAL TUNNEL SURG Left 1/28/2016    Procedure: RELEASE CARPAL TUNNEL;  Surgeon: Lacie Mendoza MD;  Location: [facility] MAIN OR;  Service: Orthopedics    TUBAL LIGATION      VARICOSE VEIN SURGERY Bilateral            07/25/22 1009   PT Last Visit   PT Visit Date 07/25/22   Note Type   Note type Evaluation   Pain Assessment   Pain Assessment Tool FLACC   Pain Rating: FLACC (Rest) - Face 1   Pain Rating: FLACC (Rest) - Legs 0   Pain Rating: FLACC (Rest) - Activity 0   Pain Rating: FLACC (Rest) - Cry 0   Pain Rating: FLACC (Rest) - Consolability 0   Score: FLACC (Rest) 1   Pain Rating: FLACC (Activity) - Face 1   Pain Rating: FLACC (Activity) - Legs 0   Pain Rating: FLACC (Activity) - Activity 0   Pain Rating: FLACC (Activity) - Cry 0   Pain Rating: FLACC (Activity) - Consolability 0   Score: FLACC (Activity) 1   Restrictions/Precautions   Weight Bearing Precautions Per Order No   Other Precautions Cognitive; Chair Alarm; Bed Alarm; Restraints;Multiple lines;Telemetry  (NG tube, L wrist restraint, global aphasia, R sided neglect)   Home Living   Type of [address] [place] [address] Two level; Able to live on main level with bedroom/bathroom; Performs ADLs on one level  (3+1 DANIELA)   Bathroom Shower/Tub Tub/shower unit   Bathroom Toilet Standard   Bathroom Equipment Grab bars in shower;Grab bars around toilet; Shower chair   Home Equipment Walker;Cane   Additional Comments Pt unable to communicate due to aphasia; per lupillougher: pt lives alone in a [facility] with 3+1 DANIELA  Pt has FFSU and stays on the 1st floor  Pt owns cane, walker and transport chair  Prior Function   Level of [place] Needs assistance with IADLs; Needs assistance with ADLs and functional mobility   Lives With Alone   Receives Help From Family   ADL Assistance Needs assistance   IADLs Needs assistance   Falls in the last 6 months 0   Vocational Retired   Comments Per daughter: pt has assistance 24/7 from family members  Pt requires A with ADL/IADLs and uses cane when ambulating outside but furniture walks inside home  Pt is retired and (-)    General   Family/Caregiver Present Yes  (daughter)   Cognition   Overall Cognitive Status Impaired   Arousal/Participation Lethargic   Attention SELAM   Orientation Level Unable to assess   Memory Unable to assess   Following Commands Follows one step commands inconsistently   Subjective   Subjective Pt was supine in bed upon PT arrival   Pt was minimally responsive and didn't follow commands but able to squeeze fingers when asked  RLE Assessment   RLE Assessment X  (Pt unable to follow directions and minimally responsive but grossly <3/5 during mobility)   Strength RLE   R Knee Flexion 1/5   LLE Assessment   LLE Assessment X  (Pt unable to follow directions and minimally responsive but grossly <3/5 during mobility)   Bed Mobility   Rolling R 2  Maximal assistance   Additional items Assist x 2; Increased time required;LE management   Supine to Sit 2  Maximal assistance   Additional items Assist x 2;HOB elevated; Increased time required;Verbal cues;LE management   Sit to Supine 2  Maximal assistance   Additional items Assist x 2;HOB elevated; Increased time required;Verbal cues;LE management   Additional Comments Pt sat EOB w/ max Ax1 and posterior lean   Transfers   Sit to Stand 2  Maximal assistance   Additional items Assist x 2; Increased time required;Verbal cues   Stand to Sit 2  Maximal assistance   Additional items Assist x 2; Increased time required;Verbal cues   Additional Comments B/L HHA and knee blocks for support; VCs for safety and technique  Pt performed 2 STS trials and demo'd R knee buckle   Ambulation/Elevation   Gait pattern Not appropriate   Gait Assistance Not tested   Ambulation/Elevation Additional Comments not appropriate at this time due to weakness and R knee buckle with STS trials  Balance   Static Sitting Poor -   Dynamic Sitting Poor -   Static Standing Poor -   Dynamic Standing Poor -   Endurance Deficit   Endurance Deficit Yes   Endurance Deficit Description weakness, fatigue   Activity Tolerance   Activity Tolerance Patient limited by fatigue   Medical Staff Made Aware OT   Nurse Made Aware RN cleared pt for therapy   Assessment   Prognosis Poor   Problem List Decreased strength; Impaired balance;Decreased endurance;Decreased mobility; Decreased cognition;Decreased safety awareness; Impaired judgement   Assessment Pt is a 79 y/o female admitted [facility] for stroke alert then transferred to [address] [place] [address] on 7/22/2022 for thrombectomy which was unsuccessful  Pt's Dx and personal factors are cardio-embolic L MCA, h/o L MCA stroke, vascular dementia, known meningioma, HTN, HLD, HF, afib, CKD3, dysphagia, COPD w/ asthma, T2DM, h/o mitral valve replacement, systolic congestive HF, and anemia  Per daughter, Pt lives alone in a [facility] with 3+1 DANIELA and FFSU  Pt stays on first floor and 3 dtrs rotate to assist pt everyday  Pt required A with ADL/IADLs prior  Pt ambulated with cane in the community but would furniture walk when at home  Pt owns walker and transport chain  During the examination, pt demonstrated decrease BLE strength, RUE flaccidity, impaired sitting and standing balance and tolerance, decrease activity tolerance, impaired endurance, global aphasia, lethargic, doesn't follow commands, R sided neglect, and fall risk which limits pt to perform ADLs independently and increases risks for falls  Pt will benefit with skilled PT interventions while in the hospital to address the impairments stated above  Pt is recommended to rehab upon D/C  At the end of the session, pt was supine in bed with call bell within reach and L wrist restraint on  Barriers to Discharge Inaccessible home environment   Goals   STG Expiration Date 08/08/22   Short Term Goal #1 STG 1: Pt will perform supine <-> sit at modAx1 to demonstrate improved bed mobility and decrease any risk for skin breakdown  STG 2: Pt will perform sit <-> stand at modAx1 with least restrictive AD to demonstrate improved transfer mobility so the pt can get in/out of bed safely  STG 3: Pt will sit EOB at [facility] for 20 min while performing dynamic sitting balances exercises without any LOB to improve sitting balance and tolerance  Will continue to assess pt's mobility and update the goals   PT Treatment Day 0   Plan   Treatment/Interventions Functional transfer training;LE strengthening/ROM; Therapeutic exercise; Endurance training;Cognitive reorientation;Equipment eval/education; Bed mobility;Gait training;OT   PT Frequency   (3-6x/wk)   Recommendation   PT Discharge Recommendation Post acute rehabilitation services   AM-PAC Basic Mobility Inpatient   Turning in Bed Without Bedrails 1   Lying on Back to Sitting on Edge of Flat Bed 1   Moving Bed to Chair 1   Standing Up From Chair 1   Walk in Room 1   Climb 3-5 Stairs 1   Basic Mobility Inpatient Raw Score 6   Turning Head Towards Sound 1   Follow Simple Instructions 1   Low Function Basic Mobility Raw Score 8   Low Function Basic Mobility Standardized Score 10 37   Highest Level Of Mobility   JH-HLM Goal 2: Bed activities/Dependent transfer   JH-HLM Achieved 3: Sit at edge of bed     Treatment: [id number]:   - STS x1 w/ maxA x2   - sat EOB for 10-12 minutes with maxA   - Family education on reorienting pt, standing to pt's R side to address neglect, and encouraging mobility of LEs by performing hip flexion, knee flexion, and DF/PF PROM    Nicolina Lombard, SPT

## 2022-07-25 NOTE — PLAN OF CARE
Problem: Potential for Falls  Goal: Patient will remain free of falls  Description: INTERVENTIONS:  - Educate patient/family on patient safety including physical limitations  - Instruct patient to call for assistance with activity   - Consult OT/PT to assist with strengthening/mobility   - Keep Call bell within reach  - Keep bed low and locked with side rails adjusted as appropriate  - Keep care items and personal belongings within reach  - Initiate and maintain comfort rounds  - Make Fall Risk Sign visible to staff  - Offer Toileting every 2 Hours, in advance of need  - Initiate/Maintain Bed/Chair alarm  - Obtain necessary fall risk management equipment  - Apply yellow socks and bracelet for high fall risk patients  - Consider moving patient to room near nurses station  7/25/2022 0948 by Mahesh Perez RN  Outcome: Progressing  7/25/2022 0947 by Mahesh Perez RN  Outcome: Progressing     Problem: Prexisting or High Potential for Compromised Skin Integrity  Goal: Skin integrity is maintained or improved  Description: INTERVENTIONS:  - Identify patients at risk for skin breakdown  - Assess and monitor skin integrity  - Assess and monitor nutrition and hydration status  - Monitor labs   - Assess for incontinence   - Turn and reposition patient  - Assist with mobility/ambulation  - Relieve pressure over bony prominences  - Avoid friction and shearing  - Provide appropriate hygiene as needed including keeping skin clean and dry  - Evaluate need for skin moisturizer/barrier cream  - Collaborate with interdisciplinary team   - Patient/family teaching  - Consider wound care consult   7/25/2022 0948 by Mahesh Perez RN  Outcome: Progressing  7/25/2022 0947 by Mahesh Perez RN  Outcome: Progressing     Problem: Neurological Deficit  Goal: Neurological status is stable or improving  Description: Interventions:  - Monitor and assess patient's level of consciousness, motor function, sensory function, and level of assistance needed for ADLs  - Monitor and report changes from baseline  Collaborate with interdisciplinary team to initiate plan and implement interventions as ordered  - Provide and maintain a safe environment  - Consider seizure precautions  - Consider fall precautions  - Consider aspiration precautions  - Consider bleeding precautions  7/25/2022 0948 by Eddie Chen RN  Outcome: Progressing  7/25/2022 0947 by Eddie Chen RN  Outcome: Progressing     Problem: Activity Intolerance/Impaired Mobility  Goal: Mobility/activity is maintained at optimum level for patient  Description: Interventions:  - Assess and monitor patient  barriers to mobility and need for assistive/adaptive devices  - Assess patient's emotional response to limitations  - Collaborate with interdisciplinary team and initiate plans and interventions as ordered  - Encourage independent activity per ability   - Maintain proper body alignment  - Perform active/passive rom as tolerated/ordered  - Plan activities to conserve energy   - Turn patient as appropriate  7/25/2022 0948 by Eddie Chen RN  Outcome: Progressing  7/25/2022 0947 by Eddie Chen RN  Outcome: Progressing     Problem: Communication Impairment  Goal: Ability to express needs and understand communication  Description: Assess patient's communication skills and ability to understand information  Patient will demonstrate use of effective communication techniques, alternative methods of communication and understanding even if not able to speak  - Encourage communication and provide alternate methods of communication as needed  - Collaborate with case management/ for discharge needs  - Include patient/family/caregiver in decisions related to communication    7/25/2022 0948 by Eddie Chen RN  Outcome: Progressing  7/25/2022 0947 by Eddie Chen RN  Outcome: Progressing     Problem: Potential for Aspiration  Goal: Non-ventilated patient's risk of aspiration is minimized  Description: Assess and monitor vital signs, respiratory status, and labs (WBC)  Monitor for signs of aspiration (tachypnea, cough, rales, wheezing, cyanosis, fever)  - Assess and monitor patient's ability to swallow  - Place patient up in chair to eat if possible  - HOB up at 90 degrees to eat if unable to get patient up into chair   - Supervise patient during oral intake  - Instruct patient/ family to take small bites  - Instruct patient/ family to take small single sips when taking liquids  - Follow patient-specific strategies generated by speech pathologist   7/25/2022 0948 by Brennon Prabhakar RN  Outcome: Progressing  7/25/2022 0947 by Brennon Prabhakar RN  Outcome: Progressing     Problem: Nutrition  Goal: Nutrition/Hydration status is improving  Description: Monitor and assess patient's nutrition/hydration status for malnutrition (ex- brittle hair, bruises, dry skin, pale skin and conjunctiva, muscle wasting, smooth red tongue, and disorientation)  Collaborate with interdisciplinary team and initiate plan and interventions as ordered  Monitor patient's weight and dietary intake as ordered or per policy  Utilize nutrition screening tool and intervene per policy  Determine patient's food preferences and provide high-protein, high-caloric foods as appropriate  - Assist patient with eating   - Allow adequate time for meals   - Encourage patient to take dietary supplement as ordered  - Collaborate with clinical nutritionist   - Include patient/family/caregiver in decisions related to nutrition  7/25/2022 0948 by Brennon Prabhakar RN  Outcome: Progressing  7/25/2022 0947 by Brennon Prabhakar RN  Outcome: Progressing     Problem: Nutrition/Hydration-ADULT  Goal: Nutrient/Hydration intake appropriate for improving, restoring or maintaining nutritional needs  Description: Monitor and assess patient's nutrition/hydration status for malnutrition   Collaborate with interdisciplinary team and initiate plan and interventions as ordered  Monitor patient's weight and dietary intake as ordered or per policy  Utilize nutrition screening tool and intervene as necessary  Determine patient's food preferences and provide high-protein, high-caloric foods as appropriate       INTERVENTIONS:  - Monitor oral intake, urinary output, labs, and treatment plans  - Assess nutrition and hydration status and recommend course of action  - Evaluate amount of meals eaten  - Assist patient with eating if necessary   - Allow adequate time for meals  - Recommend/ encourage appropriate diets, oral nutritional supplements, and vitamin/mineral supplements  - Order, calculate, and assess calorie counts as needed  - Recommend, monitor, and adjust tube feedings and TPN/PPN based on assessed needs  - Assess need for intravenous fluids  - Provide specific nutrition/hydration education as appropriate  - Include patient/family/caregiver in decisions related to nutrition  7/25/2022 0948 by Mati Keyes RN  Outcome: Progressing  7/25/2022 0947 by Mati Keyes RN  Outcome: Progressing     Problem: NEUROSENSORY - ADULT  Goal: Achieves stable or improved neurological status  Description: INTERVENTIONS  - Monitor and report changes in neurological status  - Monitor vital signs such as temperature, blood pressure, glucose, and any other labs ordered   - Initiate measures to prevent increased intracranial pressure  - Monitor for seizure activity and implement precautions if appropriate      7/25/2022 0948 by Mati Keyes RN  Outcome: Progressing  7/25/2022 0947 by Mati Keyes RN  Outcome: Progressing     Problem: CARDIOVASCULAR - ADULT  Goal: Maintains optimal cardiac output and hemodynamic stability  Description: INTERVENTIONS:  - Monitor I/O, vital signs and rhythm  - Monitor for S/S and trends of decreased cardiac output  - Administer and titrate ordered vasoactive medications to optimize hemodynamic stability  - Assess quality of pulses, skin color and temperature  - Assess for signs of decreased coronary artery perfusion  - Instruct patient to report change in severity of symptoms  7/25/2022 0948 by Tierra Guillaume RN  Outcome: Progressing  7/25/2022 0947 by Tierra Guillaume RN  Outcome: Progressing  Goal: Absence of cardiac dysrhythmias or at baseline rhythm  Description: INTERVENTIONS:  - Continuous cardiac monitoring, vital signs, obtain 12 lead EKG if ordered  - Administer antiarrhythmic and heart rate control medications as ordered  - Monitor electrolytes and administer replacement therapy as ordered  7/25/2022 0948 by Tierra Guillaume RN  Outcome: Progressing  7/25/2022 0947 by Tierra Guillaume RN  Outcome: Progressing     Problem: RESPIRATORY - ADULT  Goal: Achieves optimal ventilation and oxygenation  Description: INTERVENTIONS:  - Assess for changes in respiratory status  - Assess for changes in mentation and behavior  - Position to facilitate oxygenation and minimize respiratory effort  - Oxygen administered by appropriate delivery if ordered  - Initiate smoking cessation education as indicated  - Encourage broncho-pulmonary hygiene including cough, deep breathe, Incentive Spirometry  - Assess the need for suctioning and aspirate as needed  - Assess and instruct to report SOB or any respiratory difficulty  - Respiratory Therapy support as indicated  7/25/2022 0948 by Tierra Guillaume RN  Outcome: Progressing  7/25/2022 0947 by Tierra Guillaume RN  Outcome: Progressing     Problem: GASTROINTESTINAL - ADULT  Goal: Maintains or returns to baseline bowel function  Description: INTERVENTIONS:  - Assess bowel function  - Encourage oral fluids to ensure adequate hydration  - Administer IV fluids if ordered to ensure adequate hydration  - Administer ordered medications as needed  - Encourage mobilization and activity  - Consider nutritional services referral to assist patient with adequate nutrition and appropriate food choices  7/25/2022 0948 by Esequiel Panchal RN  Outcome: Progressing  7/25/2022 0947 by Esequiel Panchal RN  Outcome: Progressing  Goal: Maintains adequate nutritional intake  Description: INTERVENTIONS:  - Monitor percentage of each meal consumed  - Identify factors contributing to decreased intake, treat as appropriate  - Assist with meals as needed  - Monitor I&O, weight, and lab values if indicated  - Obtain nutrition services referral as needed  7/25/2022 0948 by Esequiel Panchal RN  Outcome: Progressing  7/25/2022 0947 by Esequiel Panchal RN  Outcome: Progressing     Problem: GENITOURINARY - ADULT  Goal: Maintains or returns to baseline urinary function  Description: INTERVENTIONS:  - Assess urinary function  - Encourage oral fluids to ensure adequate hydration if ordered  - Administer IV fluids as ordered to ensure adequate hydration  - Administer ordered medications as needed  - Offer frequent toileting  - Follow urinary retention protocol if ordered  7/25/2022 0948 by Esequiel Panchal RN  Outcome: Progressing  7/25/2022 0947 by Esequiel Panchal RN  Outcome: Progressing  Goal: Urinary catheter remains patent  Description: INTERVENTIONS:  - Assess patency of urinary catheter  - If patient has a chronic schaefer, consider changing catheter if non-functioning  - Follow guidelines for intermittent irrigation of non-functioning urinary catheter  7/25/2022 0948 by Esequiel Panchal RN  Outcome: Progressing  7/25/2022 0947 by Esequiel Panchal RN  Outcome: Progressing     Problem: METABOLIC, FLUID AND ELECTROLYTES - ADULT  Goal: Electrolytes maintained within normal limits  Description: INTERVENTIONS:  - Monitor labs and assess patient for signs and symptoms of electrolyte imbalances  - Administer electrolyte replacement as ordered  - Monitor response to electrolyte replacements, including repeat lab results as appropriate  - Instruct patient on fluid and nutrition as appropriate  7/25/2022 0948 by Esequiel Panchal RN  Outcome: Progressing  7/25/2022 0947 by Ian Birmingham Ai Barlow RN  Outcome: Progressing  Goal: Fluid balance maintained  Description: INTERVENTIONS:  - Monitor labs   - Monitor I/O and WT  - Instruct patient on fluid and nutrition as appropriate  - Assess for signs & symptoms of volume excess or deficit  7/25/2022 0948 by Vineet Bond RN  Outcome: Progressing  7/25/2022 0947 by Vineet Bond RN  Outcome: Progressing  Goal: Glucose maintained within target range  Description: INTERVENTIONS:  - Monitor Blood Glucose as ordered  - Assess for signs and symptoms of hyperglycemia and hypoglycemia  - Administer ordered medications to maintain glucose within target range  - Assess nutritional intake and initiate nutrition service referral as needed  7/25/2022 0948 by Vineet Bond RN  Outcome: Progressing  7/25/2022 0947 by Vineet Bond RN  Outcome: Progressing     Problem: SKIN/TISSUE INTEGRITY - ADULT  Goal: Skin Integrity remains intact(Skin Breakdown Prevention)  Description: Assess:  -Perform Wei assessment every shift  -Clean and moisturize skin every shift and PRN  -Inspect skin when repositioning, toileting, and assisting with ADLS  -Assess under medical devices every shift  -Assess extremities for adequate circulation and sensation     Bed Management:  -Have minimal linens on bed & keep smooth, unwrinkled  -Change linens as needed when moist or perspiring  -Avoid sitting or lying in one position for more than 2 hours while in bed  -Keep HOB at 30 degrees     Toileting:  -Offer bedside commode  -Assess for incontinence every 2 hours  -Use incontinent care products after each incontinent episode     Activity:  -Mobilize patient 2 times a day  -Encourage activity and walks on unit  -Encourage or provide ROM exercises   -Turn and reposition patient every 2 Hours  -Use appropriate equipment to lift or move patient in bed  -Instruct/ Assist with weight shifting every 30 minutes when out of bed in chair  -Consider limitation of chair time 4 hour intervals    Skin Care:  -Avoid use of baby powder, tape, friction and shearing, hot water or constrictive clothing  -Relieve pressure over bony prominences  -Do not massage red bony areas    7/25/2022 0948 by Junior Darci RN  Outcome: Progressing  7/25/2022 0947 by Junior Darci RN  Outcome: Progressing  Goal: Incision(s), wounds(s) or drain site(s) healing without S/S of infection  Description: INTERVENTIONS  - Assess and document dressing, incision, wound bed, drain sites and surrounding tissue  - Provide patient and family education  - Perform skin care/dressing changes every shift and PRN  7/25/2022 0948 by Junior Darci RN  Outcome: Progressing  7/25/2022 0947 by Junior Darci RN  Outcome: Progressing  Goal: Pressure injury heals and does not worsen  Description: Interventions:  - Implement low air loss mattress or specialty surface (Criteria met)  - Apply silicone foam dressing  - Instruct/assist with weight shifting every 30 minutes when in chair   - Limit chair time to 4 hour intervals  - Use special pressure reducing interventions when in chair   - Apply fecal or urinary incontinence containment device   - Perform passive or active ROM every 2 hours  - Turn and reposition patient & offload bony prominences every 2 hours   - Utilize friction reducing device or surface for transfers   - Consider consults to  interdisciplinary teams   - Use incontinent care products after each incontinent episode   - Consider nutrition services referral as needed  Outcome: Progressing     Problem: HEMATOLOGIC - ADULT  Goal: Maintains hematologic stability  Description: INTERVENTIONS  - Assess for signs and symptoms of bleeding or hemorrhage  - Monitor labs  - Administer supportive blood products/factors as ordered and appropriate  7/25/2022 0948 by Junior Darci RN  Outcome: Progressing  7/25/2022 0947 by Junior Darci RN  Outcome: Progressing     Problem: MUSCULOSKELETAL - ADULT  Goal: Maintain or return mobility to safest level of function  Description: INTERVENTIONS:  - Assess patient's ability to carry out ADLs; assess patient's baseline for ADL function and identify physical deficits which impact ability to perform ADLs (bathing, care of mouth/teeth, toileting, grooming, dressing, etc )  - Assess/evaluate cause of self-care deficits   - Assess range of motion  - Assess patient's mobility  - Assess patient's need for assistive devices and provide as appropriate  - Encourage maximum independence but intervene and supervise when necessary  - Involve family in performance of ADLs  - Assess for home care needs following discharge   - Consider OT consult to assist with ADL evaluation and planning for discharge  - Provide patient education as appropriate  7/25/2022 0948 by Mahesh Perez RN  Outcome: Progressing  7/25/2022 0947 by Mahesh Perez RN  Outcome: Progressing  Goal: Maintain proper alignment of affected body part  Description: INTERVENTIONS:  - Support, maintain and protect limb and body alignment  - Provide patient/ family with appropriate education  7/25/2022 0948 by Mahesh Perez RN  Outcome: Progressing  7/25/2022 0947 by Mahesh Perez RN  Outcome: Progressing     Problem: COPING  Goal: Pt/Family able to verbalize concerns and demonstrate effective coping strategies  Description: INTERVENTIONS:  - Assist patient/family to identify coping skills, available support systems and cultural and spiritual values  - Provide emotional support, including active listening and acknowledgement of concerns of patient and caregivers  - Reduce environmental stimuli, as able  - Provide patient education  - Assess for spiritual pain/suffering and initiate spiritual care, including notification of Pastoral Care or nida based community as needed  - Assess effectiveness of coping strategies  7/25/2022 0948 by Mahesh Perez RN  Outcome: Progressing  7/25/2022 0947 by Mahesh Perez RN  Outcome: Progressing  Goal: Will report anxiety at manageable levels  Description: INTERVENTIONS:  - Administer medication as ordered  - Teach and encourage coping skills  - Provide emotional support  - Assess patient/family for anxiety and ability to cope  7/25/2022 0948 by Shikha Arango RN  Outcome: Progressing  7/25/2022 0947 by Shikha Arango RN  Outcome: Progressing     Problem: INFECTION - ADULT  Goal: Absence or prevention of progression during hospitalization  Description: INTERVENTIONS:  - Assess and monitor for signs and symptoms of infection  - Monitor lab/diagnostic results  - Monitor all insertion sites, i e  indwelling lines, tubes, and drains  - Monitor endotracheal if appropriate and nasal secretions for changes in amount and color  - Maysel appropriate cooling/warming therapies per order  - Administer medications as ordered  - Instruct and encourage patient and family to use good hand hygiene technique  - Identify and instruct in appropriate isolation precautions for identified infection/condition  7/25/2022 0948 by Shikha Arango RN  Outcome: Progressing  7/25/2022 [phone] by Shikha Arango RN  Outcome: Progressing     Problem: SAFETY ADULT  Goal: Patient will remain free of falls  Description: INTERVENTIONS:  - Educate patient/family on patient safety including physical limitations  - Instruct patient to call for assistance with activity   - Consult OT/PT to assist with strengthening/mobility   - Keep Call bell within reach  - Keep bed low and locked with side rails adjusted as appropriate  - Keep care items and personal belongings within reach  - Initiate and maintain comfort rounds  - Make Fall Risk Sign visible to staff  - Offer Toileting every 2 Hours, in advance of need  - Initiate/Maintain Bed/Chair alarm  - Obtain necessary fall risk management equipment  - Apply yellow socks and bracelet for high fall risk patients  - Consider moving patient to room near nurses station  7/25/2022 0948 by Shikha Arango RN  Outcome: Progressing  7/25/2022 0947 by Henna Lewis RN  Outcome: Progressing     Problem: DISCHARGE PLANNING  Goal: Discharge to home or other facility with appropriate resources  Description: INTERVENTIONS:  - Identify barriers to discharge w/patient and caregiver  - Arrange for needed discharge resources and transportation as appropriate  - Identify discharge learning needs (meds, wound care, etc )  - Arrange for interpretive services to assist at discharge as needed  - Refer to Case Management Department for coordinating discharge planning if the patient needs post-hospital services based on physician/advanced practitioner order or complex needs related to functional status, cognitive ability, or social support system  7/25/2022 0948 by Henna Lewis RN  Outcome: Progressing  7/25/2022 0947 by Henna Lewis RN  Outcome: Progressing     Problem: Knowledge Deficit  Goal: Patient/family/caregiver demonstrates understanding of disease process, treatment plan, medications, and discharge instructions  Description: Complete learning assessment and assess knowledge base    Interventions:  - Provide teaching at level of understanding  - Provide teaching via preferred learning methods  7/25/2022 0948 by Henna Lewis RN  Outcome: Progressing  7/25/2022 0947 by Henna Lewis RN  Outcome: Progressing     Problem: SAFETY,RESTRAINT: NV/NON-SELF DESTRUCTIVE BEHAVIOR  Goal: Remains free of harm/injury (restraint for non violent/non self-detsructive behavior)  Description: INTERVENTIONS:  - Instruct patient/family regarding restraint use   - Assess and monitor physiologic and psychological status   - Provide interventions and comfort measures to meet assessed patient needs   - Identify and implement measures to help patient regain control  - Assess readiness for release of restraint   7/25/2022 0948 by Henna Lewis RN  Outcome: Progressing  7/25/2022 0947 by Henna Lewis RN  Outcome: Progressing  Goal: Returns to optimal restraint-free functioning  Description: INTERVENTIONS:  - Assess the patient's behavior and symptoms that indicate continued need for restraint  - Identify and implement measures to help patient regain control  - Assess readiness for release of restraint   7/25/2022 0948 by Brennon Prabhakar RN  Outcome: Progressing  7/25/2022 0947 by Brennon Prabhakar, RN  Outcome: Progressing

## 2022-07-25 NOTE — OCCUPATIONAL THERAPY NOTE
Occupational Therapy Evaluation     Patient Name: Emily Lyons  TPNJU'P Date: 7/25/2022  Problem List  Principal Problem:    CVA (cerebral vascular accident) [facility])  Active Problems:    Deep vein thrombosis (DVT) of left upper extremity ([facility] )    H/O mitral valve replacement    Controlled type 2 diabetes mellitus without complication, without long-term current use of insulin (HCC)    Hypertension    Systolic congestive heart failure ([facility] )    Cardio-embolic left MCA stroke ([facility] )    History of CVA (cerebrovascular accident)    CKD (chronic kidney disease) stage 3, GFR 30-59 ml/min ([facility])    Dysphagia    Bilateral carotid artery stenosis    Vascular dementia (HCC)    Ambulatory dysfunction    Meningioma (HCC)    Paroxysmal A-fib (HCC)    Thrombocytopenia ([facility])    Coronary artery disease with angina pectoris, unspecified vessel or lesion type, unspecified whether native or transplanted heart ([facility] )    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[place] [address] )    Past Medical History  Past Medical History:   Diagnosis Date    Acid reflux     on occ    Arthritis     DJD right hip replaced    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[facility] ) 2007    x 2 lesions with no change    Cancer ([place] [address] ) 2007    colonic polyps, no surgery done    Deep vein thrombosis (DVT) of left upper extremity ([facility] ) 12/11/2017    Dementia ([place] [address] )     Diabetes mellitus ([facility] )     type 2    Diverticulosis     Edema     in legs on occ    Hypertension     on occ    Language barrier     speaks Chilean & broken english    Stroke [facility])      Past Surgical History  Past Surgical History:   Procedure Laterality Date    COLON SURGERY      COLONOSCOPY      COLONOSCOPY N/A 11/2/2016    Procedure: COLONOSCOPY;  Surgeon: Alyson Santos MD;  Location: [facility] GI LAB; Service:     ESOPHAGOGASTRODUODENOSCOPY N/A 11/2/2016    Procedure: ESOPHAGOGASTRODUODENOSCOPY (EGD); Surgeon: Alyson Santos MD;  Location: [facility] GI LAB;   Service:    Darin Brown / Dale Mathis / Navdeep Story IR STROKE ALERT  7/22/2022  JOINT REPLACEMENT Right 02/2015    hip    JOINT REPLACEMENT Left     knee    JOINT REPLACEMENT Right     knee    MITRAL VALVE REPLACEMENT      DC COLONOSCOPY FLX DX W/COLLJ SPEC WHEN PFRMD N/A 8/9/2018    Procedure: EGD AND COLONOSCOPY;  Surgeon: Luther Jimenez MD;  [facility]; Service: Gastroenterology    DC REVISE MEDIAN N/CARPAL TUNNEL SURG Left 1/28/2016    Procedure: RELEASE CARPAL TUNNEL;  Surgeon: Amanda Jenkins MD;  Location: [facility] MAIN OR;  Service: Orthopedics    TUBAL LIGATION      VARICOSE VEIN SURGERY Bilateral          07/25/22 0955   OT Last Visit   OT Visit Date 07/25/22   Note Type   Note type Evaluation   Restrictions/Precautions   Weight Bearing Precautions Per Order No   Other Precautions Cognitive; Chair Alarm; Bed Alarm; Restraints;Multiple lines;Telemetry; Fall Risk  (ng tube, + L wrist restraint)   Pain Assessment   Pain Assessment Tool FLACC   Pain Rating: FLACC (Rest) - Face 0   Pain Rating: FLACC (Rest) - Legs 0   Pain Rating: FLACC (Rest) - Activity 0   Pain Rating: FLACC (Rest) - Cry 0   Pain Rating: FLACC (Rest) - Consolability 0   Score: FLACC (Rest) 0   Pain Rating: FLACC (Activity) - Face 0   Pain Rating: FLACC (Activity) - Legs 0   Pain Rating: FLACC (Activity) - Activity 0   Pain Rating: FLACC (Activity) - Cry 0   Pain Rating: FLACC (Activity) - Consolability 0   Score: FLACC (Activity) 0   Home Living   Type of Home House   Home Layout Two level;Performs ADLs on one level; Able to live on main level with bedroom/bathroom  (3+1 DANIELA)   Bathroom Shower/Tub Tub/shower unit   Bathroom Toilet Standard   Bathroom Equipment Grab bars in shower; Toilet raiser   Home Equipment Walker;Cane;Wheelchair-manual  (used SPC out of home, furniture walked in home, transfer [facility])   Additional Comments above information obtained from chart and confirmed with daughter in room   Prior Function   Level of [place] Needs assistance with ADLs and functional mobility; Needs assistance with IADLs   Lives With Alone  (alone but family provides 24/7 suervision daily)   Receives Help From Family   ADL Assistance Needs assistance   IADLs Needs assistance   Falls in the last 6 months 0   Vocational Retired   Comments information obtained from chart and confirmed by daughter   Lifestyle   Autonomy PTA pt required A for ADLs, IADLs, and fxnl mobility; (-) driving   Reciprocal Relationships supportive daughters, son   Service to Others retired   Intrinsic Gratification none expressed   Psychosocial   Psychosocial (WDL) X   Patient Behaviors/Mood Unable to assess   ADL   Where Assessed Edge of bed   Eating Assistance Unable to assess   Grooming Assistance 2  Maximal Assistance   Grooming Deficit Setup;Steadying;Supervision/safety;Verbal cueing; Increased time to complete   UB Bathing Assistance 2  Maximal Assistance   UB Bathing Deficit Setup;Steadying;Verbal cueing; Increased time to complete;Supervision/safety   LB Bathing Assistance 1  Total Assistance   LB Bathing Deficit Setup;Steadying;Verbal cueing;Supervision/safety; Increased time to complete   UB Dressing Assistance 2  Maximal Assistance   UB Dressing Deficit Setup;Steadying;Supervision/safety;Verbal cueing; Increased time to complete   LB Dressing Assistance 1  Total Assistance   LB Dressing Deficit Setup;Steadying;Verbal cueing;Supervision/safety; Increased time to complete   Toileting Assistance  1  Total Assistance   Toileting Deficit Setup;Steadying;Verbal cueing;Supervison/safety; Increased time to complete   Bed Mobility   Supine to Sit 2  Maximal assistance   Additional items Assist x 2;HOB elevated; Bedrails; Increased time required;Verbal cues;LE management   Sit to Supine 2  Maximal assistance   Additional items Assist x 2;HOB elevated; Increased time required;Verbal cues;LE management   Additional Comments Pt required Max Ax1 for EOB sit   Transfers   Sit to Stand 2  Maximal assistance   Additional items Assist x 2;Bedrails; Increased time required;Verbal cues   Stand to Sit 2  Maximal assistance   Additional items Assist x 2;Bedrails; Increased time required;Verbal cues   Additional Comments 2x STS attempts with b/l HHA and b/l knee blocking   Functional Mobility   Additional Comments unable to assess   Balance   Static Sitting Poor   Dynamic Sitting Poor   Static Standing Poor -   Dynamic Standing Poor -   Ambulatory Zero   Activity Tolerance   Activity Tolerance Patient limited by fatigue   Medical Staff Made Aware PT ISMAEL De La Fuente   Nurse Made Aware RN approved for sessio   RUE Assessment   RUE Assessment X  (pt with no active movement- flacid)   LUE Assessment   LUE Assessment X  (pt able to  with 1 step command, but unable to release grasp)   Hand Function   Gross Motor Coordination Impaired   Fine Motor Coordination Impaired   Sensation   Additional Comments pt with minimal response to painful stimuli through RUE   Vision - Complex Assessment   Additional Comments unable to assess, noted L sided gaze preference t/o session   Cognition   Overall Cognitive Status Impaired   Attention SELAM   Orientation Level Unable to assess   Memory Unable to assess   Following Commands Follows one step commands inconsistently   Comments pt with minimal response to commands and painful stimuli   Assessment   Limitation Decreased ADL status; Decreased UE ROM; Decreased UE strength;Decreased Safe judgement during ADL;Decreased cognition;Decreased endurance;Decreased sensation;Visual deficit; Decreased self-care trans;Decreased fine motor control;Decreased high-level ADLs   Prognosis Fair   Assessment Pt is 80 y o  F presented to [facility] with worsening aphasia, leading to dc of CVA  Pt  has a past medical history of Acid reflux, Arthritis, Brain benign neoplasm, Cancer, DVT of left upper extremity, Dementia, Diabetes mellitus, Diverticulosis, Edema, Hypertension, Language barrier, and Stroke  Pt lives alone in 2 story home, 3+1 DANIELA   PTA pt required A with ADLs, IADLs, and - driving  Pt lives alone, but has 24/7 care from her family  Daughter present t/o session and confirmed home set up  Pt with active OT orders  Pt seen as co-evaluation with PT due to comorbidities, medical complexity, and current deficits  Pt presents to session supine in bed  Pt performed Max Ax2 for bed mobility, STS trx with b/l HHA and b/l knee blocks  Pt currently requires Max A for UB ADLs, eating and grooming; total A for LB ADLs  Pt limited by activity tolerance, fxnl mobility, decreased independence with ADLs/IADLs, strength, aphasia, cognitive impairment, safety awareness, FM skills, and coordination deficit  Occupational performance areas to be addressed include bathing, dressing, grooming, feeding, fxnl communication, fxnl and community mobility  Pt to benefit from skilled OT services to address above deficits and improve overall independence, and maximize performance  OT recommendation at time of d/c to rehab when medically stable  Pt left supine in bed with all current needs met  The patient's raw score on the AM-PAC Daily Activity inpatient short form low function score is 13, standardized score is Low Function Daily Activity Standardized Score: 23 16  Patients with a standardized score less than 39 4 are likely to benefit from discharge to post-acute rehab services  Please refer to the recommendation of the Occupational Therapist for safe discharge planning  Goals   Patient Goals none stated   LTG Time Frame 10-14   Plan   Treatment Interventions ADL retraining;Visual perceptual retraining;Functional transfer training;UE strengthening/ROM; Endurance training;Cognitive reorientation;Patient/family training;Equipment evaluation/education; Fine motor coordination activities; Compensatory technique education;Continued evaluation; Energy conservation; Activityengagement   Goal Expiration Date 08/08/22   OT Frequency 3-5x/wk   Recommendation   OT Discharge Recommendation Post acute rehabilitation services   AM-PAC Daily Activity Inpatient   Lower Body Dressing 1   Bathing 1   Toileting 1   Upper Body Dressing 2   Grooming 2   Eating 2   Daily Activity Raw Score 9   Turning Head Towards Sound 2   Follow Simple Instructions 2   Low Function Daily Activity Raw Score 13   Low Function Daily Activity Standardized Score 23 16   AM-PAC Applied Cognition Inpatient   Following a Speech/Presentation 1   Understanding Ordinary Conversation 2   Taking Medications 1   Remembering Where Things Are Placed or Put Away 1   Remembering List of 4-5 Errands 1   Taking Care of Complicated Tasks 1   Applied Cognition Raw Score 7   Applied Cognition Standardized Score 15 17   Barthel Index   Feeding 0   Bathing 0   Grooming Score 0   Dressing Score 0   Bladder Score 5   Bowels Score 5   Toilet Use Score 5   Transfers (Bed/Chair) Score 5   Mobility (Level Surface) Score 0   Stairs Score 0   Barthel Index Score 20       OT Goals:    - Pt will perform fnxl trx with Min A on/off all surfaces using AD/DME as needed  - Pt will perform UB ADLs Min A using AD/DME as needed  - Pt will perform LB ADLs Min A using AD/DME as needed  - Pt will prevent loss of ROM, increase activity via facilitory techniques using LUE for stability      - Pt will attend to R hemibody and environment 75% of the time with Min VC      - Pt will perform toileting Min A with G hygiene and using AD/DME as needed  - Pt will perform fnxl mob during ADL/IADL/meaningful tasks Min A using AD/DME as needed  - Pt will demonstrate understanding of energy conversation techniques 100% of tx session     - Pt will perform fair static and dynamic sitting/standing balance during fnxl trx and ADL tasks  - Pt will engage in ongoing cognitive and visual assessments with good participation to assist with safe d/c planning/recommendations       - Pt will maintain upright sitting position for at least 10 minutes during dynamic sitting activity with fair balance to improve ADL performance and reduce risk of falls          Dipti Sanchez, OTS

## 2022-07-25 NOTE — ASSESSMENT & PLAN NOTE
Lab Results   Component Value Date    HGBA1C 6 3 (H) 07/23/2022       Recent Labs     07/24/22  1757 07/25/22  0008 07/25/22  0612 07/25/22  1132   POCGLU 118 178* 178* 174*       Blood Sugar Average: Last 72 hrs:  (P) 187     Home regimen:  Januvia 50 mg daily    · Hold all p o   Medications while inpatient  · SSI algorithm 2  · BG checks every 6 hours

## 2022-07-25 NOTE — CONSULTS
Consultation - Palliative and Supportive Care   Danilo Corea 80 y o  female [phone]    Patient Active Problem List   Diagnosis    Deep vein thrombosis (DVT) of left upper extremity ([place] [address] )    H/O mitral valve replacement    CVA (cerebral vascular accident) ([place] [address] )    Controlled type 2 diabetes mellitus without complication, without long-term current use of insulin ([facility] )    Pacemaker    Acid reflux    Hypertension    Constipation    Systolic congestive heart failure ([facility] )    Cardio-embolic left MCA stroke ([place] [address] )    History of ischemic right MCA stroke    History of CVA (cerebrovascular accident)    History of subarachnoid hemorrhage    CKD (chronic kidney disease) stage 3, GFR 30-59 ml/min ([facility]    History of DVT (deep vein thrombosis)    History of pacemaker    Acute cystitis without hematuria    Chronic systolic CHF (congestive heart failure) ([facility]    Colonic thickening    Anemia    Encephalopathy    Seizure-like activity ([facility]    HCAP (healthcare-associated pneumonia)    Shoulder pain, right    Abnormal TSH    Dysphagia    Esophageal reflux    Polyp of colon    Arthritis of right glenohumeral joint    Bilateral carotid artery stenosis    Vascular dementia ([facility]    Mood disorder ([place] [address] )    Ambulatory dysfunction    H/O ischemic left MCA stroke    TIA (transient ischemic attack)    Arthralgia of hip, right    Arthritis    Carpal tunnel syndrome, left    External hemorrhoids    Hypercholesterolemia    Malignant neoplasm without specification of site ([place] [address] )    Meningioma ([place] [address] )    Pain in both hands    Spondylosis of cervical region without myelopathy or radiculopathy    Type 2 or unspecified type diabetes mellitus    Overlap syndrome (HCC)    Chronic renal disease, stage IV ([facility]    Paroxysmal A-fib ([facility])    Thrombocytopenia ([facility] )    Coronary artery disease with angina pectoris, unspecified vessel or lesion type, unspecified whether native or transplanted heart ([place] [address] )    Cardiomyopathy, dilated ([facility] )    Medicare annual wellness visit, subsequent     Active issues specifically addressed today include: delirium d/t medical conddition; h/o large embolic stroke via hemorrhagic stroke concurrent; goals discussion; consideration of hospice; frail / vulnerable elder    Plan:  1  Symptom management - defer to ICU   -     2  Goals - rehab-focussed cares, limit of DNR/DNI   - Family wish for NG to support nutrition after stroke  - Family would accept PEG if rec'd/ offered  - Goal is to return home, possibly with hospice if pt is deteriorating, or not recuperating    - Alternatively, if pt recuperating, goal is to continue with maximal medical improvement, even to use ARU if offered  Code Status: DNR/DNI - Level 3   Decisional apparatus:  Patient is not competent on my exam today  If competence is lost, patient's substitute decision maker would default to family by [place] 169  Advance Directive / Living Will / POLST:  Not reviewed today     I have reviewed the patient's controlled substance dispensing history in the Prescription Drug Monitoring Program in compliance with the [facility] regulations before prescribing any controlled substances  We appreciate the invitation to be involved in this patient's care  We will retunr Monday  Please do not hesitate to reach our on call provider through our clinic answering service at  should you have acute symptom control concerns  Natacha Grewal MD  Palliative and Supportive Care  Clinic/Answering Service: [phone]  You can find me on [employer]! IDENTIFICATION:  Inpatient consult to Palliative Care  Consult performed by: Natacha Grewal MD  Consult ordered by: Wisam Benito DO        Physician Requesting Consult: Maura Adkins DO  Reason for Consult / Principal Problem: goals    Hx and PE limited by: stroke deficits, acutely    HISTORY OF PRESENT ILLNESS:       Daniel Law is a 80 y o  female who presents with acute on chronic stroke  At baseline, prior to acute stroke, she is described as ambulatory and conversant, though dependent on family for all IADLs, and requiring assist with all ADLs  This stay, she is described as having a massive CVA, likely devastating and non-recoverable  This new injury involves the L hemisphere, with some degree of midline shift, and almost no cerebral lobes spared by extent of injury documented on CT scans  Preivously, there is a large area of encephalomalacia abutting the R ventricle, a few cm in diameter into the parenchyma of the parieto-occipital lobe  On my visit this PM, there are family at bedside, and the pt is not appropriately responsive  Nursing reports and charts are reviewed  Bedside rounding performed with RN  An impromptu family mtg was held as noted below:    Record of Family Meeting - [facility] and [facility] 80 y o  female [phone]      A family meeting was held for consideration of goals  This meeting was necessary for determine the appropriate course of treatment  Time of Meetin  Meeting Location: 6th [facility] conference   Participants: daughters, including Pily MD Olu - Palliative and Supportive Care     Patient Participation: none - stroke deficits    Patient Support System: as above, plus several other extended family     Advanced Directive of POLST available: no    Topics of Discussion:  - Pt would not wish for mechanical life supports   - Pt would wish to die in her home  - Pt and family would accept god's will when her time comes  - Family would appreciate PEG placement if pt cannot take PO effectively, if only to continue regular medicines and provide nutrition as a moral imperative  "She can't starve to death "  - For the moment, the family wish for full cares, with a DNR/DNI limit    They understand and accept that pt's brain injury is likely to be worst within the next 12 - 24hrs  Other Content of Meeting:  - Brief grief counseling offered to family re: their young children  Time Involved in Meetin+ minutes, beginning at approximately  1515 and ending at approximately 1600  Review of Systems   Unable to perform ROS: mental status change       Past Medical History:   Diagnosis Date    Acid reflux     on occ    Arthritis     DJD right hip replaced    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 2007    x 2 lesions with no change    Cancer ([place] [address] )     colonic polyps, no surgery done    Deep vein thrombosis (DVT) of left upper extremity ([facility] ) 2017    Dementia ([facility] )     Diabetes mellitus ([facility] )     type 2    Diverticulosis     Edema     in legs on occ    Hypertension     on occ    Language barrier     speaks Estonian & broken english    Stroke [facility])      Past Surgical History:   Procedure Laterality Date    COLON SURGERY      COLONOSCOPY      COLONOSCOPY N/A 2016    Procedure: COLONOSCOPY;  Surgeon: Park Juan MD;  Location: [facility] GI LAB; Service:     ESOPHAGOGASTRODUODENOSCOPY N/A 2016    Procedure: ESOPHAGOGASTRODUODENOSCOPY (EGD); Surgeon: Park Juan MD;  Location: [facility] GI LAB; Service:    Tierra Caldera / Dayo Cantor / Amy Connellum      JOINT REPLACEMENT Right 2015    hip    JOINT REPLACEMENT Left     knee    JOINT REPLACEMENT Right     knee    MITRAL VALVE REPLACEMENT      OK COLONOSCOPY FLX DX W/COLLJ SPEC WHEN PFRMD N/A 2018    Procedure: EGD AND COLONOSCOPY;  Surgeon: Park Juan MD;  [facility]; Service: Gastroenterology    OK REVISE MEDIAN N/CARPAL TUNNEL SURG Left 2016    Procedure: RELEASE CARPAL TUNNEL;  Surgeon: Estelita Shah MD;  Location: [facility] MAIN OR;  Service: Orthopedics    TUBAL LIGATION      VARICOSE VEIN SURGERY Bilateral      Social History     Socioeconomic History    Marital status:       Spouse name: Not on file    Number of children: Not on file    Years of education: Not on file    Highest education level: Not on file   Occupational History    Not on file   Tobacco Use    Smoking status: Former Smoker     Packs/day: 0 25     Years: 10 00     Pack years: 2 50     Types: Cigarettes     Quit date: 1950     Years since quittin 6    Smokeless tobacco: Never Used   Vaping Use    Vaping Use: Never used   Substance and Sexual Activity    Alcohol use: No    Drug use: No    Sexual activity: Not Currently   Other Topics Concern    Not on file   Social History Narrative    Not on file     Social Determinants of Health     Financial Resource Strain: Not on file   Food Insecurity: Not on file   Transportation Needs: Not on file   Physical Activity: Not on file   Stress: Not on file   Social Connections: Not on file   Intimate Partner Violence: Not on file   Housing Stability: Not on file     Family History   Problem Relation Age of Onset    Cancer Mother         throat       MEDICATIONS / ALLERGIES:    all current active meds have been reviewed    Allergies   Allergen Reactions    Glimepiride Rash    Guaifenesin-Codeine Hallucinations    Heparin Other (See Comments)     Thrombocytopenia      Vancomycin      Red man syndrome       OBJECTIVE:    Physical Exam  Physical Exam  Constitutional:       General: She is not in acute distress  Appearance: She is ill-appearing and diaphoretic  She is not toxic-appearing  HENT:      Head: Normocephalic and atraumatic  Right Ear: External ear normal       Left Ear: External ear normal       Mouth/Throat:      Pharynx: No oropharyngeal exudate (membranes tacky)  Eyes:      General:         Right eye: No discharge  Left eye: No discharge  Comments: Does not open eyes during encounter  Neck:      Trachea: No tracheal deviation  Cardiovascular:      Rate and Rhythm: Regular rhythm  Tachycardia present  Pulmonary:      Effort: No respiratory distress  Breath sounds: No stridor     Abdominal:      General: There is no distension  Palpations: Abdomen is soft  Skin:     General: Skin is warm  Coloration: Skin is pale  Findings: No erythema or rash  Neurological:      Comments: Not alert, not interactive  Facies generally symmetric  Psychiatric:      Comments: Cannot participate in exam today  Lab Results: I have personally reviewed pertinent labs  Imaging Studies: reviewed reports  EKG, Pathology, and Other Studies: none pertinent    Counseling / Coordination of Care    Total floor / unit time spent today 45+ minutes  Greater than 50% of total time was spent with the patient and / or family counseling and / or coordination of care  A description of the counseling / coordination of care: chart review; symptom pursuit; supportive listening; anticipatory guidance  fam mtg as noted; consideration of goals; consideration of hospice

## 2022-07-25 NOTE — ASSESSMENT & PLAN NOTE
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

## 2022-07-25 NOTE — ASSESSMENT & PLAN NOTE
Home regimen:  Lopressor 50 mg b i d , Eliquis 2 5 mg b i d  · Restart Lopressor 12 5 mg b i d   Last 7/25  · Holding Eliquis due to acute stroke

## 2022-07-25 NOTE — PROGRESS NOTES
[facility] Neurology Progress note    Name: Marion Velázquez   Age & Sex: 80 y o  female   MRN: [id number]  Unit/Bed#: ICU 08   Encounter: [id number]    Marion Velázquez will need follow up in in 6 weeks with neurovascular team if able  She will not require outpatient neurological testing  Pending for discharge: medical optimization, rehab eval    Assessment plan:    Cardio-embolic left MCA stroke [facility])  Assessment & Plan  79yo F w/ PMH of CKD stage 4, afib on eliquis, previous L MCA stroke, HTN, HLD, T2DM, former smoker  Presented on 7/22 from [address] [place] [address] to [facility] as an endovascular alert  NIHSS 27 at [address] [place] [address], 24 at [facility]  LKW 1 5-2 hrs prior to presentation  Initial presenting deficits were worsening aphasia than baseline, L gaze preference, flaccid RUE, RLE, R facial droop, LLE drift, but able to squeeze on left  tPA contraindicated given was on Eliquis  Underwent subsequent unsuccessful thrombectomy (L ICA terminus occlusion) after good mismatch ratio of 26 4 and was transferred to the ICU for further management  Family noted patient may not have been taking Eliquis and aspirin appropriately due to having trouble swallowing medication due to chronic tongue deviation   Repeat CTH, 7/24: large L MCA infarct  Workup:   · LDL 54, TG 68, chol 116  · A1c 6 3%    · CTH 07/24: 1  Evolving nonhemorrhagic left MCA territory infarction (likely original ischemia is showing on imaging after 6-24 hours since last [facility])  2  Cerebral atrophy with chronic small vessel ischemic white matter disease and chronic infarctions   · [facility] 7/23 AM: evolving L MCA infarct   Hyperdense L MCA  Chronic infarcts seen in R occipital, parietal lobes  · TTE: Echo: bioprosthetic mitral valve  The prosthetic valve appears to be functioning normally  EF 55-60  LA The atrium is dilated   Same w/ R atrium , no hypokinesis     Impression: L MCA infarct -etiology most likely cardio-embolic due to medication noncompliance in setting of poorly controlled vascular risk factors; likely to have severe disability and will unlikely to go back to semblance of previous baseline    Plan:  - Discussed w/ family in room at length expected disability due to large L MCA infarct and reviewed imaging and unlikely to go to baseline prior to stroke  - MRI brain cannot be obtained due to pacemaker  - If [facility] for secondary stroke prevention is to be restarted in 9 days given moderate to large-sized of acute infarct would have to consider Lovenox injections versus if she has a PEG tube (as family expressed wanting one earlier) then can obtain DOAC  - receiving rectal ASA 300mg daily to be transitioned to ASA 81 mg QD   - Atorvastatin 40 mg q d   - allow for normotension, Goal normothermia and euglycemia  - Continue monitoring on telemetry  - Continue Neuro checks as per critical care protocol  - SCDs  - PT/OT/ST/PM&R evaluation  - Stat Head CT with any neuro status change   -Neurology will sign off for now; please reach out w/ any questions or acute concerns  Subjective:     Patient seen and examined at bedside  No significant events overnight  Unfortunately, she continues to be maintained on restraints  ROS cannot be performed due to patient's clinical condition  Discussed current clinical status and plan with daughter at bedside- verbalizes understanding  Review of Systems  ROS N/A  Objective:     Patient ID: Florentino Medina is a 80 y o  female      Vitals:    22 1100 22 1200 22 1300 22 1400   BP: 149/73 134/52 134/59 143/62   BP Location: Right arm Right arm Right arm Right arm   Pulse: 88 90 78 80   Resp: 16 19 15 20   Temp: 99 68 °F (37 6 °C) 99 68 °F (37 6 °C)     TempSrc: Bladder Bladder     SpO2: 99% 99% 99% 99%   Weight:       Height:          Temperature:   Temp (24hrs), Av 6 °F (37 6 °C), Min:98 96 °F (37 2 °C), Max:100 4 °F (38 °C)    Temperature: 99 68 °F (37 6 °C)      Physical Exam   Vitals reviewed   General: lethargic, fragile appearing  CVS: S1, S2 noted  Regular rate, rhythm  Neurologic Exam   Mental Status: Lethargic, arouses to loud noise, sternal rub  Able to follow simple commands (close eyes, squeeze hands) when instructed in Slovenian  Language: non verbal    Cranial Nerves: Pupils equal   Visual fields unable to be tested  Left gaze preference noted  EOMI, no nystagums  Right-sided facial droop noted  Motor:  Decreased tone in RUE  Spontaneous movements in LUE noted  Able to squeeze fingers with left hand only  Able to move both lower extremities side to side, wiggles toes b/l  Reflexes: 2+ biceps, triceps, brachioradialis bilaterally  +1 patellars, Achilles bilaterally  Clonus absent  Plantar downgoing left, upgoing right  Coordination, gait- patient unable to perform     Labs: I have personally reviewed pertinent reports      Results from last 7 days   Lab Units 07/25/22 0553 07/24/22 0622 07/23/22 0526 07/22/22  1307   WBC Thousand/uL 6 72 6 83 7 48 4 34   HEMOGLOBIN g/dL 8 9* 9 1* 9 5* 11 9   HEMATOCRIT % 28 7* 29 1* 29 6* 37 3   PLATELETS Thousands/uL 137* 113* 110* 125*   NEUTROS PCT % 73 64  --  51   MONOS PCT % 10 12  --  10      Results from last 7 days   Lab Units 07/25/22 0553 07/24/22 0628 07/23/22 0526 07/22/22  1307   SODIUM mmol/L 144 142 141 138   POTASSIUM mmol/L 3 6 3 8 4 3 4 4   CHLORIDE mmol/L 112* 112* 109* 103   CO2 mmol/L 26 23 25 29   BUN mg/dL 29* 24 27* 32*   CREATININE mg/dL 1 45* 1 32* 1 46* 1 48*   CALCIUM mg/dL 9 4 9 1 9 0 9 3   ALK PHOS U/L  --   --   --  71   ALT U/L  --   --   --  21   AST U/L  --   --   --  22     Results from last 7 days   Lab Units 07/25/22 0553 07/24/22 0628 07/23/22  0526   MAGNESIUM mg/dL 2 6 2 5 2 5     Results from last 7 days   Lab Units 07/25/22  0553 07/24/22 0628 07/23/22  0526   PHOSPHORUS mg/dL 2 8 3 5 4 2*      Results from last 7 days   Lab Units 07/22/22  1307   INR  1 01   PTT seconds 31 Imaging:     Radiology Results: I have personally reviewed pertinent films in PACS    CT head wo contrast   Final Result by Madelin Opitz, DO (07/24 2261)   1  Evolving nonhemorrhagic left MCA territory infarction  2   Cerebral atrophy with chronic small vessel ischemic white matter disease and chronic infarctions as described above  Workstation performed: [id number]         CT head wo contrast   Final Result by Madelin Opitz, DO (07/23 9057)   1  Evolving nonhemorrhagic left MCA territory infarction  2   Cerebral atrophy with chronic small vessel ischemic white matter disease and chronic right occipital parietal lobe infarctions  Workstation performed: [id number]         X-ray chest 1 view   Final Result by Fabian Aldridge MD (07/23 2504)      No acute cardiopulmonary disease  ET tube in satisfactory position  Workstation performed: [id number]         CT cerebral perfusion   Final Result by Lindsay Mittal MD (07/22 7964)      CT perfusion performed  Data available on PACS           Workstation performed: [id number]             Other Diagnostic Testing:     Active Medications:     Current Facility-Administered Medications   Medication Dose Route Frequency    acetaminophen (TYLENOL) rectal suppository 325 mg  325 mg Rectal Q4H PRN    acetaminophen (TYLENOL) tablet 650 mg  650 mg Oral Q4H PRN    albuterol inhalation solution 2 5 mg  2 5 mg Nebulization Q6H PRN    [START ON 7/26/2022] aspirin chewable tablet 81 mg  81 mg Oral Daily    atorvastatin (LIPITOR) tablet 80 mg  80 mg Oral QPM    budesonide (PULMICORT) inhalation solution 0 5 mg  0 5 mg Nebulization Q12H    chlorhexidine (PERIDEX) 0 12 % oral rinse 15 mL  15 mL Mouth/Throat Q12H [facility]    insulin lispro (HumaLOG) 100 units/mL subcutaneous injection 1-5 Units  1-5 Units Subcutaneous Q6H [address]    levalbuterol (XOPENEX) inhalation solution 1 25 mg  1 25 mg Nebulization BID    metoprolol tartrate (LOPRESSOR) partial tablet 12 5 mg  12 5 mg Oral Q12H [facility]    polyethylene glycol (MIRALAX) packet 17 g  17 g Oral Daily    senna (SENOKOT) tablet 8 6 mg  1 tablet Oral Daily    sodium chloride 0 9 % inhalation solution 3 mL  3 mL Nebulization BID       Prior to Admission medications    Medication Sig Start Date End Date Taking?  Authorizing Provider   albuterol (2 5 mg/3 mL) 0 083 % nebulizer solution Take 3 mL (2 5 mg total) by nebulization every 6 (six) hours as needed for wheezing or shortness of breath 4/17/22   Vale Cervantes DO   albuterol (PROVENTIL HFA,VENTOLIN HFA) 90 mcg/act inhaler Inhale 2 puffs every 6 (six) hours as needed for wheezing 4/17/22   Vale Cervantes DO   apixaban (ELIQUIS) 2 5 mg Take 1 tablet (2 5 mg total) by mouth 2 (two) times a day 5/18/18   Jake Peguero MD   aspirin (ECOTRIN LOW STRENGTH) 81 mg EC tablet Take 1 tablet (81 mg total) by mouth daily 11/8/18   Radha Lamas DO   atorvastatin (LIPITOR) 40 mg tablet Take 2 tablets (80 mg total) by mouth daily with dinner 9/3/20   Inez Peacock MD   benzonatate (TESSALON PERLES) 100 mg capsule Take 1 capsule (100 mg total) by mouth 3 (three) times a day as needed for cough 6/29/22   Frances Dykes PA-C   fluticasone [facility]) 50 mcg/act nasal spray 1 spray into each nostril daily 6/29/22   Frances Dykes PA-C   furosemide (LASIX) 40 mg tablet 40 mg Every other day 4/4/22   Historical Provider, MD   iron polysaccharides (FERREX) 150 mg capsule Take 150 mg by mouth 2 (two) times a day    Historical Provider, MD HOLLOWAY 50 MG tablet 50 mg daily   7/31/18   Historical Provider, MD   losartan (COZAAR) 25 mg tablet 12 5 mg In a m  7/8/20   Historical Provider, MD   metoprolol tartrate (LOPRESSOR) 50 mg tablet Take 1 tablet (50 mg total) by mouth every 12 (twelve) hours  Patient taking differently: Take 25 mg by mouth every 12 (twelve) hours 5/18/18   Jake Peguero MD Multiple Vitamins-Minerals (MULTIVITAMIN ADULT PO) 0 5 tablets 2 (two) times a day      Historical Provider, MD   Polysaccharide Iron-FA-B12 (FERREX 150 FORTE PO) Take by mouth    Historical Provider, MD   potassium chloride (MICRO-K) 10 MEQ CR capsule  1/28/19   Historical Provider, MD   tiotropium (Spiriva Respimat) 1 25 MCG/ACT AERS inhaler Inhale 2 puffs daily 7/6/22   Gaye Vegas MD     VTE Pharmacologic Prophylaxis: as per primary team  VTE Mechanical Prophylaxis: sequential compression device    ==  MD [facility], PGY-II

## 2022-07-25 NOTE — PLAN OF CARE
Problem: PHYSICAL THERAPY ADULT  Goal: Performs mobility at highest level of function for planned discharge setting  See evaluation for individualized goals  Description: Treatment/Interventions: Functional transfer training, LE strengthening/ROM, Therapeutic exercise, Endurance training, Cognitive reorientation, Equipment eval/education, Bed mobility, Gait training, OT          See flowsheet documentation for full assessment, interventions and recommendations  Note: Prognosis: Poor  Problem List: Decreased strength, Impaired balance, Decreased endurance, Decreased mobility, Decreased cognition, Decreased safety awareness, Impaired judgement  Assessment: Pt is a 81 y/o female admitted [address] [facility] for stroke alert then transferred to [facility] on 7/22/2022 for thrombectomy which was unsuccessful  Pt's Dx and personal factors are cardio-embolic L MCA, h/o L MCA stroke, vascular dementia, known meningioma, HTN, HLD, HF, afib, CKD3, dysphagia, COPD w/ asthma, T2DM, h/o mitral valve replacement, systolic congestive HF, and anemia  Per daughter, Pt lives alone in a [facility] with 3+1 DANIELA and [employer]  Pt stays on first floor and 3 dtrs rotate to assist pt everyday  Pt required A with ADL/IADLs prior  Pt ambulated with cane in the community but would furniture walk when at home  Pt owns walker and transport chain  During the examination, pt demonstrated decrease BLE strength, RUE flaccidity, impaired sitting and standing balance and tolerance, decrease activity tolerance, impaired endurance, global aphasia, lethargic, doesn't follow commands, R sided neglect, and fall risk which limits pt to perform ADLs independently and increases risks for falls  Pt will benefit with skilled PT interventions while in the hospital to address the impairments stated above  Pt is recommended to rehab upon D/C  At the end of the session, pt was supine in bed with call bell within reach and L wrist restraint on  Treatment: 3051-1621: - STS x1 w/ maxA x2 - sat EOB for 10-12 minutes with maxA - Family education on reorienting pt, standing to pt's R side to address neglect, and encouraging mobility of LEs by performing hip flexion, knee flexion, and DF/PF PROM  Barriers to Discharge: Inaccessible home environment     PT Discharge Recommendation: Post acute rehabilitation services    See flowsheet documentation for full assessment

## 2022-07-25 NOTE — PROGRESS NOTES
Progress Note - Critical Care   Johnson Groves 80 y o  female MRN: [id number]  Unit/Bed#: ICU 08 Encounter: [id number]    SUBJECTIVE:  Sleeping  HPI/24HR Event:  Linda Lawrence a 80 y  o  female with PMHx of CHF, CKD stage 4, arthritis, anemia, ambulatory dysfunction, CAD, previous left MCA stroke, atrial fibrillation and history of DVT maintained on Eliquis, history of dysphasia, status post mitral valve replacement, hyperlipidemia, hypertension, known meningioma, vascular dementia, and diabetes  Presented on 7/23 for worsening aphasia and left gaze preference,  NIH 27  CTA revealved L  M1 occlusion, not candidate for Tpa due to Eliquis, CTP showed pernumbra, unfortunately thrombectomy was unsuccessful  Overnight events:  Placed on restraints  ROS  Review of Systems   Unable to perform ROS: Patient nonverbal       Invasive lines and devices: Invasive Devices  Report    Peripheral Intravenous Line  Duration           Peripheral IV 07/22/22 Left Wrist 2 days    Peripheral IV 07/25/22 Right Antecubital <1 day          Drain  Duration           Urethral Catheter Non-latex 16 Fr  2 days    NG/OG/Enteral Tube Nasogastric 12 Fr Right nare 1 day                   Physical exam  Physical Exam  Vitals reviewed  Constitutional:       General: She is not in acute distress  Comments: Sleeping   HENT:      Head: Normocephalic and atraumatic  Nose: No rhinorrhea  Eyes:      General: No scleral icterus  Pupils: Pupils are equal, round, and reactive to light  Cardiovascular:      Rate and Rhythm: Normal rate and regular rhythm  Pulses: Normal pulses  Heart sounds: Normal heart sounds  No murmur heard  No friction rub  No gallop  Pulmonary:      Effort: Pulmonary effort is normal  No respiratory distress  Breath sounds: Normal breath sounds  No wheezing, rhonchi or rales  Abdominal:      General: Bowel sounds are normal  There is no distension        Palpations: Abdomen is soft       Tenderness: There is no abdominal tenderness  There is no guarding  Musculoskeletal:      Right lower leg: No edema  Left lower leg: No edema  Comments: Left wrist restraint   Skin:     General: Skin is warm and dry  Capillary Refill: Capillary refill takes less than 2 seconds  Coloration: Skin is not jaundiced     Neurological:      Comments: Nonverbal, does not follow commands, spontaneously moved left lower extremity and right ankle         Vitals  Temperature:   Temp (24hrs), Av 7 °F (37 6 °C), Min:98 96 °F (37 2 °C), Max:100 4 °F (38 °C)    Current: Temperature: 99 68 °F (37 6 °C)    Vitals:    22 0400 22 0500 22 0600 22 0745   BP: 133/63 157/69 141/67 138/66   BP Location:    Right arm   Pulse: 82 94 94 90   Resp:  19   Temp: 98 96 °F (37 2 °C) 99 32 °F (37 4 °C) 99 68 °F (37 6 °C) 99 68 °F (37 6 °C)   TempSrc:    Bladder   SpO2: 98% 98% 97% 99%   Weight:       Height:                 Labs:   Results from last 7 days   Lab Units 22  0553 22  0622 22  0526 22  1307   WBC Thousand/uL 6 72 6 83 7 48 4 34   HEMOGLOBIN g/dL 8 9* 9 1* 9 5* 11 9   HEMATOCRIT % 28 7* 29 1* 29 6* 37 3   PLATELETS Thousands/uL 137* 113* 110* 125*   NEUTROS PCT % 73 64  --  51   MONOS PCT % 10 12  --  10      Results from last 7 days   Lab Units 22  0553 22  0628 22  0526 22  1307   SODIUM mmol/L 144 142 141 138   POTASSIUM mmol/L 3 6 3 8 4 3 4 4   CHLORIDE mmol/L 112* 112* 109* 103   CO2 mmol/L 26 23 25 29   BUN mg/dL 29* 24 27* 32*   CREATININE mg/dL 1 45* 1 32* 1 46* 1 48*   CALCIUM mg/dL 9 4 9 1 9 0 9 3   ALK PHOS U/L  --   --   --  71   ALT U/L  --   --   --  21   AST U/L  --   --   --  22     Results from last 7 days   Lab Units 22  0553 22  0628 22  0526   MAGNESIUM mg/dL 2 6 2 5 2 5     Results from last 7 days   Lab Units 22  0553 22  0628 22  0526   PHOSPHORUS mg/dL 2 8 3 5 4 2* Results from last 7 days   Lab Units 07/22/22  1307   INR  1 01   PTT seconds 31           Other Labs    Intake and Outputs:  I/O       07/23 0701  07/24 0700 07/24 0701 07/25 0700    P  O  0 0    I V  (mL/kg) 15 5 (0 2)     NG/GT 90 60    Feedings  420    Total Intake(mL/kg) 105 5 (1 6) 480 (7 4)    Urine (mL/kg/hr) 730 (0 5) 815 (0 5)    Total Output 730 815    Net -624 5 -335                Imaging Studies    Problem List  Left MCA stroke status post unsuccessful thrombectomy on 07/22  Anemia  Hypertension  Vascular dementia  CKD4  Diabetes  COPD  Hyperlipidemia      Assessment & Plan:   Neuro:  Acute stroke, history of left MCA stroke, vascular dementia, known meningioma   · 7/22 CTA:  left M1 occlusion Moderate stenosis proximal right internal carotid artery and moderate stenosis left PCA  · 14 [facility] 7/23: Evolving non hemorrhagic L,MCA region  · 14 [facility] 7/24: Evolving nonhemorrhagic left MCA territory infarction  Cerebral atrophy with chronic small vessel ischemic white matter disease and chronic infarctions as described above  · NIH 27 on presentation   · Thrombectomy unsuccessful ,TICI 0  · Echocardiogram: LVEF 79-11%, no diastolic dysfunction, There is LA dilation  · Patient is unable to obtain a MRI due to pacemaker  · Plan:  · Continue Neuro checks q 2 hours during the day, q 4 hours at night  · Aspirin suppository 300 mg, Atorvastatin 80 mg q d    · Hold AC - Will need argatroban (No fondaparinux due to CKD 4)   · Neurology consulted, appreciate recommendations  · Restart anticoagulation 10-14 days after stroke (August 1-4)  · Palliative care consult - appreciate recommendations  · No acute intervention required for known meningioma  · Continue telemetry   · CAM-ICU BID  · Maintain glucose <180  · Sleep/wake cycle regulation     CV:  Hypertension, hyperlipidemia, heart failure, atrial fibrillation on Eliquis  · Echocardiogram: LVEF 90-00%, no diastolic dysfunction, There is LA dilation  · BNP 4,472  · Home regimen:  Losartan 20 mg, Lopressor 50 mg b i d , Lasix 40 mg every other day, Eliquis 2 5 mg b i d   · Plan:  · Hold PTA antihypertensives, Lasix, and Eliquis  ? Continue with permissive HTN goal SBP < 180  · Continue atorvastatin 80 mg daily      Pulm:  · Continue PTA inhalers (Incruse ellipta)   · Maintain SpO2 >88%      GI:   · No acute issues  · Stress ulcer prophylaxis:  Protonix  · Bowel regimen:  Senokot     :   · CKD4  · Baseline creatinine 1 4-1 6  · Sears placed  · Continue to trend BUN, creatinine  · I/O monitoring  · Continue to follow renal function tests     F/E/N:   · Replete electrolytes with as needed to maintain K >4 0, Mag >2 0, Phos >3 0  · Nutrition: Nepro 35 at goal  · Speech eval recommends NPO, consider PEG tube if family amenable     Heme/Onc:  anemia  · Hemoglobin 11 2 on admission, decreased to 9 after thrombectomy  · Transfuse for hemoglobin <7 0  · VTE prophylaxis: SCD's to BLE  ? Consider Argatroban when AC is appropriate       Endo/Rheum:  Diabetes  · Hold PTA meds (Januvia)   · Continue SSI      ID:   · No acute issues  · Continue to monitor fever and WBC curve     MSK/Skin:   · Reposition q2h, eliminate pressure points while in bed  · Close skin surveillance     LDA:  o Peripheral left wrist day 2  o Referral right AC day 1  o Sears catheter day 2  o NG tube daily 1    Disposition:  Continue critical care        Non-Invasive/Invasive Ventilation Settings:  Respiratory  Report   Lab Data (Last 4 hours)    None         O2/Vent Data (Last 4 hours)    None              No results found for: PHART, MAK2DSL, PO2ART, IZR2SOY, P5EBZVDT, BEART, SOURCE  SpO2: SpO2: 99 %    Imaging:   CT head wo contrast   Final Result by Nadia Cisneros DO (07/24 1904)   1  Evolving nonhemorrhagic left MCA territory infarction  2   Cerebral atrophy with chronic small vessel ischemic white matter disease and chronic infarctions as described above                    Workstation performed: [id number]         CT head wo contrast   Final Result by Wero Prajapati DO (07/23 1998)   1  Evolving nonhemorrhagic left MCA territory infarction  2   Cerebral atrophy with chronic small vessel ischemic white matter disease and chronic right occipital parietal lobe infarctions  Workstation performed: [id number]         X-ray chest 1 view   Final Result by Neva Romero MD (07/23 2357)      No acute cardiopulmonary disease  ET tube in satisfactory position  Workstation performed: [id number]         CT cerebral perfusion   Final Result by Juwan Chaudhry MD (07/22 7154)      CT perfusion performed  Data available on PACS  Workstation performed: [id number]           I have personally reviewed pertinent reports  Micro:  Lab Results   Component Value Date    BLOODCX No Growth After 5 Days  06/18/2018    BLOODCX No Growth After 5 Days  06/18/2018    BLOODCX No Growth After 5 Days  05/25/2018    BLOODCX No Growth After 5 Days  05/25/2018    URINECX <10,000 cfu/ml  06/06/2019    URINECX >100,000 cfu/ml Enterobacter aerogenes (A) 05/25/2018    URINECX <10,000 cfu/ml  02/02/2018         Allergies:    Allergies   Allergen Reactions    Glimepiride Rash    Guaifenesin-Codeine Hallucinations    Heparin Other (See Comments)     Thrombocytopenia      Vancomycin      Red man syndrome         Medications:   Scheduled Meds:  Current Facility-Administered Medications   Medication Dose Route Frequency Provider Last Rate    acetaminophen  325 mg Rectal Q4H PRN [employer], DO      acetaminophen  650 mg Oral Q4H PRN [employer], DO      albuterol  2 5 mg Nebulization Q6H PRN [employer], DO      aspirin  300 mg Rectal Daily Fredi Buenrostro, DO      atorvastatin  80 mg Oral QPM Yue Baltazar PA-C      chlorhexidine  15 mL Mouth/Throat Q12H [address] Yue Baltazar PA-C      fentanyl citrate (PF)  50 mcg Intravenous Q1H PRN Yue Baltazar PA-C      fluticasone  1 spray Nasal Daily [employer], DO      glycopyrrolate  0 1 mg Intravenous Q4H PRN [employer], DO      insulin lispro  1-5 Units Subcutaneous Q6H [address] Fredi Buenrostro, DO      potassium chloride  40 mEq Oral Once Brittney F Minnix, DO      senna  1 tablet Oral Daily [employer], DO      umeclidinium bromide  1 puff Inhalation Daily Fredi Adler, DO       Continuous Infusions:   PRN Meds:  acetaminophen, 325 mg, Q4H PRN  acetaminophen, 650 mg, Q4H PRN  albuterol, 2 5 mg, Q6H PRN  fentanyl citrate (PF), 50 mcg, Q1H PRN  glycopyrrolate, 0 1 mg, Q4H PRN        Counseling / Coordination of Care  Time spent:  30 minutes     Code Status: Level 3 - DNAR and DNI     Portions of the record may have been created with voice recognition software  Occasional wrong word or "sound a like" substitutions may have occurred due to the inherent limitations of voice recognition software  Read the chart carefully and recognize, using context, where substitutions have occurred       Casandra Gonzalez DO  Internal Medicine Resident, PGY2  8:00 AM

## 2022-07-25 NOTE — RESPIRATORY THERAPY NOTE
Resp care    07/25/22 0916   Respiratory Assessment   Assessment Type Pre-treatment   General Appearance Eyes open/responds to stimulus   Respiratory Pattern Symmetrical   Chest Assessment Chest expansion symmetrical   Bilateral Breath Sounds Coarse   Resp Comments Pt has a history of asthma, pt was supposed to start taking symbicort while at home  Unable to take inhalers so scheduled xopenex bid   BS coarse, pt 99 on RA

## 2022-07-25 NOTE — PLAN OF CARE
Problem: MOBILITY - ADULT  Goal: Maintain or return to baseline ADL function  Description: INTERVENTIONS:  -  Assess patient's ability to carry out ADLs; assess patient's baseline for ADL function and identify physical deficits which impact ability to perform ADLs (bathing, care of mouth/teeth, toileting, grooming, dressing, etc )  - Assess/evaluate cause of self-care deficits   - Assess range of motion  - Assess patient's mobility; develop plan if impaired  - Assess patient's need for assistive devices and provide as appropriate  - Encourage maximum independence but intervene and supervise when necessary  - Involve family in performance of ADLs  - Assess for home care needs following discharge   - Consider OT consult to assist with ADL evaluation and planning for discharge  - Provide patient education as appropriate  Outcome: Progressing  Goal: Maintains/Returns to pre admission functional level  Description: INTERVENTIONS:  - Perform BMAT or MOVE assessment daily    - Set and communicate daily mobility goal to care team and patient/family/caregiver  - Collaborate with rehabilitation services on mobility goals if consulted  - Perform Range of Motion 2 times a day  - Reposition patient every 2 hours    - Dangle patient 2 times a day  - Stand patient 2 times a day  - Ambulate patient 2 times a day  - Out of bed to chair 2 times a day   - Out of bed for meals 2 times a day  - Out of bed for toileting  - Record patient progress and toleration of activity level   Outcome: Progressing     Problem: Potential for Falls  Goal: Patient will remain free of falls  Description: INTERVENTIONS:  - Educate patient/family on patient safety including physical limitations  - Instruct patient to call for assistance with activity   - Consult OT/PT to assist with strengthening/mobility   - Keep Call bell within reach  - Keep bed low and locked with side rails adjusted as appropriate  - Keep care items and personal belongings within reach  - Initiate and maintain comfort rounds  - Make Fall Risk Sign visible to staff  - Offer Toileting every 2 Hours, in advance of need  - Initiate/Maintain 2alarm  - Obtain necessary fall risk management equipment: 2  - Apply yellow socks and bracelet for high fall risk patients  - Consider moving patient to room near nurses station  Outcome: Progressing     Problem: Prexisting or High Potential for Compromised Skin Integrity  Goal: Skin integrity is maintained or improved  Description: INTERVENTIONS:  - Identify patients at risk for skin breakdown  - Assess and monitor skin integrity  - Assess and monitor nutrition and hydration status  - Monitor labs   - Assess for incontinence   - Turn and reposition patient  - Assist with mobility/ambulation  - Relieve pressure over bony prominences  - Avoid friction and shearing  - Provide appropriate hygiene as needed including keeping skin clean and dry  - Evaluate need for skin moisturizer/barrier cream  - Collaborate with interdisciplinary team   - Patient/family teaching  - Consider wound care consult   Outcome: Progressing     Problem: Neurological Deficit  Goal: Neurological status is stable or improving  Description: Interventions:  - Monitor and assess patient's level of consciousness, motor function, sensory function, and level of assistance needed for ADLs  - Monitor and report changes from baseline  Collaborate with interdisciplinary team to initiate plan and implement interventions as ordered  - Provide and maintain a safe environment  - Consider seizure precautions  - Consider fall precautions  - Consider aspiration precautions  - Consider bleeding precautions  Outcome: Progressing     Problem: Activity Intolerance/Impaired Mobility  Goal: Mobility/activity is maintained at optimum level for patient  Description: Interventions:  - Assess and monitor patient  barriers to mobility and need for assistive/adaptive devices    - Assess patient's emotional response to limitations  - Collaborate with interdisciplinary team and initiate plans and interventions as ordered  - Encourage independent activity per ability   - Maintain proper body alignment  - Perform active/passive rom as tolerated/ordered  - Plan activities to conserve energy   - Turn patient as appropriate  Outcome: Progressing     Problem: Communication Impairment  Goal: Ability to express needs and understand communication  Description: Assess patient's communication skills and ability to understand information  Patient will demonstrate use of effective communication techniques, alternative methods of communication and understanding even if not able to speak  - Encourage communication and provide alternate methods of communication as needed  - Collaborate with case management/ for discharge needs  - Include patient/family/caregiver in decisions related to communication  Outcome: Progressing     Problem: Potential for Aspiration  Goal: Non-ventilated patient's risk of aspiration is minimized  Description: Assess and monitor vital signs, respiratory status, and labs (WBC)  Monitor for signs of aspiration (tachypnea, cough, rales, wheezing, cyanosis, fever)  - Assess and monitor patient's ability to swallow  - Place patient up in chair to eat if possible  - HOB up at 90 degrees to eat if unable to get patient up into chair   - Supervise patient during oral intake  - Instruct patient/ family to take small bites  - Instruct patient/ family to take small single sips when taking liquids  - Follow patient-specific strategies generated by speech pathologist   Outcome: Progressing  Goal: Ventilated patient's risk of aspiration is minimized  Description: Assess and monitor vital signs, respiratory status, airway cuff pressure, and labs (WBC)  Monitor for signs of aspiration (tachypnea, cough, rales, wheezing, cyanosis, fever)      - Elevate head of bed 30 degrees if patient has tube feeding   - Monitor tube feeding  Outcome: Progressing     Problem: Nutrition  Goal: Nutrition/Hydration status is improving  Description: Monitor and assess patient's nutrition/hydration status for malnutrition (ex- brittle hair, bruises, dry skin, pale skin and conjunctiva, muscle wasting, smooth red tongue, and disorientation)  Collaborate with interdisciplinary team and initiate plan and interventions as ordered  Monitor patient's weight and dietary intake as ordered or per policy  Utilize nutrition screening tool and intervene per policy  Determine patient's food preferences and provide high-protein, high-caloric foods as appropriate  - Assist patient with eating   - Allow adequate time for meals   - Encourage patient to take dietary supplement as ordered  - Collaborate with clinical nutritionist   - Include patient/family/caregiver in decisions related to nutrition  Outcome: Progressing     Problem: Nutrition/Hydration-ADULT  Goal: Nutrient/Hydration intake appropriate for improving, restoring or maintaining nutritional needs  Description: Monitor and assess patient's nutrition/hydration status for malnutrition  Collaborate with interdisciplinary team and initiate plan and interventions as ordered  Monitor patient's weight and dietary intake as ordered or per policy  Utilize nutrition screening tool and intervene as necessary  Determine patient's food preferences and provide high-protein, high-caloric foods as appropriate       INTERVENTIONS:  - Monitor oral intake, urinary output, labs, and treatment plans  - Assess nutrition and hydration status and recommend course of action  - Evaluate amount of meals eaten  - Assist patient with eating if necessary   - Allow adequate time for meals  - Recommend/ encourage appropriate diets, oral nutritional supplements, and vitamin/mineral supplements  - Order, calculate, and assess calorie counts as needed  - Recommend, monitor, and adjust tube feedings and TPN/PPN based on assessed needs  - Assess need for intravenous fluids  - Provide specific nutrition/hydration education as appropriate  - Include patient/family/caregiver in decisions related to nutrition  Outcome: Progressing

## 2022-07-25 NOTE — ASSESSMENT & PLAN NOTE
No record of PFTs    Home regimen:  Albuterol inhaler, Flonase, Spiriva    · Continue albuterol nebs  · Start on Pulmicort nebs on 07/25  · Consider PFTs at discharge

## 2022-07-25 NOTE — QUICK NOTE
Progress Note - Palliative & Supportive Care       7/25/2022 3:19 PM -  Sara Livingston Andry's chart and case were reviewed by David 33, DO  Mode of review included electronic chart check, and discussion with ICU resident  Patient being moved to med/surge today  Palliative care will return on 7/26/22         For urgent issues or any questions/concerns, please notify on-call provider via [employer]  You may also call our answering service 24/7 at   [email or web address] 33, DO  Palliative and Supportive Care  Clinic/Answering Service: [phone]  You can find me on [email or web address]!

## 2022-07-25 NOTE — ASSESSMENT & PLAN NOTE
Wt Readings from Last 3 Encounters:   07/23/22 64 9 kg (143 lb)   07/22/22 65 2 kg (143 lb 11 8 oz)   07/15/22 69 9 kg (154 lb)       TTE this admission showed EF 55-60% with normal LV systolic function  Home regimen of losartan 20 mg, Lopressor 50 mg b i d , Lasix 40 mg every other day      · Holding losartan, Lasix

## 2022-07-25 NOTE — PROGRESS NOTES
ICU TRANSFER NOTE    Code Status: Level 3 - DNAR and DNI  POA:    POLST:      Reason for ICU admission:   Stroke    Active problems:   Principal Problem:    CVA (cerebral vascular accident) ([place] [id number] )  Active Problems:    Dysphagia    Paroxysmal A-fib ([facility])    Deep vein thrombosis (DVT) of left upper extremity ([facility] )    H/O mitral valve replacement    Controlled type 2 diabetes mellitus without complication, without long-term current use of insulin ([facility])    Hypertension    Systolic congestive heart failure ([place] [address] )    Cardio-embolic left MCA stroke ([facility])    History of CVA (cerebrovascular accident)    CKD (chronic kidney disease) stage 3, GFR 30-59 ml/min ([facility])    Bilateral carotid artery stenosis    Vascular dementia ([place] [address] )    Ambulatory dysfunction    Meningioma ([facility])    Thrombocytopenia ([facility])    Coronary artery disease with angina pectoris, unspecified vessel or lesion type, unspecified whether native or transplanted heart ([place] [address] )    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[place] [address] )  Resolved Problems:    * No resolved hospital problems  *        * CVA (cerebral vascular accident) [facility])  Assessment & Plan  Presented with worsened aphasia, right-sided weakness, and right facial droop  CT showed left M1 occlusion moderate stenosis proximal right ICA and moderate stenosis left PCA  Underwent unsuccessful thrombectomy on 07/22  Repeat CTH on 07/23 and 7/24 showed evolving non hemorrhagic infarct of left MCA  · Continue aspirin 81 mg, atorvastatin 80 mg  · Holding full anticoagulation until August 1-4 (10-14 days after stroke)  · 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 on hold due to history of HIT  · Heparin induced platelet antibody WNL from 2018  · No record of NORRIS   · Neuro checks every 4 hours  · Continue to monitor on telemetry  · Neurology following - appreciate recommendations    Dysphagia  Assessment & Plan  Baseline dysphagia due to prior stroke  Speech pathology recommends patient remain NPO  NGT placed 7/24      · Continue Nepro at 35 cc/hour  · Family is aware and amenable to PEG if needed  · Palliative care following - appreciate recommendations    Paroxysmal A-fib [facility])  Assessment & Plan  Home regimen:  Lopressor 50 mg b i d , Eliquis 2 5 mg b i d  · Restart Lopressor 12 5 mg b i d  Last 7/25  · Holding Eliquis due to acute stroke    COPD with asthma [facility])  Assessment & Plan  No record of PFTs  Home regimen:  Albuterol inhaler, Flonase, Spiriva    · Continue albuterol nebs  · Start on Pulmicort nebs on 07/25  · Consider PFTs at discharge    Vascular dementia [facility])  Assessment & Plan  Stable  · Sleep/wake cycle regulation    CKD (chronic kidney disease) stage 3, GFR 30-59 ml/min [facility])  Assessment & Plan  Lab Results   Component Value Date    EGFR 32 07/25/2022    EGFR 36 07/24/2022    EGFR 31 07/23/2022    CREATININE 1 45 (H) 07/25/2022    CREATININE 1 32 (H) 07/24/2022    CREATININE 1 46 (H) 07/23/2022     Baseline creatinine 1 4-1 6  Current and has remained within baseline during admission  · Discontinue Sears catheter 7/25  · Continue to monitor BUN, creatinine, I/O    Systolic congestive heart failure [facility])  Assessment & Plan  Wt Readings from Last 3 Encounters:   07/23/22 64 9 kg (143 lb)   07/22/22 65 2 kg (143 lb 11 8 oz)   07/15/22 69 9 kg (154 lb)       TTE this admission showed EF 55-60% with normal LV systolic function  Home regimen of losartan 20 mg, Lopressor 50 mg b i d , Lasix 40 mg every other day      · Holding losartan, Lasix      Hypertension  Assessment & Plan  Home regimen:  Losartan 20 mg, Lopressor 50 mg b i d , Lasix 40 mg every other day    · Blood pressure stable, SBPs 120 to 140s  · Consider restarting losartan if BP is consistently elevated    Controlled type 2 diabetes mellitus without complication, without long-term current use of insulin [facility])  Assessment & Plan  Lab Results   Component Value Date    HGBA1C 6 3 (H) 07/23/2022       Recent Labs     07/24/22  1757 07/25/22  0008 07/25/22  0612 07/25/22  1132   POCGLU 118 178* 178* 174*       Blood Sugar Average: Last 72 hrs:  (P) 187     Home regimen:  Januvia 50 mg daily    · Hold all p o  Medications while inpatient  · SSI algorithm 2  · BG checks every 6 hours      H/O mitral valve replacement  Assessment & Plan  TTE this admission shows normal bioprosthetic mitral valve  Consultants:   Neurosurgery, Neurology, palliative Care, Nutrition    History of Present Illness: For Dr Manisha Lanier H&P, "Socorro Conte is a 80 y o  female with PMHx of CHF, CKD stage 4, arthritis, anemia, ambulatory dysfunction, CAD, previous left MCA stroke, atrial fibrillation and history of DVT maintained on Eliquis, history of dysphasia, status post mitral valve replacement, hyperlipidemia, hypertension, known meningioma, vascular dementia, and diabetes who presents to [facility] from [address] [place] [address] as an endovascular alert  Patient presented to [address] [place] [address] with a last known well approximately 1 5-2 hours prior to presentation with worsening aphasia than that of baseline, left gaze preference, flaccid right upper extremity and right lower extremity, and right facial droop  The patient has expressive aphasia at baseline, along with stuttering  The patient did agree with CT head and CTA  CT head showed chronic ischemic changes, no acute infarct or hemorrhage noted on initial CT head  CTA revealed a left M1 occlusion Moderate stenosis proximal right internal carotid artery and moderate stenosis left PCA  The patient was not a tPA candidate due to a Eliquis use  The patient was transferred to [facility] for thrombectomy  Upon arrival at [facility] the patient underwent a CT perfusion scan and a viable per number was identified  The patient subsequently underwent a unsuccessful thrombectomy  The patient was subsequently transferred to the ICU for further management "    Summary of clinical course:   Extubated on 07/23    Repeat CTH on 07/23 and 7/24 showed evolving nonhemorrhagic left MCA territory infarction with prior chronic infarctions  TTE on 07/23 showed EF 55-60% with normal LV systolic function and a normal bioprosthetic mitral valve  NGT placed 7/24  Evaluated by speech pathology and recommend patient remains NPO  Palliative care consulted for goals of care  At this time, family is amenable to PEG it is recommended  Currently not on 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 due to history of HIT  Recent or scheduled procedures:   Mechanical thrombectomy 7/22    Outstanding/pending diagnostics:   N/a    Cultures:   COVID negative 7/22       Mobilization Plan:   PT/OT    Nutrition Plan:   Nepro tube feeds    Invasive Devices Review  Invasive Devices  Report    Peripheral Intravenous Line  Duration           Peripheral IV 07/25/22 Right Antecubital <1 day          Drain  Duration           NG/OG/Enteral Tube Nasogastric 12 Fr Right nare 1 day    External Urinary Catheter <1 day                Rationale for remaining devices:  NGT - nutrition    VTE Pharmacologic Prophylaxis: Pharmacologic VTE Prophylaxis contraindicated due to Acute stroke  VTE Mechanical Prophylaxis: sequential compression device    Discharge Plan:   Patient should be ready for discharge to home/rehab after primary team evaluation and PT/OT evaluation  Initial Physical Therapy Recommendations:  Pending  Initial Occupational Therapy Recommendations:  Pending  Initial /Plan:  Following    Home medications that are not reordered and reason why:   Eliquis, Lasix, Januvia, losartan - acute stroke    [facility] regarding transfer  Please contact critical care via [employer] with any questions or concerns  Portions of the record may have been created with voice recognition software  Occasional wrong word or "sound a like" substitutions may have occurred due to the inherent limitations of voice recognition software    Read the chart carefully and recognize, using context, where substitutions have occurred    Keith Dominique DO  Internal Medicine Resident, PGY2  1:51 PM

## 2022-07-25 NOTE — PROGRESS NOTES
Pastoral Care Progress Note    2022  Patient: Goldy Mitchell : 1933  Admission Date & Time: 2022 1721  MRN: [id number] CSN: [id number]                      22 1400   Clinical Encounter Type   Visited With Patient   Routine Visit Follow-up   Worship Encounters   Worship Needs Prayer   Sacramental Encounters   Sacrament of Sick-Anointing Anointed   Sacrament Other Other (Comment)  ([place] by Nash Fall)

## 2022-07-25 NOTE — ASSESSMENT & PLAN NOTE
Home regimen:  Losartan 20 mg, Lopressor 50 mg b i d , Lasix 40 mg every other day    · Blood pressure stable, SBPs 120 to 140s  · Consider restarting losartan if BP is consistently elevated

## 2022-07-25 NOTE — ASSESSMENT & PLAN NOTE
Baseline dysphagia due to prior stroke  Speech pathology recommends patient remain NPO  NGT placed 7/24      · Continue Nepro at 35 cc/hour  · Family is aware and amenable to PEG if needed  · Palliative care following - appreciate recommendations

## 2022-07-25 NOTE — SPEECH THERAPY NOTE
Speech Language/Pathology    Speech/Language Pathology Progress Note    Patient Name: Starr Dooley  UBSTR'Q Date: 7/25/2022     Problem List  Principal Problem:    CVA (cerebral vascular accident) [facility])  Active Problems:    Deep vein thrombosis (DVT) of left upper extremity ([facility] )    H/O mitral valve replacement    Controlled type 2 diabetes mellitus without complication, without long-term current use of insulin ([facility])    Hypertension    Systolic congestive heart failure ([facility] )    Cardio-embolic left MCA stroke ([facility] )    History of CVA (cerebrovascular accident)    CKD (chronic kidney disease) stage 3, GFR 30-59 ml/min ([facility])    Dysphagia    Bilateral carotid artery stenosis    Vascular dementia ([facility])    Ambulatory dysfunction    Meningioma ([facility])    Paroxysmal A-fib ([facility])    Thrombocytopenia ([facility])    Coronary artery disease with angina pectoris, unspecified vessel or lesion type, unspecified whether native or transplanted heart [facility])       Past Medical History  Past Medical History:   Diagnosis Date    Acid reflux     on occ    Arthritis     DJD right hip replaced    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    x 2 lesions with no change    [facility] 2007    colonic polyps, no surgery done    Deep vein thrombosis (DVT) of left upper extremity ([facility] ) 12/11/2017    Dementia ([facility] )     Diabetes mellitus [facility] )     type 2    Diverticulosis     Edema     in legs on occ    Hypertension     on occ    Language barrier     speaks Maltese & broken english    Stroke [facility])         Past Surgical History  Past Surgical History:   Procedure Laterality Date    COLON SURGERY      COLONOSCOPY      COLONOSCOPY N/A 11/2/2016    Procedure: COLONOSCOPY;  Surgeon: Anne Marie Seals MD;  Location: [place] [address] GI LAB; Service:     ESOPHAGOGASTRODUODENOSCOPY N/A 11/2/2016    Procedure: ESOPHAGOGASTRODUODENOSCOPY (EGD); Surgeon: Anne Marie Seals MD;  Location: [facility] GI LAB;   Service:    Jeyson Hernandez / Yusuf Mancia / Morgan Maguire REPLACEMENT Right 02/2015    hip    JOINT REPLACEMENT Left     knee    JOINT REPLACEMENT Right     knee    MITRAL VALVE REPLACEMENT      [place] COLONOSCOPY FLX DX W/COLLJ SPEC WHEN PFRMD N/A 8/9/2018    Procedure: EGD AND COLONOSCOPY;  Surgeon: Negra Mallory MD;  Location: [facility] LAB; Service: Gastroenterology    [place] REVISE MEDIAN N/CARPAL TUNNEL SURG Left 1/28/2016    Procedure: RELEASE CARPAL TUNNEL;  Surgeon: Manuel William MD;  Location: [facility] MAIN OR;  Service: Orthopedics    TUBAL LIGATION      VARICOSE VEIN SURGERY Bilateral          Subjective:  Pt w/ eyes open, family present at bedside  Current Diet:  NPO, NG    Objective:  Pt seen for dx dysphagia tx f/u to assess appropriateness for diet initiation  Pt seen w/ trials of puree and honey thick  Weak retrieval from tsp  Pt w/ prompt lingual pumping manipulation of material  All swallows audible, 3-4 swallows per bolus  Upon swallow, pt w/ audible gurgling followed by light coughing  Pt appears in distress w/ difficulty producing cough, suspect inability to cough to eject potentially aspirated material  S/w pt and pt's family regarding VBS to further assess swallow function and safety and further guide treatment plan  Pt's family agreeable, wish to proceed w/ VBS when medically appropriate  Assessment:  Pt w/ improved oral manipulation of material though suspected ongoing aspiration  Pt would benefit from VBS to further assess        Plan/Recommendations:  NPO  VBS as soon as medically appropriate  ST f/u as able and appropriate

## 2022-07-25 NOTE — PLAN OF CARE
Problem: OCCUPATIONAL THERAPY ADULT  Goal: Performs self-care activities at highest level of function for planned discharge setting  See evaluation for individualized goals  Description: Treatment Interventions: ADL retraining, Visual perceptual retraining, Functional transfer training, UE strengthening/ROM, Endurance training, Cognitive reorientation, Patient/family training, Equipment evaluation/education, Fine motor coordination activities, Compensatory technique education, Continued evaluation, Energy conservation, Activityengagement          See flowsheet documentation for full assessment, interventions and recommendations  Outcome: Not Progressing  Note: Limitation: Decreased ADL status, Decreased UE ROM, Decreased UE strength, Decreased Safe judgement during ADL, Decreased cognition, Decreased endurance, Decreased sensation, Visual deficit, Decreased self-care trans, Decreased fine motor control, Decreased high-level ADLs  Prognosis: Fair  Assessment: Pt is 80 y o  F presented to [facility] with worsening aphasia, leading to dc of CVA  Pt  has a past medical history of Acid reflux, Arthritis, Brain benign neoplasm, Cancer, DVT of left upper extremity, Dementia, Diabetes mellitus, Diverticulosis, Edema, Hypertension, Language barrier, and Stroke  Pt lives alone in 2 story home, 3+1 DANIELA  PTA pt required A with ADLs, IADLs, and - driving  Pt lives alone, but has 24/7 care from her family  Daughter present t/o session and confirmed home set up  Pt with active OT orders  Pt seen as co-evaluation with PT due to comorbidities, medical complexity, and current deficits  Pt presents to session supine in bed  Pt performed Max Ax2 for bed mobility, STS trx with b/l HHA and b/l knee blocks  Pt currently requires Max A for UB ADLs, eating and grooming; total A for LB ADLs   Pt limited by activity tolerance, fxnl mobility, decreased independence with ADLs/IADLs, strength, aphasia, cognitive impairment, safety awareness, FM skills, and coordination deficit  Occupational performance areas to be addressed include bathing, dressing, grooming, feeding, fxnl communication, fxnl and community mobility  Pt to benefit from skilled OT services to address above deficits and improve overall independence, and maximize performance  OT recommendation at time of d/c to rehab when medically stable  Pt left supine in bed with all current needs met  The patient's raw score on the AM-PAC Daily Activity inpatient short form low function score is 13, standardized score is Low Function Daily Activity Standardized Score: 23 16  Patients with a standardized score less than 39 4 are likely to benefit from discharge to post-acute rehab services  Please refer to the recommendation of the Occupational Therapist for safe discharge planning       OT Discharge Recommendation: Post acute rehabilitation services

## 2022-07-26 ENCOUNTER — APPOINTMENT (INPATIENT)
Dept: RADIOLOGY | Facility: HOSPITAL | Age: 87
DRG: 064 | End: 2022-07-26
Attending: FAMILY MEDICINE
Payer: MEDICARE

## 2022-07-26 LAB
GLUCOSE SERPL-MCNC: 145 MG/DL (ref 65–140)
GLUCOSE SERPL-MCNC: 159 MG/DL (ref 65–140)
GLUCOSE SERPL-MCNC: 164 MG/DL (ref 65–140)
GLUCOSE SERPL-MCNC: 170 MG/DL (ref 65–140)
GLUCOSE SERPL-MCNC: 189 MG/DL (ref 65–140)
GLUCOSE SERPL-MCNC: 194 MG/DL (ref 65–140)

## 2022-07-26 PROCEDURE — 94760 N-INVAS EAR/PLS OXIMETRY 1: CPT

## 2022-07-26 PROCEDURE — 99232 SBSQ HOSP IP/OBS MODERATE 35: CPT | Performed by: INTERNAL MEDICINE

## 2022-07-26 PROCEDURE — 82948 REAGENT STRIP/BLOOD GLUCOSE: CPT

## 2022-07-26 PROCEDURE — 99232 SBSQ HOSP IP/OBS MODERATE 35: CPT | Performed by: FAMILY MEDICINE

## 2022-07-26 PROCEDURE — 74230 X-RAY XM SWLNG FUNCJ C+: CPT

## 2022-07-26 PROCEDURE — 92611 MOTION FLUOROSCOPY/SWALLOW: CPT

## 2022-07-26 PROCEDURE — 94640 AIRWAY INHALATION TREATMENT: CPT

## 2022-07-26 PROCEDURE — 99223 1ST HOSP IP/OBS HIGH 75: CPT

## 2022-07-26 RX ADMIN — INSULIN LISPRO 1 UNITS: 100 INJECTION, SOLUTION INTRAVENOUS; SUBCUTANEOUS at 06:24

## 2022-07-26 RX ADMIN — CHLORHEXIDINE GLUCONATE 15 ML: 1.2 SOLUTION ORAL at 21:29

## 2022-07-26 RX ADMIN — BUDESONIDE 0.5 MG: 0.5 INHALANT ORAL at 20:17

## 2022-07-26 RX ADMIN — POLYETHYLENE GLYCOL 3350 17 G: 17 POWDER, FOR SOLUTION ORAL at 09:58

## 2022-07-26 RX ADMIN — ATORVASTATIN CALCIUM 80 MG: 80 TABLET, FILM COATED ORAL at 17:55

## 2022-07-26 RX ADMIN — WHITE PETROLATUM 57.7 %-MINERAL OIL 31.9 % EYE OINTMENT: at 21:30

## 2022-07-26 RX ADMIN — CHLORHEXIDINE GLUCONATE 15 ML: 1.2 SOLUTION ORAL at 09:58

## 2022-07-26 RX ADMIN — ASPIRIN 81 MG CHEWABLE TABLET 81 MG: 81 TABLET CHEWABLE at 09:58

## 2022-07-26 RX ADMIN — BUDESONIDE 0.5 MG: 0.5 INHALANT ORAL at 09:31

## 2022-07-26 RX ADMIN — INSULIN LISPRO 1 UNITS: 100 INJECTION, SOLUTION INTRAVENOUS; SUBCUTANEOUS at 17:56

## 2022-07-26 RX ADMIN — Medication 12.5 MG: at 21:29

## 2022-07-26 RX ADMIN — SENNOSIDES 8.6 MG: 8.6 TABLET, FILM COATED ORAL at 09:58

## 2022-07-26 RX ADMIN — INSULIN LISPRO 1 UNITS: 100 INJECTION, SOLUTION INTRAVENOUS; SUBCUTANEOUS at 12:23

## 2022-07-26 RX ADMIN — LEVALBUTEROL HYDROCHLORIDE 1.25 MG: 1.25 SOLUTION, CONCENTRATE RESPIRATORY (INHALATION) at 09:30

## 2022-07-26 RX ADMIN — INSULIN LISPRO 1 UNITS: 100 INJECTION, SOLUTION INTRAVENOUS; SUBCUTANEOUS at 23:39

## 2022-07-26 RX ADMIN — Medication 12.5 MG: at 09:58

## 2022-07-26 RX ADMIN — INSULIN LISPRO 1 UNITS: 100 INJECTION, SOLUTION INTRAVENOUS; SUBCUTANEOUS at 00:56

## 2022-07-26 RX ADMIN — LEVALBUTEROL HYDROCHLORIDE 1.25 MG: 1.25 SOLUTION, CONCENTRATE RESPIRATORY (INHALATION) at 20:17

## 2022-07-26 NOTE — ASSESSMENT & PLAN NOTE
· Likely secondary to CVA  Status post video barium swallow  His started on a dysphagia diet with aspiration precautions  · Currently patient has an NG tube present for nutrition  If the patient does not have enough nutritional intake through mouth may need to consider feeding tube    Family is agreeable for PEG tube placement

## 2022-07-26 NOTE — CONSULTS
PHYSICAL MEDICINE AND REHABILITATION CONSULT NOTE  Johnson Groves 80 y o  female MRN: [id number]  [facility] Encounter: [id number]    Requested by:   Rosey Tripathi MD  Reason for Consultation:  Rehabilitation medicine evaluation and assistance with appropriate disposition  Primary insurance listed on facesheet:  Medicare A/B    Primary Rehabilitation Diagnosis:   Stroke:  01 2  Right Body Involvement (Left Brain)    Etiologic Diagnosis:  L MCA territory infarction      Assessment and Recommendations:  80year old F with PMH of multiple strokes, including a moderately sized R parietal MCA infarction mid 4/2018, L MCA infarction late 4/2018 with M2 occlusion s/p thrombectomy, with some residual expressive aphasia, speech difficulties, cognitive deficits/vascular dementia, dysphagia, whose strength was never the less up until recently fairly well preserved and ambulatory short distances at mod [facility] level per family as well as history of DM2, Afib, DVT, HIT, HL, CHF, CKD, anemia, MV replacement, pacemaker status, seen by neurosurgery 7/15/22 for small left occipital calcified meningioma and suspicious left FP tiny calcified meningioma vs prominence both of which were not requiring additional follow-up, who may have been noncompliant with aspirin/Eliquis who developed worsening aphasia, L gaze preference, R facial droop and flaccid R sided weakness  CT head did not show acute findings  CTA showed L M1 occlusion with moderate stenosis proximal right internal carotid artery and moderate stenosis left PCA  She was not a candidate for tPA due to Eliquis and subsequently underwent unsuccessful L MCA thrombectomy  Neuro felt etiology cardioembolic possibly from Eliquis non-compliance  Full A/C on hold until 10-14 days post CVA given size of infarct and risk of hemorrhagic conversion  Patient continues with dense R-HP, L gaze preference, impaired wakefulness, and significant dysphagia   She was evaluated by skilled therapies and on my assessment today remains total assist for all mobility and ADLs  Patient had VBS 7/26 and noted to have mod-severe oropharyngeal dysphagia with SLP tx recommendation D1, HTL  On my eval, patient without adequate wakefulness/attention for oral feeds  Given size and location CVA and premorbid difficulty swallowing I would not expect this patient to be able to adequately and safely manage oral feeds potentially for the remainder of her life  Family is adamant that they want to pursue maximal nutritional/hydration goals and if that is the case then PEG tube would by far be safest        Prior Level of Function and Social history:     Mod indep with ambulation short distances  Fairs mod indep with basic ADLs  Residual cog impairments, aphasia, dysphagia     Current Level of Function:    Total assist for all mobility and ADLs    Large L MCA infarction 2/2 L M1 occlusion (on prior R and L MCA infarcts) with residual dense R-HP, L gaze preference, aphasia, severe dysphagia, and impaired wakefulness as well as bowel/bladder incontinence at high risk for aspiration/PNA, UTI, skin wounds:    - Hospice would be reasonable consideration but at this time family elects to continue to pursue medical, functional, nutritional goals (but is DNR/DNI)  - Unfortunately, meaningful functional recovery prognosis is poor - but trial in SNF setting is reasonable with PT, OT, ST   - I do not feel patient is safe on oral diet - if she is to have D1, HTL as recommended by SLP with aspiration precautions she would need improved wakefulness and attention compared to what I observed but even then I would not expect her to meet oral nutritional/hydration needs  - Recommend PEG tube placement if family continues to want to pursue   - Oral care   - Continue PT, OT, ST  - While patient at current functional level would also focus on prevention or improvement of complications (malnutrition, dehydration, PNA, atelectasis, VTE, constipation/obstruction, urinary retention, UTI, ulcerations, contractures)     - Turn patient Q2H, skin checks minimum Qshift  - ROM of major joints 2-3 times per day  - OOB to chair or reclined chair or adjust bed to seated position 2-3 times per day if possible  - Purewick reasonable for now until hopefully mobility improves; monitor skin closely  - Risk of constipation - optimal bowel regimen and bowel incontinence  - Recommend calazime cream BID and PRN buttocks/sacrum  - Allevyn foam to heels and floating    - Recommend caregiver training of family   - VTE prophylaxis per primary team     Disposition recommendation:    SNF- Patient too low level for ARC and is not expected to make significantly more meaningful functional gains in higher level setting   - Family could elect to take patient home with [facility] therapy but this would currently be very heavy caregiver burden    # Other medical issues:     Per primary service        ==================================================================    Chief Complaint:  Aphasia and dense R-HP     History of Present Illness:   80year old F with PMH of multiple strokes, including a moderately sized R parietal MCA infarction mid 4/2018, L MCA infarction late 4/2018 with M2 occlusion s/p thrombectomy, with some residual expressive aphasia, speech difficulties, cognitive deficits/vascular dementia, dysphagia, whose strength was never the less up until recently fairly well preserved and ambulatory short distances at mod indep level per family as well as history of DM2, Afib, DVT, HIT, HL, CHF, CKD, anemia, MV replacement, pacemaker status, seen by neurosurgery 7/15/22 for small left occipital calcified meningioma and suspicious left FP tiny calcified meningioma vs prominence both of which were not requiring additional follow-up, who may have been noncompliant with aspirin/Eliquis who developed worsening aphasia, L gaze preference, R facial droop and flaccid R sided weakness  CT head did not show acute findings  CTA showed L M1 occlusion with moderate stenosis proximal right internal carotid artery and moderate stenosis left PCA  She was not a candidate for tPA due to Eliquis and subsequently underwent unsuccessful L MCA thrombectomy  Neuro felt etiology cardioembolic possibly from Eliquis non-compliance  Full A/C on hold until 10-14 days post CVA given size of infarct and risk of hemorrhagic conversion  On eval, patient able to open eyes for short period of times and partially make eye contact with L gaze preference  She is able to move LUE/LLE with loud prompting and tactile stimulation and has dense R-HP  She is not able to adequately speak or overall follow commands  Review of Systems:     ROS: Could not be adequately (or fully adequately be) obtained due to degree of impaired cognition/communication at time of encounter        Allergies   Allergen Reactions    Glimepiride Rash    Guaifenesin-Codeine Hallucinations    Heparin Other (See Comments)     Thrombocytopenia      Vancomycin      Red man syndrome       Current Facility-Administered Medications:     acetaminophen (TYLENOL) rectal suppository 325 mg, 325 mg, Rectal, Q4H PRN, Sheila Arms, PA-C    acetaminophen (TYLENOL) tablet 650 mg, 650 mg, Oral, Q4H PRN, Sheila Tesfaye, PA-C    albuterol inhalation solution 2 5 mg, 2 5 mg, Nebulization, Q6H PRN, Sheila Tesfaye, PA-C    artificial tear (LUBRIFRESH P M ) ophthalmic ointment, , Both Eyes, HS, Idalia Scherer PA-C, Given at 07/25/22 2141    aspirin chewable tablet 81 mg, 81 mg, Oral, Daily, Idalia De La Rosa PA-C, 81 mg at 07/26/22 0958    atorvastatin (LIPITOR) tablet 80 mg, 80 mg, Oral, QPM, DIDIER Beck-C, 80 mg at 07/26/22 1755    budesonide (PULMICORT) inhalation solution 0 5 mg, 0 5 mg, Nebulization, Q12H, DIDIER Beck-C, 0 5 mg at 07/26/22 0931    chlorhexidine (PERIDEX) 0 12 % oral rinse 15 mL, 15 mL, Mouth/Throat, Q12H [facility], Idalia Hyatt PA-C, 15 mL at 07/26/22 5218    insulin lispro (HumaLOG) 100 units/mL subcutaneous injection 1-5 Units, 1-5 Units, Subcutaneous, Q6H [facility], 1 Units at 07/26/22 1756 **AND** Fingerstick Glucose (POCT), , , Q6H, DIDIER Beck-MATY    levalbuterol (XOPENEX) inhalation solution 1 25 mg, 1 25 mg, Nebulization, BID, Idalia ZION Hyatt PA-C, 1 25 mg at 07/26/22 0930    metoprolol tartrate (LOPRESSOR) partial tablet 12 5 mg, 12 5 mg, Oral, Q12H [facility], Idalia Hyatt PA-C, 12 5 mg at 07/26/22 5822    polyethylene glycol (MIRALAX) packet 17 g, 17 g, Oral, Daily, Idalia Nelson Poot PA-C, 17 g at 07/26/22 0526    senna (SENOKOT) tablet 8 6 mg, 1 tablet, Oral, Daily, Orval Rober, PA-C, 8 6 mg at 07/26/22 1898    Past Medical History:   Diagnosis Date    Acid reflux     on occ    Arthritis     DJD right hip replaced    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[facility] ) 2007    x 2 lesions with no change    Cancer ([facility] ) 2007    colonic polyps, no surgery done    Deep vein thrombosis (DVT) of left upper extremity ([facility] ) 12/11/2017    Dementia ([facility] )     Diabetes mellitus ([facility] )     type 2    Diverticulosis     Edema     in legs on occ    Hypertension     on occ    Language barrier     speaks Georgian & broken english    Stroke [facility])        Past Surgical History:   Procedure Laterality Date    COLON SURGERY      COLONOSCOPY      COLONOSCOPY N/A 11/2/2016    Procedure: COLONOSCOPY;  Surgeon: Troy Barber MD;  Location: [place] [address] GI LAB; Service:     ESOPHAGOGASTRODUODENOSCOPY N/A 11/2/2016    Procedure: ESOPHAGOGASTRODUODENOSCOPY (EGD); Surgeon: Troy Barber MD;  [facility];   Service:    Sawyer Yañez / Katrin Irving / Lamar Chavez PACEMAKER      IR STROKE ALERT  7/22/2022    JOINT REPLACEMENT Right 02/2015    hip    JOINT REPLACEMENT Left     knee    JOINT REPLACEMENT Right     knee    MITRAL VALVE REPLACEMENT      HI COLONOSCOPY FLX DX W/COLLJ SPEC WHEN PFRMD N/A 8/9/2018    Procedure: EGD AND COLONOSCOPY;  Surgeon: Anabel Blanc MD;  [facility]; Service: Gastroenterology    CA REVISE MEDIAN N/CARPAL TUNNEL SURG Left 2016    Procedure: RELEASE CARPAL TUNNEL;  Surgeon: Lacie Mendoza MD;  Location: [facility] MAIN OR;  Service: Orthopedics    TUBAL LIGATION      VARICOSE VEIN SURGERY Bilateral       Family History   Problem Relation Age of Onset    Cancer Mother         throat       Social History available currently in EMR:  (See additional 31 Carmela Friend as outlined above)   Social History     Socioeconomic History    Marital status:       Spouse name: Not on file    Number of children: Not on file    Years of education: Not on file    Highest education level: Not on file   Occupational History    Not on file   Tobacco Use    Smoking status: Former Smoker     Packs/day: 0 25     Years: 10 00     Pack years: 2 50     Types: Cigarettes     Quit date: 1950     Years since quittin 6    Smokeless tobacco: Never Used   Vaping Use    Vaping Use: Never used   Substance and Sexual Activity    Alcohol use: No    Drug use: No    Sexual activity: Not Currently   Other Topics Concern    Not on file   Social History Narrative    Not on file     Social Determinants of Health     Financial Resource Strain: Not on file   Food Insecurity: Not on file   Transportation Needs: Not on file   Physical Activity: Not on file   Stress: Not on file   Social Connections: Not on file   Intimate Partner Violence: Not on file   Housing Stability: Not on file       Physical Examination:   Temp:  [98 8 °F (37 1 °C)-99 6 °F (37 6 °C)] 99 6 °F (37 6 °C)  HR:  [] 100  Resp:  [17-22] 17  BP: (132-161)/(54-80) 161/80  General: Lying in bed in NAD   HENT: NCAT, MMM, NGT in place   Neck: Supple, no lymphadenopathy  Respiratory: Wet cough at times, limited lung expansion but without significant wheeze or rales or rhonchi  Cardiovascular: Regular rate and rhythm, no murmurs, rubs, or gallops  Gastrointestinal: Soft, non-tender, non-distended, normoactive bowel sounds  Genitourinary: No schaefer  SkiN/MSK/Extremities:  Distal extremities appropriately warm, normal cap refill distally, no cyanosis or calf edema, no calf tenderness to palpation  Neurologic/Psych:   MENTAL STATUS: able to open eyes for short period of times and partially make eye contact with L gaze preference  She is not able to adequately speak or overall follow commands  Affect: Flat  Motor: She is able to move LUE/LLE with loud prompting and tactile stimulation and has dense R-HP    Data:  Lab Results   Component Value Date    HGB 8 9 (L) 07/25/2022    HGB 10 2 (L) 12/23/2015    HCT 28 7 (L) 07/25/2022    HCT 31 2 (L) 12/23/2015    WBC 6 72 07/25/2022    WBC 4 0 (L) 12/23/2015     12/23/2015    K 3 6 07/25/2022    K 4 2 12/23/2015     (H) 07/25/2022     12/23/2015    GLUCOSE 103 05/08/2018    GLUCOSE 114 (H) 12/23/2015    CREATININE 1 45 (H) 07/25/2022    CREATININE 1 3 12/23/2015    BUN 29 (H) 07/25/2022    BUN 26 (H) 12/23/2015         XR chest 1 view portable    Result Date: 7/22/2022  Impression: No acute cardiopulmonary disease  Workstation performed: [id number]     CT head wo contrast    Result Date: 7/24/2022  Impression: 1  Evolving nonhemorrhagic left MCA territory infarction  2   Cerebral atrophy with chronic small vessel ischemic white matter disease and chronic infarctions as described above  Workstation performed: [id number]     CT head wo contrast    Result Date: 7/23/2022  Impression: 1  Evolving nonhemorrhagic left MCA territory infarction  2   Cerebral atrophy with chronic small vessel ischemic white matter disease and chronic right occipital parietal lobe infarctions  Workstation performed: [id number]     X-ray chest 1 view    Result Date: 7/23/2022  Impression: No acute cardiopulmonary disease  ET tube in satisfactory position   Workstation performed: [id number]     CT stroke alert brain    Result Date: 7/22/2022  Impression: Stable chronic ischemic changes  No acute infarct or hemorrhage  I personally discussed this study with Danyelle Red on 7/22/2022 at 1:33 PM  Workstation performed: [id number]     CT cerebral perfusion    Result Date: 7/22/2022  Impression: CT perfusion performed  Data available on PACS  Workstation performed: [id number]     CTA stroke alert (head/neck)    Result Date: 7/22/2022  Impression: Proximal left M1 occlusion  Moderate stenosis proximal right internal carotid artery  Moderate stenosis left PCA  I personally discussed this study with Danyelle Red on 7/22/2022 at 1:33 PM  Workstation performed: [id number]     IR stroke alert    Result Date: 7/25/2022  Impression: Left ICA terminus occlusion, TICI 0  Unsuccessful mechanical thrombectomy with persistent occlusion  Workstation performed: [id number]       Pertinent labs and imaging reviewed  Patient seen on date of service listed  Total time spent:  75 minutes (at least) with more than 50% spent counseling/coordinating care  Counseling includes extended discussion with 3 daughters re: history, symptoms, medications, functional issues, mood, medical management (which in part specifically includes management of recent CVA and prior) and rehabilitation management plan, and disposition  Additional time spent with thorough chart review in EMR, reviewing recent medications, labs, imaging, and management plan  This was followed by formulating a comprehensive inpatient medical/rehabilitation management plan and coordinating with pertinent specialists as indicated  Additional time spent with supportive counseling  Thank you for allowing the PM&R service to participate in the care of this patient  Please do not hesitate to call with questions or concerns  I personally performed the required components and examined the patient myself in person on 7/26/22        Silverio Benavides MD, [address] [facility]  Physical Medicine and Rehabilitation  Brain Injury Medicine

## 2022-07-26 NOTE — ASSESSMENT & PLAN NOTE
Lab Results   Component Value Date    EGFR 32 07/25/2022    EGFR 36 07/24/2022    EGFR 31 07/23/2022    CREATININE 1 45 (H) 07/25/2022    CREATININE 1 32 (H) 07/24/2022    CREATININE 1 46 (H) 07/23/2022   Monitor creatinine appears to be at baseline

## 2022-07-26 NOTE — QUICK NOTE
Progress Note - Palliative & Supportive Care       7/26/2022 11:18 AM -  Sam Sumeet Wilde's chart and case were reviewed by Jacqueline Gamino DO  Mode of review included electronic chart check, and room check  Per review, symptoms remain controlled on current regimen and no changes are made at this time  Please continue the regimen in place, and review our last note for details  For dispo plan, please review Case Management notes  Patient currently off floor for VBS  [facility] will follow after resulted for ongoing goals of care conversations  For urgent issues or any questions/concerns, please notify on-call provider via [employer]  You may also call our answering service 24/7 at   Jacqueline Gamino DO  Palliative and Supportive Care  Clinic/Answering Service: [phone]  You can find me on [employer]!

## 2022-07-26 NOTE — CONSULTS
Consultation - [address] [facility]  Eliza Sears 80 y o  female MRN: [id number]  Unit/Bed#: [facility] [place] 723-01 Encounter: [id number]              Inpatient consult to gastroenterology     Performed by  James Rodriguez DO     Authorized by Vinh Nayak PA-C              Reason for Consult / Principal Problem:     PEG tube placement       ASSESSMENT AND PLAN:      Vadmi Cheng is a 80year old female with PMH CHF, CKD stage 4, arthritis, anemia, ambulatory dysfunction, CAD, previous left MCA stroke, atrial fibrillation and history of DVT maintained on Eliquis, history of dysphasia, status post mitral valve replacement, known meningioma, and vascular dementia who presented with stroke symptoms s/p unsuccessful thrombectomy  1  Dysphagia  - 2/2 CVA of this admission  - baseline dysphagia 2/2 previous stroke but was still able to tolerate solid foods  - evaluation by speech showed inability to cough and risk of aspiration and subsequently VBS showed mild-moderate oropharyngeal dysphagia and good prognosis for continued safe po intake   - Will plan for PEG tube placement if pt is unable to tolerate PO intake        Rest of care per primary team   Thank you for this consultation  ______________________________________________________________________    HPI:   Vadim Cheng is a 80year old female with PMH CHF, CKD stage 4, arthritis, anemia, ambulatory dysfunction, CAD, previous left MCA stroke, atrial fibrillation and history of DVT maintained on Eliquis, history of dysphasia, status post mitral valve replacement, hyperlipidemia, hypertension, known meningioma, vascular dementia, and diabetes who presented to [address] [place] [address] with worsening aphasia, left gaze preference, flaccid right upper and lower extremities, and facial droop  She was found to have left M1 occlusion, moderate stenosis of proximal right internal carotid, and moderate stenosis of left PCA   Pt was transferred to [facility] for thrombectomy with was unsuccessful  According to the patients daughter seen at bedside, she have history of dysphagia at baseline from prior stoke with occasional choking on solids and liquids, but was eating solid food  Patient's children would like to ultimately take their mother home and care for her there, and would like a PEG tube placed  Today pt was not responding to verbal stimuli and sternal rub, but the pt's daughter reports that she alert yesterday attempting to mumble  REVIEW OF SYSTEMS:    Review of Systems   Unable to perform ROS: Patient nonverbal          Historical Information   Past Medical History:   Diagnosis Date    Acid reflux     on occ    Arthritis     DJD right hip replaced    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    x 2 lesions with no change    Cancer ([facility] [address] ) 2007    colonic polyps, no surgery done    Deep vein thrombosis (DVT) of left upper extremity ([facility] [address] ) 12/11/2017    Dementia ([facility] [address] )     Diabetes mellitus ([facility] [address] )     type 2    Diverticulosis     Edema     in legs on occ    Hypertension     on occ    Language barrier     speaks [place] & broken english    Stroke [facility])      Past Surgical History:   Procedure Laterality Date    COLON SURGERY      COLONOSCOPY      COLONOSCOPY N/A 11/2/2016    Procedure: COLONOSCOPY;  Surgeon: Oksana Chairez MD;  Location: [facility] GI LAB; Service:     ESOPHAGOGASTRODUODENOSCOPY N/A 11/2/2016    Procedure: ESOPHAGOGASTRODUODENOSCOPY (EGD); Surgeon: Oksana Chairez MD;  Location: [facility] GI LAB; Service:    Reshma Nieves / Katherine Lowe / Sarika Khannaer PACEMAKER      IR STROKE ALERT  7/22/2022    JOINT REPLACEMENT Right 02/2015    hip    JOINT REPLACEMENT Left     knee    JOINT REPLACEMENT Right     knee    MITRAL VALVE REPLACEMENT      [place] COLONOSCOPY FLX DX W/COLLJ SPEC WHEN PFRMD N/A 8/9/2018    Procedure: EGD AND COLONOSCOPY;  Surgeon: Oksana Chairez MD;  [facility];   Service: Gastroenterology    [place] REVISE MEDIAN N/CARPAL TUNNEL SURG Left 1/28/2016 Procedure: RELEASE CARPAL TUNNEL;  Surgeon: Glenn Miller MD;  Location: [facility] MAIN OR;  Service: Orthopedics    TUBAL LIGATION      VARICOSE VEIN SURGERY Bilateral      Social History   Social History     Substance and Sexual Activity   Alcohol Use No     Social History     Substance and Sexual Activity   Drug Use No     Social History     Tobacco Use   Smoking Status Former Smoker    Packs/day: 0 25    Years: 10 00    Pack years: 2 50    Types: Cigarettes    Quit date: 46    Years since quittin 6   Smokeless Tobacco Never Used     Family History   Problem Relation Age of Onset    Cancer Mother         throat       Meds/Allergies     Medications Prior to Admission   Medication    albuterol (2 5 mg/3 mL) 0 083 % nebulizer solution    albuterol (PROVENTIL HFA,VENTOLIN HFA) 90 mcg/act inhaler    apixaban (ELIQUIS) 2 5 mg    aspirin (ECOTRIN LOW STRENGTH) 81 mg EC tablet    atorvastatin (LIPITOR) 40 mg tablet    benzonatate (TESSALON PERLES) 100 mg capsule    fluticasone (FLONASE) 50 mcg/act nasal spray    furosemide (LASIX) 40 mg tablet    iron polysaccharides (FERREX) 150 mg capsule    JANUVIA 50 MG tablet    losartan (COZAAR) 25 mg tablet    metoprolol tartrate (LOPRESSOR) 50 mg tablet    Multiple Vitamins-Minerals (MULTIVITAMIN ADULT PO)    Polysaccharide Iron-FA-B12 (FERREX 150 FORTE PO)    potassium chloride (MICRO-K) 10 MEQ CR capsule    tiotropium (Spiriva Respimat) 1 25 MCG/ACT AERS inhaler     Current Facility-Administered Medications   Medication Dose Route Frequency    acetaminophen (TYLENOL) rectal suppository 325 mg  325 mg Rectal Q4H PRN    acetaminophen (TYLENOL) tablet 650 mg  650 mg Oral Q4H PRN    albuterol inhalation solution 2 5 mg  2 5 mg Nebulization Q6H PRN    artificial tear (LUBRIFRESH P M ) ophthalmic ointment   Both Eyes HS    aspirin chewable tablet 81 mg  81 mg Oral Daily    atorvastatin (LIPITOR) tablet 80 mg  80 mg Oral QPM    budesonide (PULMICORT) inhalation solution 0 5 mg  0 5 mg Nebulization Q12H    chlorhexidine (PERIDEX) 0 12 % oral rinse 15 mL  15 mL Mouth/Throat Q12H PARUL    insulin lispro (HumaLOG) 100 units/mL subcutaneous injection 1-5 Units  1-5 Units Subcutaneous Q6H [facility]    levalbuterol (XOPENEX) inhalation solution 1 25 mg  1 25 mg Nebulization BID    metoprolol tartrate (LOPRESSOR) partial tablet 12 5 mg  12 5 mg Oral Q12H [facility]    polyethylene glycol (MIRALAX) packet 17 g  17 g Oral Daily    senna (SENOKOT) tablet 8 6 mg  1 tablet Oral Daily    sodium chloride 0 9 % inhalation solution 3 mL  3 mL Nebulization BID       Allergies   Allergen Reactions    Glimepiride Rash    Guaifenesin-Codeine Hallucinations    Heparin Other (See Comments)     Thrombocytopenia      Vancomycin      Red man syndrome           Objective     Blood pressure 144/66, pulse 92, temperature 98 8 °F (37 1 °C), resp  rate 17, height 5' 1" (1 549 m), weight 64 9 kg (143 lb), SpO2 98 %, not currently breastfeeding  Body mass index is 27 02 kg/m²  Intake/Output Summary (Last 24 hours) at 7/26/2022 0824  Last data filed at 7/26/2022 0601  Gross per 24 hour   Intake 739 ml   Output 352 ml   Net 387 ml         PHYSICAL EXAM:      Physical Exam  Constitutional:       General: She is not in acute distress  Appearance: She is not ill-appearing or toxic-appearing  HENT:      Head: Normocephalic and atraumatic  Cardiovascular:      Rate and Rhythm: Normal rate and regular rhythm  Heart sounds: No murmur heard  Pulmonary:      Effort: Pulmonary effort is normal  No respiratory distress  Breath sounds: Normal breath sounds  Abdominal:      General: Abdomen is flat  Bowel sounds are normal  There is no distension  Palpations: Abdomen is soft  There is no mass  Tenderness: There is no abdominal tenderness  There is no guarding  Musculoskeletal:      Right lower leg: No edema  Left lower leg: No edema            Lab Results: Admission on 07/22/2022   Component Date Value    Color, UA 07/22/2022 Light Yellow     Clarity, UA 07/22/2022 Clear     Specific Gravity, UA 07/22/2022 >=1 050 (A)    pH, UA 07/22/2022 6 0     Leukocytes, UA 07/22/2022 Negative     Nitrite, UA 07/22/2022 Negative     Protein, UA 07/22/2022 30 (1+) (A)    Glucose, UA 07/22/2022 Negative     Ketones, UA 07/22/2022 Negative     Urobilinogen, UA 07/22/2022 <2 0     Bilirubin, UA 07/22/2022 Negative     Occult Blood, UA 07/22/2022 Negative     RBC, UA 07/22/2022 2-4 (A)    WBC, UA 07/22/2022 1-2     Epithelial Cells 07/22/2022 Occasional     Bacteria, UA 07/22/2022 None Seen     MUCUS THREADS 07/22/2022 Occasional (A)    POC Glucose 07/22/2022 200 (A)    pH, Arterial 07/22/2022 7 363     pCO2, Arterial 07/22/2022 40 6     pO2, Arterial 07/22/2022 193 6 (A)    HCO3, Arterial 07/22/2022 22 6     Base Excess, Arterial 07/22/2022 -2 6     O2 Content, Arterial 07/22/2022 16 1     O2 HGB,Arterial  07/22/2022 98 9 (A)    SOURCE 07/22/2022 Radial, Right     SCARLET TEST 07/22/2022 Yes     Vent Type- AC 07/22/2022 AC     AC Rate 07/22/2022 14     Tidal Volume 07/22/2022 350     Inspired Air (FIO2) 07/22/2022 40     PEEP 07/22/2022 6     Cholesterol 07/23/2022 116     Triglycerides 07/23/2022 68     HDL, Direct 07/23/2022 48 (A)    LDL Calculated 07/23/2022 54     Hemoglobin A1C 07/23/2022 6 3 (A)    EAG 07/23/2022 134     Sodium 07/23/2022 141     Potassium 07/23/2022 4 3     Chloride 07/23/2022 109 (A)    CO2 07/23/2022 25     ANION GAP 07/23/2022 7     BUN 07/23/2022 27 (A)    Creatinine 07/23/2022 1 46 (A)    Glucose 07/23/2022 134     Calcium 07/23/2022 9 0     eGFR 07/23/2022 31     Magnesium 07/23/2022 2 5     Phosphorus 07/23/2022 4 2 (A)    WBC 07/23/2022 7 48     RBC 07/23/2022 3 12 (A)    Hemoglobin 07/23/2022 9 5 (A)    Hematocrit 07/23/2022 29 6 (A)    MCV 07/23/2022 95     MCH 07/23/2022 30 4     MCHC 07/23/2022 32 1     RDW 07/23/2022 12 3     Platelets 59/15/3744 110 (A)    MPV 07/23/2022 10 2     pH, Arterial 07/23/2022 7  407     pCO2, Arterial 07/23/2022 40 3     pO2, Arterial 07/23/2022 179 3 (A)    HCO3, Arterial 07/23/2022 24 8     Base Excess, Arterial 07/23/2022 0 1     O2 Content, Arterial 07/23/2022 14 3 (A)    O2 HGB,Arterial  07/23/2022 98 7 (A)    SOURCE 07/23/2022 Radial, Right     SCARLET TEST 07/23/2022 Yes     Vent Type- AC 07/23/2022 AC     AC Rate 07/23/2022 12     Tidal Volume 07/23/2022 350     Inspired Air (FIO2) 07/23/2022 40     PEEP 07/23/2022 6     LV EF 07/23/2022 60     NT-proBNP 07/23/2022 4,472 (A)    Sodium 07/24/2022 142     Potassium 07/24/2022 3 8     Chloride 07/24/2022 112 (A)    CO2 07/24/2022 23     ANION GAP 07/24/2022 7     BUN 07/24/2022 24     Creatinine 07/24/2022 1 32 (A)    Glucose 07/24/2022 123     Calcium 07/24/2022 9 1     eGFR 07/24/2022 36     WBC 07/24/2022 6 83     RBC 07/24/2022 3 01 (A)    Hemoglobin 07/24/2022 9 1 (A)    Hematocrit 07/24/2022 29 1 (A)    MCV 07/24/2022 97     MCH 07/24/2022 30 2     MCHC 07/24/2022 31 3 (A)    RDW 07/24/2022 12 5     MPV 07/24/2022 10 7     Platelets 02/34/5390 113 (A)    nRBC 07/24/2022 0     Neutrophils Relative 07/24/2022 64     Immat GRANS % 07/24/2022 0     Lymphocytes Relative 07/24/2022 17     Monocytes Relative 07/24/2022 12     Eosinophils Relative 07/24/2022 7 (A)    Basophils Relative 07/24/2022 0     Neutrophils Absolute 07/24/2022 4 33     Immature Grans Absolute 07/24/2022 0 02     Lymphocytes Absolute 07/24/2022 1 19     Monocytes Absolute 07/24/2022 0 82     Eosinophils Absolute 07/24/2022 0 45     Basophils Absolute 07/24/2022 0 02     Magnesium 07/24/2022 2 5     Phosphorus 07/24/2022 3 5     Magnesium 07/25/2022 2 6     Phosphorus 07/25/2022 2 8     WBC 07/25/2022 6 72     RBC 07/25/2022 2 95 (A)    Hemoglobin 07/25/2022 8 9 (A)    Hematocrit 07/25/2022 28 7 (A)    MCV 07/25/2022 97     MCH 07/25/2022 30 2     MCHC 07/25/2022 31 0 (A)    RDW 07/25/2022 12 6     MPV 07/25/2022 10 4     Platelets 56/63/4411 137 (A)    nRBC 07/25/2022 0     Neutrophils Relative 07/25/2022 73     Immat GRANS % 07/25/2022 0     Lymphocytes Relative 07/25/2022 14     Monocytes Relative 07/25/2022 10     Eosinophils Relative 07/25/2022 3     Basophils Relative 07/25/2022 0     Neutrophils Absolute 07/25/2022 4 88     Immature Grans Absolute 07/25/2022 0 02     Lymphocytes Absolute 07/25/2022 0 97     Monocytes Absolute 07/25/2022 0 66     Eosinophils Absolute 07/25/2022 0 18     Basophils Absolute 07/25/2022 0 01     Sodium 07/25/2022 144     Potassium 07/25/2022 3 6     Chloride 07/25/2022 112 (A)    CO2 07/25/2022 26     ANION GAP 07/25/2022 6     BUN 07/25/2022 29 (A)    Creatinine 07/25/2022 1 45 (A)    Glucose 07/25/2022 166 (A)    Calcium 07/25/2022 9 4     eGFR 07/25/2022 32     POC Glucose 07/25/2022 174 (A)    POC Glucose 07/25/2022 161 (A)    POC Glucose 07/26/2022 159 (A)    POC Glucose 07/26/2022 170 (A)       Imaging Studies: I have personally reviewed pertinent imaging studies      Connor Luna, [place] [address] Internal Medicine PGY -1

## 2022-07-26 NOTE — ASSESSMENT & PLAN NOTE
Wt Readings from Last 3 Encounters:   07/23/22 64 9 kg (143 lb)   07/22/22 65 2 kg (143 lb 11 8 oz)   07/15/22 69 9 kg (154 lb)       Patient with chronic systolic congestive heart failure  Ejection fraction on this admission EF 55-60% with normal LV systolic function  Home regimen is losartan 20 Lopressor 50  Lasix on hold currently    Hold losartan for now

## 2022-07-26 NOTE — PROCEDURES
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    x 2 lesions with no change    Cancer ([facility] [address] ) 2007    colonic polyps, no surgery done    Deep vein thrombosis (DVT) of left upper extremity ([facility] [address] ) 12/11/2017    Dementia ([facility] [address] )     Diabetes mellitus ([facility] [address] )     type 2    Diverticulosis     Edema     in legs on occ    Hypertension     on occ    Language barrier     speaks [place] & broken english    Stroke [facility])         Past Surgical History  Past Surgical History:   Procedure Laterality Date    COLON SURGERY      COLONOSCOPY      COLONOSCOPY N/A 11/2/2016    Procedure: COLONOSCOPY;  Surgeon: Trinity Mc MD;  Location: [facility] GI LAB; Service:     ESOPHAGOGASTRODUODENOSCOPY N/A 11/2/2016    Procedure: ESOPHAGOGASTRODUODENOSCOPY (EGD); Surgeon: Trinity Mc MD;  Location: [facility] GI LAB; Service:    Ned [place] / Jorge Du / Milka Smallwood PACEMAKER      IR STROKE ALERT  7/22/2022    JOINT REPLACEMENT Right 02/2015    hip    JOINT REPLACEMENT Left     knee    JOINT REPLACEMENT Right     knee    MITRAL VALVE REPLACEMENT      [place] COLONOSCOPY FLX DX W/COLLJ SPEC WHEN PFRMD N/A 8/9/2018    Procedure: EGD AND COLONOSCOPY;  Surgeon: Trinity Mc MD;  [facility]; Service: Gastroenterology    [place] REVISE MEDIAN N/CARPAL TUNNEL SURG Left 1/28/2016    Procedure: RELEASE CARPAL TUNNEL;  Surgeon: Coni Adrian MD;  Location: [facility] MAIN OR;  Service: Orthopedics    TUBAL LIGATION      VARICOSE VEIN SURGERY Bilateral          Video Barium Swallow Study    Summary:  Images are on PACS for review  Pt presents w/ mod-severe oropharyngeal dysphagia  Pt w/ poor oral control and reduced oral manipulation   Anterior and posterior spill of all material- all at least to the valleculae, nectar and thin to the pyriforms  Swallow initiation is moderately delayed, at times holding in the pyriforms for several seconds  Min hyo-laryngeal excursion  No significant pharyngeal retention  Aspiration w/ thin liquids, cough response appreciated  Penetration and ?able trace aspiration x1 w/ nectar thick (difficult to visualize 2/2 body positioning)  Per gross esophageal screen:  Unremarkable     Recommendations:  Diet: Puree  Liquids: Honey thick via tsps   Meds: Crush in puree  Strategies: Check for oral clearing, only feed when fully awake and alert   Upright position   F/u ST tx: Yes   Therapy Prognosis: Fair   Prognosis considerations: Age, past medical, current medical, cognitive status   Full Supervision  Aspiration Precautions  Reflux Precautions  Consider consult with: -   Results reviewed with: pt, nursing, family, physician  Aspiration precautions posted  If a dedicated assessment of the esophagus is desired, consider esophagram/barium swallow or EGD  Patient's goal: none stated       Goals:  Pt will tolerate least restrictive diet w/out s/s aspiration or oral/pharyngeal difficulties  Previous VBS:  7/3/18: Assessment Summary; Pt presents with mild-moderate oropharyngeal dysphagia characterized by prolonged anterior mastication, reduced bolus control posteriorly with premature spillage, delay in swallow initiation as well as reduced epiglottic inversion, pharyngeal constriction and retention post swallow  She was also noted to have penetration with puree and all liquids which was reduced/eliminated with use of neck flexion  Patient presents with risk for aspiration of retained material post swallow as well as with deep penetration with thin liquids at this time   This risk is reduced/eliminated with strategies- therefore patient would benefit from ST to initiate exercises as well as train in beneficial strategies      Diagnosis/Prognosis;  mild-moderate oropharyngeal dysphagia    good prognosis for continued safe po intake      Recommendations; Recommend mechanically altered/level 2 diet and nectar thick liquids, with upright posture, only feed when fully alert, slow rate of feeding, small bites/sips, chin tuck, effortful swallow, quiet environment (tv off, limit talking, door closed, etc ) and double swallow  Patient also benefits from use of straw for carryover of chin tuck  Recommended Form of Meds: whole with puree or crushed with puree   Full Supervision  Aspiration precautions   Reflux Precautions  Results reviewed with patient and family  Does the pt have pain? No   If yes, was nursing made aware/was it addressed? N/A       Precautions:  -     Food Allergies:  None    Current Diet:  NPO      Premorbid diet:  Regular w/ thin     Dentition:  Limited dentition     O2 requirement:  RA    Oral mech:  Strength and ROM: R facial droop     Vocal Quality/Speech:  Non-verbal     Cognitive status:  Waxing and waning       Consistencies administered: Puree, honey thick liquid, nectar thick liquid, thin liquid, barium tablet in puree, with thin  Liquids were administered/taken by straw/cup/tsp  Attempted soft solid though pt w/ no attempt to masticate and it was removed for pt safety  Pt was seated laterally at 90 degrees      Oral stage:  Lip closure: weak/incomplete   Mastication: N/A   Bolus formation:  Bolus control: Poor, anterior and posterior spill of all   Transfer: Weak lingual pumping   Residue: W/ all    Pharyngeal stage:  Swallow promptness: mod delay   Spill to valleculae: w/ all    Spill to pyriforms: w/ nectar thick, thin   Epiglottic inversion: complete   Laryngeal excursion: min   Pharyngeal constriction: wfl    Vallecular retention: no   Pyriform retention: no   PPW coating: no   Osteophytes: no   CP prominence: no   Retropulsion from prominence: no   Transient penetration: w/ honey thick   Epiglottic undercoat: w/ thin   Penetration: w/ N T  Aspiration: joanne w/ thin liquids, entered posteriorly prior to the swallow   Strategies: pt unable to follow commands for strategies to be trialed at this time   Response to aspiration: cough     Screening of Esophageal stage:  Unremarkable

## 2022-07-26 NOTE — PLAN OF CARE
Problem: Prexisting or High Potential for Compromised Skin Integrity  Goal: Skin integrity is maintained or improved  Description: INTERVENTIONS:  - Identify patients at risk for skin breakdown  - Assess and monitor skin integrity  - Assess and monitor nutrition and hydration status  - Monitor labs   - Assess for incontinence   - Turn and reposition patient  - Assist with mobility/ambulation  - Relieve pressure over bony prominences  - Avoid friction and shearing  - Provide appropriate hygiene as needed including keeping skin clean and dry  - Evaluate need for skin moisturizer/barrier cream  - Collaborate with interdisciplinary team   - Patient/family teaching  - Consider wound care consult   Outcome: Progressing     Problem: Potential for Aspiration  Goal: Non-ventilated patient's risk of aspiration is minimized  Description: Assess and monitor vital signs, respiratory status, and labs (WBC)  Monitor for signs of aspiration (tachypnea, cough, rales, wheezing, cyanosis, fever)  - Assess and monitor patient's ability to swallow  - Place patient up in chair to eat if possible  - HOB up at 90 degrees to eat if unable to get patient up into chair   - Supervise patient during oral intake  - Instruct patient/ family to take small bites  - Instruct patient/ family to take small single sips when taking liquids  - Follow patient-specific strategies generated by speech pathologist   Outcome: Progressing     Problem: Nutrition  Goal: Nutrition/Hydration status is improving  Description: Monitor and assess patient's nutrition/hydration status for malnutrition (ex- brittle hair, bruises, dry skin, pale skin and conjunctiva, muscle wasting, smooth red tongue, and disorientation)  Collaborate with interdisciplinary team and initiate plan and interventions as ordered  Monitor patient's weight and dietary intake as ordered or per policy  Utilize nutrition screening tool and intervene per policy   Determine patient's food preferences and provide high-protein, high-caloric foods as appropriate  - Assist patient with eating   - Allow adequate time for meals   - Encourage patient to take dietary supplement as ordered  - Collaborate with clinical nutritionist   - Include patient/family/caregiver in decisions related to nutrition  Outcome: Progressing     Problem: Nutrition/Hydration-ADULT  Goal: Nutrient/Hydration intake appropriate for improving, restoring or maintaining nutritional needs  Description: Monitor and assess patient's nutrition/hydration status for malnutrition  Collaborate with interdisciplinary team and initiate plan and interventions as ordered  Monitor patient's weight and dietary intake as ordered or per policy  Utilize nutrition screening tool and intervene as necessary  Determine patient's food preferences and provide high-protein, high-caloric foods as appropriate       INTERVENTIONS:  - Monitor oral intake, urinary output, labs, and treatment plans  - Assess nutrition and hydration status and recommend course of action  - Evaluate amount of meals eaten  - Assist patient with eating if necessary   - Allow adequate time for meals  - Recommend/ encourage appropriate diets, oral nutritional supplements, and vitamin/mineral supplements  - Order, calculate, and assess calorie counts as needed  - Recommend, monitor, and adjust tube feedings and TPN/PPN based on assessed needs  - Assess need for intravenous fluids  - Provide specific nutrition/hydration education as appropriate  - Include patient/family/caregiver in decisions related to nutrition  Outcome: Progressing     Problem: CARDIOVASCULAR - ADULT  Goal: Maintains optimal cardiac output and hemodynamic stability  Description: INTERVENTIONS:  - Monitor I/O, vital signs and rhythm  - Monitor for S/S and trends of decreased cardiac output  - Administer and titrate ordered vasoactive medications to optimize hemodynamic stability  - Assess quality of pulses, skin color and temperature  - Assess for signs of decreased coronary artery perfusion  - Instruct patient to report change in severity of symptoms  Outcome: Progressing     Problem: RESPIRATORY - ADULT  Goal: Achieves optimal ventilation and oxygenation  Description: INTERVENTIONS:  - Assess for changes in respiratory status  - Assess for changes in mentation and behavior  - Position to facilitate oxygenation and minimize respiratory effort  - Oxygen administered by appropriate delivery if ordered  - Initiate smoking cessation education as indicated  - Encourage broncho-pulmonary hygiene including cough, deep breathe, Incentive Spirometry  - Assess the need for suctioning and aspirate as needed  - Assess and instruct to report SOB or any respiratory difficulty  - Respiratory Therapy support as indicated  Outcome: Progressing     Problem: DISCHARGE PLANNING  Goal: Discharge to home or other facility with appropriate resources  Description: INTERVENTIONS:  - Identify barriers to discharge w/patient and caregiver  - Arrange for needed discharge resources and transportation as appropriate  - Identify discharge learning needs (meds, wound care, etc )  - Arrange for interpretive services to assist at discharge as needed  - Refer to Case Management Department for coordinating discharge planning if the patient needs post-hospital services based on physician/advanced practitioner order or complex needs related to functional status, cognitive ability, or social support system  Outcome: Progressing     Problem: Knowledge Deficit  Goal: Patient/family/caregiver demonstrates understanding of disease process, treatment plan, medications, and discharge instructions  Description: Complete learning assessment and assess knowledge base    Interventions:  - Provide teaching at level of understanding  - Provide teaching via preferred learning methods  Outcome: Progressing     Problem: SAFETY,RESTRAINT: NV/NON-SELF DESTRUCTIVE BEHAVIOR  Goal: Remains free of harm/injury (restraint for non violent/non self-detsructive behavior)  Description: INTERVENTIONS:  - Instruct patient/family regarding restraint use   - Assess and monitor physiologic and psychological status   - Provide interventions and comfort measures to meet assessed patient needs   - Identify and implement measures to help patient regain control  - Assess readiness for release of restraint   Outcome: Progressing
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
## 2022-07-26 NOTE — SPEECH THERAPY NOTE
Speech Language/Pathology    Speech/Language Pathology Progress Note    Patient Name: Robert Ballesteros  CCMKO'K Date: 6/25/2018       Subjective:  Pt alert, sitting up in chair at bedside watching TV  Limited verbal output with suspected confusion, pt did nod her head "yes" when asked if she was feeling well (asked in [place])  Objective:  Provided trials of nectar thick and thin liquid as well as toast with butter/jelly  No s/s aspiration with NTL  Mild cough/throat clear after straw sip of thin liquid 1/2 trials, followed by pt refusal to take any additional thin liquid trials  Pt would not hold the toast and attempt to feed herself  SLP assisted  Pt needed cueing to "bite down" when toast was given, and response was slow even with verbal/visual cues  Mastication was slow/prolonged, anterior mastication pattern with incomplete labial seal  Prolonged A-P transport with appearing delayed swallow initiation  NTL sips provided after due to suspected residual, however pt unwilling to open oral cavity for visualization  Assessment:  Discussed with pt's RN Swapnil Chin, continue current diet of Dysphagia 2/Mechanically altered diet (soft cooked, moist and minced foods) and nectar thick liquids  Pt will likely be discharged today and should remain on this diet upon discharge  Plan/Recommendations:  D/C ST, continue current diet/liquids as stated above  Consider ST amilcaral at next facility for potential to advance diet pending improvement of mental status  see HPI

## 2022-07-26 NOTE — NUTRITION
07/26/22 1931   Recommendations/Interventions   Recommendations to Provider Suggest changing TF to Jevity 1 2 at 25ml/hr gradually increase by 10ml/hr Q 4hrs to goal 46ml/hr  Add 110ml free H20 flushes Q6hrs providing 1325kcal, 61gms Pro  1331 ml total free h20   Continue TF Plus Oral diet Level 1 Dysphagia Honey Thick Liquids but change TF to Jevity 1 2 Meeting 88% kcal & 100% Pro & fluid needs

## 2022-07-27 PROBLEM — R50.9 FEVER: Status: ACTIVE | Noted: 2022-07-27

## 2022-07-27 LAB
ALBUMIN SERPL BCP-MCNC: 2.5 G/DL (ref 3.5–5)
ALP SERPL-CCNC: 62 U/L (ref 46–116)
ALT SERPL W P-5'-P-CCNC: 26 U/L (ref 12–78)
ANION GAP SERPL CALCULATED.3IONS-SCNC: 6 MMOL/L (ref 4–13)
AST SERPL W P-5'-P-CCNC: 66 U/L (ref 5–45)
BILIRUB SERPL-MCNC: 1.15 MG/DL (ref 0.2–1)
BUN SERPL-MCNC: 38 MG/DL (ref 5–25)
CALCIUM ALBUM COR SERPL-MCNC: 10.7 MG/DL (ref 8.3–10.1)
CALCIUM SERPL-MCNC: 9.5 MG/DL (ref 8.3–10.1)
CHLORIDE SERPL-SCNC: 114 MMOL/L (ref 96–108)
CO2 SERPL-SCNC: 26 MMOL/L (ref 21–32)
CREAT SERPL-MCNC: 1.27 MG/DL (ref 0.6–1.3)
ERYTHROCYTE [DISTWIDTH] IN BLOOD BY AUTOMATED COUNT: 13 % (ref 11.6–15.1)
GFR SERPL CREATININE-BSD FRML MDRD: 37 ML/MIN/1.73SQ M
GLUCOSE SERPL-MCNC: 163 MG/DL (ref 65–140)
GLUCOSE SERPL-MCNC: 164 MG/DL (ref 65–140)
GLUCOSE SERPL-MCNC: 167 MG/DL (ref 65–140)
GLUCOSE SERPL-MCNC: 174 MG/DL (ref 65–140)
HCT VFR BLD AUTO: 27.3 % (ref 34.8–46.1)
HGB BLD-MCNC: 8.4 G/DL (ref 11.5–15.4)
MCH RBC QN AUTO: 30.3 PG (ref 26.8–34.3)
MCHC RBC AUTO-ENTMCNC: 30.8 G/DL (ref 31.4–37.4)
MCV RBC AUTO: 99 FL (ref 82–98)
PLATELET # BLD AUTO: 181 THOUSANDS/UL (ref 149–390)
PMV BLD AUTO: 11.3 FL (ref 8.9–12.7)
POTASSIUM SERPL-SCNC: 4 MMOL/L (ref 3.5–5.3)
PROT SERPL-MCNC: 6.5 G/DL (ref 6.4–8.4)
RBC # BLD AUTO: 2.77 MILLION/UL (ref 3.81–5.12)
SODIUM SERPL-SCNC: 146 MMOL/L (ref 135–147)
WBC # BLD AUTO: 6.92 THOUSAND/UL (ref 4.31–10.16)

## 2022-07-27 PROCEDURE — 94640 AIRWAY INHALATION TREATMENT: CPT

## 2022-07-27 PROCEDURE — 80053 COMPREHEN METABOLIC PANEL: CPT | Performed by: FAMILY MEDICINE

## 2022-07-27 PROCEDURE — 87154 CUL TYP ID BLD PTHGN 6+ TRGT: CPT | Performed by: FAMILY MEDICINE

## 2022-07-27 PROCEDURE — 82948 REAGENT STRIP/BLOOD GLUCOSE: CPT

## 2022-07-27 PROCEDURE — 92526 ORAL FUNCTION THERAPY: CPT

## 2022-07-27 PROCEDURE — 85027 COMPLETE CBC AUTOMATED: CPT | Performed by: FAMILY MEDICINE

## 2022-07-27 PROCEDURE — 94760 N-INVAS EAR/PLS OXIMETRY 1: CPT

## 2022-07-27 PROCEDURE — 99232 SBSQ HOSP IP/OBS MODERATE 35: CPT | Performed by: FAMILY MEDICINE

## 2022-07-27 PROCEDURE — 87040 BLOOD CULTURE FOR BACTERIA: CPT | Performed by: FAMILY MEDICINE

## 2022-07-27 RX ORDER — SODIUM CHLORIDE 9 MG/ML
50 INJECTION, SOLUTION INTRAVENOUS CONTINUOUS
Status: DISCONTINUED | OUTPATIENT
Start: 2022-07-28 | End: 2022-07-29

## 2022-07-27 RX ORDER — CIPROFLOXACIN HYDROCHLORIDE 3.5 MG/ML
1 SOLUTION/ DROPS TOPICAL
Status: DISCONTINUED | OUTPATIENT
Start: 2022-07-27 | End: 2022-08-04 | Stop reason: HOSPADM

## 2022-07-27 RX ADMIN — CIPROFLOXACIN HYDROCHLORIDE 1 DROP: 3 SOLUTION/ DROPS OPHTHALMIC at 18:00

## 2022-07-27 RX ADMIN — LEVALBUTEROL HYDROCHLORIDE 1.25 MG: 1.25 SOLUTION, CONCENTRATE RESPIRATORY (INHALATION) at 09:04

## 2022-07-27 RX ADMIN — ASPIRIN 81 MG CHEWABLE TABLET 81 MG: 81 TABLET CHEWABLE at 09:15

## 2022-07-27 RX ADMIN — CIPROFLOXACIN HYDROCHLORIDE 1 DROP: 3 SOLUTION/ DROPS OPHTHALMIC at 12:15

## 2022-07-27 RX ADMIN — INSULIN LISPRO 1 UNITS: 100 INJECTION, SOLUTION INTRAVENOUS; SUBCUTANEOUS at 17:59

## 2022-07-27 RX ADMIN — CHLORHEXIDINE GLUCONATE 15 ML: 1.2 SOLUTION ORAL at 09:19

## 2022-07-27 RX ADMIN — SODIUM CHLORIDE 50 ML/HR: 9 INJECTION, SOLUTION INTRAVENOUS at 23:49

## 2022-07-27 RX ADMIN — BUDESONIDE 0.5 MG: 0.5 INHALANT ORAL at 19:44

## 2022-07-27 RX ADMIN — INSULIN LISPRO 1 UNITS: 100 INJECTION, SOLUTION INTRAVENOUS; SUBCUTANEOUS at 12:14

## 2022-07-27 RX ADMIN — ATORVASTATIN CALCIUM 80 MG: 80 TABLET, FILM COATED ORAL at 17:59

## 2022-07-27 RX ADMIN — CIPROFLOXACIN HYDROCHLORIDE 1 DROP: 3 SOLUTION/ DROPS OPHTHALMIC at 21:55

## 2022-07-27 RX ADMIN — LEVALBUTEROL HYDROCHLORIDE 1.25 MG: 1.25 SOLUTION, CONCENTRATE RESPIRATORY (INHALATION) at 19:44

## 2022-07-27 RX ADMIN — BUDESONIDE 0.5 MG: 0.5 INHALANT ORAL at 09:05

## 2022-07-27 RX ADMIN — Medication 12.5 MG: at 21:56

## 2022-07-27 RX ADMIN — POLYETHYLENE GLYCOL 3350 17 G: 17 POWDER, FOR SOLUTION ORAL at 09:15

## 2022-07-27 RX ADMIN — CHLORHEXIDINE GLUCONATE 15 ML: 1.2 SOLUTION ORAL at 21:55

## 2022-07-27 RX ADMIN — Medication 12.5 MG: at 09:15

## 2022-07-27 RX ADMIN — SENNOSIDES 8.6 MG: 8.6 TABLET, FILM COATED ORAL at 09:15

## 2022-07-27 RX ADMIN — WHITE PETROLATUM 57.7 %-MINERAL OIL 31.9 % EYE OINTMENT: at 21:56

## 2022-07-27 RX ADMIN — INSULIN LISPRO 1 UNITS: 100 INJECTION, SOLUTION INTRAVENOUS; SUBCUTANEOUS at 06:33

## 2022-07-27 RX ADMIN — CIPROFLOXACIN HYDROCHLORIDE 1 DROP: 3 SOLUTION/ DROPS OPHTHALMIC at 13:39

## 2022-07-27 NOTE — ASSESSMENT & PLAN NOTE
Wt Readings from Last 3 Encounters:   07/23/22 64 9 kg (143 lb)   07/22/22 65 2 kg (143 lb 11 8 oz)   07/15/22 69 9 kg (154 lb)       Patient with chronic systolic congestive heart failure  Ejection fraction on this admission EF 55-60% with normal LV systolic function  Home regimen is losartan 20 Lopressor 50  Lasix on hold currently    Hold losartan for now  Patient with poor p o  intake

## 2022-07-27 NOTE — ASSESSMENT & PLAN NOTE
Lab Results   Component Value Date    EGFR 37 07/27/2022    EGFR 32 07/25/2022    EGFR 36 07/24/2022    CREATININE 1 27 07/27/2022    CREATININE 1 45 (H) 07/25/2022    CREATININE 1 32 (H) 07/24/2022   Monitor creatinine appears to be at baseline  Creatinine 1 27 today

## 2022-07-27 NOTE — ASSESSMENT & PLAN NOTE
· Likely secondary to CVA  Status post video barium swallow  His started on a dysphagia diet with aspiration precautions  · Currently patient has an NG tube present for nutrition  If the patient does not have enough nutritional intake through mouth may need to consider feeding tube  Family is agreeable for PEG tube placement  · Discussed with GI   5 for feeding tube placement tomorrow  Will keep NPO after midnight and hold to feed after midnight    Gentle hydration while NPO

## 2022-07-27 NOTE — PROGRESS NOTES
[address] [facility]  Progress Note - Raphael Montoya 1933, 80 y o  female MRN: [id number]  Unit/Bed#: [facility] 723-01 Encounter: [id number]  Primary Care Provider: Ethan Weeks MD   Date and time admitted to hospital: 7/22/2022  5:21 PM    * Cardio-embolic left MCA stroke [facility])  Assessment & Plan  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  · Continue aspirin and Lipitor  · Holding anticoagulation until 10-14 days post stroke  · 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 on hold with previous history of HIT  · Continue with neuro checks  · Neurology on board  · PMR consultation appreciated  · Patient with multiple strokes in the past   Patient also with vascular dementia with poor functional status to begin with  Overall prognosis appears to be poor  · Family wants to continue with the current care-consulted GI for PEG tube placement and likely PEG tube placement by tomorrow  · Palliative care consultation appreciated    Fever  Assessment & Plan  · Patient noted to have low-grade fever  · Chest x-ray reviewed-no pneumonia  · Possible aspiration given her mental status  · Monitor off of antibiotics  · Follow-up on the cultures  · Monitor temperature    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[facility] [address] )  Assessment & Plan  · No acute exacerbation    Vascular dementia [facility])  Assessment & Plan  · Patient with history of vascular dementia  · Continue with supportive care    Dysphagia  Assessment & Plan  · Likely secondary to CVA  Status post video barium swallow  His started on a dysphagia diet with aspiration precautions  · Currently patient has an NG tube present for nutrition    If the patient does not have enough nutritional intake through mouth may need to consider feeding tube  Family is agreeable for PEG tube placement  · Discussed with GI   5 for feeding tube placement tomorrow  Will keep NPO after midnight and hold to feed after midnight  Gentle hydration while NPO    CKD (chronic kidney disease) stage 3, GFR 30-59 ml/min [facility])  Assessment & Plan  Lab Results   Component Value Date    EGFR 37 07/27/2022    EGFR 32 07/25/2022    EGFR 36 07/24/2022    CREATININE 1 27 07/27/2022    CREATININE 1 45 (H) 07/25/2022    CREATININE 1 32 (H) 07/24/2022   Monitor creatinine appears to be at baseline  Creatinine 1 27 today  Systolic congestive heart failure [facility])  Assessment & Plan  Wt Readings from Last 3 Encounters:   07/23/22 64 9 kg (143 lb)   07/22/22 65 2 kg (143 lb 11 8 oz)   07/15/22 69 9 kg (154 lb)       Patient with chronic systolic congestive heart failure  Ejection fraction on this admission EF 55-60% with normal LV systolic function  Home regimen is losartan 20 Lopressor 50  Lasix on hold currently  Hold losartan for now  Patient with poor p o  intake      Hypertension  Assessment & Plan  · Monitor blood pressure    Controlled type 2 diabetes mellitus without complication, without long-term current use of insulin [facility])  Assessment & Plan  Lab Results   Component Value Date    HGBA1C 6 3 (H) 07/23/2022       Recent Labs     07/26/22  2043 07/26/22  2324 07/27/22  0626 07/27/22  1129   POCGLU 145* 194* 163* 174*       Blood Sugar Average: Last 72 hrs:  (P) 169 3   Blood sugar acceptable  Continue with current care    H/O mitral valve replacement  Assessment & Plan  · TTE showed normal  valve function      VTE Pharmacologic Prophylaxis: VTE Score: 9 High Risk (Score >/= 5) - Pharmacological DVT Prophylaxis Contraindicated  Sequential Compression Devices Ordered  Patient Centered Rounds: I performed bedside rounds with nursing staff today    Discussions with Specialists or Other Care Team Provider:  Gastroenterology    Education and Discussions with Family / Patient: Updated  (Multiple family members updated in the room ) at bedside  Time Spent for Care: 30 minutes  More than 50% of total time spent on counseling and coordination of care as described above  Current Length of Stay: 5 day(s)  Current Patient Status: Inpatient   Certification Statement: The patient will continue to require additional inpatient hospital stay due to  cva   Discharge Plan: Anticipate discharge in >72 hrs to rehab facility  Code Status: Level 3 - DNAR and DNI    Subjective:   Patient seen and examined  Family in the room  Family reported that the patient is more alert and interactive today  Family reported that patient was responding to son in the room and they believe she was  Caressing his face    Objective:     Vitals:   Temp (24hrs), Av 2 °F (37 3 °C), Min:98 1 °F (36 7 °C), Max:100 9 °F (38 3 °C)    Temp:  [98 1 °F (36 7 °C)-100 9 °F (38 3 °C)] 98 8 °F (37 1 °C)  HR:  [] 99  Resp:  [16] 16  BP: ()/(51-89) 141/65  SpO2:  [92 %-95 %] 94 %  Body mass index is 27 02 kg/m²  Input and Output Summary (last 24 hours): Intake/Output Summary (Last 24 hours) at 2022 1713  Last data filed at 2022 0601  Gross per 24 hour   Intake 140 ml   Output 200 ml   Net -60 ml       Physical Exam:   Physical Exam  HENT:      Head: Normocephalic  Eyes:      General:         Right eye: Discharge present  Comments: Erythema of the conjunctiva   Cardiovascular:      Rate and Rhythm: Normal rate  Pulmonary:      Comments: Decreased breath sounds  Abdominal:      General: Abdomen is flat  Skin:     General: Skin is warm  Neurological:      Mental Status: She is alert  Comments: Patient eyes open     Do not follow commands  Left gaze preference         Additional Data:     Labs:  Results from last 7 days   Lab Units 22  0448 22  0553   WBC Thousand/uL 6 92 6 72   HEMOGLOBIN g/dL 8 4* 8 9*   HEMATOCRIT % 27 3* 28 7* PLATELETS Thousands/uL 181 137*   NEUTROS PCT %  --  73   LYMPHS PCT %  --  14   MONOS PCT %  --  10   EOS PCT %  --  3     Results from last 7 days   Lab Units 07/27/22  0448   SODIUM mmol/L 146   POTASSIUM mmol/L 4 0   CHLORIDE mmol/L 114*   CO2 mmol/L 26   BUN mg/dL 38*   CREATININE mg/dL 1 27   ANION GAP mmol/L 6   CALCIUM mg/dL 9 5   ALBUMIN g/dL 2 5*   TOTAL BILIRUBIN mg/dL 1 15*   ALK PHOS U/L 62   ALT U/L 26   AST U/L 66*   GLUCOSE RANDOM mg/dL 167*     Results from last 7 days   Lab Units 07/22/22  1307   INR  1 01     Results from last 7 days   Lab Units 07/27/22  1129 07/27/22  0626 07/26/22  2324 07/26/22  2043 07/26/22  1755 07/26/22  1149 07/26/22  0624 07/26/22  0042 07/25/22  1823 07/25/22  1132 07/25/22  0612 07/25/22  0008   POC GLUCOSE mg/dl 174* 163* 194* 145* 164* 189* 170* 159* 161* 174* 178* 178*     Results from last 7 days   Lab Units 07/23/22  0526   HEMOGLOBIN A1C % 6 3*           Lines/Drains:  Invasive Devices  Report    Peripheral Intravenous Line  Duration           Peripheral IV 07/25/22 Right Antecubital 2 days          Drain  Duration           NG/OG/Enteral Tube Nasogastric 12 Fr Right nare 3 days    External Urinary Catheter <1 day                      Imaging: Personally reviewed the following imaging: chest xray    Recent Cultures (last 7 days):         Last 24 Hours Medication List:   Current Facility-Administered Medications   Medication Dose Route Frequency Provider Last Rate    acetaminophen  325 mg Rectal Q4H PRN Jiles Sicard, PA-C      acetaminophen  650 mg Oral Q4H PRN Jiles Sicard, PA-C      albuterol  2 5 mg Nebulization Q6H PRN Jiles Sicard, PA-C      artificial tear   Both Eyes HS Jiles Sicard, PA-MATY      aspirin  81 mg Oral Daily Idalia Trudi DIDIER Younger-C      atorvastatin  80 mg Oral QPM Idalia Hyatt PA-C      budesonide  0 5 mg Nebulization Q12H Jiles Sicard, PA-MATY      chlorhexidine  15 mL Mouth/Throat Q12H [address] Idalia Younger PA-C  ciprofloxacin  1 drop Right Eye Q4H While Awake Daniel Billy MD      insulin lispro  1-5 Units Subcutaneous Q6H [facility] Idaliamorena Coto PA-C      levalbuterol  1 25 mg Nebulization BID Charu De La Rosa PA-C      metoprolol tartrate  12 5 mg Oral Q12H [facility] Idaliamorena Coto PA-C      polyethylene glycol  17 g Oral Daily Charu De La Rosa PA-C      senna  1 tablet Oral Daily Charu De La Rosa PA-C          Today, Patient Was Seen By: Daniel Billy MD    **Please Note: This note may have been constructed using a voice recognition system  **

## 2022-07-27 NOTE — ASSESSMENT & PLAN NOTE
· Patient noted to have low-grade fever  · Chest x-ray reviewed-no pneumonia  · Possible aspiration given her mental status  · Monitor off of antibiotics  · Follow-up on the cultures  · Monitor temperature

## 2022-07-27 NOTE — ASSESSMENT & PLAN NOTE
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  · Continue aspirin and Lipitor  · Holding anticoagulation until 10-14 days post stroke  · 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 on hold with previous history of HIT  · Continue with neuro checks  · Neurology on board  · PMR consultation appreciated  · Patient with multiple strokes in the past   Patient also with vascular dementia with poor functional status to begin with    Overall prognosis appears to be poor  · Family wants to continue with the current care-consulted GI for PEG tube placement and likely PEG tube placement by tomorrow  · Palliative care consultation appreciated

## 2022-07-27 NOTE — ASSESSMENT & PLAN NOTE
Lab Results   Component Value Date    HGBA1C 6 3 (H) 07/23/2022       Recent Labs     07/26/22  2043 07/26/22  2324 07/27/22  0626 07/27/22  1129   POCGLU 145* 194* 163* 174*       Blood Sugar Average: Last 72 hrs:  (P) 169 3   Blood sugar acceptable    Continue with current care

## 2022-07-27 NOTE — PROGRESS NOTES
07/27/22 1500   Clinical Encounter Type   Visited With Patient   Sacramental Encounters   Sacrament Other   ([place] by [facility] Opitz )

## 2022-07-28 ENCOUNTER — ANESTHESIA (INPATIENT)
Dept: GASTROENTEROLOGY | Facility: HOSPITAL | Age: 87
DRG: 064 | End: 2022-07-28
Payer: MEDICARE

## 2022-07-28 ENCOUNTER — ANESTHESIA EVENT (INPATIENT)
Dept: GASTROENTEROLOGY | Facility: HOSPITAL | Age: 87
DRG: 064 | End: 2022-07-28
Payer: MEDICARE

## 2022-07-28 ENCOUNTER — APPOINTMENT (INPATIENT)
Dept: GASTROENTEROLOGY | Facility: HOSPITAL | Age: 87
DRG: 064 | End: 2022-07-28
Payer: MEDICARE

## 2022-07-28 PROBLEM — R78.81 POSITIVE BLOOD CULTURE: Status: ACTIVE | Noted: 2022-07-28

## 2022-07-28 LAB
ANION GAP SERPL CALCULATED.3IONS-SCNC: 2 MMOL/L (ref 4–13)
BUN SERPL-MCNC: 36 MG/DL (ref 5–25)
CALCIUM SERPL-MCNC: 9.4 MG/DL (ref 8.3–10.1)
CHLORIDE SERPL-SCNC: 116 MMOL/L (ref 96–108)
CO2 SERPL-SCNC: 28 MMOL/L (ref 21–32)
CREAT SERPL-MCNC: 1.08 MG/DL (ref 0.6–1.3)
ERYTHROCYTE [DISTWIDTH] IN BLOOD BY AUTOMATED COUNT: 12.8 % (ref 11.6–15.1)
GFR SERPL CREATININE-BSD FRML MDRD: 45 ML/MIN/1.73SQ M
GLUCOSE SERPL-MCNC: 135 MG/DL (ref 65–140)
GLUCOSE SERPL-MCNC: 135 MG/DL (ref 65–140)
GLUCOSE SERPL-MCNC: 140 MG/DL (ref 65–140)
GLUCOSE SERPL-MCNC: 140 MG/DL (ref 65–140)
GLUCOSE SERPL-MCNC: 170 MG/DL (ref 65–140)
HCT VFR BLD AUTO: 28.2 % (ref 34.8–46.1)
HGB BLD-MCNC: 8.7 G/DL (ref 11.5–15.4)
MCH RBC QN AUTO: 30.7 PG (ref 26.8–34.3)
MCHC RBC AUTO-ENTMCNC: 30.9 G/DL (ref 31.4–37.4)
MCV RBC AUTO: 100 FL (ref 82–98)
PLATELET # BLD AUTO: 208 THOUSANDS/UL (ref 149–390)
PMV BLD AUTO: 10.4 FL (ref 8.9–12.7)
POTASSIUM SERPL-SCNC: 4.1 MMOL/L (ref 3.5–5.3)
RBC # BLD AUTO: 2.83 MILLION/UL (ref 3.81–5.12)
SODIUM SERPL-SCNC: 146 MMOL/L (ref 135–147)
SRA .2 IU/ML UFH SER-ACNC: <1 % (ref 0–20)
SRA 100IU/ML UFH SER-ACNC: <1 % (ref 0–20)
SRA UFH SER-IMP: NORMAL
WBC # BLD AUTO: 6.99 THOUSAND/UL (ref 4.31–10.16)

## 2022-07-28 PROCEDURE — NC001 PR NO CHARGE: Performed by: INTERNAL MEDICINE

## 2022-07-28 PROCEDURE — 0DB68ZX EXCISION OF STOMACH, VIA NATURAL OR ARTIFICIAL OPENING ENDOSCOPIC, DIAGNOSTIC: ICD-10-PCS | Performed by: INTERNAL MEDICINE

## 2022-07-28 PROCEDURE — 97110 THERAPEUTIC EXERCISES: CPT

## 2022-07-28 PROCEDURE — 80048 BASIC METABOLIC PNL TOTAL CA: CPT | Performed by: FAMILY MEDICINE

## 2022-07-28 PROCEDURE — 85027 COMPLETE CBC AUTOMATED: CPT | Performed by: FAMILY MEDICINE

## 2022-07-28 PROCEDURE — 99232 SBSQ HOSP IP/OBS MODERATE 35: CPT | Performed by: FAMILY MEDICINE

## 2022-07-28 PROCEDURE — 88305 TISSUE EXAM BY PATHOLOGIST: CPT | Performed by: PATHOLOGY

## 2022-07-28 PROCEDURE — 43239 EGD BIOPSY SINGLE/MULTIPLE: CPT | Performed by: INTERNAL MEDICINE

## 2022-07-28 PROCEDURE — 88313 SPECIAL STAINS GROUP 2: CPT | Performed by: PATHOLOGY

## 2022-07-28 PROCEDURE — 43246 EGD PLACE GASTROSTOMY TUBE: CPT | Performed by: INTERNAL MEDICINE

## 2022-07-28 PROCEDURE — 97112 NEUROMUSCULAR REEDUCATION: CPT

## 2022-07-28 PROCEDURE — 94640 AIRWAY INHALATION TREATMENT: CPT

## 2022-07-28 PROCEDURE — 94760 N-INVAS EAR/PLS OXIMETRY 1: CPT

## 2022-07-28 PROCEDURE — 97530 THERAPEUTIC ACTIVITIES: CPT

## 2022-07-28 PROCEDURE — 0DH63UZ INSERTION OF FEEDING DEVICE INTO STOMACH, PERCUTANEOUS APPROACH: ICD-10-PCS | Performed by: INTERNAL MEDICINE

## 2022-07-28 PROCEDURE — 82948 REAGENT STRIP/BLOOD GLUCOSE: CPT

## 2022-07-28 PROCEDURE — 97535 SELF CARE MNGMENT TRAINING: CPT

## 2022-07-28 RX ORDER — SODIUM CHLORIDE 9 MG/ML
INJECTION, SOLUTION INTRAVENOUS CONTINUOUS PRN
Status: DISCONTINUED | OUTPATIENT
Start: 2022-07-28 | End: 2022-07-28

## 2022-07-28 RX ORDER — HYDRALAZINE HYDROCHLORIDE 20 MG/ML
10 INJECTION INTRAMUSCULAR; INTRAVENOUS EVERY 6 HOURS PRN
Status: DISCONTINUED | OUTPATIENT
Start: 2022-07-28 | End: 2022-08-04 | Stop reason: HOSPADM

## 2022-07-28 RX ORDER — CEFAZOLIN SODIUM 1 G/50ML
1000 SOLUTION INTRAVENOUS ONCE
Status: DISCONTINUED | OUTPATIENT
Start: 2022-07-28 | End: 2022-08-04 | Stop reason: HOSPADM

## 2022-07-28 RX ORDER — BISACODYL 10 MG
10 SUPPOSITORY, RECTAL RECTAL DAILY PRN
Status: DISCONTINUED | OUTPATIENT
Start: 2022-07-28 | End: 2022-07-30

## 2022-07-28 RX ORDER — CEFAZOLIN SODIUM 1 G/3ML
INJECTION, POWDER, FOR SOLUTION INTRAMUSCULAR; INTRAVENOUS AS NEEDED
Status: DISCONTINUED | OUTPATIENT
Start: 2022-07-28 | End: 2022-07-28

## 2022-07-28 RX ORDER — LIDOCAINE HYDROCHLORIDE 10 MG/ML
INJECTION, SOLUTION EPIDURAL; INFILTRATION; INTRACAUDAL; PERINEURAL AS NEEDED
Status: DISCONTINUED | OUTPATIENT
Start: 2022-07-28 | End: 2022-07-28

## 2022-07-28 RX ORDER — PROPOFOL 10 MG/ML
INJECTION, EMULSION INTRAVENOUS AS NEEDED
Status: DISCONTINUED | OUTPATIENT
Start: 2022-07-28 | End: 2022-07-28

## 2022-07-28 RX ADMIN — PROPOFOL 20 MG: 10 INJECTION, EMULSION INTRAVENOUS at 11:21

## 2022-07-28 RX ADMIN — Medication 12.5 MG: at 21:24

## 2022-07-28 RX ADMIN — CIPROFLOXACIN HYDROCHLORIDE 1 DROP: 3 SOLUTION/ DROPS OPHTHALMIC at 05:51

## 2022-07-28 RX ADMIN — CIPROFLOXACIN HYDROCHLORIDE 1 DROP: 3 SOLUTION/ DROPS OPHTHALMIC at 21:24

## 2022-07-28 RX ADMIN — PROPOFOL 20 MG: 10 INJECTION, EMULSION INTRAVENOUS at 11:18

## 2022-07-28 RX ADMIN — PROPOFOL 20 MG: 10 INJECTION, EMULSION INTRAVENOUS at 11:20

## 2022-07-28 RX ADMIN — WHITE PETROLATUM 57.7 %-MINERAL OIL 31.9 % EYE OINTMENT: at 23:29

## 2022-07-28 RX ADMIN — PROPOFOL 20 MG: 10 INJECTION, EMULSION INTRAVENOUS at 11:10

## 2022-07-28 RX ADMIN — BUDESONIDE 0.5 MG: 0.5 INHALANT ORAL at 19:05

## 2022-07-28 RX ADMIN — PROPOFOL 30 MG: 10 INJECTION, EMULSION INTRAVENOUS at 11:09

## 2022-07-28 RX ADMIN — CEFAZOLIN SODIUM 1000 MG: 1 INJECTION, POWDER, FOR SOLUTION INTRAMUSCULAR; INTRAVENOUS at 11:04

## 2022-07-28 RX ADMIN — PROPOFOL 20 MG: 10 INJECTION, EMULSION INTRAVENOUS at 11:19

## 2022-07-28 RX ADMIN — LEVALBUTEROL HYDROCHLORIDE 1.25 MG: 1.25 SOLUTION, CONCENTRATE RESPIRATORY (INHALATION) at 09:12

## 2022-07-28 RX ADMIN — BUDESONIDE 0.5 MG: 0.5 INHALANT ORAL at 09:12

## 2022-07-28 RX ADMIN — PROPOFOL 20 MG: 10 INJECTION, EMULSION INTRAVENOUS at 11:16

## 2022-07-28 RX ADMIN — PROPOFOL 20 MG: 10 INJECTION, EMULSION INTRAVENOUS at 11:12

## 2022-07-28 RX ADMIN — SODIUM CHLORIDE 50 ML/HR: 9 INJECTION, SOLUTION INTRAVENOUS at 14:07

## 2022-07-28 RX ADMIN — SODIUM CHLORIDE: 0.9 INJECTION, SOLUTION INTRAVENOUS at 11:01

## 2022-07-28 RX ADMIN — PROPOFOL 20 MG: 10 INJECTION, EMULSION INTRAVENOUS at 11:14

## 2022-07-28 RX ADMIN — CIPROFLOXACIN HYDROCHLORIDE 1 DROP: 3 SOLUTION/ DROPS OPHTHALMIC at 14:06

## 2022-07-28 RX ADMIN — PROPOFOL 20 MG: 10 INJECTION, EMULSION INTRAVENOUS at 11:17

## 2022-07-28 RX ADMIN — LIDOCAINE HYDROCHLORIDE 100 MG: 10 INJECTION, SOLUTION EPIDURAL; INFILTRATION; INTRACAUDAL; PERINEURAL at 11:09

## 2022-07-28 RX ADMIN — CIPROFLOXACIN HYDROCHLORIDE 1 DROP: 3 SOLUTION/ DROPS OPHTHALMIC at 18:49

## 2022-07-28 RX ADMIN — LEVALBUTEROL HYDROCHLORIDE 1.25 MG: 1.25 SOLUTION, CONCENTRATE RESPIRATORY (INHALATION) at 19:05

## 2022-07-28 RX ADMIN — Medication 12.5 MG: at 08:28

## 2022-07-28 NOTE — RESTORATIVE TECHNICIAN NOTE
Restorative Technician Note      Patient Name: Eliza Sears     Note Type: Mobility (Pt off the unit )    [place] HONE[place], Restorative Technician, [employer]

## 2022-07-28 NOTE — PLAN OF CARE
Problem: MOBILITY - ADULT  Goal: Maintain or return to baseline ADL function  Description: INTERVENTIONS:  -  Assess patient's ability to carry out ADLs; assess patient's baseline for ADL function and identify physical deficits which impact ability to perform ADLs (bathing, care of mouth/teeth, toileting, grooming, dressing, etc )  - Assess/evaluate cause of self-care deficits   - Assess range of motion  - Assess patient's mobility; develop plan if impaired  - Assess patient's need for assistive devices and provide as appropriate  - Encourage maximum independence but intervene and supervise when necessary  - Involve family in performance of ADLs  - Assess for home care needs following discharge   - Consider OT consult to assist with ADL evaluation and planning for discharge  - Provide patient education as appropriate    Outcome: Progressing  Goal: Maintains/Returns to pre admission functional level  Description: INTERVENTIONS:  - Perform BMAT or MOVE assessment daily    - Set and communicate daily mobility goal to care team and patient/family/caregiver     - Collaborate with rehabilitation services on mobility goals if consulted  - Out of bed for toileting  - Record patient progress and toleration of activity level     Outcome: Progressing     Problem: Potential for Falls  Goal: Patient will remain free of falls  Description: INTERVENTIONS:  - Educate patient/family on patient safety including physical limitations  - Instruct patient to call for assistance with activity   - Consult OT/PT to assist with strengthening/mobility   - Keep Call bell within reach  - Keep bed low and locked with side rails adjusted as appropriate  - Keep care items and personal belongings within reach  - Initiate and maintain comfort rounds  - Make Fall Risk Sign visible to staff  - Apply yellow socks and bracelet for high fall risk patients  - Consider moving patient to room near nurses station    Outcome: Progressing     Problem: Prexisting or High Potential for Compromised Skin Integrity  Goal: Skin integrity is maintained or improved  Description: INTERVENTIONS:  - Identify patients at risk for skin breakdown  - Assess and monitor skin integrity  - Assess and monitor nutrition and hydration status  - Monitor labs   - Assess for incontinence   - Turn and reposition patient  - Assist with mobility/ambulation  - Relieve pressure over bony prominences  - Avoid friction and shearing  - Provide appropriate hygiene as needed including keeping skin clean and dry  - Evaluate need for skin moisturizer/barrier cream  - Collaborate with interdisciplinary team   - Patient/family teaching  - Consider wound care consult     Outcome: Progressing     Problem: Neurological Deficit  Goal: Neurological status is stable or improving  Description: Interventions:  - Monitor and assess patient's level of consciousness, motor function, sensory function, and level of assistance needed for ADLs  - Monitor and report changes from baseline  Collaborate with interdisciplinary team to initiate plan and implement interventions as ordered  - Provide and maintain a safe environment  - Consider seizure precautions  - Consider fall precautions  - Consider aspiration precautions  - Consider bleeding precautions  Outcome: Progressing     Problem: Activity Intolerance/Impaired Mobility  Goal: Mobility/activity is maintained at optimum level for patient  Description: Interventions:  - Assess and monitor patient  barriers to mobility and need for assistive/adaptive devices  - Assess patient's emotional response to limitations  - Collaborate with interdisciplinary team and initiate plans and interventions as ordered  - Encourage independent activity per ability   - Maintain proper body alignment  - Perform active/passive rom as tolerated/ordered    - Plan activities to conserve energy   - Turn patient as appropriate    Outcome: Progressing     Problem: Communication Impairment  Goal: Ability to express needs and understand communication  Description: Assess patient's communication skills and ability to understand information  Patient will demonstrate use of effective communication techniques, alternative methods of communication and understanding even if not able to speak  - Encourage communication and provide alternate methods of communication as needed  - Collaborate with case management/ for discharge needs  - Include patient/family/caregiver in decisions related to communication  Outcome: Progressing     Problem: Potential for Aspiration  Goal: Non-ventilated patient's risk of aspiration is minimized  Description: Assess and monitor vital signs, respiratory status, and labs (WBC)  Monitor for signs of aspiration (tachypnea, cough, rales, wheezing, cyanosis, fever)  - Assess and monitor patient's ability to swallow  - Place patient up in chair to eat if possible  - HOB up at 90 degrees to eat if unable to get patient up into chair   - Supervise patient during oral intake  - Instruct patient/ family to take small bites  - Instruct patient/ family to take small single sips when taking liquids  - Follow patient-specific strategies generated by speech pathologist     Outcome: Progressing     Problem: Nutrition  Goal: Nutrition/Hydration status is improving  Description: Monitor and assess patient's nutrition/hydration status for malnutrition (ex- brittle hair, bruises, dry skin, pale skin and conjunctiva, muscle wasting, smooth red tongue, and disorientation)  Collaborate with interdisciplinary team and initiate plan and interventions as ordered  Monitor patient's weight and dietary intake as ordered or per policy  Utilize nutrition screening tool and intervene per policy  Determine patient's food preferences and provide high-protein, high-caloric foods as appropriate  - Assist patient with eating   - Allow adequate time for meals    - Encourage patient to take dietary supplement as ordered  - Collaborate with clinical nutritionist   - Include patient/family/caregiver in decisions related to nutrition  Outcome: Progressing     Problem: Nutrition/Hydration-ADULT  Goal: Nutrient/Hydration intake appropriate for improving, restoring or maintaining nutritional needs  Description: Monitor and assess patient's nutrition/hydration status for malnutrition  Collaborate with interdisciplinary team and initiate plan and interventions as ordered  Monitor patient's weight and dietary intake as ordered or per policy  Utilize nutrition screening tool and intervene as necessary  Determine patient's food preferences and provide high-protein, high-caloric foods as appropriate       INTERVENTIONS:  - Monitor oral intake, urinary output, labs, and treatment plans  - Assess nutrition and hydration status and recommend course of action  - Evaluate amount of meals eaten  - Assist patient with eating if necessary   - Allow adequate time for meals  - Recommend/ encourage appropriate diets, oral nutritional supplements, and vitamin/mineral supplements  - Order, calculate, and assess calorie counts as needed  - Recommend, monitor, and adjust tube feedings and TPN/PPN based on assessed needs  - Assess need for intravenous fluids  - Provide specific nutrition/hydration education as appropriate  - Include patient/family/caregiver in decisions related to nutrition    Outcome: Progressing     Problem: NEUROSENSORY - ADULT  Goal: Achieves stable or improved neurological status  Description: INTERVENTIONS  - Monitor and report changes in neurological status  - Monitor vital signs such as temperature, blood pressure, glucose, and any other labs ordered   - Initiate measures to prevent increased intracranial pressure  - Monitor for seizure activity and implement precautions if appropriate      Outcome: Progressing  Goal: Achieves maximal functionality and self care  Description: INTERVENTIONS  - Monitor swallowing and airway patency with patient fatigue and changes in neurological status  - Encourage and assist patient to increase activity and self care     - Encourage visually impaired, hearing impaired and aphasic patients to use assistive/communication devices    Outcome: Progressing     Problem: CARDIOVASCULAR - ADULT  Goal: Maintains optimal cardiac output and hemodynamic stability  Description: INTERVENTIONS:  - Monitor I/O, vital signs and rhythm  - Monitor for S/S and trends of decreased cardiac output  - Administer and titrate ordered vasoactive medications to optimize hemodynamic stability  - Assess quality of pulses, skin color and temperature  - Assess for signs of decreased coronary artery perfusion  - Instruct patient to report change in severity of symptoms    Outcome: Progressing  Goal: Absence of cardiac dysrhythmias or at baseline rhythm  Description: INTERVENTIONS:  - Continuous cardiac monitoring, vital signs, obtain 12 lead EKG if ordered  - Administer antiarrhythmic and heart rate control medications as ordered  - Monitor electrolytes and administer replacement therapy as ordered    Outcome: Progressing     Problem: RESPIRATORY - ADULT  Goal: Achieves optimal ventilation and oxygenation  Description: INTERVENTIONS:  - Assess for changes in respiratory status  - Assess for changes in mentation and behavior  - Position to facilitate oxygenation and minimize respiratory effort  - Oxygen administered by appropriate delivery if ordered  - Initiate smoking cessation education as indicated  - Encourage broncho-pulmonary hygiene including cough, deep breathe, Incentive Spirometry  - Assess the need for suctioning and aspirate as needed  - Assess and instruct to report SOB or any respiratory difficulty  - Respiratory Therapy support as indicated    Outcome: Progressing     Problem: GASTROINTESTINAL - ADULT  Goal: Maintains or returns to baseline bowel function  Description: INTERVENTIONS:  - Assess bowel function  - Encourage oral fluids to ensure adequate hydration  - Administer IV fluids if ordered to ensure adequate hydration  - Administer ordered medications as needed  - Encourage mobilization and activity  - Consider nutritional services referral to assist patient with adequate nutrition and appropriate food choices    Outcome: Progressing  Goal: Maintains adequate nutritional intake  Description: INTERVENTIONS:  - Monitor percentage of each meal consumed  - Identify factors contributing to decreased intake, treat as appropriate  - Assist with meals as needed  - Monitor I&O, weight, and lab values if indicated  - Obtain nutrition services referral as needed    Outcome: Progressing     Problem: GENITOURINARY - ADULT  Goal: Maintains or returns to baseline urinary function  Description: INTERVENTIONS:  - Assess urinary function  - Encourage oral fluids to ensure adequate hydration if ordered  - Administer IV fluids as ordered to ensure adequate hydration  - Administer ordered medications as needed  - Offer frequent toileting  - Follow urinary retention protocol if ordered    Outcome: Progressing  Goal: Urinary catheter remains patent  Description: INTERVENTIONS:  - Assess patency of urinary catheter  - If patient has a chronic schaefer, consider changing catheter if non-functioning  - Follow guidelines for intermittent irrigation of non-functioning urinary catheter    Outcome: Progressing     Problem: METABOLIC, FLUID AND ELECTROLYTES - ADULT  Goal: Electrolytes maintained within normal limits  Description: INTERVENTIONS:  - Monitor labs and assess patient for signs and symptoms of electrolyte imbalances  - Administer electrolyte replacement as ordered  - Monitor response to electrolyte replacements, including repeat lab results as appropriate  - Instruct patient on fluid and nutrition as appropriate    Outcome: Progressing  Goal: Fluid balance maintained  Description: INTERVENTIONS:  - Monitor labs   - Monitor I/O and WT  - Instruct patient on fluid and nutrition as appropriate  - Assess for signs & symptoms of volume excess or deficit    Outcome: Progressing  Goal: Glucose maintained within target range  Description: INTERVENTIONS:  - Monitor Blood Glucose as ordered  - Assess for signs and symptoms of hyperglycemia and hypoglycemia  - Administer ordered medications to maintain glucose within target range  - Assess nutritional intake and initiate nutrition service referral as needed    Outcome: Progressing    Goal: Incision(s), wounds(s) or drain site(s) healing without S/S of infection  Description: INTERVENTIONS  - Assess and document dressing, incision, wound bed, drain sites and surrounding tissue  - Provide patient and family education  - Perform skin care/dressing changes     Outcome: Progressing     Problem: HEMATOLOGIC - ADULT  Goal: Maintains hematologic stability  Description: INTERVENTIONS  - Assess for signs and symptoms of bleeding or hemorrhage  - Monitor labs  - Administer supportive blood products/factors as ordered and appropriate    Outcome: Progressing     Problem: MUSCULOSKELETAL - ADULT  Goal: Maintain or return mobility to safest level of function  Description: INTERVENTIONS:  - Assess patient's ability to carry out ADLs; assess patient's baseline for ADL function and identify physical deficits which impact ability to perform ADLs (bathing, care of mouth/teeth, toileting, grooming, dressing, etc )  - Assess/evaluate cause of self-care deficits   - Assess range of motion  - Assess patient's mobility  - Assess patient's need for assistive devices and provide as appropriate  - Encourage maximum independence but intervene and supervise when necessary  - Involve family in performance of ADLs  - Assess for home care needs following discharge   - Consider OT consult to assist with ADL evaluation and planning for discharge  - Provide patient education as appropriate    Outcome: Progressing  Goal: Maintain proper alignment of affected body part  Description: INTERVENTIONS:  - Support, maintain and protect limb and body alignment  - Provide patient/ family with appropriate education    Outcome: Progressing     Problem: COPING  Goal: Pt/Family able to verbalize concerns and demonstrate effective coping strategies  Description: INTERVENTIONS:  - Assist patient/family to identify coping skills, available support systems and cultural and spiritual values  - Provide emotional support, including active listening and acknowledgement of concerns of patient and caregivers  - Reduce environmental stimuli, as able  - Provide patient education  - Assess for spiritual pain/suffering and initiate spiritual care, including notification of Pastoral Care or [facility] based community as needed  - Assess effectiveness of coping strategies    Outcome: Progressing  Goal: Will report anxiety at manageable levels  Description: INTERVENTIONS:  - Administer medication as ordered  - Teach and encourage coping skills  - Provide emotional support  - Assess patient/family for anxiety and ability to cope    Outcome: Progressing     Problem: INFECTION - ADULT  Goal: Absence or prevention of progression during hospitalization  Description: INTERVENTIONS:  - Assess and monitor for signs and symptoms of infection  - Monitor lab/diagnostic results  - Monitor all insertion sites, i e  indwelling lines, tubes, and drains  - Monitor endotracheal if appropriate and nasal secretions for changes in amount and color  - [place] appropriate cooling/warming therapies per order  - Administer medications as ordered  - Instruct and encourage patient and family to use good hand hygiene technique  - Identify and instruct in appropriate isolation precautions for identified infection/condition    Outcome: Progressing     Problem: SAFETY ADULT  Goal: Maintain or return to baseline ADL function  Description: INTERVENTIONS:  -  Assess patient's ability to carry out ADLs; assess patient's baseline for ADL function and identify physical deficits which impact ability to perform ADLs (bathing, care of mouth/teeth, toileting, grooming, dressing, etc )  - Assess/evaluate cause of self-care deficits   - Assess range of motion  - Assess patient's mobility; develop plan if impaired  - Assess patient's need for assistive devices and provide as appropriate  - Encourage maximum independence but intervene and supervise when necessary  - Involve family in performance of ADLs  - Assess for home care needs following discharge   - Consider OT consult to assist with ADL evaluation and planning for discharge  - Provide patient education as appropriate    Outcome: Progressing  Goal: Maintains/Returns to pre admission functional level  Description: INTERVENTIONS:  - Perform BMAT or MOVE assessment daily    - Set and communicate daily mobility goal to care team and patient/family/caregiver     - Collaborate with rehabilitation services on mobility goals if consulted  - Out of bed for toileting  - Record patient progress and toleration of activity level     Outcome: Progressing  Goal: Patient will remain free of falls  Description: INTERVENTIONS:  - Educate patient/family on patient safety including physical limitations  - Instruct patient to call for assistance with activity   - Consult OT/PT to assist with strengthening/mobility   - Keep Call bell within reach  - Keep bed low and locked with side rails adjusted as appropriate  - Keep care items and personal belongings within reach  - Initiate and maintain comfort rounds  - Make Fall Risk Sign visible to staff  - Apply yellow socks and bracelet for high fall risk patients  - Consider moving patient to room near nurses station    Outcome: Progressing     Problem: DISCHARGE PLANNING  Goal: Discharge to home or other facility with appropriate resources  Description: INTERVENTIONS:  - Identify barriers to discharge w/patient and caregiver  - Arrange for needed discharge resources and transportation as appropriate  - Identify discharge learning needs (meds, wound care, etc )  - Arrange for interpretive services to assist at discharge as needed  - Refer to Case Management Department for coordinating discharge planning if the patient needs post-hospital services based on physician/advanced practitioner order or complex needs related to functional status, cognitive ability, or social support system    Outcome: Progressing     Problem: Knowledge Deficit  Goal: Patient/family/caregiver demonstrates understanding of disease process, treatment plan, medications, and discharge instructions  Description: Complete learning assessment and assess knowledge base    Interventions:  - Provide teaching at level of understanding  - Provide teaching via preferred learning methods    Outcome: Progressing     Problem: SAFETY,RESTRAINT: NV/NON-SELF DESTRUCTIVE BEHAVIOR  Goal: Remains free of harm/injury (restraint for non violent/non self-detsructive behavior)  Description: INTERVENTIONS:  - Instruct patient/family regarding restraint use   - Assess and monitor physiologic and psychological status   - Provide interventions and comfort measures to meet assessed patient needs   - Identify and implement measures to help patient regain control  - Assess readiness for release of restraint     Outcome: Progressing  Goal: Returns to optimal restraint-free functioning  Description: INTERVENTIONS:  - Assess the patient's behavior and symptoms that indicate continued need for restraint  - Identify and implement measures to help patient regain control  - Assess readiness for release of restraint     Outcome: Progressing

## 2022-07-28 NOTE — PHYSICAL THERAPY NOTE
Physical Therapy Progress Note     07/28/22 1000   PT Last Visit   PT Visit Date 07/28/22   Note Type   Note Type Treatment   Pain Assessment   Pain Assessment Tool FLACC   Pain Rating: FLACC (Rest) - Face 0   Pain Rating: FLACC (Rest) - Legs 0   Pain Rating: FLACC (Rest) - Activity 0   Pain Rating: FLACC (Rest) - Cry 0   Pain Rating: FLACC (Rest) - Consolability 0   Score: FLACC (Rest) 0   Pain Rating: FLACC (Activity) - Face 0   Pain Rating: FLACC (Activity) - Legs 0   Pain Rating: FLACC (Activity) - Activity 0   Pain Rating: FLACC (Activity) - Cry 0   Pain Rating: FLACC (Activity) - Consolability 0   Score: FLACC (Activity) 0   Restrictions/Precautions   Other Precautions Cognitive; Bed Alarm; Fall Risk;Aspiration; Restraints  (Pt  on the stretcher for GI lab post session )   Subjective   Subjective The patient was nonverbal throughout the session  Family is eager for her progress, and report that they are hoping for a miracle as she has recovered from her prior three strokes  Bed Mobility   Rolling R 1  Dependent   Rolling L 1  Dependent   Supine to Sit 1  Dependent   Additional items Assist x 2;Verbal cues;LE management   Sit to Supine 1  Dependent   Additional items Assist x 2;Verbal cues;LE management   Transfers   Sit to Stand 1  Dependent   Ambulation/Elevation   Gait pattern Not appropriate   Balance   Static Sitting Poor -   Dynamic Sitting   (Zero )   Activity Tolerance   Activity Tolerance Patient tolerated treatment well   Nurse [address], RN  Exercises   Heelslides Supine;Bilateral;PROM;5 reps   Hip Abduction Supine;Bilateral;PROM;5 reps   Hip Adduction Supine;Bilateral;PROM;5 reps   TKR Sitting;Bilateral;PROM;10 reps   Assessment   Prognosis Guarded   Problem List Decreased strength; Impaired balance;Decreased endurance;Decreased mobility; Decreased cognition;Decreased safety awareness; Impaired judgement   Assessment The patient demonstrated no functional volitional movement during the session  She remained flaccid on the right side, and had spontaneous movements on her left hemibody  At times she would reach and attempt to grasp things with her LUE, but there was no functional purpose  She had a plantar flexion reflex with stimulation while seated, but otherwise demonstrated no functional movement of her LLE  She did at times respond to varying stimuli, but not reliably and moreso to noxious ones  She was maintained at the side of the bed for twenty minutes with continued assistance  Weight shifting was performed in the frontal and sagittal planes  At times she would push to the right, and she demonstrated no righting reactions today  The patient was noted to have significant salivary secretions while seated, and the [place] was utilized to suction this out as she would drool and at times attempt to swallow this ineffectively  She was assisted over to the stretcher at the end of the session as she was going to the GI lab  The patient's family were very grateful for therapy, and were instructed in what exercises that they could do for her legs and arms  Barriers to Discharge Inaccessible home environment;Decreased caregiver support   Goals   Patient Goals Pt  did not express  Family hopeful for a miracle  STG Expiration Date 08/08/22   PT Treatment Day 1   Plan   Treatment/Interventions Functional transfer training;LE strengthening/ROM; Therapeutic exercise; Endurance training;Cognitive reorientation;Patient/family training;Bed mobility   Progress No functional improvements   PT Frequency 3-5x/wk   Recommendation   PT Discharge Recommendation Post acute rehabilitation services   AM-PAC Basic Mobility Inpatient   Turning in Bed Without Bedrails 1   Lying on Back to Sitting on Edge of Flat Bed 1   Moving Bed to Chair 1   Standing Up From Chair 1   Walk in Room 1   Climb 3-5 Stairs 1   Basic Mobility Inpatient Raw Score 6   Turning Head Towards Sound 1   Follow Simple Instructions 1   Low Function Basic Mobility Raw Score 8   Low Function Basic Mobility Standardized Score 10 37   Highest Level Of Mobility   JH-HLM Goal 2: Bed activities/Dependent transfer   JH-HLM Achieved 3: Sit at edge of bed       An AM-PAC Basic Mobility standardized score less than 40 78 suggests the patient may benefit from discharge to post-acute rehab services      Frandy Tesfaye, PTA

## 2022-07-28 NOTE — PROGRESS NOTES
[facility] - Progress Note  Eliza Sears 80 y o  female MRN: [id number]  Unit/Bed#: [facility] 723-01 Encounter: [id number]      ASSESSMENT & PLAN:    Malnourishment  - Proceed with EGD/PEG placement today  ______________________________________________________________________    SUBJECTIVE:     Spoke to pt's daughter yesterday via phone and again this AM in pt's room  Going forward with planned EGD/PEG placement today  NPO since MN      Medication Administration - last 24 hours from 07/27/2022 0847 to 07/28/2022 0847       Date/Time Order Dose Route Action Action by     07/28/2022 0830 senna (SENOKOT) tablet 8 6 mg 8 6 mg Oral Not Given Jayy Deutsch RN     07/27/2022 0915 senna (SENOKOT) tablet 8 6 mg 8 6 mg Oral Given Yoli Torres, LEEANN     07/28/2022 0618 insulin lispro (HumaLOG) 100 units/mL subcutaneous injection 1-5 Units 1 Units Subcutaneous Not Given Serena Pour, LEEANN     07/28/2022 0044 insulin lispro (HumaLOG) 100 units/mL subcutaneous injection 1-5 Units 1 Units Subcutaneous Not Given Garfield Thompson, LEEANN     07/27/2022 1759 insulin lispro (HumaLOG) 100 units/mL subcutaneous injection 1-5 Units 1 Units Subcutaneous Given Yoli Torres, LEEANN     07/27/2022 1214 insulin lispro (HumaLOG) 100 units/mL subcutaneous injection 1-5 Units 1 Units Subcutaneous Given Yoli Torres RN     07/28/2022 0899 chlorhexidine (PERIDEX) 0 12 % oral rinse 15 mL 15 mL Mouth/Throat Not Given Jayy Deutsch RN     07/27/2022 2155 chlorhexidine (PERIDEX) 0 12 % oral rinse 15 mL 15 mL Mouth/Throat Given Garfield Thompson RN     07/27/2022 0919 chlorhexidine (PERIDEX) 0 12 % oral rinse 15 mL 15 mL Mouth/Throat Given Yoli Torres RN     07/27/2022 1759 atorvastatin (LIPITOR) tablet 80 mg 80 mg Oral Given Yoli Torres RN     07/27/2022 1944 levalbuterol (XOPENEX) inhalation solution 1 25 mg 1 25 mg Nebulization Given Nancy Douglas, RT     07/27/2022 0904 levalbuterol (XOPENEX) inhalation solution 1 25 mg 1 25 mg Nebulization Given Dang Sluder, RT     07/28/2022 0829 polyethylene glycol (MIRALAX) packet 17 g 17 g Oral Not Given Florence Kentrell, RN     07/27/2022 0915 polyethylene glycol (MIRALAX) packet 17 g 17 g Oral Given Jaimes Omar, RN     07/27/2022 0915 aspirin chewable tablet 81 mg 81 mg Oral Given Theodore Nelson, RN     07/28/2022 5841 metoprolol tartrate (LOPRESSOR) partial tablet 12 5 mg 12 5 mg Oral Given Williams Kentrell, RN     07/27/2022 2156 metoprolol tartrate (LOPRESSOR) partial tablet 12 5 mg 12 5 mg Oral Given Cresconnorno Alejandra, RN     07/27/2022 0915 metoprolol tartrate (LOPRESSOR) partial tablet 12 5 mg 12 5 mg Oral Given Jaimes Omar, RN     07/27/2022 1944 budesonide (PULMICORT) inhalation solution 0 5 mg 0 5 mg Nebulization Given [place] Amoakohene, RT     07/27/2022 0905 budesonide (PULMICORT) inhalation solution 0 5 mg 0 5 mg Nebulization Given Dang Sluder, RT     07/27/2022 2156 artificial tear (LUBRIFRESH P M ) ophthalmic ointment   Both Eyes Given Cresenciano Muckle, RN     07/28/2022 0551 ciprofloxacin (CILOXAN) 0 3 % ophthalmic solution 1 drop 1 drop Right Eye Given Desiree Gutting, RN     07/27/2022 2155 ciprofloxacin (CILOXAN) 0 3 % ophthalmic solution 1 drop 1 drop Right Eye Given Cresenciano Muckle, RN     07/27/2022 1800 ciprofloxacin (CILOXAN) 0 3 % ophthalmic solution 1 drop 1 drop Right Eye Given Jaimes Crepema, RN     07/27/2022 1339 ciprofloxacin (CILOXAN) 0 3 % ophthalmic solution 1 drop 1 drop Right Eye Given Theodore Nelson, RN     07/27/2022 1215 ciprofloxacin (CILOXAN) 0 3 % ophthalmic solution 1 drop 1 drop Right Eye Given Jaimes Omar, RN     07/27/2022 2349 sodium chloride 0 9 % infusion 50 mL/hr Intravenous New Bag Topher Roberts, LEEANN          OBJECTIVE:     Objective   Blood pressure (!) 171/89, pulse 99, temperature 97 9 °F (36 6 °C), resp  rate 16, height 5' 1" (1 549 m), weight 64 9 kg (143 lb), SpO2 94 %, not currently breastfeeding   Body mass index is 27 02 kg/m²      Intake/Output Summary (Last 24 hours) at 7/28/2022 0847  Last data filed at 7/28/2022 0001  Gross per 24 hour   Intake 571 ml   Output 400 ml   Net 171 ml       PHYSICAL EXAM:   General Appearance: Awake and alert, in no acute distress  Abdomen: Soft, non-tender, non-distended; bowel sounds normal; no masses or no organomegaly    Invasive Devices  Report    Peripheral Intravenous Line  Duration           Peripheral IV 07/25/22 Right Antecubital 3 days          Drain  Duration           NG/OG/Enteral Tube Nasogastric 12 Fr Right nare 4 days    External Urinary Catheter 1 day                LAB RESULTS:  Admission on 07/22/2022   Component Date Value    Color, UA 07/22/2022 Light Yellow     Clarity, UA 07/22/2022 Clear     Specific Gravity, UA 07/22/2022 >=1 050 (A)    pH, UA 07/22/2022 6 0     Leukocytes, UA 07/22/2022 Negative     Nitrite, UA 07/22/2022 Negative     Protein, UA 07/22/2022 30 (1+) (A)    Glucose, UA 07/22/2022 Negative     Ketones, UA 07/22/2022 Negative     Urobilinogen, UA 07/22/2022 <2 0     Bilirubin, UA 07/22/2022 Negative     Occult Blood, UA 07/22/2022 Negative     RBC, UA 07/22/2022 2-4 (A)    WBC, UA 07/22/2022 1-2     Epithelial Cells 07/22/2022 Occasional     Bacteria, UA 07/22/2022 None Seen     MUCUS THREADS 07/22/2022 Occasional (A)    POC Glucose 07/22/2022 200 (A)    pH, Arterial 07/22/2022 7 363     pCO2, Arterial 07/22/2022 40 6     pO2, Arterial 07/22/2022 193 6 (A)    HCO3, Arterial 07/22/2022 22 6     Base Excess, Arterial 07/22/2022 -2 6     O2 Content, Arterial 07/22/2022 16 1     O2 HGB,Arterial  07/22/2022 98 9 (A)    SOURCE 07/22/2022 Radial, Right     SCARLET TEST 07/22/2022 Yes     Vent Type- AC 07/22/2022 AC     AC Rate 07/22/2022 14     Tidal Volume 07/22/2022 350     Inspired Air (FIO2) 07/22/2022 40     PEEP 07/22/2022 6     Cholesterol 07/23/2022 116     Triglycerides 07/23/2022 68     HDL, Direct 07/23/2022 48 (A)    LDL Calculated 07/23/2022 54     Hemoglobin A1C 07/23/2022 6 3 (A)    EAG 07/23/2022 134     Sodium 07/23/2022 141     Potassium 07/23/2022 4 3     Chloride 07/23/2022 109 (A)    CO2 07/23/2022 25     ANION GAP 07/23/2022 7     BUN 07/23/2022 27 (A)    Creatinine 07/23/2022 1 46 (A)    Glucose 07/23/2022 134     Calcium 07/23/2022 9 0     eGFR 07/23/2022 31     Magnesium 07/23/2022 2 5     Phosphorus 07/23/2022 4 2 (A)    WBC 07/23/2022 7 48     RBC 07/23/2022 3 12 (A)    Hemoglobin 07/23/2022 9 5 (A)    Hematocrit 07/23/2022 29 6 (A)    MCV 07/23/2022 95     MCH 07/23/2022 30 4     MCHC 07/23/2022 32 1     RDW 07/23/2022 12 3     Platelets 41/59/8820 110 (A)    MPV 07/23/2022 10 2     pH, Arterial 07/23/2022 7  407     pCO2, Arterial 07/23/2022 40 3     pO2, Arterial 07/23/2022 179 3 (A)    HCO3, Arterial 07/23/2022 24 8     Base Excess, Arterial 07/23/2022 0 1     O2 Content, Arterial 07/23/2022 14 3 (A)    O2 HGB,Arterial  07/23/2022 98 7 (A)    SOURCE 07/23/2022 Radial, Right     SCARLET TEST 07/23/2022 Yes     Vent Type- AC 07/23/2022 AC     AC Rate 07/23/2022 12     Tidal Volume 07/23/2022 350     Inspired Air (FIO2) 07/23/2022 40     PEEP 07/23/2022 6     LV EF 07/23/2022 60     NT-proBNP 07/23/2022 4,472 (A)    Sodium 07/24/2022 142     Potassium 07/24/2022 3 8     Chloride 07/24/2022 112 (A)    CO2 07/24/2022 23     ANION GAP 07/24/2022 7     BUN 07/24/2022 24     Creatinine 07/24/2022 1 32 (A)    Glucose 07/24/2022 123     Calcium 07/24/2022 9 1     eGFR 07/24/2022 36     WBC 07/24/2022 6 83     RBC 07/24/2022 3 01 (A)    Hemoglobin 07/24/2022 9 1 (A)    Hematocrit 07/24/2022 29 1 (A)    MCV 07/24/2022 97     MCH 07/24/2022 30 2     MCHC 07/24/2022 31 3 (A)    RDW 07/24/2022 12 5     MPV 07/24/2022 10 7     Platelets 40/50/2457 113 (A)    nRBC 07/24/2022 0     Neutrophils Relative 07/24/2022 64     Immat GRANS % 07/24/2022 0  Lymphocytes Relative 07/24/2022 17     Monocytes Relative 07/24/2022 12     Eosinophils Relative 07/24/2022 7 (A)    Basophils Relative 07/24/2022 0     Neutrophils Absolute 07/24/2022 4 33     Immature Grans Absolute 07/24/2022 0 02     Lymphocytes Absolute 07/24/2022 1 19     Monocytes Absolute 07/24/2022 0 82     Eosinophils Absolute 07/24/2022 0 45     Basophils Absolute 07/24/2022 0 02     Magnesium 07/24/2022 2 5     Phosphorus 07/24/2022 3 5     Magnesium 07/25/2022 2 6     Phosphorus 07/25/2022 2 8     WBC 07/25/2022 6 72     RBC 07/25/2022 2 95 (A)    Hemoglobin 07/25/2022 8 9 (A)    Hematocrit 07/25/2022 28 7 (A)    MCV 07/25/2022 97     MCH 07/25/2022 30 2     MCHC 07/25/2022 31 0 (A)    RDW 07/25/2022 12 6     MPV 07/25/2022 10 4     Platelets 04/45/4695 137 (A)    nRBC 07/25/2022 0     Neutrophils Relative 07/25/2022 73     Immat GRANS % 07/25/2022 0     Lymphocytes Relative 07/25/2022 14     Monocytes Relative 07/25/2022 10     Eosinophils Relative 07/25/2022 3     Basophils Relative 07/25/2022 0     Neutrophils Absolute 07/25/2022 4 88     Immature Grans Absolute 07/25/2022 0 02     Lymphocytes Absolute 07/25/2022 0 97     Monocytes Absolute 07/25/2022 0 66     Eosinophils Absolute 07/25/2022 0 18     Basophils Absolute 07/25/2022 0 01     Sodium 07/25/2022 144     Potassium 07/25/2022 3 6     Chloride 07/25/2022 112 (A)    CO2 07/25/2022 26     ANION GAP 07/25/2022 6     BUN 07/25/2022 29 (A)    Creatinine 07/25/2022 1 45 (A)    Glucose 07/25/2022 166 (A)    Calcium 07/25/2022 9 4     eGFR 07/25/2022 32     POC Glucose 07/25/2022 174 (A)    POC Glucose 07/25/2022 161 (A)    POC Glucose 07/26/2022 159 (A)    POC Glucose 07/26/2022 170 (A)    POC Glucose 07/26/2022 189 (A)    POC Glucose 07/26/2022 164 (A)    POC Glucose 07/26/2022 145 (A)    POC Glucose 07/26/2022 194 (A)    WBC 07/27/2022 6 92     RBC 07/27/2022 2 77 (A)    Hemoglobin 07/27/2022 8 4 (A)    Hematocrit 07/27/2022 27 3 (A)    MCV 07/27/2022 99 (A)    MCH 07/27/2022 30 3     MCHC 07/27/2022 30 8 (A)    RDW 07/27/2022 13 0     Platelets 52/61/1568 181     MPV 07/27/2022 11 3     Sodium 07/27/2022 146     Potassium 07/27/2022 4 0     Chloride 07/27/2022 114 (A)    CO2 07/27/2022 26     ANION GAP 07/27/2022 6     BUN 07/27/2022 38 (A)    Creatinine 07/27/2022 1 27     Glucose 07/27/2022 167 (A)    Calcium 07/27/2022 9 5     Corrected Calcium 07/27/2022 10 7 (A)    AST 07/27/2022 66 (A)    ALT 07/27/2022 26     Alkaline Phosphatase 07/27/2022 62     Total Protein 07/27/2022 6 5     Albumin 07/27/2022 2 5 (A)    Total Bilirubin 07/27/2022 1 15 (A)    eGFR 07/27/2022 37     POC Glucose 07/27/2022 163 (A)    Blood Culture 07/27/2022 Received in Microbiology Lab  Culture in Progress   Blood Culture 07/27/2022 Received in Microbiology Lab  Culture in Progress   POC Glucose 07/27/2022 174 (A)    POC Glucose 07/27/2022 164 (A)    POC Glucose 07/28/2022 170 (A)    Sodium 07/28/2022 146     Potassium 07/28/2022 4 1     Chloride 07/28/2022 116 (A)    CO2 07/28/2022 28     ANION GAP 07/28/2022 2 (A)    BUN 07/28/2022 36 (A)    Creatinine 07/28/2022 1 08     Glucose 07/28/2022 140     Calcium 07/28/2022 9 4     eGFR 07/28/2022 45     POC Glucose 07/28/2022 140        RADIOLOGY RESULTS: I have personally reviewed pertinent imaging studies  AMY Mccullough   PGY-4 Gastroenterology Fellow  [facility]  Available on [email or web address]

## 2022-07-28 NOTE — ASSESSMENT & PLAN NOTE
Lab Results   Component Value Date    HGBA1C 6 3 (H) 07/23/2022       Recent Labs     07/27/22  1758 07/28/22  0007 07/28/22  0601 07/28/22  1248   POCGLU 164* 170* 140 135       Blood Sugar Average: Last 72 hrs:  (P) 164 [id number]   Blood sugar acceptable    Continue with current care

## 2022-07-28 NOTE — PROGRESS NOTES
[address] [facility]  Progress Note - Eloy Husain 1933, 80 y o  female MRN: [id number]  Unit/Bed#: [facility] 723-01 Encounter: [id number]  Primary Care Provider: Joe Sepulveda MD   Date and time admitted to hospital: 7/22/2022  5:21 PM    * Cardio-embolic left MCA stroke [facility])  Assessment & Plan  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  · Continue aspirin and Lipitor  · Holding anticoagulation until 10-14 days post stroke  · 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 on hold with previous history of HIT  · Continue with neuro checks  · Neurology on board  · PMR consultation appreciated  · Patient with multiple strokes in the past   Patient also with vascular dementia with poor functional status to begin with  Overall prognosis appears to be poor  · Family wants to continue with the current care-consulted GI for PEG tube placement and likely PEG tube placement by tomorrow  · Palliative care consultation appreciated-patient and family wants to continue with the rehab oriented approach    Positive blood culture  Assessment & Plan  · 1/2 sets came back positive likely contaminant  Monitor off of antibiotics no further fever  · Likely Staph epidermidis    Fever  Assessment & Plan  · Patient noted to have low-grade fever  · Chest x-ray reviewed-no pneumonia  · Possible aspiration given her mental status  · Monitor off of antibiotics  · Follow-up on the cultures  · Monitor temperature    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[facility] [address] )  Assessment & Plan  · No acute exacerbation    Dysphagia  Assessment & Plan  · Likely secondary to CVA  Status post video barium swallow    His started on a dysphagia diet with aspiration precautions  · Currently patient has an NG tube present for nutrition  If the patient does not have enough nutritional intake through mouth may need to consider feeding tube  Family is agreeable for PEG tube placement  · Discussed with GI   5 for feeding tube placement tomorrow  Will keep NPO after midnight and hold to feed after midnight  Gentle hydration while NPO  · Status post feeding tube placement  Plan is to start on tube feeding tomorrow if cleared by Gastroenterology    CKD (chronic kidney disease) stage 3, GFR 30-59 ml/min [facility])  Assessment & Plan  Lab Results   Component Value Date    EGFR 45 07/28/2022    EGFR 37 07/27/2022    EGFR 32 07/25/2022    CREATININE 1 08 07/28/2022    CREATININE 1 27 07/27/2022    CREATININE 1 45 (H) 07/25/2022   Monitor creatinine appears to be at baseline  Creatinine 1  0 8 today    Systolic congestive heart failure [facility])  Assessment & Plan  Wt Readings from Last 3 Encounters:   07/23/22 64 9 kg (143 lb)   07/22/22 65 2 kg (143 lb 11 8 oz)   07/15/22 69 9 kg (154 lb)       Patient with chronic systolic congestive heart failure  Ejection fraction on this admission EF 55-60% with normal LV systolic function  Home regimen is losartan 20 Lopressor 50  Lasix on hold currently  Hold losartan for now  Patient with poor p o  intake      Hypertension  Assessment & Plan  · Monitor blood pressure    Controlled type 2 diabetes mellitus without complication, without long-term current use of insulin [facility])  Assessment & Plan  Lab Results   Component Value Date    HGBA1C 6 3 (H) 07/23/2022       Recent Labs     07/27/22  1758 07/28/22  0007 07/28/22  0601 07/28/22  1248   POCGLU 164* 170* 140 135       Blood Sugar Average: Last 72 hrs:  (P) 164 [id number]   Blood sugar acceptable    Continue with current care    H/O mitral valve replacement  Assessment & Plan  · TTE showed normal  valve function        VTE Pharmacologic Prophylaxis: VTE Score: 9 Previous history of hit    Patient Centered Rounds: I performed bedside rounds with nursing staff today  Discussions with Specialists or Other Care Team Provider:     Education and Discussions with Family / Patient:  Multiple family members updated in the room  Time Spent for Care: 30 minutes  More than 50% of total time spent on counseling and coordination of care as described above  Current Length of Stay: 6 day(s)  Current Patient Status: Inpatient   Certification Statement: The patient will continue to require additional inpatient hospital stay due to Status post feeding tube placement  Discharge Plan: Anticipate discharge in >72 hrs to rehab facility  Code Status: Level 3 - DNAR and DNI    Subjective:   Patient seen and examined  Status post feeding tube placement  Plan is to start on tube feeding by tomorrow  Patient still very sleepy  Unlikely she is able to tolerated an oral diet today  Will continue with the IV fluids and start on 5 tube feeding tomorrow    Objective:     Vitals:   Temp (24hrs), Av 1 °F (36 7 °C), Min:96 8 °F (36 °C), Max:99 4 °F (37 4 °C)    Temp:  [96 8 °F (36 °C)-99 4 °F (37 4 °C)] 98 2 °F (36 8 °C)  HR:  [86-99] 90  Resp:  [16-18] 16  BP: (100-204)/() 155/81  SpO2:  [92 %-99 %] 94 %  Body mass index is 27 02 kg/m²  Input and Output Summary (last 24 hours): Intake/Output Summary (Last 24 hours) at 2022 1850  Last data filed at 2022 1405  Gross per 24 hour   Intake 1664 33 ml   Output --   Net 1664 33 ml       Physical Exam:   Physical Exam  Constitutional:       General: She is not in acute distress  HENT:      Head: Normocephalic  Nose: Nose normal    Eyes:      General: No scleral icterus  Cardiovascular:      Rate and Rhythm: Normal rate and regular rhythm  Pulmonary:      Comments: Decreased breath sounds bilateral  Abdominal:      Comments: Feeding tube present   Musculoskeletal:         General: Normal range of motion  Cervical back: No rigidity  Skin:     Coloration: Skin is not jaundiced  Neurological:      Comments: Patient do not track me in the room  Spontaneous movement of the left upper and lower extremities  Do not follow commands         Additional Data:     Labs:  Results from last 7 days   Lab Units 07/28/22  1306 07/27/22  0448 07/25/22  0553   WBC Thousand/uL 6 99   < > 6 72   HEMOGLOBIN g/dL 8 7*   < > 8 9*   HEMATOCRIT % 28 2*   < > 28 7*   PLATELETS Thousands/uL 208   < > 137*   NEUTROS PCT %  --   --  73   LYMPHS PCT %  --   --  14   MONOS PCT %  --   --  10   EOS PCT %  --   --  3    < > = values in this interval not displayed  Results from last 7 days   Lab Units 07/28/22  0603 07/27/22  0448   SODIUM mmol/L 146 146   POTASSIUM mmol/L 4 1 4 0   CHLORIDE mmol/L 116* 114*   CO2 mmol/L 28 26   BUN mg/dL 36* 38*   CREATININE mg/dL 1 08 1 27   ANION GAP mmol/L 2* 6   CALCIUM mg/dL 9 4 9 5   ALBUMIN g/dL  --  2 5*   TOTAL BILIRUBIN mg/dL  --  1 15*   ALK PHOS U/L  --  62   ALT U/L  --  26   AST U/L  --  66*   GLUCOSE RANDOM mg/dL 140 167*     Results from last 7 days   Lab Units 07/22/22  1307   INR  1 01     Results from last 7 days   Lab Units 07/28/22  1845 07/28/22  1248 07/28/22  0601 07/28/22  0007 07/27/22  1758 07/27/22  1129 07/27/22  0626 07/26/22  2324 07/26/22  2043 07/26/22  1755 07/26/22  1149 07/26/22  0624   POC GLUCOSE mg/dl 135 135 140 170* 164* 174* 163* 194* 145* 164* 189* 170*     Results from last 7 days   Lab Units 07/23/22  0526   HEMOGLOBIN A1C % 6 3*           Lines/Drains:  Invasive Devices  Report    Peripheral Intravenous Line  Duration           Peripheral IV 07/25/22 Right Antecubital 3 days          Drain  Duration           External Urinary Catheter 1 day    Gastrostomy/Enterostomy Percutaneous Endoscopic Gastrostomy (PEG) 20 Fr  LUQ <1 day                      Imaging:  No new  imaging  Recent Cultures (last 7 days):   Results from last 7 days   Lab Units 07/27/22  1338   BLOOD CULTURE  No Growth at 24 hrs     GRAM STAIN RESULT  Gram positive cocci in clusters*       Last 24 Hours Medication List:   Current Facility-Administered Medications   Medication Dose Route Frequency Provider Last Rate    acetaminophen  325 mg Rectal Q4H PRN Marlin Gong, PA-C      acetaminophen  650 mg Oral Q4H PRN Marlin Gong, PA-C      albuterol  2 5 mg Nebulization Q6H PRN Marlin Gong, PA-C      artificial tear   Both Eyes HS Marlin Gong, PA-C      aspirin  81 mg Oral Daily Marlin Gong, PA-C      atorvastatin  80 mg Oral QPM Idalia Veleta Tamr, PA-C      bisacodyl  10 mg Rectal Daily PRN Cher Styles MD      budesonide  0 5 mg Nebulization Q12H Idalia Veleta Tamr, PA-C      cefazolin  1,000 mg Intravenous Once Aaron Hernandez MD      chlorhexidine  15 mL Mouth/Throat Q12H [address] Idalia Veleta Sizer, PA-C      ciprofloxacin  1 drop Right Eye Q4H While Awake Cher Styles MD      hydrALAZINE  10 mg Intravenous Q6H PRN Cher Styles MD      insulin lispro  1-5 Units Subcutaneous Q6H [address] Idalia Veleta Sizer, PA-C      levalbuterol  1 25 mg Nebulization BID Marlin Gong, PA-C      metoprolol tartrate  12 5 mg Oral Q12H [address] Idalia Veleta Sizer, PA-C      polyethylene glycol  17 g Oral Daily Marlin Gong, PA-C      senna  1 tablet Oral Daily Idalia Velmichael Turciosr, PA-C      sodium chloride  50 mL/hr Intravenous Continuous Cher Styles MD 50 mL/hr (07/28/22 8136)        Today, Patient Was Seen By: Cher Styles MD    **Please Note: This note may have been constructed using a voice recognition system  **

## 2022-07-28 NOTE — PLAN OF CARE
Problem: MOBILITY - ADULT  Goal: Maintain or return to baseline ADL function  Description: INTERVENTIONS:  -  Assess patient's ability to carry out ADLs; assess patient's baseline for ADL function and identify physical deficits which impact ability to perform ADLs (bathing, care of mouth/teeth, toileting, grooming, dressing, etc )  - Assess/evaluate cause of self-care deficits   - Assess range of motion  - Assess patient's mobility; develop plan if impaired  - Assess patient's need for assistive devices and provide as appropriate  - Encourage maximum independence but intervene and supervise when necessary  - Involve family in performance of ADLs  - Assess for home care needs following discharge   - Consider OT consult to assist with ADL evaluation and planning for discharge  - Provide patient education as appropriate  Outcome: Progressing  Goal: Maintains/Returns to pre admission functional level  Description: INTERVENTIONS:  - Perform BMAT or MOVE assessment daily    - Set and communicate daily mobility goal to care team and patient/family/caregiver     - Collaborate with rehabilitation services on mobility goals if consulted  - Out of bed for toileting  - Record patient progress and toleration of activity level   Outcome: Progressing     Problem: Potential for Falls  Goal: Patient will remain free of falls  Description: INTERVENTIONS:  - Educate patient/family on patient safety including physical limitations  - Instruct patient to call for assistance with activity   - Consult OT/PT to assist with strengthening/mobility   - Keep Call bell within reach  - Keep bed low and locked with side rails adjusted as appropriate  - Keep care items and personal belongings within reach  - Initiate and maintain comfort rounds  - Make Fall Risk Sign visible to staff  - Apply yellow socks and bracelet for high fall risk patients  - Consider moving patient to room near nurses station  Outcome: Progressing     Problem: Prexisting or High Potential for Compromised Skin Integrity  Goal: Skin integrity is maintained or improved  Description: INTERVENTIONS:  - Identify patients at risk for skin breakdown  - Assess and monitor skin integrity  - Assess and monitor nutrition and hydration status  - Monitor labs   - Assess for incontinence   - Turn and reposition patient  - Assist with mobility/ambulation  - Relieve pressure over bony prominences  - Avoid friction and shearing  - Provide appropriate hygiene as needed including keeping skin clean and dry  - Evaluate need for skin moisturizer/barrier cream  - Collaborate with interdisciplinary team   - Patient/family teaching  - Consider wound care consult   Outcome: Progressing     Problem: Neurological Deficit  Goal: Neurological status is stable or improving  Description: Interventions:  - Monitor and assess patient's level of consciousness, motor function, sensory function, and level of assistance needed for ADLs  - Monitor and report changes from baseline  Collaborate with interdisciplinary team to initiate plan and implement interventions as ordered  - Provide and maintain a safe environment  - Consider seizure precautions  - Consider fall precautions  - Consider aspiration precautions  - Consider bleeding precautions  Outcome: Progressing     Problem: Activity Intolerance/Impaired Mobility  Goal: Mobility/activity is maintained at optimum level for patient  Description: Interventions:  - Assess and monitor patient  barriers to mobility and need for assistive/adaptive devices  - Assess patient's emotional response to limitations  - Collaborate with interdisciplinary team and initiate plans and interventions as ordered  - Encourage independent activity per ability   - Maintain proper body alignment  - Perform active/passive rom as tolerated/ordered    - Plan activities to conserve energy   - Turn patient as appropriate  Outcome: Progressing     Problem: Communication Impairment  Goal: Ability to express needs and understand communication  Description: Assess patient's communication skills and ability to understand information  Patient will demonstrate use of effective communication techniques, alternative methods of communication and understanding even if not able to speak  - Encourage communication and provide alternate methods of communication as needed  - Collaborate with case management/ for discharge needs  - Include patient/family/caregiver in decisions related to communication  Outcome: Progressing     Problem: Potential for Aspiration  Goal: Non-ventilated patient's risk of aspiration is minimized  Description: Assess and monitor vital signs, respiratory status, and labs (WBC)  Monitor for signs of aspiration (tachypnea, cough, rales, wheezing, cyanosis, fever)  - Assess and monitor patient's ability to swallow  - Place patient up in chair to eat if possible  - HOB up at 90 degrees to eat if unable to get patient up into chair   - Supervise patient during oral intake  - Instruct patient/ family to take small bites  - Instruct patient/ family to take small single sips when taking liquids  - Follow patient-specific strategies generated by speech pathologist   Outcome: Progressing     Problem: Nutrition  Goal: Nutrition/Hydration status is improving  Description: Monitor and assess patient's nutrition/hydration status for malnutrition (ex- brittle hair, bruises, dry skin, pale skin and conjunctiva, muscle wasting, smooth red tongue, and disorientation)  Collaborate with interdisciplinary team and initiate plan and interventions as ordered  Monitor patient's weight and dietary intake as ordered or per policy  Utilize nutrition screening tool and intervene per policy  Determine patient's food preferences and provide high-protein, high-caloric foods as appropriate       - Assist patient with eating   - Allow adequate time for meals   - Encourage patient to take dietary supplement as ordered  - Collaborate with clinical nutritionist   - Include patient/family/caregiver in decisions related to nutrition  Outcome: Progressing     Problem: Nutrition/Hydration-ADULT  Goal: Nutrient/Hydration intake appropriate for improving, restoring or maintaining nutritional needs  Description: Monitor and assess patient's nutrition/hydration status for malnutrition  Collaborate with interdisciplinary team and initiate plan and interventions as ordered  Monitor patient's weight and dietary intake as ordered or per policy  Utilize nutrition screening tool and intervene as necessary  Determine patient's food preferences and provide high-protein, high-caloric foods as appropriate       INTERVENTIONS:  - Monitor oral intake, urinary output, labs, and treatment plans  - Assess nutrition and hydration status and recommend course of action  - Evaluate amount of meals eaten  - Assist patient with eating if necessary   - Allow adequate time for meals  - Recommend/ encourage appropriate diets, oral nutritional supplements, and vitamin/mineral supplements  - Order, calculate, and assess calorie counts as needed  - Recommend, monitor, and adjust tube feedings and TPN/PPN based on assessed needs  - Assess need for intravenous fluids  - Provide specific nutrition/hydration education as appropriate  - Include patient/family/caregiver in decisions related to nutrition  Outcome: Progressing     Problem: NEUROSENSORY - ADULT  Goal: Achieves stable or improved neurological status  Description: INTERVENTIONS  - Monitor and report changes in neurological status  - Monitor vital signs such as temperature, blood pressure, glucose, and any other labs ordered   - Initiate measures to prevent increased intracranial pressure  - Monitor for seizure activity and implement precautions if appropriate      Outcome: Progressing  Goal: Achieves maximal functionality and self care  Description: INTERVENTIONS  - Monitor swallowing and airway patency with patient fatigue and changes in neurological status  - Encourage and assist patient to increase activity and self care     - Encourage visually impaired, hearing impaired and aphasic patients to use assistive/communication devices  Outcome: Progressing     Problem: CARDIOVASCULAR - ADULT  Goal: Maintains optimal cardiac output and hemodynamic stability  Description: INTERVENTIONS:  - Monitor I/O, vital signs and rhythm  - Monitor for S/S and trends of decreased cardiac output  - Administer and titrate ordered vasoactive medications to optimize hemodynamic stability  - Assess quality of pulses, skin color and temperature  - Assess for signs of decreased coronary artery perfusion  - Instruct patient to report change in severity of symptoms  Outcome: Progressing  Goal: Absence of cardiac dysrhythmias or at baseline rhythm  Description: INTERVENTIONS:  - Continuous cardiac monitoring, vital signs, obtain 12 lead EKG if ordered  - Administer antiarrhythmic and heart rate control medications as ordered  - Monitor electrolytes and administer replacement therapy as ordered  Outcome: Progressing     Problem: RESPIRATORY - ADULT  Goal: Achieves optimal ventilation and oxygenation  Description: INTERVENTIONS:  - Assess for changes in respiratory status  - Assess for changes in mentation and behavior  - Position to facilitate oxygenation and minimize respiratory effort  - Oxygen administered by appropriate delivery if ordered  - Initiate smoking cessation education as indicated  - Encourage broncho-pulmonary hygiene including cough, deep breathe, Incentive Spirometry  - Assess the need for suctioning and aspirate as needed  - Assess and instruct to report SOB or any respiratory difficulty  - Respiratory Therapy support as indicated  Outcome: Progressing     Problem: GASTROINTESTINAL - ADULT  Goal: Maintains or returns to baseline bowel function  Description: INTERVENTIONS:  - Assess bowel function  - Encourage oral fluids to ensure adequate hydration  - Administer IV fluids if ordered to ensure adequate hydration  - Administer ordered medications as needed  - Encourage mobilization and activity  - Consider nutritional services referral to assist patient with adequate nutrition and appropriate food choices  Outcome: Progressing  Goal: Maintains adequate nutritional intake  Description: INTERVENTIONS:  - Monitor percentage of each meal consumed  - Identify factors contributing to decreased intake, treat as appropriate  - Assist with meals as needed  - Monitor I&O, weight, and lab values if indicated  - Obtain nutrition services referral as needed  Outcome: Progressing     Problem: GENITOURINARY - ADULT  Goal: Maintains or returns to baseline urinary function  Description: INTERVENTIONS:  - Assess urinary function  - Encourage oral fluids to ensure adequate hydration if ordered  - Administer IV fluids as ordered to ensure adequate hydration  - Administer ordered medications as needed  - Offer frequent toileting  - Follow urinary retention protocol if ordered  Outcome: Progressing  Goal: Urinary catheter remains patent  Description: INTERVENTIONS:  - Assess patency of urinary catheter  - If patient has a chronic schaefer, consider changing catheter if non-functioning  - Follow guidelines for intermittent irrigation of non-functioning urinary catheter  Outcome: Progressing     Problem: METABOLIC, FLUID AND ELECTROLYTES - ADULT  Goal: Electrolytes maintained within normal limits  Description: INTERVENTIONS:  - Monitor labs and assess patient for signs and symptoms of electrolyte imbalances  - Administer electrolyte replacement as ordered  - Monitor response to electrolyte replacements, including repeat lab results as appropriate  - Instruct patient on fluid and nutrition as appropriate  Outcome: Progressing  Goal: Fluid balance maintained  Description: INTERVENTIONS:  - Monitor labs   - Monitor I/O and WT  - Instruct patient on fluid and nutrition as appropriate  - Assess for signs & symptoms of volume excess or deficit  Outcome: Progressing  Goal: Glucose maintained within target range  Description: INTERVENTIONS:  - Monitor Blood Glucose as ordered  - Assess for signs and symptoms of hyperglycemia and hypoglycemia  - Administer ordered medications to maintain glucose within target range  - Assess nutritional intake and initiate nutrition service referral as needed  Outcome: Progressing    Problem: HEMATOLOGIC - ADULT  Goal: Maintains hematologic stability  Description: INTERVENTIONS  - Assess for signs and symptoms of bleeding or hemorrhage  - Monitor labs  - Administer supportive blood products/factors as ordered and appropriate  Outcome: Progressing     Problem: MUSCULOSKELETAL - ADULT  Goal: Maintain or return mobility to safest level of function  Description: INTERVENTIONS:  - Assess patient's ability to carry out ADLs; assess patient's baseline for ADL function and identify physical deficits which impact ability to perform ADLs (bathing, care of mouth/teeth, toileting, grooming, dressing, etc )  - Assess/evaluate cause of self-care deficits   - Assess range of motion  - Assess patient's mobility  - Assess patient's need for assistive devices and provide as appropriate  - Encourage maximum independence but intervene and supervise when necessary  - Involve family in performance of ADLs  - Assess for home care needs following discharge   - Consider OT consult to assist with ADL evaluation and planning for discharge  - Provide patient education as appropriate  Outcome: Progressing  Goal: Maintain proper alignment of affected body part  Description: INTERVENTIONS:  - Support, maintain and protect limb and body alignment  - Provide patient/ family with appropriate education  Outcome: Progressing     Problem: COPING  Goal: Pt/Family able to verbalize concerns and demonstrate effective coping strategies  Description: INTERVENTIONS:  - Assist patient/family to identify coping skills, available support systems and cultural and spiritual values  - Provide emotional support, including active listening and acknowledgement of concerns of patient and caregivers  - Reduce environmental stimuli, as able  - Provide patient education  - Assess for spiritual pain/suffering and initiate spiritual care, including notification of Pastoral Care or nida based community as needed  - Assess effectiveness of coping strategies  Outcome: Progressing  Goal: Will report anxiety at manageable levels  Description: INTERVENTIONS:  - Administer medication as ordered  - Teach and encourage coping skills  - Provide emotional support  - Assess patient/family for anxiety and ability to cope  Outcome: Progressing     Problem: INFECTION - ADULT  Goal: Absence or prevention of progression during hospitalization  Description: INTERVENTIONS:  - Assess and monitor for signs and symptoms of infection  - Monitor lab/diagnostic results  - Monitor all insertion sites, i e  indwelling lines, tubes, and drains  - Monitor endotracheal if appropriate and nasal secretions for changes in amount and color  - [place] appropriate cooling/warming therapies per order  - Administer medications as ordered  - Instruct and encourage patient and family to use good hand hygiene technique  - Identify and instruct in appropriate isolation precautions for identified infection/condition  Outcome: Progressing     Problem: SAFETY ADULT  Goal: Maintain or return to baseline ADL function  Description: INTERVENTIONS:  -  Assess patient's ability to carry out ADLs; assess patient's baseline for ADL function and identify physical deficits which impact ability to perform ADLs (bathing, care of mouth/teeth, toileting, grooming, dressing, etc )  - Assess/evaluate cause of self-care deficits   - Assess range of motion  - Assess patient's mobility; develop plan if impaired  - Assess patient's need for assistive devices and provide as appropriate  - Encourage maximum independence but intervene and supervise when necessary  - Involve family in performance of ADLs  - Assess for home care needs following discharge   - Consider OT consult to assist with ADL evaluation and planning for discharge  - Provide patient education as appropriate  Outcome: Progressing  Goal: Maintains/Returns to pre admission functional level  Description: INTERVENTIONS:  - Perform BMAT or MOVE assessment daily    - Set and communicate daily mobility goal to care team and patient/family/caregiver     - Collaborate with rehabilitation services on mobility goals if consulted  - Out of bed for toileting  - Record patient progress and toleration of activity level   Outcome: Progressing  Goal: Patient will remain free of falls  Description: INTERVENTIONS:  - Educate patient/family on patient safety including physical limitations  - Instruct patient to call for assistance with activity   - Consult OT/PT to assist with strengthening/mobility   - Keep Call bell within reach  - Keep bed low and locked with side rails adjusted as appropriate  - Keep care items and personal belongings within reach  - Initiate and maintain comfort rounds  - Make Fall Risk Sign visible to staff  - Apply yellow socks and bracelet for high fall risk patients  - Consider moving patient to room near nurses station  Outcome: Progressing     Problem: DISCHARGE PLANNING  Goal: Discharge to home or other facility with appropriate resources  Description: INTERVENTIONS:  - Identify barriers to discharge w/patient and caregiver  - Arrange for needed discharge resources and transportation as appropriate  - Identify discharge learning needs (meds, wound care, etc )  - Arrange for interpretive services to assist at discharge as needed  - Refer to Case Management Department for coordinating discharge planning if the patient needs post-hospital services based on physician/advanced practitioner order or complex needs related to functional status, cognitive ability, or social support system  Outcome: Progressing     Problem: Knowledge Deficit  Goal: Patient/family/caregiver demonstrates understanding of disease process, treatment plan, medications, and discharge instructions  Description: Complete learning assessment and assess knowledge base    Interventions:  - Provide teaching at level of understanding  - Provide teaching via preferred learning methods  Outcome: Progressing     Problem: SAFETY,RESTRAINT: NV/NON-SELF DESTRUCTIVE BEHAVIOR  Goal: Remains free of harm/injury (restraint for non violent/non self-detsructive behavior)  Description: INTERVENTIONS:  - Instruct patient/family regarding restraint use   - Assess and monitor physiologic and psychological status   - Provide interventions and comfort measures to meet assessed patient needs   - Identify and implement measures to help patient regain control  - Assess readiness for release of restraint   Outcome: Progressing  Goal: Returns to optimal restraint-free functioning  Description: INTERVENTIONS:  - Assess the patient's behavior and symptoms that indicate continued need for restraint  - Identify and implement measures to help patient regain control  - Assess readiness for release of restraint   Outcome: Progressing

## 2022-07-28 NOTE — WOUND OSTOMY CARE
Consult Note - Wound   Elyo Husain 80 y o  female MRN: [id number]  Unit/Bed#: [facility] 723-01 Encounter: [id number]      History and Present Illness:  80year old female presented to the hospital with CVA  Patient's history significant for DM, previous stroke, CKD, CHF, anemia, vascular dementia  Assessment Findings:   Patient is non-verbal during assessment, family at bedside  Patient is dependent for all cares and repositioning  Incontinent of bowel and bladder with purewick in use  Receiving dietary supplements  Bilateral heels are intact, pink, blanchable  Skin folds intact  1   Hospital acquired deep tissue pressure injury to right buttock extending across sacrum--intact epidermis with non-blanchable light purple and surrounding non-blanchable erythema  No induration at this time  No evidence of blistering at this time  See flowsheet for wound details  Wound Care Plan:   1-Apply Allevyn Life foam dressing to bilateral heels for prevention  Je with P   Peel back at least daily for skin assessment and re-apply  Change dressing every 3 days and PRN  2-Elevate heels off of bed/chair surface to offload pressure  3-Offloading air cushion in chair when out of bed, if/when able  4-Moisturize skin daily with skin nourishing lotion  5-Turn/reposition every 2 hours while in bed, using positioning wedges for pressure re-distribution on skin  6-Buttocks/sacrum--cleanse with soap and water, pat dry  Apply Hydraguard lotion three times daily and as needed with incontinence care  7-P500 low air-loss mattress  Wound care team to follow  Plan of care reviewed with primary RN  Dr Babs Almendarez made aware of hospital acquired pressure injury  Wound 07/27/22 Pressure Injury Buttocks Right (Active)   Wound Description Light purple;Non-blanchable erythema 07/28/22 1300   Pressure Injury Stage DTPI 07/28/22 1300   Aicha-wound Assessment Clean;Dry; Intact 07/28/22 1300   Wound Length (cm) 8 cm 07/28/22 1300   Wound Width (cm) 12 5 cm 07/28/22 1300   Wound Depth (cm) 0 cm 07/28/22 1300   Wound Surface Area (cm^2) 100 cm^2 07/28/22 1300   Wound Volume (cm^3) 0 cm^3 07/28/22 1300   Calculated Wound Volume (cm^3) 0 cm^3 07/28/22 1300   Drainage Amount None 07/28/22 1300   Treatments Cleansed 07/28/22 1300   Dressing Moisture barrier 07/28/22 1300   Patient Tolerance Tolerated well 07/28/22 330 Shashank CASTRO BSN, RN, [employer]

## 2022-07-28 NOTE — PLAN OF CARE
Problem: PHYSICAL THERAPY ADULT  Goal: Performs mobility at highest level of function for planned discharge setting  See evaluation for individualized goals  Description: Treatment/Interventions: Functional transfer training, LE strengthening/ROM, Therapeutic exercise, Endurance training, Cognitive reorientation, Patient/family training, Bed mobility          See flowsheet documentation for full assessment, interventions and recommendations  Outcome: Progressing  Note: Prognosis: Guarded  Problem List: Decreased strength, Impaired balance, Decreased endurance, Decreased mobility, Decreased cognition, Decreased safety awareness, Impaired judgement  Assessment: The patient demonstrated no functional volitional movement during the session  She remained flaccid on the right side, and had spontaneous movements on her left hemibody  At times she would reach and attempt to grasp things with her LUE, but there was no functional purpose  She had a plantar flexion reflex with stimulation while seated, but otherwise demonstrated no functional movement of her LLE  She did at times respond to varying stimuli, but not reliably and moreso to noxious ones  She was maintained at the side of the bed for twenty minutes with continued assistance  Weight shifting was performed in the frontal and sagittal planes  At times she would push to the right, and she demonstrated no righting reactions today  The patient was noted to have significant salivary secretions while seated, and the [place] was utilized to suction this out as she would drool and at times attempt to swallow this ineffectively  She was assisted over to the stretcher at the end of the session as she was going to the GI lab  The patient's family were very grateful for therapy, and were instructed in what exercises that they could do for her legs and arms    Barriers to Discharge: Inaccessible home environment, Decreased caregiver support     PT Discharge Recommendation: Post acute rehabilitation services    See flowsheet documentation for full assessment

## 2022-07-28 NOTE — PLAN OF CARE
Problem: MOBILITY - ADULT  Goal: Maintain or return to baseline ADL function  Description: INTERVENTIONS:  -  Assess patient's ability to carry out ADLs; assess patient's baseline for ADL function and identify physical deficits which impact ability to perform ADLs (bathing, care of mouth/teeth, toileting, grooming, dressing, etc )  - Assess/evaluate cause of self-care deficits   - Assess range of motion  - Assess patient's mobility; develop plan if impaired  - Assess patient's need for assistive devices and provide as appropriate  - Encourage maximum independence but intervene and supervise when necessary  - Involve family in performance of ADLs  - Assess for home care needs following discharge   - Consider OT consult to assist with ADL evaluation and planning for discharge  - Provide patient education as appropriate  Outcome: Progressing  Goal: Maintains/Returns to pre admission functional level  Description: INTERVENTIONS:  - Perform BMAT or MOVE assessment daily    - Set and communicate daily mobility goal to care team and patient/family/caregiver     - Collaborate with rehabilitation services on mobility goals if consulted    Problem: Potential for Falls  Goal: Patient will remain free of falls  Description: INTERVENTIONS:  - Educate patient/family on patient safety including physical limitations  - Instruct patient to call for assistance with activity   - Consult OT/PT to assist with strengthening/mobility   - Keep Call bell within reach  - Keep bed low and locked with side rails adjusted as appropriate  - Keep care items and personal belongings within reach  - Initiate and maintain comfort rounds  - Make Fall Risk Sign visible to staff    Problem: Prexisting or High Potential for Compromised Skin Integrity  Goal: Skin integrity is maintained or improved  Description: INTERVENTIONS:  - Identify patients at risk for skin breakdown  - Assess and monitor skin integrity  - Assess and monitor nutrition and hydration status  - Monitor labs   - Assess for incontinence   - Turn and reposition patient  - Assist with mobility/ambulation  - Relieve pressure over bony prominences  - Avoid friction and shearing  - Provide appropriate hygiene as needed including keeping skin clean and dry  - Evaluate need for skin moisturizer/barrier cream  - Collaborate with interdisciplinary team   - Patient/family teaching  - Consider wound care consult   Outcome: Progressing     Problem: Neurological Deficit  Goal: Neurological status is stable or improving  Description: Interventions:  - Monitor and assess patient's level of consciousness, motor function, sensory function, and level of assistance needed for ADLs  - Monitor and report changes from baseline  Collaborate with interdisciplinary team to initiate plan and implement interventions as ordered  - Provide and maintain a safe environment  - Consider seizure precautions  - Consider fall precautions  - Consider aspiration precautions  - Consider bleeding precautions  Outcome: Progressing     Problem: Activity Intolerance/Impaired Mobility  Goal: Mobility/activity is maintained at optimum level for patient  Description: Interventions:  - Assess and monitor patient  barriers to mobility and need for assistive/adaptive devices  - Assess patient's emotional response to limitations  - Collaborate with interdisciplinary team and initiate plans and interventions as ordered  - Encourage independent activity per ability   - Maintain proper body alignment  - Perform active/passive rom as tolerated/ordered  - Plan activities to conserve energy   - Turn patient as appropriate  Outcome: Progressing     Problem: Communication Impairment  Goal: Ability to express needs and understand communication  Description: Assess patient's communication skills and ability to understand information    Patient will demonstrate use of effective communication techniques, alternative methods of communication and understanding even if not able to speak  - Encourage communication and provide alternate methods of communication as needed  - Collaborate with case management/ for discharge needs  - Include patient/family/caregiver in decisions related to communication  Outcome: Progressing     Problem: Potential for Aspiration  Goal: Non-ventilated patient's risk of aspiration is minimized  Description: Assess and monitor vital signs, respiratory status, and labs (WBC)  Monitor for signs of aspiration (tachypnea, cough, rales, wheezing, cyanosis, fever)  - Assess and monitor patient's ability to swallow  - Place patient up in chair to eat if possible  - HOB up at 90 degrees to eat if unable to get patient up into chair   - Supervise patient during oral intake  - Instruct patient/ family to take small bites  - Instruct patient/ family to take small single sips when taking liquids  - Follow patient-specific strategies generated by speech pathologist   Outcome: Progressing     Problem: Nutrition  Goal: Nutrition/Hydration status is improving  Description: Monitor and assess patient's nutrition/hydration status for malnutrition (ex- brittle hair, bruises, dry skin, pale skin and conjunctiva, muscle wasting, smooth red tongue, and disorientation)  Collaborate with interdisciplinary team and initiate plan and interventions as ordered  Monitor patient's weight and dietary intake as ordered or per policy  Utilize nutrition screening tool and intervene per policy  Determine patient's food preferences and provide high-protein, high-caloric foods as appropriate  - Assist patient with eating   - Allow adequate time for meals   - Encourage patient to take dietary supplement as ordered  - Collaborate with clinical nutritionist   - Include patient/family/caregiver in decisions related to nutrition    Outcome: Progressing     Problem: Nutrition/Hydration-ADULT  Goal: Nutrient/Hydration intake appropriate for improving, restoring or maintaining nutritional needs  Description: Monitor and assess patient's nutrition/hydration status for malnutrition  Collaborate with interdisciplinary team and initiate plan and interventions as ordered  Monitor patient's weight and dietary intake as ordered or per policy  Utilize nutrition screening tool and intervene as necessary  Determine patient's food preferences and provide high-protein, high-caloric foods as appropriate       INTERVENTIONS:  - Monitor oral intake, urinary output, labs, and treatment plans  - Assess nutrition and hydration status and recommend course of action  - Evaluate amount of meals eaten  - Assist patient with eating if necessary   - Allow adequate time for meals  - Recommend/ encourage appropriate diets, oral nutritional supplements, and vitamin/mineral supplements  - Order, calculate, and assess calorie counts as needed  - Recommend, monitor, and adjust tube feedings and TPN/PPN based on assessed needs  - Assess need for intravenous fluids  - Provide specific nutrition/hydration education as appropriate  - Include patient/family/caregiver in decisions related to nutrition  Outcome: Progressing     Problem: SAFETY,RESTRAINT: NV/NON-SELF DESTRUCTIVE BEHAVIOR  Goal: Remains free of harm/injury (restraint for non violent/non self-detsructive behavior)  Description: INTERVENTIONS:  - Instruct patient/family regarding restraint use   - Assess and monitor physiologic and psychological status   - Provide interventions and comfort measures to meet assessed patient needs   - Identify and implement measures to help patient regain control  - Assess readiness for release of restraint   Outcome: Progressing  Goal: Returns to optimal restraint-free functioning  Description: INTERVENTIONS:  - Assess the patient's behavior and symptoms that indicate continued need for restraint  - Identify and implement measures to help patient regain control  - Assess readiness for release of restraint   Outcome: Progressing     Problem: NEUROSENSORY - ADULT  Goal: Achieves stable or improved neurological status  Description: INTERVENTIONS  - Monitor and report changes in neurological status  - Monitor vital signs such as temperature, blood pressure, glucose, and any other labs ordered   - Initiate measures to prevent increased intracranial pressure  - Monitor for seizure activity and implement precautions if appropriate      Outcome: Progressing  Goal: Achieves maximal functionality and self care  Description: INTERVENTIONS  - Monitor swallowing and airway patency with patient fatigue and changes in neurological status  - Encourage and assist patient to increase activity and self care     - Encourage visually impaired, hearing impaired and aphasic patients to use assistive/communication devices  Outcome: Progressing     Problem: CARDIOVASCULAR - ADULT  Goal: Maintains optimal cardiac output and hemodynamic stability  Description: INTERVENTIONS:  - Monitor I/O, vital signs and rhythm  - Monitor for S/S and trends of decreased cardiac output  - Administer and titrate ordered vasoactive medications to optimize hemodynamic stability  - Assess quality of pulses, skin color and temperature  - Assess for signs of decreased coronary artery perfusion  - Instruct patient to report change in severity of symptoms  Outcome: Progressing  Goal: Absence of cardiac dysrhythmias or at baseline rhythm  Description: INTERVENTIONS:  - Continuous cardiac monitoring, vital signs, obtain 12 lead EKG if ordered  - Administer antiarrhythmic and heart rate control medications as ordered  - Monitor electrolytes and administer replacement therapy as ordered  Outcome: Progressing     Problem: RESPIRATORY - ADULT  Goal: Achieves optimal ventilation and oxygenation  Description: INTERVENTIONS:  - Assess for changes in respiratory status  - Assess for changes in mentation and behavior  - Position to facilitate oxygenation and minimize respiratory effort  - Oxygen administered by appropriate delivery if ordered  - Initiate smoking cessation education as indicated  - Encourage broncho-pulmonary hygiene including cough, deep breathe, Incentive Spirometry  - Assess the need for suctioning and aspirate as needed  - Assess and instruct to report SOB or any respiratory difficulty  - Respiratory Therapy support as indicated  Outcome: Progressing     Problem: GASTROINTESTINAL - ADULT  Goal: Maintains or returns to baseline bowel function  Description: INTERVENTIONS:  - Assess bowel function  - Encourage oral fluids to ensure adequate hydration  - Administer IV fluids if ordered to ensure adequate hydration  - Administer ordered medications as needed  - Encourage mobilization and activity  - Consider nutritional services referral to assist patient with adequate nutrition and appropriate food choices  Outcome: Progressing  Goal: Maintains adequate nutritional intake  Description: INTERVENTIONS:  - Monitor percentage of each meal consumed  - Identify factors contributing to decreased intake, treat as appropriate  - Assist with meals as needed  - Monitor I&O, weight, and lab values if indicated  - Obtain nutrition services referral as needed  Outcome: Progressing     Problem: GENITOURINARY - ADULT  Goal: Maintains or returns to baseline urinary function  Description: INTERVENTIONS:  - Assess urinary function  - Encourage oral fluids to ensure adequate hydration if ordered  - Administer IV fluids as ordered to ensure adequate hydration  - Administer ordered medications as needed  - Offer frequent toileting  - Follow urinary retention protocol if ordered  Outcome: Progressing  Goal: Urinary catheter remains patent  Description: INTERVENTIONS:  - Assess patency of urinary catheter  - If patient has a chronic schaefer, consider changing catheter if non-functioning  - Follow guidelines for intermittent irrigation of non-functioning urinary catheter  Outcome: Progressing     Problem: METABOLIC, FLUID AND ELECTROLYTES - ADULT  Goal: Electrolytes maintained within normal limits  Description: INTERVENTIONS:  - Monitor labs and assess patient for signs and symptoms of electrolyte imbalances  - Administer electrolyte replacement as ordered  - Monitor response to electrolyte replacements, including repeat lab results as appropriate  - Instruct patient on fluid and nutrition as appropriate  Outcome: Progressing  Goal: Fluid balance maintained  Description: INTERVENTIONS:  - Monitor labs   - Monitor I/O and WT  - Instruct patient on fluid and nutrition as appropriate  - Assess for signs & symptoms of volume excess or deficit  Outcome: Progressing  Goal: Glucose maintained within target range  Description: INTERVENTIONS:  - Monitor Blood Glucose as ordered  - Assess for signs and symptoms of hyperglycemia and hypoglycemia  - Administer ordered medications to maintain glucose within target range  - Assess nutritional intake and initiate nutrition service referral as needed  Outcome: Progressing     Problem: SKIN/TISSUE INTEGRITY - ADULT  Goal: Skin Integrity remains intact(Skin Breakdown Prevention)  Description: Assess:  -Bed Management:  -Have minimal linens on bed & keep smooth, unwrinkled  -Change linens as needed when moist or perspiring  in bed      Toileting:  -Offer bedside commode    Activity:  -Encourage activity and walks on unit  -Encourage or provide ROM exercises   -Use appropriate equipment to lift or move patient in bed    Skin Care:  -Avoid use of baby powder, tape, friction and shearing, hot water or constrictive clothing    Problem: HEMATOLOGIC - ADULT  Goal: Maintains hematologic stability  Description: INTERVENTIONS  - Assess for signs and symptoms of bleeding or hemorrhage  - Monitor labs  - Administer supportive blood products/factors as ordered and appropriate  Outcome: Progressing     Problem: MUSCULOSKELETAL - ADULT  Goal: Maintain or return mobility to safest level of function  Description: INTERVENTIONS:  - Assess patient's ability to carry out ADLs; assess patient's baseline for ADL function and identify physical deficits which impact ability to perform ADLs (bathing, care of mouth/teeth, toileting, grooming, dressing, etc )  - Assess/evaluate cause of self-care deficits   - Assess range of motion  - Assess patient's mobility  - Assess patient's need for assistive devices and provide as appropriate  - Encourage maximum independence but intervene and supervise when necessary  - Involve family in performance of ADLs  - Assess for home care needs following discharge   - Consider OT consult to assist with ADL evaluation and planning for discharge  - Provide patient education as appropriate  Outcome: Progressing  Goal: Maintain proper alignment of affected body part  Description: INTERVENTIONS:  - Support, maintain and protect limb and body alignment  - Provide patient/ family with appropriate education  Outcome: Progressing     Problem: COPING  Goal: Pt/Family able to verbalize concerns and demonstrate effective coping strategies  Description: INTERVENTIONS:  - Assist patient/family to identify coping skills, available support systems and cultural and spiritual values  - Provide emotional support, including active listening and acknowledgement of concerns of patient and caregivers  - Reduce environmental stimuli, as able  - Provide patient education  - Assess for spiritual pain/suffering and initiate spiritual care, including notification of Pastoral Care or nida based community as needed  - Assess effectiveness of coping strategies  Outcome: Progressing  Goal: Will report anxiety at manageable levels  Description: INTERVENTIONS:  - Administer medication as ordered  - Teach and encourage coping skills  - Provide emotional support  - Assess patient/family for anxiety and ability to cope  Outcome: Progressing     Problem: INFECTION - ADULT  Goal: Absence or prevention of progression during hospitalization  Description: INTERVENTIONS:  - Assess and monitor for signs and symptoms of infection  - Monitor lab/diagnostic results  - Monitor all insertion sites, i e  indwelling lines, tubes, and drains  - Monitor endotracheal if appropriate and nasal secretions for changes in amount and color  - [place] appropriate cooling/warming therapies per order  - Administer medications as ordered  - Instruct and encourage patient and family to use good hand hygiene technique  - Identify and instruct in appropriate isolation precautions for identified infection/condition  Outcome: Progressing     Problem: SAFETY ADULT  Goal: Maintain or return to baseline ADL function  Description: INTERVENTIONS:  -  Assess patient's ability to carry out ADLs; assess patient's baseline for ADL function and identify physical deficits which impact ability to perform ADLs (bathing, care of mouth/teeth, toileting, grooming, dressing, etc )  - Assess/evaluate cause of self-care deficits   - Assess range of motion  - Assess patient's mobility; develop plan if impaired  - Assess patient's need for assistive devices and provide as appropriate  - Encourage maximum independence but intervene and supervise when necessary  - Involve family in performance of ADLs  - Assess for home care needs following discharge   - Consider OT consult to assist with ADL evaluation and planning for discharge  - Provide patient education as appropriate  Outcome: Progressing  Goal: Maintains/Returns to pre admission functional level  Description: INTERVENTIONS:  - Perform BMAT or MOVE assessment daily    - Set and communicate daily mobility goal to care team and patient/family/caregiver     - Collaborate with rehabilitation services on mobility goals if consulted  - Out of bed for toileting  - Record patient progress and toleration of activity level   Outcome: Progressing  Goal: Patient will remain free of falls  Description: INTERVENTIONS:  - Educate patient/family on patient safety including physical limitations  - Instruct patient to call for assistance with activity   - Consult OT/PT to assist with strengthening/mobility   - Keep Call bell within reach  - Keep bed low and locked with side rails adjusted as appropriate  - Keep care items and personal belongings within reach  - Initiate and maintain comfort rounds  - Make Fall Risk Sign visible to staff  - Apply yellow socks and bracelet for high fall risk patients  - Consider moving patient to room near nurses station  Outcome: Progressing     Problem: DISCHARGE PLANNING  Goal: Discharge to home or other facility with appropriate resources  Description: INTERVENTIONS:  - Identify barriers to discharge w/patient and caregiver  - Arrange for needed discharge resources and transportation as appropriate  - Identify discharge learning needs (meds, wound care, etc )  - Arrange for interpretive services to assist at discharge as needed  - Refer to Case Management Department for coordinating discharge planning if the patient needs post-hospital services based on physician/advanced practitioner order or complex needs related to functional status, cognitive ability, or social support system  Outcome: Progressing     Problem: Knowledge Deficit  Goal: Patient/family/caregiver demonstrates understanding of disease process, treatment plan, medications, and discharge instructions  Description: Complete learning assessment and assess knowledge base    Interventions:  - Provide teaching at level of understanding  - Provide teaching via preferred learning methods  Outcome: Progressing   Goal: Incision(s), wounds(s) or drain site(s) healing without S/S of infection  Description: INTERVENTIONS  - Assess and document dressing, incision, wound bed, drain sites and surrounding tissue  - Provide patient and family education  Problem: HEMATOLOGIC - ADULT  Goal: Maintains hematologic stability  Description: INTERVENTIONS  - Assess for signs and symptoms of bleeding or hemorrhage  - Monitor labs  - Administer supportive blood products/factors as ordered and appropriate  Outcome: Progressing   - Apply yellow socks and bracelet for high fall risk patients  - Consider moving patient to room near nurses station  Outcome: Progressing   - Out of bed for toileting  - Record patient progress and toleration of activity level   Outcome: Progressing

## 2022-07-28 NOTE — DISCHARGE INSTR - OTHER ORDERS
Wound Care Plan:   1-Apply Allevyn Life foam dressing to bilateral heels for prevention  Change dressing every 3 days and PRN  2-Elevate heels off of bed/chair surface to offload pressure  3-Offloading air cushion in chair when out of bed, if/when able  4-Moisturize skin daily with skin nourishing lotion  5-Turn/reposition every 2 hours while in bed, using positioning wedges for pressure re-distribution on skin  6-Buttocks/sacrum--cleanse with soap and water, pat dry  Apply Hydraguard lotion three times daily and as needed with incontinence care  7-Low air-loss mattress

## 2022-07-28 NOTE — ASSESSMENT & PLAN NOTE
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

## 2022-07-28 NOTE — QUICK NOTE
Progress Note - Palliative & Supportive Care       7/28/2022 2:19 PM -  Shweta Bebas Andry's chart and case were reviewed by Marvin Camarillo DO  Mode of review included electronic chart check,    Per review, symptoms remain controlled on current regimen and no changes are made at this time  Please continue the regimen in place, and review our last note for details  For dispo plan, please review Case Management notes  Patient went for PEG placed  As goals are aligned palliative will  sign off at this time  For urgent issues or any questions/concerns, please notify on-call provider via [address] [place] [address]  You may also call our answering service 24/7 at   Marvin Camarillo DO  Palliative and Supportive Care  Clinic/Answering Service: [phone]  You can find me on [employer]!

## 2022-07-28 NOTE — ASSESSMENT & PLAN NOTE
Lab Results   Component Value Date    EGFR 45 07/28/2022    EGFR 37 07/27/2022    EGFR 32 07/25/2022    CREATININE 1 08 07/28/2022    CREATININE 1 27 07/27/2022    CREATININE 1 45 (H) 07/25/2022   Monitor creatinine appears to be at baseline  Creatinine 1  0 8 today

## 2022-07-28 NOTE — ASSESSMENT & PLAN NOTE
· Likely secondary to CVA  Status post video barium swallow  His started on a dysphagia diet with aspiration precautions  · Currently patient has an NG tube present for nutrition  If the patient does not have enough nutritional intake through mouth may need to consider feeding tube  Family is agreeable for PEG tube placement  · Discussed with GI   5 for feeding tube placement tomorrow  Will keep NPO after midnight and hold to feed after midnight  Gentle hydration while NPO  · Status post feeding tube placement    Plan is to start on tube feeding tomorrow if cleared by Gastroenterology

## 2022-07-28 NOTE — ANESTHESIA POSTPROCEDURE EVALUATION
Post-Op Assessment Note    CV Status:  Stable  Pain Score: 0    Pain management: adequate     Mental Status:  Sleepy   Hydration Status:  Euvolemic   PONV Controlled:  None   Airway Patency:  Patent      Post Op Vitals Reviewed: Yes      Staff: CRNA         No complications documented      /55 (07/28/22 1139)    Temp (!) 96 8 °F (36 °C) (07/28/22 1139)    Pulse 86 (07/28/22 1139)   Resp 16 (07/28/22 1139)    SpO2 99 % (07/28/22 1139)

## 2022-07-28 NOTE — OCCUPATIONAL THERAPY NOTE
Occupational Therapy Treatment Note       07/28/22 1009   OT Last Visit   OT Visit Date 07/28/22   Note Type   Note Type Treatment   Restrictions/Precautions   Weight Bearing Precautions Per Order No   Other Precautions Cognitive; Bed Alarm;Multiple lines; Fall Risk;Restraints;Telemetry  (wrist restraint)   Lifestyle   Autonomy PTA pt required A for ADLs, IADLs, and fxnl mobility; (-) driving   Reciprocal Relationships supportive daughters, son   Service to Others retired   Intrinsic Gratification none expressed   Pain Assessment   Pain Assessment Tool FLACC   Pain Rating: FLACC (Rest) - Face 0   Pain Rating: FLACC (Rest) - Legs 0   Pain Rating: FLACC (Rest) - Activity 0   Pain Rating: FLACC (Rest) - Cry 0   Pain Rating: FLACC (Rest) - Consolability 0   Score: FLACC (Rest) 0   Pain Rating: FLACC (Activity) - Face 0   Pain Rating: FLACC (Activity) - Legs 0   Pain Rating: FLACC (Activity) - Activity 0   Pain Rating: FLACC (Activity) - Cry 0   Pain Rating: FLACC (Activity) - Consolability 0   Score: FLACC (Activity) 0   ADL   Where Assessed Sitting at sink   Grooming Assistance 1  Total Assistance   Grooming Deficit Brushing hair   Bed Mobility   Supine to Sit 2  Maximal assistance   Additional items Assist x 2   Sit to Supine 2  Maximal assistance   Additional items Assist x 2   Cognition   Overall Cognitive Status Impaired   Attention SELAM   Orientation Level Unable to assess   Memory Unable to assess   Following Commands Follows one step commands inconsistently   Activity Tolerance   Activity Tolerance Patient tolerated treatment well   Assessment   Assessment   Pt seen for participation in therapy occupational therapy with focus on activity tolerance, bed mob, sitting balance and tolerance for pt engagement in UB self-care tasks  Pt cleared by LEEANN/Julee for pt participation in therapy session  Pt presented supine/HOB raised pt identifiers confirmed    Pt was unable to report her therapy goals 2* to cognitive impairments  Pt required max verbal cues to open eyes  She required hand over hand assist to brush hair with no carry-over of activity  She required assistance x2 for bed mob 2* to decreased overall strength and required unsupported sitting balance 2* to decreased balance  Pt tolerated session fairly well with no untoward issues noted for pt blood pressure throughout session  Pt's blood pressure was checked throughout session      Plan   Treatment Interventions ADL retraining   Goal Expiration Date 08/08/22   OT Treatment Day 1   OT Frequency 3-5x/wk   Recommendation   OT Discharge Recommendation Post acute rehabilitation services   AM-PAC Daily Activity Inpatient   Lower Body Dressing 1   Bathing 1   Toileting 1   Upper Body Dressing 2   Grooming 2   Eating 2   Daily Activity Raw Score 9   Turning Head Towards Sound 2   Follow Simple Instructions 2   Low Function Daily Activity Raw Score 13   Low Function Daily Activity Standardized Score 23 16   AM-PAC Applied Cognition Inpatient   Following a Speech/Presentation 1   Understanding Ordinary Conversation 2   Taking Medications 1   Remembering Where Things Are Placed or Put Away 1   Remembering List of 4-5 Errands 1   Taking Care of Complicated Tasks 1   Applied Cognition Raw Score 7   Applied Cognition Standardized Score 15 17   Barthel Index   Feeding 0   Bathing 0   Grooming Score 0   Dressing Score 0   Bladder Score 5   Bowels Score 0   Toilet Use Score 5   Transfers (Bed/Chair) Score 5   Mobility (Level Surface) Score 0   Stairs Score 0   Barthel Index Score 15     Mercedes ROCHE/MARLY

## 2022-07-28 NOTE — PLAN OF CARE
Problem: OCCUPATIONAL THERAPY ADULT  Goal: Performs self-care activities at highest level of function for planned discharge setting  See evaluation for individualized goals  Description: Treatment Interventions: ADL retraining, Visual perceptual retraining, Functional transfer training, UE strengthening/ROM, Endurance training, Cognitive reorientation, Patient/family training, Equipment evaluation/education, Fine motor coordination activities, Compensatory technique education, Continued evaluation, Energy conservation, Activityengagement          See flowsheet documentation for full assessment, interventions and recommendations  Outcome: Progressing  Note: Limitation: Decreased ADL status, Decreased UE ROM, Decreased UE strength, Decreased Safe judgement during ADL, Decreased cognition, Decreased endurance, Decreased sensation, Visual deficit, Decreased self-care trans, Decreased fine motor control, Decreased high-level ADLs  Prognosis: Fair  Assessment:   Pt seen for participation in therapy occupational therapy with focus on activity tolerance, bed mob, sitting balance and tolerance for pt engagement in UB self-care tasks  Pt cleared by RN/Julee for pt participation in therapy session  Pt presented supine/HOB raised pt identifiers confirmed  Pt was unable to report her therapy goals 2* to cognitive impairments  Pt required max verbal cues to open eyes  She required hand over hand assist to brush hair with no carry-over of activity  She required assistance x2 for bed mob 2* to decreased overall strength and required unsupported sitting balance 2* to decreased balance  Pt tolerated session fairly well with no untoward issues noted for pt blood pressure throughout session  Pt's blood pressure was checked throughout session        OT Discharge Recommendation: Post acute rehabilitation services

## 2022-07-28 NOTE — ANESTHESIA PREPROCEDURE EVALUATION
Procedure:  EGD    Relevant Problems   ANESTHESIA (within normal limits)      CARDIO   (+) Deep vein thrombosis (DVT) of left upper extremity ([facility])   (+) H/O mitral valve replacement   (+) Hypercholesterolemia   (+) Hypertension   (+) Paroxysmal A-fib (HCC)   (+) Systolic congestive heart failure (HCC)      ENDO   (+) Controlled type 2 diabetes mellitus without complication, without long-term current use of insulin ([facility])      GI/HEPATIC   (+) Acid reflux   (+) Dysphagia   (+) Esophageal reflux      /RENAL   (+) CKD (chronic kidney disease) stage 3, GFR 30-59 ml/min ([facility])   (+) Chronic renal disease, stage IV (HCC)      GYN (within normal limits)      HEMATOLOGY   (+) Anemia   (+) Thrombocytopenia (HCC)      MUSCULOSKELETAL   (+) Arthritis   (+) Arthritis of right glenohumeral joint   (+) Spondylosis of cervical region without myelopathy or radiculopathy      NEURO/PSYCH   (+) CVA (cerebral vascular accident) ([facility] [address] )   (+) Loyce Forget left MCA stroke ([facility] [address] )   (+) H/O ischemic left MCA stroke   (+) History of CVA (cerebrovascular accident)   (+) History of DVT (deep vein thrombosis)   (+) History of ischemic right MCA stroke   (+) History of pacemaker   (+) History of subarachnoid hemorrhage   (+) TIA (transient ischemic attack)   (+) Vascular dementia ([facility])      PULMONARY   (+) 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[facility] [address] )   (+) HCAP (healthcare-associated pneumonia)   (-) Sleep apnea      TTE 7/23/2022  Interpretation Summary         Left Ventricle: Left ventricular cavity size is normal  Wall thickness is normal  The left ventricular ejection fraction is 55-60%  Systolic function is normal  Wall motion is normal     Mitral Valve: 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  The prosthetic valve appears to be functioning normally  The prosthetic valve appears to be well-seated  Valve leaflet motion is normal  There is no evidence of transvalvular regurgitation      Very technically limited study     Although no diagnostic evidence of intracardiac thrombus was found, this possibility cannot be completely excluded on the basis of this study      If continued concern for the same, consider repeating the study at a later point or further evaluation with transesophageal echocardiogram       Physical Exam    Airway    Mallampati score: III  TM Distance: >3 FB  Neck ROM: full     Dental   No notable dental hx     Cardiovascular      Pulmonary      Other Findings        Anesthesia Plan  ASA Score- 3     Anesthesia Type- IV sedation with anesthesia with ASA Monitors  Additional Monitors:   Airway Plan:           Plan Factors-Exercise tolerance (METS): <4 METS  Chart reviewed  EKG reviewed  Existing labs reviewed  Patient summary reviewed  Patient is not a current smoker  Induction- intravenous  Postoperative Plan-     Informed Consent- Anesthetic plan and risks discussed with patient, son and daughter  I personally reviewed this patient with the CRNA  Discussed and agreed on the Anesthesia Plan with the CRNA  Deana Cisneros

## 2022-07-29 PROBLEM — E87.0 HYPERNATREMIA: Status: ACTIVE | Noted: 2022-07-29

## 2022-07-29 LAB
ANION GAP SERPL CALCULATED.3IONS-SCNC: 3 MMOL/L (ref 4–13)
BUN SERPL-MCNC: 33 MG/DL (ref 5–25)
CALCIUM SERPL-MCNC: 9.5 MG/DL (ref 8.3–10.1)
CHLORIDE SERPL-SCNC: 120 MMOL/L (ref 96–108)
CO2 SERPL-SCNC: 26 MMOL/L (ref 21–32)
CREAT SERPL-MCNC: 1.15 MG/DL (ref 0.6–1.3)
ERYTHROCYTE [DISTWIDTH] IN BLOOD BY AUTOMATED COUNT: 12.8 % (ref 11.6–15.1)
GFR SERPL CREATININE-BSD FRML MDRD: 42 ML/MIN/1.73SQ M
GLUCOSE SERPL-MCNC: 127 MG/DL (ref 65–140)
GLUCOSE SERPL-MCNC: 130 MG/DL (ref 65–140)
GLUCOSE SERPL-MCNC: 138 MG/DL (ref 65–140)
GLUCOSE SERPL-MCNC: 141 MG/DL (ref 65–140)
GLUCOSE SERPL-MCNC: 155 MG/DL (ref 65–140)
GLUCOSE SERPL-MCNC: 166 MG/DL (ref 65–140)
HCT VFR BLD AUTO: 28.2 % (ref 34.8–46.1)
HGB BLD-MCNC: 8.3 G/DL (ref 11.5–15.4)
MCH RBC QN AUTO: 29.6 PG (ref 26.8–34.3)
MCHC RBC AUTO-ENTMCNC: 29.4 G/DL (ref 31.4–37.4)
MCV RBC AUTO: 101 FL (ref 82–98)
PLATELET # BLD AUTO: 241 THOUSANDS/UL (ref 149–390)
PMV BLD AUTO: 10.8 FL (ref 8.9–12.7)
POTASSIUM SERPL-SCNC: 4.2 MMOL/L (ref 3.5–5.3)
RBC # BLD AUTO: 2.8 MILLION/UL (ref 3.81–5.12)
SODIUM SERPL-SCNC: 149 MMOL/L (ref 135–147)
WBC # BLD AUTO: 6.31 THOUSAND/UL (ref 4.31–10.16)

## 2022-07-29 PROCEDURE — 82948 REAGENT STRIP/BLOOD GLUCOSE: CPT

## 2022-07-29 PROCEDURE — 99232 SBSQ HOSP IP/OBS MODERATE 35: CPT | Performed by: INTERNAL MEDICINE

## 2022-07-29 PROCEDURE — 80048 BASIC METABOLIC PNL TOTAL CA: CPT | Performed by: FAMILY MEDICINE

## 2022-07-29 PROCEDURE — 94640 AIRWAY INHALATION TREATMENT: CPT

## 2022-07-29 PROCEDURE — 99232 SBSQ HOSP IP/OBS MODERATE 35: CPT | Performed by: FAMILY MEDICINE

## 2022-07-29 PROCEDURE — 94760 N-INVAS EAR/PLS OXIMETRY 1: CPT

## 2022-07-29 PROCEDURE — 85027 COMPLETE CBC AUTOMATED: CPT | Performed by: FAMILY MEDICINE

## 2022-07-29 RX ORDER — DEXTROSE AND SODIUM CHLORIDE 5; .45 G/100ML; G/100ML
75 INJECTION, SOLUTION INTRAVENOUS CONTINUOUS
Status: DISCONTINUED | OUTPATIENT
Start: 2022-07-29 | End: 2022-07-30

## 2022-07-29 RX ADMIN — DEXTROSE AND SODIUM CHLORIDE 75 ML/HR: 5; .45 INJECTION, SOLUTION INTRAVENOUS at 09:42

## 2022-07-29 RX ADMIN — CIPROFLOXACIN HYDROCHLORIDE 1 DROP: 3 SOLUTION/ DROPS OPHTHALMIC at 09:43

## 2022-07-29 RX ADMIN — WHITE PETROLATUM 57.7 %-MINERAL OIL 31.9 % EYE OINTMENT: at 23:06

## 2022-07-29 RX ADMIN — LEVALBUTEROL HYDROCHLORIDE 1.25 MG: 1.25 SOLUTION, CONCENTRATE RESPIRATORY (INHALATION) at 07:23

## 2022-07-29 RX ADMIN — CIPROFLOXACIN HYDROCHLORIDE 1 DROP: 3 SOLUTION/ DROPS OPHTHALMIC at 23:06

## 2022-07-29 RX ADMIN — DEXTROSE AND SODIUM CHLORIDE 75 ML/HR: 5; .45 INJECTION, SOLUTION INTRAVENOUS at 23:04

## 2022-07-29 RX ADMIN — CIPROFLOXACIN HYDROCHLORIDE 1 DROP: 3 SOLUTION/ DROPS OPHTHALMIC at 13:43

## 2022-07-29 RX ADMIN — INSULIN LISPRO 1 UNITS: 100 INJECTION, SOLUTION INTRAVENOUS; SUBCUTANEOUS at 19:30

## 2022-07-29 RX ADMIN — ATORVASTATIN CALCIUM 80 MG: 80 TABLET, FILM COATED ORAL at 19:14

## 2022-07-29 RX ADMIN — ASPIRIN 81 MG CHEWABLE TABLET 81 MG: 81 TABLET CHEWABLE at 09:34

## 2022-07-29 RX ADMIN — SENNOSIDES 8.6 MG: 8.6 TABLET, FILM COATED ORAL at 09:34

## 2022-07-29 RX ADMIN — BUDESONIDE 0.5 MG: 0.5 INHALANT ORAL at 07:23

## 2022-07-29 RX ADMIN — CIPROFLOXACIN HYDROCHLORIDE 1 DROP: 3 SOLUTION/ DROPS OPHTHALMIC at 19:15

## 2022-07-29 RX ADMIN — BISACODYL 10 MG: 10 SUPPOSITORY RECTAL at 19:52

## 2022-07-29 RX ADMIN — INSULIN LISPRO 1 UNITS: 100 INJECTION, SOLUTION INTRAVENOUS; SUBCUTANEOUS at 23:08

## 2022-07-29 RX ADMIN — POLYETHYLENE GLYCOL 3350 17 G: 17 POWDER, FOR SOLUTION ORAL at 09:34

## 2022-07-29 RX ADMIN — Medication 12.5 MG: at 20:52

## 2022-07-29 RX ADMIN — Medication 12.5 MG: at 09:34

## 2022-07-29 RX ADMIN — LEVALBUTEROL HYDROCHLORIDE 1.25 MG: 1.25 SOLUTION, CONCENTRATE RESPIRATORY (INHALATION) at 19:14

## 2022-07-29 RX ADMIN — BUDESONIDE 0.5 MG: 0.5 INHALANT ORAL at 19:14

## 2022-07-29 NOTE — PLAN OF CARE
Problem: MOBILITY - ADULT  Goal: Maintain or return to baseline ADL function  Description: INTERVENTIONS:  -  Assess patient's ability to carry out ADLs; assess patient's baseline for ADL function and identify physical deficits which impact ability to perform ADLs (bathing, care of mouth/teeth, toileting, grooming, dressing, etc )  - Assess/evaluate cause of self-care deficits   - Assess range of motion  - Assess patient's mobility; develop plan if impaired  - Assess patient's need for assistive devices and provide as appropriate  - Encourage maximum independence but intervene and supervise when necessary  - Involve family in performance of ADLs  - Assess for home care needs following discharge   - Consider OT consult to assist with ADL evaluation and planning for discharge  - Provide patient education as appropriate  Outcome: Progressing  Goal: Maintains/Returns to pre admission functional level  Description: INTERVENTIONS:  - Perform BMAT or MOVE assessment daily    - Set and communicate daily mobility goal to care team and patient/family/caregiver     - Collaborate with rehabilitation services on mobility goals if consulted  - Out of bed for toileting  - Record patient progress and toleration of activity level   Outcome: Progressing     Problem: Potential for Falls  Goal: Patient will remain free of falls  Description: INTERVENTIONS:  - Educate patient/family on patient safety including physical limitations  - Instruct patient to call for assistance with activity   - Consult OT/PT to assist with strengthening/mobility   - Keep Call bell within reach  - Keep bed low and locked with side rails adjusted as appropriate  - Keep care items and personal belongings within reach  - Initiate and maintain comfort rounds  - Make Fall Risk Sign visible to staff  - Apply yellow socks and bracelet for high fall risk patients  - Consider moving patient to room near nurses station  Outcome: Progressing     Problem: Prexisting or High Potential for Compromised Skin Integrity  Goal: Skin integrity is maintained or improved  Description: INTERVENTIONS:  - Identify patients at risk for skin breakdown  - Assess and monitor skin integrity  - Assess and monitor nutrition and hydration status  - Monitor labs   - Assess for incontinence   - Turn and reposition patient  - Assist with mobility/ambulation  - Relieve pressure over bony prominences  - Avoid friction and shearing  - Provide appropriate hygiene as needed including keeping skin clean and dry  - Evaluate need for skin moisturizer/barrier cream  - Collaborate with interdisciplinary team   - Patient/family teaching  - Consider wound care consult   Outcome: Progressing     Problem: Neurological Deficit  Goal: Neurological status is stable or improving  Description: Interventions:  - Monitor and assess patient's level of consciousness, motor function, sensory function, and level of assistance needed for ADLs  - Monitor and report changes from baseline  Collaborate with interdisciplinary team to initiate plan and implement interventions as ordered  - Provide and maintain a safe environment  - Consider seizure precautions  - Consider fall precautions  - Consider aspiration precautions  - Consider bleeding precautions  Outcome: Progressing     Problem: Activity Intolerance/Impaired Mobility  Goal: Mobility/activity is maintained at optimum level for patient  Description: Interventions:  - Assess and monitor patient  barriers to mobility and need for assistive/adaptive devices  - Assess patient's emotional response to limitations  - Collaborate with interdisciplinary team and initiate plans and interventions as ordered  - Encourage independent activity per ability   - Maintain proper body alignment  - Perform active/passive rom as tolerated/ordered    - Plan activities to conserve energy   - Turn patient as appropriate  Outcome: Progressing     Problem: Communication Impairment  Goal: Ability to express needs and understand communication  Description: Assess patient's communication skills and ability to understand information  Patient will demonstrate use of effective communication techniques, alternative methods of communication and understanding even if not able to speak  - Encourage communication and provide alternate methods of communication as needed  - Collaborate with case management/ for discharge needs  - Include patient/family/caregiver in decisions related to communication  Outcome: Progressing     Problem: Potential for Aspiration  Goal: Non-ventilated patient's risk of aspiration is minimized  Description: Assess and monitor vital signs, respiratory status, and labs (WBC)  Monitor for signs of aspiration (tachypnea, cough, rales, wheezing, cyanosis, fever)  - Assess and monitor patient's ability to swallow  - Place patient up in chair to eat if possible  - HOB up at 90 degrees to eat if unable to get patient up into chair   - Supervise patient during oral intake  - Instruct patient/ family to take small bites  - Instruct patient/ family to take small single sips when taking liquids  - Follow patient-specific strategies generated by speech pathologist   Outcome: Progressing     Problem: Nutrition  Goal: Nutrition/Hydration status is improving  Description: Monitor and assess patient's nutrition/hydration status for malnutrition (ex- brittle hair, bruises, dry skin, pale skin and conjunctiva, muscle wasting, smooth red tongue, and disorientation)  Collaborate with interdisciplinary team and initiate plan and interventions as ordered  Monitor patient's weight and dietary intake as ordered or per policy  Utilize nutrition screening tool and intervene per policy  Determine patient's food preferences and provide high-protein, high-caloric foods as appropriate       - Assist patient with eating   - Allow adequate time for meals   - Encourage patient to take dietary supplement as ordered  - Collaborate with clinical nutritionist   - Include patient/family/caregiver in decisions related to nutrition  Outcome: Progressing     Problem: Nutrition/Hydration-ADULT  Goal: Nutrient/Hydration intake appropriate for improving, restoring or maintaining nutritional needs  Description: Monitor and assess patient's nutrition/hydration status for malnutrition  Collaborate with interdisciplinary team and initiate plan and interventions as ordered  Monitor patient's weight and dietary intake as ordered or per policy  Utilize nutrition screening tool and intervene as necessary  Determine patient's food preferences and provide high-protein, high-caloric foods as appropriate       INTERVENTIONS:  - Monitor oral intake, urinary output, labs, and treatment plans  - Assess nutrition and hydration status and recommend course of action  - Evaluate amount of meals eaten  - Assist patient with eating if necessary   - Allow adequate time for meals  - Recommend/ encourage appropriate diets, oral nutritional supplements, and vitamin/mineral supplements  - Order, calculate, and assess calorie counts as needed  - Recommend, monitor, and adjust tube feedings and TPN/PPN based on assessed needs  - Assess need for intravenous fluids  - Provide specific nutrition/hydration education as appropriate  - Include patient/family/caregiver in decisions related to nutrition  Outcome: Progressing     Problem: NEUROSENSORY - ADULT  Goal: Achieves stable or improved neurological status  Description: INTERVENTIONS  - Monitor and report changes in neurological status  - Monitor vital signs such as temperature, blood pressure, glucose, and any other labs ordered   - Initiate measures to prevent increased intracranial pressure  - Monitor for seizure activity and implement precautions if appropriate      Outcome: Progressing  Goal: Achieves maximal functionality and self care  Description: INTERVENTIONS  - Monitor swallowing and airway patency with patient fatigue and changes in neurological status  - Encourage and assist patient to increase activity and self care     - Encourage visually impaired, hearing impaired and aphasic patients to use assistive/communication devices  Outcome: Progressing     Problem: CARDIOVASCULAR - ADULT  Goal: Maintains optimal cardiac output and hemodynamic stability  Description: INTERVENTIONS:  - Monitor I/O, vital signs and rhythm  - Monitor for S/S and trends of decreased cardiac output  - Administer and titrate ordered vasoactive medications to optimize hemodynamic stability  - Assess quality of pulses, skin color and temperature  - Assess for signs of decreased coronary artery perfusion  - Instruct patient to report change in severity of symptoms  Outcome: Progressing  Goal: Absence of cardiac dysrhythmias or at baseline rhythm  Description: INTERVENTIONS:  - Continuous cardiac monitoring, vital signs, obtain 12 lead EKG if ordered  - Administer antiarrhythmic and heart rate control medications as ordered  - Monitor electrolytes and administer replacement therapy as ordered  Outcome: Progressing     Problem: RESPIRATORY - ADULT  Goal: Achieves optimal ventilation and oxygenation  Description: INTERVENTIONS:  - Assess for changes in respiratory status  - Assess for changes in mentation and behavior  - Position to facilitate oxygenation and minimize respiratory effort  - Oxygen administered by appropriate delivery if ordered  - Initiate smoking cessation education as indicated  - Encourage broncho-pulmonary hygiene including cough, deep breathe, Incentive Spirometry  - Assess the need for suctioning and aspirate as needed  - Assess and instruct to report SOB or any respiratory difficulty  - Respiratory Therapy support as indicated  Outcome: Progressing     Problem: GASTROINTESTINAL - ADULT  Goal: Maintains or returns to baseline bowel function  Description: INTERVENTIONS:  - Assess bowel function  - Encourage oral fluids to ensure adequate hydration  - Administer IV fluids if ordered to ensure adequate hydration  - Administer ordered medications as needed  - Encourage mobilization and activity  - Consider nutritional services referral to assist patient with adequate nutrition and appropriate food choices  Outcome: Progressing  Goal: Maintains adequate nutritional intake  Description: INTERVENTIONS:  - Monitor percentage of each meal consumed  - Identify factors contributing to decreased intake, treat as appropriate  - Assist with meals as needed  - Monitor I&O, weight, and lab values if indicated  - Obtain nutrition services referral as needed  Outcome: Progressing     Problem: GENITOURINARY - ADULT  Goal: Maintains or returns to baseline urinary function  Description: INTERVENTIONS:  - Assess urinary function  - Encourage oral fluids to ensure adequate hydration if ordered  - Administer IV fluids as ordered to ensure adequate hydration  - Administer ordered medications as needed  - Offer frequent toileting  - Follow urinary retention protocol if ordered  Outcome: Progressing  Goal: Urinary catheter remains patent  Description: INTERVENTIONS:  - Assess patency of urinary catheter  - If patient has a chronic schaefer, consider changing catheter if non-functioning  - Follow guidelines for intermittent irrigation of non-functioning urinary catheter  Outcome: Progressing     Problem: METABOLIC, FLUID AND ELECTROLYTES - ADULT  Goal: Electrolytes maintained within normal limits  Description: INTERVENTIONS:  - Monitor labs and assess patient for signs and symptoms of electrolyte imbalances  - Administer electrolyte replacement as ordered  - Monitor response to electrolyte replacements, including repeat lab results as appropriate  - Instruct patient on fluid and nutrition as appropriate  Outcome: Progressing  Goal: Fluid balance maintained  Description: INTERVENTIONS:  - Monitor labs   - Monitor I/O and WT  - Instruct patient on fluid and nutrition as appropriate  - Assess for signs & symptoms of volume excess or deficit  Outcome: Progressing  Goal: Glucose maintained within target range  Description: INTERVENTIONS:  - Monitor Blood Glucose as ordered  - Assess for signs and symptoms of hyperglycemia and hypoglycemia  - Administer ordered medications to maintain glucose within target range  - Assess nutritional intake and initiate nutrition service referral as needed  Outcome: Progressing     Problem: HEMATOLOGIC - ADULT  Goal: Maintains hematologic stability  Description: INTERVENTIONS  - Assess for signs and symptoms of bleeding or hemorrhage  - Monitor labs  - Administer supportive blood products/factors as ordered and appropriate  Outcome: Progressing     Problem: MUSCULOSKELETAL - ADULT  Goal: Maintain or return mobility to safest level of function  Description: INTERVENTIONS:  - Assess patient's ability to carry out ADLs; assess patient's baseline for ADL function and identify physical deficits which impact ability to perform ADLs (bathing, care of mouth/teeth, toileting, grooming, dressing, etc )  - Assess/evaluate cause of self-care deficits   - Assess range of motion  - Assess patient's mobility  - Assess patient's need for assistive devices and provide as appropriate  - Encourage maximum independence but intervene and supervise when necessary  - Involve family in performance of ADLs  - Assess for home care needs following discharge   - Consider OT consult to assist with ADL evaluation and planning for discharge  - Provide patient education as appropriate  Outcome: Progressing  Goal: Maintain proper alignment of affected body part  Description: INTERVENTIONS:  - Support, maintain and protect limb and body alignment  - Provide patient/ family with appropriate education  Outcome: Progressing     Problem: COPING  Goal: Pt/Family able to verbalize concerns and demonstrate effective coping strategies  Description: INTERVENTIONS:  - Assist patient/family to identify coping skills, available support systems and cultural and spiritual values  - Provide emotional support, including active listening and acknowledgement of concerns of patient and caregivers  - Reduce environmental stimuli, as able  - Provide patient education  - Assess for spiritual pain/suffering and initiate spiritual care, including notification of Pastoral Care or nida based community as needed  - Assess effectiveness of coping strategies  Outcome: Progressing  Goal: Will report anxiety at manageable levels  Description: INTERVENTIONS:  - Administer medication as ordered  - Teach and encourage coping skills  - Provide emotional support  - Assess patient/family for anxiety and ability to cope  Outcome: Progressing     Problem: INFECTION - ADULT  Goal: Absence or prevention of progression during hospitalization  Description: INTERVENTIONS:  - Assess and monitor for signs and symptoms of infection  - Monitor lab/diagnostic results  - Monitor all insertion sites, i e  indwelling lines, tubes, and drains  - Monitor endotracheal if appropriate and nasal secretions for changes in amount and color  - Byron appropriate cooling/warming therapies per order  - Administer medications as ordered  - Instruct and encourage patient and family to use good hand hygiene technique  - Identify and instruct in appropriate isolation precautions for identified infection/condition  Outcome: Progressing     Problem: SAFETY ADULT  Goal: Maintain or return to baseline ADL function  Description: INTERVENTIONS:  -  Assess patient's ability to carry out ADLs; assess patient's baseline for ADL function and identify physical deficits which impact ability to perform ADLs (bathing, care of mouth/teeth, toileting, grooming, dressing, etc )  - Assess/evaluate cause of self-care deficits   - Assess range of motion  - Assess patient's mobility; develop plan if impaired  - Assess patient's need for assistive devices and provide as appropriate  - Encourage maximum independence but intervene and supervise when necessary  - Involve family in performance of ADLs  - Assess for home care needs following discharge   - Consider OT consult to assist with ADL evaluation and planning for discharge  - Provide patient education as appropriate  Outcome: Progressing  Goal: Maintains/Returns to pre admission functional level  Description: INTERVENTIONS:  - Perform BMAT or MOVE assessment daily    - Set and communicate daily mobility goal to care team and patient/family/caregiver     - Collaborate with rehabilitation services on mobility goals if consulted  - Out of bed for toileting  - Record patient progress and toleration of activity level   Outcome: Progressing  Goal: Patient will remain free of falls  Description: INTERVENTIONS:  - Educate patient/family on patient safety including physical limitations  - Instruct patient to call for assistance with activity   - Consult OT/PT to assist with strengthening/mobility   - Keep Call bell within reach  - Keep bed low and locked with side rails adjusted as appropriate  - Keep care items and personal belongings within reach  - Initiate and maintain comfort rounds  - Make Fall Risk Sign visible to staff  - Apply yellow socks and bracelet for high fall risk patients  - Consider moving patient to room near nurses station  Outcome: Progressing     Problem: DISCHARGE PLANNING  Goal: Discharge to home or other facility with appropriate resources  Description: INTERVENTIONS:  - Identify barriers to discharge w/patient and caregiver  - Arrange for needed discharge resources and transportation as appropriate  - Identify discharge learning needs (meds, wound care, etc )  - Arrange for interpretive services to assist at discharge as needed  - Refer to Case Management Department for coordinating discharge planning if the patient needs post-hospital services based on physician/advanced practitioner order or complex needs related to functional status, cognitive ability, or social support system  Outcome: Progressing     Problem: Knowledge Deficit  Goal: Patient/family/caregiver demonstrates understanding of disease process, treatment plan, medications, and discharge instructions  Description: Complete learning assessment and assess knowledge base    Interventions:  - Provide teaching at level of understanding  - Provide teaching via preferred learning methods  Outcome: Progressing

## 2022-07-29 NOTE — ASSESSMENT & PLAN NOTE
Lab Results   Component Value Date    HGBA1C 6 3 (H) 07/23/2022       Recent Labs     07/28/22  1845 07/29/22  0022 07/29/22  0533 07/29/22  1044   POCGLU 135 127 141* 138       Blood Sugar Average: Last 72 hrs:  (P) 156 75   Blood sugar acceptable    Continue with current care

## 2022-07-29 NOTE — ASSESSMENT & PLAN NOTE
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

## 2022-07-29 NOTE — RESTORATIVE TECHNICIAN NOTE
Restorative Technician Note      Patient Name: Hugo White     Note Type: Mobility  Patient Position Upon Consult: Supine  Activity Performed: Repositioned; Other (Comment) (Assistd nursing with transferring pt to new bed via lateral slide )  Patient Position at End of Consult: Supine;  All needs within reach; Bed/Chair alarm activated    Bi Lazo BS, Restorative Technician, [employer]

## 2022-07-29 NOTE — PROGRESS NOTES
[facility]  Progress Note - Hamilton Wilson 1933, 80 y o  female MRN: [id number]  Unit/Bed#: [facility] 723-01 Encounter: [id number]  Primary Care Provider: Faith Brown MD   Date and time admitted to hospital: 7/22/2022  5:21 PM    * Cardio-embolic left MCA stroke [facility])  Assessment & Plan  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  · Continue aspirin and Lipitor  · Holding anticoagulation until 10-14 days post stroke  · 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 on hold with previous history of HIT  · Continue with neuro checks  · Neurology on board  · PMR consultation appreciated  · Patient with multiple strokes in the past   Patient also with vascular dementia with poor functional status to begin with  Overall prognosis appears to be poor  · Family wants to continue with the current care-consulted GI for PEG tube placement and likely PEG tube placement by tomorrow  · Palliative care consultation appreciated-patient and family wants to continue with the rehab oriented approach  · Plan is for rehab placement    Hypernatremia  Assessment & Plan  · Tube feeding where on hold for the procedure  Will continue with the tube feeding and stat with free water flush  Change the IV fluids to D5 half-normal   Recheck in the morning and discontinue IV fluids once the patient is at call for tube feeding and free water flushes    Positive blood culture  Assessment & Plan  · 1/2 sets came back positive likely contaminant    Monitor off of antibiotics no further fever  · Likely Staph epidermidis    Fever  Assessment & Plan  · Patient noted to have low-grade fever  · Chest x-ray reviewed-no pneumonia  · Possible aspiration given her mental status  · Monitor off of antibiotics  · Follow-up on the cultures  · Monitor temperature- no further fever    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HCC)  Assessment & Plan  · No acute exacerbation    Vascular dementia [facility])  Assessment & Plan  · Patient with history of vascular dementia  · Continue with supportive care    Dysphagia  Assessment & Plan  · Likely secondary to CVA  Status post video barium swallow  His started on a dysphagia diet with aspiration precautions  · Currently patient has an NG tube present for nutrition  If the patient does not have enough nutritional intake through mouth may need to consider feeding tube  Family is agreeable for PEG tube placement  · Discussed with GI   5 for feeding tube placement tomorrow  Will keep NPO after midnight and hold to feed after midnight  Gentle hydration while NPO  · Status post feeding tube placement  · Started on tube feeding    Anemia  Assessment & Plan  · Previous history of anemia  Remained relatively stable since admission   · Monitor    CKD (chronic kidney disease) stage 3, GFR 30-59 ml/min [facility])  Assessment & Plan  Lab Results   Component Value Date    EGFR 42 07/29/2022    EGFR 45 07/28/2022    EGFR 37 07/27/2022    CREATININE 1 15 07/29/2022    CREATININE 1 08 07/28/2022    CREATININE 1 27 07/27/2022   Monitor creatinine appears to be at baseline  Creatinine 1  15 today    Systolic congestive heart failure [facility])  Assessment & Plan  Wt Readings from Last 3 Encounters:   07/23/22 64 9 kg (143 lb)   07/22/22 65 2 kg (143 lb 11 8 oz)   07/15/22 69 9 kg (154 lb)       Patient with chronic systolic congestive heart failure  Ejection fraction on this admission EF 55-60% with normal LV systolic function  Home regimen is losartan 20 Lopressor 50  Lasix on hold currently    Hold losartan for now  Patient with poor p o  intake-will restart back on the losartan if the blood pressure is persistently elevated      Hypertension  Assessment & Plan  · Monitor blood pressure    Controlled type 2 diabetes mellitus without complication, without long-term current use of insulin [facility])  Assessment & Plan  Lab Results   Component Value Date    HGBA1C 6 3 (H) 2022       Recent Labs     22  1845 22  0022 22  0533 22  1044   POCGLU 135 127 141* 138       Blood Sugar Average: Last 72 hrs:  (P) 156 75   Blood sugar acceptable  Continue with current care    H/O mitral valve replacement  Assessment & Plan  · TTE showed normal  valve function    VTE Pharmacologic Prophylaxis:   Pharmacologic: On hold secondary to previous history  hit  arixtra contraindicated   Mechanical VTE Prophylaxis in Place: Yes    Patient Centered Rounds: I have performed bedside rounds with nursing staff today  Discussions with Specialists or Other Care Team Provider:     Education and Discussions with Family / Patient:  Patient    Time Spent for Care: 30 minutes  More than 50% of total time spent on counseling and coordination of care as described above  Current Length of Stay: 7 day(s)    Current Patient Status: Inpatient   Certification Statement: The patient will continue to require additional inpatient hospital stay due to CVA    Discharge Plan:     Code Status: Level 3 - DNAR and DNI      Subjective:   Patient seen and examined  No events overnight  Cleared by GI to start back on tube feed    Objective:     Vitals:   Temp (24hrs), Av 4 °F (36 9 °C), Min:98 2 °F (36 8 °C), Max:98 6 °F (37 °C)    Temp:  [98 2 °F (36 8 °C)-98 6 °F (37 °C)] 98 2 °F (36 8 °C)  HR:  [] 85  Resp:  [16] 16  BP: (140-170)/() 155/72  SpO2:  [93 %-100 %] 95 %  Body mass index is 27 02 kg/m²  Input and Output Summary (last 24 hours): Intake/Output Summary (Last 24 hours) at 2022 1753  Last data filed at 2022 1301  Gross per 24 hour   Intake 1000 ml   Output 500 ml   Net 500 ml       Physical Exam:     Physical Exam  HENT:      Head: Normocephalic        Nose: Nose normal    Eyes:      General: No scleral icterus  Cardiovascular:      Rate and Rhythm: Normal rate and regular rhythm  Pulmonary:      Comments: Decreased breath sounds bilateral  Abdominal:      Comments: Feeding tube present   Musculoskeletal:      Right lower leg: No edema  Skin:     General: Skin is warm  Neurological:      Mental Status: She is alert  Comments: Patient moves left upper and lower extremities spontaneously  Do not follow commands         Additional Data:     Labs:    Results from last 7 days   Lab Units 07/29/22  0550 07/27/22  0448 07/25/22  0553   WBC Thousand/uL 6 31   < > 6 72   HEMOGLOBIN g/dL 8 3*   < > 8 9*   HEMATOCRIT % 28 2*   < > 28 7*   PLATELETS Thousands/uL 241   < > 137*   NEUTROS PCT %  --   --  73   LYMPHS PCT %  --   --  14   MONOS PCT %  --   --  10   EOS PCT %  --   --  3    < > = values in this interval not displayed  Results from last 7 days   Lab Units 07/29/22  0550 07/28/22  0603 07/27/22  0448   POTASSIUM mmol/L 4 2   < > 4 0   CHLORIDE mmol/L 120*   < > 114*   CO2 mmol/L 26   < > 26   BUN mg/dL 33*   < > 38*   CREATININE mg/dL 1 15   < > 1 27   CALCIUM mg/dL 9 5   < > 9 5   ALK PHOS U/L  --   --  62   ALT U/L  --   --  26   AST U/L  --   --  66*    < > = values in this interval not displayed  * I Have Reviewed All Lab Data Listed Above  * Additional Pertinent Lab Tests Reviewed: [facility] 66 Admission Reviewed    Imaging:    Imaging Reports Reviewed Today Include:   Imaging Personally Reviewed by Myself Includes:      Recent Cultures (last 7 days):     Results from last 7 days   Lab Units 07/27/22  1338   BLOOD CULTURE  No Growth at 48 hrs    Staphylococcus coagulase negative*   GRAM STAIN RESULT  Gram positive cocci in clusters*       Last 24 Hours Medication List:   Current Facility-Administered Medications   Medication Dose Route Frequency Provider Last Rate    acetaminophen  325 mg Rectal Q4H DUSTIN Nick PA-C  acetaminophen  650 mg Oral Q4H PRN Karen Jeimy, PA-C      albuterol  2 5 mg Nebulization Q6H PRN Karen Jeimy, PA-C      artificial tear   Both Eyes HS Karen Jeimy, PA-C      aspirin  81 mg Oral Daily Karen Jeimy, PA-C      atorvastatin  80 mg Oral QPM [facility]      bisacodyl  10 mg Rectal Daily PRN Ltey Saini MD      budesonide  0 5 mg Nebulization [facility]12H [facility]      cefazolin  1,000 mg Intravenous Once Sharifa Brown MD      chlorhexidine  15 mL Mouth[facility]Throat [facility]      ciprofloxacin  1 drop Right Eye Q4H While Awake Lety Saini MD      dextrose 5 % and sodium chloride 0 45 %  75 mL/hr Intravenous Continuous Lety Saini MD 75 mL/hr (07/29/22 0942)    hydrALAZINE  10 mg Intravenous Q6H PRN Lety Saini MD      insulin lispro  1-5 Units Subcutaneous Q6H [facility]      levalbuterol  1 25 mg Nebulization BID Karen DIDIER Munson-C      metoprolol tartrate  12 5 mg Oral Q12H [facility], PA-MATY      polyethylene glycol  17 g Oral Daily Karen DIDIER Munson-C      senna  1 tablet Oral Daily Karen JIMY Munson          Today, Patient Was Seen By: Lety Saini MD    ** Please Note: Dictation voice to text software may have been used in the creation of this document   **

## 2022-07-29 NOTE — ASSESSMENT & PLAN NOTE
· 1/2 sets came back positive likely contaminant    Monitor off of antibiotics no further fever  · Likely Staph epidermidis

## 2022-07-29 NOTE — ASSESSMENT & PLAN NOTE
Wt Readings from Last 3 Encounters:   07/23/22 64 9 kg (143 lb)   07/22/22 65 2 kg (143 lb 11 8 oz)   07/15/22 69 9 kg (154 lb)       Patient with chronic systolic congestive heart failure  Ejection fraction on this admission EF 55-60% with normal LV systolic function  Home regimen is losartan 20 Lopressor 50  Lasix on hold currently    Hold losartan for now  Patient with poor p o  intake-will restart back on the losartan if the blood pressure is persistently elevated

## 2022-07-29 NOTE — ASSESSMENT & PLAN NOTE
Lab Results   Component Value Date    EGFR 42 07/29/2022    EGFR 45 07/28/2022    EGFR 37 07/27/2022    CREATININE 1 15 07/29/2022    CREATININE 1 08 07/28/2022    CREATININE 1 27 07/27/2022   Monitor creatinine appears to be at baseline  Creatinine 1  15 today

## 2022-07-29 NOTE — ASSESSMENT & PLAN NOTE
· Tube feeding where on hold for the procedure  Will continue with the tube feeding and stat with free water flush    Change the IV fluids to D5 half-normal   Recheck in the morning and discontinue IV fluids once the patient is at call for tube feeding and free water flushes

## 2022-07-29 NOTE — PROGRESS NOTES
Progress Note- Douglas Lopes 80 y o  female MRN: [id number]    [facility] Encounter: [id number]      Assessment and Plan:    Dysphagia secondary to recent stroke  S/p PEG     · Patient underwent PEG on 07/28  · PEG examined at bedside today  External bumper adjusted to 3 5 cm  Peg tube was freely rotating  Easily flushed with 5 mL of sterile water  Abdomen was soft and non-tender  · Peg tube can be used for tube feeds  · Family would like to switch to a [place]  We can plan on doing this in 4-5 weeks once the PEG tract has matured    Grade D esophagitis    · Recommend PPI b i d  if patient able to tolerate orally   · Recommend keeping patient upright during the day    Nonbleeding Ulcer in the stomach    · Biopsies performed  We will follow-up on these and call patient with results    Disposition:  GI will sign off at this time    Please call with any questions or concerns    Candice Mancini MD   Gastroenterology Fellow   ______________________________________________________________________    Subjective:     Patient unable to answer any questions or participate in any history    Medication Administration - last 24 hours from 07/28/2022 1117 to 07/29/2022 1117       Date/Time Order Dose Route Action Action by     07/29/2022 0934 senna (SENOKOT) tablet 8 6 mg 8 6 mg Oral Given Vinny Haider     07/29/2022 0533 insulin lispro (HumaLOG) 100 units/mL subcutaneous injection 1-5 Units 1 Units Subcutaneous Not Given Zeynep Horton, LEEANN     07/29/2022 0023 insulin lispro (HumaLOG) 100 units/mL subcutaneous injection 1-5 Units 1 Units Subcutaneous Not Given Sanna Haas RN     07/28/2022 1849 insulin lispro (HumaLOG) 100 units/mL subcutaneous injection 1-5 Units 1 Units Subcutaneous Not Given James Garcia, LEEANN     07/28/2022 1250 insulin lispro (HumaLOG) 100 units/mL subcutaneous injection 1-5 Units 0 Units Subcutaneous Not Given Farzana Freitas RN     07/28/2022 1130 insulin lispro (HumaLOG) 100 units/mL subcutaneous injection 1-5 Units 0 Units Subcutaneous Not Given Ileana De La Cruz RN     07/29/2022 3515 chlorhexidine (PERIDEX) 0 12 % oral rinse 15 mL 15 mL Mouth/Throat Not Given Fiorella Webb     07/28/2022 2130 chlorhexidine (PERIDEX) 0 12 % oral rinse 15 mL 15 mL Mouth/Throat Not Given Audra Mcneill RN     07/28/2022 1849 atorvastatin (LIPITOR) tablet 80 mg 0 mg Oral Hold Comfort Aguilar RN     07/29/2022 0896 levalbuterol Cherri Counts) inhalation solution 1 25 mg 1 25 mg Nebulization Given Julia [facility], RT     07/28/2022 1905 levalbuterol (XOPENEX) inhalation solution 1 25 mg 1 25 mg Nebulization Given Je Lorenz, RT     07/29/2022 0934 polyethylene glycol (MIRALAX) packet 17 g 17 g Oral Given Fiorella Webb     07/29/2022 8241 aspirin chewable tablet 81 mg 81 mg Oral Given Fiorella Webb     07/29/2022 0934 metoprolol tartrate (LOPRESSOR) partial tablet 12 5 mg 12 5 mg Oral Given Fiorella Webb     07/28/2022 2124 metoprolol tartrate (LOPRESSOR) partial tablet 12 5 mg 12 5 mg Oral Given Audra Mcneill RN     07/29/2022 0723 budesonide (PULMICORT) inhalation solution 0 5 mg 0 5 mg Nebulization [facility] [place]     07/28/2022 1905 budesonide (PULMICORT) inhalation solution 0 5 mg 0 5 mg Nebulization Given Je Lorenz, RT     07/28/2022 2329 artificial tear (LUBRIFRESH P M ) ophthalmic ointment   Both Eyes Given Audra Mcneill RN     07/29/2022 0943 ciprofloxacin (CILOXAN) 0 3 % ophthalmic solution 1 drop 1 drop Right Eye Given Fiorella Webb     07/29/2022 0533 ciprofloxacin (CILOXAN) 0 3 % ophthalmic solution 1 drop 1 drop Right Eye Not Given Abimael Narayanan RN     07/28/2022 2124 ciprofloxacin (CILOXAN) 0 3 % ophthalmic solution 1 drop 1 drop Right Eye Given Audra Mcneill RN     07/28/2022 1849 ciprofloxacin (CILOXAN) 0 3 % ophthalmic solution 1 drop 1 drop Right Eye Given Comfort Aguilar RN     07/28/2022 1406 ciprofloxacin (CILOXAN) 0 3 % ophthalmic solution 1 drop 1 drop Right Eye Given Lester June, RN     07/29/2022 5481 sodium chloride 0 9 % infusion 0 mL/hr Intravenous Stopped [facility]     07/28/2022 1407 sodium chloride 0 9 % infusion 50 mL/hr Intravenous [address] Lester June, LEEANN     07/28/2022 1405 sodium chloride 0 9 % infusion 0 mL/hr Intravenous Stopped Lester Shaylee, LEEANN     07/28/2022 1244 ceFAZolin (ANCEF) IVPB (premix in dextrose) 1,000 mg 50 mL   Intravenous Canceled Entry Lester June, LEEANN     07/29/2022 5128 dextrose 5 % and sodium chloride 0 45 % infusion 75 mL/hr Intravenous New Bag [facility]          Objective:     Vitals: Blood pressure (!) 170/107, pulse (!) 121, temperature 98 3 °F (36 8 °C), resp  rate 16, height 5' 1" (1 549 m), weight 64 9 kg (143 lb), SpO2 100 %, not currently breastfeeding  ,Body mass index is 27 02 kg/m²  Intake/Output Summary (Last 24 hours) at 7/29/2022 1117  Last data filed at 7/29/2022 3828  Gross per 24 hour   Intake 2413 33 ml   Output --   Net 2413 33 ml       Physical Exam:   General Appearance: Awake and alert, not responding to any question  Abdomen: Soft, non-tender, non-distended; bowel sounds normal; no masses or no organomegaly    Invasive Devices  Report    Peripheral Intravenous Line  Duration           Peripheral IV 07/29/22 Left;Ventral (anterior) Forearm <1 day          Drain  Duration           External Urinary Catheter 2 days    Gastrostomy/Enterostomy Percutaneous Endoscopic Gastrostomy (PEG) 20 Fr  LUQ <1 day                Lab Results:  No results displayed because visit has over 200 results  Imaging Studies: I have personally reviewed pertinent imaging studies

## 2022-07-29 NOTE — PLAN OF CARE
Problem: MOBILITY - ADULT  Goal: Maintain or return to baseline ADL function  Description: INTERVENTIONS:  -  Assess patient's ability to carry out ADLs; assess patient's baseline for ADL function and identify physical deficits which impact ability to perform ADLs (bathing, care of mouth/teeth, toileting, grooming, dressing, etc )  - Assess/evaluate cause of self-care deficits   - Assess range of motion  - Assess patient's mobility; develop plan if impaired  - Assess patient's need for assistive devices and provide as appropriate  - Encourage maximum independence but intervene and supervise when necessary  - Involve family in performance of ADLs  - Assess for home care needs following discharge   - Consider OT consult to assist with ADL evaluation and planning for discharge  - Provide patient education as appropriate  Outcome: Progressing  Goal: Maintains/Returns to pre admission functional level  Description: INTERVENTIONS:  - Perform BMAT or MOVE assessment daily    - Set and communicate daily mobility goal to care team and patient/family/caregiver     - Collaborate with rehabilitation services on mobility goals if consulted  - Out of bed for toileting  - Record patient progress and toleration of activity level   Outcome: Progressing     Problem: Potential for Falls  Goal: Patient will remain free of falls  Description: INTERVENTIONS:  - Educate patient/family on patient safety including physical limitations  - Instruct patient to call for assistance with activity   - Consult OT/PT to assist with strengthening/mobility   - Keep Call bell within reach  - Keep bed low and locked with side rails adjusted as appropriate  - Keep care items and personal belongings within reach  - Initiate and maintain comfort rounds  - Make Fall Risk Sign visible to staff  -- Initiate/Maintain bed alarm  - Apply yellow socks and bracelet for high fall risk patients  - Consider moving patient to room near nurses station  Outcome: Progressing     Problem: Prexisting or High Potential for Compromised Skin Integrity  Goal: Skin integrity is maintained or improved  Description: INTERVENTIONS:  - Identify patients at risk for skin breakdown  - Assess and monitor skin integrity  - Assess and monitor nutrition and hydration status  - Monitor labs   - Assess for incontinence   - Turn and reposition patient  - Assist with mobility/ambulation  - Relieve pressure over bony prominences  - Avoid friction and shearing  - Provide appropriate hygiene as needed including keeping skin clean and dry  - Evaluate need for skin moisturizer/barrier cream  - Collaborate with interdisciplinary team   - Patient/family teaching  - Consider wound care consult   Outcome: Progressing     Problem: Neurological Deficit  Goal: Neurological status is stable or improving  Description: Interventions:  - Monitor and assess patient's level of consciousness, motor function, sensory function, and level of assistance needed for ADLs  - Monitor and report changes from baseline  Collaborate with interdisciplinary team to initiate plan and implement interventions as ordered  - Provide and maintain a safe environment  - Consider seizure precautions  - Consider fall precautions  - Consider aspiration precautions  - Consider bleeding precautions  Outcome: Progressing     Problem: Activity Intolerance/Impaired Mobility  Goal: Mobility/activity is maintained at optimum level for patient  Description: Interventions:  - Assess and monitor patient  barriers to mobility and need for assistive/adaptive devices  - Assess patient's emotional response to limitations  - Collaborate with interdisciplinary team and initiate plans and interventions as ordered  - Encourage independent activity per ability   - Maintain proper body alignment  - Perform active/passive rom as tolerated/ordered    - Plan activities to conserve energy   - Turn patient as appropriate  Outcome: Progressing Problem: Communication Impairment  Goal: Ability to express needs and understand communication  Description: Assess patient's communication skills and ability to understand information  Patient will demonstrate use of effective communication techniques, alternative methods of communication and understanding even if not able to speak  - Encourage communication and provide alternate methods of communication as needed  - Collaborate with case management/ for discharge needs  - Include patient/family/caregiver in decisions related to communication  Outcome: Progressing     Problem: Potential for Aspiration  Goal: Non-ventilated patient's risk of aspiration is minimized  Description: Assess and monitor vital signs, respiratory status, and labs (WBC)  Monitor for signs of aspiration (tachypnea, cough, rales, wheezing, cyanosis, fever)  - Assess and monitor patient's ability to swallow  - Place patient up in chair to eat if possible  - HOB up at 90 degrees to eat if unable to get patient up into chair   - Supervise patient during oral intake  - Instruct patient/ family to take small bites  - Instruct patient/ family to take small single sips when taking liquids  - Follow patient-specific strategies generated by speech pathologist   Outcome: Progressing     Problem: Nutrition  Goal: Nutrition/Hydration status is improving  Description: Monitor and assess patient's nutrition/hydration status for malnutrition (ex- brittle hair, bruises, dry skin, pale skin and conjunctiva, muscle wasting, smooth red tongue, and disorientation)  Collaborate with interdisciplinary team and initiate plan and interventions as ordered  Monitor patient's weight and dietary intake as ordered or per policy  Utilize nutrition screening tool and intervene per policy  Determine patient's food preferences and provide high-protein, high-caloric foods as appropriate       - Assist patient with eating   - Allow adequate time for meals   - Encourage patient to take dietary supplement as ordered  - Collaborate with clinical nutritionist   - Include patient/family/caregiver in decisions related to nutrition  Outcome: Progressing     Problem: Nutrition/Hydration-ADULT  Goal: Nutrient/Hydration intake appropriate for improving, restoring or maintaining nutritional needs  Description: Monitor and assess patient's nutrition/hydration status for malnutrition  Collaborate with interdisciplinary team and initiate plan and interventions as ordered  Monitor patient's weight and dietary intake as ordered or per policy  Utilize nutrition screening tool and intervene as necessary  Determine patient's food preferences and provide high-protein, high-caloric foods as appropriate       INTERVENTIONS:  - Monitor oral intake, urinary output, labs, and treatment plans  - Assess nutrition and hydration status and recommend course of action  - Evaluate amount of meals eaten  - Assist patient with eating if necessary   - Allow adequate time for meals  - Recommend/ encourage appropriate diets, oral nutritional supplements, and vitamin/mineral supplements  - Order, calculate, and assess calorie counts as needed  - Recommend, monitor, and adjust tube feedings and TPN/PPN based on assessed needs  - Assess need for intravenous fluids  - Provide specific nutrition/hydration education as appropriate  - Include patient/family/caregiver in decisions related to nutrition  Outcome: Progressing     Problem: NEUROSENSORY - ADULT  Goal: Achieves stable or improved neurological status  Description: INTERVENTIONS  - Monitor and report changes in neurological status  - Monitor vital signs such as temperature, blood pressure, glucose, and any other labs ordered   - Initiate measures to prevent increased intracranial pressure  - Monitor for seizure activity and implement precautions if appropriate      Outcome: Progressing  Goal: Achieves maximal functionality and self care  Description: INTERVENTIONS  - Monitor swallowing and airway patency with patient fatigue and changes in neurological status  - Encourage and assist patient to increase activity and self care     - Encourage visually impaired, hearing impaired and aphasic patients to use assistive/communication devices  Outcome: Progressing     Problem: CARDIOVASCULAR - ADULT  Goal: Maintains optimal cardiac output and hemodynamic stability  Description: INTERVENTIONS:  - Monitor I/O, vital signs and rhythm  - Monitor for S/S and trends of decreased cardiac output  - Administer and titrate ordered vasoactive medications to optimize hemodynamic stability  - Assess quality of pulses, skin color and temperature  - Assess for signs of decreased coronary artery perfusion  - Instruct patient to report change in severity of symptoms  Outcome: Progressing  Goal: Absence of cardiac dysrhythmias or at baseline rhythm  Description: INTERVENTIONS:  - Continuous cardiac monitoring, vital signs, obtain 12 lead EKG if ordered  - Administer antiarrhythmic and heart rate control medications as ordered  - Monitor electrolytes and administer replacement therapy as ordered  Outcome: Progressing     Problem: RESPIRATORY - ADULT  Goal: Achieves optimal ventilation and oxygenation  Description: INTERVENTIONS:  - Assess for changes in respiratory status  - Assess for changes in mentation and behavior  - Position to facilitate oxygenation and minimize respiratory effort  - Oxygen administered by appropriate delivery if ordered  - Initiate smoking cessation education as indicated  - Encourage broncho-pulmonary hygiene including cough, deep breathe, Incentive Spirometry  - Assess the need for suctioning and aspirate as needed  - Assess and instruct to report SOB or any respiratory difficulty  - Respiratory Therapy support as indicated  Outcome: Progressing     Problem: GASTROINTESTINAL - ADULT  Goal: Maintains or returns to baseline bowel function  Description: INTERVENTIONS:  - Assess bowel function  - Encourage oral fluids to ensure adequate hydration  - Administer IV fluids if ordered to ensure adequate hydration  - Administer ordered medications as needed  - Encourage mobilization and activity  - Consider nutritional services referral to assist patient with adequate nutrition and appropriate food choices  Outcome: Progressing  Goal: Maintains adequate nutritional intake  Description: INTERVENTIONS:  - Monitor percentage of each meal consumed  - Identify factors contributing to decreased intake, treat as appropriate  - Assist with meals as needed  - Monitor I&O, weight, and lab values if indicated  - Obtain nutrition services referral as needed  Outcome: Progressing     Problem: GENITOURINARY - ADULT  Goal: Maintains or returns to baseline urinary function  Description: INTERVENTIONS:  - Assess urinary function  - Encourage oral fluids to ensure adequate hydration if ordered  - Administer IV fluids as ordered to ensure adequate hydration  - Administer ordered medications as needed  - Offer frequent toileting  - Follow urinary retention protocol if ordered  Outcome: Progressing  Goal: Urinary catheter remains patent  Description: INTERVENTIONS:  - Assess patency of urinary catheter  - If patient has a chronic schaefer, consider changing catheter if non-functioning  - Follow guidelines for intermittent irrigation of non-functioning urinary catheter  Outcome: Progressing     Problem: METABOLIC, FLUID AND ELECTROLYTES - ADULT  Goal: Electrolytes maintained within normal limits  Description: INTERVENTIONS:  - Monitor labs and assess patient for signs and symptoms of electrolyte imbalances  - Administer electrolyte replacement as ordered  - Monitor response to electrolyte replacements, including repeat lab results as appropriate  - Instruct patient on fluid and nutrition as appropriate  Outcome: Progressing  Goal: Fluid balance maintained  Description: INTERVENTIONS:  - Monitor labs   - Monitor I/O and WT  - Instruct patient on fluid and nutrition as appropriate  - Assess for signs & symptoms of volume excess or deficit  Outcome: Progressing  Goal: Glucose maintained within target range  Description: INTERVENTIONS:  - Monitor Blood Glucose as ordered  - Assess for signs and symptoms of hyperglycemia and hypoglycemia  - Administer ordered medications to maintain glucose within target range  - Assess nutritional intake and initiate nutrition service referral as needed  Outcome: Progressing     Problem: SKIN/TISSUE INTEGRITY - ADULT  Goal: Skin Integrity remains intact(Skin Breakdown Prevention)  Description: Assess:  -Inspect skin when repositioning, toileting, and assisting with ADLS  -Assess extremities for adequate circulation and sensation     Bed Management:  -Have minimal linens on bed & keep smooth, unwrinkled  -Change linens as needed when moist or perspiring    Toileting:  -Offer bedside commode    Activity:  --Encourage activity and walks on unit  -Encourage or provide ROM exercises   -Turn and reposition patient every 2 Hours  -Use appropriate equipment to lift or move patient in bed      Skin Care:  -Avoid use of baby powder, tape, friction and shearing, hot water or constrictive clothing  -Do not massage red bony areas    Next Steps:    Outcome: Progressing  Goal: Incision(s), wounds(s) or drain site(s) healing without S/S of infection  Description: INTERVENTIONS  - Assess and document dressing, incision, wound bed, drain sites and surrounding tissue  - Provide patient and family education    Outcome: Progressing     Problem: HEMATOLOGIC - ADULT  Goal: Maintains hematologic stability  Description: INTERVENTIONS  - Assess for signs and symptoms of bleeding or hemorrhage  - Monitor labs  - Administer supportive blood products/factors as ordered and appropriate  Outcome: Progressing     Problem: MUSCULOSKELETAL - ADULT  Goal: Maintain or return mobility to safest level of function  Description: INTERVENTIONS:  - Assess patient's ability to carry out ADLs; assess patient's baseline for ADL function and identify physical deficits which impact ability to perform ADLs (bathing, care of mouth/teeth, toileting, grooming, dressing, etc )  - Assess/evaluate cause of self-care deficits   - Assess range of motion  - Assess patient's mobility  - Assess patient's need for assistive devices and provide as appropriate  - Encourage maximum independence but intervene and supervise when necessary  - Involve family in performance of ADLs  - Assess for home care needs following discharge   - Consider OT consult to assist with ADL evaluation and planning for discharge  - Provide patient education as appropriate  Outcome: Progressing  Goal: Maintain proper alignment of affected body part  Description: INTERVENTIONS:  - Support, maintain and protect limb and body alignment  - Provide patient/ family with appropriate education  Outcome: Progressing     Problem: COPING  Goal: Pt/Family able to verbalize concerns and demonstrate effective coping strategies  Description: INTERVENTIONS:  - Assist patient/family to identify coping skills, available support systems and cultural and spiritual values  - Provide emotional support, including active listening and acknowledgement of concerns of patient and caregivers  - Reduce environmental stimuli, as able  - Provide patient education  - Assess for spiritual pain/suffering and initiate spiritual care, including notification of Pastoral Care or nida based community as needed  - Assess effectiveness of coping strategies  Outcome: Progressing  Goal: Will report anxiety at manageable levels  Description: INTERVENTIONS:  - Administer medication as ordered  - Teach and encourage coping skills  - Provide emotional support  - Assess patient/family for anxiety and ability to cope  Outcome: Progressing     Problem: INFECTION - ADULT  Goal: Absence or prevention of progression during hospitalization  Description: INTERVENTIONS:  - Assess and monitor for signs and symptoms of infection  - Monitor lab/diagnostic results  - Monitor all insertion sites, i e  indwelling lines, tubes, and drains  - Monitor endotracheal if appropriate and nasal secretions for changes in amount and color  - [place] appropriate cooling/warming therapies per order  - Administer medications as ordered  - Instruct and encourage patient and family to use good hand hygiene technique  - Identify and instruct in appropriate isolation precautions for identified infection/condition  Outcome: Progressing     Problem: SAFETY ADULT  Goal: Maintain or return to baseline ADL function  Description: INTERVENTIONS:  -  Assess patient's ability to carry out ADLs; assess patient's baseline for ADL function and identify physical deficits which impact ability to perform ADLs (bathing, care of mouth/teeth, toileting, grooming, dressing, etc )  - Assess/evaluate cause of self-care deficits   - Assess range of motion  - Assess patient's mobility; develop plan if impaired  - Assess patient's need for assistive devices and provide as appropriate  - Encourage maximum independence but intervene and supervise when necessary  - Involve family in performance of ADLs  - Assess for home care needs following discharge   - Consider OT consult to assist with ADL evaluation and planning for discharge  - Provide patient education as appropriate  Outcome: Progressing  Goal: Maintains/Returns to pre admission functional level  Description: INTERVENTIONS:  - Perform BMAT or MOVE assessment daily    - Set and communicate daily mobility goal to care team and patient/family/caregiver     - Collaborate with rehabilitation services on mobility goals if consulted  - Out of bed for toileting  - Record patient progress and toleration of activity level   Outcome: Progressing  Goal: Patient will remain free of falls  Description: INTERVENTIONS:  - Educate patient/family on patient safety including physical limitations  - Instruct patient to call for assistance with activity   - Consult OT/PT to assist with strengthening/mobility   - Keep Call bell within reach  - Keep bed low and locked with side rails adjusted as appropriate  - Keep care items and personal belongings within reach  - Initiate and maintain comfort rounds  - Make Fall Risk Sign visible to staff  - Apply yellow socks and bracelet for high fall risk patients  - Consider moving patient to room near nurses station  Outcome: Progressing     Problem: DISCHARGE PLANNING  Goal: Discharge to home or other facility with appropriate resources  Description: INTERVENTIONS:  - Identify barriers to discharge w/patient and caregiver  - Arrange for needed discharge resources and transportation as appropriate  - Identify discharge learning needs (meds, wound care, etc )  - Arrange for interpretive services to assist at discharge as needed  - Refer to Case Management Department for coordinating discharge planning if the patient needs post-hospital services based on physician/advanced practitioner order or complex needs related to functional status, cognitive ability, or social support system  Outcome: Progressing     Problem: Knowledge Deficit  Goal: Patient/family/caregiver demonstrates understanding of disease process, treatment plan, medications, and discharge instructions  Description: Complete learning assessment and assess knowledge base    Interventions:  - Provide teaching at level of understanding  - Provide teaching via preferred learning methods  Outcome: Progressing

## 2022-07-29 NOTE — CASE MANAGEMENT
Case Management Discharge Planning Note    Patient name Felix Rawls  [facility] [address] [facility] [id number]  : 1933 Date 2022       Current Admission Date: 2022  Current Admission Diagnosis:Cardio-embolic left MCA stroke [facility])   Patient Active Problem List    Diagnosis Date Noted    Positive blood culture 2022    Fever 2022    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[facility] ) 2022    Medicare annual wellness visit, subsequent 2022    Overlap syndrome ([facility] ) 2022    Chronic renal disease, stage IV [facility] ) 2022    Paroxysmal A-fib ([facility] ) 2022    Thrombocytopenia ([facility] ) 2022    Cardiomyopathy, dilated ([facility] ) 2022    Coronary artery disease with angina pectoris, unspecified vessel or lesion type, unspecified whether native or transplanted heart ([facility] )     TIA (transient ischemic attack)     H/O ischemic left MCA stroke 10/29/2018    Vascular dementia ([facility] ) 2018    Mood disorder ([facility] ) 2018    Ambulatory dysfunction 2018    Bilateral carotid artery stenosis 2018    Arthritis of right glenohumeral joint 2018    Polyp of colon 2018    HCAP (healthcare-associated pneumonia) 2018    Shoulder pain, right 2018    Abnormal TSH 2018    Dysphagia 2018    Encephalopathy 06/15/2018    Seizure-like activity ([facility] ) 06/15/2018    Colonic thickening 2018    Anemia 2018    History of CVA (cerebrovascular accident) 2018    History of subarachnoid hemorrhage 2018    CKD (chronic kidney disease) stage 3, GFR 30-59 ml/min ([facility] 2018    History of DVT (deep vein thrombosis) 2018    History of pacemaker 2018    Acute cystitis without hematuria 2018    Chronic systolic CHF (congestive heart failure) ([facility] )     Cardio-embolic left MCA stroke ([address] ) 2018    History of ischemic right MCA stroke 2018    CVA (cerebral vascular accident) ([facility] ) 04/18/2018    Controlled type 2 diabetes mellitus without complication, without long-term current use of insulin ([place] [address] ) 04/18/2018    Pacemaker 04/18/2018    Acid reflux 42/53/0544    Systolic congestive heart failure ([place] [address] ) 04/18/2018    H/O mitral valve replacement 03/01/2018    Deep vein thrombosis (DVT) of left upper extremity (HCC) 01/25/2018    Constipation 09/21/2016    External hemorrhoids 09/21/2016    Carpal tunnel syndrome, left 01/04/2016    Pain in both hands 01/04/2016    Arthralgia of hip, right 11/17/2015    Meningioma ([place] [address] ) 12/18/2014    Hypertension 05/13/2013    Esophageal reflux 05/13/2013    Arthritis 05/13/2013    Hypercholesterolemia 05/13/2013    Malignant neoplasm without specification of site ([place] [address] ) 05/13/2013    Spondylosis of cervical region without myelopathy or radiculopathy 05/13/2013    Type 2 or unspecified type diabetes mellitus 05/13/2013      LOS (days): 7  Geometric Mean LOS (GMLOS) (days):   Days to GMLOS:     OBJECTIVE:  Risk of Unplanned Readmission Score: 27 72         Current admission status: Inpatient   Preferred Pharmacy:   [address] [facility] (Now [address] [facility] [facility]) [address] [address]  Phone: [phone] Fax: [phone]    [address]  [address] [address]  Phone: [phone] Fax: [phone]    Primary Care Provider: Rocky Conner MD    Primary Insurance: MEDICARE  Secondary Insurance: AARP    DISCHARGE DETAILS:    Other Referral/Resources/Interventions Provided:  Referral Comments: CM completed referrls via [place], previous STR ([facility]) additionally 3 STR in [place]

## 2022-07-29 NOTE — NUTRITION
New PEG tube placement  Recommend Jevity 1 2 @ 10mL/hr continuous via PEG increase by 10mL q4hrs to goal rate of 45mL/hr to provide 1296kcal, 60g pro, 871mL fluid, 183g CHO and 42g fat  Recommend 125mL water flush q6hrs when IVF are discontinued  TF with water flushes will provide 1371mL fluid  Will monitor need for protien modifier and adjust TF/water flushes prn

## 2022-07-29 NOTE — WOUND OSTOMY CARE
Progress Note - Wound   Jordan Rodas 80 y o  female MRN: [id number]  [facility] Encounter: [id number]        Assessment:   Patient seen today for wound care follow up visit, as bedside nurse was concerned over discoloration to right heel  Patient's right heel intact, some purple skin noted but area blanching  Allevyn foam applied for protection  Plan:   Wound Care Plan:   1-Apply Allevyn Life foam dressing to bilateral heels for prevention   Je with P   Peel back at least daily for skin assessment and re-apply   Change dressing every 3 days and PRN  2-Elevate heels off of bed/chair surface to offload pressure  3-Offloading air cushion in chair when out of bed, if/when able  4-Moisturize skin daily with skin nourishing lotion  5-Turn/reposition every 2 hours while in bed, using positioning wedges for pressure re-distribution on skin  6-Buttocks/sacrum--cleanse with soap and water, pat dry   Apply Hydraguard lotion three times daily and as needed with incontinence care      7-P500 low air-loss mattress  Wound 07/28/22 Pressure Injury Heel Right; Outer (Active)   Wound Image   07/29/22 0923   Dressing Changed New 07/28/22 1630   Patient Tolerance Tolerated well 07/28/22 1630   Dressing Status Clean;Dry; Intact 07/28/22 1630             Cynthia HONEYCUTTN, RN, [facility]

## 2022-07-29 NOTE — ASSESSMENT & PLAN NOTE
· Patient noted to have low-grade fever  · Chest x-ray reviewed-no pneumonia  · Possible aspiration given her mental status  · Monitor off of antibiotics  · Follow-up on the cultures  · Monitor temperature- no further fever

## 2022-07-29 NOTE — ASSESSMENT & PLAN NOTE
· Likely secondary to CVA  Status post video barium swallow  His started on a dysphagia diet with aspiration precautions  · Currently patient has an NG tube present for nutrition  If the patient does not have enough nutritional intake through mouth may need to consider feeding tube  Family is agreeable for PEG tube placement  · Discussed with GI   5 for feeding tube placement tomorrow  Will keep NPO after midnight and hold to feed after midnight  Gentle hydration while NPO  · Status post feeding tube placement    · Started on tube feeding

## 2022-07-30 LAB
ANION GAP SERPL CALCULATED.3IONS-SCNC: 6 MMOL/L (ref 4–13)
BACTERIA BLD CULT: ABNORMAL
BUN SERPL-MCNC: 31 MG/DL (ref 5–25)
CALCIUM SERPL-MCNC: 8.8 MG/DL (ref 8.3–10.1)
CHLORIDE SERPL-SCNC: 118 MMOL/L (ref 96–108)
CO2 SERPL-SCNC: 22 MMOL/L (ref 21–32)
CREAT SERPL-MCNC: 1.17 MG/DL (ref 0.6–1.3)
GFR SERPL CREATININE-BSD FRML MDRD: 41 ML/MIN/1.73SQ M
GLUCOSE SERPL-MCNC: 146 MG/DL (ref 65–140)
GLUCOSE SERPL-MCNC: 167 MG/DL (ref 65–140)
GLUCOSE SERPL-MCNC: 167 MG/DL (ref 65–140)
GLUCOSE SERPL-MCNC: 197 MG/DL (ref 65–140)
GLUCOSE SERPL-MCNC: 201 MG/DL (ref 65–140)
GRAM STN SPEC: ABNORMAL
MECA+MECC ISLT/SPM QL: DETECTED
POTASSIUM SERPL-SCNC: 3.6 MMOL/L (ref 3.5–5.3)
S EPIDERMIDIS DNA BLD POS QL NAA+NON-PRB: DETECTED
SODIUM SERPL-SCNC: 146 MMOL/L (ref 135–147)

## 2022-07-30 PROCEDURE — 99232 SBSQ HOSP IP/OBS MODERATE 35: CPT | Performed by: FAMILY MEDICINE

## 2022-07-30 PROCEDURE — 94760 N-INVAS EAR/PLS OXIMETRY 1: CPT

## 2022-07-30 PROCEDURE — 80048 BASIC METABOLIC PNL TOTAL CA: CPT | Performed by: FAMILY MEDICINE

## 2022-07-30 PROCEDURE — 94640 AIRWAY INHALATION TREATMENT: CPT

## 2022-07-30 PROCEDURE — 82948 REAGENT STRIP/BLOOD GLUCOSE: CPT

## 2022-07-30 RX ORDER — POLYETHYLENE GLYCOL 3350 17 G/17G
17 POWDER, FOR SOLUTION ORAL DAILY PRN
Status: DISCONTINUED | OUTPATIENT
Start: 2022-07-30 | End: 2022-07-31

## 2022-07-30 RX ORDER — SENNOSIDES 8.6 MG
1 TABLET ORAL
Status: DISCONTINUED | OUTPATIENT
Start: 2022-07-30 | End: 2022-07-31

## 2022-07-30 RX ADMIN — CHLORHEXIDINE GLUCONATE 15 ML: 1.2 SOLUTION ORAL at 21:59

## 2022-07-30 RX ADMIN — LEVALBUTEROL HYDROCHLORIDE 1.25 MG: 1.25 SOLUTION, CONCENTRATE RESPIRATORY (INHALATION) at 19:21

## 2022-07-30 RX ADMIN — BUDESONIDE 0.5 MG: 0.5 INHALANT ORAL at 07:43

## 2022-07-30 RX ADMIN — ASPIRIN 81 MG CHEWABLE TABLET 81 MG: 81 TABLET CHEWABLE at 09:28

## 2022-07-30 RX ADMIN — Medication 12.5 MG: at 09:28

## 2022-07-30 RX ADMIN — CIPROFLOXACIN HYDROCHLORIDE 1 DROP: 3 SOLUTION/ DROPS OPHTHALMIC at 06:48

## 2022-07-30 RX ADMIN — LEVALBUTEROL HYDROCHLORIDE 1.25 MG: 1.25 SOLUTION, CONCENTRATE RESPIRATORY (INHALATION) at 07:43

## 2022-07-30 RX ADMIN — ATORVASTATIN CALCIUM 80 MG: 80 TABLET, FILM COATED ORAL at 17:42

## 2022-07-30 RX ADMIN — CIPROFLOXACIN HYDROCHLORIDE 1 DROP: 3 SOLUTION/ DROPS OPHTHALMIC at 13:52

## 2022-07-30 RX ADMIN — WHITE PETROLATUM 57.7 %-MINERAL OIL 31.9 % EYE OINTMENT 1 APPLICATION: at 21:49

## 2022-07-30 RX ADMIN — Medication 12.5 MG: at 21:50

## 2022-07-30 RX ADMIN — BUDESONIDE 0.5 MG: 0.5 INHALANT ORAL at 19:21

## 2022-07-30 RX ADMIN — INSULIN LISPRO 1 UNITS: 100 INJECTION, SOLUTION INTRAVENOUS; SUBCUTANEOUS at 06:49

## 2022-07-30 RX ADMIN — CIPROFLOXACIN HYDROCHLORIDE 1 DROP: 3 SOLUTION/ DROPS OPHTHALMIC at 09:28

## 2022-07-30 RX ADMIN — INSULIN LISPRO 1 UNITS: 100 INJECTION, SOLUTION INTRAVENOUS; SUBCUTANEOUS at 12:35

## 2022-07-30 RX ADMIN — CIPROFLOXACIN HYDROCHLORIDE 1 DROP: 3 SOLUTION/ DROPS OPHTHALMIC at 21:48

## 2022-07-30 RX ADMIN — CIPROFLOXACIN HYDROCHLORIDE 1 DROP: 3 SOLUTION/ DROPS OPHTHALMIC at 17:43

## 2022-07-30 RX ADMIN — CHLORHEXIDINE GLUCONATE 15 ML: 1.2 SOLUTION ORAL at 09:28

## 2022-07-30 NOTE — ASSESSMENT & PLAN NOTE
Lab Results   Component Value Date    EGFR 41 07/30/2022    EGFR 42 07/29/2022    EGFR 45 07/28/2022    CREATININE 1 17 07/30/2022    CREATININE 1 15 07/29/2022    CREATININE 1 08 07/28/2022   Monitor creatinine appears to be at baseline  Creatinine 1  15 today

## 2022-07-30 NOTE — ASSESSMENT & PLAN NOTE
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

## 2022-07-30 NOTE — PROGRESS NOTES
[address] [facility]  Progress Note - Socorro Conte 1933, 80 y o  female MRN: [id number]  Unit/Bed#: [facility] 723-01 Encounter: [id number]  Primary Care Provider: Maria Eugenia Garcia MD   Date and time admitted to hospital: 7/22/2022  5:21 PM    * Cardio-embolic left MCA stroke [facility])  Assessment & Plan  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  · Continue aspirin and Lipitor  · Holding anticoagulation until 10-14 days post stroke-restart back mid next week-  · 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 on hold with 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  · Continue with neuro checks  · Neurology on board  · PMR consultation appreciated  · Patient with multiple strokes in the past   Patient also with vascular dementia with poor functional status to begin with  Overall prognosis appears to be poor  · Family wants to continue with the current care-consulted GI for PEG tube placement and likely PEG tube placement by tomorrow  · Palliative care consultation appreciated-patient and family wants to continue with the rehab oriented approach  · Plan is for rehab placement-waiting for rehab placement    Hypernatremia  Assessment & Plan   Hypernatremia resolved with IV fluids and restarting back on the tube feeding  Continue with the free water flush    Positive blood culture  Assessment & Plan  · 1/2 sets came back positive likely contaminant    Monitor off of antibiotics no further fever  · Likely Staph epidermidis    Fever  Assessment & Plan  · Patient noted to have low-grade fever  · Chest x-ray reviewed-no pneumonia  · Possible aspiration given her mental status  · Monitor off of antibiotics  · Follow-up on the cultures  · Monitor temperature- no further fever    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HCC)  Assessment & Plan  · No acute exacerbation    Vascular dementia [facility])  Assessment & Plan  · Patient with history of vascular dementia  · Continue with supportive care    Dysphagia  Assessment & Plan  · Likely secondary to CVA  Status post video barium swallow  His started on a dysphagia diet with aspiration precautions  · Currently patient has an NG tube present for nutrition  If the patient does not have enough nutritional intake through mouth may need to consider feeding tube  Family is agreeable for PEG tube placement  · Discussed with GI   5 for feeding tube placement tomorrow  Will keep NPO after midnight and hold to feed after midnight  Gentle hydration while NPO  · Status post feeding tube placement  · Patient is on goal for tube feeding-monitor    Anemia  Assessment & Plan  · Previous history of anemia  Remained relatively stable since admission   · Monitor    CKD (chronic kidney disease) stage 3, GFR 30-59 ml/min [facility])  Assessment & Plan  Lab Results   Component Value Date    EGFR 41 07/30/2022    EGFR 42 07/29/2022    EGFR 45 07/28/2022    CREATININE 1 17 07/30/2022    CREATININE 1 15 07/29/2022    CREATININE 1 08 07/28/2022   Monitor creatinine appears to be at baseline  Creatinine 1  15 today    Systolic congestive heart failure [facility])  Assessment & Plan  Wt Readings from Last 3 Encounters:   07/23/22 64 9 kg (143 lb)   07/22/22 65 2 kg (143 lb 11 8 oz)   07/15/22 69 9 kg (154 lb)       Patient with chronic systolic congestive heart failure  Ejection fraction on this admission EF 55-60% with normal LV systolic function  Home regimen is losartan 20 Lopressor 50  Lasix on hold currently    Hold losartan for now  Patient with poor p o  intake-will restart back on the losartan if the blood pressure is persistently elevated      Hypertension  Assessment & Plan  · Monitor blood pressure  · Currently off of antihypertensive continue with permissive hypertension    Controlled type 2 diabetes mellitus without complication, without long-term current use of insulin [facility])  Assessment & Plan  Lab Results   Component Value Date    HGBA1C 6 3 (H) 2022       Recent Labs     22  1920 22  2305 22  0644 22  1233   POCGLU 166* 155* 197* 167*       Blood Sugar Average: Last 72 hrs:  (P) 155 [id number]   Blood sugar acceptable  Continue with current care    H/O mitral valve replacement  Assessment & Plan  · TTE showed normal  valve function        VTE Pharmacologic Prophylaxis:   Pharmacologic:  Contraindicated  Mechanical VTE Prophylaxis in Place: Yes    Patient Centered Rounds: I have performed bedside rounds with nursing staff today  Discussions with Specialists or Other Care Team Provider:     Education and Discussions with Family / Patient:  Family updated in the room    Time Spent for Care: 30 minutes  More than 50% of total time spent on counseling and coordination of care as described above  Current Length of Stay: 8 day(s)    Current Patient Status: Inpatient   Certification Statement: The patient will continue to require additional inpatient hospital stay due to Pending rehab placement    Discharge Plan:     Code Status: Level 3 - DNAR and DNI      Subjective:   Patient seen and examined  Patient is tolerating tube feeding at goal   Had 3 loose bowel movement  Holding laxatives/stool softeners  Objective:     Vitals:   Temp (24hrs), Av 7 °F (37 1 °C), Min:98 2 °F (36 8 °C), Max:98 9 °F (37 2 °C)    Temp:  [98 2 °F (36 8 °C)-98 9 °F (37 2 °C)] 98 9 °F (37 2 °C)  HR:  [83-99] 95  Resp:  [14-16] 16  BP: (143-161)/(55-86) 149/65  SpO2:  [90 %-100 %] 96 %  Body mass index is 27 02 kg/m²  Input and Output Summary (last 24 hours):        Intake/Output Summary (Last 24 hours) at 2022 1713  Last data filed at 2022 1027  Gross per 24 hour   Intake 257 5 ml   Output 1000 ml   Net -742 5 ml Physical Exam:     Physical Exam  Constitutional:       General: She is not in acute distress  HENT:      Head: Normocephalic and atraumatic  Nose: Nose normal    Eyes:      General: No scleral icterus  Neurological:      Mental Status: She is alert  Additional Data:     Labs:    Results from last 7 days   Lab Units 07/29/22  0550 07/27/22  0448 07/25/22  0553   WBC Thousand/uL 6 31   < > 6 72   HEMOGLOBIN g/dL 8 3*   < > 8 9*   HEMATOCRIT % 28 2*   < > 28 7*   PLATELETS Thousands/uL 241   < > 137*   NEUTROS PCT %  --   --  73   LYMPHS PCT %  --   --  14   MONOS PCT %  --   --  10   EOS PCT %  --   --  3    < > = values in this interval not displayed  Results from last 7 days   Lab Units 07/30/22  1026 07/28/22  0603 07/27/22  0448   POTASSIUM mmol/L 3 6   < > 4 0   CHLORIDE mmol/L 118*   < > 114*   CO2 mmol/L 22   < > 26   BUN mg/dL 31*   < > 38*   CREATININE mg/dL 1 17   < > 1 27   CALCIUM mg/dL 8 8   < > 9 5   ALK PHOS U/L  --   --  62   ALT U/L  --   --  26   AST U/L  --   --  66*    < > = values in this interval not displayed  * I Have Reviewed All Lab Data Listed Above  * Additional Pertinent Lab Tests Reviewed: All Labs Within Last 24 Hours Reviewed    Imaging:    Imaging Reports Reviewed Today Include:   Imaging Personally Reviewed by Myself Includes:      Recent Cultures (last 7 days):     Results from last 7 days   Lab Units 07/27/22  1338   BLOOD CULTURE  No Growth at 72 hrs    Staphylococcus coagulase negative*   GRAM STAIN RESULT  Gram positive cocci in clusters*       Last 24 Hours Medication List:   Current Facility-Administered Medications   Medication Dose Route Frequency Provider Last Rate    acetaminophen  325 mg Rectal Q4H PRN Darol Eastern, PA-C      acetaminophen  650 mg Oral Q4H PRN Darol Eastern, PA-C      albuterol  2 5 mg Nebulization Q6H PRN Darol Eastern, PA-C      artificial tear   Both Eyes HS Darol Eastern, PA-C      aspirin  81 mg Oral Daily Sheilaroxanne Carlin PA-C      atorvastatin  80 mg Oral QPM Idalia Scherer PA-C      bisacodyl  10 mg Rectal Daily PRN Belen Odom MD      budesonide  0 5 mg Nebulization Q12H Idalia Scherer PA-C      cefazolin  1,000 mg Intravenous Once Jeet Hood MD      chlorhexidine  15 mL Mouth/Throat Q12H [address] Idalia Scherer PA-C      ciprofloxacin  1 drop Right Eye Q4H While Awake Belen Odom MD      hydrALAZINE  10 mg Intravenous Q6H PRN Belen Odom MD      insulin lispro  1-5 Units Subcutaneous Q6H [address] Idalia Scherer PA-C      levalbuterol  1 25 mg Nebulization BID Sheila Carlin PA-C      metoprolol tartrate  12 5 mg Oral Q12H [address] Idalia Scherer PA-C      polyethylene glycol  17 g Oral Daily Sheila Carlin PA-C      senna  1 tablet Oral Daily Sheila Carlin PA-C          Today, Patient Was Seen By: Belen Odom MD    ** Please Note: Dictation voice to text software may have been used in the creation of this document   **

## 2022-07-30 NOTE — ASSESSMENT & PLAN NOTE
· Likely secondary to CVA  Status post video barium swallow  His started on a dysphagia diet with aspiration precautions  · Currently patient has an NG tube present for nutrition  If the patient does not have enough nutritional intake through mouth may need to consider feeding tube  Family is agreeable for PEG tube placement  · Discussed with GI   5 for feeding tube placement tomorrow  Will keep NPO after midnight and hold to feed after midnight  Gentle hydration while NPO  · Status post feeding tube placement    · Patient is on goal for tube feeding-monitor

## 2022-07-30 NOTE — ASSESSMENT & PLAN NOTE
Lab Results   Component Value Date    HGBA1C 6 3 (H) 07/23/2022       Recent Labs     07/29/22  1920 07/29/22  2305 07/30/22  0644 07/30/22  1233   POCGLU 166* 155* 197* 167*       Blood Sugar Average: Last 72 hrs:  (P) 155 [id number]   Blood sugar acceptable    Continue with current care

## 2022-07-30 NOTE — PLAN OF CARE
Problem: Potential for Falls  Goal: Patient will remain free of falls  Description: INTERVENTIONS:  - Educate patient/family on patient safety including physical limitations  - Instruct patient to call for assistance with activity   - Consult OT/PT to assist with strengthening/mobility   - Keep Call bell within reach  - Keep bed low and locked with side rails adjusted as appropriate  - Keep care items and personal belongings within reach  - Initiate and maintain comfort rounds  - Make Fall Risk Sign visible to staff  - Offer Toileting every 2 Hours, in advance of need  - Initiate/Maintain bed alarm  - Obtain necessary fall risk management equipment:   Problem: Neurological Deficit  Goal: Neurological status is stable or improving  Description: Interventions:  - Monitor and assess patient's level of consciousness, motor function, sensory function, and level of assistance needed for ADLs  - Monitor and report changes from baseline  Collaborate with interdisciplinary team to initiate plan and implement interventions as ordered  - Provide and maintain a safe environment  - Consider seizure precautions  - Consider fall precautions  - Consider aspiration precautions  - Consider bleeding precautions    Outcome: Progressing     - Apply yellow socks and bracelet for high fall risk patients  - Consider moving patient to room near nurses station  Outcome: Progressing

## 2022-07-30 NOTE — ASSESSMENT & PLAN NOTE
Hypernatremia resolved with IV fluids and restarting back on the tube feeding  Continue with the free water flush

## 2022-07-31 PROBLEM — E87.0 HYPERNATREMIA: Status: RESOLVED | Noted: 2022-07-29 | Resolved: 2022-07-31

## 2022-07-31 LAB
GLUCOSE SERPL-MCNC: 170 MG/DL (ref 65–140)
GLUCOSE SERPL-MCNC: 189 MG/DL (ref 65–140)
GLUCOSE SERPL-MCNC: 197 MG/DL (ref 65–140)

## 2022-07-31 PROCEDURE — 99232 SBSQ HOSP IP/OBS MODERATE 35: CPT | Performed by: FAMILY MEDICINE

## 2022-07-31 PROCEDURE — 94640 AIRWAY INHALATION TREATMENT: CPT

## 2022-07-31 PROCEDURE — 94760 N-INVAS EAR/PLS OXIMETRY 1: CPT

## 2022-07-31 PROCEDURE — 82948 REAGENT STRIP/BLOOD GLUCOSE: CPT

## 2022-07-31 RX ORDER — FUROSEMIDE 10 MG/ML
20 INJECTION INTRAMUSCULAR; INTRAVENOUS ONCE
Status: COMPLETED | OUTPATIENT
Start: 2022-07-31 | End: 2022-07-31

## 2022-07-31 RX ORDER — SENNOSIDES 8.6 MG
1 TABLET ORAL
Status: DISCONTINUED | OUTPATIENT
Start: 2022-07-31 | End: 2022-08-04 | Stop reason: HOSPADM

## 2022-07-31 RX ORDER — POLYETHYLENE GLYCOL 3350 17 G/17G
17 POWDER, FOR SOLUTION ORAL DAILY PRN
Status: DISCONTINUED | OUTPATIENT
Start: 2022-07-31 | End: 2022-08-04 | Stop reason: HOSPADM

## 2022-07-31 RX ADMIN — CIPROFLOXACIN HYDROCHLORIDE 1 DROP: 3 SOLUTION/ DROPS OPHTHALMIC at 18:22

## 2022-07-31 RX ADMIN — CHLORHEXIDINE GLUCONATE 15 ML: 1.2 SOLUTION ORAL at 07:49

## 2022-07-31 RX ADMIN — ASPIRIN 81 MG CHEWABLE TABLET 81 MG: 81 TABLET CHEWABLE at 07:48

## 2022-07-31 RX ADMIN — FUROSEMIDE 20 MG: 10 INJECTION, SOLUTION INTRAMUSCULAR; INTRAVENOUS at 11:28

## 2022-07-31 RX ADMIN — BUDESONIDE 0.5 MG: 0.5 INHALANT ORAL at 19:18

## 2022-07-31 RX ADMIN — INSULIN LISPRO 1 UNITS: 100 INJECTION, SOLUTION INTRAVENOUS; SUBCUTANEOUS at 11:13

## 2022-07-31 RX ADMIN — WHITE PETROLATUM 57.7 %-MINERAL OIL 31.9 % EYE OINTMENT: at 22:09

## 2022-07-31 RX ADMIN — CIPROFLOXACIN HYDROCHLORIDE 1 DROP: 3 SOLUTION/ DROPS OPHTHALMIC at 15:00

## 2022-07-31 RX ADMIN — INSULIN LISPRO 1 UNITS: 100 INJECTION, SOLUTION INTRAVENOUS; SUBCUTANEOUS at 18:22

## 2022-07-31 RX ADMIN — METOPROLOL TARTRATE 25 MG: 25 TABLET, FILM COATED ORAL at 22:09

## 2022-07-31 RX ADMIN — CIPROFLOXACIN HYDROCHLORIDE 1 DROP: 3 SOLUTION/ DROPS OPHTHALMIC at 11:08

## 2022-07-31 RX ADMIN — INSULIN LISPRO 1 UNITS: 100 INJECTION, SOLUTION INTRAVENOUS; SUBCUTANEOUS at 05:25

## 2022-07-31 RX ADMIN — CIPROFLOXACIN HYDROCHLORIDE 1 DROP: 3 SOLUTION/ DROPS OPHTHALMIC at 22:09

## 2022-07-31 RX ADMIN — LEVALBUTEROL HYDROCHLORIDE 1.25 MG: 1.25 SOLUTION, CONCENTRATE RESPIRATORY (INHALATION) at 07:09

## 2022-07-31 RX ADMIN — INSULIN LISPRO 1 UNITS: 100 INJECTION, SOLUTION INTRAVENOUS; SUBCUTANEOUS at 00:33

## 2022-07-31 RX ADMIN — Medication 12.5 MG: at 07:48

## 2022-07-31 RX ADMIN — LEVALBUTEROL HYDROCHLORIDE 1.25 MG: 1.25 SOLUTION, CONCENTRATE RESPIRATORY (INHALATION) at 19:18

## 2022-07-31 RX ADMIN — BUDESONIDE 0.5 MG: 0.5 INHALANT ORAL at 07:10

## 2022-07-31 RX ADMIN — ATORVASTATIN CALCIUM 80 MG: 80 TABLET, FILM COATED ORAL at 18:22

## 2022-07-31 RX ADMIN — CHLORHEXIDINE GLUCONATE 15 ML: 1.2 SOLUTION ORAL at 22:10

## 2022-07-31 RX ADMIN — CIPROFLOXACIN HYDROCHLORIDE 1 DROP: 3 SOLUTION/ DROPS OPHTHALMIC at 05:26

## 2022-07-31 NOTE — PLAN OF CARE
Problem: MOBILITY - ADULT  Goal: Maintain or return to baseline ADL function  Description: INTERVENTIONS:  -  Assess patient's ability to carry out ADLs; assess patient's baseline for ADL function and identify physical deficits which impact ability to perform ADLs (bathing, care of mouth/teeth, toileting, grooming, dressing, etc )  - Assess/evaluate cause of self-care deficits   - Assess range of motion  - Assess patient's mobility; develop plan if impaired  - Assess patient's need for assistive devices and provide as appropriate  - Encourage maximum independence but intervene and supervise when necessary  - Involve family in performance of ADLs  - Assess for home care needs following discharge   - Consider OT consult to assist with ADL evaluation and planning for discharge  - Provide patient education as appropriate  Outcome: Progressing  Goal: Maintains/Returns to pre admission functional level  Description: INTERVENTIONS:  - Perform BMAT or MOVE assessment daily    - Set and communicate daily mobility goal to care team and patient/family/caregiver     - Collaborate with rehabilitation services on mobility goals if consulted  - Out of bed for toileting  - Record patient progress and toleration of activity level   Outcome: Progressing     Problem: Potential for Falls  Goal: Patient will remain free of falls  Description: INTERVENTIONS:  - Educate patient/family on patient safety including physical limitations  - Instruct patient to call for assistance with activity   - Consult OT/PT to assist with strengthening/mobility   - Keep Call bell within reach  - Keep bed low and locked with side rails adjusted as appropriate  - Keep care items and personal belongings within reach  - Initiate and maintain comfort rounds  - Make Fall Risk Sign visible to staff  - Apply yellow socks and bracelet for high fall risk patients  - Consider moving patient to room near nurses station  Outcome: Progressing     Problem: Prexisting or High Potential for Compromised Skin Integrity  Goal: Skin integrity is maintained or improved  Description: INTERVENTIONS:  - Identify patients at risk for skin breakdown  - Assess and monitor skin integrity  - Assess and monitor nutrition and hydration status  - Monitor labs   - Assess for incontinence   - Turn and reposition patient  - Assist with mobility/ambulation  - Relieve pressure over bony prominences  - Avoid friction and shearing  - Provide appropriate hygiene as needed including keeping skin clean and dry  - Evaluate need for skin moisturizer/barrier cream  - Collaborate with interdisciplinary team   - Patient/family teaching  - Consider wound care consult   Outcome: Progressing     Problem: Neurological Deficit  Goal: Neurological status is stable or improving  Description: Interventions:  - Monitor and assess patient's level of consciousness, motor function, sensory function, and level of assistance needed for ADLs  - Monitor and report changes from baseline  Collaborate with interdisciplinary team to initiate plan and implement interventions as ordered  - Provide and maintain a safe environment  - Consider seizure precautions  - Consider fall precautions  - Consider aspiration precautions  - Consider bleeding precautions  Outcome: Progressing     Problem: Activity Intolerance/Impaired Mobility  Goal: Mobility/activity is maintained at optimum level for patient  Description: Interventions:  - Assess and monitor patient  barriers to mobility and need for assistive/adaptive devices  - Assess patient's emotional response to limitations  - Collaborate with interdisciplinary team and initiate plans and interventions as ordered  - Encourage independent activity per ability   - Maintain proper body alignment  - Perform active/passive rom as tolerated/ordered    - Plan activities to conserve energy   - Turn patient as appropriate  Outcome: Progressing     Problem: Communication Impairment  Goal: Ability to express needs and understand communication  Description: Assess patient's communication skills and ability to understand information  Patient will demonstrate use of effective communication techniques, alternative methods of communication and understanding even if not able to speak  - Encourage communication and provide alternate methods of communication as needed  - Collaborate with case management/ for discharge needs  - Include patient/family/caregiver in decisions related to communication  Outcome: Progressing     Problem: Potential for Aspiration  Goal: Non-ventilated patient's risk of aspiration is minimized  Description: Assess and monitor vital signs, respiratory status, and labs (WBC)  Monitor for signs of aspiration (tachypnea, cough, rales, wheezing, cyanosis, fever)  - Assess and monitor patient's ability to swallow  - Place patient up in chair to eat if possible  - HOB up at 90 degrees to eat if unable to get patient up into chair   - Supervise patient during oral intake  - Instruct patient/ family to take small bites  - Instruct patient/ family to take small single sips when taking liquids  - Follow patient-specific strategies generated by speech pathologist   Outcome: Progressing     Problem: Nutrition  Goal: Nutrition/Hydration status is improving  Description: Monitor and assess patient's nutrition/hydration status for malnutrition (ex- brittle hair, bruises, dry skin, pale skin and conjunctiva, muscle wasting, smooth red tongue, and disorientation)  Collaborate with interdisciplinary team and initiate plan and interventions as ordered  Monitor patient's weight and dietary intake as ordered or per policy  Utilize nutrition screening tool and intervene per policy  Determine patient's food preferences and provide high-protein, high-caloric foods as appropriate       - Assist patient with eating   - Allow adequate time for meals   - Encourage patient to take dietary supplement as ordered  - Collaborate with clinical nutritionist   - Include patient/family/caregiver in decisions related to nutrition  Outcome: Progressing     Problem: Nutrition/Hydration-ADULT  Goal: Nutrient/Hydration intake appropriate for improving, restoring or maintaining nutritional needs  Description: Monitor and assess patient's nutrition/hydration status for malnutrition  Collaborate with interdisciplinary team and initiate plan and interventions as ordered  Monitor patient's weight and dietary intake as ordered or per policy  Utilize nutrition screening tool and intervene as necessary  Determine patient's food preferences and provide high-protein, high-caloric foods as appropriate       INTERVENTIONS:  - Monitor oral intake, urinary output, labs, and treatment plans  - Assess nutrition and hydration status and recommend course of action  - Evaluate amount of meals eaten  - Assist patient with eating if necessary   - Allow adequate time for meals  - Recommend/ encourage appropriate diets, oral nutritional supplements, and vitamin/mineral supplements  - Order, calculate, and assess calorie counts as needed  - Recommend, monitor, and adjust tube feedings and TPN/PPN based on assessed needs  - Assess need for intravenous fluids  - Provide specific nutrition/hydration education as appropriate  - Include patient/family/caregiver in decisions related to nutrition  Outcome: Progressing     Problem: NEUROSENSORY - ADULT  Goal: Achieves stable or improved neurological status  Description: INTERVENTIONS  - Monitor and report changes in neurological status  - Monitor vital signs such as temperature, blood pressure, glucose, and any other labs ordered   - Initiate measures to prevent increased intracranial pressure  - Monitor for seizure activity and implement precautions if appropriate      Outcome: Progressing  Goal: Achieves maximal functionality and self care  Description: INTERVENTIONS  - Monitor swallowing and airway patency with patient fatigue and changes in neurological status  - Encourage and assist patient to increase activity and self care     - Encourage visually impaired, hearing impaired and aphasic patients to use assistive/communication devices  Outcome: Progressing     Problem: CARDIOVASCULAR - ADULT  Goal: Maintains optimal cardiac output and hemodynamic stability  Description: INTERVENTIONS:  - Monitor I/O, vital signs and rhythm  - Monitor for S/S and trends of decreased cardiac output  - Administer and titrate ordered vasoactive medications to optimize hemodynamic stability  - Assess quality of pulses, skin color and temperature  - Assess for signs of decreased coronary artery perfusion  - Instruct patient to report change in severity of symptoms  Outcome: Progressing  Goal: Absence of cardiac dysrhythmias or at baseline rhythm  Description: INTERVENTIONS:  - Continuous cardiac monitoring, vital signs, obtain 12 lead EKG if ordered  - Administer antiarrhythmic and heart rate control medications as ordered  - Monitor electrolytes and administer replacement therapy as ordered  Outcome: Progressing     Problem: RESPIRATORY - ADULT  Goal: Achieves optimal ventilation and oxygenation  Description: INTERVENTIONS:  - Assess for changes in respiratory status  - Assess for changes in mentation and behavior  - Position to facilitate oxygenation and minimize respiratory effort  - Oxygen administered by appropriate delivery if ordered  - Initiate smoking cessation education as indicated  - Encourage broncho-pulmonary hygiene including cough, deep breathe, Incentive Spirometry  - Assess the need for suctioning and aspirate as needed  - Assess and instruct to report SOB or any respiratory difficulty  - Respiratory Therapy support as indicated  Outcome: Progressing     Problem: GASTROINTESTINAL - ADULT  Goal: Maintains or returns to baseline bowel function  Description: INTERVENTIONS:  - Assess bowel function  - Encourage oral fluids to ensure adequate hydration  - Administer IV fluids if ordered to ensure adequate hydration  - Administer ordered medications as needed  - Encourage mobilization and activity  - Consider nutritional services referral to assist patient with adequate nutrition and appropriate food choices  Outcome: Progressing  Goal: Maintains adequate nutritional intake  Description: INTERVENTIONS:  - Monitor percentage of each meal consumed  - Identify factors contributing to decreased intake, treat as appropriate  - Assist with meals as needed  - Monitor I&O, weight, and lab values if indicated  - Obtain nutrition services referral as needed  Outcome: Progressing     Problem: GENITOURINARY - ADULT  Goal: Maintains or returns to baseline urinary function  Description: INTERVENTIONS:  - Assess urinary function  - Encourage oral fluids to ensure adequate hydration if ordered  - Administer IV fluids as ordered to ensure adequate hydration  - Administer ordered medications as needed  - Offer frequent toileting  - Follow urinary retention protocol if ordered  Outcome: Progressing  Goal: Urinary catheter remains patent  Description: INTERVENTIONS:  - Assess patency of urinary catheter  - If patient has a chronic schaefer, consider changing catheter if non-functioning  - Follow guidelines for intermittent irrigation of non-functioning urinary catheter  Outcome: Progressing     Problem: METABOLIC, FLUID AND ELECTROLYTES - ADULT  Goal: Electrolytes maintained within normal limits  Description: INTERVENTIONS:  - Monitor labs and assess patient for signs and symptoms of electrolyte imbalances  - Administer electrolyte replacement as ordered  - Monitor response to electrolyte replacements, including repeat lab results as appropriate  - Instruct patient on fluid and nutrition as appropriate  Outcome: Progressing  Goal: Fluid balance maintained  Description: INTERVENTIONS:  - Monitor labs   - Monitor I/O and WT  - Instruct patient on fluid and nutrition as appropriate  - Assess for signs & symptoms of volume excess or deficit  Outcome: Progressing  Goal: Glucose maintained within target range  Description: INTERVENTIONS:  - Monitor Blood Glucose as ordered  - Assess for signs and symptoms of hyperglycemia and hypoglycemia  - Administer ordered medications to maintain glucose within target range  - Assess nutritional intake and initiate nutrition service referral as needed  Outcome: Progressing     Problem: SKIN/TISSUE INTEGRITY - ADULT  Goal: Skin Integrity remains intact(Skin Breakdown Prevention)  Description: Assess:  Bed Management:  -Have minimal linens on bed & keep smooth, unwrinkled  -Change linens as needed when moist or perspiring    Toileting:  -Offer bedside commode  Activity:  Skin Care:  -Avoid use of baby powder, tape, friction and shearing, hot water or constrictive clothing  -Do not massage red bony areas    Next Steps:  -Outcome: Progressing  Goal: Incision(s), wounds(s) or drain site(s) healing without S/S of infection  Description: INTERVENTIONS  - Assess and document dressing, incision, wound bed, drain sites and surrounding tissue  - Provide patient and family education     Problem: HEMATOLOGIC - ADULT  Goal: Maintains hematologic stability  Description: INTERVENTIONS  - Assess for signs and symptoms of bleeding or hemorrhage  - Monitor labs  - Administer supportive blood products/factors as ordered and appropriate  Outcome: Progressing     Problem: MUSCULOSKELETAL - ADULT  Goal: Maintain or return mobility to safest level of function  Description: INTERVENTIONS:  - Assess patient's ability to carry out ADLs; assess patient's baseline for ADL function and identify physical deficits which impact ability to perform ADLs (bathing, care of mouth/teeth, toileting, grooming, dressing, etc )  - Assess/evaluate cause of self-care deficits   - Assess range of motion  - Assess patient's mobility  - Assess patient's need for assistive devices and provide as appropriate  - Encourage maximum independence but intervene and supervise when necessary  - Involve family in performance of ADLs  - Assess for home care needs following discharge   - Consider OT consult to assist with ADL evaluation and planning for discharge  - Provide patient education as appropriate  Outcome: Progressing  Goal: Maintain proper alignment of affected body part  Description: INTERVENTIONS:  - Support, maintain and protect limb and body alignment  - Provide patient/ family with appropriate education  Outcome: Progressing     Problem: COPING  Goal: Pt/Family able to verbalize concerns and demonstrate effective coping strategies  Description: INTERVENTIONS:  - Assist patient/family to identify coping skills, available support systems and cultural and spiritual values  - Provide emotional support, including active listening and acknowledgement of concerns of patient and caregivers  - Reduce environmental stimuli, as able  - Provide patient education  - Assess for spiritual pain/suffering and initiate spiritual care, including notification of Pastoral Care or nida based community as needed  - Assess effectiveness of coping strategies  Outcome: Progressing  Goal: Will report anxiety at manageable levels  Description: INTERVENTIONS:  - Administer medication as ordered  - Teach and encourage coping skills  - Provide emotional support  - Assess patient/family for anxiety and ability to cope  Outcome: Progressing     Problem: INFECTION - ADULT  Goal: Absence or prevention of progression during hospitalization  Description: INTERVENTIONS:  - Assess and monitor for signs and symptoms of infection  - Monitor lab/diagnostic results  - Monitor all insertion sites, i e  indwelling lines, tubes, and drains  - Monitor endotracheal if appropriate and nasal secretions for changes in amount and color  - [place] appropriate cooling/warming therapies per order  - Administer medications as ordered  - Instruct and encourage patient and family to use good hand hygiene technique  - Identify and instruct in appropriate isolation precautions for identified infection/condition  Outcome: Progressing     Problem: SAFETY ADULT  Goal: Maintain or return to baseline ADL function  Description: INTERVENTIONS:  -  Assess patient's ability to carry out ADLs; assess patient's baseline for ADL function and identify physical deficits which impact ability to perform ADLs (bathing, care of mouth/teeth, toileting, grooming, dressing, etc )  - Assess/evaluate cause of self-care deficits   - Assess range of motion  - Assess patient's mobility; develop plan if impaired  - Assess patient's need for assistive devices and provide as appropriate  - Encourage maximum independence but intervene and supervise when necessary  - Involve family in performance of ADLs  - Assess for home care needs following discharge   - Consider OT consult to assist with ADL evaluation and planning for discharge  - Provide patient education as appropriate  Outcome: Progressing  Goal: Maintains/Returns to pre admission functional level  Description: INTERVENTIONS:  - Perform BMAT or MOVE assessment daily    - Set and communicate daily mobility goal to care team and patient/family/caregiver     - Collaborate with rehabilitation services on mobility goals if consulted  - Out of bed for toileting  - Record patient progress and toleration of activity level   Outcome: Progressing  Goal: Patient will remain free of falls  Description: INTERVENTIONS:  - Educate patient/family on patient safety including physical limitations  - Instruct patient to call for assistance with activity   - Consult OT/PT to assist with strengthening/mobility   - Keep Call bell within reach  - Keep bed low and locked with side rails adjusted as appropriate  - Keep care items and personal belongings within reach  - Initiate and maintain comfort rounds  - Make Fall Risk Sign visible to staff  - Consider moving patient to room near nurses station  Outcome: Progressing     Problem: DISCHARGE PLANNING  Goal: Discharge to home or other facility with appropriate resources  Description: INTERVENTIONS:  - Identify barriers to discharge w/patient and caregiver  - Arrange for needed discharge resources and transportation as appropriate  - Identify discharge learning needs (meds, wound care, etc )  - Arrange for interpretive services to assist at discharge as needed  - Refer to Case Management Department for coordinating discharge planning if the patient needs post-hospital services based on physician/advanced practitioner order or complex needs related to functional status, cognitive ability, or social support system  Outcome: Progressing     Problem: Knowledge Deficit  Goal: Patient/family/caregiver demonstrates understanding of disease process, treatment plan, medications, and discharge instructions  Description: Complete learning assessment and assess knowledge base    Interventions:  - Provide teaching at level of understanding  - Provide teaching via preferred learning methods  Outcome: Progressing

## 2022-07-31 NOTE — ASSESSMENT & PLAN NOTE
· Likely secondary to CVA  Status post video barium swallow  His started on a dysphagia diet with aspiration precautions  · Currently patient has an NG tube present for nutrition  If the patient does not have enough nutritional intake through mouth may need to consider feeding tube  Family is agreeable for PEG tube placement  · Discussed with GI   5 for feeding tube placement tomorrow  Will keep NPO after midnight and hold to feed after midnight  Gentle hydration while NPO  · Status post feeding tube placement    · Patient is on goal for tube feeding-monitor Administered By (Optional): Aren Dose Administered (Numbers Only - Mg, G, Mcg, Units, Cc): 45 Lot # (Optional): [id number] Render J-Code Information In Note?: yes Consent: The risks of the medication was reviewed with the patient. Expiration Date (Optional): June 2018 Units: mg Post-Care Instructions: I reviewed with the patient in detail post-care instructions. Patient understands to keep the injection sites clean and call the clinic if there is any redness, swelling or pain. Route: [place] Medication (1) And Associated J-Code Units: Ustekinumab (Stelara), 1mg Detail Level: None Dose Administered (Numbers Only - Mg, G, Mcg, Units, Cc): 0 Treatment Number: 2 Units: cc

## 2022-07-31 NOTE — ASSESSMENT & PLAN NOTE
· Patient noted to have low-grade fever  · Chest x-ray reviewed-no pneumonia  · Possible aspiration given her mental status  · Monitor off of antibiotics  · Follow-up on the cultures  · Monitor temperature- no further fever    T-max 99 7°

## 2022-07-31 NOTE — PLAN OF CARE
Problem: MOBILITY - ADULT  Goal: Maintain or return to baseline ADL function  Description: INTERVENTIONS:  -  Assess patient's ability to carry out ADLs; assess patient's baseline for ADL function and identify physical deficits which impact ability to perform ADLs (bathing, care of mouth/teeth, toileting, grooming, dressing, etc )  - Assess/evaluate cause of self-care deficits   - Assess range of motion  - Assess patient's mobility; develop plan if impaired  - Assess patient's need for assistive devices and provide as appropriate  - Encourage maximum independence but intervene and supervise when necessary  - Involve family in performance of ADLs  - Assess for home care needs following discharge   - Consider OT consult to assist with ADL evaluation and planning for discharge  - Provide patient education as appropriate  Outcome: Progressing  Goal: Maintains/Returns to pre admission functional level  Description: INTERVENTIONS:  - Perform BMAT or MOVE assessment daily    - Set and communicate daily mobility goal to care team and patient/family/caregiver  - Collaborate with rehabilitation services on mobility goals if consulted  - Perform Range of Motion 3 times a day  - Reposition patient every 2 hours    - Dangle patient 3 times a day  - Stand patient 3 times a day  - Ambulate patient 3 times a day  - Out of bed to chair 3 times a day   - Out of bed for meals 3 times a day  - Out of bed for toileting  - Record patient progress and toleration of activity level   Outcome: Progressing     Problem: Potential for Falls  Goal: Patient will remain free of falls  Description: INTERVENTIONS:  - Educate patient/family on patient safety including physical limitations  - Instruct patient to call for assistance with activity   - Consult OT/PT to assist with strengthening/mobility   - Keep Call bell within reach  - Keep bed low and locked with side rails adjusted as appropriate  - Keep care items and personal belongings within reach  - Initiate and maintain comfort rounds  - Make Fall Risk Sign visible to staff  - Offer Toileting every 2 Hours, in advance of need  - Initiate/Maintain bed alarm  - Apply yellow socks and bracelet for high fall risk patients  - Consider moving patient to room near nurses station  Outcome: Progressing     Problem: Prexisting or High Potential for Compromised Skin Integrity  Goal: Skin integrity is maintained or improved  Description: INTERVENTIONS:  - Identify patients at risk for skin breakdown  - Assess and monitor skin integrity  - Assess and monitor nutrition and hydration status  - Monitor labs   - Assess for incontinence   - Turn and reposition patient  - Assist with mobility/ambulation  - Relieve pressure over bony prominences  - Avoid friction and shearing  - Provide appropriate hygiene as needed including keeping skin clean and dry  - Evaluate need for skin moisturizer/barrier cream  - Collaborate with interdisciplinary team   - Patient/family teaching  - Consider wound care consult   Outcome: Progressing     Problem: Neurological Deficit  Goal: Neurological status is stable or improving  Description: Interventions:  - Monitor and assess patient's level of consciousness, motor function, sensory function, and level of assistance needed for ADLs  - Monitor and report changes from baseline  Collaborate with interdisciplinary team to initiate plan and implement interventions as ordered  - Provide and maintain a safe environment  - Consider seizure precautions  - Consider fall precautions  - Consider aspiration precautions  - Consider bleeding precautions  Outcome: Progressing     Problem: Activity Intolerance/Impaired Mobility  Goal: Mobility/activity is maintained at optimum level for patient  Description: Interventions:  - Assess and monitor patient  barriers to mobility and need for assistive/adaptive devices  - Assess patient's emotional response to limitations    - Collaborate with interdisciplinary team and initiate plans and interventions as ordered  - Encourage independent activity per ability   - Maintain proper body alignment  - Perform active/passive rom as tolerated/ordered  - Plan activities to conserve energy   - Turn patient as appropriate  Outcome: Progressing     Problem: Communication Impairment  Goal: Ability to express needs and understand communication  Description: Assess patient's communication skills and ability to understand information  Patient will demonstrate use of effective communication techniques, alternative methods of communication and understanding even if not able to speak  - Encourage communication and provide alternate methods of communication as needed  - Collaborate with case management/ for discharge needs  - Include patient/family/caregiver in decisions related to communication  Outcome: Progressing     Problem: Potential for Aspiration  Goal: Non-ventilated patient's risk of aspiration is minimized  Description: Assess and monitor vital signs, respiratory status, and labs (WBC)  Monitor for signs of aspiration (tachypnea, cough, rales, wheezing, cyanosis, fever)  - Assess and monitor patient's ability to swallow  - Place patient up in chair to eat if possible  - HOB up at 90 degrees to eat if unable to get patient up into chair   - Supervise patient during oral intake  - Instruct patient/ family to take small bites  - Instruct patient/ family to take small single sips when taking liquids  - Follow patient-specific strategies generated by speech pathologist   Outcome: Progressing     Problem: Nutrition  Goal: Nutrition/Hydration status is improving  Description: Monitor and assess patient's nutrition/hydration status for malnutrition (ex- brittle hair, bruises, dry skin, pale skin and conjunctiva, muscle wasting, smooth red tongue, and disorientation)   Collaborate with interdisciplinary team and initiate plan and interventions as ordered  Monitor patient's weight and dietary intake as ordered or per policy  Utilize nutrition screening tool and intervene per policy  Determine patient's food preferences and provide high-protein, high-caloric foods as appropriate  - Assist patient with eating   - Allow adequate time for meals   - Encourage patient to take dietary supplement as ordered  - Collaborate with clinical nutritionist   - Include patient/family/caregiver in decisions related to nutrition  Outcome: Progressing     Problem: Nutrition/Hydration-ADULT  Goal: Nutrient/Hydration intake appropriate for improving, restoring or maintaining nutritional needs  Description: Monitor and assess patient's nutrition/hydration status for malnutrition  Collaborate with interdisciplinary team and initiate plan and interventions as ordered  Monitor patient's weight and dietary intake as ordered or per policy  Utilize nutrition screening tool and intervene as necessary  Determine patient's food preferences and provide high-protein, high-caloric foods as appropriate       INTERVENTIONS:  - Monitor oral intake, urinary output, labs, and treatment plans  - Assess nutrition and hydration status and recommend course of action  - Evaluate amount of meals eaten  - Assist patient with eating if necessary   - Allow adequate time for meals  - Recommend/ encourage appropriate diets, oral nutritional supplements, and vitamin/mineral supplements  - Order, calculate, and assess calorie counts as needed  - Recommend, monitor, and adjust tube feedings and TPN/PPN based on assessed needs  - Assess need for intravenous fluids  - Provide specific nutrition/hydration education as appropriate  - Include patient/family/caregiver in decisions related to nutrition  Outcome: Progressing     Problem: NEUROSENSORY - ADULT  Goal: Achieves stable or improved neurological status  Description: INTERVENTIONS  - Monitor and report changes in neurological status  - Monitor vital signs such as temperature, blood pressure, glucose, and any other labs ordered   - Initiate measures to prevent increased intracranial pressure  - Monitor for seizure activity and implement precautions if appropriate      Outcome: Progressing  Goal: Achieves maximal functionality and self care  Description: INTERVENTIONS  - Monitor swallowing and airway patency with patient fatigue and changes in neurological status  - Encourage and assist patient to increase activity and self care     - Encourage visually impaired, hearing impaired and aphasic patients to use assistive/communication devices  Outcome: Progressing     Problem: CARDIOVASCULAR - ADULT  Goal: Maintains optimal cardiac output and hemodynamic stability  Description: INTERVENTIONS:  - Monitor I/O, vital signs and rhythm  - Monitor for S/S and trends of decreased cardiac output  - Administer and titrate ordered vasoactive medications to optimize hemodynamic stability  - Assess quality of pulses, skin color and temperature  - Assess for signs of decreased coronary artery perfusion  - Instruct patient to report change in severity of symptoms  Outcome: Progressing  Goal: Absence of cardiac dysrhythmias or at baseline rhythm  Description: INTERVENTIONS:  - Continuous cardiac monitoring, vital signs, obtain 12 lead EKG if ordered  - Administer antiarrhythmic and heart rate control medications as ordered  - Monitor electrolytes and administer replacement therapy as ordered  Outcome: Progressing     Problem: RESPIRATORY - ADULT  Goal: Achieves optimal ventilation and oxygenation  Description: INTERVENTIONS:  - Assess for changes in respiratory status  - Assess for changes in mentation and behavior  - Position to facilitate oxygenation and minimize respiratory effort  - Oxygen administered by appropriate delivery if ordered  - Initiate smoking cessation education as indicated  - Encourage broncho-pulmonary hygiene including cough, deep breathe, Incentive Spirometry  - Assess the need for suctioning and aspirate as needed  - Assess and instruct to report SOB or any respiratory difficulty  - Respiratory Therapy support as indicated  Outcome: Progressing     Problem: GASTROINTESTINAL - ADULT  Goal: Maintains or returns to baseline bowel function  Description: INTERVENTIONS:  - Assess bowel function  - Encourage oral fluids to ensure adequate hydration  - Administer IV fluids if ordered to ensure adequate hydration  - Administer ordered medications as needed  - Encourage mobilization and activity  - Consider nutritional services referral to assist patient with adequate nutrition and appropriate food choices  Outcome: Progressing  Goal: Maintains adequate nutritional intake  Description: INTERVENTIONS:  - Monitor percentage of each meal consumed  - Identify factors contributing to decreased intake, treat as appropriate  - Assist with meals as needed  - Monitor I&O, weight, and lab values if indicated  - Obtain nutrition services referral as needed  Outcome: Progressing     Problem: GENITOURINARY - ADULT  Goal: Maintains or returns to baseline urinary function  Description: INTERVENTIONS:  - Assess urinary function  - Encourage oral fluids to ensure adequate hydration if ordered  - Administer IV fluids as ordered to ensure adequate hydration  - Administer ordered medications as needed  - Offer frequent toileting  - Follow urinary retention protocol if ordered  Outcome: Progressing  Goal: Urinary catheter remains patent  Description: INTERVENTIONS:  - Assess patency of urinary catheter  - If patient has a chronic schaefer, consider changing catheter if non-functioning  - Follow guidelines for intermittent irrigation of non-functioning urinary catheter  Outcome: Progressing     Problem: METABOLIC, FLUID AND ELECTROLYTES - ADULT  Goal: Electrolytes maintained within normal limits  Description: INTERVENTIONS:  - Monitor labs and assess patient for signs and symptoms of electrolyte imbalances  - Administer electrolyte replacement as ordered  - Monitor response to electrolyte replacements, including repeat lab results as appropriate  - Instruct patient on fluid and nutrition as appropriate  Outcome: Progressing  Goal: Fluid balance maintained  Description: INTERVENTIONS:  - Monitor labs   - Monitor I/O and WT  - Instruct patient on fluid and nutrition as appropriate  - Assess for signs & symptoms of volume excess or deficit  Outcome: Progressing  Goal: Glucose maintained within target range  Description: INTERVENTIONS:  - Monitor Blood Glucose as ordered  - Assess for signs and symptoms of hyperglycemia and hypoglycemia  - Administer ordered medications to maintain glucose within target range  - Assess nutritional intake and initiate nutrition service referral as needed  Outcome: Progressing    Problem: HEMATOLOGIC - ADULT  Goal: Maintains hematologic stability  Description: INTERVENTIONS  - Assess for signs and symptoms of bleeding or hemorrhage  - Monitor labs  - Administer supportive blood products/factors as ordered and appropriate  Outcome: Progressing     Problem: MUSCULOSKELETAL - ADULT  Goal: Maintain or return mobility to safest level of function  Description: INTERVENTIONS:  - Assess patient's ability to carry out ADLs; assess patient's baseline for ADL function and identify physical deficits which impact ability to perform ADLs (bathing, care of mouth/teeth, toileting, grooming, dressing, etc )  - Assess/evaluate cause of self-care deficits   - Assess range of motion  - Assess patient's mobility  - Assess patient's need for assistive devices and provide as appropriate  - Encourage maximum independence but intervene and supervise when necessary  - Involve family in performance of ADLs  - Assess for home care needs following discharge   - Consider OT consult to assist with ADL evaluation and planning for discharge  - Provide patient education as appropriate  Outcome: Progressing  Goal: Maintain proper alignment of affected body part  Description: INTERVENTIONS:  - Support, maintain and protect limb and body alignment  - Provide patient/ family with appropriate education  Outcome: Progressing     Problem: COPING  Goal: Pt/Family able to verbalize concerns and demonstrate effective coping strategies  Description: INTERVENTIONS:  - Assist patient/family to identify coping skills, available support systems and cultural and spiritual values  - Provide emotional support, including active listening and acknowledgement of concerns of patient and caregivers  - Reduce environmental stimuli, as able  - Provide patient education  - Assess for spiritual pain/suffering and initiate spiritual care, including notification of Pastoral Care or [facility] based community as needed  - Assess effectiveness of coping strategies  Outcome: Progressing  Goal: Will report anxiety at manageable levels  Description: INTERVENTIONS:  - Administer medication as ordered  - Teach and encourage coping skills  - Provide emotional support  - Assess patient/family for anxiety and ability to cope  Outcome: Progressing     Problem: INFECTION - ADULT  Goal: Absence or prevention of progression during hospitalization  Description: INTERVENTIONS:  - Assess and monitor for signs and symptoms of infection  - Monitor lab/diagnostic results  - Monitor all insertion sites, i e  indwelling lines, tubes, and drains  - Monitor endotracheal if appropriate and nasal secretions for changes in amount and color  - Axson appropriate cooling/warming therapies per order  - Administer medications as ordered  - Instruct and encourage patient and family to use good hand hygiene technique  - Identify and instruct in appropriate isolation precautions for identified infection/condition  Outcome: Progressing     Problem: SAFETY ADULT  Goal: Maintain or return to baseline ADL function  Description: INTERVENTIONS:  -  Assess patient's ability to carry out ADLs; assess patient's baseline for ADL function and identify physical deficits which impact ability to perform ADLs (bathing, care of mouth/teeth, toileting, grooming, dressing, etc )  - Assess/evaluate cause of self-care deficits   - Assess range of motion  - Assess patient's mobility; develop plan if impaired  - Assess patient's need for assistive devices and provide as appropriate  - Encourage maximum independence but intervene and supervise when necessary  - Involve family in performance of ADLs  - Assess for home care needs following discharge   - Consider OT consult to assist with ADL evaluation and planning for discharge  - Provide patient education as appropriate  Outcome: Progressing  Goal: Maintains/Returns to pre admission functional level  Description: INTERVENTIONS:  - Perform BMAT or MOVE assessment daily    - Set and communicate daily mobility goal to care team and patient/family/caregiver  - Collaborate with rehabilitation services on mobility goals if consulted  - Perform Range of Motion 3 times a day  - Reposition patient every 2 hours    - Dangle patient 3 times a day  - Stand patient 3 times a day  - Ambulate patient 3 times a day  - Out of bed to chair 3 times a day   - Out of bed for meals 3 times a day  - Out of bed for toileting  - Record patient progress and toleration of activity level   Outcome: Progressing  Goal: Patient will remain free of falls  Description: INTERVENTIONS:  - Educate patient/family on patient safety including physical limitations  - Instruct patient to call for assistance with activity   - Consult OT/PT to assist with strengthening/mobility   - Keep Call bell within reach  - Keep bed low and locked with side rails adjusted as appropriate  - Keep care items and personal belongings within reach  - Initiate and maintain comfort rounds  - Make Fall Risk Sign visible to staff  - Offer Toileting every 2 Hours, in advance of need  - Initiate/Maintain bed alarm  - Apply yellow socks and bracelet for high fall risk patients  - Consider moving patient to room near nurses station  Outcome: Progressing     Problem: DISCHARGE PLANNING  Goal: Discharge to home or other facility with appropriate resources  Description: INTERVENTIONS:  - Identify barriers to discharge w/patient and caregiver  - Arrange for needed discharge resources and transportation as appropriate  - Identify discharge learning needs (meds, wound care, etc )  - Arrange for interpretive services to assist at discharge as needed  - Refer to Case Management Department for coordinating discharge planning if the patient needs post-hospital services based on physician/advanced practitioner order or complex needs related to functional status, cognitive ability, or social support system  Outcome: Progressing     Problem: Knowledge Deficit  Goal: Patient/family/caregiver demonstrates understanding of disease process, treatment plan, medications, and discharge instructions  Description: Complete learning assessment and assess knowledge base    Interventions:  - Provide teaching at level of understanding  - Provide teaching via preferred learning methods  Outcome: Progressing

## 2022-07-31 NOTE — PROGRESS NOTES
[address] [facility]  Progress Note - Amber Coffey 1933, 80 y o  female MRN: [id number]  Unit/Bed#: [facility] 723-01 Encounter: [id number]  Primary Care Provider: Austyn Dupont MD   Date and time admitted to hospital: 7/22/2022  5:21 PM    * Cardio-embolic left MCA stroke [facility])  Assessment & Plan  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  · Continue aspirin and Lipitor  · Holding anticoagulation until 10-14 days post stroke-restart back mid next week-  · 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 on hold with 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  · Continue with neuro checks  · Neurology on board  · PMR consultation appreciated  · Patient with multiple strokes in the past   Patient also with vascular dementia with poor functional status to begin with  Overall prognosis appears to be poor  · Family wants to continue with the current care-consulted GI for PEG tube placement and likely PEG tube placement by tomorrow  · Palliative care consultation appreciated-patient and family wants to continue with the rehab oriented approach  · Plan is for rehab placement-waiting for rehab placement    Positive blood culture  Assessment & Plan  · 1/2 sets came back positive likely contaminant  Monitor off of antibiotics no further fever  · Likely Staph epidermidis    Fever  Assessment & Plan  · Patient noted to have low-grade fever  · Chest x-ray reviewed-no pneumonia  · Possible aspiration given her mental status  · Monitor off of antibiotics  · Follow-up on the cultures  · Monitor temperature- no further fever    T-max 99 7°    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[facility] [address] )  Assessment & Plan  · No acute exacerbation    Vascular dementia [facility])  Assessment & Plan  · Patient with history of vascular dementia  · Continue with supportive care    Dysphagia  Assessment & Plan  · Likely secondary to CVA  Status post video barium swallow  His started on a dysphagia diet with aspiration precautions  · Currently patient has an NG tube present for nutrition  If the patient does not have enough nutritional intake through mouth may need to consider feeding tube  Family is agreeable for PEG tube placement  · Discussed with GI   5 for feeding tube placement tomorrow  Will keep NPO after midnight and hold to feed after midnight  Gentle hydration while NPO  · Status post feeding tube placement  · Patient is on goal for tube feeding-monitor    Anemia  Assessment & Plan  · Previous history of anemia  Remained relatively stable since admission   · Monitor    CKD (chronic kidney disease) stage 3, GFR 30-59 ml/min [facility])  Assessment & Plan  Lab Results   Component Value Date    EGFR 41 07/30/2022    EGFR 42 07/29/2022    EGFR 45 07/28/2022    CREATININE 1 17 07/30/2022    CREATININE 1 15 07/29/2022    CREATININE 1 08 07/28/2022   Monitor creatinine appears to be at baseline  Creatinine 1  15 today    Systolic congestive heart failure [facility])  Assessment & Plan  Wt Readings from Last 3 Encounters:   07/23/22 64 9 kg (143 lb)   07/22/22 65 2 kg (143 lb 11 8 oz)   07/15/22 69 9 kg (154 lb)       Patient with chronic systolic congestive heart failure  Ejection fraction on this admission EF 55-60% with normal LV systolic function  Home regimen is losartan 20 Lopressor 50  Lasix on hold currently    Hold losartan for now  Patient with poor p o  intake-will restart back on the losartan if the blood pressure is persistently elevated      Hypertension  Assessment & Plan  · Monitor blood pressure  · Currently off of antihypertensive continue with permissive hypertension  · Adjusting metoprolol dose    Controlled type 2 diabetes mellitus without complication, without long-term current use of insulin [facility])  Assessment & Plan  Lab Results   Component Value Date    HGBA1C 6 3 (H) 2022       Recent Labs     22  1825 22  0017 22  0517 22  1112   POCGLU 167* 189* 170* 197*       Blood Sugar Average: Last 72 hrs:  (P) 158 75   Blood sugar acceptable  Continue with current care    H/O mitral valve replacement  Assessment & Plan  · TTE showed normal  valve function        VTE Pharmacologic Prophylaxis:   Pharmacologic: On hold for now  Mechanical VTE Prophylaxis in Place: Yes    Patient Centered Rounds: I have performed bedside rounds with nursing staff today  Discussions with Specialists or Other Care Team Provider:     Education and Discussions with Family / Patient:  Family updated in the room    Time Spent for Care: 30 minutes  More than 50% of total time spent on counseling and coordination of care as described above  Current Length of Stay: 9 day(s)    Current Patient Status: Inpatient   Certification Statement: The patient will continue to require additional inpatient hospital stay due to Pending rehab place    Discharge Plan:     Code Status: Level 3 - DNAR and DNI      Subjective:   Patient seen and examined  Family reported that patient was awake alert a earlier and interacting with them  Chest x-ray reviewed noted to have pulmonary edema will give 1 time dose of IV Lasix  Objective:     Vitals:   Temp (24hrs), Av 1 °F (37 3 °C), Min:97 8 °F (36 6 °C), Max:99 7 °F (37 6 °C)    Temp:  [97 8 °F (36 6 °C)-99 7 °F (37 6 °C)] 99 1 °F (37 3 °C)  HR:  [] 91  Resp:  [18] 18  BP: (125-183)/() 134/70  SpO2:  [93 %-99 %] 94 %  Body mass index is 27 02 kg/m²  Input and Output Summary (last 24 hours):     No intake or output data in the 24 hours ending 22 1648    Physical Exam:     Physical Exam  Constitutional:       Comments: Left gaze preference   HENT:      Head: Normocephalic        Nose: Nose normal    Eyes:      General: No scleral icterus  Cardiovascular:      Rate and Rhythm: Normal rate  Abdominal:      Comments: Decreased breath sounds bilateral   Skin:     General: Skin is warm  Neurological:      Mental Status: She is alert  Comments: Moves left upper and lower extremities spontaneously  Right-sided hemiplegia  Right hemineglect  Do not follow commands  Nonverbal         Additional Data:     Labs:    Results from last 7 days   Lab Units 07/29/22  0550 07/27/22  0448 07/25/22  0553   WBC Thousand/uL 6 31   < > 6 72   HEMOGLOBIN g/dL 8 3*   < > 8 9*   HEMATOCRIT % 28 2*   < > 28 7*   PLATELETS Thousands/uL 241   < > 137*   NEUTROS PCT %  --   --  73   LYMPHS PCT %  --   --  14   MONOS PCT %  --   --  10   EOS PCT %  --   --  3    < > = values in this interval not displayed  Results from last 7 days   Lab Units 07/30/22  1026 07/28/22  0603 07/27/22  0448   POTASSIUM mmol/L 3 6   < > 4 0   CHLORIDE mmol/L 118*   < > 114*   CO2 mmol/L 22   < > 26   BUN mg/dL 31*   < > 38*   CREATININE mg/dL 1 17   < > 1 27   CALCIUM mg/dL 8 8   < > 9 5   ALK PHOS U/L  --   --  62   ALT U/L  --   --  26   AST U/L  --   --  66*    < > = values in this interval not displayed  * I Have Reviewed All Lab Data Listed Above  * Additional Pertinent Lab Tests Reviewed: [facility]    Imaging:    Imaging Reports Reviewed Today Include:   Imaging Personally Reviewed by Myself Includes:      Recent Cultures (last 7 days):     Results from last 7 days   Lab Units 07/27/22  1338   BLOOD CULTURE  No Growth at 72 hrs    Staphylococcus coagulase negative*   GRAM STAIN RESULT  Gram positive cocci in clusters*       Last 24 Hours Medication List:   Current Facility-Administered Medications   Medication Dose Route Frequency Provider Last Rate    acetaminophen  325 mg Rectal Q4H PRN Patrice Shutters, PA-C      acetaminophen  650 mg Oral Q4H PRN Patrice Shutters, PA-C      albuterol  2 5 mg Nebulization Q6H PRN Vinh Nayak PA-C      artificial tear   Both Eyes HS Vinh Nayak PA-C      aspirin  81 mg Oral Daily Vinh Nayak PA-C      atorvastatin  80 mg Oral QPM Idalia Valle PA-C      budesonide  0 5 mg Nebulization Q12H Idalia Lopez PA-C      cefazolin  1,000 mg Intravenous Once Arturo Escobar MD      chlorhexidine  15 mL Mouth/Throat Q12H [address] Idalia Valle PA-C      ciprofloxacin  1 drop Right Eye Q4H While Awake Foster Brandt MD      hydrALAZINE  10 mg Intravenous Q6H PRN Foster Brandt MD      insulin lispro  1-5 Units Subcutaneous Q6H [address] Idalia Valle PA-C      levalbuterol  1 25 mg Nebulization BID Vinh Nayak PA-C      metoprolol tartrate  25 mg Oral Q12H [address] Foster Brandt MD      polyethylene glycol  17 g Oral Daily PRN Foster Brandt MD      senna  1 tablet Oral HS PRN Foster Brandt MD          Today, Patient Was Seen By: Foster Brandt MD    ** Please Note: Dictation voice to text software may have been used in the creation of this document   **

## 2022-07-31 NOTE — ASSESSMENT & PLAN NOTE
· Monitor blood pressure  · Currently off of antihypertensive continue with permissive hypertension  · Adjusting metoprolol dose

## 2022-07-31 NOTE — ASSESSMENT & PLAN NOTE
Lab Results   Component Value Date    HGBA1C 6 3 (H) 07/23/2022       Recent Labs     07/30/22  1825 07/31/22  0017 07/31/22  0517 07/31/22  1112   POCGLU 167* 189* 170* 197*       Blood Sugar Average: Last 72 hrs:  (P) 158 75   Blood sugar acceptable    Continue with current care

## 2022-08-01 ENCOUNTER — PREP FOR PROCEDURE (OUTPATIENT)
Dept: GASTROENTEROLOGY | Facility: MEDICAL CENTER | Age: 87
End: 2022-08-01

## 2022-08-01 DIAGNOSIS — K25.9 GASTRIC ULCER WITHOUT HEMORRHAGE OR PERFORATION, UNSPECIFIED CHRONICITY: Primary | ICD-10-CM

## 2022-08-01 LAB
ANION GAP SERPL CALCULATED.3IONS-SCNC: 4 MMOL/L (ref 4–13)
BACTERIA BLD CULT: NORMAL
BUN SERPL-MCNC: 31 MG/DL (ref 5–25)
CALCIUM SERPL-MCNC: 9 MG/DL (ref 8.3–10.1)
CHLORIDE SERPL-SCNC: 112 MMOL/L (ref 96–108)
CO2 SERPL-SCNC: 26 MMOL/L (ref 21–32)
CREAT SERPL-MCNC: 1.12 MG/DL (ref 0.6–1.3)
ERYTHROCYTE [DISTWIDTH] IN BLOOD BY AUTOMATED COUNT: 12.7 % (ref 11.6–15.1)
GFR SERPL CREATININE-BSD FRML MDRD: 43 ML/MIN/1.73SQ M
GLUCOSE SERPL-MCNC: 152 MG/DL (ref 65–140)
GLUCOSE SERPL-MCNC: 157 MG/DL (ref 65–140)
GLUCOSE SERPL-MCNC: 158 MG/DL (ref 65–140)
GLUCOSE SERPL-MCNC: 164 MG/DL (ref 65–140)
GLUCOSE SERPL-MCNC: 171 MG/DL (ref 65–140)
GLUCOSE SERPL-MCNC: 185 MG/DL (ref 65–140)
HCT VFR BLD AUTO: 27.9 % (ref 34.8–46.1)
HGB BLD-MCNC: 8.4 G/DL (ref 11.5–15.4)
MCH RBC QN AUTO: 29.6 PG (ref 26.8–34.3)
MCHC RBC AUTO-ENTMCNC: 30.1 G/DL (ref 31.4–37.4)
MCV RBC AUTO: 98 FL (ref 82–98)
PLATELET # BLD AUTO: 299 THOUSANDS/UL (ref 149–390)
PMV BLD AUTO: 10.9 FL (ref 8.9–12.7)
POTASSIUM SERPL-SCNC: 3.9 MMOL/L (ref 3.5–5.3)
RBC # BLD AUTO: 2.84 MILLION/UL (ref 3.81–5.12)
SODIUM SERPL-SCNC: 142 MMOL/L (ref 135–147)
WBC # BLD AUTO: 6.62 THOUSAND/UL (ref 4.31–10.16)

## 2022-08-01 PROCEDURE — 99232 SBSQ HOSP IP/OBS MODERATE 35: CPT | Performed by: FAMILY MEDICINE

## 2022-08-01 PROCEDURE — 80048 BASIC METABOLIC PNL TOTAL CA: CPT | Performed by: FAMILY MEDICINE

## 2022-08-01 PROCEDURE — 94760 N-INVAS EAR/PLS OXIMETRY 1: CPT

## 2022-08-01 PROCEDURE — 85027 COMPLETE CBC AUTOMATED: CPT | Performed by: FAMILY MEDICINE

## 2022-08-01 PROCEDURE — 94640 AIRWAY INHALATION TREATMENT: CPT

## 2022-08-01 PROCEDURE — 82948 REAGENT STRIP/BLOOD GLUCOSE: CPT

## 2022-08-01 RX ADMIN — ATORVASTATIN CALCIUM 80 MG: 80 TABLET, FILM COATED ORAL at 18:28

## 2022-08-01 RX ADMIN — LEVALBUTEROL HYDROCHLORIDE 1.25 MG: 1.25 SOLUTION, CONCENTRATE RESPIRATORY (INHALATION) at 19:09

## 2022-08-01 RX ADMIN — METOPROLOL TARTRATE 25 MG: 25 TABLET, FILM COATED ORAL at 21:23

## 2022-08-01 RX ADMIN — CHLORHEXIDINE GLUCONATE 15 ML: 1.2 SOLUTION ORAL at 21:23

## 2022-08-01 RX ADMIN — LEVALBUTEROL HYDROCHLORIDE 1.25 MG: 1.25 SOLUTION, CONCENTRATE RESPIRATORY (INHALATION) at 07:34

## 2022-08-01 RX ADMIN — INSULIN LISPRO 1 UNITS: 100 INJECTION, SOLUTION INTRAVENOUS; SUBCUTANEOUS at 05:30

## 2022-08-01 RX ADMIN — BUDESONIDE 0.5 MG: 0.5 INHALANT ORAL at 19:09

## 2022-08-01 RX ADMIN — INSULIN LISPRO 1 UNITS: 100 INJECTION, SOLUTION INTRAVENOUS; SUBCUTANEOUS at 18:28

## 2022-08-01 RX ADMIN — CIPROFLOXACIN HYDROCHLORIDE 1 DROP: 3 SOLUTION/ DROPS OPHTHALMIC at 21:24

## 2022-08-01 RX ADMIN — WHITE PETROLATUM 57.7 %-MINERAL OIL 31.9 % EYE OINTMENT: at 21:24

## 2022-08-01 RX ADMIN — METOPROLOL TARTRATE 25 MG: 25 TABLET, FILM COATED ORAL at 09:55

## 2022-08-01 RX ADMIN — INSULIN LISPRO 1 UNITS: 100 INJECTION, SOLUTION INTRAVENOUS; SUBCUTANEOUS at 00:57

## 2022-08-01 RX ADMIN — CIPROFLOXACIN HYDROCHLORIDE 1 DROP: 3 SOLUTION/ DROPS OPHTHALMIC at 05:29

## 2022-08-01 RX ADMIN — BUDESONIDE 0.5 MG: 0.5 INHALANT ORAL at 07:34

## 2022-08-01 RX ADMIN — CIPROFLOXACIN HYDROCHLORIDE 1 DROP: 3 SOLUTION/ DROPS OPHTHALMIC at 13:08

## 2022-08-01 RX ADMIN — INSULIN LISPRO 1 UNITS: 100 INJECTION, SOLUTION INTRAVENOUS; SUBCUTANEOUS at 11:53

## 2022-08-01 RX ADMIN — ASPIRIN 81 MG CHEWABLE TABLET 81 MG: 81 TABLET CHEWABLE at 09:55

## 2022-08-01 RX ADMIN — CHLORHEXIDINE GLUCONATE 15 ML: 1.2 SOLUTION ORAL at 09:55

## 2022-08-01 RX ADMIN — CIPROFLOXACIN HYDROCHLORIDE 1 DROP: 3 SOLUTION/ DROPS OPHTHALMIC at 18:29

## 2022-08-01 RX ADMIN — CIPROFLOXACIN HYDROCHLORIDE 1 DROP: 3 SOLUTION/ DROPS OPHTHALMIC at 09:56

## 2022-08-01 NOTE — PLAN OF CARE
Problem: MOBILITY - ADULT  Goal: Maintain or return to baseline ADL function  Description: INTERVENTIONS:  -  Assess patient's ability to carry out ADLs; assess patient's baseline for ADL function and identify physical deficits which impact ability to perform ADLs (bathing, care of mouth/teeth, toileting, grooming, dressing, etc )  - Assess/evaluate cause of self-care deficits   - Assess range of motion  - Assess patient's mobility; develop plan if impaired  - Assess patient's need for assistive devices and provide as appropriate  - Encourage maximum independence but intervene and supervise when necessary  - Involve family in performance of ADLs  - Assess for home care needs following discharge   - Consider OT consult to assist with ADL evaluation and planning for discharge  - Provide patient education as appropriate  Outcome: Not Progressing  Goal: Maintains/Returns to pre admission functional level  Description: INTERVENTIONS:  - Perform BMAT or MOVE assessment daily    - Set and communicate daily mobility goal to care team and patient/family/caregiver  - Collaborate with rehabilitation services on mobility goals if consulted  - Perform Range of Motion 3 times a day  - Reposition patient every 2 hours    - Dangle patient 3 times a day  - Stand patient 3 times a day  - Ambulate patient 3 times a day  - Out of bed to chair 3 times a day   - Out of bed for meals 3 times a day  - Out of bed for toileting  - Record patient progress and toleration of activity level   Outcome: Not Progressing

## 2022-08-01 NOTE — PLAN OF CARE
Problem: MOBILITY - ADULT  Goal: Maintain or return to baseline ADL function  Description: INTERVENTIONS:  -  Assess patient's ability to carry out ADLs; assess patient's baseline for ADL function and identify physical deficits which impact ability to perform ADLs (bathing, care of mouth/teeth, toileting, grooming, dressing, etc )  - Assess/evaluate cause of self-care deficits   - Assess range of motion  - Assess patient's mobility; develop plan if impaired  - Assess patient's need for assistive devices and provide as appropriate  - Encourage maximum independence but intervene and supervise when necessary  - Involve family in performance of ADLs  - Assess for home care needs following discharge   - Consider OT consult to assist with ADL evaluation and planning for discharge  - Provide patient education as appropriate  Outcome: Progressing  Goal: Maintains/Returns to pre admission functional level  Description: INTERVENTIONS:  - Perform BMAT or MOVE assessment daily    - Set and communicate daily mobility goal to care team and patient/family/caregiver  - Collaborate with rehabilitation services on mobility goals if consulted  - Perform Range of Motion 3 times a day  - Reposition patient every 2 hours    - Dangle patient 3 times a day  - Stand patient 3 times a day  - Ambulate patient 3 times a day  - Out of bed to chair 3 times a day   - Out of bed for meals 3 times a day  - Out of bed for toileting  - Record patient progress and toleration of activity level   Outcome: Progressing     Problem: Potential for Falls  Goal: Patient will remain free of falls  Description: INTERVENTIONS:  - Educate patient/family on patient safety including physical limitations  - Instruct patient to call for assistance with activity   - Consult OT/PT to assist with strengthening/mobility   - Keep Call bell within reach  - Keep bed low and locked with side rails adjusted as appropriate  - Keep care items and personal belongings within reach  - Initiate and maintain comfort rounds  - Make Fall Risk Sign visible to staff  - Offer Toileting every 1 Hours, in advance of need  - Initiate/Maintain alarm  - Obtain necessary fall risk management equipment  - Apply yellow socks and bracelet for high fall risk patients  - Consider moving patient to room near nurses station  Outcome: Progressing     Problem: Prexisting or High Potential for Compromised Skin Integrity  Goal: Skin integrity is maintained or improved  Description: INTERVENTIONS:  - Identify patients at risk for skin breakdown  - Assess and monitor skin integrity  - Assess and monitor nutrition and hydration status  - Monitor labs   - Assess for incontinence   - Turn and reposition patient  - Assist with mobility/ambulation  - Relieve pressure over bony prominences  - Avoid friction and shearing  - Provide appropriate hygiene as needed including keeping skin clean and dry  - Evaluate need for skin moisturizer/barrier cream  - Collaborate with interdisciplinary team   - Patient/family teaching  - Consider wound care consult   Outcome: Progressing     Problem: Neurological Deficit  Goal: Neurological status is stable or improving  Description: Interventions:  - Monitor and assess patient's level of consciousness, motor function, sensory function, and level of assistance needed for ADLs  - Monitor and report changes from baseline  Collaborate with interdisciplinary team to initiate plan and implement interventions as ordered  - Provide and maintain a safe environment  - Consider seizure precautions  - Consider fall precautions  - Consider aspiration precautions  - Consider bleeding precautions  Outcome: Progressing     Problem: Activity Intolerance/Impaired Mobility  Goal: Mobility/activity is maintained at optimum level for patient  Description: Interventions:  - Assess and monitor patient  barriers to mobility and need for assistive/adaptive devices    - Assess patient's emotional response to limitations  - Collaborate with interdisciplinary team and initiate plans and interventions as ordered  - Encourage independent activity per ability   - Maintain proper body alignment  - Perform active/passive rom as tolerated/ordered  - Plan activities to conserve energy   - Turn patient as appropriate  Outcome: Progressing     Problem: Communication Impairment  Goal: Ability to express needs and understand communication  Description: Assess patient's communication skills and ability to understand information  Patient will demonstrate use of effective communication techniques, alternative methods of communication and understanding even if not able to speak  - Encourage communication and provide alternate methods of communication as needed  - Collaborate with case management/ for discharge needs  - Include patient/family/caregiver in decisions related to communication  Outcome: Progressing     Problem: Potential for Aspiration  Goal: Non-ventilated patient's risk of aspiration is minimized  Description: Assess and monitor vital signs, respiratory status, and labs (WBC)  Monitor for signs of aspiration (tachypnea, cough, rales, wheezing, cyanosis, fever)  - Assess and monitor patient's ability to swallow  - Place patient up in chair to eat if possible  - HOB up at 90 degrees to eat if unable to get patient up into chair   - Supervise patient during oral intake  - Instruct patient/ family to take small bites  - Instruct patient/ family to take small single sips when taking liquids  - Follow patient-specific strategies generated by speech pathologist   Outcome: Progressing     Problem: Nutrition  Goal: Nutrition/Hydration status is improving  Description: Monitor and assess patient's nutrition/hydration status for malnutrition (ex- brittle hair, bruises, dry skin, pale skin and conjunctiva, muscle wasting, smooth red tongue, and disorientation)   Collaborate with interdisciplinary team and initiate plan and interventions as ordered  Monitor patient's weight and dietary intake as ordered or per policy  Utilize nutrition screening tool and intervene per policy  Determine patient's food preferences and provide high-protein, high-caloric foods as appropriate  - Assist patient with eating   - Allow adequate time for meals   - Encourage patient to take dietary supplement as ordered  - Collaborate with clinical nutritionist   - Include patient/family/caregiver in decisions related to nutrition  Outcome: Progressing     Problem: Nutrition/Hydration-ADULT  Goal: Nutrient/Hydration intake appropriate for improving, restoring or maintaining nutritional needs  Description: Monitor and assess patient's nutrition/hydration status for malnutrition  Collaborate with interdisciplinary team and initiate plan and interventions as ordered  Monitor patient's weight and dietary intake as ordered or per policy  Utilize nutrition screening tool and intervene as necessary  Determine patient's food preferences and provide high-protein, high-caloric foods as appropriate       INTERVENTIONS:  - Monitor oral intake, urinary output, labs, and treatment plans  - Assess nutrition and hydration status and recommend course of action  - Evaluate amount of meals eaten  - Assist patient with eating if necessary   - Allow adequate time for meals  - Recommend/ encourage appropriate diets, oral nutritional supplements, and vitamin/mineral supplements  - Order, calculate, and assess calorie counts as needed  - Recommend, monitor, and adjust tube feedings and TPN/PPN based on assessed needs  - Assess need for intravenous fluids  - Provide specific nutrition/hydration education as appropriate  - Include patient/family/caregiver in decisions related to nutrition  Outcome: Progressing     Problem: NEUROSENSORY - ADULT  Goal: Achieves stable or improved neurological status  Description: INTERVENTIONS  - Monitor and report changes in neurological status  - Monitor vital signs such as temperature, blood pressure, glucose, and any other labs ordered   - Initiate measures to prevent increased intracranial pressure  - Monitor for seizure activity and implement precautions if appropriate      Outcome: Progressing  Goal: Achieves maximal functionality and self care  Description: INTERVENTIONS  - Monitor swallowing and airway patency with patient fatigue and changes in neurological status  - Encourage and assist patient to increase activity and self care     - Encourage visually impaired, hearing impaired and aphasic patients to use assistive/communication devices  Outcome: Progressing     Problem: CARDIOVASCULAR - ADULT  Goal: Maintains optimal cardiac output and hemodynamic stability  Description: INTERVENTIONS:  - Monitor I/O, vital signs and rhythm  - Monitor for S/S and trends of decreased cardiac output  - Administer and titrate ordered vasoactive medications to optimize hemodynamic stability  - Assess quality of pulses, skin color and temperature  - Assess for signs of decreased coronary artery perfusion  - Instruct patient to report change in severity of symptoms  Outcome: Progressing  Goal: Absence of cardiac dysrhythmias or at baseline rhythm  Description: INTERVENTIONS:  - Continuous cardiac monitoring, vital signs, obtain 12 lead EKG if ordered  - Administer antiarrhythmic and heart rate control medications as ordered  - Monitor electrolytes and administer replacement therapy as ordered  Outcome: Progressing     Problem: RESPIRATORY - ADULT  Goal: Achieves optimal ventilation and oxygenation  Description: INTERVENTIONS:  - Assess for changes in respiratory status  - Assess for changes in mentation and behavior  - Position to facilitate oxygenation and minimize respiratory effort  - Oxygen administered by appropriate delivery if ordered  - Initiate smoking cessation education as indicated  - Encourage broncho-pulmonary hygiene including cough, deep breathe, Incentive Spirometry  - Assess the need for suctioning and aspirate as needed  - Assess and instruct to report SOB or any respiratory difficulty  - Respiratory Therapy support as indicated  Outcome: Progressing     Problem: GASTROINTESTINAL - ADULT  Goal: Maintains or returns to baseline bowel function  Description: INTERVENTIONS:  - Assess bowel function  - Encourage oral fluids to ensure adequate hydration  - Administer IV fluids if ordered to ensure adequate hydration  - Administer ordered medications as needed  - Encourage mobilization and activity  - Consider nutritional services referral to assist patient with adequate nutrition and appropriate food choices  Outcome: Progressing  Goal: Maintains adequate nutritional intake  Description: INTERVENTIONS:  - Monitor percentage of each meal consumed  - Identify factors contributing to decreased intake, treat as appropriate  - Assist with meals as needed  - Monitor I&O, weight, and lab values if indicated  - Obtain nutrition services referral as needed  Outcome: Progressing     Problem: GENITOURINARY - ADULT  Goal: Maintains or returns to baseline urinary function  Description: INTERVENTIONS:  - Assess urinary function  - Encourage oral fluids to ensure adequate hydration if ordered  - Administer IV fluids as ordered to ensure adequate hydration  - Administer ordered medications as needed  - Offer frequent toileting  - Follow urinary retention protocol if ordered  Outcome: Progressing  Goal: Urinary catheter remains patent  Description: INTERVENTIONS:  - Assess patency of urinary catheter  - If patient has a chronic schaefer, consider changing catheter if non-functioning  - Follow guidelines for intermittent irrigation of non-functioning urinary catheter  Outcome: Progressing     Problem: METABOLIC, FLUID AND ELECTROLYTES - ADULT  Goal: Electrolytes maintained within normal limits  Description: INTERVENTIONS:  - Monitor labs and assess patient for signs and symptoms of electrolyte imbalances  - Administer electrolyte replacement as ordered  - Monitor response to electrolyte replacements, including repeat lab results as appropriate  - Instruct patient on fluid and nutrition as appropriate  Outcome: Progressing  Goal: Fluid balance maintained  Description: INTERVENTIONS:  - Monitor labs   - Monitor I/O and WT  - Instruct patient on fluid and nutrition as appropriate  - Assess for signs & symptoms of volume excess or deficit  Outcome: Progressing  Goal: Glucose maintained within target range  Description: INTERVENTIONS:  - Monitor Blood Glucose as ordered  - Assess for signs and symptoms of hyperglycemia and hypoglycemia  - Administer ordered medications to maintain glucose within target range  - Assess nutritional intake and initiate nutrition service referral as needed  Outcome: Progressing     Problem: SKIN/TISSUE INTEGRITY - ADULT  Goal: Skin Integrity remains intact(Skin Breakdown Prevention)  Description: Assess:  -Perform Wei assessment every shift  -Clean and moisturize skin every daily and as needed  -Inspect skin when repositioning, toileting, and assisting with ADLS  -Assess under medical devices such as peg tube every shift  -Assess extremities for adequate circulation and sensation     Bed Management:  -Have minimal linens on bed & keep smooth, unwrinkled  -Change linens as needed when moist or perspiring  -Avoid sitting or lying in one position for more than 2 hours while in bed  -Keep HOB at 30 degrees     Toileting:  -Offer bedside commode  -Assess for incontinence every hour  -Use incontinent care products after each incontinent episode such as foam cleanser, moisture barriers     Activity:  -Mobilize patient 3 times a day  -Encourage activity and walks on unit  -Encourage or provide ROM exercises   -Turn and reposition patient every 2 Hours  -Use appropriate equipment to lift or move patient in bed  -Instruct/ Assist with weight shifting every 15 min when out of bed in chair  -Consider limitation of chair time 2 hour intervals    Skin Care:  -Avoid use of baby powder, tape, friction and shearing, hot water or constrictive clothing  -Relieve pressure over bony prominences using allevyn dressings and/or moisture barrier cream  -Do not massage red bony areas    Next Steps:  -Teach patient strategies to minimize risks    -Consider consults to  interdisciplinary teams such as wound care as necessary  Outcome: Progressing  Goal: Incision(s), wounds(s) or drain site(s) healing without S/S of infection  Description: INTERVENTIONS  - Assess and document dressing, incision, wound bed, drain sites and surrounding tissue  - Provide patient and family education  - Perform skin care/dressing changes every shift and as needed  Outcome: Progressing     Problem: HEMATOLOGIC - ADULT  Goal: Maintains hematologic stability  Description: INTERVENTIONS  - Assess for signs and symptoms of bleeding or hemorrhage  - Monitor labs  - Administer supportive blood products/factors as ordered and appropriate  Outcome: Progressing     Problem: MUSCULOSKELETAL - ADULT  Goal: Maintain or return mobility to safest level of function  Description: INTERVENTIONS:  - Assess patient's ability to carry out ADLs; assess patient's baseline for ADL function and identify physical deficits which impact ability to perform ADLs (bathing, care of mouth/teeth, toileting, grooming, dressing, etc )  - Assess/evaluate cause of self-care deficits   - Assess range of motion  - Assess patient's mobility  - Assess patient's need for assistive devices and provide as appropriate  - Encourage maximum independence but intervene and supervise when necessary  - Involve family in performance of ADLs  - Assess for home care needs following discharge   - Consider OT consult to assist with ADL evaluation and planning for discharge  - Provide patient education as appropriate  Outcome: Progressing  Goal: Maintain proper alignment of affected body part  Description: INTERVENTIONS:  - Support, maintain and protect limb and body alignment  - Provide patient/ family with appropriate education  Outcome: Progressing     Problem: COPING  Goal: Pt/Family able to verbalize concerns and demonstrate effective coping strategies  Description: INTERVENTIONS:  - Assist patient/family to identify coping skills, available support systems and cultural and spiritual values  - Provide emotional support, including active listening and acknowledgement of concerns of patient and caregivers  - Reduce environmental stimuli, as able  - Provide patient education  - Assess for spiritual pain/suffering and initiate spiritual care, including notification of Pastoral Care or nida based community as needed  - Assess effectiveness of coping strategies  Outcome: Progressing  Goal: Will report anxiety at manageable levels  Description: INTERVENTIONS:  - Administer medication as ordered  - Teach and encourage coping skills  - Provide emotional support  - Assess patient/family for anxiety and ability to cope  Outcome: Progressing     Problem: INFECTION - ADULT  Goal: Absence or prevention of progression during hospitalization  Description: INTERVENTIONS:  - Assess and monitor for signs and symptoms of infection  - Monitor lab/diagnostic results  - Monitor all insertion sites, i e  indwelling lines, tubes, and drains  - Monitor endotracheal if appropriate and nasal secretions for changes in amount and color  - [place] appropriate cooling/warming therapies per order  - Administer medications as ordered  - Instruct and encourage patient and family to use good hand hygiene technique  - Identify and instruct in appropriate isolation precautions for identified infection/condition  Outcome: Progressing     Problem: SAFETY ADULT  Goal: Maintain or return to baseline ADL function  Description: INTERVENTIONS:  -  Assess patient's ability to carry out ADLs; assess patient's baseline for ADL function and identify physical deficits which impact ability to perform ADLs (bathing, care of mouth/teeth, toileting, grooming, dressing, etc )  - Assess/evaluate cause of self-care deficits   - Assess range of motion  - Assess patient's mobility; develop plan if impaired  - Assess patient's need for assistive devices and provide as appropriate  - Encourage maximum independence but intervene and supervise when necessary  - Involve family in performance of ADLs  - Assess for home care needs following discharge   - Consider OT consult to assist with ADL evaluation and planning for discharge  - Provide patient education as appropriate  Outcome: Progressing  Goal: Maintains/Returns to pre admission functional level  Description: INTERVENTIONS:  - Perform BMAT or MOVE assessment daily    - Set and communicate daily mobility goal to care team and patient/family/caregiver  - Collaborate with rehabilitation services on mobility goals if consulted  - Perform Range of Motion 3 times a day  - Reposition patient every 2 hours    - Dangle patient 3 times a day  - Stand patient 3 times a day  - Ambulate patient 3 times a day  - Out of bed to chair 3 times a day   - Out of bed for meals 3 times a day  - Out of bed for toileting  - Record patient progress and toleration of activity level   Outcome: Progressing  Goal: Patient will remain free of falls  Description: INTERVENTIONS:  - Educate patient/family on patient safety including physical limitations  - Instruct patient to call for assistance with activity   - Consult OT/PT to assist with strengthening/mobility   - Keep Call bell within reach  - Keep bed low and locked with side rails adjusted as appropriate  - Keep care items and personal belongings within reach  - Initiate and maintain comfort rounds  - Make Fall Risk Sign visible to staff  - Offer Toileting every 2 Hours, in advance of need  - Initiate/Maintain alarm  - Obtain necessary fall risk management equipment  - Apply yellow socks and bracelet for high fall risk patients  - Consider moving patient to room near nurses station  Outcome: Progressing     Problem: DISCHARGE PLANNING  Goal: Discharge to home or other facility with appropriate resources  Description: INTERVENTIONS:  - Identify barriers to discharge w/patient and caregiver  - Arrange for needed discharge resources and transportation as appropriate  - Identify discharge learning needs (meds, wound care, etc )  - Arrange for interpretive services to assist at discharge as needed  - Refer to Case Management Department for coordinating discharge planning if the patient needs post-hospital services based on physician/advanced practitioner order or complex needs related to functional status, cognitive ability, or social support system  Outcome: Progressing     Problem: Knowledge Deficit  Goal: Patient/family/caregiver demonstrates understanding of disease process, treatment plan, medications, and discharge instructions  Description: Complete learning assessment and assess knowledge base    Interventions:  - Provide teaching at level of understanding  - Provide teaching via preferred learning methods  Outcome: Progressing

## 2022-08-02 ENCOUNTER — TELEPHONE (OUTPATIENT)
Dept: GASTROENTEROLOGY | Facility: MEDICAL CENTER | Age: 87
End: 2022-08-02

## 2022-08-02 LAB
GLUCOSE SERPL-MCNC: 166 MG/DL (ref 65–140)
GLUCOSE SERPL-MCNC: 173 MG/DL (ref 65–140)
GLUCOSE SERPL-MCNC: 179 MG/DL (ref 65–140)
GLUCOSE SERPL-MCNC: 186 MG/DL (ref 65–140)

## 2022-08-02 PROCEDURE — 97535 SELF CARE MNGMENT TRAINING: CPT

## 2022-08-02 PROCEDURE — 94640 AIRWAY INHALATION TREATMENT: CPT

## 2022-08-02 PROCEDURE — 97110 THERAPEUTIC EXERCISES: CPT

## 2022-08-02 PROCEDURE — 97530 THERAPEUTIC ACTIVITIES: CPT

## 2022-08-02 PROCEDURE — 99232 SBSQ HOSP IP/OBS MODERATE 35: CPT | Performed by: INTERNAL MEDICINE

## 2022-08-02 PROCEDURE — 94760 N-INVAS EAR/PLS OXIMETRY 1: CPT

## 2022-08-02 PROCEDURE — 82948 REAGENT STRIP/BLOOD GLUCOSE: CPT

## 2022-08-02 RX ADMIN — INSULIN LISPRO 1 UNITS: 100 INJECTION, SOLUTION INTRAVENOUS; SUBCUTANEOUS at 00:45

## 2022-08-02 RX ADMIN — INSULIN LISPRO 1 UNITS: 100 INJECTION, SOLUTION INTRAVENOUS; SUBCUTANEOUS at 18:09

## 2022-08-02 RX ADMIN — LEVALBUTEROL HYDROCHLORIDE 1.25 MG: 1.25 SOLUTION, CONCENTRATE RESPIRATORY (INHALATION) at 08:00

## 2022-08-02 RX ADMIN — METOPROLOL TARTRATE 25 MG: 25 TABLET, FILM COATED ORAL at 09:14

## 2022-08-02 RX ADMIN — CHLORHEXIDINE GLUCONATE 15 ML: 1.2 SOLUTION ORAL at 09:14

## 2022-08-02 RX ADMIN — METOPROLOL TARTRATE 25 MG: 25 TABLET, FILM COATED ORAL at 21:59

## 2022-08-02 RX ADMIN — CIPROFLOXACIN HYDROCHLORIDE 1 DROP: 3 SOLUTION/ DROPS OPHTHALMIC at 14:13

## 2022-08-02 RX ADMIN — BUDESONIDE 0.5 MG: 0.5 INHALANT ORAL at 20:00

## 2022-08-02 RX ADMIN — INSULIN LISPRO 1 UNITS: 100 INJECTION, SOLUTION INTRAVENOUS; SUBCUTANEOUS at 06:33

## 2022-08-02 RX ADMIN — INSULIN LISPRO 1 UNITS: 100 INJECTION, SOLUTION INTRAVENOUS; SUBCUTANEOUS at 12:35

## 2022-08-02 RX ADMIN — LEVALBUTEROL HYDROCHLORIDE 1.25 MG: 1.25 SOLUTION, CONCENTRATE RESPIRATORY (INHALATION) at 20:00

## 2022-08-02 RX ADMIN — CIPROFLOXACIN HYDROCHLORIDE 1 DROP: 3 SOLUTION/ DROPS OPHTHALMIC at 18:09

## 2022-08-02 RX ADMIN — ATORVASTATIN CALCIUM 80 MG: 80 TABLET, FILM COATED ORAL at 18:09

## 2022-08-02 RX ADMIN — WHITE PETROLATUM 57.7 %-MINERAL OIL 31.9 % EYE OINTMENT: at 22:00

## 2022-08-02 RX ADMIN — BUDESONIDE 0.5 MG: 0.5 INHALANT ORAL at 08:00

## 2022-08-02 RX ADMIN — CIPROFLOXACIN HYDROCHLORIDE 1 DROP: 3 SOLUTION/ DROPS OPHTHALMIC at 22:00

## 2022-08-02 RX ADMIN — CIPROFLOXACIN HYDROCHLORIDE 1 DROP: 3 SOLUTION/ DROPS OPHTHALMIC at 06:27

## 2022-08-02 RX ADMIN — ASPIRIN 81 MG CHEWABLE TABLET 81 MG: 81 TABLET CHEWABLE at 09:14

## 2022-08-02 RX ADMIN — CHLORHEXIDINE GLUCONATE 15 ML: 1.2 SOLUTION ORAL at 22:00

## 2022-08-02 RX ADMIN — CIPROFLOXACIN HYDROCHLORIDE 1 DROP: 3 SOLUTION/ DROPS OPHTHALMIC at 09:14

## 2022-08-02 NOTE — PHYSICAL THERAPY NOTE
PHYSICAL THERAPY NOTE          Patient Name: Marion VAZQUEZ Date: 8/2/2022 08/02/22 1026   PT Last Visit   PT Visit Date 08/02/22   Note Type   Note Type Treatment   Pain Assessment   Pain Assessment Tool FLACC   Pain Score No Pain   Restrictions/Precautions   Weight Bearing Precautions Per Order No   Other Precautions Cognitive; Chair Alarm; Bed Alarm; Fall Risk;Multiple lines;Telemetry  (R>L hemipareiss, R GHJ inferior subluxation, R intattention/ L gaze preference,  nonverbal, global aphasia , +PEG,)   General   Chart Reviewed Yes   Response to Previous Treatment Patient unable to report, no changes reported from family or staff   Family/Caregiver Present Yes  (daughter)   Cognition   Overall Cognitive Status Impaired   Arousal/Participation Lethargic   Attention Difficulty attending to directions   Orientation Level Unable to assess   Memory Unable to assess   Following Commands Follows one step commands inconsistently   Comments nonverbal  global aphasia   Bed Mobility   Supine to Sit 1  Dependent   Additional items Assist x 2; Increased time required;Verbal cues;LE management   Sit to Supine 1  Dependent   Additional items Assist x 2; Increased time required;Verbal cues;LE management   Transfers   Sit to Stand Unable to assess   Balance   Static Sitting Poor +   Dynamic Sitting Poor   Static Standing Zero   Endurance Deficit   Endurance Deficit Yes   Endurance Deficit Description fatigue, weakness   Activity Tolerance   Activity Tolerance Patient limited by fatigue   Medical Staff Made Aware [place], [address] pt cleared to see and mobilize per nursing   Exercises   Hip Flexion Sitting;10 reps;PROM; Bilateral   Knee AROM Long Arc Quad Sitting;10 reps;AAROM; Bilateral  (3-/5 L, 1/5 R; c distal quad tapping)   Ankle Pumps Sitting;10 reps;AAROM; Bilateral  (3-/5 DF L, 1/5 DF R; c quick stretch)   Neuro re-ed lateral lean onto elbow x3 each direction with modAx1   Balance training  sat EOB x18 min with min-modAx1   Assessment   Prognosis Guarded   Problem List Decreased strength;Decreased endurance; Impaired balance;Decreased mobility; Decreased coordination;Decreased cognition; Impaired judgement;Decreased safety awareness; Impaired sensation; Impaired tone; Impaired vision;Orthopedic restrictions   Assessment Pt nonverbal with ?global aphasia  Pt performed functional mobility and therex as outlined above  R>L weakness noted in UE and LE  R GHJ subluxation noted-pillow placed under RUE while sitting EOB and WB through elbow endorsed throughout session  Encouraged pt to attend to R visual field throughout with poor carry over  Improved participation in LE therex with counting to 10 in [place]  Pt left supine in bed with bed alarm, call bell, phone, and all personal needs within reach  Pt will continue to benefit from skilled acute care PT to further address their functional mobility limitations  D/C recommendations remain rehab  Barriers to Discharge Inaccessible home environment;Decreased caregiver support   Goals   Patient Goals none stated   STG Expiration Date 08/08/22   PT Treatment Day 2   Plan   Treatment/Interventions LE strengthening/ROM; Functional transfer training; Therapeutic exercise; Endurance training;Cognitive reorientation;Equipment eval/education;Patient/family training;Bed mobility;Gait training;Spoke to nursing;Spoke to case management;OT   Progress Slow progress, decreased activity tolerance   PT Frequency 3-5x/wk   Recommendation   PT Discharge Recommendation Post acute rehabilitation services   AM-PAC Basic Mobility Inpatient   Turning in Bed Without Bedrails 1   Lying on Back to Sitting on Edge of Flat Bed 1   Moving Bed to Chair 1   Standing Up From Chair 1   Walk in Room 1   Climb 3-5 Stairs 1   Basic Mobility Inpatient Raw Score 6   Turning Head Towards Sound 1   Follow Simple Instructions 1   Low Function Basic Mobility Raw Score 8   Low Function Basic Mobility Standardized Score 10 37   Highest Level Of Mobility   -HLM Goal 2: Bed activities/Dependent transfer   -HL Achieved 3: Sit at edge of bed   Lucie Ortiz PT, DPT

## 2022-08-02 NOTE — PLAN OF CARE
Problem: OCCUPATIONAL THERAPY ADULT  Goal: Performs self-care activities at highest level of function for planned discharge setting  See evaluation for individualized goals  Description: Treatment Interventions: ADL retraining          See flowsheet documentation for full assessment, interventions and recommendations  Outcome: Progressing  Note: Limitation: Decreased ADL status, Decreased UE ROM, Decreased UE strength, Decreased Safe judgement during ADL, Decreased cognition, Decreased endurance, Decreased sensation, Visual deficit, Decreased self-care trans, Decreased fine motor control, Decreased high-level ADLs  Prognosis: Fair  Assessment: Pt seen for participation occupational therapy session with focus on activity tolerance, bed mob and sitting balance tolerance for pt engagement in UB self-care tasks  Pt cleared by RN/hope for pt participation in therapy session  Pt presented supine/HOB raised pt identifiers confirm  Pt family member daughter present and supportive of pt throughout session throughout session  Pt required assistance x2 for bed mob 2* to pt deconditioning and decreased overall strength  Pt continues to require assistance for unsupported sitting balance 2* to decreased strength  Pt required hand over hand assist to complete grooming tasks/washing face and brushing hair with use of left UE  Pt was noted to resist hand over hand technique 2* to pt cognitive deficits  Pt open eyes minimally and was nonverbal throughout session  Pt able to tolerate session well with no untoward issues noted for pt vitals  Pt will require post acute rehab service to continue to address pt's noted deficits with decreased strength balance coordination and deconditioning which currently impairs pt's ADL and functional mob  Pt was returned to bed post session bed alarm activated and all needs within reach       OT Discharge Recommendation: Post acute rehabilitation services

## 2022-08-02 NOTE — PLAN OF CARE
Problem: MOBILITY - ADULT  Goal: Maintain or return to baseline ADL function  Description: INTERVENTIONS:  -  Assess patient's ability to carry out ADLs; assess patient's baseline for ADL function and identify physical deficits which impact ability to perform ADLs (bathing, care of mouth/teeth, toileting, grooming, dressing, etc )  - Assess/evaluate cause of self-care deficits   - Assess range of motion  - Assess patient's mobility; develop plan if impaired  - Assess patient's need for assistive devices and provide as appropriate  - Encourage maximum independence but intervene and supervise when necessary  - Involve family in performance of ADLs  - Assess for home care needs following discharge   - Consider OT consult to assist with ADL evaluation and planning for discharge  - Provide patient education as appropriate  Outcome: Progressing  Goal: Maintains/Returns to pre admission functional level  Description: INTERVENTIONS:  - Perform BMAT or MOVE assessment daily    - Set and communicate daily mobility goal to care team and patient/family/caregiver  - Collaborate with rehabilitation services on mobility goals if consulted  - Perform Range of Motion 3 times a day  - Reposition patient every 2 hours    - Dangle patient 3 times a day  - Stand patient 3 times a day  - Ambulate patient 3 times a day  - Out of bed to chair 3 times a day   - Out of bed for meals 3 times a day  - Out of bed for toileting  - Record patient progress and toleration of activity level   Outcome: Progressing     Problem: Potential for Falls  Goal: Patient will remain free of falls  Description: INTERVENTIONS:  - Educate patient/family on patient safety including physical limitations  - Instruct patient to call for assistance with activity   - Consult OT/PT to assist with strengthening/mobility   - Keep Call bell within reach  - Keep bed low and locked with side rails adjusted as appropriate  - Keep care items and personal belongings within reach  - Initiate and maintain comfort rounds  - Make Fall Risk Sign visible to staff  - Offer Toileting every 2 Hours, in advance of need  - Initiate/Maintain bed alarm  - Obtain necessary fall risk management equipment: alarms  - Apply yellow socks and bracelet for high fall risk patients  - Consider moving patient to room near nurses station  Outcome: Progressing     Problem: Prexisting or High Potential for Compromised Skin Integrity  Goal: Skin integrity is maintained or improved  Description: INTERVENTIONS:  - Identify patients at risk for skin breakdown  - Assess and monitor skin integrity  - Assess and monitor nutrition and hydration status  - Monitor labs   - Assess for incontinence   - Turn and reposition patient  - Assist with mobility/ambulation  - Relieve pressure over bony prominences  - Avoid friction and shearing  - Provide appropriate hygiene as needed including keeping skin clean and dry  - Evaluate need for skin moisturizer/barrier cream  - Collaborate with interdisciplinary team   - Patient/family teaching  - Consider wound care consult   Outcome: Progressing     Problem: Neurological Deficit  Goal: Neurological status is stable or improving  Description: Interventions:  - Monitor and assess patient's level of consciousness, motor function, sensory function, and level of assistance needed for ADLs  - Monitor and report changes from baseline  Collaborate with interdisciplinary team to initiate plan and implement interventions as ordered  - Provide and maintain a safe environment  - Consider seizure precautions  - Consider fall precautions  - Consider aspiration precautions  - Consider bleeding precautions  Outcome: Progressing     Problem: Activity Intolerance/Impaired Mobility  Goal: Mobility/activity is maintained at optimum level for patient  Description: Interventions:  - Assess and monitor patient  barriers to mobility and need for assistive/adaptive devices    - Assess patient's emotional response to limitations  - Collaborate with interdisciplinary team and initiate plans and interventions as ordered  - Encourage independent activity per ability   - Maintain proper body alignment  - Perform active/passive rom as tolerated/ordered  - Plan activities to conserve energy   - Turn patient as appropriate  Outcome: Progressing     Problem: Communication Impairment  Goal: Ability to express needs and understand communication  Description: Assess patient's communication skills and ability to understand information  Patient will demonstrate use of effective communication techniques, alternative methods of communication and understanding even if not able to speak  - Encourage communication and provide alternate methods of communication as needed  - Collaborate with case management/ for discharge needs  - Include patient/family/caregiver in decisions related to communication  Outcome: Progressing     Problem: Potential for Aspiration  Goal: Non-ventilated patient's risk of aspiration is minimized  Description: Assess and monitor vital signs, respiratory status, and labs (WBC)  Monitor for signs of aspiration (tachypnea, cough, rales, wheezing, cyanosis, fever)  - Assess and monitor patient's ability to swallow  - Place patient up in chair to eat if possible  - HOB up at 90 degrees to eat if unable to get patient up into chair   - Supervise patient during oral intake  - Instruct patient/ family to take small bites  - Instruct patient/ family to take small single sips when taking liquids  - Follow patient-specific strategies generated by speech pathologist   Outcome: Progressing     Problem: Nutrition  Goal: Nutrition/Hydration status is improving  Description: Monitor and assess patient's nutrition/hydration status for malnutrition (ex- brittle hair, bruises, dry skin, pale skin and conjunctiva, muscle wasting, smooth red tongue, and disorientation)   Collaborate with interdisciplinary team and initiate plan and interventions as ordered  Monitor patient's weight and dietary intake as ordered or per policy  Utilize nutrition screening tool and intervene per policy  Determine patient's food preferences and provide high-protein, high-caloric foods as appropriate  - Assist patient with eating   - Allow adequate time for meals   - Encourage patient to take dietary supplement as ordered  - Collaborate with clinical nutritionist   - Include patient/family/caregiver in decisions related to nutrition  Outcome: Progressing     Problem: Nutrition/Hydration-ADULT  Goal: Nutrient/Hydration intake appropriate for improving, restoring or maintaining nutritional needs  Description: Monitor and assess patient's nutrition/hydration status for malnutrition  Collaborate with interdisciplinary team and initiate plan and interventions as ordered  Monitor patient's weight and dietary intake as ordered or per policy  Utilize nutrition screening tool and intervene as necessary  Determine patient's food preferences and provide high-protein, high-caloric foods as appropriate       INTERVENTIONS:  - Monitor oral intake, urinary output, labs, and treatment plans  - Assess nutrition and hydration status and recommend course of action  - Evaluate amount of meals eaten  - Assist patient with eating if necessary   - Allow adequate time for meals  - Recommend/ encourage appropriate diets, oral nutritional supplements, and vitamin/mineral supplements  - Order, calculate, and assess calorie counts as needed  - Recommend, monitor, and adjust tube feedings and TPN/PPN based on assessed needs  - Assess need for intravenous fluids  - Provide specific nutrition/hydration education as appropriate  - Include patient/family/caregiver in decisions related to nutrition  Outcome: Progressing     Problem: NEUROSENSORY - ADULT  Goal: Achieves stable or improved neurological status  Description: INTERVENTIONS  - Monitor and report changes in neurological status  - Monitor vital signs such as temperature, blood pressure, glucose, and any other labs ordered   - Initiate measures to prevent increased intracranial pressure  - Monitor for seizure activity and implement precautions if appropriate      Outcome: Progressing  Goal: Achieves maximal functionality and self care  Description: INTERVENTIONS  - Monitor swallowing and airway patency with patient fatigue and changes in neurological status  - Encourage and assist patient to increase activity and self care     - Encourage visually impaired, hearing impaired and aphasic patients to use assistive/communication devices  Outcome: Progressing     Problem: CARDIOVASCULAR - ADULT  Goal: Maintains optimal cardiac output and hemodynamic stability  Description: INTERVENTIONS:  - Monitor I/O, vital signs and rhythm  - Monitor for S/S and trends of decreased cardiac output  - Administer and titrate ordered vasoactive medications to optimize hemodynamic stability  - Assess quality of pulses, skin color and temperature  - Assess for signs of decreased coronary artery perfusion  - Instruct patient to report change in severity of symptoms  Outcome: Progressing  Goal: Absence of cardiac dysrhythmias or at baseline rhythm  Description: INTERVENTIONS:  - Continuous cardiac monitoring, vital signs, obtain 12 lead EKG if ordered  - Administer antiarrhythmic and heart rate control medications as ordered  - Monitor electrolytes and administer replacement therapy as ordered  Outcome: Progressing     Problem: RESPIRATORY - ADULT  Goal: Achieves optimal ventilation and oxygenation  Description: INTERVENTIONS:  - Assess for changes in respiratory status  - Assess for changes in mentation and behavior  - Position to facilitate oxygenation and minimize respiratory effort  - Oxygen administered by appropriate delivery if ordered  - Initiate smoking cessation education as indicated  - Encourage broncho-pulmonary hygiene including cough, deep breathe, Incentive Spirometry  - Assess the need for suctioning and aspirate as needed  - Assess and instruct to report SOB or any respiratory difficulty  - Respiratory Therapy support as indicated  Outcome: Progressing     Problem: GASTROINTESTINAL - ADULT  Goal: Maintains or returns to baseline bowel function  Description: INTERVENTIONS:  - Assess bowel function  - Encourage oral fluids to ensure adequate hydration  - Administer IV fluids if ordered to ensure adequate hydration  - Administer ordered medications as needed  - Encourage mobilization and activity  - Consider nutritional services referral to assist patient with adequate nutrition and appropriate food choices  Outcome: Progressing  Goal: Maintains adequate nutritional intake  Description: INTERVENTIONS:  - Monitor percentage of each meal consumed  - Identify factors contributing to decreased intake, treat as appropriate  - Assist with meals as needed  - Monitor I&O, weight, and lab values if indicated  - Obtain nutrition services referral as needed  Outcome: Progressing     Problem: GENITOURINARY - ADULT  Goal: Maintains or returns to baseline urinary function  Description: INTERVENTIONS:  - Assess urinary function  - Encourage oral fluids to ensure adequate hydration if ordered  - Administer IV fluids as ordered to ensure adequate hydration  - Administer ordered medications as needed  - Offer frequent toileting  - Follow urinary retention protocol if ordered  Outcome: Progressing  Goal: Urinary catheter remains patent  Description: INTERVENTIONS:  - Assess patency of urinary catheter  - If patient has a chronic schaefer, consider changing catheter if non-functioning  - Follow guidelines for intermittent irrigation of non-functioning urinary catheter  Outcome: Progressing     Problem: METABOLIC, FLUID AND ELECTROLYTES - ADULT  Goal: Electrolytes maintained within normal limits  Description: INTERVENTIONS:  - Monitor labs and assess patient for signs and symptoms of electrolyte imbalances  - Administer electrolyte replacement as ordered  - Monitor response to electrolyte replacements, including repeat lab results as appropriate  - Instruct patient on fluid and nutrition as appropriate  Outcome: Progressing  Goal: Fluid balance maintained  Description: INTERVENTIONS:  - Monitor labs   - Monitor I/O and WT  - Instruct patient on fluid and nutrition as appropriate  - Assess for signs & symptoms of volume excess or deficit  Outcome: Progressing  Goal: Glucose maintained within target range  Description: INTERVENTIONS:  - Monitor Blood Glucose as ordered  - Assess for signs and symptoms of hyperglycemia and hypoglycemia  - Administer ordered medications to maintain glucose within target range  - Assess nutritional intake and initiate nutrition service referral as needed  Outcome: Progressing      Problem: HEMATOLOGIC - ADULT  Goal: Maintains hematologic stability  Description: INTERVENTIONS  - Assess for signs and symptoms of bleeding or hemorrhage  - Monitor labs  - Administer supportive blood products/factors as ordered and appropriate  Outcome: Progressing     Problem: MUSCULOSKELETAL - ADULT  Goal: Maintain or return mobility to safest level of function  Description: INTERVENTIONS:  - Assess patient's ability to carry out ADLs; assess patient's baseline for ADL function and identify physical deficits which impact ability to perform ADLs (bathing, care of mouth/teeth, toileting, grooming, dressing, etc )  - Assess/evaluate cause of self-care deficits   - Assess range of motion  - Assess patient's mobility  - Assess patient's need for assistive devices and provide as appropriate  - Encourage maximum independence but intervene and supervise when necessary  - Involve family in performance of ADLs  - Assess for home care needs following discharge   - Consider OT consult to assist with ADL evaluation and planning for discharge  - Provide patient education as appropriate  Outcome: Progressing  Goal: Maintain proper alignment of affected body part  Description: INTERVENTIONS:  - Support, maintain and protect limb and body alignment  - Provide patient/ family with appropriate education  Outcome: Progressing     Problem: COPING  Goal: Pt/Family able to verbalize concerns and demonstrate effective coping strategies  Description: INTERVENTIONS:  - Assist patient/family to identify coping skills, available support systems and cultural and spiritual values  - Provide emotional support, including active listening and acknowledgement of concerns of patient and caregivers  - Reduce environmental stimuli, as able  - Provide patient education  - Assess for spiritual pain/suffering and initiate spiritual care, including notification of Pastoral Care or [facility] based community as needed  - Assess effectiveness of coping strategies  Outcome: Progressing  Goal: Will report anxiety at manageable levels  Description: INTERVENTIONS:  - Administer medication as ordered  - Teach and encourage coping skills  - Provide emotional support  - Assess patient/family for anxiety and ability to cope  Outcome: Progressing     Problem: INFECTION - ADULT  Goal: Absence or prevention of progression during hospitalization  Description: INTERVENTIONS:  - Assess and monitor for signs and symptoms of infection  - Monitor lab/diagnostic results  - Monitor all insertion sites, i e  indwelling lines, tubes, and drains  - Monitor endotracheal if appropriate and nasal secretions for changes in amount and color  - [place] appropriate cooling/warming therapies per order  - Administer medications as ordered  - Instruct and encourage patient and family to use good hand hygiene technique  - Identify and instruct in appropriate isolation precautions for identified infection/condition  Outcome: Progressing     Problem: SAFETY ADULT  Goal: Maintain or return to baseline ADL function  Description: INTERVENTIONS:  -  Assess patient's ability to carry out ADLs; assess patient's baseline for ADL function and identify physical deficits which impact ability to perform ADLs (bathing, care of mouth/teeth, toileting, grooming, dressing, etc )  - Assess/evaluate cause of self-care deficits   - Assess range of motion  - Assess patient's mobility; develop plan if impaired  - Assess patient's need for assistive devices and provide as appropriate  - Encourage maximum independence but intervene and supervise when necessary  - Involve family in performance of ADLs  - Assess for home care needs following discharge   - Consider OT consult to assist with ADL evaluation and planning for discharge  - Provide patient education as appropriate  Outcome: Progressing  Goal: Maintains/Returns to pre admission functional level  Description: INTERVENTIONS:  - Perform BMAT or MOVE assessment daily    - Set and communicate daily mobility goal to care team and patient/family/caregiver  - Collaborate with rehabilitation services on mobility goals if consulted  - Perform Range of Motion 3 times a day  - Reposition patient every 2 hours    - Dangle patient 3 times a day  - Stand patient 3 times a day  - Ambulate patient 3 times a day  - Out of bed to chair 3 times a day   - Out of bed for meals 3 times a day  - Out of bed for toileting  - Record patient progress and toleration of activity level   Outcome: Progressing  Goal: Patient will remain free of falls  Description: INTERVENTIONS:  - Educate patient/family on patient safety including physical limitations  - Instruct patient to call for assistance with activity   - Consult OT/PT to assist with strengthening/mobility   - Keep Call bell within reach  - Keep bed low and locked with side rails adjusted as appropriate  - Keep care items and personal belongings within reach  - Initiate and maintain comfort rounds  - Make Fall Risk Sign visible to staff  - Offer Toileting every 2 Hours, in advance of need  - Initiate/Maintain bed alarm  - Obtain necessary fall risk management equipment: alarms  - Apply yellow socks and bracelet for high fall risk patients  - Consider moving patient to room near nurses station  Outcome: Progressing     Problem: DISCHARGE PLANNING  Goal: Discharge to home or other facility with appropriate resources  Description: INTERVENTIONS:  - Identify barriers to discharge w/patient and caregiver  - Arrange for needed discharge resources and transportation as appropriate  - Identify discharge learning needs (meds, wound care, etc )  - Arrange for interpretive services to assist at discharge as needed  - Refer to Case Management Department for coordinating discharge planning if the patient needs post-hospital services based on physician/advanced practitioner order or complex needs related to functional status, cognitive ability, or social support system  Outcome: Progressing     Problem: Knowledge Deficit  Goal: Patient/family/caregiver demonstrates understanding of disease process, treatment plan, medications, and discharge instructions  Description: Complete learning assessment and assess knowledge base    Interventions:  - Provide teaching at level of understanding  - Provide teaching via preferred learning methods  Outcome: Progressing

## 2022-08-02 NOTE — ASSESSMENT & PLAN NOTE
Lab Results   Component Value Date    HGBA1C 6 3 (H) 07/23/2022       Recent Labs     08/01/22  0011 08/01/22  0529 08/01/22  1035 08/01/22  1825   POCGLU 157* 185* 164* 158*       Blood Sugar Average: Last 72 hrs:  (P) 164   Blood sugar acceptable    Continue with current care

## 2022-08-02 NOTE — PROGRESS NOTES
[facility]  Progress Note - Kenia Razo 1933, 80 y o  female MRN: [id number]  Unit/Bed#: [facility] 723-01 Encounter: [id number]  Primary Care Provider: Naomi Harry MD   Date and time admitted to hospital: 7/22/2022  5:21 PM    * Cardio-embolic left MCA stroke [facility])  Assessment & Plan  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 moderate stenosis left PCA  Underwent unsuccessful thrombectomy on 07/22  Repeat CT scan on 07/23 and 7/24 showed evolving nonhemorrhagic infract of the left MCA  · Continue aspirin and Lipitor  · Holding anticoagulation until 10-14 days post stroke-restart back mid next week-  · 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 on hold with 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  · Continue with neuro checks  · Neurology on board  · PMR consultation appreciated  · Patient with multiple strokes in the past   Patient also with vascular dementia with poor functional status to begin with  Overall prognosis appears to be poor  · Family wants to continue with the current care-status post PEG tube 7/28, tolerating tube feed  · Plan is for rehab placement-waiting for rehab placement,    Positive blood culture  Assessment & Plan  · 1/2 sets came back positive likely contaminant  Monitor off of antibiotics no further fever  · Likely Staph epidermidis    Fever  Assessment & Plan  · Patient noted to have low-grade fever  · Chest x-ray reviewed-no pneumonia  · Possible aspiration given her mental status  · Monitor off of antibiotics  · Follow-up on the cultures  · Monitor temperature- no further fever  · Fever appears to be resolved    COPD with asthma [facility])  Assessment & Plan  · No acute exacerbation    Vascular dementia [facility])  Assessment & Plan  · Patient with history of vascular dementia    · Continue with supportive care    Dysphagia  Assessment & Plan  · Status post PEG tube 7/28, tolerating tube feeds      Anemia  Assessment & Plan  · Previous history of anemia  Remained relatively stable since admission   · Monitor    CKD (chronic kidney disease) stage 3, GFR 30-59 ml/min [facility])  Assessment & Plan  Lab Results   Component Value Date    EGFR 43 08/01/2022    EGFR 41 07/30/2022    EGFR 42 07/29/2022    CREATININE 1 12 08/01/2022    CREATININE 1 17 07/30/2022    CREATININE 1 15 07/29/2022   Monitor creatinine appears to be at baseline  Creatinine 1  12     Systolic congestive heart failure [facility])  Assessment & Plan  Wt Readings from Last 3 Encounters:   07/23/22 64 9 kg (143 lb)   07/22/22 65 2 kg (143 lb 11 8 oz)   07/15/22 69 9 kg (154 lb)       · Patient with chronic systolic congestive heart failure  Ejection fraction on this admission EF 55-60% with normal LV systolic function  Home regimen is losartan 20 Lopressor 50  Lasix on hold currently  Hold losartan for now  · Patient with poor p o  intake-will restart back on the losartan if the blood pressure is persistently elevated      Hypertension  Assessment & Plan  · Monitor blood pressure  · Continue metoprolol    Controlled type 2 diabetes mellitus without complication, without long-term current use of insulin [facility])  Assessment & Plan  Lab Results   Component Value Date    HGBA1C 6 3 (H) 07/23/2022       Recent Labs     08/02/22  0020 08/02/22  0625 08/02/22  1203 08/02/22  1744   POCGLU 173* 186* 179* 166*       Blood Sugar Average: Last 72 hrs:  (P) 172 0625   Blood sugar acceptable  Continue with current care    H/O mitral valve replacement  Assessment & Plan  · TTE showed normal  valve function          VTE Pharmacologic Prophylaxis: VTE Score: 9 High Risk (Score >/= 5) - Pharmacological DVT Prophylaxis Contraindicated  Sequential Compression Devices Ordered  Patient Centered Rounds: I performed bedside rounds with nursing staff today    Discussions with Specialists or Other Care Team Provider:  Nursing, case management    Education and Discussions with Family / Patient: Three daughters at bedside updated  Time Spent for Care: 30 minutes  More than 50% of total time spent on counseling and coordination of care as described above  Current Length of Stay: 11 day(s)  Current Patient Status: Inpatient   Certification Statement: The patient will continue to require additional inpatient hospital stay due to Stroke  Discharge Plan: Anticipate discharge in 48-72 hrs to rehab facility  Code Status: Level 3 - DNAR and DNI    Subjective:   Patient was seen and evaluated bedside  Nonverbal   No distress  Objective:     Vitals:   Temp (24hrs), Av 1 °F (36 7 °C), Min:97 5 °F (36 4 °C), Max:98 9 °F (37 2 °C)    Temp:  [97 5 °F (36 4 °C)-98 9 °F (37 2 °C)] 98 4 °F (36 9 °C)  HR:  [79-98] 82  BP: (135-157)/(61-85) 142/74  SpO2:  [92 %-99 %] 99 %  Body mass index is 27 02 kg/m²  Input and Output Summary (last 24 hours): Intake/Output Summary (Last 24 hours) at 2022 1902  Last data filed at 2022 1700  Gross per 24 hour   Intake 0 ml   Output 800 ml   Net -800 ml       Physical Exam:   Physical Exam  Constitutional:       General: She is not in acute distress  Appearance: She is ill-appearing  HENT:      Head: Atraumatic  Cardiovascular:      Rate and Rhythm: Normal rate and regular rhythm  Heart sounds: No murmur heard  No friction rub  No gallop  Pulmonary:      Effort: Pulmonary effort is normal  No respiratory distress  Breath sounds: Normal breath sounds  No wheezing  Abdominal:      General: Bowel sounds are normal  There is no distension  Palpations: Abdomen is soft  Tenderness: There is no abdominal tenderness  Comments: Peg tube   Musculoskeletal:         General: No swelling  Cervical back: Neck supple  Skin:     General: Skin is warm and dry  Neurological:      Mental Status: She is alert        Comments: Right-sided weakness   Psychiatric:      Comments: Unable to assess          Additional Data:     Labs:  Results from last 7 days   Lab Units 08/01/22  0527   WBC Thousand/uL 6 62   HEMOGLOBIN g/dL 8 4*   HEMATOCRIT % 27 9*   PLATELETS Thousands/uL 299     Results from last 7 days   Lab Units 08/01/22  0527 07/28/22  0603 07/27/22  0448   SODIUM mmol/L 142   < > 146   POTASSIUM mmol/L 3 9   < > 4 0   CHLORIDE mmol/L 112*   < > 114*   CO2 mmol/L 26   < > 26   BUN mg/dL 31*   < > 38*   CREATININE mg/dL 1 12   < > 1 27   ANION GAP mmol/L 4   < > 6   CALCIUM mg/dL 9 0   < > 9 5   ALBUMIN g/dL  --   --  2 5*   TOTAL BILIRUBIN mg/dL  --   --  1 15*   ALK PHOS U/L  --   --  62   ALT U/L  --   --  26   AST U/L  --   --  66*   GLUCOSE RANDOM mg/dL 171*   < > 167*    < > = values in this interval not displayed  Results from last 7 days   Lab Units 08/02/22  1744 08/02/22  1203 08/02/22  2102 08/02/22  0020 08/01/22  2104 08/01/22  1825 08/01/22  1035 08/01/22  0529 08/01/22  0011 07/31/22  1112 07/31/22  0517 07/31/22  0017   POC GLUCOSE mg/dl 166* 179* 186* 173* 152* 158* 164* 185* 157* 197* 170* 189*               Lines/Drains:  Invasive Devices  Report    Peripheral Intravenous Line  Duration           Peripheral IV 08/02/22 Dorsal (posterior); Right Hand <1 day          Drain  Duration           Gastrostomy/Enterostomy Percutaneous Endoscopic Gastrostomy (PEG) 20 Fr  LUQ 5 days                      Imaging: Reviewed radiology reports from this admission including: chest xray and CT head    Recent Cultures (last 7 days):   Results from last 7 days   Lab Units 07/27/22  1338   BLOOD CULTURE  No Growth After 5 Days    Staphylococcus coagulase negative*   GRAM STAIN RESULT  Gram positive cocci in clusters*       Last 24 Hours Medication List:   Current Facility-Administered Medications   Medication Dose Route Frequency Provider Last Rate    acetaminophen  325 mg Rectal Q4H PRN Marlin Odom PA-C      acetaminophen  650 mg Oral Q4H PRN Bharati Schuler, PA-C      albuterol  2 5 mg Nebulization Q6H PRN Bharati Schuler, PA-C      artificial tear   Both Eyes HS Bharati Schuler, PA-C      aspirin  81 mg Oral Daily Bharati Schuler, PA-C      atorvastatin  80 mg Oral QPM Idalia Thdenamon Brenna, PA-C      budesonide  0 5 mg Nebulization Q12H Idalia Thurmon Peprakash, PA-C      cefazolin  1,000 mg Intravenous Once Antwan Smith MD      chlorhexidine  15 mL Mouth/Throat Q12H [address] Idalia Thurmon Brenna, PA-C      ciprofloxacin  1 drop Right Eye Q4H While Awake Sunday MD Josse      hydrALAZINE  10 mg Intravenous Q6H PRN Sunday MD Josse      insulin lispro  1-5 Units Subcutaneous Q6H [address] Idalia Neal Ceja, PA-C      levalbuterol  1 25 mg Nebulization BID DIDIER Bryan-C      metoprolol tartrate  25 mg Oral Q12H [address] Sunday MD Josse      polyethylene glycol  17 g Oral Daily PRN Sunday MD Josse      senna  1 tablet Oral HS PRN Sunday MD Josse          Today, Patient Was Seen By: Alena Talavera MD    **Please Note: This note may have been constructed using a voice recognition system  **

## 2022-08-02 NOTE — PROGRESS NOTES
08/02/22 1500   Clinical Encounter Type   Visited With Patient and family together   Routine Visit Follow-up   Sacramental Encounters   Sacrament Other   ([place] by Fr Gonzalez Cox)

## 2022-08-02 NOTE — PROGRESS NOTES
[address] [facility]  Progress Note - Florentino Medina 1933, 80 y o  female MRN: [id number]  Unit/Bed#: [facility] 723-01 Encounter: [id number]  Primary Care Provider: Vlad Crow MD   Date and time admitted to hospital: 7/22/2022  5:21 PM    * Cardio-embolic left MCA stroke [facility])  Assessment & Plan  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  · Continue aspirin and Lipitor  · Holding anticoagulation until 10-14 days post stroke-restart back mid next week-  · 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 on hold with 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  · Continue with neuro checks  · Neurology on board  · PMR consultation appreciated  · Patient with multiple strokes in the past   Patient also with vascular dementia with poor functional status to begin with  Overall prognosis appears to be poor  · Family wants to continue with the current care-consulted GI for PEG tube placement and likely PEG tube placement by tomorrow  · Palliative care consultation appreciated-patient and family wants to continue with the rehab oriented approach  · Plan is for rehab placement-waiting for rehab placement,    Positive blood culture  Assessment & Plan  · 1/2 sets came back positive likely contaminant  Monitor off of antibiotics no further fever  · Likely Staph epidermidis    Fever  Assessment & Plan  · Patient noted to have low-grade fever  · Chest x-ray reviewed-no pneumonia  · Possible aspiration given her mental status  · Monitor off of antibiotics  · Follow-up on the cultures  · Monitor temperature- no further fever     · Fever appears to be resolved    COPD with asthma [facility])  Assessment & Plan  · No acute exacerbation    Vascular dementia [facility])  Assessment & Plan  · Patient with history of vascular dementia  · Continue with supportive care    Dysphagia  Assessment & Plan  · Likely secondary to CVA  Status post video barium swallow  His started on a dysphagia diet with aspiration precautions  · Currently patient has an NG tube present for nutrition  If the patient does not have enough nutritional intake through mouth may need to consider feeding tube  Family is agreeable for PEG tube placement  · Discussed with GI   5 for feeding tube placement tomorrow  Will keep NPO after midnight and hold to feed after midnight  Gentle hydration while NPO  · Status post feeding tube placement  · Patient is on goal for tube feeding-monitor    Anemia  Assessment & Plan  · Previous history of anemia  Remained relatively stable since admission   · Monitor    CKD (chronic kidney disease) stage 3, GFR 30-59 ml/min [facility])  Assessment & Plan  Lab Results   Component Value Date    EGFR 43 08/01/2022    EGFR 41 07/30/2022    EGFR 42 07/29/2022    CREATININE 1 12 08/01/2022    CREATININE 1 17 07/30/2022    CREATININE 1 15 07/29/2022   Monitor creatinine appears to be at baseline  Creatinine 1  12 today    Systolic congestive heart failure [facility])  Assessment & Plan  Wt Readings from Last 3 Encounters:   07/23/22 64 9 kg (143 lb)   07/22/22 65 2 kg (143 lb 11 8 oz)   07/15/22 69 9 kg (154 lb)       Patient with chronic systolic congestive heart failure  Ejection fraction on this admission EF 55-60% with normal LV systolic function  Home regimen is losartan 20 Lopressor 50  Lasix on hold currently    Hold losartan for now  Patient with poor p o  intake-will restart back on the losartan if the blood pressure is persistently elevated      Hypertension  Assessment & Plan  · Monitor blood pressure  · Currently off of antihypertensive continue with permissive hypertension  · Adjusting metoprolol dose    Controlled type 2 diabetes mellitus without complication, without long-term current use of insulin [facility])  Assessment & Plan  Lab Results   Component Value Date    HGBA1C 6 3 (H) 2022       Recent Labs     22  0011 22  0529 22  1035 22  1825   POCGLU 157* 185* 164* 158*       Blood Sugar Average: Last 72 hrs:  (P) 164   Blood sugar acceptable  Continue with current care    H/O mitral valve replacement  Assessment & Plan  · TTE showed normal  valve function        VTE Pharmacologic Prophylaxis: VTE Score: 9 History of hit and Arixtra contraindicated    Patient Centered Rounds: I performed bedside rounds with nursing staff today  Discussions with Specialists or Other Care Team Provider:     Education and Discussions with Family / Patient: Updated  (daughter) at bedside  Time Spent for Care: 30 minutes  More than 50% of total time spent on counseling and coordination of care as described above  Current Length of Stay: 10 day(s)  Current Patient Status: Inpatient   Certification Statement: The patient will continue to require additional inpatient hospital stay due to Pending rehab placement  Discharge Plan: Anticipate discharge in 24-48 hrs to rehab facility  Code Status: Level 3 - DNAR and DNI    Subjective:   Patient seen and examined  No change in neuro exam   No events overnight no further fever    Objective:     Vitals:   Temp (24hrs), Av 9 °F (36 6 °C), Min:97 5 °F (36 4 °C), Max:98 6 °F (37 °C)    Temp:  [97 5 °F (36 4 °C)-98 6 °F (37 °C)] 97 5 °F (36 4 °C)  HR:  [88-96] 95  Resp:  [17] 17  BP: (111-142)/(55-66) 142/61  SpO2:  [92 %-100 %] 92 %  Body mass index is 27 02 kg/m²  Input and Output Summary (last 24 hours): Intake/Output Summary (Last 24 hours) at 2022  Last data filed at 2022 1428  Gross per 24 hour   Intake 845 ml   Output --   Net 845 ml       Physical Exam:   Physical Exam  Constitutional:       General: She is not in acute distress  HENT:      Head: Normocephalic        Nose: Nose normal    Eyes:      General: No scleral icterus  Cardiovascular:      Rate and Rhythm: Normal rate and regular rhythm  Pulmonary:      Comments: Decreased breath sounds bilateral  Abdominal:      General: Abdomen is flat  Skin:     General: Skin is warm  Neurological:      Mental Status: She is alert  Comments: Patient with right-sided hemiplegia  Moves left upper and lower extremities spontaneously  Left gaze preference  Aphasic          Additional Data:     Labs:  Results from last 7 days   Lab Units 08/01/22  0527   WBC Thousand/uL 6 62   HEMOGLOBIN g/dL 8 4*   HEMATOCRIT % 27 9*   PLATELETS Thousands/uL 299     Results from last 7 days   Lab Units 08/01/22  0527 07/28/22  0603 07/27/22  0448   SODIUM mmol/L 142   < > 146   POTASSIUM mmol/L 3 9   < > 4 0   CHLORIDE mmol/L 112*   < > 114*   CO2 mmol/L 26   < > 26   BUN mg/dL 31*   < > 38*   CREATININE mg/dL 1 12   < > 1 27   ANION GAP mmol/L 4   < > 6   CALCIUM mg/dL 9 0   < > 9 5   ALBUMIN g/dL  --   --  2 5*   TOTAL BILIRUBIN mg/dL  --   --  1 15*   ALK PHOS U/L  --   --  62   ALT U/L  --   --  26   AST U/L  --   --  66*   GLUCOSE RANDOM mg/dL 171*   < > 167*    < > = values in this interval not displayed  Results from last 7 days   Lab Units 08/01/22  1825 08/01/22  1035 08/01/22  0529 08/01/22  0011 07/31/22  1112 07/31/22  0517 07/31/22  0017 07/30/22  1825 07/30/22  1742 07/30/22  1233 07/30/22  0644 07/29/22  2305   POC GLUCOSE mg/dl 158* 164* 185* 157* 197* 170* 189* 167* 146* 167* 197* 155*               Lines/Drains:  Invasive Devices  Report    Peripheral Intravenous Line  Duration           Peripheral IV 07/29/22 Left;Ventral (anterior) Forearm 3 days          Drain  Duration           Gastrostomy/Enterostomy Percutaneous Endoscopic Gastrostomy (PEG) 20 Fr  LUQ 4 days                      Imaging: No pertinent imaging reviewed      Recent Cultures (last 7 days):   Results from last 7 days   Lab Units 07/27/22  1338   BLOOD CULTURE  No Growth After 5 Days  Staphylococcus coagulase negative*   GRAM STAIN RESULT  Gram positive cocci in clusters*       Last 24 Hours Medication List:   Current Facility-Administered Medications   Medication Dose Route Frequency Provider Last Rate    acetaminophen  325 mg Rectal Q4H PRN Sheila Glass, PA-C      acetaminophen  650 mg Oral Q4H PRN Sheila Glass, PA-C      albuterol  2 5 mg Nebulization Q6H PRN Sheila Glass, PA-C      artificial tear   Both Eyes HS Sheila Glass, PA-C      aspirin  81 mg Oral Daily Sheila Glass, PA-C      atorvastatin  80 mg Oral QPM Idalia Hyatt PA-C      budesonide  0 5 mg Nebulization Q12H Idalia Hyatt PA-C      cefazolin  1,000 mg Intravenous Once Candice Mancini MD      chlorhexidine  15 mL Mouth/Throat Q12H [address] Idalia Mckeon PA-C      ciprofloxacin  1 drop Right Eye Q4H While Awake Phoebe Atkinson MD      hydrALAZINE  10 mg Intravenous Q6H PRN Phoebe Atkinson MD      insulin lispro  1-5 Units Subcutaneous Q6H [address] Idalia Mckeon PA-C      levalbuterol  1 25 mg Nebulization BID Sheila Glass, PA-MATY      metoprolol tartrate  25 mg Oral Q12H [address] Phoebe Atkinson MD      polyethylene glycol  17 g Oral Daily PRN Phoebe Atkinson MD      senna  1 tablet Oral HS PRN Phoebe Atkinson MD          Today, Patient Was Seen By: Phoebe Atkinson MD    **Please Note: This note may have been constructed using a voice recognition system  **

## 2022-08-02 NOTE — ASSESSMENT & PLAN NOTE
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

## 2022-08-02 NOTE — ASSESSMENT & PLAN NOTE
Lab Results   Component Value Date    EGFR 43 08/01/2022    EGFR 41 07/30/2022    EGFR 42 07/29/2022    CREATININE 1 12 08/01/2022    CREATININE 1 17 07/30/2022    CREATININE 1 15 07/29/2022   Monitor creatinine appears to be at baseline  Creatinine 1  12

## 2022-08-02 NOTE — TELEPHONE ENCOUNTER
----- Message from Magdaleno Linda MD sent at 8/1/2022 12:19 PM EDT -----  Hi,    Can you let her family know the biopsy from the ulcer showed inflammation but no infection    Thank you

## 2022-08-02 NOTE — ASSESSMENT & PLAN NOTE
Lab Results   Component Value Date    EGFR 43 08/01/2022    EGFR 41 07/30/2022    EGFR 42 07/29/2022    CREATININE 1 12 08/01/2022    CREATININE 1 17 07/30/2022    CREATININE 1 15 07/29/2022   Monitor creatinine appears to be at baseline  Creatinine 1  12 today

## 2022-08-02 NOTE — LETTER
August 2, 2022     [address] [facility]    Patient: Bhumi Crow   YOB: 1933   Date of Visit: 8/2/2022       Dear Shahla Mckeon,     This is a reminder that Dr Maria Dolores Nicholson is recommending Ryan Curry have a repeat EGD in approx 3 months  The order is placed into the [facility] [employer] system  You can call central scheduling at [phone] to schedule that procedure  Please call our office at the number listed above with any questions or concerns          Sincerely,     MD Earl Trujillo RN

## 2022-08-02 NOTE — ASSESSMENT & PLAN NOTE
· Patient noted to have low-grade fever  · Chest x-ray reviewed-no pneumonia  · Possible aspiration given her mental status  · Monitor off of antibiotics  · Follow-up on the cultures  · Monitor temperature- no further fever     · Fever appears to be resolved

## 2022-08-02 NOTE — ASSESSMENT & PLAN NOTE
Lab Results   Component Value Date    HGBA1C 6 3 (H) 07/23/2022       Recent Labs     08/02/22  0020 08/02/22  0625 08/02/22  1203 08/02/22  1744   POCGLU 173* 186* 179* 166*       Blood Sugar Average: Last 72 hrs:  (P) 172 0625   Blood sugar acceptable    Continue with current care

## 2022-08-02 NOTE — CASE MANAGEMENT
Case Management Discharge Planning Note    Patient name Starr Dooley  Location [address] 723/PPHP 570-21 MRN [id number]  : 1933 Date 2022       Current Admission Date: 2022  Current Admission Diagnosis:Cardio-embolic left MCA stroke [facility])   Patient Active Problem List    Diagnosis Date Noted    Positive blood culture 2022    Fever 2022    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[facility] ) 2022    Medicare annual wellness visit, subsequent 2022    Overlap syndrome ([facility] ) 2022    Chronic renal disease, stage IV ([facility] ) 2022    Paroxysmal A-fib ([facility] ) 2022    Thrombocytopenia ([place] [address] ) 2022    Cardiomyopathy, dilated ([place] [address] ) 2022    Coronary artery disease with angina pectoris, unspecified vessel or lesion type, unspecified whether native or transplanted heart ([place] [address] )     TIA (transient ischemic attack)     H/O ischemic left MCA stroke 10/29/2018    Vascular dementia ([facility] ) 2018    Mood disorder ([place] [address] ) 2018    Ambulatory dysfunction 2018    Bilateral carotid artery stenosis 2018    Arthritis of right glenohumeral joint 2018    Polyp of colon 2018    HCAP (healthcare-associated pneumonia) 2018    Shoulder pain, right 2018    Abnormal TSH 2018    Dysphagia 2018    Encephalopathy 06/15/2018    Seizure-like activity ([facility] ) 06/15/2018    Colonic thickening 2018    Anemia 2018    History of CVA (cerebrovascular accident) 2018    History of subarachnoid hemorrhage 2018    CKD (chronic kidney disease) stage 3, GFR 30-59 ml/min ([facility]) 2018    History of DVT (deep vein thrombosis) 2018    History of pacemaker 2018    Acute cystitis without hematuria 2018    Chronic systolic CHF (congestive heart failure) ([facility] )     Cardio-embolic left MCA stroke ([place] [address] ) 2018    History of ischemic right MCA stroke 2018    CVA (cerebral vascular accident) ([facility] ) 04/18/2018    Controlled type 2 diabetes mellitus without complication, without long-term current use of insulin ([place] [address] ) 04/18/2018    Pacemaker 04/18/2018    Acid reflux 51/19/1788    Systolic congestive heart failure ([place] [address] ) 04/18/2018    H/O mitral valve replacement 03/01/2018    Deep vein thrombosis (DVT) of left upper extremity (HCC) 01/25/2018    Constipation 09/21/2016    External hemorrhoids 09/21/2016    Carpal tunnel syndrome, left 01/04/2016    Pain in both hands 01/04/2016    Arthralgia of hip, right 11/17/2015    Meningioma ([place] [address] ) 12/18/2014    Hypertension 05/13/2013    Esophageal reflux 05/13/2013    Arthritis 05/13/2013    Hypercholesterolemia 05/13/2013    Malignant neoplasm without specification of site ([place] [address] ) 05/13/2013    Spondylosis of cervical region without myelopathy or radiculopathy 05/13/2013    Type 2 or unspecified type diabetes mellitus 05/13/2013      LOS (days): 11  Geometric Mean LOS (GMLOS) (days):   Days to GMLOS:     OBJECTIVE:  Risk of Unplanned Readmission Score: 28 63         Current admission status: Inpatient   Preferred Pharmacy:   [address] [facility] (Now [address] [facility] [facility]) [place] [address]  Phone: [phone] Fax: [phone]    Thomas Velez [address]  [address] [address]  Phone: [phone] Fax: [phone]    Primary Care Provider: Tracy Russo MD    Primary Insurance: MEDICARE  Secondary Insurance: AARP    DISCHARGE DETAILS:    Family notified[de-identified] CM spoke wiht daughter Ailyn Livings regarding urrent possible facilites  Family would like to stay out of [place]  Would be intersted in [facility]   Ailyn Livings confirmed if boostrer is needed then they are in agreement with pt receiving it

## 2022-08-02 NOTE — ASSESSMENT & PLAN NOTE
Wt Readings from Last 3 Encounters:   07/23/22 64 9 kg (143 lb)   07/22/22 65 2 kg (143 lb 11 8 oz)   07/15/22 69 9 kg (154 lb)       · Patient with chronic systolic congestive heart failure  Ejection fraction on this admission EF 55-60% with normal LV systolic function  Home regimen is losartan 20 Lopressor 50  Lasix on hold currently    Hold losartan for now  · Patient with poor p o  intake-will restart back on the losartan if the blood pressure is persistently elevated

## 2022-08-02 NOTE — PLAN OF CARE
Problem: PHYSICAL THERAPY ADULT  Goal: Performs mobility at highest level of function for planned discharge setting  See evaluation for individualized goals  Description: Treatment/Interventions: Functional transfer training, LE strengthening/ROM, Therapeutic exercise, Endurance training, Cognitive reorientation, Equipment eval/education, Bed mobility, Gait training, OT          See flowsheet documentation for full assessment, interventions and recommendations  Outcome: Not Progressing  Note: Prognosis: Guarded  Problem List: Decreased strength, Decreased endurance, Impaired balance, Decreased mobility, Decreased coordination, Decreased cognition, Impaired judgement, Decreased safety awareness, Impaired sensation, Impaired tone, Impaired vision, Orthopedic restrictions  Assessment: Pt nonverbal with ?global aphasia  Pt performed functional mobility and therex as outlined above  R>L weakness noted in UE and LE  R GHJ subluxation noted-pillow placed under RUE while sitting EOB and WB through elbow endorsed throughout session  Encouraged pt to attend to R visual field throughout with poor carry over  Improved participation in LE therex with counting to 10 in Yi  Pt left supine in bed with bed alarm, call bell, phone, and all personal needs within reach  Pt will continue to benefit from skilled acute care PT to further address their functional mobility limitations  D/C recommendations remain rehab  Barriers to Discharge: Inaccessible home environment, Decreased caregiver support     PT Discharge Recommendation: Post acute rehabilitation services    See flowsheet documentation for full assessment

## 2022-08-02 NOTE — OCCUPATIONAL THERAPY NOTE
Occupational Therapy Treatment Note       08/02/22 1028   OT Last Visit   OT Visit Date 08/02/22   Note Type   Note Type Treatment   Restrictions/Precautions   Weight Bearing Precautions Per Order No   Other Precautions Cognitive; Bed Alarm; Fall Risk;Telemetry   Lifestyle   Autonomy PTA pt required A for ADLs, IADLs, and fxnl mobility; (-) driving   Reciprocal Relationships supportive daughters, son   Service to Others retired   Intrinsic Gratification none expressed   Pain Assessment   Pain Assessment Tool FLACC   Pain Rating: FLACC (Rest) - Face 0   Pain Rating: FLACC (Rest) - Legs 0   Pain Rating: FLACC (Rest) - Activity 0   Pain Rating: FLACC (Rest) - Cry 0   Pain Rating: FLACC (Rest) - Consolability 0   Score: FLACC (Rest) 0   Pain Rating: FLACC (Activity) - Face 0   Pain Rating: FLACC (Activity) - Legs 0   Pain Rating: FLACC (Activity) - Activity 0   Pain Rating: FLACC (Activity) - Cry 0   Pain Rating: FLACC (Activity) - Consolability 0   Score: FLACC (Activity) 0   ADL   Where Assessed Edge of bed   Grooming Assistance 2  Maximal Assistance   Grooming Deficit Wash/dry face; Wash/dry hands   Bed Mobility   Rolling R 1  Dependent   Additional items Assist x 2   Rolling L 1  Dependent   Additional items Assist x 2   Supine to Sit 2  Maximal assistance   Additional items Assist x 2   Sit to Supine 2  Maximal assistance   Additional items Assist x 2   Cognition   Overall Cognitive Status Impaired   Arousal/Participation Arousable;Lethargic   Attention SELAM   Orientation Level Unable to assess   Memory Unable to assess   Following Commands Follows one step commands inconsistently   Activity Tolerance   Activity Tolerance Patient tolerated treatment well   Assessment   Assessment Pt seen for participation occupational therapy session with focus on activity tolerance, bed mob and sitting balance tolerance for pt engagement in UB self-care tasks  Pt cleared by RN/hope for pt participation in therapy session    Pt presented supine/HOB raised pt identifiers confirm  Pt family member daughter present and supportive of pt throughout session throughout session  Pt required assistance x2 for bed mob 2* to pt deconditioning and decreased overall strength  Pt continues to require assistance for unsupported sitting balance 2* to decreased strength  Pt required hand over hand assist to complete grooming tasks/washing face and brushing hair with use of left UE  Pt was noted to resist hand over hand technique 2* to pt cognitive deficits  Pt open eyes minimally and was nonverbal throughout session  Pt able to tolerate session well with no untoward issues noted for pt vitals  Pt will require post acute rehab service to continue to address pt's noted deficits with decreased strength balance coordination and deconditioning which currently impairs pt's ADL and functional mob  Pt was returned to bed post session bed alarm activated and all needs within reach     Plan   Treatment Interventions ADL retraining   Goal Expiration Date 08/08/22   OT Treatment Day 2   OT Frequency 3-5x/wk   Recommendation   OT Discharge Recommendation Post acute rehabilitation services   AM-PAC Daily Activity Inpatient   Lower Body Dressing 1   Bathing 1   Toileting 1   Upper Body Dressing 2   Grooming 2   Eating 2   Daily Activity Raw Score 9   Turning Head Towards Sound 2   Follow Simple Instructions 2   Low Function Daily Activity Raw Score 13   Low Function Daily Activity Standardized Score 23 16   AM-PAC Applied Cognition Inpatient   Following a Speech/Presentation 1   Understanding Ordinary Conversation 2   Taking Medications 1   Remembering Where Things Are Placed or Put Away 1   Remembering List of 4-5 Errands 1   Taking Care of Complicated Tasks 1   Applied Cognition Raw Score 7   Applied Cognition Standardized Score 15 17   Barthel Index   Feeding 0   Bathing 0   Grooming Score 0   Dressing Score 0   Bladder Score 0   Bowels Score 0   Toilet Use Score 0   Transfers (Bed/Chair) Score 0   Mobility (Level Surface) Score 0   Stairs Score 0   Barthel Index Score 0     Patricia Alfonso  ROCHE/L

## 2022-08-02 NOTE — PLAN OF CARE
Problem: Prexisting or High Potential for Compromised Skin Integrity  Goal: Skin integrity is maintained or improved  Description: INTERVENTIONS:  - Identify patients at risk for skin breakdown  - Assess and monitor skin integrity  - Assess and monitor nutrition and hydration status  - Monitor labs   - Assess for incontinence   - Turn and reposition patient  - Assist with mobility/ambulation  - Relieve pressure over bony prominences  - Avoid friction and shearing  - Provide appropriate hygiene as needed including keeping skin clean and dry  - Evaluate need for skin moisturizer/barrier cream  - Collaborate with interdisciplinary team   - Patient/family teaching  - Consider wound care consult   8/2/2022 0044 by Abimael Renee RN  Outcome: Progressing  8/2/2022 0044 by Abimael Renee RN  Outcome: Progressing     Problem: Activity Intolerance/Impaired Mobility  Goal: Mobility/activity is maintained at optimum level for patient  Description: Interventions:  - Assess and monitor patient  barriers to mobility and need for assistive/adaptive devices  - Assess patient's emotional response to limitations  - Collaborate with interdisciplinary team and initiate plans and interventions as ordered  - Encourage independent activity per ability   - Maintain proper body alignment  - Perform active/passive rom as tolerated/ordered  - Plan activities to conserve energy   - Turn patient as appropriate  8/2/2022 0044 by Abimael Renee RN  Outcome: Progressing  8/2/2022 0044 by Abimael Renee RN  Outcome: Progressing     Problem: Communication Impairment  Goal: Ability to express needs and understand communication  Description: Assess patient's communication skills and ability to understand information  Patient will demonstrate use of effective communication techniques, alternative methods of communication and understanding even if not able to speak       - Encourage communication and provide alternate methods of communication as needed  - Collaborate with case management/ for discharge needs  - Include patient/family/caregiver in decisions related to communication  8/2/2022 0044 by Ru Armando RN  Outcome: Progressing  8/2/2022 0044 by Ru Armando RN  Outcome: Progressing     Problem: Potential for Aspiration  Goal: Non-ventilated patient's risk of aspiration is minimized  Description: Assess and monitor vital signs, respiratory status, and labs (WBC)  Monitor for signs of aspiration (tachypnea, cough, rales, wheezing, cyanosis, fever)  - Assess and monitor patient's ability to swallow  - Place patient up in chair to eat if possible  - HOB up at 90 degrees to eat if unable to get patient up into chair   - Supervise patient during oral intake  - Instruct patient/ family to take small bites  - Instruct patient/ family to take small single sips when taking liquids    - Follow patient-specific strategies generated by speech pathologist   8/2/2022 0044 by Ru Armando RN  Outcome: Progressing  8/2/2022 0044 by Ru Armando RN  Outcome: Progressing

## 2022-08-02 NOTE — ASSESSMENT & PLAN NOTE
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  · Continue aspirin and Lipitor  · Holding anticoagulation until 10-14 days post stroke-restart back mid next week-  · 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 on hold with 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  · Continue with neuro checks  · Neurology on board  · PMR consultation appreciated  · Patient with multiple strokes in the past   Patient also with vascular dementia with poor functional status to begin with    Overall prognosis appears to be poor  · Family wants to continue with the current care-consulted GI for PEG tube placement and likely PEG tube placement by tomorrow  · Palliative care consultation appreciated-patient and family wants to continue with the rehab oriented approach  · Plan is for rehab placement-waiting for rehab placement,

## 2022-08-02 NOTE — RESTORATIVE TECHNICIAN NOTE
Restorative Technician Note      Patient Name: Tracee Amaro     Note Type: Mobility  Patient Position Upon Consult: Supine  Activity Performed: Repositioned  Education Provided: Yes  Patient Position at End of Consult: Supine;  All needs within reach; Bed/Chair alarm activated  Miki HONEYCUTT, Restorative Technician, [employer]

## 2022-08-02 NOTE — TELEPHONE ENCOUNTER
Spoke with pt's daughter, Dina Boo, relayed information regarding her mother's biopsy  She requested recommendation for repeat EGD be emailed to her in a letter as a reminder   Letter written and sent to [email or web address]

## 2022-08-03 ENCOUNTER — APPOINTMENT (INPATIENT)
Dept: RADIOLOGY | Facility: HOSPITAL | Age: 87
DRG: 064 | End: 2022-08-03
Payer: MEDICARE

## 2022-08-03 LAB
FLUAV RNA RESP QL NAA+PROBE: NEGATIVE
FLUBV RNA RESP QL NAA+PROBE: NEGATIVE
GLUCOSE SERPL-MCNC: 143 MG/DL (ref 65–140)
GLUCOSE SERPL-MCNC: 164 MG/DL (ref 65–140)
GLUCOSE SERPL-MCNC: 167 MG/DL (ref 65–140)
GLUCOSE SERPL-MCNC: 173 MG/DL (ref 65–140)
GLUCOSE SERPL-MCNC: 192 MG/DL (ref 65–140)
RSV RNA RESP QL NAA+PROBE: NEGATIVE
SARS-COV-2 RNA RESP QL NAA+PROBE: NEGATIVE

## 2022-08-03 PROCEDURE — 70450 CT HEAD/BRAIN W/O DYE: CPT

## 2022-08-03 PROCEDURE — 82948 REAGENT STRIP/BLOOD GLUCOSE: CPT

## 2022-08-03 PROCEDURE — 99232 SBSQ HOSP IP/OBS MODERATE 35: CPT | Performed by: INTERNAL MEDICINE

## 2022-08-03 PROCEDURE — 94760 N-INVAS EAR/PLS OXIMETRY 1: CPT

## 2022-08-03 PROCEDURE — 0241U HB NFCT DS VIR RESP RNA 4 TRGT: CPT | Performed by: INTERNAL MEDICINE

## 2022-08-03 PROCEDURE — G1004 CDSM NDSC: HCPCS

## 2022-08-03 PROCEDURE — XW023W7 INTRODUCTION OF COVID-19 VACCINE BOOSTER INTO MUSCLE, PERCUTANEOUS APPROACH, NEW TECHNOLOGY GROUP 7: ICD-10-PCS | Performed by: INTERNAL MEDICINE

## 2022-08-03 PROCEDURE — 91305 COVID-19 PFIZER VAC (TRIS SUCROSE, GRAY CAP FORM) 30 MCG/0.3ML SUSP: CPT | Performed by: INTERNAL MEDICINE

## 2022-08-03 PROCEDURE — 94640 AIRWAY INHALATION TREATMENT: CPT

## 2022-08-03 RX ORDER — FUROSEMIDE 20 MG/1
20 TABLET ORAL EVERY OTHER DAY
Status: DISCONTINUED | OUTPATIENT
Start: 2022-08-03 | End: 2022-08-04 | Stop reason: HOSPADM

## 2022-08-03 RX ADMIN — FUROSEMIDE 20 MG: 20 TABLET ORAL at 09:30

## 2022-08-03 RX ADMIN — BUDESONIDE 0.5 MG: 0.5 INHALANT ORAL at 19:04

## 2022-08-03 RX ADMIN — LEVALBUTEROL HYDROCHLORIDE 1.25 MG: 1.25 SOLUTION, CONCENTRATE RESPIRATORY (INHALATION) at 07:19

## 2022-08-03 RX ADMIN — INSULIN LISPRO 1 UNITS: 100 INJECTION, SOLUTION INTRAVENOUS; SUBCUTANEOUS at 14:16

## 2022-08-03 RX ADMIN — CIPROFLOXACIN HYDROCHLORIDE 1 DROP: 3 SOLUTION/ DROPS OPHTHALMIC at 14:16

## 2022-08-03 RX ADMIN — BUDESONIDE 0.5 MG: 0.5 INHALANT ORAL at 07:19

## 2022-08-03 RX ADMIN — ASPIRIN 81 MG CHEWABLE TABLET 81 MG: 81 TABLET CHEWABLE at 09:31

## 2022-08-03 RX ADMIN — INSULIN LISPRO 1 UNITS: 100 INJECTION, SOLUTION INTRAVENOUS; SUBCUTANEOUS at 05:58

## 2022-08-03 RX ADMIN — ATORVASTATIN CALCIUM 80 MG: 80 TABLET, FILM COATED ORAL at 18:00

## 2022-08-03 RX ADMIN — METOPROLOL TARTRATE 25 MG: 25 TABLET, FILM COATED ORAL at 09:31

## 2022-08-03 RX ADMIN — CHLORHEXIDINE GLUCONATE 15 ML: 1.2 SOLUTION ORAL at 09:31

## 2022-08-03 RX ADMIN — CIPROFLOXACIN HYDROCHLORIDE 1 DROP: 3 SOLUTION/ DROPS OPHTHALMIC at 09:32

## 2022-08-03 RX ADMIN — METOPROLOL TARTRATE 25 MG: 25 TABLET, FILM COATED ORAL at 21:00

## 2022-08-03 RX ADMIN — WHITE PETROLATUM 57.7 %-MINERAL OIL 31.9 % EYE OINTMENT: at 21:03

## 2022-08-03 RX ADMIN — BNT162B2 0.3 ML: 0.23 INJECTION, SUSPENSION INTRAMUSCULAR at 14:14

## 2022-08-03 RX ADMIN — CIPROFLOXACIN HYDROCHLORIDE 1 DROP: 3 SOLUTION/ DROPS OPHTHALMIC at 05:58

## 2022-08-03 RX ADMIN — APIXABAN 2.5 MG: 2.5 TABLET, FILM COATED ORAL at 18:00

## 2022-08-03 RX ADMIN — INSULIN LISPRO 1 UNITS: 100 INJECTION, SOLUTION INTRAVENOUS; SUBCUTANEOUS at 01:07

## 2022-08-03 RX ADMIN — LEVALBUTEROL HYDROCHLORIDE 1.25 MG: 1.25 SOLUTION, CONCENTRATE RESPIRATORY (INHALATION) at 19:04

## 2022-08-03 RX ADMIN — CIPROFLOXACIN HYDROCHLORIDE 1 DROP: 3 SOLUTION/ DROPS OPHTHALMIC at 21:03

## 2022-08-03 RX ADMIN — CIPROFLOXACIN HYDROCHLORIDE 1 DROP: 3 SOLUTION/ DROPS OPHTHALMIC at 18:00

## 2022-08-03 NOTE — ASSESSMENT & PLAN NOTE
· 7/27 1/2 sets came back positive coag-negative staph, likely contaminant   · Monitored off of antibiotics no further fever, no leukocytosis

## 2022-08-03 NOTE — RESTORATIVE TECHNICIAN NOTE
Restorative Technician Note      Patient Name: Bill Bernstein     Note Type: Mobility  Patient Position Upon Consult: Supine  Activity Performed: Repositioned  Patient Position at End of Consult: Supine;  All needs within reach; Bed/Chair alarm activated    Logan HONEYCUTT, Restorative Technician, [employer]

## 2022-08-03 NOTE — ASSESSMENT & PLAN NOTE
· Patient noted to have low-grade fever suspect reactive secondary to stroke and possible aspiration given her mental status  · Chest x-ray reviewed-no pneumonia  · Blood cultures 1/2 growing coag-negative staph, contaminant  · Monitored off of antibiotics  · Fever resolved, no leukocytosis

## 2022-08-03 NOTE — CASE MANAGEMENT
Case Management Discharge Planning Note    Patient name Rosas Osuna  [facility] [id number]  : 1933 Date 8/3/2022       Current Admission Date: 2022  Current Admission Diagnosis:Cardio-embolic left MCA stroke [facility])   Patient Active Problem List    Diagnosis Date Noted    Positive blood culture 2022    Fever 2022    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[place] [address] ) 2022    Medicare annual wellness visit, subsequent 2022    Overlap syndrome ([place] [address] ) 2022    Chronic renal disease, stage IV ([place] [address] ) 2022    Paroxysmal A-fib ([facility] ) 2022    Thrombocytopenia ([place] [address] ) 2022    Cardiomyopathy, dilated ([place] [address] ) 2022    Coronary artery disease with angina pectoris, unspecified vessel or lesion type, unspecified whether native or transplanted heart ([place] [address] )     TIA (transient ischemic attack)     H/O ischemic left MCA stroke 10/29/2018    Vascular dementia ([place] [address] ) 2018    Mood disorder ([place] [address] ) 2018    Ambulatory dysfunction 2018    Bilateral carotid artery stenosis 2018    Arthritis of right glenohumeral joint 2018    Polyp of colon 2018    HCAP (healthcare-associated pneumonia) 2018    Shoulder pain, right 2018    Abnormal TSH 2018    Dysphagia 2018    Encephalopathy 06/15/2018    Seizure-like activity ([place] [address] ) 06/15/2018    Colonic thickening 2018    Anemia 2018    History of CVA (cerebrovascular accident) 2018    History of subarachnoid hemorrhage 2018    CKD (chronic kidney disease) stage 3, GFR 30-59 ml/min ([facility]) 2018    History of DVT (deep vein thrombosis) 2018    History of pacemaker 2018    Acute cystitis without hematuria 2018    Chronic systolic CHF (congestive heart failure) ([facility] )     Cardio-embolic left MCA stroke ([place] [address] ) 2018    History of ischemic right MCA stroke 2018    CVA (cerebral vascular accident) ([facility] ) 04/18/2018    Controlled type 2 diabetes mellitus without complication, without long-term current use of insulin ([place] [address] ) 04/18/2018    Pacemaker 04/18/2018    Acid reflux 30/37/8236    Systolic congestive heart failure ([place] [address] ) 04/18/2018    H/O mitral valve replacement 03/01/2018    Deep vein thrombosis (DVT) of left upper extremity (HCC) 01/25/2018    Constipation 09/21/2016    External hemorrhoids 09/21/2016    Carpal tunnel syndrome, left 01/04/2016    Pain in both hands 01/04/2016    Arthralgia of hip, right 11/17/2015    Meningioma ([place] [address] ) 12/18/2014    Hypertension 05/13/2013    Esophageal reflux 05/13/2013    Arthritis 05/13/2013    Hypercholesterolemia 05/13/2013    Malignant neoplasm without specification of site ([place] [address] ) 05/13/2013    Spondylosis of cervical region without myelopathy or radiculopathy 05/13/2013    Type 2 or unspecified type diabetes mellitus 05/13/2013      LOS (days): 12  Geometric Mean LOS (GMLOS) (days):   Days to GMLOS:     OBJECTIVE:  Risk of Unplanned Readmission Score: 28 99         Current admission status: Inpatient   Preferred Pharmacy:   [address] [facility] (Now [address] [facility] Mail Delivery) [address] [address]  Phone: [phone] Fax: [phone]    Thomas Velez [address] [address]  Phone: [phone] Fax: [phone]    Primary Care Provider: Leonel Leone MD    Primary Insurance: MEDICARE  Secondary Insurance: AARP    DISCHARGE DETAILS:    Other Referral/Resources/Interventions Provided:  Referral Comments: Phone call to [facility] [phone] left message for return call  Phone call to [facility] [phone] spoke with Chelsea Driscoll  CM exended deadline and advised pt is open to receiving booster   Chelsea Driscoll will speak with team and respond to CM via [employer]

## 2022-08-03 NOTE — ASSESSMENT & PLAN NOTE
Lab Results   Component Value Date    HGBA1C 6 3 (H) 07/23/2022       Recent Labs     08/03/22  0037 08/03/22  0556 08/03/22  0639 08/03/22  1202   POCGLU 167* 164* 173* 192*       Blood Sugar Average: Last 72 hrs:  (P) 173 25   Blood sugar acceptable    Continue with current care

## 2022-08-03 NOTE — RESPIRATORY THERAPY NOTE
08/03/22 0721   Respiratory Assessment   Assessment Type During-treatment   General Appearance Awake   Respiratory Pattern Normal   Chest Assessment Chest expansion symmetrical   Bilateral Breath Sounds Clear;Diminished   Resp Comments Recieved of 2 LNC  Sats 99%, will place on RA  Patient did not communicate or open eyes  Attempted to remove treatment but finally left it on   No distress noted   O2 Device ra   Additional Assessments   Pulse 62   Respirations 18   SpO2 99 %

## 2022-08-03 NOTE — CASE MANAGEMENT
Case Management Discharge Planning Note    Patient name Bill Bernstein  Location [address] 723/PPHP 858-92 MRN [id number]  : 1933 Date 8/3/2022       Current Admission Date: 2022  Current Admission Diagnosis:Cardio-embolic left MCA stroke [facility])   Patient Active Problem List    Diagnosis Date Noted    Positive blood culture 2022    Fever 2022    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[place] [address] ) 2022    Medicare annual wellness visit, subsequent 2022    Overlap syndrome ([place] [address] ) 2022    Chronic renal disease, stage IV ([place] [address] ) 2022    Paroxysmal A-fib ([facility] ) 2022    Thrombocytopenia ([place] [address] ) 2022    Cardiomyopathy, dilated ([place] [address] ) 2022    Coronary artery disease with angina pectoris, unspecified vessel or lesion type, unspecified whether native or transplanted heart ([place] [address] )     TIA (transient ischemic attack)     H/O ischemic left MCA stroke 10/29/2018    Vascular dementia ([place] [address] ) 2018    Mood disorder ([place] [address] ) 2018    Ambulatory dysfunction 2018    Bilateral carotid artery stenosis 2018    Arthritis of right glenohumeral joint 2018    Polyp of colon 2018    HCAP (healthcare-associated pneumonia) 2018    Shoulder pain, right 2018    Abnormal TSH 2018    Dysphagia 2018    Encephalopathy 06/15/2018    Seizure-like activity ([place] [address] ) 06/15/2018    Colonic thickening 2018    Anemia 2018    History of CVA (cerebrovascular accident) 2018    History of subarachnoid hemorrhage 2018    CKD (chronic kidney disease) stage 3, GFR 30-59 ml/min ([facility]) 2018    History of DVT (deep vein thrombosis) 2018    History of pacemaker 2018    Acute cystitis without hematuria 2018    Chronic systolic CHF (congestive heart failure) ([facility] )     Cardio-embolic left MCA stroke ([place] [address] ) 2018    History of ischemic right MCA stroke 2018    CVA (cerebral vascular accident) ([facility] ) 04/18/2018    Controlled type 2 diabetes mellitus without complication, without long-term current use of insulin ([place] [address] ) 04/18/2018    Pacemaker 04/18/2018    Acid reflux 41/40/5421    Systolic congestive heart failure ([place] [address] ) 04/18/2018    H/O mitral valve replacement 03/01/2018    Deep vein thrombosis (DVT) of left upper extremity (HCC) 01/25/2018    Constipation 09/21/2016    External hemorrhoids 09/21/2016    Carpal tunnel syndrome, left 01/04/2016    Pain in both hands 01/04/2016    Arthralgia of hip, right 11/17/2015    Meningioma ([place] [address] ) 12/18/2014    Hypertension 05/13/2013    Esophageal reflux 05/13/2013    Arthritis 05/13/2013    Hypercholesterolemia 05/13/2013    Malignant neoplasm without specification of site ([place] [address] ) 05/13/2013    Spondylosis of cervical region without myelopathy or radiculopathy 05/13/2013    Type 2 or unspecified type diabetes mellitus 05/13/2013      LOS (days): 12  Geometric Mean LOS (GMLOS) (days):   Days to GMLOS:     OBJECTIVE:  Risk of Unplanned Readmission Score: 29 63         Current admission status: Inpatient   Preferred Pharmacy:   [address] [facility] (Now [address] [facility] [facility] Delivery) [facility], [address] [place] [address]  Phone: [phone] Fax: [phone]    [facility] [address]  Phone: [phone] Fax: [phone]    Primary Care Provider: Rocky Conner MD    Primary Insurance: MEDICARE  Secondary Insurance: [place] DETAILS:    Accepting Facility Name, [address]  Receiving Facility/Agency Phone Number: [phone]   Facility/Agency Fax Number: [phone]

## 2022-08-03 NOTE — ASSESSMENT & PLAN NOTE
Wt Readings from Last 3 Encounters:   07/23/22 64 9 kg (143 lb)   07/22/22 65 2 kg (143 lb 11 8 oz)   07/15/22 69 9 kg (154 lb)       · Patient with chronic systolic congestive heart failure  On previous echos ejection fraction 45%  · Ejection fraction on this admission EF 55-60% with normal LV systolic function  · Home regimen is losartan 25 mg daily Lopressor 50 mg b i d     Lasix 40 mg every other day   · Lasix 20 mg every other day restarted  · Patient seems euvolemic

## 2022-08-03 NOTE — PROGRESS NOTES
[address] [facility]  Progress Note - Worthy Brad 1933, 80 y o  female MRN: [id number]  Unit/Bed#: [facility] 723-01 Encounter: [id number]  Primary Care Provider: Kelsey Aponte MD   Date and time admitted to hospital: 7/22/2022  5:21 PM    * Cardio-embolic left MCA stroke [facility])  Assessment & Plan  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 moderate stenosis left PCA  Underwent unsuccessful thrombectomy on 07/22  Repeat CT scan on 07/23 and 7/24 showed evolving nonhemorrhagic infract of the left MCA  Patient has a history of multiple CVAs in the past on aspirin, Eliquis and Lipitor  · Continue aspirin and Lipitor  · 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 on hold with 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  · Patient with multiple strokes in the past   Patient also with vascular dementia with poor functional status to begin with  Overall prognosis appears to be poor  · According to last Neurology note, repeat CT head in 10-14 days to reinitiate anticoagulation  · Today is day 11, repeat CT head showed subacute infarct involving a large portion of the left MCA territory with interval development mild cortical laminar necrosis/petechial hemorrhage within the cortex of the infarct  Improving cytotoxic edema  No joanne hemorrhagic transformation    · Discussed with Neurology, CT head stable, restart Eliquis 2 5 mg b i d   · Continue with neuro checks  · Neurology on board  · PMR consultation appreciated  · Family wants to continue with the current care-status post PEG tube 7/28, tolerating tube feed  · Plan is for rehab placement-plan to discharge on Thursday    Positive blood culture  Assessment & Plan  · 7/27 1/2 sets came back positive coag-negative staph, likely contaminant   · Monitored off of antibiotics no further fever, no leukocytosis      Fever  Assessment & Plan  · Patient noted to have low-grade fever suspect reactive secondary to stroke and possible aspiration given her mental status  · Chest x-ray reviewed-no pneumonia  · Blood cultures 1/2 growing coag-negative staph, contaminant  · Monitored off of antibiotics  · Fever resolved, no leukocytosis      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[facility])  Assessment & Plan  · No acute exacerbation    Paroxysmal A-fib ([facility])  Assessment & Plan  · Restart Eliquis  · Continue metoprolol tartrate 25 mg b i d  Vascular dementia [facility])  Assessment & Plan  · Patient with history of vascular dementia  · Continue with supportive care    Dysphagia  Assessment & Plan  · Status post PEG tube 7/28, tolerating tube feeds      Anemia  Assessment & Plan  · Previous history of anemia  Remained relatively stable since admission   · Monitor    CKD (chronic kidney disease) stage 3, GFR 30-59 ml/min [facility])  Assessment & Plan  Lab Results   Component Value Date    EGFR 43 08/01/2022    EGFR 41 07/30/2022    EGFR 42 07/29/2022    CREATININE 1 12 08/01/2022    CREATININE 1 17 07/30/2022    CREATININE 1 15 07/29/2022   · Seems to be baseline creatinine from 1 2-1 6 in the past few years  · Creatinine below baseline at this point  · Continue to monitor    Systolic congestive heart failure [facility])  Assessment & Plan  Wt Readings from Last 3 Encounters:   07/23/22 64 9 kg (143 lb)   07/22/22 65 2 kg (143 lb 11 8 oz)   07/15/22 69 9 kg (154 lb)       · Patient with chronic systolic congestive heart failure  On previous echos ejection fraction 45%  · Ejection fraction on this admission EF 55-60% with normal LV systolic function  · Home regimen is losartan 25 mg daily Lopressor 50 mg b i d     Lasix 40 mg every other day   · Lasix 20 mg every other day restarted  · Patient seems euvolemic      Hypertension  Assessment & Plan  · Monitor blood pressure  · Continue metoprolol    Controlled type 2 diabetes mellitus without complication, without long-term current use of insulin [facility])  Assessment & Plan  Lab Results   Component Value Date    HGBA1C 6 3 (H) 2022       Recent Labs     22  0037 22  0556 22  0639 22  1202   POCGLU 167* 164* 173* 192*       Blood Sugar Average: Last 72 hrs:  (P) 173 25   Blood sugar acceptable  Continue with current care    H/O mitral valve replacement  Assessment & Plan  · TTE showed normal  valve function        VTE Pharmacologic Prophylaxis: VTE Score: 9 High Risk (Score >/= 5) - Pharmacological DVT Prophylaxis Ordered: apixaban (Eliquis)  Sequential Compression Devices Ordered  Patient Centered Rounds: I performed bedside rounds with nursing staff today  Discussions with Specialists or Other Care Team Provider:  Neurology, nursing, case management    Education and Discussions with Family / Patient: Three daughters at bedside  Time Spent for Care: 30 minutes  More than 50% of total time spent on counseling and coordination of care as described above  Current Length of Stay: 12 day(s)  Current Patient Status: Inpatient   Certification Statement: The patient will continue to require additional inpatient hospital stay due to Stroke  Discharge Plan: Anticipate discharge tomorrow to rehab facility  Code Status: Level 3 - DNAR and DNI    Subjective:   Patient was seen and evaluated bedside  No events per nursing staff  Objective:     Vitals:   Temp (24hrs), Av 2 °F (37 3 °C), Min:98 9 °F (37 2 °C), Max:99 4 °F (37 4 °C)    Temp:  [98 9 °F (37 2 °C)-99 4 °F (37 4 °C)] 99 3 °F (37 4 °C)  HR:  [] 82  Resp:  [18-20] 20  BP: (140-146)/(55-85) 144/85  SpO2:  [93 %-99 %] 97 %  Body mass index is 27 02 kg/m²  Input and Output Summary (last 24 hours):      Intake/Output Summary (Last 24 hours) at 8/3/2022 1549  Last data filed at 8/3/2022 0701  Gross per 24 hour   Intake 0 ml   Output 100 ml   Net -100 ml       Physical Exam:   Physical Exam  Constitutional:       Comments: Nonverbal   HENT:      Head: Atraumatic  Cardiovascular:      Rate and Rhythm: Normal rate and regular rhythm  Heart sounds: No murmur heard  No friction rub  No gallop  Pulmonary:      Effort: Pulmonary effort is normal  No respiratory distress  Breath sounds: Normal breath sounds  No wheezing  Abdominal:      General: Bowel sounds are normal  There is no distension  Palpations: Abdomen is soft  Tenderness: There is no abdominal tenderness  Comments: Peg tube   Musculoskeletal:         General: No swelling  Cervical back: Neck supple  Skin:     General: Skin is warm and dry  Neurological:      Mental Status: She is alert  Comments: Right-sided paralysis   Psychiatric:         Mood and Affect: Mood normal           Additional Data:     Labs:  Results from last 7 days   Lab Units 08/01/22  0527   WBC Thousand/uL 6 62   HEMOGLOBIN g/dL 8 4*   HEMATOCRIT % 27 9*   PLATELETS Thousands/uL 299     Results from last 7 days   Lab Units 08/01/22  0527   SODIUM mmol/L 142   POTASSIUM mmol/L 3 9   CHLORIDE mmol/L 112*   CO2 mmol/L 26   BUN mg/dL 31*   CREATININE mg/dL 1 12   ANION GAP mmol/L 4   CALCIUM mg/dL 9 0   GLUCOSE RANDOM mg/dL 171*         Results from last 7 days   Lab Units 08/03/22  1202 08/03/22  0639 08/03/22  0556 08/03/22  0037 08/02/22  1744 08/02/22  1203 08/02/22  0625 08/02/22  0020 08/01/22  2104 08/01/22  1825 08/01/22  1035 08/01/22  0529   POC GLUCOSE mg/dl 192* 173* 164* 167* 166* 179* 186* 173* 152* 158* 164* 185*               Lines/Drains:  Invasive Devices  Report    Peripheral Intravenous Line  Duration           Peripheral IV 08/02/22 Dorsal (posterior); Right Hand 1 day          Drain  Duration           Gastrostomy/Enterostomy Percutaneous Endoscopic Gastrostomy (PEG) 20 Fr  LUQ 6 days                      Imaging: Reviewed radiology reports from this admission including: CT head    Recent Cultures (last 7 days):         Last 24 Hours Medication List:   Current Facility-Administered Medications   Medication Dose Route Frequency Provider Last Rate    acetaminophen  325 mg Rectal Q4H PRN Dharmesh Courtney PA-C      acetaminophen  650 mg Oral Q4H PRN Dharmesh Courtney PA-C      albuterol  2 5 mg Nebulization Q6H PRN Dharmesh Courtney PA-C      apixaban  2 5 mg Oral BID Jyoti Carroll MD      artificial tear   Both Eyes HS Dharmesh Courtney PA-C      aspirin  81 mg Oral Daily Dharmesh Courtney PA-C      atorvastatin  80 mg Oral QPM Idalia Hyatt PA-C      budesonide  0 5 mg Nebulization Q12H Idalia Guerra PA-C      cefazolin  1,000 mg Intravenous Once Ishaan Keith MD      chlorhexidine  15 mL Mouth/Throat Q12H [address] Idalia Guerra PA-C      ciprofloxacin  1 drop Right Eye Q4H While Awake Katey Barone MD      furosemide  20 mg Oral Every Other Day Jyoti Carroll MD      hydrALAZINE  10 mg Intravenous Q6H PRN Katey Barone MD      insulin lispro  1-5 Units Subcutaneous Q6H [address] Idalia Guerra PA-C      levalbuterol  1 25 mg Nebulization BID Dharmesh Courtney PA-C      metoprolol tartrate  25 mg Oral Q12H [address] Katey Barone MD      polyethylene glycol  17 g Oral Daily PRN Katey Barone MD      senna  1 tablet Oral HS PRN Katey Barone MD          Today, Patient Was Seen By: Jyoti Carroll MD    **Please Note: This note may have been constructed using a voice recognition system  **

## 2022-08-03 NOTE — ASSESSMENT & PLAN NOTE
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 moderate stenosis left PCA  Underwent unsuccessful thrombectomy on 07/22  Repeat CT scan on 07/23 and 7/24 showed evolving nonhemorrhagic infract of the left MCA  Patient has a history of multiple CVAs in the past on aspirin, Eliquis and Lipitor  · Continue aspirin and Lipitor  · 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 on hold with 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  · Patient with multiple strokes in the past   Patient also with vascular dementia with poor functional status to begin with  Overall prognosis appears to be poor  · According to last Neurology note, repeat CT head in 10-14 days to reinitiate anticoagulation  · Today is day 11, repeat CT head showed subacute infarct involving a large portion of the left MCA territory with interval development mild cortical laminar necrosis/petechial hemorrhage within the cortex of the infarct  Improving cytotoxic edema  No joanne hemorrhagic transformation    · Discussed with Neurology, CT head stable, restart Eliquis 2 5 mg b i d   · Continue with neuro checks  · Neurology on board  · PMR consultation appreciated  · Family wants to continue with the current care-status post PEG tube 7/28, tolerating tube feed  · Plan is for rehab placement-plan to discharge on Thursday

## 2022-08-03 NOTE — ASSESSMENT & PLAN NOTE
Lab Results   Component Value Date    EGFR 43 08/01/2022    EGFR 41 07/30/2022    EGFR 42 07/29/2022    CREATININE 1 12 08/01/2022    CREATININE 1 17 07/30/2022    CREATININE 1 15 07/29/2022   · Seems to be baseline creatinine from 1 2-1 6 in the past few years  · Creatinine below baseline at this point  · Continue to monitor

## 2022-08-03 NOTE — PLAN OF CARE
Problem: Prexisting or High Potential for Compromised Skin Integrity  Goal: Skin integrity is maintained or improved  Description: INTERVENTIONS:  - Identify patients at risk for skin breakdown  - Assess and monitor skin integrity  - Assess and monitor nutrition and hydration status  - Monitor labs   - Assess for incontinence   - Turn and reposition patient  - Assist with mobility/ambulation  - Relieve pressure over bony prominences  - Avoid friction and shearing  - Provide appropriate hygiene as needed including keeping skin clean and dry  - Evaluate need for skin moisturizer/barrier cream  - Collaborate with interdisciplinary team   - Patient/family teaching  - Consider wound care consult   Outcome: Progressing     Problem: Neurological Deficit  Goal: Neurological status is stable or improving  Description: Interventions:  - Monitor and assess patient's level of consciousness, motor function, sensory function, and level of assistance needed for ADLs  - Monitor and report changes from baseline  Collaborate with interdisciplinary team to initiate plan and implement interventions as ordered  - Provide and maintain a safe environment  - Consider seizure precautions  - Consider fall precautions  - Consider aspiration precautions  - Consider bleeding precautions  Outcome: Progressing     Problem: Activity Intolerance/Impaired Mobility  Goal: Mobility/activity is maintained at optimum level for patient  Description: Interventions:  - Assess and monitor patient  barriers to mobility and need for assistive/adaptive devices  - Assess patient's emotional response to limitations  - Collaborate with interdisciplinary team and initiate plans and interventions as ordered  - Encourage independent activity per ability   - Maintain proper body alignment  - Perform active/passive rom as tolerated/ordered    - Plan activities to conserve energy   - Turn patient as appropriate  Outcome: Progressing     Problem: Communication Impairment  Goal: Ability to express needs and understand communication  Description: Assess patient's communication skills and ability to understand information  Patient will demonstrate use of effective communication techniques, alternative methods of communication and understanding even if not able to speak  - Encourage communication and provide alternate methods of communication as needed  - Collaborate with case management/ for discharge needs  - Include patient/family/caregiver in decisions related to communication  Outcome: Progressing     Problem: Potential for Aspiration  Goal: Non-ventilated patient's risk of aspiration is minimized  Description: Assess and monitor vital signs, respiratory status, and labs (WBC)  Monitor for signs of aspiration (tachypnea, cough, rales, wheezing, cyanosis, fever)  - Assess and monitor patient's ability to swallow  - Place patient up in chair to eat if possible  - HOB up at 90 degrees to eat if unable to get patient up into chair   - Supervise patient during oral intake  - Instruct patient/ family to take small bites  - Instruct patient/ family to take small single sips when taking liquids  - Follow patient-specific strategies generated by speech pathologist   Outcome: Progressing     Problem: Nutrition  Goal: Nutrition/Hydration status is improving  Description: Monitor and assess patient's nutrition/hydration status for malnutrition (ex- brittle hair, bruises, dry skin, pale skin and conjunctiva, muscle wasting, smooth red tongue, and disorientation)  Collaborate with interdisciplinary team and initiate plan and interventions as ordered  Monitor patient's weight and dietary intake as ordered or per policy  Utilize nutrition screening tool and intervene per policy  Determine patient's food preferences and provide high-protein, high-caloric foods as appropriate       - Assist patient with eating   - Allow adequate time for meals   - Encourage patient to take dietary supplement as ordered  - Collaborate with clinical nutritionist   - Include patient/family/caregiver in decisions related to nutrition    Outcome: Progressing

## 2022-08-04 ENCOUNTER — TRANSITIONAL CARE MANAGEMENT (OUTPATIENT)
Dept: FAMILY MEDICINE CLINIC | Facility: CLINIC | Age: 87
End: 2022-08-04

## 2022-08-04 VITALS
TEMPERATURE: 97.6 F | HEART RATE: 84 BPM | RESPIRATION RATE: 16 BRPM | HEIGHT: 61 IN | WEIGHT: 143 LBS | DIASTOLIC BLOOD PRESSURE: 95 MMHG | BODY MASS INDEX: 27 KG/M2 | OXYGEN SATURATION: 96 % | SYSTOLIC BLOOD PRESSURE: 122 MMHG

## 2022-08-04 LAB
GLUCOSE SERPL-MCNC: 170 MG/DL (ref 65–140)
GLUCOSE SERPL-MCNC: 172 MG/DL (ref 65–140)

## 2022-08-04 PROCEDURE — 94640 AIRWAY INHALATION TREATMENT: CPT

## 2022-08-04 PROCEDURE — 99239 HOSP IP/OBS DSCHRG MGMT >30: CPT | Performed by: INTERNAL MEDICINE

## 2022-08-04 PROCEDURE — 82948 REAGENT STRIP/BLOOD GLUCOSE: CPT

## 2022-08-04 PROCEDURE — 94760 N-INVAS EAR/PLS OXIMETRY 1: CPT

## 2022-08-04 RX ORDER — FUROSEMIDE 20 MG/1
20 TABLET ORAL EVERY OTHER DAY
Qty: 15 TABLET | Refills: 0
Start: 2022-08-05 | End: 2022-09-08 | Stop reason: SDUPTHER

## 2022-08-04 RX ORDER — INSULIN LISPRO 100 [IU]/ML
1-5 INJECTION, SOLUTION INTRAVENOUS; SUBCUTANEOUS EVERY 6 HOURS SCHEDULED
Qty: 6 ML | Refills: 0
Start: 2022-08-04 | End: 2022-08-30 | Stop reason: ALTCHOICE

## 2022-08-04 RX ORDER — SENNOSIDES 8.6 MG
8.6 TABLET ORAL
Qty: 30 TABLET | Refills: 0
Start: 2022-08-04 | End: 2022-09-25

## 2022-08-04 RX ORDER — POLYETHYLENE GLYCOL 3350 17 G/17G
17 POWDER, FOR SOLUTION ORAL DAILY PRN
Qty: 7 EACH | Refills: 0
Start: 2022-08-04 | End: 2022-09-25

## 2022-08-04 RX ADMIN — ASPIRIN 81 MG CHEWABLE TABLET 81 MG: 81 TABLET CHEWABLE at 08:05

## 2022-08-04 RX ADMIN — METOPROLOL TARTRATE 25 MG: 25 TABLET, FILM COATED ORAL at 08:05

## 2022-08-04 RX ADMIN — CIPROFLOXACIN HYDROCHLORIDE 1 DROP: 3 SOLUTION/ DROPS OPHTHALMIC at 05:22

## 2022-08-04 RX ADMIN — APIXABAN 2.5 MG: 2.5 TABLET, FILM COATED ORAL at 08:05

## 2022-08-04 RX ADMIN — INSULIN LISPRO 1 UNITS: 100 INJECTION, SOLUTION INTRAVENOUS; SUBCUTANEOUS at 00:07

## 2022-08-04 RX ADMIN — CHLORHEXIDINE GLUCONATE 15 ML: 1.2 SOLUTION ORAL at 08:05

## 2022-08-04 RX ADMIN — LEVALBUTEROL HYDROCHLORIDE 1.25 MG: 1.25 SOLUTION, CONCENTRATE RESPIRATORY (INHALATION) at 07:49

## 2022-08-04 RX ADMIN — INSULIN LISPRO 1 UNITS: 100 INJECTION, SOLUTION INTRAVENOUS; SUBCUTANEOUS at 05:22

## 2022-08-04 RX ADMIN — CIPROFLOXACIN HYDROCHLORIDE 1 DROP: 3 SOLUTION/ DROPS OPHTHALMIC at 08:05

## 2022-08-04 RX ADMIN — BUDESONIDE 0.5 MG: 0.5 INHALANT ORAL at 07:49

## 2022-08-04 NOTE — ASSESSMENT & PLAN NOTE
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 moderate stenosis left PCA  Underwent unsuccessful thrombectomy on 07/22  Repeat CT scan on 07/23 and 7/24 showed evolving nonhemorrhagic infract of the left MCA  · Patient with multiple strokes in the past   Patient also with vascular dementia with poor functional status to begin with  Overall prognosis appears to be poor  · Per Neurology plan to restart anticoagulation 10-14 days after initial stroke  · Repeat CT head 8/3 showed subacute infarct involving a large portion of the left MCA territory with interval development mild cortical laminar necrosis/petechial hemorrhage within the cortex of the infarct  Improving cytotoxic edema  No joanne hemorrhagic transformation    · Discussed with Neurology, CT head stable, restarted Eliquis 2 5 mg b i d   · Continue aspirin, Lipitor

## 2022-08-04 NOTE — ASSESSMENT & PLAN NOTE
Lab Results   Component Value Date    HGBA1C 6 3 (H) 07/23/2022       Recent Labs     08/03/22  1202 08/03/22  1658 08/04/22  0005 08/04/22  0521   POCGLU 192* 143* 172* 170*       Blood Sugar Average: Last 72 hrs:  (P) 731 3965   Blood sugar acceptable    Continue with ISS

## 2022-08-04 NOTE — RESTORATIVE TECHNICIAN NOTE
Restorative Technician Note      Patient Name: Pop Nascimento     Note Type: Mobility  Patient Position Upon Consult: Supine  Activity Performed: Repositioned  Patient Position at End of Consult: Supine;  All needs within reach; Bed/Chair alarm activated    [facility] NICKY HONEYCUTT, Restorative Technician, [employer]

## 2022-08-04 NOTE — PLAN OF CARE
Problem: MOBILITY - ADULT  Goal: Maintain or return to baseline ADL function  Description: INTERVENTIONS:  -  Assess patient's ability to carry out ADLs; assess patient's baseline for ADL function and identify physical deficits which impact ability to perform ADLs (bathing, care of mouth/teeth, toileting, grooming, dressing, etc )  - Assess/evaluate cause of self-care deficits   - Assess range of motion  - Assess patient's mobility; develop plan if impaired  - Assess patient's need for assistive devices and provide as appropriate  - Encourage maximum independence but intervene and supervise when necessary  - Involve family in performance of ADLs  - Assess for home care needs following discharge   - Consider OT consult to assist with ADL evaluation and planning for discharge  - Provide patient education as appropriate  Outcome: Progressing  Goal: Maintains/Returns to pre admission functional level  Description: INTERVENTIONS:  - Perform BMAT or MOVE assessment daily    - Set and communicate daily mobility goal to care team and patient/family/caregiver  - Collaborate with rehabilitation services on mobility goals if consulted  - Perform Range of Motion 3 times a day  - Reposition patient every 2 hours    - Dangle patient 3 times a day  - Stand patient 3 times a day  - Ambulate patient 3 times a day  - Out of bed to chair 3 times a day   - Out of bed for meals 3 times a day  - Out of bed for toileting  - Record patient progress and toleration of activity level   Outcome: Progressing     Problem: Potential for Falls  Goal: Patient will remain free of falls  Description: INTERVENTIONS:  - Educate patient/family on patient safety including physical limitations  - Instruct patient to call for assistance with activity   - Consult OT/PT to assist with strengthening/mobility   - Keep Call bell within reach  - Keep bed low and locked with side rails adjusted as appropriate  - Keep care items and personal belongings within reach  - Initiate and maintain comfort rounds  - Make Fall Risk Sign visible to staff  - Offer Toileting every 2 Hours, in advance of need  - Initiate/Maintain bed alarm  - Obtain necessary fall risk management equipment: alarms   - Apply yellow socks and bracelet for high fall risk patients  - Consider moving patient to room near nurses station  Outcome: Progressing     Problem: Prexisting or High Potential for Compromised Skin Integrity  Goal: Skin integrity is maintained or improved  Description: INTERVENTIONS:  - Identify patients at risk for skin breakdown  - Assess and monitor skin integrity  - Assess and monitor nutrition and hydration status  - Monitor labs   - Assess for incontinence   - Turn and reposition patient  - Assist with mobility/ambulation  - Relieve pressure over bony prominences  - Avoid friction and shearing  - Provide appropriate hygiene as needed including keeping skin clean and dry  - Evaluate need for skin moisturizer/barrier cream  - Collaborate with interdisciplinary team   - Patient/family teaching  - Consider wound care consult   Outcome: Progressing     Problem: Neurological Deficit  Goal: Neurological status is stable or improving  Description: Interventions:  - Monitor and assess patient's level of consciousness, motor function, sensory function, and level of assistance needed for ADLs  - Monitor and report changes from baseline  Collaborate with interdisciplinary team to initiate plan and implement interventions as ordered  - Provide and maintain a safe environment  - Consider seizure precautions  - Consider fall precautions  - Consider aspiration precautions  - Consider bleeding precautions  Outcome: Progressing     Problem: Activity Intolerance/Impaired Mobility  Goal: Mobility/activity is maintained at optimum level for patient  Description: Interventions:  - Assess and monitor patient  barriers to mobility and need for assistive/adaptive devices    - Assess patient's emotional response to limitations  - Collaborate with interdisciplinary team and initiate plans and interventions as ordered  - Encourage independent activity per ability   - Maintain proper body alignment  - Perform active/passive rom as tolerated/ordered  - Plan activities to conserve energy   - Turn patient as appropriate  Outcome: Progressing     Problem: Communication Impairment  Goal: Ability to express needs and understand communication  Description: Assess patient's communication skills and ability to understand information  Patient will demonstrate use of effective communication techniques, alternative methods of communication and understanding even if not able to speak  - Encourage communication and provide alternate methods of communication as needed  - Collaborate with case management/ for discharge needs  - Include patient/family/caregiver in decisions related to communication  Outcome: Progressing     Problem: Potential for Aspiration  Goal: Non-ventilated patient's risk of aspiration is minimized  Description: Assess and monitor vital signs, respiratory status, and labs (WBC)  Monitor for signs of aspiration (tachypnea, cough, rales, wheezing, cyanosis, fever)  - Assess and monitor patient's ability to swallow  - Place patient up in chair to eat if possible  - HOB up at 90 degrees to eat if unable to get patient up into chair   - Supervise patient during oral intake  - Instruct patient/ family to take small bites  - Instruct patient/ family to take small single sips when taking liquids  - Follow patient-specific strategies generated by speech pathologist   Outcome: Progressing     Problem: Nutrition  Goal: Nutrition/Hydration status is improving  Description: Monitor and assess patient's nutrition/hydration status for malnutrition (ex- brittle hair, bruises, dry skin, pale skin and conjunctiva, muscle wasting, smooth red tongue, and disorientation)   Collaborate with interdisciplinary team and initiate plan and interventions as ordered  Monitor patient's weight and dietary intake as ordered or per policy  Utilize nutrition screening tool and intervene per policy  Determine patient's food preferences and provide high-protein, high-caloric foods as appropriate  - Assist patient with eating   - Allow adequate time for meals   - Encourage patient to take dietary supplement as ordered  - Collaborate with clinical nutritionist   - Include patient/family/caregiver in decisions related to nutrition  Outcome: Progressing     Problem: Nutrition/Hydration-ADULT  Goal: Nutrient/Hydration intake appropriate for improving, restoring or maintaining nutritional needs  Description: Monitor and assess patient's nutrition/hydration status for malnutrition  Collaborate with interdisciplinary team and initiate plan and interventions as ordered  Monitor patient's weight and dietary intake as ordered or per policy  Utilize nutrition screening tool and intervene as necessary  Determine patient's food preferences and provide high-protein, high-caloric foods as appropriate       INTERVENTIONS:  - Monitor oral intake, urinary output, labs, and treatment plans  - Assess nutrition and hydration status and recommend course of action  - Evaluate amount of meals eaten  - Assist patient with eating if necessary   - Allow adequate time for meals  - Recommend/ encourage appropriate diets, oral nutritional supplements, and vitamin/mineral supplements  - Order, calculate, and assess calorie counts as needed  - Recommend, monitor, and adjust tube feedings and TPN/PPN based on assessed needs  - Assess need for intravenous fluids  - Provide specific nutrition/hydration education as appropriate  - Include patient/family/caregiver in decisions related to nutrition  Outcome: Progressing     Problem: NEUROSENSORY - ADULT  Goal: Achieves stable or improved neurological status  Description: INTERVENTIONS  - Monitor and report changes in neurological status  - Monitor vital signs such as temperature, blood pressure, glucose, and any other labs ordered   - Initiate measures to prevent increased intracranial pressure  - Monitor for seizure activity and implement precautions if appropriate      Outcome: Progressing  Goal: Achieves maximal functionality and self care  Description: INTERVENTIONS  - Monitor swallowing and airway patency with patient fatigue and changes in neurological status  - Encourage and assist patient to increase activity and self care     - Encourage visually impaired, hearing impaired and aphasic patients to use assistive/communication devices  Outcome: Progressing     Problem: CARDIOVASCULAR - ADULT  Goal: Maintains optimal cardiac output and hemodynamic stability  Description: INTERVENTIONS:  - Monitor I/O, vital signs and rhythm  - Monitor for S/S and trends of decreased cardiac output  - Administer and titrate ordered vasoactive medications to optimize hemodynamic stability  - Assess quality of pulses, skin color and temperature  - Assess for signs of decreased coronary artery perfusion  - Instruct patient to report change in severity of symptoms  Outcome: Progressing  Goal: Absence of cardiac dysrhythmias or at baseline rhythm  Description: INTERVENTIONS:  - Continuous cardiac monitoring, vital signs, obtain 12 lead EKG if ordered  - Administer antiarrhythmic and heart rate control medications as ordered  - Monitor electrolytes and administer replacement therapy as ordered  Outcome: Progressing     Problem: RESPIRATORY - ADULT  Goal: Achieves optimal ventilation and oxygenation  Description: INTERVENTIONS:  - Assess for changes in respiratory status  - Assess for changes in mentation and behavior  - Position to facilitate oxygenation and minimize respiratory effort  - Oxygen administered by appropriate delivery if ordered  - Initiate smoking cessation education as indicated  - Encourage broncho-pulmonary hygiene including cough, deep breathe, Incentive Spirometry  - Assess the need for suctioning and aspirate as needed  - Assess and instruct to report SOB or any respiratory difficulty  - Respiratory Therapy support as indicated  Outcome: Progressing     Problem: GASTROINTESTINAL - ADULT  Goal: Maintains or returns to baseline bowel function  Description: INTERVENTIONS:  - Assess bowel function  - Encourage oral fluids to ensure adequate hydration  - Administer IV fluids if ordered to ensure adequate hydration  - Administer ordered medications as needed  - Encourage mobilization and activity  - Consider nutritional services referral to assist patient with adequate nutrition and appropriate food choices  Outcome: Progressing  Goal: Maintains adequate nutritional intake  Description: INTERVENTIONS:  - Monitor percentage of each meal consumed  - Identify factors contributing to decreased intake, treat as appropriate  - Assist with meals as needed  - Monitor I&O, weight, and lab values if indicated  - Obtain nutrition services referral as needed  Outcome: Progressing     Problem: GENITOURINARY - ADULT  Goal: Maintains or returns to baseline urinary function  Description: INTERVENTIONS:  - Assess urinary function  - Encourage oral fluids to ensure adequate hydration if ordered  - Administer IV fluids as ordered to ensure adequate hydration  - Administer ordered medications as needed  - Offer frequent toileting  - Follow urinary retention protocol if ordered  Outcome: Progressing  Goal: Urinary catheter remains patent  Description: INTERVENTIONS:  - Assess patency of urinary catheter  - If patient has a chronic schaefer, consider changing catheter if non-functioning  - Follow guidelines for intermittent irrigation of non-functioning urinary catheter  Outcome: Progressing     Problem: METABOLIC, FLUID AND ELECTROLYTES - ADULT  Goal: Electrolytes maintained within normal limits  Description: INTERVENTIONS:  - Monitor labs and assess patient for signs and symptoms of electrolyte imbalances  - Administer electrolyte replacement as ordered  - Monitor response to electrolyte replacements, including repeat lab results as appropriate  - Instruct patient on fluid and nutrition as appropriate  Outcome: Progressing  Goal: Fluid balance maintained  Description: INTERVENTIONS:  - Monitor labs   - Monitor I/O and WT  - Instruct patient on fluid and nutrition as appropriate  - Assess for signs & symptoms of volume excess or deficit  Outcome: Progressing  Goal: Glucose maintained within target range  Description: INTERVENTIONS:  - Monitor Blood Glucose as ordered  - Assess for signs and symptoms of hyperglycemia and hypoglycemia  - Administer ordered medications to maintain glucose within target range  - Assess nutritional intake and initiate nutrition service referral as needed  Outcome: Progressing      Problem: HEMATOLOGIC - ADULT  Goal: Maintains hematologic stability  Description: INTERVENTIONS  - Assess for signs and symptoms of bleeding or hemorrhage  - Monitor labs  - Administer supportive blood products/factors as ordered and appropriate  Outcome: Progressing     Problem: MUSCULOSKELETAL - ADULT  Goal: Maintain or return mobility to safest level of function  Description: INTERVENTIONS:  - Assess patient's ability to carry out ADLs; assess patient's baseline for ADL function and identify physical deficits which impact ability to perform ADLs (bathing, care of mouth/teeth, toileting, grooming, dressing, etc )  - Assess/evaluate cause of self-care deficits   - Assess range of motion  - Assess patient's mobility  - Assess patient's need for assistive devices and provide as appropriate  - Encourage maximum independence but intervene and supervise when necessary  - Involve family in performance of ADLs  - Assess for home care needs following discharge   - Consider OT consult to assist with ADL evaluation and planning for discharge  - Provide patient education as appropriate  Outcome: Progressing  Goal: Maintain proper alignment of affected body part  Description: INTERVENTIONS:  - Support, maintain and protect limb and body alignment  - Provide patient/ family with appropriate education  Outcome: Progressing     Problem: COPING  Goal: Pt/Family able to verbalize concerns and demonstrate effective coping strategies  Description: INTERVENTIONS:  - Assist patient/family to identify coping skills, available support systems and cultural and spiritual values  - Provide emotional support, including active listening and acknowledgement of concerns of patient and caregivers  - Reduce environmental stimuli, as able  - Provide patient education  - Assess for spiritual pain/suffering and initiate spiritual care, including notification of Pastoral Care or [facility] based community as needed  - Assess effectiveness of coping strategies  Outcome: Progressing  Goal: Will report anxiety at manageable levels  Description: INTERVENTIONS:  - Administer medication as ordered  - Teach and encourage coping skills  - Provide emotional support  - Assess patient/family for anxiety and ability to cope  Outcome: Progressing     Problem: INFECTION - ADULT  Goal: Absence or prevention of progression during hospitalization  Description: INTERVENTIONS:  - Assess and monitor for signs and symptoms of infection  - Monitor lab/diagnostic results  - Monitor all insertion sites, i e  indwelling lines, tubes, and drains  - Monitor endotracheal if appropriate and nasal secretions for changes in amount and color  - [place] appropriate cooling/warming therapies per order  - Administer medications as ordered  - Instruct and encourage patient and family to use good hand hygiene technique  - Identify and instruct in appropriate isolation precautions for identified infection/condition  Outcome: Progressing     Problem: SAFETY ADULT  Goal: Maintain or return to baseline ADL function  Description: INTERVENTIONS:  -  Assess patient's ability to carry out ADLs; assess patient's baseline for ADL function and identify physical deficits which impact ability to perform ADLs (bathing, care of mouth/teeth, toileting, grooming, dressing, etc )  - Assess/evaluate cause of self-care deficits   - Assess range of motion  - Assess patient's mobility; develop plan if impaired  - Assess patient's need for assistive devices and provide as appropriate  - Encourage maximum independence but intervene and supervise when necessary  - Involve family in performance of ADLs  - Assess for home care needs following discharge   - Consider OT consult to assist with ADL evaluation and planning for discharge  - Provide patient education as appropriate  Outcome: Progressing  Goal: Maintains/Returns to pre admission functional level  Description: INTERVENTIONS:  - Perform BMAT or MOVE assessment daily    - Set and communicate daily mobility goal to care team and patient/family/caregiver  - Collaborate with rehabilitation services on mobility goals if consulted  - Perform Range of Motion 3 times a day  - Reposition patient every 2 hours    - Dangle patient 3 times a day  - Stand patient 3 times a day  - Ambulate patient 3 times a day  - Out of bed to chair 3 times a day   - Out of bed for meals 3 times a day  - Out of bed for toileting  - Record patient progress and toleration of activity level   Outcome: Progressing  Goal: Patient will remain free of falls  Description: INTERVENTIONS:  - Educate patient/family on patient safety including physical limitations  - Instruct patient to call for assistance with activity   - Consult OT/PT to assist with strengthening/mobility   - Keep Call bell within reach  - Keep bed low and locked with side rails adjusted as appropriate  - Keep care items and personal belongings within reach  - Initiate and maintain comfort rounds  - Make Fall Risk Sign visible to staff  - Offer Toileting every 2 Hours, in advance of need  - Initiate/Maintain bed alarm  - Obtain necessary fall risk management equipment: alarms  - Apply yellow socks and bracelet for high fall risk patients  - Consider moving patient to room near nurses station  Outcome: Progressing     Problem: DISCHARGE PLANNING  Goal: Discharge to home or other facility with appropriate resources  Description: INTERVENTIONS:  - Identify barriers to discharge w/patient and caregiver  - Arrange for needed discharge resources and transportation as appropriate  - Identify discharge learning needs (meds, wound care, etc )  - Arrange for interpretive services to assist at discharge as needed  - Refer to Case Management Department for coordinating discharge planning if the patient needs post-hospital services based on physician/advanced practitioner order or complex needs related to functional status, cognitive ability, or social support system  Outcome: Progressing     Problem: Knowledge Deficit  Goal: Patient/family/caregiver demonstrates understanding of disease process, treatment plan, medications, and discharge instructions  Description: Complete learning assessment and assess knowledge base    Interventions:  - Provide teaching at level of understanding  - Provide teaching via preferred learning methods  Outcome: Progressing

## 2022-08-04 NOTE — DISCHARGE SUMMARY
Dominguez 1933, 80 y o  female MRN: [id number]  Unit/Bed#: [facility] 723-01 Encounter: [id number]  Primary Care Provider: Shine Barnett MD   Date and time admitted to hospital: 7/22/2022  5:21 PM    * Cardio-embolic left MCA stroke [facility])  Assessment & Plan  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 moderate stenosis left PCA  Underwent unsuccessful thrombectomy on 07/22  Repeat CT scan on 07/23 and 7/24 showed evolving nonhemorrhagic infract of the left MCA  · Patient with multiple strokes in the past   Patient also with vascular dementia with poor functional status to begin with  Overall prognosis appears to be poor  · Per Neurology plan to restart anticoagulation 10-14 days after initial stroke  · Repeat CT head 8/3 showed subacute infarct involving a large portion of the left MCA territory with interval development mild cortical laminar necrosis/petechial hemorrhage within the cortex of the infarct  Improving cytotoxic edema  No joanne hemorrhagic transformation  · Discussed with Neurology, CT head stable, restarted Eliquis 2 5 mg b i d   · Continue aspirin, Lipitor      Dysphagia  Assessment & Plan  · Status post PEG tube 7/28, tolerating tube feeds      Fever  Assessment & Plan  · Patient noted to have low-grade fever suspect reactive secondary to stroke and possible aspiration given her mental status  · Chest x-ray reviewed-no pneumonia  · Blood cultures 1/2 growing coag-negative staph, contaminant  · Monitored off of antibiotics  · Fever resolved, no leukocytosis      Systolic congestive heart failure (HCC)  Assessment & Plan  Wt Readings from Last 3 Encounters:   07/23/22 64 9 kg (143 lb)   07/22/22 65 2 kg (143 lb 11 8 oz)   07/15/22 69 9 kg (154 lb)       · Patient with chronic systolic congestive heart failure    On previous echos ejection fraction 45%  · Ejection fraction on this admission EF 55-60% with normal LV systolic function  · Home regimen is losartan 25 mg daily Lopressor 50 mg b i d     Lasix 40 mg every other day   · Last chest x-ray 7/30 showed mild pulmonary edema, patient clinically seems euvolemic, stable on room air, no respiratory distress, could not appreciate JVD  · Lasix 20 mg every other day restarted 8/3    Positive blood culture  Assessment & Plan  · 7/27 1/2 sets came back positive coag-negative staph, likely contaminant   · Monitored off of antibiotics no further fever, no leukocytosis      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[facility])  Assessment & Plan  · No acute exacerbation    Paroxysmal A-fib ([facility])  Assessment & Plan  · Restart Eliquis  · Continue metoprolol tartrate 25 mg b i d  Vascular dementia [facility])  Assessment & Plan  · Patient with history of vascular dementia  · Continue with supportive care    Anemia  Assessment & Plan  · Previous history of anemia    Remained relatively stable since admission   · Monitor    CKD (chronic kidney disease) stage 3, GFR 30-59 ml/min [facility])  Assessment & Plan  Lab Results   Component Value Date    EGFR 43 08/01/2022    EGFR 41 07/30/2022    EGFR 42 07/29/2022    CREATININE 1 12 08/01/2022    CREATININE 1 17 07/30/2022    CREATININE 1 15 07/29/2022   · Seems to be baseline creatinine from 1 2-1 6 in the past few years  · Creatinine below baseline now      Hypertension  Assessment & Plan  · Monitor blood pressure  · Continue metoprolol tartrate 25 mg b i d   · Hold losartan for now, restart after discharge if appropriate    Controlled type 2 diabetes mellitus without complication, without long-term current use of insulin [facility])  Assessment & Plan  Lab Results   Component Value Date    HGBA1C 6 3 (H) 07/23/2022       Recent Labs     08/03/22  1202 08/03/22  1658 08/04/22  0005 08/04/22  0521   POCGLU 192* 143* 172* 170*       Blood Sugar Average: Last 72 hrs:  (P) 550 4782   Blood sugar acceptable  Continue with ISS    H/O mitral valve replacement  Assessment & Plan  · TTE showed normal  valve function      Medical Problems             Resolved Problems  Date Reviewed: 8/4/2022          Resolved    Hypernatremia 7/31/2022     Resolved by  Kiet Dowd MD              Discharging Physician / Practitioner: Manisha Santos MD  PCP: Jeannine Ortiz MD  Admission Date:   Admission Orders (From admission, onward)     Ordered        07/22/22 1745  Inpatient Admission  Once                      Discharge Date: 08/04/22    Consultations During Hospital Stay:  · Neuro surgery  · Neurology  · Palliative care  · Gastroenterology  · PMR    Procedures Performed:   · Unsuccessful mechanical thrombectomy 7/22  · Peg tube placement 7/28    Significant Findings / Test Results:   · CT head 07/22/2022  IMPRESSION:  Stable chronic ischemic changes  No acute infarct or hemorrhage  · CTA head and neck 07/22/2022  IMPRESSION:  Proximal left M1 occlusion  Moderate stenosis proximal right internal carotid artery  Moderate stenosis left PCA  · Chest x-ray 07/22/2022 no acute cardiopulmonary disease    · CT head 07/23/2022  IMPRESSION:  1  Evolving nonhemorrhagic left MCA territory infarction  2   Cerebral atrophy with chronic small vessel ischemic white matter disease and chronic right occipital parietal lobe infarctions  · CT head 07/24/2022  IMPRESSION:  1  Evolving nonhemorrhagic left MCA territory infarction  2   Cerebral atrophy with chronic small vessel ischemic white matter disease and chronic infarctions as described above  · Chest x-ray 7/27/22 small left pleural effusion    · Chest x-ray 07/30/2022 mild pulmonary edema    · CT head 08/03/2022  IMPRESSION:  Subacute infarct involving a large portion of the left MCA territory with interval development of mild cortical laminar necrosis/petechial hemorrhage within the cortex of the infarct  Improving cytotoxic edema    No joanne hemorrhagic transformation  Old right posterior MCA infarct  Additional scattered chronic microangiopathic change      Incidental Findings:   · none    Test Results Pending at Discharge (will require follow up):   · none     Outpatient Tests Requested:  · none    Complications:  none    Reason for Admission:  CVA    Hospital Course:   Goldy Mitchell is a 80 y o  female patient who originally presented to the hospital on 7/22/2022 due to aphasia, right-sided weakness and facial droop  CT showed left middle cerebral artery occlusion  Underwent unsuccessful thrombectomy on 07/22  Patient has a history of multiple strokes in the past and vascular dementia with poor functional status  Patient was on aspirin, Lipitor  Repeat CT scan 8/3 stable, restarted Eliquis 2 5 mg b i d  Peg tube placed 7/28, tolerating tube feeds  On Jevity 1 2, continues 45 cc/hour with 125 cc of free water flushes q 6 hours  Patient has a history of systolic heart failure with ejection fraction 45%  Her echo on this admission showed ejection fraction 55-60%  Chest x-ray 7/30 showed mild pulmonary edema  Patient is on Lasix 40 mg every other day  Restarted 20 mg every other day  Patient on room air, no respiratory distress  Continue to monitor and increase the dose if indicated  For paroxysmal atrial fibrillation continue metoprolol tartrate 25 mg b i d  Restarted Eliquis 2 5 mg b i d  For history of multiple CVAs and paroxysmal atrial fibrillation  Please see above list of diagnoses and related plan for additional information  Condition at Discharge: poor    Discharge Day Visit / Exam:   Subjective:  No events overnight    Calm, stable  Vitals: Blood Pressure: 122/95 (08/04/22 0711)  Pulse: 84 (08/04/22 0711)  Temperature: 97 6 °F (36 4 °C) (08/04/22 0711)  Temp Source: Axillary (07/31/22 1117)  Respirations: 16 (08/04/22 0711)  Height: 5' 1" (154 9 cm) (07/23/22 1110)  Weight - Scale: 64 9 kg (143 lb) (07/23/22 1110)  SpO2: 96 % (08/04/22 0751)  Exam:   Physical Exam  Constitutional:       General: She is not in acute distress  Comments: Aphasic, seems chronically sick   HENT:      Head: Atraumatic  Cardiovascular:      Rate and Rhythm: Normal rate and regular rhythm  Heart sounds: No murmur heard  No friction rub  No gallop  Pulmonary:      Effort: Pulmonary effort is normal  No respiratory distress  Breath sounds: Normal breath sounds  No wheezing  Abdominal:      General: Bowel sounds are normal  There is no distension  Palpations: Abdomen is soft  Tenderness: There is no abdominal tenderness  Comments: Peg tube   Musculoskeletal:         General: No swelling  Cervical back: Neck supple  Skin:     General: Skin is warm and dry  Neurological:      Mental Status: She is alert  Comments: Right-sided hemiparesis, aphasic   Psychiatric:      Comments: Unable to assess          Discussion with Family: Updated  (daughter) at bedside  Discharge instructions/Information to patient and family:   See after visit summary for information provided to patient and family  Provisions for Follow-Up Care:  See after visit summary for information related to follow-up care and any pertinent home health orders  Disposition:   [facility]    Planned Readmission: no     Discharge Statement:  I spent 45 minutes discharging the patient  This time was spent on the day of discharge  I had direct contact with the patient on the day of discharge  Greater than 50% of the total time was spent examining patient, answering all patient questions, arranging and discussing plan of care with patient as well as directly providing post-discharge instructions  Additional time then spent on discharge activities  Discharge Medications:  See after visit summary for reconciled discharge medications provided to patient and/or family  **Please Note: This note may have been constructed using a voice recognition system**

## 2022-08-04 NOTE — ASSESSMENT & PLAN NOTE
Lab Results   Component Value Date    EGFR 43 08/01/2022    EGFR 41 07/30/2022    EGFR 42 07/29/2022    CREATININE 1 12 08/01/2022    CREATININE 1 17 07/30/2022    CREATININE 1 15 07/29/2022   · Seems to be baseline creatinine from 1 2-1 6 in the past few years  · Creatinine below baseline now

## 2022-08-04 NOTE — ASSESSMENT & PLAN NOTE
Wt Readings from Last 3 Encounters:   07/23/22 64 9 kg (143 lb)   07/22/22 65 2 kg (143 lb 11 8 oz)   07/15/22 69 9 kg (154 lb)       · Patient with chronic systolic congestive heart failure  On previous echos ejection fraction 45%  · Ejection fraction on this admission EF 55-60% with normal LV systolic function  · Home regimen is losartan 25 mg daily Lopressor 50 mg b i d     Lasix 40 mg every other day   · Last chest x-ray 7/30 showed mild pulmonary edema, patient clinically seems euvolemic, stable on room air, no respiratory distress, could not appreciate JVD  · Lasix 20 mg every other day restarted 8/3

## 2022-08-04 NOTE — ASSESSMENT & PLAN NOTE
· Monitor blood pressure  · Continue metoprolol tartrate 25 mg b i d   · Hold losartan for now, restart after discharge if appropriate

## 2022-08-04 NOTE — DISCHARGE INSTRUCTIONS
You were admitted with aphasia, right-sided weakness and facial droop  CT scan showed a stroke  He underwent unsuccessful thrombectomy 7/22  Continue aspirin, Lipitor  Repeat CT scan of the head 8/3 stable  Per Neurology restarted Eliquis  Back tube was placed 7/28, tolerating tube feeds

## 2022-08-04 NOTE — PLAN OF CARE
Problem: MOBILITY - ADULT  Goal: Maintain or return to baseline ADL function  Description: INTERVENTIONS:  -  Assess patient's ability to carry out ADLs; assess patient's baseline for ADL function and identify physical deficits which impact ability to perform ADLs (bathing, care of mouth/teeth, toileting, grooming, dressing, etc )  - Assess/evaluate cause of self-care deficits   - Assess range of motion  - Assess patient's mobility; develop plan if impaired  - Assess patient's need for assistive devices and provide as appropriate  - Encourage maximum independence but intervene and supervise when necessary  - Involve family in performance of ADLs  - Assess for home care needs following discharge   - Consider OT consult to assist with ADL evaluation and planning for discharge  - Provide patient education as appropriate  8/4/2022 1054 by Rasheed Nash RN  Outcome: Adequate for Discharge  8/4/2022 0705 by Rasheed Nash RN  Outcome: Progressing  Goal: Maintains/Returns to pre admission functional level  Description: INTERVENTIONS:  - Perform BMAT or MOVE assessment daily    - Set and communicate daily mobility goal to care team and patient/family/caregiver     - Collaborate with rehabilitation services on mobility goals if consulted  - Out of bed for toileting  - Record patient progress and toleration of activity level   8/4/2022 1054 by Rasheed Nash RN  Outcome: Adequate for Discharge  8/4/2022 0705 by Rasheed Nash RN  Outcome: Progressing     Problem: Potential for Falls  Goal: Patient will remain free of falls  Description: INTERVENTIONS:  - Educate patient/family on patient safety including physical limitations  - Instruct patient to call for assistance with activity   - Consult OT/PT to assist with strengthening/mobility   - Keep Call bell within reach  - Keep bed low and locked with side rails adjusted as appropriate  - Keep care items and personal belongings within reach  - Initiate and maintain comfort rounds  - Make Fall Risk Sign visible to staff     - Apply yellow socks and bracelet for high fall risk patients  - Consider moving patient to room near nurses station  8/4/2022 1054 by Jerod Rose RN  Outcome: Adequate for Discharge  8/4/2022 0705 by Jerod Rose RN  Outcome: Progressing     Problem: Prexisting or High Potential for Compromised Skin Integrity  Goal: Skin integrity is maintained or improved  Description: INTERVENTIONS:  - Identify patients at risk for skin breakdown  - Assess and monitor skin integrity  - Assess and monitor nutrition and hydration status  - Monitor labs   - Assess for incontinence   - Turn and reposition patient  - Assist with mobility/ambulation  - Relieve pressure over bony prominences  - Avoid friction and shearing  - Provide appropriate hygiene as needed including keeping skin clean and dry  - Evaluate need for skin moisturizer/barrier cream  - Collaborate with interdisciplinary team   - Patient/family teaching  - Consider wound care consult   8/4/2022 1054 by Jerod Rose RN  Outcome: Adequate for Discharge  8/4/2022 0705 by Jerod Rose RN  Outcome: Progressing     Problem: Neurological Deficit  Goal: Neurological status is stable or improving  Description: Interventions:  - Monitor and assess patient's level of consciousness, motor function, sensory function, and level of assistance needed for ADLs  - Monitor and report changes from baseline  Collaborate with interdisciplinary team to initiate plan and implement interventions as ordered  - Provide and maintain a safe environment  - Consider seizure precautions  - Consider fall precautions  - Consider aspiration precautions  - Consider bleeding precautions  8/4/2022 1054 by Jerod Rose RN  Outcome: Adequate for Discharge  8/4/2022 0705 by Jerod Rose RN  Outcome: Progressing     Problem:  Activity Intolerance/Impaired Mobility  Goal: Mobility/activity is maintained at optimum level for patient  Description: Interventions:  - Assess and monitor patient  barriers to mobility and need for assistive/adaptive devices  - Assess patient's emotional response to limitations  - Collaborate with interdisciplinary team and initiate plans and interventions as ordered  - Encourage independent activity per ability   - Maintain proper body alignment  - Perform active/passive rom as tolerated/ordered  - Plan activities to conserve energy   - Turn patient as appropriate  8/4/2022 1054 by Anita Branham RN  Outcome: Adequate for Discharge  8/4/2022 0705 by Anita Branham RN  Outcome: Progressing     Problem: Communication Impairment  Goal: Ability to express needs and understand communication  Description: Assess patient's communication skills and ability to understand information  Patient will demonstrate use of effective communication techniques, alternative methods of communication and understanding even if not able to speak  - Encourage communication and provide alternate methods of communication as needed  - Collaborate with case management/ for discharge needs  - Include patient/family/caregiver in decisions related to communication  8/4/2022 1054 by Anita Branham RN  Outcome: Adequate for Discharge  8/4/2022 0705 by Anita Branham RN  Outcome: Progressing     Problem: Potential for Aspiration  Goal: Non-ventilated patient's risk of aspiration is minimized  Description: Assess and monitor vital signs, respiratory status, and labs (WBC)  Monitor for signs of aspiration (tachypnea, cough, rales, wheezing, cyanosis, fever)  - Assess and monitor patient's ability to swallow  - Place patient up in chair to eat if possible  - HOB up at 90 degrees to eat if unable to get patient up into chair   - Supervise patient during oral intake  - Instruct patient/ family to take small bites  - Instruct patient/ family to take small single sips when taking liquids    - Follow patient-specific strategies generated by speech pathologist   8/4/2022 1054 by Ori Leigh RN  Outcome: Adequate for Discharge  8/4/2022 0705 by Ori Leigh RN  Outcome: Progressing     Problem: Nutrition  Goal: Nutrition/Hydration status is improving  Description: Monitor and assess patient's nutrition/hydration status for malnutrition (ex- brittle hair, bruises, dry skin, pale skin and conjunctiva, muscle wasting, smooth red tongue, and disorientation)  Collaborate with interdisciplinary team and initiate plan and interventions as ordered  Monitor patient's weight and dietary intake as ordered or per policy  Utilize nutrition screening tool and intervene per policy  Determine patient's food preferences and provide high-protein, high-caloric foods as appropriate  - Assist patient with eating   - Allow adequate time for meals   - Encourage patient to take dietary supplement as ordered  - Collaborate with clinical nutritionist   - Include patient/family/caregiver in decisions related to nutrition  8/4/2022 1054 by Ori Leigh RN  Outcome: Adequate for Discharge  8/4/2022 0705 by Ori Leigh RN  Outcome: Progressing     Problem: Nutrition/Hydration-ADULT  Goal: Nutrient/Hydration intake appropriate for improving, restoring or maintaining nutritional needs  Description: Monitor and assess patient's nutrition/hydration status for malnutrition  Collaborate with interdisciplinary team and initiate plan and interventions as ordered  Monitor patient's weight and dietary intake as ordered or per policy  Utilize nutrition screening tool and intervene as necessary  Determine patient's food preferences and provide high-protein, high-caloric foods as appropriate       INTERVENTIONS:  - Monitor oral intake, urinary output, labs, and treatment plans  - Assess nutrition and hydration status and recommend course of action  - Evaluate amount of meals eaten  - Assist patient with eating if necessary   - Allow adequate time for meals  - Recommend/ encourage appropriate diets, oral nutritional supplements, and vitamin/mineral supplements  - Order, calculate, and assess calorie counts as needed  - Recommend, monitor, and adjust tube feedings and TPN/PPN based on assessed needs  - Assess need for intravenous fluids  - Provide specific nutrition/hydration education as appropriate  - Include patient/family/caregiver in decisions related to nutrition  8/4/2022 1054 by Ori Leigh RN  Outcome: Adequate for Discharge  8/4/2022 0705 by Ori Leigh RN  Outcome: Progressing     Problem: NEUROSENSORY - ADULT  Goal: Achieves stable or improved neurological status  Description: INTERVENTIONS  - Monitor and report changes in neurological status  - Monitor vital signs such as temperature, blood pressure, glucose, and any other labs ordered   - Initiate measures to prevent increased intracranial pressure  - Monitor for seizure activity and implement precautions if appropriate      8/4/2022 1054 by Ori Leigh RN  Outcome: Adequate for Discharge  8/4/2022 0705 by Ori Leigh RN  Outcome: Progressing  Goal: Achieves maximal functionality and self care  Description: INTERVENTIONS  - Monitor swallowing and airway patency with patient fatigue and changes in neurological status  - Encourage and assist patient to increase activity and self care     - Encourage visually impaired, hearing impaired and aphasic patients to use assistive/communication devices  8/4/2022 1054 by Ori Leigh RN  Outcome: Adequate for Discharge  8/4/2022 0705 by Ori Leigh RN  Outcome: Progressing     Problem: CARDIOVASCULAR - ADULT  Goal: Maintains optimal cardiac output and hemodynamic stability  Description: INTERVENTIONS:  - Monitor I/O, vital signs and rhythm  - Monitor for S/S and trends of decreased cardiac output  - Administer and titrate ordered vasoactive medications to optimize hemodynamic stability  - Assess quality of pulses, skin color and temperature  - Assess for signs of decreased coronary artery perfusion  - Instruct patient to report change in severity of symptoms  8/4/2022 1054 by Neil Hancock RN  Outcome: Adequate for Discharge  8/4/2022 0705 by Neil Hancock RN  Outcome: Progressing  Goal: Absence of cardiac dysrhythmias or at baseline rhythm  Description: INTERVENTIONS:  - Continuous cardiac monitoring, vital signs, obtain 12 lead EKG if ordered  - Administer antiarrhythmic and heart rate control medications as ordered  - Monitor electrolytes and administer replacement therapy as ordered  8/4/2022 1054 by Neil Hancock RN  Outcome: Adequate for Discharge  8/4/2022 0705 by Neil Hancock RN  Outcome: Progressing     Problem: RESPIRATORY - ADULT  Goal: Achieves optimal ventilation and oxygenation  Description: INTERVENTIONS:  - Assess for changes in respiratory status  - Assess for changes in mentation and behavior  - Position to facilitate oxygenation and minimize respiratory effort  - Oxygen administered by appropriate delivery if ordered  - Initiate smoking cessation education as indicated  - Encourage broncho-pulmonary hygiene including cough, deep breathe, Incentive Spirometry  - Assess the need for suctioning and aspirate as needed  - Assess and instruct to report SOB or any respiratory difficulty  - Respiratory Therapy support as indicated  8/4/2022 1054 by Neil Hancock RN  Outcome: Adequate for Discharge  8/4/2022 0705 by Neil Hancock RN  Outcome: Progressing     Problem: GASTROINTESTINAL - ADULT  Goal: Maintains or returns to baseline bowel function  Description: INTERVENTIONS:  - Assess bowel function  - Encourage oral fluids to ensure adequate hydration  - Administer IV fluids if ordered to ensure adequate hydration  - Administer ordered medications as needed  - Encourage mobilization and activity  - Consider nutritional services referral to assist patient with adequate nutrition and appropriate food choices  8/4/2022 1054 by Agnieszka Acosta RN  Outcome: Adequate for Discharge  8/4/2022 0705 by Agnieszka Acosta RN  Outcome: Progressing  Goal: Maintains adequate nutritional intake  Description: INTERVENTIONS:  - Monitor percentage of each meal consumed  - Identify factors contributing to decreased intake, treat as appropriate  - Assist with meals as needed  - Monitor I&O, weight, and lab values if indicated  - Obtain nutrition services referral as needed  8/4/2022 1054 by Agnieszka Acosta RN  Outcome: Adequate for Discharge  8/4/2022 0705 by Agnieszka Acosta RN  Outcome: Progressing     Problem: GENITOURINARY - ADULT  Goal: Maintains or returns to baseline urinary function  Description: INTERVENTIONS:  - Assess urinary function  - Encourage oral fluids to ensure adequate hydration if ordered  - Administer IV fluids as ordered to ensure adequate hydration  - Administer ordered medications as needed  - Offer frequent toileting  - Follow urinary retention protocol if ordered  8/4/2022 1054 by Agnieszka Acosta RN  Outcome: Adequate for Discharge  8/4/2022 0705 by Agnieszka Acosta RN  Outcome: Progressing  Goal: Urinary catheter remains patent  Description: INTERVENTIONS:  - Assess patency of urinary catheter  - If patient has a chronic schaefer, consider changing catheter if non-functioning  - Follow guidelines for intermittent irrigation of non-functioning urinary catheter  8/4/2022 1054 by Agnieszka Acosta RN  Outcome: Adequate for Discharge  8/4/2022 0705 by Agnieszka Acosta RN  Outcome: Progressing     Problem: METABOLIC, FLUID AND ELECTROLYTES - ADULT  Goal: Electrolytes maintained within normal limits  Description: INTERVENTIONS:  - Monitor labs and assess patient for signs and symptoms of electrolyte imbalances  - Administer electrolyte replacement as ordered  - Monitor response to electrolyte replacements, including repeat lab results as appropriate  - Instruct patient on fluid and nutrition as appropriate  8/4/2022 1054 by Julia Loyd RN  Outcome: Adequate for Discharge  8/4/2022 0705 by Julia Loyd RN  Outcome: Progressing  Goal: Fluid balance maintained  Description: INTERVENTIONS:  - Monitor labs   - Monitor I/O and WT  - Instruct patient on fluid and nutrition as appropriate  - Assess for signs & symptoms of volume excess or deficit  8/4/2022 1054 by Julia Loyd RN  Outcome: Adequate for Discharge  8/4/2022 0705 by Julia Loyd RN  Outcome: Progressing  Goal: Glucose maintained within target range  Description: INTERVENTIONS:  - Monitor Blood Glucose as ordered  - Assess for signs and symptoms of hyperglycemia and hypoglycemia  - Administer ordered medications to maintain glucose within target range  - Assess nutritional intake and initiate nutrition service referral as needed  8/4/2022 1054 by Julia Loyd RN  Outcome: Adequate for Discharge  8/4/2022 0705 by Julia Loyd RN  Outcome: Progressing      Problem: HEMATOLOGIC - ADULT  Goal: Maintains hematologic stability  Description: INTERVENTIONS  - Assess for signs and symptoms of bleeding or hemorrhage  - Monitor labs  - Administer supportive blood products/factors as ordered and appropriate  8/4/2022 1054 by Julia Loyd RN  Outcome: Adequate for Discharge  8/4/2022 0705 by Julia Loyd RN  Outcome: Progressing     Problem: MUSCULOSKELETAL - ADULT  Goal: Maintain or return mobility to safest level of function  Description: INTERVENTIONS:  - Assess patient's ability to carry out ADLs; assess patient's baseline for ADL function and identify physical deficits which impact ability to perform ADLs (bathing, care of mouth/teeth, toileting, grooming, dressing, etc )  - Assess/evaluate cause of self-care deficits   - Assess range of motion  - Assess patient's mobility  - Assess patient's need for assistive devices and provide as appropriate  - Encourage maximum independence but intervene and supervise when necessary  - Involve family in performance of ADLs  - Assess for home care needs following discharge   - Consider OT consult to assist with ADL evaluation and planning for discharge  - Provide patient education as appropriate  8/4/2022 1054 by Rodolfo Sanabria RN  Outcome: Adequate for Discharge  8/4/2022 0705 by Rodolfo Sanabria RN  Outcome: Progressing  Goal: Maintain proper alignment of affected body part  Description: INTERVENTIONS:  - Support, maintain and protect limb and body alignment  - Provide patient/ family with appropriate education  8/4/2022 1054 by Rodolfo Sanabria RN  Outcome: Adequate for Discharge  8/4/2022 0705 by Rodolfo Sanabria RN  Outcome: Progressing     Problem: COPING  Goal: Pt/Family able to verbalize concerns and demonstrate effective coping strategies  Description: INTERVENTIONS:  - Assist patient/family to identify coping skills, available support systems and cultural and spiritual values  - Provide emotional support, including active listening and acknowledgement of concerns of patient and caregivers  - Reduce environmental stimuli, as able  - Provide patient education  - Assess for spiritual pain/suffering and initiate spiritual care, including notification of Pastoral Care or nida based community as needed  - Assess effectiveness of coping strategies  8/4/2022 1054 by Rodolfo Sanabria RN  Outcome: Adequate for Discharge  8/4/2022 0705 by Rodolfo Sanabria RN  Outcome: Progressing  Goal: Will report anxiety at manageable levels  Description: INTERVENTIONS:  - Administer medication as ordered  - Teach and encourage coping skills  - Provide emotional support  - Assess patient/family for anxiety and ability to cope  8/4/2022 1054 by Rodolfo Sanabria RN  Outcome: Adequate for Discharge  8/4/2022 0705 by Rodolfo Sanabria RN  Outcome: Progressing     Problem: INFECTION - ADULT  Goal: Absence or prevention of progression during hospitalization  Description: INTERVENTIONS:  - Assess and monitor for signs and symptoms of infection  - Monitor lab/diagnostic results  - Monitor all insertion sites, i e  indwelling lines, tubes, and drains  - Monitor endotracheal if appropriate and nasal secretions for changes in amount and color  - [place] appropriate cooling/warming therapies per order  - Administer medications as ordered  - Instruct and encourage patient and family to use good hand hygiene technique  - Identify and instruct in appropriate isolation precautions for identified infection/condition  8/4/2022 1054 by Robyn Johnson RN  Outcome: Adequate for Discharge  8/4/2022 0705 by Roybn Johnson RN  Outcome: Progressing     Problem: SAFETY ADULT  Goal: Maintain or return to baseline ADL function  Description: INTERVENTIONS:  -  Assess patient's ability to carry out ADLs; assess patient's baseline for ADL function and identify physical deficits which impact ability to perform ADLs (bathing, care of mouth/teeth, toileting, grooming, dressing, etc )  - Assess/evaluate cause of self-care deficits   - Assess range of motion  - Assess patient's mobility; develop plan if impaired  - Assess patient's need for assistive devices and provide as appropriate  - Encourage maximum independence but intervene and supervise when necessary  - Involve family in performance of ADLs  - Assess for home care needs following discharge   - Consider OT consult to assist with ADL evaluation and planning for discharge  - Provide patient education as appropriate  8/4/2022 1054 by Robyn Johnson RN  Outcome: Adequate for Discharge  8/4/2022 0705 by Robyn Johnson RN  Outcome: Progressing  Goal: Maintains/Returns to pre admission functional level  Description: INTERVENTIONS:  - Perform BMAT or MOVE assessment daily    - Set and communicate daily mobility goal to care team and patient/family/caregiver     - Collaborate with rehabilitation services on mobility goals if consulted     - Out of bed for toileting  - Record patient progress and toleration of activity level   8/4/2022 1054 by Barbie Perales RN  Outcome: Adequate for Discharge  8/4/2022 0705 by Barbie Perales RN  Outcome: Progressing  Goal: Patient will remain free of falls  Description: INTERVENTIONS:  - Educate patient/family on patient safety including physical limitations  - Instruct patient to call for assistance with activity   - Consult OT/PT to assist with strengthening/mobility   - Keep Call bell within reach  - Keep bed low and locked with side rails adjusted as appropriate  - Keep care items and personal belongings within reach  - Initiate and maintain comfort rounds  - Make Fall Risk Sign visible to staff     - Apply yellow socks and bracelet for high fall risk patients  - Consider moving patient to room near nurses station  8/4/2022 1054 by Barbie Perales RN  Outcome: Adequate for Discharge  8/4/2022 0705 by Barbie Perales RN  Outcome: Progressing     Problem: DISCHARGE PLANNING  Goal: Discharge to home or other facility with appropriate resources  Description: INTERVENTIONS:  - Identify barriers to discharge w/patient and caregiver  - Arrange for needed discharge resources and transportation as appropriate  - Identify discharge learning needs (meds, wound care, etc )  - Arrange for interpretive services to assist at discharge as needed  - Refer to Case Management Department for coordinating discharge planning if the patient needs post-hospital services based on physician/advanced practitioner order or complex needs related to functional status, cognitive ability, or social support system  8/4/2022 1054 by Barbie Perales RN  Outcome: Adequate for Discharge  8/4/2022 0705 by Barbie Perales RN  Outcome: Progressing     Problem: Knowledge Deficit  Goal: Patient/family/caregiver demonstrates understanding of disease process, treatment plan, medications, and discharge instructions  Description: Complete learning assessment and assess knowledge base    Interventions:  - Provide teaching at level of understanding  - Provide teaching via preferred learning methods  8/4/2022 1054 by Sandeep Martinez RN  Outcome: Adequate for Discharge  8/4/2022 0705 by Sandeep Martinez RN  Outcome: Progressing

## 2022-08-04 NOTE — QUICK NOTE
Primary team requested for Neurology to review when patient would be okay for anticoagulation  Initially plan from last note noted that anticoagulation could be restarted after 10-14 days after initial stroke and was on day 11 when question was asked  Repeat CT head was done and reported/impression did not show worsening of prior stroke  Patient was then restarted on eliquis 2 5mg BID

## 2022-08-04 NOTE — PLAN OF CARE
Problem: MOBILITY - ADULT  Goal: Maintain or return to baseline ADL function  Description: INTERVENTIONS:  -  Assess patient's ability to carry out ADLs; assess patient's baseline for ADL function and identify physical deficits which impact ability to perform ADLs (bathing, care of mouth/teeth, toileting, grooming, dressing, etc )  - Assess/evaluate cause of self-care deficits   - Assess range of motion  - Assess patient's mobility; develop plan if impaired  - Assess patient's need for assistive devices and provide as appropriate  - Encourage maximum independence but intervene and supervise when necessary  - Involve family in performance of ADLs  - Assess for home care needs following discharge   - Consider OT consult to assist with ADL evaluation and planning for discharge  - Provide patient education as appropriate  Outcome: Progressing  Goal: Maintains/Returns to pre admission functional level  Description: INTERVENTIONS:  - Perform BMAT or MOVE assessment daily    - Set and communicate daily mobility goal to care team and patient/family/caregiver  - Collaborate with rehabilitation services on mobility goals if consulted  - Perform Range of Motion 2 times a day  - Reposition patient every 2 hours    - Dangle patient 2 times a day  - Stand patient 2 times a day  - Ambulate patient 2 times a day  - Out of bed to chair 2 times a day   - Out of bed for meals 2 times a day  - Out of bed for toileting  - Record patient progress and toleration of activity level   Outcome: Progressing     Problem: Potential for Falls  Goal: Patient will remain free of falls  Description: INTERVENTIONS:  - Educate patient/family on patient safety including physical limitations  - Instruct patient to call for assistance with activity   - Consult OT/PT to assist with strengthening/mobility   - Keep Call bell within reach  - Keep bed low and locked with side rails adjusted as appropriate  - Keep care items and personal belongings within reach  - Initiate and maintain comfort rounds  - Make Fall Risk Sign visible to staff  - Offer Toileting every 2 Hours, in advance of need  - Initiate/Maintain bed alarm  - Apply yellow socks and bracelet for high fall risk patients  - Consider moving patient to room near nurses station  Outcome: Progressing     Problem: Prexisting or High Potential for Compromised Skin Integrity  Goal: Skin integrity is maintained or improved  Description: INTERVENTIONS:  - Identify patients at risk for skin breakdown  - Assess and monitor skin integrity  - Assess and monitor nutrition and hydration status  - Monitor labs   - Assess for incontinence   - Turn and reposition patient  - Assist with mobility/ambulation  - Relieve pressure over bony prominences  - Avoid friction and shearing  - Provide appropriate hygiene as needed including keeping skin clean and dry  - Evaluate need for skin moisturizer/barrier cream  - Collaborate with interdisciplinary team   - Patient/family teaching  - Consider wound care consult   Outcome: Progressing     Problem: Neurological Deficit  Goal: Neurological status is stable or improving  Description: Interventions:  - Monitor and assess patient's level of consciousness, motor function, sensory function, and level of assistance needed for ADLs  - Monitor and report changes from baseline  Collaborate with interdisciplinary team to initiate plan and implement interventions as ordered  - Provide and maintain a safe environment  - Consider seizure precautions  - Consider fall precautions  - Consider aspiration precautions  - Consider bleeding precautions  Outcome: Progressing     Problem: Activity Intolerance/Impaired Mobility  Goal: Mobility/activity is maintained at optimum level for patient  Description: Interventions:  - Assess and monitor patient  barriers to mobility and need for assistive/adaptive devices  - Assess patient's emotional response to limitations    - Collaborate with interdisciplinary team and initiate plans and interventions as ordered  - Encourage independent activity per ability   - Maintain proper body alignment  - Perform active/passive rom as tolerated/ordered  - Plan activities to conserve energy   - Turn patient as appropriate  Outcome: Progressing     Problem: Communication Impairment  Goal: Ability to express needs and understand communication  Description: Assess patient's communication skills and ability to understand information  Patient will demonstrate use of effective communication techniques, alternative methods of communication and understanding even if not able to speak  - Encourage communication and provide alternate methods of communication as needed  - Collaborate with case management/ for discharge needs  - Include patient/family/caregiver in decisions related to communication  Outcome: Progressing     Problem: Potential for Aspiration  Goal: Non-ventilated patient's risk of aspiration is minimized  Description: Assess and monitor vital signs, respiratory status, and labs (WBC)  Monitor for signs of aspiration (tachypnea, cough, rales, wheezing, cyanosis, fever)  - Assess and monitor patient's ability to swallow  - Place patient up in chair to eat if possible  - HOB up at 90 degrees to eat if unable to get patient up into chair   - Supervise patient during oral intake  - Instruct patient/ family to take small bites  - Instruct patient/ family to take small single sips when taking liquids  - Follow patient-specific strategies generated by speech pathologist   Outcome: Progressing     Problem: Nutrition  Goal: Nutrition/Hydration status is improving  Description: Monitor and assess patient's nutrition/hydration status for malnutrition (ex- brittle hair, bruises, dry skin, pale skin and conjunctiva, muscle wasting, smooth red tongue, and disorientation)   Collaborate with interdisciplinary team and initiate plan and interventions as ordered  Monitor patient's weight and dietary intake as ordered or per policy  Utilize nutrition screening tool and intervene per policy  Determine patient's food preferences and provide high-protein, high-caloric foods as appropriate  - Assist patient with eating   - Allow adequate time for meals   - Encourage patient to take dietary supplement as ordered  - Collaborate with clinical nutritionist   - Include patient/family/caregiver in decisions related to nutrition  Outcome: Progressing     Problem: Nutrition/Hydration-ADULT  Goal: Nutrient/Hydration intake appropriate for improving, restoring or maintaining nutritional needs  Description: Monitor and assess patient's nutrition/hydration status for malnutrition  Collaborate with interdisciplinary team and initiate plan and interventions as ordered  Monitor patient's weight and dietary intake as ordered or per policy  Utilize nutrition screening tool and intervene as necessary  Determine patient's food preferences and provide high-protein, high-caloric foods as appropriate       INTERVENTIONS:  - Monitor oral intake, urinary output, labs, and treatment plans  - Assess nutrition and hydration status and recommend course of action  - Evaluate amount of meals eaten  - Assist patient with eating if necessary   - Allow adequate time for meals  - Recommend/ encourage appropriate diets, oral nutritional supplements, and vitamin/mineral supplements  - Order, calculate, and assess calorie counts as needed  - Recommend, monitor, and adjust tube feedings and TPN/PPN based on assessed needs  - Assess need for intravenous fluids  - Provide specific nutrition/hydration education as appropriate  - Include patient/family/caregiver in decisions related to nutrition  Outcome: Progressing     Problem: NEUROSENSORY - ADULT  Goal: Achieves stable or improved neurological status  Description: INTERVENTIONS  - Monitor and report changes in neurological status  - Monitor vital signs such as temperature, blood pressure, glucose, and any other labs ordered   - Initiate measures to prevent increased intracranial pressure  - Monitor for seizure activity and implement precautions if appropriate      Outcome: Progressing  Goal: Achieves maximal functionality and self care  Description: INTERVENTIONS  - Monitor swallowing and airway patency with patient fatigue and changes in neurological status  - Encourage and assist patient to increase activity and self care     - Encourage visually impaired, hearing impaired and aphasic patients to use assistive/communication devices  Outcome: Progressing     Problem: CARDIOVASCULAR - ADULT  Goal: Maintains optimal cardiac output and hemodynamic stability  Description: INTERVENTIONS:  - Monitor I/O, vital signs and rhythm  - Monitor for S/S and trends of decreased cardiac output  - Administer and titrate ordered vasoactive medications to optimize hemodynamic stability  - Assess quality of pulses, skin color and temperature  - Assess for signs of decreased coronary artery perfusion  - Instruct patient to report change in severity of symptoms  Outcome: Progressing  Goal: Absence of cardiac dysrhythmias or at baseline rhythm  Description: INTERVENTIONS:  - Continuous cardiac monitoring, vital signs, obtain 12 lead EKG if ordered  - Administer antiarrhythmic and heart rate control medications as ordered  - Monitor electrolytes and administer replacement therapy as ordered  Outcome: Progressing     Problem: RESPIRATORY - ADULT  Goal: Achieves optimal ventilation and oxygenation  Description: INTERVENTIONS:  - Assess for changes in respiratory status  - Assess for changes in mentation and behavior  - Position to facilitate oxygenation and minimize respiratory effort  - Oxygen administered by appropriate delivery if ordered  - Initiate smoking cessation education as indicated  - Encourage broncho-pulmonary hygiene including cough, deep breathe, Incentive Spirometry  - Assess the need for suctioning and aspirate as needed  - Assess and instruct to report SOB or any respiratory difficulty  - Respiratory Therapy support as indicated  Outcome: Progressing     Problem: GASTROINTESTINAL - ADULT  Goal: Maintains or returns to baseline bowel function  Description: INTERVENTIONS:  - Assess bowel function  - Encourage oral fluids to ensure adequate hydration  - Administer IV fluids if ordered to ensure adequate hydration  - Administer ordered medications as needed  - Encourage mobilization and activity  - Consider nutritional services referral to assist patient with adequate nutrition and appropriate food choices  Outcome: Progressing  Goal: Maintains adequate nutritional intake  Description: INTERVENTIONS:  - Monitor percentage of each meal consumed  - Identify factors contributing to decreased intake, treat as appropriate  - Assist with meals as needed  - Monitor I&O, weight, and lab values if indicated  - Obtain nutrition services referral as needed  Outcome: Progressing     Problem: GENITOURINARY - ADULT  Goal: Maintains or returns to baseline urinary function  Description: INTERVENTIONS:  - Assess urinary function  - Encourage oral fluids to ensure adequate hydration if ordered  - Administer IV fluids as ordered to ensure adequate hydration  - Administer ordered medications as needed  - Offer frequent toileting  - Follow urinary retention protocol if ordered  Outcome: Progressing  Goal: Urinary catheter remains patent  Description: INTERVENTIONS:  - Assess patency of urinary catheter  - If patient has a chronic schaefer, consider changing catheter if non-functioning  - Follow guidelines for intermittent irrigation of non-functioning urinary catheter  Outcome: Progressing     Problem: METABOLIC, FLUID AND ELECTROLYTES - ADULT  Goal: Electrolytes maintained within normal limits  Description: INTERVENTIONS:  - Monitor labs and assess patient for signs and symptoms of electrolyte imbalances  - Administer electrolyte replacement as ordered  - Monitor response to electrolyte replacements, including repeat lab results as appropriate  - Instruct patient on fluid and nutrition as appropriate  Outcome: Progressing  Goal: Fluid balance maintained  Description: INTERVENTIONS:  - Monitor labs   - Monitor I/O and WT  - Instruct patient on fluid and nutrition as appropriate  - Assess for signs & symptoms of volume excess or deficit  Outcome: Progressing  Goal: Glucose maintained within target range  Description: INTERVENTIONS:  - Monitor Blood Glucose as ordered  - Assess for signs and symptoms of hyperglycemia and hypoglycemia  - Administer ordered medications to maintain glucose within target range  - Assess nutritional intake and initiate nutrition service referral as needed  Outcome: Progressing     Problem: SKIN/TISSUE INTEGRITY - ADULT  Goal: Skin Integrity remains intact(Skin Breakdown Prevention)  Description: Assess:    -Inspect skin when repositioning, toileting, and assisting with ADLS  -Assess extremities for adequate circulation and sensation     Bed Management:  -Have minimal linens on bed & keep smooth, unwrinkled  -Change linens as needed when moist or perspiring      Toileting:  -Offer bedside commode      Activity:  -Encourage activity and walks on unit  -Encourage or provide ROM exercises   -Use appropriate equipment to lift or move patient in bed      Skin Care:  -Avoid use of baby powder, tape, friction and shearing, hot water or constrictive clothing  -Do not massage red bony areas      Outcome: Progressing  Goal: Incision(s), wounds(s) or drain site(s) healing without S/S of infection  Description: INTERVENTIONS  - Assess and document dressing, incision, wound bed, drain sites and surrounding tissue  - Provide patient and family education  Outcome: Progressing     Problem: HEMATOLOGIC - ADULT  Goal: Maintains hematologic stability  Description: INTERVENTIONS  - Assess for signs and symptoms of bleeding or hemorrhage  - Monitor labs  - Administer supportive blood products/factors as ordered and appropriate  Outcome: Progressing     Problem: MUSCULOSKELETAL - ADULT  Goal: Maintain or return mobility to safest level of function  Description: INTERVENTIONS:  - Assess patient's ability to carry out ADLs; assess patient's baseline for ADL function and identify physical deficits which impact ability to perform ADLs (bathing, care of mouth/teeth, toileting, grooming, dressing, etc )  - Assess/evaluate cause of self-care deficits   - Assess range of motion  - Assess patient's mobility  - Assess patient's need for assistive devices and provide as appropriate  - Encourage maximum independence but intervene and supervise when necessary  - Involve family in performance of ADLs  - Assess for home care needs following discharge   - Consider OT consult to assist with ADL evaluation and planning for discharge  - Provide patient education as appropriate  Outcome: Progressing  Goal: Maintain proper alignment of affected body part  Description: INTERVENTIONS:  - Support, maintain and protect limb and body alignment  - Provide patient/ family with appropriate education  Outcome: Progressing     Problem: COPING  Goal: Pt/Family able to verbalize concerns and demonstrate effective coping strategies  Description: INTERVENTIONS:  - Assist patient/family to identify coping skills, available support systems and cultural and spiritual values  - Provide emotional support, including active listening and acknowledgement of concerns of patient and caregivers  - Reduce environmental stimuli, as able  - Provide patient education  - Assess for spiritual pain/suffering and initiate spiritual care, including notification of Pastoral Care or nida based community as needed  - Assess effectiveness of coping strategies  Outcome: Progressing  Goal: Will report anxiety at manageable levels  Description: INTERVENTIONS:  - Administer medication as ordered  - Teach and encourage coping skills  - Provide emotional support  - Assess patient/family for anxiety and ability to cope  Outcome: Progressing     Problem: INFECTION - ADULT  Goal: Absence or prevention of progression during hospitalization  Description: INTERVENTIONS:  - Assess and monitor for signs and symptoms of infection  - Monitor lab/diagnostic results  - Monitor all insertion sites, i e  indwelling lines, tubes, and drains  - Monitor endotracheal if appropriate and nasal secretions for changes in amount and color  - [place] appropriate cooling/warming therapies per order  - Administer medications as ordered  - Instruct and encourage patient and family to use good hand hygiene technique  - Identify and instruct in appropriate isolation precautions for identified infection/condition  Outcome: Progressing     Problem: SAFETY ADULT  Goal: Maintain or return to baseline ADL function  Description: INTERVENTIONS:  -  Assess patient's ability to carry out ADLs; assess patient's baseline for ADL function and identify physical deficits which impact ability to perform ADLs (bathing, care of mouth/teeth, toileting, grooming, dressing, etc )  - Assess/evaluate cause of self-care deficits   - Assess range of motion  - Assess patient's mobility; develop plan if impaired  - Assess patient's need for assistive devices and provide as appropriate  - Encourage maximum independence but intervene and supervise when necessary  - Involve family in performance of ADLs  - Assess for home care needs following discharge   - Consider OT consult to assist with ADL evaluation and planning for discharge  - Provide patient education as appropriate  Outcome: Progressing  Goal: Maintains/Returns to pre admission functional level  Description: INTERVENTIONS:  - Perform BMAT or MOVE assessment daily    - Set and communicate daily mobility goal to care team and patient/family/caregiver     - Collaborate with rehabilitation services on mobility goals if consulted  - Perform Range of Motion 2 times a day  - Reposition patient every 2 hours    - Dangle patient 2 times a day  - Stand patient 2 times a day  - Ambulate patient 2 times a day  - Out of bed to chair 2 times a day   - Out of bed for meals 2 times a day  - Out of bed for toileting  - Record patient progress and toleration of activity level   Outcome: Progressing  Goal: Patient will remain free of falls  Description: INTERVENTIONS:  - Educate patient/family on patient safety including physical limitations  - Instruct patient to call for assistance with activity   - Consult OT/PT to assist with strengthening/mobility   - Keep Call bell within reach  - Keep bed low and locked with side rails adjusted as appropriate  - Keep care items and personal belongings within reach  - Initiate and maintain comfort rounds  - Make Fall Risk Sign visible to staff  - Offer Toileting every 2 Hours, in advance of need  - Initiate/Maintain bed alarm  - Apply yellow socks and bracelet for high fall risk patients  - Consider moving patient to room near nurses station  Outcome: Progressing     Problem: DISCHARGE PLANNING  Goal: Discharge to home or other facility with appropriate resources  Description: INTERVENTIONS:  - Identify barriers to discharge w/patient and caregiver  - Arrange for needed discharge resources and transportation as appropriate  - Identify discharge learning needs (meds, wound care, etc )  - Arrange for interpretive services to assist at discharge as needed  - Refer to Case Management Department for coordinating discharge planning if the patient needs post-hospital services based on physician/advanced practitioner order or complex needs related to functional status, cognitive ability, or social support system  Outcome: Progressing     Problem: Knowledge Deficit  Goal: Patient/family/caregiver demonstrates understanding of disease process, treatment plan, medications, and discharge instructions  Description: Complete learning assessment and assess knowledge base    Interventions:  - Provide teaching at level of understanding  - Provide teaching via preferred learning methods  Outcome: Progressing

## 2022-08-09 ENCOUNTER — TELEPHONE (OUTPATIENT)
Dept: GASTROENTEROLOGY | Facility: MEDICAL CENTER | Age: 87
End: 2022-08-09

## 2022-08-09 NOTE — TELEPHONE ENCOUNTER
----- Message from Oval Mixer, RN sent at 8/2/2022  9:09 AM EDT -----  FYI: this pt will need an EGD in approx 3 months    Patient's daughter Sharmaine Davis was sent an e-mail as a reminder to schedule when her mother is discharged from hospital   The pt is currently in hospital    ----- Message -----  From: Fred Paula MD  Sent: 8/1/2022  12:19 PM EDT  To: Cali Willams RN, #    Hi,    Can you let her family know the biopsy from the ulcer showed inflammation but no infection    Thank you

## 2022-08-09 NOTE — ANESTHESIA POSTPROCEDURE EVALUATION
Post-Op Assessment Note             Reason for prolonged intubation > 24 hours:  S/p CVA, ongoing ICU care      No complications documented      BP      Temp      Pulse     Resp      SpO2

## 2022-08-09 NOTE — ANESTHESIA PREPROCEDURE EVALUATION
Procedure:  IR STROKE ALERT    Relevant Problems   CARDIO   (+) Deep vein thrombosis (DVT) of left upper extremity (HCC)   (+) H/O mitral valve replacement   (+) Hypercholesterolemia   (+) Hypertension   (+) Paroxysmal A-fib (HCC)   (+) Systolic congestive heart failure (HCC)      ENDO   (+) Controlled type 2 diabetes mellitus without complication, without long-term current use of insulin (HCC)      GI/HEPATIC   (+) Acid reflux   (+) Dysphagia   (+) Esophageal reflux      /RENAL   (+) CKD (chronic kidney disease) stage 3, GFR 30-59 ml/min ([facility])   (+) Chronic renal disease, stage IV ([facility])      HEMATOLOGY   (+) Anemia   (+) Thrombocytopenia (HCC)      MUSCULOSKELETAL   (+) Arthritis   (+) Arthritis of right glenohumeral joint   (+) Spondylosis of cervical region without myelopathy or radiculopathy      NEURO/PSYCH   (+) CVA (cerebral vascular accident) ([facility] [address] [facility]   (+) [facility]   (+) H/O ischemic left MCA stroke   (+) History of CVA (cerebrovascular accident)   (+) History of DVT (deep vein thrombosis)   (+) History of ischemic right MCA stroke   (+) History of pacemaker   (+) History of subarachnoid hemorrhage   (+) TIA (transient ischemic attack)   (+) Vascular dementia ([facility] [address] )      PULMONARY   (+) 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[facility] [address] )   (+) HCAP (healthcare-associated pneumonia)        Physical Exam    Airway      TM Distance: >3 FB       Dental       Cardiovascular      Pulmonary      Other Findings        Anesthesia Plan  ASA Score- 4 Emergent    Anesthesia Type- general with ASA Monitors  Additional Monitors:   Airway Plan: ETT  Plan Factors-Exercise tolerance (METS): <4 METS  Chart reviewed  Existing labs reviewed  Patient summary reviewed  Induction- intravenous  Postoperative Plan-     Informed Consent- Plan/risks discussed with: Presumed consent, emergent procedure  I personally reviewed this patient with the CRNA   Discussed and agreed on the Anesthesia Plan with Christ Telles

## 2022-08-10 ENCOUNTER — PATIENT OUTREACH (OUTPATIENT)
Dept: CASE MANAGEMENT | Facility: HOSPITAL | Age: 87
End: 2022-08-10

## 2022-08-12 ENCOUNTER — TELEPHONE (OUTPATIENT)
Dept: NEUROSURGERY | Facility: CLINIC | Age: 87
End: 2022-08-12

## 2022-08-12 NOTE — TELEPHONE ENCOUNTER
It was recommended for the patient to follow up with the neurovascular team within 6 weeks for a hospital follow up  Patient has seen Dr Devan Shea in the past (4/1/19)  Routing message to [facility] for a HFU with Dr Devan Shea  Patient is currently at [facility] for rehab  I will call [facility] to complete the stroke follow up call once we obtain an appointment

## 2022-08-17 ENCOUNTER — PATIENT OUTREACH (OUTPATIENT)
Dept: CASE MANAGEMENT | Facility: HOSPITAL | Age: 87
End: 2022-08-17

## 2022-08-17 NOTE — TELEPHONE ENCOUNTER
Post CVA Discharge Follow Up  Hospitalization: 7/22/22-8/4/22    According to chart, patient discharged to [facility]  [address] and spoke with the patient's nurse, Claribel Du  She reports the patient is doing "as well as can be expected " Since discharge, patient has not experienced any new or worsening stroke-like symptoms  She continues to have the following symptoms: right sided weakness, aphasia  Ambulation / ADLs:  Patient requires the soy lift for mobilization  She has a PEG tube in place  They just had a session for family training  Family wishes to bring the patient home soon  The training session lasted a few hours  Medication Review:  I reviewed medications with them  There have not been any medication changes since discharge from the hospital  No reported missed doses, medication side effects, or signs of bleeding  Last reported /60  Last reported blood glucose 191    Appointments:  Patient is scheduled for a stroke follow up appointment tomorrow  [facility] is aware of the appointment including the date/time/location

## 2022-08-22 ENCOUNTER — TELEPHONE (OUTPATIENT)
Dept: NEUROLOGY | Facility: CLINIC | Age: 87
End: 2022-08-22

## 2022-08-22 NOTE — TELEPHONE ENCOUNTER
THE [facility] to confirm patient's 8/26/2022 @ 2:30 pm appointment with Dr Ana Smith at the [facility]  Call back number given [phone]

## 2022-08-25 ENCOUNTER — PATIENT OUTREACH (OUTPATIENT)
Dept: CASE MANAGEMENT | Facility: HOSPITAL | Age: 87
End: 2022-08-25

## 2022-08-25 ENCOUNTER — PATIENT OUTREACH (OUTPATIENT)
Dept: FAMILY MEDICINE CLINIC | Facility: CLINIC | Age: 87
End: 2022-08-25

## 2022-08-25 ENCOUNTER — OFFICE VISIT (OUTPATIENT)
Dept: NEUROLOGY | Facility: CLINIC | Age: 87
End: 2022-08-25
Payer: MEDICARE

## 2022-08-25 VITALS
DIASTOLIC BLOOD PRESSURE: 84 MMHG | HEART RATE: 80 BPM | BODY MASS INDEX: 27.02 KG/M2 | SYSTOLIC BLOOD PRESSURE: 120 MMHG | HEIGHT: 61 IN

## 2022-08-25 DIAGNOSIS — Z86.73 HISTORY OF CVA (CEREBROVASCULAR ACCIDENT): Primary | ICD-10-CM

## 2022-08-25 PROCEDURE — 99215 OFFICE O/P EST HI 40 MIN: CPT | Performed by: PSYCHIATRY & NEUROLOGY

## 2022-08-25 RX ORDER — INSULIN GLARGINE 100 [IU]/ML
11 INJECTION, SOLUTION SUBCUTANEOUS
COMMUNITY
End: 2022-09-07 | Stop reason: SDUPTHER

## 2022-08-25 NOTE — PATIENT INSTRUCTIONS
Continue Eliquis 2 5 mg tab BID    Continue Aspiirn 81 mg daily   Continue Lipitor 80 mg daily   Continue PT/OT   Follow up in 1 year with Dr Trula Kussmaul (virtual visit)

## 2022-08-25 NOTE — ASSESSMENT & PLAN NOTE
81yo F w/ PMH of CKD stage 4, afib on eliquis, s/p pace maker, previous L MCA stroke, HTN, HLD, T2DM, former smoker presents here as a hospital follow up  To review, Patient presented on 7/22 from [address] [place] [address] to [facility] as an endovascular alert  NIHSS 27 at [address] [place] [address], 24 at [facility]  LKW 1 5-2 hrs prior to presentation  Initial presenting deficits were worsening aphasia than baseline, L gaze preference, flaccid RUE, RLE, R facial droop, LLE drift, but able to squeeze on left  tPA contraindicated given was on Eliquis  Underwent subsequent unsuccessful thrombectomy (L ICA terminus occlusion) after good mismatch ratio of 26 4 and was transferred to the ICU for further management  Family noted patient may not have been taking Eliquis and aspirin appropriately due to having trouble swallowing medication due to chronic tongue deviation  Admitted 7/22/22 to 8/4/22  Discharged to [facility]hab  [facility]  Since discharge- continues to have both receptive and expressive aphasia  L gaze preference, flaccid RUE, RLE, R facial droop  At rehab will discharge to home some time tomorrow  Completely dependent for all ADLs       Plan-  Continue Eliquis 2 5 mg tab BID    Continue Aspiirn 81 mg daily   Continue Lipitor 80 mg daily   Continue BP management and HLD treatment per PCP   Continue PT/OT   Follow up in 1 year with Dr Arnel Adkins (virtual visit)

## 2022-08-25 NOTE — PROGRESS NOTES
Patient ID: Hugo White is a 80 y o  female  Assessment/Plan:    History of CVA (cerebrovascular accident)  81yo F w/ PMH of CKD stage 4, afib on eliquis, s/p pace maker, previous L MCA stroke, HTN, HLD, T2DM, former smoker presents here as a hospital follow up  To review, Patient presented on 7/22 from [address] [place] [address] to [facility] as an endovascular alert  NIHSS 27 at [address] [place] [address], 24 at [facility]  LKW 1 5-2 hrs prior to presentation  Initial presenting deficits were worsening aphasia than baseline, L gaze preference, flaccid RUE, RLE, R facial droop, LLE drift, but able to squeeze on left  tPA contraindicated given was on Eliquis  Underwent subsequent unsuccessful thrombectomy (L ICA terminus occlusion) after good mismatch ratio of 26 4 and was transferred to the ICU for further management  Family noted patient may not have been taking Eliquis and aspirin appropriately due to having trouble swallowing medication due to chronic tongue deviation  Admitted 7/22/22 to 8/4/22  Discharged to rehab  [facility]  Since discharge- continues to have both receptive and expressive aphasia  L gaze preference, flaccid RUE, RLE, R facial droop  At rehab will discharge to home some time tomorrow  Completely dependent for all ADLs  Plan-  Continue Eliquis 2 5 mg tab BID    Continue Aspiirn 81 mg daily   Continue Lipitor 80 mg daily   Continue BP management and HLD treatment per PCP   Continue PT/OT   Follow up in 1 year with Dr Lc Muse (virtual visit)               Diagnoses and all orders for this visit:    History of CVA (cerebrovascular accident)    Other orders  -     insulin glargine (LANTUS) 100 units/mL subcutaneous injection; Inject 100 Units under the skin           Subjective:    81yo F w/ PMH of CKD stage 4, afib on eliquis, s/p pace maker, previous L MCA stroke, HTN, HLD, T2DM, former smoker presents here as a hospital follow up       To review, Patient presented on 7/22 from [place]  [address] to [facility] as an endovascular alert  NIHSS 27 at [address] [facility], 24 at [facility]  LKW 1 5-2 hrs prior to presentation  Initial presenting deficits were worsening aphasia than baseline, L gaze preference, flaccid RUE, RLE, R facial droop, LLE drift, but able to squeeze on left  tPA contraindicated given was on Eliquis  Underwent subsequent unsuccessful thrombectomy (L ICA terminus occlusion) after good mismatch ratio of 26 4 and was transferred to the ICU for further management  Family noted patient may not have been taking Eliquis and aspirin appropriately due to having trouble swallowing medication due to chronic tongue deviation  Admitted 7/22/22 to 8/4/22  Discharged to [facility]hab  [facility]  She had previous strokes  2018- within 7 days 2nd and after 3 months 3rd stroke  Previous deficits- Rt sided weakness, right eye visison issues, stuttutering, expressive aphasia  She is currently at [facility]  She has a feeding tube  Takes Eliquis 2 5 mg bID  Aspiirn 81 mg, Lipitor 80 mg daily  Repeat CT head done- 8/4- which was negative for new stroke so started on Eliquis 2 5 mg BID  There is mild improvement- more alert, eyes are open, turns her head  Mild movement in toes in right  Mild tightening in her right leg  She utters sounds  Sometimes she follows commands  She can sometimes comb her hair  ADLS- Completely dependent  Patient requires the soy lift for mobilization  She has a PEG tube in place  They plan to have home PT when patient gets home  The following portions of the patient's history were reviewed and updated as appropriate: allergies, current medications, past family history, past medical history, past social history, past surgical history and problem list          Objective:    Blood pressure 120/84, pulse 80, height 5' 1" (1 549 m), not currently breastfeeding      GENERAL MEDICAL EXAMINATION:  General appearance:  Pt on wheel chair, sleeping, non verbal    Eyes: Sclera are non-injected  Ears, nose, Mouth, Throat: Mucous membranes are moist    Resp: Breathing comfortably on RA   Musculoskeletal: No evidence of deformities  Skin: No visible rashes  Well perfused  Mental Status: Lethargic, arouses to loud noise,    Able to follow simple commands (close eyes, squeeze hands) when instructed in Liberian  Language: non verbal    Cranial Nerves: Pupils equal   Visual fields unable to be tested  Left gaze preference noted  EOMI, no nystagums  Right-sided facial droop noted  Motor:  Decreased tone in RUE  Spontaneous movements in LUE noted  Able to squeeze fingers with left hand only  , wiggles toes b/l  Tone increased in RLE   Tone mildly increased in LUE  Reflexes: 2+ biceps, triceps, brachioradialis bilaterally  +1 patellars, Achilles bilaterally  Clonus absent   Plantar downgoing left and right     Coordination, gait- patient unable to perform     ROS:    Review of Systems   Unable to perform ROS: Patient nonverbal

## 2022-08-26 ENCOUNTER — PATIENT OUTREACH (OUTPATIENT)
Dept: FAMILY MEDICINE CLINIC | Facility: CLINIC | Age: 87
End: 2022-08-26

## 2022-08-26 NOTE — PROGRESS NOTES
New Bundle episode: Jerri  7/22/22-11/2/22    Chart review completed for the following sections:   Recent Vital Signs   Allergies/Contradictions   Medication Review    History    SDOH    Problem List   Immunizations   Past hospitalizations and major procedures, including surgery   Significant past illnesses and treatment history including: History and Physical, PT, OT, ST, Medications/Infusions/Transfusions and Diet/Fluid restrictions   Relevant past medications related to the patient's condition      This OPCM RN called patients daughter Heidy Marvin  I introduced myself and explained the role of OPCM RN and the Bundle program  Ming Jacob was very pleased and would definitely like future follow up for her mother  She states patient was discharged home from [facility] yesterday  She lives in her own home but Ming Jacob and her 3 siblings rotate staying with patient and she is never alone  All 4 children are POA  (Briana Shown and [address])  Patient is totslly dependant and bed bound  She suffered 3 strokes in 2018 and the most recent was last month which rendered her non-communicative, dysphagic, immobile and totally dependant on others for care  Patient has a hospital bed and a soy lift which is needed for transfer and bed changes  Patient is unable to sit up on her own at this time  This is one of her families goals  To get her core stronger through PT so she can sit up in her reclining chair or in bed  Patient is dysphagic and subsequently had a G-tube placed and 100% of her nutrition is provided through tube feedings  Ming Jacob states that they received an in service at [facility] on all equipment prior to patients dc home  [facility]  Patient was set up to have HHS through [address] per daughter   I called [facility] and spoke with Luh and confirmed they received the referral  She states they received everything and someone will be reaching out to the family shortly  Patients daughter states she handles all of patients medications and many medications have changed  She had a list of the medication patient was discharged on from [facility] and we did a medication review  She has enough medications for the next month  CM sent IB message to PCP for medication refills  Patient is diabetic and was switched from po hypoglycemics to Insulin  She says they have a glucometer at home and are checking patients BS twice a day once in the am and one in pm  Today's BS was 166  Carlito Berger said she would like to get a continuous BS reading device for patients arm  We discussed ordering a [employer]  I told her I would speak with her PCP and see if insurance would cover it  She is aware this will not happen until next week  She was very appreciative  Carlito Berger states she does not feel they need any education on patients medical conditions  She said her and her siblings biggest concern is building patients core strength up so she can sit in her reclining chair  I made her aware I could mail her some educational material on DM and Stroke and she and her siblings can look at it and we can discuss on our follow up call if their are any questions or concerns  OPCM RN to follow up next week

## 2022-08-27 ENCOUNTER — TELEPHONE (OUTPATIENT)
Dept: FAMILY MEDICINE CLINIC | Facility: CLINIC | Age: 87
End: 2022-08-27

## 2022-08-27 NOTE — TELEPHONE ENCOUNTER
----- Message from Zohra Mejia MD sent at 8/26/2022  4:48 PM EDT -----  Regarding: RE: Medication adjustments/refills  Hi Ai José,    I haven't seen patient since all these changes have been made during her hospitalization and/or thru her specialist visits  It would be best to have daughter come in with patient to review and update record to reflect most recent evaluations/treatment  I cc'd staff to call to set up time that would work for them  Thanks and enjoy the day-  Dr Nick Pallas  ----- Message -----  From: Mukund Nunez RN  Sent: 8/26/2022   2:33 PM EDT  To: Zohra Mejia MD  Subject: Medication adjustments/refills                   Hi Dr Cesario Peoples! I spoke with this patients daughter Zack Hyde  We reviewed patients medications  Patient's medications were changed around and she is now on Insulin  Patients daughter states she has Lantus Insulin pen and 11 units were ordered in the am only  It was not documented that way in the EMR  I did change it to reflect the dose she told me  Could you please double check this  She is also out of refills for all her medications  Zack Hyde is also requesting a [employer] be ordered  Im not sure if it is  I am going to work on it but not until next week  Could you please let me know if you have any questions! Thanks!  Ai José

## 2022-08-30 ENCOUNTER — TRANSITIONAL CARE MANAGEMENT (OUTPATIENT)
Dept: FAMILY MEDICINE CLINIC | Facility: CLINIC | Age: 87
End: 2022-08-30

## 2022-08-30 ENCOUNTER — TELEMEDICINE (OUTPATIENT)
Dept: FAMILY MEDICINE CLINIC | Facility: CLINIC | Age: 87
End: 2022-08-30
Payer: MEDICARE

## 2022-08-30 ENCOUNTER — TELEPHONE (OUTPATIENT)
Dept: FAMILY MEDICINE CLINIC | Facility: CLINIC | Age: 87
End: 2022-08-30

## 2022-08-30 VITALS — HEART RATE: 86 BPM | DIASTOLIC BLOOD PRESSURE: 60 MMHG | SYSTOLIC BLOOD PRESSURE: 100 MMHG | TEMPERATURE: 98.1 F

## 2022-08-30 DIAGNOSIS — Z93.1 S/P PERCUTANEOUS ENDOSCOPIC GASTROSTOMY (PEG) TUBE PLACEMENT (HCC): ICD-10-CM

## 2022-08-30 DIAGNOSIS — I63.512 ACUTE ISCHEMIC LEFT MCA STROKE (HCC): ICD-10-CM

## 2022-08-30 DIAGNOSIS — N18.30 STAGE 3 CHRONIC KIDNEY DISEASE, UNSPECIFIED WHETHER STAGE 3A OR 3B CKD (HCC): ICD-10-CM

## 2022-08-30 DIAGNOSIS — Z95.0 PACEMAKER: Chronic | ICD-10-CM

## 2022-08-30 DIAGNOSIS — Z76.89 ENCOUNTER FOR SUPPORT AND COORDINATION OF TRANSITION OF CARE: Primary | ICD-10-CM

## 2022-08-30 DIAGNOSIS — I50.20 SYSTOLIC CONGESTIVE HEART FAILURE, UNSPECIFIED HF CHRONICITY (HCC): Chronic | ICD-10-CM

## 2022-08-30 DIAGNOSIS — D32.9 MENINGIOMA (HCC): ICD-10-CM

## 2022-08-30 DIAGNOSIS — K59.00 CONSTIPATION, UNSPECIFIED CONSTIPATION TYPE: ICD-10-CM

## 2022-08-30 DIAGNOSIS — K21.9 GASTROESOPHAGEAL REFLUX DISEASE, UNSPECIFIED WHETHER ESOPHAGITIS PRESENT: ICD-10-CM

## 2022-08-30 DIAGNOSIS — Z95.2 H/O MITRAL VALVE REPLACEMENT: Chronic | ICD-10-CM

## 2022-08-30 DIAGNOSIS — Z86.73 HISTORY OF ISCHEMIC RIGHT MCA STROKE: Chronic | ICD-10-CM

## 2022-08-30 DIAGNOSIS — I10 HYPERTENSION, UNSPECIFIED TYPE: ICD-10-CM

## 2022-08-30 DIAGNOSIS — I42.0 CARDIOMYOPATHY, DILATED (HCC): ICD-10-CM

## 2022-08-30 DIAGNOSIS — H10.9 CONJUNCTIVITIS OF LEFT EYE, UNSPECIFIED CONJUNCTIVITIS TYPE: Primary | ICD-10-CM

## 2022-08-30 PROCEDURE — 99213 OFFICE O/P EST LOW 20 MIN: CPT | Performed by: FAMILY MEDICINE

## 2022-08-30 RX ORDER — INSULIN GLARGINE 100 [IU]/ML
100 INJECTION, SOLUTION SUBCUTANEOUS
Qty: 10 ML | Status: CANCELLED | OUTPATIENT
Start: 2022-08-30

## 2022-08-30 RX ORDER — PEN NEEDLE, DIABETIC 30 GX3/16"
NEEDLE, DISPOSABLE MISCELLANEOUS
COMMUNITY
End: 2022-09-07 | Stop reason: CLARIF

## 2022-08-30 RX ORDER — TOBRAMYCIN 3 MG/ML
2 SOLUTION/ DROPS OPHTHALMIC
Qty: 5 ML | Refills: 0 | Status: SHIPPED | OUTPATIENT
Start: 2022-08-30 | End: 2022-09-04

## 2022-08-30 NOTE — TELEPHONE ENCOUNTER
Pts daughter would like an order for a face  Mask for her nebulizer because the pt cannot inhale through her mouth any longer   She would like the script to go to [facility]

## 2022-08-30 NOTE — TELEPHONE ENCOUNTER
Spoke to patients daughter and she forgot to tell you for the last 2 days her mom has had a yellow discharge of her left eye , at the rehab she was on an antibiotic but after she stopped using it ,the discharge came back  , please advise tc/cma

## 2022-08-31 NOTE — PATIENT INSTRUCTIONS
Control de la diabetes tipo 2 en los adultos   CUIDADO AMBULATORIO:   La diabetes tipo 2 es royal enfermedad que afecta la forma en que el cuerpo utiliza la glucosa (azúcar)  El cuerpo no puede producir suficiente insulina o es incapaz de usarla adecuadamente  Es importante controlar la diabetes para evitar el daño al corazón, los vasos sanguíneos y otros órganos  Pídale a alguien que llame al número de emergencias local (911 en los [place]) si:  · No es posible despertarlo  · Tiene signos de cetoacidosis diabética:     ? confusión, fatiga    ? vómitos    ? latidos cardíacos rápidos    ? aliento con [employer] a frutas    ? sed extrema    ? sequedad en la boca y la piel    · Tiene alguno de los siguientes signos de un ataque cardíaco:      ? Estrujamiento, presión o tensión en head pecho    ? Usted también podría presentar alguno de los siguientes:     § Malestar o dolor en head espalda, dion, mandíbula, abdomen, o brazo    § Falta de [employer] o vómitos    § Desvanecimiento o sudor frío repentino    · Usted tiene alguno de los siguientes signos de derrame cerebral:      ? Adormecimiento o caída de un lado de head josefa    ? Debilidad en un [place] o royal pierna    ? Confusión o debilidad para hablar    ? Mareos o dolor de virginia intenso, o pérdida de la visión  Llame a head médico o al equipo de atención diabética si:  · Tiene royal llaga o royal herida que no cicatrizan  · Tiene un cambio en la cantidad de [facility]  · Bhavna niveles de azúcar en la gil son superiores a las metas fijadas  · Usted a menudo tiene niveles de azúcar en la gil más bajos que bhavna metas fijadas  · Head piel está enrojecida, seca, caliente al tacto o inflamada  · Usted tiene problemas para sobrellevar head diabetes o se siente ansioso o deprimido  · Usted tiene preguntas o inquietudes acerca de head condición o cuidado      Lo que usted necesita saber sobre los niveles altos de azúcar en la gil: El azúcar alto en la gil puede no causar ningún síntoma  Puede sentir más sed u orinar con más frecuencia de lo habitual  Con el tiempo, los niveles altos de azúcar en la gil pueden dañar bhavna nervios, vasos sanguíneos, tejidos y órganos  Lo siguiente aumenta los niveles de azúcar en la gil:  · Comidas copiosas o grandes cantidades de carbohidratos de royal terrence vez    · Menos actividad física    · Estrés    · Enfermedad    · Royal dosis simeon de medicamento o [place], o royal dosis tardía    Lo que usted necesita saber sobre los niveles bajos de azúcar en la gil: Usted puede prevenir síntomas nava temblores, mareos, irritabilidad o confusión asegurándose de que willis azúcar en la gil no baje demasiado  · Trate un bajón de azúcar inmediatamente  ? Melisa 4 onzas de jugo o 1 tubo de gel de glucosa  ? Revise nuevamente willis nivel de azúcar en la gil en 10 a 15 minutos  ? Cuando el nivel regrese a la normalidad, coma un alimento o refrigerio para prevenir otro bajón  · Lleve siempre consigo gel de glucosa, uvas pasas o caramelos duros para tratar los niveles bajos de azúcar en la gil  · Willis azúcar en la gil puede bajar demasiado si norma un medicamento para la diabetes o la insulina y no consume suficientes alimentos  · Si [place] insulina, verifique willis nivel de azúcar en la gil antes de hacer ejercicio  ? Si willis nivel de azúcar en la gil es inferior a 100 mg/dL, coma 4 galletas, 2 onzas de uvas pasas o melisa 4 onzas de jugo  ? Compruebe willis nivel cada 30 minutos si hace ejercicio masoud más de 1 hora  ? Puede que necesite royal merienda masoud o después de hacer ejercicio  Qué puede hacer para manejar bhavna niveles de azúcar en la gil:  · Revise bhavna niveles de azúcar en la gil según las indicaciones y según sea necesario  Hay varios elementos disponibles que puede utilizar para comprobar bhavna [place]  Puede que tenga que comprobarlo probando royal gota de Eliana Noguera en un medidor de glucosa   En willis lugar, es posible que le den un monitor continuo de glucosa (MCG)  El dispositivo se lleva puesto en todo momento  El MCG revisa willis nivel de azúcar en la gil cada 5 minutos  [employer] a un dispositivo electrónico, nava un teléfono inteligente  Un MCG puede utilizarse con o sin royal bomba de insulina  Hable con willis médico para averiguar cuál es el [address] para usted  El objetivo de los niveles de azúcar en la gil antes de las comidas es entre 80 y 130 mg/dL y 2 horas después de comer  es inferior a 180 mg/dL  · Elija opciones de alimentos saludables  Consulte con willis dietista para crear un plan de comidas que funcione para usted y bhavna horarios  Un dietista puede ayudarlo para que aprenda cómo alimentarse saman con la cantidad [place] de carbohidratos masoud las comidas y [place]  Los carbohidratos pueden subir willis azúcar en la gil si usted come demasiado a la vez  Algunos ejemplos de alimentos que contienen carbohidratos son panes, cereales, arroz, pasta y dulces  · Realice actividad física regularmente  La actividad física puede ayudarlo a alcanzar willis objetivo de nivel de azúcar en gil y a controlar willis peso  Jacqui al menos 150 minutos de [place] física [place] de moderada a vigorosa cada semana  No deje de realizarla masoud más de 2 días seguidos  No permanezca sentada por más de 30 minutos cada vez  Willis médico puede ayudarle a crear un plan de actividades  El plan puede incluir las mejores actividades para usted y puede ayudarlo a desarrollar willis fuerza y [place]  · Mantenga un peso saludable  Pregúntele a willis médico cuál es el peso ideal para usted  Pídale que lo ayude a crear un plan seguro para bajar de peso si tiene sobrepeso  · Tómese willis medicamento para la diabetes o la insulina según las indicaciones  Es posible que necesite medicamento para la diabetes, insulina o ambos para controlar bhavna niveles de glucosa en gil   Willis médico le indicará cómo y cuándo se debe sridhar willis medicamento de diabetes o la insulina  También se le enseñarán los efectos secundarios que pueden causar los medicamentos orales para la diabetes  La insulina puede administrarse mediante inyección o royal bomba de insulina o royal pluma  Usted y willis equipo de atención analizarán qué método es mejor para usted  ? La bomba de insulina es un dispositivo implantado que le da insulina las 24 horas del día  Royal bomba de insulina previene la necesidad de inyecciones múltiples de insulina en un día  ? La pluma para insulina es un dispositivo precargado con la cantidad [place] de insulina  ? A usted y willis regi les enseñarán cómo preparar y administrar la insulina si nubia es el mejor método para usted  El equipo de educación también le enseñará cómo desechar las agujas y [place]  ? Aprenderá la cantidad de insulina que necesita y cuándo administrarla  Se le enseñará cuándo no administrar la insulina  También se le enseñará lo que debe hacer si willis nivel de azúcar en la gil baja demasiado  [place] puede suceder si usted norma insulina y no come la cantidad [place] de carbohidratos  Otras cosas que puede hacer para controlar la diabetes tipo 2:  · Use identificación de alerta médica  Use un brazalete o collar de alerta médica o lleve consigo royal tarjeta que indique que tiene diabetes  Pregunte a willis médico dónde puede conseguir esos artículos  · No fume  La nicotina y otras sustancias químicas de los cigarrillos y los cigarros pueden dañar el pulmón y los vasos sanguíneos  También dificulta el control de la diabetes  Pida información a willis médico si usted actualmente fuma y necesita ayuda para dejar de fumar  No use cigarrillos electrónicos o tabaco sin humo en vez de cigarrillos o para tratar de dejar de fumar  Todos estos aún contienen nicotina  · Revise bhavna pies todos los días por cortadas, raspones, callos u otras heridas   Nubia pendiente de enrojecimiento e inflamación y de calor al tacto  Use zapatos que le calcen saman  Compruebe que no haya piedras u otros objetos dentro de bhavna zapatos que le podrían [facility]  No camine descalzo ni use zapatos sin calcetines  Use calcetines de algodón para ayudar a [place] [employer]  · Pregunte sobre las vacunas que pudiera necesitar  Usted corre un mayor riesgo de presentar enfermedades graves si se contagia gripe, neumonía, COVID-19 o hepatitis  Pregunte a willis médico si debe vacunarse para prevenir estas u otras enfermedades, y cuándo debe hacerlo  · Hable con willis equipo de atención médica si se siente estresado por el cuidado de la diabetes  A veces, encajar el cuidado de la diabetes en willis dickson puede provocar un aumento del estrés  El estrés puede hacer que no se cuide [facility]  Willis equipo de atención médica puede ayudarlo con consejos sobre el autocuidado  Willis equipo de [facility] puede sugerirle que hable con un profesional de la shun mental  [place] profesional puede escuchar y ofrecer ayuda en cuestiones de autocuidado  Acuda a bhavna consultas de control con willis médico o con el equipo de cuidado de la diabetes según le indicaron: Es posible que a usted le hill análisis de gil antes de la anish de control  Los [employer] de las pruebas mostrarán si es necesario hacer cambios en el tratamiento o en los cuidados personales  Anote bhavna preguntas para que se acuerde de hacerlas masoud bhavna visitas  Hable con willis médico si no puede pagar bhavna medicamentos  © Copyright [employer] 2022 Information is for End User's use only and may not be sold, redistributed or otherwise used for commercial purposes  All illustrations and images included in CareNotes® are the copyrighted property of A D A M , Inc  or [address] [place] [address] es sólo para uso en educación  Willis intención no es darle un consejo médico sobre enfermedades o tratamientos   Colsulte con willis médico, enfermera o farmacéutico antes de seguir cualquier régimen médico para saber si es seguro y efectivo para usted  Cuidado del pie para personas con diabetes   CUIDADO AMBULATORIO:   Lo que usted necesita saber acerca del cuidado del pie:  · El cuidado del pie ayuda a proteger willis pies y evitar úlceras o llagas en el pie  [facility] de azúcar en la gil a truman plazo pueden dañar los vasos sanguíneos y los nervios en cathy piernas y pies  Ayla daño dificulta que sienta presión, dolor, temperatura y el tacto  Es posible que usted no sienta royal cortada o royal úlcera o que los zapatos están muy apretados  El cuidado del pie es necesario para evitar problemas graves, nava royal infección o royal amputación  · La diabetes podría provocar que los dedos de cathy pies se tuerzan o se encorven [place]  Estos cambios podrían afectar la manera en que usted camina y pueden conllevar al aumento de la presión en willis pie  La presión puede disminuir el flujo sanguíneo a cathy pies  La falta del flujo sanguíneo aumenta willis riesgo de royal úlcera en [employer]  No ignore problemas pequeños, nava la piel reseca o heridas pequeñas  Con el tiempo, estos puede representar royal amenaza para la dickson si no se les da el cuidado apropiado  Llame al proveedor del equipo de cuidados de shun si:  · Cathy pies se ponen entumecidos, débiles o difícil de   · Usted tiene pus drenando de royal llaga en willis pie  · Usted tiene royal herida en willis pie que se hace más jaimie, más profunda o que no livan  · Usted nota que tiene ampollas, cortadas, rasguños, callos o llagas en willis pie  · Usted tiene fiebre y cathy pies se ponen rojos, calientes e inflamados  · Las uñas de cathy pies se vuelven gruesas, encorvadas o [id number]  · Le es difícil revisarse los pies porque willis visión no está saman  · Usted tiene preguntas o inquietudes acerca de willis condición o cuidado  Cómo cuidar de cathy pies:  · Revísese los pies a diario   Observe todo el pie, incluyendo la planta del pie, entre y debajo de los dedos  Revise si hay heridas y callos  Use un jean para verse la planta de los pies  La piel de los pies podría estar brillante, estirada o más obscura de lo normal  Cathy pies también podrían estar fríos y pálidos  Pase cathy deondre por encima, por debajo, por los lados y [place] dedos del pie para sentir la piel  El enrojecimiento, inflamación y calor son signos de problemas con el flujo sanguíneo que pueden conllevar a royal úlcera en el pie  No trate de quitarse los callos usted mismo  · [employer] todos los días con agua tibia y [place]  No use [place], porque esto puede lesionarle el pie  Séquese los pies suavemente con royal toalla después de lavarlos  Seque entre y [employer] dedos  · Aplique royal loción o un humectante sobre cathy pies secos  Pregunte al médico del equipo de cuidados de shun cuáles lociones son las mejores que puede usar  No  aplique loción o humectante entre cathy dedos  La [employer] dedos del pie podría causar rupturas de la piel  · [place] uñas de los pies correctamente  Lime o lara las uñas de los pies en línea recta  Use un cepillo suave para limpiar alrededor [employer]  Si las [address] gruesas, es posible que necesite que un médico del equipo de cuidados de shun o un especialista se las lara  · Proteja cathy pies  No  camine descalzo ni use zapatos sin calcetines  Compruebe que no haya piedras u otros objetos dentro de cathy zapatos que le podrían [email or web address]  Use calcetines de algodón para ayudar a [employer]  Use calcetines sin costura en los dedos o póngaselos con la costura hacia afuera  Cámbiese los calcetines diariamente  No use calcetines sucios ni húmedos  · Use zapatos que le calcen saman  Use zapatos que no rocen ninguna parte de cathy pies  Head calzado debería ser de ½ a ¾ pulgada (1 a 2 centímetros) más grandes que cathy pies   Head calzado también debería tener espacio adicional alrededor de la parte más ancha de bhavna pies  El calzado para caminar o atlético con cordones o correas que se ajustan son los mejores  Pida ayuda al médico del equipo de cuidados de shun para elegir un par de zapatos que le calcen saman  Pregúntele si debe usar algún tipo de plantilla, soporte o vendaje en bhavna pies  · Asista a bhavna citas de seguimiento  El médico del equipo de cuidados de shun felix [place] revisión a bhavna pies al menos royal vez al [place]  Es posible que usted necesite hacerse un examen de pie más seguido si tiene los nervios dañados, deformidad en el pie o [place]  Él revisará si hay daño al nervio y qué tan saman usted puede sentir bhavna pies  Él le revisará willis calzado para jag si le [employer]  · No fume  Fumar puede dañar los vasos sanguíneos y aumentar willis riesgo de úlceras en los pies  Pida al médico de willis equipo de cuidados de shun información si usted fuma actualmente y [place] para dejar de hacerlo  Los cigarrillos electrónicos o el tabaco sin humo igualmente contienen nicotina  Consulte con willis médico del equipo de cuidados de shun antes de utilizar estos productos  Acuda a bhanva consultas de control con el médico del equipo de cuidado de la diabetes o un especialista en pies según le indicaron: Usted va a necesitar que le revisen bhavna pies al menos royal vez al [place]  Es posible que usted necesite hacerse un examen de pie más seguido si tiene los nervios dañados, deformidad en el pie o [place]  Anote bhavna preguntas para que se acuerde de hacerlas masoud bhavna visitas  © Copyright [employer] 2022 Information is for End User's use only and may not be sold, redistributed or otherwise used for commercial purposes  All illustrations and images included in CareNotes® are the copyrighted property of A D A M , Inc  or [address] [place] [address] es sólo para uso en educación  Willis intención no es darle un consejo médico sobre enfermedades o tratamientos   Colsulte con willis médico, enfermera o farmacéutico antes de seguir cualquier régimen médico para saber si es seguro y efectivo para usted

## 2022-09-01 ENCOUNTER — TELEPHONE (OUTPATIENT)
Dept: NEUROLOGY | Facility: CLINIC | Age: 87
End: 2022-09-01

## 2022-09-01 NOTE — TELEPHONE ENCOUNTER
Patient's daughter called in to schedule patient's follow-up appt  LOV: 8/25/22 w/ Dr Dwayne Stevenson    Per follow-up instruction in office visit:     Follow up in 1 year with Dr Nimo Hatfield (virtual visit)  Pt's daughter stated that she was speaking with pt's PCP (Dr Zenon Chavarria) who advised her to schedule the appt for a 6 month follow-up instead of a year follow-up  Dr Sherie Schneider schedule is not open past January  Advised daughter to call back in around a month to see if the schedule opens up by then  Once there is an open schedule, please schedule virtual follow-up w/ Dr Nimo Hatfield     Thank you!

## 2022-09-02 ENCOUNTER — APPOINTMENT (OUTPATIENT)
Dept: RADIOLOGY | Facility: HOSPITAL | Age: 87
End: 2022-09-02
Payer: MEDICARE

## 2022-09-02 ENCOUNTER — HOSPITAL ENCOUNTER (EMERGENCY)
Facility: HOSPITAL | Age: 87
Discharge: HOME | End: 2022-09-02
Attending: EMERGENCY MEDICINE
Payer: MEDICARE

## 2022-09-02 VITALS
HEART RATE: 96 BPM | HEIGHT: 61 IN | DIASTOLIC BLOOD PRESSURE: 75 MMHG | SYSTOLIC BLOOD PRESSURE: 142 MMHG | WEIGHT: 158.07 LBS | BODY MASS INDEX: 29.84 KG/M2 | RESPIRATION RATE: 20 BRPM | TEMPERATURE: 99.1 F | OXYGEN SATURATION: 96 %

## 2022-09-02 DIAGNOSIS — R06.02 SOB (SHORTNESS OF BREATH): Primary | ICD-10-CM

## 2022-09-02 LAB
2HR DELTA HS TROPONIN: 1 NG/L
ALBUMIN SERPL BCP-MCNC: 2.6 G/DL (ref 3.5–5)
ALP SERPL-CCNC: 95 U/L (ref 46–116)
ALT SERPL W P-5'-P-CCNC: 54 U/L (ref 12–78)
ANION GAP SERPL CALCULATED.3IONS-SCNC: 8 MMOL/L (ref 4–13)
AST SERPL W P-5'-P-CCNC: 52 U/L (ref 5–45)
BASOPHILS # BLD AUTO: 0.03 THOUSANDS/ΜL (ref 0–0.1)
BASOPHILS NFR BLD AUTO: 1 % (ref 0–1)
BILIRUB SERPL-MCNC: 0.45 MG/DL (ref 0.2–1)
BUN SERPL-MCNC: 46 MG/DL (ref 5–25)
CALCIUM ALBUM COR SERPL-MCNC: 10.6 MG/DL (ref 8.3–10.1)
CALCIUM SERPL-MCNC: 9.5 MG/DL (ref 8.3–10.1)
CARDIAC TROPONIN I PNL SERPL HS: 12 NG/L
CARDIAC TROPONIN I PNL SERPL HS: 13 NG/L
CHLORIDE SERPL-SCNC: 100 MMOL/L (ref 96–108)
CO2 SERPL-SCNC: 29 MMOL/L (ref 21–32)
CREAT SERPL-MCNC: 1.13 MG/DL (ref 0.6–1.3)
EOSINOPHIL # BLD AUTO: 0.32 THOUSAND/ΜL (ref 0–0.61)
EOSINOPHIL NFR BLD AUTO: 5 % (ref 0–6)
ERYTHROCYTE [DISTWIDTH] IN BLOOD BY AUTOMATED COUNT: 13.5 % (ref 11.6–15.1)
GFR SERPL CREATININE-BSD FRML MDRD: 43 ML/MIN/1.73SQ M
GLUCOSE SERPL-MCNC: 132 MG/DL (ref 65–140)
HCT VFR BLD AUTO: 34 % (ref 34.8–46.1)
HGB BLD-MCNC: 10.4 G/DL (ref 11.5–15.4)
IMM GRANULOCYTES # BLD AUTO: 0.02 THOUSAND/UL (ref 0–0.2)
IMM GRANULOCYTES NFR BLD AUTO: 0 % (ref 0–2)
LYMPHOCYTES # BLD AUTO: 1.02 THOUSANDS/ΜL (ref 0.6–4.47)
LYMPHOCYTES NFR BLD AUTO: 17 % (ref 14–44)
MAGNESIUM SERPL-MCNC: 2.1 MG/DL (ref 1.6–2.6)
MCH RBC QN AUTO: 28.5 PG (ref 26.8–34.3)
MCHC RBC AUTO-ENTMCNC: 30.6 G/DL (ref 31.4–37.4)
MCV RBC AUTO: 93 FL (ref 82–98)
MONOCYTES # BLD AUTO: 0.7 THOUSAND/ΜL (ref 0.17–1.22)
MONOCYTES NFR BLD AUTO: 12 % (ref 4–12)
NEUTROPHILS # BLD AUTO: 3.99 THOUSANDS/ΜL (ref 1.85–7.62)
NEUTS SEG NFR BLD AUTO: 65 % (ref 43–75)
NRBC BLD AUTO-RTO: 0 /100 WBCS
NT-PROBNP SERPL-MCNC: 1381 PG/ML
PLATELET # BLD AUTO: 258 THOUSANDS/UL (ref 149–390)
PMV BLD AUTO: 11 FL (ref 8.9–12.7)
POTASSIUM SERPL-SCNC: 4.3 MMOL/L (ref 3.5–5.3)
PROT SERPL-MCNC: 7.6 G/DL (ref 6.4–8.4)
RBC # BLD AUTO: 3.65 MILLION/UL (ref 3.81–5.12)
SODIUM SERPL-SCNC: 137 MMOL/L (ref 135–147)
WBC # BLD AUTO: 6.08 THOUSAND/UL (ref 4.31–10.16)

## 2022-09-02 PROCEDURE — 71045 X-RAY EXAM CHEST 1 VIEW: CPT

## 2022-09-02 PROCEDURE — 83880 ASSAY OF NATRIURETIC PEPTIDE: CPT | Performed by: EMERGENCY MEDICINE

## 2022-09-02 PROCEDURE — 99285 EMERGENCY DEPT VISIT HI MDM: CPT

## 2022-09-02 PROCEDURE — 84484 ASSAY OF TROPONIN QUANT: CPT | Performed by: EMERGENCY MEDICINE

## 2022-09-02 PROCEDURE — 36415 COLL VENOUS BLD VENIPUNCTURE: CPT | Performed by: EMERGENCY MEDICINE

## 2022-09-02 PROCEDURE — 83735 ASSAY OF MAGNESIUM: CPT | Performed by: EMERGENCY MEDICINE

## 2022-09-02 PROCEDURE — 99284 EMERGENCY DEPT VISIT MOD MDM: CPT | Performed by: EMERGENCY MEDICINE

## 2022-09-02 PROCEDURE — 80053 COMPREHEN METABOLIC PANEL: CPT | Performed by: EMERGENCY MEDICINE

## 2022-09-02 PROCEDURE — 93005 ELECTROCARDIOGRAM TRACING: CPT

## 2022-09-02 PROCEDURE — 85025 COMPLETE CBC W/AUTO DIFF WBC: CPT | Performed by: EMERGENCY MEDICINE

## 2022-09-02 NOTE — TELEPHONE ENCOUNTER
Patients daughter called regarding script for mask  Send to [facility] at fax [phone]  As of today she has not heard from anyone from PT, OT or any Home Health Nursing staff  Will Medicare cover the cost of Pure Kadeem Marko as they would like to begin this  Also asked about getting her started on [employer]

## 2022-09-03 NOTE — ED PROVIDER NOTES
History  Chief Complaint   Patient presents with    Shortness of Breath     Patient with a prior medical history of CVA with right-sided hemiparesis and aphasia, presents to the emergency department with her daughter and EMS, with concern for shortness of breath  Per patient's daughter about closer current caregiver, patient was gurgling for approximately 30 minutes and a pulse ox was in the 90s  Patient is currently asymptomatic  She also has a history of dementia, diabetes, hypertension and is chronically dependent on tube feeds      History provided by:  Caregiver  History limited by:  Patient nonverbal   used: No    Shortness of Breath  Severity:  Moderate  Onset quality:  Sudden  Timing:  Intermittent  Progression:  Resolved  Chronicity:  New  Relieved by:  Nothing  Worsened by:  Nothing  Ineffective treatments:  None tried  Associated symptoms: no abdominal pain, no cough, no fever, no neck pain and no vomiting        Prior to Admission Medications   Prescriptions Last Dose Informant Patient Reported? Taking?    Insulin Pen Needle (Pen Needles) 31G X 5 MM MISC   Yes No   Sig: Use   albuterol (2 5 mg/3 mL) 0 083 % nebulizer solution   No No   Sig: Take 3 mL (2 5 mg total) by nebulization every 6 (six) hours as needed for wheezing or shortness of breath   apixaban (ELIQUIS) 2 5 mg   No No   Sig: Take 1 tablet (2 5 mg total) by mouth 2 (two) times a day   Patient taking differently: 2 5 mg by Per G Tube route 2 (two) times a day   aspirin (ECOTRIN LOW STRENGTH) 81 mg EC tablet   No No   Sig: Take 1 tablet (81 mg total) by mouth daily   Patient taking differently: Take 81 mg by mouth daily G Tube   atorvastatin (LIPITOR) 40 mg tablet   No No   Sig: Take 2 tablets (80 mg total) by mouth daily with dinner   Patient taking differently: 80 mg by Per G Tube route daily with dinner   furosemide (LASIX) 20 mg tablet   No No   Sig: Take 1 tablet (20 mg total) by mouth every other day   Patient taking differently: 20 mg by Per G Tube route every other day   insulin glargine (LANTUS) 100 units/mL subcutaneous injection   Yes No   Sig: Inject 11 Units under the skin    metoprolol tartrate (LOPRESSOR) 25 mg tablet   No No   Sig: Take 1 tablet (25 mg total) by mouth every 12 (twelve) hours   Patient taking differently: 25 mg by Per G Tube route every 12 (twelve) hours   polyethylene glycol (MIRALAX) 17 g packet   No No   Sig: Take 17 g by mouth daily as needed (constipation - 2nd line) for up to 7 days   senna (SENOKOT) 8 6 mg   No No   Sig: Take 1 tablet (8 6 mg total) by mouth daily at bedtime as needed (constipation - 1st line)   Patient taking differently: 8 6 mg by Per G Tube route daily at bedtime as needed (constipation - 1st line)   tobramycin (TOBREX) 0 3 % SOLN   No No   Sig: Administer 2 drops into the left eye every 4 (four) hours while awake for 5 days      Facility-Administered Medications: None       Past Medical History:   Diagnosis Date    Acid reflux     on occ    Arthritis     DJD right hip replaced    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    x 2 lesions with no change    Cancer ([facility] ) 2007    colonic polyps, no surgery done    Deep vein thrombosis (DVT) of left upper extremity ([facility] ) 12/11/2017    Dementia ([facility] )     Diabetes mellitus (HCC)     type 2    Diverticulosis     Edema     in legs on occ    Hypertension     on occ    Language barrier     speaks Surinamese & broken english    Stroke [facility])        Past Surgical History:   Procedure Laterality Date    COLON SURGERY      COLONOSCOPY      COLONOSCOPY N/A 11/2/2016    Procedure: COLONOSCOPY;  Surgeon: Padmini Bautista MD;  Location: [facility] GI LAB; Service:     ESOPHAGOGASTRODUODENOSCOPY N/A 11/2/2016    Procedure: ESOPHAGOGASTRODUODENOSCOPY (EGD); Surgeon: Padmini Bautista MD;  Location: [facility] GI LAB;   Service:    Alicia Staff / REPLACE / REMOVE PACEMAKER      IR STROKE ALERT  7/22/2022    JOINT REPLACEMENT Right 02/2015    hip    JOINT REPLACEMENT Left     knee    JOINT REPLACEMENT Right     knee    MITRAL VALVE REPLACEMENT      SC COLONOSCOPY FLX DX W/COLLJ SPEC WHEN PFRMD N/A 2018    Procedure: EGD AND COLONOSCOPY;  Surgeon: Ruth Sanchez MD;  Location: [facility] GI LAB; Service: Gastroenterology    SC REVISE MEDIAN N/CARPAL TUNNEL SURG Left 2016    Procedure: RELEASE CARPAL TUNNEL;  Surgeon: Bushra Sanabria MD;  Location: [facility] MAIN OR;  Service: Orthopedics    TUBAL LIGATION      VARICOSE VEIN SURGERY Bilateral        Family History   Problem Relation Age of Onset    Cancer Mother         throat     I have reviewed and agree with the history as documented  E-Cigarette/Vaping    E-Cigarette Use Never User      E-Cigarette/Vaping Substances     Social History     Tobacco Use    Smoking status: Former Smoker     Packs/day: 0 25     Years: 10 00     Pack years: 2 50     Types: Cigarettes     Quit date:      Years since quittin 7    Smokeless tobacco: Never Used   Vaping Use    Vaping Use: Never used   Substance Use Topics    Alcohol use: No    Drug use: No       Review of Systems   Unable to perform ROS: Patient nonverbal   Constitutional: Negative for chills and fever  Respiratory: Positive for shortness of breath  Negative for cough and chest tightness  Gastrointestinal: Negative for abdominal pain, diarrhea, nausea and vomiting  Genitourinary: Negative for dysuria, frequency, hematuria and urgency  Musculoskeletal: Negative for back pain, neck pain and neck stiffness  All other systems reviewed and are negative  Physical Exam  Physical Exam  Vitals and nursing note reviewed  Constitutional:       General: She is not in acute distress  Appearance: She is well-developed  She is not diaphoretic  HENT:      Head: Normocephalic and atraumatic  Eyes:      Conjunctiva/sclera: Conjunctivae normal       Pupils: Pupils are equal, round, and reactive to light     Cardiovascular:      Rate and Rhythm: Normal rate and regular rhythm  Heart sounds: Normal heart sounds  No murmur heard  Pulmonary:      Effort: Pulmonary effort is normal  No respiratory distress  Breath sounds: Normal breath sounds  Abdominal:      General: Bowel sounds are normal  There is no distension  Palpations: Abdomen is soft  Tenderness: There is no abdominal tenderness  Musculoskeletal:         General: No deformity  Normal range of motion  Cervical back: Normal range of motion and neck supple  Skin:     General: Skin is warm and dry  Capillary Refill: Capillary refill takes less than 2 seconds  Coloration: Skin is not pale  Findings: No rash  Neurological:      Mental Status: She is alert  Cranial Nerves: No cranial nerve deficit  Motor: Weakness present           Vital Signs  ED Triage Vitals [09/02/22 1747]   Temperature Pulse Respirations Blood Pressure SpO2   99 1 °F (37 3 °C) 99 20 121/76 94 %      Temp Source Heart Rate Source Patient Position - Orthostatic VS BP Location FiO2 (%)   Axillary Monitor Lying Right arm --      Pain Score       --           Vitals:    09/02/22 1815 09/02/22 1945 09/02/22 2215 09/02/22 2230   BP: 154/77 134/83 142/75    Pulse: 98 102 102 96   Patient Position - Orthostatic VS:             Visual Acuity      ED Medications  Medications - No data to display    Diagnostic Studies  Results Reviewed     Procedure Component Value Units Date/Time    HS Troponin I 2hr [[id number]]  (Normal) Collected: 09/02/22 2034    Lab Status: Final result Specimen: Blood from Arm, Right Updated: 09/02/22 2106     hs TnI 2hr 13 ng/L      Delta 2hr hsTnI 1 ng/L     HS Troponin 0hr (reflex protocol) [[id number]]  (Normal) Collected: 09/02/22 1838    Lab Status: Final result Specimen: Blood from Arm, Right Updated: 09/02/22 1911     hs TnI 0hr 12 ng/L     NT-BNP PRO [[id number]]  (Abnormal) Collected: 09/02/22 1838    Lab Status: Final result Specimen: Blood from Arm, Right Updated: 09/02/22 1910     NT-proBNP 1,381 pg/mL     Magnesium [[id number]]  (Normal) Collected: 09/02/22 1838    Lab Status: Final result Specimen: Blood from Arm, Right Updated: 09/02/22 1910     Magnesium 2 1 mg/dL     Comprehensive metabolic panel [[id number]]  (Abnormal) Collected: 09/02/22 1838    Lab Status: Final result Specimen: Blood from Arm, Right Updated: 09/02/22 1904     Sodium 137 mmol/L      Potassium 4 3 mmol/L      Chloride 100 mmol/L      CO2 29 mmol/L      ANION GAP 8 mmol/L      BUN 46 mg/dL      Creatinine 1 13 mg/dL      Glucose 132 mg/dL      Calcium 9 5 mg/dL      Corrected Calcium 10 6 mg/dL      AST 52 U/L      ALT 54 U/L      Alkaline Phosphatase 95 U/L      Total Protein 7 6 g/dL      Albumin 2 6 g/dL      Total Bilirubin 0 45 mg/dL      eGFR 43 ml/min/1 73sq m     Narrative:      [place] guidelines for Chronic Kidney Disease (CKD):     Stage 1 with normal or high GFR (GFR > 90 mL/min/1 73 square meters)    Stage 2 Mild CKD (GFR = 60-89 mL/min/1 73 square meters)    Stage 3A Moderate CKD (GFR = 45-59 mL/min/1 73 square meters)    Stage 3B Moderate CKD (GFR = 30-44 mL/min/1 73 square meters)    Stage 4 Severe CKD (GFR = 15-29 mL/min/1 73 square meters)    Stage 5 End Stage CKD (GFR <15 mL/min/1 73 square meters)  Note: GFR calculation is accurate only with a steady state creatinine    CBC and differential [[id number]]  (Abnormal) Collected: 09/02/22 1838    Lab Status: Final result Specimen: Blood from Arm, Right Updated: 09/02/22 1845     WBC 6 08 Thousand/uL      RBC 3 65 Million/uL      Hemoglobin 10 4 g/dL      Hematocrit 34 0 %      MCV 93 fL      MCH 28 5 pg      MCHC 30 6 g/dL      RDW 13 5 %      MPV 11 0 fL      Platelets 456 Thousands/uL      nRBC 0 /100 WBCs      Neutrophils Relative 65 %      Immat GRANS % 0 %      Lymphocytes Relative 17 %      Monocytes Relative 12 %      Eosinophils Relative 5 %      Basophils Relative 1 % Neutrophils Absolute 3 99 Thousands/µL      Immature Grans Absolute 0 02 Thousand/uL      Lymphocytes Absolute 1 02 Thousands/µL      Monocytes Absolute 0 70 Thousand/µL      Eosinophils Absolute 0 32 Thousand/µL      Basophils Absolute 0 03 Thousands/µL                  XR chest 1 view portable    (Results Pending)              Procedures  Procedures         ED Course                                             MDM  Number of Diagnoses or Management Options  SOB (shortness of breath): new and requires workup     Amount and/or Complexity of Data Reviewed  Clinical lab tests: ordered and reviewed  Tests in the radiology section of CPT®: ordered and reviewed    Risk of Complications, Morbidity, and/or Mortality  Presenting problems: high  Diagnostic procedures: high  Management options: high    Patient Progress  Patient progress: improved      Disposition  Final diagnoses:   SOB (shortness of breath)     Time reflects when diagnosis was documented in both MDM as applicable and the Disposition within this note     Time User Action Codes Description Comment    9/2/2022  9:18 PM Con Cuadra Add [R06 02] SOB (shortness of breath)       ED Disposition     ED Disposition   Discharge    Condition   Stable    Date/Time   Fri Sep 2, 2022  9:18 PM    Comment   [address] discharge to home/self care                 Follow-up Information     Follow up With Specialties Details Why Contact Nik Alcala MD [facility] Medicine Schedule an appointment as soon as possible for a visit in 2 days for follow up [address] [facility] [address]  [phone]            Discharge Medication List as of 9/2/2022 10:34 PM      CONTINUE these medications which have NOT CHANGED    Details   albuterol (2 5 mg/3 mL) 0 083 % nebulizer solution Take 3 mL (2 5 mg total) by nebulization every 6 (six) hours as needed for wheezing or shortness of breath, Starting Sun 4/17/2022, Normal      apixaban (ELIQUIS) 2 5 mg Take 1 tablet (2 5 mg total) by mouth 2 (two) times a day, Starting Fri 5/18/2018, No Print      aspirin (ECOTRIN LOW STRENGTH) 81 mg EC tablet Take 1 tablet (81 mg total) by mouth daily, Starting Thu 11/8/2018, No Print      atorvastatin (LIPITOR) 40 mg tablet Take 2 tablets (80 mg total) by mouth daily with dinner, Starting Thu 9/3/2020, Print      furosemide (LASIX) 20 mg tablet Take 1 tablet (20 mg total) by mouth every other day, Starting Fri 8/5/2022, Until Sun 9/4/2022, No Print      insulin glargine (LANTUS) 100 units/mL subcutaneous injection Inject 11 Units under the skin , Historical Med      Insulin Pen Needle (Pen Needles) 31G X 5 MM MISC Use, Historical Med      metoprolol tartrate (LOPRESSOR) 25 mg tablet Take 1 tablet (25 mg total) by mouth every 12 (twelve) hours, Starting Thu 8/4/2022, Until Sat 9/3/2022, No Print      polyethylene glycol (MIRALAX) 17 g packet Take 17 g by mouth daily as needed (constipation - 2nd line) for up to 7 days, Starting Thu 8/4/2022, Until Thu 8/11/2022 at 2359, No Print      senna (SENOKOT) 8 6 mg Take 1 tablet (8 6 mg total) by mouth daily at bedtime as needed (constipation - 1st line), Starting Thu 8/4/2022, Until Sat 9/3/2022 at 2359, No Print      tobramycin (TOBREX) 0 3 % SOLN Administer 2 drops into the left eye every 4 (four) hours while awake for 5 days, Starting Tue 8/30/2022, Until Sun 9/4/2022, Normal             No discharge procedures on file      PDMP Review     None          ED Provider  Electronically Signed by           Sascha Ceja DO  09/03/22 0111

## 2022-09-05 PROBLEM — M16.10 PRIMARY OSTEOARTHRITIS OF HIP: Status: ACTIVE | Noted: 2020-01-19

## 2022-09-05 PROBLEM — K21.00 GASTROESOPHAGEAL REFLUX DISEASE WITH ESOPHAGITIS: Status: ACTIVE | Noted: 2020-09-21

## 2022-09-05 PROBLEM — N18.30 STAGE 3 CHRONIC KIDNEY DISEASE (HCC): Status: ACTIVE | Noted: 2020-01-19

## 2022-09-05 PROBLEM — J45.901 ASTHMA EXACERBATION: Status: ACTIVE | Noted: 2022-09-05

## 2022-09-05 PROBLEM — J40 TRACHEOBRONCHITIS: Status: ACTIVE | Noted: 2022-01-26

## 2022-09-05 PROBLEM — I48.0 PAROXYSMAL A-FIB (HCC): Status: ACTIVE | Noted: 2020-01-19

## 2022-09-05 PROBLEM — Z95.0 HISTORY OF PERMANENT CARDIAC PACEMAKER PLACEMENT: Status: ACTIVE | Noted: 2020-01-19

## 2022-09-05 PROBLEM — I10 HYPERTENSION, ESSENTIAL: Status: ACTIVE | Noted: 2020-01-19

## 2022-09-05 PROBLEM — E55.9 VITAMIN D DEFICIENCY: Status: ACTIVE | Noted: 2020-01-21

## 2022-09-05 PROBLEM — R06.00 DOE (DYSPNEA ON EXERTION): Status: ACTIVE | Noted: 2020-01-21

## 2022-09-05 PROBLEM — Z95.2 S/P MVR (MITRAL VALVE REPLACEMENT): Status: ACTIVE | Noted: 2020-01-19

## 2022-09-05 PROBLEM — D69.6 THROMBOCYTOPENIA (HCC): Status: ACTIVE | Noted: 2021-04-13

## 2022-09-05 PROBLEM — R06.09 DOE (DYSPNEA ON EXERTION): Status: ACTIVE | Noted: 2020-01-21

## 2022-09-05 PROBLEM — Z93.1 S/P PERCUTANEOUS ENDOSCOPIC GASTROSTOMY (PEG) TUBE PLACEMENT (HCC): Status: ACTIVE | Noted: 2022-09-05

## 2022-09-05 PROBLEM — E11.9 TYPE 2 DIABETES MELLITUS WITHOUT COMPLICATION, WITHOUT LONG-TERM CURRENT USE OF INSULIN (HCC): Status: ACTIVE | Noted: 2020-01-19

## 2022-09-06 ENCOUNTER — TELEPHONE (OUTPATIENT)
Dept: NEUROLOGY | Facility: CLINIC | Age: 87
End: 2022-09-06

## 2022-09-06 ENCOUNTER — TELEPHONE (OUTPATIENT)
Dept: GASTROENTEROLOGY | Facility: AMBULARY SURGERY CENTER | Age: 87
End: 2022-09-06

## 2022-09-06 DIAGNOSIS — E11.9 CONTROLLED TYPE 2 DIABETES MELLITUS WITHOUT COMPLICATION, WITHOUT LONG-TERM CURRENT USE OF INSULIN (HCC): Primary | ICD-10-CM

## 2022-09-06 LAB
ATRIAL RATE: 100 BPM
P AXIS: 6 DEGREES
PR INTERVAL: 114 MS
QRS AXIS: -62 DEGREES
QRSD INTERVAL: 122 MS
QT INTERVAL: 400 MS
QTC INTERVAL: 516 MS
T WAVE AXIS: 42 DEGREES
VENTRICULAR RATE: 100 BPM

## 2022-09-06 PROCEDURE — 93010 ELECTROCARDIOGRAM REPORT: CPT | Performed by: INTERNAL MEDICINE

## 2022-09-06 RX ORDER — PEN NEEDLE, DIABETIC 30 GX3/16"
NEEDLE, DISPOSABLE MISCELLANEOUS
COMMUNITY
End: 2022-09-06 | Stop reason: SDUPTHER

## 2022-09-06 NOTE — PROGRESS NOTES
Virtual TCM Visit:    Verification of patient location:    Patient is located in the following state in which I hold an active license [place]        Assessment/Plan:        Problem List Items Addressed This Visit     H/O mitral valve replacement (Chronic)    Pacemaker (Chronic)    Systolic congestive heart failure (HCC) (Chronic)    History of ischemic right MCA stroke (Chronic)    Acid reflux    Hypertension    Constipation    Cardio-embolic left MCA stroke ([facility] )    Meningioma ([facility] )    Cardiomyopathy, dilated ([facility] )    Encounter for support and coordination of transition of care - Primary    Stage 3 chronic kidney disease ([facility] )    BMI 29 0-29 9,adult    S/P percutaneous endoscopic gastrostomy (PEG) tube placement ([place] [address] )     Placed 7/28-tolerating tube feeds              Medications reconciled  Needs assistance w arranging VNA services  req orders for neb mask/Purewick female ext catheters and CGM    Reason for visit is TCM    Encounter provider Gaye Vegas MD       Provider located at [address] [place] [id number]      Recent Visits  Date Type Provider Dept   08/30/22 Telephone Barby Almaguer   08/30/22 [address],[facility], [place] [address] [place] [address] recent visits within past 7 days and meeting all other requirements  Future Appointments  No visits were found meeting these conditions  Showing future appointments within next 150 days and meeting all other requirements       After connecting through [employer], the patient was identified by name and date of birth  [place] was informed that this is a telemedicine visit and that the visit is being conducted through [address] [place] [address] Now and patient was informed that this is a secure, HIPAA-compliant platform  She agrees to proceed     My office door was closed  No one else was in the room    She acknowledged consent and understanding of privacy and security of the video platform  The patient has agreed to participate and understands they can discontinue the visit at any time  Patient is aware this is a billable service  Subjective:     Patient ID: Todd De Jesus is a 80 y o  female  HPI  TCM Call     Date and time call was made  8/30/2022  8:37 AM    Patient was hospitialized at  Other (comment)    Comment  [facility]    Date of Admission  08/04/22    Date of discharge  08/25/22    Diagnosis  stroke    Disposition  Home    Were the patients medications reviewed and updated  Yes    Current Symptoms  Cough    Cough Severity  Mild      TCM Call     Should patient be enrolled in anticoag monitoring? No    Scheduled for follow up? Yes    Patients specialists  Cardiologist    Cardiologist name  Dr Shawn Nieves    Did you obtain your prescribed medications  Yes    Do you need help managing your prescriptions or medications  Yes    Is transportation to your appointment needed  Yes    I have advised the patient to call PCP with any new or worsening symptoms  Beverly portillo/lorna    Living Arrangements  Family members    Are you recieving any outpatient services  No    Are you recieving home care services  No    Are you using any community resources  No    Current waiver services  No    Have you fallen in the last 12 months  No    Interperter language line needed  No        Review of Systems   Reason unable to perform ROS: per dtr  Constitutional: Positive for fatigue  Negative for fever  Respiratory: Positive for shortness of breath  Cardiovascular: Positive for leg swelling  Gastrointestinal:        Gerd   Musculoskeletal: Positive for arthralgias, gait problem and myalgias  Neurological: Positive for speech difficulty and weakness  Psychiatric/Behavioral: Positive for sleep disturbance  The patient is nervous/anxious            Objective:    Vitals:    08/30/22 0840   BP: 100/60   BP Location: Left arm   Patient Position: Sitting   Cuff Size: Standard Pulse: 86   Temp: 98 1 °F (36 7 °C)       Physical Exam  Vitals and nursing note reviewed  Pt sleeping in chair-dtr at side  No audible/visual distress  I spent 25 minutes with the patient and dtr (Pérez Fregosok) during this visit  Harlan Westbrook MD      VIRTUAL VISIT [address] verbally agrees to participate in [employer]  Pt is aware that [employer] could be limited without vital signs or the ability to perform a full hands-on physical Henery Chill understands she or the provider may request at any time to terminate the video visit and request the patient to seek care or treatment in person

## 2022-09-06 NOTE — PROGRESS NOTES
Assessment/Plan:    1  Encounter for support and coordination of transition of care    2  History of ischemic right MCA stroke    3  Cardio-embolic left [facility] stroke ([facility] )    4  Systolic congestive heart failure, unspecified HF chronicity ([facility] )    5  Hypertension, unspecified type    6  Pacemaker    7  Constipation, unspecified constipation type    8  S/P percutaneous endoscopic gastrostomy (PEG) tube placement [facility])  Assessment & Plan:  Placed 7/28-tolerating tube feeds      9  Gastroesophageal reflux disease, unspecified whether esophagitis present    10  H/O mitral valve replacement    11  Stage 3 chronic kidney disease, unspecified whether stage 3a or 3b CKD ([facility] )    12  Cardiomyopathy, dilated ([facility] )    13  Meningioma ([facility] )    14  BMI 29 0-29 9,adult        Medications reconciled  req orders for nebulizer face mask, Purewick external female cath and continuous glucose monitor  Needs assistance w arrangements for VN serivces    Patient Instructions       Control de la diabetes tipo 2 en los adultos   CUIDADO AMBULATORIO:   La diabetes tipo 2 es royal enfermedad que afecta la forma en que el cuerpo utiliza la glucosa (azúcar)  El cuerpo no puede producir suficiente insulina o es incapaz de usarla adecuadamente  Es importante controlar la diabetes para evitar el daño al corazón, los vasos sanguíneos y otros órganos  Pídale a alguien que llame al número de emergencias local (911 en los [place]) si:  · No es posible despertarlo  · Tiene signos de cetoacidosis diabética:     ? confusión, fatiga    ? vómitos    ? latidos cardíacos rápidos    ? aliento con [employer] a frutas    ? sed extrema    ? sequedad en la boca y la piel    · Tiene alguno de los siguientes signos de un ataque cardíaco:      ? Estrujamiento, presión o tensión en willis pecho    ?  Usted también podría presentar alguno de los siguientes:     § Malestar o dolor en willis espalda, dion, mandíbula, abdomen, o brazo    [address] o vómitos    § Desvanecimiento o sudor frío repentino    · Usted tiene alguno de los siguientes signos de derrame cerebral:      ? Adormecimiento o caída de un lado de willis josefa    ? Debilidad en un Graydon Matas o royal pierna    ? Confusión o debilidad para hablar    ? Mareos o dolor de virginia intenso, o pérdida de la visión  Llame a willis médico o al equipo de atención diabética si:  · Tiene royal llaga o royal herida que no cicatrizan  · Tiene un cambio en la cantidad de [facility]  · Bhavna niveles de azúcar en la gil son superiores a las metas fijadas  · Usted a menudo tiene niveles de azúcar en la gil más bajos que bhavna metas fijadas  · Willis piel está enrojecida, seca, caliente al tacto o inflamada  · Usted tiene problemas para sobrellevar willis diabetes o se siente ansioso o deprimido  · Usted tiene preguntas o inquietudes acerca de willis condición o cuidado  Lo que usted necesita saber sobre los niveles altos de azúcar en la gil: El azúcar alto en la gil puede no causar ningún síntoma  Puede sentir más sed u orinar con más frecuencia de lo habitual  Con el tiempo, los niveles altos de azúcar en la gil pueden dañar bhavna nervios, vasos sanguíneos, tejidos y órganos  Lo siguiente aumenta los niveles de azúcar en la gil:  · Comidas copiosas o grandes cantidades de carbohidratos de royal terrence vez    · Menos actividad física    · Estrés    · Enfermedad    · Royal dosis simeon de medicamento o [place], o royal dosis tardía    Lo que usted necesita saber sobre los niveles bajos de azúcar en la gil: Usted puede prevenir síntomas nava temblores, mareos, irritabilidad o confusión asegurándose de que willis azúcar en la gil no baje demasiado  · Trate un bajón de azúcar inmediatamente  ? Lidia 4 onzas de jugo o 1 tubo de gel de glucosa  ? Revise nuevamente willis nivel de azúcar en la gil en 10 a 15 minutos  ? Cuando el nivel regrese a la normalidad, coma un alimento o refrigerio para prevenir otro bajón  · Lleve siempre consigo gel de glucosa, uvas pasas o caramelos duros para tratar los niveles bajos de azúcar en la gil  · Willis azúcar en la gil puede bajar demasiado si norma un medicamento para la diabetes o la insulina y no consume suficientes alimentos  · Si [place] insulina, verifique willis nivel de azúcar en la gil antes de hacer ejercicio  ? Si willis nivel de azúcar en la gil es inferior a 100 mg/dL, coma 4 galletas, 2 onzas de uvas pasas o melisa 4 onzas de jugo  ? Compruebe willis nivel cada 30 minutos si hace ejercicio masoud más de 1 hora  ? Puede que necesite royal merienda masoud o después de hacer ejercicio  Qué puede hacer para manejar bhavna niveles de azúcar en la gil:  · Revise bhavna niveles de azúcar en la gil según las indicaciones y según sea necesario  Hay varios elementos disponibles que puede utilizar para comprobar bhavna [place]  Puede que tenga que comprobarlo probando royal gota clifford Noguera en un medidor de glucosa  En willis lugar, es posible que le den un monitor continuo de glucosa (MCG)  El dispositivo se lleva puesto en todo momento  El MCG revisa willis nivel de azúcar en la gil cada 5 minutos  [employer] a un dispositivo electrónico, nava un teléfono inteligente  Un MCG puede utilizarse con o sin royal bomba de insulina  Hable con willis médico para averiguar cuál es el [address] para usted  El objetivo de los niveles de azúcar en la gil antes de las comidas es entre 80 y 130 mg/dL y 2 horas después de comer  es inferior a 180 mg/dL  · Elija opciones de alimentos saludables  Consulte con willis dietista para crear un plan de comidas que funcione para usted y bhavna horarios  Un dietista puede ayudarlo para que aprenda cómo alimentarse saman con la cantidad [place] de carbohidratos masoud las comidas y [place]  Los carbohidratos pueden subir willis azúcar en la gil si usted come demasiado a la vez   Charmian Snowball de alimentos que contienen carbohidratos son panes, cereales, arroz, pasta y dulces  · Realice actividad física regularmente  La actividad física puede ayudarlo a alcanzar willis objetivo de nivel de azúcar en gil y a controlar willis peso  Jacqui al menos 150 minutos de [place] física [place] de moderada a vigorosa cada semana  No deje de realizarla masoud más de 2 días seguidos  No permanezca sentada por más de 30 minutos cada vez  Willis médico puede ayudarle a crear un plan de actividades  El plan puede incluir las mejores actividades para usted y puede ayudarlo a desarrollar willis fuerza y Rashaun Helms  · Mantenga un peso saludable  Pregúntele a willis médico cuál es el peso ideal para usted  Pídale que lo ayude a crear un plan seguro para bajar de peso si tiene sobrepeso  · Tómese willis medicamento para la diabetes o la insulina según las indicaciones  Es posible que necesite medicamento para la diabetes, insulina o ambos para controlar bhavna niveles de glucosa en gil  Willis médico le indicará cómo y cuándo se debe sridhar willis medicamento de diabetes o la insulina  También se le enseñarán los efectos secundarios que pueden causar los medicamentos orales para la diabetes  La insulina puede administrarse mediante inyección o royal bomba de insulina o royal pluma  Usted y willis equipo de atención analizarán qué método es mejor para usted  ? La bomba de insulina es un dispositivo implantado que le da insulina las 24 horas del día  Royal bomba de insulina previene la necesidad de inyecciones múltiples de insulina en un día  ? La pluma para insulina es un dispositivo precargado con la cantidad [place] de insulina  ? A usted y willis regi les enseñarán cómo preparar y administrar la insulina si nubia es el mejor método para usted  El equipo de educación también le enseñará cómo desechar las agujas y [place]  ? Aprenderá la cantidad de insulina que necesita y cuándo administrarla  Se le enseñará cuándo no administrar la insulina   También se le enseñará lo que debe hacer si willis nivel de azúcar en la gil baja demasiado  [place] puede suceder si usted norma insulina y no come la cantidad [place] de carbohidratos  Otras cosas que puede hacer para controlar la diabetes tipo 2:  · Use identificación de alerta médica  Use un brazalete o collar de alerta médica o lleve consigo royal tarjeta que indique que tiene diabetes  Pregunte a willis médico dónde puede conseguir esos artículos  · No fume  La nicotina y otras sustancias químicas de los cigarrillos y los cigarros pueden dañar el pulmón y los vasos sanguíneos  También dificulta el control de la diabetes  Pida información a willis médico si usted actualmente fuma y necesita ayuda para dejar de fumar  No use cigarrillos electrónicos o tabaco sin humo en vez de cigarrillos o para tratar de dejar de fumar  Todos estos aún contienen nicotina  · Revise bhavna pies todos los días por cortadas, raspones, callos u otras heridas  Ayla pendiente de enrojecimiento e inflamación y de calor al tacto  Use zapatos que le calcen saman  Compruebe que no haya piedras u otros objetos dentro de bhavna zapatos que le podrían [facility]  No camine descalzo ni use zapatos sin calcetines  Use calcetines de algodón para ayudar a [place] [employer]  · Pregunte sobre las vacunas que pudiera necesitar  Usted corre un mayor riesgo de presentar enfermedades graves si se contagia gripe, neumonía, COVID-19 o hepatitis  Pregunte a willis médico si debe vacunarse para prevenir estas u otras enfermedades, y cuándo debe hacerlo  · Hable con willis equipo de atención médica si se siente estresado por el cuidado de la diabetes  A veces, encajar el cuidado de la diabetes en willis dickson puede provocar un aumento del estrés  El estrés puede hacer que no se cuide [place]  Willis equipo de atención médica puede ayudarlo con consejos sobre el autocuidado   Willis equipo de [facility] puede sugerirle que hable con un profesional de la shun mental  Ayla profesional puede escuchar y ofrecer ayuda en cuestiones de autocuidado  Acuda a bhavna consultas de control con willis médico o con el equipo de cuidado de la diabetes según le indicaron: Es posible que a usted le hill análisis de gil antes de la anish de control  Los [employer] de las pruebas mostrarán si es necesario hacer cambios en el tratamiento o en los cuidados personales  Anote bhavna preguntas para que se acuerde de hacerlas masoud bhavna visitas  Hable con willis médico si no puede pagar bhavna medicamentos  © sharing.it 2022 Information is for End User's use only and may not be sold, redistributed or otherwise used for commercial purposes  All illustrations and images included in CareNotes® are the copyrighted property of [employer]  or [address] [place] [address] es sólo para uso en educación  Willis intención no es darle un consejo médico sobre enfermedades o tratamientos  Colsulte con willis Giuliana Armas farmacéutico antes de seguir cualquier régimen médico para saber si es seguro y efectivo para usted  Cuidado del pie para personas con diabetes   CUIDADO AMBULATORIO:   Lo que usted necesita saber acerca del cuidado del pie:  · El cuidado del pie ayuda a proteger willis pies y evitar úlceras o llagas en el pie  Los [employer] de azúcar en la gil a truman plazo pueden dañar los vasos sanguíneos y los nervios en bhavna piernas y pies  Ayla daño dificulta que sienta presión, dolor, temperatura y el tacto  Es posible que usted no sienta royal cortada o royal úlcera o que los zapatos están muy apretados  El cuidado del pie es necesario para evitar problemas graves, nava royal infección o royal amputación  · La diabetes podría provocar que los dedos de bhavna pies se tuerzan o se encorven [place]  Estos cambios podrían afectar la manera en que usted camina y pueden conllevar al aumento de la presión en willis pie  La presión puede disminuir el flujo sanguíneo a bhavna pies   [place]ermon Ambrosia del flujo sanguíneo aumenta willis riesgo de [employer]  No ignore problemas pequeños, nava la piel reseca o heridas pequeñas  Con el tiempo, estos puede representar royal amenaza para la dickson si no se les da el cuidado apropiado  Llame al proveedor del equipo de cuidados de shun si:  · Bhavna pies se ponen entumecidos, débiles o difícil de   · Usted tiene pus drenando de royal llaga en willis pie  · Usted tiene royal herida en willis pie que se hace más jaimie, más profunda o que no livan  · Usted nota que tiene ampollas, cortadas, rasguños, callos o llagas en willis pie  · Usted tiene fiebre y bhavna pies se ponen rojos, calientes e inflamados  · Las uñas de bhavna pies se vuelven gruesas, encorvadas o [place]  · Le es difícil revisarse los pies porque willis visión no está saman  · Usted tiene preguntas o inquietudes acerca de willis condición o cuidado  Cómo cuidar de bhvana pies:  · Revísese los pies a diario  Observe todo el pie, incluyendo la planta del pie, entre y [employer] dedos  Revise si hay heridas y callos  Use un jean para verse la planta de los pies  La piel de los pies podría estar brillante, estirada o más obscura de lo normal  Bhavna pies también podrían estar fríos y pálidos  Pase bhavna deondre por encima, por debajo, por los lados y [facility] dedos del pie para sentir la piel  El enrojecimiento, inflamación y calor son signos de problemas con el flujo sanguíneo que pueden conllevar a royal úlcera en el pie  No trate de quitarse los callos usted mismo  · [employer] todos los días con agua tibia y [place]  No use [place], porque esto puede lesionarle el pie  Séquese los pies suavemente con royal toalla después de lavarlos  Seque entre y [employer] dedos  · Aplique royal loción o un humectante sobre bhavna pies secos  Pregunte al médico del equipo de cuidados de shun cuáles lociones son las mejores que puede usar  No  aplique loción o humectante entre bhavna dedos   La [employer] dedos del pie podría causar rupturas de la piel  · [place] [address] uñas de los pies correctamente  Lime o lara las uñas de los pies en línea recta  Use un cepillo suave para limpiar alrededor [employer]  Si las [address] gruesas, es posible que necesite que un médico del equipo de cuidados de shun o un especialista se las lara  · Proteja bhavna pies  No  camine descalzo ni use zapatos sin calcetines  Compruebe que no haya piedras u otros objetos dentro de bhavna zapatos que le podrían [employer]  Use calcetines de algodón para ayudar a [employer]  Use calcetines sin costura en los dedos o póngaselos con la costura hacia afuera  Cámbiese los calcetines diariamente  No use calcetines sucios ni húmedos  · Use zapatos que le calcen saman  Use zapatos que no rocen ninguna parte de bhavna pies  Willis calzado debería ser de ½ a ¾ pulgada (1 a 2 centímetros) más grandes que bhavna pies  Willis calzado también debería tener espacio adicional alrededor de la parte más ancha de bhavna pies  El calzado para caminar o atlético con cordones o correas que se ajustan son los mejores  Pida ayuda al médico del equipo de cuidados de shun para elegir un par de zapatos que le calcen saman  Pregúntele si debe usar algún tipo de plantilla, soporte o vendaje en bhavna pies  · Asista a bhavna citas de seguimiento  El médico del equipo de cuidados de shun hará Areli Peek revisión a bhavna pies al menos royal vez al [place]  Es posible que usted necesite hacerse un examen de pie más seguido si tiene los nervios dañados, deformidad en el pie o [place]  Él revisará si hay daño al nervio y qué tan samna usted puede sentir bhavna pies  Él le revisará willis calzado para jag si le [employer]  · No fume  Fumar puede dañar los vasos sanguíneos y aumentar willis riesgo de úlceras en los pies  Pida al médico de willis equipo de cuidados de shun información si usted fuma actualmente y [place] para dejar de hacerlo   Los cigarrillos electrónicos o el tabaco sin humo igualmente contienen nicotina  Consulte con willis médico del equipo de cuidados de shun antes de utilizar estos productos  Acuda a bhavna consultas de control con el médico del equipo de cuidado de la diabetes o un especialista en pies según le indicaron: Usted va a necesitar que le revisen bhavna pies al menos royal vez al [place]  Es posible que usted necesite hacerse un examen de pie más seguido si tiene los nervios dañados, deformidad en el pie o [place]  Anote bhavna preguntas para que se acuerde de hacerlas masoud bhavna visitas  © Copyright [address] 2022 Information is for End User's use only and may not be sold, redistributed or otherwise used for commercial purposes  All illustrations and images included in CareNotes® are the copyrighted property of [employer]  or [address] [place] [address] es sólo para uso en educación  Willis intención no es darle un consejo médico sobre enfermedades o tratamientos  Colsulte con willis Ozie Sharp farmacéutico antes de seguir cualquier régimen médico para saber si es seguro y efectivo para usted  Subjective:      Patient ID: Tatyana Lr is a 80 y o  female  Chief Complaint   Patient presents with    Transition of Care Management     Pt came out of rehab because of stroke , pt ca nnot communicate so im speaking with her daughter  She needs pt /ot and a nurse to come to home   Her daughter asked if she could get a script for [place] and purwick  She also needs a mask for her nebulizer        HPI  TCM Call     Date and time call was made  8/30/2022  8:37 AM    Patient [facility]ial[facility]  [facility]    Date of Admission  08/[facility]/22    Date of discharge  08/25/22    Diagnosis  stroke    Disposition  Home    Were the patients medications reviewed and updated  Yes    Current Symptoms  Cough    Cough Severity  Mild      TCM Call     Should patient be enrolled in anticoag monitoring? No    Scheduled for follow up?   Yes Patients specialists  Cardiologist    Cardiologist name  Dr Rod Blanca    Did you obtain your prescribed medications  Yes    Do you need help managing your prescriptions or medications  Yes    Is transportation to your appointment needed  Yes    I have advised the patient to call PCP with any new or worsening symptoms  Beverly portillo/lorna    Living Arrangements  Family members    Are you recieving any outpatient services  No    Are you recieving home care services  No    Are you using any community resources  No    Current waiver services  No    Have you fallen in the last 12 months  No    Interperter language line needed  No        The following portions of the patient's history were reviewed and updated as appropriate: allergies, current medications, past family history, past medical history, past social history, past surgical history and problem list     Review of Systems   Constitutional: Positive for fatigue  Negative for fever  Respiratory: Positive for wheezing  Cardiovascular: Positive for leg swelling  Gastrointestinal:        GERD   Musculoskeletal: Positive for arthralgias, gait problem and myalgias  Neurological: Positive for weakness         Per dtr  Current Outpatient Medications   Medication Sig Dispense Refill    albuterol (2 5 mg/3 mL) 0 083 % nebulizer solution Take 3 mL (2 5 mg total) by nebulization every 6 (six) hours as needed for wheezing or shortness of breath 75 mL 0    apixaban (ELIQUIS) 2 5 mg Take 1 tablet (2 5 mg total) by mouth 2 (two) times a day (Patient taking differently: 2 5 mg by Per G Tube route 2 (two) times a day)  0    aspirin (ECOTRIN LOW STRENGTH) 81 mg EC tablet Take 1 tablet (81 mg total) by mouth daily (Patient taking differently: Take 81 mg by mouth daily G Tube)  0    atorvastatin (LIPITOR) 40 mg tablet Take 2 tablets (80 mg total) by mouth daily with dinner (Patient taking differently: 80 mg by Per G Tube route daily with dinner) 30 tablet 3    furosemide (LASIX) 20 mg tablet Take 1 tablet (20 mg total) by mouth every other day (Patient taking differently: 20 mg by Per G Tube route every other day) 15 tablet 0    insulin glargine (LANTUS) 100 units/mL subcutaneous injection Inject 11 Units under the skin       Insulin Pen Needle (Pen Needles) 31G X 5 MM MISC Use      metoprolol tartrate (LOPRESSOR) 25 mg tablet Take 1 tablet (25 mg total) by mouth every 12 (twelve) hours (Patient taking differently: 25 mg by Per G Tube route every 12 (twelve) hours) 60 tablet 0    senna (SENOKOT) 8 6 mg Take 1 tablet (8 6 mg total) by mouth daily at bedtime as needed (constipation - 1st line) (Patient taking differently: 8 6 mg by Per G Tube route daily at bedtime as needed (constipation - 1st line)) 30 tablet 0    polyethylene glycol (MIRALAX) 17 g packet Take 17 g by mouth daily as needed (constipation - 2nd line) for up to 7 days 7 each 0     No current facility-administered medications for this visit  Objective:    /60 (BP Location: Left arm, Patient Position: Sitting, Cuff Size: Standard)   Pulse 86   Temp 98 1 °F (36 7 °C)   LMP  (LMP Unknown)        Physical Exam  Vitals and nursing note reviewed                  Dheeraj Crowell MD

## 2022-09-06 NOTE — TELEPHONE ENCOUNTER
Post CVA Discharge Follow Up  Hospitalization: 7/22/22-8/4/22     According to chart, [facility] to [facility]  Was transferred to a voice mail box  Left a  requesting for a call back   Provided my name and office's phone number

## 2022-09-07 ENCOUNTER — DOCUMENTATION (OUTPATIENT)
Dept: FAMILY MEDICINE CLINIC | Facility: CLINIC | Age: 87
End: 2022-09-07

## 2022-09-07 ENCOUNTER — PATIENT OUTREACH (OUTPATIENT)
Dept: FAMILY MEDICINE CLINIC | Facility: CLINIC | Age: 87
End: 2022-09-07

## 2022-09-07 ENCOUNTER — NURSE TRIAGE (OUTPATIENT)
Dept: OTHER | Facility: OTHER | Age: 87
End: 2022-09-07

## 2022-09-07 DIAGNOSIS — E11.9 CONTROLLED TYPE 2 DIABETES MELLITUS WITHOUT COMPLICATION, WITHOUT LONG-TERM CURRENT USE OF INSULIN (HCC): Primary | ICD-10-CM

## 2022-09-07 DIAGNOSIS — I63.9 CVA (CEREBRAL VASCULAR ACCIDENT) (HCC): ICD-10-CM

## 2022-09-07 DIAGNOSIS — I50.22 CHRONIC SYSTOLIC CONGESTIVE HEART FAILURE (HCC): Chronic | ICD-10-CM

## 2022-09-07 DIAGNOSIS — I48.0 PAROXYSMAL A-FIB (HCC): ICD-10-CM

## 2022-09-07 DIAGNOSIS — E78.2 MIXED HYPERLIPIDEMIA: ICD-10-CM

## 2022-09-07 DIAGNOSIS — J45.901 ASTHMA EXACERBATION: ICD-10-CM

## 2022-09-07 RX ORDER — PEN NEEDLE, DIABETIC 30 GX3/16"
NEEDLE, DISPOSABLE MISCELLANEOUS DAILY
Qty: 100 EACH | Refills: 3 | Status: SHIPPED | OUTPATIENT
Start: 2022-09-07 | End: 2022-09-17 | Stop reason: SDUPTHER

## 2022-09-07 RX ORDER — ALBUTEROL SULFATE 2.5 MG/3ML
2.5 SOLUTION RESPIRATORY (INHALATION) EVERY 6 HOURS PRN
Qty: 75 ML | Refills: 0 | Status: SHIPPED | OUTPATIENT
Start: 2022-09-07 | End: 2022-09-08 | Stop reason: SDUPTHER

## 2022-09-07 RX ORDER — INSULIN GLARGINE 100 [IU]/ML
INJECTION, SOLUTION SUBCUTANEOUS
Status: CANCELLED | OUTPATIENT
Start: 2022-09-07

## 2022-09-07 RX ORDER — FUROSEMIDE 20 MG/1
20 TABLET ORAL EVERY OTHER DAY
Qty: 15 TABLET | Refills: 0 | Status: CANCELLED | OUTPATIENT
Start: 2022-09-07 | End: 2022-10-07

## 2022-09-07 RX ORDER — INSULIN GLARGINE 100 [IU]/ML
11 INJECTION, SOLUTION SUBCUTANEOUS DAILY
Qty: 10 ML | Refills: 3 | Status: SHIPPED | OUTPATIENT
Start: 2022-09-07 | End: 2022-09-08 | Stop reason: SDUPTHER

## 2022-09-07 RX ORDER — ATORVASTATIN CALCIUM 40 MG/1
80 TABLET, FILM COATED ORAL
Qty: 30 TABLET | Refills: 3 | Status: CANCELLED | OUTPATIENT
Start: 2022-09-07

## 2022-09-07 NOTE — TELEPHONE ENCOUNTER
Dr Hutchison Home:    Jose Alcaraz from [employer] [facility] called stating that they do not have Speech therapist for home visits  She advised us to contact [facility] for them to start homecare

## 2022-09-07 NOTE — TELEPHONE ENCOUNTER
Patient resting up in bed. Alert and oriented. Lung sounds clear. S1S2, bowel sounds active. Patient with green socks. Plans for discharge today. Bright and pleasant affect. Complaint of back discomfort. See MAR. No other verbalized needs made known at present time. Assessment completed. See doc flow sheet for further assessments. Reason for Disposition   [1] Caller has URGENT medicine question about med that PCP or specialist prescribed AND [2] triager unable to answer question    Answer Assessment - Initial Assessment Questions  1  NAME of MEDICATION: "What medicine are you calling about?"      Lantus insulin     2  QUESTION: "What is your question?" (e g , medication refill, side effect)      The vial was called instead of the pen, can the pen be called into the pharmacy? 3  PRESCRIBING HCP: "Who prescribed it?" Reason: if prescribed by specialist, call should be referred to that group  Dr Candace Muñoz     4  SYMPTOMS: "Do you have any symptoms?"      Denies    5   SEVERITY: If symptoms are present, ask "Are they mild, moderate or severe?"     N/A    Protocols used: MEDICATION QUESTION CALL-ADULT-

## 2022-09-07 NOTE — TELEPHONE ENCOUNTER
Patient's daughter says that patient is all out of meds and needs all rx's sent [place] [facility] [place] [facility] [place]

## 2022-09-07 NOTE — TELEPHONE ENCOUNTER
Regarding: out of insulin since this morning / concerned bc its new med - wrong one called in    ----- Message from Lucien Medley sent at 9/7/2022  7:17 PM EDT -----  "I called and spoke with someone a little while ago to get a refill on my mom's medication  Pharmacy called back to inform me that the wrong insulin was sent in  It should be for the pen  I was told to give this number to whoever is calling it in   ([phone])  "

## 2022-09-07 NOTE — PROGRESS NOTES
This OPCM RN called [facility] and spoke with Bhanu in intake and she reviewed their notes  She says they were unable to take case due to lack of staffing  She states it was called to [facility]  I requested they revisit availability since the referral was made over a week ago-check if they have the staffing now  She states she will look luis fernando it and speak with supervisor  This OPCM RN attempted to call patients daughter Adrienne Martinez back to discuss her concern regarding not hearing from [facility]  There is no VMB set up and unable to leave message  OPCM RN then attempted to call patients maxx Stroud and there was no answer and a message was left to return my call  OPCM RN to follow

## 2022-09-08 ENCOUNTER — TELEPHONE (OUTPATIENT)
Dept: FAMILY MEDICINE CLINIC | Facility: CLINIC | Age: 87
End: 2022-09-08

## 2022-09-08 DIAGNOSIS — Z86.73 HISTORY OF ISCHEMIC RIGHT MCA STROKE: Chronic | ICD-10-CM

## 2022-09-08 DIAGNOSIS — I48.0 PAROXYSMAL A-FIB (HCC): ICD-10-CM

## 2022-09-08 DIAGNOSIS — Z95.0 PACEMAKER: Chronic | ICD-10-CM

## 2022-09-08 DIAGNOSIS — K59.04 CHRONIC IDIOPATHIC CONSTIPATION: ICD-10-CM

## 2022-09-08 DIAGNOSIS — I50.22 CHRONIC SYSTOLIC CONGESTIVE HEART FAILURE (HCC): Chronic | ICD-10-CM

## 2022-09-08 DIAGNOSIS — J45.901 ASTHMA EXACERBATION: ICD-10-CM

## 2022-09-08 DIAGNOSIS — E11.9 CONTROLLED TYPE 2 DIABETES MELLITUS WITHOUT COMPLICATION, WITHOUT LONG-TERM CURRENT USE OF INSULIN (HCC): ICD-10-CM

## 2022-09-08 DIAGNOSIS — E78.2 MIXED HYPERLIPIDEMIA: ICD-10-CM

## 2022-09-08 DIAGNOSIS — I63.9 CVA (CEREBRAL VASCULAR ACCIDENT) (HCC): ICD-10-CM

## 2022-09-08 DIAGNOSIS — I82.622 DEEP VEIN THROMBOSIS (DVT) OF LEFT UPPER EXTREMITY (HCC): Chronic | ICD-10-CM

## 2022-09-08 RX ORDER — INSULIN GLARGINE 100 [IU]/ML
11 INJECTION, SOLUTION SUBCUTANEOUS DAILY
Qty: 10 ML | Refills: 3 | Status: SHIPPED | OUTPATIENT
Start: 2022-09-08 | End: 2022-11-18 | Stop reason: SDUPTHER

## 2022-09-08 RX ORDER — ATORVASTATIN CALCIUM 40 MG/1
80 TABLET, FILM COATED ORAL
Qty: 180 TABLET | Refills: 3 | Status: SHIPPED | OUTPATIENT
Start: 2022-09-08 | End: 2023-02-20

## 2022-09-08 RX ORDER — ALBUTEROL SULFATE 2.5 MG/3ML
2.5 SOLUTION RESPIRATORY (INHALATION) EVERY 6 HOURS PRN
Qty: 75 ML | Refills: 3 | Status: SHIPPED | OUTPATIENT
Start: 2022-09-08

## 2022-09-08 RX ORDER — FUROSEMIDE 20 MG/1
20 TABLET ORAL EVERY OTHER DAY
Qty: 45 TABLET | Refills: 3 | Status: SHIPPED | OUTPATIENT
Start: 2022-09-08 | End: 2022-09-23

## 2022-09-08 NOTE — TELEPHONE ENCOUNTER
[facility] have declined service due to staff shortage  [facility] can possibly do the home care so Cooper Coats will call them now to try to obtain service for her mother  Paul Nance

## 2022-09-09 ENCOUNTER — TELEMEDICINE (OUTPATIENT)
Dept: FAMILY MEDICINE CLINIC | Facility: CLINIC | Age: 87
End: 2022-09-09
Payer: MEDICARE

## 2022-09-09 VITALS
OXYGEN SATURATION: 93 % | TEMPERATURE: 98 F | DIASTOLIC BLOOD PRESSURE: 58 MMHG | HEART RATE: 90 BPM | SYSTOLIC BLOOD PRESSURE: 114 MMHG

## 2022-09-09 DIAGNOSIS — Z91.89 AT RISK FOR STRESS ULCER: Primary | ICD-10-CM

## 2022-09-09 DIAGNOSIS — R06.2 WHEEZING: ICD-10-CM

## 2022-09-09 PROCEDURE — 99442 PR PHYS/QHP TELEPHONE EVALUATION 11-20 MIN: CPT | Performed by: FAMILY MEDICINE

## 2022-09-09 RX ORDER — PANTOPRAZOLE SODIUM 40 MG/1
TABLET, DELAYED RELEASE ORAL
Qty: 90 TABLET | Refills: 1 | Status: SHIPPED | OUTPATIENT
Start: 2022-09-09 | End: 2022-09-25

## 2022-09-12 ENCOUNTER — NURSE TRIAGE (OUTPATIENT)
Dept: OTHER | Facility: OTHER | Age: 87
End: 2022-09-12

## 2022-09-12 ENCOUNTER — TELEPHONE (OUTPATIENT)
Dept: FAMILY MEDICINE CLINIC | Facility: CLINIC | Age: 87
End: 2022-09-12

## 2022-09-12 NOTE — TELEPHONE ENCOUNTER
Spoke with patients daughter, Mohini Jamison, reports she noticed within the last week intermittent scant bleeding on drainage sponge around PEG tube area  She was calling to make sure it was nothing to be concerned about  She denies any foul smells, yellow or green discharge or fevers  She is not sure if patient is in any pain since she had a stroke and is now nonverbal but she does not look to be in pain  Patient has follow up with Dr Macarena Rebolledo on 9/30  I advised I would let providers know and if there is any concern I would give her a call back  She verbalized understanding, no further questions

## 2022-09-12 NOTE — TELEPHONE ENCOUNTER
Regarding: peg tube questions   ----- Message from [facility] sent at 9/12/2022 12:54 PM EDT -----  "I have questions regarding my mom's peg tube   She sometimes has blood around area "

## 2022-09-12 NOTE — TELEPHONE ENCOUNTER
Dr Kimberley Lux:    Talia Rodriguez from [address] called stating that they cannot accommodate order for hospice due to staffing  She suggests to reach out to [facility] or an agency that just does hospice

## 2022-09-12 NOTE — TELEPHONE ENCOUNTER
Dr Frank Jane:    Patient's daughter needs an order for Pure Priscella Ford to be faxed to [facility] @ [phone]  Any questions please contact her

## 2022-09-12 NOTE — TELEPHONE ENCOUNTER
Post CVA Discharge Follow Up  Hospitalization: 7/22/22-8/4/22  [facility]  Latrice Wolf reports the patient was recently discharged to home  Patient's listed primary contact is her daughter, Anuj Ravi  She is on the communication consent form  Called Lorena  She is unable to take the call at this time  She will call back  Provided the office's phone number and my name

## 2022-09-12 NOTE — TELEPHONE ENCOUNTER
Hi,    It does not sound concerning but if she continues to notice it, if it worsens, or if the patient develops black stools she should let us know  Thank you!

## 2022-09-12 NOTE — TELEPHONE ENCOUNTER
Pt's daughter was told by [address] [place] [address]  that they only have lantus to be taken via vial   She should have solostar pen and pen needles [id number]  Also, she takes atorvastatin 80mg once daily and protonix can't be crushed for her feeding tube  They recommended generic flagyl instead  Aspirin needs to be changed to a chewable so it can be crushed  Please resend  Has there been any luck with hospice care?

## 2022-09-12 NOTE — TELEPHONE ENCOUNTER
Patient's daughter would like a call back from the office to discuss her mother's tube feed and bleeding at times  She would like a call back to advise and to discuss a possible alternative for her Protonix  Reason for Disposition   Cleaning and dressing the G-tube (PEG) or J-tube site, questions about    Answer Assessment - Initial Assessment Questions  1  MAIN CONCERN OR SYMPTOM:  "What is your main concern right now?" "What question do you have?" "What's the main symptom you're worried about?" (e g , fever, pain, redness, swelling)      Blood around site at times  2  ONSET: "When did the symptom or problem start (or worsen)?" (minutes, hours, days, weeks)      Last week   3  WHAT TYPE: "What type of feeding tube is it?" (e g , NG tube, NJ tube, G tube, GJ tube, J tube, PEG)  PEG  4  WHEN INSERTED: "When was the tube put in", "Has it been changed, if so when?"      7/28/22    Protocols used:  FEEDING TUBE SYMPTOMS AND QUESTIONS-ADULT-AH

## 2022-09-13 ENCOUNTER — PATIENT OUTREACH (OUTPATIENT)
Dept: FAMILY MEDICINE CLINIC | Facility: CLINIC | Age: 87
End: 2022-09-13

## 2022-09-13 NOTE — TELEPHONE ENCOUNTER
Patient's daughter says [facility] is unable to do OT/PT as they are short staffed- just end of care hospice which daughter does not think patient needs at this time  Also Hilario in [place] and Nicole both state they do no service [employer] for [employer]  She also tried [facility] and they also do not service in [place] [employer]  Patient is starting to have slight skin break down in coccyx area  Daughter applied silicone foam dressing to area  Please advise

## 2022-09-13 NOTE — PROGRESS NOTES
Bundle Episode Stroke    This OPCM RN received voice message from patients daughter Amy Art regarding patient and the need for [place] [address]  I called patients daughter Lisset Urbina and discussed the above  She states that [facility] never called her back  Lisset Urbina said she was given several other [place] [address] agency names and numbers to call and non of them service their area  She states she called [facility] again today and they told her they would need all new information, virtual notes and orders from her PCP again because they cannot use the old  I told her not to make any more phone calls that I would call in the morning and confirm the information [facility] needs and speak with Dr Vincent Khan office and I will call her back  She states understanding      OPCM RN to follow in the am

## 2022-09-14 ENCOUNTER — PATIENT OUTREACH (OUTPATIENT)
Dept: FAMILY MEDICINE CLINIC | Facility: CLINIC | Age: 87
End: 2022-09-14

## 2022-09-14 DIAGNOSIS — Z78.9 NEEDS ASSISTANCE WITH COMMUNITY RESOURCES: Primary | ICD-10-CM

## 2022-09-14 NOTE — TELEPHONE ENCOUNTER
I spoke with [facility] again to day  after speaking with Bernardo Mcgovern (case management ) about availability

## 2022-09-14 NOTE — PROGRESS NOTES
Received call from Linnea 21 explaining pt needs home health  Pt was referred to [facility] but per the dtr was not called  Per dtr she spoke with them and they need updated information for referral and orders  PAPITO informed Ronald Hernandez can make referral to [facility]  SW reviewed chart  Referral was placed to [facility] on 9/7, for PT, VN and SLP  [facility] then declined due to staff service, as did [facility]  Dtr had planned on contacting [facility]  Office received notification that they cannot accommodate for hospice, and suggested dtr reach out to [facility] , who does hospice  Dtr learned [facility] cannot do services, as they are also short staffed  Dtr then decided pt does not need end of care hospice right now  SW spoke to [facility] again regarding the referrals that office has been working on, and informed Ronald Hernandez will follow up with office to see if additional help is needed  SW spoke with KATELYNN Alcala said she will reach out to [facility] again to see if they need updates and have any staff available  PAPITO spoke with Beverly later and was told that [employer] does not have available staff  Beverly explained she reached out to Dr Ferrel Boxer and will follow up  SW spoke with KATELYNN Paul explained office has referred to all available VNAs in area but has not had any success  PAPITO requested Beverly reach out to [employer] again to see if they may have staff available, and that they need updated information  Beverly said she will follow up today  Note routed to Linnea COBOS spoke with Beverly again  Beverly said [facility] does not have staff  Beverly reached out to Dr Ferrel Boxer to see if his nurse could go in  She is awaiting a response  SW relayed the above to [employer]  SW will reach out to other VNAs that may be available

## 2022-09-14 NOTE — PROGRESS NOTES
This OPCM RN consulted with Ivan Metz CMSW and obtaining [facility] at this time was unsuccessful  All local [facility] agencies ae at capacity  I called patients daughter Lord Muller and gave her an update  She is aware SW is still working on getting someone into the home to see her mother  During our conversation patients daughter requested help with obtaining glucometer test strips Free Style Lite Code 16  She was made aware CM will look into it tomorrow  OPCM RN to follow

## 2022-09-14 NOTE — TELEPHONE ENCOUNTER
[facility] home care still does not have availability tc/cma

## 2022-09-14 NOTE — PROGRESS NOTES
Fina Episode: Stroke    This [facility] RN called [facility] [phone] and spoke with Cherie Muro RN in Intake  Discussed the difficulty in obtaining [facility] for patient at initial dc from [facility] in August and the need for a new referral  She reviewed her notes and confirmed such  She states she is unsure if they have any availability at this time but to resubmit request/order ASAP because initial order was placed too long ago  Patient must have a recent virtual PCP visit or in person visit  Notes must be sent from that visit along with a f2f and why patient is home bound and what skilled services patient needs  This OPCM RN spoke with [facility] for follow up  She states she will start process and make additional referrals this am     Note was routed to her as well as IB message  CM must contact patients daughter Senait Nieto [phone] with out come  OPCM RN to follow

## 2022-09-15 ENCOUNTER — PATIENT OUTREACH (OUTPATIENT)
Dept: FAMILY MEDICINE CLINIC | Facility: CLINIC | Age: 87
End: 2022-09-15

## 2022-09-15 NOTE — PROGRESS NOTES
SW reached out to [facility] to see if they have available staff for pt needs  Reached voicemail and left message with pt name, location and insurance  Requested call back  SW emailed Jeanmarie Line at [employer] to see if they have availability, explained pt needs PT, OT and VN    Waiting for confirmation  SW received call back from Rosalva Metz  They do not have PT available and they dont have VN in their agency  Note routed to Beverly and Dr Cristina Hughes at  [facility], and [facility]  Will send referral to [facility] if they can accept pt

## 2022-09-16 ENCOUNTER — TELEPHONE (OUTPATIENT)
Dept: FAMILY MEDICINE CLINIC | Facility: CLINIC | Age: 87
End: 2022-09-16

## 2022-09-16 ENCOUNTER — PATIENT OUTREACH (OUTPATIENT)
Dept: FAMILY MEDICINE CLINIC | Facility: CLINIC | Age: 87
End: 2022-09-16

## 2022-09-16 NOTE — TELEPHONE ENCOUNTER
Spoke to jeni at Dr Grayson Guillen office they will make a call to patient to set something up tc/cma

## 2022-09-16 NOTE — PROGRESS NOTES
SW received call from [facility] explained that they do not do VN only PT, OT and SLP  SW sent inbasket to Juan Gonzalez, Dr Julio Dobbs and [facility] relaying the above and inquiring about need for VN, as well as outcome of Dr Su Harman nurse being able to go into home  SW received response from [facility] stating Dr Su Harman nurse Kristy Hutton will reach out to dtr regarding visiting nurse  SW will remove self from care team at this time but be available for further assistance if needed  [facility], Dr Kamini Ca and León Morris

## 2022-09-16 NOTE — PROGRESS NOTES
Dr Lui Galan office will contact daughter to set up a home visit   I called daughter so she is awre    Also daughter needs new needles sent in and she said her moms skin is breaking down even though they are turning her , she has been using a silicone foam but wants to know if there is anything better to use tc/cma

## 2022-09-16 NOTE — TELEPHONE ENCOUNTER
Dr Kimberley Lux    Patient needs a refill of Auto Shield Duo Insulin Pen 30Gx3/16   Patient is out of this and would like a refill today  Please contact daughter with any questions

## 2022-09-17 DIAGNOSIS — E11.9 CONTROLLED TYPE 2 DIABETES MELLITUS WITHOUT COMPLICATION, WITHOUT LONG-TERM CURRENT USE OF INSULIN (HCC): ICD-10-CM

## 2022-09-17 RX ORDER — PEN NEEDLE, DIABETIC 30 GX3/16"
NEEDLE, DISPOSABLE MISCELLANEOUS DAILY
Qty: 100 EACH | Refills: 3 | Status: SHIPPED | OUTPATIENT
Start: 2022-09-17 | End: 2022-09-17 | Stop reason: SDUPTHER

## 2022-09-17 RX ORDER — PEN NEEDLE, DIABETIC 30 GX3/16"
NEEDLE, DISPOSABLE MISCELLANEOUS DAILY
Qty: 100 EACH | Refills: 3 | Status: SHIPPED | OUTPATIENT
Start: 2022-09-17

## 2022-09-17 NOTE — PROGRESS NOTES
Sent rx for insulin needles to both  local pharm and mail order- (See other note re:brand)  Cont w frequent positions changes (every 2 hrs at min during day)--can use donut and/or triangular pillows to help in addition to the silicone foam

## 2022-09-17 NOTE — TELEPHONE ENCOUNTER
Please clarify the auto shield duo insulin pen--I do not see on pt list or in epic list--spoke w pharm and could not find this particular product either-  Please see me for rx for the nebulizer supplies---we have sent this in before to [facility] --is pt changing suppliers? Yes

## 2022-09-19 ENCOUNTER — PATIENT OUTREACH (OUTPATIENT)
Dept: FAMILY MEDICINE CLINIC | Facility: CLINIC | Age: 87
End: 2022-09-19

## 2022-09-19 ENCOUNTER — TELEPHONE (OUTPATIENT)
Dept: FAMILY MEDICINE CLINIC | Facility: CLINIC | Age: 87
End: 2022-09-19

## 2022-09-19 NOTE — PROGRESS NOTES
Bundle Episode: stroke  7/22/22-11/2/22    Chart was reviewed  This OPCM RN called patients daughter Carlito Berger for follow up  Carlito Berger states she heard back from Pepe at [facility] office and she is scheduled to make a home visit to see patient in Friday 9/23/22  Estebanlennox Berger is very appreciative of all the help the team has provided  She states the need glucometer strips and requests we send a script in to see if insurance hang cover them  The last bottle they got cost $100  I called Dr Garcia Search office and spoke with Rick  She states she will make sure a script is sent over  An IB msg was also sent to Dr Julio Dobsb  Patient uses Free Style Monicate  OPCM RN to follow

## 2022-09-19 NOTE — TELEPHONE ENCOUNTER
[facility]) requests Freestyle lite test strips sent to [facility] for patient

## 2022-09-19 NOTE — TELEPHONE ENCOUNTER
I spoke to Benita Liang about the gayle pens and she said she got them from [facility] , I told her we may not be able to find the exact pen , she wants something that has a cover over the needle so it is not exposed  and yes supplies go to [facility] tc/cma

## 2022-09-19 NOTE — TELEPHONE ENCOUNTER
Please send over rx given to [facility]

## 2022-09-20 ENCOUNTER — PATIENT OUTREACH (OUTPATIENT)
Dept: FAMILY MEDICINE CLINIC | Facility: CLINIC | Age: 87
End: 2022-09-20

## 2022-09-20 NOTE — PROGRESS NOTES
Bundle Episode: Stroke  7/22/22-11/2/22    This OPCM RN called [id number] and spoke with Marnie  Confirmed they received the prescription for glucometer strips and they will be covered by Medicare Part B  I then called patient's daughter Benita Liang and made her aware  She states they will pick them up tonight  OPCM RN to follow

## 2022-09-21 ENCOUNTER — TELEPHONE (OUTPATIENT)
Dept: FAMILY MEDICINE CLINIC | Facility: CLINIC | Age: 87
End: 2022-09-21

## 2022-09-21 NOTE — TELEPHONE ENCOUNTER
Dr Alpesh Prado:     Patient's daughter Kiersten Harman called stating that she spoke with [facility] today and they staff available to start home nursing care  Please fax order to [phone]

## 2022-09-22 NOTE — TELEPHONE ENCOUNTER
Post CVA Discharge Follow Up  Hospitalization: 7/22/22-8/4/22     Called Ming Jacob, patient's daughter, to obtain an update  She reports difficulty obtaining in-home support with PT,OT, ST and nursing  Reviewed notes with Ming Jacob how Ron Noland, patient's daughter, reached out to Dr Jason Fernando office yesterday for them to fax the order for DR RASHID MORGAN [facility]  She will follow up with Dr Jason Fernando office for this  Virginiateddy Cecil expressed her feelings regarding her mother's current state of health and how her recent stroke took a emotional toll on her and the family  They are currently taking turns every 3 days to help prevent burnout  She states how since the stroke, her mother is unable to speak with a lack of facial expressions  Provided emotional support and utilized active listening  A PA-C with [facility] is scheduled for a home visit tomorrow at 6 am  Ming Jacob is looking forward to this visit since she has a few questions  She reports how the patient receives a nebulizer treatment twice daily for wheezing  Advised Lorena to call [phone] with any decline  She agreed  Ming Jacob also reports how the patient has some skin breakdown on her coccyx  They are frequently repositioning her, applying zinc ointment, keeping the area clean, and monitoring it  They also bought the patient a donut to help relieve pressure and gel max to help provide comfort/support  Encouraged for Ming Jacob to write down the questions to ensure her questions are addressed  She denies any questions for the neurology office  We are waiting for Dr Hector Baker schedule to open up in 2023 to schedule the patient a follow up visit  Ming Jacob was very appreciative for the call  She is aware to call the office with questions, concerns, or if she would like to just talk over the phone  She is aware to consider nursing home placement if her and her family become overwhelmed and/or if they cannot appropriately take care of the patient at home   She is aware to call 911 if the patient experiences any new or worsening stroke-like symptoms

## 2022-09-23 ENCOUNTER — PATIENT OUTREACH (OUTPATIENT)
Dept: FAMILY MEDICINE CLINIC | Facility: CLINIC | Age: 87
End: 2022-09-23

## 2022-09-23 ENCOUNTER — IN HOME VISIT (OUTPATIENT)
Dept: FAMILY MEDICINE CLINIC | Facility: CLINIC | Age: 87
End: 2022-09-23
Payer: MEDICARE

## 2022-09-23 VITALS
OXYGEN SATURATION: 97 % | RESPIRATION RATE: 18 BRPM | DIASTOLIC BLOOD PRESSURE: 70 MMHG | TEMPERATURE: 98 F | SYSTOLIC BLOOD PRESSURE: 110 MMHG | HEART RATE: 88 BPM

## 2022-09-23 DIAGNOSIS — L89.90 PRESSURE ULCERS OF SKIN OF MULTIPLE TOPOGRAPHIC SITES: ICD-10-CM

## 2022-09-23 DIAGNOSIS — Z78.9 ON TUBE FEEDING DIET: ICD-10-CM

## 2022-09-23 DIAGNOSIS — Z97.8 FOLEY CATHETER IN PLACE: ICD-10-CM

## 2022-09-23 DIAGNOSIS — K21.9 GASTROESOPHAGEAL REFLUX DISEASE, UNSPECIFIED WHETHER ESOPHAGITIS PRESENT: ICD-10-CM

## 2022-09-23 DIAGNOSIS — R33.9 URINARY RETENTION: Primary | ICD-10-CM

## 2022-09-23 DIAGNOSIS — J18.9 PNEUMONIA DUE TO INFECTIOUS ORGANISM, UNSPECIFIED LATERALITY, UNSPECIFIED PART OF LUNG: ICD-10-CM

## 2022-09-23 DIAGNOSIS — I50.22 CHRONIC SYSTOLIC CONGESTIVE HEART FAILURE (HCC): Chronic | ICD-10-CM

## 2022-09-23 PROCEDURE — 99349 HOME/RES VST EST MOD MDM 40: CPT | Performed by: NURSE PRACTITIONER

## 2022-09-23 RX ORDER — CEPHALEXIN 500 MG/1
500 CAPSULE ORAL EVERY 6 HOURS SCHEDULED
Qty: 28 CAPSULE | Refills: 0 | Status: SHIPPED | OUTPATIENT
Start: 2022-09-23 | End: 2022-09-25

## 2022-09-23 RX ORDER — RANITIDINE 150 MG/1
150 TABLET ORAL 2 TIMES DAILY
Qty: 60 TABLET | Refills: 5 | Status: SHIPPED | OUTPATIENT
Start: 2022-09-23 | End: 2022-09-25

## 2022-09-23 RX ORDER — FUROSEMIDE 20 MG/1
20 TABLET ORAL DAILY
Qty: 30 TABLET | Refills: 3 | Status: SHIPPED | OUTPATIENT
Start: 2022-09-23 | End: 2022-10-23

## 2022-09-23 NOTE — PROGRESS NOTES
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    4  On tube feeding diet    5  Pneumonia due to infectious organism, unspecified laterality, unspecified part of lung  -     cephalexin (KEFLEX) 500 mg capsule; Take 1 capsule (500 mg total) by mouth every 6 (six) hours for 7 days  -     guaiFENesin (ROBITUSSIN) 100 mg/5 mL syrup; Take 10 mL (200 mg total) by mouth 3 (three) times a day as needed for cough for up to 10 days    6  Chronic systolic congestive heart failure (HCC)  -     furosemide (LASIX) 20 mg tablet; 1 tablet (20 mg total) by Per G Tube route daily    7  Gastroesophageal reflux disease, unspecified whether esophagitis present  -     ranitidine (ZANTAC) 150 mg tablet; Take 1 tablet (150 mg total) by mouth 2 (two) times a day           Subjective      NP visited patient at home in need of VN  Patient was seen at bedside  She is NPO with a peg tube and Jevity 1 2 at 55cc/hr continuously  She has a right irina and rigidity on her left side  She is transferred via a soy lift  She is in need of PT/OT  She has a buttocks/sacral wound for which NP left optifoam and educated family to apply with neosporin  Patient had not urinated for 36 hours so schaefer cath 18/10 was inserted by NP with 1000cc drainage clear yellow urine  Patient has minimal BS so family told to give 27cc MOM tonight to clean out bowels  They have been checking BG as patient on insulin-ranges 116-176 though non fasting due to 24 hour tube feeding  She has trace edema B/L LE  Family taught how to off load heals  Lungs with rhonchi and patient said to apparently wheeze-NP to start keflex for possible PNA  Patient has CHF-educated family to keep her on lasix daily  She is receiving 400cc water flush daily  Patient requires VN for PT and OT and Dietician and nursing care for wound, clinical management and schaefer catheter care  Review of Systems   Constitutional: Negative  HENT: Negative  Eyes: Positive for discharge  Left   Respiratory: Positive for wheezing  Gastrointestinal: Positive for constipation  Endocrine: Negative  Genitourinary: Positive for difficulty urinating  Musculoskeletal: Positive for joint swelling  Skin: Positive for wound  Allergic/Immunologic: Negative  Neurological: Negative  Hematological: Negative  Psychiatric/Behavioral: Negative          Current Outpatient Medications on File Prior to Visit   Medication Sig    albuterol (2 5 mg/3 mL) 0 083 % nebulizer solution Take 3 mL (2 5 mg total) by nebulization every 6 (six) hours as needed for wheezing or shortness of breath    apixaban (ELIQUIS) 2 5 mg 1 tablet (2 5 mg total) by Per G Tube route 2 (two) times a day    aspirin (ECOTRIN LOW STRENGTH) 81 mg EC tablet Take 1 tablet (81 mg total) by mouth daily G Tube    atorvastatin (LIPITOR) 40 mg tablet 2 tablets (80 mg total) by Per G Tube route daily with dinner    insulin glargine (LANTUS) 100 units/mL subcutaneous injection Inject 11 Units under the skin daily    Insulin Pen Needle (Pen Needles) 30G X 5 MM MISC Use in the morning    metoprolol tartrate (LOPRESSOR) 25 mg tablet 1 tablet (25 mg total) by Per G Tube route every 12 (twelve) hours    pantoprazole (PROTONIX) 40 mg tablet One tab daily via G tube    polyethylene glycol (MIRALAX) 17 g packet Take 17 g by mouth daily as needed (constipation - 2nd line) for up to 7 days    senna (SENOKOT) 8 6 mg Take 1 tablet (8 6 mg total) by mouth daily at bedtime as needed (constipation - 1st line) (Patient taking differently: 8 6 mg by Per G Tube route daily at bedtime as needed (constipation - 1st line))    [DISCONTINUED] furosemide (LASIX) 20 mg tablet 1 tablet (20 mg total) by Per G Tube route every other day       Objective     /70   Pulse 88   Temp 98 °F (36 7 °C)   Resp 18   LMP  (LMP Unknown)   SpO2 97%     Physical Exam  Constitutional:       General: She is not in acute distress  Appearance: Normal appearance  She is obese  She is not toxic-appearing  HENT:      Head: Normocephalic and atraumatic  Right Ear: Tympanic membrane normal       Left Ear: Tympanic membrane normal       Nose: Nose normal       Mouth/Throat:      Mouth: Mucous membranes are moist    Eyes:      Extraocular Movements: Extraocular movements intact  Comments: Left outer eye slightly red-sclera white   Cardiovascular:      Rate and Rhythm: Normal rate and regular rhythm  Pulses: Normal pulses  Heart sounds: Normal heart sounds  Pulmonary:      Effort: Pulmonary effort is normal  No respiratory distress  Breath sounds: Rhonchi present  No wheezing or rales  Abdominal:      General: There is distension  Comments: constipated   Genitourinary:     General: Normal vulva  Vagina: No vaginal discharge  Musculoskeletal:      Cervical back: Normal range of motion  No rigidity  Right lower leg: Edema present  Left lower leg: Edema present  Comments: Right irina-left rigidity   Skin:     General: Skin is warm and dry  Findings: Lesion present  Comments: Sacral and buttocks ulcers   Neurological:      Motor: Weakness present        Gait: Gait abnormal       Comments: Unable to stand-oriented x1 per translation with son-spaeks Irish and english   Psychiatric:         Behavior: Behavior normal          Judgment: Judgment normal        Tuesday GAYATRI Navarro

## 2022-09-23 NOTE — PROGRESS NOTES
Received phone call from Tuesday GAYATRI  Tuesday states patient is in need of home care services  Pt was discharged from [facility]  In August and has not received any home care services  According to notes, referral was placed to [facility] care, however episode was never opened due to staffing issues  Pt reportedly had not voided in two days  Indwelling schaefer catheter was placed by Tuesday  Pt has PEG tube and skin breakdown  Outreach to [facility]  S/W intake department  RN CM was advised that due to staffing issues a new referral needed to be placed  Further stated that they are short staffed so even if a referral is received they will not know if they can take patient on to service  Advised that it was a "day to day thing  "    [facility] PAPITO ROSA  Advised of need to for home care services  Advised that [facility] was first choice of agencies  Once office notes are completed, referrals will be made through [address]  Note routed to PAPITO ROSA for follow up through [address] [place] [address] Day

## 2022-09-25 ENCOUNTER — APPOINTMENT (EMERGENCY)
Dept: RADIOLOGY | Facility: HOSPITAL | Age: 87
DRG: 872 | End: 2022-09-25
Payer: MEDICARE

## 2022-09-25 ENCOUNTER — HOSPITAL ENCOUNTER (INPATIENT)
Facility: HOSPITAL | Age: 87
LOS: 4 days | Discharge: NURSING, REHAB OR LONG-TERM CARE | DRG: 872 | End: 2022-09-29
Attending: EMERGENCY MEDICINE | Admitting: INTERNAL MEDICINE
Payer: MEDICARE

## 2022-09-25 DIAGNOSIS — R31.0 GROSS HEMATURIA: Primary | ICD-10-CM

## 2022-09-25 DIAGNOSIS — N39.0 UTI (URINARY TRACT INFECTION): ICD-10-CM

## 2022-09-25 DIAGNOSIS — A41.9 SEPSIS (HCC): ICD-10-CM

## 2022-09-25 DIAGNOSIS — Z93.1 S/P PERCUTANEOUS ENDOSCOPIC GASTROSTOMY (PEG) TUBE PLACEMENT (HCC): ICD-10-CM

## 2022-09-25 DIAGNOSIS — J44.9 COPD WITH ASTHMA (HCC): ICD-10-CM

## 2022-09-25 DIAGNOSIS — K59.01 SLOW TRANSIT CONSTIPATION: ICD-10-CM

## 2022-09-25 DIAGNOSIS — R68.89 COPIOUS ORAL SECRETIONS: ICD-10-CM

## 2022-09-25 LAB
2HR DELTA HS TROPONIN: -2 NG/L
ALBUMIN SERPL BCP-MCNC: 2.4 G/DL (ref 3.5–5)
ALP SERPL-CCNC: 100 U/L (ref 46–116)
ALT SERPL W P-5'-P-CCNC: 35 U/L (ref 12–78)
ANION GAP SERPL CALCULATED.3IONS-SCNC: 8 MMOL/L (ref 4–13)
APTT PPP: 33 SECONDS (ref 23–37)
AST SERPL W P-5'-P-CCNC: 36 U/L (ref 5–45)
BACTERIA UR QL AUTO: ABNORMAL /HPF
BASOPHILS # BLD AUTO: 0.02 THOUSANDS/ΜL (ref 0–0.1)
BASOPHILS NFR BLD AUTO: 0 % (ref 0–1)
BILIRUB SERPL-MCNC: 0.68 MG/DL (ref 0.2–1)
BILIRUB UR QL STRIP: ABNORMAL
BUN SERPL-MCNC: 37 MG/DL (ref 5–25)
CALCIUM ALBUM COR SERPL-MCNC: 10.9 MG/DL (ref 8.3–10.1)
CALCIUM SERPL-MCNC: 9.6 MG/DL (ref 8.3–10.1)
CARDIAC TROPONIN I PNL SERPL HS: 17 NG/L
CARDIAC TROPONIN I PNL SERPL HS: 19 NG/L
CHLORIDE SERPL-SCNC: 100 MMOL/L (ref 96–108)
CLARITY UR: ABNORMAL
CO2 SERPL-SCNC: 29 MMOL/L (ref 21–32)
COLOR UR: ABNORMAL
CREAT SERPL-MCNC: 1.15 MG/DL (ref 0.6–1.3)
EOSINOPHIL # BLD AUTO: 0.03 THOUSAND/ΜL (ref 0–0.61)
EOSINOPHIL NFR BLD AUTO: 0 % (ref 0–6)
ERYTHROCYTE [DISTWIDTH] IN BLOOD BY AUTOMATED COUNT: 13.9 % (ref 11.6–15.1)
GFR SERPL CREATININE-BSD FRML MDRD: 42 ML/MIN/1.73SQ M
GLUCOSE SERPL-MCNC: 108 MG/DL (ref 65–140)
GLUCOSE SERPL-MCNC: 130 MG/DL (ref 65–140)
GLUCOSE SERPL-MCNC: 97 MG/DL (ref 65–140)
GLUCOSE UR STRIP-MCNC: NEGATIVE MG/DL
HCT VFR BLD AUTO: 37.5 % (ref 34.8–46.1)
HGB BLD-MCNC: 11.7 G/DL (ref 11.5–15.4)
HGB UR QL STRIP.AUTO: ABNORMAL
IMM GRANULOCYTES # BLD AUTO: 0.03 THOUSAND/UL (ref 0–0.2)
IMM GRANULOCYTES NFR BLD AUTO: 0 % (ref 0–2)
INR PPP: 1.17 (ref 0.84–1.19)
KETONES UR STRIP-MCNC: NEGATIVE MG/DL
LACTATE SERPL-SCNC: 0.9 MMOL/L (ref 0.5–2)
LEUKOCYTE ESTERASE UR QL STRIP: ABNORMAL
LIPASE SERPL-CCNC: 133 U/L (ref 73–393)
LYMPHOCYTES # BLD AUTO: 0.59 THOUSANDS/ΜL (ref 0.6–4.47)
LYMPHOCYTES NFR BLD AUTO: 6 % (ref 14–44)
MAGNESIUM SERPL-MCNC: 2.3 MG/DL (ref 1.6–2.6)
MCH RBC QN AUTO: 27.5 PG (ref 26.8–34.3)
MCHC RBC AUTO-ENTMCNC: 31.2 G/DL (ref 31.4–37.4)
MCV RBC AUTO: 88 FL (ref 82–98)
MONOCYTES # BLD AUTO: 0.73 THOUSAND/ΜL (ref 0.17–1.22)
MONOCYTES NFR BLD AUTO: 7 % (ref 4–12)
NEUTROPHILS # BLD AUTO: 9.24 THOUSANDS/ΜL (ref 1.85–7.62)
NEUTS SEG NFR BLD AUTO: 87 % (ref 43–75)
NITRITE UR QL STRIP: POSITIVE
NON-SQ EPI CELLS URNS QL MICRO: ABNORMAL /HPF
NRBC BLD AUTO-RTO: 0 /100 WBCS
PH UR STRIP.AUTO: 8.5 [PH]
PLATELET # BLD AUTO: 287 THOUSANDS/UL (ref 149–390)
PMV BLD AUTO: 10.4 FL (ref 8.9–12.7)
POTASSIUM SERPL-SCNC: 4.7 MMOL/L (ref 3.5–5.3)
PROT SERPL-MCNC: 7.6 G/DL (ref 6.4–8.4)
PROT UR STRIP-MCNC: >=300 MG/DL
PROTHROMBIN TIME: 15 SECONDS (ref 11.6–14.5)
RBC # BLD AUTO: 4.26 MILLION/UL (ref 3.81–5.12)
RBC #/AREA URNS AUTO: ABNORMAL /HPF
SARS-COV-2 RNA RESP QL NAA+PROBE: NEGATIVE
SODIUM SERPL-SCNC: 137 MMOL/L (ref 135–147)
SP GR UR STRIP.AUTO: 1.01 (ref 1–1.03)
UROBILINOGEN UR QL STRIP.AUTO: 1 E.U./DL
WBC # BLD AUTO: 10.64 THOUSAND/UL (ref 4.31–10.16)
WBC #/AREA URNS AUTO: ABNORMAL /HPF

## 2022-09-25 PROCEDURE — 71260 CT THORAX DX C+: CPT

## 2022-09-25 PROCEDURE — 74177 CT ABD & PELVIS W/CONTRAST: CPT

## 2022-09-25 PROCEDURE — 87040 BLOOD CULTURE FOR BACTERIA: CPT | Performed by: PHYSICIAN ASSISTANT

## 2022-09-25 PROCEDURE — U0005 INFEC AGEN DETEC AMPLI PROBE: HCPCS | Performed by: PHYSICIAN ASSISTANT

## 2022-09-25 PROCEDURE — 99223 1ST HOSP IP/OBS HIGH 75: CPT | Performed by: INTERNAL MEDICINE

## 2022-09-25 PROCEDURE — 85025 COMPLETE CBC W/AUTO DIFF WBC: CPT | Performed by: PHYSICIAN ASSISTANT

## 2022-09-25 PROCEDURE — 80053 COMPREHEN METABOLIC PANEL: CPT | Performed by: PHYSICIAN ASSISTANT

## 2022-09-25 PROCEDURE — 81001 URINALYSIS AUTO W/SCOPE: CPT | Performed by: PHYSICIAN ASSISTANT

## 2022-09-25 PROCEDURE — 83735 ASSAY OF MAGNESIUM: CPT | Performed by: PHYSICIAN ASSISTANT

## 2022-09-25 PROCEDURE — 94760 N-INVAS EAR/PLS OXIMETRY 1: CPT

## 2022-09-25 PROCEDURE — 94640 AIRWAY INHALATION TREATMENT: CPT

## 2022-09-25 PROCEDURE — 83690 ASSAY OF LIPASE: CPT | Performed by: PHYSICIAN ASSISTANT

## 2022-09-25 PROCEDURE — 87086 URINE CULTURE/COLONY COUNT: CPT | Performed by: PHYSICIAN ASSISTANT

## 2022-09-25 PROCEDURE — 96361 HYDRATE IV INFUSION ADD-ON: CPT

## 2022-09-25 PROCEDURE — 82948 REAGENT STRIP/BLOOD GLUCOSE: CPT

## 2022-09-25 PROCEDURE — 99285 EMERGENCY DEPT VISIT HI MDM: CPT | Performed by: PHYSICIAN ASSISTANT

## 2022-09-25 PROCEDURE — 96374 THER/PROPH/DIAG INJ IV PUSH: CPT

## 2022-09-25 PROCEDURE — U0003 INFECTIOUS AGENT DETECTION BY NUCLEIC ACID (DNA OR RNA); SEVERE ACUTE RESPIRATORY SYNDROME CORONAVIRUS 2 (SARS-COV-2) (CORONAVIRUS DISEASE [COVID-19]), AMPLIFIED PROBE TECHNIQUE, MAKING USE OF HIGH THROUGHPUT TECHNOLOGIES AS DESCRIBED BY CMS-2020-01-R: HCPCS | Performed by: PHYSICIAN ASSISTANT

## 2022-09-25 PROCEDURE — 85610 PROTHROMBIN TIME: CPT | Performed by: PHYSICIAN ASSISTANT

## 2022-09-25 PROCEDURE — 83605 ASSAY OF LACTIC ACID: CPT | Performed by: PHYSICIAN ASSISTANT

## 2022-09-25 PROCEDURE — 99285 EMERGENCY DEPT VISIT HI MDM: CPT

## 2022-09-25 PROCEDURE — 36415 COLL VENOUS BLD VENIPUNCTURE: CPT | Performed by: PHYSICIAN ASSISTANT

## 2022-09-25 PROCEDURE — 84484 ASSAY OF TROPONIN QUANT: CPT | Performed by: PHYSICIAN ASSISTANT

## 2022-09-25 PROCEDURE — G1004 CDSM NDSC: HCPCS

## 2022-09-25 PROCEDURE — 85730 THROMBOPLASTIN TIME PARTIAL: CPT | Performed by: PHYSICIAN ASSISTANT

## 2022-09-25 PROCEDURE — 87077 CULTURE AEROBIC IDENTIFY: CPT | Performed by: PHYSICIAN ASSISTANT

## 2022-09-25 PROCEDURE — 87186 SC STD MICRODIL/AGAR DIL: CPT | Performed by: PHYSICIAN ASSISTANT

## 2022-09-25 PROCEDURE — 93005 ELECTROCARDIOGRAM TRACING: CPT

## 2022-09-25 RX ORDER — ALBUTEROL SULFATE 2.5 MG/3ML
2.5 SOLUTION RESPIRATORY (INHALATION)
Status: DISCONTINUED | OUTPATIENT
Start: 2022-09-25 | End: 2022-09-29 | Stop reason: HOSPADM

## 2022-09-25 RX ORDER — POLYETHYLENE GLYCOL 3350 17 G/17G
17 POWDER, FOR SOLUTION ORAL DAILY
Status: DISCONTINUED | OUTPATIENT
Start: 2022-09-26 | End: 2022-09-27

## 2022-09-25 RX ORDER — SODIUM PHOSPHATE, DIBASIC AND SODIUM PHOSPHATE, MONOBASIC 7; 19 G/133ML; G/133ML
1 ENEMA RECTAL ONCE
Status: DISCONTINUED | OUTPATIENT
Start: 2022-09-25 | End: 2022-09-26

## 2022-09-25 RX ORDER — POLYETHYLENE GLYCOL 3350 17 G/17G
17 POWDER, FOR SOLUTION ORAL DAILY PRN
Status: DISCONTINUED | OUTPATIENT
Start: 2022-09-25 | End: 2022-09-25

## 2022-09-25 RX ORDER — CEFTRIAXONE 1 G/50ML
1000 INJECTION, SOLUTION INTRAVENOUS ONCE
Status: COMPLETED | OUTPATIENT
Start: 2022-09-25 | End: 2022-09-25

## 2022-09-25 RX ORDER — ACETAMINOPHEN 325 MG/1
650 TABLET ORAL ONCE
Status: DISCONTINUED | OUTPATIENT
Start: 2022-09-25 | End: 2022-09-25

## 2022-09-25 RX ORDER — INSULIN LISPRO 100 [IU]/ML
1-5 INJECTION, SOLUTION INTRAVENOUS; SUBCUTANEOUS
Status: DISCONTINUED | OUTPATIENT
Start: 2022-09-25 | End: 2022-09-25

## 2022-09-25 RX ORDER — SENNOSIDES 8.6 MG
2 TABLET ORAL
Status: DISCONTINUED | OUTPATIENT
Start: 2022-09-25 | End: 2022-09-27

## 2022-09-25 RX ORDER — INSULIN LISPRO 100 [IU]/ML
1-5 INJECTION, SOLUTION INTRAVENOUS; SUBCUTANEOUS EVERY 6 HOURS SCHEDULED
Status: DISCONTINUED | OUTPATIENT
Start: 2022-09-25 | End: 2022-09-29 | Stop reason: HOSPADM

## 2022-09-25 RX ORDER — FUROSEMIDE 20 MG/1
20 TABLET ORAL DAILY
Status: DISCONTINUED | OUTPATIENT
Start: 2022-09-26 | End: 2022-09-26

## 2022-09-25 RX ORDER — ACETAMINOPHEN 160 MG/5ML
650 SUSPENSION, ORAL (FINAL DOSE FORM) ORAL EVERY 4 HOURS PRN
COMMUNITY

## 2022-09-25 RX ORDER — INSULIN GLARGINE 100 [IU]/ML
11 INJECTION, SOLUTION SUBCUTANEOUS
Status: DISCONTINUED | OUTPATIENT
Start: 2022-09-25 | End: 2022-09-29 | Stop reason: HOSPADM

## 2022-09-25 RX ORDER — CEFTRIAXONE 1 G/50ML
1000 INJECTION, SOLUTION INTRAVENOUS EVERY 24 HOURS
Status: DISCONTINUED | OUTPATIENT
Start: 2022-09-26 | End: 2022-09-27

## 2022-09-25 RX ORDER — ACETAMINOPHEN 160 MG/5ML
650 SUSPENSION, ORAL (FINAL DOSE FORM) ORAL ONCE
Status: COMPLETED | OUTPATIENT
Start: 2022-09-25 | End: 2022-09-25

## 2022-09-25 RX ORDER — SENNA PLUS 8.6 MG/1
1 TABLET ORAL AS NEEDED
COMMUNITY

## 2022-09-25 RX ORDER — ATORVASTATIN CALCIUM 80 MG/1
80 TABLET, FILM COATED ORAL
Status: DISCONTINUED | OUTPATIENT
Start: 2022-09-25 | End: 2022-09-29 | Stop reason: HOSPADM

## 2022-09-25 RX ORDER — ACETAMINOPHEN 160 MG/5ML
650 SUSPENSION, ORAL (FINAL DOSE FORM) ORAL EVERY 4 HOURS PRN
Status: DISCONTINUED | OUTPATIENT
Start: 2022-09-25 | End: 2022-09-29 | Stop reason: HOSPADM

## 2022-09-25 RX ORDER — ALBUTEROL SULFATE 2.5 MG/3ML
2.5 SOLUTION RESPIRATORY (INHALATION) EVERY 6 HOURS PRN
Status: DISCONTINUED | OUTPATIENT
Start: 2022-09-25 | End: 2022-09-29 | Stop reason: HOSPADM

## 2022-09-25 RX ADMIN — IOHEXOL 100 ML: 350 INJECTION, SOLUTION INTRAVENOUS at 11:16

## 2022-09-25 RX ADMIN — CEFTRIAXONE 1000 MG: 1 INJECTION, SOLUTION INTRAVENOUS at 12:29

## 2022-09-25 RX ADMIN — SODIUM CHLORIDE 500 ML: 0.9 INJECTION, SOLUTION INTRAVENOUS at 11:34

## 2022-09-25 RX ADMIN — ACETAMINOPHEN 650 MG: 650 SUSPENSION ORAL at 12:28

## 2022-09-25 RX ADMIN — ATORVASTATIN CALCIUM 80 MG: 80 TABLET, FILM COATED ORAL at 19:55

## 2022-09-25 RX ADMIN — ALBUTEROL SULFATE 2.5 MG: 2.5 SOLUTION RESPIRATORY (INHALATION) at 20:44

## 2022-09-25 RX ADMIN — METOPROLOL TARTRATE 25 MG: 25 TABLET, FILM COATED ORAL at 20:56

## 2022-09-25 RX ADMIN — INSULIN GLARGINE 11 UNITS: 100 INJECTION, SOLUTION SUBCUTANEOUS at 21:04

## 2022-09-25 NOTE — ED PROVIDER NOTES
History  Chief Complaint   Patient presents with    Blood in Urine     Pt brought by EMS from home for blood in schaefer starting approx 1 hour ago     79-year-old female, on Eliquis and nonverbal, presenting today via EMS for evaluation of right red blood in urine that began this morning  Patient was seen by visiting home nurse 2 days ago and found to have urinary retention for 36 hours with subsequent 100 cc urine output after Schaefer insertion  Patient has past history of stroke and is nonverbal with right-sided deficits  Has an ongoing sacral ulcer that she is being treated for  She is being taken care of by multiple sons and daughters and currently trying to get approved nursing services  Has not had any fevers at home, presents with 100 4 temperature  There is bright red blood noted in the Schaefer  Daughter goes on to mention that patient seemed to be constipated over the past few days with hard turd like stools however also at times having intermittent diarrhea  Daughter relays that she manually disimpacted her mother subsequently resulting in passage of hard stool  She was also given MiraLax  According to daughter patient does not seem to be in pain, she does seem to be more pale than usual   Patient has a PEG tube, discover the patient also was bleeding slightly from the PEG tube  Has small amount of dried blood in the mouth  No signs of trauma or falls no reported falls  Patient is an aspiration risk and according to daughter has to be kept at a 45 degree angle, she states she is making a gargling/whistling noise from mouth which is new  Prior to Admission Medications   Prescriptions Last Dose Informant Patient Reported? Taking?    Insulin Pen Needle (Pen Needles) 30G X 5 MM MISC   No No   Sig: Use in the morning   albuterol (2 5 mg/3 mL) 0 083 % nebulizer solution   No No   Sig: Take 3 mL (2 5 mg total) by nebulization every 6 (six) hours as needed for wheezing or shortness of breath apixaban (ELIQUIS) 2 5 mg   No No   Si tablet (2 5 mg total) by Per G Tube route 2 (two) times a day   aspirin (ECOTRIN LOW STRENGTH) 81 mg EC tablet   No No   Sig: Take 1 tablet (81 mg total) by mouth daily G Tube   atorvastatin (LIPITOR) 40 mg tablet   No No   Si tablets (80 mg total) by Per G Tube route daily with dinner   cephalexin (KEFLEX) 500 mg capsule   No No   Sig: Take 1 capsule (500 mg total) by mouth every 6 (six) hours for 7 days   furosemide (LASIX) 20 mg tablet   No No   Si tablet (20 mg total) by Per G Tube route daily   guaiFENesin (ROBITUSSIN) 100 mg/5 mL syrup   No No   Sig: Take 10 mL (200 mg total) by mouth 3 (three) times a day as needed for cough for up to 10 days   insulin glargine (LANTUS) 100 units/mL subcutaneous injection   No No   Sig: Inject 11 Units under the skin daily   metoprolol tartrate (LOPRESSOR) 25 mg tablet   No No   Si tablet (25 mg total) by Per G Tube route every 12 (twelve) hours   pantoprazole (PROTONIX) 40 mg tablet   No No   Sig: One tab daily via G tube   polyethylene glycol (MIRALAX) 17 g packet   No No   Sig: Take 17 g by mouth daily as needed (constipation - 2nd line) for up to 7 days   ranitidine (ZANTAC) 150 mg tablet   No No   Sig: Take 1 tablet (150 mg total) by mouth 2 (two) times a day   senna (SENOKOT) 8 6 mg   No No   Sig: Take 1 tablet (8 6 mg total) by mouth daily at bedtime as needed (constipation - 1st line)   Patient taking differently: 8 6 mg by Per G Tube route daily at bedtime as needed (constipation - 1st line)      Facility-Administered Medications: None       Past Medical History:   Diagnosis Date    Acid reflux     on occ    Arthritis     DJD right hip replaced    Asthma     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 ) 2007    x 2 lesions with no change    Cancer ([facility] )     colonic polyps, no surgery done    Deep vein thrombosis (DVT) of left upper extremity (HCC) 2017    Dementia ([facility] )     Diabetes mellitus ([facility] )     type 2  Diverticulosis     Edema     in legs on occ    Hypertension     on occ    Language barrier     speaks Azeri & broken english    Stroke [facility])        Past Surgical History:   Procedure Laterality Date    COLON SURGERY      COLONOSCOPY      COLONOSCOPY N/A 2016    Procedure: COLONOSCOPY;  Surgeon: Darlyn South MD;  Location: [facility] GI LAB; Service:     ESOPHAGOGASTRODUODENOSCOPY N/A 2016    Procedure: ESOPHAGOGASTRODUODENOSCOPY (EGD); Surgeon: Darlyn South MD;  Location: [facility] LAB; Service:    Bertha Mclain / Melinda Medina / Reed Gu PACEMAKER      IR STROKE ALERT  2022    JOINT REPLACEMENT Right 2015    hip    JOINT REPLACEMENT Left     knee    JOINT REPLACEMENT Right     knee    MITRAL VALVE REPLACEMENT      OK COLONOSCOPY FLX DX W/COLLJ SPEC WHEN PFRMD N/A 2018    Procedure: EGD AND COLONOSCOPY;  Surgeon: Darlyn South MD;  [facility]; Service: Gastroenterology    OK REVISE MEDIAN N/CARPAL TUNNEL SURG Left 2016    Procedure: RELEASE CARPAL TUNNEL;  Surgeon: Rebekah Marquez MD;  Location: [facility] MAIN OR;  Service: Orthopedics    TUBAL LIGATION      VARICOSE VEIN SURGERY Bilateral        Family History   Problem Relation Age of Onset    Cancer Mother         throat     I have reviewed and agree with the history as documented  E-Cigarette/Vaping    E-Cigarette Use Never User      E-Cigarette/Vaping Substances     Social History     Tobacco Use    Smoking status: Former Smoker     Packs/day: 0 25     Years: 10 00     Pack years: 2 50     Types: Cigarettes     Quit date: 1950     Years since quittin 7    Smokeless tobacco: Never Used   Vaping Use    Vaping Use: Never used   Substance Use Topics    Alcohol use: No    Drug use: No       Review of Systems   Unable to perform ROS: Patient nonverbal (Given by daughter)   Constitutional: Negative  Negative for chills, fatigue and fever  HENT: Negative    Negative for congestion, postnasal drip, rhinorrhea and sore throat  Eyes: Negative  Respiratory: Negative  Negative for cough, shortness of breath and wheezing  Cardiovascular: Negative  Gastrointestinal: Negative  Negative for abdominal pain, diarrhea, nausea and vomiting  Genitourinary: Positive for hematuria  Negative for decreased urine volume, difficulty urinating, dyspareunia, dysuria, enuresis, flank pain, frequency, genital sores, menstrual problem, pelvic pain, urgency, vaginal bleeding, vaginal discharge and vaginal pain  Musculoskeletal: Negative  Skin: Positive for wound  Negative for color change, pallor and rash  Neurological: Negative  Hematological: Negative  Psychiatric/Behavioral: Negative  All other systems reviewed and are negative  Physical Exam  Physical Exam  Vitals and nursing note reviewed  Constitutional:       General: She is not in acute distress  Appearance: She is well-developed  She is ill-appearing  She is not diaphoretic  HENT:      Head: Normocephalic and atraumatic  Right Ear: External ear normal       Left Ear: External ear normal       Nose: Nose normal       Mouth/Throat:      Mouth: Mucous membranes are dry  Pharynx: No oropharyngeal exudate  Eyes:      General: No scleral icterus  Right eye: No discharge  Left eye: No discharge  Conjunctiva/sclera: Conjunctivae normal    Cardiovascular:      Rate and Rhythm: Regular rhythm  Tachycardia present  Heart sounds: Normal heart sounds  No murmur heard  No friction rub  No gallop  Pulmonary:      Effort: Pulmonary effort is normal  No respiratory distress  Breath sounds: No stridor  Rhonchi present  No wheezing or rales  Comments: SpO2 is 96% indicating adequate oxygenation   Chest:      Chest wall: No tenderness  Abdominal:      General: Bowel sounds are normal  There is no distension  Palpations: Abdomen is soft  There is no mass  Tenderness:  There is no abdominal tenderness  There is no guarding or rebound  Hernia: No hernia is present  Genitourinary:      Musculoskeletal:      Cervical back: Normal range of motion and neck supple  Lymphadenopathy:      Cervical: No cervical adenopathy  Skin:     General: Skin is warm and dry  Capillary Refill: Capillary refill takes less than 2 seconds  Coloration: Skin is pale  Skin is not jaundiced  Findings: No bruising, erythema, lesion or rash  Comments: Patient appears slightly pale as well as mildly jaundiced   Neurological:      General: No focal deficit present  Mental Status: She is alert and oriented to person, place, and time  Mental status is at baseline           Vital Signs  ED Triage Vitals   Temperature Pulse Respirations Blood Pressure SpO2   09/25/22 0949 09/25/22 0945 09/25/22 0945 09/25/22 0945 09/25/22 0945   100 4 °F (38 °C) (!) 106 20 119/56 96 %      Temp Source Heart Rate Source Patient Position - Orthostatic VS BP Location FiO2 (%)   09/25/22 0949 09/25/22 1115 -- -- --   Tympanic Monitor         Pain Score       09/25/22 1228       Med Not Given for Pain - for MAR use only           Vitals:    09/25/22 1030 09/25/22 1045 09/25/22 1115 09/25/22 1245   BP: 140/65 154/65 162/66 160/71   Pulse: (!) 108 (!) 110 100 104         Visual Acuity      ED Medications  Medications   iohexol (OMNIPAQUE) 350 MG/ML injection (SINGLE-DOSE) 100 mL (100 mL Intravenous Given 9/25/22 1116)   sodium chloride 0 9 % bolus 500 mL (0 mL Intravenous Stopped 9/25/22 1318)   cefTRIAXone (ROCEPHIN) IVPB (premix in dextrose) 1,000 mg 50 mL (0 mg Intravenous Stopped 9/25/22 1318)   acetaminophen (TYLENOL) oral suspension 650 mg (650 mg Per G Tube Given 9/25/22 1228)       Diagnostic Studies  Results Reviewed     Procedure Component Value Units Date/Time    HS Troponin I 2hr [[id number]]  (Normal) Collected: 09/25/22 1209    Lab Status: Final result Specimen: Blood from Hand, Left Updated: 09/25/22 7899 hs TnI 2hr 17 ng/L      Delta 2hr hsTnI -2 ng/L     Blood culture #1 [[id number]] Collected: 09/25/22 1209    Lab Status: In process Specimen: Blood from Hand, Left Updated: 09/25/22 1213    HS Troponin I 4hr [[id number]]     Lab Status: No result Specimen: Blood     COVID only [[id number]]  (Normal) Collected: 09/25/22 1007    Lab Status: Final result Specimen: Nares from Nose Updated: 09/25/22 1110     SARS-CoV-2 Negative    Narrative:      FOR PEDIATRIC PATIENTS - copy/paste COVID Guidelines URL to browser: [email or web address]  [employer]    SARS-CoV-2 assay is a Nucleic Acid Amplification assay intended for the  qualitative detection of nucleic acid from SARS-CoV-2 in nasopharyngeal  swabs  Results are for the presumptive identification of SARS-CoV-2 RNA  Positive results are indicative of infection with SARS-CoV-2, the virus  causing COVID-19, but do not rule out bacterial infection or co-infection  with other viruses  Laboratories within the [place] and its  territories are required to report all positive results to the appropriate  public health authorities  Negative results do not preclude SARS-CoV-2  infection and should not be used as the sole basis for treatment or other  patient management decisions  Negative results must be combined with  clinical observations, patient history, and epidemiological information  This test has not been FDA cleared or approved  This test has been authorized by FDA under an Emergency Use Authorization  (EUA)  This test is only authorized for the duration of time the  declaration that circumstances exist justifying the authorization of the  emergency use of an in vitro diagnostic tests for detection of SARS-CoV-2  virus and/or diagnosis of COVID-19 infection under section 564(b)(1) of  the Act, 21 U  S C  685ISU-0(I)(6), unless the authorization is terminated  or revoked sooner   The test has been validated but independent review by FDA  and CLIA is pending  Test performed using [employer] GeneXpert: This RT-PCR assay targets N2,  a region unique to SARS-CoV-2  A conserved region in the E-gene was chosen  for pan-Sarbecovirus detection which includes SARS-CoV-2  According to CMS-2020-01-R, this platform meets the definition of high-throughput technology  Urine Microscopic [[id number]]  (Abnormal) Collected: 09/25/22 1019    Lab Status: Final result Specimen: Urine, Indwelling Sears Catheter Updated: 09/25/22 1043     RBC, UA Innumerable /hpf      WBC, UA       Field obscured, unable to enumerate     /hpf     Epithelial Cells       Field obscured, unable to enumerate     /hpf     Bacteria, UA       Field obscured, unable to enumerate     /hpf    Urine culture [[id number]] Collected: 09/25/22 1019    Lab Status: In process Specimen: Urine, Indwelling Sears Catheter Updated: 09/25/22 1043    HS Troponin 0hr (reflex protocol) [[id number]]  (Normal) Collected: 09/25/22 1007    Lab Status: Final result Specimen: Blood from Arm, Right Updated: 09/25/22 1041     hs TnI 0hr 19 ng/L     Lactic acid [[id number]]  (Normal) Collected: 09/25/22 1007    Lab Status: Final result Specimen: Blood from Arm, Right Updated: 09/25/22 1040     LACTIC ACID 0 9 mmol/L     Narrative:      Result may be elevated if tourniquet was used during collection      Comprehensive metabolic panel [[id number]]  (Abnormal) Collected: 09/25/22 1007    Lab Status: Final result Specimen: Blood from Arm, Right Updated: 09/25/22 1040     Sodium 137 mmol/L      Potassium 4 7 mmol/L      Chloride 100 mmol/L      CO2 29 mmol/L      ANION GAP 8 mmol/L      BUN 37 mg/dL      Creatinine 1 15 mg/dL      Glucose 130 mg/dL      Calcium 9 6 mg/dL      Corrected Calcium 10 9 mg/dL      AST 36 U/L      ALT 35 U/L      Alkaline Phosphatase 100 U/L      Total Protein 7 6 g/dL      Albumin 2 4 g/dL      Total Bilirubin --     eGFR 42 ml/min/1 73sq m     Narrative:      [employer] guidelines for Chronic Kidney Disease (CKD):     Stage 1 with normal or high GFR (GFR > 90 mL/min/1 73 square meters)    Stage 2 Mild CKD (GFR = 60-89 mL/min/1 73 square meters)    Stage 3A Moderate CKD (GFR = 45-59 mL/min/1 73 square meters)    Stage 3B Moderate CKD (GFR = 30-44 mL/min/1 73 square meters)    Stage 4 Severe CKD (GFR = 15-29 mL/min/1 73 square meters)    Stage 5 End Stage CKD (GFR <15 mL/min/1 73 square meters)  Note: GFR calculation is accurate only with a steady state creatinine    Bilirubin, total [[id number]] Collected: 09/25/22 1007    Lab Status:  In process Specimen: Blood from Arm, Right Updated: 09/25/22 1039    Protime-INR [[id number]]  (Abnormal) Collected: 09/25/22 1007    Lab Status: Final result Specimen: Blood from Arm, Right Updated: 09/25/22 1038     Protime 15 0 seconds      INR 1 17    APTT [[id number]]  (Normal) Collected: 09/25/22 1007    Lab Status: Final result Specimen: Blood from Arm, Right Updated: 09/25/22 1038     PTT 33 seconds     UA w Reflex to Microscopic w Reflex to Culture [[id number]]  (Abnormal) Collected: 09/25/22 1019    Lab Status: Final result Specimen: Urine, Indwelling Sears Catheter Updated: 09/25/22 1036     Color, UA Red     Clarity, UA Cloudy     Specific Gravity, UA 1 015     pH, UA 8 5     Leukocytes, UA Large     Nitrite, UA Positive     Protein, UA >=300 mg/dl      Glucose, UA Negative mg/dl      Ketones, UA Negative mg/dl      Urobilinogen, UA 1 0 E U /dl      Bilirubin, UA Small     Occult Blood, UA Large    Lipase [[id number]]  (Normal) Collected: 09/25/22 1007    Lab Status: Final result Specimen: Blood from Arm, Right Updated: 09/25/22 1035     Lipase 133 u/L     Magnesium [[id number]]  (Normal) Collected: 09/25/22 1007    Lab Status: Final result Specimen: Blood from Arm, Right Updated: 09/25/22 1035     Magnesium 2 3 mg/dL     CBC and differential [[id number]]  (Abnormal) Collected: 09/25/22 1007    Lab Status: Final result Specimen: Blood from Arm, Right Updated: 09/25/22 1016     WBC 10 64 Thousand/uL      RBC 4 26 Million/uL      Hemoglobin 11 7 g/dL      Hematocrit 37 5 %      MCV 88 fL      MCH 27 5 pg      MCHC 31 2 g/dL      RDW 13 9 %      MPV 10 4 fL      Platelets 470 Thousands/uL      nRBC 0 /100 WBCs      Neutrophils Relative 87 %      Immat GRANS % 0 %      Lymphocytes Relative 6 %      Monocytes Relative 7 %      Eosinophils Relative 0 %      Basophils Relative 0 %      Neutrophils Absolute 9 24 Thousands/µL      Immature Grans Absolute 0 03 Thousand/uL      Lymphocytes Absolute 0 59 Thousands/µL      Monocytes Absolute 0 73 Thousand/µL      Eosinophils Absolute 0 03 Thousand/µL      Basophils Absolute 0 02 Thousands/µL     Blood culture #2 [[id number]] Collected: 09/25/22 1007    Lab Status: In process Specimen: Blood from Arm, Right Updated: 09/25/22 1013                 CT chest abdomen pelvis w contrast   Final Result by Ming Smith MD (09/25 1136)      Colonic diverticulosis without diverticulitis  Findings suggestive of fecal incontinence  Correlate with clinical history  Bladder mucosa enhancement which is nonspecific but might indicate inflammation or infection  Correlate with urinalysis  Some debris in the esophagus could indicate reflux  No evidence of aspiration  Workstation performed: [id number]                    Procedures  Procedures         ED Course  ED Course as of 09/25/22 1333   Sun Sep 25, 2022   1133 Troponin hemolyzed x2  Will hydrate    [address] [facility] urology    2449 RN aware of CBI                            Initial Sepsis Screening     Row Name 09/25/22 1332                Is the patient's history suggestive of a new or worsening infection?  Yes (Proceed)  -BC        Suspected source of infection acute abdominal infection;urinary tract infection  -BC        Are two or more of the following signs & symptoms of infection both present and new to the patient? Yes (Proceed)  -BC        Indicate SIRS criteria Tachycardia > 90 bpm;Tachypnea > 20 resp per min  -BC        If the answer is yes to both questions, suspicion of sepsis is present --        If severe sepsis is present AND tissue hypoperfusion perists in the hour after fluid resuscitation or lactate > 4, the patient meets criteria for SEPTIC SHOCK --        Are any of the following organ dysfunction criteria present within 6 hours of suspected infection and SIRS criteria that are NOT considered to be chronic conditions? No  -BC        Organ dysfunction --        Date of presentation of severe sepsis --        Time of presentation of severe sepsis --        Tissue hypoperfusion persists in the hour after crystalloid fluid administration, evidenced, by either: --        Was hypotension present within one hour of the conclusion of crystalloid fluid administration? --        Date of presentation of septic shock --        Time of presentation of septic shock --              User Key  (r) = Recorded By, (t) = Taken By, (c) = Cosigned By    [address] Name Provider Type    BC Billy Zhao PA-C Physician Assistant              Default Flowsheet Data (last 720 hours)     Sepsis Reassess     Row Name 09/25/22 1332                   Repeat Volume Status and Tissue Perfusion Assessment Performed    Repeat Volume Status and Tissue Perfusion Assessment Performed Yes  -BC                  Volume Status and Tissue Perfusion Post Fluid Resuscitation * Must Document All *    Vital Signs Reviewed (HR, RR, BP, T) Yes  -BC        Shock Index Reviewed Yes  -BC        Arterial Oxygen Saturation Reviewed (POx, SaO2 or SpO2) Yes (comment %)  -BC        Cardio Regular rate and rhythm;Normal S1/S2; No rub or gallop; Tachycardia  -BC        Pulmonary Rhonchi  -BC        Capillary Refill Brisk  -BC        Peripheral Pulses --        Skin Warm  -BC        Urine output assessed Adequate  -BC                  *OR*   Intensive Monitoring- Must Document One of the Following Four *:    Vital Signs Reviewed --        * Central Venous Pressure (CVP or RAP) --        * Central Venous Oxygen (SVO2, ScvO2 or Oxygen saturation via central catheter) --        * Bedside Cardiovascular US in IVC diameter and % collapse --        * Passive Leg Raise OR Crystalloid Challenge --              User Key  (r) = Recorded By, (t) = Taken By, (c) = Cosigned By    Initials Name Provider Type    ANEESH Jiang, PACharissaC Physician Assistant                            MDM  Number of Diagnoses or Management Options  Gross hematuria  Sepsis ([facility] )  UTI (urinary tract infection)  Diagnosis management comments: Discussed case with Dr Sherwin Atwood who agrees with CBI  Will admit for urosepsis  Daughter agreeable with plan  Amount and/or Complexity of Data Reviewed  Clinical lab tests: ordered and reviewed  Tests in the radiology section of CPT®: ordered and reviewed  Obtain history from someone other than the patient: yes (Daughter  )  Review and summarize past medical records: yes  Discuss the patient with other providers: yes  Independent visualization of images, tracings, or specimens: yes        Disposition  Final diagnoses:   Gross hematuria   Sepsis ([facility] )   UTI (urinary tract infection)     Time reflects when diagnosis was documented in both MDM as applicable and the Disposition within this note     Time User Action Codes Description Comment    9/25/2022 12:31 PM Jayant Smith Add [R31 0] Gross hematuria     9/25/2022 12:31 PM Jayant Smith Add [A41 9] Sepsis ([facility] )     9/25/2022 12:31 PM Jayant Smith Add [N39 0] UTI (urinary tract infection)       ED Disposition     ED Disposition   Admit    Condition   Stable    Date/Time   Sun Sep 25, 2022 12:30 PM    Comment   Case was discussed with Dr Efrem Hensley and the patient's admission status was agreed to be Admission Status: inpatient status to the service of Dr Efrem Hensley              Follow-up Information    None         Patient's Medications   Discharge Prescriptions    No medications on file       No discharge procedures on file      PDMP Review     None          ED Provider  Electronically Signed by           Ilya Jj PA-C  09/25/22 7078

## 2022-09-25 NOTE — H&P
History and Physical - [facility] Internal Medicine    Patient Information: Socorro Conte 80 y o  female MRN: [id number]  Unit/Bed#: ED 10 Encounter: [id number]  Admitting Physician: Louann Malin MD  PCP: Maria Eugenia Garcia MD  Date of Admission:  09/25/22        Hospital Problem List:     Principal Problem:    Acute cystitis with hematuria  Active Problems:    Sepsis ([facility] )    Gross hematuria    Constipation    History of CVA (cerebrovascular accident)    Paroxysmal A-fib ([place] [address] )    Stage 3 chronic kidney disease ([place] [address] )    Type 2 diabetes mellitus without complication, without long-term current use of insulin ([place] [address] )    S/P percutaneous endoscopic gastrostomy (PEG) tube placement ([place] [address] )    Deep vein thrombosis (DVT) of left upper extremity (HCC)    Vascular dementia ([facility] )    Chronic systolic CH[facility] (congestive [facility] failure) [facility]  )    Hypercholesterolemia    Decubitus ulcer of sacral area      Assessment/Plan:    Gross hematuria  Assessment & Plan  Recently noted to have urinary retention required Sears catheter   Presented with gross hematuria   CT abdomen without any hydronephrosis   · Hold aspirin, Eliquis  · Urology evaluation, recommended CBI   · Monitor H&H    Sepsis ([place] [address] )  Assessment & Plan  As evidenced by fever, tachycardia, leukocytosis     Lactic acid within normal limit   Secondary to urinary tract infection  · Follow-up urine culture   · Follow-up blood culture   · Monitor fever   · Follow-up CBC      * Acute cystitis with hematuria  Assessment & Plan  Associated with urinary retention and indwelling Sears catheter  Presented with hematuria, recent urinary retention requiring Sears   Noted to have low-grade fever, leukocytosis and tachycardia   UA suggestive of UTI   · IV ceftriaxone   · Monitor intake output   · Will follow-up urine culture    S/P percutaneous endoscopic gastrostomy (PEG) tube placement [facility])  Assessment & Plan  Resume tube feeding, Jevity 1 2 at 55 cc/hour   Nutrition evaluation    Type 2 diabetes mellitus without complication, without long-term current use of insulin [facility])  Assessment & Plan  Lab Results   Component Value Date    HGBA1C 6 3 (H) 07/23/2022       Recent Labs     09/25/22  1745 09/25/22 2036 09/26/22  0010 09/26/22  0223   POCGLU 108 97 142* 174*       Blood Sugar Average: Last 72 hrs:  (P) 130 25     Resume home Lantus 11 units q h s  while in hospital  Insulin sliding scale    Stage 3 chronic kidney disease ([facility] )  Assessment & Plan  At baseline, monitor    Paroxysmal A-fib ([facility])  Assessment & Plan  Continue metoprolol   Resume Eliquis when cleared by Urology    History of CVA (cerebrovascular accident)  Assessment & Plan  History of CVA x4 with right hemiplegia, dysphagia status post PEG tube placement   Currently at baseline   · Supportive care   · PT/OT as tolerated  · Continue statin  · Holding aspirin and Eliquis due to hematuria    Constipation  Assessment & Plan  Family reported that patient is dealing with constipation and fecal impaction   CT with evidence of colonic and rectal stool   Likely contributed to urinary retention  · Bowel regimen   · Enema x1   · Monitor    Vascular dementia ([facility] )  Assessment & Plan  At baseline    Deep vein thrombosis (DVT) of left upper extremity ([facility])  Assessment & Plan  History of DVT left upper extremity  Currently noted to have dependent right upper extremity edema   · Eliquis on hold  · Check venous Doppler    Decubitus ulcer of sacral area  Assessment & Plan  Offloading   Wound care evaluation    Hypercholesterolemia  Assessment & Plan  On statin    Chronic systolic CHF (congestive heart failure) ([facility])  Assessment & Plan  Wt Readings from Last 3 Encounters:   09/25/22 71 9 kg (158 lb 8 2 oz)   09/02/22 71 7 kg (158 lb 1 1 oz)   07/23/22 64 9 kg (143 lb)     Appears euvolemic   ·  Continue metoprolol, Lasix   · Monitor intake output  · Daily weight              VTE Prophylaxis: Pharmacologic VTE Prophylaxis contraindicated due to Hematuria / sequential compression device   Code Status: Level 1 - Full Code    Anticipated Length of Stay:  Patient will be admitted on an Inpatient basis with an anticipated length of stay of  > 2 midnights  Justification for Hospital Stay:  Hematuria, cystitis    Total Time for Visit, including Counseling / Coordination of Care: 45 minutes  Greater than 50% of this total time spent on direct patient counseling and coordination of care  Chief Complaint:     Blood in Urine (Pt brought by EMS from home for blood in schaefer starting approx 1 hour ago)    History of Present Illness:    Hamilton Wilson is a 80 y o  female with history of CVA x4 with aphasia and right-sided deficit, dysphagia status post PEG tube placement, CHF, AFib on anticoagulation, diabetes mellitus type 2, hypertension, history of left upper extremity DVT, arthritis, peptic ulcer disease who presents with hematuria  As per the family, patient was dealing with constipation and was noted to have fecal impaction over last few days  Patient was also seen by visiting physician assistant 2 days ago and was noted to have urinary retention and Schaefer catheter was placed with about 900 cc of urine output subsequently  Initially urine was clear but gradually became darker today she was having bright red blood output with urine in Schaefer  Patient was also noted to be in discomfort and patient was brought to ED for further evaluation  In ED patient was noted to have low-grade fever, tachycardia tachypnea saturating adequately on room air  Workup revealed leukocytosis  UA with evidence of infection  CT chest abdomen pelvis revealed evidence of cystitis, esophageal reflux and fecal retention  No hydronephrosis noted  Review of Systems:    Review of Systems   Unable to perform ROS: Dementia (Nonverbal)   Gastrointestinal: Positive for constipation  Negative for diarrhea  Genitourinary: Positive for hematuria         Past Medical and Surgical History: Past Medical History:   Diagnosis Date    Acid reflux     on occ    Arthritis     DJD right hip replaced    Asthma     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)        Past Surgical History:   Procedure Laterality Date    COLON SURGERY      COLONOSCOPY      COLONOSCOPY N/A 2016    Procedure: COLONOSCOPY;  Surgeon: Jeffrey Barlow MD;  Location: [facility] GI LAB; Service:     ESOPHAGOGASTRODUODENOSCOPY N/A 2016    Procedure: ESOPHAGOGASTRODUODENOSCOPY (EGD); Surgeon: Jeffrey Barlow MD;  Location: [facility] GI LAB; Service:    Daniel Reich / Jenna Gallegos / Juan Adams PACEMAKER      IR STROKE ALERT  2022    JOINT REPLACEMENT Right 2015    hip    JOINT REPLACEMENT Left     knee    JOINT REPLACEMENT Right     knee    MITRAL VALVE REPLACEMENT      IL COLONOSCOPY FLX DX W/COLLJ SPEC WHEN PFRMD N/A 2018    Procedure: EGD AND COLONOSCOPY;  Surgeon: Jeffrey Barlow MD;  Location: [facility] GI LAB; Service: Gastroenterology    IL REVISE MEDIAN N/CARPAL TUNNEL SURG Left 2016    Procedure: RELEASE CARPAL TUNNEL;  Surgeon: Tereza [place] MD;  Location: [facility] MAIN OR;  Service: Orthopedics    TUBAL LIGATION      VARICOSE VEIN SURGERY Bilateral        Meds/Allergies:    PTA meds:   Prior to Admission Medications   Prescriptions Last Dose Informant Patient Reported? Taking?    Insulin Pen Needle (Pen Needles) 30G X 5 MM MISC Past Week at Unknown time  No Yes   Sig: Use in the morning   acetaminophen (TYLENOL) 160 mg/5 mL suspension 2022 at Unknown time  Yes Yes   Si mg by Per G Tube route every 4 (four) hours as needed for mild pain albuterol (2 5 mg/3 mL) 0 083 % nebulizer solution 2022 at Unknown time  No Yes   Sig: Take 3 mL (2 5 mg total) by nebulization every 6 (six) hours as needed for wheezing or shortness of breath   apixaban (ELIQUIS) 2 5 mg 2022 at Unknown time  No Yes   Si tablet (2 5 mg total) by Per G Tube route 2 (two) times a day   aspirin (ECOTRIN LOW STRENGTH) 81 mg EC tablet 2022 at Unknown time  No Yes   Sig: Take 1 tablet (81 mg total) by mouth daily G Tube   atorvastatin (LIPITOR) 40 mg tablet 2022 at Unknown time  No Yes   Si tablets (80 mg total) by Per G Tube route daily with dinner   furosemide (LASIX) 20 mg tablet 2022 at Unknown time  No Yes   Si tablet (20 mg total) by Per G Tube route daily   glycerin, pediatric, 1 2 g rectal suppository 2022 at Unknown time  Yes Yes   Sig: Insert 1 suppository into the rectum daily as needed   insulin glargine (LANTUS) 100 units/mL subcutaneous injection 2022 at Unknown time  No Yes   Sig: Inject 11 Units under the skin daily   metoprolol tartrate (LOPRESSOR) 25 mg tablet 2022 at Unknown time  No Yes   Si tablet (25 mg total) by Per G Tube route every 12 (twelve) hours   Patient taking differently: 25 mg by Per G Tube route every 12 (twelve) hours Hold sys of 100 or lower   polyethylene glycol (MIRALAX) 17 g packet   No Yes   Sig: Take 17 g by mouth daily as needed (constipation - 2nd line) for up to 7 days      Facility-Administered Medications: None       Allergies: Allergies   Allergen Reactions    Glimepiride Rash    Guaifenesin-Codeine Hallucinations    Heparin Other (See Comments)     Thrombocytopenia      Vancomycin      Red man syndrome     History:     Marital Status:       Substance Use History:   Social History     Substance and Sexual Activity   Alcohol Use No     Social History     Tobacco Use   Smoking Status Former Smoker    Packs/day: 0 25    Years: 10 00    Pack years: 2 50    Types: Cigarettes    Quit date: Joshua Gilliland Years since quittin 7   Smokeless Tobacco Never Used     Social History     Substance and Sexual Activity   Drug Use No       Family History:    Family History   Problem Relation Age of Onset    Cancer Mother         throat       Physical Exam:     Vitals:   Blood Pressure: 142/78 (22 1545)  Pulse: 100 (22 1545)  Temperature: 98 9 °F (37 2 °C) (22 1427)  Temp Source: Tympanic (22 1245)  Respirations: 20 (22 1545)  SpO2: 96 % (22 1545)    Physical Exam  Constitutional:       General: She is not in acute distress  Appearance: She is ill-appearing (Chronically)  HENT:      Head: Normocephalic and atraumatic  Mouth/Throat:      Mouth: Mucous membranes are dry  Comments: No active bleeding noted  Cardiovascular:      Rate and Rhythm: Normal rate  Pulmonary:      Effort: Pulmonary effort is normal  No respiratory distress  Breath sounds: Normal breath sounds  No wheezing or rales  Comments: Diminished but clear  Abdominal:      General: Bowel sounds are normal  There is no distension  Palpations: Abdomen is soft  Tenderness: There is no abdominal tenderness  There is no guarding or rebound  Comments: PEG tube in place, scant amount of old blood noted  Genitourinary:     Comments: Sears catheter with yellow urine  Musculoskeletal:      Right lower leg: No edema  Left lower leg: No edema  Comments: Mild right upper extremity edema   Skin:     General: Skin is warm and dry  Findings: Lesion (Sacral ulcer) present  No rash  Neurological:      Mental Status: She is alert  Comments: Right-sided hemiplegia, aphasia                 Lab Results: I have personally reviewed pertinent reports        Results from last 7 days   Lab Units 22  1007   WBC Thousand/uL 10 64*   HEMOGLOBIN g/dL 11 7   HEMATOCRIT % 37 5   PLATELETS Thousands/uL 287   NEUTROS PCT % 87*   LYMPHS PCT % 6*   MONOS PCT % 7   EOS PCT % 0     Results from last 7 days   Lab Units 09/25/22  1007   POTASSIUM mmol/L 4 7   CHLORIDE mmol/L 100   CO2 mmol/L 29   BUN mg/dL 37*   CREATININE mg/dL 1 15   CALCIUM mg/dL 9 6   ALK PHOS U/L 100   ALT U/L 35   AST U/L 36     Results from last 7 days   Lab Units 09/25/22  1007   INR  1 17       Imaging: I have personally reviewed pertinent reports  XR chest 1 view portable    Result Date: 9/3/2022  Narrative: CHEST INDICATION:   sob  COMPARISON:  07/30/2022 EXAM PERFORMED/VIEWS:  XR CHEST PORTABLE 1 image FINDINGS: Cardiomediastinal silhouette appears enlarged  A median sternotomy has been performed  Left-sided pacemaker unchanged  Epicardial leads are present  Mild prominence of the pulmonary vessels similar to the prior study  The lungs are clear  No pneumothorax or pleural effusion  Osseous structures appear within normal limits for patient age  Impression: Cardiomegaly  Mild CHF unchanged  Workstation performed: [id number]     CT chest abdomen pelvis w contrast    Result Date: 9/25/2022  Narrative: CT CHEST, ABDOMEN AND PELVIS WITH IV CONTRAST INDICATION:   fevers, non verbal, aspiration risk, hematuria with urinary retention  COMPARISON:  CT chest April 17, 2022 and CT chest abdomen pelvis 5/25/2018 TECHNIQUE: CT examination of the chest, abdomen and pelvis was performed  Axial, sagittal, and coronal 2D reformatted images were created from the source data and submitted for interpretation  Radiation dose length product (DLP) for this visit:  510 61 mGy-cm   This examination, like all CT scans performed in the [facility], was performed utilizing techniques to minimize radiation dose exposure, including the use of iterative  reconstruction and automated exposure control  IV Contrast:  100 mL of iohexol (OMNIPAQUE) Enteric Contrast: Enteric contrast was not administered   FINDINGS: CHEST LUNGS:  Small area of groundglass opacity within the anterior aspect of the right lung on images 26-28 series 2 is stable when compared with the most recent prior scan  Perhaps small area of scarring  No significant new pulmonary infiltrates are seen  There is a small round noncalcified nodule posteriorly in the left lower lobe which measures 3 mm on image 41 series 2  This is stable  No gross evidence of debris within the airways  No lung masses are seen  PLEURA:  Unremarkable  HEART/GREAT VESSELS: Heart size is stable  No pericardial effusion  Dual lead transvenous pacemaker present  High density along the mitral annulus is probably severe calcification  No thoracic aortic aneurysm  MEDIASTINUM AND KEILA:  Small amount of debris in the esophageal lumen may indicate reflux  No mediastinal or hilar adenopathy identified  CHEST WALL AND LOWER NECK:  Pacemaker generator pack in the left upper chest wall  No axillary adenopathy  Base of neck is unremarkable ABDOMEN LIVER/BILIARY TREE:  Unremarkable  GALLBLADDER:  No calcified gallstones  No pericholecystic inflammatory change  SPLEEN:  Unremarkable  PANCREAS:  Unremarkable  ADRENAL GLANDS:  Unremarkable  KIDNEYS/URETERS:  Questionable tiny bilateral cystic foci  No hydronephrosis or kidney stones  No convincing evidence of pyelonephritis  No perinephric fluid  STOMACH AND BOWEL:  There is a PEG tube present  Positioning seems satisfactory  No obvious complications  There is diverticulosis of the colon without diverticulitis  There is a large amount of stool in the rectosigmoid region and this extends perhaps down to and through the region of the anus suggesting fecal incontinence  Correlate with patient history  APPENDIX:  No findings to suggest appendicitis  ABDOMINOPELVIC CAVITY:  No ascites  No pneumoperitoneum  No lymphadenopathy  VESSELS:  Atherosclerotic changes are present  No evidence of aneurysm  PELVIS REPRODUCTIVE ORGANS:  Calcified uterine leiomyoma on the left side    No worrisome appearing gynecologic masses  URINARY BLADDER:  The bladder has a Sears catheter in it  It is empty, otherwise  There seems to be some enhancement of the mucosa which might indicate the presence of inflammation or infection  Is nonspecific in the setting of catheterized patient  Correlate with urinalysis  ABDOMINAL WALL/INGUINAL REGIONS:  No complications of the PEG tube site  No hernias OSSEOUS STRUCTURES:  Right hip prosthesis noted  Degenerative arthritis of the lumbar spine  Median sternotomy wires intact  No acute bony pathology appreciated  Impression: Colonic diverticulosis without diverticulitis  Findings suggestive of fecal incontinence  Correlate with clinical history  Bladder mucosa enhancement which is nonspecific but might indicate inflammation or infection  Correlate with urinalysis  Some debris in the esophagus could indicate reflux  No evidence of aspiration  Workstation performed: [id number]       CT chest abdomen pelvis w contrast   Final Result      Colonic diverticulosis without diverticulitis  Findings suggestive of fecal incontinence  Correlate with clinical history  Bladder mucosa enhancement which is nonspecific but might indicate inflammation or infection  Correlate with urinalysis  Some debris in the esophagus could indicate reflux  No evidence of aspiration  Workstation performed: [id number]             EKG, Pathology, and Other Studies Reviewed on Admission:   · EKG-sinus tachycardia, RBBB, LAFB    Allscripts/EPIC Records Reviewed: Yes     ** Please Note: "This note has been constructed using a voice recognition system  Therefore there may be syntax, spelling, and/or grammatical errors   Please call if you have any questions  "**

## 2022-09-26 ENCOUNTER — PATIENT OUTREACH (OUTPATIENT)
Dept: FAMILY MEDICINE CLINIC | Facility: CLINIC | Age: 87
End: 2022-09-26

## 2022-09-26 ENCOUNTER — APPOINTMENT (INPATIENT)
Dept: RADIOLOGY | Facility: HOSPITAL | Age: 87
DRG: 872 | End: 2022-09-26
Payer: MEDICARE

## 2022-09-26 ENCOUNTER — TELEPHONE (OUTPATIENT)
Dept: FAMILY MEDICINE CLINIC | Facility: CLINIC | Age: 87
End: 2022-09-26

## 2022-09-26 DIAGNOSIS — Z71.89 COMPLEX CARE COORDINATION: Primary | ICD-10-CM

## 2022-09-26 DIAGNOSIS — Z78.9 NEED FOR FOLLOW-UP BY SOCIAL WORKER: ICD-10-CM

## 2022-09-26 PROBLEM — N30.01 ACUTE CYSTITIS WITH HEMATURIA: Status: ACTIVE | Noted: 2018-05-25

## 2022-09-26 PROBLEM — A41.9 SEPSIS (HCC): Status: RESOLVED | Noted: 2018-05-25 | Resolved: 2022-09-26

## 2022-09-26 PROBLEM — L89.159 DECUBITUS ULCER OF SACRAL AREA: Status: ACTIVE | Noted: 2022-09-26

## 2022-09-26 PROBLEM — R31.0 GROSS HEMATURIA: Status: ACTIVE | Noted: 2022-09-26

## 2022-09-26 LAB
ANION GAP SERPL CALCULATED.3IONS-SCNC: 9 MMOL/L (ref 4–13)
BUN SERPL-MCNC: 39 MG/DL (ref 5–25)
CALCIUM SERPL-MCNC: 9.1 MG/DL (ref 8.3–10.1)
CHLORIDE SERPL-SCNC: 103 MMOL/L (ref 96–108)
CO2 SERPL-SCNC: 26 MMOL/L (ref 21–32)
CREAT SERPL-MCNC: 1.19 MG/DL (ref 0.6–1.3)
ERYTHROCYTE [DISTWIDTH] IN BLOOD BY AUTOMATED COUNT: 14.2 % (ref 11.6–15.1)
GFR SERPL CREATININE-BSD FRML MDRD: 40 ML/MIN/1.73SQ M
GLUCOSE SERPL-MCNC: 142 MG/DL (ref 65–140)
GLUCOSE SERPL-MCNC: 148 MG/DL (ref 65–140)
GLUCOSE SERPL-MCNC: 154 MG/DL (ref 65–140)
GLUCOSE SERPL-MCNC: 168 MG/DL (ref 65–140)
GLUCOSE SERPL-MCNC: 170 MG/DL (ref 65–140)
GLUCOSE SERPL-MCNC: 174 MG/DL (ref 65–140)
GLUCOSE SERPL-MCNC: 180 MG/DL (ref 65–140)
GLUCOSE SERPL-MCNC: 214 MG/DL (ref 65–140)
HCT VFR BLD AUTO: 31.4 % (ref 34.8–46.1)
HGB BLD-MCNC: 9.6 G/DL (ref 11.5–15.4)
MCH RBC QN AUTO: 27.7 PG (ref 26.8–34.3)
MCHC RBC AUTO-ENTMCNC: 30.6 G/DL (ref 31.4–37.4)
MCV RBC AUTO: 91 FL (ref 82–98)
PLATELET # BLD AUTO: 237 THOUSANDS/UL (ref 149–390)
PMV BLD AUTO: 10.7 FL (ref 8.9–12.7)
POTASSIUM SERPL-SCNC: 4.1 MMOL/L (ref 3.5–5.3)
RBC # BLD AUTO: 3.46 MILLION/UL (ref 3.81–5.12)
SODIUM SERPL-SCNC: 138 MMOL/L (ref 135–147)
WBC # BLD AUTO: 6.92 THOUSAND/UL (ref 4.31–10.16)

## 2022-09-26 PROCEDURE — 99232 SBSQ HOSP IP/OBS MODERATE 35: CPT | Performed by: INTERNAL MEDICINE

## 2022-09-26 PROCEDURE — 94760 N-INVAS EAR/PLS OXIMETRY 1: CPT

## 2022-09-26 PROCEDURE — 94640 AIRWAY INHALATION TREATMENT: CPT

## 2022-09-26 PROCEDURE — 85027 COMPLETE CBC AUTOMATED: CPT | Performed by: INTERNAL MEDICINE

## 2022-09-26 PROCEDURE — 82948 REAGENT STRIP/BLOOD GLUCOSE: CPT

## 2022-09-26 PROCEDURE — 80048 BASIC METABOLIC PNL TOTAL CA: CPT | Performed by: INTERNAL MEDICINE

## 2022-09-26 RX ADMIN — CEFTRIAXONE 1000 MG: 1 INJECTION, SOLUTION INTRAVENOUS at 11:40

## 2022-09-26 RX ADMIN — ALBUTEROL SULFATE 2.5 MG: 2.5 SOLUTION RESPIRATORY (INHALATION) at 19:53

## 2022-09-26 RX ADMIN — ATORVASTATIN CALCIUM 80 MG: 80 TABLET, FILM COATED ORAL at 16:34

## 2022-09-26 RX ADMIN — METOPROLOL TARTRATE 25 MG: 25 TABLET, FILM COATED ORAL at 20:44

## 2022-09-26 RX ADMIN — METOPROLOL TARTRATE 25 MG: 25 TABLET, FILM COATED ORAL at 08:43

## 2022-09-26 RX ADMIN — INSULIN LISPRO 1 UNITS: 100 INJECTION, SOLUTION INTRAVENOUS; SUBCUTANEOUS at 23:29

## 2022-09-26 RX ADMIN — FUROSEMIDE 20 MG: 20 TABLET ORAL at 08:42

## 2022-09-26 RX ADMIN — INSULIN LISPRO 1 UNITS: 100 INJECTION, SOLUTION INTRAVENOUS; SUBCUTANEOUS at 17:04

## 2022-09-26 RX ADMIN — INSULIN LISPRO 1 UNITS: 100 INJECTION, SOLUTION INTRAVENOUS; SUBCUTANEOUS at 06:25

## 2022-09-26 RX ADMIN — Medication 20 MG: at 08:42

## 2022-09-26 RX ADMIN — INSULIN GLARGINE 11 UNITS: 100 INJECTION, SOLUTION SUBCUTANEOUS at 21:03

## 2022-09-26 RX ADMIN — ALBUTEROL SULFATE 2.5 MG: 2.5 SOLUTION RESPIRATORY (INHALATION) at 13:10

## 2022-09-26 RX ADMIN — ALBUTEROL SULFATE 2.5 MG: 2.5 SOLUTION RESPIRATORY (INHALATION) at 08:11

## 2022-09-26 NOTE — CASE MANAGEMENT
Case Management Discharge Planning Note    Patient name Daniel Law  [facility] MRN [id number]  : 1933 Date 2022       Current Admission Date: 2022  Current Admission Diagnosis:Acute cystitis with hematuria   Patient Active Problem List    Diagnosis Date Noted    Gross hematuria 2022    Decubitus ulcer of sacral area 2022    Asthma exacerbation 2022    BMI 29 0-29 9,adult 2022    S/P percutaneous endoscopic gastrostomy (PEG) tube placement ([facility] ) 2022    Positive blood culture 2022    Fever 2022    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[facility] ) 2022    Encounter for support and coordination of transition of care 2022    [facility] 20[facility]    Chronic renal disease, stage IV ([facility] ) 2022    Cardiomyopathy, dilated ([facility] ) 2022    Coronary artery disease with angina pectoris, unspecified vessel or lesion type, unspecified whether native or transplanted heart ([facility] )     Tracheobronchitis 2022    Thrombocytopenia ([facility] ) 2021    Gastroesophageal reflux disease with esophagitis 2020    CLEANING (dyspnea on exertion) 2020    Vitamin D deficiency 2020    Paroxysmal A-fib ([facility] ) 2020    Primary osteoarthritis of hip 2020    History of permanent cardiac pacemaker placement 2020    S/P MVR (mitral valve replacement) 2020    Hypertension, essential 2020    Stage 3 chronic kidney disease ([facility] ) 2020    Type 2 diabetes mellitus without complication, without long-term current use of insulin ([facility] ) 2020    TIA (transient ischemic attack)     History of stroke 10/29/2018    Vascular dementia ([address] ) 2018    Mood disorder ([facility] ) 2018    Ambulatory dysfunction 2018    Bilateral carotid artery stenosis 2018    Arthritis of right glenohumeral joint 2018    Polyp of colon 2018    HCAP (healthcare-associated pneumonia) 06/18/2018    Shoulder pain, right 06/18/2018    Abnormal TSH 06/18/2018    Dysphagia 06/18/2018    Encephalopathy 06/15/2018    Seizure-like activity ([facility] ) 06/15/2018    Colonic thickening 05/26/2018    Anemia 05/26/2018    History of CVA (cerebrovascular accident) 05/25/2018    History of subarachnoid hemorrhage 05/25/2018    CKD (chronic kidney disease) stage 3, GFR 30-59 ml/min ([facility]) 05/25/2018    History of DVT (deep vein thrombosis) 05/25/2018    History of pacemaker 05/25/2018    Acute cystitis with hematuria 05/25/2018    Chronic systolic CHF (congestive heart failure) ([facility] ) 12/45/3307    Cardio-embolic left MCA stroke ([facility] ) 04/23/2018    History of ischemic right MCA stroke 04/23/2018    CVA (cerebral vascular accident) ([facility] ) 04/18/2018    Controlled type 2 diabetes mellitus without complication, without long-term current use of insulin ([facility] ) 04/18/2018    Pacemaker 04/18/2018    Acid reflux 04/18/2018    H/O mitral valve replacement 03/01/2018    Deep vein thrombosis (DVT) of left upper extremity ([facility] ) 01/25/2018    Constipation 09/21/2016    External hemorrhoids 09/21/2016    Carpal tunnel syndrome, left 01/04/2016    Pain in both hands 01/04/2016    Arthralgia of hip, right 11/17/2015    Meningioma ([facility] ) 12/18/2014    Hypertension 05/13/2013    Esophageal reflux 05/13/2013    Arthritis 05/13/2013    Hypercholesterolemia 05/13/2013    Malignant neoplasm without specification of site ([facility] ) 05/13/2013    Spondylosis of cervical region without myelopathy or radiculopathy 05/13/2013    Type 2 or unspecified type diabetes mellitus 05/13/2013      LOS (days): 1  Geometric Mean LOS (GMLOS) (days): 3 50  Days to GMLOS:2 6     OBJECTIVE:  Risk of Unplanned Readmission Score: 29 97         Current admission status: Inpatient   Preferred Pharmacy:   [address] [place] [address], [address] [place] [address]  Phone: [phone] Fax: [phone]    [facility] [address]  Phone: [phone] Fax: [phone]    Primary Care Provider: Don Baron MD    Primary Insurance: MEDICARE  Secondary Insurance: AARP    DISCHARGE DETAILS:    Contacts  Patient Contacts: Jesi Benitez [facility])  Relationship to Patient[de-identified] Referral Source  Contact Method: Phone  Phone Number: [phone]  Reason/Outcome: Continuity of Care, Referral, Discharge Planning    Other Referral/Resources/Interventions Provided:  Interventions: [address]  Referral Comments: CM received a call from [facility] at pt's PCP office confirming that pt was supposed to receive [address] services for her SN and therapy from [facility] after [address] over 1 month ago, and that they have no yet come  PCP office tried to f/u with [address], however does not appear they will be providing services at this time in a manner necessary for this patient  PCP office was planning to send referral to [facility] on behalf of patient, however, now being she is in the hospital, wanted to collaborate DC planning with CM to ensure continuity of care    CM placed referral in [address] and left a VM for daughter-Lorena (p: [phone]) to discuss role of CM and DC planning for SNF v  Home w/HHC

## 2022-09-26 NOTE — PROGRESS NOTES
[address] [address]  Progress Note - Goldy Mitchell 1933, 80 y o  female MRN: [id number]  Unit/Bed#: [address] [place] [address] Encounter: [id number]  Primary Care Provider: Jennifer Quinonez MD   Date and time admitted to hospital: 9/25/2022  9:35 AM    * Acute cystitis with hematuria  Assessment & Plan  Associated with urinary retention and indwelling Esars catheter  Presented with hematuria, recent urinary retention requiring Sears   Noted to have low-grade fever, leukocytosis and tachycardia   UA suggestive of UTI   · IV ceftriaxone   · Monitor intake output   · Will follow-up urine culture    Gross hematuria  Assessment & Plan  Recently noted to have urinary retention required Sears catheter   Presented with gross hematuria   CT abdomen without any hydronephrosis   · Hold aspirin, Eliquis  · Urology evaluation, recommended CBI   · Monitor H&H    Sepsis (HCC)-resolved as of 9/26/2022  Assessment & Plan  As evidenced by fever (10 4F), tachycardia (106), tachypnea (26) leukocytosis (10 64)      Lactic acid within normal limit   Secondary to urinary tract infection  · Follow-up urine culture   · Follow-up blood culture   · Monitor fever   · Follow-up CBC  · Continue Rocephin     Deep vein thrombosis (DVT) of left upper extremity (HCC)  Assessment & Plan  History of DVT left upper extremity  Currently noted to have dependent right upper extremity edema   · Eliquis on hold  · Check venous Doppler    Decubitus ulcer of sacral area  Assessment & Plan  Offloading   Wound care evaluation    S/P percutaneous endoscopic gastrostomy (PEG) tube placement [facility])  Assessment & Plan  · Continue tube feeding, Jevity 1 2 at 54 cc/hour   · Nutrition evaluation    Type 2 diabetes mellitus without complication, without long-term current use of insulin [facility])  Assessment & Plan  Lab Results   Component Value Date    HGBA1C 6 3 (H) 07/23/2022       Recent Labs     09/25/22  1745 09/25/22 2036 09/26/22  0010 09/26/22  0223   POCGLU 108 97 142* 174*       Blood Sugar Average: Last 72 hrs:  (P) 130 25     Resume home Lantus 11 units q h s  while in hospital  Insulin sliding scale    Stage 3 chronic kidney disease (HCC)  Assessment & Plan  At baseline, monitor    Paroxysmal A-fib ([facility])  Assessment & Plan  · Continue metoprolol   · Resume Eliquis when cleared by Urology    Hypercholesterolemia  Assessment & Plan  On statin    Vascular dementia [facility])  Assessment & Plan  At baseline    Chronic systolic CHF (congestive heart failure) ([facility])  Assessment & Plan  Wt Readings from Last 3 Encounters:   09/25/22 71 9 kg (158 lb 8 2 oz)   09/02/22 71 7 kg (158 lb 1 1 oz)   07/23/22 64 9 kg (143 lb)     Appears euvolemic   ·  Continue metoprolol, Lasix   · Monitor intake output  · Daily weight    History of CVA (cerebrovascular accident)  Assessment & Plan  History of CVA x4 with right hemiplegia, dysphagia status post PEG tube placement   Currently at baseline   · Supportive care   · PT/OT as tolerated  · Continue statin  · Holding aspirin and Eliquis due to hematuria    Constipation  Assessment & Plan  Family reported that patient is dealing with constipation and fecal impaction   CT with evidence of colonic and rectal stool   Likely contributed to urinary retention  · Bowel regimen   · Enema x1 on admission was refused   · Monitor      VTE Pharmacologic Prophylaxis: VTE Score: 10 High Risk (Score >/= 5) - Pharmacological DVT Prophylaxis Contraindicated  Sequential Compression Devices Ordered  Patient Centered Rounds: I performed bedside rounds with nursing staff today  Discussions with Specialists or Other Care Team Provider: nursing, CM, urology note appreciated     Education and Discussions with Family / Patient: Updated  (daughter) at bedside  Time Spent for Care: 30 minutes  More than 50% of total time spent on counseling and coordination of care as described above      Current Length of Stay: 1 day(s)  Current Patient Status: Inpatient   Certification Statement: The patient will continue to require additional inpatient hospital stay due to UTI  Discharge Plan: Anticipate discharge in 48-72 hrs to discharge location to be determined pending rehab evaluations  Code Status: Level 1 - Full Code    Subjective:   Patient seen and examined at bedside  Non verbal  Most of the information is obtain from daughter  Reviewed the events leading up to her admission  Currently, patient seems comfortable  No further hematuria  Objective:     Vitals:   Temp (24hrs), Av °F (37 2 °C), Min:97 6 °F (36 4 °C), Max:100 °F (37 8 °C)    Temp:  [97 6 °F (36 4 °C)-100 °F (37 8 °C)] 98 9 °F (37 2 °C)  HR:  [] 88  Resp:  [16-26] 17  BP: ()/(48-78) 121/58  SpO2:  [94 %-97 %] 95 %  Body mass index is 30 03 kg/m²  Input and Output Summary (last 24 hours): Intake/Output Summary (Last 24 hours) at 2022 1000  Last data filed at 2022 0631  Gross per 24 hour   Intake 550 ml   Output 2050 ml   Net -1500 ml       Physical Exam:   Physical Exam  Vitals and nursing note reviewed  Constitutional:       General: She is not in acute distress  Appearance: She is obese  She is ill-appearing  She is not toxic-appearing or diaphoretic  Comments: Non verbal female resting in bed on room air    HENT:      Head: Normocephalic  Mouth/Throat:      Mouth: Mucous membranes are moist    Eyes:      Conjunctiva/sclera: Conjunctivae normal    Cardiovascular:      Rate and Rhythm: Normal rate  Pulmonary:      Effort: Pulmonary effort is normal       Breath sounds: Normal breath sounds  No wheezing, rhonchi or rales  Abdominal:      General: Bowel sounds are normal       Palpations: Abdomen is soft  Comments: PEG tube without signs of infection    Genitourinary:     Comments: Indwelling schaefer catheter draining cayla/tea colored urine   Musculoskeletal:      Cervical back: Normal range of motion  Right lower leg: No edema  Left lower leg: No edema  Skin:     General: Skin is warm and dry  Neurological:      Mental Status: She is alert  Mental status is at baseline  She is disoriented  Motor: Weakness present  Psychiatric:         Mood and Affect: Mood normal          Speech: She is noncommunicative  Behavior: Behavior is withdrawn  Cognition and Memory: Cognition is impaired  Memory is impaired            Additional Data:     Labs:  Results from last 7 days   Lab Units 09/26/22  0624 09/25/22  1007   WBC Thousand/uL 6 92 10 64*   HEMOGLOBIN g/dL 9 6* 11 7   HEMATOCRIT % 31 4* 37 5   PLATELETS Thousands/uL 237 287   NEUTROS PCT %  --  87*   LYMPHS PCT %  --  6*   MONOS PCT %  --  7   EOS PCT %  --  0     Results from last 7 days   Lab Units 09/26/22  0624 09/25/22  1007   SODIUM mmol/L 138 137   POTASSIUM mmol/L 4 1 4 7   CHLORIDE mmol/L 103 100   CO2 mmol/L 26 29   BUN mg/dL 39* 37*   CREATININE mg/dL 1 19 1 15   ANION GAP mmol/L 9 8   CALCIUM mg/dL 9 1 9 6   ALBUMIN g/dL  --  2 4*   TOTAL BILIRUBIN   --  0 68   ALK PHOS U/L  --  100   ALT U/L  --  35   AST U/L  --  36   GLUCOSE RANDOM mg/dL 168* 130     Results from last 7 days   Lab Units 09/25/22  1007   INR  1 17     Results from last 7 days   Lab Units 09/26/22  0610 09/26/22  0223 09/26/22  0010 09/25/22  2036 09/25/22  1745   POC GLUCOSE mg/dl 180* 174* 142* 97 108         Results from last 7 days   Lab Units 09/25/22  1007   LACTIC ACID mmol/L 0 9       Lines/Drains:  Invasive Devices  Report    Peripheral Intravenous Line  Duration           Peripheral IV 09/25/22 Right Antecubital <1 day          Drain  Duration           Gastrostomy/Enterostomy Percutaneous Endoscopic Gastrostomy (PEG) 20 Fr  LUQ 59 days    Urethral Catheter Three way 22 Fr  <1 day              Urinary Catheter:  Goal for removal: Remove after 48 hrs of I/O monitoring               Imaging: Reviewed radiology reports from this admission including: CT C/A/P    Recent Cultures (last 7 days):   Results from last 7 days   Lab Units 09/25/22  1209 09/25/22  1007   BLOOD CULTURE  Received in Microbiology Lab  Culture in Progress  Received in Microbiology Lab  Culture in Progress  Last 24 Hours Medication List:   Current Facility-Administered Medications   Medication Dose Route Frequency Provider Last Rate    acetaminophen  650 mg Per G Tube Q4H PRN Louann Malin MD      albuterol  2 5 mg Nebulization Q6H PRN Louann Malin MD      albuterol  2 5 mg Nebulization TID Louann Malin MD      atorvastatin  80 mg Per G Tube Daily With Ronna Stevenson MD      cefTRIAXone  1,000 mg Intravenous Q24H Louann Malin MD      furosemide  20 mg Per G Tube Daily Louann Malin MD      insulin glargine  11 Units Subcutaneous HS Louann Malin MD      insulin lispro  1-5 Units Subcutaneous Q6H [address] Louann Malin MD      magnesium hydroxide  30 mL Oral Daily PRN Louann Malin MD      metoprolol tartrate  25 mg Per G Tube Q12H [address] Louann Malin MD      omeprazole (PRILOSEC) suspension 2 mg/mL  20 mg Per PEG Tube Daily Louann Malin MD      polyethylene glycol  17 g Per G Tube Daily Louann Malin MD      senna  2 tablet Per PEG Tube HS Louann Malin MD      sodium phosphate-biphosphate  1 enema Rectal Once Louann Malin MD          Today, Patient Was Seen By: GAYATRI Wayne    **Please Note: This note may have been constructed using a voice recognition system  **

## 2022-09-26 NOTE — ASSESSMENT & PLAN NOTE
History of CVA x4 with right hemiplegia, dysphagia status post PEG tube placement   Currently at baseline   · Supportive care   · PT/OT as tolerated  · Continue statin  · Holding aspirin and Eliquis due to hematuria

## 2022-09-26 NOTE — ASSESSMENT & PLAN NOTE
Recently noted to have urinary retention required Sears catheter   Presented with gross hematuria   CT abdomen without any hydronephrosis   · Urine clear  · Holding aspirin, Eliquis  · Urology evaluation appreciated  · Continue ABX and follow-up urine culture   · Monitor H&H

## 2022-09-26 NOTE — CASE MANAGEMENT
Case Management Assessment & Discharge Planning Note    Patient name Kenia Razo  Location [address] [place] [address]/3 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[facility] ) 2022    Encounter for support and coordination of transition of care 2022    Overlap syndrome ([facility] ) 2022    Chronic renal disease, stage IV ([facility] ) 2022    Cardiomyopathy, dilated ([facility] ) 2022    Coronary artery disease with angina pectoris, unspecified vessel or lesion type, unspecified whether native or transplanted heart ([facility] )     Tracheobronchitis 2022    Thrombocytopenia ([facility] ) 2021    Gastroesophageal reflux disease with esophagitis 2020    CLEANING (dyspnea on exertion) 2020    Vitamin D deficiency 2020    Paroxysmal A-fib ([facility] ) 2020    Primary osteoarthritis of hip 2020    History of permanent cardiac pacemaker placement 2020    S/P MVR (mitral valve replacement) 2020    Hypertension, essential 2020    Stage 3 chronic kidney disease ([facility] ) 2020    Type 2 diabetes mellitus without complication, without long-term current use of insulin ([facility] ) 2020    TIA (transient ischemic attack)     History of stroke 10/29/2018    Vascular dementia ([facility] ) 2018    Mood disorder ([facility] ) 2018    Ambulatory dysfunction 2018    Bilateral carotid artery stenosis 2018    Arthritis of right glenohumeral joint 2018    Polyp of colon 2018    HCAP (healthcare-associated pneumonia) 06/18/2018    Shoulder pain, right 06/18/2018    Abnormal TSH 06/18/2018    Dysphagia 06/18/2018    Encephalopathy 06/15/2018    Seizure-like activity ([facility] ) 06/15/2018    Colonic thickening 05/26/2018    Anemia 05/26/2018    History of CVA (cerebrovascular accident) 05/25/2018    History of subarachnoid hemorrhage 05/25/2018    CKD (chronic kidney disease) stage 3, GFR 30-59 ml/min ([facility]) 05/25/2018    History of DVT (deep vein thrombosis) 05/25/2018    History of pacemaker 05/25/2018    Acute cystitis with hematuria 05/25/2018    Chronic systolic CHF (congestive heart failure) ([facility] ) 06/58/1415    Cardio-embolic left MCA stroke ([email or web address] 04/23/2018    History of ischemic right MCA stroke 04/23/2018    CVA (cerebral vascular accident) ([email or web address] ) 04/18/2018    Controlled type 2 diabetes mellitus without complication, without long-term current use of insulin ([email or web address] ) 04/18/2018    Pacemaker 04/18/2018    Acid reflux 04/18/2018    H/O mitral valve replacement 03/01/2018    Deep vein thrombosis (DVT) of left upper extremity ([facility] ) 01/25/2018    Constipation 09/21/2016    External hemorrhoids 09/21/2016    Carpal tunnel syndrome, left 01/04/2016    Pain in both hands 01/04/2016    Arthralgia of hip, right 11/17/2015    Meningioma ([email or web address] ) 12/18/2014    Hypertension 05/13/2013    Esophageal reflux 05/13/2013    Arthritis 05/13/2013    Hypercholesterolemia 05/13/2013    Malignant neoplasm without specification of site ([email or web address] ) 05/13/2013    Spondylosis of cervical region without myelopathy or radiculopathy 05/13/2013    Type 2 or unspecified type diabetes mellitus 05/13/2013      LOS (days): 1  Geometric Mean LOS (GMLOS) (days): 3 50  Days to GMLOS:2 5     OBJECTIVE:    Risk of Unplanned Readmission Score: 29 97         Current admission status: Inpatient  Referral Reason: Rehab    Preferred Pharmacy:   [facility], [address] [place] [address] [address] [address]  Phone: [phone] Fax: [phone]    [address]  [address] [address]  Phone: [phone] Fax: [phone]    Primary Care Provider: Lien Sepulveda MD    Primary Insurance: MEDICARE  Secondary Insurance: AARP    ASSESSMENT:  [address], [address] Representative - Daughter   Primary Phone: [phone] (Mobile)               Patient Information  Admitted from[de-identified] Home  Mental Status: Alert  During Assessment patient was accompanied by: Not accompanied during assessment  Assessment information provided by[de-identified] Daughter  Primary Caregiver: Family  Caregiver's Name[de-identified] Tanner Bonilla Relationship to Patient[de-identified] Family Member  Caregiver's Telephone Number[de-identified] [phone]  Support Systems: Family members  [place] of Residence: [address] [place] [address] do you live in?: [address] entry access options   Select all that apply : Stairs  Number of steps to enter home : 3  Type of Current Residence: [address] [place] home  Upon entering residence, is there a bedroom on the main floor (no further steps)?: Yes  Upon entering residence, is there a bathroom on the main floor (no further steps)?: Yes  In the last 12 months, was there a time when you were not able to pay the mortgage or rent on time?: No  In the last 12 months, how many places have you lived?: 1  In the last 12 months, was there a time when you did not have a steady place to sleep or slept in a shelter (including now)?: No  Homeless/housing insecurity resource given?: N/A  Living Arrangements: Lives Alone (4 daughters rotate staying with patient so that she is never alone)    Activities of Daily Living Prior to Admission  Functional Status: Assistance  Completes ADLs independently?: No  Level of ADL dependence: Assistance  Ambulates independently?: No  Level of ambulatory dependence: Assistance  Does patient use assisted devices?: Yes  Assisted Devices (DME) used: Hospital Bed, [address], Other (Comment) (Transport chair)  Does patient currently own DME?: Yes  What DME does the patient currently own?: [address], Other (Comment) (Transport chair)  Does patient have a history of Outpatient Therapy (PT/OT)?: No  Does the patient have a history of Short-Term Rehab?: Yes ([facility])  Does patient have a history of HHC?: Yes ([facility] referral-however, no one had scheduled a visit)  Does patient currently have [facility]?: No    Patient Information Continued  Does patient have prescription coverage?: Yes  Within the past 12 months, you worried that your food would run out before you got the money to buy more : Never true  Within the past 12 months, the food you bought just didn't last and you didn't have money to get more : Never true  Food insecurity resource given?: N/A  Does patient receive dialysis treatments?: No  Does patient have a history of substance abuse?: No  Does patient have a history of Mental Health Diagnosis?: No    Means of Transportation  Means of Transport to Appts[de-identified] Other (Comment) (PCP comes to her home)  In the past 12 months, has lack of transportation kept you from medical appointments or from getting medications?: No  In the past 12 months, has lack of transportation kept you from meetings, work, or from getting things needed for daily living?: No  Was application for public transport provided?: N/A    DISCHARGE DETAILS:    Discharge planning discussed with[de-identified] daughter-Lorena  Freedom of Choice: Yes  Comments - Freedom of Choice: FOC discussed regarding re-referral to [facility] v  SNF  Daughter would like referrals to both at this time and will choose from what is available    CM contacted family/caregiver?: Yes  Were Treatment Team discharge recommendations reviewed with patient/caregiver?: Yes  Did patient/caregiver verbalize understanding of patient care needs?: N/A- going to facility  Were patient/caregiver advised of the risks associated with not following Treatment Team discharge recommendations?: Yes    Contacts  Patient Contacts: Jerri Glez (daughter)  Relationship to Patient[de-identified] Family  Contact Method: Phone  Phone Number: [phone]  Reason/Outcome: Continuity of Care, Discharge Planning, Referral    Requested [address]         Is the patient interested in [facility] at discharge?: Yes  [address] requested[de-identified] Physical Therapy, Occupational Therapy, [address] Name[de-identified] Other  [address] [place] [address] Provider[de-identified] PCP  Home Health Services Needed[de-identified] Evaluate Functional Status and Safety, Gait/ADL Training, Strengthening/Theraputic Exercises to Improve Function, Wound/Ostomy Care, Other (comment) (Tube feeding)  Homebound Criteria Met[de-identified] Uses an Assist Device (i e  cane, walker, etc), Requires the Assistance of Another Person for Safe Ambulation or to Leave the Home  Supporting Clincal Findings[de-identified] Cognitive Deficit Requiring the Assistance of Others, Limited Endurance, Fatigues [place] in [employer]    Other Referral/Resources/Interventions Provided:  Interventions: HHC, SNF  Referral Comments: CM placed blanket referral to SNF per discussion with daughter Jerri Glez CM [facility] [address] per request of daughter in [address] [facility] [employer] Team Recommendation: SNF  Discharge Destination Plan[de-identified] SNF

## 2022-09-26 NOTE — DISCHARGE INSTR - OTHER ORDERS
Plan:   Skin care plans:  1-Calazime paste to sacrum, buttocks 3x/day and as needed or if sacral wound open, take small Dermagran drsg, open square & cut to size of wound in a single layer  Apply to wound & cover with Allevyn foam sacral dressing  Change every other day & prn soilage/dislodgement  2-Hydraguard to bilateral heels 2x/day and as needed  3-Heel protectors to bilateral heels to offload pressure  4-Pressure redistribution cushion when out of bed  Limit sitting to 1-2 hrs at a time then transfer back to bed for 1-2 hrs to offload sacral wound  5-Turn/reposition every 2 hrs for pressure re-distribution on skin    6-Moisturize skin daily with skin nourishing cream

## 2022-09-26 NOTE — PROGRESS NOTES
Pt admitted to [facility] for gross hematuria and sepsis  Pt was evaluated in home by Sima MENDIETA on 9/23  Referrals for home care to be sent via [employer] [place] [employer]  [facility] RN CM spoke with inpatient case management Cher Covert  Report provided  In basket message sent to Donald Santos for clarification of bundle end date  In basket message sent to Selam Jarvis

## 2022-09-26 NOTE — ASSESSMENT & PLAN NOTE
History of DVT left upper extremity  Currently noted to have dependent right upper extremity edema   · Eliquis on hold  · Check venous Doppler

## 2022-09-26 NOTE — WOUND OSTOMY CARE
Progress Note - Wound   Oneilos Bal 80 y o  female MRN: [id number]  Unit/Bed#: [facility] 307-01 Encounter: [id number]      Assessment: This is an 80year old female patient admitted on 9/25/22 with cystits  She has a history of DM 2, CKD 3, CVA, dysphagia with PEG tube placement, vascular dementia, chronic sacral decubitus ulcer  She was awake, alert & non-verbal with daughter at bedside  The patient is dependent upon nursing staff for all of her care  Assessment Findings:  1-Blanchable erythema to right heel - orders in place for skin care & for prevention  2-Unstageable pressure injury to sacrum (POA) measuring 0 4 x  0 5 x 0 1 cm with 100% yellow slough and no drainage  Due to excoriation of periwound & frequent stools, protective barrier paste ordered for 3x/day & prn  Orders also in place for prevention due to danger of skin breakdown  Plan:   Skin care plans:  1-Calazime to sacrum, buttocks TID and PRN  2-Hydraguard to bilateral heel BID and PRN  3-Heel protectors to bilateral heels to offload pressure  4-Ehob cushion when out of bed  5-Turn/reposition q2h or when medically stable for pressure re-distribution on skin  6-Moisturize skin daily with skin nourishing cream      Wound 07/28/22 Pressure Injury Heel Right; Outer (Active)   Wound Image  Blanchable erythema 09/26/22 0955   Wound Length (cm) 1 cm 09/26/22 0955   Wound Width (cm) 3 cm 09/26/22 0955   Wound Depth (cm) 0 cm 09/26/22 0955   Wound Surface Area (cm^2) 3 cm^2 09/26/22 0955   Wound Volume (cm^3) 0 cm^3 09/26/22 0955   Calculated Wound Volume (cm^3) 0 cm^3 09/26/22 0955   Drainage Amount None 09/26/22 0955   Dressing Protective barrier 09/26/22 0955   Wound packed?  No 09/26/22 0955   Dressing Status Intact 09/26/22 0955       Wound 09/25/22 Pressure Injury Sacrum (Active)   Wound Image   09/26/22 0942   Wound Description Yellow;Slough 09/26/22 0942   Pressure Injury Stage Unstageable present on admission 09/26/22 5344   Aicha-wound Assessment Hyperpigmented;Scar Tissue; Excoriated 09/26/22 0942   Wound Length (cm) 0 4 cm 09/26/22 0942   Wound Width (cm) 0 5 cm 09/26/22 0942   Wound Depth (cm) 0 1 cm 09/26/22 0942   Wound Surface Area (cm^2) 0 2 cm^2 09/26/22 0942   Wound Volume (cm^3) 0 02 cm^3 09/26/22 0942   Calculated Wound Volume (cm^3) 0 02 cm^3 09/26/22 0942   Treatments Cleansed 09/26/22 0942   Dressing Moisture barrier (Calazime) 09/26/22 0942   Dressing Changed New 09/26/22 0942   Dressing Status Clean;Dry; Intact 09/26/22 8811     Discussed assessment findings, and plan of care/recommendations with Shyann Dallas RN  Wound care will follow along with patient throughout admission, please call or tiger text with questions and concerns    Recommendations written as orders    Sg Clark RN, BSN, José Luis Pat

## 2022-09-26 NOTE — PROGRESS NOTES
This OPCM RN spoke with [facility] RN  Patient is now receiving home visits with [facility]  Case was handed over to Betina Lopeste for continued follow up   This OPCM RN removed self from care team

## 2022-09-26 NOTE — PROGRESS NOTES
Progress Note - Urology      Patient: Johnson Groves   : 1933 Sex: female   MRN: [id number]     CSN: [id number]  Unit/Bed#: [address] [place] [address]     SUBJECTIVE:   Vs stable  cbi clear      Objective   Vitals: /58 (BP Location: Left arm)   Pulse 88   Temp 98 9 °F (37 2 °C) (Axillary)   Resp 17   Ht 5' 1" (1 549 m)   Wt 72 1 kg (158 lb 15 2 oz)   LMP  (LMP Unknown)   SpO2 95%   BMI 30 03 kg/m²     I/O last 24 hours:   In: 550 [IV Piggyback:550]  Out:  [Urine:]      Physical Exam:   General Alert awake   Normocephalic atraumatic PERRLA  Lungs clear bilaterally  Cardiac normal S1 normal S2  Abdomen soft, flank pain  cbi clear  Extremities no edema      Lab Results: CBC:   Lab Results   Component Value Date    WBC 6 92 2022    HGB 9 6 (L) 2022    HCT 31 4 (L) 2022    MCV 91 2022     2022    MCH 27 7 2022    MCHC 30 6 (L) 2022    RDW 14 2 2022    MPV 10 7 2022    NRBC 0 2022     CMP:   Lab Results   Component Value Date     2015     2022     2015    CO2 26 2022    CO2 29 2018    ANIONGAP 11 6 2015    BUN 39 (H) 2022    BUN 26 (H) 2015    CREATININE 1 19 2022    CREATININE 1 3 2015    GLUCOSE 103 2018    GLUCOSE 114 (H) 2015    CALCIUM 9 1 2022    CALCIUM 8 7 2015    AST 36 2022    AST 11 (L) 2015    ALT 35 2022    ALT 18 2015    ALKPHOS 100 2022    ALKPHOS 50 2015    PROT 6 9 2015    BILITOT 0 5 2015    EGFR 40 2022    EGFR 29 2018     Urinalysis:   Lab Results   Component Value Date    COLORU Red 2022    COLORU Yellow 2015    CLARITYU Cloudy 2022    CLARITYU YELLOW 2015    CLARITYU CLEAR 2015    SPECGRAV 1 015 2022    SPECGRAV 1 020 2015    PHUR 8 5 2022    PHUR 6 5 2018    PHUR 6 0 2015    LEUKOCYTESUR Large (A) 09/25/2022    LEUKOCYTESUR NEGATIVE 12/23/2015    NITRITE Positive (A) 09/25/2022    NITRITE NEGATIVE 12/23/2015    PROTEINUA TRACE 12/23/2015    GLUCOSEU Negative 09/25/2022    GLUCOSEU NEGATIVE 12/23/2015    KETONESU Negative 09/25/2022    KETONESU NEGATIVE 12/23/2015    BILIRUBINUR Small (A) 09/25/2022    BILIRUBINUR NEGATIVE 12/23/2015    BLOODU Large (A) 09/25/2022    BLOODU SMALL (A) 12/23/2015     Urine Culture:   Lab Results   Component Value Date    URINECX <10,000 cfu/ml  06/06/2019     PSA: No results found for: PSA      Assessment/ Plan:  Hematuria clearing  D/c cbi  Awaiting cultures          Radha Funez MD

## 2022-09-26 NOTE — ASSESSMENT & PLAN NOTE
As evidenced by fever (10 4F), tachycardia (106), tachypnea (26) leukocytosis (10 64)      Lactic acid within normal limit   Secondary to urinary tract infection  · Follow-up urine culture   · Follow-up blood culture   · Monitor fever   · Follow-up CBC  · Continue Rocephin

## 2022-09-26 NOTE — ASSESSMENT & PLAN NOTE
Family reported that patient is dealing with constipation and fecal impaction   CT with evidence of colonic and rectal stool   Likely contributed to urinary retention  · Bowel regimen with miralax daily   · Hold Lasix  · Increase free water flushes   · Enema x1 on admission was refused   · Monitor

## 2022-09-26 NOTE — CONSULTS
H&P Exam - Urology       Patient: Raphael Montoya   : 1933 Sex: female   MRN: [id number]     CSN: [id number]      History of Present Illness   HPI:  Raphael Montoya is a 80 y o  female who presents with gross hematuria seen in ER earlier   today nonverbal switched to cbi  With mild hematuria  No smoking  History quit in   2 packs/week        Review of Systems:   Constitutional:  Negative for activity change, fever, chills and diaphoresis  HENT: Negative for hearing loss and trouble swallowing  Eyes: Negative for itching and visual disturbance  Respiratory: Negative for chest tightness and shortness of breath  Cardiovascular: Negative for chest pain, edema  Gastrointestinal: Negative for abdominal distention, na abdominal pain, constipation, diarrhea, Nausea and vomiting  Genitourinary: Negative for decreased urine volume, difficulty urinating, dysuria, enuresis, frequency, hematuria and urgency  Musculoskeletal: Negative for gait problem and myalgias  Neurological: Negative for dizziness and headaches  Hematological: Does not bruise/bleed easily  Historical Information   Past Medical History:   Diagnosis Date    Acid reflux     on occ    Arthritis     DJD right hip replaced    Asthma     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 with no change    Cancer ([facility] )     colonic polyps, no surgery done    COPD (chronic obstructive pulmonary disease) (HCC)     Deep vein thrombosis (DVT) of left upper extremity ([facility] ) 2017    Dementia ([facility] )     Diabetes mellitus (HCC)     type 2    Diverticulosis     Edema     in legs on occ    Hypertension     on occ    Language barrier     speaks Bahamian & broken english    Stroke [facility])      Past Surgical History:   Procedure Laterality Date    COLON SURGERY      COLONOSCOPY      COLONOSCOPY N/A 2016    Procedure: COLONOSCOPY;  Surgeon: Sindhu Beauchamp MD;  Location: [place] [address] GI LAB;   Service:    Frankie Mendoza ESOPHAGOGASTRODUODENOSCOPY N/A 2016    Procedure: ESOPHAGOGASTRODUODENOSCOPY (EGD); Surgeon: Yvrose Toth MD;  Location: [facility] GI LAB; Service:    Alondra Segovia / Janeth Primus / Lenore Fontana PACEMAKER      IR STROKE ALERT  2022    JOINT REPLACEMENT Right 2015    hip    JOINT REPLACEMENT Left     knee    JOINT REPLACEMENT Right     knee    MITRAL VALVE REPLACEMENT      WY COLONOSCOPY FLX DX W/COLLJ SPEC WHEN PFRMD N/A 2018    Procedure: EGD AND COLONOSCOPY;  Surgeon: Yvrose Toth MD;  [facility];   Service: Gastroenterology    WY REVISE MEDIAN N/CARPAL TUNNEL SURG Left 2016    Procedure: RELEASE CARPAL TUNNEL;  Surgeon: Alma Rosa Barney MD;  Location: [facility] MAIN OR;  Service: Orthopedics    TUBAL LIGATION      VARICOSE VEIN SURGERY Bilateral      Social History   Social History     Substance and Sexual Activity   Alcohol Use Never     Social History     Substance and Sexual Activity   Drug Use No     Social History     Tobacco Use   Smoking Status Former Smoker    Packs/day: 0 25    Years: 10 00    Pack years: 2 50    Types: Cigarettes    Quit date: 46    Years since quittin 7   Smokeless Tobacco Never Used     Family History:   Family History   Problem Relation Age of Onset    Cancer Mother         throat       Meds/Allergies   Medications Prior to Admission   Medication    acetaminophen (TYLENOL) 160 mg/5 mL suspension    albuterol (2 5 mg/3 mL) 0 083 % nebulizer solution    apixaban (ELIQUIS) 2 5 mg    aspirin (ECOTRIN LOW STRENGTH) 81 mg EC tablet    atorvastatin (LIPITOR) 40 mg tablet    furosemide (LASIX) 20 mg tablet    glycerin, pediatric, 1 2 g rectal suppository    insulin glargine (LANTUS) 100 units/mL subcutaneous injection    Insulin Pen Needle (Pen Needles) 30G X 5 MM MISC    magnesium hydroxide (MILK OF MAGNESIA) 400 mg/5 mL oral suspension    metoprolol tartrate (LOPRESSOR) 25 mg tablet    polyethylene glycol (MIRALAX) 17 g packet    senna (SENOKOT) 8 6 MG tablet     Allergies   Allergen Reactions    Glimepiride Rash    Guaifenesin-Codeine Hallucinations    Heparin Other (See Comments)     Thrombocytopenia      Vancomycin      Red man syndrome       Objective   Vitals: /63   Pulse 100   Temp 98 6 °F (37 °C) (Axillary)   Resp 20   Ht 5' 1" (1 549 m)   Wt 71 9 kg (158 lb 8 2 oz)   LMP  (LMP Unknown)   SpO2 96%   BMI 29 95 kg/m²     Physical Exam:  General Alert awake   Normocephalic atraumatic PERRLA  Lungs clear bilaterally  Cardiac normal S1 normal S2  Abdomen soft, flank pain  cbi hematuria  Irrigated clots in ER  Extremities no edema    I/O last 24 hours:   In: 550 [IV Piggyback:550]  Out: 1700 [Urine:1700]    Invasive Devices  Report    Peripheral Intravenous Line  Duration           Peripheral IV 09/25/22 Right Antecubital <1 day          Drain  Duration           Gastrostomy/Enterostomy Percutaneous Endoscopic Gastrostomy (PEG) 20 Fr  LUQ 59 days    Urethral Catheter Three way 22 Fr  <1 day                    Lab Results: CBC:   Lab Results   Component Value Date    WBC 10 64 (H) 09/25/2022    HGB 11 7 09/25/2022    HCT 37 5 09/25/2022    MCV 88 09/25/2022     09/25/2022    MCH 27 5 09/25/2022    MCHC 31 2 (L) 09/25/2022    RDW 13 9 09/25/2022    MPV 10 4 09/25/2022    NRBC 0 09/25/2022     CMP:   Lab Results   Component Value Date     12/23/2015     09/25/2022     12/23/2015    CO2 29 09/25/2022    CO2 29 05/08/2018    ANIONGAP 11 6 12/23/2015    BUN 37 (H) 09/25/2022    BUN 26 (H) 12/23/2015    CREATININE 1 15 09/25/2022    CREATININE 1 3 12/23/2015    GLUCOSE 103 05/08/2018    GLUCOSE 114 (H) 12/23/2015    CALCIUM 9 6 09/25/2022    CALCIUM 8 7 12/23/2015    AST 36 09/25/2022    AST 11 (L) 12/23/2015    ALT 35 09/25/2022    ALT 18 12/23/2015    ALKPHOS 100 09/25/2022    ALKPHOS 50 12/23/2015    PROT 6 9 12/23/2015    BILITOT 0 5 12/23/2015    EGFR 42 09/25/2022    EGFR 29 05/08/2018     Urinalysis: Lab Results   Component Value Date    COLORU Red 09/25/2022    COLORU Yellow 02/19/2015    CLARITYU Cloudy 09/25/2022    CLARITYU YELLOW 12/23/2015    CLARITYU CLEAR 12/23/2015    SPECGRAV 1 015 09/25/2022    SPECGRAV 1 020 12/23/2015    PHUR 8 5 09/25/2022    PHUR 6 5 11/04/2018    PHUR 6 0 12/23/2015    LEUKOCYTESUR Large (A) 09/25/2022    LEUKOCYTESUR NEGATIVE 12/23/2015    NITRITE Positive (A) 09/25/2022    NITRITE NEGATIVE 12/23/2015    PROTEINUA TRACE 12/23/2015    GLUCOSEU Negative 09/25/2022    GLUCOSEU NEGATIVE 12/23/2015    KETONESU Negative 09/25/2022    KETONESU NEGATIVE 12/23/2015    BILIRUBINUR Small (A) 09/25/2022    BILIRUBINUR NEGATIVE 12/23/2015    BLOODU Large (A) 09/25/2022    BLOODU SMALL (A) 12/23/2015     Urine Culture:   Lab Results   Component Value Date    URINECX <10,000 cfu/ml  06/06/2019     PSA: No results found for: PSA        Assessment/ Plan:  Gross hematuria  Hold blood thinner  cbi  Awaiting culture or infection      Radha Funez MD

## 2022-09-26 NOTE — PLAN OF CARE
Problem: Potential for Falls  Goal: Patient will remain free of falls  Description: INTERVENTIONS:  - Educate patient/family on patient safety including physical limitations  - Instruct patient to call for assistance with activity   - Consult OT/PT to assist with strengthening/mobility   - Keep Call bell within reach  - Keep bed low and locked with side rails adjusted as appropriate  - Keep care items and personal belongings within reach  - Initiate and maintain comfort rounds  - Make Fall Risk Sign visible to staff  - Offer Toileting every 2 Hours, in advance of need  - Initiate/Maintain bed alarm  - Obtain necessary fall risk management equipment: yes  - Apply yellow socks and bracelet for high fall risk patients  - Consider moving patient to room near nurses station  Outcome: Progressing     Problem: RESPIRATORY - ADULT  Goal: Achieves optimal ventilation and oxygenation  Description: INTERVENTIONS:  - Assess for changes in respiratory status  - Assess for changes in mentation and behavior  - Position to facilitate oxygenation and minimize respiratory effort  - Oxygen administered by appropriate delivery if ordered  - Initiate smoking cessation education as indicated  - Encourage broncho-pulmonary hygiene including cough, deep breathe, Incentive Spirometry  - Assess the need for suctioning and aspirate as needed  - Assess and instruct to report SOB or any respiratory difficulty  - Respiratory Therapy support as indicated  Outcome: Progressing     Problem: Nutrition/Hydration-ADULT  Goal: Nutrient/Hydration intake appropriate for improving, restoring or maintaining nutritional needs  Description: Monitor and assess patient's nutrition/hydration status for malnutrition  Collaborate with interdisciplinary team and initiate plan and interventions as ordered  Monitor patient's weight and dietary intake as ordered or per policy  Utilize nutrition screening tool and intervene as necessary   Determine patient's food preferences and provide high-protein, high-caloric foods as appropriate  INTERVENTIONS:  - Monitor oral intake, urinary output, labs, and treatment plans  - Assess nutrition and hydration status and recommend course of action  - Evaluate amount of meals eaten  - Assist patient with eating if necessary   - Allow adequate time for meals  - Recommend/ encourage appropriate diets, oral nutritional supplements, and vitamin/mineral supplements  - Order, calculate, and assess calorie counts as needed  - Recommend, monitor, and adjust tube feedings and TPN/PPN based on assessed needs  - Assess need for intravenous fluids  - Provide specific nutrition/hydration education as appropriate  - Include patient/family/caregiver in decisions related to nutrition  Outcome: Progressing     Problem: MOBILITY - ADULT  Goal: Maintain or return to baseline ADL function  Description: INTERVENTIONS:  -  Assess patient's ability to carry out ADLs; assess patient's baseline for ADL function and identify physical deficits which impact ability to perform ADLs (bathing, care of mouth/teeth, toileting, grooming, dressing, etc )  - Assess/evaluate cause of self-care deficits   - Assess range of motion  - Assess patient's mobility; develop plan if impaired  - Assess patient's need for assistive devices and provide as appropriate  - Encourage maximum independence but intervene and supervise when necessary  - Involve family in performance of ADLs  - Assess for home care needs following discharge   - Consider OT consult to assist with ADL evaluation and planning for discharge  - Provide patient education as appropriate  Outcome: Progressing  Goal: Maintains/Returns to pre admission functional level  Description: INTERVENTIONS:  - Perform BMAT or MOVE assessment daily    - Set and communicate daily mobility goal to care team and patient/family/caregiver     - Collaborate with rehabilitation services on mobility goals if consulted  - Perform Range of Motion 3 times a day  - Reposition patient every 2 hours    - Out of bed for toileting  - Record patient progress and toleration of activity level   Outcome: Progressing     Problem: Prexisting or High Potential for Compromised Skin Integrity  Goal: Skin integrity is maintained or improved  Description: INTERVENTIONS:  - Identify patients at risk for skin breakdown  - Assess and monitor skin integrity  - Assess and monitor nutrition and hydration status  - Monitor labs   - Assess for incontinence   - Turn and reposition patient  - Assist with mobility/ambulation  - Relieve pressure over bony prominences  - Avoid friction and shearing  - Provide appropriate hygiene as needed including keeping skin clean and dry  - Evaluate need for skin moisturizer/barrier cream  - Collaborate with interdisciplinary team   - Patient/family teaching  - Consider wound care consult   Outcome: Progressing

## 2022-09-26 NOTE — PLAN OF CARE
Problem: Potential for Falls  Goal: Patient will remain free of falls  Description: INTERVENTIONS:  - Educate patient/family on patient safety including physical limitations  - Instruct patient to call for assistance with activity   - Consult OT/PT to assist with strengthening/mobility   - Keep Call bell within reach  - Keep bed low and locked with side rails adjusted as appropriate  - Keep care items and personal belongings within reach  - Initiate and maintain comfort rounds  - Make Fall Risk Sign visible to staff  - Offer Toileting every 2 Hours, in advance of need  - Initiate/Maintain bed alarm  - Obtain necessary fall risk management equipment: yes  - Apply yellow socks and bracelet for high fall risk patients  - Consider moving patient to room near nurses station  Outcome: Progressing     Problem: RESPIRATORY - ADULT  Goal: Achieves optimal ventilation and oxygenation  Description: INTERVENTIONS:  - Assess for changes in respiratory status  - Assess for changes in mentation and behavior  - Position to facilitate oxygenation and minimize respiratory effort  - Oxygen administered by appropriate delivery if ordered  - Initiate smoking cessation education as indicated  - Encourage broncho-pulmonary hygiene including cough, deep breathe, Incentive Spirometry  - Assess the need for suctioning and aspirate as needed  - Assess and instruct to report SOB or any respiratory difficulty  - Respiratory Therapy support as indicated  Outcome: Progressing     Problem: Nutrition/Hydration-ADULT  Goal: Nutrient/Hydration intake appropriate for improving, restoring or maintaining nutritional needs  Description: Monitor and assess patient's nutrition/hydration status for malnutrition  Collaborate with interdisciplinary team and initiate plan and interventions as ordered  Monitor patient's weight and dietary intake as ordered or per policy  Utilize nutrition screening tool and intervene as necessary   Determine patient's food preferences and provide high-protein, high-caloric foods as appropriate  INTERVENTIONS:  - Monitor oral intake, urinary output, labs, and treatment plans  - Assess nutrition and hydration status and recommend course of action  - Evaluate amount of meals eaten  - Assist patient with eating if necessary   - Allow adequate time for meals  - Recommend/ encourage appropriate diets, oral nutritional supplements, and vitamin/mineral supplements  - Order, calculate, and assess calorie counts as needed  - Recommend, monitor, and adjust tube feedings and TPN/PPN based on assessed needs  - Assess need for intravenous fluids  - Provide specific nutrition/hydration education as appropriate  - Include patient/family/caregiver in decisions related to nutrition  Outcome: Progressing     Problem: MOBILITY - ADULT  Goal: Maintain or return to baseline ADL function  Description: INTERVENTIONS:  -  Assess patient's ability to carry out ADLs; assess patient's baseline for ADL function and identify physical deficits which impact ability to perform ADLs (bathing, care of mouth/teeth, toileting, grooming, dressing, etc )  - Assess/evaluate cause of self-care deficits   - Assess range of motion  - Assess patient's mobility; develop plan if impaired  - Assess patient's need for assistive devices and provide as appropriate  - Encourage maximum independence but intervene and supervise when necessary  - Involve family in performance of ADLs  - Assess for home care needs following discharge   - Consider OT consult to assist with ADL evaluation and planning for discharge  - Provide patient education as appropriate  Outcome: Progressing  Goal: Maintains/Returns to pre admission functional level  Description: INTERVENTIONS:  - Perform BMAT or MOVE assessment daily    - Set and communicate daily mobility goal to care team and patient/family/caregiver     - Collaborate with rehabilitation services on mobility goals if consulted  - Perform Range of Motion 3 times a day  - Reposition patient every 2 hours    - Out of bed for toileting  - Record patient progress and toleration of activity level   Outcome: Progressing

## 2022-09-26 NOTE — PHYSICAL THERAPY NOTE
PHYSICAL THERAPY     09/26/22 1145   Note Type   Note type Evaluation; Cancelled Session   Cancel Reasons Other  (patient with nursing care, will re-attempt at a later time)   Licensure   [place] License Number  Angela Abdullahi PT [id number]

## 2022-09-26 NOTE — PROGRESS NOTES
[facility] had received in basket from AZALIA FERNÁNDEZ [facility] on this case last Friday  [facility] notes Ce Hwang was covering while out of the office  SW had discussed this referral with RN SHELLEY Pelayo mentioned patient not having VNA services for 2 months  Lindsay Pelayo mentioned patient was referred ot [facility]  Lindsay Pelayo mentioned that she called [facility] and they do not know when they will have anyone to assist this patient  PAPITO had completed a chart review  Per chart, RN SHELLEY Coto and PAPITO Linder were on the care team for this patient  Per chart, both were aware of issues r/t VNA services  Per chart, patient was in the ED as of yesterday  [facility] communicated this to Lindsay Pelayo stated she would reach out to [address] she is covering that floor  Lindsay Pelayo spoke with Julianne via phone as cc'd [facility] on conversation  Julianne will f/u on VNA referral and keep ROSA and Lindsay Pelayo posted on this  PAPITO added herself to care team to ensure patient if placed after discharged  SWCM opened socially complex care episode  [facility] will continue to f/u

## 2022-09-26 NOTE — ASSESSMENT & PLAN NOTE
Wt Readings from Last 3 Encounters:   09/25/22 71 9 kg (158 lb 8 2 oz)   09/02/22 71 7 kg (158 lb 1 1 oz)   07/23/22 64 9 kg (143 lb)     Appears euvolemic   · Continue metoprolol  · Hold Lasix   · Monitor intake output  · Daily weight

## 2022-09-26 NOTE — ASSESSMENT & PLAN NOTE
Associated with urinary retention and indwelling Sears catheter  Presented with hematuria, recent urinary retention requiring Sears   Noted to have low-grade fever, leukocytosis and tachycardia   UA suggestive of UTI   · IV ceftriaxone   · Monitor intake output   · Will follow-up urine culture

## 2022-09-26 NOTE — ASSESSMENT & PLAN NOTE
Wt Readings from Last 3 Encounters:   09/25/22 71 9 kg (158 lb 8 2 oz)   09/02/22 71 7 kg (158 lb 1 1 oz)   07/23/22 64 9 kg (143 lb)

## 2022-09-26 NOTE — ASSESSMENT & PLAN NOTE
Lab Results   Component Value Date    HGBA1C 6 3 (H) 07/23/2022       Recent Labs     09/25/22  1745 09/25/22 2036 09/26/22  0010 09/26/22 0223   POCGLU 108 97 142* 174*       Blood Sugar Average: Last 72 hrs:  (P) 130 25     Resume home Lantus 11 units q h s  while in hospital  Insulin sliding scale

## 2022-09-27 ENCOUNTER — APPOINTMENT (OUTPATIENT)
Dept: RADIOLOGY | Facility: HOSPITAL | Age: 87
DRG: 872 | End: 2022-09-27
Payer: MEDICARE

## 2022-09-27 ENCOUNTER — APPOINTMENT (INPATIENT)
Dept: RADIOLOGY | Facility: HOSPITAL | Age: 87
DRG: 872 | End: 2022-09-27
Attending: INTERNAL MEDICINE
Payer: MEDICARE

## 2022-09-27 PROBLEM — I82.621 ACUTE DEEP VEIN THROMBOSIS (DVT) OF RIGHT UPPER EXTREMITY (HCC): Status: ACTIVE | Noted: 2018-01-25

## 2022-09-27 LAB
ANION GAP SERPL CALCULATED.3IONS-SCNC: 9 MMOL/L (ref 4–13)
BACTERIA UR CULT: ABNORMAL
BUN SERPL-MCNC: 37 MG/DL (ref 5–25)
CALCIUM SERPL-MCNC: 8.5 MG/DL (ref 8.3–10.1)
CHLORIDE SERPL-SCNC: 104 MMOL/L (ref 96–108)
CO2 SERPL-SCNC: 27 MMOL/L (ref 21–32)
CREAT SERPL-MCNC: 1.15 MG/DL (ref 0.6–1.3)
ERYTHROCYTE [DISTWIDTH] IN BLOOD BY AUTOMATED COUNT: 14.2 % (ref 11.6–15.1)
GFR SERPL CREATININE-BSD FRML MDRD: 42 ML/MIN/1.73SQ M
GLUCOSE SERPL-MCNC: 157 MG/DL (ref 65–140)
GLUCOSE SERPL-MCNC: 158 MG/DL (ref 65–140)
GLUCOSE SERPL-MCNC: 160 MG/DL (ref 65–140)
GLUCOSE SERPL-MCNC: 173 MG/DL (ref 65–140)
GLUCOSE SERPL-MCNC: 178 MG/DL (ref 65–140)
GLUCOSE SERPL-MCNC: 211 MG/DL (ref 65–140)
HCT VFR BLD AUTO: 29.3 % (ref 34.8–46.1)
HGB BLD-MCNC: 8.9 G/DL (ref 11.5–15.4)
MAGNESIUM SERPL-MCNC: 2.2 MG/DL (ref 1.6–2.6)
MCH RBC QN AUTO: 27.5 PG (ref 26.8–34.3)
MCHC RBC AUTO-ENTMCNC: 30.4 G/DL (ref 31.4–37.4)
MCV RBC AUTO: 90 FL (ref 82–98)
PHOSPHATE SERPL-MCNC: 4.3 MG/DL (ref 2.3–4.1)
PLATELET # BLD AUTO: 238 THOUSANDS/UL (ref 149–390)
PMV BLD AUTO: 11 FL (ref 8.9–12.7)
POTASSIUM SERPL-SCNC: 4.4 MMOL/L (ref 3.5–5.3)
RBC # BLD AUTO: 3.24 MILLION/UL (ref 3.81–5.12)
SODIUM SERPL-SCNC: 140 MMOL/L (ref 135–147)
WBC # BLD AUTO: 7.13 THOUSAND/UL (ref 4.31–10.16)

## 2022-09-27 PROCEDURE — 80048 BASIC METABOLIC PNL TOTAL CA: CPT | Performed by: INTERNAL MEDICINE

## 2022-09-27 PROCEDURE — 82948 REAGENT STRIP/BLOOD GLUCOSE: CPT

## 2022-09-27 PROCEDURE — 84100 ASSAY OF PHOSPHORUS: CPT | Performed by: NURSE PRACTITIONER

## 2022-09-27 PROCEDURE — 99232 SBSQ HOSP IP/OBS MODERATE 35: CPT | Performed by: NURSE PRACTITIONER

## 2022-09-27 PROCEDURE — 97163 PT EVAL HIGH COMPLEX 45 MIN: CPT

## 2022-09-27 PROCEDURE — 83735 ASSAY OF MAGNESIUM: CPT | Performed by: NURSE PRACTITIONER

## 2022-09-27 PROCEDURE — 93971 EXTREMITY STUDY: CPT

## 2022-09-27 PROCEDURE — 93971 EXTREMITY STUDY: CPT | Performed by: SURGERY

## 2022-09-27 PROCEDURE — 94760 N-INVAS EAR/PLS OXIMETRY 1: CPT

## 2022-09-27 PROCEDURE — 85027 COMPLETE CBC AUTOMATED: CPT | Performed by: INTERNAL MEDICINE

## 2022-09-27 PROCEDURE — 74018 RADEX ABDOMEN 1 VIEW: CPT

## 2022-09-27 PROCEDURE — 94640 AIRWAY INHALATION TREATMENT: CPT

## 2022-09-27 PROCEDURE — 97110 THERAPEUTIC EXERCISES: CPT

## 2022-09-27 PROCEDURE — 87505 NFCT AGENT DETECTION GI: CPT | Performed by: NURSE PRACTITIONER

## 2022-09-27 RX ORDER — LORATADINE 10 MG/1
10 TABLET ORAL DAILY
Status: DISCONTINUED | OUTPATIENT
Start: 2022-09-27 | End: 2022-09-29 | Stop reason: HOSPADM

## 2022-09-27 RX ORDER — SENNOSIDES 8.6 MG
2 TABLET ORAL
Status: DISCONTINUED | OUTPATIENT
Start: 2022-09-27 | End: 2022-09-28

## 2022-09-27 RX ORDER — CEFAZOLIN SODIUM 1 G/50ML
1000 SOLUTION INTRAVENOUS EVERY 12 HOURS
Status: DISCONTINUED | OUTPATIENT
Start: 2022-09-27 | End: 2022-09-29 | Stop reason: HOSPADM

## 2022-09-27 RX ORDER — SACCHAROMYCES BOULARDII 250 MG
250 CAPSULE ORAL 2 TIMES DAILY
Status: DISCONTINUED | OUTPATIENT
Start: 2022-09-27 | End: 2022-09-29 | Stop reason: HOSPADM

## 2022-09-27 RX ADMIN — Medication 20 MG: at 10:00

## 2022-09-27 RX ADMIN — CEFAZOLIN SODIUM 1000 MG: 1 SOLUTION INTRAVENOUS at 22:24

## 2022-09-27 RX ADMIN — METOPROLOL TARTRATE 25 MG: 25 TABLET, FILM COATED ORAL at 10:00

## 2022-09-27 RX ADMIN — ACETAMINOPHEN 650 MG: 650 SUSPENSION ORAL at 22:23

## 2022-09-27 RX ADMIN — ALBUTEROL SULFATE 2.5 MG: 2.5 SOLUTION RESPIRATORY (INHALATION) at 07:34

## 2022-09-27 RX ADMIN — INSULIN LISPRO 1 UNITS: 100 INJECTION, SOLUTION INTRAVENOUS; SUBCUTANEOUS at 12:17

## 2022-09-27 RX ADMIN — METOPROLOL TARTRATE 25 MG: 25 TABLET, FILM COATED ORAL at 22:24

## 2022-09-27 RX ADMIN — POLYETHYLENE GLYCOL 3350 17 G: 17 POWDER, FOR SOLUTION ORAL at 12:04

## 2022-09-27 RX ADMIN — ALBUTEROL SULFATE 2.5 MG: 2.5 SOLUTION RESPIRATORY (INHALATION) at 20:23

## 2022-09-27 RX ADMIN — INSULIN LISPRO 1 UNITS: 100 INJECTION, SOLUTION INTRAVENOUS; SUBCUTANEOUS at 05:00

## 2022-09-27 RX ADMIN — ALBUTEROL SULFATE 2.5 MG: 2.5 SOLUTION RESPIRATORY (INHALATION) at 13:10

## 2022-09-27 RX ADMIN — ATORVASTATIN CALCIUM 80 MG: 80 TABLET, FILM COATED ORAL at 16:30

## 2022-09-27 RX ADMIN — LORATADINE 10 MG: 10 TABLET ORAL at 12:14

## 2022-09-27 RX ADMIN — APIXABAN 2.5 MG: 2.5 TABLET, FILM COATED ORAL at 10:00

## 2022-09-27 RX ADMIN — Medication 250 MG: at 22:24

## 2022-09-27 RX ADMIN — INSULIN GLARGINE 11 UNITS: 100 INJECTION, SOLUTION SUBCUTANEOUS at 22:25

## 2022-09-27 RX ADMIN — INSULIN LISPRO 1 UNITS: 100 INJECTION, SOLUTION INTRAVENOUS; SUBCUTANEOUS at 17:54

## 2022-09-27 RX ADMIN — CEFAZOLIN SODIUM 1000 MG: 1 SOLUTION INTRAVENOUS at 10:00

## 2022-09-27 RX ADMIN — APIXABAN 5 MG: 5 TABLET, FILM COATED ORAL at 17:39

## 2022-09-27 NOTE — ASSESSMENT & PLAN NOTE
Improving, hematuria resolving    Associated with urinary retention and indwelling schaefer catheter  Presented with hematuria, recent urinary retention requiring schaefer insertion last week  Noted to have low-grade fever, leukocytosis and tachycardia   UA suggestive of UTI   · Urine culture grew proteus sensitive to Cefazolin  · Will transition ceftriaxone to cefazolin

## 2022-09-27 NOTE — ASSESSMENT & PLAN NOTE
· Continue tube feeding, Jevity 1 2 at 50 cc/hour with free water flush 125 cc every 6 hours per nutrition recommendations

## 2022-09-27 NOTE — ASSESSMENT & PLAN NOTE
Wt Readings from Last 3 Encounters:   09/27/22 71 2 kg (157 lb)   09/02/22 71 7 kg (158 lb 1 1 oz)   07/23/22 64 9 kg (143 lb)     Appears euvolemic   · Continue metoprolol  · Hold Lasix   · Monitor intake output  · Daily weight

## 2022-09-27 NOTE — PROGRESS NOTES
[facility]  66   Progress Note - Eleuterio Benavides 1933, 80 y o  female MRN: [id number]  Unit/Bed#: [address] [place] [address] Encounter: [id number]  Primary Care Provider: Petra Haider MD   Date and time admitted to hospital: 9/25/2022  9:35 AM    * Acute cystitis with hematuria  Assessment & Plan  Associated with urinary retention and indwelling schaefer catheter  Presented with hematuria, recent urinary retention requiring schaefer insertion last week  Noted to have low-grade fever, leukocytosis and tachycardia   UA suggestive of UTI   · Urine culture grew proteus sensitive to Cefazolin  · Will transition ceftriaxone to cefazolin      Gross hematuria  Assessment & Plan  Recently noted to have urinary retention required schaefer catheter   Presented with gross hematuria likely 2/2 UTI  CT abdomen without any hydronephrosis   · Urine now clear  · Appreciate urology input  · OK to restart Eliquis   · Consider restarting ASA if urine remains clear   · Continue ABX  · Monitor H&H    Constipation  Assessment & Plan  Family reported that patient is dealing with constipation and fecal impaction   CT with evidence of colonic and rectal stool   Likely contributed to urinary retention  · Bowel regimen with miralax daily   · Hold Lasix  · Increase free water flushes per nutrition recommendations      Acute deep vein thrombosis (DVT) of right upper extremity ([address] )  Assessment & Plan  History of DVT left upper extremity  Currently noted to have dependent right upper extremity edema   · Venous duplex shows a subacute DVT in the basilic vein   · Was on Eliquis 2 5 mg BID at home   Will increase to 5 mg BID     Decubitus ulcer of sacral area  Assessment & Plan  · Offloading   · Wound care evaluation    S/P percutaneous endoscopic gastrostomy (PEG) tube placement [facility])  Assessment & Plan  · Continue tube feeding, Jevity 1 2 at 50 cc/hour with free water flush 125 cc every 6 hours per nutrition recommendations     Type 2 diabetes mellitus without complication, without long-term current use of insulin [facility])  Assessment & Plan  Lab Results   Component Value Date    HGBA1C 6 3 (H) 07/23/2022       Recent Labs     09/27/22  0445 09/27/22  0716 09/27/22  1126 09/27/22  1215   POCGLU 178* 157* 211* 173*       Blood Sugar Average: Last 72 hrs:  (P) 162     · Continue home Lantus 11 units q h s  while in hospital  · Insulin sliding scale q6h    Stage 3 chronic kidney disease ([facility] [address] )  Assessment & Plan  · At baseline, monitor    Paroxysmal A-fib ([facility])  Assessment & Plan  · Continue metoprolol   · OK to start Eliquis  Increase to 5 mg BID     Hypercholesterolemia  Assessment & Plan  · On statin    Vascular dementia ([facility] [address] )  Assessment & Plan  · At baseline    Chronic systolic CHF (congestive heart failure) ([facility])  Assessment & Plan  Wt Readings from Last 3 Encounters:   09/27/22 71 2 kg (157 lb)   09/02/22 71 7 kg (158 lb 1 1 oz)   07/23/22 64 9 kg (143 lb)     Appears euvolemic   · Continue metoprolol  · Hold Lasix   · Monitor intake output  · Daily weight    History of CVA (cerebrovascular accident)  Assessment & Plan  History of CVA x4 with right hemiplegia, dysphagia status post PEG tube placement   Currently at baseline   · Supportive care   · PT/OT as tolerated  · Continue statin  · OK to restart Eliquis  · Consider starting ASA if urine remains clear       VTE Pharmacologic Prophylaxis: VTE Score: 10 Moderate Risk (Score 3-4) - Pharmacological DVT Prophylaxis Ordered: apixaban (Eliquis)  Patient Centered Rounds: I performed bedside rounds with nursing staff today  Discussions with Specialists or Other Care Team Provider: nursing, CM, urology     Education and Discussions with Family / Patient: Updated  (daughter) at bedside  Time Spent for Care: 30 minutes  More than 50% of total time spent on counseling and coordination of care as described above      Current Length of Stay: 2 day(s)  Current Patient Status: Inpatient Certification Statement: The patient will continue to require additional inpatient hospital stay due to hematuria, restarting Eliquis   Discharge Plan: Anticipate discharge tomorrow to rehab facility  Code Status: Level 1 - Full Code    Subjective:   Patient seen and examined at bedside  Resting comfortably  Non verbal from recent stroke  Daughter at bedside  Reports post nasal drip and would like to try claritin  Objective:     Vitals:   Temp (24hrs), Av 3 °F (36 8 °C), Min:97 2 °F (36 2 °C), Max:98 9 °F (37 2 °C)    Temp:  [97 2 °F (36 2 °C)-98 9 °F (37 2 °C)] 98 9 °F (37 2 °C)  HR:  [94-98] 95  Resp:  [18-20] 20  BP: (107-128)/(53-91) 123/91  SpO2:  [90 %-95 %] 94 %  Body mass index is 29 66 kg/m²  Input and Output Summary (last 24 hours): Intake/Output Summary (Last 24 hours) at 2022 1319  Last data filed at 2022 0458  Gross per 24 hour   Intake --   Output 875 ml   Net -875 ml       Physical Exam:   Physical Exam  Vitals and nursing note reviewed  Constitutional:       General: She is not in acute distress  Appearance: She is obese  She is ill-appearing  She is not toxic-appearing or diaphoretic  Comments: Pleasant, non verbal female resting in bed on room air    HENT:      Head: Normocephalic  Mouth/Throat:      Mouth: Mucous membranes are moist    Eyes:      Conjunctiva/sclera: Conjunctivae normal    Cardiovascular:      Rate and Rhythm: Normal rate  Pulmonary:      Effort: Pulmonary effort is normal       Breath sounds: Normal breath sounds  No wheezing, rhonchi or rales  Abdominal:      General: Bowel sounds are normal  There is no distension  Palpations: Abdomen is soft  Tenderness: There is no abdominal tenderness  Comments: PEG tube   Musculoskeletal:         General: Normal range of motion  Cervical back: Normal range of motion  Right lower leg: No edema  Left lower leg: No edema  Skin:     General: Skin is warm and dry  Capillary Refill: Capillary refill takes less than 2 seconds  Neurological:      Mental Status: She is alert and oriented to person, place, and time  Mental status is at baseline  Motor: Weakness present  Psychiatric:         Speech: She is noncommunicative  Behavior: Behavior normal          Cognition and Memory: Cognition is impaired  Memory is impaired  Judgment: Judgment is inappropriate  Additional Data:     Labs:  Results from last 7 days   Lab Units 09/27/22 0458 09/26/22  0624 09/25/22  1007   WBC Thousand/uL 7 13   < > 10 64*   HEMOGLOBIN g/dL 8 9*   < > 11 7   HEMATOCRIT % 29 3*   < > 37 5   PLATELETS Thousands/uL 238   < > 287   NEUTROS PCT %  --   --  87*   LYMPHS PCT %  --   --  6*   MONOS PCT %  --   --  7   EOS PCT %  --   --  0    < > = values in this interval not displayed  Results from last 7 days   Lab Units 09/27/22 0458 09/26/22  0624 09/25/22  1007   SODIUM mmol/L 140   < > 137   POTASSIUM mmol/L 4 4   < > 4 7   CHLORIDE mmol/L 104   < > 100   CO2 mmol/L 27   < > 29   BUN mg/dL 37*   < > 37*   CREATININE mg/dL 1 15   < > 1 15   ANION GAP mmol/L 9   < > 8   CALCIUM mg/dL 8 5   < > 9 6   ALBUMIN g/dL  --   --  2 4*   TOTAL BILIRUBIN   --   --  0 68   ALK PHOS U/L  --   --  100   ALT U/L  --   --  35   AST U/L  --   --  36   GLUCOSE RANDOM mg/dL 158*   < > 130    < > = values in this interval not displayed       Results from last 7 days   Lab Units 09/25/22  1007   INR  1 17     Results from last 7 days   Lab Units 09/27/22  1215 09/27/22  1126 09/27/22  0716 09/27/22  0445 09/26/22  2311 09/26/22  2040 09/26/22  1626 09/26/22  1107 09/26/22  0610 09/26/22  0223 09/26/22  0010 09/25/22  2036   POC GLUCOSE mg/dl 173* 211* 157* 178* 170* 154* 214* 148* 180* 174* 142* 97         Results from last 7 days   Lab Units 09/25/22  1007   LACTIC ACID mmol/L 0 9       Lines/Drains:  Invasive Devices  Report    Peripheral Intravenous Line  Duration Peripheral IV 09/25/22 Right Antecubital 2 days          Drain  Duration           Gastrostomy/Enterostomy Percutaneous Endoscopic Gastrostomy (PEG) 20 Fr  LUQ 61 days    Urethral Catheter Three way 22 Fr  1 day              Urinary Catheter:  Goal for removal: Remove after 48 hrs of I/O monitoring               Imaging: Reviewed radiology reports from this admission including: CT C/A/P    Recent Cultures (last 7 days):   Results from last 7 days   Lab Units 09/25/22  1209 09/25/22  1019 09/25/22  1007   BLOOD CULTURE  No Growth at 24 hrs   --  No Growth at 24 hrs  URINE CULTURE   --  >100,000 cfu/ml Proteus mirabilis*  --        Last 24 Hours Medication List:   Current Facility-Administered Medications   Medication Dose Route Frequency Provider Last Rate    acetaminophen  650 mg Per G Tube Q4H PRN Dagmar Perdue MD      albuterol  2 5 mg Nebulization Q6H PRN Dagmar Perdue MD      albuterol  2 5 mg Nebulization TID Dagmar Perdue MD      apixaban  5 mg Per PEG Tube BID GAYATRI Jo      atorvastatin  80 mg Per G Tube Daily With Julián Aguilera MD      cefazolin  1,000 mg Intravenous Q12H GAYATRI Jo 1,000 mg (09/27/22 1000)    insulin glargine  11 Units Subcutaneous HS Dagmar Perdue MD      insulin lispro  1-5 Units Subcutaneous Q6H [address] Lizz Bernstein MD      loratadine  10 mg Oral Daily GAYATRI Jo      magnesium hydroxide  30 mL Oral Daily PRN Dagmar Perdue MD      metoprolol tartrate  25 mg Per G Tube Q12H [address] Dagmar Perdue MD      omeprazole (PRILOSEC) suspension 2 mg/mL  20 mg Per PEG Tube Daily Dagmar Perdue MD      polyethylene glycol  17 g Per G Tube Daily Dagmar Perdue MD      senna  2 tablet Per PEG Tube HS Dagmar Perdue MD          Today, Patient Was Seen By: GAYATRI Jo    **Please Note: This note may have been constructed using a voice recognition system  **

## 2022-09-27 NOTE — ASSESSMENT & PLAN NOTE
History of CVA x4 with right hemiplegia, dysphagia status post PEG tube placement   Currently at baseline   · Supportive care   · PT/OT as tolerated  · Continue statin  · OK to restart Eliquis  · Consider starting ASA if urine remains clear

## 2022-09-27 NOTE — ASSESSMENT & PLAN NOTE
History of DVT left upper extremity  Currently noted to have dependent right upper extremity edema   · Venous duplex shows a subacute DVT in the basilic vein   · Was on Eliquis 2 5 mg BID at home   Will increase to 5 mg BID

## 2022-09-27 NOTE — ASSESSMENT & PLAN NOTE
Resolving,    CT with evidence of colonic and rectal stool   Likely contributed to urinary retention  · Bowel regimen with miralax daily   · Hold Lasix  · Increase free water flushes per nutrition recommendations

## 2022-09-27 NOTE — NURSING NOTE
Corrections: reason for urinary catheter is for acute urinary retention /obstruction failing urinary retention

## 2022-09-27 NOTE — PHYSICAL THERAPY NOTE
PHYSICAL THERAPY EVALUATION/TREATMENT     09/27/22 1045   PT Last Visit   PT Visit Date 09/27/22   Note Type   Note type Evaluation   Pain Assessment   Pain Assessment Tool FLACC   Pain Rating: FLACC (Rest) - Face 0   Pain Rating: FLACC (Rest) - Legs 0   Pain Rating: FLACC (Rest) - Activity 0   Pain Rating: FLACC (Rest) - Cry 0   Pain Rating: FLACC (Rest) - Consolability 0   Score: FLACC (Rest) 0   Pain Rating: FLACC (Activity) - Face 0   Pain Rating: FLACC (Activity) - Legs 0   Pain Rating: FLACC (Activity) - Activity 0   Pain Rating: FLACC (Activity) - Cry 0   Pain Rating: FLACC (Activity) - Consolability 0   Score: FLACC (Activity) 0   Restrictions/Precautions   Weight Bearing Precautions Per Order No   Other Precautions Cognitive; Chair Alarm; Bed Alarm  (non verbal)   Home Living   Type of Home House   Home Layout Multi-level   Home Equipment Mechanical lift; Hospital bed;Feeding equipment   Prior Function   Level of [place] Total dependent   Lives With Family   Receives Help From Family   ADL Assistance   (dependent)   IADLs   (dependent)   Comments Pt's family take turns staying with pt so she has 24/7 care  Pt is total assist for all ADLs  Pt is non ambulatory, gets to chair via soy lift  Pt is bed/wheelchair bound   Pt does not follow commands or move right side actively  Does move left UE but not to command  General   Additional Pertinent History Pt admitted with blood in urine  History of 4 CVAs per daughter  but the last on in July of 2022 left pt with current status      Family/Caregiver Present Yes  (daughter)   Cognition   Overall Cognitive Status Impaired   Arousal/Participation Alert   Orientation Level Unable to assess   Memory Unable to assess   Following Commands Unable to follow one step commands   Subjective   Subjective pt is non verbal   RLE Assessment   RLE Assessment WFL  (PROM)   LLE Assessment   LLE Assessment WFL  (PROM)   Bed Mobility   Rolling R 1  Dependent Rolling L 1  Dependent   Supine to Sit Unable to assess   Additional Comments Will assess when assistance is available due to dependence of pt  Transfers   Sit to Stand Unable to assess   Additional Comments sit <> stand N/A   Ambulation/Elevation   Gait Assistance Not tested   Activity Tolerance   Activity Tolerance Treatment limited secondary to medical complications (Comment)   Nurse Made Aware yes   Assessment   Prognosis Fair   Problem List Decreased strength;Decreased range of motion;Decreased endurance; Impaired balance;Decreased mobility; Decreased coordination;Decreased cognition; Impaired judgement;Decreased safety awareness;Decreased skin integrity   Assessment Patient seen for Physical Therapy evaluation  Patient admitted with Acute cystitis with hematuria  Comorbidities affecting patient's physical performance include: CVA, DM, dementia, CHF, CKD, non verbal  Personal factors affecting patient at time of initial evaluation include: lives in multi story house, inability to ambulate household distances, inability to navigate community distances, inability to navigate level surfaces without external assistance, decreased cognition, decreased initiation and engagement, inability to perform physical activity, inability to perform ADLS and inability to perform IADLS   Prior to admission, patient was dependent for mobility, requiring assist for ADLS, requiring assist for IADLS, living with family  in a multi level home with a few steps to enter, having 24 hour care , home with family assist and lives in a multilevel house but has 1st floor setup    Please find objective findings from Physical Therapy assessment regarding body systems outlined above with impairments and limitations including weakness, decreased ROM, impaired balance, decreased endurance, impaired coordination, decreased activity tolerance, decreased functional mobility tolerance, decreased safety awareness, impaired judgement, fall risk, impaired tone, decreased skin integrity and decreased cognition  The Barthel Index was used as a functional outcome tool presenting with a score of Barthel Index Score: 0 today indicating marked limitations of functional mobility and ADLS  Patient's clinical presentation is currently unstable/unpredictable as seen in patient's presentation of varying levels of cognitive performance, increased fall risk and decreased endurance  Pt would benefit from continued Physical Therapy treatment to address deficits as defined above and maximize level of functional mobility  As demonstrated by objective findings, the assigned level of complexity for this evaluation is high  The patient's AM-[facility] Basic Mobility Inpatient Short Form Raw Score is 6  A Raw score of less than or equal to 16 suggests the patient may benefit from discharge to post-acute rehabilitation services  Please also refer to the recommendation of the Physical Therapist for safe discharge planning  Goals   Patient Goals unable to state: non verbal   STG Expiration Date 10/04/22   Short Term Goal #1 Pt able to tolerate transfer to EOB assessment;  Assess pt's static sitting balance   LTG Expiration Date 10/11/22   Long Term Goal #1 Family independent with HEP   Plan   Treatment/Interventions Functional transfer training; Therapeutic exercise;Patient/family training;Equipment eval/education; Bed mobility;Continued evaluation;Spoke to nursing;Spoke to case management; Family   PT Frequency 2-3x/wk   Recommendation   PT Discharge Recommendation Post acute rehabilitation services   Equipment Recommended Other (Comment)  (mechanical lift)   Additional Comments Pt is completely dependent for all ADLs and functional mobility;  Pt's daugter expreses concern over pt's lack of core strength when trying to roll pt or when sitting in chair  Pt is cared for at home 24/7 by family members on a rotating basis    May benefit from  rehab to work on pt's sitting balance and to improve any mobility potential and therefore  assistance that pt may have to help  decrease caretakers burden  AM-PAC Basic Mobility Inpatient   Turning in Bed Without Bedrails 1   Lying on Back to Sitting on Edge of Flat Bed 1   Moving Bed to Chair 1   Standing Up From Chair 1   Walk in Room 1   Climb 3-5 Stairs 1   Basic Mobility Inpatient Raw Score 6   Turning Head Towards Sound 1   Follow Simple Instructions 1   Low Function Basic Mobility Raw Score 8   Low Function Basic Mobility Standardized Score 10 37   Highest Level Of Mobility   -HL Goal 2: Bed activities/Dependent transfer   -HL Achieved 2: Bed activities/Dependent transfer   Barthel Index   Feeding 0   Bathing 0   Grooming Score 0   Dressing Score 0   Bladder Score 0   Bowels Score 0   Toilet Use Score 0   Transfers (Bed/Chair) Score 0   Mobility (Level Surface) Score 0   Stairs Score 0   Barthel Index Score 0   Additional Treatment Session   Start Time 1035   End Time 1045   Treatment Assessment Pt seen for PROm of all 4 extremities several reps each for all planes and available pivots  Some static stretching of areas of tightness such as left elbow extension  Positoned for edema control and skin integrity  Discussed with daughter possible interventions to work on pt's core strength and abilities to decrease caretaker burden  End of Consult   Patient Position at End of Consult Supine;Bed/Chair alarm activated   End of Consult Comments Will assess transfers to EOB sitting when two person assist is available for safety  Licensure   [place] License Number  Iva Liang PT  [id number]

## 2022-09-27 NOTE — ASSESSMENT & PLAN NOTE
Resolved  Recently noted to have urinary retention required schaefer catheter   Presented with gross hematuria likely 2/2 UTI  CT abdomen without any hydronephrosis   · Urine now clear  · Appreciate urology input  · OK to restart Eliquis   · Consider restarting ASA if urine remains clear   · Continue ABX  · Monitor H&H

## 2022-09-27 NOTE — CASE MANAGEMENT
Case Management Discharge Planning Note    Patient name Brook Drake  [facility]  [facility] MRN [id number]  : 1933 Date 2022       Current Admission Date: 2022  Current Admission Diagnosis:Acute cystitis with hematuria   Patient Active Problem List    Diagnosis Date Noted    Gross hematuria 2022    Decubitus ulcer of sacral area 2022    Asthma exacerbation 2022    BMI 29 0-29 9,adult 2022    S/P percutaneous endoscopic gastrostomy (PEG) tube placement ([facility] ) 2022    Positive blood culture 2022    Fever 2022    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[facility] ) 2022    Encounter for support and coordination of transition of care 2022    Overlap syndrome ([facility] ) 2022    Chronic renal disease, stage IV ([facility] ) 2022    Cardiomyopathy, dilated ([facility] ) 2022    Coronary artery disease with angina pectoris, unspecified vessel or lesion type, unspecified whether native or transplanted heart ([facility] )     Tracheobronchitis 2022    Thrombocytopenia [facility] 2021    Gastroesophageal reflux disease with esophagitis 2020    CLEANING (dyspnea on exertion) 2020    Vitamin D deficiency 2020    Paroxysmal A-fib ([facility] ) 2020    Primary osteoarthritis of hip 2020    History of permanent cardiac pacemaker placement 2020    S/P MVR (mitral valve replacement) 2020    Hypertension, essential 2020    Stage 3 chronic kidney disease ([facility] ) 2020    Type 2 diabetes mellitus without complication, without long-term current use of insulin ([facility] ) 2020    TIA (transient ischemic attack)     History of stroke 10/29/2018    Vascular dementia ([facility] ) 2018    Mood disorder ([facility] ) 2018    Ambulatory dysfunction 2018    Bilateral carotid artery stenosis 2018    Arthritis of right glenohumeral joint 2018    Polyp of colon 2018    HCAP (healthcare-associated pneumonia) 06/18/2018    Shoulder pain, right 06/18/2018    Abnormal TSH 06/18/2018    Dysphagia 06/18/2018    Encephalopathy 06/15/2018    Seizure-like activity ([facility] ) 06/15/2018    Colonic thickening 05/26/2018    Anemia 05/26/2018    History of CVA (cerebrovascular accident) 05/25/2018    History of subarachnoid hemorrhage 05/25/2018    CKD (chronic kidney disease) stage 3, GFR 30-59 ml/min ([facility]) 05/25/2018    History of DVT (deep vein thrombosis) 05/25/2018    History of pacemaker 05/25/2018    Acute cystitis with hematuria 05/25/2018    Chronic systolic CHF (congestive heart failure) ([facility] ) 61/74/5868    Cardio-embolic left MCA stroke ([facility] ) 04/23/2018    History of ischemic right MCA stroke 04/23/2018    CVA (cerebral vascular accident) ([facility] ) 04/18/2018    Controlled type 2 diabetes mellitus without complication, without long-term current use of insulin ([facility] ) 04/18/2018    Pacemaker 04/18/2018    Acid reflux 04/18/2018    H/O mitral valve replacement 03/01/2018    Deep vein thrombosis (DVT) of left upper extremity ([facility] ) 01/25/2018    Constipation 09/21/2016    External hemorrhoids 09/21/2016    Carpal tunnel syndrome, left 01/04/2016    Pain in both hands 01/04/2016    Arthralgia of hip, right 11/17/2015    Meningioma ([facility] ) 12/18/2014    Hypertension 05/13/2013    Esophageal reflux 05/13/2013    Arthritis 05/13/2013    Hypercholesterolemia 05/13/2013    Malignant neoplasm without specification of site ([facility] ) 05/13/2013    Spondylosis of cervical region without myelopathy or radiculopathy 05/13/2013    Type 2 or unspecified type diabetes mellitus 05/13/2013      LOS (days): 2  Geometric Mean LOS (GMLOS) (days): 3 50  Days to GMLOS:1 6     OBJECTIVE:  Risk of Unplanned Readmission Score: 32 12         Current admission status: Inpatient   Preferred Pharmacy:   [address] [place] [address], [address] [place] [address] [id number]  Phone: [phone] Fax: [phone] [address] [address]  Phone: [phone] Fax: [phone]    Primary Care Provider: Patricia Asencio MD    Primary Insurance: MEDICARE  Secondary Insurance: AARP    DISCHARGE DETAILS:    Discharge planning discussed with[de-identified] Layla  Freedom of Choice: Yes  Comments - Freedom of Choice: FOC discussed regarding beds available via [facility]  Family confirmed they would like to accept bed at [facility], hoping for [facility] stay  CM encouraged daughter to speak with the SW at facility regarding DC planning from rehab when stable      Contacts  Patient Contacts: Ivania Laguna (daughter)  Relationship to Patient[de-identified] Family  Contact Method: Phone  Phone Number: [phone]  Reason/Outcome: Continuity of Care, Discharge Planning, Referral    Other Referral/Resources/Interventions Provided:  Interventions: Short Term Rehab  Referral Comments: CM reserved [facility] in [address] [facility] Team Recommendation: Short Term Rehab  Discharge Destination Plan[de-identified] Short Term Rehab     IMM Given (Date):: 09/27/22  IMM Given to[de-identified] Family  Family notified[de-identified] CM discussed IMM via phone with daughterCharlie

## 2022-09-27 NOTE — ASSESSMENT & PLAN NOTE
Lab Results   Component Value Date    HGBA1C 6 3 (H) 07/23/2022       Recent Labs     09/28/22  0017 09/28/22  0534 09/28/22  0736 09/28/22  1124   POCGLU 131 139 105 180*       Blood Sugar Average: Last 72 hrs:  (P) 162     · Continue home Lantus 11 units q h s  while in hospital  · Insulin sliding scale q6h

## 2022-09-27 NOTE — CONSULTS
Recommend Jevity 1 2 at 50 mL/hour for a total volume of 1200 mL  This will provide 1440 kcals, 67 grams protein and 968 mL free water  Recommend 125 mL flushes q 6 hours cor total fluid intake of 1468 mL

## 2022-09-28 PROBLEM — R68.89 COPIOUS ORAL SECRETIONS: Status: ACTIVE | Noted: 2022-09-28

## 2022-09-28 LAB
ANION GAP SERPL CALCULATED.3IONS-SCNC: 7 MMOL/L (ref 4–13)
ATRIAL RATE: 111 BPM
BUN SERPL-MCNC: 30 MG/DL (ref 5–25)
CALCIUM SERPL-MCNC: 8.5 MG/DL (ref 8.3–10.1)
CAMPYLOBACTER DNA SPEC NAA+PROBE: NORMAL
CHLORIDE SERPL-SCNC: 105 MMOL/L (ref 96–108)
CO2 SERPL-SCNC: 27 MMOL/L (ref 21–32)
CREAT SERPL-MCNC: 1.03 MG/DL (ref 0.6–1.3)
ERYTHROCYTE [DISTWIDTH] IN BLOOD BY AUTOMATED COUNT: 14.1 % (ref 11.6–15.1)
GFR SERPL CREATININE-BSD FRML MDRD: 48 ML/MIN/1.73SQ M
GLUCOSE SERPL-MCNC: 105 MG/DL (ref 65–140)
GLUCOSE SERPL-MCNC: 122 MG/DL (ref 65–140)
GLUCOSE SERPL-MCNC: 131 MG/DL (ref 65–140)
GLUCOSE SERPL-MCNC: 132 MG/DL (ref 65–140)
GLUCOSE SERPL-MCNC: 139 MG/DL (ref 65–140)
GLUCOSE SERPL-MCNC: 180 MG/DL (ref 65–140)
HCT VFR BLD AUTO: 28.8 % (ref 34.8–46.1)
HGB BLD-MCNC: 8.8 G/DL (ref 11.5–15.4)
MCH RBC QN AUTO: 27.3 PG (ref 26.8–34.3)
MCHC RBC AUTO-ENTMCNC: 30.6 G/DL (ref 31.4–37.4)
MCV RBC AUTO: 89 FL (ref 82–98)
P AXIS: 50 DEGREES
PLATELET # BLD AUTO: 229 THOUSANDS/UL (ref 149–390)
PMV BLD AUTO: 10.6 FL (ref 8.9–12.7)
POTASSIUM SERPL-SCNC: 4.2 MMOL/L (ref 3.5–5.3)
PR INTERVAL: 142 MS
QRS AXIS: -64 DEGREES
QRSD INTERVAL: 120 MS
QT INTERVAL: 374 MS
QTC INTERVAL: 508 MS
RBC # BLD AUTO: 3.22 MILLION/UL (ref 3.81–5.12)
SALMONELLA DNA SPEC QL NAA+PROBE: NORMAL
SHIGA TOXIN STX GENE SPEC NAA+PROBE: NORMAL
SHIGELLA DNA SPEC QL NAA+PROBE: NORMAL
SODIUM SERPL-SCNC: 139 MMOL/L (ref 135–147)
T WAVE AXIS: 31 DEGREES
VENTRICULAR RATE: 111 BPM
WBC # BLD AUTO: 5.8 THOUSAND/UL (ref 4.31–10.16)

## 2022-09-28 PROCEDURE — 94760 N-INVAS EAR/PLS OXIMETRY 1: CPT

## 2022-09-28 PROCEDURE — 80048 BASIC METABOLIC PNL TOTAL CA: CPT | Performed by: NURSE PRACTITIONER

## 2022-09-28 PROCEDURE — 93010 ELECTROCARDIOGRAM REPORT: CPT | Performed by: INTERNAL MEDICINE

## 2022-09-28 PROCEDURE — 94640 AIRWAY INHALATION TREATMENT: CPT

## 2022-09-28 PROCEDURE — 97167 OT EVAL HIGH COMPLEX 60 MIN: CPT

## 2022-09-28 PROCEDURE — 99232 SBSQ HOSP IP/OBS MODERATE 35: CPT | Performed by: STUDENT IN AN ORGANIZED HEALTH CARE EDUCATION/TRAINING PROGRAM

## 2022-09-28 PROCEDURE — 85027 COMPLETE CBC AUTOMATED: CPT | Performed by: NURSE PRACTITIONER

## 2022-09-28 PROCEDURE — 82948 REAGENT STRIP/BLOOD GLUCOSE: CPT

## 2022-09-28 RX ORDER — POLYETHYLENE GLYCOL 3350 17 G/17G
17 POWDER, FOR SOLUTION ORAL DAILY
Status: DISCONTINUED | OUTPATIENT
Start: 2022-09-28 | End: 2022-09-29 | Stop reason: HOSPADM

## 2022-09-28 RX ORDER — SENNOSIDES 8.6 MG
2 TABLET ORAL
Status: DISCONTINUED | OUTPATIENT
Start: 2022-09-28 | End: 2022-09-28

## 2022-09-28 RX ORDER — SENNOSIDES 8.6 MG
2 TABLET ORAL
Status: DISCONTINUED | OUTPATIENT
Start: 2022-09-28 | End: 2022-09-29 | Stop reason: HOSPADM

## 2022-09-28 RX ORDER — GLYCOPYRROLATE 0.2 MG/ML
0.1 INJECTION INTRAMUSCULAR; INTRAVENOUS ONCE
Status: COMPLETED | OUTPATIENT
Start: 2022-09-28 | End: 2022-09-28

## 2022-09-28 RX ADMIN — APIXABAN 5 MG: 5 TABLET, FILM COATED ORAL at 09:45

## 2022-09-28 RX ADMIN — ACETAMINOPHEN 650 MG: 650 SUSPENSION ORAL at 10:48

## 2022-09-28 RX ADMIN — Medication 250 MG: at 09:45

## 2022-09-28 RX ADMIN — ALBUTEROL SULFATE 2.5 MG: 2.5 SOLUTION RESPIRATORY (INHALATION) at 19:19

## 2022-09-28 RX ADMIN — LORATADINE 10 MG: 10 TABLET ORAL at 09:45

## 2022-09-28 RX ADMIN — ALBUTEROL SULFATE 2.5 MG: 2.5 SOLUTION RESPIRATORY (INHALATION) at 07:37

## 2022-09-28 RX ADMIN — SENNOSIDES 17.2 MG: 8.6 TABLET, FILM COATED ORAL at 22:15

## 2022-09-28 RX ADMIN — INSULIN GLARGINE 11 UNITS: 100 INJECTION, SOLUTION SUBCUTANEOUS at 22:18

## 2022-09-28 RX ADMIN — ATORVASTATIN CALCIUM 80 MG: 80 TABLET, FILM COATED ORAL at 16:30

## 2022-09-28 RX ADMIN — POLYETHYLENE GLYCOL 3350 17 G: 17 POWDER, FOR SOLUTION ORAL at 09:45

## 2022-09-28 RX ADMIN — INSULIN LISPRO 1 UNITS: 100 INJECTION, SOLUTION INTRAVENOUS; SUBCUTANEOUS at 12:33

## 2022-09-28 RX ADMIN — METOPROLOL TARTRATE 25 MG: 25 TABLET, FILM COATED ORAL at 22:15

## 2022-09-28 RX ADMIN — CEFAZOLIN SODIUM 1000 MG: 1 SOLUTION INTRAVENOUS at 10:39

## 2022-09-28 RX ADMIN — Medication 20 MG: at 09:45

## 2022-09-28 RX ADMIN — Medication 250 MG: at 17:57

## 2022-09-28 RX ADMIN — CEFAZOLIN SODIUM 1000 MG: 1 SOLUTION INTRAVENOUS at 22:20

## 2022-09-28 RX ADMIN — ALBUTEROL SULFATE 2.5 MG: 2.5 SOLUTION RESPIRATORY (INHALATION) at 14:08

## 2022-09-28 RX ADMIN — APIXABAN 5 MG: 5 TABLET, FILM COATED ORAL at 17:57

## 2022-09-28 RX ADMIN — GLYCOPYRROLATE 0.1 MG: 0.2 INJECTION, SOLUTION INTRAMUSCULAR; INTRAVENOUS at 18:11

## 2022-09-28 RX ADMIN — METOPROLOL TARTRATE 25 MG: 25 TABLET, FILM COATED ORAL at 09:45

## 2022-09-28 NOTE — ASSESSMENT & PLAN NOTE
High risk of aspiration pneumonia,   -patient not currently on bedside suction   -will initiate glycopyrrolate and monitor

## 2022-09-28 NOTE — QUICK NOTE
Nursing reports liquidy diarrhea, requesting fecal management system  Patient on MiraLax and Senokot  Reported constipation on admission by daughter  Stat KUB showed fecal impaction to me, pending final read  Will order tap water enema x1  Discontinue fecal management system  Continue MiraLax and Senokot

## 2022-09-28 NOTE — PROGRESS NOTES
Per daughter's request for when PT goes to rehab: Recommend Jevity 1 2 at 474 mL (2 cans) at breakfast and dinner and 237 mL (1 can) at lunch  This will provide 1440 kcals and 67 grams protein  Recommend flushes of 100 mL water before and after each infusion for a total fluid intake of 1568 mL

## 2022-09-28 NOTE — PROGRESS NOTES
[address]  [facility]  Progress Note - Luci Valle 1933, 80 y o  female MRN: [id number]  Unit/Bed#: [address] [place] [address] Encounter: [id number]  Primary Care Provider: Lien Sepulveda MD   Date and time admitted to hospital: 9/25/2022  9:35 AM    Copious oral secretions  Assessment & Plan  High risk of aspiration pneumonia,   -patient not currently on bedside suction   -will initiate glycopyrrolate and monitor    Decubitus ulcer of sacral area  Assessment & Plan  · Offloading   · Wound care evaluation    Gross hematuria  Assessment & Plan  Resolved  Recently noted to have urinary retention required schaefer catheter   Presented with gross hematuria likely 2/2 UTI  CT abdomen without any hydronephrosis   · Urine now clear  · Appreciate urology input  · OK to restart Eliquis   · Consider restarting ASA if urine remains clear   · Continue ABX  · Monitor H&H    S/P percutaneous endoscopic gastrostomy (PEG) tube placement (HCC)  Assessment & Plan  · Continue tube feeding, Jevity 1 2 at 50 cc/hour with free water flush 125 cc every 6 hours per nutrition recommendations     Type 2 diabetes mellitus without complication, without long-term current use of insulin [facility])  Assessment & Plan  Lab Results   Component Value Date    HGBA1C 6 3 (H) 07/23/2022       Recent Labs     09/28/22  0017 09/28/22  0534 09/28/22  0736 09/28/22  1124   POCGLU 131 139 105 180*       Blood Sugar Average: Last 72 hrs:  (P) 162     · Continue home Lantus 11 units q h s  while in hospital  · Insulin sliding scale q6h    Stage 3 chronic kidney disease ([facility] )  Assessment & Plan  · At baseline, monitor    Paroxysmal A-fib ([facility])  Assessment & Plan  · Continue metoprolol   · OK to start Eliquis   Increase to 5 mg BID     Hypercholesterolemia  Assessment & Plan  · Continue On statin    Vascular dementia ([facility] )  Assessment & Plan  · At baseline    Chronic systolic CHF (congestive heart failure) ([facility])  Assessment & Plan  Wt Readings from Last 3 Encounters:   09/27/22 71 2 kg (157 lb)   09/02/22 71 7 kg (158 lb 1 1 oz)   07/23/22 64 9 kg (143 lb)     Appears euvolemic   · Continue metoprolol  · Hold Lasix   · Monitor intake output  · Daily weight    History of CVA (cerebrovascular accident)  Assessment & Plan  History of CVA x4 with right hemiplegia, dysphagia status post PEG tube placement   Currently at baseline   · Supportive care   · PT/OT as tolerated  · Continue statin  · OK to restart Eliquis  · Consider starting ASA if urine remains clear     Constipation  Assessment & Plan  Resolving,    CT with evidence of colonic and rectal stool   Likely contributed to urinary retention  · Bowel regimen with miralax daily   · Hold Lasix  · Increase free water flushes per nutrition recommendations      Acute deep vein thrombosis (DVT) of right upper extremity (HCC)  Assessment & Plan  History of DVT left upper extremity  Currently noted to have dependent right upper extremity edema   · Venous duplex shows a subacute DVT in the basilic vein   · Was on Eliquis 2 5 mg BID at home  Will increase to 5 mg BID     * Acute cystitis with hematuria  Assessment & Plan  Improving, hematuria resolving    Associated with urinary retention and indwelling schaefer catheter  Presented with hematuria, recent urinary retention requiring schaefer insertion last week  Noted to have low-grade fever, leukocytosis and tachycardia   UA suggestive of UTI   · Urine culture grew proteus sensitive to Cefazolin  · Will transition ceftriaxone to cefazolin          VTE Pharmacologic Prophylaxis: VTE Score: 10 Moderate Risk (Score 3-4) - Pharmacological DVT Prophylaxis Ordered: apixaban (Eliquis)  Patient Centered Rounds: I performed bedside rounds with nursing staff today  Discussions with Specialists or Other Care Team Provider:  Nursing, Uro    Education and Discussions with Family / Patient: Updated  (daughter) at bedside  2     Time Spent for Care: 30 minutes   More than 50% of total time spent on counseling and coordination of care as described above  Current Length of Stay: 3 day(s)  Current Patient Status: Inpatient   Certification Statement: The patient will continue to require additional inpatient hospital stay due to Clinical course  Discharge Plan: Anticipate discharge in 24-48 hrs to rehab facility  Code Status: Level 1 - Full Code    Subjective:   Patient nonverbal at baseline post stroke, both daughters at bedside  Primary caretaker demonstrated patient's outpatient management at home, she reported that patient did not qualify for VNA so they provide around the clock care  Patient's daughter reported them mother frequently wheezes and sounds like she has gurgling or choking and does have the ability to suction patient  Discussed option of glycopyrrolate and family was open to trying to prevent aspiration  Objective:     Vitals:   Temp (24hrs), Av 3 °F (36 8 °C), Min:97 4 °F (36 3 °C), Max:99 °F (37 2 °C)    Temp:  [97 4 °F (36 3 °C)-99 °F (37 2 °C)] 97 4 °F (36 3 °C)  HR:  [85-98] 90  Resp:  [18-23] 23  BP: (127-135)/(60-74) 130/60  SpO2:  [94 %-100 %] 94 %  Body mass index is 29 66 kg/m²  Input and Output Summary (last 24 hours): Intake/Output Summary (Last 24 hours) at 2022 1704  Last data filed at 2022 2100  Gross per 24 hour   Intake 125 ml   Output --   Net 125 ml       Physical Exam:   Physical Exam  Vitals and nursing note reviewed  Constitutional:       General: She is not in acute distress  Appearance: She is well-developed  She is not ill-appearing or diaphoretic  HENT:      Head: Normocephalic and atraumatic  Eyes:      Conjunctiva/sclera: Conjunctivae normal    Cardiovascular:      Rate and Rhythm: Normal rate and regular rhythm  Heart sounds: No murmur heard  Pulmonary:      Effort: Pulmonary effort is normal  No respiratory distress  Breath sounds: No rales        Comments: Auscultation limited due to habitus no wheezing appreciated  Chest:      Chest wall: No tenderness  Abdominal:      General: There is no distension  Palpations: Abdomen is soft  Tenderness: There is no abdominal tenderness  There is no guarding  Musculoskeletal:         General: No deformity  Cervical back: Neck supple  Right lower leg: No edema  Left lower leg: No edema  Comments: Right upper extremity swelling, nontender   Skin:     General: Skin is warm and dry  Neurological:      Mental Status: She is alert  Mental status is at baseline  Comments: Suffered a stroke in July and has diffuse deficits   Psychiatric:      Comments: Patient nonverbal unable to assess          Additional Data:     Labs:  Results from last 7 days   Lab Units 09/28/22  0541 09/26/22  0624 09/25/22  1007   WBC Thousand/uL 5 80   < > 10 64*   HEMOGLOBIN g/dL 8 8*   < > 11 7   HEMATOCRIT % 28 8*   < > 37 5   PLATELETS Thousands/uL 229   < > 287   NEUTROS PCT %  --   --  87*   LYMPHS PCT %  --   --  6*   MONOS PCT %  --   --  7   EOS PCT %  --   --  0    < > = values in this interval not displayed  Results from last 7 days   Lab Units 09/28/22  0541 09/26/22  0624 09/25/22  1007   SODIUM mmol/L 139   < > 137   POTASSIUM mmol/L 4 2   < > 4 7   CHLORIDE mmol/L 105   < > 100   CO2 mmol/L 27   < > 29   BUN mg/dL 30*   < > 37*   CREATININE mg/dL 1 03   < > 1 15   ANION GAP mmol/L 7   < > 8   CALCIUM mg/dL 8 5   < > 9 6   ALBUMIN g/dL  --   --  2 4*   TOTAL BILIRUBIN   --   --  0 68   ALK PHOS U/L  --   --  100   ALT U/L  --   --  35   AST U/L  --   --  36   GLUCOSE RANDOM mg/dL 122   < > 130    < > = values in this interval not displayed       Results from last 7 days   Lab Units 09/25/22  1007   INR  1 17     Results from last 7 days   Lab Units 09/28/22  1124 09/28/22  0736 09/28/22  0534 09/28/22  0017 09/27/22  1752 09/27/22  1215 09/27/22  1126 09/27/22  0716 09/27/22  0445 09/26/22  2311 09/26/22  2040 09/26/22  1626   POC GLUCOSE mg/dl 180* 105 139 131 160* 173* 211* 157* 178* 170* 154* 214*         Results from last 7 days   Lab Units 09/25/22  1007   LACTIC ACID mmol/L 0 9       Lines/Drains:  Invasive Devices  Report    Peripheral Intravenous Line  Duration           Peripheral IV 09/25/22 Right Antecubital 3 days          Drain  Duration           Gastrostomy/Enterostomy Percutaneous Endoscopic Gastrostomy (PEG) 20 Fr  LUQ 62 days    Urethral Catheter Three way 22 Fr  3 days              Urinary Catheter:  Goal for removal: N/A - Chronic Sears               Imaging: Reviewed radiology reports from this admission including: xray(s) and Vast Doppler upper extremity    Recent Cultures (last 7 days):   Results from last 7 days   Lab Units 09/25/22  1209 09/25/22  1019 09/25/22  1007   BLOOD CULTURE  No Growth at 72 hrs   --  No Growth at 72 hrs     URINE CULTURE   --  >100,000 cfu/ml Proteus mirabilis*  --        Last 24 Hours Medication List:   Current Facility-Administered Medications   Medication Dose Route Frequency Provider Last Rate    acetaminophen  650 mg Per G Tube Q4H PRN Russell Phipps MD      albuterol  2 5 mg Nebulization Q6H PRN Russell Phipps MD      albuterol  2 5 mg Nebulization TID Russell Phipps MD      apixaban  5 mg Per PEG Tube BID GAYATRI Lu      atorvastatin  80 mg Per G Tube Daily With Linda Moreno MD      cefazolin  1,000 mg Intravenous Q12H GAYATRI Lu 1,000 mg (09/28/22 1039)    glycopyrrolate  0 1 mg Intravenous Once Temitope Sanchez MD      insulin glargine  11 Units Subcutaneous HS Russell Phipps MD      insulin lispro  1-5 Units Subcutaneous Q6H [address] Russell Phipps MD      loratadine  10 mg Oral Daily GAYATRI Lu      magnesium hydroxide  30 mL Oral Daily PRN Russell Phipps MD      metoprolol tartrate  25 mg Per G Tube Q12H [address] Russell Phipps MD      omeprazole (PRILOSEC) suspension 2 mg/mL  20 mg Per PEG Tube Daily [facility] Chetan Flores MD      polyethylene glycol  17 g Per PEG Tube Daily GAYATRI Dang      saccharomyces boulardii  250 mg Oral BID GAYATRI Dang      senna  2 tablet Per PEG Tube HS GAYATRI Dang          Today, Patient Was Seen By: Nishant Lagunas    **Please Note: This note may have been constructed using a voice recognition system  **

## 2022-09-28 NOTE — OCCUPATIONAL THERAPY NOTE
OT EVALUATION       09/28/22 0945   Note Type   Note type Evaluation   Restrictions/Precautions   Other Precautions Cognitive; Chair Alarm; Bed Alarm; Fall Risk  (non-verbal)   Pain Assessment   Pain Assessment Tool FLACC   Pain Rating: FLACC (Rest) - Face 0   Pain Rating: FLACC (Rest) - Legs 0   Pain Rating: FLACC (Rest) - Activity 0   Pain Rating: FLACC (Rest) - Cry 0   Pain Rating: FLACC (Rest) - Consolability 0   Score: FLACC (Rest) 0   Home Living   Type of Home House   Home Layout Multi-level   Home Equipment Mechanical lift; Hospital bed   Additional Comments pt has a PEG tube for all feeds  Pt's family take turns staying with pt so she has 24/7 care  Pt is total assist for all ADLs  Pt is non ambulatory, gets to chair via soy lift  Pt is bed/wheelchair bound   Pt does not follow commands or move right side actively  Does move left UE but not to command  Prior Function   Level of [place] Total dependent   Lives With Family   Receives Help From Family   ADL Assistance Needs assistance   IADLs Needs assistance   Comments Patient admitted with hematuria     Lifestyle   Reciprocal Relationships pts daughter present for session and provided with home setup and prior functional level as pt is nonverbal and unable to state   ADL   Eating Assistance 1  Total Assistance   Grooming Assistance 1  Total Assistance   [address] 1  Total Assistance   LB Bathing Assistance 1  Total Assistance   UB Dressing Assistance 1  Total Assistance   LB Dressing Assistance 1  Total [address]  1  Total Assistance   Bed Mobility   Rolling R 1  Dependent   Rolling L 1  Dependent   Transfers   Sit to Stand 1  Dependent   Activity Tolerance   Activity Tolerance Treatment limited secondary to medical complications (Comment)   RUE Assessment   RUE Assessment   (tone PROM WFL, no active motion noted)   LUE Assessment   LUE Assessment   (able to move finger but not on command, per daughter pt can  bedrail and wheelchair  PROM shoulder and elbow WFL, some resistance from patient noted)   Hand Function   Gross Motor Coordination Impaired   Fine Motor Coordination Impaired   Cognition   Overall Cognitive Status Impaired   Attention SELAM   Orientation Level Unable to assess   Memory Unable to assess   Following Commands Unable to follow one step commands   Comments pt is non-verbal   eyes close most of session   Assessment   Limitation Decreased ADL status; Decreased UE ROM; Decreased UE strength;Decreased Safe judgement during ADL;Decreased endurance;Decreased self-care trans;Decreased high-level ADLs; Decreased cognition   Prognosis Poor   Assessment Patient evaluated by Occupational Therapy  Patient admitted with Acute cystitis with hematuria  The patients occupational profile, medical and therapy history includes a extensive additional review of physical, cognitive, or psychosocial history related to current functional performance  Comorbidities affecting functional mobility and ADLS include: arthritis, cancer, COPD, CVA, dementia, diabetes, DVT and hypertension  Prior to admission, patient was dependent for mobility, requiring assist for ADLS and requiring assist for IADLS  The evaluation identifies the following performance deficits: weakness, decreased ROM, impaired balance, decreased endurance, increased fall risk, new onset of impairment of functional mobility, decreased ADLS, decreased IADLS, decreased activity tolerance, decreased safety awareness, impaired judgement, decreased cognition and decreased strength, that result in activity limitations and/or participation restrictions  This evaluation requires clinical decision making of high complexity, because the patient presents with comorbidites that affect occupational performance and required significant modification of tasks or assistance with consideration of multiple treatment options    The Barthel Index was used as a functional outcome tool presenting with a score of Barthel Index Score: 0, indicating marked limitations of functional mobility and ADLS  The patient's raw score on the AM-PAC Daily Activity inpatient short form low function score is 8, standardized score is Low Function Daily Activity Standardized Score: 12 06  Patients with a standardized score less than 39 4 are likely to benefit from discharge to post-acute rehab services  Please refer to the recommendation of the Occupational Therapist for safe discharge planning  Patient will benefit from a trial of skilled Occupational Therapy services to address above deficits and facilitate a safe return to prior level of function  Goals   Patient Goals unable to state as pt is non-verbal   STG Time Frame   (1-7 days)   Short Term Goal  Patient will increase bed mobility to max assist in preparation for ADLS and transfers; Patient will tolerate 10 minutes of UE ROM/strengthening to increase general activity tolerance and performance in ADLS/IADLS; Patient will improve functional activity tolerance to 10 minutes of sustained functional tasks to increase participation in basic self-care and decrease assistance level;   Patient will increase static sitting balance to poor to improve the ability to sit at edge of bed or on a chair for ADLS  LTG Time Frame   (8-14 days)   Long Term Goal Patient will increase bed mobility to mod assist in preparation for ADLS and transfers; Patient will tolerate 20 minutes of UE ROM/strengthening to increase general activity tolerance and performance in ADLS/IADLS; Patient will improve functional activity tolerance to 20 minutes of sustained functional tasks to increase participation in basic self-care and decrease assistance level;   Patient will increase static sitting balance to poor+ to improve the ability to sit at edge of bed or on a chair for ADLS    Pt will score >/= 11/24 on [facility] Daily Activity Inpatient scale to promote safe independence with ADLs and functional mobility; Pt will score >/= 30/100 on Barthel Index in order to decrease caregiver assistance needed and increase ability to perform ADLs and functional mobility  ADL Goals   Pt Will Perform Grooming   (STG max assist LTG mod assist)   Plan   Treatment Interventions ADL retraining;Functional transfer training;UE strengthening/ROM; Endurance training;Cognitive reorientation;Patient/family training;Equipment evaluation/education; Neuromuscular reeducation; Activityengagement; Compensatory technique education   Goal Expiration Date 10/12/22   OT Frequency 3-5x/wk   Recommendation   OT Discharge Recommendation Post acute rehabilitation services   AM-PAC Daily Activity Inpatient   Lower Body Dressing 1   Bathing 1   Toileting 1   Upper Body Dressing 1   Grooming 1   Eating 1   Daily Activity Raw Score 6   Turning Head Towards Sound 1   Follow Simple Instructions 1   Low Function Daily Activity Raw Score 8   Low Function Daily Activity Standardized Score 12 06   AM-PAC Applied Cognition Inpatient   Following a Speech/Presentation 1   Understanding Ordinary Conversation 1   Taking Medications 1   Remembering Where Things Are Placed or Put Away 1   Remembering List of 4-5 Errands 1   Taking Care of Complicated Tasks 1   Applied Cognition Raw Score 6   Applied Cognition Standardized Score 7 69   Barthel Index   Feeding 0   Bathing 0   Grooming Score 0   Dressing Score 0   Bladder Score 0   Bowels Score 0   Toilet Use Score 0   Transfers (Bed/Chair) Score 0   Mobility (Level Surface) Score 0   Stairs Score 0   Barthel Index Score 0   Licensure   [place] License Number  Luci Padmini WHITE OTR/L [id number]

## 2022-09-29 VITALS
TEMPERATURE: 99.9 F | OXYGEN SATURATION: 96 % | HEART RATE: 97 BPM | WEIGHT: 156.53 LBS | DIASTOLIC BLOOD PRESSURE: 56 MMHG | RESPIRATION RATE: 12 BRPM | SYSTOLIC BLOOD PRESSURE: 122 MMHG | BODY MASS INDEX: 29.55 KG/M2 | HEIGHT: 61 IN

## 2022-09-29 LAB
GLUCOSE SERPL-MCNC: 136 MG/DL (ref 65–140)
GLUCOSE SERPL-MCNC: 145 MG/DL (ref 65–140)
GLUCOSE SERPL-MCNC: 153 MG/DL (ref 65–140)
GLUCOSE SERPL-MCNC: 177 MG/DL (ref 65–140)

## 2022-09-29 PROCEDURE — 94640 AIRWAY INHALATION TREATMENT: CPT

## 2022-09-29 PROCEDURE — 99239 HOSP IP/OBS DSCHRG MGMT >30: CPT | Performed by: STUDENT IN AN ORGANIZED HEALTH CARE EDUCATION/TRAINING PROGRAM

## 2022-09-29 PROCEDURE — 82948 REAGENT STRIP/BLOOD GLUCOSE: CPT

## 2022-09-29 PROCEDURE — 94760 N-INVAS EAR/PLS OXIMETRY 1: CPT

## 2022-09-29 RX ORDER — CIPROFLOXACIN 500 MG/1
500 TABLET, FILM COATED ORAL EVERY 12 HOURS SCHEDULED
Qty: 8 TABLET | Refills: 0 | Status: SHIPPED | OUTPATIENT
Start: 2022-09-29 | End: 2022-10-03

## 2022-09-29 RX ORDER — GLYCOPYRROLATE 1 MG/1
1 TABLET ORAL 2 TIMES DAILY
Qty: 60 TABLET | Refills: 0 | Status: SHIPPED | OUTPATIENT
Start: 2022-09-29

## 2022-09-29 RX ORDER — SACCHAROMYCES BOULARDII 250 MG
250 CAPSULE ORAL 2 TIMES DAILY
Qty: 30 CAPSULE | Refills: 0 | Status: SHIPPED | OUTPATIENT
Start: 2022-09-29

## 2022-09-29 RX ORDER — FLUTICASONE PROPIONATE 50 MCG
1 SPRAY, SUSPENSION (ML) NASAL DAILY
Qty: 16 G | Refills: 0 | Status: SHIPPED | OUTPATIENT
Start: 2022-09-29

## 2022-09-29 RX ADMIN — ATORVASTATIN CALCIUM 80 MG: 80 TABLET, FILM COATED ORAL at 17:03

## 2022-09-29 RX ADMIN — INSULIN LISPRO 1 UNITS: 100 INJECTION, SOLUTION INTRAVENOUS; SUBCUTANEOUS at 06:27

## 2022-09-29 RX ADMIN — LORATADINE 10 MG: 10 TABLET ORAL at 08:41

## 2022-09-29 RX ADMIN — APIXABAN 5 MG: 5 TABLET, FILM COATED ORAL at 08:41

## 2022-09-29 RX ADMIN — ALBUTEROL SULFATE 2.5 MG: 2.5 SOLUTION RESPIRATORY (INHALATION) at 14:07

## 2022-09-29 RX ADMIN — Medication 250 MG: at 08:41

## 2022-09-29 RX ADMIN — POLYETHYLENE GLYCOL 3350 17 G: 17 POWDER, FOR SOLUTION ORAL at 08:36

## 2022-09-29 RX ADMIN — ALBUTEROL SULFATE 2.5 MG: 2.5 SOLUTION RESPIRATORY (INHALATION) at 07:37

## 2022-09-29 RX ADMIN — CEFAZOLIN SODIUM 1000 MG: 1 SOLUTION INTRAVENOUS at 10:37

## 2022-09-29 RX ADMIN — METOPROLOL TARTRATE 25 MG: 25 TABLET, FILM COATED ORAL at 08:41

## 2022-09-29 RX ADMIN — Medication 20 MG: at 08:41

## 2022-09-29 RX ADMIN — INSULIN LISPRO 1 UNITS: 100 INJECTION, SOLUTION INTRAVENOUS; SUBCUTANEOUS at 12:22

## 2022-09-29 NOTE — ASSESSMENT & PLAN NOTE
Lab Results   Component Value Date    HGBA1C 6 3 (H) 07/23/2022       Recent Labs     09/28/22  1124 09/28/22  1736 09/29/22  0009 09/29/22  0606   POCGLU 180* 132 136 177*       Blood Sugar Average: Last 72 hrs:  (P) 812 [id number]     · Continue home Lantus 11 units q h s  while in hospital  · Insulin sliding scale q6h

## 2022-09-29 NOTE — ASSESSMENT & PLAN NOTE
Wt Readings from Last 3 Encounters:   09/29/22 71 kg (156 lb 8 4 oz)   09/02/22 71 7 kg (158 lb 1 1 oz)   07/23/22 64 9 kg (143 lb)     Appears euvolemic   · Continue metoprolol  · Hold Lasix   · Monitor intake output  · Daily weight

## 2022-09-29 NOTE — ASSESSMENT & PLAN NOTE
Improving, hematuria resolved    Associated with urinary retention and indwelling schaefer catheter  Presented with hematuria, recent urinary retention requiring schaefer insertion last week  Noted to have low-grade fever, leukocytosis and tachycardia   UA suggestive of UTI   · Urine culture grew proteus sensitive to Cefazolin  · Will transition ceftriaxone to cefazolin

## 2022-09-29 NOTE — CASE MANAGEMENT
Case Management Discharge Planning Note    Patient name Socorro oCnte  Location 3 [address] [facility] 307/3 [address] [facility]-* MRN [id number]  : 1933 Date 2022       Current Admission Date: 2022  Current Admission Diagnosis:Acute cystitis with hematuria   Patient Active Problem List    Diagnosis Date Noted    Copious oral secretions 2022    Gross hematuria 2022    Decubitus ulcer of sacral area 2022    Asthma exacerbation 2022    BMI 29 0-29 9,adult 2022    S/P percutaneous endoscopic gastrostomy (PEG) tube placement ([facility] ) 2022    Positive blood culture 2022    Fever 2022    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[facility] ) 2022    Encounter for support and coordination of transition of care 2022    Overlap syndrome ([facility] ) 2022    Chronic renal disease, stage IV ([address] ) 2022    Cardiomyopathy, dilated ([facility] ) 2022    Coronary artery disease with angina pectoris, unspecified vessel or lesion type, unspecified whether native or transplanted heart ([facility] )     Tracheobronchitis 2022    Thrombocytopenia ([facility] ) 2021    Gastroesophageal reflux disease with esophagitis 2020    CLEANING (dyspnea on exertion) 2020    Vitamin D deficiency 2020    Paroxysmal A-fib ([address] ) 2020    Primary osteoarthritis of hip 2020    History of permanent cardiac pacemaker placement 2020    S/P MVR (mitral valve replacement) 2020    Hypertension, essential 2020    Stage 3 chronic kidney disease ([address] ) 2020    Type 2 diabetes mellitus without complication, without long-term current use of insulin ([address] ) 2020    TIA (transient ischemic attack)     History of stroke 10/29/2018    Vascular dementia ([address] ) 2018    Mood disorder ([address] ) 2018    Ambulatory dysfunction 2018    Bilateral carotid artery stenosis 2018    Arthritis of right glenohumeral joint 2018    Polyp of colon 06/22/2018    HCAP (healthcare-associated pneumonia) 06/18/2018    Shoulder pain, right 06/18/2018    Abnormal TSH 06/18/2018    Dysphagia 06/18/2018    Encephalopathy 06/15/2018    Seizure-like activity ([facility] ) 06/15/2018    Colonic thickening 05/26/2018    Anemia 05/26/2018    History of CVA (cerebrovascular accident) 05/25/2018    History of subarachnoid hemorrhage 05/25/2018    CKD (chronic kidney disease) stage 3, GFR 30-59 ml/min ([facility]) 05/25/2018    History of DVT (deep vein thrombosis) 05/25/2018    History of pacemaker 05/25/2018    Acute cystitis with hematuria 05/25/2018    Chronic systolic CHF (congestive heart failure) ([facility] ) 47/28/1623    Cardio-embolic left MCA stroke ([facility] ) 04/23/2018    History of ischemic right MCA stroke 04/23/2018    CVA (cerebral vascular accident) ([facility] ) 04/18/2018    Controlled type 2 diabetes mellitus without complication, without long-term current use of insulin ([facility] ) 04/18/2018    Pacemaker 04/18/2018    Acid reflux 04/18/2018    H/O mitral valve replacement 03/01/2018    Acute deep vein thrombosis (DVT) of brachial vein of right upper extremity ([facility] ) 01/25/2018    Constipation 09/21/2016    External hemorrhoids 09/21/2016    Carpal tunnel syndrome, left 01/04/2016    Pain in both hands 01/04/2016    Arthralgia of hip, right 11/17/2015    Meningioma ([facility] ) 12/18/2014    Hypertension 05/13/2013    Esophageal reflux 05/13/2013    Arthritis 05/13/2013    Hypercholesterolemia 05/13/2013    Malignant neoplasm without specification of site ([facility] ) 05/13/2013    Spondylosis of cervical region without myelopathy or radiculopathy 05/13/2013    Type 2 or unspecified type diabetes mellitus 05/13/2013      LOS (days): 4  Geometric Mean LOS (GMLOS) (days): 3 50  Days to GMLOS:-0 6     OBJECTIVE:  Risk of Unplanned Readmission Score: 33 79         Current admission status: Inpatient   Preferred Pharmacy:   [address] [place] [address] [address] [place] [address]  Phone: [phone] Fax: [phone]    [address] [place] [id number]  Phone: [phone] Fax: [phone]    Primary Care Provider: Jorge Zurita MD    Primary Insurance: MEDICARE  Secondary Insurance: AARP    DISCHARGE DETAILS:  CM updated patient's transport time is 1600 via [facility]    CM updated receiving facility, nursing and physician with transport time  CM left detailed voice message for daughter Leonel Bianchi at [phone]  CM left contact number for any f/u questions  Transported by [employer] and Unit #):  [facility]     ETA of Transport (Time): 1600

## 2022-09-29 NOTE — ASSESSMENT & PLAN NOTE
UnResolving,    CT with evidence of colonic and rectal stool   Likely contributed to urinary retention  · Bowel regimen with miralax daily   · Hold Lasix  · Increase free water flushes per nutrition recommendations

## 2022-09-29 NOTE — [place] UNIVERSAL TRANSFER FORM
[place] UNIVERSAL TRANSFER FORM  (ALL ITEMS MUST BE COMPLETED)    1  TRANSFER FROM: [address] [place] [address] Street: [facility]    2  DATE OF TRANSFER: 9/29/2022                        TIME OF TRANSFER: 1600    3  PATIENT NAME: GLENN Portillo      YOB: 1933                             GENDER: female    4  LANGUAGE:   English    5  PHYSICIAN NAME:  Edwina Schuler MD                   PHONE: [phone]  CODE STATUS: Level 1 - Full Code        Out of Hospital DNR Attached: No    7  :                                      :  Extended Emergency Contact Information  Primary Emergency Contact: [facility]  Mobile Phone: [phone]  Relation: Daughter  Secondary Emergency Contact: [facility]  Mobile Phone: [phone]  Relation: Daughter           Health Care Representative/Proxy:  Yes           Legal Guardian:  No             NAME OF:           HEALTH CARE REPRESENTATIVE/PROXY:                                         OR           LEGAL GUARDIAN, IF NOT :                                               PHONE:  (Day)           (Night)                        (Cell)    8  REASON FOR TRANSFER: (Must include brief medical history and recent changes in physical function or cognition ) weakness            V/S: /63   Pulse 93   Temp 98 8 °F (37 1 °C) (Axillary)   Resp 20   Ht 5' 1" (1 549 m)   Wt 71 kg (156 lb 8 4 oz)   LMP  (LMP Unknown)   SpO2 95%   BMI 29 58 kg/m²           PAIN: None    9  PRIMARY DIAGNOSIS: Acute cystitis with hematuria      Secondary Diagnosis:         Pacemaker: No      Internal Defib: No          Mental Health Diagnosis (if Applicable):    10  RESTRAINTS: No     11  RESPIRATORY NEEDS: None    12  ISOLATION/PRECAUTION: None    13  ALLERGY: Glimepiride, Guaifenesin-codeine, Heparin, and Vancomycin    14   SENSORY:       Vision Good, Hearing Good  and Speech Clear    15  SKIN CONDITION: Yes:  Pressure    16  DIET: Tube Feed    17  IV ACCESS: None    18  PERSONAL ITEMS SENT WITH PATIENT: None    19  ATTACHED DOCUMENTS: MUST ATTACH CURRENT MEDICATION INFORMATION Face Sheet and Discharge Summary    20  AT RISK ALERTS:Falls and Aspiration        HARM TO: N/A    21  WEIGHT BEARING STATUS:         Left Leg: Full        Right Leg: Full    22  MENTAL STATUS:Other nonverbal    21  FUNCTION:        Walk: Not Able        Transfer: Not Able        Toilet: With Help        Feed: Not Able    24  IMMUNIZATIONS/SCREENING:     Immunization History   Administered Date(s) Administered    COVID-19 MODERNA VACC 0 5 ML IM 05/25/2021, 06/22/2021    COVID-19 Pfizer vac (Bab[place]unde-sucrose, gray cap) 12 yr+ IM 08/03/2022, 08/03/2022    Influenza Split High Dose Preservative Free IM 10/22/2020    Influenza, high dose seasonal 0 7 mL 11/05/2018    Pneumococcal Polysaccharide PPV23 02/22/2015    influenza, trivalent, adjuvanted 10/23/2019       25  BOWEL: Incontinent     26  BLADDER: Sears Catheter    27   SENDING FACILITY CONTACT: Virginia Hood                  Title: RN        Unit: 3N        Phone: [phone]          [address] (if known):        Title:        Unit:         Phone:         FORM PREFILLED BY (if applicable)       Title:       Unit:        Phone:         FORM COMPLETED BY Virginia Hood      Title:       Phone:

## 2022-09-29 NOTE — PLAN OF CARE
Problem: Potential for Falls  Goal: Patient will remain free of falls  Description: INTERVENTIONS:  - Educate patient/family on patient safety including physical limitations  - Instruct patient to call for assistance with activity   - Consult OT/PT to assist with strengthening/mobility   - Keep Call bell within reach  - Keep bed low and locked with side rails adjusted as appropriate  - Keep care items and personal belongings within reach  - Initiate and maintain comfort rounds  - Make Fall Risk Sign visible to staff  - Offer Toileting every 2 Hours, in advance of need  - Initiate/Maintain bed alarm  - Obtain necessary fall risk management equipment: yes  - Apply yellow socks and bracelet for high fall risk patients  - Consider moving patient to room near nurses station  9/29/2022 1544 by Nestor Guillaume RN  Outcome: Completed  9/29/2022 1144 by Nestor Guillaume RN  Outcome: Progressing     Problem: RESPIRATORY - ADULT  Goal: Achieves optimal ventilation and oxygenation  Description: INTERVENTIONS:  - Assess for changes in respiratory status  - Assess for changes in mentation and behavior  - Position to facilitate oxygenation and minimize respiratory effort  - Oxygen administered by appropriate delivery if ordered  - Initiate smoking cessation education as indicated  - Encourage broncho-pulmonary hygiene including cough, deep breathe, Incentive Spirometry  - Assess the need for suctioning and aspirate as needed  - Assess and instruct to report SOB or any respiratory difficulty  - Respiratory Therapy support as indicated  9/29/2022 1544 by Nestor Guillaume RN  Outcome: Completed  9/29/2022 1144 by Nestor Guillaume RN  Outcome: Progressing     Problem: Nutrition/Hydration-ADULT  Goal: Nutrient/Hydration intake appropriate for improving, restoring or maintaining nutritional needs  Description: Monitor and assess patient's nutrition/hydration status for malnutrition   Collaborate with interdisciplinary team and initiate plan and interventions as ordered  Monitor patient's weight and dietary intake as ordered or per policy  Utilize nutrition screening tool and intervene as necessary  Determine patient's food preferences and provide high-protein, high-caloric foods as appropriate       INTERVENTIONS:  - Monitor oral intake, urinary output, labs, and treatment plans  - Assess nutrition and hydration status and recommend course of action  - Evaluate amount of meals eaten  - Assist patient with eating if necessary   - Allow adequate time for meals  - Recommend/ encourage appropriate diets, oral nutritional supplements, and vitamin/mineral supplements  - Order, calculate, and assess calorie counts as needed  - Recommend, monitor, and adjust tube feedings and TPN/PPN based on assessed needs  - Assess need for intravenous fluids  - Provide specific nutrition/hydration education as appropriate  - Include patient/family/caregiver in decisions related to nutrition  9/29/2022 1544 by Harmony Moctezuma RN  Outcome: Completed  9/29/2022 1144 by Harmony Moctezuma RN  Outcome: Progressing     Problem: MOBILITY - ADULT  Goal: Maintain or return to baseline ADL function  Description: INTERVENTIONS:  -  Assess patient's ability to carry out ADLs; assess patient's baseline for ADL function and identify physical deficits which impact ability to perform ADLs (bathing, care of mouth/teeth, toileting, grooming, dressing, etc )  - Assess/evaluate cause of self-care deficits   - Assess range of motion  - Assess patient's mobility; develop plan if impaired  - Assess patient's need for assistive devices and provide as appropriate  - Encourage maximum independence but intervene and supervise when necessary  - Involve family in performance of ADLs  - Assess for home care needs following discharge   - Consider OT consult to assist with ADL evaluation and planning for discharge  - Provide patient education as appropriate  9/29/2022 1544 by Jackie Johnson RN  Outcome: Completed  9/29/2022 1144 by Jackie Johnson RN  Outcome: Progressing  Goal: Maintains/Returns to pre admission functional level  Description: INTERVENTIONS:  - Perform BMAT or MOVE assessment daily    - Set and communicate daily mobility goal to care team and patient/family/caregiver  - Collaborate with rehabilitation services on mobility goals if consulted  - Perform Range of Motion 3 times a day  - Reposition patient every 2 hours    - Out of bed for toileting  - Record patient progress and toleration of activity level   9/29/2022 1544 by Jackie Johnson RN  Outcome: Completed  9/29/2022 1144 by Jackie Johnson RN  Outcome: Progressing     Problem: Prexisting or High Potential for Compromised Skin Integrity  Goal: Skin integrity is maintained or improved  Description: INTERVENTIONS:  - Identify patients at risk for skin breakdown  - Assess and monitor skin integrity  - Assess and monitor nutrition and hydration status  - Monitor labs   - Assess for incontinence   - Turn and reposition patient  - Assist with mobility/ambulation  - Relieve pressure over bony prominences  - Avoid friction and shearing  - Provide appropriate hygiene as needed including keeping skin clean and dry  - Evaluate need for skin moisturizer/barrier cream  - Collaborate with interdisciplinary team   - Patient/family teaching  - Consider wound care consult   9/29/2022 1544 by Jackie Johnson RN  Outcome: Completed  9/29/2022 1144 by Jackie Johnson RN  Outcome: Progressing

## 2022-09-29 NOTE — PLAN OF CARE
Problem: Potential for Falls  Goal: Patient will remain free of falls  Description: INTERVENTIONS:  - Educate patient/family on patient safety including physical limitations  - Instruct patient to call for assistance with activity   - Consult OT/PT to assist with strengthening/mobility   - Keep Call bell within reach  - Keep bed low and locked with side rails adjusted as appropriate  - Keep care items and personal belongings within reach  - Initiate and maintain comfort rounds  - Make Fall Risk Sign visible to staff  - Offer Toileting every 2 Hours, in advance of need  - Initiate/Maintain bed alarm  - Obtain necessary fall risk management equipment: yes  - Apply yellow socks and bracelet for high fall risk patients  - Consider moving patient to room near nurses station  Outcome: Progressing     Problem: RESPIRATORY - ADULT  Goal: Achieves optimal ventilation and oxygenation  Description: INTERVENTIONS:  - Assess for changes in respiratory status  - Assess for changes in mentation and behavior  - Position to facilitate oxygenation and minimize respiratory effort  - Oxygen administered by appropriate delivery if ordered  - Initiate smoking cessation education as indicated  - Encourage broncho-pulmonary hygiene including cough, deep breathe, Incentive Spirometry  - Assess the need for suctioning and aspirate as needed  - Assess and instruct to report SOB or any respiratory difficulty  - Respiratory Therapy support as indicated  Outcome: Progressing     Problem: Prexisting or High Potential for Compromised Skin Integrity  Goal: Skin integrity is maintained or improved  Description: INTERVENTIONS:  - Identify patients at risk for skin breakdown  - Assess and monitor skin integrity  - Assess and monitor nutrition and hydration status  - Monitor labs   - Assess for incontinence   - Turn and reposition patient  - Assist with mobility/ambulation  - Relieve pressure over bony prominences  - Avoid friction and shearing  - Provide appropriate hygiene as needed including keeping skin clean and dry  - Evaluate need for skin moisturizer/barrier cream  - Collaborate with interdisciplinary team   - Patient/family teaching  - Consider wound care consult   Outcome: Progressing

## 2022-09-29 NOTE — DISCHARGE SUMMARY
Alfonso 45  Discharge- Ike Angry 1933, 80 y o  female MRN: [id number]  Unit/Bed#: [address] [place] [address] Encounter: [id number]  Primary Care Provider: Jay Fuentes MD   Date and time admitted to hospital: 9/25/2022  9:35 AM    Constipation  Assessment & Plan  UnResolving,    CT with evidence of colonic and rectal stool   Likely contributed to urinary retention  · Bowel regimen with miralax daily   · Hold Lasix  · Increase free water flushes per nutrition recommendations      Acute deep vein thrombosis (DVT) of brachial vein of right upper extremity (HCC)  Assessment & Plan  History of DVT left upper extremity  Currently noted to have dependent right upper extremity edema   · Venous duplex shows a subacute DVT in the basilic vein   · Was on Eliquis 2 5 mg BID at home   Will increase to 5 mg BID     * Acute cystitis with hematuria  Assessment & Plan  Improving, hematuria resolved    Associated with urinary retention and indwelling schaefer catheter  Presented with hematuria, recent urinary retention requiring schaefer insertion last week  Noted to have low-grade fever, leukocytosis and tachycardia   UA suggestive of UTI   · Urine culture grew proteus sensitive to Cefazolin  · Will transition ceftriaxone to cefazolin      Copious oral secretions  Assessment & Plan  High risk of aspiration pneumonia,   -patient not currently on bedside suction   -will initiate glycopyrrolate and monitor    Decubitus ulcer of sacral area  Assessment & Plan  · Offloading   · Wound care evaluation    Gross hematuria  Assessment & Plan  Resolved  Recently noted to have urinary retention required schaefer catheter   Presented with gross hematuria likely 2/2 UTI  CT abdomen without any hydronephrosis   · Urine now clear  · Appreciate urology input  · OK to restart Eliquis   · Consider restarting ASA if urine remains clear   · Continue ABX  · Monitor H&H    S/P percutaneous endoscopic gastrostomy (PEG) tube placement [facility])  Assessment & Plan  · Continue tube feeding, Jevity 1 2 at 50 cc/hour with free water flush 125 cc every 6 hours per nutrition recommendations     Type 2 diabetes mellitus without complication, without long-term current use of insulin [facility])  Assessment & Plan  Lab Results   Component Value Date    HGBA1C 6 3 (H) 07/23/2022       Recent Labs     09/28/22  1124 09/28/22  1736 09/29/22  0009 09/29/22  0606   POCGLU 180* 132 136 177*       Blood Sugar Average: Last 72 hrs:  (P) 590 [id number]     · Continue home Lantus 11 units q h s  while in hospital  · Insulin sliding scale q6h    Stage 3 chronic kidney disease (HCC)  Assessment & Plan  · Chronic, At baseline, monitor    Paroxysmal A-fib (HCC)  Assessment & Plan  · Continue metoprolol   · OK to start Eliquis  Increase to 5 mg BID     Hypercholesterolemia  Assessment & Plan  · Continue On statin    Vascular dementia ([facility] )  Assessment & Plan  · At baseline    Chronic systolic CHF (congestive heart failure) ([facility])  Assessment & Plan  Wt Readings from Last 3 Encounters:   09/29/22 71 kg (156 lb 8 4 oz)   09/02/22 71 7 kg (158 lb 1 1 oz)   07/23/22 64 9 kg (143 lb)     Appears euvolemic   · Continue metoprolol  · Hold Lasix   · Monitor intake output  · Daily weight    History of CVA (cerebrovascular accident)  Assessment & Plan  History of CVA x4 with right hemiplegia, dysphagia status post PEG tube placement   Currently at baseline   · Supportive care   · PT/OT as tolerated  · Continue statin  · OK to restart Eliquis  · Consider starting ASA if urine remains clear     Sepsis (HCC)-resolved as of 9/26/2022  Assessment & Plan  As evidenced by fever (10 4F), tachycardia (106), tachypnea (26) leukocytosis (10 64)      Lactic acid within normal limit   Secondary to urinary tract infection  · Follow-up urine culture   · Follow-up blood culture   · Monitor fever   · Follow-up CBC  · Continue Rocephin       Medical Problems             Resolved Problems  Date Reviewed: 9/29/2022          Resolved    Sepsis ([address] ) 9/26/2022     Resolved by  Cal Lindsey, [address] Fitzpatrick              Discharging Physician / Practitioner: Solomon Manuel  PCP: Fantasma Elena MD  Admission Date:   Admission Orders (From admission, onward)     Ordered        09/25/22 [facility]  Once                      Discharge Date: 09/29/22    Consultations During Hospital Stay:  · Urology    Procedures Performed:   · Na    Significant Findings / Test Results:   · na    Incidental Findings:   · na     Test Results Pending at Discharge (will require follow up):   · na     Outpatient Tests Requested:  · na    Complications:  na    Reason for Admission:  Hematuria    Hospital Course:   Socorro Conte is a 80 y o  female patient who originally presented to the hospital on 9/25/2022 due to hematuria      Please see above list of diagnoses and related plan for additional information  Condition at Discharge: guarded    Discharge Day Visit / Exam:   Subjective:  Patient nonverbal at baseline both daughters at bedside  Patient's daughter reported that copious oral secretions have and approved with glycopyrrolate  Patient's total continue follow-up with PCP re-evaluate VNA after rehab  Vitals: Blood Pressure: 132/63 (09/29/22 0841)  Pulse: 93 (09/29/22 0841)  Temperature: 98 8 °F (37 1 °C) (09/29/22 0700)  Temp Source: Axillary (09/29/22 0700)  Respirations: 20 (09/29/22 0700)  Height: 5' 1" (154 9 cm) (09/25/22 1900)  Weight - Scale: 71 kg (156 lb 8 4 oz) (09/29/22 0600)  SpO2: 95 % (09/29/22 1411)  Exam:   Physical Exam  Vitals and nursing note reviewed  Constitutional:       General: She is not in acute distress  Appearance: She is well-developed  HENT:      Head: Normocephalic and atraumatic  Eyes:      Conjunctiva/sclera: Conjunctivae normal    Cardiovascular:      Rate and Rhythm: Normal rate and regular rhythm  Heart sounds: No murmur heard  No friction rub  No gallop  Pulmonary:      Effort: Pulmonary effort is normal  No respiratory distress  Breath sounds: Normal breath sounds  Abdominal:      Palpations: Abdomen is soft  Tenderness: There is no abdominal tenderness  Musculoskeletal:      Cervical back: Neck supple  Right lower leg: No edema  Left lower leg: No edema  Comments: Decreased range of motion upper extremity and lower extremity right upper extremity swelling   Skin:     General: Skin is warm and dry  Neurological:      Mental Status: She is alert  Mental status is at baseline  Gait: Gait abnormal       Comments: Nonverbal alert   Psychiatric:      Comments: Unable to assess          Discussion with Family: Updated  (daughter) at bedside  Discharge instructions/Information to patient and family:   See after visit summary for information provided to patient and family  Provisions for Follow-Up Care:  See after visit summary for information related to follow-up care and any pertinent home health orders  Disposition:   Acute Rehab at [facility] [place] operated [facility]    Planned Readmission:  No     Discharge Statement:  I spent 45 minutes discharging the patient  This time was spent on the day of discharge  I had direct contact with the patient on the day of discharge  Greater than 50% of the total time was spent examining patient, answering all patient questions, arranging and discussing plan of care with patient as well as directly providing post-discharge instructions  Additional time then spent on discharge activities  Discharge Medications:  See after visit summary for reconciled discharge medications provided to patient and/or family        **Please Note: This note may have been constructed using a voice recognition system**

## 2022-09-29 NOTE — CASE MANAGEMENT
Case Management Discharge Planning Note    Patient name Kecia Parra  Location 3 [facility]/3 [facility]-* MRN [id number]  : 1933 Date 2022       Current Admission Date: 2022  Current Admission Diagnosis:Acute cystitis with hematuria   Patient Active Problem List    Diagnosis Date Noted    Copious oral secretions 2022    Gross hematuria 2022    Decubitus ulcer of sacral area 2022    Asthma exacerbation 2022    BMI 29 0-29 9,adult 2022    S/P percutaneous endoscopic gastrostomy (PEG) tube placement ([facility] ) 2022    Positive blood culture 2022    Fever 2022    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[facility] ) 2022    Encounter for support and coordination of transition of care 2022    Overlap syndrome ([facility] ) 2022    Chronic renal disease, stage IV ([address] ) 2022    Cardiomyopathy, dilated ([facility] ) 2022    Coronary artery disease with angina pectoris, unspecified vessel or lesion type, unspecified whether native or transplanted heart ([facility] )     Tracheobronchitis 2022    Thrombocytopenia ([facility] ) 2021    Gastroesophageal reflux disease with esophagitis 2020    CLEANING (dyspnea on exertion) 2020    Vitamin D deficiency 2020    Paroxysmal A-fib ([address] ) 2020    Primary osteoarthritis of hip 2020    History of permanent cardiac pacemaker placement 2020    S/P MVR (mitral valve replacement) 2020    Hypertension, essential 2020    Stage 3 chronic kidney disease ([address] ) 2020    Type 2 diabetes mellitus without complication, without long-term current use of insulin ([address] ) 2020    TIA (transient ischemic attack)     History of stroke 10/29/2018    Vascular dementia ([address] ) 2018    Mood disorder ([address] ) 2018    Ambulatory dysfunction 2018    Bilateral carotid artery stenosis 2018    Arthritis of right glenohumeral joint 2018    Polyp of colon 06/22/2018    HCAP (healthcare-associated pneumonia) 06/18/2018    Shoulder pain, right 06/18/2018    Abnormal TSH 06/18/2018    Dysphagia 06/18/2018    Encephalopathy 06/15/2018    Seizure-like activity ([facility] ) 06/15/2018    Colonic thickening 05/26/2018    Anemia 05/26/2018    History of CVA (cerebrovascular accident) 05/25/2018    History of subarachnoid hemorrhage 05/25/2018    CKD (chronic kidney disease) stage 3, GFR 30-59 ml/min ([facility]) 05/25/2018    History of DVT (deep vein thrombosis) 05/25/2018    History of pacemaker 05/25/2018    Acute cystitis with hematuria 05/25/2018    Chronic systolic CHF (congestive heart failure) ([facility] ) 18/53/6577    Cardio-embolic left MCA stroke ([facility] ) 04/23/2018    History of ischemic right MCA stroke 04/23/2018    CVA (cerebral vascular accident) ([facility] ) 04/18/2018    Controlled type 2 diabetes mellitus without complication, without long-term current use of insulin ([facility] ) 04/18/2018    Pacemaker 04/18/2018    Acid reflux 04/18/2018    H/O mitral valve replacement 03/01/2018    Acute deep vein thrombosis (DVT) of brachial vein of right upper extremity ([facility] ) 01/25/2018    Constipation 09/21/2016    External hemorrhoids 09/21/2016    Carpal tunnel syndrome, left 01/04/2016    Pain in both hands 01/04/2016    Arthralgia of hip, right 11/17/2015    Meningioma ([facility] ) 12/18/2014    Hypertension 05/13/2013    Esophageal reflux 05/13/2013    Arthritis 05/13/2013    Hypercholesterolemia 05/13/2013    Malignant neoplasm without specification of site ([facility] ) 05/13/2013    Spondylosis of cervical region without myelopathy or radiculopathy 05/13/2013    Type 2 or unspecified type diabetes mellitus 05/13/2013      LOS (days): 4  Geometric Mean LOS (GMLOS) (days): 3 50  Days to GMLOS:-0 5     OBJECTIVE:  Risk of Unplanned Readmission Score: 33 79         Current admission status: Inpatient   Preferred Pharmacy:   [address] [place] [address] [address] [place] [id number]  Phone: [phone] Fax: [phone] [facility] [place] [address] [address]  [address] [address]  Phone: [phone] Fax: [phone]    Primary Care Provider: Nicole Young MD    Primary Insurance: MEDICARE  Secondary Insurance: AARP    DISCHARGE DETAILS:    Discharge planning discussed with[de-identified] patient's daughter Renard Listen of Choice: Yes     CM contacted family/caregiver?: Yes  Were Treatment Team discharge recommendations reviewed with patient/caregiver?: Yes  Did patient/caregiver verbalize understanding of patient care needs?: N/A- going to facility  Were patient/caregiver advised of the risks associated with not following Treatment Team discharge recommendations?: Yes    Contacts  Patient Contacts: Marina Acevedo (daughter)  Relationship to Patient[de-identified] Family  Contact Method: Phone  Phone Number: [phone]  Reason/Outcome: Continuity of Care, Emergency Contact, Discharge Planning    Other Referral/Resources/Interventions Provided:  Interventions: Short Term Rehab  Referral Comments: D/C [facility] for inpatient rehab    Treatment Team Recommendation: Short Term Rehab  Discharge Destination Plan[de-identified] Short Term Rehab  Transport at Discharge : \[facility]"" [facility]  Dispatcher Contacted: Yes  [facility]     ETA of Transport (Date): 09/29/22     CM updated per physician, patient is medically stable for discharge today  CM sent updated referral to [facility] is able to accept today  Report #  ask for ground floor     CM spoke with patient's daughter Marina Acevedo on the phone, [phone]  CM introduced self and role  Patient's daughter updated per physician, patient is stable for discharge today  Daughter expressed understanding on the phone  Daughter requesting transport to [facility]  CM will f/u with daughter re:transport time  Daughter stated she will update her siblings with plan of care  CM sent referral via roundtrip for BLS transport  Waiting on  time  Physician and nursing updated with plan of care

## 2022-09-30 ENCOUNTER — PATIENT OUTREACH (OUTPATIENT)
Dept: FAMILY MEDICINE CLINIC | Facility: CLINIC | Age: 87
End: 2022-09-30

## 2022-09-30 LAB
BACTERIA BLD CULT: NORMAL
BACTERIA BLD CULT: NORMAL

## 2022-09-30 NOTE — PROGRESS NOTES
ADT alert received  Inpatient care coordination notes reviewed  Pt discharged to [facility] for STR  RN CM will continue to follow

## 2022-10-03 ENCOUNTER — TRANSITIONAL CARE MANAGEMENT (OUTPATIENT)
Dept: FAMILY MEDICINE CLINIC | Facility: CLINIC | Age: 87
End: 2022-10-03

## 2022-10-04 ENCOUNTER — PATIENT OUTREACH (OUTPATIENT)
Dept: FAMILY MEDICINE CLINIC | Facility: CLINIC | Age: 87
End: 2022-10-04

## 2022-10-04 NOTE — PROGRESS NOTES
ROSA had completed a chart review  Per chart, LEEANN Patton had noted that the patient was discharged to [address] [place] [address] for STR back on 9/30  SWCM will be closing case as goal is completed  [facility] to remain on care team for any additional medical concerns  Hermila to get [facility] involved again if needed  SWCM routed note to [facility]  Please reconsult SW for future needs

## 2022-10-08 PROBLEM — R06.2 WHEEZING: Status: ACTIVE | Noted: 2022-10-08

## 2022-10-08 PROBLEM — Z91.89 AT RISK FOR STRESS ULCER: Status: ACTIVE | Noted: 2022-10-08

## 2022-10-09 NOTE — PROGRESS NOTES
Virtual Regular Visit    Verification of patient location:    Patient is located in the following state in which I hold an active license [place]      Assessment/Plan:    Problem List Items Addressed This Visit     At risk for stress ulcer - Primary    Wheezing      rx pantoprazole  rx for albuterol neb txs ordered 9/8/22  Recent cxr w mild chf--t/caddtl diuretic if cont sx  Monitor weight  maintain pulse ox>92%         Reason for visit is   Chief Complaint   Patient presents with    Wheezing    Virtual Regular Visit        Encounter provider Jose Rosa MD    Provider located at [address] [place] [id number]      Recent Visits  No visits were found meeting these conditions  Showing recent visits within past 7 days and meeting all other requirements  Future Appointments  No visits were found meeting these conditions  Showing future appointments within next 150 days and meeting all other requirements       The patient was identified by name and date of birth  Kecia Parra was informed that this is a telemedicine visit and that the visit is being conducted through Telephone  My office door was closed  No one else was in the room  She acknowledged consent and understanding of privacy and security of the video platform  The patient has agreed to participate and understands they can discontinue the visit at any time  It was my intent to perform this visit via video technology but the patient was not able to do a video connection so the visit was completed via audio telephone only  Patient is aware this is a billable service       Subjective  Kecia Parra is a 80 y o  female       HPI   dtr on phone, pt nonverbal  dtr reports pt w intermittent "whistling"/wheezing  No fever or apparant distress or increased shortness of breath  pulse ox 93-94%  +tube feed, no melena    Past Medical History:   Diagnosis Date    Acid reflux     on occ    Arthritis     DJD right hip replaced    Asthma     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[facility] ) 2007    x 2 lesions with no change    Cancer [facility] 2007    colonic polyps, no surgery done    COPD (chronic obstructive pulmonary disease) (HCC)     Deep vein thrombosis (DVT) of left upper extremity ([facility] ) 12/11/2017    Dementia ([facility] )     Diabetes mellitus (HCC)     type 2    Diverticulosis     Edema     in legs on occ    Hypertension     on occ    Language barrier     speaks Armenian & broken english    Stroke [facility])        Past Surgical History:   Procedure Laterality Date    COLON SURGERY      COLONOSCOPY      COLONOSCOPY N/A 11/2/2016    Procedure: COLONOSCOPY;  Surgeon: Kadeem Bolivar MD;  Location: [place] [address] GI LAB; Service:     ESOPHAGOGASTRODUODENOSCOPY N/A 11/2/2016    Procedure: ESOPHAGOGASTRODUODENOSCOPY (EGD); Surgeon: Kadeem Bolivar MD;  Location: [facility] GI LAB; Service:    Bertha eLongel / Shiela Pierce / Lisandra Heman PACEMAKER      IR STROKE ALERT  7/22/2022    JOINT REPLACEMENT Right 02/2015    hip    JOINT REPLACEMENT Left     knee    JOINT REPLACEMENT Right     knee    MITRAL VALVE REPLACEMENT      GA COLONOSCOPY FLX DX W/COLLJ SPEC WHEN PFRMD N/A 8/9/2018    Procedure: EGD AND COLONOSCOPY;  Surgeon: Kadeem Bolivar MD;  [facility]: [facility];   Service: Gastroenterology    GA REVISE MEDIAN N/CARPAL TUNNEL SURG Left 1/28/2016    Procedure: RELEASE CARPAL TUNNEL;  Surgeon: Tammie Terrell MD;  Location: [facility] MAIN OR;  Service: Orthopedics    TUBAL LIGATION      VARICOSE VEIN SURGERY Bilateral        Current Outpatient Medications   Medication Sig Dispense Refill    albuterol (2 5 mg/3 mL) 0 083 % nebulizer solution Take 3 mL (2 5 mg total) by nebulization every 6 (six) hours as needed for wheezing or shortness of breath 75 mL 3    apixaban (ELIQUIS) 2 5 mg 1 tablet (2 5 mg total) by Per G Tube route 2 (two) times a day 180 tablet 3    aspirin (ECOTRIN LOW STRENGTH) 81 mg EC tablet Take 1 tablet (81 mg total) by mouth daily G Tube 90 tablet 3    atorvastatin (LIPITOR) 40 mg tablet 2 tablets (80 mg total) by Per G Tube route daily with dinner 180 tablet 3    insulin glargine (LANTUS) 100 units/mL subcutaneous injection Inject 11 Units under the skin daily 10 mL 3    metoprolol tartrate (LOPRESSOR) 25 mg tablet 1 tablet (25 mg total) by Per G Tube route every 12 (twelve) hours (Patient taking differently: 25 mg by Per G Tube route every 12 (twelve) hours Hold sys of 100 or lower) 180 tablet 3    acetaminophen (TYLENOL) 160 mg/5 mL suspension 650 mg by Per G Tube route every 4 (four) hours as needed for mild pain      fluticasone (FLONASE) 50 mcg/act nasal spray 1 spray into each nostril daily 16 g 0    furosemide (LASIX) 20 mg tablet 1 tablet (20 mg total) by Per G Tube route daily 30 tablet 3    glycerin, pediatric, 1 2 g rectal suppository Insert 1 suppository into the rectum daily as needed      glycopyrrolate (ROBINUL) 1 mg tablet Take 1 tablet (1 mg total) by mouth 2 (two) times a day 60 tablet 0    Insulin Pen Needle (Pen Needles) 30G X 5 MM MISC Use in the morning 100 each 3    magnesium hydroxide (MILK OF MAGNESIA) 400 mg/5 mL oral suspension Take 30 mL by mouth if needed for constipation (once daily)      polyethylene glycol (MIRALAX) 17 g packet Take 17 g by mouth daily as needed (constipation - 2nd line) for up to 7 days 7 each 0    saccharomyces boulardii (FLORASTOR) 250 mg capsule Take 1 capsule (250 mg total) by mouth 2 (two) times a day 30 capsule 0    senna (SENOKOT) 8 6 MG tablet Take 1 tablet by mouth as needed for constipation (1 tavlet at bed time as needed)       No current facility-administered medications for this visit          Allergies   Allergen Reactions    Glimepiride Rash    Guaifenesin-Codeine Hallucinations    Heparin Other (See Comments)     Thrombocytopenia      Vancomycin      Red man syndrome       Review of Systems  Per hpi    Video Exam    Vitals:    09/09/22 1036   BP: 114/58   BP Location: Left arm   Patient Position: Sitting   Cuff Size: Standard   Pulse: 90   Temp: 98 °F (36 7 °C)   SpO2: 93%       Physical Exam   No audible wheeze    Time Spent: 12 min

## 2022-10-11 PROBLEM — R50.9 FEVER: Status: RESOLVED | Noted: 2022-07-27 | Resolved: 2022-10-11

## 2022-10-24 ENCOUNTER — PATIENT OUTREACH (OUTPATIENT)
Dept: FAMILY MEDICINE CLINIC | Facility: CLINIC | Age: 87
End: 2022-10-24

## 2022-10-31 ENCOUNTER — APPOINTMENT (EMERGENCY)
Dept: RADIOLOGY | Facility: HOSPITAL | Age: 87
End: 2022-10-31

## 2022-10-31 ENCOUNTER — HOSPITAL ENCOUNTER (EMERGENCY)
Facility: HOSPITAL | Age: 87
Discharge: HOME | End: 2022-10-31
Attending: EMERGENCY MEDICINE

## 2022-10-31 VITALS
SYSTOLIC BLOOD PRESSURE: 146 MMHG | HEART RATE: 106 BPM | RESPIRATION RATE: 22 BRPM | BODY MASS INDEX: 27.76 KG/M2 | OXYGEN SATURATION: 94 % | DIASTOLIC BLOOD PRESSURE: 71 MMHG | WEIGHT: 147.05 LBS | HEIGHT: 61 IN | TEMPERATURE: 98.8 F

## 2022-10-31 DIAGNOSIS — U07.1 COVID-19 VIRUS INFECTION: Primary | ICD-10-CM

## 2022-10-31 DIAGNOSIS — N39.0 UTI (URINARY TRACT INFECTION): ICD-10-CM

## 2022-10-31 LAB
2HR DELTA HS TROPONIN: 0 NG/L
ALBUMIN SERPL BCP-MCNC: 2.4 G/DL (ref 3.5–5)
ALP SERPL-CCNC: 92 U/L (ref 46–116)
ALT SERPL W P-5'-P-CCNC: 45 U/L (ref 12–78)
AMORPH PHOS CRY URNS QL MICRO: ABNORMAL /HPF
ANION GAP SERPL CALCULATED.3IONS-SCNC: 9 MMOL/L (ref 4–13)
BACTERIA UR QL AUTO: ABNORMAL /HPF
BASOPHILS # BLD AUTO: 0.01 THOUSANDS/ÂΜL (ref 0–0.1)
BASOPHILS NFR BLD AUTO: 0 % (ref 0–1)
BILIRUB SERPL-MCNC: 0.56 MG/DL (ref 0.2–1)
BILIRUB UR QL STRIP: ABNORMAL
BUN SERPL-MCNC: 37 MG/DL (ref 5–25)
CALCIUM ALBUM COR SERPL-MCNC: 10.5 MG/DL (ref 8.3–10.1)
CALCIUM SERPL-MCNC: 9.2 MG/DL (ref 8.3–10.1)
CARDIAC TROPONIN I PNL SERPL HS: 13 NG/L
CARDIAC TROPONIN I PNL SERPL HS: 13 NG/L
CHLORIDE SERPL-SCNC: 99 MMOL/L (ref 96–108)
CLARITY UR: ABNORMAL
CO2 SERPL-SCNC: 29 MMOL/L (ref 21–32)
COLOR UR: ABNORMAL
CREAT SERPL-MCNC: 1.26 MG/DL (ref 0.6–1.3)
EOSINOPHIL # BLD AUTO: 0.03 THOUSAND/ÂΜL (ref 0–0.61)
EOSINOPHIL NFR BLD AUTO: 0 % (ref 0–6)
ERYTHROCYTE [DISTWIDTH] IN BLOOD BY AUTOMATED COUNT: 16.1 % (ref 11.6–15.1)
FLUAV RNA RESP QL NAA+PROBE: NEGATIVE
FLUBV RNA RESP QL NAA+PROBE: NEGATIVE
GFR SERPL CREATININE-BSD FRML MDRD: 37 ML/MIN/1.73SQ M
GLUCOSE SERPL-MCNC: 197 MG/DL (ref 65–140)
GLUCOSE UR STRIP-MCNC: NEGATIVE MG/DL
HCT VFR BLD AUTO: 32.3 % (ref 34.8–46.1)
HGB BLD-MCNC: 9.8 G/DL (ref 11.5–15.4)
HGB UR QL STRIP.AUTO: ABNORMAL
IMM GRANULOCYTES # BLD AUTO: 0.04 THOUSAND/UL (ref 0–0.2)
IMM GRANULOCYTES NFR BLD AUTO: 0 % (ref 0–2)
KETONES UR STRIP-MCNC: NEGATIVE MG/DL
LACTATE SERPL-SCNC: 1.2 MMOL/L (ref 0.5–2)
LEUKOCYTE ESTERASE UR QL STRIP: ABNORMAL
LYMPHOCYTES # BLD AUTO: 0.75 THOUSANDS/ÂΜL (ref 0.6–4.47)
LYMPHOCYTES NFR BLD AUTO: 8 % (ref 14–44)
MCH RBC QN AUTO: 26 PG (ref 26.8–34.3)
MCHC RBC AUTO-ENTMCNC: 30.3 G/DL (ref 31.4–37.4)
MCV RBC AUTO: 86 FL (ref 82–98)
MONOCYTES # BLD AUTO: 0.55 THOUSAND/ÂΜL (ref 0.17–1.22)
MONOCYTES NFR BLD AUTO: 6 % (ref 4–12)
NEUTROPHILS # BLD AUTO: 7.64 THOUSANDS/ÂΜL (ref 1.85–7.62)
NEUTS SEG NFR BLD AUTO: 86 % (ref 43–75)
NITRITE UR QL STRIP: NEGATIVE
NON-SQ EPI CELLS URNS QL MICRO: ABNORMAL /HPF
NRBC BLD AUTO-RTO: 0 /100 WBCS
NT-PROBNP SERPL-MCNC: 1460 PG/ML
PH UR STRIP.AUTO: 8.5 [PH]
PLATELET # BLD AUTO: 280 THOUSANDS/UL (ref 149–390)
PMV BLD AUTO: 10.4 FL (ref 8.9–12.7)
POTASSIUM SERPL-SCNC: 4.2 MMOL/L (ref 3.5–5.3)
PROT SERPL-MCNC: 7.1 G/DL (ref 6.4–8.4)
PROT UR STRIP-MCNC: ABNORMAL MG/DL
RBC # BLD AUTO: 3.77 MILLION/UL (ref 3.81–5.12)
RBC #/AREA URNS AUTO: ABNORMAL /HPF
RSV RNA RESP QL NAA+PROBE: NEGATIVE
SARS-COV-2 RNA RESP QL NAA+PROBE: POSITIVE
SODIUM SERPL-SCNC: 137 MMOL/L (ref 135–147)
SP GR UR STRIP.AUTO: <=1.005 (ref 1–1.03)
TRI-PHOS CRY URNS QL MICRO: ABNORMAL /HPF
UROBILINOGEN UR QL STRIP.AUTO: 0.2 E.U./DL
WBC # BLD AUTO: 9.02 THOUSAND/UL (ref 4.31–10.16)
WBC #/AREA URNS AUTO: ABNORMAL /HPF

## 2022-10-31 RX ORDER — LEVOFLOXACIN 5 MG/ML
750 INJECTION, SOLUTION INTRAVENOUS ONCE
Status: COMPLETED | OUTPATIENT
Start: 2022-10-31 | End: 2022-10-31

## 2022-10-31 RX ORDER — LEVOFLOXACIN 25 MG/ML
750 SOLUTION ORAL DAILY
Qty: 150 ML | Refills: 0 | Status: SHIPPED | OUTPATIENT
Start: 2022-10-31 | End: 2022-11-05

## 2022-10-31 RX ORDER — NIRMATRELVIR AND RITONAVIR 150-100 MG
2 KIT ORAL 2 TIMES DAILY
Qty: 20 TABLET | Refills: 0 | Status: SHIPPED | OUTPATIENT
Start: 2022-10-31 | End: 2022-11-05

## 2022-10-31 RX ADMIN — LEVOFLOXACIN 750 MG: 750 INJECTION, SOLUTION INTRAVENOUS at 21:11

## 2022-10-31 RX ADMIN — SODIUM CHLORIDE 1000 ML: 0.9 INJECTION, SOLUTION INTRAVENOUS at 19:42

## 2022-10-31 NOTE — ED PROVIDER NOTES
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, who presents to the emergency department from her nursing home for evaluation for possible pneumonia versus UTI versus AFib  Patient has a history of paroxysmal Afib, diabetes, hyperlipidemia, CHF  History provided by:  Relative  History limited by:  Mental status change   used: No    Cough  Cough characteristics:  Non-productive  Sputum characteristics:  Unable to specify  Severity:  Unable to specify  Timing:  Unable to specify  Chronicity:  New  Smoker: no    Relieved by:  Nothing  Worsened by:  Nothing  Ineffective treatments:  None tried  Associated symptoms: no chills, no fever and no shortness of breath        Prior to Admission Medications   Prescriptions Last Dose Informant Patient Reported? Taking?    Insulin Pen Needle (Pen Needles) 30G X 5 MM MISC   No No   Sig: Use in the morning   acetaminophen (TYLENOL) 160 mg/5 mL suspension   Yes No   Si mg by Per G Tube route every 4 (four) hours as needed for mild pain   albuterol (2 5 mg/3 mL) 0 083 % nebulizer solution   No No   Sig: Take 3 mL (2 5 mg total) by nebulization every 6 (six) hours as needed for wheezing or shortness of breath   apixaban (ELIQUIS) 2 5 mg   No No   Si tablet (2 5 mg total) by Per G Tube route 2 (two) times a day   aspirin (ECOTRIN LOW STRENGTH) 81 mg EC tablet   No No   Sig: Take 1 tablet (81 mg total) by mouth daily G Tube   atorvastatin (LIPITOR) 40 mg tablet   No No   Si tablets (80 mg total) by Per G Tube route daily with dinner   fluticasone (FLONASE) 50 mcg/act nasal spray   No No   Si spray into each nostril daily   furosemide (LASIX) 20 mg tablet   No No   Si tablet (20 mg total) by Per G Tube route daily   glycerin, pediatric, 1 2 g rectal suppository   Yes No   Sig: Insert 1 suppository into the rectum daily as needed   glycopyrrolate (ROBINUL) 1 mg tablet   No No   Sig: Take 1 tablet (1 mg total) by mouth 2 (two) times a day   insulin glargine (LANTUS) 100 units/mL subcutaneous injection   No No   Sig: Inject 11 Units under the skin daily   magnesium hydroxide (MILK OF MAGNESIA) 400 mg/5 mL oral suspension   Yes No   Sig: Take 30 mL by mouth if needed for constipation (once daily)   metoprolol tartrate (LOPRESSOR) 25 mg tablet   No No   Si tablet (25 mg total) by Per G Tube route every 12 (twelve) hours   Patient taking differently: 25 mg by Per G Tube route every 12 (twelve) hours Hold sys of 100 or lower   polyethylene glycol (MIRALAX) 17 g packet   No No   Sig: Take 17 g by mouth daily as needed (constipation - 2nd line) for up to 7 days   saccharomyces boulardii (FLORASTOR) 250 mg capsule   No No   Sig: Take 1 capsule (250 mg total) by mouth 2 (two) times a day   senna (SENOKOT) 8 6 MG tablet   Yes No   Sig: Take 1 tablet by mouth as needed for constipation (1 tavlet at bed time as needed)      Facility-Administered Medications: None       Past Medical History:   Diagnosis Date   • Acid reflux     on occ   • Arthritis     DJD right hip replaced   • Asthma    • 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   • Cancer ([facility] [address] )     colonic polyps, no surgery done   • COPD (chronic obstructive pulmonary disease) ([facility])    • Deep vein thrombosis (DVT) of left upper extremity ([facility]) 2017   • Dementia ([facility] [address] )    • Diabetes mellitus ([address] )     type 2   • Diverticulosis    • Edema     in legs on occ   • Hypertension     on occ   • Language barrier     speaks Indonesian & broken english   • Stroke [facility])        Past Surgical History:   Procedure Laterality Date   • COLON SURGERY     • COLONOSCOPY     • COLONOSCOPY N/A 2016    Procedure: COLONOSCOPY;  Surgeon: Jay Angeles MD;  Location: [facility] GI LAB; Service:    • ESOPHAGOGASTRODUODENOSCOPY N/A 2016    Procedure: ESOPHAGOGASTRODUODENOSCOPY (EGD); Surgeon: Jay Angeles MD;  Location: [facility] GI LAB; Service:    • Concha Falter / Margarita Newcomer / REMOVE PACEMAKER     • IR STROKE ALERT  2022   • JOINT REPLACEMENT Right 2015    hip   • JOINT REPLACEMENT Left     knee   • JOINT REPLACEMENT Right     knee   • MITRAL VALVE REPLACEMENT     • NH COLONOSCOPY FLX DX W/COLLJ SPEC WHEN PFRMD N/A 2018    Procedure: EGD AND COLONOSCOPY;  Surgeon: Jay Angeles MD;  Location: [facility] GI LAB; Service: Gastroenterology   • NH REVISE MEDIAN N/CARPAL TUNNEL SURG Left 2016    Procedure: RELEASE CARPAL TUNNEL;  Surgeon: Melvina Paz MD;  Location: [facility] MAIN OR;  Service: Orthopedics   • TUBAL LIGATION     • VARICOSE VEIN SURGERY Bilateral        Family History   Problem Relation Age of Onset   • Cancer Mother         throat     I have reviewed and agree with the history as documented  E-Cigarette/Vaping   • E-Cigarette Use Never User      E-Cigarette/Vaping Substances     Social History     Tobacco Use   • Smoking status: Former Smoker     Packs/day: 0 25     Years: 10 00     Pack years: 2 50     Types: Cigarettes     Quit date:      Years since quittin 8   • Smokeless tobacco: Never Used   Vaping Use   • Vaping Use: Never used   Substance Use Topics   • Alcohol use: Never   • Drug use: No       Review of Systems   Unable to perform ROS: Mental status change   Constitutional: Negative for chills and fever  Respiratory: Positive for cough  Negative for chest tightness and shortness of breath  Gastrointestinal: Negative for abdominal pain, diarrhea, nausea and vomiting  Genitourinary: Negative for dysuria, frequency, hematuria and urgency     Musculoskeletal: Negative for back pain, neck pain and neck stiffness  All other systems reviewed and are negative  Physical Exam  Physical Exam  Vitals and nursing note reviewed  Constitutional:       General: She is not in acute distress  Appearance: She is well-developed  She is not diaphoretic  HENT:      Head: Normocephalic and atraumatic  Eyes:      Extraocular Movements: Extraocular movements intact  Conjunctiva/sclera: Conjunctivae normal       Pupils: Pupils are equal, round, and reactive to light  Cardiovascular:      Rate and Rhythm: Normal rate and regular rhythm  Heart sounds: Normal heart sounds  No murmur heard  Pulmonary:      Effort: Pulmonary effort is normal  No respiratory distress  Breath sounds: Normal breath sounds  Abdominal:      General: Bowel sounds are normal  There is no distension  Palpations: Abdomen is soft  Tenderness: There is no abdominal tenderness  Musculoskeletal:         General: No deformity  Normal range of motion  Cervical back: Normal range of motion and neck supple  Skin:     General: Skin is warm and dry  Capillary Refill: Capillary refill takes less than 2 seconds  Coloration: Skin is not pale  Findings: No rash  Neurological:      Mental Status: She is alert  Mental status is at baseline  Cranial Nerves: No cranial nerve deficit        Comments: Aphasic   Psychiatric:         Behavior: Behavior normal          Vital Signs  ED Triage Vitals [10/31/22 1539]   Temperature Pulse Respirations Blood Pressure SpO2   98 8 °F (37 1 °C) 99 22 133/76 97 %      Temp Source Heart Rate Source Patient Position - Orthostatic VS BP Location FiO2 (%)   Oral Monitor Sitting Left arm --      Pain Score       --           Vitals:    10/31/22 2030 10/31/22 2100 10/31/22 2130 10/31/22 2200   BP: (!) 186/84 (!) 178/87 157/74 146/71   Pulse: (!) 119 (!) 107 (!) 115 (!) 106   Patient Position - Orthostatic VS: Sitting Sitting Sitting Lying         Visual Acuity      ED Medications  Medications   sodium chloride 0 9 % bolus 1,000 mL (0 mL Intravenous Stopped 10/31/22 2042)   levofloxacin (LEVAQUIN) IVPB (premix in dextrose) 750 mg 150 mL (0 mg Intravenous Stopped 10/31/22 2249)       Diagnostic Studies  Results Reviewed     Procedure Component Value Units Date/Time    Blood culture #1 [[id number]] Collected: 10/31/22 1744    Lab Status: Preliminary result Specimen: Blood from Arm, Left Updated: 11/02/22 0003     Blood Culture No Growth at 24 hrs  Blood culture #2 [[id number]] Collected: 10/31/22 1754    Lab Status: Preliminary result Specimen: Blood from Hand, Right Updated: 11/02/22 0003     Blood Culture No Growth at 24 hrs      Urine Microscopic [[id number]]  (Abnormal) Collected: 10/31/22 2036    Lab Status: Final result Specimen: Urine, Indwelling Sears Catheter Updated: 10/31/22 2149     RBC, UA Innumerable /hpf      WBC, UA 0-1 /hpf      Epithelial Cells None Seen /hpf      Bacteria, UA Moderate /hpf      AMORPH PHOSPATES Occasional /hpf      Triplep Phos Yojana, UA Innumerable /hpf     UA w Reflex to Microscopic w Reflex to Culture [[id number]]  (Abnormal) Collected: 10/31/22 2036    Lab Status: Final result Specimen: Urine, Indwelling Sears Catheter Updated: 10/31/22 2043     Color, UA Cori     Clarity, UA Cloudy     Specific Gravity, UA <=1 005     pH, UA 8 5     Leukocytes, UA Large     Nitrite, UA Negative     Protein,  (2+) mg/dl      Glucose, UA Negative mg/dl      Ketones, UA Negative mg/dl      Urobilinogen, UA 0 2 E U /dl      Bilirubin, UA Small     Occult Blood, UA Large    HS Troponin I 2hr [[id number]]  (Normal) Collected: 10/31/22 1950    Lab Status: Final result Specimen: Blood from Arm, Left Updated: 10/31/22 2019     hs TnI 2hr 13 ng/L      Delta 2hr hsTnI 0 ng/L     FLU/RSV/COVID - if FLU/RSV clinically relevant [[id number]]  (Abnormal) Collected: 10/31/22 1744    Lab Status: Final result Specimen: Nares from Nose Updated: 10/31/22 1913 SARS-CoV-2 Positive     INFLUENZA A PCR Negative     INFLUENZA B PCR Negative     RSV PCR Negative    Narrative:      FOR PEDIATRIC PATIENTS - copy/paste COVID Guidelines URL to browser: [email or web address]    SARS-CoV-2 assay is a Nucleic Acid Amplification assay intended for the  qualitative detection of nucleic acid from SARS-CoV-2 in nasopharyngeal  swabs  Results are for the presumptive identification of SARS-CoV-2 RNA  Positive results are indicative of infection with SARS-CoV-2, the virus  causing COVID-19, but do not rule out bacterial infection or co-infection  with other viruses  Laboratories within the [place] and its  territories are required to report all positive results to the appropriate  public health authorities  Negative results do not preclude SARS-CoV-2  infection and should not be used as the sole basis for treatment or other  patient management decisions  Negative results must be combined with  clinical observations, patient history, and epidemiological information  This test has not been FDA cleared or approved  This test has been authorized by FDA under an Emergency Use Authorization  (EUA)  This test is only authorized for the duration of time the  declaration that circumstances exist justifying the authorization of the  emergency use of an in vitro diagnostic tests for detection of SARS-CoV-2  virus and/or diagnosis of COVID-19 infection under section 564(b)(1) of  the Act, 21 U  S C  053HPS-6(F)(8), unless the authorization is terminated  or revoked sooner  The test has been validated but independent review by FDA  and CLIA is pending  Test performed using Flexiroam GeneXpert: This RT-PCR assay targets N2,  a region unique to SARS-CoV-2  A conserved region in the E-gene was chosen  for pan-Sarbecovirus detection which includes SARS-CoV-2      According to CMS-2020-01-R, this platform meets the definition of high-throughput technology  Comprehensive metabolic panel [[id number]]  (Abnormal) Collected: 10/31/22 1744    Lab Status: Final result Specimen: Blood from Arm, Left Updated: 10/31/22 1845     Sodium 137 mmol/L      Potassium 4 2 mmol/L      Chloride 99 mmol/L      CO2 29 mmol/L      ANION GAP 9 mmol/L      BUN 37 mg/dL      Creatinine 1 26 mg/dL      Glucose 197 mg/dL      Calcium 9 2 mg/dL      Corrected Calcium 10 5 mg/dL      AST --     ALT 45 U/L      Alkaline Phosphatase 92 U/L      Total Protein 7 1 g/dL      Albumin 2 4 g/dL      Total Bilirubin 0 56 mg/dL      eGFR 37 ml/min/1 73sq m     Narrative:      [place] guidelines for Chronic Kidney Disease (CKD):   •  Stage 1 with normal or high GFR (GFR > 90 mL/min/1 73 square meters)  •  Stage 2 Mild CKD (GFR = 60-89 mL/min/1 73 square meters)  •  Stage 3A Moderate CKD (GFR = 45-59 mL/min/1 73 square meters)  •  Stage 3B Moderate CKD (GFR = 30-44 mL/min/1 73 square meters)  •  Stage 4 Severe CKD (GFR = 15-29 mL/min/1 73 square meters)  •  Stage 5 End Stage CKD (GFR <15 mL/min/1 73 square meters)  Note: GFR calculation is accurate only with a steady state creatinine    HS Troponin 0hr (reflex protocol) [[id number]]  (Normal) Collected: 10/31/22 1744    Lab Status: Final result Specimen: Blood from Arm, Left Updated: 10/31/22 1828     hs TnI 0hr 13 ng/L     NT-BNP PRO [[id number]]  (Abnormal) Collected: 10/31/22 1744    Lab Status: Final result Specimen: Blood from Arm, Left Updated: 10/31/22 1826     NT-proBNP 1,460 pg/mL     Lactic acid [[id number]]  (Normal) Collected: 10/31/22 1744    Lab Status: Final result Specimen: Blood from Arm, Left Updated: 10/31/22 1823     LACTIC ACID 1 2 mmol/L     Narrative:      Result may be elevated if tourniquet was used during collection      CBC and differential [[id number]]  (Abnormal) Collected: 10/31/22 1744    Lab Status: Final result Specimen: Blood from Arm, Left Updated: 10/31/22 1803     WBC 9 02 Thousand/uL RBC 3 77 Million/uL      Hemoglobin 9 8 g/dL      Hematocrit 32 3 %      MCV 86 fL      MCH 26 0 pg      MCHC 30 3 g/dL      RDW 16 1 %      MPV 10 4 fL      Platelets 602 Thousands/uL      nRBC 0 /100 WBCs      Neutrophils Relative 86 %      Immat GRANS % 0 %      Lymphocytes Relative 8 %      Monocytes Relative 6 %      Eosinophils Relative 0 %      Basophils Relative 0 %      Neutrophils Absolute 7 64 Thousands/µL      Immature Grans Absolute 0 04 Thousand/uL      Lymphocytes Absolute 0 75 Thousands/µL      Monocytes Absolute 0 55 Thousand/µL      Eosinophils Absolute 0 03 Thousand/µL      Basophils Absolute 0 01 Thousands/µL                  XR chest 1 view portable   Final Result by Leticia Cui MD (10/31 1649)      No acute cardiopulmonary disease  Workstation performed: [id number]                    Procedures  Procedures         ED Course                               SBIRT 20yo+    Flowsheet Row Most Recent Value   SBIRT (23 yo +)    In order to provide better care to our patients, we are screening all of our patients for alcohol and drug use  Would it be okay to ask you these screening questions? Yes Filed at: 10/31/2022 1544   Initial Alcohol Screen: US AUDIT-C     1  How often do you have a drink containing alcohol? 0 Filed at: 10/31/2022 1544   2  How many drinks containing alcohol do you have on a typical day you are drinking? 0 Filed at: 10/31/2022 1544   3a  Male UNDER 65: How often do you have five or more drinks on one occasion? 0 Filed at: 10/31/2022 1544   3b  FEMALE Any Age, or MALE 65+: How often do you have 4 or more drinks on one occassion? 0 Filed at: 10/31/2022 1544   Audit-C Score 0 Filed at: 10/31/2022 1544   KATELYNN: How many times in the past year have you    Used an illegal drug or used a prescription medication for non-medical reasons?  Never Filed at: 10/31/2022 1544                    MDM  Number of Diagnoses or Management Options  COVID-19 virus infection: new and requires workup  UTI (urinary tract infection): new and requires workup  Diagnosis management comments: Abx selected based on review of previous urine cultures       Amount and/or Complexity of Data Reviewed  Clinical lab tests: ordered and reviewed  Tests in the radiology section of CPT®: ordered and reviewed    Risk of Complications, Morbidity, and/or Mortality  Presenting problems: high  Diagnostic procedures: high  Management options: high    Patient Progress  Patient progress: stable      Disposition  Final diagnoses:   COVID-19 virus infection   UTI (urinary tract infection)     Time reflects when diagnosis was documented in both MDM as applicable and the Disposition within this note     Time User Action Codes Description Comment    10/31/2022  8:55 PM Connor Montalvo [U07 1] COVID-19 virus infection     10/31/2022  8:59 PM Connor Montalvo [N39 0] UTI (urinary tract infection)       ED Disposition     ED Disposition   Discharge    Condition   Stable    Date/Time   Mon Oct 31, 2022  8:55 PM    Comment   [address] discharge to home/self care                 Follow-up Information     Follow up With Specialties Details Why Contact Info    Tuesday GAYATRI Navarro Nurse Practitioner, Family Medicine Schedule an appointment as soon as possible for a visit in 2 days for follow up [phone]  [phone]            Discharge Medication List as of 10/31/2022  9:00 PM      START taking these medications    Details   levofloxacin (LEVAQUIN) 25 mg/mL solution Take 30 mL (750 mg total) by mouth daily for 5 days, Starting Mon 10/31/2022, Until Sat 11/5/2022, Print      nirmatrelvir & ritonavir (Paxlovid, 150/100,) tablet therapy pack Take 2 tablets by mouth 2 (two) times a day for 5 days Take 1 nirmatrelvir tablet + 1 ritonavir tablet together per dose, Starting Mon 10/31/2022, Until Sat 11/5/2022, Print         CONTINUE these medications which have NOT CHANGED    Details   acetaminophen (TYLENOL) 160 mg/5 mL suspension 650 mg by Per G Tube route every 4 (four) hours as needed for mild pain, Historical Med      albuterol (2 5 mg/3 mL) 0 083 % nebulizer solution Take 3 mL (2 5 mg total) by nebulization every 6 (six) hours as needed for wheezing or shortness of breath, Starting Thu 9/8/2022, Normal      apixaban (ELIQUIS) 2 5 mg 1 tablet (2 5 mg total) by Per G Tube route 2 (two) times a day, Starting Thu 9/8/2022, Normal      aspirin (ECOTRIN LOW STRENGTH) 81 mg EC tablet Take 1 tablet (81 mg total) by mouth daily G Tube, Starting Thu 9/8/2022, Normal      atorvastatin (LIPITOR) 40 mg tablet 2 tablets (80 mg total) by Per G Tube route daily with dinner, Starting Thu 9/8/2022, Normal      fluticasone (FLONASE) 50 mcg/act nasal spray 1 spray into each nostril daily, Starting Thu 9/29/2022, Normal      furosemide (LASIX) 20 mg tablet 1 tablet (20 mg total) by Per G Tube route daily, Starting Fri 9/23/2022, Until Sun 10/23/2022, Normal      glycerin, pediatric, 1 2 g rectal suppository Insert 1 suppository into the rectum daily as needed, Historical Med      glycopyrrolate (ROBINUL) 1 mg tablet Take 1 tablet (1 mg total) by mouth 2 (two) times a day, Starting u 9/29/2022, Normal      insulin glargine (LANTUS) 100 units/mL subcutaneous injection Inject 11 Units under the skin daily, Starting Thu 9/8/2022, Normal      Insulin Pen Needle (Pen Needles) 30G X 5 MM MISC Use in the morning, Starting Sat 9/17/2022, Normal      magnesium hydroxide (MILK OF MAGNESIA) 400 mg/5 mL oral suspension Take 30 mL by mouth if needed for constipation (once daily), Historical Med      metoprolol tartrate (LOPRESSOR) 25 mg tablet 1 tablet (25 mg total) by Per G Tube route every 12 (twelve) hours, Starting Thu 9/8/2022, Until Sat 10/8/2022, Normal      polyethylene glycol (MIRALAX) 17 g packet Take 17 g by mouth daily as needed (constipation - 2nd line) for up to 7 days, Starting Thu 8/4/2022, Until Sun 9/25/2022 at 2359, No Print      saccharomyces boulardii (FLORASTOR) 250 mg capsule Take 1 capsule (250 mg total) by mouth 2 (two) times a day, Starting Thu 9/29/2022, Normal      senna (SENOKOT) 8 6 MG tablet Take 1 tablet by mouth as needed for constipation (1 tavlet at bed time as needed), Historical Med             No discharge procedures on file      PDMP Review       Value Time User    PDMP Reviewed  Yes 9/29/2022  2:07 PM Gibran Woods MD          ED Provider  Electronically Signed by           Lizett Colón DO  11/02/22 2796

## 2022-10-31 NOTE — ED NOTES
Sears bag replaced per provider request to collect UA  This RN checked collection bag with no urine to collect at this time  Will continue to monitor  Daughter at bedside made aware of COVID positive results and is leaving bedside  Pt daughter requested update when all pt results are received        Ifeoma Friday, LEEANN  10/31/22 9456

## 2022-10-31 NOTE — ED NOTES
This RN attempted IV insertion x2 with no success  Charge nurse Blessing Owens made aware to attempt IV insertion        Rocio Valenzuela RN  10/31/22 5428

## 2022-11-01 ENCOUNTER — PATIENT OUTREACH (OUTPATIENT)
Dept: FAMILY MEDICINE CLINIC | Facility: CLINIC | Age: 87
End: 2022-11-01

## 2022-11-01 LAB
ATRIAL RATE: 102 BPM
P AXIS: 30 DEGREES
PR INTERVAL: 144 MS
QRS AXIS: -43 DEGREES
QRSD INTERVAL: 112 MS
QT INTERVAL: 368 MS
QTC INTERVAL: 479 MS
T WAVE AXIS: 51 DEGREES
VENTRICULAR RATE: 102 BPM

## 2022-11-01 NOTE — PROGRESS NOTES
Case discussed with Sima MENDIETA  No current plan to discharge patient from  [address] [facility]  Pt is now positive for covid, afib with increased heart rate and has a UTI  RN CM will close bundle episode tomorrow  Will reopen case if any needs arise

## 2022-11-02 ENCOUNTER — PATIENT OUTREACH (OUTPATIENT)
Dept: FAMILY MEDICINE CLINIC | Facility: CLINIC | Age: 87
End: 2022-11-02

## 2022-11-02 ENCOUNTER — TELEPHONE (OUTPATIENT)
Dept: GASTROENTEROLOGY | Facility: CLINIC | Age: 87
End: 2022-11-02

## 2022-11-02 NOTE — TELEPHONE ENCOUNTER
Called to verify phone number for upcoming Virtual  Daughter asked to reschedule due to patient being hospitalized since yesterday due to Covid  Rescheduled and verified phone number

## 2022-11-02 NOTE — PROGRESS NOTES
Bundle episode end  Pt remains in STR at this time  RN CM will close episode  Will reopen if patient is discharged to home and any new referrals are received

## 2022-11-03 ENCOUNTER — EPISODE CHANGES (OUTPATIENT)
Dept: CASE MANAGEMENT | Facility: OTHER | Age: 87
End: 2022-11-03

## 2022-11-06 LAB
BACTERIA BLD CULT: NORMAL
BACTERIA BLD CULT: NORMAL

## 2022-11-18 DIAGNOSIS — E11.9 CONTROLLED TYPE 2 DIABETES MELLITUS WITHOUT COMPLICATION, WITHOUT LONG-TERM CURRENT USE OF INSULIN (HCC): ICD-10-CM

## 2022-11-18 DIAGNOSIS — Z86.73 HISTORY OF STROKE: Primary | ICD-10-CM

## 2022-11-18 RX ORDER — INSULIN GLARGINE 100 [IU]/ML
11 INJECTION, SOLUTION SUBCUTANEOUS DAILY
Qty: 10 ML | Refills: 3 | Status: SHIPPED | OUTPATIENT
Start: 2022-11-18

## 2022-11-18 RX ORDER — INSULIN GLARGINE 100 [IU]/ML
11 INJECTION, SOLUTION SUBCUTANEOUS DAILY
Qty: 10 ML | Refills: 3 | Status: SHIPPED | OUTPATIENT
Start: 2022-11-18 | End: 2022-11-18

## 2022-11-18 RX ORDER — BACLOFEN 10 MG/1
5 TABLET ORAL 3 TIMES DAILY
Qty: 45 TABLET | Refills: 2 | Status: SHIPPED | OUTPATIENT
Start: 2022-11-18 | End: 2022-11-18

## 2022-11-18 RX ORDER — BACLOFEN 10 MG/1
5 TABLET ORAL 3 TIMES DAILY
Qty: 45 TABLET | Refills: 2 | Status: SHIPPED | OUTPATIENT
Start: 2022-11-18

## 2022-11-21 ENCOUNTER — TRANSITIONAL CARE MANAGEMENT (OUTPATIENT)
Dept: FAMILY MEDICINE CLINIC | Facility: CLINIC | Age: 87
End: 2022-11-21

## 2022-11-28 PROBLEM — N30.01 ACUTE CYSTITIS WITH HEMATURIA: Status: RESOLVED | Noted: 2018-05-25 | Resolved: 2022-11-28

## 2022-11-29 DIAGNOSIS — R68.89 COPIOUS ORAL SECRETIONS: ICD-10-CM

## 2022-11-29 DIAGNOSIS — J44.9 COPD WITH ASTHMA (HCC): ICD-10-CM

## 2022-11-29 RX ORDER — GLYCOPYRROLATE 1 MG/1
1 TABLET ORAL 2 TIMES DAILY
Qty: 60 TABLET | Refills: 0 | Status: SHIPPED | OUTPATIENT
Start: 2022-11-29

## 2022-11-29 RX ORDER — FLUTICASONE PROPIONATE 50 MCG
1 SPRAY, SUSPENSION (ML) NASAL DAILY
Qty: 16 G | Refills: 0 | Status: SHIPPED | OUTPATIENT
Start: 2022-11-29

## 2022-12-02 ENCOUNTER — TELEPHONE (OUTPATIENT)
Dept: FAMILY MEDICINE CLINIC | Facility: CLINIC | Age: 87
End: 2022-12-02

## 2022-12-02 DIAGNOSIS — K21.9 GASTROESOPHAGEAL REFLUX DISEASE WITHOUT ESOPHAGITIS: Primary | ICD-10-CM

## 2022-12-02 RX ORDER — FAMOTIDINE 40 MG/5ML
20 POWDER, FOR SUSPENSION ORAL DAILY
Qty: 50 ML | Refills: 6 | Status: SHIPPED | OUTPATIENT
Start: 2022-12-02 | End: 2023-01-01

## 2022-12-02 NOTE — TELEPHONE ENCOUNTER
Patients daughter, Tyrone Murray, called  She said during nursing home stay patient was taking Famotidine 40 ml 5l suspension @ 2 5 ml x2/day for GERD  Daughter confused if patient should continue taking this or if we do not want her taking it since we did not send rx to pharmacy  Advised daughter I will send message to Tuesday to confirm  Tuesday- can you confirm if patient should be taking Famotidine  For her GERD? If so, can you send a rx to the pharmacy  If pt should NOT be taking it, can you call daughter and explain why  She is concerned since pt was taking this in nursing home

## 2022-12-23 DIAGNOSIS — R68.89 COPIOUS ORAL SECRETIONS: ICD-10-CM

## 2022-12-23 RX ORDER — GLYCOPYRROLATE 1 MG/1
1 TABLET ORAL 2 TIMES DAILY
Qty: 60 TABLET | Refills: 0 | Status: SHIPPED | OUTPATIENT
Start: 2022-12-23

## 2022-12-30 ENCOUNTER — TELEPHONE (OUTPATIENT)
Dept: GASTROENTEROLOGY | Facility: CLINIC | Age: 87
End: 2022-12-30

## 2022-12-30 NOTE — TELEPHONE ENCOUNTER
Patients GI provider:  Dr Madrigal [facility]    Number to return call: [phone]    Reason for call: Pt's daughter Nick Garcia calling asking if it's ok for bleeding and raw inner tissue around feeding tube comes out when pt coughs or sits up?     Scheduled procedure/appointment date if applicable: Appt  4/62/85

## 2022-12-30 NOTE — TELEPHONE ENCOUNTER
Peg tube placement 7/27  OV 3/20    I spoke with patient daughter Hortencia Tse  She reports excess tissue 1 cm protruding from the sides of the peg tube and joanne blood after patient forcefully coughs or changes position  The tissue protrudes back in slowly  She report no redness around the site  Patient has low grade fevers not above 101 every couple of days and is given tylenol  Patient has visiting hospice care and they are able to visualize the site

## 2023-01-20 DIAGNOSIS — J44.9 COPD WITH ASTHMA (HCC): ICD-10-CM

## 2023-01-23 RX ORDER — FLUTICASONE PROPIONATE 50 MCG
SPRAY, SUSPENSION (ML) NASAL
Qty: 16 G | Refills: 0 | Status: SHIPPED | OUTPATIENT
Start: 2023-01-23

## 2023-01-30 ENCOUNTER — TELEPHONE (OUTPATIENT)
Dept: OTHER | Facility: OTHER | Age: 88
End: 2023-01-30

## 2023-01-30 ENCOUNTER — NURSE TRIAGE (OUTPATIENT)
Dept: OTHER | Facility: OTHER | Age: 88
End: 2023-01-30

## 2023-01-30 DIAGNOSIS — K94.29 IRRITATION AROUND PERCUTANEOUS ENDOSCOPIC GASTROSTOMY (PEG) TUBE SITE (HCC): Primary | ICD-10-CM

## 2023-01-30 NOTE — TELEPHONE ENCOUNTER
Crys President, JIMY  You 1 hour ago (12:10 PM)     This patient has not been seen since her peg was inserted in July 2022  They should really have the peg site evaluated in our [facility]/urgent care/ER  The other option would to discuss with their hospice nurse/physician and see what their recommendations would be

## 2023-01-30 NOTE — TELEPHONE ENCOUNTER
Regarding: blood lost/ tissue protruding from site  ----- Message from Nanci Frausto sent at 1/30/2023 10:37 AM EST -----  " My mother has lost a lot of blood from her infection site, there is tissue protruding from the corners and I currently have two drain sponges there and not sure if I should add another or remove them and replace   She had a stroke so it would be hard and expensive to get her to the hospital  I just don't know what I should do "

## 2023-01-30 NOTE — TELEPHONE ENCOUNTER
Pt's daughter would like Dr Lalo Osuna to know that pt will be heading to the [facility] ED tomorrow to have her tube wound evaluated   Amy Renae was unable to send the pics to Dr Lalo Osuna through [employer] bc the pt has not yet had a OV and he is not on her Mychart list

## 2023-01-30 NOTE — TELEPHONE ENCOUNTER
Received call from [facility]'s [facility] regarding change in patient's peg site since 1/27 and this morning patient has more tissue protruding around the peg tube; previously tube protruding was at 3 5 line; this morning is at 6 5  There was also significant bleeding at the site; stringy blood clots, with multiple dressing changes  Patient is on blood thinners, Eliquis BID and aspirin  Given Eliquis this AM before care of site has started  Hortencia Tse has taken pictures, but is trying to set up My Chart for mother; advised to creat email address for mother so a link could be sent  Patient resides in [address] [place] [address]; sent to [facility] for Peg site attention  Patient has now has impaction and family has a regimen to perform every two days to keep bowels moving  Patient is on hospice at home; provider update on chart, but not in [facility] network  Difficulty to arrange patient to OV  Peg tube placed by Dr Dilan Rodriguez on 7/28/22 while hospitalized at [facility] for [facility]  Please follow up with Hortencia Tse for peg site  Reason for Disposition  • [1] G-tube (or PEG), J-tube, or GJ-tube tube SITE looks infected  (spreading redness) AND [2] fever    Answer Assessment - Initial Assessment Questions  1  LOCATION: "Where is the wound located?"       Peg tube site;   2  WOUND APPEARANCE: "What does the wound look like?"       Tissue is protruding outward starting at sides with some yellowish discharge; tissue is protruding out around the whole peg site except at 12 noon site; bumpers are off the skin at 6 5; was at 3 5; when cleaning this AM, there is bleeding at lower portion of site and bleeding has increased and eventually stopped after several dressings  3  SIZE: If redness is present, ask: "What is the size of the red area?" (Inches, centimeters, or compare to size of a coin)       Denies  4   SPREAD: "What's changed in the last day?"  "Do you see any red streaks coming from the wound?"      Protruding of tissue is pink; worm-like blood clots (stringy)  5  ONSET: "When did it start to look infected?"       Started 1/27 and worsened today  6  MECHANISM: "How did the wound start, what was the cause?"      PEG tube site  7  PAIN: "Is there any pain?" If Yes, ask: "How bad is the pain?"   (Scale 1-10; or mild, moderate, severe)      Unable to determine  8  FEVER: "Do you have a fever?" If Yes, ask: "What is your temperature, how was it measured, and when did it start?"      Denies  9  OTHER SYMPTOMS: "Do you have any other symptoms?" (e g , shaking chills, weakness, rash elsewhere on body)     Denies    Answer Assessment - Initial Assessment Questions  1  MAIN CONCERN OR SYMPTOM:  "What is your main concern right now?" "What question do you have?" "What's the main symptom you're worried about?" (e g , fever, pain, redness, swelling)      Tissue protruding at site  2  ONSET: "When did the symptom or problem start (or worsen)?" (minutes, hours, days, weeks)      Since 1/27/23  3  WHAT TYPE: "What type of feeding tube is it?" (e g , NG tube, NJ tube, G tube, GJ tube, J tube, PEG)  PEG tube  4  WHEN INSERTED: "When was the tube put in", "Has it been changed, if so when?"      7/28/22  5  WHO INSERTED: "Do you recall who put the tube in?"      Dilan Rodriguez MD  6  FEEDINGS: "Has the type, rate or concentration of the tube feedings changed?"      24 hour feed  7  MEDICATIONS:  "Are you taking any medications via your feeding tube?"  "Are these medications liquid or crushed given in your feeding tube?"       Liquid and crushed  8  VOMITING and DIARRHEA: "Is there new vomiting or diarrhea?" (e g , number of time; description)      Denies  9  OTHER SYMPTOMS: "What other symptoms are you having?" (e g , shortness of breath, fever, abdominal pain)      Denies  10  HOME HEALTH NURSE: "Do you have a home health (visiting) nurse?"        Family care under hospice    Protocols used:  FEEDING TUBE SYMPTOMS AND QUESTIONS-ADULT-[facility], WOUND INFECTION-ADULT-[facility]

## 2023-01-31 NOTE — TELEPHONE ENCOUNTER
Daughter called [facility] yesterday after seeing an increase in blood at peg tube site and continues to have low grade fevers, provider recommended [facility] urgent care/ED for peg tube evaluation  Pts daughter agreed she would like her mother evaluated at [facility] ED and will call the ambulance at 3:30

## 2023-02-07 ENCOUNTER — TELEPHONE (OUTPATIENT)
Dept: GASTROENTEROLOGY | Facility: CLINIC | Age: 88
End: 2023-02-07

## 2023-02-07 NOTE — TELEPHONE ENCOUNTER
Spoke the pt daugther and she said the bleeding stop but she wanted early virtual scheduled, or any cancellation, she can contact her  Give the information to the

## 2023-02-15 DIAGNOSIS — R68.89 COPIOUS ORAL SECRETIONS: ICD-10-CM

## 2023-02-15 RX ORDER — GLYCOPYRROLATE 1 MG/1
1 TABLET ORAL 2 TIMES DAILY
Qty: 180 TABLET | Refills: 3 | Status: SHIPPED | OUTPATIENT
Start: 2023-02-15 | End: 2023-02-20 | Stop reason: SDUPTHER

## 2023-02-20 ENCOUNTER — IN HOME VISIT (OUTPATIENT)
Dept: FAMILY MEDICINE CLINIC | Facility: CLINIC | Age: 88
End: 2023-02-20

## 2023-02-20 VITALS
RESPIRATION RATE: 24 BRPM | SYSTOLIC BLOOD PRESSURE: 100 MMHG | DIASTOLIC BLOOD PRESSURE: 70 MMHG | TEMPERATURE: 97.3 F | HEART RATE: 80 BPM | OXYGEN SATURATION: 98 %

## 2023-02-20 DIAGNOSIS — C80.1 MALIGNANT NEOPLASM WITHOUT SPECIFICATION OF SITE (HCC): ICD-10-CM

## 2023-02-20 DIAGNOSIS — K59.00 CONSTIPATION, UNSPECIFIED CONSTIPATION TYPE: ICD-10-CM

## 2023-02-20 DIAGNOSIS — R68.89 COPIOUS ORAL SECRETIONS: ICD-10-CM

## 2023-02-20 DIAGNOSIS — E11.9 CONTROLLED TYPE 2 DIABETES MELLITUS WITHOUT COMPLICATION, WITHOUT LONG-TERM CURRENT USE OF INSULIN (HCC): ICD-10-CM

## 2023-02-20 DIAGNOSIS — J44.9 COPD WITH ASTHMA (HCC): ICD-10-CM

## 2023-02-20 DIAGNOSIS — Z51.5 HOSPICE CARE: Primary | ICD-10-CM

## 2023-02-20 DIAGNOSIS — Z97.8 FOLEY CATHETER IN PLACE: ICD-10-CM

## 2023-02-20 DIAGNOSIS — R06.2 WHEEZING: ICD-10-CM

## 2023-02-20 DIAGNOSIS — I50.22 CHRONIC SYSTOLIC CONGESTIVE HEART FAILURE (HCC): Chronic | ICD-10-CM

## 2023-02-20 DIAGNOSIS — Z93.1 S/P PERCUTANEOUS ENDOSCOPIC GASTROSTOMY (PEG) TUBE PLACEMENT (HCC): ICD-10-CM

## 2023-02-20 DIAGNOSIS — I63.9 CEREBROVASCULAR ACCIDENT (CVA), UNSPECIFIED MECHANISM (HCC): ICD-10-CM

## 2023-02-20 DIAGNOSIS — Z97.8 USES FEEDING TUBE: ICD-10-CM

## 2023-02-20 PROBLEM — I50.32 CHRONIC DIASTOLIC CHF (CONGESTIVE HEART FAILURE) (HCC): Status: ACTIVE | Noted: 2022-09-20

## 2023-02-20 RX ORDER — GLYCOPYRROLATE 1 MG/1
1 TABLET ORAL 2 TIMES DAILY
Qty: 60 TABLET | Refills: 3 | Status: SHIPPED | OUTPATIENT
Start: 2023-02-20 | End: 2023-05-21

## 2023-02-20 RX ORDER — FUROSEMIDE 20 MG/1
60 TABLET ORAL DAILY
Qty: 180 TABLET | Refills: 0
Start: 2023-02-20 | End: 2023-03-22

## 2023-02-20 NOTE — PROGRESS NOTES
Name: Kecia Parra      : 9/15/1933      MRN: [id number]  Encounter Provider: GAYATRI Reese  Encounter Date: 2023   Encounter department: [facility]     1  Hospice care    2  Malignant neoplasm without specification of site ([place] [id number] )    3  Chronic systolic congestive heart failure (HCC)  -     furosemide (LASIX) 20 mg tablet; 3 tablets (60 mg total) by Per G Tube route daily    4  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[place] [address] )       -02 at 5L NC    5  Wheezing-       -lasix per order       -morphine per hospice    6  Controlled type 2 diabetes mellitus without complication, without long-term current use of insulin ([place] [address] )    7  Cerebrovascular accident (CVA), unspecified mechanism ([place] [address] )    8  Constipation, unspecified constipation type        -bowel program    9  S/P percutaneous endoscopic gastrostomy (PEG) tube placement ([place] [address] )    10  Uses feeding tube    11  Schaefer catheter in place      -changed today by hopice nurse-clear yllow urine    12  Copious oral secretions  -     glycopyrrolate (ROBINUL) 1 mg tablet; Take 1 tablet (1 mg total) by mouth 2 (two) times a day      BMI Counseling: There is no height or weight on file to calculate BMI  Follow-up plan was not completed due to patient being in urgent or emergent medical situation  Depression Screening and Follow-up Plan: Patient not screened for depression due to medical reason (urgent/emergent situation, decreased capacity)  Falls Plan of Care: not completed because patient not ambulatory, bed ridden, immobile, confined to chair, or wheelchair bound  Urinary Incontinence Plan of Care: Patient has a schaefer catheter 24 Vietnamese  Subjective      Patient seen in home today with son, daughter and hospice nurse present  Medications reconciled  Some medications discontinued and explained to son to give patient morphine as hospice suggests every 3 hours for difficulty breathing  Son has only been giving once a day    Upon assessment patient's lungs are filled with crackles and inspiratory and expiratory wheezing  She is on 5L of 02  Educated son on leaning close to his mom"s face to listen for gurgling and wheezing so he can understand how hard she is breathing and when she needs the morphine  He expressed an understanding  Decision made with hospice nurse and family members not to change to bolus feedings as this makes Cookie vomit and continue on continuous feeding over a certain time of the day but decrease rate  Educated son and daughter on a bowel program to keep Yasmeen Adkins on and to call hospice or myself if not working  Patient lays with her eyes open-is unresponsive-does not appear to be in pain-definitely has respiratory distress  indicating need for increasing morphine frequency  She is contracted in all limbs and does not turn herself  She has a loving family and is well cared for   >45 minutes was spent with patient, family, hospice nurse doing history, assessment, exam and decision making and education on this visit        Review of Systems   Unable to perform ROS: Patient unresponsive       Current Outpatient Medications on File Prior to Visit   Medication Sig   • acetaminophen (TYLENOL) 160 mg/5 mL suspension 650 mg by Per G Tube route every 4 (four) hours as needed for mild pain   • albuterol (2 5 mg/3 mL) 0 083 % nebulizer solution Take 3 mL (2 5 mg total) by nebulization every 6 (six) hours as needed for wheezing or shortness of breath   • apixaban (ELIQUIS) 2 5 mg 1 tablet (2 5 mg total) by Per G Tube route 2 (two) times a day   • glycerin, pediatric, 1 2 g rectal suppository Insert 1 suppository into the rectum daily as needed   • insulin glargine (LANTUS) 100 units/mL subcutaneous injection Inject 11 Units under the skin daily   • Insulin Pen Needle (Pen Needles) 30G X 5 MM MISC Use in the morning   • magnesium hydroxide (MILK OF MAGNESIA) 400 mg/5 mL oral suspension Take 60 mL by mouth in the morning   • polyethylene glycol (MIRALAX) 17 g packet Take 17 g by mouth daily as needed (constipation - 2nd line) for up to 7 days   • senna (SENOKOT) 8 6 MG tablet Take 1 tablet by mouth as needed for constipation (1 tavlet at bed time as needed)   • [DISCONTINUED] aspirin (ECOTRIN LOW STRENGTH) 81 mg EC tablet Take 1 tablet (81 mg total) by mouth daily G Tube   • [DISCONTINUED] atorvastatin (LIPITOR) 40 mg tablet 2 tablets (80 mg total) by Per G Tube route daily with dinner   • [DISCONTINUED] baclofen 10 mg tablet Take 0 5 tablets (5 mg total) by mouth 3 (three) times a day   • [DISCONTINUED] famotidine (PEPCID) 20 mg/2 5 mL oral suspension Take 2 5 mL (20 mg total) by mouth in the morning   • [DISCONTINUED] fluticasone (FLONASE) 50 mcg/act nasal spray USE 1 SPRAY IN EACH NOSTRIL ONE TIME DAILY   • [DISCONTINUED] furosemide (LASIX) 20 mg tablet 1 tablet (20 mg total) by Per G Tube route daily   • [DISCONTINUED] glycopyrrolate (ROBINUL) 1 mg tablet Take 1 tablet (1 mg total) by mouth 2 (two) times a day   • [DISCONTINUED] metoprolol tartrate (LOPRESSOR) 25 mg tablet 1 tablet (25 mg total) by Per G Tube route every 12 (twelve) hours (Patient taking differently: 25 mg by Per G Tube route every 12 (twelve) hours Hold sys of 100 or lower)   • [DISCONTINUED] saccharomyces boulardii (FLORASTOR) 250 mg capsule Take 1 capsule (250 mg total) by mouth 2 (two) times a day       Objective     /70   Pulse 80   Temp (!) 97 3 °F (36 3 °C)   Resp (!) 24   LMP  (LMP Unknown)   SpO2 98%     Physical Exam  Constitutional:       General: She is not in acute distress  Appearance: She is obese  She is ill-appearing  HENT:      Head: Normocephalic and atraumatic  Right Ear: External ear normal       Left Ear: External ear normal       Nose: Nose normal       Mouth/Throat:      Mouth: Mucous membranes are dry  Eyes:      General:         Right eye: No discharge  Left eye: No discharge        Extraocular Movements: Extraocular movements intact  Cardiovascular:      Rate and Rhythm: Normal rate and regular rhythm  Pulses: no weak pulses     Heart sounds: Normal heart sounds  Pulmonary:      Effort: Respiratory distress present  Breath sounds: Wheezing, rhonchi and rales present  Abdominal:      General: Bowel sounds are normal       Palpations: Abdomen is soft  Comments: Feeding via GT-GT site without bleeding-stoma slightly protruding-no redness-explained to family to call office or hospice with any further stoma issues-asa dc'd-remains on eliquis as they are not ready to dc yet   Genitourinary:     Vagina: No vaginal discharge  Musculoskeletal:      Cervical back: Rigidity present  Right lower leg: No edema  Left lower leg: No edema  Comments: Bilateral upper and lower extremity contractures and stiffness   Feet:      Right foot:      Skin integrity: No ulcer, skin breakdown, erythema, warmth, callus or dry skin  Left foot:      Skin integrity: No ulcer, skin breakdown, erythema, warmth, callus or dry skin  Skin:     General: Skin is warm and dry  Findings: No lesion or rash  Neurological:      Comments: Bed bound-non verbal-cannot turn self   Psychiatric:      Comments: N/A       Tuesday Lesa Navarroabetic Foot Exam    Patient's shoes and socks removed  Right Foot/Ankle   Right Foot Inspection  Skin Exam: skin normal and skin intact  No dry skin, no warmth, no callus, no erythema, no maceration, no abnormal color, no pre-ulcer, no ulcer and no callus  Toe Exam: No swelling, erythema and  no right toe deformity    Left Foot/Ankle  Left Foot Inspection  Skin Exam: skin normal and skin intact  No dry skin, no warmth, no erythema, no maceration, normal color, no pre-ulcer, no ulcer and no callus  Toe Exam: No swelling, no erythema and no left toe deformity       Assign Risk Category  No deformity present  Loss of protective sensation  No weak pulses  Risk: 1

## 2023-02-21 ENCOUNTER — TELEPHONE (OUTPATIENT)
Dept: FAMILY MEDICINE CLINIC | Facility: CLINIC | Age: 88
End: 2023-02-21

## 2023-02-21 NOTE — TELEPHONE ENCOUNTER
Patient son calling in and asking for a call back to go over medication questions that he has     Viviane Arroyo [phone]

## 2023-02-22 NOTE — TELEPHONE ENCOUNTER
Returned call to patients son and discussed the bowel program  Patient's son verbalized understanding

## 2023-03-14 DIAGNOSIS — E11.9 CONTROLLED TYPE 2 DIABETES MELLITUS WITHOUT COMPLICATION, WITHOUT LONG-TERM CURRENT USE OF INSULIN (HCC): ICD-10-CM

## 2023-03-14 NOTE — TELEPHONE ENCOUNTER
Miss Gao: Refill request form placed in your folder  FYI Last Hgba1c done last yr   On 07/23/2022   Thanks

## 2023-03-15 ENCOUNTER — TELEPHONE (OUTPATIENT)
Dept: FAMILY MEDICINE CLINIC | Facility: CLINIC | Age: 88
End: 2023-03-15

## 2023-03-15 RX ORDER — INSULIN GLARGINE 100 [IU]/ML
11 INJECTION, SOLUTION SUBCUTANEOUS DAILY
Qty: 10 ML | Refills: 3 | Status: SHIPPED | OUTPATIENT
Start: 2023-03-15

## 2023-03-20 ENCOUNTER — TELEPHONE (OUTPATIENT)
Dept: GASTROENTEROLOGY | Facility: CLINIC | Age: 88
End: 2023-03-20

## 2023-04-03 ENCOUNTER — TELEPHONE (OUTPATIENT)
Dept: FAMILY MEDICINE CLINIC | Facility: CLINIC | Age: 88
End: 2023-04-03

## 2023-04-03 NOTE — TELEPHONE ENCOUNTER
doug called from [facility] pharmacy she needed a verbal on a medication lantus from  back in November she needed a replacement for a pen that did not work   She needed a verbal so it could be replaceed , I gave her the verbal and made Dr Marium Park aware of this tc/cma

## 2023-05-22 ENCOUNTER — TELEPHONE (OUTPATIENT)
Dept: FAMILY MEDICINE CLINIC | Facility: CLINIC | Age: 88
End: 2023-05-22

## 2023-05-22 NOTE — TELEPHONE ENCOUNTER
vm left on refill line:     Birth date of patient is September 15th, 1933  Her name is [facility]  It's in regards to a refill of the blood glucose strips called Freestyle Light  She only has three left  If possible, would love to have it refilled  I don't need the Lantus  She is on Lantus, but that's OK  All I need is the actual strip so I can tell what her glucose levels are  I appreciate it very much  This is her daughter Halie Paiz and the message is for Tuesday [place]  I appreciate a call back  Thank you  Can we please place order for new test strips?

## 2023-05-22 NOTE — TELEPHONE ENCOUNTER
Health Maintenance Due   Topic Date Due   • COVID-19 Vaccine (1) Never done   • Pneumococcal Vaccine 0-64 (1 - PCV) Never done   • DTaP/Tdap/Td Vaccine (1 - Tdap) Never done   • Cervical Cancer Screen 30-64 -  Never done   • Colorectal Cancer Screen-  Never done   • Shingles Vaccine (1 of 2) Never done   • Hepatitis C Screening  Never done   • Breast Cancer Screening  Never done   • Medicare Advantage- Medicare Wellness Visit  Never done       Patient is due for the topics as listed above and wishes to proceed with them. Orders placed for Colorectal Cancer Screening: Colonoscopy and Mammogram.   Patient message

## 2023-05-23 ENCOUNTER — TELEPHONE (OUTPATIENT)
Dept: FAMILY MEDICINE CLINIC | Facility: CLINIC | Age: 88
End: 2023-05-23

## 2023-05-23 RX ORDER — INSULIN GLARGINE 100 [IU]/ML
INJECTION, SOLUTION SUBCUTANEOUS
COMMUNITY
Start: 2023-03-16

## 2023-05-23 NOTE — TELEPHONE ENCOUNTER
Patients daughter is requesting test strips be sent the her pharmacy, I do not see Test strips on her current medication list

## 2023-05-25 NOTE — PROGRESS NOTES
SLP TAA     05/01/18 0730   Patient Data   Rehab Impairment Stroke   Etiologic Diagnosis L MCA CVA   Date of Onset 04/23/18   Restrictions/Precautions   Precautions Cognitive; Impulsive;Bed/chair alarms; Fall Risk;Supervision on toilet/commode   Pain Assessment   Pain Assessment No/denies pain   Pain Score No Pain   QI: Eating   Assistance Needed Set-up / clean-up   Assistance Provided by [place] No physical assistance   Eating CARE Score 5   Eating Assessment   Swallow Precautions Yes   Bedside Swallow Results Yes   VBS Study Results No   Food To Mouth Yes   Able To Cut Yes   Positioning Upright   Safety Needs Increase Time   Meal Assessed Breakfast   QI: Swallowing/Nutritional Status Modified food consistency   Current Diet Dysphia III; Thin   Intake Mode PO;Self   Finishes Timely Yes   Opens Packages No   Findings Pt presenting w/ overall mild oropharyngeal dysphagia  Recommend level 3 diet and thin liquids at this time  See SLP Rehab note for full details  Eating (FIM) 5 - Patient needs help to open contianers or set up tray   Toileting   Toileting (FIM) 0 - Activity does not occur   Bowel/Bladder Management   Bladder Management (FIM) 1 - Patient requires assist for all tasks with bladder accident (wet linen or clothing)   Discharge Information   Patient's Discharge Plan d/c home with family/son   Patient's Rehab Expectations to go home   Barriers to Discharge Home Limited Family Support;Decreased Cognitive Function;Decreased Strength;Decreased Endurance; Safety Considerations   Impressions Bedside swallow completed where pt found to be presenting w/ mild oral and pharyngeal dysphagia characterized by prolonged mastication, slow a-p transfers of solids and delayed initiation of swallows  Currently recommending level 3 diet w/ thin liquids  Pt will benefit from skilled ST services to maximize overall swallow skills and to safely achieve baseline diet of regular/thins      SLP Therapy Minutes   SLP Time In 0730   SLP Time Out 0800   SLP Total Time (minutes) 30   SLP Mode of treatment - Individual (minutes) 30   SLP Mode of treatment - Concurrent (minutes) 0   SLP Mode of treatment - Group (minutes) 0   SLP Mode of treatment - Co-treat (minutes) 0   SLP Mode of Teatment - Total time(minutes) 30 minutes No.

## 2023-07-11 DIAGNOSIS — R68.89 COPIOUS ORAL SECRETIONS: ICD-10-CM

## 2023-07-11 RX ORDER — GLYCOPYRROLATE 1 MG/1
TABLET ORAL
Qty: 180 TABLET | Refills: 3 | Status: SHIPPED | OUTPATIENT
Start: 2023-07-11

## 2023-08-01 DIAGNOSIS — I82.622 DEEP VEIN THROMBOSIS (DVT) OF LEFT UPPER EXTREMITY (HCC): Chronic | ICD-10-CM

## 2023-08-01 DIAGNOSIS — Z95.0 PACEMAKER: Chronic | ICD-10-CM

## 2023-08-01 DIAGNOSIS — Z86.73 HISTORY OF ISCHEMIC RIGHT MCA STROKE: Chronic | ICD-10-CM

## 2023-08-01 RX ORDER — APIXABAN 2.5 MG/1
TABLET, FILM COATED ORAL
Qty: 180 TABLET | Refills: 3 | OUTPATIENT
Start: 2023-08-01

## 2023-10-05 DIAGNOSIS — E11.9 CONTROLLED TYPE 2 DIABETES MELLITUS WITHOUT COMPLICATION, WITHOUT LONG-TERM CURRENT USE OF INSULIN (HCC): ICD-10-CM

## 2023-10-05 RX ORDER — PEN NEEDLE, DIABETIC 30 GX3/16"
NEEDLE, DISPOSABLE MISCELLANEOUS DAILY
Qty: 100 EACH | Refills: 3 | Status: SHIPPED | OUTPATIENT
Start: 2023-10-05

## 2023-10-05 NOTE — TELEPHONE ENCOUNTER
vm on refill line:     Yes, I'm trying to get a hold of Tuevy Leslie. She's my momma's, Dr. Vanessa Rosen. I need her to write a script out for my momma's droplet pen needles for her insulin pen. She's on the Lantus Solo star and she takes 11 units daily. It's just about out of the needles. Her birthday is [birth year] and phone number [phone] if someone could call me back, but I greatly appreciate that. OK. Alright. I guess that's it. Thank you.

## 2023-10-10 DIAGNOSIS — E11.9 TYPE 2 DIABETES MELLITUS WITHOUT COMPLICATION, WITHOUT LONG-TERM CURRENT USE OF INSULIN (HCC): Primary | ICD-10-CM

## 2023-10-10 DIAGNOSIS — Z95.0 PACEMAKER: Chronic | ICD-10-CM

## 2023-10-10 DIAGNOSIS — I82.622 DEEP VEIN THROMBOSIS (DVT) OF LEFT UPPER EXTREMITY (HCC): Chronic | ICD-10-CM

## 2023-10-10 DIAGNOSIS — Z86.73 HISTORY OF ISCHEMIC RIGHT MCA STROKE: Chronic | ICD-10-CM

## 2023-10-10 DIAGNOSIS — R68.89 COPIOUS ORAL SECRETIONS: ICD-10-CM

## 2023-10-10 NOTE — TELEPHONE ENCOUNTER
vm on clinical line:     Hi, my name is [place]. My number is [phone]. I have been trying to reach a Tuesday [facility] but the number I dialed and I need someone to call me back. It's very important concerning my mom, Milo Read, [id number] and some prescriptions that she needs. Alright, so if someone could call me back, I appreciate it very much as soon as possible. Thank you. Is there important? Bye. Called to clarify what meds. Daughter stated pt needs pen needles. Informed those were already sent 10/5 to mail order. Pt also needs eliquis, glycopyrrolate- informed glycopyrrolate already sent to pharm in July with 3 refills. Pharmacy should have refill.

## 2023-10-13 RX ORDER — INSULIN GLARGINE 100 [IU]/ML
11 INJECTION, SOLUTION SUBCUTANEOUS DAILY
Qty: 5 ML | Refills: 3 | Status: SHIPPED | OUTPATIENT
Start: 2023-10-13 | End: 2024-04-12

## 2023-10-17 ENCOUNTER — IN HOME VISIT (OUTPATIENT)
Dept: FAMILY MEDICINE CLINIC | Facility: CLINIC | Age: 88
End: 2023-10-17
Payer: MEDICARE

## 2023-10-17 DIAGNOSIS — Z00.01 ENCOUNTER FOR GENERAL ADULT MEDICAL EXAMINATION WITH ABNORMAL FINDINGS: ICD-10-CM

## 2023-10-17 DIAGNOSIS — F01.C0 SEVERE VASCULAR DEMENTIA WITHOUT BEHAVIORAL DISTURBANCE, PSYCHOTIC DISTURBANCE, MOOD DISTURBANCE, OR ANXIETY (HCC): ICD-10-CM

## 2023-10-17 DIAGNOSIS — E11.9 CONTROLLED TYPE 2 DIABETES MELLITUS WITHOUT COMPLICATION, WITHOUT LONG-TERM CURRENT USE OF INSULIN (HCC): Primary | ICD-10-CM

## 2023-10-17 DIAGNOSIS — N18.31 STAGE 3A CHRONIC KIDNEY DISEASE (HCC): ICD-10-CM

## 2023-10-17 DIAGNOSIS — I50.22 CHRONIC SYSTOLIC CONGESTIVE HEART FAILURE (HCC): ICD-10-CM

## 2023-10-17 DIAGNOSIS — R68.89 COPIOUS ORAL SECRETIONS: ICD-10-CM

## 2023-10-17 DIAGNOSIS — I82.621 ACUTE DEEP VEIN THROMBOSIS (DVT) OF BRACHIAL VEIN OF RIGHT UPPER EXTREMITY (HCC): ICD-10-CM

## 2023-10-17 DIAGNOSIS — I63.9 CEREBROVASCULAR ACCIDENT (CVA), UNSPECIFIED MECHANISM (HCC): ICD-10-CM

## 2023-10-17 DIAGNOSIS — D69.6 THROMBOCYTOPENIA (HCC): ICD-10-CM

## 2023-10-17 DIAGNOSIS — Z23 ENCOUNTER FOR IMMUNIZATION: ICD-10-CM

## 2023-10-17 DIAGNOSIS — Z97.8 FOLEY CATHETER IN PLACE: ICD-10-CM

## 2023-10-17 DIAGNOSIS — Z95.0 HISTORY OF PERMANENT CARDIAC PACEMAKER PLACEMENT: ICD-10-CM

## 2023-10-17 DIAGNOSIS — I69.351 HEMIPLEGIA AND HEMIPARESIS FOLLOWING CEREBRAL INFARCTION AFFECTING RIGHT DOMINANT SIDE (HCC): ICD-10-CM

## 2023-10-17 PROCEDURE — G0438 PPPS, INITIAL VISIT: HCPCS | Performed by: NURSE PRACTITIONER

## 2023-10-17 PROCEDURE — G0008 ADMIN INFLUENZA VIRUS VAC: HCPCS | Performed by: NURSE PRACTITIONER

## 2023-10-17 PROCEDURE — 90662 IIV NO PRSV INCREASED AG IM: CPT | Performed by: NURSE PRACTITIONER

## 2023-10-18 VITALS
DIASTOLIC BLOOD PRESSURE: 60 MMHG | RESPIRATION RATE: 18 BRPM | SYSTOLIC BLOOD PRESSURE: 110 MMHG | HEART RATE: 74 BPM | TEMPERATURE: 96.3 F

## 2023-10-18 PROBLEM — R06.2 WHEEZING: Status: RESOLVED | Noted: 2022-10-08 | Resolved: 2023-10-18

## 2023-10-18 PROBLEM — I50.20 UNSPECIFIED SYSTOLIC (CONGESTIVE) HEART FAILURE (HCC): Status: ACTIVE | Noted: 2022-08-04

## 2023-10-18 PROBLEM — L89.159 DECUBITUS ULCER OF SACRAL AREA: Status: RESOLVED | Noted: 2022-09-26 | Resolved: 2023-10-18

## 2023-10-18 PROBLEM — Z00.01 ENCOUNTER FOR GENERAL ADULT MEDICAL EXAMINATION WITH ABNORMAL FINDINGS: Status: ACTIVE | Noted: 2022-08-04

## 2023-10-18 PROBLEM — Z79.01 LONG TERM (CURRENT) USE OF ANTICOAGULANTS: Status: ACTIVE | Noted: 2022-08-04

## 2023-10-18 PROBLEM — I69.351 HEMIPLEGIA AND HEMIPARESIS FOLLOWING CEREBRAL INFARCTION AFFECTING RIGHT DOMINANT SIDE (HCC): Status: ACTIVE | Noted: 2022-08-04

## 2023-10-18 PROBLEM — Z43.1 ENCOUNTER FOR ATTENTION TO GASTROSTOMY (HCC): Status: ACTIVE | Noted: 2022-08-04

## 2023-10-18 PROBLEM — Z23 ENCOUNTER FOR IMMUNIZATION: Status: ACTIVE | Noted: 2022-08-04

## 2023-10-18 PROBLEM — R27.8 OTHER LACK OF COORDINATION: Status: ACTIVE | Noted: 2022-08-04

## 2023-10-18 PROBLEM — Z95.2 PRESENCE OF PROSTHETIC HEART VALVE: Status: ACTIVE | Noted: 2022-08-04

## 2023-10-18 RX ORDER — LINACLOTIDE 290 UG/1
290 CAPSULE, GELATIN COATED ORAL EVERY MORNING
COMMUNITY
Start: 2023-09-19

## 2023-12-08 ENCOUNTER — TELEPHONE (OUTPATIENT)
Dept: FAMILY MEDICINE CLINIC | Facility: CLINIC | Age: 88
End: 2023-12-08

## 2023-12-08 NOTE — TELEPHONE ENCOUNTER
Vm on clinical line:     Yes, this message is gonna be for Tuesday. Reyes. I need to leave her a message, but can you somebody there please call me? My name is Angela Henley. I'm calling from my mother, Constance Nicholas. My number is [phone] and again my number is [phone]. And it's in regards to my mother, Constance Nicholas. Thank you. Tuesday- can you please call?

## 2024-02-05 NOTE — PROGRESS NOTES
05/09/18 1230   Pain Assessment   Pain Assessment No/denies pain   Pain Score No Pain   Eating Assessment   Meal Assessed Lunch   Findings Pt benefits from backward chaining to compelte eating tasks  Pt reports that she does not want to eat but when food items placed in her hand she eats them  Pt able to complete her full meal     Eating (FIM) 4 - Mims occasional scoops food   Transfer Bed/Chair/Wheelchair   Limitations Noted In Balance;Vision; Sequencing;Problem Solving   Stand Pivot Supervision   Sit to Stand Assist x 1   Supine to Sit Maximum Assist   Findings Pt is awoke from sleep and requires increased assist  pts family present and educated on need for more assist when sleepy or fatigued  Pt later is able to compelte sit-stand w/ CS   Bed, Chair, Wheelchair Transfer (FIM) 2 - Mims needs to lift or boost to rise AND assist to sit   Assessment   Treatment Assessment Pt engages in skilled OT session focusing on sitting balance, attention to task, task initiation  Activity is eating lunch time meal  Pt set up sitting unsupported on EOB w/ CS and requires MIN A at times of distraction  Pt would lose balance backward when attention was shifted to her daughters on the L  Pt requires Cues for L UE use during activity, although pt is L handed she would occassionally eat with the L hand  Pt cues to use L hand as helper/stabilizer throughout meal when scooping food  Pt benefited from backward chaining to complete eating tasks and initiate eating foods  Pt continues to require skilled acute rehab OT services to increase overall functional independence and safety w/ ADLs and functional transfers, continue to follow plan of care  Prognosis Fair   Problem List Decreased endurance; Impaired balance;Decreased mobility; Decreased cognition;Decreased coordination; Impaired judgement;Decreased safety awareness; Impaired vision   Plan   Treatment/Interventions ADL retraining;Functional transfer training;LE OB Follow up visit    ASSESSMENT & PLAN  SS testing for baby    Emilia Link is a 30 y.o.  at 35w6d presenting for routine prenatal care    Problem List Items Addressed This Visit       Prenatal care, subsequent pregnancy in second trimester - Primary    Overview     [x] Dating: LMP consistent with 9 week ultrasound  [x] Initial BMI: 19  [x] Prenatal Labs: O+, rubella immune, Hgb 11.5 (second trimester 11.0)  [x] Pap: NILM/neg HPV 2023  [x] Aneuploidy Screening:  initial cfDNA result inconclusive, repeat was risk reducing, microarray was normal  [x] Baby ASA: No  [x] Anatomy US: Normal fetal anatomy, large fibroids   [x] 1hr GCT at 24-28wks: normal at 111  [x] Tdap (27-36wks): 23  [x] Flu Shot: Discussed, declines  [x] COVID vaccine: Discussed, declines  [x] GBS at 36 wks: collected 24  [x] Breastfeeding: Yes, planning to try  [x] PPBC: Reviewed options, desires POP  [x] 39 weeks discussion of IOL vs. Expectant management: Recommend delivery between 38-39 weeks gestation for FGR with EFW in 9%tile and AC 1%tile. Desires 24 and shew ill be 38+4. Dates requested  [x] Mode of delivery: vaginal            Relevant Orders    Group B Streptococcus (GBS) Prenatal Screen, Culture    Sickle cell trait in mother affecting pregnancy (CMS/HCC)    Overview     Partner has not been tested.   Genetic testing showed low risk of baby having sickle cell disease         Leiomyoma of uterus affecting pregnancy in second trimester    Overview     Large uterine fibroids, largest [id number]  Stable size  Plan for serial growth this pregnancy         Threatened  labor, antepartum    Overview     : right sided abdominal pain, cervix 80/-3 -> 50/-3   S/p BMZ -12/15         Fetal growth restriction antepartum    Overview     12/15: growth with EFW 1080g 12%, AC<1%  1/3/24: EFW 18%tile, AC 2%tile, normal dopplers  24: EFW 1981g 9%tile, AC <1%tile, normal dopplers  Continue weekly BPPs  and dopplers   Serial growth US          Positive urine drug screen    Overview     Patient did not consent to UDS on  and ordered for unclear purpose  Patient adamantly denies any history of substance use, no opioids given at OSH, not taking home meds besides PNV and ASA, no recent exposures she is aware of    Discussed with pathologist and summary below: All confirmatory testing has been positive.   23: [facility] Screen positive fentanyl and opiates,  confirmation positive fentanyl metabolite and morphine, [facility] confirmation positive fentanyl metabolite, [facility] confirmation positive for Morphine and fentanyl metabolite    24: [facility] Screen positive fentanyl, [facility] confirmation positive fentanyl metabolite, [facility] confirmation positive fentanyl metabolite    24: [facility] screen positive fentanyl,  confirmation negative fentanyl (cutoff of 2.5 ng/mL), [facility] confirmation positive fentanyl (Norfentanyl at 1.3 ng/mL, cutoff 1.0 ng/mL).  This discrepancy is explained due to the fact that the [facility] screen cutoff is 1.0 ng/mL,  confirmation cutoff is 2.5 ng/mL.  The concentration fell between the screen and our confirmatory cutoff - a level is present but below our detection limit.  The [facility] test has lower cutoff, so the result was positive.  The confirmatory test is being reported as POSITIVE using the [place] result.            Current Assessment & Plan     Repeat drug screen collected today per patient preference         Relevant Orders    Drug Screen, Urine With Reflex to Confirmation        Orders Placed This Encounter   Procedures    Group B Streptococcus (GBS) Prenatal Screen, Culture     Order Specific Question:   Release result to MyChart     Answer:   Immediate [1]    Drug Screen, Urine With Reflex to Confirmation     Order Specific Question:   Release result to MyChart     Answer:   Immediate [1]      Requested IOL 24  RTC in 1 week      SUBJECTIVE    HPI: Emilia Link is a 30 y.o.  at 35w6d  strengthening/ROM; Therapeutic exercise; Endurance training;Cognitive reorientation   Progress Slow progress, decreased activity tolerance   Recommendation   OT Discharge Recommendation 24 hour supervision/assist   OT Therapy Minutes   OT Time In 1230   OT Time Out 1310   OT Total Time (minutes) 40   OT Mode of treatment - Individual (minutes) 40   OT Mode of treatment - Concurrent (minutes) 0   OT Mode of treatment - Group (minutes) 0   OT Mode of treatment - Co-treat (minutes) 0   OT Mode of Teatment - Total time(minutes) 40 minutes here for RPNV. Denies contractions, bleeding, or LOF. Reports normal fetal movement. Patient without complaints.      OBJECTIVE    Visit Vitals  /50   Wt 53.1 kg (117 lb)   LMP 05/30/2023   BMI 22.11 kg/m²   OB Status Pregnant   Smoking Status Never   BSA 1.51 m²        Tati Morrison MD

## 2024-12-12 NOTE — PROGRESS NOTES
December 12, 2024     Patient: Jann Banuelos  YOB: 2009  Date of Visit: 12/12/2024      To Whom it May Concern:    Jann Banuelos is under my professional care. Jann was seen in my office on 12/12/2024. Jann should not return to gym class or sports until cleared by a physician.    If you have any questions or concerns, please don't hesitate to call.         Sincerely,          Rey Tamez MD        CC: No Recipients   Assessment and Plan:     Problem List Items Addressed This Visit          Digestive    Copious oral secretions       Endocrine    Controlled type 2 diabetes mellitus without complication, without long-term current use of insulin ([facility]) - Primary    Relevant Orders    Hemoglobin A1C       Cardiovascular and Mediastinum    Acute deep vein thrombosis (DVT) of brachial vein of right upper extremity ([facility])    CVA (cerebral vascular accident) ([address])    Chronic systolic congestive heart failure (HCC)  Medication management       Nervous and Auditory    Vascular dementia ([address])    Hemiplegia and hemiparesis following cerebral infarction affecting right dominant side (HCC)       Hematopoietic and Hemostatic    Thrombocytopenia ([facility])    Relevant Orders    CBC and differential       Genitourinary    CKD (chronic kidney disease) stage 3, GFR 30-59 ml/min ([facility])    Relevant Orders    Comprehensive metabolic panel       Other    History of permanent cardiac pacemaker placement    Schaefer catheter in place    Encounter for immunization    Relevant Orders    influenza vaccine, high-dose, PF 0.7 mL (FLUZONE HIGH-DOSE) (Completed)     BMI Counseling: There is no height or weight on file to calculate BMI. Follow-up plan was not completed due to elderly patient (72 years old) where weight reduction/weight gain would complicate underlying health condition such as: mental illness, dementia, or confusion. Bed bound and hospice and tube feeding    Falls Plan of Care: not completed because patient not ambulatory, bed ridden, immobile, confined to chair, or wheelchair bound. Urinary Incontinence Plan of Care: Tube feeding and schaefer catheter. Preventive health issues were discussed with patient, and age appropriate screening tests were ordered as noted in patient's After Visit Summary.   Personalized health advice and appropriate referrals for health education or preventive services given if needed, as noted in patient's After Visit Summary. History of Present Illness:     Patient presents for a Medicare Wellness Visit    Patient seen at home today for AWV. She is on hospice but her son stated they are not happy with what hospice wants them to do ie. -decrease amount of tube feeding etc..  The children of patient are willing to keep Minh Jimenez alive as long as she can live. She is bed bound, non verbal, contracted, fed by gastrostomy tube, and has a foly catheter. She has no pain response and often has cough and abnormal lung sounds as well as loose stools. Her children take shifts with her and take very good care of her. I was unable to draw blood from her because slightly dehydrated. Her skin is clear. Recommendations given on bowel regimine. Flu vaccine given left deltoid. Patient is home bound. > 40 minutes spent on detailed history, physical exam and assessment, planning and diagnosing and educating her son. Her schaefer is changed monthly by VN.          Patient Care Team:  Tuesday GAYATRI Navarro as PCP - General (Nurse Practitioner)  MD Vu Acuna MD as Endoscopist     Review of Systems:     Review of Systems   Unable to perform ROS: Dementia      Problem List:     Patient Active Problem List   Diagnosis    Acute deep vein thrombosis (DVT) of brachial vein of right upper extremity ([address])    H/O mitral valve replacement    CVA (cerebral vascular accident) ([address])    Controlled type 2 diabetes mellitus without complication, without long-term current use of insulin ([address])    Pacemaker    Acid reflux    Hypertension    Constipation    Cardio-embolic left MCA stroke ([address])    History of ischemic right MCA stroke    History of CVA (cerebrovascular accident)    History of subarachnoid hemorrhage    CKD (chronic kidney disease) stage 3, GFR 30-59 ml/min ([facility])    History of DVT (deep vein thrombosis)    History of pacemaker    Chronic systolic congestive heart failure (HCC)    Colonic thickening    Anemia    Encephalopathy Seizure-like activity (HCC)    HCAP (healthcare-associated pneumonia)    Shoulder pain, right    Abnormal TSH    Dysphagia    Esophageal reflux    Polyp of colon    Arthritis of right glenohumeral joint    Bilateral carotid artery stenosis    Vascular dementia (HCC)    Mood disorder (HCC)    Ambulatory dysfunction    History of stroke    TIA (transient ischemic attack)    Arthralgia of hip, right    Arthritis    Carpal tunnel syndrome, left    External hemorrhoids    Hypercholesterolemia    Malignant neoplasm without specification of site (HCC)    Meningioma (HCC)    Pain in both hands    Spondylosis of cervical region without myelopathy or radiculopathy    Type 2 or unspecified type diabetes mellitus    Overlap syndrome (HCC)    Chronic renal disease, stage IV (HCC)    Paroxysmal A-fib (HCC)    Thrombocytopenia (HCC)    Coronary artery disease with angina pectoris, unspecified vessel or lesion type, unspecified whether native or transplanted heart ([address])    Cardiomyopathy, dilated ([address])    Encounter for support and coordination of transition of care    COPD with asthma    Positive blood culture    CLEANING (dyspnea on exertion)    Gastroesophageal reflux disease with esophagitis    Primary osteoarthritis of hip    Vitamin D deficiency    Tracheobronchitis    History of permanent cardiac pacemaker placement    S/P MVR (mitral valve replacement)    Hypertension, essential    Stage 3 chronic kidney disease ([address])    Type 2 diabetes mellitus without complication, without long-term current use of insulin (HCC)    Asthma exacerbation    BMI 29.0-29.9,adult    Gross hematuria    Copious oral secretions    At risk for stress ulcer    Uses feeding tube    Chronic diastolic CHF (congestive heart failure) ([address])    Sears catheter in place    Long term (current) use of anticoagulants    Other lack of coordination    Unspecified systolic (congestive) heart failure (HCC)    Hemiplegia and hemiparesis following cerebral infarction affecting right dominant side ([address])    Encounter for immunization    Presence of prosthetic heart valve      Past Medical and Surgical History:     Past Medical History:   Diagnosis Date    Acid reflux     on occ    Arthritis     DJD right hip replaced    Asthma     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[address]) 2007    x 2 lesions with no change    Cancer ([address]) 2007    colonic polyps, no surgery done    COPD (chronic obstructive pulmonary disease) (HCC)     Deep vein thrombosis (DVT) of left upper extremity ([address]) 12/11/2017    Dementia ([address])     Diabetes mellitus ([address])     type 2    Diverticulosis     Edema     in legs on occ    Hypertension     on occ    Language barrier     speaks Frisian & broken english    Stroke [facility])      Past Surgical History:   Procedure Laterality Date    COLON SURGERY      COLONOSCOPY      COLONOSCOPY N/A 11/2/2016    Procedure: COLONOSCOPY;  Surgeon: Simran La MD;  Location: [address] [place] [address] GI LAB; Service:     ESOPHAGOGASTRODUODENOSCOPY N/A 11/2/2016    Procedure: ESOPHAGOGASTRODUODENOSCOPY (EGD); Surgeon: Simran La MD;  Location: [facility] GI LAB; Service:     [place]uli [place] / Mary Kay Smoke / Garcia Ang      IR STROKE ALERT  7/22/2022    JOINT REPLACEMENT Right 02/2015    hip    JOINT REPLACEMENT Left     knee    JOINT REPLACEMENT Right     knee    MITRAL VALVE REPLACEMENT      DC COLONOSCOPY FLX DX W/COLLJ SPEC WHEN PFRMD N/A 8/9/2018    Procedure: EGD AND COLONOSCOPY;  Surgeon: Simran La MD;  Location: [facility]; Service: Gastroenterology    DC NEUROPLASTY &/TRANSPOS MEDIAN NRV CARPAL Daniel Falguni Left 1/28/2016    Procedure: RELEASE CARPAL TUNNEL;  Surgeon: Adam Jerez MD;  Location: [facility] MAIN OR;  Service: Orthopedics    TUBAL LIGATION      VARICOSE VEIN SURGERY Bilateral       Family History:     Family History   Problem Relation Age of Onset    Cancer Mother         throat      Social History:     Social History     Socioeconomic History    Marital status:       Spouse name: Not on file Number of children: Not on file    Years of education: Not on file    Highest education level: Not on file   Occupational History    Not on file   Tobacco Use    Smoking status: Former     Packs/day: 0.25     Years: 10.00     Total pack years: 2.50     Types: Cigarettes     Quit date: 46     Years since quittin.8    Smokeless tobacco: Never   Vaping Use    Vaping Use: Never used   Substance and Sexual Activity    Alcohol use: Never    Drug use: No    Sexual activity: Not Currently   Other Topics Concern    Not on file   Social History Narrative    Not on file     Social Determinants of Health     Financial Resource Strain: Low Risk  (10/18/2023)    Overall Financial Resource Strain (CARDIA)     Difficulty of Paying Living Expenses: Not hard at all   Food Insecurity: No Food Insecurity (2022)    Hunger Vital Sign     Worried About Running Out of Food in the Last Year: Never true     [address] in the Last Year: Never true   Transportation Needs: No Transportation Needs (10/18/2023)    PRAPARE - Transportation     Lack of Transportation (Medical): No     Lack of Transportation (Non-Medical):  No   Physical Activity: Not on file   Stress: Not on file   Social Connections: Not on file   Intimate Partner Violence: Not on file   Housing Stability: Low Risk  (2022)    Housing Stability Vital Sign     Unable to Pay for Housing in the Last Year: No     Number of Places Lived in the Last Year: 1     Unstable Housing in the Last Year: No      Medications and Allergies:     Current Outpatient Medications   Medication Sig Dispense Refill    Linzess 290 MCG CAPS 290 mcg by Gastrostomy Tube route every morning      acetaminophen (TYLENOL) 160 mg/5 mL suspension 650 mg by Per G Tube route every 4 (four) hours as needed for mild pain      albuterol (2.5 mg/3 mL) 0.083 % nebulizer solution Take 3 mL (2.5 mg total) by nebulization every 6 (six) hours as needed for wheezing or shortness of breath 75 mL 3 apixaban (ELIQUIS) 2.5 mg 1 tablet (2.5 mg total) by Per G Tube route 2 (two) times a day 180 tablet 3    furosemide (LASIX) 20 mg tablet 3 tablets (60 mg total) by Per G Tube route daily 180 tablet 0    glycerin, pediatric, 1.2 g rectal suppository Insert 1 suppository into the rectum daily as needed      glycopyrrolate (ROBINUL) 1 mg tablet TAKE 1 TABLET TWICE DAILY 180 tablet 3    insulin glargine (LANTUS) 100 units/mL subcutaneous injection Inject 11 Units under the skin daily 10 mL 3    Insulin Pen Needle (Pen Needles) 30G X 5 MM MISC Use in the morning 100 each 3    Lantus SoloStar 100 units/mL SOPN Inject 0.11 mL (11 Units total) under the skin in the morning 5 mL 3    magnesium hydroxide (MILK OF MAGNESIA) 400 mg/5 mL oral suspension Take 60 mL by mouth in the morning      polyethylene glycol (MIRALAX) 17 g packet Take 17 g by mouth daily as needed (constipation - 2nd line) for up to 7 days 7 each 0    senna (SENOKOT) 8.6 MG tablet Take 1 tablet by mouth as needed for constipation (1 tavlet at bed time as needed)       No current facility-administered medications for this visit.      Allergies   Allergen Reactions    Glimepiride Rash    Guaifenesin-Codeine Hallucinations    Heparin Other (See Comments)     Thrombocytopenia      Vancomycin      Red man syndrome    Guaifenesin Diarrhea      Immunizations:     Immunization History   Administered Date(s) Administered    COVID-19 MODERNA VACC 0.5 ML IM 05/25/2021, 06/22/2021    COVID-19 Pfizer vac (Bab[place]unde-sucrose, gray cap) 12 yr+ IM 08/03/2022, 08/03/2022    Influenza Split High Dose Preservative Free IM 10/22/2020    Influenza, high dose seasonal 0.7 mL 11/05/2018, 10/01/2021, 10/17/2023    Pneumococcal Polysaccharide PPV23 02/22/2015    Unknown 05/25/2021, 06/22/2021, 08/03/2022, 08/09/2022, 08/11/2022, 08/24/2022    influenza, trivalent, adjuvanted 10/23/2019      Health Maintenance:         Topic Date Due    Colorectal Cancer Screening  10/15/2025 (Originally 9/15/1978)     There are no preventive care reminders to display for this patient. Medicare Screening Tests and Risk Assessments:         Historian  Patient cannot answer questions due to cognitive impairment, intelluctual disability, or expressive limitations. Information provided by: family. Health Risk Assessment:   Patient rates overall health as fair. Patient feels that their physical health rating is same. Pain experienced in the last 7 days has been none. Fall Risk Screening: In the past year, patient has experienced: no history of falling in past year      Urinary Incontinence Screening:   Patient has not leaked urine accidently in the last six months. Sears catheter    Home Safety:  Bed bound    Nutrition:   Jevity 1.2 with fiber continuously at 36ml/hr  H2O 1180 cc daily    Medications:   Patient is currently taking over-the-counter supplements. OTC medications include: see medication list. Patient is not able to manage medications. Activities of Daily Living (ADLs)/Instrumental Activities of Daily Living (IADLs):   Walk and transfer into and out of bed and chair?: No  Dress and groom yourself?: No    Bathe or shower yourself?: No    Feed yourself?  No  Do your laundry/housekeeping?: No  Manage your money, pay your bills and track your expenses?: No  Make your own meals?: No    Do your own shopping?: No    ADL comments: Complete care and bed bound    Previous Hospitalizations:   Any hospitalizations or ED visits within the last 12 months?: Yes      Hospitalization Comments: covid    Advance Care Planning:   Living will: No    Advanced directive counseling given: Yes    End of Life Decisions reviewed with patient: No      Comments: Patient is on hospice    Cognitive Screening:   Provider or family/friend/caregiver concerned regarding cognition?: No    PREVENTIVE SCREENINGS      Cardiovascular Screening:    General: Screening Not Indicated and History Lipid Disorder      Diabetes Screening: General: Screening Not Indicated and History Diabetes      Colorectal Cancer Screening:     General: Screening Not Indicated      Breast Cancer Screening:     General: Screening Not Indicated      Cervical Cancer Screening:    General: Screening Not Indicated      Osteoporosis Screening:    General: Screening Not Indicated      Abdominal Aortic Aneurysm (AAA) Screening:        General: Screening Not Indicated      Lung Cancer Screening:     General: Screening Not Indicated      Hepatitis C Screening:    General: Screening Not Indicated    Screening, Brief Intervention, and Referral to Treatment (SBIRT)    Screening  Typical number of drinks in a day: 0  Typical number of drinks in a week: 0  Interpretation: Low risk drinking behavior. No results found. Physical Exam:     /60   Pulse 74   Temp (!) 96.3 °F (35.7 °C)   Resp 18   LMP  (LMP Unknown)     Physical Exam  Constitutional:       General: She is not in acute distress. Appearance: She is obese. She is not toxic-appearing. HENT:      Head: Normocephalic and atraumatic. Right Ear: External ear normal.      Left Ear: External ear normal.      Nose: Nose normal. No congestion. Mouth/Throat:      Mouth: Mucous membranes are moist.   Eyes:      General:         Right eye: No discharge. Left eye: No discharge. Conjunctiva/sclera: Conjunctivae normal.   Neck:      Comments: Rigidity due to contractures  Cardiovascular:      Rate and Rhythm: Normal rate and regular rhythm. Heart sounds: Normal heart sounds. No murmur heard. Pulmonary:      Effort: Pulmonary effort is normal. No respiratory distress. Breath sounds: No stridor. Rhonchi present. No wheezing or rales. Comments: Rhonchi RLL  Abdominal:      General: Bowel sounds are normal.      Palpations: Abdomen is soft.       Comments: Gastrostomy tube in abdomen-intact   Genitourinary:     Comments: Sears catheter  Loose BM-incontinent  Musculoskeletal: Cervical back: Rigidity present. Right lower leg: No edema. Left lower leg: No edema. Comments: Multiple contractures   Skin:     General: Skin is warm and dry. Findings: No lesion or rash.    Neurological:      Comments: Bed bound  Non verbal   Psychiatric:      Comments: Non verbal  No agitation  Rarely opens her eyes  No deep pain sensation          GAYATRI Rodarte